# Patient Record
Sex: FEMALE | Race: WHITE | NOT HISPANIC OR LATINO | Employment: OTHER | URBAN - METROPOLITAN AREA
[De-identification: names, ages, dates, MRNs, and addresses within clinical notes are randomized per-mention and may not be internally consistent; named-entity substitution may affect disease eponyms.]

---

## 2018-04-05 LAB
ALBUMIN SERPL BCP-MCNC: 4.7 G/DL (ref 3.5–5.7)
ALP SERPL-CCNC: 92 IU/L (ref 40–150)
ALT SERPL W P-5'-P-CCNC: 15 IU/L (ref 0–50)
ANION GAP SERPL CALCULATED.3IONS-SCNC: 11.6 MM/L
AST SERPL W P-5'-P-CCNC: 16 U/L (ref 7–26)
BASOPHILS # BLD AUTO: 0.1 X3/UL (ref 0–0.3)
BASOPHILS # BLD AUTO: 0.9 % (ref 0–2)
BILIRUB SERPL-MCNC: 0.5 MG/DL (ref 0.3–1)
BUN SERPL-MCNC: 14 MG/DL (ref 7–25)
CALCIUM SERPL-MCNC: 10.4 MG/DL (ref 8.6–10.5)
CHLORIDE SERPL-SCNC: 100 MM/L (ref 98–107)
CHOLEST SERPL-MCNC: 216 MG/DL (ref 0–200)
CO2 SERPL-SCNC: 32 MM/L (ref 21–31)
CREAT SERPL-MCNC: 0.72 MG/DL (ref 0.6–1.2)
DEPRECATED RDW RBC AUTO: 13 % (ref 11.5–14.5)
EGFR (HISTORICAL): > 60 GFR
EGFR AFRICAN AMERICAN (HISTORICAL): > 60 GFR
EOSINOPHIL # BLD AUTO: 0.2 X3/UL (ref 0–0.5)
EOSINOPHIL NFR BLD AUTO: 2.5 % (ref 0–5)
GLUCOSE (HISTORICAL): 102 MG/DL (ref 65–99)
HCT VFR BLD AUTO: 45.4 % (ref 37–47)
HDLC SERPL-MCNC: 43 MG/DL (ref 40–60)
HGB BLD-MCNC: 15.2 G/DL (ref 12–16)
LDLC SERPL CALC-MCNC: 139.5 MG/DL (ref 75–193)
LYMPHOCYTES # BLD AUTO: 2.7 X3/UL (ref 1.2–4.2)
LYMPHOCYTES NFR BLD AUTO: 30.8 % (ref 20.5–51.1)
MCH RBC QN AUTO: 29.5 PG (ref 26–34)
MCHC RBC AUTO-ENTMCNC: 33.5 G/DL (ref 31–36)
MCV RBC AUTO: 88.1 FL (ref 81–99)
MONOCYTES # BLD AUTO: 0.5 X3/UL (ref 0–1)
MONOCYTES NFR BLD AUTO: 5.6 % (ref 1.7–12)
NEUTROPHILS # BLD AUTO: 5.2 X3/UL (ref 1.4–6.5)
NEUTS SEG NFR BLD AUTO: 60.2 % (ref 42.2–75.2)
OSMOLALITY, SERUM (HISTORICAL): 278 MOSM (ref 262–291)
PLATELET # BLD AUTO: 376 X3/UL (ref 130–400)
PMV BLD AUTO: 7.5 FL (ref 8.6–11.7)
POTASSIUM SERPL-SCNC: 4.6 MM/L (ref 3.5–5.5)
RBC # BLD AUTO: 5.16 X6/UL (ref 3.9–5.2)
SODIUM SERPL-SCNC: 139 MM/L (ref 134–143)
T4 TOTAL (HISTORICAL): 10.01 UG/DL (ref 6.1–12.2)
TOTAL PROTEIN (HISTORICAL): 8.1 G/DL (ref 6.4–8.9)
TRIGL SERPL-MCNC: 168 MG/DL (ref 44–166)
TSH SERPL DL<=0.05 MIU/L-ACNC: 2.06 UIU/M (ref 0.45–5.33)
VLDL CHOLESTEROL (HISTORICAL): 34 MG/DL (ref 5–51)
WBC # BLD AUTO: 8.7 X3/UL (ref 4.8–10.8)

## 2018-04-07 LAB
PTH-INTACT SERPL-MCNC: NORMAL PG/ML
QUESTION/PROBLEM (HISTORICAL): NORMAL

## 2018-04-08 LAB
AMPHETAMINES (HISTORICAL): NEGATIVE NG/ML
BARBITURATES (HISTORICAL): NEGATIVE NG/ML
BENZODIAZEPINES (HISTORICAL): NEGATIVE NG/ML
CANNABINOIDS (HISTORICAL): NEGATIVE NG/ML
COCAINE (METAB.), URINE (HISTORICAL): NEGATIVE NG/ML
CREATININE, URINE (HISTORICAL): 83 MG/DL (ref 20–300)
Lab: NEGATIVE NG/ML
Lab: NEGATIVE PG/ML
Lab: POSITIVE NG/ML
MEPERIDINE (HISTORICAL): NEGATIVE NG/ML
METHADONE (HISTORICAL): NEGATIVE NG/ML
OPIATES (HISTORICAL): POSITIVE NG/ML
PH UR STRIP.AUTO: 7.3 [PH] (ref 4.5–8.9)
PLEASE NOTE (HISTORICAL): NORMAL
PROPOXYPHENE (HISTORICAL): NEGATIVE NG/ML
TRAMADOL (HISTORICAL): NEGATIVE NG/ML

## 2018-07-19 ENCOUNTER — TRANSCRIBE ORDERS (OUTPATIENT)
Dept: LAB | Facility: MEDICAL CENTER | Age: 58
End: 2018-07-19

## 2018-07-19 ENCOUNTER — APPOINTMENT (OUTPATIENT)
Dept: LAB | Facility: MEDICAL CENTER | Age: 58
End: 2018-07-19
Payer: MEDICARE

## 2018-07-19 DIAGNOSIS — M19.90 ARTHRITIS: ICD-10-CM

## 2018-07-19 DIAGNOSIS — G62.9 NEUROPATHY: Primary | ICD-10-CM

## 2018-07-19 DIAGNOSIS — F31.9 BIPOLAR AFFECTIVE DISORDER, REMISSION STATUS UNSPECIFIED (HCC): ICD-10-CM

## 2018-07-19 DIAGNOSIS — E53.8 LOW VITAMIN B12 LEVEL: ICD-10-CM

## 2018-07-19 DIAGNOSIS — G62.9 NEUROPATHY: ICD-10-CM

## 2018-07-19 LAB
ANION GAP SERPL CALCULATED.3IONS-SCNC: 5 MMOL/L (ref 4–13)
BUN SERPL-MCNC: 14 MG/DL (ref 5–25)
CALCIUM SERPL-MCNC: 9.9 MG/DL (ref 8.3–10.1)
CHLORIDE SERPL-SCNC: 106 MMOL/L (ref 100–108)
CO2 SERPL-SCNC: 27 MMOL/L (ref 21–32)
CREAT SERPL-MCNC: 0.81 MG/DL (ref 0.6–1.3)
CRP SERPL QL: 14.7 MG/L
ERYTHROCYTE [DISTWIDTH] IN BLOOD BY AUTOMATED COUNT: 13 % (ref 11.6–15.1)
GFR SERPL CREATININE-BSD FRML MDRD: 81 ML/MIN/1.73SQ M
GLUCOSE SERPL-MCNC: 83 MG/DL (ref 65–140)
HCT VFR BLD AUTO: 48.3 % (ref 34.8–46.1)
HGB BLD-MCNC: 15.5 G/DL (ref 11.5–15.4)
MCH RBC QN AUTO: 29 PG (ref 26.8–34.3)
MCHC RBC AUTO-ENTMCNC: 32.1 G/DL (ref 31.4–37.4)
MCV RBC AUTO: 90 FL (ref 82–98)
PLATELET # BLD AUTO: 365 THOUSANDS/UL (ref 149–390)
PMV BLD AUTO: 9.6 FL (ref 8.9–12.7)
POTASSIUM SERPL-SCNC: 3.8 MMOL/L (ref 3.5–5.3)
RBC # BLD AUTO: 5.35 MILLION/UL (ref 3.81–5.12)
SODIUM SERPL-SCNC: 138 MMOL/L (ref 136–145)
WBC # BLD AUTO: 9.7 THOUSAND/UL (ref 4.31–10.16)

## 2018-07-19 PROCEDURE — 36415 COLL VENOUS BLD VENIPUNCTURE: CPT

## 2018-07-19 PROCEDURE — 85027 COMPLETE CBC AUTOMATED: CPT

## 2018-07-19 PROCEDURE — 86618 LYME DISEASE ANTIBODY: CPT

## 2018-07-19 PROCEDURE — 86140 C-REACTIVE PROTEIN: CPT

## 2018-07-19 PROCEDURE — 86430 RHEUMATOID FACTOR TEST QUAL: CPT

## 2018-07-19 PROCEDURE — 80048 BASIC METABOLIC PNL TOTAL CA: CPT

## 2018-07-20 LAB
B BURGDOR IGG SER IA-ACNC: 0.2
B BURGDOR IGM SER IA-ACNC: 0.58
RHEUMATOID FACT SER QL LA: NEGATIVE

## 2018-11-27 ENCOUNTER — TRANSCRIBE ORDERS (OUTPATIENT)
Dept: ADMINISTRATIVE | Facility: HOSPITAL | Age: 58
End: 2018-11-27

## 2018-11-27 DIAGNOSIS — R09.89 CAROTID BRUIT, UNSPECIFIED LATERALITY: Primary | ICD-10-CM

## 2018-11-27 DIAGNOSIS — Z87.891 HX OF SMOKING: ICD-10-CM

## 2018-12-21 ENCOUNTER — HOSPITAL ENCOUNTER (EMERGENCY)
Facility: HOSPITAL | Age: 58
Discharge: HOME | End: 2018-12-21
Payer: MEDICARE

## 2018-12-21 VITALS
RESPIRATION RATE: 18 BRPM | HEART RATE: 106 BPM | TEMPERATURE: 96.9 F | HEIGHT: 70 IN | DIASTOLIC BLOOD PRESSURE: 76 MMHG | SYSTOLIC BLOOD PRESSURE: 126 MMHG | WEIGHT: 180 LBS | BODY MASS INDEX: 25.77 KG/M2 | OXYGEN SATURATION: 98 %

## 2018-12-21 DIAGNOSIS — K04.7 DENTAL ABSCESS: Primary | ICD-10-CM

## 2018-12-21 PROCEDURE — 99282 EMERGENCY DEPT VISIT SF MDM: CPT

## 2018-12-21 RX ORDER — TIZANIDINE 4 MG/1
4 TABLET ORAL EVERY 8 HOURS PRN
COMMUNITY
End: 2021-04-01 | Stop reason: HOSPADM

## 2018-12-21 RX ORDER — IBUPROFEN 600 MG/1
600 TABLET ORAL 2 TIMES DAILY
COMMUNITY
End: 2021-04-01 | Stop reason: HOSPADM

## 2018-12-21 RX ORDER — MORPHINE SULFATE 30 MG/1
30 TABLET, FILM COATED, EXTENDED RELEASE ORAL 2 TIMES DAILY
COMMUNITY
End: 2022-08-03

## 2018-12-21 RX ORDER — GABAPENTIN 300 MG/1
300 CAPSULE ORAL AS NEEDED
COMMUNITY
End: 2022-08-03

## 2018-12-21 RX ORDER — AMOXICILLIN AND CLAVULANATE POTASSIUM 875; 125 MG/1; MG/1
1 TABLET, FILM COATED ORAL EVERY 12 HOURS
Qty: 14 TABLET | Refills: 0 | Status: SHIPPED | OUTPATIENT
Start: 2018-12-21 | End: 2018-12-28

## 2018-12-21 RX ORDER — OXYCODONE HYDROCHLORIDE AND ACETAMINOPHEN 5; 325 MG/1; MG/1
1 TABLET ORAL EVERY 4 HOURS PRN
COMMUNITY
End: 2022-08-03

## 2018-12-21 RX ORDER — CLONAZEPAM 0.5 MG/1
0.5 TABLET ORAL
COMMUNITY
End: 2021-07-13 | Stop reason: CLARIF

## 2018-12-21 RX ORDER — AMOXICILLIN AND CLAVULANATE POTASSIUM 875; 125 MG/1; MG/1
1 TABLET, FILM COATED ORAL ONCE
Status: COMPLETED | OUTPATIENT
Start: 2018-12-21 | End: 2018-12-21

## 2018-12-21 RX ADMIN — AMOXICILLIN AND CLAVULANATE POTASSIUM 1 TABLET: 875; 125 TABLET, FILM COATED ORAL at 15:14

## 2018-12-21 NOTE — ED PROVIDER NOTES
History  Chief Complaint   Patient presents with   [address] [facility], [place], operated by [facility]     Apolinar Arango is a 79-year-old female who came to the emergency department due to tooth pain with started several days prior to arrival associated with left lower facial pain  Patient states that she has done to a dentist several days ago but no definitive treatment has been instituted at this time  Patient has taken Bactrim DS for the last 1 week or so with no significant improvement noted  She denies fever or chills  History provided by:  Patient   used: No    Dental Pain   Location:  Lower  Lower teeth location:  23/LL lateral incisor  Quality:  Aching  Severity:  Moderate  Onset quality:  Gradual  Duration: Several   Timing:  Constant  Progression:  Worsening  Chronicity:  Recurrent  Context: abscess, dental caries and poor dentition    Context: cap still on, not crown fracture, not dental fracture, not enamel fracture, filling intact, not intrusion, not malocclusion, not recent dental surgery and not trauma    Relieved by:  Nothing  Worsened by:  Nothing  Ineffective treatments:  None tried  Associated symptoms: facial pain and gum swelling    Associated symptoms: no congestion, no difficulty swallowing, no drooling, no facial swelling, no fever, no headaches, no neck pain, no neck swelling, no oral bleeding, no oral lesions and no trismus    Risk factors: lack of dental care and smoking        Prior to Admission Medications   Prescriptions Last Dose Informant Patient Reported? Taking?    clonazePAM (KlonoPIN) 0 5 mg tablet   Yes Yes   Sig: Take 0 5 mg by mouth daily at bedtime   gabapentin (NEURONTIN) 300 mg capsule   Yes Yes   Sig: Take 100 mg by mouth as needed   ibuprofen (MOTRIN) 600 mg tablet   Yes Yes   Sig: Take 600 mg by mouth 2 (two) times a day   morphine (MS CONTIN) 30 mg 12 hr tablet   Yes Yes   Sig: Take 30 mg by mouth 2 (two) times a day   oxyCODONE-acetaminophen (PERCOCET) 5-325 mg per tablet   Yes Yes   Sig: Take 1 tablet by mouth every 4 (four) hours as needed for moderate pain   tiZANidine (ZANAFLEX) 4 mg tablet   Yes Yes   Sig: Take 4 mg by mouth every 8 (eight) hours as needed for muscle spasms      Facility-Administered Medications: None       Past Medical History:   Diagnosis Date    Fatty liver        Past Surgical History:   Procedure Laterality Date     SECTION      CHOLECYSTECTOMY      ROTATOR CUFF REPAIR W/ DISTAL CLAVICLE EXCISION Right     TONSILLECTOMY         History reviewed  No pertinent family history  I have reviewed and agree with the history as documented  Social History   Substance Use Topics    Smoking status: Current Every Day Smoker     Packs/day: 1 00     Types: Cigarettes    Smokeless tobacco: Never Used    Alcohol use No        Review of Systems   Constitutional: Negative for fever  HENT: Negative for congestion, drooling, facial swelling and mouth sores  Eyes: Negative  Respiratory: Negative  Cardiovascular: Negative  Gastrointestinal: Negative  Endocrine: Negative  Genitourinary: Negative  Musculoskeletal: Negative for neck pain  Skin: Negative  Allergic/Immunologic: Negative  Neurological: Negative for headaches  Hematological: Negative  Psychiatric/Behavioral: Negative  Physical Exam  Physical Exam   Constitutional: She is oriented to person, place, and time  She appears well-developed and well-nourished  No distress  HENT:   Head: Normocephalic and atraumatic  Right Ear: External ear normal    Left Ear: External ear normal    Nose: Nose normal    Mouth/Throat: Oropharynx is clear and moist  No oropharyngeal exudate  Eyes: Pupils are equal, round, and reactive to light  Conjunctivae and EOM are normal  Right eye exhibits no discharge  Left eye exhibits no discharge  No scleral icterus  Neck: Normal range of motion  Neck supple  No tracheal deviation present  No thyromegaly present     Cardiovascular: Normal rate, regular rhythm, normal heart sounds and intact distal pulses  Pulmonary/Chest: Effort normal and breath sounds normal  No respiratory distress  She has no wheezes  Abdominal: Soft  Bowel sounds are normal  She exhibits no distension  There is no tenderness  Musculoskeletal: Normal range of motion  She exhibits no edema, tenderness or deformity  Lymphadenopathy:     She has no cervical adenopathy  Neurological: She is alert and oriented to person, place, and time  No cranial nerve deficit or sensory deficit  She exhibits normal muscle tone  Coordination normal    Skin: Skin is warm and dry  No rash noted  She is not diaphoretic  No erythema  No pallor  Psychiatric: She has a normal mood and affect  Her behavior is normal  Judgment and thought content normal    Nursing note and vitals reviewed        Vital Signs  ED Triage Vitals [12/21/18 1503]   Temperature Pulse Respirations Blood Pressure SpO2   (!) 96 9 °F (36 1 °C) (!) 106 18 126/76 98 %      Temp Source Heart Rate Source Patient Position - Orthostatic VS BP Location FiO2 (%)   Tympanic Monitor Sitting Right arm --      Pain Score       3           Vitals:    12/21/18 1503   BP: 126/76   Pulse: (!) 106   Patient Position - Orthostatic VS: Sitting       Visual Acuity      ED Medications  Medications   amoxicillin-clavulanate (AUGMENTIN) 875-125 mg per tablet 1 tablet (1 tablet Oral Given 12/21/18 1514)       Diagnostic Studies  Results Reviewed     None                 No orders to display              Procedures  Procedures       Phone Contacts  ED Phone Contact    ED Course                               MDM  Number of Diagnoses or Management Options  Dental abscess: minor  Risk of Complications, Morbidity, and/or Mortality  Presenting problems: minimal  Management options: minimal    Patient Progress  Patient progress: stable    CritCare Time    Disposition  Final diagnoses:   Dental abscess     Time reflects when diagnosis was documented in both MDM as applicable and the Disposition within this note     Time User Action Codes Description Comment    12/21/2018  3:10 PM Eduardo Cade Add [K04 7] Dental abscess       ED Disposition     ED Disposition Condition Comment    Discharge  [address] discharge to home/self care  Condition at discharge: Good        Follow-up Information     Follow up With Specialties Details Why Contact Info    Oral surgeon  In 3 days            Patient's Medications   Discharge Prescriptions    AMOXICILLIN-CLAVULANATE (AUGMENTIN) 875-125 MG PER TABLET    Take 1 tablet by mouth every 12 (twelve) hours for 7 days       Start Date: 12/21/2018End Date: 12/28/2018       Order Dose: 1 tablet       Quantity: 14 tablet    Refills: 0     No discharge procedures on file      ED Provider  Electronically Signed by           Camilo Roman MD  12/21/18 0616

## 2018-12-21 NOTE — DISCHARGE INSTRUCTIONS
Dental Abscess   WHAT YOU NEED TO KNOW:   A dental abscess is a collection of pus in or around a tooth  A dental abscess is caused by bacteria  The bacteria usually enter the tooth when the enamel (outer part of the tooth) is damaged by tooth decay  Bacteria may also enter the tooth through a break or chip in the tooth, or a cut in the gum  Food particles that are stuck between the teeth for a long time may also lead to an abscess  DISCHARGE INSTRUCTIONS:   Return to the emergency department if:   · You have severe pain  · You have trouble breathing because of pain or swelling  Contact your healthcare provider if:   · Your symptoms get worse, even after treatment  · Your mouth is bleeding  · You cannot eat or drink because of pain or swelling  · Your abscess returns  · You have an injury that causes a crack in your tooth  · You have questions or concerns about your condition or care  Medicines: You may  need any of the following:  · Antibiotics  help treat a bacterial infection  · NSAIDs , such as ibuprofen, help decrease swelling, pain, and fever  This medicine is available with or without a doctor's order  NSAIDs can cause stomach bleeding or kidney problems in certain people  If you take blood thinner medicine, always ask your healthcare provider if NSAIDs are safe for you  Always read the medicine label and follow directions  · Acetaminophen  decreases pain and fever  It is available without a doctor's order  Ask how much to take and how often to take it  Follow directions  Read the labels of all other medicines you are using to see if they also contain acetaminophen, or ask your doctor or pharmacist  Acetaminophen can cause liver damage if not taken correctly  Do not use more than 4 grams (4,000 milligrams) total of acetaminophen in one day  · Prescription pain medicine  may be given  Ask your healthcare provider how to take this medicine safely   Some prescription pain medicines contain acetaminophen  Do not take other medicines that contain acetaminophen without talking to your healthcare provider  Too much acetaminophen may cause liver damage  Prescription pain medicine may cause constipation  Ask your healthcare provider how to prevent or treat constipation  · Take your medicine as directed  Contact your healthcare provider if you think your medicine is not helping or if you have side effects  Tell him of her if you are allergic to any medicine  Keep a list of the medicines, vitamins, and herbs you take  Include the amounts, and when and why you take them  Bring the list or the pill bottles to follow-up visits  Carry your medicine list with you in case of an emergency  Self-care:   · Rinse your mouth every 2 hours with salt water  This will help keep the area clean  · Gently brush your teeth twice a day with a soft tooth brush  This will help keep the area clean  · Eat soft foods as directed  Soft foods may cause less pain  Examples include applesauce, yogurt, and cooked pasta  Ask your healthcare provider how long to follow this instruction  · Apply a warm compress to your tooth or gum  Use a cotton ball or gauze soaked in warm water  Remove the compress in 10 minutes or when it becomes cool  Repeat 3 times a day  Prevent another abscess:   · Brush your teeth at least 2 times a day with fluoride toothpaste  · Use dental floss to clean between your teeth at least once a day  · Rinse your mouth with water or mouthwash after meals and snacks  · Chew sugarless gum after meals and snacks  · Limit foods that are sticky and high in sugar such as raisons  Also limit drinks high in sugar, such as soda  · See your dentist every 6 months for dental cleanings and oral exams  Follow up with your healthcare provider in 24 hours: Your healthcare provider will need to check your teeth and gums   Write down your questions so you remember to ask them during your visits  © 2017 [employer] Information is for End User's use only and may not be sold, redistributed or otherwise used for commercial purposes  All illustrations and images included in CareNotes® are the copyrighted property of [employer]  or [employer]  The above information is an  only  It is not intended as medical advice for individual conditions or treatments  Talk to your doctor, nurse or pharmacist before following any medical regimen to see if it is safe and effective for you  Dental Abscess   WHAT YOU NEED TO KNOW:   What is a dental abscess? A dental abscess is a collection of pus in or around a tooth  A dental abscess is caused by bacteria  The bacteria usually enter the tooth when the enamel (outer part of the tooth) is damaged by tooth decay  Bacteria may also enter the tooth through a break or chip in the tooth, or a cut in the gum  Food particles that are stuck between the teeth for a long time may also lead to an abscess  What increases my risk for a dental abscess? · Poor tooth care    · Medical conditions, such as diabetes, gastric reflux, or diseases that weaken the immune system     · Procedures on the tooth or the gums    · Dry mouth or very little saliva     · Smoking or drinking alcohol    · Radiation therapy of the head and neck    · Certain medicines, such as steroids, allergy, or blood pressure medicines  What are the signs and symptoms of a dental abscess? · Toothache, a loose tooth, or a tooth that is very sensitive to pressure or temperature    · Bad breath, unpleasant taste, and drooling    · Fever    · Pain, redness, and swelling of the gums, or swelling of your face and neck    · Pain when you open or close your mouth    · Trouble opening your mouth  How is a dental abscess diagnosed? Your healthcare provider will examine your teeth and gums  He or she will check for pus, redness, swelling, or a mass   You may need an x-ray to check for infection in deeper tissues or broken teeth  How is a dental abscess treated? Treatment helps treat your abscess and prevent more serious problems  · Medicines  may be given to treat a bacterial infection and decrease pain  · Incision and drainage  is a cut in the abscess to allow the pus to drain  A sample of fluid may be collected from your abscess  The fluid is sent to a lab and tested for bacteria  Ask your healthcare provider for more information  · A root canal  is a procedure to remove the bacteria and prevent more infection  It is usually done after an incision and drainage  A filling or crown will be placed over the tooth after you have healed from your root canal      · Tooth removal  may be needed if the infection affects deeper tissues  This is usually done after an incision and drainage  What can I do to care for myself? · Rinse your mouth every 2 hours with salt water  This will help keep the area clean  · Gently brush your teeth twice a day with a soft tooth brush  This will help keep the area clean  · Eat soft foods as directed  Soft foods may cause less pain  Examples include applesauce, yogurt, and cooked pasta  Ask your healthcare provider how long to follow this instruction  · Apply a warm compress to your tooth or gum  Use a cotton ball or gauze soaked in warm water  Remove the compress in 10 minutes or when it becomes cool  Repeat 3 times a day  What can I do to prevent another dental abscess? · Brush your teeth at least 2 times a day with fluoride toothpaste  · Use dental floss to clean between your teeth at least once a day  · Rinse your mouth with water or mouthwash after meals and snacks  · Chew sugarless gum after meals and snacks  · Limit foods that are sticky and high in sugar such as raisons  Also limit drinks high in sugar, such as soda       · See your dentist every 6 months for dental cleanings and oral exams   When should I seek immediate care? · You have severe pain  · You have trouble breathing because of pain or swelling  When should I contact my healthcare provider? · Your symptoms get worse, even after treatment  · Your mouth is bleeding  · You cannot eat or drink because of pain or swelling  · Your abscess returns  · You have an injury that causes a crack in your tooth  · You have questions or concerns about your condition or care  CARE AGREEMENT:   You have the right to help plan your care  Learn about your health condition and how it may be treated  Discuss treatment options with your caregivers to decide what care you want to receive  You always have the right to refuse treatment  The above information is an  only  It is not intended as medical advice for individual conditions or treatments  Talk to your doctor, nurse or pharmacist before following any medical regimen to see if it is safe and effective for you  © 2017 [employer]  Information is for End User's use only and may not be sold, redistributed or otherwise used for commercial purposes  All illustrations and images included in CareNotes® are the copyrighted property of [employer]  or [employer]

## 2019-01-07 ENCOUNTER — HOSPITAL ENCOUNTER (OUTPATIENT)
Dept: CT IMAGING | Facility: HOSPITAL | Age: 59
Discharge: HOME | End: 2019-01-07
Attending: INTERNAL MEDICINE
Payer: MEDICARE

## 2019-01-07 ENCOUNTER — HOSPITAL ENCOUNTER (OUTPATIENT)
Dept: NON INVASIVE DIAGNOSTICS | Facility: HOSPITAL | Age: 59
Discharge: HOME | End: 2019-01-07
Attending: INTERNAL MEDICINE
Payer: MEDICARE

## 2019-01-07 DIAGNOSIS — R09.89 CAROTID BRUIT, UNSPECIFIED LATERALITY: ICD-10-CM

## 2019-01-07 DIAGNOSIS — Z87.891 HX OF SMOKING: ICD-10-CM

## 2019-01-07 PROCEDURE — 93880 EXTRACRANIAL BILAT STUDY: CPT | Performed by: SURGERY

## 2019-01-07 PROCEDURE — 93880 EXTRACRANIAL BILAT STUDY: CPT

## 2020-07-16 ENCOUNTER — TRANSCRIBE ORDERS (OUTPATIENT)
Dept: ADMINISTRATIVE | Facility: HOSPITAL | Age: 60
End: 2020-07-16

## 2020-07-16 ENCOUNTER — TRANSCRIBE ORDERS (OUTPATIENT)
Dept: LAB | Facility: MEDICAL CENTER | Age: 60
End: 2020-07-16

## 2020-07-16 ENCOUNTER — APPOINTMENT (OUTPATIENT)
Dept: LAB | Facility: MEDICAL CENTER | Age: 60
End: 2020-07-16
Payer: MEDICARE

## 2020-07-16 DIAGNOSIS — M25.50 ARTHRALGIA, UNSPECIFIED JOINT: ICD-10-CM

## 2020-07-16 DIAGNOSIS — J44.9 CHRONIC OBSTRUCTIVE PULMONARY DISEASE, UNSPECIFIED COPD TYPE (HCC): ICD-10-CM

## 2020-07-16 DIAGNOSIS — R91.1 PULMONARY NODULE: Primary | ICD-10-CM

## 2020-07-16 DIAGNOSIS — E55.9 VITAMIN D DEFICIENCY: ICD-10-CM

## 2020-07-16 DIAGNOSIS — M25.511 RIGHT SHOULDER PAIN, UNSPECIFIED CHRONICITY: ICD-10-CM

## 2020-07-16 DIAGNOSIS — M54.2 NECK PAIN: ICD-10-CM

## 2020-07-16 DIAGNOSIS — R25.1 TREMOR: Primary | ICD-10-CM

## 2020-07-16 DIAGNOSIS — R91.1 PULMONARY NODULE: ICD-10-CM

## 2020-07-16 DIAGNOSIS — M54.10 RADICULOPATHY, UNSPECIFIED SPINAL REGION: ICD-10-CM

## 2020-07-16 DIAGNOSIS — M54.2 NECK PAIN: Primary | ICD-10-CM

## 2020-07-16 LAB
25(OH)D3 SERPL-MCNC: 34.9 NG/ML (ref 30–100)
ALBUMIN SERPL BCP-MCNC: 3.9 G/DL (ref 3.5–5)
ALP SERPL-CCNC: 99 U/L (ref 46–116)
ALT SERPL W P-5'-P-CCNC: 16 U/L (ref 12–78)
ANION GAP SERPL CALCULATED.3IONS-SCNC: 2 MMOL/L (ref 4–13)
AST SERPL W P-5'-P-CCNC: 11 U/L (ref 5–45)
BILIRUB SERPL-MCNC: 0.36 MG/DL (ref 0.2–1)
BUN SERPL-MCNC: 14 MG/DL (ref 5–25)
CALCIUM ALBUM COR SERPL-MCNC: 10.7 MG/DL (ref 8.3–10.1)
CALCIUM SERPL-MCNC: 10.6 MG/DL (ref 8.3–10.1)
CHLORIDE SERPL-SCNC: 109 MMOL/L (ref 100–108)
CHOLEST SERPL-MCNC: 213 MG/DL (ref 50–200)
CO2 SERPL-SCNC: 30 MMOL/L (ref 21–32)
CREAT SERPL-MCNC: 0.67 MG/DL (ref 0.6–1.3)
CRP SERPL QL: 8.3 MG/L
ERYTHROCYTE [DISTWIDTH] IN BLOOD BY AUTOMATED COUNT: 12.9 % (ref 11.6–15.1)
GFR SERPL CREATININE-BSD FRML MDRD: 97 ML/MIN/1.73SQ M
GLUCOSE P FAST SERPL-MCNC: 88 MG/DL (ref 65–99)
HCT VFR BLD AUTO: 45.8 % (ref 34.8–46.1)
HDLC SERPL-MCNC: 43 MG/DL
HGB BLD-MCNC: 15 G/DL (ref 11.5–15.4)
LDLC SERPL CALC-MCNC: 133 MG/DL (ref 0–100)
MCH RBC QN AUTO: 29.7 PG (ref 26.8–34.3)
MCHC RBC AUTO-ENTMCNC: 32.8 G/DL (ref 31.4–37.4)
MCV RBC AUTO: 91 FL (ref 82–98)
NONHDLC SERPL-MCNC: 170 MG/DL
PLATELET # BLD AUTO: 346 THOUSANDS/UL (ref 149–390)
PMV BLD AUTO: 9.4 FL (ref 8.9–12.7)
POTASSIUM SERPL-SCNC: 4.5 MMOL/L (ref 3.5–5.3)
PROT SERPL-MCNC: 8.3 G/DL (ref 6.4–8.2)
RBC # BLD AUTO: 5.05 MILLION/UL (ref 3.81–5.12)
SODIUM SERPL-SCNC: 141 MMOL/L (ref 136–145)
T4 SERPL-MCNC: 13.1 UG/DL (ref 4.7–13.3)
TRIGL SERPL-MCNC: 186 MG/DL
TSH SERPL DL<=0.05 MIU/L-ACNC: 1.36 UIU/ML (ref 0.36–3.74)
WBC # BLD AUTO: 10.34 THOUSAND/UL (ref 4.31–10.16)

## 2020-07-16 PROCEDURE — 86140 C-REACTIVE PROTEIN: CPT

## 2020-07-16 PROCEDURE — 84443 ASSAY THYROID STIM HORMONE: CPT

## 2020-07-16 PROCEDURE — 80061 LIPID PANEL: CPT

## 2020-07-16 PROCEDURE — 82306 VITAMIN D 25 HYDROXY: CPT

## 2020-07-16 PROCEDURE — 36415 COLL VENOUS BLD VENIPUNCTURE: CPT

## 2020-07-16 PROCEDURE — 80053 COMPREHEN METABOLIC PANEL: CPT

## 2020-07-16 PROCEDURE — 84436 ASSAY OF TOTAL THYROXINE: CPT

## 2020-07-16 PROCEDURE — 85027 COMPLETE CBC AUTOMATED: CPT

## 2021-02-24 ENCOUNTER — HOSPITAL ENCOUNTER (EMERGENCY)
Facility: HOSPITAL | Age: 61
Discharge: HOME | End: 2021-02-25
Attending: EMERGENCY MEDICINE | Admitting: EMERGENCY MEDICINE
Payer: MEDICARE

## 2021-02-24 DIAGNOSIS — R04.0 RIGHT-SIDED EPISTAXIS: Primary | ICD-10-CM

## 2021-02-24 PROCEDURE — 99283 EMERGENCY DEPT VISIT LOW MDM: CPT

## 2021-02-24 PROCEDURE — 99284 EMERGENCY DEPT VISIT MOD MDM: CPT | Performed by: EMERGENCY MEDICINE

## 2021-02-24 PROCEDURE — 30903 CONTROL OF NOSEBLEED: CPT | Performed by: EMERGENCY MEDICINE

## 2021-02-24 RX ORDER — OXYCODONE HYDROCHLORIDE AND ACETAMINOPHEN 5; 325 MG/1; MG/1
1 TABLET ORAL ONCE
Status: COMPLETED | OUTPATIENT
Start: 2021-02-24 | End: 2021-02-24

## 2021-02-24 RX ADMIN — OXYCODONE HYDROCHLORIDE AND ACETAMINOPHEN 1 TABLET: 5; 325 TABLET ORAL at 23:43

## 2021-02-25 VITALS
OXYGEN SATURATION: 94 % | DIASTOLIC BLOOD PRESSURE: 101 MMHG | RESPIRATION RATE: 18 BRPM | TEMPERATURE: 97.9 F | HEART RATE: 110 BPM | BODY MASS INDEX: 25.83 KG/M2 | WEIGHT: 180 LBS | SYSTOLIC BLOOD PRESSURE: 181 MMHG

## 2021-02-25 RX ADMIN — SILVER NITRATE APPLICATORS 1 APPLICATOR: 25; 75 STICK TOPICAL at 00:34

## 2021-02-25 NOTE — ED PROVIDER NOTES
History  Chief Complaint   Patient presents with    Nose Bleed     pt presents with a nose bleed that emilydnelly started at 2010hr today  pt stated she took 3-4 325mg ASA today for pain  HPI        This is a very pleasant, nontoxic, 61-year-old female presents the emergency department with a chief complaint of a right-sided nose bleed which started approximately 8:10 p m  tonight  Patient reports that she ran out of her pain medication specifically her MS Contin therefore she took 4 tablets of aspirin earlier today for pain control  Patient denies any trauma to the area  Patient also denies taking any other blood thinners  Prior to Admission Medications   Prescriptions Last Dose Informant Patient Reported? Taking? clonazePAM (KlonoPIN) 0 5 mg tablet   Yes Yes   Sig: Take 0 5 mg by mouth daily at bedtime   gabapentin (NEURONTIN) 300 mg capsule   Yes Yes   Sig: Take 100 mg by mouth as needed   ibuprofen (MOTRIN) 600 mg tablet Not Taking at Unknown time  Yes No   Sig: Take 600 mg by mouth 2 (two) times a day   morphine (MS CONTIN) 30 mg 12 hr tablet   Yes Yes   Sig: Take 30 mg by mouth 2 (two) times a day   oxyCODONE-acetaminophen (PERCOCET) 5-325 mg per tablet   Yes Yes   Sig: Take 1 tablet by mouth every 4 (four) hours as needed for moderate pain   tiZANidine (ZANAFLEX) 4 mg tablet   Yes Yes   Sig: Take 4 mg by mouth every 8 (eight) hours as needed for muscle spasms      Facility-Administered Medications: None       Past Medical History:   Diagnosis Date    Fatty liver        Past Surgical History:   Procedure Laterality Date     SECTION      CHOLECYSTECTOMY      ROTATOR CUFF REPAIR W/ DISTAL CLAVICLE EXCISION Right     TONSILLECTOMY         History reviewed  No pertinent family history  I have reviewed and agree with the history as documented      E-Cigarette/Vaping    E-Cigarette Use Never User      E-Cigarette/Vaping Substances     Social History     Tobacco Use    Smoking status: Current Every Day Smoker     Packs/day: 1 00     Types: Cigarettes    Smokeless tobacco: Never Used   Substance Use Topics    Alcohol use: No    Drug use: No       Review of Systems   Constitutional: Negative  HENT: Positive for nosebleeds  Eyes: Negative  Respiratory: Negative  Cardiovascular: Negative  Gastrointestinal: Negative  Endocrine: Negative  Genitourinary: Negative  Musculoskeletal: Negative  Skin: Negative  Allergic/Immunologic: Negative  Neurological: Negative  Hematological: Negative  Psychiatric/Behavioral: Negative  Physical Exam  Physical Exam  Vitals signs and nursing note reviewed  Constitutional:       Appearance: Normal appearance  She is normal weight  HENT:      Head: Normocephalic and atraumatic  Comments: Patient maintaining airway maintaining secretions  No stridor  No brawniness under the tongue  Uvula midline without edema  Right Ear: External ear normal       Left Ear: External ear normal       Nose: No nasal deformity, septal deviation, signs of injury, laceration, nasal tenderness, mucosal edema, congestion or rhinorrhea  Right Nostril: Epistaxis present  No septal hematoma or occlusion  Left Nostril: No foreign body, epistaxis, septal hematoma or occlusion  Right Turbinates: Not enlarged  Left Turbinates: Not enlarged  Mouth/Throat:      Mouth: Mucous membranes are moist       Pharynx: Oropharynx is clear  Eyes:      Extraocular Movements: Extraocular movements intact  Conjunctiva/sclera: Conjunctivae normal       Pupils: Pupils are equal, round, and reactive to light  Neck:      Musculoskeletal: Normal range of motion  Cardiovascular:      Rate and Rhythm: Normal rate and regular rhythm  Pulses: Normal pulses  Heart sounds: Normal heart sounds  Pulmonary:      Effort: Pulmonary effort is normal       Breath sounds: Normal breath sounds  Abdominal:      General: Abdomen is flat  Bowel sounds are normal    Musculoskeletal: Normal range of motion  Skin:     General: Skin is warm  Capillary Refill: Capillary refill takes less than 2 seconds  Neurological:      General: No focal deficit present  Mental Status: She is alert and oriented to person, place, and time  Mental status is at baseline  Psychiatric:         Mood and Affect: Mood normal          Behavior: Behavior normal          Thought Content: Thought content normal          Judgment: Judgment normal          Vital Signs  ED Triage Vitals   Temperature Pulse Respirations Blood Pressure SpO2   02/24/21 2206 02/24/21 2206 02/24/21 2206 02/24/21 2206 02/24/21 2206   97 9 °F (36 6 °C) (!) 117 18 (!) 190/108 94 %      Temp Source Heart Rate Source Patient Position - Orthostatic VS BP Location FiO2 (%)   02/24/21 2206 02/24/21 2206 -- 02/24/21 2206 --   Temporal Monitor  Right arm       Pain Score       02/24/21 2343       Worst Possible Pain           Vitals:    02/24/21 2206 02/25/21 0015 02/25/21 0030   BP: (!) 190/108 (!) 186/132 (!) 181/101   Pulse: (!) 117 (!) 111 (!) 110         Visual Acuity      ED Medications  Medications   oxyCODONE-acetaminophen (PERCOCET) 5-325 mg per tablet 1 tablet (1 tablet Oral Given 2/24/21 2343)   silver nitrate-potassium nitrate (ARZOL SILVER NITRATE) 77-66 % applicator 1 applicator (1 applicator Topical Given 2/25/21 0034)       Diagnostic Studies  Results Reviewed     None                 No orders to display              Procedures  Epistaxis management    Date/Time: 2/24/2021 10:14 PM  Performed by: Mikhail Ko DO  Authorized by: Yuko Fair III, DO   Universal Protocol:  Procedure performed by:  Consent: Verbal consent obtained    Risks and benefits: risks, benefits and alternatives were discussed  Consent given by: patient  Timeout called at: 2/24/2021 10:14 PM   Patient identity confirmed: verbally with patient and hospital-assigned identification number      Patient location:  ED  Procedure details:     Treatment site:  R anterior    Hemostasis method:  Rhino rocket and cautery    Approach:  External    Spec Headlamp used: Yes      Treatment complexity:  Extensive    Treatment episode: recurring    Post-procedure details:     Assessment:  No improvement    Patient tolerance of procedure: Tolerated well, no immediate complications  Comments:      See ED course for specifics  ED Course  ED Course as of Feb 25 0222   Wed Feb 24, 2021   2214  History physical completed  Direct pressure clamp applied, no active bleeding noted in posterior pharynx  2238 Clamp taken off, no active bleeding noted posterior pharynx, no identifiable bleeding noted currently, will re-assess  2325 Patient started to re-bleed will need a rhino rocket,   Long history of chronic pain syndrome, patient ran out of her medications and will be following up with her primary care doctor regarding this  Will proceed with 1 dose of p r n  pain medicine  2331 5 5 mm rhino-rocket, placed, started to re bleed, took out rhino-rocket, will apply direct pressure  Thu Feb 25, 2021   0019 Patient reassessed, noted to have small oozing area on inferior turbinate, will  cauterize  0026  Right near cauterized  0037 No active bleeding noted  2269 Patient has no ride home, an coached Randa Garrison can come and pick the patient up at 6:30 am                                               MDM  Number of Diagnoses or Management Options  Right-sided epistaxis:   Diagnosis management comments:  Please see ED course for specifics  Patient has chronic radicular pain in the right shoulder along with a right we scapular where she was given 1 tablet of Percocet secondary to running out of her pain medicine  Reviewed department policy about prescribing narcotics to the patient and will not be able to  Prescribe the medications for the patient    Patient will need to follow-up with a pain management doctor  After the right nare was cauterized, patient declined a re- insertion of a larger rhino rocket  Portions of the record may have been created with voice recognition software  Occasional wrong word or "sound a like" substitutions may have occurred due to the inherent limitations of voice recognition software  Read the chart carefully and recognize, using context, where substitutions have occurred  Disposition  Final diagnoses:   Right-sided epistaxis     Time reflects when diagnosis was documented in both MDM as applicable and the Disposition within this note     Time User Action Codes Description Comment    2/25/2021 12:29 AM Mindi Dance Add [R04 0] Right-sided epistaxis       ED Disposition     ED Disposition Condition Date/Time Comment    Discharge Stable u Feb 25, 2021 12:29 AM Marcia Arreguin discharge to home/self care              Follow-up Information     Follow up With Specialties Details Why Contact Info    Lisset Nayak, DO Otolaryngology   [address] [place] [address], DO Emergency Medicine, Internal Medicine   [address]  [phone]            Discharge Medication List as of 2/25/2021 12:40 AM      CONTINUE these medications which have NOT CHANGED    Details   clonazePAM (KlonoPIN) 0 5 mg tablet Take 0 5 mg by mouth daily at bedtime, Historical Med      gabapentin (NEURONTIN) 300 mg capsule Take 100 mg by mouth as needed, Historical Med      morphine (MS CONTIN) 30 mg 12 hr tablet Take 30 mg by mouth 2 (two) times a day, Historical Med      oxyCODONE-acetaminophen (PERCOCET) 5-325 mg per tablet Take 1 tablet by mouth every 4 (four) hours as needed for moderate pain, Historical Med      tiZANidine (ZANAFLEX) 4 mg tablet Take 4 mg by mouth every 8 (eight) hours as needed for muscle spasms, Historical Med      ibuprofen (MOTRIN) 600 mg tablet Take 600 mg by mouth 2 (two) times a day, Historical Med No discharge procedures on file      PDMP Review     None          ED Provider  Electronically Signed by           Andrew Hernandes III,   02/25/21 0225

## 2021-03-26 ENCOUNTER — HOSPITAL ENCOUNTER (EMERGENCY)
Facility: HOSPITAL | Age: 61
End: 2021-03-27
Attending: EMERGENCY MEDICINE | Admitting: EMERGENCY MEDICINE
Payer: MEDICARE

## 2021-03-26 ENCOUNTER — APPOINTMENT (EMERGENCY)
Dept: RADIOLOGY | Facility: HOSPITAL | Age: 61
End: 2021-03-26
Payer: MEDICARE

## 2021-03-26 DIAGNOSIS — R09.02 HYPOXIA: ICD-10-CM

## 2021-03-26 DIAGNOSIS — J18.9 PNEUMONIA: ICD-10-CM

## 2021-03-26 DIAGNOSIS — D72.829 LEUKOCYTOSIS: ICD-10-CM

## 2021-03-26 DIAGNOSIS — I31.3 PERICARDIAL EFFUSION: ICD-10-CM

## 2021-03-26 DIAGNOSIS — R73.9 HYPERGLYCEMIA: ICD-10-CM

## 2021-03-26 DIAGNOSIS — J96.90 RESPIRATORY FAILURE (HCC): Primary | ICD-10-CM

## 2021-03-26 DIAGNOSIS — R00.0 SINUS TACHYCARDIA: ICD-10-CM

## 2021-03-26 DIAGNOSIS — J90 BILATERAL PLEURAL EFFUSION: ICD-10-CM

## 2021-03-26 PROCEDURE — 94002 VENT MGMT INPAT INIT DAY: CPT

## 2021-03-26 PROCEDURE — 71045 X-RAY EXAM CHEST 1 VIEW: CPT

## 2021-03-26 PROCEDURE — 99291 CRITICAL CARE FIRST HOUR: CPT | Performed by: EMERGENCY MEDICINE

## 2021-03-26 PROCEDURE — 94760 N-INVAS EAR/PLS OXIMETRY 1: CPT

## 2021-03-26 PROCEDURE — 99285 EMERGENCY DEPT VISIT HI MDM: CPT

## 2021-03-26 PROCEDURE — 99292 CRITICAL CARE ADDL 30 MIN: CPT | Performed by: EMERGENCY MEDICINE

## 2021-03-27 ENCOUNTER — APPOINTMENT (EMERGENCY)
Dept: RADIOLOGY | Facility: HOSPITAL | Age: 61
End: 2021-03-27
Payer: MEDICARE

## 2021-03-27 ENCOUNTER — HOSPITAL ENCOUNTER (INPATIENT)
Facility: HOSPITAL | Age: 61
LOS: 5 days | Discharge: HOME HEALTH | DRG: 871 | End: 2021-04-01
Attending: INTERNAL MEDICINE | Admitting: INTERNAL MEDICINE
Payer: MEDICARE

## 2021-03-27 ENCOUNTER — APPOINTMENT (INPATIENT)
Dept: NON INVASIVE DIAGNOSTICS | Facility: HOSPITAL | Age: 61
DRG: 871 | End: 2021-03-27
Payer: MEDICARE

## 2021-03-27 ENCOUNTER — APPOINTMENT (EMERGENCY)
Dept: CT IMAGING | Facility: HOSPITAL | Age: 61
End: 2021-03-27
Payer: MEDICARE

## 2021-03-27 VITALS
RESPIRATION RATE: 28 BRPM | DIASTOLIC BLOOD PRESSURE: 103 MMHG | WEIGHT: 180 LBS | BODY MASS INDEX: 28.25 KG/M2 | HEIGHT: 67 IN | SYSTOLIC BLOOD PRESSURE: 165 MMHG | TEMPERATURE: 97.5 F | HEART RATE: 132 BPM | OXYGEN SATURATION: 95 %

## 2021-03-27 DIAGNOSIS — E87.6 HYPOKALEMIA: ICD-10-CM

## 2021-03-27 DIAGNOSIS — I42.9 CARDIOMYOPATHY, UNSPECIFIED TYPE (HCC): Primary | ICD-10-CM

## 2021-03-27 DIAGNOSIS — J18.9 PNEUMONIA: ICD-10-CM

## 2021-03-27 DIAGNOSIS — I31.3 PERICARDIAL EFFUSION: ICD-10-CM

## 2021-03-27 DIAGNOSIS — Z72.0 TOBACCO ABUSE: ICD-10-CM

## 2021-03-27 DIAGNOSIS — A41.9 SEPSIS (HCC): ICD-10-CM

## 2021-03-27 DIAGNOSIS — R77.8 ELEVATED TROPONIN: ICD-10-CM

## 2021-03-27 PROBLEM — R79.89 ELEVATED TROPONIN: Status: ACTIVE | Noted: 2021-03-27

## 2021-03-27 PROBLEM — I10 HYPERTENSION: Status: ACTIVE | Noted: 2021-03-27

## 2021-03-27 PROBLEM — R73.9 HYPERGLYCEMIA: Status: ACTIVE | Noted: 2021-03-27

## 2021-03-27 PROBLEM — I31.39 PERICARDIAL EFFUSION: Status: ACTIVE | Noted: 2021-03-27

## 2021-03-27 PROBLEM — J96.01 ACUTE RESPIRATORY FAILURE WITH HYPOXIA (HCC): Status: ACTIVE | Noted: 2021-03-27

## 2021-03-27 PROBLEM — G89.29 CHRONIC PAIN: Status: ACTIVE | Noted: 2021-03-27

## 2021-03-27 LAB
ALBUMIN SERPL BCP-MCNC: 4.1 G/DL (ref 3.5–5.7)
ALP SERPL-CCNC: 92 U/L (ref 55–165)
ALT SERPL W P-5'-P-CCNC: 9 U/L (ref 7–52)
ANION GAP SERPL CALCULATED.3IONS-SCNC: 12 MMOL/L (ref 4–13)
ANION GAP SERPL CALCULATED.3IONS-SCNC: 9 MMOL/L (ref 4–13)
APTT PPP: 31 SECONDS (ref 23–37)
ARTERIAL PATENCY WRIST A: YES
ARTERIAL PATENCY WRIST A: YES
AST SERPL W P-5'-P-CCNC: 13 U/L (ref 13–39)
BACTERIA UR QL AUTO: NORMAL /HPF
BASE EXCESS BLDA CALC-SCNC: -1.3 MMOL/L
BASE EXCESS BLDA CALC-SCNC: -3.2 MMOL/L (ref -2–3)
BASOPHILS # BLD AUTO: 0.09 THOUSANDS/ΜL (ref 0–0.1)
BASOPHILS NFR BLD AUTO: 0 % (ref 0–1)
BILIRUB SERPL-MCNC: 0.5 MG/DL (ref 0.2–1)
BILIRUB UR QL STRIP: NEGATIVE
BNP SERPL-MCNC: 1016 PG/ML (ref 1–100)
BUN SERPL-MCNC: 12 MG/DL (ref 5–25)
BUN SERPL-MCNC: 14 MG/DL (ref 7–25)
CALCIUM SERPL-MCNC: 8.2 MG/DL (ref 8.3–10.1)
CALCIUM SERPL-MCNC: 9.6 MG/DL (ref 8.6–10.5)
CHLORIDE SERPL-SCNC: 93 MMOL/L (ref 98–107)
CHLORIDE SERPL-SCNC: 99 MMOL/L (ref 100–108)
CLARITY UR: CLEAR
CO2 SERPL-SCNC: 24 MMOL/L (ref 21–31)
CO2 SERPL-SCNC: 28 MMOL/L (ref 21–32)
COLOR UR: YELLOW
CREAT SERPL-MCNC: 1.01 MG/DL (ref 0.6–1.3)
CREAT SERPL-MCNC: 1.04 MG/DL (ref 0.6–1.2)
EOSINOPHIL # BLD AUTO: 0 THOUSAND/ΜL (ref 0–0.61)
EOSINOPHIL # BLD AUTO: 0.22 THOUSAND/UL (ref 0–0.61)
EOSINOPHIL NFR BLD AUTO: 0 % (ref 0–6)
EOSINOPHIL NFR BLD MANUAL: 1 % (ref 0–6)
ERYTHROCYTE [DISTWIDTH] IN BLOOD BY AUTOMATED COUNT: 13 % (ref 11.6–15.1)
ERYTHROCYTE [DISTWIDTH] IN BLOOD BY AUTOMATED COUNT: 13.6 % (ref 11.5–14.5)
EST. AVERAGE GLUCOSE BLD GHB EST-MCNC: 111 MG/DL
FLUAV RNA RESP QL NAA+PROBE: NEGATIVE
FLUBV RNA RESP QL NAA+PROBE: NEGATIVE
GFR SERPL CREATININE-BSD FRML MDRD: 59 ML/MIN/1.73SQ M
GFR SERPL CREATININE-BSD FRML MDRD: 61 ML/MIN/1.73SQ M
GLUCOSE SERPL-MCNC: 121 MG/DL (ref 65–140)
GLUCOSE SERPL-MCNC: 138 MG/DL (ref 65–140)
GLUCOSE SERPL-MCNC: 150 MG/DL (ref 65–140)
GLUCOSE SERPL-MCNC: 192 MG/DL (ref 65–140)
GLUCOSE SERPL-MCNC: 390 MG/DL (ref 65–99)
GLUCOSE UR STRIP-MCNC: ABNORMAL MG/DL
HBA1C MFR BLD: 5.5 %
HCO3 BLDA-SCNC: 24.4 MMOL/L (ref 22–28)
HCO3 BLDA-SCNC: 25.6 MMOL/L (ref 22–28)
HCT VFR BLD AUTO: 41.1 % (ref 34.8–46.1)
HCT VFR BLD AUTO: 42.4 % (ref 42–47)
HGB BLD-MCNC: 13.4 G/DL (ref 11.5–15.4)
HGB BLD-MCNC: 13.5 G/DL (ref 12–16)
HGB UR QL STRIP.AUTO: ABNORMAL
HOROWITZ INDEX BLDA+IHG-RTO: 100 MM[HG]
HOROWITZ INDEX BLDA+IHG-RTO: 70 MM[HG]
IMM GRANULOCYTES # BLD AUTO: 0.32 THOUSAND/UL (ref 0–0.2)
IMM GRANULOCYTES NFR BLD AUTO: 1 % (ref 0–2)
INR PPP: 1.32 (ref 0.84–1.19)
KETONES UR STRIP-MCNC: NEGATIVE MG/DL
L PNEUMO1 AG UR QL IA.RAPID: NEGATIVE
LACTATE SERPL-SCNC: 1.5 MMOL/L (ref 0.5–2)
LACTATE SERPL-SCNC: 4.9 MMOL/L (ref 0.5–2)
LEUKOCYTE ESTERASE UR QL STRIP: NEGATIVE
LYMPHOCYTES # BLD AUTO: 1.51 THOUSANDS/ΜL (ref 0.6–4.47)
LYMPHOCYTES # BLD AUTO: 29 % (ref 20–51)
LYMPHOCYTES # BLD AUTO: 6.35 THOUSAND/UL (ref 0.6–4.47)
LYMPHOCYTES NFR BLD AUTO: 6 % (ref 14–44)
MCH RBC QN AUTO: 28.7 PG (ref 26–34)
MCH RBC QN AUTO: 29.5 PG (ref 26.8–34.3)
MCHC RBC AUTO-ENTMCNC: 31.9 G/DL (ref 31–37)
MCHC RBC AUTO-ENTMCNC: 32.6 G/DL (ref 31.4–37.4)
MCV RBC AUTO: 90 FL (ref 81–99)
MCV RBC AUTO: 90 FL (ref 82–98)
MONOCYTES # BLD AUTO: 1.1 THOUSAND/UL (ref 0–1.22)
MONOCYTES # BLD AUTO: 1.62 THOUSAND/ΜL (ref 0.17–1.22)
MONOCYTES NFR BLD AUTO: 5 % (ref 4–12)
MONOCYTES NFR BLD AUTO: 6 % (ref 4–12)
NEUTROPHILS # BLD AUTO: 24.13 THOUSANDS/ΜL (ref 1.85–7.62)
NEUTS SEG # BLD: 14.24 THOUSAND/UL (ref 1.81–6.82)
NEUTS SEG NFR BLD AUTO: 65 % (ref 42–75)
NEUTS SEG NFR BLD AUTO: 87 % (ref 43–75)
NITRITE UR QL STRIP: NEGATIVE
NON-SQ EPI CELLS URNS QL MICRO: NORMAL /HPF
NRBC BLD AUTO-RTO: 0 /100 WBCS
O2 CT BLDA-SCNC: 17.9 ML/DL
O2 CT BLDA-SCNC: 18.6 ML/DL (ref 16–23)
OXYHGB MFR BLDA: 94.1 % (ref 94–97)
OXYHGB MFR BLDA: 95.3 % (ref 94–100)
PCO2 BLDA: 51.6 MM HG (ref 36–44)
PCO2 BLDA: 57 MM HG (ref 35–45)
PEEP RESPIRATORY: 6 CM[H2O]
PEEP RESPIRATORY: 6 CM[H2O]
PH BLDA: 7.25 [PH] (ref 7.35–7.45)
PH BLDA: 7.31 [PH] (ref 7.35–7.45)
PH UR STRIP.AUTO: 6.5 [PH]
PLATELET # BLD AUTO: 435 THOUSANDS/UL (ref 149–390)
PLATELET # BLD AUTO: 543 THOUSANDS/UL (ref 149–390)
PLATELET BLD QL SMEAR: ABNORMAL
PMV BLD AUTO: 8.2 FL (ref 8.6–11.7)
PMV BLD AUTO: 9.6 FL (ref 8.9–12.7)
PO2 BLDA: 216 MM HG (ref 80–100)
PO2 BLDA: 82.8 MM HG (ref 75–129)
POTASSIUM SERPL-SCNC: 3.2 MMOL/L (ref 3.5–5.5)
POTASSIUM SERPL-SCNC: 3.5 MMOL/L (ref 3.5–5.3)
PROCALCITONIN SERPL-MCNC: 2.38 NG/ML
PROT SERPL-MCNC: 7.6 G/DL (ref 6.4–8.9)
PROT UR STRIP-MCNC: ABNORMAL MG/DL
PROTHROMBIN TIME: 16.3 SECONDS (ref 11.6–14.5)
RBC # BLD AUTO: 4.55 MILLION/UL (ref 3.81–5.12)
RBC # BLD AUTO: 4.72 MILLION/UL (ref 3.9–5.2)
RBC #/AREA URNS AUTO: NORMAL /HPF
RBC MORPH BLD: NORMAL
RSV RNA RESP QL NAA+PROBE: NEGATIVE
S PNEUM AG UR QL: NEGATIVE
SARS-COV-2 RNA RESP QL NAA+PROBE: NEGATIVE
SODIUM SERPL-SCNC: 129 MMOL/L (ref 134–143)
SODIUM SERPL-SCNC: 136 MMOL/L (ref 136–145)
SP GR UR STRIP.AUTO: 1.02 (ref 1–1.03)
SPECIMEN SOURCE: ABNORMAL
SPECIMEN SOURCE: ABNORMAL
TOTAL CELLS COUNTED SPEC: 100
TROPONIN I SERPL-MCNC: 0.08 NG/ML
TROPONIN I SERPL-MCNC: 0.42 NG/ML
TROPONIN I SERPL-MCNC: 0.49 NG/ML
TROPONIN I SERPL-MCNC: 0.53 NG/ML
TSH SERPL DL<=0.05 MIU/L-ACNC: 3.05 UIU/ML (ref 0.45–5.33)
UROBILINOGEN UR QL STRIP.AUTO: 0.2 E.U./DL
VENT AC: 16
VENT AC: 18
VENT- AC: AC
VT SETTING VENT: 400 ML
VT SETTING VENT: 400 ML
WBC # BLD AUTO: 21.9 THOUSAND/UL (ref 4.8–10.8)
WBC # BLD AUTO: 27.67 THOUSAND/UL (ref 4.31–10.16)
WBC #/AREA URNS AUTO: NORMAL /HPF

## 2021-03-27 PROCEDURE — 99291 CRITICAL CARE FIRST HOUR: CPT | Performed by: INTERNAL MEDICINE

## 2021-03-27 PROCEDURE — 87070 CULTURE OTHR SPECIMN AEROBIC: CPT | Performed by: NURSE PRACTITIONER

## 2021-03-27 PROCEDURE — 99223 1ST HOSP IP/OBS HIGH 75: CPT | Performed by: INTERNAL MEDICINE

## 2021-03-27 PROCEDURE — 36620 INSERTION CATHETER ARTERY: CPT | Performed by: INTERNAL MEDICINE

## 2021-03-27 PROCEDURE — NC001 PR NO CHARGE: Performed by: INTERNAL MEDICINE

## 2021-03-27 PROCEDURE — 81001 URINALYSIS AUTO W/SCOPE: CPT | Performed by: EMERGENCY MEDICINE

## 2021-03-27 PROCEDURE — 80053 COMPREHEN METABOLIC PANEL: CPT | Performed by: EMERGENCY MEDICINE

## 2021-03-27 PROCEDURE — 94640 AIRWAY INHALATION TREATMENT: CPT

## 2021-03-27 PROCEDURE — 03HY32Z INSERTION OF MONITORING DEVICE INTO UPPER ARTERY, PERCUTANEOUS APPROACH: ICD-10-PCS | Performed by: INTERNAL MEDICINE

## 2021-03-27 PROCEDURE — 83605 ASSAY OF LACTIC ACID: CPT | Performed by: EMERGENCY MEDICINE

## 2021-03-27 PROCEDURE — 71275 CT ANGIOGRAPHY CHEST: CPT

## 2021-03-27 PROCEDURE — 84484 ASSAY OF TROPONIN QUANT: CPT | Performed by: NURSE PRACTITIONER

## 2021-03-27 PROCEDURE — 87040 BLOOD CULTURE FOR BACTERIA: CPT | Performed by: NURSE PRACTITIONER

## 2021-03-27 PROCEDURE — 82805 BLOOD GASES W/O2 SATURATION: CPT | Performed by: EMERGENCY MEDICINE

## 2021-03-27 PROCEDURE — 93005 ELECTROCARDIOGRAM TRACING: CPT

## 2021-03-27 PROCEDURE — 84484 ASSAY OF TROPONIN QUANT: CPT | Performed by: EMERGENCY MEDICINE

## 2021-03-27 PROCEDURE — 4A133B1 MONITORING OF ARTERIAL PRESSURE, PERIPHERAL, PERCUTANEOUS APPROACH: ICD-10-PCS | Performed by: INTERNAL MEDICINE

## 2021-03-27 PROCEDURE — 36600 WITHDRAWAL OF ARTERIAL BLOOD: CPT

## 2021-03-27 PROCEDURE — 96365 THER/PROPH/DIAG IV INF INIT: CPT

## 2021-03-27 PROCEDURE — 87449 NOS EACH ORGANISM AG IA: CPT | Performed by: NURSE PRACTITIONER

## 2021-03-27 PROCEDURE — 82948 REAGENT STRIP/BLOOD GLUCOSE: CPT

## 2021-03-27 PROCEDURE — 0241U HB NFCT DS VIR RESP RNA 4 TRGT: CPT | Performed by: EMERGENCY MEDICINE

## 2021-03-27 PROCEDURE — 85007 BL SMEAR W/DIFF WBC COUNT: CPT | Performed by: EMERGENCY MEDICINE

## 2021-03-27 PROCEDURE — 84443 ASSAY THYROID STIM HORMONE: CPT | Performed by: EMERGENCY MEDICINE

## 2021-03-27 PROCEDURE — 84145 PROCALCITONIN (PCT): CPT | Performed by: NURSE PRACTITIONER

## 2021-03-27 PROCEDURE — 82805 BLOOD GASES W/O2 SATURATION: CPT | Performed by: NURSE PRACTITIONER

## 2021-03-27 PROCEDURE — 94002 VENT MGMT INPAT INIT DAY: CPT

## 2021-03-27 PROCEDURE — 93306 TTE W/DOPPLER COMPLETE: CPT

## 2021-03-27 PROCEDURE — 85025 COMPLETE CBC W/AUTO DIFF WBC: CPT | Performed by: NURSE PRACTITIONER

## 2021-03-27 PROCEDURE — 5A1945Z RESPIRATORY VENTILATION, 24-96 CONSECUTIVE HOURS: ICD-10-PCS | Performed by: INTERNAL MEDICINE

## 2021-03-27 PROCEDURE — 96361 HYDRATE IV INFUSION ADD-ON: CPT

## 2021-03-27 PROCEDURE — 36415 COLL VENOUS BLD VENIPUNCTURE: CPT | Performed by: EMERGENCY MEDICINE

## 2021-03-27 PROCEDURE — 85610 PROTHROMBIN TIME: CPT | Performed by: EMERGENCY MEDICINE

## 2021-03-27 PROCEDURE — 96366 THER/PROPH/DIAG IV INF ADDON: CPT

## 2021-03-27 PROCEDURE — 83036 HEMOGLOBIN GLYCOSYLATED A1C: CPT | Performed by: NURSE PRACTITIONER

## 2021-03-27 PROCEDURE — 96367 TX/PROPH/DG ADDL SEQ IV INF: CPT

## 2021-03-27 PROCEDURE — 80048 BASIC METABOLIC PNL TOTAL CA: CPT | Performed by: NURSE PRACTITIONER

## 2021-03-27 PROCEDURE — 94760 N-INVAS EAR/PLS OXIMETRY 1: CPT

## 2021-03-27 PROCEDURE — 96375 TX/PRO/DX INJ NEW DRUG ADDON: CPT

## 2021-03-27 PROCEDURE — 4A133J1 MONITORING OF ARTERIAL PULSE, PERIPHERAL, PERCUTANEOUS APPROACH: ICD-10-PCS | Performed by: INTERNAL MEDICINE

## 2021-03-27 PROCEDURE — 85730 THROMBOPLASTIN TIME PARTIAL: CPT | Performed by: EMERGENCY MEDICINE

## 2021-03-27 PROCEDURE — 93306 TTE W/DOPPLER COMPLETE: CPT | Performed by: INTERNAL MEDICINE

## 2021-03-27 PROCEDURE — G1004 CDSM NDSC: HCPCS

## 2021-03-27 PROCEDURE — 85027 COMPLETE CBC AUTOMATED: CPT | Performed by: EMERGENCY MEDICINE

## 2021-03-27 PROCEDURE — 87040 BLOOD CULTURE FOR BACTERIA: CPT | Performed by: EMERGENCY MEDICINE

## 2021-03-27 PROCEDURE — 87205 SMEAR GRAM STAIN: CPT | Performed by: NURSE PRACTITIONER

## 2021-03-27 PROCEDURE — 83880 ASSAY OF NATRIURETIC PEPTIDE: CPT | Performed by: EMERGENCY MEDICINE

## 2021-03-27 RX ORDER — PROPOFOL 10 MG/ML
5-50 INJECTION, EMULSION INTRAVENOUS
Status: DISCONTINUED | OUTPATIENT
Start: 2021-03-27 | End: 2021-03-28

## 2021-03-27 RX ORDER — METHYLPREDNISOLONE SODIUM SUCCINATE 40 MG/ML
40 INJECTION, POWDER, LYOPHILIZED, FOR SOLUTION INTRAMUSCULAR; INTRAVENOUS EVERY 12 HOURS SCHEDULED
Status: DISCONTINUED | OUTPATIENT
Start: 2021-03-27 | End: 2021-03-27

## 2021-03-27 RX ORDER — ETOMIDATE 2 MG/ML
20 INJECTION INTRAVENOUS ONCE
Status: COMPLETED | OUTPATIENT
Start: 2021-03-27 | End: 2021-03-27

## 2021-03-27 RX ORDER — FENTANYL CITRATE 50 UG/ML
INJECTION, SOLUTION INTRAMUSCULAR; INTRAVENOUS
Status: COMPLETED
Start: 2021-03-27 | End: 2021-03-27

## 2021-03-27 RX ORDER — CHLORHEXIDINE GLUCONATE 0.12 MG/ML
15 RINSE ORAL EVERY 12 HOURS SCHEDULED
Status: DISCONTINUED | OUTPATIENT
Start: 2021-03-27 | End: 2021-03-28

## 2021-03-27 RX ORDER — METHYLPREDNISOLONE SODIUM SUCCINATE 40 MG/ML
40 INJECTION, POWDER, LYOPHILIZED, FOR SOLUTION INTRAMUSCULAR; INTRAVENOUS DAILY
Status: DISCONTINUED | OUTPATIENT
Start: 2021-03-27 | End: 2021-03-29

## 2021-03-27 RX ORDER — VECURONIUM BROMIDE 1 MG/ML
10 INJECTION, POWDER, LYOPHILIZED, FOR SOLUTION INTRAVENOUS ONCE
Status: COMPLETED | OUTPATIENT
Start: 2021-03-27 | End: 2021-03-27

## 2021-03-27 RX ORDER — POTASSIUM CHLORIDE 14.9 MG/ML
20 INJECTION INTRAVENOUS ONCE
Status: COMPLETED | OUTPATIENT
Start: 2021-03-27 | End: 2021-03-27

## 2021-03-27 RX ORDER — FENTANYL CITRATE-0.9 % NACL/PF 10 MCG/ML
50 PLASTIC BAG, INJECTION (ML) INTRAVENOUS CONTINUOUS
Status: DISCONTINUED | OUTPATIENT
Start: 2021-03-27 | End: 2021-03-29

## 2021-03-27 RX ORDER — FENTANYL CITRATE 50 UG/ML
50 INJECTION, SOLUTION INTRAMUSCULAR; INTRAVENOUS ONCE
Status: COMPLETED | OUTPATIENT
Start: 2021-03-27 | End: 2021-03-27

## 2021-03-27 RX ORDER — MIDAZOLAM HYDROCHLORIDE 2 MG/2ML
2 INJECTION, SOLUTION INTRAMUSCULAR; INTRAVENOUS ONCE
Status: COMPLETED | OUTPATIENT
Start: 2021-03-27 | End: 2021-03-27

## 2021-03-27 RX ORDER — FUROSEMIDE 10 MG/ML
40 INJECTION INTRAMUSCULAR; INTRAVENOUS ONCE
Status: COMPLETED | OUTPATIENT
Start: 2021-03-27 | End: 2021-03-27

## 2021-03-27 RX ORDER — ASPIRIN 300 MG/1
300 SUPPOSITORY RECTAL ONCE
Status: COMPLETED | OUTPATIENT
Start: 2021-03-27 | End: 2021-03-27

## 2021-03-27 RX ORDER — SUCCINYLCHOLINE/SOD CL,ISO/PF 100 MG/5ML
110 SYRINGE (ML) INTRAVENOUS ONCE
Status: COMPLETED | OUTPATIENT
Start: 2021-03-27 | End: 2021-03-27

## 2021-03-27 RX ORDER — CEFEPIME HYDROCHLORIDE 2 G/50ML
2000 INJECTION, SOLUTION INTRAVENOUS ONCE
Status: COMPLETED | OUTPATIENT
Start: 2021-03-27 | End: 2021-03-27

## 2021-03-27 RX ORDER — PROPOFOL 10 MG/ML
5-50 INJECTION, EMULSION INTRAVENOUS
Status: DISCONTINUED | OUTPATIENT
Start: 2021-03-27 | End: 2021-03-27 | Stop reason: HOSPADM

## 2021-03-27 RX ORDER — LEVALBUTEROL 1.25 MG/.5ML
1.25 SOLUTION, CONCENTRATE RESPIRATORY (INHALATION)
Status: DISCONTINUED | OUTPATIENT
Start: 2021-03-27 | End: 2021-03-30

## 2021-03-27 RX ORDER — POTASSIUM CHLORIDE 20MEQ/15ML
40 LIQUID (ML) ORAL ONCE
Status: COMPLETED | OUTPATIENT
Start: 2021-03-27 | End: 2021-03-27

## 2021-03-27 RX ORDER — HEPARIN SODIUM 5000 [USP'U]/ML
5000 INJECTION, SOLUTION INTRAVENOUS; SUBCUTANEOUS EVERY 8 HOURS SCHEDULED
Status: DISCONTINUED | OUTPATIENT
Start: 2021-03-27 | End: 2021-04-01 | Stop reason: HOSPADM

## 2021-03-27 RX ADMIN — Medication 110 MG: at 00:11

## 2021-03-27 RX ADMIN — CEFTRIAXONE SODIUM 1000 MG: 10 INJECTION, POWDER, FOR SOLUTION INTRAVENOUS at 06:29

## 2021-03-27 RX ADMIN — FENTANYL CITRATE 50 MCG: 50 INJECTION INTRAMUSCULAR; INTRAVENOUS at 10:50

## 2021-03-27 RX ADMIN — HEPARIN SODIUM 5000 UNITS: 5000 INJECTION INTRAVENOUS; SUBCUTANEOUS at 05:57

## 2021-03-27 RX ADMIN — PROPOFOL 50 MCG/KG/MIN: 10 INJECTION, EMULSION INTRAVENOUS at 05:36

## 2021-03-27 RX ADMIN — FENTANYL CITRATE 50 MCG: 50 INJECTION, SOLUTION INTRAMUSCULAR; INTRAVENOUS at 10:50

## 2021-03-27 RX ADMIN — EPINEPHRINE 5 MCG/MIN: 1 INJECTION, SOLUTION, CONCENTRATE INTRAVENOUS at 09:28

## 2021-03-27 RX ADMIN — MIDAZOLAM HYDROCHLORIDE 2 MG: 1 INJECTION, SOLUTION INTRAMUSCULAR; INTRAVENOUS at 00:38

## 2021-03-27 RX ADMIN — INSULIN LISPRO 1 UNITS: 100 INJECTION, SOLUTION INTRAVENOUS; SUBCUTANEOUS at 18:02

## 2021-03-27 RX ADMIN — IPRATROPIUM BROMIDE 0.5 MG: 0.5 SOLUTION RESPIRATORY (INHALATION) at 07:57

## 2021-03-27 RX ADMIN — PROPOFOL 50 MCG/KG/MIN: 10 INJECTION, EMULSION INTRAVENOUS at 20:28

## 2021-03-27 RX ADMIN — VANCOMYCIN HYDROCHLORIDE 1750 MG: 1 INJECTION, POWDER, LYOPHILIZED, FOR SOLUTION INTRAVENOUS at 02:20

## 2021-03-27 RX ADMIN — LEVALBUTEROL HYDROCHLORIDE 1.25 MG: 1.25 SOLUTION, CONCENTRATE RESPIRATORY (INHALATION) at 07:57

## 2021-03-27 RX ADMIN — SODIUM CHLORIDE 1000 ML: 0.9 INJECTION, SOLUTION INTRAVENOUS at 02:40

## 2021-03-27 RX ADMIN — IPRATROPIUM BROMIDE 0.5 MG: 0.5 SOLUTION RESPIRATORY (INHALATION) at 14:15

## 2021-03-27 RX ADMIN — ETOMIDATE 20 MG: 20 INJECTION, SOLUTION INTRAVENOUS at 00:30

## 2021-03-27 RX ADMIN — Medication 50 MCG/HR: at 10:45

## 2021-03-27 RX ADMIN — SODIUM CHLORIDE 1000 ML: 0.9 INJECTION, SOLUTION INTRAVENOUS at 01:23

## 2021-03-27 RX ADMIN — POTASSIUM CHLORIDE 20 MEQ: 14.9 INJECTION, SOLUTION INTRAVENOUS at 05:57

## 2021-03-27 RX ADMIN — LEVALBUTEROL HYDROCHLORIDE 1.25 MG: 1.25 SOLUTION, CONCENTRATE RESPIRATORY (INHALATION) at 14:16

## 2021-03-27 RX ADMIN — LEVALBUTEROL HYDROCHLORIDE 1.25 MG: 1.25 SOLUTION, CONCENTRATE RESPIRATORY (INHALATION) at 19:43

## 2021-03-27 RX ADMIN — CHLORHEXIDINE GLUCONATE 0.12% ORAL RINSE 15 ML: 1.2 LIQUID ORAL at 23:00

## 2021-03-27 RX ADMIN — PROPOFOL 45 MCG/KG/MIN: 10 INJECTION, EMULSION INTRAVENOUS at 17:39

## 2021-03-27 RX ADMIN — HEPARIN SODIUM 5000 UNITS: 5000 INJECTION INTRAVENOUS; SUBCUTANEOUS at 23:00

## 2021-03-27 RX ADMIN — POTASSIUM CHLORIDE 40 MEQ: 20 SOLUTION ORAL at 05:57

## 2021-03-27 RX ADMIN — IPRATROPIUM BROMIDE 0.5 MG: 0.5 SOLUTION RESPIRATORY (INHALATION) at 19:43

## 2021-03-27 RX ADMIN — FUROSEMIDE 40 MG: 10 INJECTION, SOLUTION INTRAMUSCULAR; INTRAVENOUS at 11:01

## 2021-03-27 RX ADMIN — METHYLPREDNISOLONE SODIUM SUCCINATE 40 MG: 40 INJECTION, POWDER, FOR SOLUTION INTRAMUSCULAR; INTRAVENOUS at 10:45

## 2021-03-27 RX ADMIN — SODIUM CHLORIDE 448 ML: 0.9 INJECTION, SOLUTION INTRAVENOUS at 05:37

## 2021-03-27 RX ADMIN — ASPIRIN 300 MG: 300 SUPPOSITORY RECTAL at 01:18

## 2021-03-27 RX ADMIN — HEPARIN SODIUM 5000 UNITS: 5000 INJECTION INTRAVENOUS; SUBCUTANEOUS at 13:08

## 2021-03-27 RX ADMIN — CEFEPIME HYDROCHLORIDE 2000 MG: 2 INJECTION, SOLUTION INTRAVENOUS at 00:45

## 2021-03-27 RX ADMIN — AZITHROMYCIN MONOHYDRATE 500 MG: 500 INJECTION, POWDER, LYOPHILIZED, FOR SOLUTION INTRAVENOUS at 08:26

## 2021-03-27 RX ADMIN — PROPOFOL 40 MCG/KG/MIN: 10 INJECTION, EMULSION INTRAVENOUS at 13:08

## 2021-03-27 RX ADMIN — VECURONIUM BROMIDE 10 MG: 1 INJECTION, POWDER, LYOPHILIZED, FOR SOLUTION INTRAVENOUS at 01:15

## 2021-03-27 RX ADMIN — IOHEXOL 100 ML: 350 INJECTION, SOLUTION INTRAVENOUS at 01:53

## 2021-03-27 RX ADMIN — CHLORHEXIDINE GLUCONATE 0.12% ORAL RINSE 15 ML: 1.2 LIQUID ORAL at 08:26

## 2021-03-27 RX ADMIN — PROPOFOL 10 MCG/KG/MIN: 10 INJECTION, EMULSION INTRAVENOUS at 01:07

## 2021-03-27 NOTE — RESPIRATORY THERAPY NOTE
Pt was transported to ct scan via transport vent w/o incident, pt was then placed back on vent w/ prior settings

## 2021-03-27 NOTE — ASSESSMENT & PLAN NOTE
· Found to have calcification on renal arteries on CTA  · Hypertension noted on previous ER visits but not on outpatient medication  · May need to consult nephrology for FABIOLA workup

## 2021-03-27 NOTE — PLAN OF CARE
Problem: Prexisting or High Potential for Compromised Skin Integrity  Goal: Skin integrity is maintained or improved  Description: INTERVENTIONS:  - Identify patients at risk for skin breakdown  - Assess and monitor skin integrity  - Assess and monitor nutrition and hydration status  - Monitor labs   - Assess for incontinence   - Turn and reposition patient  - Assist with mobility/ambulation  - Relieve pressure over bony prominences  - Avoid friction and shearing  - Provide appropriate hygiene as needed including keeping skin clean and dry  - Evaluate need for skin moisturizer/barrier cream  - Collaborate with interdisciplinary team   - Patient/family teaching  - Consider wound care consult   Outcome: Progressing     Problem: PAIN - ADULT  Goal: Verbalizes/displays adequate comfort level or baseline comfort level  Description: Interventions:  - Encourage patient to monitor pain and request assistance  - Assess pain using appropriate pain scale  - Administer analgesics based on type and severity of pain and evaluate response  - Implement non-pharmacological measures as appropriate and evaluate response  - Consider cultural and social influences on pain and pain management  - Notify physician/advanced practitioner if interventions unsuccessful or patient reports new pain  Outcome: Progressing     Problem: INFECTION - ADULT  Goal: Absence or prevention of progression during hospitalization  Description: INTERVENTIONS:  - Assess and monitor for signs and symptoms of infection  - Monitor lab/diagnostic results  - Monitor all insertion sites, i e  indwelling lines, tubes, and drains  - Monitor endotracheal if appropriate and nasal secretions for changes in amount and color  - [place] appropriate cooling/warming therapies per order  - Administer medications as ordered  - Instruct and encourage patient and family to use good hand hygiene technique  - Identify and instruct in appropriate isolation precautions for identified infection/condition  Outcome: Progressing  Goal: Absence of fever/infection during neutropenic period  Description: INTERVENTIONS:  - Monitor WBC    Outcome: Progressing     Problem: SAFETY ADULT  Goal: Patient will remain free of falls  Description: INTERVENTIONS:  - Assess patient frequently for physical needs  -  Identify cognitive and physical deficits and behaviors that affect risk of falls    -  [place] fall precautions as indicated by assessment   - Educate patient/family on patient safety including physical limitations  - Instruct patient to call for assistance with activity based on assessment  - Modify environment to reduce risk of injury  - Consider OT/PT consult to assist with strengthening/mobility  Outcome: Progressing  Goal: Maintain or return to baseline ADL function  Description: INTERVENTIONS:  -  Assess patient's ability to carry out ADLs; assess patient's baseline for ADL function and identify physical deficits which impact ability to perform ADLs (bathing, care of mouth/teeth, toileting, grooming, dressing, etc )  - Assess/evaluate cause of self-care deficits   - Assess range of motion  - Assess patient's mobility; develop plan if impaired  - Assess patient's need for assistive devices and provide as appropriate  - Encourage maximum independence but intervene and supervise when necessary  - Involve family in performance of ADLs  - Assess for home care needs following discharge   - Consider OT consult to assist with ADL evaluation and planning for discharge  - Provide patient education as appropriate  Outcome: Progressing  Goal: Maintain or return mobility status to optimal level  Description: INTERVENTIONS:  - Assess patient's baseline mobility status (ambulation, transfers, stairs, etc )    - Identify cognitive and physical deficits and behaviors that affect mobility  - Identify mobility aids required to assist with transfers and/or ambulation (gait belt, sit-to-stand, lift, walker, cane, etc )  - [place] fall precautions as indicated by assessment  - Record patient progress and toleration of activity level on Mobility SBAR; progress patient to next Phase/Stage  - Instruct patient to call for assistance with activity based on assessment  - Consider rehabilitation consult to assist with strengthening/weightbearing, etc   Outcome: Progressing     Problem: DISCHARGE PLANNING  Goal: Discharge to home or other facility with appropriate resources  Description: INTERVENTIONS:  - Identify barriers to discharge w/patient and caregiver  - Arrange for needed discharge resources and transportation as appropriate  - Identify discharge learning needs (meds, wound care, etc )  - Arrange for interpretive services to assist at discharge as needed  - Refer to Case Management Department for coordinating discharge planning if the patient needs post-hospital services based on physician/advanced practitioner order or complex needs related to functional status, cognitive ability, or social support system  Outcome: Progressing     Problem: Knowledge Deficit  Goal: Patient/family/caregiver demonstrates understanding of disease process, treatment plan, medications, and discharge instructions  Description: Complete learning assessment and assess knowledge base    Interventions:  - Provide teaching at level of understanding  - Provide teaching via preferred learning methods  Outcome: Progressing     Problem: RESPIRATORY - ADULT  Goal: Achieves optimal ventilation and oxygenation  Description: INTERVENTIONS:  - Assess for changes in respiratory status  - Assess for changes in mentation and behavior  - Position to facilitate oxygenation and minimize respiratory effort  - Oxygen administered by appropriate delivery if ordered  - Initiate smoking cessation education as indicated  - Encourage broncho-pulmonary hygiene including cough, deep breathe, Incentive Spirometry  - Assess the need for suctioning and aspirate as needed  - Assess and instruct to report SOB or any respiratory difficulty  - Respiratory Therapy support as indicated  Outcome: Progressing     Problem: Potential for Falls  Goal: Patient will remain free of falls  Description: INTERVENTIONS:  - Assess patient frequently for physical needs  -  Identify cognitive and physical deficits and behaviors that affect risk of falls    -  [place] fall precautions as indicated by assessment   - Educate patient/family on patient safety including physical limitations  - Instruct patient to call for assistance with activity based on assessment  - Modify environment to reduce risk of injury  - Consider OT/PT consult to assist with strengthening/mobility  Outcome: Progressing     Problem: SAFETY,RESTRAINT: NV/NON-SELF DESTRUCTIVE BEHAVIOR  Goal: Remains free of harm/injury (restraint for non violent/non self-detsructive behavior)  Description: INTERVENTIONS:  - Instruct patient/family regarding restraint use   - Assess and monitor physiologic and psychological status   - Provide interventions and comfort measures to meet assessed patient needs   - Identify and implement measures to help patient regain control  - Assess readiness for release of restraint   Outcome: Progressing  Goal: Returns to optimal restraint-free functioning  Description: INTERVENTIONS:  - Assess the patient's behavior and symptoms that indicate continued need for restraint  - Identify and implement measures to help patient regain control  - Assess readiness for release of restraint   Outcome: Progressing     Problem: SELF HARM  Goal: Effect of psychiatric condition will be minimized and patient will be protected from self harm  Description: INTERVENTIONS:  - Assess impact of patient's symptoms on level of functioning, self-care needs and offer support as indicated  - Assess patient/family knowledge of depression, impact on illness and need for teaching  - Provide emotional support, presence and reassurance  - Assess for possible suicidal thoughts, ideation or self-harm  If patient expresses suicidal thoughts or statements do not leave alone, notify physician/AP immediately, initiate suicide precautions, and determine need for continual observation  - initiate consults and referrals as appropriate (a mental health professional, Spiritual Care  Outcome: Progressing     Problem: Nutrition/Hydration-ADULT  Goal: Nutrient/Hydration intake appropriate for improving, restoring or maintaining nutritional needs  Description: Monitor and assess patient's nutrition/hydration status for malnutrition  Collaborate with interdisciplinary team and initiate plan and interventions as ordered  Monitor patient's weight and dietary intake as ordered or per policy  Utilize nutrition screening tool and intervene as necessary  Determine patient's food preferences and provide high-protein, high-caloric foods as appropriate       INTERVENTIONS:  - Monitor oral intake, urinary output, labs, and treatment plans  - Assess nutrition and hydration status and recommend course of action  - Evaluate amount of meals eaten  - Assist patient with eating if necessary   - Allow adequate time for meals  - Recommend/ encourage appropriate diets, oral nutritional supplements, and vitamin/mineral supplements  - Order, calculate, and assess calorie counts as needed  - Recommend, monitor, and adjust tube feedings and TPN/PPN based on assessed needs  - Assess need for intravenous fluids  - Provide specific nutrition/hydration education as appropriate  - Include patient/family/caregiver in decisions related to nutrition  Outcome: Progressing

## 2021-03-27 NOTE — EMTALA/ACUTE CARE TRANSFER
[facility]  [address] [address]  [phone]  Dept: [phone]      EMTALA TRANSFER CONSENT    NAME Fam Courtney                                         1960                              MRN [id number]    I have been informed of my rights regarding examination, treatment, and transfer   by Dr Arturo Ovalles III, DO    Benefits:  Higher level of care, pericardial effusion    Risks:  [employer] crash      Consent for Transfer:  I acknowledge that my medical condition has been evaluated and explained to me by the emergency department physician or other qualified medical person and/or my attending physician, who has recommended that I be transferred to the service of    Dr Dann Sanderson at  [place] [address]  The above potential benefits of such transfer, the potential risks associated with such transfer, and the probable risks of not being transferred have been explained to me, and I fully understand them  The doctor has explained that, in my case, the benefits of transfer outweigh the risks  I agree to be transferred  I authorize the performance of emergency medical procedures and treatments upon me in both transit and upon arrival at the receiving facility  Additionally, I authorize the release of any and all medical records to the receiving facility and request they be transported with me, if possible  I understand that the safest mode of transportation during a medical emergency is an ambulance and that the Hospital advocates the use of this mode of transport  Risks of traveling to the receiving facility by car, including absence of medical control, life sustaining equipment, such as oxygen, and medical personnel has been explained to me and I fully understand them  (DENISE CORRECT BOX BELOW)  [  ]  I consent to the stated transfer and to be transported by ambulance/helicopter    [  ]  I consent to the stated transfer, but refuse transportation by ambulance and accept full responsibility for my transportation by car  I understand the risks of non-ambulance transfers and I exonerate the Hospital and its staff from any deterioration in my condition that results from this refusal     X___________________________________________    DATE  21  TIME________  Signature of patient or legally responsible individual signing on patient behalf           RELATIONSHIP TO PATIENT_________________________          Provider Certification    NAME Geoff Morales                                         1960                              MRN [id number]    A medical screening exam was performed on the above named patient  Based on the examination:    Condition Necessitating Transfer The primary encounter diagnosis was Leukocytosis  Diagnoses of Pneumonia, Hypoxia, Sinus tachycardia, Pericardial effusion, and Bilateral pleural effusion were also pertinent to this visit  Patient Condition:      Reason for Transfer:      Transfer Requirements: Facility     · Space available and qualified personnel available for treatment as acknowledged by    · Agreed to accept transfer and to provide appropriate medical treatment as acknowledged by          · Appropriate medical records of the examination and treatment of the patient are provided at the time of transfer   [address] [facility], [address] _______  · Transfer will be performed by qualified personnel from    and appropriate transfer equipment as required, including the use of necessary and appropriate life support measures      Provider Certification: I have examined the patient and explained the following risks and benefits of being transferred/refusing transfer to the patient/family:         Based on these reasonable risks and benefits to the patient and/or the unborn child(ebony), and based upon the information available at the time of the patients examination, I certify that the medical benefits reasonably to be expected from the provision of appropriate medical treatments at another medical facility outweigh the increasing risks, if any, to the individuals medical condition, and in the case of labor to the unborn child, from effecting the transfer      X____________________________________________ DATE 03/27/21        TIME_______      ORIGINAL - SEND TO MEDICAL RECORDS   COPY - SEND WITH PATIENT DURING TRANSFER

## 2021-03-27 NOTE — ED NOTES
Pt went to ct scan on vent and monitor with RN and resp therapy     Nereyda Elias, HORACE  03/27/21 1249
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
## 2021-03-27 NOTE — ASSESSMENT & PLAN NOTE
· Likely secondary to pneumonia now with thick ET secretions  · Obtain sputum culture and blood cultures  · Check procalcitonin  · Given Cefepime and Vancomycin in the ER --- Transitioned to Ceftriaxone and Azithromycin  · Urine negative

## 2021-03-27 NOTE — PLAN OF CARE
Problem: Prexisting or High Potential for Compromised Skin Integrity  Goal: Skin integrity is maintained or improved  Description: INTERVENTIONS:  - Identify patients at risk for skin breakdown  - Assess and monitor skin integrity  - Assess and monitor nutrition and hydration status  - Monitor labs   - Assess for incontinence   - Turn and reposition patient  - Assist with mobility/ambulation  - Relieve pressure over bony prominences  - Avoid friction and shearing  - Provide appropriate hygiene as needed including keeping skin clean and dry  - Evaluate need for skin moisturizer/barrier cream  - Collaborate with interdisciplinary team   - Patient/family teaching  - Consider wound care consult   Outcome: Progressing     Problem: PAIN - ADULT  Goal: Verbalizes/displays adequate comfort level or baseline comfort level  Description: Interventions:  - Encourage patient to monitor pain and request assistance  - Assess pain using appropriate pain scale  - Administer analgesics based on type and severity of pain and evaluate response  - Implement non-pharmacological measures as appropriate and evaluate response  - Consider cultural and social influences on pain and pain management  - Notify physician/advanced practitioner if interventions unsuccessful or patient reports new pain  Outcome: Progressing     Problem: INFECTION - ADULT  Goal: Absence or prevention of progression during hospitalization  Description: INTERVENTIONS:  - Assess and monitor for signs and symptoms of infection  - Monitor lab/diagnostic results  - Monitor all insertion sites, i e  indwelling lines, tubes, and drains  - Monitor endotracheal if appropriate and nasal secretions for changes in amount and color  - [place] appropriate cooling/warming therapies per order  - Administer medications as ordered  - Instruct and encourage patient and family to use good hand hygiene technique  - Identify and instruct in appropriate isolation precautions for identified infection/condition  Outcome: Progressing  Goal: Absence of fever/infection during neutropenic period  Description: INTERVENTIONS:  - Monitor WBC    Outcome: Progressing     Problem: SAFETY ADULT  Goal: Patient will remain free of falls  Description: INTERVENTIONS:  - Assess patient frequently for physical needs  -  Identify cognitive and physical deficits and behaviors that affect risk of falls    -  [place] fall precautions as indicated by assessment   - Educate patient/family on patient safety including physical limitations  - Instruct patient to call for assistance with activity based on assessment  - Modify environment to reduce risk of injury  - Consider OT/PT consult to assist with strengthening/mobility  Outcome: Progressing  Goal: Maintain or return to baseline ADL function  Description: INTERVENTIONS:  -  Assess patient's ability to carry out ADLs; assess patient's baseline for ADL function and identify physical deficits which impact ability to perform ADLs (bathing, care of mouth/teeth, toileting, grooming, dressing, etc )  - Assess/evaluate cause of self-care deficits   - Assess range of motion  - Assess patient's mobility; develop plan if impaired  - Assess patient's need for assistive devices and provide as appropriate  - Encourage maximum independence but intervene and supervise when necessary  - Involve family in performance of ADLs  - Assess for home care needs following discharge   - Consider OT consult to assist with ADL evaluation and planning for discharge  - Provide patient education as appropriate  Outcome: Progressing  Goal: Maintain or return mobility status to optimal level  Description: INTERVENTIONS:  - Assess patient's baseline mobility status (ambulation, transfers, stairs, etc )    - Identify cognitive and physical deficits and behaviors that affect mobility  - Identify mobility aids required to assist with transfers and/or ambulation (gait belt, sit-to-stand, lift, walker, cane, etc )  - [place] fall precautions as indicated by assessment  - Record patient progress and toleration of activity level on Mobility SBAR; progress patient to next Phase/Stage  - Instruct patient to call for assistance with activity based on assessment  - Consider rehabilitation consult to assist with strengthening/weightbearing, etc   Outcome: Progressing     Problem: DISCHARGE PLANNING  Goal: Discharge to home or other facility with appropriate resources  Description: INTERVENTIONS:  - Identify barriers to discharge w/patient and caregiver  - Arrange for needed discharge resources and transportation as appropriate  - Identify discharge learning needs (meds, wound care, etc )  - Arrange for interpretive services to assist at discharge as needed  - Refer to Case Management Department for coordinating discharge planning if the patient needs post-hospital services based on physician/advanced practitioner order or complex needs related to functional status, cognitive ability, or social support system  Outcome: Progressing     Problem: Knowledge Deficit  Goal: Patient/family/caregiver demonstrates understanding of disease process, treatment plan, medications, and discharge instructions  Description: Complete learning assessment and assess knowledge base    Interventions:  - Provide teaching at level of understanding  - Provide teaching via preferred learning methods  Outcome: Progressing     Problem: RESPIRATORY - ADULT  Goal: Achieves optimal ventilation and oxygenation  Description: INTERVENTIONS:  - Assess for changes in respiratory status  - Assess for changes in mentation and behavior  - Position to facilitate oxygenation and minimize respiratory effort  - Oxygen administered by appropriate delivery if ordered  - Initiate smoking cessation education as indicated  - Encourage broncho-pulmonary hygiene including cough, deep breathe, Incentive Spirometry  - Assess the need for suctioning and aspirate as needed  - Assess and instruct to report SOB or any respiratory difficulty  - Respiratory Therapy support as indicated  Outcome: Progressing

## 2021-03-27 NOTE — CONSULTS
Cardiology Consult  03/27/21    Referring Physian: MD YASMINE San    Chief Complain/Reason for Referal:     IMPRESSION/RECOMMENDATIONS/DISCUSSION:  1  Acute systolic and diastolic congestive heart failure  2  Severe cardiomyopathy, ejection fraction 18%, unspecified  3  Acute hypoxic respiratory failure, suspect predominately due to CHF  4  Moderate mitral regurgitation  5  COPD  6  Non MI related troponin elevation secondary to CHF      · Suspect acute hypoxic ventilator-dependent respiratory failure predominantly due to cardiogenic pulmonary edema  As such, recommend IV diuresis  40 mg IV Lasix has already been ordered--follow urine output/clinical response, renal function, electrolytes, blood pressure  · Watch for signs of cardiogenic shock  Currently, seems wet and fairly warm  Hopefully will respond well to IV Lasix  Would start inotropic therapy with norepinephrine if any signs of cardiogenic shock  If suboptimal diuresis, or worsening creatinine can consider Milrinone  · A-line can be considered  · Watch telemetry for ventricular dysrhythmia--would consider amiodarone if frequent bouts of nonsustained VT  · Will defer steroids, bronchodilators, pulmonary toileting, antibiotics, ventilator management to Critical Care Service  · Eventual ischemic evaluation coronary angiography after extubation  · Hold on BB and afterload reducers in light of marginally low BP  · D/W Dr Taylor Shoulders      ==================================================    HPI:  I am seeing this patient in cardiology consultation for:  Allyson Jones is a 61 y o  female with no prior cardiac history  She has history of fibromyalgia chronic pain as well as anxiety  She presented to the ER with respiratory distress, not feeling well generally for 2 weeks  EMS had found her hypoxic with saturations in the 70s  Was initiated on BiPAP then intubated in the ER  She received multiple nebulizers and steroids    CT chest without pulmonary embolism, but found a moderate pericardial effusion  Subsequent echocardiogram revealed ejection fraction 18% with moderate mitral regurgitation and small pericardial effusion without tamponade at this time cardiology consultation  At this time she is intubated and sedated  Past Medical History:   Diagnosis Date    Fatty liver          Scheduled Meds:  Current Facility-Administered Medications   Medication Dose Route Frequency Provider Last Rate    azithromycin  500 mg Intravenous Q24H Sherren Kinds, CRNP Stopped (03/27/21 2964)    cefTRIAXone  1,000 mg Intravenous Q24H [facility] Bilofskkaterina, CRNP 1,000 mg (03/27/21 0629)    chlorhexidine  15 mL [facility] haven, CRNP      epinephrine  1-10 mcg/min Intravenous Titrated Ruy Indu, CRNP 5 mcg/min (03/27/21 2371)    fentaNYL  50 mcg/hr Intravenous Continuous Ruy Indu, CRNP 50 mcg/hr (03/27/21 1045)    heparin (porcine)  5,000 Units Subcutaneous Q8H [address] Mariela K Bilofsky, CRNP      insulin lispro  1-5 Units Subcutaneous Q6H [address] Mariela K Bilofsky, CRNP      ipratropium  0 5 mg Nebulization Q6H Mariela K Bilofsky, CRNP      levalbuterol  1 25 mg Nebulization Q6H Mariela K Bilofsky, CRNP      methylPREDNISolone sodium succinate  40 mg Intravenous Daily Franca Louis DO      propofol  5-50 mcg/kg/min Intravenous Titrated [facility] Bilofsky, CRNP 50 mcg/kg/min (03/27/21 0536)     Continuous Infusions:epinephrine, 1-10 mcg/min, Last Rate: 5 mcg/min (03/27/21 1128)  fentaNYL, 50 mcg/hr, Last Rate: 50 mcg/hr (03/27/21 1045)  propofol, 5-50 mcg/kg/min, Last Rate: 50 mcg/kg/min (03/27/21 0536)      PRN Meds:  Allergies   Allergen Reactions    Wellbutrin [Bupropion] Arthralgia     I reviewed the Home Medication list in the chart  No family history on file      Social History     Socioeconomic History    Marital status: Single     Spouse name: Not on file    Number of children: Not on file    Years of education: Not on file    Highest education level: Not on file   Occupational History    Not on file   Social Needs    Financial resource strain: Not on file    Food insecurity     Worry: Not on file     Inability: Not on file    Transportation needs     Medical: Not on file     Non-medical: Not on file   Tobacco Use    Smoking status: Current Every Day Smoker     Packs/day: 1 00     Types: Cigarettes    Smokeless tobacco: Never Used   Substance and Sexual Activity    Alcohol use: No    Drug use: No    Sexual activity: Not on file   Lifestyle    Physical activity     Days per week: Not on file     Minutes per session: Not on file    Stress: Not on file   Relationships    Social connections     Talks on phone: Not on file     Gets together: Not on file     Attends Nondenominational service: Not on file     Active member of club or organization: Not on file     Attends meetings of clubs or organizations: Not on file     Relationship status: Not on file    Intimate partner violence     Fear of current or ex partner: Not on file     Emotionally abused: Not on file     Physically abused: Not on file     Forced sexual activity: Not on file   Other Topics Concern    Not on file   Social History Narrative    Not on file       Review of Systems - as per HPI, all others reviewed and negative  Vitals:    03/27/21 1000   BP: 101/77   Pulse: (!) 108   Resp: (!) 31   Temp: 100 °F (37 8 °C)   SpO2: 100%     I/O       03/25 0701 - 03/26 0700 03/26 0701 - 03/27 0700 03/27 0701 - 03/28 0700    I V  (mL/kg)  9 8 (0 1)     IV Piggyback  448 300    Total Intake(mL/kg)  457 8 (6) 300 (3 9)    Urine (mL/kg/hr)   150 (0 5)    Total Output   150    Net  +457 8 +150               Weight (last 2 days)     Date/Time   Weight    03/27/21 0500   76 1 (167 77)              GEN: NAD, Alert  HEENT: Mucus membranes moist, pink conjunctivae  EYES: Pupils equal, sclera anicteric  NECK: No JVD/HJR, no carotid bruit  CARDIOVASCULAR: RRR, No murmur, rub, gallops S1,S2, no parasternal heave/thrill  LUNGS: Clear To auscultation bilaterally  ABDOMEN: Soft, nondistended, no hepatic systolic pulsation  EXTREMITIES/VASCULAR: No edema  PSYCH: Normal Affect by limited examination  NEURO: Grossly intact by limited examination    HEME: No significant bleeding, bruising, petechia by limited examination  SKIN: No significant rashes by limited examination  MSK:  Normal upper extremity and trunk strengths by limited examination      EKG:  Sinus rhythm, LVH  TELE:  Sinus rhythm  CXR:  Pulmonary vascular congestion, pleural effusions    ECHO:  Ejection fraction 18%, moderate mitral regurgitation, small pericardial effusion    Results from last 7 days   Lab Units 03/27/21  0529 03/27/21  0012   WBC Thousand/uL 27 67* 21 90*   HEMOGLOBIN g/dL 13 4 13 5   HEMATOCRIT % 41 1 42 4   PLATELETS Thousands/uL 435* 543*   NEUTROS PCT % 87*  --    MONOS PCT % 6  --    MONO PCT %  --  5     Results from last 7 days   Lab Units 03/27/21  0522 03/27/21  0011   POTASSIUM mmol/L 3 5 3 2*   CHLORIDE mmol/L 99* 93*   CO2 mmol/L 28 24   BUN mg/dL 12 14   CREATININE mg/dL 1 01 1 04   CALCIUM mg/dL 8 2* 9 6     Results from last 7 days   Lab Units 03/27/21  0522 03/27/21  0011   POTASSIUM mmol/L 3 5 3 2*   CHLORIDE mmol/L 99* 93*   CO2 mmol/L 28 24   BUN mg/dL 12 14   CREATININE mg/dL 1 01 1 04   CALCIUM mg/dL 8 2* 9 6   ALK PHOS U/L  --  92   ALT U/L  --  9   AST U/L  --  13     No results found for: TROPONINT      Results from last 7 days   Lab Units 03/27/21  0927   TROPONIN I ng/mL 0 49*         Results from last 7 days   Lab Units 03/27/21  0011   INR  1 32*               I have personally reviewed the EKG, CXR and Telemetry images directly        Patient Active Problem List    Diagnosis Date Noted    Acute respiratory failure with hypoxia ([facility] [address] ) 03/27/2021     Priority: Low    [facility] 03/27/2021     Priority: Low    Pericardial effusion 03/27/2021     Priority: Low    Hypertension 03/27/2021     Priority: Low    Hyperglycemia 03/27/2021     Priority: Low    Chronic pain 03/27/2021     Priority: Low    Elevated troponin 03/27/2021     Priority: Low    Vitamin D deficiency 11/25/2013     Priority: Low       Portions of the record may have been created with voice recognition software  Occasional wrong word or "sound a like" substitutions may have occurred due to the inherent limitations of voice recognition software  Read the chart carefully and recognize, using context, where substitutions have occurred

## 2021-03-27 NOTE — ASSESSMENT & PLAN NOTE
· Per her PCP has not been feeling well for 2 weeks  Activated EMS due to shortness of breath  Found tripoding with O2 saturation in the 70's   Placed on BiPAP and received nebulizers and steroids in the ER for wheezing without improvement  · Intubated at [facility] and transferred to [facility]  · Multifactorial related to sepsis concerning for pneumonia vs atelectasis vs pericardial effusion  · Received Cefepime and Vancomycin in the ER transitioned to Ceftriaxone and Azithromycin  · Continue on mechanical ventilation  · Check ABG  · Maintain O2 saturation >90%  · Bronchodilators

## 2021-03-27 NOTE — ASSESSMENT & PLAN NOTE
· Likely secondary to sepsis vs pericardial effusion  · Trend troponins  · Bilateral pleural effusions noted on CTA with no previous echocardiogram  · Obtain echo

## 2021-03-27 NOTE — RESPIRATORY THERAPY NOTE
Pt arrived to ed in resp distress via ambulance, pt was on a neb rx w/ sats in the low 80's, bipap was started at 2350 w/ settings of 12/6 and 100%, pt tripoding and yelling "I can't breathe!" repeatedly, did not tolerate bipap well, pulling at mask, although sats increased to 100%, pt was on bipap for about 15 minutes and dr decided to intubate, pt was then intubated at [facility] w/ 7 0 ett after 2 attempts, ett secured at 21@ lips after comfirmed w/ +co2 detection, ausciltation, cxr was then ordered, pt was then placed on vent w/ settings given by dr Kyra Ramsey

## 2021-03-27 NOTE — ED NOTES
Patient arrived tachypnic- diaphoretic removing oxygen mask saying help me - respiratory present and BIPAP applied - oxygen sat 100%     Mary Pettit RN  03/26/21 5511

## 2021-03-27 NOTE — PROCEDURES
Arterial Line Insertion    Date/Time: 3/27/2021 1:00 PM  Performed by: Soha Madrigal MD  Authorized by: Soha Madrigal MD     Patient location:  Bedside and ICU  Consent:     Consent obtained:  Emergent situation    Consent given by:  Healthcare agent  [place] protocol:     Radiology Images displayed and confirmed  If images not available, report reviewed: yes      Required blood products, implants, devices, and special equipment available: yes      Site/side marked: yes      Immediately prior to procedure a time out was called: yes      Patient identity confirmed:  Arm band  Indications:     Indications: hemodynamic monitoring and continuous blood pressure monitoring    Pre-procedure details:     Skin preparation:  Chlorhexidine    Preparation: Patient was prepped and draped in sterile fashion    Sedation:     Sedation type: Moderate (conscious) sedation  Anesthesia (see MAR for exact dosages): Anesthesia method:  None  Procedure details:     Location / Tip of Catheter:  Radial    Laterality:  Right    German's test performed: yes      German's test abnormal: no      Needle gauge:  20 G    Placement technique:  Ultrasound guided    Number of attempts:  3    Successful placement: yes      Transducer: waveform confirmed    Post-procedure details:     Post-procedure:  Biopatch applied, secured with tape, sterile dressing applied and sutured    Patient tolerance of procedure:   Tolerated well, no immediate complications

## 2021-03-27 NOTE — ED NOTES
#7 ETT by dr Veronica Meng, 21 cm at lip, posirtive color change of co2 detector, jorge breath sounds and jorge rise and fall ogf chest       Evonne Moscoso, RN  03/27/21 7620

## 2021-03-27 NOTE — ED PROCEDURE NOTE
Procedure  Intubation    Date/Time: 3/27/2021 12:19 AM  Performed by: David Modi MD  Authorized by: David Modi MD     Patient location:  ED  Other Assisting Provider: Yes (comment) (Dr Vik Pérez)    Consent:     Consent obtained:  Emergent situation    Consent given by: emergent  Risks discussed:  Aspiration, brain injury, bleeding, death, hypoxia, dental trauma, laryngeal injury and pneumothorax    Alternatives discussed:  No treatment  Universal protocol:     Procedure explained and questions answered to patient or proxy's satisfaction: yes      Relevant documents present and verified: yes      Test results available and properly labeled: yes      Radiology Images displayed and confirmed  If images not available, report reviewed: yes      Required blood products, implants, devices, and special equipment available: yes      Site/side marked: yes      Immediately prior to procedure, a time out was called: yes      Patient identity confirmed: Anonymous protocol, patient vented/unresponsive  Pre-procedure details:     Patient status:  Altered mental status    Mallampati score:  3    Pretreatment medications:  Etomidate    Paralytics:  Succinylcholine  Indications:     Indications for intubation: respiratory distress and airway protection    Procedure details:     Preoxygenation:  Bag valve mask    CPR in progress: no      Nasal intubation technique: glidescope  Laryngoscope blade: Mac 4    Tube size (mm):  7 0    Tube type:  Cuffed    Number of attempts:  2    Ventilation between attempts: yes      Cricoid pressure: no      Tube visualized through cords: yes    Placement assessment:     ETT to lip:  21    Tube secured with:  ETT giron    Breath sounds:  Equal    Placement verification: chest rise, condensation, equal breath sounds and ETCO2 detector      CXR findings:  ETT in proper place  Post-procedure details:     Patient tolerance of procedure:   Tolerated well, no immediate complications                     Sylvain Keith MD  03/27/21 9694

## 2021-03-28 PROBLEM — I42.9 CARDIOMYOPATHY (HCC): Status: ACTIVE | Noted: 2021-03-27

## 2021-03-28 LAB
ANION GAP SERPL CALCULATED.3IONS-SCNC: 9 MMOL/L (ref 4–13)
ATRIAL RATE: 131 BPM
ATRIAL RATE: 134 BPM
BASE EXCESS BLDA CALC-SCNC: 3 MMOL/L
BASOPHILS # BLD AUTO: 0.05 THOUSANDS/ΜL (ref 0–0.1)
BASOPHILS NFR BLD AUTO: 0 % (ref 0–1)
BUN SERPL-MCNC: 18 MG/DL (ref 5–25)
CALCIUM SERPL-MCNC: 9.2 MG/DL (ref 8.3–10.1)
CHLORIDE SERPL-SCNC: 101 MMOL/L (ref 100–108)
CO2 SERPL-SCNC: 28 MMOL/L (ref 21–32)
CREAT SERPL-MCNC: 0.75 MG/DL (ref 0.6–1.3)
EOSINOPHIL # BLD AUTO: 0 THOUSAND/ΜL (ref 0–0.61)
EOSINOPHIL NFR BLD AUTO: 0 % (ref 0–6)
ERYTHROCYTE [DISTWIDTH] IN BLOOD BY AUTOMATED COUNT: 12.9 % (ref 11.6–15.1)
GFR SERPL CREATININE-BSD FRML MDRD: 87 ML/MIN/1.73SQ M
GLUCOSE SERPL-MCNC: 122 MG/DL (ref 65–140)
GLUCOSE SERPL-MCNC: 127 MG/DL (ref 65–140)
GLUCOSE SERPL-MCNC: 128 MG/DL (ref 65–140)
GLUCOSE SERPL-MCNC: 146 MG/DL (ref 65–140)
GLUCOSE SERPL-MCNC: 179 MG/DL (ref 65–140)
HCO3 BLDA-SCNC: 27.5 MMOL/L (ref 22–28)
HCT VFR BLD AUTO: 33.5 % (ref 34.8–46.1)
HGB BLD-MCNC: 11 G/DL (ref 11.5–15.4)
IMM GRANULOCYTES # BLD AUTO: 0.08 THOUSAND/UL (ref 0–0.2)
IMM GRANULOCYTES NFR BLD AUTO: 1 % (ref 0–2)
LYMPHOCYTES # BLD AUTO: 2.84 THOUSANDS/ΜL (ref 0.6–4.47)
LYMPHOCYTES NFR BLD AUTO: 19 % (ref 14–44)
MCH RBC QN AUTO: 29.3 PG (ref 26.8–34.3)
MCHC RBC AUTO-ENTMCNC: 32.8 G/DL (ref 31.4–37.4)
MCV RBC AUTO: 89 FL (ref 82–98)
MONOCYTES # BLD AUTO: 1.32 THOUSAND/ΜL (ref 0.17–1.22)
MONOCYTES NFR BLD AUTO: 9 % (ref 4–12)
NEUTROPHILS # BLD AUTO: 11.04 THOUSANDS/ΜL (ref 1.85–7.62)
NEUTS SEG NFR BLD AUTO: 71 % (ref 43–75)
NRBC BLD AUTO-RTO: 0 /100 WBCS
O2 CT BLDA-SCNC: 16.5 ML/DL (ref 16–23)
OXYHGB MFR BLDA: 95.9 % (ref 94–97)
P AXIS: 60 DEGREES
P AXIS: 61 DEGREES
PCO2 BLDA: 41.6 MM HG (ref 36–44)
PH BLDA: 7.44 [PH] (ref 7.35–7.45)
PLATELET # BLD AUTO: 350 THOUSANDS/UL (ref 149–390)
PMV BLD AUTO: 9.7 FL (ref 8.9–12.7)
PO2 BLDA: 85.6 MM HG (ref 75–129)
POTASSIUM SERPL-SCNC: 4 MMOL/L (ref 3.5–5.3)
PR INTERVAL: 142 MS
PR INTERVAL: 156 MS
PROCALCITONIN SERPL-MCNC: 3.96 NG/ML
PS CM H2O: 5
PS VENT FIO2: 50
PS VENT PEEP: 6
QRS AXIS: 82 DEGREES
QRS AXIS: 82 DEGREES
QRSD INTERVAL: 83 MS
QRSD INTERVAL: 84 MS
QT INTERVAL: 268 MS
QT INTERVAL: 308 MS
QTC INTERVAL: 400 MS
QTC INTERVAL: 455 MS
RBC # BLD AUTO: 3.76 MILLION/UL (ref 3.81–5.12)
SODIUM SERPL-SCNC: 138 MMOL/L (ref 136–145)
SPECIMEN SOURCE: NORMAL
T WAVE AXIS: -25 DEGREES
T WAVE AXIS: 69 DEGREES
VENT - PS: NORMAL
VENTRICULAR RATE: 131 BPM
VENTRICULAR RATE: 134 BPM
WBC # BLD AUTO: 15.33 THOUSAND/UL (ref 4.31–10.16)

## 2021-03-28 PROCEDURE — 94640 AIRWAY INHALATION TREATMENT: CPT

## 2021-03-28 PROCEDURE — 93005 ELECTROCARDIOGRAM TRACING: CPT

## 2021-03-28 PROCEDURE — 82805 BLOOD GASES W/O2 SATURATION: CPT | Performed by: INTERNAL MEDICINE

## 2021-03-28 PROCEDURE — 85025 COMPLETE CBC W/AUTO DIFF WBC: CPT | Performed by: NURSE PRACTITIONER

## 2021-03-28 PROCEDURE — 94003 VENT MGMT INPAT SUBQ DAY: CPT

## 2021-03-28 PROCEDURE — 84145 PROCALCITONIN (PCT): CPT | Performed by: NURSE PRACTITIONER

## 2021-03-28 PROCEDURE — 93010 ELECTROCARDIOGRAM REPORT: CPT | Performed by: INTERNAL MEDICINE

## 2021-03-28 PROCEDURE — C9113 INJ PANTOPRAZOLE SODIUM, VIA: HCPCS | Performed by: NURSE PRACTITIONER

## 2021-03-28 PROCEDURE — 94660 CPAP INITIATION&MGMT: CPT

## 2021-03-28 PROCEDURE — 93010 ELECTROCARDIOGRAM REPORT: CPT

## 2021-03-28 PROCEDURE — 82948 REAGENT STRIP/BLOOD GLUCOSE: CPT

## 2021-03-28 PROCEDURE — NC001 PR NO CHARGE: Performed by: INTERNAL MEDICINE

## 2021-03-28 PROCEDURE — 99291 CRITICAL CARE FIRST HOUR: CPT | Performed by: INTERNAL MEDICINE

## 2021-03-28 PROCEDURE — 80048 BASIC METABOLIC PNL TOTAL CA: CPT | Performed by: NURSE PRACTITIONER

## 2021-03-28 PROCEDURE — 99232 SBSQ HOSP IP/OBS MODERATE 35: CPT | Performed by: INTERNAL MEDICINE

## 2021-03-28 RX ORDER — LISINOPRIL 5 MG/1
5 TABLET ORAL DAILY
Status: DISCONTINUED | OUTPATIENT
Start: 2021-03-28 | End: 2021-03-31

## 2021-03-28 RX ORDER — FUROSEMIDE 10 MG/ML
40 INJECTION INTRAMUSCULAR; INTRAVENOUS
Status: COMPLETED | OUTPATIENT
Start: 2021-03-28 | End: 2021-03-29

## 2021-03-28 RX ORDER — CLONAZEPAM 0.5 MG/1
0.5 TABLET ORAL
Status: DISCONTINUED | OUTPATIENT
Start: 2021-03-28 | End: 2021-03-28

## 2021-03-28 RX ORDER — LORAZEPAM 2 MG/ML
1 INJECTION INTRAMUSCULAR ONCE
Status: COMPLETED | OUTPATIENT
Start: 2021-03-28 | End: 2021-03-28

## 2021-03-28 RX ORDER — PANTOPRAZOLE SODIUM 40 MG/1
40 INJECTION, POWDER, FOR SOLUTION INTRAVENOUS EVERY 12 HOURS SCHEDULED
Status: DISCONTINUED | OUTPATIENT
Start: 2021-03-28 | End: 2021-03-29

## 2021-03-28 RX ORDER — CLONAZEPAM 0.5 MG/1
0.5 TABLET ORAL
Status: DISCONTINUED | OUTPATIENT
Start: 2021-03-28 | End: 2021-04-01 | Stop reason: HOSPADM

## 2021-03-28 RX ORDER — LORAZEPAM 2 MG/ML
INJECTION INTRAMUSCULAR
Status: COMPLETED
Start: 2021-03-28 | End: 2021-03-28

## 2021-03-28 RX ORDER — NICOTINE 21 MG/24HR
14 PATCH, TRANSDERMAL 24 HOURS TRANSDERMAL DAILY
Status: DISCONTINUED | OUTPATIENT
Start: 2021-03-28 | End: 2021-04-01 | Stop reason: HOSPADM

## 2021-03-28 RX ADMIN — IPRATROPIUM BROMIDE 0.5 MG: 0.5 SOLUTION RESPIRATORY (INHALATION) at 07:49

## 2021-03-28 RX ADMIN — INSULIN LISPRO 1 UNITS: 100 INJECTION, SOLUTION INTRAVENOUS; SUBCUTANEOUS at 12:20

## 2021-03-28 RX ADMIN — IPRATROPIUM BROMIDE 0.5 MG: 0.5 SOLUTION RESPIRATORY (INHALATION) at 19:27

## 2021-03-28 RX ADMIN — SODIUM CHLORIDE 0.1 MCG/KG/HR: 9 INJECTION, SOLUTION INTRAVENOUS at 12:15

## 2021-03-28 RX ADMIN — PANTOPRAZOLE SODIUM 40 MG: 40 INJECTION, POWDER, FOR SOLUTION INTRAVENOUS at 08:01

## 2021-03-28 RX ADMIN — LORAZEPAM 1 MG: 2 INJECTION INTRAMUSCULAR; INTRAVENOUS at 12:14

## 2021-03-28 RX ADMIN — PROPOFOL 50 MCG/KG/MIN: 10 INJECTION, EMULSION INTRAVENOUS at 01:04

## 2021-03-28 RX ADMIN — HEPARIN SODIUM 5000 UNITS: 5000 INJECTION INTRAVENOUS; SUBCUTANEOUS at 14:28

## 2021-03-28 RX ADMIN — LEVALBUTEROL HYDROCHLORIDE 1.25 MG: 1.25 SOLUTION, CONCENTRATE RESPIRATORY (INHALATION) at 01:42

## 2021-03-28 RX ADMIN — PANTOPRAZOLE SODIUM 40 MG: 40 INJECTION, POWDER, FOR SOLUTION INTRAVENOUS at 03:00

## 2021-03-28 RX ADMIN — LORAZEPAM 1 MG: 2 INJECTION INTRAMUSCULAR at 12:14

## 2021-03-28 RX ADMIN — CHLORHEXIDINE GLUCONATE 0.12% ORAL RINSE 15 ML: 1.2 LIQUID ORAL at 08:01

## 2021-03-28 RX ADMIN — LEVALBUTEROL HYDROCHLORIDE 1.25 MG: 1.25 SOLUTION, CONCENTRATE RESPIRATORY (INHALATION) at 13:49

## 2021-03-28 RX ADMIN — FUROSEMIDE 40 MG: 10 INJECTION, SOLUTION INTRAMUSCULAR; INTRAVENOUS at 17:40

## 2021-03-28 RX ADMIN — Medication 14 MG: at 08:05

## 2021-03-28 RX ADMIN — HEPARIN SODIUM 5000 UNITS: 5000 INJECTION INTRAVENOUS; SUBCUTANEOUS at 22:12

## 2021-03-28 RX ADMIN — IPRATROPIUM BROMIDE 0.5 MG: 0.5 SOLUTION RESPIRATORY (INHALATION) at 01:42

## 2021-03-28 RX ADMIN — PANTOPRAZOLE SODIUM 40 MG: 40 INJECTION, POWDER, FOR SOLUTION INTRAVENOUS at 22:12

## 2021-03-28 RX ADMIN — PROPOFOL 50 MCG/KG/MIN: 10 INJECTION, EMULSION INTRAVENOUS at 06:34

## 2021-03-28 RX ADMIN — LEVALBUTEROL HYDROCHLORIDE 1.25 MG: 1.25 SOLUTION, CONCENTRATE RESPIRATORY (INHALATION) at 19:27

## 2021-03-28 RX ADMIN — CEFTRIAXONE SODIUM 1000 MG: 10 INJECTION, POWDER, FOR SOLUTION INTRAVENOUS at 05:02

## 2021-03-28 RX ADMIN — Medication 50 MCG/HR: at 01:04

## 2021-03-28 RX ADMIN — Medication 50 MCG/HR: at 22:21

## 2021-03-28 RX ADMIN — HEPARIN SODIUM 5000 UNITS: 5000 INJECTION INTRAVENOUS; SUBCUTANEOUS at 06:41

## 2021-03-28 RX ADMIN — IPRATROPIUM BROMIDE 0.5 MG: 0.5 SOLUTION RESPIRATORY (INHALATION) at 13:49

## 2021-03-28 RX ADMIN — AZITHROMYCIN MONOHYDRATE 500 MG: 500 INJECTION, POWDER, LYOPHILIZED, FOR SOLUTION INTRAVENOUS at 05:47

## 2021-03-28 RX ADMIN — LISINOPRIL 5 MG: 5 TABLET ORAL at 14:27

## 2021-03-28 RX ADMIN — CLONAZEPAM 0.5 MG: 0.5 TABLET ORAL at 22:12

## 2021-03-28 RX ADMIN — LEVALBUTEROL HYDROCHLORIDE 1.25 MG: 1.25 SOLUTION, CONCENTRATE RESPIRATORY (INHALATION) at 07:49

## 2021-03-28 RX ADMIN — METHYLPREDNISOLONE SODIUM SUCCINATE 40 MG: 40 INJECTION, POWDER, FOR SOLUTION INTRAMUSCULAR; INTRAVENOUS at 08:01

## 2021-03-28 NOTE — ASSESSMENT & PLAN NOTE
· Troponin 0 08  · BNP 1016  · Echo showed EF 18%, moderate MR, small pericardial effusion with no tamponade  · Cardiology input appreciated  · Given IV lasix yesterday with good UO  · Ischemic workup once extubated  · Monitor for cardiogenic shock

## 2021-03-28 NOTE — ASSESSMENT & PLAN NOTE
· Currently still on fentanyl and Precedex  · Wean off fentanyl and Precedex and resume below home meds  · PDMP reviewed, on Percocet prn and Morphine 30 BID -- resume

## 2021-03-28 NOTE — PROGRESS NOTES
Cardiology   MD Melissa Calvert MD Ather Mansoor, MD Pamalee Cool DO, Kathy Jones DO, Harriet Ferguson DO, [facility] [place]  -------------------------------------------------------------------  [facility]  [address], [place] [address]  [place]  [phone]        Progress Note - Cardiology   Federico Day 61 y o  female MRN: [id number]  Unit/Bed#: ICU 07 Encounter: [id number]        Assessment/Recommendations/Discussion:   1  Acute systolic and diastolic congestive heart failure  2  Severe cardiomyopathy, ejection fraction 18%, unspecified  3  Acute hypoxic respiratory failure, suspect predominately due to CHF  4  Moderate mitral regurgitation  5  COPD  6  Non MI related troponin elevation secondary to CHF      · Patient improving with diuresis, now extubated  Would re-dose 40 mg IV Lasix b i d  · Will start lisinopril 5 mg p o  Daily  · Can start beta-blocker tomorrow if blood pressure tolerating diuresis and ACE-inhibitor  · Suspect coronary angiography will be possible on Tuesday    She was recently extubated, still sitting upright on a Ventimask        Subjective:  Patient seen and examined, now extubated          Physical Exam:  GEN:  NAD  HEENT:  MMM, NCAT, pink conjunctiva, EOMI, nonicteric sclera  CV:  Mild JVD/HJR, RR, NO M/R/G, +S1/S2, NO PARASTERNAL HEAVE/THRILL, NO LE EDEMA, NO HEPATIC SYSTOLIC PULSATION, WARM EXTREMITIES  RESP:  Mildly coarse anteriorly  ABD:  SOFT, NT, NO GROSS ORGANOMEGALY        Vitals:   /87   Pulse 100   Temp 99 °F (37 2 °C)   Resp (!) 26   Ht 5' 7" (1 702 m)   Wt 76 3 kg (168 lb 3 4 oz)   SpO2 94%   BMI 26 35 kg/m²   Vitals:    03/27/21 0500 03/28/21 0600   Weight: 76 1 kg (167 lb 12 3 oz) 76 3 kg (168 lb 3 4 oz)       Intake/Output Summary (Last 24 hours) at 3/28/2021 1025  Last data filed at 3/28/2021 0600  Gross per 24 hour   Intake 886 45 ml   Output 2135 ml   Net -1248 55 ml TELEMETRY: ST  Lab Results:  Results from last 7 days   Lab Units 03/28/21 0457   WBC Thousand/uL 15 33*   HEMOGLOBIN g/dL 11 0*   HEMATOCRIT % 33 5*   PLATELETS Thousands/uL 350     Results from last 7 days   Lab Units 03/28/21 0457  03/27/21  0011   POTASSIUM mmol/L 4 0   < > 3 2*   CHLORIDE mmol/L 101   < > 93*   CO2 mmol/L 28   < > 24   BUN mg/dL 18   < > 14   CREATININE mg/dL 0 75   < > 1 04   CALCIUM mg/dL 9 2   < > 9 6   ALK PHOS U/L  --   --  92   ALT U/L  --   --  9   AST U/L  --   --  13    < > = values in this interval not displayed       Results from last 7 days   Lab Units 03/28/21 0457   POTASSIUM mmol/L 4 0   CHLORIDE mmol/L 101   CO2 mmol/L 28   BUN mg/dL 18   CREATININE mg/dL 0 75   CALCIUM mg/dL 9 2           Medications:    Current Facility-Administered Medications:     azithromycin (ZITHROMAX) 500 mg in sodium chloride 0 9% 250mL IVPB 500 mg, 500 mg, Intravenous, Q24H, ESTHER Park, Last Rate: 0 mL/hr at 03/27/21 0928, 500 mg at 03/28/21 0547    cefTRIAXone (ROCEPHIN) 1,000 mg in dextrose 5 % 50 mL IVPB, 1,000 mg, Intravenous, Q24H, ESTHER Parsons, Stopped at 03/28/21 0547    chlorhexidine (PERIDEX) 0 12 % oral rinse 15 mL, 15 mL, Swish & Spit, Q12H [address], ESTHER Parsons, 15 mL at 03/28/21 0801    fentaNYL 1000 mcg in sodium chloride 0 9% 100mL infusion, 50 mcg/hr, Intravenous, Continuous, ESTHER Lee, Last Rate: 5 mL/hr at 03/28/21 0104, 50 mcg/hr at 03/28/21 0104    heparin (porcine) subcutaneous injection 5,000 Units, 5,000 Units, Subcutaneous, Q8H [address], 5,000 Units at 03/28/21 0641 **AND** [CANCELED] Platelet count, , , Once, Mariela K Bilofsky, CRNP    insulin lispro (HumaLOG) 100 units/mL subcutaneous injection 1-5 Units, 1-5 Units, Subcutaneous, Q6H [address], 1 Units at 03/27/21 1802 **AND** Fingerstick Glucose (POCT), , , Q6H, ESTHER Park    ipratropium (ATROVENT) 0 02 % inhalation solution 0 5 mg, 0 5 mg, Nebulization, Q6H, Mariela Terry, ESTHER, 0 5 mg at 03/28/21 0749    levalbuterol [facility]) inhalation solution 1 25 mg, 1 25 mg, Nebulization, Q6H, ESTHER Park, 1 25 mg at 03/28/21 0749    methylPREDNISolone sodium succinate (Solu-MEDROL) injection 40 mg, 40 mg, Intravenous, Daily, Franca PICKETT Dostal, DO, 40 mg at 03/28/21 0801    nicotine (NICODERM CQ) 14 mg/24hr TD 24 hr patch 14 mg, 14 mg, Transdermal, Daily, JAY PalaciosNP, 14 mg at 03/28/21 0805    NOREPINEPHRINE 4 MG  ML NSS (CMPD ORDER) infusion, 1-30 mcg/min, Intravenous, Titrated, Esme Jiménez MD    pantoprazole (PROTONIX) injection 40 mg, 40 mg, Intravenous, Q12H [address], Elvia Edwards, CRNP, 40 mg at 03/28/21 0801    propofol (DIPRIVAN) 1000 mg in 100 mL infusion (premix), 5-50 mcg/kg/min, Intravenous, Titrated, ESTHER Menon, Last Rate: 24 5 mL/hr at 03/28/21 0634, 50 mcg/kg/min at 03/28/21 0555    This note was completed in part utilizing M-Modal Fluency Direct Software  Grammatical errors, random word insertions, spelling mistakes, and incomplete sentences may be an occasional consequence of this system secondary to software limitations, ambient noise, and hardware issues  If you have any questions or concerns about the content, text, or information contained within the body of this dictation, please contact the provider for clarification

## 2021-03-28 NOTE — QUICK NOTE
Spoke with patient's daughter Tyler Quiroz over the phone  Updated her of current status and plan/management  All questions/concerns were addressed

## 2021-03-28 NOTE — PLAN OF CARE
Problem: SAFETY,RESTRAINT: NV/NON-SELF DESTRUCTIVE BEHAVIOR  Goal: Returns to optimal restraint-free functioning  Description: INTERVENTIONS:  - Assess the patient's behavior and symptoms that indicate continued need for restraint  - Identify and implement measures to help patient regain control  - Assess readiness for release of restraint   3/28/2021 1252 by Mellissa Carr, RN  Outcome: Completed  3/28/2021 0727 by Mellissa Carr RN  Outcome: Progressing

## 2021-03-28 NOTE — ASSESSMENT & PLAN NOTE
· Per her PCP has not been feeling well for 2 weeks  Activated EMS due to shortness of breath  Found tripoding with O2 saturation in the 70's   Placed on BiPAP and received nebulizers and steroids in the ER for wheezing without improvement  · Intubated at [facility] and transferred to [facility]  · Secondary to volume overload (cardiogenic pulmonary edema); possible pne  · Check ABG this AM  · Maintain O2 saturation >90%  · Continue on mechanical ventilation, SBT today  · Bronchodilators

## 2021-03-28 NOTE — ASSESSMENT & PLAN NOTE
· Likely secondary to pneumonia   · Procalcitonin yesterday elevated at 2 3 (follow up procalcitonin this AM)  · Given Cefepime and Vancomycin in the ER --- Transitioned to Ceftriaxone and Azithromycin (day 2 on abx)  · Follow up blood and sputum cultures  · Urine negative

## 2021-03-28 NOTE — PROGRESS NOTES
[facility]  Progress Note - Nehemias Weeks 1960, 61 y o  female MRN: [id number]  Unit/Bed#: ICU 07 Encounter: [id number]  Primary Care Provider: Olivia Zuñiga DO   Date and time admitted to hospital: 3/27/2021  4:49 AM    * Acute respiratory failure with hypoxia [facility])  Assessment & Plan  · Per her PCP has not been feeling well for 2 weeks  Activated EMS due to shortness of breath  Found tripoding with O2 saturation in the 70's   Placed on BiPAP and received nebulizers and steroids in the ER for wheezing without improvement  · Intubated at [facility] and transferred to [facility]  · Secondary to volume overload (cardiogenic pulmonary edema); possible pne  · Check ABG this AM  · Maintain O2 saturation >90%  · Continue on mechanical ventilation, SBT today  · Bronchodilators      Sepsis ([address] )  Assessment & Plan  · Likely secondary to pneumonia   · Procalcitonin yesterday elevated at 2 3 (follow up procalcitonin this AM)  · Given Cefepime and Vancomycin in the ER --- Transitioned to Ceftriaxone and Azithromycin (day 2 on abx)  · Follow up blood and sputum cultures  · Urine negative     Severe cardiomyopathy  Assessment & Plan  · Troponin 0 08  · BNP 1016  · Echo showed EF 18%, moderate MR, small pericardial effusion with no tamponade  · Cardiology input appreciated  · Given IV lasix yesterday with good UO  · Ischemic workup once extubated  · Monitor for cardiogenic shock    Hypertension  Assessment & Plan  · No maintenance medications for HTN in outpatient setting    Hyperglycemia  Assessment & Plan  · BG on admission yesterday at 300s  · A1c 5 5% obtained on 3/27  · Blood glucose range in last 24h 122-150  · Continue SSI    Elevated troponin  Assessment & Plan  · Likely secondary to sepsis vs pericardial effusion  · Trend troponins  · Bilateral pleural effusions noted on CTA with no previous echocardiogram  · Obtain echo        ----------------------------------------------------------------------------------------  HPI/24hr events:     No overnight events    This is a 26-year-old female with past medical history of fibromyalgia, chronic pain, anxiety, no prior cardiac history who presented with respiratory distress  Reports feeling overall very weak and unwell over the last 2 weeks prior to admission  Found hypoxic by EMS (SpO2 70%), intubated in ED at [facility]  CT chest showed pericardial effusion, no PE  Transferred to [address] [place] [address]  Echo showed EF 18% with moderate MR, small pericardial effusion without tamponade  Currently on fentanyl 50 mcg/hr and propofol 50 mcg/kg/min for sedation  Disposition: Continue Critical Care   Code Status: Level 1 - Full Code  ---------------------------------------------------------------------------------------  SUBJECTIVE  Intubated and sedated    Review of Systems  Review of systems was reviewed and negative unless stated above in HPI/24-hour events   ---------------------------------------------------------------------------------------  OBJECTIVE    Vitals   Vitals:    21 0400 21 0500 21 0600 21 0715   BP:       BP Location:       Pulse: 96 94 92 96   Resp: 18 18 18 18   Temp: 99 °F (37 2 °C) 99 °F (37 2 °C) 98 6 °F (37 °C) 98 6 °F (37 °C)   TempSrc:       SpO2: 96% 97% 97% 98%   Weight:   76 3 kg (168 lb 3 4 oz)    Height:         Temp (24hrs), Av 8 °F (37 1 °C), Min:98 1 °F (36 7 °C), Max:100 °F (37 8 °C)  Current: Temperature: 98 6 °F (37 °C)  Arterial Line BP: 130/84  Arterial Line MAP (mmHg): 100 mmHg    Respiratory:  SpO2 Device: O2 Device: Ventilator      ACVC/18/400/60/6    Invasive/non-invasive ventilation settings   Respiratory    Lab Data (Last 4 hours)    None         O2/Vent Data (Last 4 hours)    None                Physical Exam  Vitals signs reviewed  Constitutional:       Comments: RASS -4   Eyes:      General: No scleral icterus    Neck: Comments: No jugular vein distension  No carotid bruit  Cardiovascular:      Rate and Rhythm: Normal rate and regular rhythm  Heart sounds: No murmur  Pulmonary:      Effort: Pulmonary effort is normal  No respiratory distress  Breath sounds: No wheezing or rales  Abdominal:      General: There is no distension  Palpations: Abdomen is soft  Musculoskeletal:         General: No swelling  Right lower leg: No edema  Left lower leg: No edema  Skin:     General: Skin is warm     Neurological:      Comments: Intubated and sedated          Laboratory and Diagnostics:  Results from last 7 days   Lab Units 03/28/21  0457 03/27/21  0529 03/27/21  0012   WBC Thousand/uL 15 33* 27 67* 21 90*   HEMOGLOBIN g/dL 11 0* 13 4 13 5   HEMATOCRIT % 33 5* 41 1 42 4   PLATELETS Thousands/uL 350 435* 543*   NEUTROS PCT % 71 87*  --    MONOS PCT % 9 6  --    MONO PCT %  --   --  5     Results from last 7 days   Lab Units 03/28/21  0457 03/27/21  0522 03/27/21  0011   SODIUM mmol/L 138 136 129*   POTASSIUM mmol/L 4 0 3 5 3 2*   CHLORIDE mmol/L 101 99* 93*   CO2 mmol/L 28 28 24   ANION GAP mmol/L 9 9 12   BUN mg/dL 18 12 14   CREATININE mg/dL 0 75 1 01 1 04   CALCIUM mg/dL 9 2 8 2* 9 6   GLUCOSE RANDOM mg/dL 127 192* 390*   ALT U/L  --   --  9   AST U/L  --   --  13   ALK PHOS U/L  --   --  92   ALBUMIN g/dL  --   --  4 1   TOTAL BILIRUBIN mg/dL  --   --  0 50          Results from last 7 days   Lab Units 03/27/21  0011   INR  1 32*   PTT seconds 31      Results from last 7 days   Lab Units 03/27/21  1512 03/27/21  0927 03/27/21  0522 03/27/21  0011   TROPONIN I ng/mL 0 42* 0 49* 0 53* 0 08*     Results from last 7 days   Lab Units 03/27/21  0326 03/27/21  0011   LACTIC ACID mmol/L 1 5 4 9*     ABG:  Results from last 7 days   Lab Units 03/27/21  0526   PH ART  7 313*   PCO2 ART mm Hg 51 6*   PO2 ART mm Hg 82 8   HCO3 ART mmol/L 25 6   BASE EXC ART mmol/L -1 3   ABG SOURCE  Radial, Left     VBG:  Results from last 7 days   Lab Units 03/27/21  0526   ABG SOURCE  Radial, Left     Results from last 7 days   Lab Units 03/27/21  0522   PROCALCITONIN ng/ml 2 38*       Micro  Results from last 7 days   Lab Units 03/27/21  0527 03/27/21  0522 03/27/21  0027 03/27/21  0011   BLOOD CULTURE   --  Received in Microbiology Lab  Culture in Progress  Received in Microbiology Lab  Culture in Progress  Received in Microbiology Lab  Culture in Progress  Received in Microbiology Lab  Culture in Progress  LEGIONELLA URINARY ANTIGEN  Negative  --   --   --    STREP PNEUMONIAE ANTIGEN, URINE  Negative  --   --   --        EKG:  Sinus  Imaging: I have personally reviewed pertinent reports  Intake and Output  I/O       03/26 0701 - 03/27 0700 03/27 0701 - 03/28 0700    I V  (mL/kg) 9 8 (0 1) 408 4 (5 4)    IV Piggyback 448 300    Total Intake(mL/kg) 457 8 (6) 708 4 (9 3)    Urine (mL/kg/hr)  2135 (1 2)    Total Output  2135    Net +457 8 -1426 6                Height and Weights   Height: 5' 7" (170 2 cm)  IBW: 61 6 kg  Body mass index is 26 35 kg/m²  Weight (last 2 days)     Date/Time   Weight    03/28/21 0600   76 3 (168 21)    03/27/21 0500   76 1 (167 77)                Nutrition       Diet Orders   (From admission, onward)             Start     Ordered    03/27/21 0510  Diet NPO  Diet effective now     Question Answer Comment   Diet Type NPO    RD to adjust diet per protocol?  Yes        03/27/21 0515                  Active Medications  Scheduled Meds:  Current Facility-Administered Medications   Medication Dose Route Frequency Provider Last Rate    azithromycin  500 mg Intravenous Q24H [facility] ESTHER Terry 0 mg (03/27/21 0928)    cefTRIAXone  1,000 mg Intravenous Q24H Sherren Kinds, CRNP Stopped (03/28/21 0547)    chlorhexidine  15 mL Swish & Spit [facility], CRNP      fentaNYL  50 mcg/hr Intravenous Continuous JAY ChenNP 50 mcg/hr (03/28/21 0104)    heparin (porcine)  5,000 Units Subcutaneous Q8H [address] Jerone Foil Supaofskkaterina, ESTHER      insulin lispro  1-5 Units Subcutaneous Q6H [address] Mariela K Bilofsky, ESTHER      ipratropium  0 5 mg Nebulization Q6H Mariela K Bilofsky, CRNICOLAS      levalbuterol  1 25 mg Nebulization Q6H Mariela K Bilofsky, CRNICOLAS      methylPREDNISolone sodium succinate  40 mg Intravenous Daily Franca Louis DO      nicotine  14 mg Transdermal Daily ESTHER Ching      norepinephrine  1-30 mcg/min Intravenous Titrated Kory Etienne MD      pantoprazole  40 mg Intravenous Q12H [address] Guillermo Altamirano, ESTHER      propofol  5-50 mcg/kg/min Intravenous Titrated Sharyle Smiling, CRNP 50 mcg/kg/min (03/28/21 0708)     Continuous Infusions:  fentaNYL, 50 mcg/hr, Last Rate: 50 mcg/hr (03/28/21 0104)  norepinephrine, 1-30 mcg/min  propofol, 5-50 mcg/kg/min, Last Rate: 50 mcg/kg/min (03/28/21 0634)      PRN Meds:        Invasive Devices Review  Invasive Devices     Peripheral Intravenous Line            Peripheral IV 03/27/21 Left Antecubital 1 day    Peripheral IV 03/27/21 Left Wrist 1 day    Peripheral IV 03/27/21 Right Antecubital 1 day          Arterial Line            Arterial Line 03/27/21 Radial less than 1 day          Drain            NG/OG Tube Orogastric Center mouth 1 day    NG/OG/Enteral Tube Orogastric Center mouth 1 day    Urethral Catheter 1 day          Airway            ETT  Cuffed 7 mm 1 day                Rationale for remaining devices:  Continue mechanical ventilation  ---------------------------------------------------------------------------------------  Advance Directive and Living Will:      Power of :    POLST:    ---------------------------------------------------------------------------------------        Kely Khan MD      Portions of the record may have been created with voice recognition software  Occasional wrong word or "sound a like" substitutions may have occurred due to the inherent limitations of voice recognition software    Read the chart carefully and recognize, using context, where substitutions have occurred

## 2021-03-28 NOTE — PLAN OF CARE
Problem: Prexisting or High Potential for Compromised Skin Integrity  Goal: Skin integrity is maintained or improved  Description: INTERVENTIONS:  - Identify patients at risk for skin breakdown  - Assess and monitor skin integrity  - Assess and monitor nutrition and hydration status  - Monitor labs   - Assess for incontinence   - Turn and reposition patient  - Assist with mobility/ambulation  - Relieve pressure over bony prominences  - Avoid friction and shearing  - Provide appropriate hygiene as needed including keeping skin clean and dry  - Evaluate need for skin moisturizer/barrier cream  - Collaborate with interdisciplinary team   - Patient/family teaching  - Consider wound care consult   Outcome: Progressing     Problem: PAIN - ADULT  Goal: Verbalizes/displays adequate comfort level or baseline comfort level  Description: Interventions:  - Encourage patient to monitor pain and request assistance  - Assess pain using appropriate pain scale  - Administer analgesics based on type and severity of pain and evaluate response  - Implement non-pharmacological measures as appropriate and evaluate response  - Consider cultural and social influences on pain and pain management  - Notify physician/advanced practitioner if interventions unsuccessful or patient reports new pain  Outcome: Progressing     Problem: INFECTION - ADULT  Goal: Absence or prevention of progression during hospitalization  Description: INTERVENTIONS:  - Assess and monitor for signs and symptoms of infection  - Monitor lab/diagnostic results  - Monitor all insertion sites, i e  indwelling lines, tubes, and drains  - Monitor endotracheal if appropriate and nasal secretions for changes in amount and color  - [place] appropriate cooling/warming therapies per order  - Administer medications as ordered  - Instruct and encourage patient and family to use good hand hygiene technique  - Identify and instruct in appropriate isolation precautions for identified infection/condition  Outcome: Progressing  Goal: Absence of fever/infection during neutropenic period  Description: INTERVENTIONS:  - Monitor WBC    Outcome: Progressing     Problem: SAFETY ADULT  Goal: Patient will remain free of falls  Description: INTERVENTIONS:  - Assess patient frequently for physical needs  -  Identify cognitive and physical deficits and behaviors that affect risk of falls    -  [place] fall precautions as indicated by assessment   - Educate patient/family on patient safety including physical limitations  - Instruct patient to call for assistance with activity based on assessment  - Modify environment to reduce risk of injury  - Consider OT/PT consult to assist with strengthening/mobility  Outcome: Progressing  Goal: Maintain or return to baseline ADL function  Description: INTERVENTIONS:  -  Assess patient's ability to carry out ADLs; assess patient's baseline for ADL function and identify physical deficits which impact ability to perform ADLs (bathing, care of mouth/teeth, toileting, grooming, dressing, etc )  - Assess/evaluate cause of self-care deficits   - Assess range of motion  - Assess patient's mobility; develop plan if impaired  - Assess patient's need for assistive devices and provide as appropriate  - Encourage maximum independence but intervene and supervise when necessary  - Involve family in performance of ADLs  - Assess for home care needs following discharge   - Consider OT consult to assist with ADL evaluation and planning for discharge  - Provide patient education as appropriate  Outcome: Progressing  Goal: Maintain or return mobility status to optimal level  Description: INTERVENTIONS:  - Assess patient's baseline mobility status (ambulation, transfers, stairs, etc )    - Identify cognitive and physical deficits and behaviors that affect mobility  - Identify mobility aids required to assist with transfers and/or ambulation (gait belt, sit-to-stand, lift, walker, cane, etc )  - [place] fall precautions as indicated by assessment  - Record patient progress and toleration of activity level on Mobility SBAR; progress patient to next Phase/Stage  - Instruct patient to call for assistance with activity based on assessment  - Consider rehabilitation consult to assist with strengthening/weightbearing, etc   Outcome: Progressing     Problem: DISCHARGE PLANNING  Goal: Discharge to home or other facility with appropriate resources  Description: INTERVENTIONS:  - Identify barriers to discharge w/patient and caregiver  - Arrange for needed discharge resources and transportation as appropriate  - Identify discharge learning needs (meds, wound care, etc )  - Arrange for interpretive services to assist at discharge as needed  - Refer to Case Management Department for coordinating discharge planning if the patient needs post-hospital services based on physician/advanced practitioner order or complex needs related to functional status, cognitive ability, or social support system  Outcome: Progressing     Problem: Knowledge Deficit  Goal: Patient/family/caregiver demonstrates understanding of disease process, treatment plan, medications, and discharge instructions  Description: Complete learning assessment and assess knowledge base    Interventions:  - Provide teaching at level of understanding  - Provide teaching via preferred learning methods  Outcome: Progressing     Problem: RESPIRATORY - ADULT  Goal: Achieves optimal ventilation and oxygenation  Description: INTERVENTIONS:  - Assess for changes in respiratory status  - Assess for changes in mentation and behavior  - Position to facilitate oxygenation and minimize respiratory effort  - Oxygen administered by appropriate delivery if ordered  - Initiate smoking cessation education as indicated  - Encourage broncho-pulmonary hygiene including cough, deep breathe, Incentive Spirometry  - Assess the need for suctioning and aspirate as needed  - Assess and instruct to report SOB or any respiratory difficulty  - Respiratory Therapy support as indicated  Outcome: Progressing     Problem: Potential for Falls  Goal: Patient will remain free of falls  Description: INTERVENTIONS:  - Assess patient frequently for physical needs  -  Identify cognitive and physical deficits and behaviors that affect risk of falls    -  [place] fall precautions as indicated by assessment   - Educate patient/family on patient safety including physical limitations  - Instruct patient to call for assistance with activity based on assessment  - Modify environment to reduce risk of injury  - Consider OT/PT consult to assist with strengthening/mobility  Outcome: Progressing     Problem: SAFETY,RESTRAINT: NV/NON-SELF DESTRUCTIVE BEHAVIOR  Goal: Remains free of harm/injury (restraint for non violent/non self-detsructive behavior)  Description: INTERVENTIONS:  - Instruct patient/family regarding restraint use   - Assess and monitor physiologic and psychological status   - Provide interventions and comfort measures to meet assessed patient needs   - Identify and implement measures to help patient regain control  - Assess readiness for release of restraint   Outcome: Progressing  Goal: Returns to optimal restraint-free functioning  Description: INTERVENTIONS:  - Assess the patient's behavior and symptoms that indicate continued need for restraint  - Identify and implement measures to help patient regain control  - Assess readiness for release of restraint   Outcome: Progressing     Problem: SELF HARM  Goal: Effect of psychiatric condition will be minimized and patient will be protected from self harm  Description: INTERVENTIONS:  - Assess impact of patient's symptoms on level of functioning, self-care needs and offer support as indicated  - Assess patient/family knowledge of depression, impact on illness and need for teaching  - Provide emotional support, presence and reassurance  - Assess for possible suicidal thoughts, ideation or self-harm  If patient expresses suicidal thoughts or statements do not leave alone, notify physician/AP immediately, initiate suicide precautions, and determine need for continual observation  - initiate consults and referrals as appropriate (a mental health professional, Spiritual Care  Outcome: Progressing     Problem: Nutrition/Hydration-ADULT  Goal: Nutrient/Hydration intake appropriate for improving, restoring or maintaining nutritional needs  Description: Monitor and assess patient's nutrition/hydration status for malnutrition  Collaborate with interdisciplinary team and initiate plan and interventions as ordered  Monitor patient's weight and dietary intake as ordered or per policy  Utilize nutrition screening tool and intervene as necessary  Determine patient's food preferences and provide high-protein, high-caloric foods as appropriate       INTERVENTIONS:  - Monitor oral intake, urinary output, labs, and treatment plans  - Assess nutrition and hydration status and recommend course of action  - Evaluate amount of meals eaten  - Assist patient with eating if necessary   - Allow adequate time for meals  - Recommend/ encourage appropriate diets, oral nutritional supplements, and vitamin/mineral supplements  - Order, calculate, and assess calorie counts as needed  - Recommend, monitor, and adjust tube feedings and TPN/PPN based on assessed needs  - Assess need for intravenous fluids  - Provide specific nutrition/hydration education as appropriate  - Include patient/family/caregiver in decisions related to nutrition  Outcome: Progressing

## 2021-03-28 NOTE — ASSESSMENT & PLAN NOTE
· BG on admission yesterday at 300s  · A1c 5 5% obtained on 3/27  · Blood glucose range in last 24h 122-150  · Continue SSI

## 2021-03-29 PROBLEM — F41.9 ANXIETY: Status: ACTIVE | Noted: 2021-03-29

## 2021-03-29 LAB
ANION GAP SERPL CALCULATED.3IONS-SCNC: 9 MMOL/L (ref 4–13)
BACTERIA SPT RESP CULT: ABNORMAL
BASOPHILS # BLD AUTO: 0.02 THOUSANDS/ΜL (ref 0–0.1)
BASOPHILS NFR BLD AUTO: 0 % (ref 0–1)
BUN SERPL-MCNC: 20 MG/DL (ref 5–25)
CALCIUM SERPL-MCNC: 9.3 MG/DL (ref 8.3–10.1)
CHLORIDE SERPL-SCNC: 100 MMOL/L (ref 100–108)
CO2 SERPL-SCNC: 29 MMOL/L (ref 21–32)
CREAT SERPL-MCNC: 0.78 MG/DL (ref 0.6–1.3)
EOSINOPHIL # BLD AUTO: 0.01 THOUSAND/ΜL (ref 0–0.61)
EOSINOPHIL NFR BLD AUTO: 0 % (ref 0–6)
ERYTHROCYTE [DISTWIDTH] IN BLOOD BY AUTOMATED COUNT: 12.9 % (ref 11.6–15.1)
GFR SERPL CREATININE-BSD FRML MDRD: 83 ML/MIN/1.73SQ M
GLUCOSE SERPL-MCNC: 111 MG/DL (ref 65–140)
GLUCOSE SERPL-MCNC: 112 MG/DL (ref 65–140)
GLUCOSE SERPL-MCNC: 183 MG/DL (ref 65–140)
GLUCOSE SERPL-MCNC: 187 MG/DL (ref 65–140)
GLUCOSE SERPL-MCNC: 93 MG/DL (ref 65–140)
GLUCOSE SERPL-MCNC: 95 MG/DL (ref 65–140)
GRAM STN SPEC: ABNORMAL
HCT VFR BLD AUTO: 36.5 % (ref 34.8–46.1)
HGB BLD-MCNC: 11.8 G/DL (ref 11.5–15.4)
IMM GRANULOCYTES # BLD AUTO: 0.05 THOUSAND/UL (ref 0–0.2)
IMM GRANULOCYTES NFR BLD AUTO: 0 % (ref 0–2)
LYMPHOCYTES # BLD AUTO: 3.66 THOUSANDS/ΜL (ref 0.6–4.47)
LYMPHOCYTES NFR BLD AUTO: 24 % (ref 14–44)
MCH RBC QN AUTO: 28.6 PG (ref 26.8–34.3)
MCHC RBC AUTO-ENTMCNC: 32.3 G/DL (ref 31.4–37.4)
MCV RBC AUTO: 88 FL (ref 82–98)
MONOCYTES # BLD AUTO: 1.09 THOUSAND/ΜL (ref 0.17–1.22)
MONOCYTES NFR BLD AUTO: 7 % (ref 4–12)
NEUTROPHILS # BLD AUTO: 10.58 THOUSANDS/ΜL (ref 1.85–7.62)
NEUTS SEG NFR BLD AUTO: 69 % (ref 43–75)
NRBC BLD AUTO-RTO: 0 /100 WBCS
PLATELET # BLD AUTO: 392 THOUSANDS/UL (ref 149–390)
PMV BLD AUTO: 9.5 FL (ref 8.9–12.7)
POTASSIUM SERPL-SCNC: 3.5 MMOL/L (ref 3.5–5.3)
RBC # BLD AUTO: 4.13 MILLION/UL (ref 3.81–5.12)
SODIUM SERPL-SCNC: 138 MMOL/L (ref 136–145)
WBC # BLD AUTO: 15.41 THOUSAND/UL (ref 4.31–10.16)

## 2021-03-29 PROCEDURE — 94660 CPAP INITIATION&MGMT: CPT

## 2021-03-29 PROCEDURE — 99291 CRITICAL CARE FIRST HOUR: CPT | Performed by: INTERNAL MEDICINE

## 2021-03-29 PROCEDURE — 99232 SBSQ HOSP IP/OBS MODERATE 35: CPT | Performed by: INTERNAL MEDICINE

## 2021-03-29 PROCEDURE — C9113 INJ PANTOPRAZOLE SODIUM, VIA: HCPCS | Performed by: NURSE PRACTITIONER

## 2021-03-29 PROCEDURE — 82948 REAGENT STRIP/BLOOD GLUCOSE: CPT

## 2021-03-29 PROCEDURE — 94640 AIRWAY INHALATION TREATMENT: CPT

## 2021-03-29 PROCEDURE — 85025 COMPLETE CBC W/AUTO DIFF WBC: CPT | Performed by: NURSE PRACTITIONER

## 2021-03-29 PROCEDURE — 80048 BASIC METABOLIC PNL TOTAL CA: CPT | Performed by: NURSE PRACTITIONER

## 2021-03-29 RX ORDER — CEFUROXIME AXETIL 250 MG/1
500 TABLET ORAL EVERY 12 HOURS SCHEDULED
Status: DISCONTINUED | OUTPATIENT
Start: 2021-03-30 | End: 2021-04-01 | Stop reason: HOSPADM

## 2021-03-29 RX ORDER — METOPROLOL SUCCINATE 25 MG/1
25 TABLET, EXTENDED RELEASE ORAL
Status: DISCONTINUED | OUTPATIENT
Start: 2021-03-29 | End: 2021-03-31

## 2021-03-29 RX ORDER — SODIUM CHLORIDE 9 MG/ML
100 INJECTION, SOLUTION INTRAVENOUS ONCE
Status: COMPLETED | OUTPATIENT
Start: 2021-03-30 | End: 2021-03-31

## 2021-03-29 RX ORDER — OXYCODONE HYDROCHLORIDE AND ACETAMINOPHEN 5; 325 MG/1; MG/1
1 TABLET ORAL EVERY 4 HOURS PRN
Status: DISCONTINUED | OUTPATIENT
Start: 2021-03-29 | End: 2021-04-01 | Stop reason: HOSPADM

## 2021-03-29 RX ORDER — POTASSIUM CHLORIDE 20 MEQ/1
40 TABLET, EXTENDED RELEASE ORAL ONCE
Status: COMPLETED | OUTPATIENT
Start: 2021-03-29 | End: 2021-03-29

## 2021-03-29 RX ORDER — ATORVASTATIN CALCIUM 40 MG/1
40 TABLET, FILM COATED ORAL
Status: DISCONTINUED | OUTPATIENT
Start: 2021-03-29 | End: 2021-04-01 | Stop reason: HOSPADM

## 2021-03-29 RX ORDER — MORPHINE SULFATE 30 MG/1
30 TABLET, FILM COATED, EXTENDED RELEASE ORAL EVERY 12 HOURS SCHEDULED
Status: DISCONTINUED | OUTPATIENT
Start: 2021-03-29 | End: 2021-04-01 | Stop reason: HOSPADM

## 2021-03-29 RX ORDER — TORSEMIDE 20 MG/1
20 TABLET ORAL DAILY
Status: DISCONTINUED | OUTPATIENT
Start: 2021-03-30 | End: 2021-04-01 | Stop reason: HOSPADM

## 2021-03-29 RX ADMIN — MORPHINE SULFATE 30 MG: 30 TABLET, EXTENDED RELEASE ORAL at 21:36

## 2021-03-29 RX ADMIN — METOPROLOL SUCCINATE 25 MG: 25 TABLET, EXTENDED RELEASE ORAL at 21:36

## 2021-03-29 RX ADMIN — METHYLPREDNISOLONE SODIUM SUCCINATE 40 MG: 40 INJECTION, POWDER, FOR SOLUTION INTRAMUSCULAR; INTRAVENOUS at 08:17

## 2021-03-29 RX ADMIN — IPRATROPIUM BROMIDE 0.5 MG: 0.5 SOLUTION RESPIRATORY (INHALATION) at 07:23

## 2021-03-29 RX ADMIN — IPRATROPIUM BROMIDE 0.5 MG: 0.5 SOLUTION RESPIRATORY (INHALATION) at 01:02

## 2021-03-29 RX ADMIN — LISINOPRIL 5 MG: 5 TABLET ORAL at 08:17

## 2021-03-29 RX ADMIN — HEPARIN SODIUM 5000 UNITS: 5000 INJECTION INTRAVENOUS; SUBCUTANEOUS at 13:36

## 2021-03-29 RX ADMIN — SODIUM CHLORIDE 0.3 MCG/KG/HR: 9 INJECTION, SOLUTION INTRAVENOUS at 05:20

## 2021-03-29 RX ADMIN — LEVALBUTEROL HYDROCHLORIDE 1.25 MG: 1.25 SOLUTION, CONCENTRATE RESPIRATORY (INHALATION) at 01:02

## 2021-03-29 RX ADMIN — IPRATROPIUM BROMIDE 0.5 MG: 0.5 SOLUTION RESPIRATORY (INHALATION) at 18:46

## 2021-03-29 RX ADMIN — LEVALBUTEROL HYDROCHLORIDE 1.25 MG: 1.25 SOLUTION, CONCENTRATE RESPIRATORY (INHALATION) at 07:23

## 2021-03-29 RX ADMIN — MORPHINE SULFATE 30 MG: 30 TABLET, EXTENDED RELEASE ORAL at 11:17

## 2021-03-29 RX ADMIN — IPRATROPIUM BROMIDE 0.5 MG: 0.5 SOLUTION RESPIRATORY (INHALATION) at 13:00

## 2021-03-29 RX ADMIN — CLONAZEPAM 0.5 MG: 0.5 TABLET ORAL at 21:37

## 2021-03-29 RX ADMIN — HEPARIN SODIUM 5000 UNITS: 5000 INJECTION INTRAVENOUS; SUBCUTANEOUS at 06:09

## 2021-03-29 RX ADMIN — PANTOPRAZOLE SODIUM 40 MG: 40 INJECTION, POWDER, FOR SOLUTION INTRAVENOUS at 08:17

## 2021-03-29 RX ADMIN — LEVALBUTEROL HYDROCHLORIDE 1.25 MG: 1.25 SOLUTION, CONCENTRATE RESPIRATORY (INHALATION) at 18:46

## 2021-03-29 RX ADMIN — Medication 14 MG: at 08:18

## 2021-03-29 RX ADMIN — FUROSEMIDE 40 MG: 10 INJECTION, SOLUTION INTRAMUSCULAR; INTRAVENOUS at 17:32

## 2021-03-29 RX ADMIN — ATORVASTATIN CALCIUM 40 MG: 40 TABLET, FILM COATED ORAL at 17:32

## 2021-03-29 RX ADMIN — AZITHROMYCIN MONOHYDRATE 500 MG: 500 INJECTION, POWDER, LYOPHILIZED, FOR SOLUTION INTRAVENOUS at 06:27

## 2021-03-29 RX ADMIN — FUROSEMIDE 40 MG: 10 INJECTION, SOLUTION INTRAMUSCULAR; INTRAVENOUS at 08:17

## 2021-03-29 RX ADMIN — INSULIN LISPRO 1 UNITS: 100 INJECTION, SOLUTION INTRAVENOUS; SUBCUTANEOUS at 17:32

## 2021-03-29 RX ADMIN — HEPARIN SODIUM 5000 UNITS: 5000 INJECTION INTRAVENOUS; SUBCUTANEOUS at 21:37

## 2021-03-29 RX ADMIN — POTASSIUM CHLORIDE 40 MEQ: 1500 TABLET, EXTENDED RELEASE ORAL at 17:32

## 2021-03-29 RX ADMIN — CEFTRIAXONE SODIUM 1000 MG: 10 INJECTION, POWDER, FOR SOLUTION INTRAVENOUS at 05:31

## 2021-03-29 RX ADMIN — LEVALBUTEROL HYDROCHLORIDE 1.25 MG: 1.25 SOLUTION, CONCENTRATE RESPIRATORY (INHALATION) at 13:00

## 2021-03-29 NOTE — ASSESSMENT & PLAN NOTE
· Per her PCP has not been feeling well for 2 weeks  Activated EMS due to shortness of breath  Found tripoding with O2 saturation in the 70's   Placed on BiPAP and received nebulizers and steroids in the ER for wheezing without improvement  · Intubated at [facility] and transferred to [facility]  · Secondary to volume overload (cardiogenic pulmonary edema); possible pneumonia  · Continue oxygen supplementation, wean as tolerated maintain SpO2 greater than 90%  · Bronchodilators

## 2021-03-29 NOTE — PROGRESS NOTES
Progress Note - Cardiology   Zeb Best 61 y o  female MRN: [id number]  Unit/Bed#: ICU 07 Encounter: [id number]          Asessment/Plan   acute systolic and diastolic CHF   non MI troponin elevation (due to  Acute CHF)   cardiomyopathy-unspecified type:   TTE 3/27/2021: EF approximately 18%, severe diffuse hypokinesia    moderate MR, mild AI, mild TR ( echo 3/27/2021)   COPD     potassium 3 5, BUN 20, creatinine 0 78    · Check daily standing weights  · Will initiate beta-blocker ~~> follow BP and heart rate trend with plans to continue escalation of both beta-blocker and Ace as tolerated  · Will confer with catheterization lab hopeful for angiography tomorrow  · Will transition to oral Lasix tomorrow  · Check lipid profile ~~> will empirically initiate atorvastatin while awaiting results      Subjective:  Patient reports that she is currently comfortable was seated out of bed    She has had no recent chest pain and states she is breathing comfortably    Vitals:  Vitals:    03/28/21 0600 03/29/21 0600   Weight: 76 3 kg (168 lb 3 4 oz) 76 5 kg (168 lb 10 4 oz)   ,  Vitals:    03/29/21 1110 03/29/21 1210 03/29/21 1310 03/29/21 1447   BP: 121/67 106/61 127/78    Pulse: 90 104 102    Resp: 20 20 (!) 26    Temp:    97 5 °F (36 4 °C)   TempSrc:    Temporal   SpO2: 91% 94% 95%    Weight:       Height:           Exam:  General:  Patient appears comfortable seated out of bed on nasal cannula oxygen  Heart:  Regular with controlled rate with DHAVAL at base/LSB  Lungs:  Some bibasilar dulling with only few rales  Lower Limbs:  No edema           Telemetry:       Sinus tachycardia with with ventricular rate 80-90s    Medications:    Current Facility-Administered Medications:     [START ON 3/30/2021] cefuroxime (CEFTIN) tablet 500 mg, 500 mg, Oral, Q12H [address], Ayala Dale MD    clonazePAM (KlonoPIN) tablet 0 5 mg, 0 5 mg, Oral, HS, Ayala Dale MD, 0 5 mg at 03/28/21 2212    furosemide (LASIX) injection 40 mg, 40 mg, Intravenous, BID (diuretic), Mignon Huerta MD, 40 mg at 03/29/21 0817    heparin (porcine) subcutaneous injection 5,000 Units, 5,000 Units, Subcutaneous, Q8H [address], 5,000 Units at 03/29/21 1336 **AND** [CANCELED] Platelet count, , , Once, ESTHER [place]    insulin lispro (HumaLOG) 100 units/mL subcutaneous injection 1-5 Units, 1-5 Units, Subcutaneous, TID AC **AND** Fingerstick Glucose (POCT), , , TID AC, ESTHER Venegas    ipratropium (ATROVENT) 0 02 % inhalation solution 0 5 mg, 0 5 mg, Nebulization, Q6H, ESTHER [place], 0 5 mg at 03/29/21 1300    levalbuterol (XOPENEX) inhalation solution 1 25 mg, 1 25 mg, Nebulization, Q6H, ESTHER Park, 1 25 mg at 03/29/21 1300    lisinopril (ZESTRIL) tablet 5 mg, 5 mg, Oral, Daily, Mignon Huerta MD, 5 mg at 03/29/21 0817    morphine (MS CONTIN) ER tablet 30 mg, 30 mg, Oral, Q12H [address], ESTHER Venegas, 30 mg at 03/29/21 1117    nicotine (NICODERM CQ) 14 mg/24hr TD 24 hr patch 14 mg, 14 mg, Transdermal, Daily, Darrelyn Minus, JAYNP, 14 mg at 03/29/21 0818    oxyCODONE-acetaminophen (PERCOCET) 5-325 mg per tablet 1 tablet, 1 tablet, Oral, Q4H PRN, ESTHER Venegas      Labs/Data:        Results from last 7 days   Lab Units 03/29/21  0510 03/28/21  0457 03/27/21  0529   WBC Thousand/uL 15 41* 15 33* 27 67*   HEMOGLOBIN g/dL 11 8 11 0* 13 4   HEMATOCRIT % 36 5 33 5* 41 1   PLATELETS Thousands/uL 392* 350 435*     Results from last 7 days   Lab Units 03/29/21  0510 03/28/21  0457 03/27/21  1512 03/27/21  0927 03/27/21  0522   POTASSIUM mmol/L 3 5 4 0  --   --  3 5   CHLORIDE mmol/L 100 101  --   --  99*   CO2 mmol/L 29 28  --   --  28   BUN mg/dL 20 18  --   --  12   TROPONIN I ng/mL  --   --  0 42* 0 49* 0 53*

## 2021-03-29 NOTE — PROGRESS NOTES
03/29/21 1200   Clinical Encounter Type   Visited With Patient   Routine Visit Introduction   Continue Visiting Yes

## 2021-03-29 NOTE — ASSESSMENT & PLAN NOTE
· BNP 1016  · Echo showed EF 18%, moderate MR, small pericardial effusion with no tamponade  · Cardiology input appreciated  · Started on IV Lasix 40 b i d   · Ischemic workup plan for Tuesday?   · Monitor for cardiogenic shock

## 2021-03-29 NOTE — ASSESSMENT & PLAN NOTE
· No maintenance medications for HTN in outpatient setting  · Started on lisinopril 5 mg once a day  · Add beta-blocker?

## 2021-03-29 NOTE — PROGRESS NOTES
[address] [place] [address]  Progress Note - Federico Day 1960, 61 y o  female MRN: [id number]  Unit/Bed#: ICU 07 Encounter: [id number]  Primary Care Provider: Jerome Wick DO   Date and time admitted to hospital: 3/27/2021  4:49 AM    * Acute respiratory failure with hypoxia [facility])  Assessment & Plan  · Per her PCP has not been feeling well for 2 weeks  Activated EMS due to shortness of breath  Found tripoding with O2 saturation in the 70's  Placed on BiPAP and received nebulizers and steroids in the ER for wheezing without improvement  · Intubated at [facility] and transferred to [facility]  · Secondary to volume overload (cardiogenic pulmonary edema); possible pneumonia  · Continue oxygen supplementation, wean as tolerated maintain SpO2 greater than 90%  · Bronchodilators      Sepsis ([address] )  Assessment & Plan  · Likely secondary to pneumonia   · Procalcitonin elevated at 2 3 on admission, uptrend yesterday to 3 9  · Given Cefepime and Vancomycin in the ER --- Transitioned to Ceftriaxone and Azithromycin (day 3 on abx)  · Follow up blood and sputum cultures -- sputum cultures showed mixed respiratory mathew, blood cultures negative for 24 hours  · Urine negative     Severe cardiomyopathy  Assessment & Plan  · BNP 1016  · Echo showed EF 18%, moderate MR, small pericardial effusion with no tamponade  · Cardiology input appreciated  · Started on IV Lasix 40 b i d   · Ischemic workup plan for Tuesday? · Monitor for cardiogenic shock    Hypertension  Assessment & Plan  · No maintenance medications for HTN in outpatient setting  · Started on lisinopril 5 mg once a day  · Add beta-blocker?     Hyperglycemia  Assessment & Plan  · BG on admission at 300s  · A1c 5 5% obtained on 3/27  · Blood glucose range in last 24h   · Continue SSI    Chronic pain  Assessment & Plan  · Currently still on fentanyl and Precedex  · Wean off fentanyl and Precedex and resume below home meds  · PDMP reviewed, on Percocet prn and Morphine 30 BID -- resume    Elevated troponin  Assessment & Plan  · Likely secondary to sepsis vs pericardial effusion  · Bilateral pleural effusions noted on CTA with no previous echocardiogram  · Echo results as outlined above      Anxiety  Assessment & Plan  · PDMP reviewed, on Clonazepam at bedtime      ----------------------------------------------------------------------------------------  HPI/24hr events: This is a 49-year-old female with past medical history of fibromyalgia, chronic pain, anxiety, no prior cardiac history who presented with respiratory distress  Reports feeling overall very weak and unwell over the last 2 weeks prior to admission  Found hypoxic by EMS with O2 sats 70%, intubated in the ED at [facility]  CT chest showed pericardial effusion, no PE  Patient was then transferred to [facility]  Echo showed EF of 18% with moderate MR, small pericardial effusion without tamponade  Yesterday was extubated, had episodes of hypoxia and anxiety, was placed on BiPAP, given 1 time dose of Ativan 1 mg  Fentanyl drip was continued and Precedex was added  No overnight events  Disposition: Continue Stepdown Level 1 level of care   Code Status: Level 1 - Full Code  ---------------------------------------------------------------------------------------  SUBJECTIVE  Patient states that she feels okay, denies any chest pain, abdominal pain, nausea or vomiting, increased shortness of breath      Review of Systems  Review of systems was reviewed and negative unless stated above in HPI/24-hour events   ---------------------------------------------------------------------------------------  OBJECTIVE    Vitals   Vitals:    21 0600 21 0710 21 0742 21 0817   BP:  110/75  110/70   Pulse:  90     Resp:  (!) 24     Temp:   97 9 °F (36 6 °C)    TempSrc:   Temporal    SpO2:  96%     Weight: 76 5 kg (168 lb 10 4 oz)      Height:         Temp (24hrs), Av 9 °F (36 6 °C), Min:97 4 °F (36 3 °C), Max:99 °F (37 2 °C)  Current: Temperature: 97 9 °F (36 6 °C)  Arterial Line BP: 160/96  Arterial Line MAP (mmHg): 120 mmHg    Respiratory:  SpO2 Device: O2 Device: Mid flow nasal cannula       Invasive/non-invasive ventilation settings   Respiratory    Lab Data (Last 4 hours)    None         O2/Vent Data (Last 4 hours)    None                Physical Exam  Vitals signs reviewed  Constitutional:       Appearance: She is not ill-appearing  HENT:      Nose: No congestion  Eyes:      General: No scleral icterus  Neck:      Comments: No jugular vein distension  Cardiovascular:      Rate and Rhythm: Normal rate and regular rhythm  Heart sounds: No murmur  Pulmonary:      Effort: Pulmonary effort is normal  No respiratory distress  Breath sounds: No wheezing or rales  Abdominal:      General: There is no distension  Palpations: Abdomen is soft  Tenderness: There is no abdominal tenderness  Musculoskeletal:         General: No swelling  Right lower leg: No edema  Left lower leg: No edema  Skin:     Capillary Refill: Capillary refill takes less than 2 seconds  Neurological:      General: No focal deficit present  Mental Status: She is alert and oriented to person, place, and time             Laboratory and Diagnostics:  Results from last 7 days   Lab Units 03/29/21  0510 03/28/21  0457 03/27/21  0529 03/27/21  0012   WBC Thousand/uL 15 41* 15 33* 27 67* 21 90*   HEMOGLOBIN g/dL 11 8 11 0* 13 4 13 5   HEMATOCRIT % 36 5 33 5* 41 1 42 4   PLATELETS Thousands/uL 392* 350 435* 543*   NEUTROS PCT % 69 71 87*  --    MONOS PCT % 7 9 6  --    MONO PCT %  --   --   --  5     Results from last 7 days   Lab Units 03/29/21  0510 03/28/21  0457 03/27/21  0522 03/27/21  0011   SODIUM mmol/L 138 138 136 129*   POTASSIUM mmol/L 3 5 4 0 3 5 3 2*   CHLORIDE mmol/L 100 101 99* 93*   CO2 mmol/L 29 28 28 24   ANION GAP mmol/L 9 9 9 12   BUN mg/dL 20 18 12 14   CREATININE mg/dL 0 78 0 75 1 01 1 04   CALCIUM mg/dL 9 3 9 2 8 2* 9 6   GLUCOSE RANDOM mg/dL 93 127 192* 390*   ALT U/L  --   --   --  9   AST U/L  --   --   --  13   ALK PHOS U/L  --   --   --  92   ALBUMIN g/dL  --   --   --  4 1   TOTAL BILIRUBIN mg/dL  --   --   --  0 50          Results from last 7 days   Lab Units 03/27/21  0011   INR  1 32*   PTT seconds 31      Results from last 7 days   Lab Units 03/27/21  1512 03/27/21  0927 03/27/21  0522 03/27/21  0011   TROPONIN I ng/mL 0 42* 0 49* 0 53* 0 08*     Results from last 7 days   Lab Units 03/27/21  0326 03/27/21  0011   LACTIC ACID mmol/L 1 5 4 9*     ABG:  Results from last 7 days   Lab Units 03/28/21  0806   PH ART  7 438   PCO2 ART mm Hg 41 6   PO2 ART mm Hg 85 6   HCO3 ART mmol/L 27 5   BASE EXC ART mmol/L 3 0   ABG SOURCE  Line, Arterial     VBG:  Results from last 7 days   Lab Units 03/28/21  0806   ABG SOURCE  Line, Arterial     Results from last 7 days   Lab Units 03/28/21  0457 03/27/21  0522   PROCALCITONIN ng/ml 3 96* 2 38*       Micro  Results from last 7 days   Lab Units 03/27/21  0530 03/27/21  0527 03/27/21  0522 03/27/21  0027 03/27/21  0011   BLOOD CULTURE   --   --  No Growth at 24 hrs  No Growth at 24 hrs  No Growth at 24 hrs  No Growth at 24 hrs  SPUTUM CULTURE  1+ Growth of   --   --   --   --    GRAM STAIN RESULT  No polys seen*  1+ Gram negative rods*  Rare Gram positive cocci in pairs*  --   --   --   --    LEGIONELLA URINARY ANTIGEN   --  Negative  --   --   --    STREP PNEUMONIAE ANTIGEN, URINE   --  Negative  --   --   --        EKG:  Sinus rhythm  Imaging: I have personally reviewed pertinent reports  Intake and Output  I/O       03/27 0701 - 03/28 0700 03/28 0701 - 03/29 0700 03/29 0701 - 03/30 0700    P  O   240     I V  (mL/kg) 586 5 (7 7) 131 1 (1 7)     IV Piggyback 600  250    Total Intake(mL/kg) 1186 5 (15 5) 371 1 (4 9) 250 (3 3)    Urine (mL/kg/hr) 2135 (1 2) 2410 (1 3)     Emesis/NG output 150      Total Output 2285 2410     Net -1098 6 -2038 9 +250                 Height and Weights   Height: 5' 7" (170 2 cm)  IBW: 61 6 kg  Body mass index is 26 41 kg/m²  Weight (last 2 days)     Date/Time   Weight    03/29/21 0600   76 5 (168 65)    03/28/21 0600   76 3 (168 21)    03/27/21 0500   76 1 (167 77)                Nutrition       Diet Orders   (From admission, onward)             Start     Ordered    03/29/21 0814  Diet Cardiovascular; Cardiac; Fluid Restriction 1800 ML, Sodium 2 GM  Diet effective now     Question Answer Comment   Diet Type Cardiovascular    Cardiac Cardiac    Other Restriction(s): Fluid Restriction 1800 ML    Other Restriction(s): Sodium 2 GM    RD to adjust diet per protocol?  Yes        03/29/21 0814                  Active Medications  Scheduled Meds:  Current Facility-Administered Medications   Medication Dose Route Frequency Provider Last Rate    azithromycin  500 mg Intravenous Q24H ESTHER Coles Stopped (03/29/21 0745)    cefTRIAXone  1,000 mg Intravenous Q24H [place] 1,000 mg (03/29/21 0531)    clonazePAM  0 5 mg Oral HS Dorothy Boyle MD      dexmedetomidine  0 1-0 7 mcg/kg/hr Intravenous Titrated Dorothy Boyle MD 0 3 mcg/kg/hr (03/29/21 0739)    fentaNYL  50 mcg/hr Intravenous Continuous Peggy Fleischer, CRNP 50 mcg/hr (03/29/21 0739)    furosemide  40 mg Intravenous BID (diuretic) Dorothy Boyle MD      heparin (porcine)  5,000 Units Subcutaneous [facility] ESTHER Wing      insulin lispro  1-5 Units Subcutaneous Q6H [address] [place]      ipratropium  0 5 mg Nebulization Q6H [place]      levalbuterol  1 25 mg Nebulization Q6H [place]      lisinopril  5 mg Oral Daily Dorothy Boyle MD      methylPREDNISolone sodium succinate  40 mg Intravenous Daily Mirta Don DO      nicotine  14 mg Transdermal Daily Peggy Fleischer, CRNP      pantoprazole  40 mg Intravenous Q12H [address] [facility] [address], [place]       Continuous Infusions:  dexmedetomidine, 0 1-0 7 mcg/kg/hr, Last Rate: 0 3 mcg/kg/hr (03/29/21 0739)  fentaNYL, 50 mcg/hr, Last Rate: 50 mcg/hr (03/29/21 0739)      PRN Meds:        Invasive Devices Review  Invasive Devices     Peripheral Intravenous Line            Peripheral IV 03/27/21 Left Wrist 2 days    Peripheral IV 03/29/21 Dorsal (posterior); Left Forearm less than 1 day          Drain            NG/OG Tube Orogastric Center mouth 2 days    Urethral Catheter 2 days                  ---------------------------------------------------------------------------------------  Advance Directive and Living Will:      Power of :    POLST:    ---------------------------------------------------------------------------------------      Leodan Goodwin MD      Portions of the record may have been created with voice recognition software  Occasional wrong word or "sound a like" substitutions may have occurred due to the inherent limitations of voice recognition software    Read the chart carefully and recognize, using context, where substitutions have occurred

## 2021-03-29 NOTE — SOCIAL WORK
CM met with the patient to do a general SW assessment:     The patient is LOS day 2  She is not a bundle, she is not a re-admission, her HRR score is GREEN at 9  The patient was found to have an EF of 18% on admission, cardiology following  The patient lives alone in a two story home, 2nd story BR and BA, the patient sleeps on her couch on the 1st floor  The patient is independent with adls and ambulation  She drives  No hx of VNA  No hx of STR  Her PCP is Dr Sarah Kaufman  She has no formal POA, her healthcare representative would be her daughter, Tyler Quiroz  No D&A use  No MH history  The patient reports that she has not had hot water for the past month  She has heat in her home, however, the coil in her hot water machine needs to be replaced and would cost about $900  She reports [facility] assisted her with something similar 3yrs ago however, they are telling her they will not help now  She takes pots of boiling water up to her bathtub and bathes about every 3 days using those pots of hot water in the tub  She is asking for any assistance that can be provided - will look into resources  She reports she recieves $1,066 in SSD per month, she is behind on her mortgage from paying her first 2 month oil bills and recently bought a new vehicle for $2,000    CM following

## 2021-03-29 NOTE — ASSESSMENT & PLAN NOTE
· Likely secondary to sepsis vs pericardial effusion  · Bilateral pleural effusions noted on CTA with no previous echocardiogram  · Echo results as outlined above

## 2021-03-29 NOTE — PLAN OF CARE
Problem: Prexisting or High Potential for Compromised Skin Integrity  Goal: Skin integrity is maintained or improved  Description: INTERVENTIONS:  - Identify patients at risk for skin breakdown  - Assess and monitor skin integrity  - Assess and monitor nutrition and hydration status  - Monitor labs   - Assess for incontinence   - Turn and reposition patient  - Assist with mobility/ambulation  - Relieve pressure over bony prominences  - Avoid friction and shearing  - Provide appropriate hygiene as needed including keeping skin clean and dry  - Evaluate need for skin moisturizer/barrier cream  - Collaborate with interdisciplinary team   - Patient/family teaching  - Consider wound care consult   Outcome: Progressing     Problem: PAIN - ADULT  Goal: Verbalizes/displays adequate comfort level or baseline comfort level  Description: Interventions:  - Encourage patient to monitor pain and request assistance  - Assess pain using appropriate pain scale  - Administer analgesics based on type and severity of pain and evaluate response  - Implement non-pharmacological measures as appropriate and evaluate response  - Consider cultural and social influences on pain and pain management  - Notify physician/advanced practitioner if interventions unsuccessful or patient reports new pain  Outcome: Progressing     Problem: INFECTION - ADULT  Goal: Absence or prevention of progression during hospitalization  Description: INTERVENTIONS:  - Assess and monitor for signs and symptoms of infection  - Monitor lab/diagnostic results  - Monitor all insertion sites, i e  indwelling lines, tubes, and drains  - Monitor endotracheal if appropriate and nasal secretions for changes in amount and color  - [place] appropriate cooling/warming therapies per order  - Administer medications as ordered  - Instruct and encourage patient and family to use good hand hygiene technique  - Identify and instruct in appropriate isolation precautions for identified infection/condition  Outcome: Progressing  Goal: Absence of fever/infection during neutropenic period  Description: INTERVENTIONS:  - Monitor WBC    Outcome: Progressing     Problem: SAFETY ADULT  Goal: Patient will remain free of falls  Description: INTERVENTIONS:  - Assess patient frequently for physical needs  -  Identify cognitive and physical deficits and behaviors that affect risk of falls    -  [place] fall precautions as indicated by assessment   - Educate patient/family on patient safety including physical limitations  - Instruct patient to call for assistance with activity based on assessment  - Modify environment to reduce risk of injury  - Consider OT/PT consult to assist with strengthening/mobility  Outcome: Progressing  Goal: Maintain or return to baseline ADL function  Description: INTERVENTIONS:  -  Assess patient's ability to carry out ADLs; assess patient's baseline for ADL function and identify physical deficits which impact ability to perform ADLs (bathing, care of mouth/teeth, toileting, grooming, dressing, etc )  - Assess/evaluate cause of self-care deficits   - Assess range of motion  - Assess patient's mobility; develop plan if impaired  - Assess patient's need for assistive devices and provide as appropriate  - Encourage maximum independence but intervene and supervise when necessary  - Involve family in performance of ADLs  - Assess for home care needs following discharge   - Consider OT consult to assist with ADL evaluation and planning for discharge  - Provide patient education as appropriate  Outcome: Progressing  Goal: Maintain or return mobility status to optimal level  Description: INTERVENTIONS:  - Assess patient's baseline mobility status (ambulation, transfers, stairs, etc )    - Identify cognitive and physical deficits and behaviors that affect mobility  - Identify mobility aids required to assist with transfers and/or ambulation (gait belt, sit-to-stand, lift, walker, cane, etc )  - [place] fall precautions as indicated by assessment  - Record patient progress and toleration of activity level on Mobility SBAR; progress patient to next Phase/Stage  - Instruct patient to call for assistance with activity based on assessment  - Consider rehabilitation consult to assist with strengthening/weightbearing, etc   Outcome: Progressing     Problem: DISCHARGE PLANNING  Goal: Discharge to home or other facility with appropriate resources  Description: INTERVENTIONS:  - Identify barriers to discharge w/patient and caregiver  - Arrange for needed discharge resources and transportation as appropriate  - Identify discharge learning needs (meds, wound care, etc )  - Arrange for interpretive services to assist at discharge as needed  - Refer to Case Management Department for coordinating discharge planning if the patient needs post-hospital services based on physician/advanced practitioner order or complex needs related to functional status, cognitive ability, or social support system  Outcome: Progressing     Problem: Knowledge Deficit  Goal: Patient/family/caregiver demonstrates understanding of disease process, treatment plan, medications, and discharge instructions  Description: Complete learning assessment and assess knowledge base    Interventions:  - Provide teaching at level of understanding  - Provide teaching via preferred learning methods  Outcome: Progressing     Problem: RESPIRATORY - ADULT  Goal: Achieves optimal ventilation and oxygenation  Description: INTERVENTIONS:  - Assess for changes in respiratory status  - Assess for changes in mentation and behavior  - Position to facilitate oxygenation and minimize respiratory effort  - Oxygen administered by appropriate delivery if ordered  - Initiate smoking cessation education as indicated  - Encourage broncho-pulmonary hygiene including cough, deep breathe, Incentive Spirometry  - Assess the need for suctioning and aspirate as needed  - Assess and instruct to report SOB or any respiratory difficulty  - Respiratory Therapy support as indicated  Outcome: Progressing     Problem: Potential for Falls  Goal: Patient will remain free of falls  Description: INTERVENTIONS:  - Assess patient frequently for physical needs  -  Identify cognitive and physical deficits and behaviors that affect risk of falls  -  [place] fall precautions as indicated by assessment   - Educate patient/family on patient safety including physical limitations  - Instruct patient to call for assistance with activity based on assessment  - Modify environment to reduce risk of injury  - Consider OT/PT consult to assist with strengthening/mobility  Outcome: Progressing     Problem: SAFETY,RESTRAINT: NV/NON-SELF DESTRUCTIVE BEHAVIOR  Goal: Remains free of harm/injury (restraint for non violent/non self-detsructive behavior)  Description: INTERVENTIONS:  - Instruct patient/family regarding restraint use   - Assess and monitor physiologic and psychological status   - Provide interventions and comfort measures to meet assessed patient needs   - Identify and implement measures to help patient regain control  - Assess readiness for release of restraint   Outcome: Progressing     Problem: SELF HARM  Goal: Effect of psychiatric condition will be minimized and patient will be protected from self harm  Description: INTERVENTIONS:  - Assess impact of patient's symptoms on level of functioning, self-care needs and offer support as indicated  - Assess patient/family knowledge of depression, impact on illness and need for teaching  - Provide emotional support, presence and reassurance  - Assess for possible suicidal thoughts, ideation or self-harm  If patient expresses suicidal thoughts or statements do not leave alone, notify physician/AP immediately, initiate suicide precautions, and determine need for continual observation    - initiate consults and referrals as appropriate (a mental health professional, Spiritual Care  Outcome: Progressing     Problem: Nutrition/Hydration-ADULT  Goal: Nutrient/Hydration intake appropriate for improving, restoring or maintaining nutritional needs  Description: Monitor and assess patient's nutrition/hydration status for malnutrition  Collaborate with interdisciplinary team and initiate plan and interventions as ordered  Monitor patient's weight and dietary intake as ordered or per policy  Utilize nutrition screening tool and intervene as necessary  Determine patient's food preferences and provide high-protein, high-caloric foods as appropriate       INTERVENTIONS:  - Monitor oral intake, urinary output, labs, and treatment plans  - Assess nutrition and hydration status and recommend course of action  - Evaluate amount of meals eaten  - Assist patient with eating if necessary   - Allow adequate time for meals  - Recommend/ encourage appropriate diets, oral nutritional supplements, and vitamin/mineral supplements  - Order, calculate, and assess calorie counts as needed  - Recommend, monitor, and adjust tube feedings and TPN/PPN based on assessed needs  - Assess need for intravenous fluids  - Provide specific nutrition/hydration education as appropriate  - Include patient/family/caregiver in decisions related to nutrition  Outcome: Progressing

## 2021-03-29 NOTE — ASSESSMENT & PLAN NOTE
· BG on admission at 300s  · A1c 5 5% obtained on 3/27  · Blood glucose range in last 24h   · Continue SSI

## 2021-03-29 NOTE — PROGRESS NOTES
Pastoral Care Progress Note    3/29/2021  Patient: Elvira Hernandez : 1960  Admission Date & Time: 3/27/2021 0449  MRN: [id number] [facility]: [id number]                     Chaplaincy Interventions Utilized:   Empowerment: Encouraged focus on present    Exploration: Explored emotional needs & resources and Explored relational needs & resources    Collaboration: Encouraged adherence to treatment plan    Relationship Building: Cultivated a relationship of care and support and Listened empathically    Chaplaincy Outcomes Achieved:  Expressed gratitude    Spiritual Coping Strategies Utilized:   Spiritual comfort

## 2021-03-29 NOTE — ASSESSMENT & PLAN NOTE
· Likely secondary to pneumonia   · Procalcitonin elevated at 2 3 on admission, uptrend yesterday to 3 9  · Given Cefepime and Vancomycin in the ER --- Transitioned to Ceftriaxone and Azithromycin (day 3 on abx)  · Follow up blood and sputum cultures -- sputum cultures showed mixed respiratory mathew, blood cultures negative for 24 hours  · Urine negative

## 2021-03-29 NOTE — QUICK NOTE
Spoke with patient's daughter Dary Mercado over the phone  Updated her of current status and plan/management  All questions/concerns were addressed

## 2021-03-30 ENCOUNTER — APPOINTMENT (OUTPATIENT)
Dept: NON INVASIVE DIAGNOSTICS | Facility: HOSPITAL | Age: 61
DRG: 871 | End: 2021-03-30
Payer: MEDICARE

## 2021-03-30 LAB
ANION GAP SERPL CALCULATED.3IONS-SCNC: 8 MMOL/L (ref 4–13)
BUN SERPL-MCNC: 19 MG/DL (ref 5–25)
CALCIUM SERPL-MCNC: 9.3 MG/DL (ref 8.3–10.1)
CHLORIDE SERPL-SCNC: 99 MMOL/L (ref 100–108)
CHOLEST SERPL-MCNC: 155 MG/DL (ref 50–200)
CO2 SERPL-SCNC: 31 MMOL/L (ref 21–32)
CREAT SERPL-MCNC: 0.82 MG/DL (ref 0.6–1.3)
GFR SERPL CREATININE-BSD FRML MDRD: 78 ML/MIN/1.73SQ M
GLUCOSE SERPL-MCNC: 101 MG/DL (ref 65–140)
GLUCOSE SERPL-MCNC: 104 MG/DL (ref 65–140)
GLUCOSE SERPL-MCNC: 108 MG/DL (ref 65–140)
GLUCOSE SERPL-MCNC: 130 MG/DL (ref 65–140)
GLUCOSE SERPL-MCNC: 97 MG/DL (ref 65–140)
HDLC SERPL-MCNC: 32 MG/DL
LDLC SERPL CALC-MCNC: 93 MG/DL (ref 0–100)
MAGNESIUM SERPL-MCNC: 2 MG/DL (ref 1.6–2.6)
NONHDLC SERPL-MCNC: 123 MG/DL
POTASSIUM SERPL-SCNC: 3.3 MMOL/L (ref 3.5–5.3)
SODIUM SERPL-SCNC: 138 MMOL/L (ref 136–145)
TRIGL SERPL-MCNC: 150 MG/DL

## 2021-03-30 PROCEDURE — 94640 AIRWAY INHALATION TREATMENT: CPT

## 2021-03-30 PROCEDURE — 80061 LIPID PANEL: CPT | Performed by: PHYSICIAN ASSISTANT

## 2021-03-30 PROCEDURE — 93458 L HRT ARTERY/VENTRICLE ANGIO: CPT | Performed by: PHYSICIAN ASSISTANT

## 2021-03-30 PROCEDURE — C1760 CLOSURE DEV, VASC: HCPCS | Performed by: PHYSICIAN ASSISTANT

## 2021-03-30 PROCEDURE — C1894 INTRO/SHEATH, NON-LASER: HCPCS | Performed by: PHYSICIAN ASSISTANT

## 2021-03-30 PROCEDURE — 94760 N-INVAS EAR/PLS OXIMETRY 1: CPT

## 2021-03-30 PROCEDURE — 93458 L HRT ARTERY/VENTRICLE ANGIO: CPT | Performed by: INTERNAL MEDICINE

## 2021-03-30 PROCEDURE — 82948 REAGENT STRIP/BLOOD GLUCOSE: CPT

## 2021-03-30 PROCEDURE — B2111ZZ FLUOROSCOPY OF MULTIPLE CORONARY ARTERIES USING LOW OSMOLAR CONTRAST: ICD-10-PCS | Performed by: INTERNAL MEDICINE

## 2021-03-30 PROCEDURE — 4A023N7 MEASUREMENT OF CARDIAC SAMPLING AND PRESSURE, LEFT HEART, PERCUTANEOUS APPROACH: ICD-10-PCS | Performed by: INTERNAL MEDICINE

## 2021-03-30 PROCEDURE — 83735 ASSAY OF MAGNESIUM: CPT | Performed by: NURSE PRACTITIONER

## 2021-03-30 PROCEDURE — 99152 MOD SED SAME PHYS/QHP 5/>YRS: CPT | Performed by: INTERNAL MEDICINE

## 2021-03-30 PROCEDURE — 80048 BASIC METABOLIC PNL TOTAL CA: CPT | Performed by: INTERNAL MEDICINE

## 2021-03-30 PROCEDURE — 99232 SBSQ HOSP IP/OBS MODERATE 35: CPT | Performed by: INTERNAL MEDICINE

## 2021-03-30 PROCEDURE — 97163 PT EVAL HIGH COMPLEX 45 MIN: CPT

## 2021-03-30 PROCEDURE — 99152 MOD SED SAME PHYS/QHP 5/>YRS: CPT | Performed by: PHYSICIAN ASSISTANT

## 2021-03-30 PROCEDURE — 90682 RIV4 VACC RECOMBINANT DNA IM: CPT | Performed by: NURSE PRACTITIONER

## 2021-03-30 PROCEDURE — 93308 TTE F-UP OR LMTD: CPT | Performed by: INTERNAL MEDICINE

## 2021-03-30 PROCEDURE — G0008 ADMIN INFLUENZA VIRUS VAC: HCPCS | Performed by: NURSE PRACTITIONER

## 2021-03-30 PROCEDURE — C1769 GUIDE WIRE: HCPCS | Performed by: PHYSICIAN ASSISTANT

## 2021-03-30 RX ORDER — LIDOCAINE HYDROCHLORIDE 10 MG/ML
INJECTION, SOLUTION EPIDURAL; INFILTRATION; INTRACAUDAL; PERINEURAL CODE/TRAUMA/SEDATION MEDICATION
Status: COMPLETED | OUTPATIENT
Start: 2021-03-30 | End: 2021-03-30

## 2021-03-30 RX ORDER — LEVALBUTEROL 1.25 MG/.5ML
1.25 SOLUTION, CONCENTRATE RESPIRATORY (INHALATION)
Status: DISCONTINUED | OUTPATIENT
Start: 2021-03-31 | End: 2021-04-01 | Stop reason: HOSPADM

## 2021-03-30 RX ORDER — ASPIRIN 81 MG/1
TABLET, CHEWABLE ORAL CODE/TRAUMA/SEDATION MEDICATION
Status: COMPLETED | OUTPATIENT
Start: 2021-03-30 | End: 2021-03-30

## 2021-03-30 RX ORDER — SODIUM CHLORIDE 9 MG/ML
50 INJECTION, SOLUTION INTRAVENOUS CONTINUOUS
Status: DISPENSED | OUTPATIENT
Start: 2021-03-30 | End: 2021-03-30

## 2021-03-30 RX ORDER — MIDAZOLAM HYDROCHLORIDE 2 MG/2ML
INJECTION, SOLUTION INTRAMUSCULAR; INTRAVENOUS CODE/TRAUMA/SEDATION MEDICATION
Status: COMPLETED | OUTPATIENT
Start: 2021-03-30 | End: 2021-03-30

## 2021-03-30 RX ORDER — FENTANYL CITRATE 50 UG/ML
INJECTION, SOLUTION INTRAMUSCULAR; INTRAVENOUS CODE/TRAUMA/SEDATION MEDICATION
Status: COMPLETED | OUTPATIENT
Start: 2021-03-30 | End: 2021-03-30

## 2021-03-30 RX ORDER — ASPIRIN 81 MG/1
81 TABLET, CHEWABLE ORAL DAILY
Status: DISCONTINUED | OUTPATIENT
Start: 2021-03-31 | End: 2021-04-01 | Stop reason: HOSPADM

## 2021-03-30 RX ORDER — POTASSIUM CHLORIDE 20 MEQ/1
40 TABLET, EXTENDED RELEASE ORAL ONCE
Status: COMPLETED | OUTPATIENT
Start: 2021-03-30 | End: 2021-03-30

## 2021-03-30 RX ORDER — ACETAMINOPHEN 325 MG/1
650 TABLET ORAL EVERY 4 HOURS PRN
Status: DISCONTINUED | OUTPATIENT
Start: 2021-03-30 | End: 2021-04-01 | Stop reason: HOSPADM

## 2021-03-30 RX ADMIN — IOHEXOL 55 ML: 350 INJECTION, SOLUTION INTRAVENOUS at 10:53

## 2021-03-30 RX ADMIN — ASPIRIN 81 MG CHEWABLE TABLET 81 MG: 81 TABLET CHEWABLE at 10:31

## 2021-03-30 RX ADMIN — FENTANYL CITRATE 50 MCG: 50 INJECTION, SOLUTION INTRAMUSCULAR; INTRAVENOUS at 10:33

## 2021-03-30 RX ADMIN — ATORVASTATIN CALCIUM 40 MG: 40 TABLET, FILM COATED ORAL at 16:13

## 2021-03-30 RX ADMIN — FENTANYL CITRATE 50 MCG: 50 INJECTION, SOLUTION INTRAMUSCULAR; INTRAVENOUS at 10:45

## 2021-03-30 RX ADMIN — HEPARIN SODIUM 5000 UNITS: 5000 INJECTION INTRAVENOUS; SUBCUTANEOUS at 06:06

## 2021-03-30 RX ADMIN — LEVALBUTEROL HYDROCHLORIDE 1.25 MG: 1.25 SOLUTION, CONCENTRATE RESPIRATORY (INHALATION) at 19:52

## 2021-03-30 RX ADMIN — IPRATROPIUM BROMIDE 0.5 MG: 0.5 SOLUTION RESPIRATORY (INHALATION) at 07:09

## 2021-03-30 RX ADMIN — LEVALBUTEROL HYDROCHLORIDE 1.25 MG: 1.25 SOLUTION, CONCENTRATE RESPIRATORY (INHALATION) at 13:43

## 2021-03-30 RX ADMIN — IPRATROPIUM BROMIDE 0.5 MG: 0.5 SOLUTION RESPIRATORY (INHALATION) at 01:38

## 2021-03-30 RX ADMIN — CEFUROXIME AXETIL 500 MG: 250 TABLET ORAL at 21:23

## 2021-03-30 RX ADMIN — TICAGRELOR 180 MG: 90 TABLET ORAL at 08:46

## 2021-03-30 RX ADMIN — OXYCODONE HYDROCHLORIDE AND ACETAMINOPHEN 1 TABLET: 5; 325 TABLET ORAL at 02:48

## 2021-03-30 RX ADMIN — CEFUROXIME AXETIL 500 MG: 250 TABLET ORAL at 08:34

## 2021-03-30 RX ADMIN — POTASSIUM CHLORIDE 40 MEQ: 1500 TABLET, EXTENDED RELEASE ORAL at 07:32

## 2021-03-30 RX ADMIN — MIDAZOLAM 2 MG: 1 INJECTION INTRAMUSCULAR; INTRAVENOUS at 10:34

## 2021-03-30 RX ADMIN — LIDOCAINE HYDROCHLORIDE 5 ML: 10 INJECTION, SOLUTION EPIDURAL; INFILTRATION; INTRACAUDAL; PERINEURAL at 10:36

## 2021-03-30 RX ADMIN — SODIUM CHLORIDE 100 ML/HR: 0.9 INJECTION, SOLUTION INTRAVENOUS at 06:05

## 2021-03-30 RX ADMIN — INFLUENZA A VIRUS A/HAWAII/70/2019 (H1N1) RECOMBINANT HEMAGGLUTININ ANTIGEN, INFLUENZA A VIRUS A/MINNESOTA/41/2019 (H3N2) RECOMBINANT HEMAGGLUTININ ANTIGEN, INFLUENZA B VIRUS B/WASHINGTON/02/2019 RECOMBINANT HEMAGGLUTININ ANTIGEN, AND INFLUENZA B VIRUS B/PHUKET/3073/2013 RECOMBINANT HEMAGGLUTININ ANTIGEN 0.5 ML: 45; 45; 45; 45 INJECTION INTRAMUSCULAR at 14:38

## 2021-03-30 RX ADMIN — Medication 14 MG: at 09:31

## 2021-03-30 RX ADMIN — CLONAZEPAM 0.5 MG: 0.5 TABLET ORAL at 21:23

## 2021-03-30 RX ADMIN — LISINOPRIL 5 MG: 5 TABLET ORAL at 08:34

## 2021-03-30 RX ADMIN — METOPROLOL SUCCINATE 25 MG: 25 TABLET, EXTENDED RELEASE ORAL at 21:23

## 2021-03-30 RX ADMIN — HEPARIN SODIUM 5000 UNITS: 5000 INJECTION INTRAVENOUS; SUBCUTANEOUS at 16:13

## 2021-03-30 RX ADMIN — IPRATROPIUM BROMIDE 0.5 MG: 0.5 SOLUTION RESPIRATORY (INHALATION) at 19:52

## 2021-03-30 RX ADMIN — OXYCODONE HYDROCHLORIDE AND ACETAMINOPHEN 1 TABLET: 5; 325 TABLET ORAL at 21:28

## 2021-03-30 RX ADMIN — LEVALBUTEROL HYDROCHLORIDE 1.25 MG: 1.25 SOLUTION, CONCENTRATE RESPIRATORY (INHALATION) at 07:09

## 2021-03-30 RX ADMIN — IPRATROPIUM BROMIDE 0.5 MG: 0.5 SOLUTION RESPIRATORY (INHALATION) at 13:43

## 2021-03-30 RX ADMIN — TORSEMIDE 20 MG: 20 TABLET ORAL at 16:13

## 2021-03-30 RX ADMIN — LEVALBUTEROL HYDROCHLORIDE 1.25 MG: 1.25 SOLUTION, CONCENTRATE RESPIRATORY (INHALATION) at 01:38

## 2021-03-30 RX ADMIN — MORPHINE SULFATE 30 MG: 30 TABLET, EXTENDED RELEASE ORAL at 09:31

## 2021-03-30 RX ADMIN — HEPARIN SODIUM 5000 UNITS: 5000 INJECTION INTRAVENOUS; SUBCUTANEOUS at 21:23

## 2021-03-30 NOTE — PHYSICAL THERAPY NOTE
PT EVALUATION    Pt  Name: Asa Santos  Pt  Age: 61 y o  MRN: [id number]  LENGTH OF STAY: 3    Patient Active Problem List   Diagnosis    Vitamin D deficiency    Acute [facility] failure with hypoxia ([facility] [address] [facility]    Sepsis ([facility] [address] )    Severe cardiomyopathy    Hypertension    Hyperglycemia    Chronic pain    Elevated troponin    Anxiety       Admitting Diagnoses:   Hypoxia [R09 02]    Past Medical History:   Diagnosis Date    Fatty liver        Past Surgical History:   Procedure Laterality Date     SECTION      CHOLECYSTECTOMY      ROTATOR CUFF REPAIR W/ DISTAL CLAVICLE EXCISION Right     TONSILLECTOMY         Imaging Studies:  No orders to display        21 1543   PT Last Visit   PT Visit Date 21   Note Type   Note type Evaluation   Pain Assessment   Pain Assessment Tool Pain Assessment not indicated - pt denies pain   Pain Score No Pain   Home Living   Type of Home House   Home Layout Two level;Bed/bath upstairs;Stairs to enter without rails  (2+3+4 w/o rails to enter; FOS to 2nd fl)   Bathroom Shower/Tub Tub/shower unit   Bathroom Toilet Standard   Bathroom Equipment Other (Comment)  (None)    [facility] Other (Comment)  (None)   Additional Comments Pt states she frequently sleeps on couch on 1st floor;  Pt states her hot water is currently broke and she carries buckets of hot water upstairs to bathe   Prior Function   Level of [place] Independent with ADLs and functional mobility  (w/o AD)   Lives With Alone   Receives Help From Family  (Daughter)   ADL Assistance Independent   Falls in the last 6 months 0   Comments (+)    Restrictions/Precautions   Weight Bearing Precautions Per Order No   Other Precautions Telemetry;O2;Fall Risk  (2L O2 NC)   General   Family/Caregiver Present No   Cognition   Overall Cognitive Status WFL   Arousal/Participation Alert   Orientation Level Oriented X4   Following Commands Follows multistep commands without difficulty   Comments Cooperative and pleasant   RUE Assessment   RUE Assessment WFL  (4-/5 grossly)   LUE Assessment   LUE Assessment WFL  (4-/5 grossly)   RLE Assessment   RLE Assessment X   Strength RLE   R Hip Flexion 3/5  (Deferred MMT testing 2* post cardiac cath w/ R femoral)   R Knee Flexion 4-/5   R Knee Extension 4-/5   R Ankle Dorsiflexion 4/5   R Ankle Plantar Flexion 4/5   LLE Assessment   LLE Assessment WFL  (4/5 grossly)   Coordination   Sensation WFL   Bed Mobility   Supine to Sit 5  Supervision   Additional items Bedrails; Increased time required;Verbal cues;HOB elevated   Sit to Supine Unable to assess   Additional Comments Cues for technique and safety; Pt OOB in chair post session   Transfers   Sit to Stand 5  Supervision   Additional items Increased time required;Verbal cues   Stand to Sit 5  Supervision   Additional items Armrests; Increased time required;Verbal cues   Additional Comments Cues for technique and safety   Ambulation/Elevation   Gait pattern Decreased foot clearance; Short stride; Step to;Excessively slow   Gait Assistance 4  Minimal assist   Additional items Assist x 1;Verbal cues   Assistive Device None   Distance 4'x1   Balance   Static Sitting Good   Dynamic Sitting Fair +   Static Standing Fair   Dynamic Standing Poor +   Ambulatory Poor +   Endurance Deficit   Endurance Deficit Yes   Endurance Deficit Description fatigue   Activity Tolerance   Activity Tolerance Patient limited by fatigue;Treatment limited secondary to medical complications (Comment)   Nurse Made Aware HORACE Briceno   Assessment   Prognosis Good   Problem List Decreased strength;Decreased endurance; Impaired balance;Decreased mobility   Assessment Pt  60 y  o female presents with SOB for two weeks  Past medical hx includes chronic pain, fibromyalgia, and anxiety  Pt admitted for Acute respiratory failure with hypoxia (HCC) and sepsis  Pt was intubated at [facility] 2* dec tolerance to bipap on 3/27/21  Pt extubated on 3/28/21  Per chart, CT noted moderate pericardial effusion; ECHO noted EF of 18%  S/p Cardiac catheterization on 3/30/21  Pt referred to PT for functional mobility evaluation & D/C planning w/ orders of up w/ assistance  Pt denies home O2 use  Pt currently on 2L O2 NC  SpO2 maintained at 94-95% t/o session  PTA, pt reports being I w/o AD  During evaluation, deficits included dec mobility, balance, ambulation  Pt demonstrated dec endurance and tolerance to activity  Evaluation of functional mobility required S for bed mobility and transfers; minAx1 for amb  Pt was able to ambulate 4' w/o AD  Gait deviations as above, slow but no gross LOB noted  Denies reports of dizziness or SOB t/o session  Pt stated she felt disoriented following supine to sit transfer  BP seated /83  Nsg staff most recent vital signs as follows: /93   Pulse 90   Temp (!) 97 °F (36 1 °C) (Temporal)   Resp 22   Ht 5' 7" (1 702 m)   Wt 74 4 kg (164 lb 0 4 oz)   SpO2 97%   BMI 25 69 kg/m²   At end of session, pt OOB in chair in stable condition, call bell & phone in reach, all lines intact  Pt was educated on fall precautions and reinforced w/ good understanding  Pt would benefit from continued PT to address deficits as defined above and maximize level of independence with functional mobility and safety  The patient's AM-PAC Basic Mobility Inpatient Short Form Raw Score is 19, Standardized Score is 42 48  A standardized score less than 42 9 suggests the patient may benefit from discharge to post-acute rehabilitation services  Please also refer to the recommendation of the Physical Therapist for safe discharge planning  From PT/mobility standpoint recommendation for D/C to STR vs home w/ HHPT pending progress, when medically cleared based on objective measures above, current function, risk for falls and dec family/caregiver support  CM to follow   [facility] staff to continue to mobilized pt (OOB in chair for all meals & ambulate in room/unit) as tolerated to prevent further decline in function  Nsg notified  Barriers to Discharge Inaccessible home environment;Decreased caregiver support   Barriers to Discharge Comments stairs; lives alone   Goals   Patient Goals to go home tomorrow   STG Expiration Date 04/09/21   Short Term Goal #1 1) Inc overall LE strength by 1/2 MMT grade to improve functional mobility; 2) Pt will demonstrate improved bed mobility with mod I to dec caregiver burden; 3) Pt will demonstrate improved transfers w/ mod I for inc safety; 4) Pt will be able to amb w/ S >150' w/ appropriate AD for household distances to inc safety and dec caregiver burden; 5) Pt will be able to navigate stairs with S to dec caregiver burden and inc safety with functional mobility; 6) Improve general balance by 1 grade to inc safety; 7) PT for ongoing patient and caregiver education    PT Treatment Day 0   Plan   Treatment/Interventions Functional transfer training;LE strengthening/ROM; Elevations; Therapeutic exercise; Endurance training;Patient/family training;Bed mobility;Gait training;Spoke to nursing   PT Frequency Other (Comment)  (3-5x/wk)   Recommendation   PT Discharge Recommendation Post-Acute Rehabilitation Services; Home with skilled therapy; Return to previous environment with social support  (STR vs HHPT pending progress)   AM-PAC Basic Mobility Inpatient   Turning in Bed Without Bedrails 4   Lying on Back to Sitting on Edge of Flat Bed 3   Moving Bed to Chair 3   Standing Up From Chair 3   Walk in Room 3   Climb 3-5 Stairs 3   Basic Mobility Inpatient Raw Score 19   Basic Mobility Standardized Score 42 48   Hx/personal factors: co-morbidities, inaccessible home, dec caregiver support, home alone, telemetry, fall risk and O2  Examination: dec mobility, dec balance, dec endurance, dec amb, risk for falls  Clinical: unpredictable (ongoing medical status, abnormal lab values and risk for falls)  Complexity: high     Susan Anu Ray

## 2021-03-30 NOTE — PHYSICAL THERAPY NOTE
PHYSICAL THERAPY NOTE          Patient Name: Elvira Hernandez  [id number] Date: 3/30/2021    PT consult received  Pt off floor to cath lab hence will defer PT eval at this time  Will continue to follow as appropriate   Leon Robledo, PT

## 2021-03-30 NOTE — QUICK NOTE
Spoke with patient's daughter Elena Staton over the phone  Updated her of current status and plan/management  All questions/concerns were addressed

## 2021-03-30 NOTE — PROCEDURES
POC Cardiac US    Date/Time: 3/30/2021 11:43 AM  Performed by: Iván Castillo MD  Authorized by:  Iván Castillo MD     Patient location:  ICU  Other Assisting Provider: No    Procedure details:     Exam Type:  Diagnostic    Indications: dyspnea      Assessment / Evaluation for: cardiac function and pericardial effusion      Exam Type: follow-up exam      Image quality: limited diagnostic      Image availability:  Video obtained  Patient Details:     Cardiac Rhythm:  Regular    Mechanical ventilation: No    Cardiac findings:     Echo technique: limited 2D      Views obtained: parasternal short axis and apical      Pericardial effusion: absent      Wall motion: hypodynamic      LV systolic function: depressed      LV systolic function comment:  But improved from prior echocardiogram    RV dilation: none    Interpretation:     Fluid Status:  Euvolemic

## 2021-03-30 NOTE — PLAN OF CARE
Problem: PHYSICAL THERAPY ADULT  Goal: Performs mobility at highest level of function for planned discharge setting  See evaluation for individualized goals  Description: Treatment/Interventions: Functional transfer training, LE strengthening/ROM, Elevations, Therapeutic exercise, Endurance training, Patient/family training, Bed mobility, Gait training, Spoke to nursing          See flowsheet documentation for full assessment, interventions and recommendations  Note: Prognosis: Good  Problem List: Decreased strength, Decreased endurance, Impaired balance, Decreased mobility  Assessment: Pt  61 y  o female presents with SOB for two weeks  Past medical hx includes chronic pain, fibromyalgia, and anxiety  Pt admitted for Acute respiratory failure with hypoxia (HCC) and sepsis  Pt was intubated at [facility] 2* dec tolerance to bipap on 3/27/21  Pt extubated on 3/28/21  Per chart, CT noted moderate pericardial effusion; ECHO noted EF of 18%  S/p Cardiac catheterization on 3/30/21  Pt referred to PT for functional mobility evaluation & D/C planning w/ orders of up w/ assistance  Pt denies home O2 use  Pt currently on 2L O2 NC  SpO2 maintained at 94-95% t/o session  PTA, pt reports being I w/o AD  During evaluation, deficits included dec mobility, balance, ambulation  Pt demonstrated dec endurance and tolerance to activity  Evaluation of functional mobility required S for bed mobility and transfers; minAx1 for amb  Pt was able to ambulate 4' w/o AD  Gait deviations as above, slow but no gross LOB noted  Denies reports of dizziness or SOB t/o session  Pt stated she felt disoriented following supine to sit transfer  BP seated /83  Nsg staff most recent vital signs as follows: /93   Pulse 90   Temp (!) 97 °F (36 1 °C) (Temporal)   Resp 22   Ht 5' 7" (1 702 m)   Wt 74 4 kg (164 lb 0 4 oz)   SpO2 97%   BMI 25 69 kg/m²    At end of session, pt OOB in chair in stable condition, call bell & phone in reach, all lines intact  Pt was educated on fall precautions and reinforced w/ good understanding  Pt would benefit from continued PT to address deficits as defined above and maximize level of independence with functional mobility and safety  The patient's AM-PAC Basic Mobility Inpatient Short Form Raw Score is 19, Standardized Score is 42 48  A standardized score less than 42 9 suggests the patient may benefit from discharge to post-acute rehabilitation services  Please also refer to the recommendation of the Physical Therapist for safe discharge planning  From PT/mobility standpoint recommendation for D/C to STR vs home w/ HHPT pending progress, when medically cleared based on objective measures above, current function, risk for falls and dec family/caregiver support  CM to follow  Nsg staff to continue to mobilized pt (OOB in chair for all meals & ambulate in room/unit) as tolerated to prevent further decline in function  Nsg notified  Barriers to Discharge: Inaccessible home environment, Decreased caregiver support  Barriers to Discharge Comments: stairs; lives alone  PT Discharge Recommendation: [address] [place], Home with skilled therapy, Return to previous environment with social support(STR vs HHPT pending progress)          See flowsheet documentation for full assessment

## 2021-03-30 NOTE — PLAN OF CARE
NPO for cath lab today  VS stable     Problem: Prexisting or High Potential for Compromised Skin Integrity  Goal: Skin integrity is maintained or improved  Description: INTERVENTIONS:  - Identify patients at risk for skin breakdown  - Assess and monitor skin integrity  - Assess and monitor nutrition and hydration status  - Monitor labs   - Assess for incontinence   - Turn and reposition patient  - Assist with mobility/ambulation  - Relieve pressure over bony prominences  - Avoid friction and shearing  - Provide appropriate hygiene as needed including keeping skin clean and dry  - Evaluate need for skin moisturizer/barrier cream  - Collaborate with interdisciplinary team   - Patient/family teaching  - Consider wound care consult   Outcome: Progressing     Problem: PAIN - ADULT  Goal: Verbalizes/displays adequate comfort level or baseline comfort level  Description: Interventions:  - Encourage patient to monitor pain and request assistance  - Assess pain using appropriate pain scale  - Administer analgesics based on type and severity of pain and evaluate response  - Implement non-pharmacological measures as appropriate and evaluate response  - Consider cultural and social influences on pain and pain management  - Notify physician/advanced practitioner if interventions unsuccessful or patient reports new pain  Outcome: Progressing     Problem: INFECTION - ADULT  Goal: Absence or prevention of progression during hospitalization  Description: INTERVENTIONS:  - Assess and monitor for signs and symptoms of infection  - Monitor lab/diagnostic results  - Monitor all insertion sites, i e  indwelling lines, tubes, and drains  - Monitor endotracheal if appropriate and nasal secretions for changes in amount and color  - [place] appropriate cooling/warming therapies per order  - Administer medications as ordered  - Instruct and encourage patient and family to use good hand hygiene technique  - Identify and instruct in appropriate isolation precautions for identified infection/condition  Outcome: Progressing     Problem: SAFETY ADULT  Goal: Patient will remain free of falls  Description: INTERVENTIONS:  - Assess patient frequently for physical needs  -  Identify cognitive and physical deficits and behaviors that affect risk of falls    -  [place] fall precautions as indicated by assessment   - Educate patient/family on patient safety including physical limitations  - Instruct patient to call for assistance with activity based on assessment  - Modify environment to reduce risk of injury  - Consider OT/PT consult to assist with strengthening/mobility  Outcome: Progressing  Goal: Maintain or return to baseline ADL function  Description: INTERVENTIONS:  -  Assess patient's ability to carry out ADLs; assess patient's baseline for ADL function and identify physical deficits which impact ability to perform ADLs (bathing, care of mouth/teeth, toileting, grooming, dressing, etc )  - Assess/evaluate cause of self-care deficits   - Assess range of motion  - Assess patient's mobility; develop plan if impaired  - Assess patient's need for assistive devices and provide as appropriate  - Encourage maximum independence but intervene and supervise when necessary  - Involve family in performance of ADLs  - Assess for home care needs following discharge   - Consider OT consult to assist with ADL evaluation and planning for discharge  - Provide patient education as appropriate  Outcome: Progressing  Goal: Maintain or return mobility status to optimal level  Description: INTERVENTIONS:  - Assess patient's baseline mobility status (ambulation, transfers, stairs, etc )    - Identify cognitive and physical deficits and behaviors that affect mobility  - Identify mobility aids required to assist with transfers and/or ambulation (gait belt, sit-to-stand, lift, walker, cane, etc )  - [place] fall precautions as indicated by assessment  - Record patient progress and toleration of activity level on Mobility SBAR; progress patient to next Phase/Stage  - Instruct patient to call for assistance with activity based on assessment  - Consider rehabilitation consult to assist with strengthening/weightbearing, etc   Outcome: Progressing     Problem: DISCHARGE PLANNING  Goal: Discharge to home or other facility with appropriate resources  Description: INTERVENTIONS:  - Identify barriers to discharge w/patient and caregiver  - Arrange for needed discharge resources and transportation as appropriate  - Identify discharge learning needs (meds, wound care, etc )  - Arrange for interpretive services to assist at discharge as needed  - Refer to Case Management Department for coordinating discharge planning if the patient needs post-hospital services based on physician/advanced practitioner order or complex needs related to functional status, cognitive ability, or social support system  Outcome: Progressing     Problem: Knowledge Deficit  Goal: Patient/family/caregiver demonstrates understanding of disease process, treatment plan, medications, and discharge instructions  Description: Complete learning assessment and assess knowledge base    Interventions:  - Provide teaching at level of understanding  - Provide teaching via preferred learning methods  Outcome: Progressing     Problem: RESPIRATORY - ADULT  Goal: Achieves optimal ventilation and oxygenation  Description: INTERVENTIONS:  - Assess for changes in respiratory status  - Assess for changes in mentation and behavior  - Position to facilitate oxygenation and minimize respiratory effort  - Oxygen administered by appropriate delivery if ordered  - Initiate smoking cessation education as indicated  - Encourage broncho-pulmonary hygiene including cough, deep breathe, Incentive Spirometry  - Assess the need for suctioning and aspirate as needed  - Assess and instruct to report SOB or any respiratory difficulty  - Respiratory Therapy support as indicated  Outcome: Progressing     Problem: CARDIOVASCULAR - ADULT  Goal: Maintains optimal cardiac output and hemodynamic stability  Description: INTERVENTIONS:  - Monitor I/O, vital signs and rhythm  - Monitor for S/S and trends of decreased cardiac output  - Administer and titrate ordered vasoactive medications to optimize hemodynamic stability  - Assess quality of pulses, skin color and temperature  - Assess for signs of decreased coronary artery perfusion  - Instruct patient to report change in severity of symptoms  Outcome: Progressing  Goal: Absence of cardiac dysrhythmias or at baseline rhythm  Description: INTERVENTIONS:  - Continuous cardiac monitoring, vital signs, obtain 12 lead EKG if ordered  - Administer antiarrhythmic and heart rate control medications as ordered  - Monitor electrolytes and administer replacement therapy as ordered  Outcome: Progressing     Problem: METABOLIC, FLUID AND ELECTROLYTES - ADULT  Goal: Electrolytes maintained within normal limits  Description: INTERVENTIONS:  - Monitor labs and assess patient for signs and symptoms of electrolyte imbalances  - Administer electrolyte replacement as ordered  - Monitor response to electrolyte replacements, including repeat lab results as appropriate  - Instruct patient on fluid and nutrition as appropriate  Outcome: Progressing  Goal: Fluid balance maintained  Description: INTERVENTIONS:  - Monitor labs   - Monitor I/O and WT  - Instruct patient on fluid and nutrition as appropriate  - Assess for signs & symptoms of volume excess or deficit  Outcome: Progressing     Problem: SKIN/TISSUE INTEGRITY - ADULT  Goal: Skin integrity remains intact  Description: INTERVENTIONS  - Identify patients at risk for skin breakdown  - Assess and monitor skin integrity  - Assess and monitor nutrition and hydration status  - Monitor labs (i e  albumin)  - Assess for incontinence   - Turn and reposition patient  - Assist with mobility/ambulation  - Relieve pressure over bony prominences  - Avoid friction and shearing  - Provide appropriate hygiene as needed including keeping skin clean and dry  - Evaluate need for skin moisturizer/barrier cream  - Collaborate with interdisciplinary team (i e  Nutrition, Rehabilitation, etc )   - Patient/family teaching  Outcome: Progressing     Problem: HEMATOLOGIC - ADULT  Goal: Maintains hematologic stability  Description: INTERVENTIONS  - Assess for signs and symptoms of bleeding or hemorrhage  - Monitor labs  - Administer supportive blood products/factors as ordered and appropriate  Outcome: Progressing     Problem: Potential for Falls  Goal: Patient will remain free of falls  Description: INTERVENTIONS:  - Assess patient frequently for physical needs  -  Identify cognitive and physical deficits and behaviors that affect risk of falls  -  [place] fall precautions as indicated by assessment   - Educate patient/family on patient safety including physical limitations  - Instruct patient to call for assistance with activity based on assessment  - Modify environment to reduce risk of injury  - Consider OT/PT consult to assist with strengthening/mobility  Outcome: Progressing     Problem: Nutrition/Hydration-ADULT  Goal: Nutrient/Hydration intake appropriate for improving, restoring or maintaining nutritional needs  Description: Monitor and assess patient's nutrition/hydration status for malnutrition  Collaborate with interdisciplinary team and initiate plan and interventions as ordered  Monitor patient's weight and dietary intake as ordered or per policy  Utilize nutrition screening tool and intervene as necessary  Determine patient's food preferences and provide high-protein, high-caloric foods as appropriate       INTERVENTIONS:  - Monitor oral intake, urinary output, labs, and treatment plans  - Assess nutrition and hydration status and recommend course of action  - Evaluate amount of meals eaten  - Assist patient with eating if necessary   - Allow adequate time for meals  - Recommend/ encourage appropriate diets, oral nutritional supplements, and vitamin/mineral supplements  - Order, calculate, and assess calorie counts as needed  - Recommend, monitor, and adjust tube feedings and TPN/PPN based on assessed needs  - Assess need for intravenous fluids  - Provide specific nutrition/hydration education as appropriate  - Include patient/family/caregiver in decisions related to nutrition  Outcome: Progressing

## 2021-03-30 NOTE — ASSESSMENT & PLAN NOTE
· Unclear etiology, differentials would be ischemic cardiomyopathy, viral cardiomyopathy  · BNP 1016  · Echo showed EF 18%, moderate MR, small pericardial effusion with no tamponade  · Cardiology input appreciated  · IV Lasix 40 b i d   Discontinued by Cardiology yesterday afternoon was her last dose, they will start on oral diuretics after catheterization today  · Cardiac catheterization today

## 2021-03-30 NOTE — ASSESSMENT & PLAN NOTE
· Per her PCP has not been feeling well for 2 weeks  Activated EMS due to shortness of breath  Found tripoding with O2 saturation in the 70's  Placed on BiPAP and received nebulizers and steroids in the ER for wheezing without improvement   Intubated at [facility] and transferred to [facility]  · Secondary to volume overload, cardiogenic shock and possible pneumonia  · Patient is currently doing well on room air  · Bronchodilators

## 2021-03-30 NOTE — PROGRESS NOTES
[address]  Progress Note - Anisa Elders 1960, 61 y o  female MRN: [id number]  Unit/Bed#: ICU 07 Encounter: [id number]  Primary Care Provider: Farzad Coffey DO   Date and time admitted to hospital: 3/27/2021  4:49 AM    * Acute respiratory failure with hypoxia [facility])  Assessment & Plan  · Per her PCP has not been feeling well for 2 weeks  Activated EMS due to shortness of breath  Found tripoding with O2 saturation in the 70's  Placed on BiPAP and received nebulizers and steroids in the ER for wheezing without improvement  · Intubated at [facility] and transferred to [facility]  · Secondary to volume overload (cardiogenic pulmonary edema); possible pneumonia  · Continue oxygen supplementation, wean as tolerated maintain SpO2 greater than 90%  · Bronchodilators      Sepsis ([address] )  Assessment & Plan  · Likely secondary to pneumonia   · Procalcitonin elevated at 2 3 on admission, uptrend yesterday to 3 9  · Given Cefepime and Vancomycin in the ER --- is a term eyes in discontinued, currently on Ceftin to complete a total of 7 days on antibiotics  · Follow up blood and sputum cultures -- sputum cultures showed mixed respiratory mathew, blood cultures negative for 24 hours  · Urine negative     Severe cardiomyopathy  Assessment & Plan  · Unclear etiology, differentials would be ischemic cardiomyopathy, viral cardiomyopathy  · BNP 1016  · Echo showed EF 18%, moderate MR, small pericardial effusion with no tamponade  · Cardiology input appreciated  · IV Lasix 40 b i d   Discontinued by Cardiology yesterday afternoon was her last dose, they will start on oral diuretics after catheterization today  · Cardiac catheterization today    Hypertension  Assessment & Plan  · No maintenance medications for HTN in outpatient setting  · Started on lisinopril 5 mg once a day  · Metoprolol started by Cardiology yesterday    Hyperglycemia  Assessment & Plan  · BG on admission at 300s  · A1c 5 5% obtained on 3/27  · Blood glucose range in last 24h   · Continue SSI    Chronic pain  Assessment & Plan  · Currently still on fentanyl and Precedex  · Discontinued fentanyl and Precedex infusion yesterday  · PDMP reviewed, on Percocet prn and Morphine 30 BID -- resume    Elevated troponin  Assessment & Plan  · Likely secondary to sepsis vs pericardial effusion  · Bilateral pleural effusions noted on CTA with no previous echocardiogram  · Echo results as outlined above      Anxiety  Assessment & Plan  · PDMP reviewed, on Clonazepam at bedtime        ----------------------------------------------------------------------------------------  HPI/24hr events:     No overnight events    Disposition: Transfer to [facility] with Telemetry   Code Status: Level 1 - Full Code  ---------------------------------------------------------------------------------------  SUBJECTIVE  Patient reports he feels well, no complaint  Review of Systems  Review of systems was reviewed and negative unless stated above in HPI/24-hour events   ---------------------------------------------------------------------------------------  OBJECTIVE    Vitals   Vitals:    21 0700 21 0709 21 0800 21 0834   BP: 163/87  170/89 (!) 173/97   Pulse: 96  90    Resp: (!) 33  19    Temp:  97 6 °F (36 4 °C)     TempSrc:  Temporal     SpO2: 91% 96% 96%    Weight:       Height:         Temp (24hrs), Av 1 °F (36 7 °C), Min:97 5 °F (36 4 °C), Max:99 1 °F (37 3 °C)  Current: Temperature: 97 6 °F (36 4 °C)  Arterial Line BP: 160/96  Arterial Line MAP (mmHg): 120 mmHg    Respiratory:  SpO2 Device: O2 Device: Nasal cannula 6 L       Invasive/non-invasive ventilation settings   Respiratory    Lab Data (Last 4 hours)    None         O2/Vent Data (Last 4 hours)    None                Physical Exam  Vitals signs reviewed  Constitutional:       General: She is not in acute distress  Appearance: She is not ill-appearing     Eyes:      General: No scleral icterus  Neck:      Comments: No JVD  Cardiovascular:      Rate and Rhythm: Normal rate and regular rhythm  Heart sounds: No murmur  Pulmonary:      Effort: Pulmonary effort is normal  No respiratory distress  Breath sounds: No wheezing or rales  Abdominal:      General: There is no distension  Palpations: Abdomen is soft  Tenderness: There is no abdominal tenderness  Musculoskeletal:      Right lower leg: No edema  Left lower leg: No edema  Skin:     Capillary Refill: Capillary refill takes less than 2 seconds  Coloration: Skin is not jaundiced  Neurological:      General: No focal deficit present  Mental Status: She is alert and oriented to person, place, and time              Laboratory and Diagnostics:  Results from last 7 days   Lab Units 03/29/21  0510 03/28/21  0457 03/27/21  0529 03/27/21  0012   WBC Thousand/uL 15 41* 15 33* 27 67* 21 90*   HEMOGLOBIN g/dL 11 8 11 0* 13 4 13 5   HEMATOCRIT % 36 5 33 5* 41 1 42 4   PLATELETS Thousands/uL 392* 350 435* 543*   NEUTROS PCT % 69 71 87*  --    MONOS PCT % 7 9 6  --    MONO PCT %  --   --   --  5     Results from last 7 days   Lab Units 03/30/21  0451 03/29/21  0510 03/28/21  0457 03/27/21  0522 03/27/21  0011   SODIUM mmol/L 138 138 138 136 129*   POTASSIUM mmol/L 3 3* 3 5 4 0 3 5 3 2*   CHLORIDE mmol/L 99* 100 101 99* 93*   CO2 mmol/L 31 29 28 28 24   ANION GAP mmol/L 8 9 9 9 12   BUN mg/dL 19 20 18 12 14   CREATININE mg/dL 0 82 0 78 0 75 1 01 1 04   CALCIUM mg/dL 9 3 9 3 9 2 8 2* 9 6   GLUCOSE RANDOM mg/dL 104 93 127 192* 390*   ALT U/L  --   --   --   --  9   AST U/L  --   --   --   --  13   ALK PHOS U/L  --   --   --   --  92   ALBUMIN g/dL  --   --   --   --  4 1   TOTAL BILIRUBIN mg/dL  --   --   --   --  0 50          Results from last 7 days   Lab Units 03/27/21  0011   INR  1 32*   PTT seconds 31      Results from last 7 days   Lab Units 03/27/21  1512 03/27/21  0927 03/27/21  0522 03/27/21  0011 TROPONIN I ng/mL 0 42* 0 49* 0 53* 0 08*     Results from last 7 days   Lab Units 03/27/21  0326 03/27/21  0011   LACTIC ACID mmol/L 1 5 4 9*     ABG:  Results from last 7 days   Lab Units 03/28/21  0806   PH ART  7 438   PCO2 ART mm Hg 41 6   PO2 ART mm Hg 85 6   HCO3 ART mmol/L 27 5   BASE EXC ART mmol/L 3 0   ABG SOURCE  Line, Arterial     VBG:  Results from last 7 days   Lab Units 03/28/21  0806   ABG SOURCE  Line, Arterial     Results from last 7 days   Lab Units 03/28/21  0457 03/27/21  0522   PROCALCITONIN ng/ml 3 96* 2 38*       Micro  Results from last 7 days   Lab Units 03/27/21  0530 03/27/21  0527 03/27/21  0522 03/27/21  0027 03/27/21  0011   BLOOD CULTURE   --   --  No Growth at 48 hrs  No Growth at 48 hrs  No Growth at 48 hrs  No Growth at 48 hrs  SPUTUM CULTURE  1+ Growth of   --   --   --   --    GRAM STAIN RESULT  No polys seen*  1+ Gram negative rods*  Rare Gram positive cocci in pairs*  --   --   --   --    LEGIONELLA URINARY ANTIGEN   --  Negative  --   --   --    STREP PNEUMONIAE ANTIGEN, URINE   --  Negative  --   --   --          Imaging: I have personally reviewed pertinent reports  Intake and Output  I/O       03/28 0701 - 03/29 0700 03/29 0701 - 03/30 0700 03/30 0701 - 03/31 0700    P  O  240 240     I V  (mL/kg) 131 1 (1 7) 142 (1 9)     IV Piggyback  250     Total Intake(mL/kg) 371 1 (4 9) 632 (8 5)     Urine (mL/kg/hr) 2410 (1 3) 3060 (1 7)     Emesis/NG output       Total Output 2410 3060     Net -2038 9 -7329                  Height and Weights   Height: 5' 7" (170 2 cm)  IBW: 61 6 kg  Body mass index is 25 69 kg/m²    Weight (last 2 days)     Date/Time   Weight    03/30/21 0558   74 4 (164 02)    03/30/21 0248   74 4 (164 02)    03/29/21 0600   76 5 (168 65)    03/28/21 0600   76 3 (168 21)                Nutrition       Diet Orders   (From admission, onward)             Start     Ordered    03/30/21 0001  Diet NPO; Sips with meds  Diet effective midnight     Question Answer Comment   Diet Type NPO    NPO Except: Sips with meds    RD to adjust diet per protocol? Yes        03/29/21 1527                  Active Medications  Scheduled Meds:  Current Facility-Administered Medications   Medication Dose Route Frequency Provider Last Rate    atorvastatin  40 mg Oral Daily With Dinner Mary Ann Anderson PA-C      cefuroxime  500 mg Oral Q12H [address] Leodan Goodwin MD      clonazePAM  0 5 mg Oral HS Leodan Goodwin MD      heparin (porcine)  5,000 Units Subcutaneous Replaced by [facility] Meryle Jacquet Bilofsky, CRNP      insulin lispro  1-5 Units Subcutaneous TID AC [place] Setting, ESTHER      ipratropium  0 5 mg Nebulization Q6H Meryle Jacquet Bilofsky, CRNP      levalbuterol  1 25 mg Nebulization Q6H Mariela ESTHER [place]      lisinopril  5 mg Oral Daily Leodan Goodwin MD      metoprolol succinate  25 mg Oral HS Mary Ann Anderson PA-C      morphine  30 mg Oral Q12H [address] [place] Setting, [address]      nicotine  14 mg Transdermal Daily ESTHER Saleh      oxyCODONE-acetaminophen  1 tablet Oral Q4H PRN Wilhemena SettingESTHER      ticagrelor  180 mg Oral Once Mary Ann Anderson PA-C      torsemide  20 mg Oral Daily Mary Ann Anderson PA-C       Continuous Infusions:     PRN Meds:   oxyCODONE-acetaminophen, 1 tablet, Q4H PRN        Invasive Devices Review  Invasive Devices     Peripheral Intravenous Line            Peripheral IV 03/27/21 Left Wrist 3 days    Peripheral IV 03/29/21 Dorsal (posterior); Left Forearm 1 day                ---------------------------------------------------------------------------------------  Advance Directive and Living Will:      Power of :    POLST:    ---------------------------------------------------------------------------------------        Leodan Goodwin MD      Portions of the record may have been created with voice recognition software    Occasional wrong word or "sound a like" substitutions may have occurred due to the inherent limitations of voice recognition software    Read the chart carefully and recognize, using context, where substitutions have occurred

## 2021-03-30 NOTE — ASSESSMENT & PLAN NOTE
· No maintenance medications for HTN in outpatient setting  · Started on lisinopril 20 mg daily, Coreg 3 125 mg b i d

## 2021-03-30 NOTE — PLAN OF CARE
Problem: Prexisting or High Potential for Compromised Skin Integrity  Goal: Skin integrity is maintained or improved  Description: INTERVENTIONS:  - Identify patients at risk for skin breakdown  - Assess and monitor skin integrity  - Assess and monitor nutrition and hydration status  - Monitor labs   - Assess for incontinence   - Turn and reposition patient  - Assist with mobility/ambulation  - Relieve pressure over bony prominences  - Avoid friction and shearing  - Provide appropriate hygiene as needed including keeping skin clean and dry  - Evaluate need for skin moisturizer/barrier cream  - Collaborate with interdisciplinary team   - Patient/family teaching  - Consider wound care consult   Outcome: Progressing     Problem: PAIN - ADULT  Goal: Verbalizes/displays adequate comfort level or baseline comfort level  Description: Interventions:  - Encourage patient to monitor pain and request assistance  - Assess pain using appropriate pain scale  - Administer analgesics based on type and severity of pain and evaluate response  - Implement non-pharmacological measures as appropriate and evaluate response  - Consider cultural and social influences on pain and pain management  - Notify physician/advanced practitioner if interventions unsuccessful or patient reports new pain  Outcome: Progressing     Problem: INFECTION - ADULT  Goal: Absence or prevention of progression during hospitalization  Description: INTERVENTIONS:  - Assess and monitor for signs and symptoms of infection  - Monitor lab/diagnostic results  - Monitor all insertion sites, i e  indwelling lines, tubes, and drains  - Monitor endotracheal if appropriate and nasal secretions for changes in amount and color  - [place] appropriate cooling/warming therapies per order  - Administer medications as ordered  - Instruct and encourage patient and family to use good hand hygiene technique  - Identify and instruct in appropriate isolation precautions for identified infection/condition  Outcome: Progressing     Problem: SAFETY ADULT  Goal: Patient will remain free of falls  Description: INTERVENTIONS:  - Assess patient frequently for physical needs  -  Identify cognitive and physical deficits and behaviors that affect risk of falls    -  [place] fall precautions as indicated by assessment   - Educate patient/family on patient safety including physical limitations  - Instruct patient to call for assistance with activity based on assessment  - Modify environment to reduce risk of injury  - Consider OT/PT consult to assist with strengthening/mobility  Outcome: Progressing  Goal: Maintain or return to baseline ADL function  Description: INTERVENTIONS:  -  Assess patient's ability to carry out ADLs; assess patient's baseline for ADL function and identify physical deficits which impact ability to perform ADLs (bathing, care of mouth/teeth, toileting, grooming, dressing, etc )  - Assess/evaluate cause of self-care deficits   - Assess range of motion  - Assess patient's mobility; develop plan if impaired  - Assess patient's need for assistive devices and provide as appropriate  - Encourage maximum independence but intervene and supervise when necessary  - Involve family in performance of ADLs  - Assess for home care needs following discharge   - Consider OT consult to assist with ADL evaluation and planning for discharge  - Provide patient education as appropriate  Outcome: Progressing  Goal: Maintain or return mobility status to optimal level  Description: INTERVENTIONS:  - Assess patient's baseline mobility status (ambulation, transfers, stairs, etc )    - Identify cognitive and physical deficits and behaviors that affect mobility  - Identify mobility aids required to assist with transfers and/or ambulation (gait belt, sit-to-stand, lift, walker, cane, etc )  - [place] fall precautions as indicated by assessment  - Record patient progress and toleration of activity level on Mobility SBAR; progress patient to next Phase/Stage  - Instruct patient to call for assistance with activity based on assessment  - Consider rehabilitation consult to assist with strengthening/weightbearing, etc   Outcome: Progressing     Problem: DISCHARGE PLANNING  Goal: Discharge to home or other facility with appropriate resources  Description: INTERVENTIONS:  - Identify barriers to discharge w/patient and caregiver  - Arrange for needed discharge resources and transportation as appropriate  - Identify discharge learning needs (meds, wound care, etc )  - Arrange for interpretive services to assist at discharge as needed  - Refer to Case Management Department for coordinating discharge planning if the patient needs post-hospital services based on physician/advanced practitioner order or complex needs related to functional status, cognitive ability, or social support system  Outcome: Progressing     Problem: Knowledge Deficit  Goal: Patient/family/caregiver demonstrates understanding of disease process, treatment plan, medications, and discharge instructions  Description: Complete learning assessment and assess knowledge base    Interventions:  - Provide teaching at level of understanding  - Provide teaching via preferred learning methods  Outcome: Progressing     Problem: RESPIRATORY - ADULT  Goal: Achieves optimal ventilation and oxygenation  Description: INTERVENTIONS:  - Assess for changes in respiratory status  - Assess for changes in mentation and behavior  - Position to facilitate oxygenation and minimize respiratory effort  - Oxygen administered by appropriate delivery if ordered  - Initiate smoking cessation education as indicated  - Encourage broncho-pulmonary hygiene including cough, deep breathe, Incentive Spirometry  - Assess the need for suctioning and aspirate as needed  - Assess and instruct to report SOB or any respiratory difficulty  - Respiratory Therapy support as indicated  Outcome: Progressing     Problem: Potential for Falls  Goal: Patient will remain free of falls  Description: INTERVENTIONS:  - Assess patient frequently for physical needs  -  Identify cognitive and physical deficits and behaviors that affect risk of falls  -  [place] fall precautions as indicated by assessment   - Educate patient/family on patient safety including physical limitations  - Instruct patient to call for assistance with activity based on assessment  - Modify environment to reduce risk of injury  - Consider OT/PT consult to assist with strengthening/mobility  Outcome: Progressing     Problem: Nutrition/Hydration-ADULT  Goal: Nutrient/Hydration intake appropriate for improving, restoring or maintaining nutritional needs  Description: Monitor and assess patient's nutrition/hydration status for malnutrition  Collaborate with interdisciplinary team and initiate plan and interventions as ordered  Monitor patient's weight and dietary intake as ordered or per policy  Utilize nutrition screening tool and intervene as necessary  Determine patient's food preferences and provide high-protein, high-caloric foods as appropriate       INTERVENTIONS:  - Monitor oral intake, urinary output, labs, and treatment plans  - Assess nutrition and hydration status and recommend course of action  - Evaluate amount of meals eaten  - Assist patient with eating if necessary   - Allow adequate time for meals  - Recommend/ encourage appropriate diets, oral nutritional supplements, and vitamin/mineral supplements  - Order, calculate, and assess calorie counts as needed  - Recommend, monitor, and adjust tube feedings and TPN/PPN based on assessed needs  - Assess need for intravenous fluids  - Provide specific nutrition/hydration education as appropriate  - Include patient/family/caregiver in decisions related to nutrition  Outcome: Progressing     Problem: CARDIOVASCULAR - ADULT  Goal: Maintains optimal cardiac output and hemodynamic stability  Description: INTERVENTIONS:  - Monitor I/O, vital signs and rhythm  - Monitor for S/S and trends of decreased cardiac output  - Administer and titrate ordered vasoactive medications to optimize hemodynamic stability  - Assess quality of pulses, skin color and temperature  - Assess for signs of decreased coronary artery perfusion  - Instruct patient to report change in severity of symptoms  Outcome: Progressing  Goal: Absence of cardiac dysrhythmias or at baseline rhythm  Description: INTERVENTIONS:  - Continuous cardiac monitoring, vital signs, obtain 12 lead EKG if ordered  - Administer antiarrhythmic and heart rate control medications as ordered  - Monitor electrolytes and administer replacement therapy as ordered  Outcome: Progressing     Problem: METABOLIC, FLUID AND ELECTROLYTES - ADULT  Goal: Electrolytes maintained within normal limits  Description: INTERVENTIONS:  - Monitor labs and assess patient for signs and symptoms of electrolyte imbalances  - Administer electrolyte replacement as ordered  - Monitor response to electrolyte replacements, including repeat lab results as appropriate  - Instruct patient on fluid and nutrition as appropriate  Outcome: Progressing  Goal: Fluid balance maintained  Description: INTERVENTIONS:  - Monitor labs   - Monitor I/O and WT  - Instruct patient on fluid and nutrition as appropriate  - Assess for signs & symptoms of volume excess or deficit  Outcome: Progressing     Problem: SKIN/TISSUE INTEGRITY - ADULT  Goal: Skin integrity remains intact  Description: INTERVENTIONS  - Identify patients at risk for skin breakdown  - Assess and monitor skin integrity  - Assess and monitor nutrition and hydration status  - Monitor labs (i e  albumin)  - Assess for incontinence   - Turn and reposition patient  - Assist with mobility/ambulation  - Relieve pressure over bony prominences  - Avoid friction and shearing  - Provide appropriate hygiene as needed including keeping skin clean and dry  - Evaluate need for skin moisturizer/barrier cream  - Collaborate with interdisciplinary team (i e  Nutrition, Rehabilitation, etc )   - Patient/family teaching  Outcome: Progressing     Problem: HEMATOLOGIC - ADULT  Goal: Maintains hematologic stability  Description: INTERVENTIONS  - Assess for signs and symptoms of bleeding or hemorrhage  - Monitor labs  - Administer supportive blood products/factors as ordered and appropriate  Outcome: Progressing

## 2021-03-30 NOTE — ASSESSMENT & PLAN NOTE
· Likely secondary to pneumonia   · Procalcitonin elevated at 2 3-->3 9  · Given Cefepime and Vancomycin in the ER ---currently on Ceftin to complete a total of 7 days on antibiotics  · Follow up blood and sputum cultures -- sputum cultures showed mixed respiratory mathew, blood cultures negative for 4 days  · Urine negative

## 2021-03-30 NOTE — ASSESSMENT & PLAN NOTE
· No maintenance medications for HTN in outpatient setting  · Started on lisinopril 5 mg once a day  · Metoprolol started by Cardiology yesterday

## 2021-03-30 NOTE — OCCUPATIONAL THERAPY NOTE
Occupational Therapy         Patient Name: Manuel Mohan  SEJXA'Y Date: 3/30/2021        MD's orders received  Currently pt off the flr for a cardiac cath  Will continue when appropriate   Jasmin Lung, OT

## 2021-03-30 NOTE — ASSESSMENT & PLAN NOTE
· Unclear etiology, differentials would be viral cardiomyopathy, dilated cardiomyopathy  · BNP 1016  · Echo showed EF 18%, moderate MR, small pericardial effusion with no tamponade  · Cardiology input appreciated  · IV Lasix 40 b i d   Discontinued by Cardiology yesterday afternoon was her last dose, they will start on oral diuretics after catheterization today  · Cardiac catheterization today

## 2021-03-30 NOTE — ASSESSMENT & PLAN NOTE
· Per her PCP has not been feeling well for 2 weeks  Activated EMS due to shortness of breath  Found tripoding with O2 saturation in the 70's   Placed on BiPAP and received nebulizers and steroids in the ER for wheezing without improvement  · Intubated at [facility] and transferred to [facility]  · Secondary to volume overload (cardiogenic pulmonary edema); possible pneumonia  · Continue oxygen supplementation, wean as tolerated maintain SpO2 greater than 90%  · Bronchodilators

## 2021-03-30 NOTE — ASSESSMENT & PLAN NOTE
· Per her PCP has not been feeling well for 2 weeks  Activated EMS due to shortness of breath  Found tripoding with O2 saturation in the 70's   Placed on BiPAP and received nebulizers and steroids in the ER for wheezing without improvement  · Intubated at [facility] and transferred to [facility]  · Secondary to volume overload (cardiogenic pulmonary edema); possible pneumonia  · Continue oxygen supplementation, wean as tolerated maintain SpO2 greater than 90%  · Bronchodilators

## 2021-03-30 NOTE — ASSESSMENT & PLAN NOTE
· Unclear etiology  · Echo showed EF 18%, moderate MR, small pericardial effusion with no tamponade  · Cardiac catheterization 3/30 showed mild to moderate on obstructing coronary artery disease  · Cardiology input appreciated, initially diuresed with IV Lasix, now on torsemide 20 mg daily  · Continue carvedilol and lisinopril  · Patient had no episodes of VT    No need for LifeVest, repeat echo in 3 months

## 2021-03-30 NOTE — ASSESSMENT & PLAN NOTE
· Likely secondary to pneumonia   · Procalcitonin elevated at 2 3 on admission, uptrend yesterday to 3 9  · Given Cefepime and Vancomycin in the ER --- is a term eyes in discontinued, currently on Ceftin to complete a total of 7 days on antibiotics  · Follow up blood and sputum cultures -- sputum cultures showed mixed respiratory mathew, blood cultures negative for 24 hours  · Urine negative

## 2021-03-30 NOTE — ASSESSMENT & PLAN NOTE
· Currently still on fentanyl and Precedex  · Discontinued fentanyl and Precedex infusion yesterday  · PDMP reviewed, on Percocet prn and Morphine 30 BID -- resume

## 2021-03-31 LAB
ANION GAP SERPL CALCULATED.3IONS-SCNC: 8 MMOL/L (ref 4–13)
BUN SERPL-MCNC: 16 MG/DL (ref 5–25)
CALCIUM SERPL-MCNC: 9.9 MG/DL (ref 8.3–10.1)
CHLORIDE SERPL-SCNC: 99 MMOL/L (ref 100–108)
CO2 SERPL-SCNC: 31 MMOL/L (ref 21–32)
CREAT SERPL-MCNC: 0.95 MG/DL (ref 0.6–1.3)
ERYTHROCYTE [DISTWIDTH] IN BLOOD BY AUTOMATED COUNT: 13 % (ref 11.6–15.1)
GFR SERPL CREATININE-BSD FRML MDRD: 65 ML/MIN/1.73SQ M
GLUCOSE SERPL-MCNC: 111 MG/DL (ref 65–140)
GLUCOSE SERPL-MCNC: 112 MG/DL (ref 65–140)
GLUCOSE SERPL-MCNC: 81 MG/DL (ref 65–140)
GLUCOSE SERPL-MCNC: 91 MG/DL (ref 65–140)
GLUCOSE SERPL-MCNC: 99 MG/DL (ref 65–140)
HCT VFR BLD AUTO: 41.4 % (ref 34.8–46.1)
HGB BLD-MCNC: 13.6 G/DL (ref 11.5–15.4)
MAGNESIUM SERPL-MCNC: 2.1 MG/DL (ref 1.6–2.6)
MCH RBC QN AUTO: 29.2 PG (ref 26.8–34.3)
MCHC RBC AUTO-ENTMCNC: 32.9 G/DL (ref 31.4–37.4)
MCV RBC AUTO: 89 FL (ref 82–98)
PHOSPHATE SERPL-MCNC: 4.1 MG/DL (ref 2.3–4.1)
PLATELET # BLD AUTO: 466 THOUSANDS/UL (ref 149–390)
PMV BLD AUTO: 9.6 FL (ref 8.9–12.7)
POTASSIUM SERPL-SCNC: 3.6 MMOL/L (ref 3.5–5.3)
RBC # BLD AUTO: 4.65 MILLION/UL (ref 3.81–5.12)
SODIUM SERPL-SCNC: 138 MMOL/L (ref 136–145)
WBC # BLD AUTO: 11.78 THOUSAND/UL (ref 4.31–10.16)

## 2021-03-31 PROCEDURE — 80048 BASIC METABOLIC PNL TOTAL CA: CPT | Performed by: INTERNAL MEDICINE

## 2021-03-31 PROCEDURE — 97116 GAIT TRAINING THERAPY: CPT

## 2021-03-31 PROCEDURE — 83735 ASSAY OF MAGNESIUM: CPT | Performed by: INTERNAL MEDICINE

## 2021-03-31 PROCEDURE — 99232 SBSQ HOSP IP/OBS MODERATE 35: CPT | Performed by: INTERNAL MEDICINE

## 2021-03-31 PROCEDURE — 97530 THERAPEUTIC ACTIVITIES: CPT

## 2021-03-31 PROCEDURE — 97110 THERAPEUTIC EXERCISES: CPT

## 2021-03-31 PROCEDURE — 94760 N-INVAS EAR/PLS OXIMETRY 1: CPT

## 2021-03-31 PROCEDURE — 84100 ASSAY OF PHOSPHORUS: CPT | Performed by: INTERNAL MEDICINE

## 2021-03-31 PROCEDURE — 94640 AIRWAY INHALATION TREATMENT: CPT

## 2021-03-31 PROCEDURE — 82948 REAGENT STRIP/BLOOD GLUCOSE: CPT

## 2021-03-31 PROCEDURE — 85027 COMPLETE CBC AUTOMATED: CPT | Performed by: INTERNAL MEDICINE

## 2021-03-31 PROCEDURE — 97167 OT EVAL HIGH COMPLEX 60 MIN: CPT

## 2021-03-31 RX ORDER — CARVEDILOL 6.25 MG/1
3.12 TABLET ORAL 2 TIMES DAILY WITH MEALS
Status: DISCONTINUED | OUTPATIENT
Start: 2021-03-31 | End: 2021-04-01 | Stop reason: HOSPADM

## 2021-03-31 RX ORDER — LISINOPRIL 20 MG/1
20 TABLET ORAL DAILY
Status: DISCONTINUED | OUTPATIENT
Start: 2021-04-01 | End: 2021-04-01 | Stop reason: HOSPADM

## 2021-03-31 RX ADMIN — MORPHINE SULFATE 30 MG: 30 TABLET, EXTENDED RELEASE ORAL at 08:45

## 2021-03-31 RX ADMIN — OXYCODONE HYDROCHLORIDE AND ACETAMINOPHEN 1 TABLET: 5; 325 TABLET ORAL at 21:33

## 2021-03-31 RX ADMIN — IPRATROPIUM BROMIDE 0.5 MG: 0.5 SOLUTION RESPIRATORY (INHALATION) at 08:21

## 2021-03-31 RX ADMIN — ASPIRIN 81 MG CHEWABLE TABLET 81 MG: 81 TABLET CHEWABLE at 08:45

## 2021-03-31 RX ADMIN — ATORVASTATIN CALCIUM 40 MG: 40 TABLET, FILM COATED ORAL at 16:21

## 2021-03-31 RX ADMIN — LISINOPRIL 15 MG: 5 TABLET ORAL at 09:46

## 2021-03-31 RX ADMIN — HEPARIN SODIUM 5000 UNITS: 5000 INJECTION INTRAVENOUS; SUBCUTANEOUS at 05:34

## 2021-03-31 RX ADMIN — LEVALBUTEROL HYDROCHLORIDE 1.25 MG: 1.25 SOLUTION, CONCENTRATE RESPIRATORY (INHALATION) at 13:45

## 2021-03-31 RX ADMIN — CEFUROXIME AXETIL 500 MG: 250 TABLET ORAL at 08:45

## 2021-03-31 RX ADMIN — CARVEDILOL 3.12 MG: 6.25 TABLET, FILM COATED ORAL at 16:21

## 2021-03-31 RX ADMIN — LEVALBUTEROL HYDROCHLORIDE 1.25 MG: 1.25 SOLUTION, CONCENTRATE RESPIRATORY (INHALATION) at 08:21

## 2021-03-31 RX ADMIN — LEVALBUTEROL HYDROCHLORIDE 1.25 MG: 1.25 SOLUTION, CONCENTRATE RESPIRATORY (INHALATION) at 19:00

## 2021-03-31 RX ADMIN — TORSEMIDE 20 MG: 20 TABLET ORAL at 08:45

## 2021-03-31 RX ADMIN — Medication 14 MG: at 08:45

## 2021-03-31 RX ADMIN — LISINOPRIL 5 MG: 5 TABLET ORAL at 08:45

## 2021-03-31 RX ADMIN — CLONAZEPAM 0.5 MG: 0.5 TABLET ORAL at 21:33

## 2021-03-31 RX ADMIN — CARVEDILOL 3.12 MG: 6.25 TABLET, FILM COATED ORAL at 09:47

## 2021-03-31 RX ADMIN — HEPARIN SODIUM 5000 UNITS: 5000 INJECTION INTRAVENOUS; SUBCUTANEOUS at 13:42

## 2021-03-31 RX ADMIN — IPRATROPIUM BROMIDE 0.5 MG: 0.5 SOLUTION RESPIRATORY (INHALATION) at 19:00

## 2021-03-31 RX ADMIN — HEPARIN SODIUM 5000 UNITS: 5000 INJECTION INTRAVENOUS; SUBCUTANEOUS at 21:32

## 2021-03-31 RX ADMIN — IPRATROPIUM BROMIDE 0.5 MG: 0.5 SOLUTION RESPIRATORY (INHALATION) at 13:45

## 2021-03-31 RX ADMIN — CEFUROXIME AXETIL 500 MG: 250 TABLET ORAL at 21:33

## 2021-03-31 NOTE — PHYSICAL THERAPY NOTE
PT PROGRESS NOTE    Name: Ariel Morales  AGE: 61 y o  MRN: [id number]   LENGTH OF STAY: 4        03/31/21 1109   PT Last Visit   PT Visit Date 03/31/21   Note Type   Note Type Treatment   Pain Assessment   Pain Assessment Tool Pain Assessment not indicated - pt denies pain   Pain Score No Pain   Restrictions/Precautions   Weight Bearing Precautions Per Order No   Other Precautions Chair Alarm; Bed Alarm;Telemetry; Fall Risk   General   Chart Reviewed Yes   Family/Caregiver Present No   Cognition   Overall Cognitive Status WFL   Arousal/Participation Alert   Attention Within functional limits   Orientation Level Oriented X4   Following Commands Follows multistep commands without difficulty   Comments Cooperative and pleasant   Subjective   Subjective Pt agreeable to PT   Bed Mobility   Sit to Supine 5  Supervision   Additional items Increased time required   Additional Comments Demonstrated proper techniques and safety; Pt OOB in chair pre session   Transfers   Sit to Stand 5  Supervision   Additional items Armrests; Increased time required;Verbal cues   Stand to Sit 5  Supervision   Additional items Armrests; Increased time required;Verbal cues   Additional Comments Cues for technique and safety   Ambulation/Elevation   Gait pattern Decreased foot clearance; Short stride; Excessively slow  (unsteady)   Gait Assistance 4  Minimal assist   Additional items Assist x 1;Verbal cues   Assistive Device None;Straight cane  (1st trial no AD; Trialed SPC)   Distance 20'x1 w/o AD; 40' x1 w/o AD; 10'x1 w/ SPC   Stair Management Assistance 4  Minimal assist   Additional items Assist x 1;Verbal cues   Stair Management Technique One rail R;Alternating pattern;Reciprocal   Number of Stairs 4  (x2; 1st trial no AD; 2nd trial w/ SPC step to pattern)   Balance   Static Sitting Good   Dynamic Sitting Fair +   Static Standing Fair   Dynamic Standing Poor +   Ambulatory Poor +   Endurance Deficit   Endurance Deficit Yes   Endurance Deficit Description fatigue   Activity Tolerance   Activity Tolerance Patient limited by fatigue;Treatment limited secondary to medical complications (Comment)   Nurse Made Aware RN Stephany   Exercises   Hip Abduction Sitting;10 reps;AROM; Bilateral   Hip Adduction Sitting;10 reps;AROM; Bilateral   Knee AROM Long Arc Quad Sitting;10 reps;AROM; Bilateral   Assessment   Prognosis Good   Problem List Decreased strength;Decreased endurance; Impaired balance;Decreased mobility   Assessment Patient was seen today per POC  Overall, pt demonstrated improved mobility & dec tolerance to activity  Upon arrival pt OOB in chair on RA  Required S for bed mobility and transfers; minAx1 for amb and stair navigation  Patient was able to amb 20' w/o AD + face mask, however pt complained of SOB and fatigue w/ SpO2 at 89% RA  Face mask was removed and pt amb in room 40' w/o AD and minAx1  SpO2 maintained 91-94% RA  Pt required seated rest break and SpO2 maintained at 95% RA  Pt was educated on use of RW for help w/ balance and energy conservation  Pt refused amb w/ RW  Pt was educated on use of SPC to inc balance and help w/ energy conservation  Pt agreeable to use of SPC  Pt able to amb 10' w/ [facility] however pt continues to demonstrate unsteadiness and inc fatigue  Pt may benefit from use of RW for safety  Gait deviations as mentioned above, slow but no gross LOB noted  Pt was [facility]axi to [facility]  Pt performed stair navigation of 4 steps w/ minAx1 w/ use of R hand rail and reciprocal gait pattern  SpO2 90% on RA following stair training  Required standing rest break to improve SpO2 to 94% RA  Pt was educated on proper use of SPC for stair navigation and step to pattern 2* pt does not have railings at home  Pt required consistent VC t/o stair training for use of SPC and inc safety  Pt demonstrated inc difficulty w/ stair descending w/ use of SPC and was unsteady requiring minAx1   Good tolerance to seated therapeutic exercises with mild fatigue  At end of session, pt back in bed in stable condition w/ call bell and phone in reach w/ bed alarm  Will continue to see pt per POC as tolerated  Pt concerned about inc fatigue and difficulty breathing w/ mobility  Pt was educated on benefits of STR due to her dec endurance and activity tolerance  Pt agreeable to rehab  The patient's AM-PAC Basic Mobility Inpatient Short Form Raw Score is 19, Standardized Score is 42 48  A standardized score less than 42 9 suggests the patient may benefit from discharge to post-acute rehabilitation services  Please also refer to the recommendation of the Physical Therapist for safe discharge planning  From PT standpoint recommendation for STR at D/C when medically cleared based objective findings, current function, dec endurance, dec caregiver/family support and risk for falls  [facility] staff to continue to mobilized pt (OOB in chair for all meals & ambulate in room/unit) as tolerated to prevent further decline in function  Ns staff notified  Barriers to Discharge Inaccessible home environment;Decreased caregiver support   Barriers to Discharge Comments stairs; lives alone   Goals   Patient Goals to get stronger   STG Expiration Date 04/09/21   PT Treatment Day 1   Plan   Treatment/Interventions Functional transfer training;LE strengthening/ROM; Elevations; Therapeutic exercise; Endurance training;Patient/family training;Bed mobility;Gait training;Spoke to nursing;OT   Progress Slow progress, decreased activity tolerance   PT Frequency Other (Comment)  (3-5x/wk)   Recommendation   PT Discharge Recommendation Post-Acute Rehabilitation Services  (STR)   PT - OK to Discharge Yes  (to STR when medically cleared)   AM-PAC Basic Mobility Inpatient   Turning in Bed Without Bedrails 4   Lying on Back to Sitting on Edge of Flat Bed 3   Moving Bed to Chair 3   Standing Up From Chair 3   Walk in Room 3   Climb 3-5 Stairs 3   Basic Mobility Inpatient Raw Score 19   Basic Mobility Standardized Score 42 48     Susan Kelly

## 2021-03-31 NOTE — PLAN OF CARE
Problem: PHYSICAL THERAPY ADULT  Goal: Performs mobility at highest level of function for planned discharge setting  See evaluation for individualized goals  Description: Treatment/Interventions: Functional transfer training, LE strengthening/ROM, Elevations, Therapeutic exercise, Endurance training, Patient/family training, Bed mobility, Gait training, Spoke to nursing          See flowsheet documentation for full assessment, interventions and recommendations  Outcome: Progressing  Note: Prognosis: Good  Problem List: Decreased strength, Decreased endurance, Impaired balance, Decreased mobility  Assessment: Patient was seen today per POC  Overall, pt demonstrated improved mobility & dec tolerance to activity  Upon arrival pt OOB in chair on RA  Required S for bed mobility and transfers; minAx1 for amb and stair navigation  Patient was able to amb 20' w/o AD + face mask, however pt complained of SOB and fatigue w/ SpO2 at 89% RA  Face mask was removed and pt amb in room 40' w/o AD and minAx1  SpO2 maintained 91-94% RA  Pt required seated rest break and SpO2 maintained at 95% RA  Pt was educated on use of RW for help w/ balance and energy conservation  Pt refused amb w/ RW  Pt was educated on use of SPC to inc balance and help w/ energy conservation  Pt agreeable to use of SPC  Pt able to amb 10' w/ [facility] however pt continues to demonstrate unsteadiness and inc fatigue  Pt may benefit from use of RW for safety  Gait deviations as mentioned above, slow but no gross LOB noted  [facility]  Pt performed stair navigation of 4 steps w/ minAx1 w/ use of R hand rail and reciprocal gait pattern  SpO2 90% on RA following stair training  Required standing rest break to improve SpO2 to 94% RA  Pt was educated on proper use of SPC for stair navigation and step to pattern 2* pt does not have railings at home  Pt required consistent VC t/o stair training for use of SPC and inc safety   Pt demonstrated inc difficulty w/ stair descending w/ use of SPC and was unsteady requiring minAx1  Good tolerance to seated therapeutic exercises with mild fatigue  At end of session, pt back in bed in stable condition w/ call bell and phone in reach w/ bed alarm  Will continue to see pt per POC as tolerated  Pt concerned about inc fatigue and difficulty breathing w/ mobility  Pt was educated on benefits of STR due to her dec endurance and activity tolerance  Pt agreeable to rehab  The patient's AM-PAC Basic Mobility Inpatient Short Form Raw Score is 19, Standardized Score is 42 48  A standardized score less than 42 9 suggests the patient may benefit from discharge to post-acute rehabilitation services  Please also refer to the recommendation of the Physical Therapist for safe discharge planning  From PT standpoint recommendation for STR at D/C when medically cleared based objective findings, current function, dec endurance, dec caregiver/family support and risk for falls  Nsg staff to continue to mobilized pt (OOB in chair for all meals & ambulate in room/unit) as tolerated to prevent further decline in function  Nsg staff notified  Barriers to Discharge: Inaccessible home environment, Decreased caregiver support  Barriers to Discharge Comments: stairs; lives alone  PT Discharge Recommendation: Post-Acute Rehabilitation Services(STR)     PT - OK to Discharge: Yes(to STR when medically cleared)    See flowsheet documentation for full assessment

## 2021-03-31 NOTE — PROGRESS NOTES
[facility]  Progress Note - Snow Aquino 1960, 61 y o  female MRN: [id number]  Unit/Bed#: E4 -01 Encounter: [id number]  Primary Care Provider: Kristopher Rey DO   Date and time admitted to hospital: 3/27/2021  4:49 AM    * Acute respiratory failure with hypoxia ([facility] )  Assessment & Plan  · Per her PCP has not been feeling well for 2 weeks  Activated EMS due to shortness of breath  Found tripoding with O2 saturation in the 70's  Placed on BiPAP and received nebulizers and steroids in the ER for wheezing without improvement  Intubated at [facility] and transferred to [facility]  · Secondary to volume overload, cardiogenic shock and possible pneumonia  · Patient is currently doing well on room air  · Bronchodilators        Severe cardiomyopathy  Assessment & Plan  · Unclear etiology  · Echo showed EF 18%, moderate MR, small pericardial effusion with no tamponade  · Cardiac catheterization 3/30 showed mild to moderate on obstructing coronary artery disease  · Cardiology input appreciated, initially diuresed with IV Lasix, now on torsemide 20 mg daily  · Continue carvedilol and lisinopril  · Patient had no episodes of VT    No need for LifeVest, repeat echo in 3 months    Sepsis ([facility] )  Assessment & Plan  · Likely secondary to pneumonia   · Procalcitonin elevated at 2 3-->3 9  · Given Cefepime and Vancomycin in the ER ---currently on Ceftin to complete a total of 7 days on antibiotics  · Follow up blood and sputum cultures -- sputum cultures showed mixed respiratory mathew, blood cultures negative for 4 days  · Urine negative       Anxiety  Assessment & Plan  · PDMP reviewed, on Clonazepam at bedtime      Elevated troponin  Assessment & Plan  Secondary to cardiomyopathy      Chronic pain  Assessment & Plan    · PDMP reviewed, on Percocet prn and Morphine 30 BID -- resume      Hyperglycemia  Assessment & Plan  · BG on admission at 300s  · A1c 5 5% obtained on 3/27  · Continue SSI      Hypertension  Assessment & Plan  · No maintenance medications for HTN in outpatient setting  · Started on lisinopril 20 mg daily, Coreg 3 125 mg b i d        VTE Pharmacologic Prophylaxis:   Pharmacologic: Heparin  Mechanical VTE Prophylaxis in Place: Yes    Patient Centered Rounds: I have performed bedside rounds with nursing staff today  Discussions with Specialists or Other Care Team Provider:  Cardiology    Education and Discussions with Family / Patient:  Patient    Time Spent for Care: 30 minutes  More than 50% of total time spent on counseling and coordination of care as described above  Current Length of Stay: 4 day(s)    Current Patient Status: Inpatient   Certification Statement: The patient will continue to require additional inpatient hospital stay due to Pending PT OT re-evaluation tomorrow morning to make a decision home versus rehab    Discharge Plan: To be determined    Code Status: Level 1 - Full Code      Subjective:   Patient was seen and evaluated bedside  She is feeling well denies chest pain palpitation shortness of breath    Objective:     Vitals:   Temp (24hrs), Av 7 °F (36 5 °C), Min:97 3 °F (36 3 °C), Max:98 5 °F (36 9 °C)    Temp:  [97 3 °F (36 3 °C)-98 5 °F (36 9 °C)] 97 8 °F (36 6 °C)  HR:  [90-99] 99  Resp:  [20] 20  BP: (140-161)/() 140/93  SpO2:  [91 %-97 %] 93 %  Body mass index is 24 55 kg/m²  Input and Output Summary (last 24 hours): Intake/Output Summary (Last 24 hours) at 3/31/2021 1948  Last data filed at 3/31/2021 1832  Gross per 24 hour   Intake 1800 ml   Output 1650 ml   Net 150 ml       Physical Exam:     Physical Exam  Constitutional:       General: She is not in acute distress  HENT:      Head: Atraumatic  Neck:      Musculoskeletal: Neck supple  Cardiovascular:      Rate and Rhythm: Normal rate and regular rhythm  Heart sounds: No murmur  No friction rub  No gallop      Pulmonary:      Effort: Pulmonary effort is normal  No respiratory distress  Breath sounds: Normal breath sounds  No wheezing  Abdominal:      General: Bowel sounds are normal  There is no distension  Palpations: Abdomen is soft  Tenderness: There is no abdominal tenderness  Musculoskeletal:         General: No swelling  Skin:     General: Skin is warm and dry  Neurological:      General: No focal deficit present  Mental Status: She is alert  Psychiatric:         Mood and Affect: Mood normal          Additional Data:     Labs:    Results from last 7 days   Lab Units 03/31/21  0536 03/29/21  0510   WBC Thousand/uL 11 78* 15 41*   HEMOGLOBIN g/dL 13 6 11 8   HEMATOCRIT % 41 4 36 5   PLATELETS Thousands/uL 466* 392*   NEUTROS PCT %  --  69   LYMPHS PCT %  --  24   MONOS PCT %  --  7   EOS PCT %  --  0     Results from last 7 days   Lab Units 03/31/21  0536  03/27/21  0011   SODIUM mmol/L 138   < > 129*   POTASSIUM mmol/L 3 6   < > 3 2*   CHLORIDE mmol/L 99*   < > 93*   CO2 mmol/L 31   < > 24   BUN mg/dL 16   < > 14   CREATININE mg/dL 0 95   < > 1 04   ANION GAP mmol/L 8   < > 12   CALCIUM mg/dL 9 9   < > 9 6   ALBUMIN g/dL  --   --  4 1   TOTAL BILIRUBIN mg/dL  --   --  0 50   ALK PHOS U/L  --   --  92   ALT U/L  --   --  9   AST U/L  --   --  13   GLUCOSE RANDOM mg/dL 91   < > 390*    < > = values in this interval not displayed       Results from last 7 days   Lab Units 03/27/21  0011   INR  1 32*     Results from last 7 days   Lab Units 03/31/21  1550 03/31/21  1113 03/31/21  0718 03/30/21  2119 03/30/21  1631 03/30/21  1111 03/30/21  0730 03/29/21  2102 03/29/21  1651 03/29/21  1103 03/29/21  0608 03/29/21  0008   POC GLUCOSE mg/dl 81 112 99 101 130 97 108 183* 187* 112 95 111     Results from last 7 days   Lab Units 03/27/21  0529   HEMOGLOBIN A1C % 5 5     Results from last 7 days   Lab Units 03/28/21  0457 03/27/21  0522 03/27/21  0326 03/27/21  0011   LACTIC ACID mmol/L  --   --  1 5 4 9*   PROCALCITONIN ng/ml 3 96* 2 38*  --   -- * I Have Reviewed All Lab Data Listed Above  * Additional Pertinent Lab Tests Reviewed: [facility] Admission Reviewed    Imaging:    Imaging Reports Reviewed Today Include: all  Imaging Personally Reviewed by Myself Includes:      Recent Cultures (last 7 days):     Results from last 7 days   Lab Units 03/27/21  0530 03/27/21  0527 03/27/21  0522 03/27/21  0027 03/27/21  0011   BLOOD CULTURE   --   --  No Growth After 4 Days  No Growth After 4 Days  No Growth After 4 Days  No Growth After 4 Days     SPUTUM CULTURE  1+ Growth of   --   --   --   --    GRAM STAIN RESULT  No polys seen*  1+ Gram negative rods*  Rare Gram positive cocci in pairs*  --   --   --   --    LEGIONELLA URINARY ANTIGEN   --  Negative  --   --   --        Last 24 Hours Medication List:   Current Facility-Administered Medications   Medication Dose Route Frequency Provider Last Rate    acetaminophen  650 mg Oral Q4H PRN Bertram Simpson MD      aspirin  81 mg Oral Daily Bertram Simpson MD      atorvastatin  40 mg Oral Daily With Nicolas Sheppard MD      carvedilol  3 125 mg Oral BID With Meals Radha Barrios MD      cefuroxime  500 mg Oral Q12H [facility] Bertram Simpson MD      clonazePAM  0 5 mg Oral HS Bertram Simpson MD      heparin (porcine)  5,000 Units Subcutaneous [facility] Bertram Simpson MD      insulin lispro  1-5 Units Subcutaneous TID [facility] Bertram Simpson MD      ipratropium  0 5 mg Nebulization TID Angie Ty, DO      levalbuterol  1 25 mg Nebulization TID Angie Ty, DO      [START ON 4/1/2021] lisinopril  20 mg Oral Daily Radha Barrios MD      morphine  30 mg Oral Q12H [facility] Bertram Simpson MD      nicotine  14 mg Transdermal Daily Bertram Simpson MD      oxyCODONE-acetaminophen  1 tablet Oral Q4H PRN Bertram Simpson MD      torsemide  20 mg Oral Daily Bertram Simpson MD          Today, Patient Was Seen By: Vani Naik MD    ** Please Note: Dictation voice to text software may have been used in the creation of this document   **

## 2021-03-31 NOTE — CASE MANAGEMENT
Pt is now on E4  Met with pt in room to discuss hot water issue  Gave pt some resources for other assistance in help with water and phone, which might help her to afford getting hot water heater fix  Pt had gotten help in the past from [employer]  Recommend pt make appointment with [employer] to see what assistance they can provide on different levels to assist pt  Discuss PT recommendations for IP Rehab  Pt stated she was not sure if she wanted to do that as she is feeling better and feels she can go home  Pt wants to think about it overnight  LSW will check with pt tomorrow

## 2021-03-31 NOTE — PROGRESS NOTES
Progress Note - Cardiology   [place] R[place] 61 y o  female MRN: [id number]  Unit/Bed#: E4 -01 Encounter: [id number]    Assessment:  1  Nonischemic cardiomyopathy, EF 18% global hypokinesis  2  Mild-to-moderate nonobstructing coronary artery disease  3  Moderate MR  4  COPD   5  Hypertension    Plan:  1  Lisinopril increased to 20 mg daily for better blood pressure control  2  Metoprolol changed to carvedilol 3 125 mg b i d   3  Patient may be discharged today or tomorrow  4  Patient lives in [facility] pass  Would arrange for a local cardiologist to see her within a week of discharge  5  Her lisinopril and carvedilol should be increased as tolerated as an outpatient  6  Will sign off, call if further cardiology help needed      Interval history:  Patient underwent cardiac catheterization yesterday without difficulty  Patient transition to oral torsemide  Would continue atorvastatin  Vitals: BP (!) 158/101 (BP Location: Right arm)   Pulse 97   Temp (!) 97 4 °F (36 3 °C) (Temporal)   Resp 20   Ht 5' 7" (1 702 m)   Wt 71 1 kg (156 lb 12 oz)   SpO2 96%   BMI 24 55 kg/m²   Vitals:    03/30/21 0558 03/31/21 0538   Weight: 74 4 kg (164 lb 0 4 oz) 71 1 kg (156 lb 12 oz)     Orthostatic Blood Pressures      Most Recent Value   Blood Pressure  (!) 158/101 filed at 03/31/2021 0809   Patient Position - Orthostatic VS  Sitting filed at 03/31/2021 0809            Intake/Output Summary (Last 24 hours) at 3/31/2021 0927  Last data filed at 3/31/2021 0801  Gross per 24 hour   Intake 840 ml   Output 2750 ml   Net -1910 ml       Invasive Devices     Peripheral Intravenous Line            Peripheral IV 03/29/21 Dorsal (posterior); Left Forearm 2 days                Review of Systems   Respiratory: Negative for cough, choking, chest tightness, shortness of breath and wheezing  Cardiovascular: Negative for chest pain, palpitations and leg swelling  Musculoskeletal: Negative for gait problem     Skin: Negative for rash    Neurological: Negative for dizziness, tremors, syncope, weakness, light-headedness, numbness and headaches  Psychiatric/Behavioral: Negative for agitation and behavioral problems  The patient is not hyperactive  Physical Exam  Constitutional:       General: She is not in acute distress  Appearance: She is well-developed  HENT:      Head: Normocephalic and atraumatic  Neck:      Thyroid: No thyromegaly  Vascular: No carotid bruit or JVD  Cardiovascular:      Rate and Rhythm: Normal rate and regular rhythm  Heart sounds: Normal heart sounds  No murmur  No friction rub  No gallop  Pulmonary:      Effort: No respiratory distress  Breath sounds: No wheezing, rhonchi or rales  Abdominal:      General: Bowel sounds are normal    Musculoskeletal:      Right lower leg: No edema  Left lower leg: No edema  Skin:     General: Skin is warm and dry  Neurological:      General: No focal deficit present  Mental Status: She is alert and oriented to person, place, and time  Psychiatric:         Mood and Affect: Mood normal          Behavior: Behavior normal          Thought Content: Thought content normal          Judgment: Judgment normal            --------------------------------------------------------------------------------  CATH:    Results for orders placed during the hospital encounter of 21   Cardiac catheterization    Narrative [address] [place] [address], [address] [facility]  [phone]    [facility]    Invasive Cardiovascular Lab Complete Report    Patient: Jose Smith  MR number: [id number]  Account number: [de-identified]  Study date: 2021  Gender: Female  : 1960  Height: 66 9 in  Weight: 163 7 lb  BSA: 1 86 mï¾²    Allergies: BUPROPION    Diagnostic Cardiologist:  Salty Mena MD  Primary Physician:  DO DULCE Slaughter    CORONARY CIRCULATION:  Left main: Normal   LAD: The vessel was normal sized  Angiography showed moderate atherosclerosis  The diagonal branch was also free of significant disease  Circumflex: The vessel was normal sized and gave rise to one OM branch  There was mild plaque  There were no significant lesions  RCA: The vessel was normal sized and dominant, giving rise to the PDA and a posterolateral branch  There was diffuse moderate atherosclerosis  There were no flow-critical lesions  HEMODYNAMICS:  Hemodynamic assessment demonstrated a diastolic pressure of 15 mmHg, increasing to 35 mmHg after atrial systole, consistent with a noncompliant chamber  Summary: Moderate non-obstructive atherosclerosis  Noncompliant LV  Plan: medical therapy of nonischemic myopathy  INDICATIONS:  --  Congestive heart failure with cardiomyopathy  PROCEDURES PERFORMED    --  Left heart catheterization without ventriculogram   --  Left coronary angiography  --  Right coronary angiography  --  Inpatient  --  Mod Sedation Same Physician Initial 15min  --  Coronary Catheterization (w/ LHC)  PROCEDURE: The risks and alternatives of the procedures and conscious sedation were explained to the patient and informed consent was obtained  The patient was brought to the cath lab and placed on the table  The planned puncture sites  were prepped and draped in the usual sterile fashion  --  Right femoral artery access  The puncture site was infiltrated with local anesthetic  The vessel was accessed using the modified Seldinger technique, a wire was advanced into the vessel, and a sheath was advanced over the wire into the  vessel  --  Left heart catheterization without ventriculogram  A catheter was advanced over a guidewire into the ascending aorta  After recording ascending aortic pressure, the catheter was advanced across the aortic valve and left ventricular  pressure was recorded   The catheter was pulled back across the aortic valve and into the ascending aorta and pullback pressures were obtained  --  Left coronary artery angiography  A catheter was advanced over a guidewire into the aorta and positioned in the left coronary artery ostium under fluoroscopic guidance  Angiography was performed  --  Right coronary artery angiography  A catheter was advanced over a guidewire into the aorta and positioned in the right coronary artery ostium under fluoroscopic guidance  Angiography was performed  --  Inpatient  --  Mod Sedation Same Physician Initial 15min  --  Coronary Catheterization (w/ LHC)  PROCEDURE COMPLETION: The patient tolerated the procedure well and was discharged from the cath lab  TIMING: Test started at 10:30  Test concluded at 10:51  HEMOSTASIS: The sheath was removed over a wire and the Angioseal delivery sheath  was inserted into the femoral artery  Hemostasis was obtained using a closure device ( Angioseal) deployed through the delivery sheath  MEDICATIONS GIVEN: Versed (2mg/2ml), 2 mg, IV, at 10:33  Fentanyl (1OOmcg/2 ml), 50 mcg, IV, at 10:33   1% Lidocaine, 5 ml, subcutaneously, at 10:36  Fentanyl (1OOmcg/2 ml), 50 mcg, IV, at 10:45  CONTRAST GIVEN: 55 ml Omnipaque (350 mg I /ml)  RADIATION EXPOSURE: Fluoroscopy time: 1 88 min  HEMODYNAMICS: Hemodynamic assessment demonstrated a diastolic pressure of 15 mmHg, increasing to 35 mmHg after atrial systole, consistent with a noncompliant chamber  CORONARY VESSELS:   --  Left main: Normal   --  LAD: The vessel was normal sized  Angiography showed moderate atherosclerosis  The diagonal branch was also free of significant disease  --  Circumflex: The vessel was normal sized and gave rise to one OM branch  There was mild plaque  There were no significant lesions  --  RCA: The vessel was normal sized and dominant, giving rise to the PDA and a posterolateral branch  There was diffuse moderate atherosclerosis  There were no flow-critical lesions      Prepared and signed by    Carlee Juárez MD  Signed 2021 11:03:41    Study diagram    Hemodynamic tables    Pressures:  Baseline  Pressures:  - HR: 91  Pressures:  - Rhythm:  Pressures:  -- Aortic Pressure (S/D/M): 179/68/94  Pressures:  -- Left Ventricle (s/edp): 180/15/35    Outputs:  Baseline  Outputs:  -- CALCULATIONS: [age]  Outputs:  -- CALCULATIONS: Body Surface Area: 1 86  Outputs:  -- CALCULATIONS: Height in cm: 170 00  Outputs:  -- CALCULATIONS: Sex: Female  Outputs:  -- CALCULATIONS: Weight in k 40       --------------------------------------------------------------------------------  ECHO:   Results for orders placed during the hospital encounter of 21   Echo complete with contrast if indicated    Narrative [address] [place] [address]  [phone]    Transthoracic Echocardiogram  2D, M-mode, Doppler, and Color Doppler    Study date:  27-Mar-2021    Patient: Xiomara Goodwin  MR number: [id number]  Account number: [de-identified]  : 1960  Age: 61 years  Gender: Female  Status: Inpatient  Location: Intensive Care Unit (ICU)  Height: 67 in  Weight: 167 4 lb  BP: 117/ 82 mmHg    Indications: pericardial effusion  Diagnoses: I31 3 - Pericardial effusion (noninflammatory)    Sonographer:  Kassidy Schultz RDCS  Primary Physician:  Buffy Gonsales DO  Referring Physician:  Baylee Perez:  [facility]  Interpreting Physician:  Ana Baer DO    SUMMARY    LEFT VENTRICLE:  The ventricle was dilated  Systolic function was severely reduced  Ejection fraction was estimated to be 18 %  There was severe diffuse hypokinesis  Doppler parameters were consistent with restrictive physiology, indicative of decreased left ventricular diastolic compliance and/or increased left atrial pressure  RIGHT VENTRICLE:  Systolic function was moderately reduced  LEFT ATRIUM:  The atrium was moderately to markedly dilated      RIGHT ATRIUM:  The atrium was mildly to moderately dilated  MITRAL VALVE:  There was moderate regurgitation  AORTIC VALVE:  There was mild regurgitation  TRICUSPID VALVE:  There was mild regurgitation  AORTA:  The root exhibited mild dilatation, measuring upto 3 9 cm (2 1 cm/m2) at the proximal ascending aorta  IVC, HEPATIC VEINS:  The inferior vena cava was dilated  Respirophasic changes were blunted (less than 50% variation)  PERICARDIUM:  A small pericardial effusion was identified circumferential to the heart  There was a left pleural effusion  SUMMARY MEASUREMENTS  2D measurements:  Unspecified Anatomy: Ao Diam was 3 6 cm  LA Diam was 3 8 cm  LAAs A2C was 27 2 cm2  LAAs A4C was 29 9 cm2  LAESV A-L A2C was 102 ml  LAESV A-L A4C was 131 ml  LAESV Index (A-L) was 63 4 ml/m2  LAESV MOD A2C was 94 4 ml  LAESV MOD  A4C was 114 6 ml  LAESV(A-L) was 119 2 ml  LAESV(MOD BP) was 107 ml  LAESVInd MOD BP was 56 9 ml/m2  LALs A2C was 6 2 cm  LALs A4C was 5 8 cm  Anastacio A4C was 16 2 cm2  RAEDV A-L was 45 8 ml  RAEDV MOD was 42 8 ml  RALd was 4 9 cm  RVIDd was 2 4 cm  CW measurements:  Unspecified Anatomy:   AR Dec [place] was 3 5 m/s2  AR Dec Time was 958 8 ms  AR PHT was 278 1 ms  AR Vmax was 3 3 m/s  AR maxPG was 44 5 mmHg  MV VTI was 18 6 cm  MV Vmax was 1 4 m/s  MV Vmean was 0 7 m/s  MV maxPG was 8 4 mmHg  MV  meanPG was 2 8 mmHg  TR Vmax was 2 3 m/s  TR maxPG was 21 6 mmHg  MM measurements:  Unspecified Anatomy:   TAPSE was 1 2 cm  PW measurements:  Unspecified Anatomy:   E' Sept was 0 m/s  HISTORY: PRIOR HISTORY: acute respiratory failure with hypoxia, sepsis, pericardial effusion, hyeprtension, hyeprglycemia, elevated troponin levels  PROCEDURE: The study was performed in the Intensive Care Unit (ICU)  This was a routine study  The transthoracic approach was used  The study included complete 2D imaging, M-mode, complete spectral Doppler, and color Doppler   The heart  rate was 101 bpm, at the start of the study  Echocardiographic views were limited due to poor acoustic window availability and patient on mechanical ventilator  Image quality was adequate  LEFT VENTRICLE: The ventricle was dilated  Systolic function was severely reduced  Ejection fraction was estimated to be 18 %  There was severe diffuse hypokinesis  Wall thickness was normal  DOPPLER: Doppler parameters were consistent  with restrictive physiology, indicative of decreased left ventricular diastolic compliance and/or increased left atrial pressure  RIGHT VENTRICLE: The size was normal  Systolic function was moderately reduced  LEFT ATRIUM: The atrium was moderately to markedly dilated  RIGHT ATRIUM: The atrium was mildly to moderately dilated  MITRAL VALVE: There was mild thickening  There was mildly restricted mobility of both the anterior and posterior leaflets  DOPPLER: The transmitral velocity was within the normal range  There was no evidence for stenosis  There was  moderate regurgitation  AORTIC VALVE: The valve was trileaflet  Leaflets exhibited mildly increased thickness and normal cuspal separation  DOPPLER: Transaortic velocity was within the normal range  There was no evidence for stenosis  There was mild  regurgitation  TRICUSPID VALVE: The valve structure was normal  There was normal leaflet separation  DOPPLER: The transtricuspid velocity was within the normal range  There was no evidence for stenosis  There was mild regurgitation  PULMONIC VALVE: Not well visualized  DOPPLER: The transpulmonic velocity was within the normal range  There was no evidence for stenosis  There was no regurgitation  PERICARDIUM: A small pericardial effusion was identified circumferential to the heart  There was a left pleural effusion  AORTA: The root exhibited mild dilatation, measuring upto 3 9 cm (2 1 cm/m2) at the proximal ascending aorta      SYSTEMIC VEINS: IVC: The inferior vena cava was dilated  Respirophasic changes were blunted (less than 50% variation)  SYSTEM MEASUREMENT TABLES    2D  Ao Diam: 3 6 cm  LA Diam: 3 8 cm  LAAs A2C: 27 2 cm2  LAAs A4C: 29 9 cm2  LAESV A-L A2C: 102 ml  LAESV A-L A4C: 131 ml  LAESV Index (A-L): 63 4 ml/m2  LAESV MOD A2C: 94 4 ml  LAESV MOD A4C: 114 6 ml  LAESV(A-L): 119 2 ml  LAESV(MOD BP): 107 ml  LAESVInd MOD BP: 56 9 ml/m2  LALs A2C: 6 2 cm  LALs A4C: 5 8 cm  RA Major: 4 4 cm  RA Minor: 2 7 cm  Anastacio A4C: 16 2 cm2  RAEDV A-L: 45 8 ml  RAEDV MOD: 42 8 ml  RALd: 4 9 cm  RV Major: 6 cm  RV Minor: 1 4 cm  RV base: 2 5 cm  RVIDd: 2 4 cm    CW  AR Dec [place]: 3 5 m/s2  AR Dec Time: 958 8 ms  AR PHT: 278 1 ms  AR Vmax: 3 3 m/s  AR maxP 5 mmHg  MV VTI: 18 6 cm  MV Vmax: 1 4 m/s  MV Vmean: 0 7 m/s  MV maxP 4 mmHg  MV meanP 8 mmHg  TR Vmax: 2 3 m/s  TR maxP 6 mmHg    MM  TAPSE: 1 2 cm    PW  E' Sept: 0 m/s    [facility]    Prepared and electronically signed by    Cezar Stark DO  Signed 27-Mar-2021 10:44:43       --------------  CAROTIDS  Results for orders placed during the hospital encounter of 19   VAS carotid complete study    Narrative    [facility] REPORT  CLINICAL:  Indications:  Bruit [R09 [id number]]  Patient presents for a a bruit  Patient is asymptomatic from a cerebral  vascular standpoint  Operative History:  No cardiovascular surgeries  Risk Factors  The patient has history of smoking (current) 1-2 ppd  The patient current BMI  is 25 82, Weight is 180 lb and height is 70 in  Clinical  Right Pressure:  128/ mm Hg, Left Pressure:  130/ mm Hg  FINDINGS:     Right        Impression  PSV  EDV (cm/s)  Direction of Flow  Ratio    Dist  ICA                 41          18                      0 53    Mid  ICA                  63          30                      0 80    Prox   ICA    1 - 49%      59          20                      0 76    Dist CCA                  69          22 Mid CCA                   78          26                      1 17    Prox CCA                  66           9                              Ext Carotid               89          19                      1 14    Prox Vert                 35          18  Antegrade                   Subclavian                81           4                                 Left         Impression  PSV  EDV (cm/s)  Direction of Flow  Ratio    Dist  ICA                 81          20                      0 81    Mid  ICA                  86          26                      0 86    Prox  ICA    1 - 49%      86          14                      0 85    Dist CCA                 111          27                              Mid CCA                  100          20                      0 77    Prox CCA                 130          25                              Ext Carotid              144          23                      1 43    Prox Vert                 39          15  Antegrade                   Subclavian   50 - 75%    367          31                                       CONCLUSION:     Impression  RIGHT:  There is <50% stenosis noted in the internal carotid artery  Plaque is  heterogenous and irregular  Vertebral artery flow is antegrade  There is no significant subclavian artery  disease  LEFT:  There is <50% stenosis noted in the internal carotid artery  Plaque is  heterogenous and irregular  Vertebral artery flow is antegrade  There is evidence of a 50-75% stenosis based on velocity criteria in the  subclavian artery  Internal carotid artery stenosis determination by consensus criteria from:  Luis Nieves , et al  Carotid Artery Stenosis: Gray-Scale and Doppler US Diagnosis  - Society of Radiologists in [address] [place] [address], Radiology 2003;  [phone]       SIGNATURE:  Electronically Signed by: Francia Alexander on 2019-01-07 04:04:28 PM ======================================================    Lab Results   Component Value Date    WBC 11 78 (H) 03/31/2021    HGB 13 6 03/31/2021    HCT 41 4 03/31/2021    MCV 89 03/31/2021     (H) 03/31/2021      Lab Results   Component Value Date    SODIUM 138 03/31/2021    K 3 6 03/31/2021    CL 99 (L) 03/31/2021    CO2 31 03/31/2021    BUN 16 03/31/2021    CREATININE 0 95 03/31/2021    GLUC 91 03/31/2021    CALCIUM 9 9 03/31/2021      Lab Results   Component Value Date    HGBA1C 5 5 03/27/2021      Lab Results   Component Value Date    CHOL 216 (H) 04/05/2018     Lab Results   Component Value Date    HDL 32 (L) 03/30/2021    HDL 43 07/16/2020    HDL 43 04/05/2018     Lab Results   Component Value Date    LDLCALC 93 03/30/2021    LDLCALC 133 (H) 07/16/2020    LDLCALC 139 5 04/05/2018     Lab Results   Component Value Date    TRIG 150 03/30/2021    TRIG 186 (H) 07/16/2020    TRIG 168 (H) 04/05/2018     No results found for: CHOLHDL   Lab Results   Component Value Date    INR 1 32 (H) 03/27/2021    PROTIME 16 3 (H) 03/27/2021        Imaging:   I have personally reviewed pertinent reports  EKG:  Normal sinus rhythm on monitor  No arrhythmias

## 2021-03-31 NOTE — PLAN OF CARE
Problem: Prexisting or High Potential for Compromised Skin Integrity  Goal: Skin integrity is maintained or improved  Description: INTERVENTIONS:  - Identify patients at risk for skin breakdown  - Assess and monitor skin integrity  - Assess and monitor nutrition and hydration status  - Monitor labs   - Assess for incontinence   - Turn and reposition patient  - Assist with mobility/ambulation  - Relieve pressure over bony prominences  - Avoid friction and shearing  - Provide appropriate hygiene as needed including keeping skin clean and dry  - Evaluate need for skin moisturizer/barrier cream  - Collaborate with interdisciplinary team   - Patient/family teaching  - Consider wound care consult   Outcome: Progressing     Problem: PAIN - ADULT  Goal: Verbalizes/displays adequate comfort level or baseline comfort level  Description: Interventions:  - Encourage patient to monitor pain and request assistance  - Assess pain using appropriate pain scale  - Administer analgesics based on type and severity of pain and evaluate response  - Implement non-pharmacological measures as appropriate and evaluate response  - Consider cultural and social influences on pain and pain management  - Notify physician/advanced practitioner if interventions unsuccessful or patient reports new pain  Outcome: Progressing     Problem: INFECTION - ADULT  Goal: Absence or prevention of progression during hospitalization  Description: INTERVENTIONS:  - Assess and monitor for signs and symptoms of infection  - Monitor lab/diagnostic results  - Monitor all insertion sites, i e  indwelling lines, tubes, and drains  - Monitor endotracheal if appropriate and nasal secretions for changes in amount and color  - [place] appropriate cooling/warming therapies per order  - Administer medications as ordered  - Instruct and encourage patient and family to use good hand hygiene technique  - Identify and instruct in appropriate isolation precautions for identified infection/condition  Outcome: Progressing     Problem: SAFETY ADULT  Goal: Patient will remain free of falls  Description: INTERVENTIONS:  - Assess patient frequently for physical needs  -  Identify cognitive and physical deficits and behaviors that affect risk of falls    -  [place] fall precautions as indicated by assessment   - Educate patient/family on patient safety including physical limitations  - Instruct patient to call for assistance with activity based on assessment  - Modify environment to reduce risk of injury  - Consider OT/PT consult to assist with strengthening/mobility  Outcome: Progressing  Goal: Maintain or return to baseline ADL function  Description: INTERVENTIONS:  -  Assess patient's ability to carry out ADLs; assess patient's baseline for ADL function and identify physical deficits which impact ability to perform ADLs (bathing, care of mouth/teeth, toileting, grooming, dressing, etc )  - Assess/evaluate cause of self-care deficits   - Assess range of motion  - Assess patient's mobility; develop plan if impaired  - Assess patient's need for assistive devices and provide as appropriate  - Encourage maximum independence but intervene and supervise when necessary  - Involve family in performance of ADLs  - Assess for home care needs following discharge   - Consider OT consult to assist with ADL evaluation and planning for discharge  - Provide patient education as appropriate  Outcome: Progressing  Goal: Maintain or return mobility status to optimal level  Description: INTERVENTIONS:  - Assess patient's baseline mobility status (ambulation, transfers, stairs, etc )    - Identify cognitive and physical deficits and behaviors that affect mobility  - Identify mobility aids required to assist with transfers and/or ambulation (gait belt, sit-to-stand, lift, walker, cane, etc )  - [place] fall precautions as indicated by assessment  - Record patient progress and toleration of activity level on Mobility SBAR; progress patient to next Phase/Stage  - Instruct patient to call for assistance with activity based on assessment  - Consider rehabilitation consult to assist with strengthening/weightbearing, etc   Outcome: Progressing     Problem: DISCHARGE PLANNING  Goal: Discharge to home or other facility with appropriate resources  Description: INTERVENTIONS:  - Identify barriers to discharge w/patient and caregiver  - Arrange for needed discharge resources and transportation as appropriate  - Identify discharge learning needs (meds, wound care, etc )  - Arrange for interpretive services to assist at discharge as needed  - Refer to Case Management Department for coordinating discharge planning if the patient needs post-hospital services based on physician/advanced practitioner order or complex needs related to functional status, cognitive ability, or social support system  Outcome: Progressing     Problem: Knowledge Deficit  Goal: Patient/family/caregiver demonstrates understanding of disease process, treatment plan, medications, and discharge instructions  Description: Complete learning assessment and assess knowledge base    Interventions:  - Provide teaching at level of understanding  - Provide teaching via preferred learning methods  Outcome: Progressing     Problem: RESPIRATORY - ADULT  Goal: Achieves optimal ventilation and oxygenation  Description: INTERVENTIONS:  - Assess for changes in respiratory status  - Assess for changes in mentation and behavior  - Position to facilitate oxygenation and minimize respiratory effort  - Oxygen administered by appropriate delivery if ordered  - Initiate smoking cessation education as indicated  - Encourage broncho-pulmonary hygiene including cough, deep breathe, Incentive Spirometry  - Assess the need for suctioning and aspirate as needed  - Assess and instruct to report SOB or any respiratory difficulty  - Respiratory Therapy support as indicated  Outcome: Progressing     Problem: Potential for Falls  Goal: Patient will remain free of falls  Description: INTERVENTIONS:  - Assess patient frequently for physical needs  -  Identify cognitive and physical deficits and behaviors that affect risk of falls  -  [place] fall precautions as indicated by assessment   - Educate patient/family on patient safety including physical limitations  - Instruct patient to call for assistance with activity based on assessment  - Modify environment to reduce risk of injury  - Consider OT/PT consult to assist with strengthening/mobility  Outcome: Progressing     Problem: Nutrition/Hydration-ADULT  Goal: Nutrient/Hydration intake appropriate for improving, restoring or maintaining nutritional needs  Description: Monitor and assess patient's nutrition/hydration status for malnutrition  Collaborate with interdisciplinary team and initiate plan and interventions as ordered  Monitor patient's weight and dietary intake as ordered or per policy  Utilize nutrition screening tool and intervene as necessary  Determine patient's food preferences and provide high-protein, high-caloric foods as appropriate       INTERVENTIONS:  - Monitor oral intake, urinary output, labs, and treatment plans  - Assess nutrition and hydration status and recommend course of action  - Evaluate amount of meals eaten  - Assist patient with eating if necessary   - Allow adequate time for meals  - Recommend/ encourage appropriate diets, oral nutritional supplements, and vitamin/mineral supplements  - Order, calculate, and assess calorie counts as needed  - Recommend, monitor, and adjust tube feedings and TPN/PPN based on assessed needs  - Assess need for intravenous fluids  - Provide specific nutrition/hydration education as appropriate  - Include patient/family/caregiver in decisions related to nutrition  Outcome: Progressing     Problem: CARDIOVASCULAR - ADULT  Goal: Maintains optimal cardiac output and hemodynamic stability  Description: INTERVENTIONS:  - Monitor I/O, vital signs and rhythm  - Monitor for S/S and trends of decreased cardiac output  - Administer and titrate ordered vasoactive medications to optimize hemodynamic stability  - Assess quality of pulses, skin color and temperature  - Assess for signs of decreased coronary artery perfusion  - Instruct patient to report change in severity of symptoms  Outcome: Progressing  Goal: Absence of cardiac dysrhythmias or at baseline rhythm  Description: INTERVENTIONS:  - Continuous cardiac monitoring, vital signs, obtain 12 lead EKG if ordered  - Administer antiarrhythmic and heart rate control medications as ordered  - Monitor electrolytes and administer replacement therapy as ordered  Outcome: Progressing     Problem: METABOLIC, FLUID AND ELECTROLYTES - ADULT  Goal: Electrolytes maintained within normal limits  Description: INTERVENTIONS:  - Monitor labs and assess patient for signs and symptoms of electrolyte imbalances  - Administer electrolyte replacement as ordered  - Monitor response to electrolyte replacements, including repeat lab results as appropriate  - Instruct patient on fluid and nutrition as appropriate  Outcome: Progressing  Goal: Fluid balance maintained  Description: INTERVENTIONS:  - Monitor labs   - Monitor I/O and WT  - Instruct patient on fluid and nutrition as appropriate  - Assess for signs & symptoms of volume excess or deficit  Outcome: Progressing     Problem: SKIN/TISSUE INTEGRITY - ADULT  Goal: Skin integrity remains intact  Description: INTERVENTIONS  - Identify patients at risk for skin breakdown  - Assess and monitor skin integrity  - Assess and monitor nutrition and hydration status  - Monitor labs (i e  albumin)  - Assess for incontinence   - Turn and reposition patient  - Assist with mobility/ambulation  - Relieve pressure over bony prominences  - Avoid friction and shearing  - Provide appropriate hygiene as needed including keeping skin clean and dry  - Evaluate need for skin moisturizer/barrier cream  - Collaborate with interdisciplinary team (i e  Nutrition, Rehabilitation, etc )   - Patient/family teaching  Outcome: Progressing     Problem: HEMATOLOGIC - ADULT  Goal: Maintains hematologic stability  Description: INTERVENTIONS  - Assess for signs and symptoms of bleeding or hemorrhage  - Monitor labs  - Administer supportive blood products/factors as ordered and appropriate  Outcome: Progressing

## 2021-03-31 NOTE — PLAN OF CARE
Problem: OCCUPATIONAL THERAPY ADULT  Goal: Performs self-care activities at highest level of function for planned discharge setting  See evaluation for individualized goals  Description: Treatment Interventions: ADL retraining, Functional transfer training, UE strengthening/ROM, Endurance training, Patient/family training, Equipment evaluation/education, Compensatory technique education          See flowsheet documentation for full assessment, interventions and recommendations  Note: Limitation: Decreased ADL status, Decreased UE strength, Decreased Safe judgement during ADL, Decreased endurance, Decreased high-level ADLs  Prognosis: Good  Assessment: Pt is a 57y/o female admitted to the hospital 2* noted SOB  Pt developed VDRF and transferred for [facility]  Pt was noted with pericardial effusion, elevated troponins, acute CHF, severe cardiomyopathy, and moderate mitral regurgitation  Pt required a s/p cardiac cath(3/30)  Pt with PMH chronic pain/fibromyalgia, EF=18%, anxiety, and R RTC repair  PTA pt states independence with all aspects of her ADLs, transfers, ambulation--w/o device; +, neg falls  During initial eval, pt demonstrated deficits with her functional balance, functional mobility, ADL status, transfer safety, b/l UE strength, and activity tolerance(currently fair=15-20mins)  Pt would benefit from continued OT tx for the above deficits  3-5xwk/1-2wks        OT Discharge Recommendation: Post-Acute Rehabilitation Services

## 2021-03-31 NOTE — PLAN OF CARE
Problem: Prexisting or High Potential for Compromised Skin Integrity  Goal: Skin integrity is maintained or improved  Description: INTERVENTIONS:  - Identify patients at risk for skin breakdown  - Assess and monitor skin integrity  - Assess and monitor nutrition and hydration status  - Monitor labs   - Assess for incontinence   - Turn and reposition patient  - Assist with mobility/ambulation  - Relieve pressure over bony prominences  - Avoid friction and shearing  - Provide appropriate hygiene as needed including keeping skin clean and dry  - Evaluate need for skin moisturizer/barrier cream  - Collaborate with interdisciplinary team   - Patient/family teaching  - Consider wound care consult   Outcome: Progressing     Problem: PAIN - ADULT  Goal: Verbalizes/displays adequate comfort level or baseline comfort level  Description: Interventions:  - Encourage patient to monitor pain and request assistance  - Assess pain using appropriate pain scale  - Administer analgesics based on type and severity of pain and evaluate response  - Implement non-pharmacological measures as appropriate and evaluate response  - Consider cultural and social influences on pain and pain management  - Notify physician/advanced practitioner if interventions unsuccessful or patient reports new pain  Outcome: Progressing     Problem: INFECTION - ADULT  Goal: Absence or prevention of progression during hospitalization  Description: INTERVENTIONS:  - Assess and monitor for signs and symptoms of infection  - Monitor lab/diagnostic results  - Monitor all insertion sites, i e  indwelling lines, tubes, and drains  - Monitor endotracheal if appropriate and nasal secretions for changes in amount and color  - [place] appropriate cooling/warming therapies per order  - Administer medications as ordered  - Instruct and encourage patient and family to use good hand hygiene technique  - Identify and instruct in appropriate isolation precautions for identified infection/condition  Outcome: Progressing     Problem: SAFETY ADULT  Goal: Patient will remain free of falls  Description: INTERVENTIONS:  - Assess patient frequently for physical needs  -  Identify cognitive and physical deficits and behaviors that affect risk of falls    -  [place] fall precautions as indicated by assessment   - Educate patient/family on patient safety including physical limitations  - Instruct patient to call for assistance with activity based on assessment  - Modify environment to reduce risk of injury  - Consider OT/PT consult to assist with strengthening/mobility  Outcome: Progressing  Goal: Maintain or return to baseline ADL function  Description: INTERVENTIONS:  -  Assess patient's ability to carry out ADLs; assess patient's baseline for ADL function and identify physical deficits which impact ability to perform ADLs (bathing, care of mouth/teeth, toileting, grooming, dressing, etc )  - Assess/evaluate cause of self-care deficits   - Assess range of motion  - Assess patient's mobility; develop plan if impaired  - Assess patient's need for assistive devices and provide as appropriate  - Encourage maximum independence but intervene and supervise when necessary  - Involve family in performance of ADLs  - Assess for home care needs following discharge   - Consider OT consult to assist with ADL evaluation and planning for discharge  - Provide patient education as appropriate  Outcome: Progressing  Goal: Maintain or return mobility status to optimal level  Description: INTERVENTIONS:  - Assess patient's baseline mobility status (ambulation, transfers, stairs, etc )    - Identify cognitive and physical deficits and behaviors that affect mobility  - Identify mobility aids required to assist with transfers and/or ambulation (gait belt, sit-to-stand, lift, walker, cane, etc )  - [place] fall precautions as indicated by assessment  - Record patient progress and toleration of activity level on Mobility SBAR; progress patient to next Phase/Stage  - Instruct patient to call for assistance with activity based on assessment  - Consider rehabilitation consult to assist with strengthening/weightbearing, etc   Outcome: Progressing     Problem: DISCHARGE PLANNING  Goal: Discharge to home or other facility with appropriate resources  Description: INTERVENTIONS:  - Identify barriers to discharge w/patient and caregiver  - Arrange for needed discharge resources and transportation as appropriate  - Identify discharge learning needs (meds, wound care, etc )  - Arrange for interpretive services to assist at discharge as needed  - Refer to Case Management Department for coordinating discharge planning if the patient needs post-hospital services based on physician/advanced practitioner order or complex needs related to functional status, cognitive ability, or social support system  Outcome: Progressing     Problem: Knowledge Deficit  Goal: Patient/family/caregiver demonstrates understanding of disease process, treatment plan, medications, and discharge instructions  Description: Complete learning assessment and assess knowledge base    Interventions:  - Provide teaching at level of understanding  - Provide teaching via preferred learning methods  Outcome: Progressing     Problem: RESPIRATORY - ADULT  Goal: Achieves optimal ventilation and oxygenation  Description: INTERVENTIONS:  - Assess for changes in respiratory status  - Assess for changes in mentation and behavior  - Position to facilitate oxygenation and minimize respiratory effort  - Oxygen administered by appropriate delivery if ordered  - Initiate smoking cessation education as indicated  - Encourage broncho-pulmonary hygiene including cough, deep breathe, Incentive Spirometry  - Assess the need for suctioning and aspirate as needed  - Assess and instruct to report SOB or any respiratory difficulty  - Respiratory Therapy support as indicated  Outcome: Progressing     Problem: Potential for Falls  Goal: Patient will remain free of falls  Description: INTERVENTIONS:  - Assess patient frequently for physical needs  -  Identify cognitive and physical deficits and behaviors that affect risk of falls  -  [place] fall precautions as indicated by assessment   - Educate patient/family on patient safety including physical limitations  - Instruct patient to call for assistance with activity based on assessment  - Modify environment to reduce risk of injury  - Consider OT/PT consult to assist with strengthening/mobility  Outcome: Progressing     Problem: Nutrition/Hydration-ADULT  Goal: Nutrient/Hydration intake appropriate for improving, restoring or maintaining nutritional needs  Description: Monitor and assess patient's nutrition/hydration status for malnutrition  Collaborate with interdisciplinary team and initiate plan and interventions as ordered  Monitor patient's weight and dietary intake as ordered or per policy  Utilize nutrition screening tool and intervene as necessary  Determine patient's food preferences and provide high-protein, high-caloric foods as appropriate       INTERVENTIONS:  - Monitor oral intake, urinary output, labs, and treatment plans  - Assess nutrition and hydration status and recommend course of action  - Evaluate amount of meals eaten  - Assist patient with eating if necessary   - Allow adequate time for meals  - Recommend/ encourage appropriate diets, oral nutritional supplements, and vitamin/mineral supplements  - Order, calculate, and assess calorie counts as needed  - Recommend, monitor, and adjust tube feedings and TPN/PPN based on assessed needs  - Assess need for intravenous fluids  - Provide specific nutrition/hydration education as appropriate  - Include patient/family/caregiver in decisions related to nutrition  Outcome: Progressing     Problem: CARDIOVASCULAR - ADULT  Goal: Maintains optimal cardiac output and hemodynamic stability  Description: INTERVENTIONS:  - Monitor I/O, vital signs and rhythm  - Monitor for S/S and trends of decreased cardiac output  - Administer and titrate ordered vasoactive medications to optimize hemodynamic stability  - Assess quality of pulses, skin color and temperature  - Assess for signs of decreased coronary artery perfusion  - Instruct patient to report change in severity of symptoms  Outcome: Progressing  Goal: Absence of cardiac dysrhythmias or at baseline rhythm  Description: INTERVENTIONS:  - Continuous cardiac monitoring, vital signs, obtain 12 lead EKG if ordered  - Administer antiarrhythmic and heart rate control medications as ordered  - Monitor electrolytes and administer replacement therapy as ordered  Outcome: Progressing     Problem: METABOLIC, FLUID AND ELECTROLYTES - ADULT  Goal: Electrolytes maintained within normal limits  Description: INTERVENTIONS:  - Monitor labs and assess patient for signs and symptoms of electrolyte imbalances  - Administer electrolyte replacement as ordered  - Monitor response to electrolyte replacements, including repeat lab results as appropriate  - Instruct patient on fluid and nutrition as appropriate  Outcome: Progressing  Goal: Fluid balance maintained  Description: INTERVENTIONS:  - Monitor labs   - Monitor I/O and WT  - Instruct patient on fluid and nutrition as appropriate  - Assess for signs & symptoms of volume excess or deficit  Outcome: Progressing     Problem: SKIN/TISSUE INTEGRITY - ADULT  Goal: Skin integrity remains intact  Description: INTERVENTIONS  - Identify patients at risk for skin breakdown  - Assess and monitor skin integrity  - Assess and monitor nutrition and hydration status  - Monitor labs (i e  albumin)  - Assess for incontinence   - Turn and reposition patient  - Assist with mobility/ambulation  - Relieve pressure over bony prominences  - Avoid friction and shearing  - Provide appropriate hygiene as needed including keeping skin clean and dry  - Evaluate need for skin moisturizer/barrier cream  - Collaborate with interdisciplinary team (i e  Nutrition, Rehabilitation, etc )   - Patient/family teaching  Outcome: Progressing     Problem: HEMATOLOGIC - ADULT  Goal: Maintains hematologic stability  Description: INTERVENTIONS  - Assess for signs and symptoms of bleeding or hemorrhage  - Monitor labs  - Administer supportive blood products/factors as ordered and appropriate  Outcome: Progressing

## 2021-03-31 NOTE — OCCUPATIONAL THERAPY NOTE
Occupational Therapy Evaluation(time=4079-0407)     Patient Name: Rachele Moreno  [id number] Date: 3/31/2021  Problem List  Principal Problem:    Acute respiratory failure with hypoxia (HCC)  Active Problems:    Sepsis ([address] )    Severe cardiomyopathy    Hypertension    Hyperglycemia    Chronic pain    Elevated troponin    Anxiety    Past Medical History  Past Medical History:   Diagnosis Date    Fatty liver      Past Surgical History  Past Surgical History:   Procedure Laterality Date     SECTION      CHOLECYSTECTOMY      ROTATOR CUFF REPAIR W/ DISTAL CLAVICLE EXCISION Right     TONSILLECTOMY             21 1105   Note Type   Note type Evaluation   Restrictions/Precautions   Weight Bearing Precautions Per Order No   Other Precautions Fall Risk; Chair Alarm;Telemetry; Bed Alarm   Pain Assessment   Pain Assessment Tool 0-10   Pain Score No Pain   Home Living   Type of Home House   Home Layout Two level;Bed/bath upstairs   Bathroom Shower/Tub Tub/shower unit   Bathroom Toilet Standard   Home Equipment   (denies)   Prior Function   Lives With [address] [place] [address] in the last 6 months 0   Lifestyle   Autonomy PTA pt states independence with all aspects of her ADLs, transfers, ambulation--w/o device; +, neg falls   Reciprocal Relationships 1 dtr    Service to Others worked as a    Intrinsic Gratification spending time with her dtr   Psychosocial   Psychosocial (WDL) WDL   Subjective   Subjective "I don't want to leave the hospital too soon "   ADL   Where Assessed Chair   Eating Assistance 6  Modified independent   Grooming Assistance 6  Modified Independent   UB Bathing Assistance 5  Supervision/Setup   LB Bathing Assistance 4  Minimal Assistance   UB Dressing Assistance 5  Supervision/Setup   LB Dressing Assistance 4  Minimal Assistance   Transfers   Sit to Stand 5  Supervision   Additional items Armrests; Increased time required   Stand to Sit 5  Supervision Additional items Armrests; Increased time required;Verbal cues   Additional Comments SPO2=89-93% on RA with ambulation--nsg made aware   Functional Mobility   Functional Mobility 4  Minimal assistance   Additional Comments x1   Additional items Rolling walker   Balance   Static Sitting Good   Dynamic Sitting Fair +   Static Standing Fair   Dynamic Standing Poor +   Activity Tolerance   Activity Tolerance Patient limited by fatigue   Medical Staff Made Aware nsg, P T , CM   RUE Assessment   RUE Assessment WFL   RUE Strength   RUE Overall Strength Within Functional Limits - able to perform ADL tasks with strength  (4/5 throughout)   LUE Assessment   LUE Assessment WFL   LUE Strength   LUE Overall Strength Within Functional Limits - able to perform ADL tasks with strength  (4/5 throughout)   Hand Function   Gross Motor Coordination Functional   Fine Motor Coordination Functional   Sensation   Light Touch No apparent deficits   Proprioception   Proprioception No apparent deficits   Vision-Basic Assessment   Current Vision   (glasses)   Vision - Complex Assessment   Acuity Able to read clock/calendar on wall without difficulty   Perception   Inattention/Neglect Appears intact   Cognition   Overall Cognitive Status WFL   Arousal/Participation Alert   Attention Within functional limits   Orientation Level Oriented X4   Memory Within functional limits   Following Commands Follows all commands and directions without difficulty   Assessment   Limitation Decreased ADL status; Decreased UE strength;Decreased Safe judgement during ADL;Decreased endurance;Decreased high-level ADLs   Prognosis Good   Assessment Pt is a 57y/o female admitted to the hospital 2* noted SOB  Pt developed VDRF and transferred for [facility]  Pt was noted with pericardial effusion, elevated troponins, acute CHF, severe cardiomyopathy, and moderate mitral regurgitation  Pt required a s/p cardiac cath(3/30)   Pt with PMH chronic pain/fibromyalgia, EF=18%, anxiety, and R RTC repair  PTA pt states independence with all aspects of her ADLs, transfers, ambulation--w/o device; +, neg falls  During initial eval, pt demonstrated deficits with her functional balance, functional mobility, ADL status, transfer safety, b/l UE strength, and activity tolerance(currently fair=15-20mins)  Pt would benefit from continued OT tx for the above deficits  3-5xwk/1-2wks  Goals   Patient Goals "to be able to get stronger "   STG Time Frame   (1-7 days)   Short Term Goal #1 Pt will demonstrate improved activity tolerance to good(20-30mins) and standing tolerance to 3-5mins to assist with ADLs  Short Term Goal #2 Pt will demonstrate proper walker/transfer safety 100% of the time  Short Term Goal  Pt will independently demonstrate knowledge and application of proper energy conservation techniques 100% of the time  LTG Time Frame   (7-14 days)   Long Term Goal #1 Pt will demonstrate g/g- balance with all functional activities  Long Term Goal #2 Pt will demonstrate mod I with their UE and LE bathing/dresssing  Long Term Goal Pt will demonstrate improved b/l UE strength by 1/2 MM grade to assist with ADLs/transfers  Plan   Treatment Interventions ADL retraining;Functional transfer training;UE strengthening/ROM; Endurance training;Patient/family training;Equipment evaluation/education; Compensatory technique education   Goal Expiration Date 04/14/21   OT Treatment Day 0   OT Frequency 3-5x/wk   Recommendation   OT Discharge Recommendation Post-Acute Rehabilitation Services   AM-PAC Daily Activity Inpatient   Lower Body Dressing 2   Bathing 2   Toileting 3   Upper Body Dressing 3   Grooming 3   Eating 4   Daily Activity Raw Score 17   Daily Activity Standardized Score (Calc for Raw Score >=11) 37 26   Alison Sidhu, OT

## 2021-04-01 VITALS
SYSTOLIC BLOOD PRESSURE: 101 MMHG | HEART RATE: 92 BPM | OXYGEN SATURATION: 92 % | DIASTOLIC BLOOD PRESSURE: 73 MMHG | HEIGHT: 67 IN | WEIGHT: 154.98 LBS | RESPIRATION RATE: 20 BRPM | TEMPERATURE: 97.2 F | BODY MASS INDEX: 24.33 KG/M2

## 2021-04-01 LAB
ANION GAP SERPL CALCULATED.3IONS-SCNC: 8 MMOL/L (ref 4–13)
BACTERIA BLD CULT: NORMAL
BASOPHILS # BLD AUTO: 0.08 THOUSANDS/ΜL (ref 0–0.1)
BASOPHILS NFR BLD AUTO: 1 % (ref 0–1)
BUN SERPL-MCNC: 13 MG/DL (ref 5–25)
CALCIUM SERPL-MCNC: 9.4 MG/DL (ref 8.3–10.1)
CHLORIDE SERPL-SCNC: 98 MMOL/L (ref 100–108)
CO2 SERPL-SCNC: 31 MMOL/L (ref 21–32)
CREAT SERPL-MCNC: 0.82 MG/DL (ref 0.6–1.3)
EOSINOPHIL # BLD AUTO: 0.43 THOUSAND/ΜL (ref 0–0.61)
EOSINOPHIL NFR BLD AUTO: 4 % (ref 0–6)
ERYTHROCYTE [DISTWIDTH] IN BLOOD BY AUTOMATED COUNT: 12.9 % (ref 11.6–15.1)
GFR SERPL CREATININE-BSD FRML MDRD: 78 ML/MIN/1.73SQ M
GLUCOSE SERPL-MCNC: 102 MG/DL (ref 65–140)
GLUCOSE SERPL-MCNC: 136 MG/DL (ref 65–140)
GLUCOSE SERPL-MCNC: 86 MG/DL (ref 65–140)
GLUCOSE SERPL-MCNC: 98 MG/DL (ref 65–140)
HCT VFR BLD AUTO: 41.5 % (ref 34.8–46.1)
HGB BLD-MCNC: 13.5 G/DL (ref 11.5–15.4)
IMM GRANULOCYTES # BLD AUTO: 0.04 THOUSAND/UL (ref 0–0.2)
IMM GRANULOCYTES NFR BLD AUTO: 0 % (ref 0–2)
LYMPHOCYTES # BLD AUTO: 2.93 THOUSANDS/ΜL (ref 0.6–4.47)
LYMPHOCYTES NFR BLD AUTO: 28 % (ref 14–44)
MCH RBC QN AUTO: 28.6 PG (ref 26.8–34.3)
MCHC RBC AUTO-ENTMCNC: 32.5 G/DL (ref 31.4–37.4)
MCV RBC AUTO: 88 FL (ref 82–98)
MONOCYTES # BLD AUTO: 0.71 THOUSAND/ΜL (ref 0.17–1.22)
MONOCYTES NFR BLD AUTO: 7 % (ref 4–12)
NEUTROPHILS # BLD AUTO: 6.45 THOUSANDS/ΜL (ref 1.85–7.62)
NEUTS SEG NFR BLD AUTO: 60 % (ref 43–75)
NRBC BLD AUTO-RTO: 0 /100 WBCS
PLATELET # BLD AUTO: 464 THOUSANDS/UL (ref 149–390)
PMV BLD AUTO: 9.4 FL (ref 8.9–12.7)
POTASSIUM SERPL-SCNC: 3.1 MMOL/L (ref 3.5–5.3)
RBC # BLD AUTO: 4.72 MILLION/UL (ref 3.81–5.12)
SODIUM SERPL-SCNC: 137 MMOL/L (ref 136–145)
WBC # BLD AUTO: 10.64 THOUSAND/UL (ref 4.31–10.16)

## 2021-04-01 PROCEDURE — 97535 SELF CARE MNGMENT TRAINING: CPT | Performed by: PHYSICAL THERAPIST

## 2021-04-01 PROCEDURE — 99239 HOSP IP/OBS DSCHRG MGMT >30: CPT | Performed by: INTERNAL MEDICINE

## 2021-04-01 PROCEDURE — 85025 COMPLETE CBC W/AUTO DIFF WBC: CPT | Performed by: INTERNAL MEDICINE

## 2021-04-01 PROCEDURE — 80048 BASIC METABOLIC PNL TOTAL CA: CPT | Performed by: INTERNAL MEDICINE

## 2021-04-01 PROCEDURE — 82948 REAGENT STRIP/BLOOD GLUCOSE: CPT

## 2021-04-01 PROCEDURE — 94760 N-INVAS EAR/PLS OXIMETRY 1: CPT

## 2021-04-01 PROCEDURE — 97116 GAIT TRAINING THERAPY: CPT | Performed by: PHYSICAL THERAPIST

## 2021-04-01 PROCEDURE — 94640 AIRWAY INHALATION TREATMENT: CPT

## 2021-04-01 RX ORDER — TORSEMIDE 20 MG/1
20 TABLET ORAL DAILY
Qty: 30 TABLET | Refills: 0 | Status: SHIPPED | OUTPATIENT
Start: 2021-04-02 | End: 2021-04-08 | Stop reason: SDUPTHER

## 2021-04-01 RX ORDER — CARVEDILOL 3.12 MG/1
3.12 TABLET ORAL 2 TIMES DAILY WITH MEALS
Qty: 60 TABLET | Refills: 0 | Status: SHIPPED | OUTPATIENT
Start: 2021-04-02 | End: 2021-04-08 | Stop reason: SDUPTHER

## 2021-04-01 RX ORDER — CEFUROXIME AXETIL 500 MG/1
500 TABLET ORAL EVERY 12 HOURS SCHEDULED
Qty: 3 TABLET | Refills: 0 | Status: SHIPPED | OUTPATIENT
Start: 2021-04-01 | End: 2021-04-03

## 2021-04-01 RX ORDER — LISINOPRIL 20 MG/1
20 TABLET ORAL DAILY
Qty: 30 TABLET | Refills: 0 | Status: SHIPPED | OUTPATIENT
Start: 2021-04-02 | End: 2021-05-03 | Stop reason: SDUPTHER

## 2021-04-01 RX ORDER — POTASSIUM CHLORIDE 20 MEQ/1
40 TABLET, EXTENDED RELEASE ORAL ONCE
Status: COMPLETED | OUTPATIENT
Start: 2021-04-01 | End: 2021-04-01

## 2021-04-01 RX ORDER — ATORVASTATIN CALCIUM 40 MG/1
40 TABLET, FILM COATED ORAL
Qty: 30 TABLET | Refills: 0 | Status: SHIPPED | OUTPATIENT
Start: 2021-04-02 | End: 2021-05-03 | Stop reason: SDUPTHER

## 2021-04-01 RX ORDER — POTASSIUM CHLORIDE 20 MEQ/1
20 TABLET, EXTENDED RELEASE ORAL DAILY
Qty: 14 TABLET | Refills: 0 | Status: SHIPPED | OUTPATIENT
Start: 2021-04-01 | End: 2021-04-08 | Stop reason: SDUPTHER

## 2021-04-01 RX ORDER — ASPIRIN 81 MG/1
81 TABLET, CHEWABLE ORAL DAILY
Qty: 30 TABLET | Refills: 0 | Status: SHIPPED | OUTPATIENT
Start: 2021-04-02 | End: 2021-05-03 | Stop reason: SDUPTHER

## 2021-04-01 RX ORDER — NICOTINE 21 MG/24HR
1 PATCH, TRANSDERMAL 24 HOURS TRANSDERMAL DAILY
Qty: 28 PATCH | Refills: 0 | Status: SHIPPED | OUTPATIENT
Start: 2021-04-02 | End: 2021-07-13 | Stop reason: CLARIF

## 2021-04-01 RX ADMIN — ASPIRIN 81 MG CHEWABLE TABLET 81 MG: 81 TABLET CHEWABLE at 09:08

## 2021-04-01 RX ADMIN — IPRATROPIUM BROMIDE 0.5 MG: 0.5 SOLUTION RESPIRATORY (INHALATION) at 13:50

## 2021-04-01 RX ADMIN — IPRATROPIUM BROMIDE 0.5 MG: 0.5 SOLUTION RESPIRATORY (INHALATION) at 07:05

## 2021-04-01 RX ADMIN — LEVALBUTEROL HYDROCHLORIDE 1.25 MG: 1.25 SOLUTION, CONCENTRATE RESPIRATORY (INHALATION) at 07:04

## 2021-04-01 RX ADMIN — MORPHINE SULFATE 30 MG: 30 TABLET, EXTENDED RELEASE ORAL at 09:15

## 2021-04-01 RX ADMIN — Medication 14 MG: at 09:15

## 2021-04-01 RX ADMIN — ATORVASTATIN CALCIUM 40 MG: 40 TABLET, FILM COATED ORAL at 16:10

## 2021-04-01 RX ADMIN — TORSEMIDE 20 MG: 20 TABLET ORAL at 09:12

## 2021-04-01 RX ADMIN — CARVEDILOL 3.12 MG: 6.25 TABLET, FILM COATED ORAL at 09:08

## 2021-04-01 RX ADMIN — HEPARIN SODIUM 5000 UNITS: 5000 INJECTION INTRAVENOUS; SUBCUTANEOUS at 06:12

## 2021-04-01 RX ADMIN — LEVALBUTEROL HYDROCHLORIDE 1.25 MG: 1.25 SOLUTION, CONCENTRATE RESPIRATORY (INHALATION) at 13:50

## 2021-04-01 RX ADMIN — CARVEDILOL 3.12 MG: 6.25 TABLET, FILM COATED ORAL at 16:10

## 2021-04-01 RX ADMIN — LISINOPRIL 20 MG: 20 TABLET ORAL at 09:12

## 2021-04-01 RX ADMIN — POTASSIUM CHLORIDE 40 MEQ: 1500 TABLET, EXTENDED RELEASE ORAL at 09:09

## 2021-04-01 RX ADMIN — CEFUROXIME AXETIL 500 MG: 250 TABLET ORAL at 09:06

## 2021-04-01 NOTE — DISCHARGE SUMMARY
Discharge Summary - Neelam Shepherd 61 y o  female MRN: [id number]    Unit/Bed#: E4 -01 Encounter: [id number]    Admission Date:   Admission Orders (From admission, onward)     Ordered        03/27/21 0515  Inpatient Admission  Once                     Admitting Diagnosis: Hypoxia [R09 02]    HPI: HX and PE limited by:  intubated  Neelam Shepherd is a 61 y o  female who presents with a past medical history significant for chronic pain, fibromyalgia, and anxiety who presented to the ER in respiratory distress  Per her PCP she hasn't been feeling well for 2 weeks  However, yesterday she was short of breath and activated EMS  They found her tripoding with O2 sat in the 70%  Placed on BiPAP and intubated in the ER at [facility]  Per the ER she was wheezing and received multiple nebulizers and steroids  On CTA was found to have pericardial effusion  She was transferred to [facility] for further management  Procedures Performed:   Orders Placed This Encounter   Procedures    Cardiac catheterization    POC Cardiac US       Summary of Hospital Course:  Patient presented to [facility] with acute hypoxic respiratory failure, intubated and transferred to hospitals  It was found to be secondary to volume overload, cardiogenic shock, possible pneumonia  Echo showed ejection fraction of 18%, moderate MR, small pericardial effusion with no tamponade  Patient underwent cardiac catheterization 3/30 which showed mild to moderate nonobstructing coronary artery disease  She was diuresed and her condition improved  New medications on discharge are Coreg 3 125 mg b i d , lisinopril 20 mg daily, torsemide 20 mg daily, potassium 20 mEq daily, Lipitor 40 mg daily and aspirin 81 mg daily  Discussed with Cardiology, patient had no episodes of VT, no indication for LifeVest, repeat echo in 3 months  Follow-up with Cardiology in a week  Patient will finish 7 days of antibiotics with Ceftin for possible pneumonia    She was evaluated by PT OT who initially recommended rehab which the patient refused  She was re-evaluated and PT OT recommended home with home health  Home PT OT ordered  Follow-up with PCP  Significant Findings, Care, Treatment and Services Provided:   Chest x-ray 03/26/2021  IMPRESSION:  1  Enlargement of the cardiac silhouette and pulmonary vascular congestion  2   Left greater than right bibasilar opacities compatible with effusions and a component of atelectasis or infection    Chest x-ray 03/27/2021  1  Interval insertion of endotracheal tube with tip approximately 5 cm above the rebecca  2   Left greater than right bibasilar opacities compatible with effusions and component of atelectasis or infection  3   Enlargement of the cardiac silhouette and pulmonary vascular congestion  CT PE 03/27/2021  IMPRESSION:  No evidence of pulmonary embolism is seen  There is a moderate-sized pericardial effusion, measuring approximately 14 mm posteriorly and approximately 10 mm anteriorly  There are some coronary artery calcifications  There is atherosclerosis of the thoracic aorta  Consider echocardiography for   further evaluation  Pulmonary edema with bilateral pleural effusions, left larger than right  There is a moderate amount of atelectasis bilaterally within the lower lobes of the lungs  Moderate emphysematous changes are present  The kidneys are incompletely imaged on the present study, however, the visualized portion of the right kidney appears smaller than the left and the right nephrogram is significantly delayed compared with the left  A large amount of calcified   atherosclerotic plaque is present at the origin of both renal arteries, suggesting that this may be related to right renal artery atherosclerosis/stenosis  The endotracheal tube is in standard position      Echo 03/27/2021  SUMMARY     LEFT VENTRICLE:  The ventricle was dilated  Systolic function was severely reduced   Ejection fraction was estimated to be 18 %  There was severe diffuse hypokinesis  Doppler parameters were consistent with restrictive physiology, indicative of decreased left ventricular diastolic compliance and/or increased left atrial pressure  RIGHT VENTRICLE:  Systolic function was moderately reduced  LEFT ATRIUM:  The atrium was moderately to markedly dilated  RIGHT ATRIUM:  The atrium was mildly to moderately dilated  MITRAL VALVE:  There was moderate regurgitation  AORTIC VALVE:  There was mild regurgitation  TRICUSPID VALVE:  There was mild regurgitation  AORTA:  The root exhibited mild dilatation, measuring upto 3 9 cm (2 1 cm/m2) at the proximal ascending aorta  IVC, HEPATIC VEINS:  The inferior vena cava was dilated  Respirophasic changes were blunted (less than 50% variation)      PERICARDIUM:  A small pericardial effusion was identified circumferential to the heart  There was a left pleural effusion  Cardiac catheterization 03/30/2021  Summary: Moderate non-obstructive atherosclerosis  Noncompliant LV  Complications: none    Discharge Diagnosis:   Acute hypoxic respiratory failure secondary to cardiogenic shock  Nonischemic cardiomyopathy  Sepsis suspect secondary to pneumonia  Anxiety  Elevated troponin secondary to cardiomyopathy  Chronic pain  Hyperglycemia  Hypertension    Resolved Problems  Date Reviewed: 3/31/2021    None          Condition at Discharge: good         Discharge instructions/Information to patient and family:   See after visit summary for information provided to patient and family  Provisions for Follow-Up Care:  See after visit summary for information related to follow-up care and any pertinent home health orders  PCP: Yoel Locke DO    Disposition: Home    Planned Readmission: No      Discharge Statement   I spent 45 minutes discharging the patient  This time was spent on the day of discharge  I had direct contact with the patient on the day of discharge   Additional documentation is required if more than 30 minutes were spent on discharge  Discharge Medications:  See after visit summary for reconciled discharge medications provided to patient and family

## 2021-04-01 NOTE — PHYSICAL THERAPY NOTE
Physical Therapy Progress note    Patient Active Problem List   Diagnosis    Vitamin D deficiency    Acute respiratory failure with hypoxia ([facility] [address] )    Sepsis ([facility] [address] )    Severe cardiomyopathy    Hypertension    Hyperglycemia    Chronic pain    Elevated troponin    Anxiety       Past Medical History:   Diagnosis Date    Fatty liver        Past Surgical History:   Procedure Laterality Date     SECTION      CHOLECYSTECTOMY      ROTATOR CUFF REPAIR W/ DISTAL CLAVICLE EXCISION Right     TONSILLECTOMY          21 1340   PT Last Visit   PT Visit Date 21   Note Type   Note Type Treatment   Pain Assessment   Pain Assessment Tool Pain Assessment not indicated - pt denies pain   Restrictions/Precautions   Weight Bearing Precautions Per Order No   Other Precautions Chair Alarm; Bed Alarm; Impulsive   General   Chart Reviewed No   Response to Previous Treatment Patient with no complaints from previous session  Cognition   Overall Cognitive Status WFL   Orientation Level Oriented X4   Subjective   Subjective agrees not to carry heavy items up stairs  reoprts her plumbing will be fixed in a day or 2  wants to shower before going home  Bed Mobility   Supine to Sit 7  Independent   Sit to Supine 7  Independent   Transfers   Sit to Stand 5  Supervision   Stand to Sit 5  Supervision   Ambulation/Elevation   Gait pattern Ataxia; Excessively slow  (mild ataxia, weakness vs dec sensation in feet)   Gait Assistance 5  Supervision   Assistive Device None   Distance 150' x2   Stair Management Assistance 5  Supervision   Additional items Verbal cues; Increased time required   Stair Management Technique One rail R;Alternating pattern; Foreward   Number of Stairs   (4+10, rest between)   Balance   Static Sitting Normal   Dynamic Sitting Good   Static Standing Fair   Dynamic Standing Fair   Ambulatory Fair   Endurance Deficit   Endurance Deficit Yes   Endurance Deficit Description fatigue, spO2 88% after flight of stairs     Activity Tolerance   Activity Tolerance Patient limited by fatigue  (nieves)   Nurse Made Aware yes   Exercises   Neuro re-ed instructed in HEP  standing slr 3 ways, heel raises, mini squats and marching  pt instructed to start with 10 reps  needs to be able to talk while exercising  if unable to talk, then stop and rest  progress to 3 sets of 10 reps over course of day   Assessment   Prognosis Good   Problem List Decreased endurance; Impaired balance;Decreased strength;Decreased safety awareness   Assessment pt seen for PT treatment session  pt is desiring to go home rather than rehab  pt was able to amb without assistive device no loss of balance but slight sway noted periodically  pt was nieves after 150', needed sitting rest was noted to spO2 88% on RA and   pt able to perform 4+10 stairs using handrail  pt is slow, nieves noted  pt educated in pacing her actvities until recovered more  pt educated in HEP of standing exercises to be done at counter top, 3 sets 10 reps goal over course of the day, to start with 1 set 10 reps  given guidlines for exercise tolerance and need to pace activities  pt voiced understanding  pt will need either cardiac rehab or OPPT to regain strength and cardiac endurance  Barriers to Discharge Inaccessible home environment;Decreased caregiver support   Goals   Patient Goals get strength back   STG Expiration Date 04/09/21   PT Treatment Day 2   Plan   Treatment/Interventions Functional transfer training;LE strengthening/ROM; Elevations; Therapeutic exercise; Endurance training;Patient/family training;Equipment eval/education;Gait training; Compensatory technique education;Spoke to nursing;Spoke to MD   Progress Slow progress, medical status limitations   Recommendation   PT Discharge Recommendation Return to previous environment with no needs; Home with skilled therapy  (pt wants OPPT vs cardiac rehab)   Equipment Recommended Cane  (has)   PT - OK to Discharge Yes AM-PAC Basic Mobility Inpatient   Turning in Bed Without Bedrails 4   Lying on Back to Sitting on Edge of Flat Bed 4   Moving Bed to Chair 4   Standing Up From Chair 4   Walk in Room 4   Climb 3-5 Stairs 4   Basic Mobility Inpatient Raw Score 24   Basic Mobility Standardized Score 57 68       Den Heck, PT

## 2021-04-01 NOTE — DISCHARGE INSTRUCTIONS
Your new medications are aspirin 81 mg daily, Lipitor 40 mg daily, Coreg 3 125 mg twice a day, lisinopril 20 mg daily, torsemide 20 mg daily and potassium 20 mEq daily  Antibiotics for 3 more doses to finish a course of 7 days  basic metabolic panel, lab work in 1 week

## 2021-04-01 NOTE — PLAN OF CARE
Problem: PHYSICAL THERAPY ADULT  Goal: Performs mobility at highest level of function for planned discharge setting  See evaluation for individualized goals  Description: Treatment/Interventions: Functional transfer training, LE strengthening/ROM, Elevations, Therapeutic exercise, Endurance training, Patient/family training, Bed mobility, Gait training, Spoke to nursing          See flowsheet documentation for full assessment, interventions and recommendations  Outcome: Adequate for Discharge  Note: Prognosis: Good  Problem List: Decreased endurance, Impaired balance, Decreased strength, Decreased safety awareness  Assessment: pt seen for PT treatment session  pt is desiring to go home rather than rehab  pt was able to amb without assistive device no loss of balance but slight sway noted periodically  pt was nieves after 150', needed sitting rest was noted to spO2 88% on RA and   pt able to perform 4+10 stairs using handrail  pt is slow, nieves noted  pt educated in pacing her actvities until recovered more  pt educated in HEP of standing exercises to be done at counter top, 3 sets 10 reps goal over course of the day, to start with 1 set 10 reps  given guidlines for exercise tolerance and need to pace activities  pt voiced understanding  pt will need either cardiac rehab or OPPT to regain strength and cardiac endurance  Barriers to Discharge: Inaccessible home environment, Decreased caregiver support  Barriers to Discharge Comments: stairs; lives alone  PT Discharge Recommendation: Return to previous environment with no needs, Home with skilled therapy(pt wants OPPT vs cardiac rehab)     PT - OK to Discharge: Yes    See flowsheet documentation for full assessment

## 2021-04-01 NOTE — SOCIAL WORK
CM met with pt at bedside  Pt has made improvements with PT/OT - she still is refusing rehab but PT/OT are now recommending home PT/OT  A post acute care recommendation was made by your care team for [facility]  Discussed [place] of Choice with patient  List of agencies given to patient via in person  patient aware the list is custom filtered for them by preference  and that [facility] post acute providers are designated  Pt wants [facility]  Ref sent there  Pt reports the heat/hot water issue in her home has been fixed  Pt will d/c home today; her daughter will transport her home  IMM reviewed with patient   patient agree with discharge determination

## 2021-04-01 NOTE — PLAN OF CARE
Problem: Prexisting or High Potential for Compromised Skin Integrity  Goal: Skin integrity is maintained or improved  Description: INTERVENTIONS:  - Identify patients at risk for skin breakdown  - Assess and monitor skin integrity  - Assess and monitor nutrition and hydration status  - Monitor labs   - Assess for incontinence   - Turn and reposition patient  - Assist with mobility/ambulation  - Relieve pressure over bony prominences  - Avoid friction and shearing  - Provide appropriate hygiene as needed including keeping skin clean and dry  - Evaluate need for skin moisturizer/barrier cream  - Collaborate with interdisciplinary team   - Patient/family teaching  - Consider wound care consult   Outcome: Progressing     Problem: PAIN - ADULT  Goal: Verbalizes/displays adequate comfort level or baseline comfort level  Description: Interventions:  - Encourage patient to monitor pain and request assistance  - Assess pain using appropriate pain scale  - Administer analgesics based on type and severity of pain and evaluate response  - Implement non-pharmacological measures as appropriate and evaluate response  - Consider cultural and social influences on pain and pain management  - Notify physician/advanced practitioner if interventions unsuccessful or patient reports new pain  Outcome: Progressing     Problem: INFECTION - ADULT  Goal: Absence or prevention of progression during hospitalization  Description: INTERVENTIONS:  - Assess and monitor for signs and symptoms of infection  - Monitor lab/diagnostic results  - Monitor all insertion sites, i e  indwelling lines, tubes, and drains  - Monitor endotracheal if appropriate and nasal secretions for changes in amount and color  - [place] appropriate cooling/warming therapies per order  - Administer medications as ordered  - Instruct and encourage patient and family to use good hand hygiene technique  - Identify and instruct in appropriate isolation precautions for identified infection/condition  Outcome: Progressing     Problem: SAFETY ADULT  Goal: Patient will remain free of falls  Description: INTERVENTIONS:  - Assess patient frequently for physical needs  -  Identify cognitive and physical deficits and behaviors that affect risk of falls    -  [place] fall precautions as indicated by assessment   - Educate patient/family on patient safety including physical limitations  - Instruct patient to call for assistance with activity based on assessment  - Modify environment to reduce risk of injury  - Consider OT/PT consult to assist with strengthening/mobility  Outcome: Progressing  Goal: Maintain or return to baseline ADL function  Description: INTERVENTIONS:  -  Assess patient's ability to carry out ADLs; assess patient's baseline for ADL function and identify physical deficits which impact ability to perform ADLs (bathing, care of mouth/teeth, toileting, grooming, dressing, etc )  - Assess/evaluate cause of self-care deficits   - Assess range of motion  - Assess patient's mobility; develop plan if impaired  - Assess patient's need for assistive devices and provide as appropriate  - Encourage maximum independence but intervene and supervise when necessary  - Involve family in performance of ADLs  - Assess for home care needs following discharge   - Consider OT consult to assist with ADL evaluation and planning for discharge  - Provide patient education as appropriate  Outcome: Progressing  Goal: Maintain or return mobility status to optimal level  Description: INTERVENTIONS:  - Assess patient's baseline mobility status (ambulation, transfers, stairs, etc )    - Identify cognitive and physical deficits and behaviors that affect mobility  - Identify mobility aids required to assist with transfers and/or ambulation (gait belt, sit-to-stand, lift, walker, cane, etc )  - [place] fall precautions as indicated by assessment  - Record patient progress and toleration of activity level on Mobility SBAR; progress patient to next Phase/Stage  - Instruct patient to call for assistance with activity based on assessment  - Consider rehabilitation consult to assist with strengthening/weightbearing, etc   Outcome: Progressing     Problem: DISCHARGE PLANNING  Goal: Discharge to home or other facility with appropriate resources  Description: INTERVENTIONS:  - Identify barriers to discharge w/patient and caregiver  - Arrange for needed discharge resources and transportation as appropriate  - Identify discharge learning needs (meds, wound care, etc )  - Arrange for interpretive services to assist at discharge as needed  - Refer to Case Management Department for coordinating discharge planning if the patient needs post-hospital services based on physician/advanced practitioner order or complex needs related to functional status, cognitive ability, or social support system  Outcome: Progressing     Problem: Knowledge Deficit  Goal: Patient/family/caregiver demonstrates understanding of disease process, treatment plan, medications, and discharge instructions  Description: Complete learning assessment and assess knowledge base    Interventions:  - Provide teaching at level of understanding  - Provide teaching via preferred learning methods  Outcome: Progressing     Problem: RESPIRATORY - ADULT  Goal: Achieves optimal ventilation and oxygenation  Description: INTERVENTIONS:  - Assess for changes in respiratory status  - Assess for changes in mentation and behavior  - Position to facilitate oxygenation and minimize respiratory effort  - Oxygen administered by appropriate delivery if ordered  - Initiate smoking cessation education as indicated  - Encourage broncho-pulmonary hygiene including cough, deep breathe, Incentive Spirometry  - Assess the need for suctioning and aspirate as needed  - Assess and instruct to report SOB or any respiratory difficulty  - Respiratory Therapy support as indicated  Outcome: Progressing     Problem: Potential for Falls  Goal: Patient will remain free of falls  Description: INTERVENTIONS:  - Assess patient frequently for physical needs  -  Identify cognitive and physical deficits and behaviors that affect risk of falls  -  [place] fall precautions as indicated by assessment   - Educate patient/family on patient safety including physical limitations  - Instruct patient to call for assistance with activity based on assessment  - Modify environment to reduce risk of injury  - Consider OT/PT consult to assist with strengthening/mobility  Outcome: Progressing     Problem: Nutrition/Hydration-ADULT  Goal: Nutrient/Hydration intake appropriate for improving, restoring or maintaining nutritional needs  Description: Monitor and assess patient's nutrition/hydration status for malnutrition  Collaborate with interdisciplinary team and initiate plan and interventions as ordered  Monitor patient's weight and dietary intake as ordered or per policy  Utilize nutrition screening tool and intervene as necessary  Determine patient's food preferences and provide high-protein, high-caloric foods as appropriate       INTERVENTIONS:  - Monitor oral intake, urinary output, labs, and treatment plans  - Assess nutrition and hydration status and recommend course of action  - Evaluate amount of meals eaten  - Assist patient with eating if necessary   - Allow adequate time for meals  - Recommend/ encourage appropriate diets, oral nutritional supplements, and vitamin/mineral supplements  - Order, calculate, and assess calorie counts as needed  - Recommend, monitor, and adjust tube feedings and TPN/PPN based on assessed needs  - Assess need for intravenous fluids  - Provide specific nutrition/hydration education as appropriate  - Include patient/family/caregiver in decisions related to nutrition  Outcome: Progressing     Problem: CARDIOVASCULAR - ADULT  Goal: Maintains optimal cardiac output and hemodynamic stability  Description: INTERVENTIONS:  - Monitor I/O, vital signs and rhythm  - Monitor for S/S and trends of decreased cardiac output  - Administer and titrate ordered vasoactive medications to optimize hemodynamic stability  - Assess quality of pulses, skin color and temperature  - Assess for signs of decreased coronary artery perfusion  - Instruct patient to report change in severity of symptoms  Outcome: Progressing  Goal: Absence of cardiac dysrhythmias or at baseline rhythm  Description: INTERVENTIONS:  - Continuous cardiac monitoring, vital signs, obtain 12 lead EKG if ordered  - Administer antiarrhythmic and heart rate control medications as ordered  - Monitor electrolytes and administer replacement therapy as ordered  Outcome: Progressing     Problem: METABOLIC, FLUID AND ELECTROLYTES - ADULT  Goal: Electrolytes maintained within normal limits  Description: INTERVENTIONS:  - Monitor labs and assess patient for signs and symptoms of electrolyte imbalances  - Administer electrolyte replacement as ordered  - Monitor response to electrolyte replacements, including repeat lab results as appropriate  - Instruct patient on fluid and nutrition as appropriate  Outcome: Progressing  Goal: Fluid balance maintained  Description: INTERVENTIONS:  - Monitor labs   - Monitor I/O and WT  - Instruct patient on fluid and nutrition as appropriate  - Assess for signs & symptoms of volume excess or deficit  Outcome: Progressing     Problem: SKIN/TISSUE INTEGRITY - ADULT  Goal: Skin integrity remains intact  Description: INTERVENTIONS  - Identify patients at risk for skin breakdown  - Assess and monitor skin integrity  - Assess and monitor nutrition and hydration status  - Monitor labs (i e  albumin)  - Assess for incontinence   - Turn and reposition patient  - Assist with mobility/ambulation  - Relieve pressure over bony prominences  - Avoid friction and shearing  - Provide appropriate hygiene as needed including keeping skin clean and dry  - Evaluate need for skin moisturizer/barrier cream  - Collaborate with interdisciplinary team (i e  Nutrition, Rehabilitation, etc )   - Patient/family teaching  Outcome: Progressing     Problem: HEMATOLOGIC - ADULT  Goal: Maintains hematologic stability  Description: INTERVENTIONS  - Assess for signs and symptoms of bleeding or hemorrhage  - Monitor labs  - Administer supportive blood products/factors as ordered and appropriate  Outcome: Progressing

## 2021-04-08 ENCOUNTER — OFFICE VISIT (OUTPATIENT)
Dept: CARDIOLOGY CLINIC | Facility: CLINIC | Age: 61
End: 2021-04-08
Payer: MEDICARE

## 2021-04-08 VITALS
DIASTOLIC BLOOD PRESSURE: 70 MMHG | BODY MASS INDEX: 25.43 KG/M2 | HEIGHT: 67 IN | WEIGHT: 162 LBS | HEART RATE: 80 BPM | SYSTOLIC BLOOD PRESSURE: 118 MMHG

## 2021-04-08 DIAGNOSIS — I42.0 DILATED CARDIOMYOPATHY (HCC): Primary | ICD-10-CM

## 2021-04-08 DIAGNOSIS — I10 ESSENTIAL HYPERTENSION: ICD-10-CM

## 2021-04-08 DIAGNOSIS — E87.6 HYPOKALEMIA: ICD-10-CM

## 2021-04-08 DIAGNOSIS — I42.9 CARDIOMYOPATHY, UNSPECIFIED TYPE (HCC): ICD-10-CM

## 2021-04-08 PROCEDURE — 99214 OFFICE O/P EST MOD 30 MIN: CPT | Performed by: INTERNAL MEDICINE

## 2021-04-08 RX ORDER — SPIRONOLACTONE 25 MG/1
25 TABLET ORAL DAILY
Qty: 90 TABLET | Refills: 5 | Status: SHIPPED | OUTPATIENT
Start: 2021-04-08 | End: 2021-05-27 | Stop reason: ALTCHOICE

## 2021-04-08 RX ORDER — POTASSIUM CHLORIDE 20 MEQ/1
20 TABLET, EXTENDED RELEASE ORAL DAILY PRN
Qty: 14 TABLET | Refills: 0
Start: 2021-04-08 | End: 2021-05-17 | Stop reason: ALTCHOICE

## 2021-04-08 RX ORDER — CARVEDILOL 6.25 MG/1
6.25 TABLET ORAL 2 TIMES DAILY WITH MEALS
Qty: 180 TABLET | Refills: 5 | Status: SHIPPED | OUTPATIENT
Start: 2021-04-08 | End: 2021-06-15 | Stop reason: HOSPADM

## 2021-04-08 RX ORDER — TORSEMIDE 20 MG/1
20 TABLET ORAL DAILY PRN
Qty: 30 TABLET | Refills: 0
Start: 2021-04-08 | End: 2021-05-27 | Stop reason: ALTCHOICE

## 2021-04-08 NOTE — PROGRESS NOTES
Patient ID: Jose King is a 61 y o  female  Plan:      Dilated cardiomyopathy ([facility] [address] )  Etiology unclear but not CAD related  I am going to [facility] up the carvedilol dose, add spironolactone, cut back on torsemide and potassium to p r n  Weight gain, and obtain a repeat echo in 3 months just prior to return  Hypertension  Very well controlled  Follow up Plan/Other summary comments:  Change in regimen as described above  Three month echo, EKG, and follow-up visit  HPI:   Patient is seen today to assume cardiac care  On 2021 she presented with acute heart failure and was at least briefly intubated  An echo the next day revealed ejection fraction less than 20% with moderate mitral regurgitation  Several days later she underwent coronary angiography which revealed moderate but nonobstructive disease  She has felt better since hospital discharge  No chest pain  No further dyspnea  No syncope or near syncope  Most recent or relevant cardiac/vascular testing:    TTE 2021:  EF less than 20%  Coronary angiography 2021:  No high-grade proximal CAD        Past Surgical History:   Procedure Laterality Date    CARDIAC CATHETERIZATION  2021    Moderate non-obstructive atherosclerosis     SECTION      CHOLECYSTECTOMY      ROTATOR CUFF REPAIR W/ DISTAL CLAVICLE EXCISION Right     TONSILLECTOMY       CMP:   Lab Results   Component Value Date     2018    K 3 1 (L) 2021    K 4 6 2018    CL 98 (L) 2021     2018    CO2 31 2021    CO2 32 (H) 2018    BUN 13 2021    BUN 14 2018    CREATININE 0 82 2021    CREATININE 0 72 2018    EGFR 78 2021       Lipid Profile:   Lab Results   Component Value Date    CHOL 216 (H) 2018    TRIG 150 2021    TRIG 168 (H) 2018    HDL 32 (L) 2021    HDL 43 2018         Review of Systems   10  point ROS  was otherwise non pertinent or negative except as per HPI or as below  Gait: Normal         Objective:     /70   Pulse 80   Ht 5' 7" (1 702 m)   Wt 73 5 kg (162 lb)   BMI 25 37 kg/m²     PHYSICAL EXAM:    General:  Normal appearance in no distress  Eyes:  Anicteric  Oral mucosa:  Moist   Neck:  No JVD  Carotid upstrokes are brisk without bruits  No masses  Chest:  Clear to auscultation and percussion  Cardiac:  No palpable PMI  Normal S1 and S2  No murmur gallop or rub  Abdomen:  Soft and nontender  No palpable organomegaly or aortic enlargement  Extremities:  No peripheral edema  Musculoskeletal:  Symmetric  Vascular:  Femoral pulses are brisk without bruits  Popliteal pulses are intact bilaterally  Pedal pulses are intact  Neuro:  Grossly symmetric  Psych:  Alert and oriented x3          Current Outpatient Medications:     aspirin 81 mg chewable tablet, Chew 1 tablet (81 mg total) daily, Disp: 30 tablet, Rfl: 0    atorvastatin (LIPITOR) 40 mg tablet, Take 1 tablet (40 mg total) by mouth daily with dinner, Disp: 30 tablet, Rfl: 0    carvedilol (COREG) 6 25 mg tablet, Take 1 tablet (6 25 mg total) by mouth 2 (two) times a day with meals, Disp: 180 tablet, Rfl: 5    clonazePAM (KlonoPIN) 0 5 mg tablet, Take 0 5 mg by mouth daily at bedtime, Disp: , Rfl:     gabapentin (NEURONTIN) 300 mg capsule, Take 100 mg by mouth as needed, Disp: , Rfl:     lisinopril (ZESTRIL) 20 mg tablet, Take 1 tablet (20 mg total) by mouth daily, Disp: 30 tablet, Rfl: 0    morphine (MS CONTIN) 30 mg 12 hr tablet, Take 30 mg by mouth 2 (two) times a day, Disp: , Rfl:     nicotine (NICODERM CQ) 14 mg/24hr TD 24 hr patch, Place 1 patch on the skin daily, Disp: 28 patch, Rfl: 0    oxyCODONE-acetaminophen (PERCOCET) 5-325 mg per tablet, Take 1 tablet by mouth every 4 (four) hours as needed for moderate pain, Disp: , Rfl:     potassium chloride (K-DUR,KLOR-CON) 20 mEq tablet, Take 1 tablet (20 mEq total) by mouth daily as needed (Only take on days that you need torsemide) for up to 14 days, Disp: 14 tablet, Rfl: 0    torsemide (DEMADEX) 20 mg tablet, Take 1 tablet (20 mg total) by mouth daily as needed (Only for weight in the am more than 160), Disp: 30 tablet, Rfl: 0    spironolactone (ALDACTONE) 25 mg tablet, Take 1 tablet (25 mg total) by mouth daily, Disp: 90 tablet, Rfl: 5  Allergies   Allergen Reactions    Wellbutrin [Bupropion] Arthralgia     Past Medical History:   Diagnosis Date    Cardiomyopathy ([facility] [address] )     COPD (chronic obstructive pulmonary disease) (HCC)     Fatty liver     Hyperlipidemia     Hypertension     Mitral regurgitation            Social History     Tobacco Use   Smoking Status Current Every Day Smoker    Packs/day: 1 00    Types: Cigarettes   Smokeless Tobacco Never Used

## 2021-04-08 NOTE — ASSESSMENT & PLAN NOTE
Etiology unclear but not CAD related  I am going to [facility] up the carvedilol dose, add spironolactone, cut back on torsemide and potassium to p r n  Weight gain, and obtain a repeat echo in 3 months just prior to return

## 2021-04-08 NOTE — PATIENT INSTRUCTIONS
Increase carvedilol to 6 25 mg twice daily  You can take to avoid yet have twice a day but I also sent you a new prescription for the higher dose  Add spironolactone 25 mg once daily  I sent in a new prescription for this  Only take the torsemide and potassium if your weight in the morning is greater than 160 lb  If fit is 160 or less than do not take either

## 2021-04-16 ENCOUNTER — APPOINTMENT (OUTPATIENT)
Dept: LAB | Facility: CLINIC | Age: 61
End: 2021-04-16
Payer: MEDICARE

## 2021-04-16 DIAGNOSIS — I42.0 DILATED CARDIOMYOPATHY (HCC): ICD-10-CM

## 2021-04-16 LAB
ANION GAP SERPL CALCULATED.3IONS-SCNC: 6 MMOL/L (ref 4–13)
BUN SERPL-MCNC: 36 MG/DL (ref 5–25)
CALCIUM SERPL-MCNC: 10.1 MG/DL (ref 8.3–10.1)
CHLORIDE SERPL-SCNC: 99 MMOL/L (ref 100–108)
CO2 SERPL-SCNC: 24 MMOL/L (ref 21–32)
CREAT SERPL-MCNC: 2.61 MG/DL (ref 0.6–1.3)
GFR SERPL CREATININE-BSD FRML MDRD: 19 ML/MIN/1.73SQ M
GLUCOSE P FAST SERPL-MCNC: 92 MG/DL (ref 65–99)
POTASSIUM SERPL-SCNC: 5.6 MMOL/L (ref 3.5–5.3)
SODIUM SERPL-SCNC: 129 MMOL/L (ref 136–145)

## 2021-04-16 PROCEDURE — 80048 BASIC METABOLIC PNL TOTAL CA: CPT

## 2021-04-16 PROCEDURE — 36415 COLL VENOUS BLD VENIPUNCTURE: CPT

## 2021-04-19 DIAGNOSIS — E87.5 HYPERKALEMIA: Primary | ICD-10-CM

## 2021-04-22 ENCOUNTER — TELEPHONE (OUTPATIENT)
Dept: CARDIOLOGY CLINIC | Facility: CLINIC | Age: 61
End: 2021-04-22

## 2021-04-22 ENCOUNTER — APPOINTMENT (OUTPATIENT)
Dept: LAB | Facility: CLINIC | Age: 61
End: 2021-04-22
Payer: MEDICARE

## 2021-04-22 DIAGNOSIS — E87.5 HYPERKALEMIA: ICD-10-CM

## 2021-04-22 LAB
ANION GAP SERPL CALCULATED.3IONS-SCNC: 8 MMOL/L (ref 4–13)
BUN SERPL-MCNC: 45 MG/DL (ref 5–25)
CALCIUM SERPL-MCNC: 9.8 MG/DL (ref 8.3–10.1)
CHLORIDE SERPL-SCNC: 103 MMOL/L (ref 100–108)
CO2 SERPL-SCNC: 24 MMOL/L (ref 21–32)
CREAT SERPL-MCNC: 2.44 MG/DL (ref 0.6–1.3)
GFR SERPL CREATININE-BSD FRML MDRD: 21 ML/MIN/1.73SQ M
GLUCOSE P FAST SERPL-MCNC: 95 MG/DL (ref 65–99)
POTASSIUM SERPL-SCNC: 5.1 MMOL/L (ref 3.5–5.3)
SODIUM SERPL-SCNC: 135 MMOL/L (ref 136–145)

## 2021-04-22 PROCEDURE — 36415 COLL VENOUS BLD VENIPUNCTURE: CPT

## 2021-04-22 PROCEDURE — 80048 BASIC METABOLIC PNL TOTAL CA: CPT

## 2021-04-22 NOTE — TELEPHONE ENCOUNTER
Home health nurse called, Yousif Mejia had her labs done today  Potassium is better but kidney function is still off  Nurse said she is holding potassium as you instructed and she did stop aldactone as you said also  Please review labs and let me know if there are any other changes  Nurse wanted us aware that ivan is feeling much better than she has been  She is eating, which she wasn't able to

## 2021-05-03 DIAGNOSIS — R77.8 ELEVATED TROPONIN: ICD-10-CM

## 2021-05-03 DIAGNOSIS — I42.9 CARDIOMYOPATHY, UNSPECIFIED TYPE (HCC): ICD-10-CM

## 2021-05-03 RX ORDER — LISINOPRIL 20 MG/1
20 TABLET ORAL DAILY
Qty: 90 TABLET | Refills: 3 | Status: SHIPPED | OUTPATIENT
Start: 2021-05-03 | End: 2021-05-25 | Stop reason: ALTCHOICE

## 2021-05-03 RX ORDER — ASPIRIN 81 MG/1
81 TABLET, CHEWABLE ORAL DAILY
Qty: 90 TABLET | Refills: 3 | Status: SHIPPED | OUTPATIENT
Start: 2021-05-03 | End: 2021-06-15 | Stop reason: HOSPADM

## 2021-05-03 RX ORDER — ATORVASTATIN CALCIUM 40 MG/1
40 TABLET, FILM COATED ORAL
Qty: 90 TABLET | Refills: 3 | Status: SHIPPED | OUTPATIENT
Start: 2021-05-03 | End: 2021-06-15 | Stop reason: HOSPADM

## 2021-05-03 NOTE — TELEPHONE ENCOUNTER
Claire HAWKINS  [facility]             Lisinopril  20 mg  1 tab Qd  90 with 3 refills     Atorvastatin 40 mg  1 tab QD   90 with 3 refills     ASA 81 mg   1 tab QD   90 with 3 refills     Dr Brigitte Chou in [place]

## 2021-05-13 ENCOUNTER — LAB (OUTPATIENT)
Dept: LAB | Facility: MEDICAL CENTER | Age: 61
End: 2021-05-13
Payer: MEDICARE

## 2021-05-13 ENCOUNTER — TRANSCRIBE ORDERS (OUTPATIENT)
Dept: LAB | Facility: MEDICAL CENTER | Age: 61
End: 2021-05-13

## 2021-05-13 DIAGNOSIS — I42.9 CARDIOMYOPATHY, UNSPECIFIED TYPE (HCC): ICD-10-CM

## 2021-05-13 DIAGNOSIS — I50.9 CONGESTIVE HEART FAILURE, UNSPECIFIED HF CHRONICITY, UNSPECIFIED HEART FAILURE TYPE (HCC): ICD-10-CM

## 2021-05-13 DIAGNOSIS — J44.9 CHRONIC OBSTRUCTIVE PULMONARY DISEASE, UNSPECIFIED COPD TYPE (HCC): ICD-10-CM

## 2021-05-13 DIAGNOSIS — D64.9 ANEMIA, UNSPECIFIED TYPE: ICD-10-CM

## 2021-05-13 DIAGNOSIS — I42.9 CARDIOMYOPATHY, UNSPECIFIED TYPE (HCC): Primary | ICD-10-CM

## 2021-05-13 LAB
ANION GAP SERPL CALCULATED.3IONS-SCNC: 4 MMOL/L (ref 4–13)
BASOPHILS # BLD AUTO: 0.09 THOUSANDS/ΜL (ref 0–0.1)
BASOPHILS NFR BLD AUTO: 1 % (ref 0–1)
BUN SERPL-MCNC: 25 MG/DL (ref 5–25)
CALCIUM SERPL-MCNC: 10 MG/DL (ref 8.3–10.1)
CHLORIDE SERPL-SCNC: 107 MMOL/L (ref 100–108)
CO2 SERPL-SCNC: 27 MMOL/L (ref 21–32)
CREAT SERPL-MCNC: 1.28 MG/DL (ref 0.6–1.3)
EOSINOPHIL # BLD AUTO: 0.49 THOUSAND/ΜL (ref 0–0.61)
EOSINOPHIL NFR BLD AUTO: 5 % (ref 0–6)
ERYTHROCYTE [DISTWIDTH] IN BLOOD BY AUTOMATED COUNT: 13.4 % (ref 11.6–15.1)
GFR SERPL CREATININE-BSD FRML MDRD: 46 ML/MIN/1.73SQ M
GLUCOSE SERPL-MCNC: 108 MG/DL (ref 65–140)
HCT VFR BLD AUTO: 40.7 % (ref 34.8–46.1)
HGB BLD-MCNC: 13.1 G/DL (ref 11.5–15.4)
IMM GRANULOCYTES # BLD AUTO: 0.04 THOUSAND/UL (ref 0–0.2)
IMM GRANULOCYTES NFR BLD AUTO: 0 % (ref 0–2)
LYMPHOCYTES # BLD AUTO: 2.99 THOUSANDS/ΜL (ref 0.6–4.47)
LYMPHOCYTES NFR BLD AUTO: 30 % (ref 14–44)
MAGNESIUM SERPL-MCNC: 2.1 MG/DL (ref 1.6–2.6)
MCH RBC QN AUTO: 28.9 PG (ref 26.8–34.3)
MCHC RBC AUTO-ENTMCNC: 32.2 G/DL (ref 31.4–37.4)
MCV RBC AUTO: 90 FL (ref 82–98)
MONOCYTES # BLD AUTO: 0.43 THOUSAND/ΜL (ref 0.17–1.22)
MONOCYTES NFR BLD AUTO: 4 % (ref 4–12)
NEUTROPHILS # BLD AUTO: 5.98 THOUSANDS/ΜL (ref 1.85–7.62)
NEUTS SEG NFR BLD AUTO: 60 % (ref 43–75)
NRBC BLD AUTO-RTO: 0 /100 WBCS
PLATELET # BLD AUTO: 370 THOUSANDS/UL (ref 149–390)
PMV BLD AUTO: 9.7 FL (ref 8.9–12.7)
POTASSIUM SERPL-SCNC: 5.2 MMOL/L (ref 3.5–5.3)
RBC # BLD AUTO: 4.53 MILLION/UL (ref 3.81–5.12)
SODIUM SERPL-SCNC: 138 MMOL/L (ref 136–145)
WBC # BLD AUTO: 10.02 THOUSAND/UL (ref 4.31–10.16)

## 2021-05-13 PROCEDURE — 36415 COLL VENOUS BLD VENIPUNCTURE: CPT

## 2021-05-13 PROCEDURE — 83735 ASSAY OF MAGNESIUM: CPT

## 2021-05-13 PROCEDURE — 80048 BASIC METABOLIC PNL TOTAL CA: CPT

## 2021-05-13 PROCEDURE — 85025 COMPLETE CBC W/AUTO DIFF WBC: CPT

## 2021-05-17 ENCOUNTER — TELEPHONE (OUTPATIENT)
Dept: CARDIOLOGY CLINIC | Facility: CLINIC | Age: 61
End: 2021-05-17

## 2021-05-17 NOTE — TELEPHONE ENCOUNTER
Drew Bautista MD  P  [facility]; Russ Melton, DO    Staff,   Please make sure potassium is stopped for now  5/17/21 called patient and left message with instructions    Zachary Mora

## 2021-05-24 ENCOUNTER — TELEPHONE (OUTPATIENT)
Dept: CARDIOLOGY CLINIC | Facility: CLINIC | Age: 61
End: 2021-05-24

## 2021-05-24 ENCOUNTER — APPOINTMENT (OUTPATIENT)
Dept: LAB | Facility: CLINIC | Age: 61
End: 2021-05-24
Payer: MEDICARE

## 2021-05-24 DIAGNOSIS — I42.0 DILATED CARDIOMYOPATHY (HCC): ICD-10-CM

## 2021-05-24 DIAGNOSIS — I42.0 DILATED CARDIOMYOPATHY (HCC): Primary | ICD-10-CM

## 2021-05-24 LAB
ANION GAP SERPL CALCULATED.3IONS-SCNC: 5 MMOL/L (ref 4–13)
BUN SERPL-MCNC: 19 MG/DL (ref 5–25)
CALCIUM SERPL-MCNC: 10.4 MG/DL (ref 8.3–10.1)
CHLORIDE SERPL-SCNC: 104 MMOL/L (ref 100–108)
CO2 SERPL-SCNC: 26 MMOL/L (ref 21–32)
CREAT SERPL-MCNC: 1.05 MG/DL (ref 0.6–1.3)
GFR SERPL CREATININE-BSD FRML MDRD: 58 ML/MIN/1.73SQ M
GLUCOSE SERPL-MCNC: 101 MG/DL (ref 65–140)
NT-PROBNP SERPL-MCNC: 468 PG/ML
POTASSIUM SERPL-SCNC: 4.6 MMOL/L (ref 3.5–5.3)
SODIUM SERPL-SCNC: 135 MMOL/L (ref 136–145)

## 2021-05-24 PROCEDURE — 83880 ASSAY OF NATRIURETIC PEPTIDE: CPT

## 2021-05-24 PROCEDURE — 36415 COLL VENOUS BLD VENIPUNCTURE: CPT | Performed by: PHYSICIAN ASSISTANT

## 2021-05-24 PROCEDURE — 80048 BASIC METABOLIC PNL TOTAL CA: CPT | Performed by: PHYSICIAN ASSISTANT

## 2021-05-24 NOTE — TELEPHONE ENCOUNTER
Gallo Estes stopped her lisinopril because she didn't feel well at all over the weekend  She couldn't eat, nauseous, metallic taste  She is saying she ate a lot of things with potassium  Strawberries, milk    she feels like she is loaded with potassium because she felt this way before  She said she read lisinopril can raise potassium levels  She is feeling a bit better today  She said she cannot go back on it

## 2021-05-24 NOTE — PROGRESS NOTES
The patient is concerned that she may be eating too many things rich in potassium and taking lisinopril as she is nauseous and having a metal taste in her mouth  She is concerned that the lisinopril without a diuretic may be harming her  We will check a BNP and BMP and see if there are any electrolyte abnormalities or need for diuresis

## 2021-05-25 ENCOUNTER — TELEPHONE (OUTPATIENT)
Dept: CARDIOLOGY CLINIC | Facility: CLINIC | Age: 61
End: 2021-05-25

## 2021-05-25 NOTE — TELEPHONE ENCOUNTER
----- Message from Acie Bamberger, PA-C sent at 5/25/2021  8:27 AM EDT -----  Potassium was not High but sodium was slightly low and therefore would not recommend starting a diuretic  Also Marker for heart failure was low and there for do not need diuretic at this point  Symptoms are NOT from a high potassium  Ok to continue off the Lisinopril   But will need to come in for a BP check in two weeks   ----- Message -----  From: Lab, Background User  Sent: 5/24/2021   7:51 PM EDT  To: Acie Bamberger, PA-C        5/25/21  Spoke with patient and gave her results/instructions as above  She will try to have her daughter check her BP and report back to us with number  She will call back with any questions or concerns      Flor Winsotn

## 2021-05-27 ENCOUNTER — TELEPHONE (OUTPATIENT)
Dept: CARDIOLOGY CLINIC | Facility: CLINIC | Age: 61
End: 2021-05-27

## 2021-05-27 NOTE — TELEPHONE ENCOUNTER
Patient called again  She has been still experiencing tiredness and metal/bitter taste in mouth, which started about a week after she was seen on 4/8/21  Her Carvedilol was increased to 6 25 mg bid  She was subsequently taken off Torsemide, potassium, and spironolactone  Can she try going back to 3 125 mg bid?

## 2021-06-13 ENCOUNTER — APPOINTMENT (EMERGENCY)
Dept: RADIOLOGY | Facility: HOSPITAL | Age: 61
DRG: 208 | End: 2021-06-13
Payer: MEDICARE

## 2021-06-13 ENCOUNTER — HOSPITAL ENCOUNTER (INPATIENT)
Facility: HOSPITAL | Age: 61
LOS: 2 days | Discharge: HOME | DRG: 208 | End: 2021-06-15
Attending: EMERGENCY MEDICINE | Admitting: HOSPITALIST
Payer: MEDICARE

## 2021-06-13 DIAGNOSIS — I50.43 ACUTE ON CHRONIC COMBINED SYSTOLIC AND DIASTOLIC CONGESTIVE HEART FAILURE (HCC): ICD-10-CM

## 2021-06-13 DIAGNOSIS — J96.02 ACUTE RESPIRATORY FAILURE WITH HYPOXIA AND HYPERCAPNIA (HCC): ICD-10-CM

## 2021-06-13 DIAGNOSIS — E87.6 HYPOKALEMIA: ICD-10-CM

## 2021-06-13 DIAGNOSIS — J96.01 ACUTE RESPIRATORY FAILURE WITH HYPOXIA (HCC): ICD-10-CM

## 2021-06-13 DIAGNOSIS — R77.8 ELEVATED TROPONIN: ICD-10-CM

## 2021-06-13 DIAGNOSIS — J96.01 ACUTE RESPIRATORY FAILURE WITH HYPOXIA AND HYPERCAPNIA (HCC): ICD-10-CM

## 2021-06-13 DIAGNOSIS — I42.0 DILATED CARDIOMYOPATHY (HCC): ICD-10-CM

## 2021-06-13 DIAGNOSIS — J81.0 FLASH PULMONARY EDEMA (HCC): ICD-10-CM

## 2021-06-13 DIAGNOSIS — I50.9 ACUTE DECOMPENSATED HEART FAILURE (HCC): Primary | ICD-10-CM

## 2021-06-13 PROBLEM — A41.9 SEPSIS (HCC): Status: RESOLVED | Noted: 2021-03-27 | Resolved: 2021-06-13

## 2021-06-13 PROBLEM — D72.829 LEUKOCYTOSIS: Status: ACTIVE | Noted: 2021-06-13

## 2021-06-13 PROBLEM — I10 ESSENTIAL HYPERTENSION: Chronic | Status: ACTIVE | Noted: 2021-03-27

## 2021-06-13 PROBLEM — E87.1 HYPONATREMIA: Status: ACTIVE | Noted: 2021-06-13

## 2021-06-13 PROBLEM — Z72.0 TOBACCO ABUSE: Status: ACTIVE | Noted: 2021-06-13

## 2021-06-13 LAB
ALBUMIN SERPL BCP-MCNC: 4.1 G/DL (ref 3.5–5.7)
ALP SERPL-CCNC: 87 U/L (ref 55–165)
ALT SERPL W P-5'-P-CCNC: 7 U/L (ref 7–52)
ANION GAP SERPL CALCULATED.3IONS-SCNC: 12 MMOL/L (ref 4–13)
APTT PPP: 29 SECONDS (ref 23–37)
ARTERIAL PATENCY WRIST A: NO
ARTERIAL PATENCY WRIST A: NO
AST SERPL W P-5'-P-CCNC: 16 U/L (ref 13–39)
BACTERIA UR QL AUTO: ABNORMAL /HPF
BASE EXCESS BLDA CALC-SCNC: -1 MMOL/L (ref -2–3)
BASE EXCESS BLDA CALC-SCNC: 4 MMOL/L (ref -2–3)
BASOPHILS # BLD AUTO: 0.1 THOUSANDS/ΜL (ref 0–0.1)
BASOPHILS NFR BLD AUTO: 1 % (ref 0–2)
BILIRUB SERPL-MCNC: 0.5 MG/DL (ref 0.2–1)
BILIRUB UR QL STRIP: NEGATIVE
BNP SERPL-MCNC: 2688 PG/ML (ref 1–100)
BUN SERPL-MCNC: 13 MG/DL (ref 7–25)
CALCIUM SERPL-MCNC: 9.3 MG/DL (ref 8.6–10.5)
CHLORIDE SERPL-SCNC: 95 MMOL/L (ref 98–107)
CLARITY UR: CLEAR
CO2 SERPL-SCNC: 25 MMOL/L (ref 21–31)
COLOR UR: YELLOW
CREAT SERPL-MCNC: 0.86 MG/DL (ref 0.6–1.2)
EOSINOPHIL # BLD AUTO: 0.4 THOUSAND/ΜL (ref 0–0.61)
EOSINOPHIL NFR BLD AUTO: 2 % (ref 0–5)
ERYTHROCYTE [DISTWIDTH] IN BLOOD BY AUTOMATED COUNT: 15.5 % (ref 11.5–14.5)
GFR SERPL CREATININE-BSD FRML MDRD: 74 ML/MIN/1.73SQ M
GLUCOSE SERPL-MCNC: 328 MG/DL (ref 65–99)
GLUCOSE UR STRIP-MCNC: NEGATIVE MG/DL
HCO3 BLDA-SCNC: 26.5 MMOL/L (ref 22–28)
HCO3 BLDA-SCNC: 29 MMOL/L (ref 22–28)
HCT VFR BLD AUTO: 37.5 % (ref 42–47)
HCT VFR BLD AUTO: 38.5 % (ref 42–47)
HCT VFR BLD AUTO: 39.7 % (ref 42–47)
HCT VFR BLD AUTO: 46.1 % (ref 42–47)
HGB BLD-MCNC: 12.7 G/DL (ref 12–16)
HGB BLD-MCNC: 13 G/DL (ref 12–16)
HGB BLD-MCNC: 13.5 G/DL (ref 12–16)
HGB BLD-MCNC: 15 G/DL (ref 12–16)
HGB UR QL STRIP.AUTO: NEGATIVE
HOROWITZ INDEX BLDA+IHG-RTO: 60 MM[HG]
HOROWITZ INDEX BLDA+IHG-RTO: ABNORMAL MM[HG]
INR PPP: 1.09 (ref 0.84–1.19)
KETONES UR STRIP-MCNC: NEGATIVE MG/DL
LEUKOCYTE ESTERASE UR QL STRIP: NEGATIVE
LYMPHOCYTES # BLD AUTO: 8.4 THOUSANDS/ΜL (ref 0.6–4.47)
LYMPHOCYTES NFR BLD AUTO: 51 % (ref 21–51)
MCH RBC QN AUTO: 28.9 PG (ref 26–34)
MCHC RBC AUTO-ENTMCNC: 32.5 G/DL (ref 31–37)
MCV RBC AUTO: 89 FL (ref 81–99)
MONOCYTES # BLD AUTO: 0.8 THOUSAND/ΜL (ref 0.17–1.22)
MONOCYTES NFR BLD AUTO: 5 % (ref 2–12)
NEUTROPHILS # BLD AUTO: 6.8 THOUSANDS/ΜL (ref 1.4–6.5)
NEUTS SEG NFR BLD AUTO: 41 % (ref 42–75)
NITRITE UR QL STRIP: NEGATIVE
NON-SQ EPI CELLS URNS QL MICRO: ABNORMAL /HPF
O2 CT BLDA-SCNC: 16.1 ML/DL
O2 CT BLDA-SCNC: 17.1 ML/DL
OB PNL GAST: NEGATIVE
OXYHGB MFR BLDA: 87 % (ref 94–100)
OXYHGB MFR BLDA: 94.9 % (ref 94–100)
PCO2 BLDA: 47.4 MM HG (ref 35–45)
PCO2 BLDA: 58.3 MM HG (ref 35–45)
PEEP RESPIRATORY: 6 CM[H2O]
PEEP RESPIRATORY: 6 CM[H2O]
PH BLDA: 7.28 [PH] (ref 7.35–7.45)
PH BLDA: 7.4 [PH] (ref 7.35–7.45)
PH UR STRIP.AUTO: 6 [PH]
PLATELET # BLD AUTO: 445 THOUSANDS/UL (ref 149–390)
PMV BLD AUTO: 8 FL (ref 8.6–11.7)
PO2 BLDA: 101 MM HG (ref 80–100)
PO2 BLDA: 71 MM HG (ref 80–100)
POTASSIUM SERPL-SCNC: 3.7 MMOL/L (ref 3.5–5.5)
PROCALCITONIN SERPL-MCNC: <0.05 NG/ML
PROT SERPL-MCNC: 8.1 G/DL (ref 6.4–8.9)
PROT UR STRIP-MCNC: NEGATIVE MG/DL
PROTHROMBIN TIME: 14 SECONDS (ref 11.6–14.5)
RBC # BLD AUTO: 5.19 MILLION/UL (ref 3.9–5.2)
RBC #/AREA URNS AUTO: ABNORMAL /HPF
SARS-COV-2 RNA RESP QL NAA+PROBE: NEGATIVE
SODIUM SERPL-SCNC: 132 MMOL/L (ref 134–143)
SP GR UR STRIP.AUTO: 1.01 (ref 1–1.03)
SPECIMEN SOURCE: ABNORMAL
SPECIMEN SOURCE: ABNORMAL
TROPONIN I SERPL-MCNC: 0.04 NG/ML
TROPONIN I SERPL-MCNC: 0.1 NG/ML
TROPONIN I SERPL-MCNC: 0.11 NG/ML
UROBILINOGEN UR QL STRIP.AUTO: 0.2 E.U./DL
VENT AC: 14
VENT AC: 18
VT SETTING VENT: 450 ML
VT SETTING VENT: 550 ML
WBC # BLD AUTO: 16.5 THOUSAND/UL (ref 4.8–10.8)
WBC #/AREA URNS AUTO: ABNORMAL /HPF

## 2021-06-13 PROCEDURE — U0003 INFECTIOUS AGENT DETECTION BY NUCLEIC ACID (DNA OR RNA); SEVERE ACUTE RESPIRATORY SYNDROME CORONAVIRUS 2 (SARS-COV-2) (CORONAVIRUS DISEASE [COVID-19]), AMPLIFIED PROBE TECHNIQUE, MAKING USE OF HIGH THROUGHPUT TECHNOLOGIES AS DESCRIBED BY CMS-2020-01-R: HCPCS

## 2021-06-13 PROCEDURE — 83880 ASSAY OF NATRIURETIC PEPTIDE: CPT | Performed by: EMERGENCY MEDICINE

## 2021-06-13 PROCEDURE — 99291 CRITICAL CARE FIRST HOUR: CPT

## 2021-06-13 PROCEDURE — 99291 CRITICAL CARE FIRST HOUR: CPT | Performed by: ANESTHESIOLOGY

## 2021-06-13 PROCEDURE — 82805 BLOOD GASES W/O2 SATURATION: CPT | Performed by: PHYSICIAN ASSISTANT

## 2021-06-13 PROCEDURE — 85610 PROTHROMBIN TIME: CPT | Performed by: EMERGENCY MEDICINE

## 2021-06-13 PROCEDURE — C9113 INJ PANTOPRAZOLE SODIUM, VIA: HCPCS | Performed by: PHYSICIAN ASSISTANT

## 2021-06-13 PROCEDURE — 0BH17EZ INSERTION OF ENDOTRACHEAL AIRWAY INTO TRACHEA, VIA NATURAL OR ARTIFICIAL OPENING: ICD-10-PCS | Performed by: INTERNAL MEDICINE

## 2021-06-13 PROCEDURE — 81001 URINALYSIS AUTO W/SCOPE: CPT | Performed by: ANESTHESIOLOGY

## 2021-06-13 PROCEDURE — 85018 HEMOGLOBIN: CPT | Performed by: PHYSICIAN ASSISTANT

## 2021-06-13 PROCEDURE — U0005 INFEC AGEN DETEC AMPLI PROBE: HCPCS

## 2021-06-13 PROCEDURE — 94002 VENT MGMT INPAT INIT DAY: CPT

## 2021-06-13 PROCEDURE — 87040 BLOOD CULTURE FOR BACTERIA: CPT | Performed by: ANESTHESIOLOGY

## 2021-06-13 PROCEDURE — 36600 WITHDRAWAL OF ARTERIAL BLOOD: CPT

## 2021-06-13 PROCEDURE — 85025 COMPLETE CBC W/AUTO DIFF WBC: CPT | Performed by: EMERGENCY MEDICINE

## 2021-06-13 PROCEDURE — 71045 X-RAY EXAM CHEST 1 VIEW: CPT

## 2021-06-13 PROCEDURE — 80053 COMPREHEN METABOLIC PANEL: CPT | Performed by: EMERGENCY MEDICINE

## 2021-06-13 PROCEDURE — 84145 PROCALCITONIN (PCT): CPT | Performed by: PHYSICIAN ASSISTANT

## 2021-06-13 PROCEDURE — 85730 THROMBOPLASTIN TIME PARTIAL: CPT | Performed by: EMERGENCY MEDICINE

## 2021-06-13 PROCEDURE — 5A1945Z RESPIRATORY VENTILATION, 24-96 CONSECUTIVE HOURS: ICD-10-PCS | Performed by: INTERNAL MEDICINE

## 2021-06-13 PROCEDURE — 36415 COLL VENOUS BLD VENIPUNCTURE: CPT | Performed by: EMERGENCY MEDICINE

## 2021-06-13 PROCEDURE — 94760 N-INVAS EAR/PLS OXIMETRY 1: CPT

## 2021-06-13 PROCEDURE — 99285 EMERGENCY DEPT VISIT HI MDM: CPT | Performed by: EMERGENCY MEDICINE

## 2021-06-13 PROCEDURE — 84484 ASSAY OF TROPONIN QUANT: CPT | Performed by: PHYSICIAN ASSISTANT

## 2021-06-13 PROCEDURE — 99223 1ST HOSP IP/OBS HIGH 75: CPT | Performed by: HOSPITALIST

## 2021-06-13 PROCEDURE — 99222 1ST HOSP IP/OBS MODERATE 55: CPT | Performed by: INTERNAL MEDICINE

## 2021-06-13 PROCEDURE — 84484 ASSAY OF TROPONIN QUANT: CPT | Performed by: EMERGENCY MEDICINE

## 2021-06-13 PROCEDURE — 96365 THER/PROPH/DIAG IV INF INIT: CPT

## 2021-06-13 PROCEDURE — 82805 BLOOD GASES W/O2 SATURATION: CPT | Performed by: EMERGENCY MEDICINE

## 2021-06-13 PROCEDURE — 82271 OCCULT BLOOD OTHER SOURCES: CPT | Performed by: PHYSICIAN ASSISTANT

## 2021-06-13 PROCEDURE — 85014 HEMATOCRIT: CPT | Performed by: PHYSICIAN ASSISTANT

## 2021-06-13 RX ORDER — FUROSEMIDE 10 MG/ML
40 INJECTION INTRAMUSCULAR; INTRAVENOUS
Status: DISCONTINUED | OUTPATIENT
Start: 2021-06-13 | End: 2021-06-13

## 2021-06-13 RX ORDER — FENTANYL CITRATE 50 UG/ML
100 INJECTION, SOLUTION INTRAMUSCULAR; INTRAVENOUS ONCE
Status: COMPLETED | OUTPATIENT
Start: 2021-06-13 | End: 2021-06-13

## 2021-06-13 RX ORDER — ETOMIDATE 2 MG/ML
0.3 INJECTION INTRAVENOUS ONCE
Status: COMPLETED | OUTPATIENT
Start: 2021-06-13 | End: 2021-06-13

## 2021-06-13 RX ORDER — NICOTINE 21 MG/24HR
1 PATCH, TRANSDERMAL 24 HOURS TRANSDERMAL DAILY
Status: DISCONTINUED | OUTPATIENT
Start: 2021-06-13 | End: 2021-06-15 | Stop reason: HOSPADM

## 2021-06-13 RX ORDER — ONDANSETRON 2 MG/ML
4 INJECTION INTRAMUSCULAR; INTRAVENOUS EVERY 6 HOURS PRN
Status: DISCONTINUED | OUTPATIENT
Start: 2021-06-13 | End: 2021-06-15 | Stop reason: HOSPADM

## 2021-06-13 RX ORDER — PANTOPRAZOLE SODIUM 40 MG/1
40 INJECTION, POWDER, FOR SOLUTION INTRAVENOUS EVERY 12 HOURS SCHEDULED
Status: DISCONTINUED | OUTPATIENT
Start: 2021-06-13 | End: 2021-06-14

## 2021-06-13 RX ORDER — CLONAZEPAM 0.5 MG/1
0.5 TABLET ORAL
Status: DISCONTINUED | OUTPATIENT
Start: 2021-06-13 | End: 2021-06-15 | Stop reason: HOSPADM

## 2021-06-13 RX ORDER — MORPHINE SULFATE 30 MG/1
30 TABLET, FILM COATED, EXTENDED RELEASE ORAL EVERY 12 HOURS SCHEDULED
Status: DISCONTINUED | OUTPATIENT
Start: 2021-06-13 | End: 2021-06-13

## 2021-06-13 RX ORDER — METOPROLOL TARTRATE 5 MG/5ML
5 INJECTION INTRAVENOUS EVERY 6 HOURS
Status: DISCONTINUED | OUTPATIENT
Start: 2021-06-13 | End: 2021-06-14

## 2021-06-13 RX ORDER — METHYLPREDNISOLONE SODIUM SUCCINATE 40 MG/ML
40 INJECTION, POWDER, LYOPHILIZED, FOR SOLUTION INTRAMUSCULAR; INTRAVENOUS EVERY 8 HOURS SCHEDULED
Status: DISCONTINUED | OUTPATIENT
Start: 2021-06-13 | End: 2021-06-14

## 2021-06-13 RX ORDER — NITROGLYCERIN 20 MG/100ML
5-200 INJECTION INTRAVENOUS
Status: DISCONTINUED | OUTPATIENT
Start: 2021-06-13 | End: 2021-06-13

## 2021-06-13 RX ORDER — FUROSEMIDE 10 MG/ML
80 INJECTION INTRAMUSCULAR; INTRAVENOUS ONCE
Status: COMPLETED | OUTPATIENT
Start: 2021-06-13 | End: 2021-06-13

## 2021-06-13 RX ORDER — FUROSEMIDE 10 MG/ML
10 SYRINGE (ML) INJECTION CONTINUOUS
Status: DISCONTINUED | OUTPATIENT
Start: 2021-06-13 | End: 2021-06-14

## 2021-06-13 RX ORDER — PROPOFOL 10 MG/ML
5-50 INJECTION, EMULSION INTRAVENOUS
Status: DISCONTINUED | OUTPATIENT
Start: 2021-06-13 | End: 2021-06-14

## 2021-06-13 RX ORDER — ALBUTEROL SULFATE 2.5 MG/3ML
2.5 SOLUTION RESPIRATORY (INHALATION) EVERY 6 HOURS PRN
Status: DISCONTINUED | OUTPATIENT
Start: 2021-06-13 | End: 2021-06-15 | Stop reason: HOSPADM

## 2021-06-13 RX ORDER — SUCCINYLCHOLINE/SOD CL,ISO/PF 100 MG/5ML
140 SYRINGE (ML) INTRAVENOUS ONCE
Status: COMPLETED | OUTPATIENT
Start: 2021-06-13 | End: 2021-06-13

## 2021-06-13 RX ADMIN — NICOTINE 1 PATCH: 21 PATCH, EXTENDED RELEASE TRANSDERMAL at 08:23

## 2021-06-13 RX ADMIN — Medication 10 MG/HR: at 09:43

## 2021-06-13 RX ADMIN — ENOXAPARIN SODIUM 40 MG: 40 INJECTION SUBCUTANEOUS at 09:00

## 2021-06-13 RX ADMIN — FENTANYL CITRATE 50 MCG/HR: 50 INJECTION, SOLUTION INTRAMUSCULAR; INTRAVENOUS at 12:45

## 2021-06-13 RX ADMIN — METOPROLOL TARTRATE 5 MG: 5 INJECTION INTRAVENOUS at 05:31

## 2021-06-13 RX ADMIN — PANTOPRAZOLE SODIUM 40 MG: 40 INJECTION, POWDER, FOR SOLUTION INTRAVENOUS at 20:41

## 2021-06-13 RX ADMIN — PROPOFOL 40 MCG/KG/MIN: 10 INJECTION, EMULSION INTRAVENOUS at 05:03

## 2021-06-13 RX ADMIN — METHYLPREDNISOLONE SODIUM SUCCINATE 40 MG: 40 INJECTION, POWDER, FOR SOLUTION INTRAMUSCULAR; INTRAVENOUS at 14:14

## 2021-06-13 RX ADMIN — PROPOFOL 50 MCG/KG/MIN: 10 INJECTION, EMULSION INTRAVENOUS at 20:41

## 2021-06-13 RX ADMIN — Medication 140 MG: at 02:23

## 2021-06-13 RX ADMIN — PROPOFOL 40 MCG/KG/MIN: 10 INJECTION, EMULSION INTRAVENOUS at 15:48

## 2021-06-13 RX ADMIN — MORPHINE SULFATE 30 MG: 30 TABLET, FILM COATED, EXTENDED RELEASE ORAL at 09:44

## 2021-06-13 RX ADMIN — ETOMIDATE 20 MG: 20 INJECTION, SOLUTION INTRAVENOUS at 02:41

## 2021-06-13 RX ADMIN — FENTANYL CITRATE 100 MCG: 50 INJECTION INTRAMUSCULAR; INTRAVENOUS at 18:39

## 2021-06-13 RX ADMIN — NITROGLYCERIN 40 MCG/MIN: 20 INJECTION INTRAVENOUS at 02:30

## 2021-06-13 RX ADMIN — METOPROLOL TARTRATE 5 MG: 5 INJECTION INTRAVENOUS at 12:40

## 2021-06-13 RX ADMIN — FUROSEMIDE 80 MG: 10 INJECTION, SOLUTION INTRAVENOUS at 04:03

## 2021-06-13 RX ADMIN — CLONAZEPAM 0.5 MG: 0.5 TABLET ORAL at 21:50

## 2021-06-13 RX ADMIN — PANTOPRAZOLE SODIUM 40 MG: 40 INJECTION, POWDER, FOR SOLUTION INTRAVENOUS at 08:24

## 2021-06-13 RX ADMIN — FUROSEMIDE 40 MG: 10 INJECTION, SOLUTION INTRAVENOUS at 08:25

## 2021-06-13 RX ADMIN — METHYLPREDNISOLONE SODIUM SUCCINATE 40 MG: 40 INJECTION, POWDER, FOR SOLUTION INTRAMUSCULAR; INTRAVENOUS at 21:50

## 2021-06-13 RX ADMIN — METOPROLOL TARTRATE 5 MG: 5 INJECTION INTRAVENOUS at 17:15

## 2021-06-13 RX ADMIN — PROPOFOL 10 MCG/KG/MIN: 10 INJECTION, EMULSION INTRAVENOUS at 02:35

## 2021-06-13 RX ADMIN — PROPOFOL 40 MCG/KG/MIN: 10 INJECTION, EMULSION INTRAVENOUS at 09:41

## 2021-06-13 NOTE — NURSING NOTE
Received pt from er via stretcher  Pt transferred to bed x 3 assist  All monitors attached  Pt intubated and sedated  Propofol infusing and nitro gtt infusing  Vss  Full assessment complete and documented  Spoke with daughter and let know that pt is on the unit  hospitalist in to see pt  Will cont to monitor

## 2021-06-13 NOTE — H&P
Eun Pedro 76 1960, 61 y o  female MRN: [id number]  Unit/Bed#: ICU 06 Encounter: [id number]  Primary Care Provider: Yoel Locke DO   Date and time admitted to hospital: 2021  2:18 AM    * Acute respiratory failure with hypoxia [facility])  Assessment & Plan  · Intubated with CHF exacerbation  · Had previous intubation for same  · Critical care consult    Acute on chronic combined systolic and diastolic congestive heart failure [facility])  Assessment & Plan  Wt Readings from Last 3 Encounters:   21 75 kg (165 lb 5 5 oz)   21 73 5 kg (162 lb)   21 70 3 kg (154 lb 15 7 oz)     · IV Lasix  · Monitor I&O  · Cardiology consult  · BNP 2688  · Chest x-ray pulmonary vascular congestion        Elevated troponin  Assessment & Plan  · Suspect Secondary CHF exacerbation  · Continue to trend troponins    Dilated cardiomyopathy [facility])  Assessment & Plan  · Echo 2021 with severely reduced systolic function with EF 18%, severe diffuse hypokinesis  Doppler parameters consistent with restrictive physiology indicated above decreased left ventricular diastolic compliance  Right ventricle systolic function moderately reduced  Mitral valve moderate regurg aortic valve and tricuspid valve mild regurg  · Resume Coreg as able    Appears and medication  2021      Leukocytosis  Assessment & Plan  · Check procalcitonin level  · Afebrile  · Hold on antibiotics for now    Hyponatremia  Assessment & Plan  · Likely secondary to hypervolemia  · Monitor labs with diuresis    Tobacco abuse  Assessment & Plan  · Nicotine patch    Chronic pain  Assessment & Plan  · With continuous opioid  · Patient on chronic morphine ER  · PDMP reviewed morphine ER 30 mg number 60 tabs filled on 2021  · Resume when extubated    Hyperglycemia  Assessment & Plan  · A1c 5 5 on 3/27    Essential hypertension  Assessment & Plan  · Metoprolol 5 mg IV q 6 hours  · Resume Coreg as able    VTE Prophylaxis: Enoxaparin (Lovenox)  Code Status:  Patient currently intubated    Anticipated Length of Stay:  Patient will be admitted on an Inpatient basis with an anticipated length of stay of  > 2 midnights  Justification for Hospital Stay:  Patient currently intubated, specialist input      Chief Complaint:   Shortness of breath    History of Present Illness:    Federico aDy is a 61 y o  female who has past medical history of hypertension, hyperlipidemia, COPD, systolic CHF, dilated cardiomyopathy presents with shortness of breath  Details obtained from chart  Patient was brought in by EMS secondary to shortness of breath  Patient had similar episodes acute decompensated heart failure requiring intubation  EMS provide patient 2 nitroglycerin and BiPAP in the ambulance foot when she arrived in the ER she reported that she was fatiguing and requested to be intubated as in the past     ER treatment including nitro drip and propofol    Review of Systems:    Review of Systems   Unable to perform ROS: Intubated       Past Medical and Surgical History:     Past Medical History:   Diagnosis Date    [facility] [address]     [facility] ([facility]     Fatty liver     Hyperlipidemia     Hypertension     Mitral regurgitation        Past Surgical History:   Procedure Laterality Date    CARDIAC CATHETERIZATION  2021    Moderate non-obstructive atherosclerosis     SECTION      CHOLECYSTECTOMY      ROTATOR CUFF REPAIR W/ DISTAL CLAVICLE EXCISION Right     TONSILLECTOMY         Meds/Allergies:    Prior to Admission medications    Medication Sig Start Date End Date Taking?  Authorizing Provider   aspirin 81 mg chewable tablet Chew 1 tablet (81 mg total) daily for 360 doses 5/3/21 4/28/22  Ara Pires MD   atorvastatin (LIPITOR) 40 mg tablet Take 1 tablet (40 mg total) by mouth daily with dinner for 360 doses 5/3/21 4/28/22  Ara Pires MD carvedilol (COREG) 6 25 mg tablet Take 1 tablet (6 25 mg total) by mouth 2 (two) times a day with meals 4/8/21 5/8/21  Abigail Colbert MD   clonazePAM (KlonoPIN) 0 5 mg tablet Take 0 5 mg by mouth daily at bedtime    Historical Provider, MD   gabapentin (NEURONTIN) 300 mg capsule Take 100 mg by mouth as needed    Historical Provider, MD   morphine (MS CONTIN) 30 mg 12 hr tablet Take 30 mg by mouth 2 (two) times a day    Historical Provider, MD   nicotine (NICODERM CQ) 14 mg/24hr TD 24 hr patch Place 1 patch on the skin daily 4/2/21   Mikki Shelton MD   oxyCODONE-acetaminophen (PERCOCET) 5-325 mg per tablet Take 1 tablet by mouth every 4 (four) hours as needed for moderate pain    Historical Provider, MD     all medications and allergies reviewed    Allergies: Allergies   Allergen Reactions    Wellbutrin [Bupropion] Arthralgia       Social History:     Marital Status: Single   Occupation:  Unknown  Patient Pre-hospital Living Situation:  Home  Patient Pre-hospital Level of Mobility:  Unknown  Patient Pre-hospital Diet Restrictions:  Unknown  Substance Use History:   Social History     Substance and Sexual Activity   Alcohol Use No     Social History     Tobacco Use   Smoking Status Current Every Day Smoker    Packs/day: 1 00    Types: Cigarettes   Smokeless Tobacco Never Used     Social History     Substance and Sexual Activity   Drug Use No       Family History:  I have reviewed the patient's family history    Physical Exam:     Vitals:   Blood Pressure: 122/74 (06/13/21 0515)  Pulse: (!) 111 (06/13/21 0515)  Temperature: (!) 97 4 °F (36 3 °C) (06/13/21 0428)  Temp Source: Tympanic (06/13/21 0428)  Respirations: 21 (06/13/21 0515)  Height: 5' 7" (170 2 cm) (06/13/21 0428)  Weight - Scale: 73 9 kg (162 lb 14 7 oz) (06/13/21 0428)  SpO2: 96 % (06/13/21 0515)    Physical Exam  Vitals reviewed  Constitutional:       Appearance: Normal appearance  Interventions: She is intubated     HENT:      Head: Normocephalic and atraumatic  Nose: Nose normal       Mouth/Throat:      Comments: OG tube in place possible blood  Eyes:      General:         Right eye: No discharge  Left eye: No discharge  Cardiovascular:      Rate and Rhythm: Regular rhythm  Tachycardia present  Pulmonary:      Effort: She is intubated  Breath sounds: Rales present  Abdominal:      General: Bowel sounds are normal  There is no distension  Palpations: Abdomen is soft  Tenderness: There is no abdominal tenderness  Musculoskeletal:         General: No swelling or tenderness  Right lower leg: No edema  Left lower leg: No edema  Comments: venadynes in place   Skin:     General: Skin is warm and dry  Capillary Refill: Capillary refill takes less than 2 seconds  Neurological:      Comments: Currently intubated   Psychiatric:      Comments: Currently intubated         Additional Data:     Lab Results: I have personally reviewed pertinent reports  Results from last 7 days   Lab Units 06/13/21  0233   WBC Thousand/uL 16 50*   HEMOGLOBIN g/dL 15 0   HEMATOCRIT % 46 1   PLATELETS Thousands/uL 445*   NEUTROS PCT % 41*   LYMPHS PCT % 51   MONOS PCT % 5   EOS PCT % 2     Results from last 7 days   Lab Units 06/13/21  0233   SODIUM mmol/L 132*   POTASSIUM mmol/L 3 7   CHLORIDE mmol/L 95*   CO2 mmol/L 25   BUN mg/dL 13   CREATININE mg/dL 0 86   ANION GAP mmol/L 12   CALCIUM mg/dL 9 3   ALBUMIN g/dL 4 1   TOTAL BILIRUBIN mg/dL 0 50   ALK PHOS U/L 87   ALT U/L 7   AST U/L 16   GLUCOSE RANDOM mg/dL 328*     Results from last 7 days   Lab Units 06/13/21  0233   INR  1 09       Imaging: Vascular congestion on my read  XR chest 1 view portable   ED Interpretation by Mayank Nelson MD (06/13 0304)   Severe pulmonary edema, ETT in satisfactory place          Epic / Care Everywhere Records Reviewed: Yes    ** Please Note: This note has been constructed using a voice recognition system   **

## 2021-06-13 NOTE — PLAN OF CARE
Problem: Potential for Falls  Goal: Patient will remain free of falls  Description: INTERVENTIONS:  - Assess patient frequently for physical needs  -  Identify cognitive and physical deficits and behaviors that affect risk of falls    -  [place] fall precautions as indicated by assessment   - Educate patient/family on patient safety including physical limitations  - Instruct patient to call for assistance with activity based on assessment  - Modify environment to reduce risk of injury  - Consider OT/PT consult to assist with strengthening/mobility  Outcome: Progressing     Problem: PAIN - ADULT  Goal: Verbalizes/displays adequate comfort level or baseline comfort level  Description: Interventions:  - Encourage patient to monitor pain and request assistance  - Assess pain using appropriate pain scale  - Administer analgesics based on type and severity of pain and evaluate response  - Implement non-pharmacological measures as appropriate and evaluate response  - Consider cultural and social influences on pain and pain management  - Notify physician/advanced practitioner if interventions unsuccessful or patient reports new pain  Outcome: Progressing     Problem: INFECTION - ADULT  Goal: Absence or prevention of progression during hospitalization  Description: INTERVENTIONS:  - Assess and monitor for signs and symptoms of infection  - Monitor lab/diagnostic results  - Monitor all insertion sites, i e  indwelling lines, tubes, and drains  - Monitor endotracheal if appropriate and nasal secretions for changes in amount and color  - [place] appropriate cooling/warming therapies per order  - Administer medications as ordered  - Instruct and encourage patient and family to use good hand hygiene technique  - Identify and instruct in appropriate isolation precautions for identified infection/condition  Outcome: Progressing  Goal: Absence of fever/infection during neutropenic period  Description: INTERVENTIONS:  - Monitor WBC    Outcome: Progressing     Problem: SAFETY ADULT  Goal: Patient will remain free of falls  Description: INTERVENTIONS:  - Assess patient frequently for physical needs  -  Identify cognitive and physical deficits and behaviors that affect risk of falls    -  [place] fall precautions as indicated by assessment   - Educate patient/family on patient safety including physical limitations  - Instruct patient to call for assistance with activity based on assessment  - Modify environment to reduce risk of injury  - Consider OT/PT consult to assist with strengthening/mobility  Outcome: Progressing  Goal: Maintain or return to baseline ADL function  Description: INTERVENTIONS:  -  Assess patient's ability to carry out ADLs; assess patient's baseline for ADL function and identify physical deficits which impact ability to perform ADLs (bathing, care of mouth/teeth, toileting, grooming, dressing, etc )  - Assess/evaluate cause of self-care deficits   - Assess range of motion  - Assess patient's mobility; develop plan if impaired  - Assess patient's need for assistive devices and provide as appropriate  - Encourage maximum independence but intervene and supervise when necessary  - Involve family in performance of ADLs  - Assess for home care needs following discharge   - Consider OT consult to assist with ADL evaluation and planning for discharge  - Provide patient education as appropriate  Outcome: Progressing  Goal: Maintain or return mobility status to optimal level  Description: INTERVENTIONS:  - Assess patient's baseline mobility status (ambulation, transfers, stairs, etc )    - Identify cognitive and physical deficits and behaviors that affect mobility  - Identify mobility aids required to assist with transfers and/or ambulation (gait belt, sit-to-stand, lift, walker, cane, etc )  - [place] fall precautions as indicated by assessment  - Record patient progress and toleration of activity level on Mobility SBAR; progress patient to next Phase/Stage  - Instruct patient to call for assistance with activity based on assessment  - Consider rehabilitation consult to assist with strengthening/weightbearing, etc   Outcome: Progressing     Problem: DISCHARGE PLANNING  Goal: Discharge to home or other facility with appropriate resources  Description: INTERVENTIONS:  - Identify barriers to discharge w/patient and caregiver  - Arrange for needed discharge resources and transportation as appropriate  - Identify discharge learning needs (meds, wound care, etc )  - Arrange for interpretive services to assist at discharge as needed  - Refer to Case Management Department for coordinating discharge planning if the patient needs post-hospital services based on physician/advanced practitioner order or complex needs related to functional status, cognitive ability, or social support system  Outcome: Progressing     Problem: Knowledge Deficit  Goal: Patient/family/caregiver demonstrates understanding of disease process, treatment plan, medications, and discharge instructions  Description: Complete learning assessment and assess knowledge base    Interventions:  - Provide teaching at level of understanding  - Provide teaching via preferred learning methods  Outcome: Progressing     Problem: SAFETY,RESTRAINT: NV/NON-SELF DESTRUCTIVE BEHAVIOR  Goal: Remains free of harm/injury (restraint for non violent/non self-detsructive behavior)  Description: INTERVENTIONS:  - Instruct patient/family regarding restraint use   - Assess and monitor physiologic and psychological status   - Provide interventions and comfort measures to meet assessed patient needs   - Identify and implement measures to help patient regain control  - Assess readiness for release of restraint   Outcome: Progressing  Goal: Returns to optimal restraint-free functioning  Description: INTERVENTIONS:  - Assess the patient's behavior and symptoms that indicate continued need for restraint  - Identify and implement measures to help patient regain control  - Assess readiness for release of restraint   Outcome: Progressing     Problem: CARDIOVASCULAR - ADULT  Goal: Maintains optimal cardiac output and hemodynamic stability  Description: INTERVENTIONS:  - Monitor I/O, vital signs and rhythm  - Monitor for S/S and trends of decreased cardiac output  - Administer and titrate ordered vasoactive medications to optimize hemodynamic stability  - Assess quality of pulses, skin color and temperature  - Assess for signs of decreased coronary artery perfusion  - Instruct patient to report change in severity of symptoms  Outcome: Progressing  Goal: Absence of cardiac dysrhythmias or at baseline rhythm  Description: INTERVENTIONS:  - Continuous cardiac monitoring, vital signs, obtain 12 lead EKG if ordered  - Administer antiarrhythmic and heart rate control medications as ordered  - Monitor electrolytes and administer replacement therapy as ordered  Outcome: Progressing     Problem: RESPIRATORY - ADULT  Goal: Achieves optimal ventilation and oxygenation  Description: INTERVENTIONS:  - Assess for changes in respiratory status  - Assess for changes in mentation and behavior  - Position to facilitate oxygenation and minimize respiratory effort  - Oxygen administered by appropriate delivery if ordered  - Initiate smoking cessation education as indicated  - Encourage broncho-pulmonary hygiene including cough, deep breathe, Incentive Spirometry  - Assess the need for suctioning and aspirate as needed  - Assess and instruct to report SOB or any respiratory difficulty  - Respiratory Therapy support as indicated  Outcome: Progressing

## 2021-06-13 NOTE — RESPIRATORY THERAPY NOTE
Per Dr John Wilson ,increase Vt to 550ml and increase rate to 18bpm  Repeated and verified verbal order

## 2021-06-13 NOTE — CONSULTS
Consultation - Cardiology   Moon Garcia 61 y o  female MRN: [id number]  Unit/Bed#: ICU 06 Encounter: [id number]    Assessment/Plan     Assessment:  Acute respiratory failure  Acute on chronic HFrEF  Nonischemic cardiomyopathy  COPD  Hypertension    Plan:  -wean off ventilator per critical care  -agree with Lasix infusion  She is currently diuresing well  Continue intake and output, daily weights  -replace electrolytes as needed  -continue beta-blockers  -add ARB, Aldactone as able  If price feasible, would subsequently start Entresto  -LV systolic function evaluation in 1-2 months with consideration for ICD placement  -we will follow    History of Present Illness   Physician Requesting Consult: Liz Fischer MD  Reason for Consult / Principal Problem: CHF  HPI: Moon Garcia is a 61y o  year old female with a history of COPD, nonischemic cardiomyopathy who presented with worsening shortness of breath  She was intubated for hypoxic respiratory failure and remains intubated, sedated  She had a spontaneous breathing trial this morning which she failed and hence she remains intubated  History is obtained from chart review, discussion with nursing  There is no history of chest pain  She has known non ischemic cardiomyopathy and underwent cardiac catheterization a few months ago showing nonobstructive CAD  Inpatient consult to Cardiology  Consult performed by: Yina Noe MD  Consult ordered by:  José Manuel Tabares PA-C          Review of Systems   Unable to perform ROS: Intubated       Historical Information   Past Medical History:   Diagnosis Date    Cardiomyopathy [facility])     COPD [facility] disease) [facility] [address] [facility]     Fatty liver     Hyperlipidemia     Hypertension     Mitral regurgitation      Past Surgical History:   Procedure Laterality Date    CARDIAC CATHETERIZATION  2021    Moderate non-obstructive atherosclerosis     SECTION      CHOLECYSTECTOMY      ROTATOR CUFF REPAIR W/ DISTAL CLAVICLE EXCISION Right     TONSILLECTOMY       Social History     Substance and Sexual Activity   Alcohol Use No     Social History     Substance and Sexual Activity   Drug Use No     E-Cigarette/Vaping    E-Cigarette Use Never User      E-Cigarette/Vaping Substances    Nicotine No     THC No     CBD No     Flavoring No     Other No     Unknown No      Social History     Tobacco Use   Smoking Status Current Every Day Smoker    Packs/day: 1 00    Types: Cigarettes   Smokeless Tobacco Never Used     Family History: non-contributory    Meds/Allergies   all current active meds have been reviewed, current meds:   Current Facility-Administered Medications   Medication Dose Route Frequency    albuterol inhalation solution 2 5 mg  2 5 mg Nebulization Q6H PRN    clonazePAM (KlonoPIN) tablet 0 5 mg  0 5 mg Oral HS    enoxaparin (LOVENOX) subcutaneous injection 40 mg  40 mg Subcutaneous Daily    fentaNYL (SUBLIMAZE) 2000 mcg (DOUBLE CONCENTRATION) drip in sodium chloride 0 9% 100 mL  50 mcg/hr Intravenous Continuous    furosemide (LASIX) 500 mg infusion 50 mL  10 mg/hr Intravenous Continuous    methylPREDNISolone sodium succinate (Solu-MEDROL) injection 40 mg  40 mg Intravenous Q8H [address]    metoprolol (LOPRESSOR) injection 5 mg  5 mg Intravenous Q6H    nicotine (NICODERM CQ) 21 mg/24 hr TD 24 hr patch 1 patch  1 patch Transdermal Daily    ondansetron (ZOFRAN) injection 4 mg  4 mg Intravenous Q6H PRN    pantoprazole (PROTONIX) injection 40 mg  40 mg Intravenous Q12H [address]    propofol (DIPRIVAN) 1000 mg in 100 mL infusion (premix)  5-50 mcg/kg/min Intravenous Titrated    and PTA meds:   Prior to Admission Medications   Prescriptions Last Dose Informant Patient Reported?  Taking?   aspirin 81 mg chewable tablet   No No   Sig: Chew 1 tablet (81 mg total) daily for 360 doses   atorvastatin (LIPITOR) 40 mg tablet   No No   Sig: Take 1 tablet (40 mg total) by mouth daily with dinner for 360 doses   carvedilol (COREG) 6 25 mg tablet   No No   Sig: Take 1 tablet (6 25 mg total) by mouth 2 (two) times a day with meals   clonazePAM (KlonoPIN) 0 5 mg tablet   Yes No   Sig: Take 0 5 mg by mouth daily at bedtime   gabapentin (NEURONTIN) 300 mg capsule   Yes No   Sig: Take 100 mg by mouth as needed   morphine (MS CONTIN) 30 mg 12 hr tablet   Yes No   Sig: Take 30 mg by mouth 2 (two) times a day   nicotine (NICODERM CQ) 14 mg/24hr TD 24 hr patch   No No   Sig: Place 1 patch on the skin daily   oxyCODONE-acetaminophen (PERCOCET) 5-325 mg per tablet   Yes No   Sig: Take 1 tablet by mouth every 4 (four) hours as needed for moderate pain      Facility-Administered Medications: None     Allergies   Allergen Reactions    Wellbutrin [Bupropion] Arthralgia       Objective   Vitals: Blood pressure 136/81, pulse 103, temperature 98 5 °F (36 9 °C), temperature source Temporal, resp  rate 18, height 5' 7" (1 702 m), weight 73 9 kg (162 lb 14 7 oz), SpO2 95 %  Orthostatic Blood Pressures      Most Recent Value   Blood Pressure  136/81 filed at 06/13/2021 1200   Patient Position - Orthostatic VS  Lying filed at 06/13/2021 0800            Intake/Output Summary (Last 24 hours) at 6/13/2021 1338  Last data filed at 6/13/2021 1200  Gross per 24 hour   Intake 200 71 ml   Output 1320 ml   Net -1119 29 ml       Invasive Devices     Peripheral Intravenous Line            Peripheral IV 06/13/21 Left;Ventral (anterior) Forearm <1 day    Peripheral IV 06/13/21 Right Antecubital <1 day    Peripheral IV 06/13/21 Right;Ventral (anterior) Wrist <1 day          Drain            NG/OG/Enteral Tube Orogastric 16 Fr Center mouth <1 day    Urethral Catheter Latex;Straight-tip 16 Fr  <1 day          Airway            ETT  Cuffed 7 mm <1 day                Physical Exam  Vitals and nursing note reviewed  HENT:      Head: Normocephalic and atraumatic        Nose: Nose normal    Cardiovascular:      Rate and Rhythm: Normal rate and regular rhythm  Pulses: Normal pulses  Pulmonary:      Breath sounds: Rhonchi present  Abdominal:      Palpations: Abdomen is soft  Musculoskeletal:      Cervical back: Neck supple  Skin:     General: Skin is warm and dry  Lab Results:   I have personally reviewed pertinent lab results  CBC with diff:   Results from last 7 days   Lab Units 06/13/21  1005 06/13/21  0233   WBC Thousand/uL  --  16 50*   RBC Million/uL  --  5 19   HEMOGLOBIN g/dL 13 0 15 0   HEMATOCRIT % 38 5* 46 1   MCV fL  --  89   MCH pg  --  28 9   MCHC g/dL  --  32 5   RDW %  --  15 5*   MPV fL  --  8 0*   PLATELETS Thousands/uL  --  445*     CMP:   Results from last 7 days   Lab Units 06/13/21  0233   SODIUM mmol/L 132*   POTASSIUM mmol/L 3 7   CHLORIDE mmol/L 95*   CO2 mmol/L 25   BUN mg/dL 13   CREATININE mg/dL 0 86   CALCIUM mg/dL 9 3   AST U/L 16   ALT U/L 7   ALK PHOS U/L 87   EGFR ml/min/1 73sq m 74     Troponin:   0   Lab Value Date/Time    TROPONINI 0 11 (H) 06/13/2021 0804    TROPONINI 0 10 (H) 06/13/2021 0521    TROPONINI 0 04 (H) 06/13/2021 0233    TROPONINI 0 42 (H) 03/27/2021 1512    TROPONINI 0 49 (H) 03/27/2021 0927    TROPONINI 0 53 (H) 03/27/2021 0522    TROPONINI 0 08 (H) 03/27/2021 0011     BNP:   Results from last 7 days   Lab Units 06/13/21  0233   POTASSIUM mmol/L 3 7   CHLORIDE mmol/L 95*   CO2 mmol/L 25   BUN mg/dL 13   CREATININE mg/dL 0 86   CALCIUM mg/dL 9 3   EGFR ml/min/1 73sq m 74     Coags:   Results from last 7 days   Lab Units 06/13/21  0233   PTT seconds 29   INR  1 09     TSH:     Magnesium:     Lipid Profile:     Imaging: I have personally reviewed pertinent reports  Code Status: Level 1 - Full Code  Advance Directive and Living Will:      Power of :    POLST:      Counseling / Coordination of Care  Total floor / unit time spent today 30 minutes  Greater than 50% of total time was spent with the patient and / or family counseling and / or coordination of care

## 2021-06-13 NOTE — ASSESSMENT & PLAN NOTE
· With continuous opioid  · Patient on chronic morphine ER  · PDMP reviewed morphine ER 30 mg number 60 tabs filled on 05/29/2021  · Resume when extubated

## 2021-06-13 NOTE — ASSESSMENT & PLAN NOTE
· Echo 2021 with severely reduced systolic function with EF 18%, severe diffuse hypokinesis  Doppler parameters consistent with restrictive physiology indicated above decreased left ventricular diastolic compliance  Right ventricle systolic function moderately reduced  Mitral valve moderate regurg aortic valve and tricuspid valve mild regurg  · Resume Coreg as able    Appears and medication  2021

## 2021-06-13 NOTE — CONSULTS
Consult - [address]en 61 y o  female MRN: [id number]  Unit/Bed#: ICU 06 Encounter: [id number]     Physician Requesting Consult: Maira Noe MD  Inpatient consult to Ronald Madrigal performed by: Barrington Peters MD  Consult ordered by: Monica Hicks PA-C           Reason for Consultation / Chief Complaint: Acute respiratory distress     History of Present Illness:  Nahomy Murillo is a 61 y o  female with a PMH of chronic systolic and diastolic heart failure secondary to a nonischemic dilated cardiomyopathy with an EF of 18%, COPD, HTN, chronic pain, and tobacco abuse who presented with shortness of breath and was found to have acute hypoxic and hypercarbic respiratory failure and is now s/p intubation at the patient's request following the initiation of NIPPV via Bipap  Of note, patient was recently admitted in 2021 for acute hypoxic respiratory failure of a similar etiology  History obtained from patient's chart  Past Medical History:  Past Medical History:   Diagnosis Date    Cardiomyopathy ([address] )     COPD (chronic obstructive pulmonary disease) (HCC)     Fatty liver     Hyperlipidemia     Hypertension     Mitral regurgitation         Past Surgical History:  Past Surgical History:   Procedure Laterality Date    CARDIAC CATHETERIZATION  2021    Moderate non-obstructive atherosclerosis     SECTION      CHOLECYSTECTOMY      ROTATOR CUFF REPAIR W/ DISTAL CLAVICLE EXCISION Right     TONSILLECTOMY          Past Family History:  History reviewed  No pertinent family history       Social History:  E-Cigarette/Vaping    E-Cigarette Use Never User      E-Cigarette/Vaping Substances    Nicotine No     THC No     CBD No     Flavoring No     Other No     Unknown No      Social History     Tobacco Use   Smoking Status Current Every Day Smoker    Packs/day: 1 00    Types: Cigarettes   Smokeless Tobacco Never Used     Social History Substance and Sexual Activity   Alcohol Use No     Social History     Substance and Sexual Activity   Drug Use No     Marital Status: Single    Home Medications:   Prior to Admission medications    Medication Sig Start Date End Date Taking?  Authorizing Provider   aspirin 81 mg chewable tablet Chew 1 tablet (81 mg total) daily for 360 doses 5/3/21 4/28/22  Maksim Laura MD   atorvastatin (LIPITOR) 40 mg tablet Take 1 tablet (40 mg total) by mouth daily with dinner for 360 doses 5/3/21 4/28/22  Maksim Laura MD   carvedilol (COREG) 6 25 mg tablet Take 1 tablet (6 25 mg total) by mouth 2 (two) times a day with meals 4/8/21 5/8/21  Maksim Laura MD   clonazePAM (KlonoPIN) 0 5 mg tablet Take 0 5 mg by mouth daily at bedtime    Historical Provider, MD   gabapentin (NEURONTIN) 300 mg capsule Take 100 mg by mouth as needed    Historical Provider, MD   morphine (MS CONTIN) 30 mg 12 hr tablet Take 30 mg by mouth 2 (two) times a day    Historical Provider, MD   nicotine (NICODERM CQ) 14 mg/24hr TD 24 hr patch Place 1 patch on the skin daily 4/2/21   Maeve Chávez MD   oxyCODONE-acetaminophen (PERCOCET) 5-325 mg per tablet Take 1 tablet by mouth every 4 (four) hours as needed for moderate pain    Historical Provider, MD        Inpatient Medications:  Scheduled Meds:  Current Facility-Administered Medications   Medication Dose Route Frequency Provider Last Rate    albuterol  2 5 mg Nebulization Q6H PRN Rosa Mathur MD      enoxaparin  40 mg Subcutaneous Daily Edward Shanks PA-C      furosemide  10 mg/hr Intravenous Continuous Ling Rachel MD      metoprolol  5 mg Intravenous Q6H Edward Shanks PA-C      nicotine  1 patch Transdermal Daily Edward Shanks PA-C      nitroGLYcerin  5-200 mcg/min Intravenous Titrated Jim Berg MD Stopped (06/13/21 0604)    ondansetron  4 mg Intravenous Q6H PRN Edward Shanks PA-C      pantoprazole  40 mg Intravenous Q12H [facility] Edward Shanks BRAVO      propofol  5-50 mcg/kg/min Intravenous Titrated Pura Haile MD 40 mcg/kg/min (21 0503)     Continuous Infusions:furosemide, 10 mg/hr  nitroGLYcerin, 5-200 mcg/min, Last Rate: Stopped (21 0604)  propofol, 5-50 mcg/kg/min, Last Rate: 40 mcg/kg/min (21 0503)      PRN Meds:  albuterol, 2 5 mg, Q6H PRN  ondansetron, 4 mg, Q6H PRN         Allergies: Allergies   Allergen Reactions    Wellbutrin [Bupropion] Arthralgia        ROS:   Review of Systems   Unable to perform ROS: Intubated        Vitals:  Vitals:    21 0645 21 0700 21 0749 21 0800   BP: 116/77 114/76  146/89   BP Location:       Pulse: 93 93  91   Resp: 18 18  18   Temp:  (!) 97 °F (36 1 °C)     TempSrc:  Temporal     SpO2: 97% 98% 98% 99%   Weight:       Height:         Temperature:   Temp (24hrs), Av 3 °F (36 3 °C), Min:97 °F (36 1 °C), Max:97 6 °F (36 4 °C)    Current Temperature: (!) 97 °F (36 1 °C)     Weights:   IBW (Ideal Body Weight): 61 6 kg  Body mass index is 25 52 kg/m²  Hemodynamic Monitoring:  N/A     Non-Invasive/Invasive Ventilation Settings:  Respiratory    Lab Data (Last 4 hours)       0600            pH, Arterial       7 400           pCO2, Arterial       47 4           pO2, Arterial       101 0           HCO3, Arterial       29 0           Base Excess, Arterial       4 0                O2/Vent Data       508   Most Recent         Vent Mode   AC/VC      Resp Rate (BPM) (BPM) 18  18      Vt (mL) (mL) 550  550      FIO2 (%) (%) 50  40      PEEP (cmH2O) (cmH2O) 6  6      MV   10 4                Lab Results   Component Value Date    PHART 7 400 2021    KJV4TPH 47 4 (H) 2021    PO2ART 101 0 (H) 2021    ZPE3AAO 29 0 (H) 2021    BEART 4 0 (H) 2021    SOURCE Brachial, Left 2021     SpO2: SpO2: 99 %     Physical Exam:  Physical Exam  Vitals and nursing note reviewed     Constitutional:       Comments: Intubated and sedated   HENT: Head: Normocephalic and atraumatic  Mouth/Throat:      Mouth: Mucous membranes are moist    Eyes:      Pupils: Pupils are equal, round, and reactive to light  Cardiovascular:      Rate and Rhythm: Normal rate and regular rhythm  Pulmonary:      Comments: Mechanical breath sounds  Abdominal:      General: Abdomen is flat  Palpations: Abdomen is soft  Musculoskeletal:      Cervical back: Neck supple  Right lower leg: No edema  Left lower leg: No edema  Skin:     General: Skin is warm and dry  Neurological:      Comments: Unable to assess    Psychiatric:      Comments: Unable to assess          Labs:  Results from last 7 days   Lab Units 06/13/21  0524 06/13/21  0233   WBC Thousand/uL  --  16 50*   HEMOGLOBIN g/dL 12 7 15 0   HEMATOCRIT % 37 5* 46 1   PLATELETS Thousands/uL  --  445*   NEUTROS PCT %  --  41*   MONOS PCT %  --  5      Results from last 7 days   Lab Units 06/13/21  0233   SODIUM mmol/L 132*   POTASSIUM mmol/L 3 7   CHLORIDE mmol/L 95*   CO2 mmol/L 25   BUN mg/dL 13   CREATININE mg/dL 0 86   CALCIUM mg/dL 9 3   ALK PHOS U/L 87   ALT U/L 7   AST U/L 16              Results from last 7 days   Lab Units 06/13/21  0233   INR  1 09   PTT seconds 29         0   Lab Value Date/Time    TROPONINI 0 10 (H) 06/13/2021 0521    TROPONINI 0 04 (H) 06/13/2021 0233    TROPONINI 0 42 (H) 03/27/2021 1512    TROPONINI 0 49 (H) 03/27/2021 0927    TROPONINI 0 53 (H) 03/27/2021 0522    TROPONINI 0 08 (H) 03/27/2021 0011        Imaging:  I have personally reviewed pertinent reports  and I have personally reviewed pertinent films in PACS     EKG: This was personally reviewed by myself  Micro:  Lab Results   Component Value Date    BLOODCX No Growth After 5 Days  03/27/2021    BLOODCX No Growth After 5 Days  03/27/2021    BLOODCX No Growth After 5 Days   03/27/2021    SPUTUMCULTUR 1+ Growth of  03/27/2021       Assessment: 61 F with acute hypoxic and hypercarbic respiratory failure secondary to acute on chronic systolic and diastolic heart failure secondary to nonischemic cardiomyopathy  Plan:                  Neuro: Intubated and sedated on propofol  Continue home klonopin  Will add a fentanyl infusion  Daily sedation holiday and SBT  Monitor serial neurological exams  CV: Monitor hemodynamics closely  Aggressive diuresis initiated in the ED on presentation  Will initiate a lasix infusion today  Monitor BNP  Elevated troponin on admission  Trend troponin  NTG infusion initiated in the ED  Will wean to off and discontinue  Metoprolol 5 mg IV Q6H  Cardiology consulted by primary team                 Lung: Acute hypoxic and hypercarbic respiratory failure  Presumed CHF exacerbation, but cannot rule out COPD exacerbation  Will initiate solumedrol 40 mg IV TID, with goal to initiate rapid wean once acute phase of illness is over  Intubated and sedated  Daily SBT  Serial ABGs as needed for changes in mechanical ventilation  FEN/GI: NPO  OGT  : Initiate lasix infusion  Trend serial BNPs and electrolytes  Insert leung catheter  Monitor strict I/Os  Monitor and replete electrolytes as needed  ID: Patient presented with leukocytosis  Afebrile since admission  Cultures pending  Monitor WBC count and fever curve  Procalcitonin pending  Will trend procalcitonin  Heme: Lovenox for DVT ppx                Endo: No acute concerns  Msk/Skin: Frequent turning and repositioning  Disposition: ICU     VTE Pharmacologic Prophylaxis: Enoxaparin (Lovenox)  VTE Mechanical Prophylaxis: sequential compression device     Invasive lines and devices:   Invasive Devices     Peripheral Intravenous Line            Peripheral IV 06/13/21 Left;Ventral (anterior) Forearm <1 day    Peripheral IV 06/13/21 Right Antecubital <1 day    Peripheral IV 06/13/21 Right;Ventral (anterior) Wrist <1 day          Drain NG/OG/Enteral Tube Orogastric 16 Fr Center mouth <1 day    Urethral Catheter Latex;Straight-tip 16 Fr  <1 day          Airway            ETT  Cuffed 7 mm <1 day                 Code Status: Level 1 - Full Code  POA:    POLST:       Given critical illness, patient length of stay will require greater than two midnights  Counseling / Coordination of Care  Upon my evaluation, this patient had a high probability of imminent or life-threatening deterioration due to acute resp failure, which required my direct attention, intervention, and personal management  I have personally provided 40 minutes (10:01 AM to 10:41 AM) of critical care time, exclusive of procedures, teaching, family meetings, and any prior time recorded by providers other than myself  Portions of the record may have been created with voice recognition software  Occasional wrong word or "sound a like" substitutions may have occurred due to the inherent limitations of voice recognition software  Read the chart carefully and recognize, using context, where substitutions have occurred          Kate Francisco MD

## 2021-06-13 NOTE — ASSESSMENT & PLAN NOTE
Wt Readings from Last 3 Encounters:   06/13/21 75 kg (165 lb 5 5 oz)   04/08/21 73 5 kg (162 lb)   04/01/21 70 3 kg (154 lb 15 7 oz)     · IV Lasix  · Monitor I&O  · Cardiology consult  · BNP 2688  · Chest x-ray pulmonary vascular congestion

## 2021-06-13 NOTE — RESPIRATORY THERAPY NOTE
1120 Patient placed on a weaning trial as per Dr Brian Santoyo  Settings of Spont  Mode, PS of 5 , Peep + 6 , and Fio2 40%  Patient HR increased to 106, BP increased to 172 / 101, Respiratory rate increased to 35  Patient became anxious  1127 Vent was placed back on her previous settings of A/C , Rate 18 , , Fio2 40 % and Peep + 6  Dr Brian Santoyo and HORACE Crooks  were  present  for weaning trial    As per Dr Brian Santoyo patient will remain on A/C settings till she is assessed by critical care in the morning

## 2021-06-13 NOTE — ED PROVIDER NOTES
History  Chief Complaint   Patient presents with    Respiratory Distress     Reports sudden onset of shortness of breath 1 hour prior to arrival, patient arrive grey, diaphoretic and dyspneic, gasping for air  Patient requesting to be intubated  Pt came in via EMS with acute onset of SOB similar to prior episodes of acute decompensated heart failure  2x ntg and bipap in ambulance improved but patient tiring and patient requested to be intubated as she has been in past  Pt denies cp/fever  No other history 2/2 acuity and respiratory distress  Prior to Admission Medications   Prescriptions Last Dose Informant Patient Reported?  Taking?   aspirin 81 mg chewable tablet   No No   Sig: Chew 1 tablet (81 mg total) daily for 360 doses   atorvastatin (LIPITOR) 40 mg tablet   No No   Sig: Take 1 tablet (40 mg total) by mouth daily with dinner for 360 doses   carvedilol (COREG) 6 25 mg tablet   No No   Sig: Take 1 tablet (6 25 mg total) by mouth 2 (two) times a day with meals   clonazePAM (KlonoPIN) 0 5 mg tablet   Yes No   Sig: Take 0 5 mg by mouth daily at bedtime   gabapentin (NEURONTIN) 300 mg capsule   Yes No   Sig: Take 100 mg by mouth as needed   morphine (MS CONTIN) 30 mg 12 hr tablet   Yes No   Sig: Take 30 mg by mouth 2 (two) times a day   nicotine (NICODERM CQ) 14 mg/24hr TD 24 hr patch   No No   Sig: Place 1 patch on the skin daily   oxyCODONE-acetaminophen (PERCOCET) 5-325 mg per tablet   Yes No   Sig: Take 1 tablet by mouth every 4 (four) hours as needed for moderate pain      Facility-Administered Medications: None       Past Medical History:   Diagnosis Date    Cardiomyopathy ([address] )     COPD (chronic obstructive pulmonary disease) (HCC)     Fatty liver     Hyperlipidemia     Hypertension     Mitral regurgitation        Past Surgical History:   Procedure Laterality Date    CARDIAC CATHETERIZATION  2021    Moderate non-obstructive atherosclerosis     SECTION      CHOLECYSTECTOMY      ROTATOR CUFF REPAIR W/ DISTAL CLAVICLE EXCISION Right     TONSILLECTOMY         History reviewed  No pertinent family history  I have reviewed and agree with the history as documented  E-Cigarette/Vaping    E-Cigarette Use Never User      E-Cigarette/Vaping Substances    Nicotine No     THC No     CBD No     Flavoring No     Other No     Unknown No      Social History     Tobacco Use    Smoking status: Current Every Day Smoker     Packs/day: 1 00     Types: Cigarettes    Smokeless tobacco: Never Used   Vaping Use    Vaping Use: Never used   Substance Use Topics    Alcohol use: No    Drug use: No       Review of Systems   Unable to perform ROS: Acuity of condition       Physical Exam  Physical Exam  Constitutional:       General: She is in acute distress  Appearance: She is ill-appearing and diaphoretic  HENT:      Head: Normocephalic and atraumatic  Mouth/Throat:      Mouth: Mucous membranes are moist    Eyes:      Conjunctiva/sclera: Conjunctivae normal    Cardiovascular:      Rate and Rhythm: Regular rhythm  Tachycardia present  Pulses: Normal pulses  Heart sounds: Normal heart sounds  Pulmonary:      Effort: Respiratory distress present  Breath sounds: Rales present  Abdominal:      General: Bowel sounds are normal       Palpations: Abdomen is soft  There is no mass  Tenderness: There is no abdominal tenderness  There is no guarding  Musculoskeletal:         General: Normal range of motion  Cervical back: Normal range of motion and neck supple  Skin:     General: Skin is warm  Capillary Refill: Capillary refill takes less than 2 seconds  Neurological:      General: No focal deficit present  Mental Status: She is oriented to person, place, and time           Vital Signs  ED Triage Vitals   Temperature Pulse Respirations Blood Pressure SpO2   06/13/21 0255 06/13/21 0225 06/13/21 0225 06/13/21 0225 06/13/21 0225   97 6 °F (36 4 °C) (!) 135 (!) 0 (!) 183/123 94 %      Temp Source Heart Rate Source Patient Position - Orthostatic VS BP Location FiO2 (%)   06/13/21 0255 06/13/21 0315 06/13/21 0315 06/13/21 0315 06/13/21 0315   Oral Monitor Lying Right arm 60      Pain Score       06/13/21 0428       No Pain           Vitals:    06/14/21 1800 06/14/21 1900 06/14/21 2000 06/14/21 2200   BP: 162/75 159/80 (!) 180/86 135/74   Pulse: 98 92 96 88   Patient Position - Orthostatic VS: Sitting Sitting Lying Lying         Visual Acuity  Visual Acuity      Most Recent Value   L Pupil Size (mm)  3   R Pupil Size (mm)  3   L Pupil Shape  Round   R Pupil Shape  Round          ED Medications  Medications   ondansetron (ZOFRAN) injection 4 mg (has no administration in time range)   nicotine (NICODERM CQ) 21 mg/24 hr TD 24 hr patch 1 patch (1 patch Transdermal Medication Applied 6/14/21 0809)   enoxaparin (LOVENOX) subcutaneous injection 40 mg (40 mg Subcutaneous Given 6/14/21 0810)   albuterol inhalation solution 2 5 mg (has no administration in time range)   clonazePAM (KlonoPIN) tablet 0 5 mg (0 5 mg Oral Given 6/14/21 2107)   aspirin chewable tablet 81 mg (81 mg Oral Given 6/14/21 1020)   atorvastatin (LIPITOR) tablet 40 mg (40 mg Oral Given 6/14/21 1609)   morphine (MS CONTIN) ER tablet 30 mg (30 mg Oral Given 6/14/21 1724)   oxyCODONE-acetaminophen (PERCOCET) 5-325 mg per tablet 1 tablet (has no administration in time range)   methylPREDNISolone sodium succinate (Solu-MEDROL) injection 40 mg (40 mg Intravenous Given 6/14/21 2106)   carvedilol (COREG) tablet 12 5 mg (12 5 mg Oral Given 6/14/21 1609)   furosemide (LASIX) injection 40 mg (40 mg Intravenous Given 6/14/21 1724)   Succinylcholine Chloride 100 mg/5 mL syringe 140 mg (140 mg Intravenous Given 6/13/21 0223)   etomidate (AMIDATE) 2 mg/mL injection 22 mg (20 mg Intravenous Given 6/13/21 0241)   furosemide (LASIX) injection 80 mg (80 mg Intravenous Given 6/13/21 2973)   fentanyl citrate (PF) 100 MCG/2ML 100 mcg (100 mcg Intravenous Given 6/13/21 1839)   potassium chloride 20 mEq IVPB (premix) (0 mEq Intravenous Stopped 6/14/21 1204)       Diagnostic Studies  Results Reviewed     Procedure Component Value Units Date/Time    CBC (With Platelets) [[id number]]  (Abnormal) Collected: 06/14/21 0500    Lab Status: Final result Specimen: Blood from Arm, Left Updated: 06/14/21 0655     WBC 6 00 Thousand/uL      RBC 4 45 Million/uL      Hemoglobin 13 1 g/dL      Hematocrit 38 4 %      MCV 86 fL      MCH 29 3 pg      MCHC 34 0 g/dL      RDW 15 6 %      Platelets 100 Thousands/uL      MPV 8 2 fL     Basic metabolic panel [[id number]]  (Abnormal) Collected: 06/14/21 0500    Lab Status: Final result Specimen: Blood from Arm, Left Updated: 06/14/21 0626     Sodium 133 mmol/L      Potassium 3 3 mmol/L      Chloride 92 mmol/L      CO2 29 mmol/L      ANION GAP 12 mmol/L      BUN 21 mg/dL      Creatinine 1 11 mg/dL      Glucose 142 mg/dL      Calcium 9 6 mg/dL      eGFR 54 ml/min/1 73sq m     Narrative:      [place] guidelines for Chronic Kidney Disease (CKD):     Stage 1 with normal or high GFR (GFR > 90 mL/min/1 73 square meters)    Stage 2 Mild CKD (GFR = 60-89 mL/min/1 73 square meters)    Stage 3A Moderate CKD (GFR = 45-59 mL/min/1 73 square meters)    Stage 3B Moderate CKD (GFR = 30-44 mL/min/1 73 square meters)    Stage 4 Severe CKD (GFR = 15-29 mL/min/1 73 square meters)    Stage 5 End Stage CKD (GFR <15 mL/min/1 73 square meters)  Note: GFR calculation is accurate only with a steady state creatinine    Magnesium [[id number]]  (Normal) Collected: 06/14/21 0500    Lab Status: Final result Specimen: Blood from Arm, Left Updated: 06/14/21 0625     Magnesium 2 0 mg/dL     Procalcitonin with AM Reflex [[id number]]  (Normal) Collected: 06/13/21 0521    Lab Status: Final result Specimen: Blood from Arm, Left Updated: 06/13/21 1448     Procalcitonin <0 05 ng/ml     Troponin I [[id number]] (Abnormal) Collected: 06/13/21 0804    Lab Status: Final result Specimen: Blood from Arm, Left Updated: 06/13/21 0840     Troponin I 0 11 ng/mL     Blood gas, arterial [[id number]]  (Abnormal) Collected: 06/13/21 0600    Lab Status: Final result Specimen: Blood, Arterial from Brachial, Left Updated: 06/13/21 0620     pH, Arterial 7 400     pCO2, Arterial 47 4 mm Hg      pO2, Arterial 101 0 mm Hg      HCO3, Arterial 29 0 mmol/L      Base Excess, Arterial 4 0 mmol/L      O2 Content, Arterial 17 1 mL/dL      O2 HGB,Arterial  94 9 %      SOURCE Brachial, Left     JULIAN TEST No     AC Rate 18     Tidal Volume 550 ml      Inspired Air (FIO2) 50%     PEEP 6    Troponin I [[id number]]  (Abnormal) Collected: 06/13/21 0521    Lab Status: Final result Specimen: Blood from Arm, Left Updated: 06/13/21 0614     Troponin I 0 10 ng/mL     Novel Coronavirus (Covid-19),PCR [facility] [[id number]]  (Normal) Collected: 06/13/21 0354    Lab Status: Final result Specimen: Nares from Nasopharyngeal Swab Updated: 06/13/21 0528     SARS-CoV-2 Negative    Narrative: The specimen collection materials, transport medium, and/or testing methodology utilized in the production of these test results have been proven to be reliable in a limited validation with an abbreviated program under the Emergency Utilization Authorization provided by the FDA  Testing reported as "Presumptive positive" will be confirmed with secondary testing to ensure result accuracy  Clinical caution and judgement should be used with the interpretation of these results with consideration of the clinical impression and other laboratory testing  Testing reported as "Positive" or "Negative" has been proven to be accurate according to standard laboratory validation requirements  All testing is performed with control materials showing appropriate reactivity at standard intervals        Blood gas, arterial [[id number]]  (Abnormal) Collected: 06/13/21 0301    Lab Status: Final result Specimen: Blood, Arterial from Brachial, Right Updated: 06/13/21 0326     pH, Arterial 7 280     pCO2, Arterial 58 3 mm Hg      pO2, Arterial 71 0 mm Hg      HCO3, Arterial 26 5 mmol/L      Base Excess, Arterial -1 0 mmol/L      O2 Content, Arterial 16 1 mL/dL      O2 HGB,Arterial  87 0 %      SOURCE Brachial, Right     JULIAN TEST No     AC Rate 14     Tidal Volume 450 ml      Inspired Air (FIO2) 60     PEEP 6    B-Type Natriuretic Peptide [facility] ONLY) [[id number]]  (Abnormal) Collected: 06/13/21 0233    Lab Status: Final result Specimen: Blood from Arm, Right Updated: 06/13/21 0311     BNP 2,688 pg/mL     Comprehensive metabolic panel [[id number]]  (Abnormal) Collected: 06/13/21 0233    Lab Status: Final result Specimen: Blood from Arm, Right Updated: 06/13/21 0309     Sodium 132 mmol/L      Potassium 3 7 mmol/L      Chloride 95 mmol/L      CO2 25 mmol/L      ANION GAP 12 mmol/L      BUN 13 mg/dL      Creatinine 0 86 mg/dL      Glucose 328 mg/dL      Calcium 9 3 mg/dL      AST 16 U/L      ALT 7 U/L      Alkaline Phosphatase 87 U/L      Total Protein 8 1 g/dL      Albumin 4 1 g/dL      Total Bilirubin 0 50 mg/dL      eGFR 74 ml/min/1 73sq m     Narrative:      [place] guidelines for Chronic Kidney Disease (CKD):     Stage 1 with normal or high GFR (GFR > 90 mL/min/1 73 square meters)    Stage 2 Mild CKD (GFR = 60-89 mL/min/1 73 square meters)    Stage 3A Moderate CKD (GFR = 45-59 mL/min/1 73 square meters)    Stage 3B Moderate CKD (GFR = 30-44 mL/min/1 73 square meters)    Stage 4 Severe CKD (GFR = 15-29 mL/min/1 73 square meters)    Stage 5 End Stage CKD (GFR <15 mL/min/1 73 square meters)  Note: GFR calculation is accurate only with a steady state creatinine    Troponin I [[id number]]  (Abnormal) Collected: 06/13/21 0233    Lab Status: Final result Specimen: Blood from Arm, Right Updated: 06/13/21 0309     Troponin I 0 04 ng/mL     Protime-INR [[id number]]  (Normal) Collected: 06/13/21 0233    Lab Status: Final result Specimen: Blood from Arm, Right Updated: 06/13/21 0300     Protime 14 0 seconds      INR 1 09    APTT [[id number]]  (Normal) Collected: 06/13/21 0233    Lab Status: Final result Specimen: Blood from Arm, Right Updated: 06/13/21 0300     PTT 29 seconds     CBC and differential [[id number]]  (Abnormal) Collected: 06/13/21 0233    Lab Status: Final result Specimen: Blood from Arm, Right Updated: 06/13/21 0254     WBC 16 50 Thousand/uL      RBC 5 19 Million/uL      Hemoglobin 15 0 g/dL      Hematocrit 46 1 %      MCV 89 fL      MCH 28 9 pg      MCHC 32 5 g/dL      RDW 15 5 %      MPV 8 0 fL      Platelets 950 Thousands/uL      Neutrophils Relative 41 %      Lymphocytes Relative 51 %      Monocytes Relative 5 %      Eosinophils Relative 2 %      Basophils Relative 1 %      Neutrophils Absolute 6 80 Thousands/µL      Lymphocytes Absolute 8 40 Thousands/µL      Monocytes Absolute 0 80 Thousand/µL      Eosinophils Absolute 0 40 Thousand/µL      Basophils Absolute 0 10 Thousands/µL                  XR chest 1 view portable   ED Interpretation by Pura Haile MD (06/13 0304)   Severe pulmonary edema, ETT in satisfactory place      Final Result by Jhony Reid MD (06/13 1901)      Moderate pulmonary edema  Workstation performed: [id number]                    Procedures  Procedures         ED Course                                           MDM  Number of Diagnoses or Management Options  Acute decompensated heart failure ([address] )  Acute respiratory failure with hypoxia and hypercapnia (HCC)  Flash pulmonary edema (HCC)  Diagnosis management comments: Acute Decomp Heart Failure, tiring out despite NIPV        Procedure:  RSI  Verbal consent  etom20 sux 140  1st pass with intubating bronchoscope - poor view  Patient bagged sats never dropped below 90   2nd pass excellent view with glidescope  Tube passed to 22 at gum under direct vision  Easy to ventilate   Confirmed ausc/color  CXR pending  Disposition  Final diagnoses:   Acute decompensated heart failure ([facility] [address] )   Flash pulmonary edema (HCC)   Acute respiratory failure with hypoxia and hypercapnia ([facility] )     Time reflects when diagnosis was documented in both MDM as applicable and the Disposition within this note     Time User Action Codes Description Comment    6/13/2021  3:38 AM Shantel Foil Add [I50 9] Acute decompensated heart failure ([facility] [address] )     6/13/2021  3:38 AM Jessie Caba Add [J81 0] Flash pulmonary edema ([facility] [address] )     6/13/2021  3:38 AM Shantel Foil Add [J96 01,  J96 02] Acute respiratory failure with hypoxia and hypercapnia ([facility] [address] )     6/13/2021  3:55 AM Vish Mantle D Add [J96 01] Acute respiratory failure with hypoxia ([facility] )     6/13/2021  3:57 AM Vish Mantle D Add [I50 43] Acute on chronic combined systolic and diastolic congestive heart failure ([facility] )     6/14/2021 10:07 AM Lischner, [place] Add [I42 0] Dilated cardiomyopathy [facility])       ED Disposition     ED Disposition Condition Date/Time Comment    Admit Stable Sun Jun 13, 2021  3:38 AM Case was discussed with la nena and the patient's admission status was agreed to be Admission Status: inpatient status to the service of Dr Karan Aquino           Follow-up Information    None         Current Discharge Medication List      CONTINUE these medications which have NOT CHANGED    Details   aspirin 81 mg chewable tablet Chew 1 tablet (81 mg total) daily for 360 doses  Qty: 90 tablet, Refills: 3    Associated Diagnoses: Elevated troponin      atorvastatin (LIPITOR) 40 mg tablet Take 1 tablet (40 mg total) by mouth daily with dinner for 360 doses  Qty: 90 tablet, Refills: 3    Associated Diagnoses: Elevated troponin      carvedilol (COREG) 6 25 mg tablet Take 1 tablet (6 25 mg total) by mouth 2 (two) times a day with meals  Qty: 180 tablet, Refills: 5    Associated Diagnoses: Cardiomyopathy, unspecified type (HCC)      clonazePAM (KlonoPIN) 0 5 mg tablet Take 0 5 mg by mouth daily at bedtime      gabapentin (NEURONTIN) 300 mg capsule Take 100 mg by mouth as needed      morphine (MS CONTIN) 30 mg 12 hr tablet Take 30 mg by mouth 2 (two) times a day      nicotine (NICODERM CQ) 14 mg/24hr TD 24 hr patch Place 1 patch on the skin daily  Qty: 28 patch, Refills: 0    Associated Diagnoses: Tobacco abuse      oxyCODONE-acetaminophen (PERCOCET) 5-325 mg per tablet Take 1 tablet by mouth every 4 (four) hours as needed for moderate pain           No discharge procedures on file      PDMP Review       Value Time User    PDMP Reviewed  Yes 6/13/2021  3:39 AM Ashwin Tomlin PA-C          ED Provider  Electronically Signed by           Anahi Bashir MD  06/14/21 8462

## 2021-06-13 NOTE — RESPIRATORY THERAPY NOTE
RT Protocol Note  Madelin Laura 61 y o  female MRN: [id number]  Unit/Bed#: ICU 06 Encounter: [id number]    Assessment    Principal Problem:    Acute respiratory failure with hypoxia ([place] [address] )  Active Problems:    Dilated cardiomyopathy (HCC)    Essential hypertension    Hyperglycemia    Chronic pain    Elevated troponin    Acute on chronic combined systolic and diastolic congestive heart failure (HCC)    Tobacco abuse    Hyponatremia    Leukocytosis      Home Pulmonary Medications:    Home Devices/Therapy: (P) Other (Comment) (No respiratory meds listed(pt  intubated))    Past Medical History:   Diagnosis Date    Cardiomyopathy ([place] [address] )     COPD (chronic obstructive pulmonary disease) ([place] [address] )     Fatty liver     Hyperlipidemia     Hypertension     Mitral regurgitation      Social History     Socioeconomic History    Marital status: Single     Spouse name: None    Number of children: None    Years of education: None    Highest education level: None   Occupational History    None   Tobacco Use    Smoking status: Current Every Day Smoker     Packs/day: 1 00     Types: Cigarettes    Smokeless tobacco: Never Used   Vaping Use    Vaping Use: Never used   Substance and Sexual Activity    Alcohol use: No    Drug use: No    Sexual activity: None   Other Topics Concern    None   Social History Narrative    None     Social Determinants of Health     Financial Resource Strain:     Difficulty of Paying Living Expenses:    Food Insecurity:     Worried About Running Out of Food in the Last Year:     Ran Out of Food in the Last Year:    Transportation Needs:     Lack of Transportation (Medical):      Lack of Transportation (Non-Medical):    Physical Activity:     Days of Exercise per Week:     Minutes of Exercise per Session:    Stress:     Feeling of Stress :    Social Connections:     Frequency of Communication with Friends and Family:     Frequency of Social Gatherings with Friends and Family:     Attends [employer] Services:     Active Member of Clubs or Organizations:     Attends Club or Organization Meetings:     Marital Status:    Intimate Partner Violence:     Fear of Current or Ex-Partner:     Emotionally Abused:     Physically Abused:     Sexually Abused:        Subjective DX: Acute respiratory failure with hypoxia and hypercapnia  No known home respiratory meds listed  Pt  Is intubated and on the ventilaor  Will add PRN Albuterol MN and I S  post intubation per protocol  Objective    Physical Exam:   Assessment Type: (P) Assess only  General Appearance: (P) Sedated  Respiratory Pattern: (P) Assisted  Chest Assessment: (P) Chest expansion symmetrical, Trachea midline  Bilateral Breath Sounds: (P) Clear, Diminished    Vitals:  Blood pressure (!) 174/112, pulse (!) 109, temperature (!) 97 4 °F (36 3 °C), temperature source Tympanic, resp  rate (!) 26, height 5' 7" (1 702 m), weight 73 9 kg (162 lb 14 7 oz), SpO2 100 %  Results from last 7 days   Lab Units 06/13/21  0301   PH ART  7 280*   PCO2 ART mm Hg 58 3*   PO2 ART mm Hg 71 0*   HCO3 ART mmol/L 26 5   BASE EXC ART mmol/L -1 0   O2 CONTENT ART mL/dL 16 1   O2 HGB, ARTERIAL % 87 0*   ABG SOURCE  Brachial, Right   JULIAN TEST  No       Imaging and other studies: I have personally reviewed pertinent reports              Plan    Respiratory Plan: (P) Mild Distress pathway  Airway Clearance Plan: (P) Incentive Spirometer     Resp Comments: (P) pt  intubated/vented

## 2021-06-13 NOTE — NURSING NOTE
Pt awake, with eyes open  Very restless, agitated, kicking, hitting rails with fists, attempting to sit up  When questioned, denies pain or sob  Afebrile  Propofol maxed out, and Fentanyl continues  VSS  Dr Vilma Albright made aware of same  New orders pending

## 2021-06-13 NOTE — RESPIRATORY THERAPY NOTE
Pt  Transported to ICU #6 on the portable ventilator  Pt  Placed back on the prior settings AC/, 18, 60% +6 peep

## 2021-06-14 PROBLEM — J96.02 ACUTE RESPIRATORY FAILURE WITH HYPOXIA AND HYPERCAPNIA (HCC): Status: ACTIVE | Noted: 2021-03-27

## 2021-06-14 LAB
ANION GAP SERPL CALCULATED.3IONS-SCNC: 12 MMOL/L (ref 4–13)
BUN SERPL-MCNC: 21 MG/DL (ref 7–25)
CALCIUM SERPL-MCNC: 9.6 MG/DL (ref 8.6–10.5)
CHLORIDE SERPL-SCNC: 92 MMOL/L (ref 98–107)
CO2 SERPL-SCNC: 29 MMOL/L (ref 21–31)
CREAT SERPL-MCNC: 1.11 MG/DL (ref 0.6–1.2)
ERYTHROCYTE [DISTWIDTH] IN BLOOD BY AUTOMATED COUNT: 15.6 % (ref 11.5–14.5)
GFR SERPL CREATININE-BSD FRML MDRD: 54 ML/MIN/1.73SQ M
GLUCOSE SERPL-MCNC: 142 MG/DL (ref 65–99)
HCT VFR BLD AUTO: 38.4 % (ref 42–47)
HGB BLD-MCNC: 13.1 G/DL (ref 12–16)
MAGNESIUM SERPL-MCNC: 2 MG/DL (ref 1.9–2.7)
MCH RBC QN AUTO: 29.3 PG (ref 26–34)
MCHC RBC AUTO-ENTMCNC: 34 G/DL (ref 31–37)
MCV RBC AUTO: 86 FL (ref 81–99)
PLATELET # BLD AUTO: 348 THOUSANDS/UL (ref 149–390)
PMV BLD AUTO: 8.2 FL (ref 8.6–11.7)
POTASSIUM SERPL-SCNC: 3.3 MMOL/L (ref 3.5–5.5)
PROCALCITONIN SERPL-MCNC: 0.8 NG/ML
RBC # BLD AUTO: 4.45 MILLION/UL (ref 3.9–5.2)
SODIUM SERPL-SCNC: 133 MMOL/L (ref 134–143)
WBC # BLD AUTO: 6 THOUSAND/UL (ref 4.8–10.8)

## 2021-06-14 PROCEDURE — 83735 ASSAY OF MAGNESIUM: CPT | Performed by: PHYSICIAN ASSISTANT

## 2021-06-14 PROCEDURE — 94664 DEMO&/EVAL PT USE INHALER: CPT

## 2021-06-14 PROCEDURE — 97166 OT EVAL MOD COMPLEX 45 MIN: CPT

## 2021-06-14 PROCEDURE — 84145 PROCALCITONIN (PCT): CPT | Performed by: PHYSICIAN ASSISTANT

## 2021-06-14 PROCEDURE — 97163 PT EVAL HIGH COMPLEX 45 MIN: CPT

## 2021-06-14 PROCEDURE — 99232 SBSQ HOSP IP/OBS MODERATE 35: CPT | Performed by: INTERNAL MEDICINE

## 2021-06-14 PROCEDURE — 99291 CRITICAL CARE FIRST HOUR: CPT | Performed by: ANESTHESIOLOGY

## 2021-06-14 PROCEDURE — 94760 N-INVAS EAR/PLS OXIMETRY 1: CPT

## 2021-06-14 PROCEDURE — 80048 BASIC METABOLIC PNL TOTAL CA: CPT | Performed by: PHYSICIAN ASSISTANT

## 2021-06-14 PROCEDURE — 85027 COMPLETE CBC AUTOMATED: CPT | Performed by: PHYSICIAN ASSISTANT

## 2021-06-14 PROCEDURE — C9113 INJ PANTOPRAZOLE SODIUM, VIA: HCPCS | Performed by: PHYSICIAN ASSISTANT

## 2021-06-14 PROCEDURE — 94003 VENT MGMT INPAT SUBQ DAY: CPT

## 2021-06-14 RX ORDER — MORPHINE SULFATE 30 MG/1
30 TABLET, FILM COATED, EXTENDED RELEASE ORAL 2 TIMES DAILY
Status: DISCONTINUED | OUTPATIENT
Start: 2021-06-14 | End: 2021-06-15 | Stop reason: HOSPADM

## 2021-06-14 RX ORDER — FUROSEMIDE 10 MG/ML
40 INJECTION INTRAMUSCULAR; INTRAVENOUS
Status: DISCONTINUED | OUTPATIENT
Start: 2021-06-14 | End: 2021-06-15

## 2021-06-14 RX ORDER — OXYCODONE HYDROCHLORIDE AND ACETAMINOPHEN 5; 325 MG/1; MG/1
1 TABLET ORAL EVERY 4 HOURS PRN
Status: DISCONTINUED | OUTPATIENT
Start: 2021-06-14 | End: 2021-06-15 | Stop reason: HOSPADM

## 2021-06-14 RX ORDER — ATORVASTATIN CALCIUM 40 MG/1
40 TABLET, FILM COATED ORAL
Status: DISCONTINUED | OUTPATIENT
Start: 2021-06-14 | End: 2021-06-15

## 2021-06-14 RX ORDER — CARVEDILOL 12.5 MG/1
12.5 TABLET ORAL 2 TIMES DAILY WITH MEALS
Status: DISCONTINUED | OUTPATIENT
Start: 2021-06-14 | End: 2021-06-15 | Stop reason: HOSPADM

## 2021-06-14 RX ORDER — ASPIRIN 81 MG/1
81 TABLET, CHEWABLE ORAL DAILY
Status: DISCONTINUED | OUTPATIENT
Start: 2021-06-14 | End: 2021-06-15

## 2021-06-14 RX ORDER — POTASSIUM CHLORIDE 14.9 MG/ML
20 INJECTION INTRAVENOUS
Status: COMPLETED | OUTPATIENT
Start: 2021-06-14 | End: 2021-06-14

## 2021-06-14 RX ORDER — CARVEDILOL 3.12 MG/1
6.25 TABLET ORAL 2 TIMES DAILY WITH MEALS
Status: DISCONTINUED | OUTPATIENT
Start: 2021-06-14 | End: 2021-06-14

## 2021-06-14 RX ORDER — METHYLPREDNISOLONE SODIUM SUCCINATE 40 MG/ML
40 INJECTION, POWDER, LYOPHILIZED, FOR SOLUTION INTRAMUSCULAR; INTRAVENOUS EVERY 12 HOURS SCHEDULED
Status: DISCONTINUED | OUTPATIENT
Start: 2021-06-14 | End: 2021-06-15

## 2021-06-14 RX ADMIN — NICOTINE 1 PATCH: 21 PATCH, EXTENDED RELEASE TRANSDERMAL at 08:09

## 2021-06-14 RX ADMIN — MORPHINE SULFATE 30 MG: 30 TABLET, FILM COATED, EXTENDED RELEASE ORAL at 10:19

## 2021-06-14 RX ADMIN — METHYLPREDNISOLONE SODIUM SUCCINATE 40 MG: 40 INJECTION, POWDER, FOR SOLUTION INTRAMUSCULAR; INTRAVENOUS at 21:06

## 2021-06-14 RX ADMIN — CARVEDILOL 12.5 MG: 12.5 TABLET, FILM COATED ORAL at 16:09

## 2021-06-14 RX ADMIN — PROPOFOL 50 MCG/KG/MIN: 10 INJECTION, EMULSION INTRAVENOUS at 04:00

## 2021-06-14 RX ADMIN — ASPIRIN 81 MG: 81 TABLET, CHEWABLE ORAL at 10:20

## 2021-06-14 RX ADMIN — ATORVASTATIN CALCIUM 40 MG: 40 TABLET, FILM COATED ORAL at 16:09

## 2021-06-14 RX ADMIN — PANTOPRAZOLE SODIUM 40 MG: 40 INJECTION, POWDER, FOR SOLUTION INTRAVENOUS at 08:10

## 2021-06-14 RX ADMIN — METOPROLOL TARTRATE 5 MG: 5 INJECTION INTRAVENOUS at 06:21

## 2021-06-14 RX ADMIN — POTASSIUM CHLORIDE 20 MEQ: 14.9 INJECTION, SOLUTION INTRAVENOUS at 10:04

## 2021-06-14 RX ADMIN — FUROSEMIDE 40 MG: 10 INJECTION, SOLUTION INTRAVENOUS at 11:29

## 2021-06-14 RX ADMIN — CLONAZEPAM 0.5 MG: 0.5 TABLET ORAL at 21:07

## 2021-06-14 RX ADMIN — FUROSEMIDE 40 MG: 10 INJECTION, SOLUTION INTRAVENOUS at 17:24

## 2021-06-14 RX ADMIN — POTASSIUM CHLORIDE 20 MEQ: 14.9 INJECTION, SOLUTION INTRAVENOUS at 08:10

## 2021-06-14 RX ADMIN — METHYLPREDNISOLONE SODIUM SUCCINATE 40 MG: 40 INJECTION, POWDER, FOR SOLUTION INTRAMUSCULAR; INTRAVENOUS at 06:21

## 2021-06-14 RX ADMIN — PROPOFOL 40 MCG/KG/MIN: 10 INJECTION, EMULSION INTRAVENOUS at 08:12

## 2021-06-14 RX ADMIN — MORPHINE SULFATE 30 MG: 30 TABLET, FILM COATED, EXTENDED RELEASE ORAL at 17:24

## 2021-06-14 RX ADMIN — ENOXAPARIN SODIUM 40 MG: 40 INJECTION SUBCUTANEOUS at 08:10

## 2021-06-14 RX ADMIN — PROPOFOL 50 MCG/KG/MIN: 10 INJECTION, EMULSION INTRAVENOUS at 00:12

## 2021-06-14 RX ADMIN — METOPROLOL TARTRATE 5 MG: 5 INJECTION INTRAVENOUS at 01:01

## 2021-06-14 NOTE — ASSESSMENT & PLAN NOTE
· Echo from March of this year showed EF of 18%  Patient also had cardiac catheterization done in March of this year which did not show significant CAD  · Will continue IV diuresis, patient currently on Lasix drip  · Will monitor output and daily weights  · Continue IV Lopressor while patient is on mechanical ventilation, will start patient's home Coreg once tolerating p o    · Troponins elevated with most recent of 0 11  · Cardiology following

## 2021-06-14 NOTE — NURSING NOTE
Patient extubated as of 0955  Pleasant and talkative at this time  Informed me that she recalls how she got short of breath which sent her to the emergency room on Saturday  Patient states "I live in an apartment by myself that costs 900$ and I can't afford it  I was heating water on the stove and carrying it upstairs to put it in the tub and got short of breath  I usually take breaks in between but I didn't that day and got short of breath "  Adds that " I need to get home soon because I have cats to feed and take care of " Reassured patient she is doing much better and will be home soon

## 2021-06-14 NOTE — NURSING NOTE
Patient found with oxygen out of nose SaO2 84% on same  Reapplied O2 at 4L and explained to patient she needs the oxygen right now as she was just extubated  Patient yelling at me "you don't know what I need  All you do is sit at that [employer] "  She claims that staff doesn't know what they are doing  Provided patient with the phone to call her daughter, she left her a message to come visit  Patient questioning our visiting policy, explained same  Also had charge nurse speak with patient and she settled down a bit

## 2021-06-14 NOTE — ASSESSMENT & PLAN NOTE
Non coronary related  The plan ultimately was repeat echo in July  <<----- Click to add NO pertinent Past Medical History No pertinent past medical history

## 2021-06-14 NOTE — NURSING NOTE
Patient remains on Ventilator settings tidal volume 550, rate 18 FIO2 30% PEEP 6  Complex physical assessment as noted, see chart for details  Wide awake at this time, follows commands  Asked if in any pain, patient shook head "no"  Explained to patient the doctor would be in soon and we would see if the tube could come out, she nodded that she understood

## 2021-06-14 NOTE — PLAN OF CARE
Problem: PHYSICAL THERAPY ADULT  Goal: Performs mobility at highest level of function for planned discharge setting  See evaluation for individualized goals  Description: Treatment/Interventions: Functional transfer training, LE strengthening/ROM, Elevations, Therapeutic exercise, Endurance training, Patient/family training, Equipment eval/education, Bed mobility, Gait training, Spoke to nursing, Spoke to case management          See flowsheet documentation for full assessment, interventions and recommendations  Note: Prognosis: Good  Problem List: Decreased strength, Decreased endurance, Impaired balance, Decreased mobility, Decreased safety awareness  Assessment: Pt is 61 y o  female seen for PT evaluation on 6/14/2021 s/p admit to [address] on 6/13/2021 w/ Acute respiratory failure with hypoxia and hypercapnia ([facility] [address] )  PT consulted to assess pt's functional mobility and d/c needs  Order placed for PT eval and tx, w/ up and OOB as tolerated order  PT performed at least 2 patient identifiers during session: Name and wristband  Comorbidities affecting pt's physical performance at time of assessment include: dilated cardiomyopathy, HTN, hyperglycemia, chronic pain, elevated troponin, acute on chronic systolic and diastolic CHF, tobacco abuse, hyponatremia, leukocytosis  PTA, pt was independent w/ all functional mobility w/ no AD, ambulates community distances and elevations, ambulates household distances, has 2+2+3 ANATOLY, lives alone in 2 level home and on disability  Personal factors affecting pt at time of IE include: lives in 2 story house, stairs to enter home, inability to navigate level surfaces w/o external assistance, unable to perform dynamic tasks in community, limited home support, decreased initiation and engagement, unable to perform physical activity, limited insight into impairments and inability to perform IADLs   Please find objective findings from PT assessment regarding body systems outlined above with impairments and limitations including weakness, impaired balance, decreased endurance, decreased activity tolerance, decreased functional mobility tolerance, decreased safety awareness and fall risk, as well as mobility assessment (need for cueing for mobility technique)  The following objective measures performed on IE also reveal limitations: AM-PAC 6-Clicks: 47/24  Pt's clinical presentation is currently unstable/unpredictable seen in pt's presentation of abnormal lab value(s), need for input for task focus and mobility technique and ongoing medical assessment  Pt to benefit from continued PT tx to address deficits as defined above and maximize level of functional independent mobility and consistency  From PT/mobility standpoint, recommendation at time of d/c would be home with home health rehabilitation pending progress in order to facilitate return to PLOF  Barriers to Discharge: Inaccessible home environment, Decreased caregiver support        PT Discharge Recommendation: Home with home health rehabilitation     PT - OK to Discharge: Yes (when medically cleared)    See flowsheet documentation for full assessment

## 2021-06-14 NOTE — OCCUPATIONAL THERAPY NOTE
Occupational Therapy Evaluation      Ezequielpatricia Neena    2021    Patient Active Problem List   Diagnosis    Vitamin D deficiency    Acute respiratory failure with hypoxia and hypercapnia (HCC)    Dilated cardiomyopathy (HCC)    Essential hypertension    Hyperglycemia    Chronic pain    Elevated troponin    Anxiety    Acute on chronic combined systolic and diastolic congestive heart failure (HCC)    Tobacco abuse    Hyponatremia    Leukocytosis       Past Medical History:   Diagnosis Date    [facility]omy[facility] [address] )     [facility]     Fatty liver     Hyperlipidemia     Hypertension     Mitral regurgitation        Past Surgical History:   Procedure Laterality Date    CARDIAC CATHETERIZATION  2021    Moderate non-obstructive atherosclerosis     SECTION      CHOLECYSTECTOMY      ROTATOR CUFF REPAIR W/ DISTAL CLAVICLE EXCISION Right     TONSILLECTOMY          21 1332   OT Last Visit   OT Visit Date 21   Note Type   Note type Evaluation   Restrictions/Precautions   Weight Bearing Precautions Per Order No   Other Precautions O2;Multiple lines;Telemetry; Fall Risk;Impulsive  (4 L O2 via NC- not used at baseline )   Pain Assessment   Pain Assessment Tool Pain Assessment not indicated - pt denies pain   Pain Score No Pain   Home Living   Type of Home House   Home Layout Two level;Performs ADLs on one level;Stairs to enter without rails  (2+3 ANATOLY, bathroom on 2nd floor, FOS to 2nd floor )   Bathroom Shower/Tub Tub/shower unit   Bathroom Toilet Standard   Bathroom Equipment   (no DME reported )   [facility]  (no AD used at baseline )   Prior Function   Level of [place] Independent with ADLs and functional mobility   Lives With Alone   Receives Help From Family  (family lives nearby)   [id number] [employer] in the last 6 months 0   Vocational On disability Comments (+) driving   Lifestyle   Autonomy Patient reporting being independent with ADLs/IADLs, ambulatory with no AD and lives alone in a two story house    Reciprocal Relationships daughter and nephew live nearby    Service to Others on disability for 30+ years    Intrinsic Gratification enjoys gardening    ADL   Eating Assistance 6  Modified independent   Grooming Assistance 6  Modified Independent   UB Bathing Assistance 5  Supervision/Setup   LB Bathing Assistance 4  [address] 5  Supervision/Setup    [facility] 4  [address]  4  Minimal Assistance   Bed Mobility   Supine to Sit 5  Supervision   Additional items Assist x 1; Increased time required;Verbal cues   Sit to Supine   (DNT: pt seated OOB in chair at end of eval )   Transfers   Sit to Stand 4  Minimal assistance   Additional items Assist x 1; Increased time required;Verbal cues   Stand to Sit 5  Supervision   Additional items Assist x 1; Armrests; Increased time required;Verbal cues   Additional Comments Pt denied lightheaded/dizziness    Functional Mobility   Functional Mobility   (CGA)   Additional Comments Pt ambulated in hallway with no LOB  Pt grossly unsteady with a slow gait pattern  SpO2 on 4 L at rest: 95-97%, during amb on 4 L O2, SpO2 ~94-95%   Additional items   (Pt ambulated with no AD )   Balance   Static Sitting Good   Dynamic Sitting Fair +   Static Standing Fair +   Dynamic Standing Fair   Activity Tolerance   Activity Tolerance Patient limited by fatigue   Medical Staff Made Aware Pt seen as a co-eval with PT due to the patient's co-morbidities, clinically unstable presentation, and present impairments which are a regression from the patient's baseline      Nurse Made Aware HORACE Sheth verbalized pt appropraite for therapy and made aware of outcomes    RUE Assessment   RUE Assessment WFL  (grossly 4/5 MMT based on functional assessment )   LUE Assessment   LUE Assessment WFL  (grossly 4/5 MMT based on functional assessment )   Hand Function   Gross Motor Coordination Functional   Fine Motor Coordination Functional   Sensation   Light Touch No apparent deficits   Vision-Basic Assessment   Current Vision Wears glasses all the time   Cognition   Overall Cognitive Status [facility]   Arousal/Participation Alert; Responsive; Cooperative   Attention Within functional limits   Orientation Level Oriented X4   Memory Decreased recall of precautions   Following Commands Follows one step commands without difficulty   Comments Pt agreeable to OT eval  Mini-Cog assessment performed today  Version 1 was given  Patient able to recall 3/3 words  Patient able to draw a normal clock with the correct time  Total score of test was 5/5 indicating no further screening of cognition is required  Cognition Assessment Tools   (Mini-cog )   Score 5   Assessment   Limitation Decreased ADL status; Decreased Safe judgement during ADL;Decreased endurance;Decreased self-care trans;Decreased high-level ADLs   Prognosis Good   Assessment Patient is a 61 y o  female seen for OT evaluation s/p admit to [address] [place] [address]  on 6/13/2021 w/Acute respiratory failure with hypoxia and hypercapnia ([facility] [address] )  Commorbidities affecting patient's functional performance at time of assessment include: CHF, dilated cardiomyopathy, HTN, hyperglycemia, and hyponatremia  Orders placed for OT evaluation and treatment and up and OOB as tolerated   Performed at least two patient identifiers during session including name and wristband  Prior to admission, Patient reporting being independent with ADLs/IADLs, ambulatory with no AD and lives alone in a two story house  Personal factors affecting patient at time of initial evaluation include: steps to enter, limited insight into deficits, difficulty performing ADLs and difficulty performing IADLs   Upon evaluation, patient requires supervision assist for UB ADLs, minimal  assist for LB ADLs, transfers and functional ambulation in room and bathroom with contact guard assist, with the use of no AD  Patient is oriented x 4 and presents with ability to acquire new concepts and behaviors (learning)  Occupational performance is affected by the following deficits: impulsive behavior, decreased muscle strength, dynamic sit/ stand balance deficit with poor standing tolerance time for self care and functional mobility, decreased activity tolerance and delayed righting and equilibrium reactions  Patient to benefit from continued Occupational Therapy treatment while in the hospital to address deficits as defined above and maximize level of functional independence with ADLs and functional mobility  Occupational Performance areas to address include: grooming , bathing/ shower, dressing, toilet hygiene, transfer to all surfaces, functional ambulation, medication routine/ management, IADLS: Household maintenance, IADLs: safety procedures and IADLs: meal prep/ clean up  From OT standpoint, recommendation at time of d/c would be Home with home health rehabilitation  Goals   Patient Goals to go home today    Plan   Treatment Interventions ADL retraining;Functional transfer training; Endurance training;Patient/family training; Compensatory technique education; Energy conservation   Goal Expiration Date 06/24/21   OT Treatment Day 0   OT Frequency 3-5x/wk   Recommendation   OT Discharge Recommendation Home with home health rehabilitation   Equipment Recommended Bedside commode   Commode Type Standard   OT - OK to Discharge Yes  (Once medically cleared )   Additional Comments  The patient's raw score on the AM-PAC Daily Activity inpatient short form is 21, standardized score is 44 27, greater than 39 4  Patients at this level are likely to benefit from discharge to home  Please refer to the recommendation of the Occupational Therapist for safe discharge planning     AM-PAC Daily Activity Inpatient   Lower Body Dressing 3   Bathing 3   Toileting 3   Upper Body Dressing 4   Grooming 4   Eating 4   Daily Activity Raw Score 21   Daily Activity Standardized Score (Calc for Raw Score >=11) 44 27   AM-PAC Applied Cognition Inpatient   Following a Speech/Presentation 4   Understanding Ordinary Conversation 4   Taking Medications 4   Remembering Where Things Are Placed or Put Away 4   Remembering List of 4-5 Errands 4   Taking Care of Complicated Tasks 4   Applied Cognition Raw Score 24   Applied Cognition Standardized Score 62 21     GOALS:    *ADL transfers with (I) for inc'd independence with ADLs/purposeful tasks    *UB ADL with (I) for inc'd independence with self cares    *LB ADL with (I) using AE prn for inc'd independence with self cares    *Toileting with (I) for clothing management and hygiene for return to PLOF with personal care    *Increase stand tolerance x5 m for inc'd tolerance with standing purposeful tasks    *Participate in 10m UE therex to increase overall stamina/activity tolerance for purposeful tasks    *Bed mobility- (I) for inc'd independence to manage own comfort and initiate EOB & OOB purposeful tasks    *Patient will verbalize 3 safety awareness/ principles to prevent falls in the home setting  *Patient will verbalize and demonstrate use of energy conservation/deep breathing techniques and work simplification skills during functional activities with no verbal cues  *Patient will increase OOB/sitting tolerance to 2-4 hours per day to increase participation in self-care and leisure tasks with no s/s of exertion       Earl Whiting MS, OTR/L

## 2021-06-14 NOTE — NURSING NOTE
Patient removed her oxygen and is refusing to wear it at this time  Denies shortness of breath  SaO2 on room air currently 93%  Also insists I remove her leung and states "I'm not taking lasix either, I'm going home " Dr Marylin Boyer aware of same

## 2021-06-14 NOTE — PHYSICAL THERAPY NOTE
Physical Therapy Evaluation     Patient's Name: Zachary Vanegas    Admitting Diagnosis  SOB (shortness of breath) [R06 02]  Flash pulmonary edema (HCC) [J81 0]  Acute on chronic combined systolic and diastolic congestive heart failure (HCC) [I50 43]  Acute respiratory failure with hypoxia (HCC) [J96 01]  Acute decompensated heart failure (HCC) [I50 9]  Acute respiratory failure with hypoxia and hypercapnia (HCC) [J96 01, J96 02]    Problem List  Patient Active Problem List   Diagnosis    Vitamin D deficiency    Acute respiratory failure with hypoxia and hypercapnia (HCC)    Dilated cardiomyopathy (HCC)    Essential hypertension    Hyperglycemia    Chronic pain    Elevated troponin    Anxiety    Acute on chronic combined systolic and diastolic congestive heart failure (HCC)    Tobacco abuse    Hyponatremia    Leukocytosis       Past Medical History  Past Medical History:   Diagnosis Date    [facility] [address] [facility]     [facility]     Fatty liver     Hyperlipidemia     Hypertension     Mitral regurgitation        Past Surgical History  Past Surgical History:   Procedure Laterality Date    CARDIAC CATHETERIZATION  2021    Moderate non-obstructive atherosclerosis     SECTION      CHOLECYSTECTOMY      ROTATOR CUFF REPAIR W/ DISTAL CLAVICLE EXCISION Right     TONSILLECTOMY            21 1352   PT Last Visit   PT Visit Date 21   Note Type   Note type Evaluation   Pain Assessment   Pain Assessment Tool Pain Assessment not indicated - pt denies pain   Pain Score No Pain   Home Living   Type of Home House   Home Layout Two level;Bed/bath upstairs;Stairs to enter with rails; Performs ADLs on one level  (2+2+3 ANATOLY, bathroom on 2nd floor, FOS to 2nd floor )   Bathroom Shower/Tub Tub/shower unit   Bathroom Toilet Standard   Bathroom Equipment   (no DME reported )    [address]  (no AD used at baseline ) Prior Function   Level of [place] Independent with ADLs and functional mobility   Lives With Alone   Receives Help From Family  (family lives nearby)   ADL Assistance Independent   IADLs Independent   Falls in the last 6 months 0   Vocational On disability   Comments + driving   Restrictions/Precautions   Weight Bearing Precautions Per Order No   Other Precautions Multiple lines;Telemetry;O2;Fall Risk;Impulsive  (4 L O2 via NC- not used at baseline )   General   Family/Caregiver Present No   Cognition   Arousal/Participation Alert   Orientation Level Oriented X4   Memory Decreased recall of precautions   Following Commands Follows one step commands without difficulty   Comments pt agreeable to PT session   RLE Assessment   RLE Assessment X   Strength RLE   RLE Overall Strength 3+/5   LLE Assessment   LLE Assessment X   Strength LLE   LLE Overall Strength 3+/5   Coordination   Movements are Fluid and Coordinated 1   Sensation WFL   Bed Mobility   Supine to Sit 5  Supervision   Additional items Assist x 1; Increased time required;Verbal cues   Transfers   Sit to Stand 4  Minimal assistance   Additional items Assist x 1; Increased time required;Verbal cues   Stand to Sit 5  Supervision   Additional items Assist x 1; Armrests; Verbal cues   Additional Comments SpO2 on 4 L at rest: 95-97%, during amb on 4 L O2, SpO2 ~94-95%   Ambulation/Elevation   Gait pattern Improper Weight shift;Decreased foot clearance; Short stride; Excessively slow   Gait Assistance 4  Minimal assist  (CGA for safety d/t unsteadiness)   Additional items Assist x 1;Verbal cues; Tactile cues   Assistive Device None   Distance 120 ft   Stair Management Assistance Not tested   Balance   Static Sitting Good   Dynamic Sitting Fair +   Static Standing Fair +   Dynamic Standing Fair   Ambulatory Fair   Endurance Deficit   Endurance Deficit Yes   Activity Tolerance   Activity Tolerance Patient limited by fatigue   Nurse Made Aware Yes, HORACE Sheth made aware of outcomes/recs   Assessment   Prognosis Good   Problem List Decreased strength;Decreased endurance; Impaired balance;Decreased mobility; Decreased safety awareness   Assessment Pt is 61 y o  female seen for PT evaluation on 6/14/2021 s/p [facility] on 6/13/2021 w/ Acute respiratory failure with hypoxia and hypercapnia ([facility] [address] )  PT consulted to assess pt's functional mobility and d/c needs  Order placed for PT eval and tx, w/ up and OOB as tolerated order  PT performed at least 2 patient identifiers during session: Name and wristband  Comorbidities affecting pt's physical performance at time of assessment include: dilated cardiomyopathy, HTN, hyperglycemia, chronic pain, elevated troponin, acute on chronic systolic and diastolic CHF, tobacco abuse, hyponatremia, leukocytosis  PTA, pt was independent w/ all functional mobility w/ no AD, ambulates community distances and elevations, ambulates household distances, has 2+2+3 ANATOLY, lives alone in 2 level home and on disability  Personal factors affecting pt at time of IE include: lives in 2 story house, stairs to enter home, inability to navigate level surfaces w/o external assistance, unable to perform dynamic tasks in community, limited home support, decreased initiation and engagement, unable to perform physical activity, limited insight into impairments and inability to perform IADLs  Please find objective findings from PT assessment regarding body systems outlined above with impairments and limitations including weakness, impaired balance, decreased endurance, decreased activity tolerance, decreased functional mobility tolerance, decreased safety awareness and fall risk, as well as mobility assessment (need for cueing for mobility technique)  The following objective measures performed on IE also reveal limitations: AM-PAC 6-Clicks: 13/19   Pt's clinical presentation is currently unstable/unpredictable seen in pt's presentation of abnormal lab value(s), need for input for task focus and mobility technique and ongoing medical assessment  Pt to benefit from continued PT tx to address deficits as defined above and maximize level of functional independent mobility and consistency  From PT/mobility standpoint, recommendation at time of d/c would be home with home health rehabilitation pending progress in order to facilitate return to PLOF  Barriers to Discharge Inaccessible home environment;Decreased caregiver support   Goals   Patient Goals "to go home today"   [facility] Expiration Date 06/24/21   Short Term Goal #1 In 7-10 days: Increase bilateral LE strength 1/2 grade to facilitate independent mobility, Perform all bed mobility tasks modified independent to decrease caregiver burden, Perform all transfers modified independent to improve independence, Ambulate > 150 ft  with least restrictive assistive device modified independent w/o LOB and w/ normalized gait pattern 100% of the time, Navigate 2+2+3 stairs modified independent with unilateral handrail to facilitate return to previous living environment, Increase all balance 1/2 grade to decrease risk for falls, Complete exercise program independently and Tolerate 4 hr OOB to faciliate upright tolerance   PT Treatment Day 0   Plan   Treatment/Interventions Functional transfer training;LE strengthening/ROM; Elevations; Therapeutic exercise; Endurance training;Patient/family training;Equipment eval/education; Bed mobility;Gait training;Spoke to nursing;Spoke to case management   PT Frequency   (3-5x/wk)   Recommendation   PT Discharge Recommendation Home with home health rehabilitation   PT - OK to Discharge Yes  (when medically cleared)   Additional Comments Pt's raw score on the [facility] Basic Mobility inpatient short form is 18, standardized score is 41 05  Patients at this level are likely to benefit from DC to home with home care, however, please refer to therapist recommendation for safe DC planning     -PAC Basic Mobility Inpatient   Turning in Bed Without Bedrails 4   Lying on Back to Sitting on Edge of Flat Bed 3   Moving Bed to Chair 3   Standing Up From Chair 3   Walk in Room 3   Climb 3-5 Stairs 2   Basic Mobility Inpatient Raw Score 18   Basic Mobility Standardized Score 41 05         Isela Garrison, PT

## 2021-06-14 NOTE — ASSESSMENT & PLAN NOTE
· Secondary to CHF exacerbation    Patient was intubated in the ER  · Will continue diuresis as tolerated  · Will continue to wean ventilator as possible  · Critical Care following

## 2021-06-14 NOTE — PROGRESS NOTES
Progress Note - Critical Care   Neelam Bun 61 y o  female MRN: [id number]  Unit/Bed#: ICU 06 Encounter: [id number]    Impression:  Principal Problem:    Acute respiratory failure with hypoxia and hypercapnia (HCC)  Active Problems:    Dilated cardiomyopathy (HCC)    Essential hypertension    Hyperglycemia    Chronic pain    Elevated troponin    Acute on chronic combined systolic and diastolic congestive heart failure (HCC)    Tobacco abuse    Hyponatremia    Leukocytosis      Assessment: 61 F with acute hypoxic and hypercarbic respiratory failure secondary to acute on chronic systolic and diastolic heart failure secondary to nonischemic cardiomyopathy         Plan:                  Neuro: Intact  Propofol held for extubation  Maintain fentanyl at 50mcg/min until she received home  dose of MS contin to prevent acute withdrawal   Continue home opioid regimen  Continue home klonopin                 CV: Diuresed well with lasix gtt  Transition to BID dosing today  Watch renal function  Lytes repleted  Pending repeat TTE  Transitioned back to Coreg  A/W add ARB and Entresto if feasible going forward                            Lung: Agree w/ management of both acute exacerbation of CHF and COPD  Wean solumedrol to BID today and continue nebs  Extubate to midflow                  FEN/GI: Lytes repleted  Resume cardiac diet post extubation if doing well                      : Monitor strict I/Os  Monitor and replete electrolytes as needed                   ID:  No evidence of infx    Cx results P                 Heme: Lovenox for DVT ppx                 Endo: No acute concerns                   Msk/Skin: Frequent turning and repositioning        VTE Prophylaxis:  · Pharmacologic Prophylaxis: Enoxaparin (Lovenox)  · Mechanical Prophylaxis: sequential compression device    Disposition: Continue ICU care    Treatment Team/Consultants: YASMINE cardiology    Date of admission: 2021    Reason for Admission: Acute resp failure    HPI/24hr events: Diuresed well on lasix gtt  Agitated this AM, indicates she wants tube out  ROS: 14 point ROS is unable to be obatined seconday to patient status  Physical Exam:    Skin - Pale, w/d  HEENT: Chronically ill appearing, anicteric  Neck: No JVD  Lungs: Coarse, w/o rales/rhonchii  Abd: S, N-T  Ext - WWP, no sig edema        Vitals:   Vitals:    21 0700 21 0800 21 0805 21 0946   BP: 108/65 136/78     BP Location: Left arm Left arm     Pulse: 66 65 69    Resp: 18 19 18    Temp: 97 5 °F (36 4 °C)      TempSrc: Tympanic      SpO2: 90% (!) 88% (!) 87% 91%   Weight:       Height:                 Temperature: Temp (24hrs), Av 4 °F (36 3 °C), Min:96 5 °F (35 8 °C), Max:98 5 °F (36 9 °C)  Current: Temperature: 97 5 °F (36 4 °C)    Weights: IBW (Ideal Body Weight): 61 6 kg  Body mass index is 23 48 kg/m²  Hemodynamic Monitoring:  N/A       Respiratory:  SpO2: SpO2: 91 %, SpO2 Activity: SpO2 Activity: At Rest, SpO2 Device: O2 Device: Ventilator       Intake and Outputs:    Intake/Output Summary (Last 24 hours) at 2021 0951  Last data filed at 2021 0800  Gross per 24 hour   Intake 670 38 ml   Output 2185 ml   Net -1514 62 ml     I/O last 24 hours: In: 788 1 [I V :638 1; NG/GT:150]  Out: 7516 [Urine:1375; Emesis/NG output:930]      Nutrition:        Diet Orders   (From admission, onward)             Start     Ordered    218  Diet NPO  Diet effective now     Question Answer Comment   Diet Type NPO    RD to adjust diet per protocol?  Yes        21 0407                  Labs:   Results from last 7 days   Lab Units 21  0500 21  1736 21  1005 21  0233   WBC Thousand/uL 6 00  --   --  16 50*   HEMOGLOBIN g/dL 13 1 13 5 13 0 15 0   HEMATOCRIT % 38 4* 39 7* 38 5* 46 1   PLATELETS Thousands/uL 348  --   --  445*   NEUTROS PCT %  --   --   --  41*   MONOS PCT %  --   --   --  5 Results from last 7 days   Lab Units 06/14/21  0500 06/13/21  0233   SODIUM mmol/L 133* 132*   POTASSIUM mmol/L 3 3* 3 7   CHLORIDE mmol/L 92* 95*   CO2 mmol/L 29 25   BUN mg/dL 21 13   CREATININE mg/dL 1 11 0 86   CALCIUM mg/dL 9 6 9 3   ALK PHOS U/L  --  87   ALT U/L  --  7   AST U/L  --  16     Results from last 7 days   Lab Units 06/14/21  0500   MAGNESIUM mg/dL 2 0          Results from last 7 days   Lab Units 06/13/21  0233   INR  1 09   PTT seconds 29         Results from last 7 days   Lab Units 06/13/21  0804 06/13/21  0521 06/13/21  0233   TROPONIN I ng/mL 0 11* 0 10* 0 04*     Results from last 7 days   Lab Units 06/13/21  0600 06/13/21  0301   PH ART  7 400 7 280*   PCO2 ART mm Hg 47 4* 58 3*   PO2 ART mm Hg 101 0* 71 0*   HCO3 ART mmol/L 29 0* 26 5   BASE EXC ART mmol/L 4 0* -1 0   ABG SOURCE  Brachial, Left Brachial, Right     Results from last 7 days   Lab Units 06/13/21  0521   PROCALCITONIN ng/ml <0 05     No results found for: [facility]             Imaging:   No new imaging  Admission CXR reviewed    Micro:   Blood Culture:   Lab Results   Component Value Date    BLOODCX Received in Microbiology Lab  Culture in Progress  06/13/2021    BLOODCX Received in Microbiology Lab  Culture in Progress  06/13/2021    BLOODCX No Growth After 5 Days  03/27/2021    BLOODCX No Growth After 5 Days  03/27/2021    BLOODCX No Growth After 5 Days  03/27/2021    BLOODCX No Growth After 5 Days  03/27/2021     Urine Culture: No results found for: URINECX  Sputum Culture: No components found for: SPUTUMCX  Wound Culure: No results found for: WOUNDCULT  Results from last 7 days   Lab Units 06/13/21  1008   BLOOD CULTURE  Received in Microbiology Lab  Culture in Progress  Received in Microbiology Lab  Culture in Progress  Allergies:    Allergies   Allergen Reactions    Wellbutrin [Bupropion] Arthralgia       Medications:   Scheduled Meds:  Current Facility-Administered Medications   Medication Dose Route Frequency Provider Last Rate    albuterol  2 5 mg Nebulization Q6H PRN Debra Keyes MD      aspirin  81 mg Oral Daily Shun Nailer, DO      atorvastatin  40 mg Oral Daily With [employer], DO      carvedilol  6 25 mg Oral BID With Meals Shun Sapna, DO      clonazePAM  0 5 mg Oral HS Dalton Rose MD      enoxaparin  40 mg Subcutaneous Daily Kaci Blount PA-C      fentaNYL  100 mcg/hr Intravenous Continuous Dalton Rose  mcg/hr (06/14/21 0501)    furosemide  10 mg/hr Intravenous Continuous Dalton Rose MD 10 mg/hr (06/14/21 0501)    methylPREDNISolone sodium succinate  40 mg Intravenous Q8H Ted Murry MD      morphine  30 mg Oral BID Shunjono Alejo, DO      nicotine  1 patch Transdermal Daily Kaci Blount PA-C      ondansetron  4 mg Intravenous Q6H PRN Kaci Blount PA-C      oxyCODONE-acetaminophen  1 tablet Oral Q4H PRN Mark Lischner, DO      pantoprazole  40 mg Intravenous Q12H [address] Mariam Pena PA-C      potassium chloride  20 mEq Intravenous Q2H Arnoldo Tolentino MD 20 mEq (06/14/21 0810)    propofol  5-50 mcg/kg/min Intravenous Titrated Ayla Henry MD 20 mcg/kg/min (06/14/21 0935)     Continuous Infusions:fentaNYL, 100 mcg/hr, Last Rate: 100 mcg/hr (06/14/21 0501)  furosemide, 10 mg/hr, Last Rate: 10 mg/hr (06/14/21 0501)  propofol, 5-50 mcg/kg/min, Last Rate: 20 mcg/kg/min (06/14/21 0935)      PRN Meds:  albuterol, 2 5 mg, Q6H PRN  ondansetron, 4 mg, Q6H PRN  oxyCODONE-acetaminophen, 1 tablet, Q4H PRN        Invasive lines and devices:   Invasive Devices     Peripheral Intravenous Line            Peripheral IV 06/13/21 Left;Ventral (anterior) Forearm 1 day    Peripheral IV 06/13/21 Right Antecubital 1 day    Peripheral IV 06/13/21 Right;Ventral (anterior) Wrist 1 day          Drain            NG/OG/Enteral Tube Orogastric 16 Fr Center mouth 1 day    Urethral Catheter Latex;Straight-tip 16 Fr  1 day          Airway            ETT  Cuffed 7 mm 1 day                Code Status: Level 1 - Full Code    Counseling / Coordination of Care  Total Critical Care time spent 31 minutes excluding procedures, teaching and family updates        SIGNATURE: Stevie Macias DO  DATE: June 14, 2021  TIME: 9:51 AM

## 2021-06-14 NOTE — NURSING NOTE
Patient is rambling about multiple topics, jumping from one to the next, SaO2 remains 93-95% on room air  Denies shortness of breath

## 2021-06-14 NOTE — ASSESSMENT & PLAN NOTE
May be multifactorial but certainly cardiomyopathy may be a contributing issue  Agree with diuresis as long as creatinine is not rising substantially and blood pressure is okay

## 2021-06-14 NOTE — PROGRESS NOTES
[address]  Progress Note [facility] [place] 1960, 61 y o  female MRN: [id number]  Unit/Bed#: ICU 06 Encounter: [id number]  Primary Care Provider: Marlea Canavan, DO   Date and time admitted to hospital: 6/13/2021  2:18 AM    Essential hypertension  Assessment & Plan  Well controlled  Dilated cardiomyopathy ([address] )  Assessment & Plan  Non coronary related  The plan ultimately was repeat echo in July  * Acute respiratory failure with hypoxia and hypercapnia [facility])  Assessment & Plan  May be multifactorial but certainly cardiomyopathy may be a contributing issue  Agree with diuresis as long as creatinine is not rising substantially and blood pressure is okay  Outpatient Cardiologist: Mahnaz Julien      Subjective:   Patient seen and examined  No significant events overnight  She remained intubated when I saw her  Summary comments:  Certainly the patient has underlying cardiomyopathy and BNP is high  Assume presentation as at least partly related to heart failure and diuresis remains appropriate as long as creatinine is not rising  On 03/26/2021 she presented with acute heart failure and was at least briefly intubated  An echo the next day revealed ejection fraction less than 20% with moderate mitral regurgitation  Several days later she underwent coronary angiography which revealed moderate but nonobstructive disease  Telemetry/ECG/Cardiac testing:   Sinus rhythm  TTE 03/27/2021:  EF less than 20%  Coronary angiography 03/30/2021:  No high-grade proximal CAD  Vitals: Blood pressure 134/84, pulse 81, temperature 97 5 °F (36 4 °C), temperature source Tympanic, resp   rate (!) 31, height 5' 7" (1 702 m), weight 68 kg (149 lb 14 6 oz), SpO2 93 % ,   Orthostatic Blood Pressures      Most Recent Value   Blood Pressure  134/84 filed at 06/14/2021 1100   Patient Position - Orthostatic VS  Lying filed at 06/14/2021 1100      ,   Weight (last 2 days) Date/Time   Weight    06/14/21 0600   68 (149 91)    06/13/21 0428   73 9 (162 92)    06/13/21 0306   75 (165 35)              Physical Exam:    General:  Intubated but awake and motioning  Eyes:  Anicteric  Oral mucosa:  Moist   Neck:  No JV D  Carotid upstrokes are brisk without bruits  No masses  Chest:  Clear to auscultation and percussion  Cardiac:  No palpable PMI  Normal S1 and S2  No murmur gallop or rub  Abdomen:  Soft and nontender  No palpable organomegaly or aortic enlargement  Extremities:  No peripheral edema  Neuro:  Grossly symmetric  Psych:  Alert and oriented x3        Medications:      Current Facility-Administered Medications:     albuterol inhalation solution 2 5 mg, 2 5 mg, Nebulization, Q6H PRN, Rosamaria Fernandez MD    aspirin chewable tablet 81 mg, 81 mg, Oral, Daily, Mark Lischner, DO, 81 mg at 06/14/21 1020    atorvastatin (LIPITOR) tablet 40 mg, 40 mg, Oral, Daily With [employer], [employer], DO    carvedilol (COREG) tablet 12 5 mg, 12 5 mg, Oral, BID With Meals, [employer], DO    clonazePAM (KlonoPIN) tablet 0 5 mg, 0 5 mg, Oral, HS, Chaitanya Pan MD, 0 5 mg at 06/13/21 2150    enoxaparin (LOVENOX) subcutaneous injection 40 mg, 40 mg, Subcutaneous, Daily, Alissa Abbasi PA-C, 40 mg at 06/14/21 0810    furosemide (LASIX) injection 40 mg, 40 mg, Intravenous, TID (diuretic), [employer], DO    methylPREDNISolone sodium succinate (Solu-MEDROL) injection 40 mg, 40 mg, Intravenous, Q12H JULIÁN, Mark Lischner, DO    morphine (MS CONTIN) ER tablet 30 mg, 30 mg, Oral, BID, Mark Lischner, DO, 30 mg at 06/14/21 1019    nicotine (NICODERM CQ) 21 mg/24 hr TD 24 hr patch 1 patch, 1 patch, Transdermal, Daily, Alissa Abbasi PA-C, 1 patch at 06/14/21 0809    ondansetron (ZOFRAN) injection 4 mg, 4 mg, Intravenous, Q6H PRN, Alissa Abbais PA-C    oxyCODONE-acetaminophen (PERCOCET) 5-325 mg per tablet 1 tablet, 1 tablet, Oral, Q4H PRN, Mark Lischner, DO  potassium chloride 20 mEq IVPB (premix), 20 mEq, Intravenous, Q2H, Anika Limon MD, Last Rate: 50 mL/hr at 06/14/21 1004, 20 mEq at 06/14/21 1004     Labs & Results:    Troponins:   Results from last 7 days   Lab Units 06/13/21  0804 06/13/21  0521 06/13/21  0233   TROPONIN I ng/mL 0 11* 0 10* 0 04*      BNP:   Results from last 6 Months   Lab Units 06/13/21  0233 03/27/21  0011   BNP pg/mL 2,688* 1,016*       CBC with diff:   Results from last 7 days   Lab Units 06/14/21  0500 06/13/21  1736 06/13/21  0233   WBC Thousand/uL 6 00  --  16 50*   HEMOGLOBIN g/dL 13 1 13 5 15 0   HEMATOCRIT % 38 4* 39 7* 46 1   MCV fL 86  --  89   PLATELETS Thousands/uL 348  --  445*     TSH:     CMP:   Results from last 7 days   Lab Units 06/14/21  0500 06/13/21  0233   POTASSIUM mmol/L 3 3* 3 7   CHLORIDE mmol/L 92* 95*   CO2 mmol/L 29 25   BUN mg/dL 21 13   CREATININE mg/dL 1 11 0 86   AST U/L  --  16   ALT U/L  --  7   EGFR ml/min/1 73sq m 54 74     Lipid Profile:     Coags:   Results from last 7 days   Lab Units 06/13/21  0233   INR  1 09

## 2021-06-14 NOTE — NURSING NOTE
Dr Khalida Reid at bedside with patient and spoke at length with her, as she is wanting to leave today   After lengthy discussion patient is agreeable to stay until tomorrow and be monitored overnight and have labs in am

## 2021-06-14 NOTE — PROGRESS NOTES
[address] [facility]  [facility] 1960, 61 y o  female MRN: [id number]  Unit/Bed#: ICU 06 Encounter: [id number]  Primary Care Provider: Marlea Canavan, DO   Date and time admitted to hospital: 6/13/2021  2:18 AM    Acute on chronic combined systolic and diastolic congestive heart failure [facility])  Assessment & Plan  · Echo from March of this year showed EF of 18%  Patient also had cardiac catheterization done in March of this year which did not show significant CAD  · Will continue IV diuresis, patient currently on Lasix drip  · Will monitor output and daily weights  · Continue IV Lopressor while patient is on mechanical ventilation, will start patient's home Coreg once tolerating p o  · Troponins elevated with most recent of 0 11  · Cardiology following    * Acute respiratory failure with hypoxia and hypercapnia (HCC)  Assessment & Plan  · Secondary to CHF exacerbation  Patient was intubated in the ER  · Will continue diuresis as tolerated  · Will continue to wean ventilator as possible  · Critical Care following    Dilated cardiomyopathy [address] [address] )  Assessment & Plan  · Continue treatment as above      Leukocytosis  Assessment & Plan  · Likely reactive, improved today  · Procalcitonin normal  · Afebrile, with no signs of infection    Tobacco abuse  Assessment & Plan  · Nicotine patch    Elevated troponin  Assessment & Plan  · Suspect Secondary to CHF exacerbation  · Will follow-up with cardiology    Chronic pain  Assessment & Plan  · With continuous opioid  · Patient on chronic morphine ER  · PDMP reviewed morphine ER 30 mg number 60 tabs filled on 05/29/2021  · Resume when extubated    Essential hypertension  Assessment & Plan  · Currently on Metoprolol 5 mg IV q 6 hours  · Resume Coreg once tolerating p o  VTE Prophylaxis:  Enoxaparin (Lovenox)    Patient Centered Rounds: I have performed bedside rounds with nursing staff today      Discussions with Specialists or Other Care Team Provider: yes  Education and Discussions with Family / Patient:  Spoke with patient's daughter Dary Mercado over the phone regarding plan of care and patient's current clinical condition    Current Length of Stay: 1 day(s)    Current Patient Status: Inpatient   Certification Statement: The patient will continue to require additional inpatient hospital stay due to Respiratory failure    Discharge Plan:  Pending hospital course    Code Status: Level 1 - Full Code    Subjective:   No overnight events noted  Patient continues to require mechanical ventilation this morning  Unable to get any review of systems as patient is currently intubated and sedated    Objective:     Vitals:   Temp (24hrs), Av 4 °F (36 3 °C), Min:96 5 °F (35 8 °C), Max:98 5 °F (36 9 °C)    Temp:  [96 5 °F (35 8 °C)-98 5 °F (36 9 °C)] 97 5 °F (36 4 °C)  HR:  [] 66  Resp:  [18-19] 18  BP: (108-137)/(62-86) 108/65  SpO2:  [90 %-98 %] 90 %  Body mass index is 23 48 kg/m²  Input and Output Summary (last 24 hours): Intake/Output Summary (Last 24 hours) at 2021 0805  Last data filed at 2021 0601  Gross per 24 hour   Intake 609 97 ml   Output 2125 ml   Net -1515 03 ml       Physical Exam:   Physical Exam  Constitutional:       Appearance: She is ill-appearing  Comments: Currently intubated and sedated   HENT:      Head: Normocephalic and atraumatic  Nose: Nose normal       Mouth/Throat:      Comments: ET tube and OG tube in place  Eyes:      Conjunctiva/sclera: Conjunctivae normal    Cardiovascular:      Rate and Rhythm: Normal rate and regular rhythm  Pulmonary:      Comments: Equal breath sounds bilaterally  Currently on mechanical ventilation  Abdominal:      General: There is no distension  Palpations: Abdomen is soft  Musculoskeletal:         General: No swelling or deformity  Cervical back: Neck supple  Skin:     General: Skin is warm and dry     Neurological:      Comments: Patient currently intubated and sedated         Additional Data:     Labs:    Results from last 7 days   Lab Units 06/14/21  0500 06/13/21  0233   WBC Thousand/uL 6 00 16 50*   HEMOGLOBIN g/dL 13 1 15 0   HEMATOCRIT % 38 4* 46 1   PLATELETS Thousands/uL 348 445*   NEUTROS PCT %  --  41*   LYMPHS PCT %  --  51   MONOS PCT %  --  5   EOS PCT %  --  2     Results from last 7 days   Lab Units 06/14/21  0500 06/13/21  0233   SODIUM mmol/L 133* 132*   POTASSIUM mmol/L 3 3* 3 7   CHLORIDE mmol/L 92* 95*   CO2 mmol/L 29 25   BUN mg/dL 21 13   CREATININE mg/dL 1 11 0 86   CALCIUM mg/dL 9 6 9 3   ALK PHOS U/L  --  87   ALT U/L  --  7   AST U/L  --  16     Results from last 7 days   Lab Units 06/13/21  0233   INR  1 09               * I Have Reviewed All Lab Data Listed Above  * Additional Pertinent Lab Tests Reviewed: [address] Admission  Reviewed    Imaging:  Imaging Reports Reviewed Today Include:  No new imaging    Recent Cultures (last 7 days):     Results from last 7 days   Lab Units 06/13/21  1008   BLOOD CULTURE  Received in Microbiology Lab  Culture in Progress  Received in Microbiology Lab  Culture in Progress         Last 24 Hours Medication List:   Current Facility-Administered Medications   Medication Dose Route Frequency Provider Last Rate    albuterol  2 5 mg Nebulization Q6H PRN Yana Flores MD      clonazePAM  0 5 mg Oral HS Ruddy Enriquez MD      enoxaparin  40 mg Subcutaneous Daily Tyesha Castellanos PA-C      fentaNYL  100 mcg/hr Intravenous Continuous Ruddy Enriquez  mcg/hr (06/14/21 0501)    furosemide  10 mg/hr Intravenous Continuous Ruddy Enriquez MD 10 mg/hr (06/14/21 0501)    methylPREDNISolone sodium succinate  40 mg Intravenous Q8H Marie Govea MD      metoprolol  5 mg Intravenous Q6H Tyesha Castellanos PA-C      nicotine  1 patch Transdermal Daily Tyesha Castellanos PA-C      ondansetron  4 mg Intravenous Q6H PRN Tyesha Castellanos PA-C      pantoprazole  40 mg Intravenous Q12H [address] Monica Hicks PA-C      potassium chloride  20 mEq Intravenous Q2H Alisia Rose MD      propofol  5-50 mcg/kg/min Intravenous Titrated Tomas Cabot, MD 40 mcg/kg/min (06/14/21 0745)        Today, Patient Was Seen By: Alisia Rose MD    ** Please Note: Dictation voice to text software may have been used in the creation of this document   **

## 2021-06-14 NOTE — NURSING NOTE
1900: Assumed care  Pt restless  Orders received to increase fentanyl gtt to 100 mcg/hr and bolus 100 mcg  Pt medicated per orders  Full assessment complete  She is on lasix gtt with adequate urine output  Propofol infusing at max dose of 50 mcg/kg min    2200: pt resting quietly at this time  Pt occasionally is restless and kicks legs and shakes head back and forth  Her RASS is -2 when resting  Will cont to monitor

## 2021-06-14 NOTE — CASE MANAGEMENT
LOS: 1 day  15 GREEN, pt is not a 30 day Readmission or Bundle patient, pt was admitted to ICU for Resp distress /chf, pt was able to be extubated today, pt is now on oxygen and she does not have oxygen at home, pt lives alone in a 2 story home with also a basement and she does  not need to use,  12-14 steps inside to each level , br on 2 nd floor only, 5 steps outside, pt had [facility] in the past, pt has declined the need for hhc, DME: cane ( she does not use), pt drives and is independent, pt does wear glasses and she states she is able to read the medication labels, pt smokes 1pkg cig a day, she is willing to quit she stated she wants the patches for free, cm placed a med to [facility], pt has medicare D plan and it does not cover nicotne patches, I mad her aware the can be purchased without a rx for cheaper and she stated to me she needs them FREE, pt denies alcohol use pt states her daughter can transport the pt home , cm will see if the pt qualifies for free patches, cm will continue to follow and work on a safe d/c plan, CM reviewed d/c planning process including the following: identifying help at home, patient preference for d/c planning needs, availability of treatment team to discuss questions or concerns patient and/or family may have regarding understanding medications and recognizing signs and symptoms once discharged  CM also encouraged patient to follow up with all recommended appointments after discharge  Patient advised of importance for patient and family to participate in managing patients medical well being

## 2021-06-14 NOTE — PLAN OF CARE
Problem: OCCUPATIONAL THERAPY ADULT  Goal: Performs self-care activities at highest level of function for planned discharge setting  See evaluation for individualized goals  Description: Treatment Interventions: ADL retraining, Functional transfer training, Endurance training, Patient/family training, Compensatory technique education, Energy conservation  Equipment Recommended: Bedside commode       See flowsheet documentation for full assessment, interventions and recommendations  6/14/2021 1440 by Kami Yates OT  Note: Limitation: Decreased ADL status, Decreased Safe judgement during ADL, Decreased endurance, Decreased self-care trans, Decreased high-level ADLs  Prognosis: Good  Assessment: Patient is a 61 y o  female seen for OT evaluation s/p admit to [facility]  on 6/13/2021 w/Acute respiratory failure with hypoxia and hypercapnia ([facility] [address] )  Commorbidities affecting patient's functional performance at time of assessment include: CHF, dilated cardiomyopathy, HTN, hyperglycemia, and hyponatremia  Orders placed for OT evaluation and treatment and up and OOB as tolerated   Performed at least two patient identifiers during session including name and wristband  Prior to admission, Patient reporting being independent with ADLs/IADLs, ambulatory with no AD and lives alone in a two story house  Personal factors affecting patient at time of initial evaluation include: steps to enter, limited insight into deficits, difficulty performing ADLs and difficulty performing IADLs  Upon evaluation, patient requires supervision assist for UB ADLs, minimal  assist for LB ADLs, transfers and functional ambulation in room and bathroom with contact guard assist, with the use of no AD  Patient is oriented x 4 and presents with ability to acquire new concepts and behaviors (learning)    Occupational performance is affected by the following deficits: impulsive behavior, decreased muscle strength, dynamic sit/ stand balance deficit with poor standing tolerance time for self care and functional mobility, decreased activity tolerance and delayed righting and equilibrium reactions  Patient to benefit from continued Occupational Therapy treatment while in the hospital to address deficits as defined above and maximize level of functional independence with ADLs and functional mobility  Occupational Performance areas to address include: grooming , bathing/ shower, dressing, toilet hygiene, transfer to all surfaces, functional ambulation, medication routine/ management, IADLS: Household maintenance, IADLs: safety procedures and IADLs: meal prep/ clean up  From OT standpoint, recommendation at time of d/c would be Home with home health rehabilitation  OT Discharge Recommendation: Home with home health rehabilitation  OT - OK to Discharge: Yes (Once medically cleared )    6/14/2021 1440 by Axel Hall OT  Note: Limitation: Decreased ADL status, Decreased Safe judgement during ADL, Decreased endurance, Decreased self-care trans, Decreased high-level ADLs  Prognosis: Good  Assessment: Patient is a 61 y o  female seen for OT evaluation s/p admit to [address] [place] [address]  on 6/13/2021 w/Acute respiratory failure with hypoxia and hypercapnia ([facility] [address] )  Commorbidities affecting patient's functional performance at time of assessment include: CHF, dilated cardiomyopathy, HTN, hyperglycemia, and hyponatremia  Orders placed for OT evaluation and treatment and up and OOB as tolerated   Performed at least two patient identifiers during session including name and wristband  Prior to admission, Patient reporting being independent with ADLs/IADLs, ambulatory with no AD and lives alone in a two story house  Personal factors affecting patient at time of initial evaluation include: steps to enter, limited insight into deficits, difficulty performing ADLs and difficulty performing IADLs   Upon evaluation, patient requires supervision assist for UB ADLs, minimal  assist for LB ADLs, transfers and functional ambulation in room and bathroom with contact guard assist, with the use of no AD  Patient is oriented x 4 and presents with ability to acquire new concepts and behaviors (learning)  Occupational performance is affected by the following deficits: impulsive behavior, decreased muscle strength, dynamic sit/ stand balance deficit with poor standing tolerance time for self care and functional mobility, decreased activity tolerance and delayed righting and equilibrium reactions  Patient to benefit from continued Occupational Therapy treatment while in the hospital to address deficits as defined above and maximize level of functional independence with ADLs and functional mobility  Occupational Performance areas to address include: grooming , bathing/ shower, dressing, toilet hygiene, transfer to all surfaces, functional ambulation, medication routine/ management, IADLS: Household maintenance, IADLs: safety procedures and IADLs: meal prep/ clean up  From OT standpoint, recommendation at time of d/c would be Home with home health rehabilitation         OT Discharge Recommendation: Home with home health rehabilitation  OT - OK to Discharge: Yes (Once medically cleared )     Lindsey Shahid, OT

## 2021-06-15 VITALS
HEART RATE: 72 BPM | RESPIRATION RATE: 26 BRPM | BODY MASS INDEX: 23.53 KG/M2 | WEIGHT: 149.91 LBS | OXYGEN SATURATION: 95 % | TEMPERATURE: 98 F | SYSTOLIC BLOOD PRESSURE: 138 MMHG | DIASTOLIC BLOOD PRESSURE: 82 MMHG | HEIGHT: 67 IN

## 2021-06-15 PROBLEM — E87.6 HYPOKALEMIA: Status: ACTIVE | Noted: 2021-03-27

## 2021-06-15 LAB
ANION GAP SERPL CALCULATED.3IONS-SCNC: 10 MMOL/L (ref 4–13)
BASOPHILS # BLD AUTO: 0 THOUSANDS/ΜL (ref 0–0.1)
BASOPHILS NFR BLD AUTO: 0 % (ref 0–2)
BUN SERPL-MCNC: 31 MG/DL (ref 7–25)
CALCIUM SERPL-MCNC: 9.7 MG/DL (ref 8.6–10.5)
CHLORIDE SERPL-SCNC: 92 MMOL/L (ref 98–107)
CO2 SERPL-SCNC: 32 MMOL/L (ref 21–31)
CREAT SERPL-MCNC: 1 MG/DL (ref 0.6–1.2)
EOSINOPHIL # BLD AUTO: 0 THOUSAND/ΜL (ref 0–0.61)
EOSINOPHIL NFR BLD AUTO: 0 % (ref 0–5)
ERYTHROCYTE [DISTWIDTH] IN BLOOD BY AUTOMATED COUNT: 15.2 % (ref 11.5–14.5)
GFR SERPL CREATININE-BSD FRML MDRD: 61 ML/MIN/1.73SQ M
GLUCOSE SERPL-MCNC: 142 MG/DL (ref 65–99)
HCT VFR BLD AUTO: 37.3 % (ref 42–47)
HGB BLD-MCNC: 12.4 G/DL (ref 12–16)
LYMPHOCYTES # BLD AUTO: 1.7 THOUSANDS/ΜL (ref 0.6–4.47)
LYMPHOCYTES NFR BLD AUTO: 18 % (ref 21–51)
MAGNESIUM SERPL-MCNC: 2.1 MG/DL (ref 1.9–2.7)
MCH RBC QN AUTO: 28.8 PG (ref 26–34)
MCHC RBC AUTO-ENTMCNC: 33.3 G/DL (ref 31–37)
MCV RBC AUTO: 86 FL (ref 81–99)
MONOCYTES # BLD AUTO: 0.3 THOUSAND/ΜL (ref 0.17–1.22)
MONOCYTES NFR BLD AUTO: 3 % (ref 2–12)
NEUTROPHILS # BLD AUTO: 7.4 THOUSANDS/ΜL (ref 1.4–6.5)
NEUTS SEG NFR BLD AUTO: 79 % (ref 42–75)
PLATELET # BLD AUTO: 362 THOUSANDS/UL (ref 149–390)
PMV BLD AUTO: 8.3 FL (ref 8.6–11.7)
POTASSIUM SERPL-SCNC: 3.2 MMOL/L (ref 3.5–5.5)
RBC # BLD AUTO: 4.32 MILLION/UL (ref 3.9–5.2)
SODIUM SERPL-SCNC: 134 MMOL/L (ref 134–143)
WBC # BLD AUTO: 9.4 THOUSAND/UL (ref 4.8–10.8)

## 2021-06-15 PROCEDURE — 99239 HOSP IP/OBS DSCHRG MGMT >30: CPT | Performed by: INTERNAL MEDICINE

## 2021-06-15 PROCEDURE — 85025 COMPLETE CBC W/AUTO DIFF WBC: CPT | Performed by: INTERNAL MEDICINE

## 2021-06-15 PROCEDURE — 94664 DEMO&/EVAL PT USE INHALER: CPT

## 2021-06-15 PROCEDURE — 80048 BASIC METABOLIC PNL TOTAL CA: CPT | Performed by: INTERNAL MEDICINE

## 2021-06-15 PROCEDURE — NC001 PR NO CHARGE: Performed by: ANESTHESIOLOGY

## 2021-06-15 PROCEDURE — 99231 SBSQ HOSP IP/OBS SF/LOW 25: CPT | Performed by: INTERNAL MEDICINE

## 2021-06-15 PROCEDURE — 94760 N-INVAS EAR/PLS OXIMETRY 1: CPT

## 2021-06-15 PROCEDURE — 83735 ASSAY OF MAGNESIUM: CPT | Performed by: INTERNAL MEDICINE

## 2021-06-15 RX ORDER — LISINOPRIL 10 MG/1
10 TABLET ORAL ONCE
Status: COMPLETED | OUTPATIENT
Start: 2021-06-15 | End: 2021-06-15

## 2021-06-15 RX ORDER — FUROSEMIDE 40 MG/1
40 TABLET ORAL DAILY
Qty: 30 TABLET | Refills: 0 | Status: SHIPPED | OUTPATIENT
Start: 2021-06-16 | End: 2021-06-15 | Stop reason: SDUPTHER

## 2021-06-15 RX ORDER — POTASSIUM CHLORIDE 14.9 MG/ML
20 INJECTION INTRAVENOUS ONCE
Status: DISCONTINUED | OUTPATIENT
Start: 2021-06-15 | End: 2021-06-15

## 2021-06-15 RX ORDER — POTASSIUM CHLORIDE 20 MEQ/1
40 TABLET, EXTENDED RELEASE ORAL ONCE
Status: DISCONTINUED | OUTPATIENT
Start: 2021-06-15 | End: 2021-06-15

## 2021-06-15 RX ORDER — POTASSIUM CHLORIDE 750 MG/1
20 CAPSULE, EXTENDED RELEASE ORAL DAILY
Qty: 40 CAPSULE | Refills: 0 | Status: SHIPPED | OUTPATIENT
Start: 2021-06-15 | End: 2021-06-15 | Stop reason: HOSPADM

## 2021-06-15 RX ORDER — CARVEDILOL 12.5 MG/1
12.5 TABLET ORAL 2 TIMES DAILY WITH MEALS
Qty: 60 TABLET | Refills: 0 | Status: SHIPPED | OUTPATIENT
Start: 2021-06-15 | End: 2021-07-13 | Stop reason: CLARIF

## 2021-06-15 RX ORDER — SPIRONOLACTONE 25 MG/1
25 TABLET ORAL DAILY
Qty: 30 TABLET | Refills: 0 | Status: SHIPPED | OUTPATIENT
Start: 2021-06-15 | End: 2021-07-13 | Stop reason: CLARIF

## 2021-06-15 RX ORDER — POTASSIUM CHLORIDE 20 MEQ/1
40 TABLET, EXTENDED RELEASE ORAL
Status: DISCONTINUED | OUTPATIENT
Start: 2021-06-15 | End: 2021-06-15 | Stop reason: HOSPADM

## 2021-06-15 RX ORDER — LISINOPRIL 10 MG/1
10 TABLET ORAL DAILY
Qty: 30 TABLET | Refills: 0 | Status: SHIPPED | OUTPATIENT
Start: 2021-06-15 | End: 2021-07-13 | Stop reason: CLARIF

## 2021-06-15 RX ORDER — FUROSEMIDE 20 MG/1
20 TABLET ORAL DAILY
Qty: 30 TABLET | Refills: 0 | Status: SHIPPED | OUTPATIENT
Start: 2021-06-16 | End: 2021-07-13 | Stop reason: CLARIF

## 2021-06-15 RX ADMIN — NICOTINE 1 PATCH: 21 PATCH, EXTENDED RELEASE TRANSDERMAL at 08:31

## 2021-06-15 RX ADMIN — ASPIRIN 81 MG: 81 TABLET, CHEWABLE ORAL at 08:30

## 2021-06-15 RX ADMIN — ENOXAPARIN SODIUM 40 MG: 40 INJECTION SUBCUTANEOUS at 08:30

## 2021-06-15 RX ADMIN — CARVEDILOL 12.5 MG: 12.5 TABLET, FILM COATED ORAL at 08:30

## 2021-06-15 RX ADMIN — LISINOPRIL 10 MG: 10 TABLET ORAL at 10:09

## 2021-06-15 RX ADMIN — MORPHINE SULFATE 30 MG: 30 TABLET, FILM COATED, EXTENDED RELEASE ORAL at 08:30

## 2021-06-15 RX ADMIN — POTASSIUM CHLORIDE 40 MEQ: 1500 TABLET, EXTENDED RELEASE ORAL at 08:30

## 2021-06-15 RX ADMIN — FUROSEMIDE 40 MG: 10 INJECTION, SOLUTION INTRAVENOUS at 05:18

## 2021-06-15 NOTE — NURSING NOTE
Discharge instructions given at length to patient, with highlights on instructions  Patient expressed understanding of same  Awaiting ride for discharge home

## 2021-06-15 NOTE — DISCHARGE INSTR - AVS FIRST PAGE
Dear Elvira Hernandez,     It was our pleasure to care for you here at [facility]  It is our hope that we were always able to exceed the expected standards for your care during your stay  You were hospitalized due to congestive heart failure and respiratory failure  You were cared for on the medical floor by Markus Garcia MD with the [facility] who covers for your primary care physician (PCP), Magdy Locke, DO, while you were hospitalized  If you have any questions or concerns related to this hospitalization, you may contact us at [phone]  For follow up as well as any medication refills, we recommend that you follow up with your primary care physician  A registered nurse will reach out to you by phone within a few days after your discharge to answer any additional questions that you may have after going home  However, at this time we provide for you here, the most important instructions / recommendations at discharge:     · Notable Medication Adjustments -   · Lisinopril 10 mg daily added  Aldactone 25 mg daily  Coreg 12 5 mg twice a day  Lasix 20 mg daily  · Discontinue aspirin and atorvastatin  · Testing Required after Discharge -   · Routine labs with PCP in 1 week specifically BMP to monitor kidney function and potassium level  · Important follow up information -   · Follow-up with cardiology as outpatient within 1-2 weeks of discharge  · Follow-up with PCP within 1 week of discharge  · Other Instructions -   · Please see discharge instructions  · Please review this entire after visit summary as additional general instructions including medication list, appointments, activity, diet, any pertinent wound care, and other additional recommendations from your care team that may be provided for you        Sincerely,     Markus Garcia MD

## 2021-06-15 NOTE — CASE MANAGEMENT
Cm met with the pt and I made her aware that c was recommended and the patient has declined, she does not want me to call her daughter because she has a new baby, she stated she will call her daughter, her family will transport the pt home, pt wanted free nicotine patches, I told her I tried to print a free coupon from the computer but I was not able , she has a computer at home, pt stated she has MA I stated she should use her MA card, she has medicare and it does not cover the patches, I let her know that [facility] has them you can buy without a rx, I did suggest she use her cigarette money to purchase the patches, she also has a cm from [place] and she will ask her about a coupon, pt denies any additional needs, she states she has an appointment with her pcp tomorrow, d/c order written, pt is in agreement with the d/c and d/c plan home

## 2021-06-15 NOTE — DISCHARGE SUMMARY
[address]  Discharge- [facility] 1960, 61 y o  female MRN: [id number]  Unit/Bed#: ICU 06 Encounter: [id number]  Primary Care Provider: Raúl Huerta DO   Date and time admitted to hospital: 6/13/2021  2:18 AM    Acute on chronic combined systolic and diastolic congestive heart failure [facility])  Assessment & Plan  · Echo from March of this year showed EF of 18%    Patient also had cardiac catheterization done in March of this year which did not show significant CAD  · Patient has improved with IV diuresis  · Patient was extubated on 06/14/2021  · Currently off oxygen  · Discussed case with Cardiology  · Will discharge patient on lisinopril, Lasix, Coreg, Aldactone  · Outpatient follow-up with cardiology for further management    * Acute respiratory failure with hypoxia and hypercapnia ([address] )  Assessment & Plan  · Patient extubated on 06/14/2021  · Further management as above    Dilated cardiomyopathy ([facility] [address] )  Assessment & Plan  · Continue treatment as above      Hyponatremia  Assessment & Plan  · Likely secondary to hypervolemia  · Improved with diuresis    Tobacco abuse  Assessment & Plan  · Nicotine patch  · Counseled on smoking cessation    Hypokalemia  Assessment & Plan  · Replaced, advised to follow-up with PCP to have BMP checked next week    Discharging Physician / Practitioner: Suleiman Contreras MD  PCP: Raúl Huerta DO  Admission Date:   Admission Orders (From admission, onward)     Ordered        06/13/21 0343  Inpatient Admission  Once                   Discharge Date: 06/15/21    Medical Problems     Resolved Problems  Date Reviewed: 6/13/2021    None                Consultations During Hospital Stay:  · Cardiology, Critical Care    Procedures Performed:   · Intubation/extubation    Significant Findings / Test Results:   · Congestive heart failure exacerbation/respiratory failure    Incidental Findings:   · None     Test Results Pending at Discharge (will require follow up): · None     Outpatient Tests Requested:  · BMP to be done with PCP within 1 week of discharge to monitor kidney function and potassium level    Complications:     None    Reason for Admission:  Respiratory failure    Hospital Course:     Nehemias Weeks is a 61 y o  female patient who originally presented to the hospital on 6/13/2021 due to shortness of breath  Patient found have CHF exacerbation  Patient also with respiratory failure requiring mechanical ventilation  Patient was intubated in the ER admitted to the ICU  Patient was started on IV diuresis  Patient was seen by critical care and medications were optimized  Patient was also seen by Cardiology  Patient was extubated on 06/14/2021  Patient doing well and was titrated off oxygen  Cardiology recommended discharge medications as above  Patient discharged home with outpatient follow-up  Please see above list of diagnoses and related plan for additional information  Condition at Discharge: stable     Discharge Day Visit / Exam:     Subjective:  Patient states she is feeling much better today  No complaints today  No shortness of breath or chest pain  Patient tolerating diet  No fever or chills  Vitals: Blood Pressure: 138/82 (06/15/21 1052)  Pulse: 72 (06/15/21 1052)  Temperature: 98 °F (36 7 °C) (06/15/21 0800)  Temp Source: Temporal (06/15/21 0800)  Respirations: (!) 26 (06/15/21 0800)  Height: 5' 7" (170 2 cm) (06/13/21 0428)  Weight - Scale: 68 kg (149 lb 14 6 oz) (06/15/21 0509)  SpO2: 95 % (06/15/21 0800)     Exam:   Physical Exam  Constitutional:       General: She is not in acute distress  HENT:      Head: Normocephalic and atraumatic  Nose: Nose normal       Mouth/Throat:      Mouth: Mucous membranes are moist    Eyes:      Extraocular Movements: Extraocular movements intact  Conjunctiva/sclera: Conjunctivae normal    Cardiovascular:      Rate and Rhythm: Normal rate and regular rhythm  Pulmonary:      Effort: Pulmonary effort is normal  No respiratory distress  Abdominal:      Palpations: Abdomen is soft  Tenderness: There is no abdominal tenderness  Musculoskeletal:         General: No swelling  Normal range of motion  Cervical back: Normal range of motion and neck supple  Skin:     General: Skin is warm and dry  Neurological:      General: No focal deficit present  Mental Status: She is alert  Cranial Nerves: No cranial nerve deficit  Psychiatric:         Mood and Affect: Mood normal          Behavior: Behavior normal          Discussion with Family:  Attempted to call patient's daughter Dary Poloa at number provided in chart with no answer    Discharge instructions/Information to patient and family:   See after visit summary for information provided to patient and family  Provisions for Follow-Up Care:  See after visit summary for information related to follow-up care and any pertinent home health orders  Disposition:     Home      Planned Readmission:    no     Discharge Statement:  I spent 35 minutes discharging the patient  This time was spent on the day of discharge  I had direct contact with the patient on the day of discharge  Greater than 50% of the total time was spent examining patient, answering all patient questions, arranging and discussing plan of care with patient as well as directly providing post-discharge instructions  Additional time then spent on discharge activities  Discharge Medications:  See after visit summary for reconciled discharge medications provided to patient and family        ** Please Note: This note has been constructed using a voice recognition system **

## 2021-06-15 NOTE — ASSESSMENT & PLAN NOTE
· Echo from March of this year showed EF of 18%    Patient also had cardiac catheterization done in March of this year which did not show significant CAD  · Patient has improved with IV diuresis  · Patient was extubated on 06/14/2021  · Currently off oxygen  · Discussed case with Cardiology  · Will discharge patient on lisinopril, Lasix, Coreg, Aldactone  · Outpatient follow-up with cardiology for further management

## 2021-06-15 NOTE — PROGRESS NOTES
[address]  Progress Note [facility] 1960, 61 y o  female MRN: [id number]  Unit/Bed#: ICU 06 Encounter: [id number]  Primary Care Provider: Ramon Ignacio DO   Date and time admitted to hospital: 6/13/2021  2:18 AM    Elevated troponin  Assessment & Plan  No CAD is present and there has been no true rise and fall  Dilated cardiomyopathy ([facility] [address] )  Assessment & Plan  Non coronary related  The plan ultimately was repeat echo in July  * Acute respiratory failure with hypoxia and hypercapnia [facility])  Assessment & Plan  May be multifactorial but certainly cardiomyopathy may be a contributing issue  Resolved  Outpatient Cardiologist: Eliezer Vinson   On 03/26/2021 she presented with acute heart failure and was at least briefly intubated  An echo the next day revealed ejection fraction less than 20% with moderate mitral regurgitation  Several days later she underwent coronary angiography which revealed moderate but nonobstructive disease  Subjective:   Patient seen and examined  Feels great today  Summary comments:  No respiratory distress  Successfully extubated yesterday  Appears ready for discharge  I am want to keep meds simple  As she has no CAD, I am going to stop the aspirin and atorvastatin  Chem 7 will be checked in 1 week  I am going to see her again in approximately 1 month with an echo prior and on that basis decide upon prophylactic defibrillator  Lisinopril being restarted for hypertension and cardiomyopathy  Telemetry/ECG/Cardiac testing:   Sinus rhythm  TTE 03/27/2021:  EF less than 20%  Coronary angiography 03/30/2021:  No high-grade proximal CAD  Vitals: Blood pressure (!) 171/90, pulse 88, temperature 98 °F (36 7 °C), temperature source Temporal, resp   rate (!) 26, height 5' 7" (1 702 m), weight 68 kg (149 lb 14 6 oz), SpO2 95 % ,   Orthostatic Blood Pressures      Most Recent Value   Blood Pressure  (!) 171/90 filed at 06/15/2021 0957   Patient Position - Orthostatic VS  Sitting filed at 06/15/2021 0800      ,   Weight (last 2 days)     Date/Time   Weight    06/15/21 0509   68 (149 91)    06/14/21 0600   68 (149 91)    06/13/21 0428   73 9 (162 92)    06/13/21 0306   75 (165 35)              Physical Exam:    General:  Normal appearance in no distress  Eyes:  Anicteric  Oral mucosa:  Moist   Neck:  No JVD  Carotid upstrokes are brisk without bruits  No masses  Chest:  Clear to auscultation and percussion  Cardiac:  Lateral  PMI  Normal S1 and S2  No murmur gallop or rub  Abdomen:  Soft and nontender  No palpable organomegaly or aortic enlargement  Extremities:  No peripheral edema  Musculoskeletal:  Symmetric  Vascular:  Femoral pulses are brisk without bruits  Popliteal pulses are intact bilaterally  Pedal pulses are intact  Neuro:  Grossly symmetric    Psych:  Alert and oriented x3           Medications:      Current Facility-Administered Medications:     albuterol inhalation solution 2 5 mg, 2 5 mg, Nebulization, Q6H PRN, Alissa Merlos MD    carvedilol (COREG) tablet 12 5 mg, 12 5 mg, Oral, BID With Meals, [employer], DO, 12 5 mg at 06/15/21 0830    clonazePAM (KlonoPIN) tablet 0 5 mg, 0 5 mg, Oral, HS, Saji Singh MD, 0 5 mg at 06/14/21 2107    enoxaparin (LOVENOX) subcutaneous injection 40 mg, 40 mg, Subcutaneous, Daily, Rima Wilson PA-C, 40 mg at 06/15/21 0830    lisinopril (ZESTRIL) tablet 10 mg, 10 mg, Oral, Once, Sen Castorena MD    morphine (MS CONTIN) ER tablet 30 mg, 30 mg, Oral, BID, Mark Lischner, DO, 30 mg at 06/15/21 0830    nicotine (NICODERM CQ) 21 mg/24 hr TD 24 hr patch 1 patch, 1 patch, Transdermal, Daily, Rima Wilson PA-C, 1 patch at 06/15/21 0831    ondansetron (ZOFRAN) injection 4 mg, 4 mg, Intravenous, Q6H PRN, Rima Wilson PA-C    oxyCODONE-acetaminophen (PERCOCET) 5-325 mg per tablet 1 tablet, 1 tablet, Oral, Q4H PRN, Mark Lischner, DO  potassium chloride (K-DUR,KLOR-CON) CR tablet 40 mEq, 40 mEq, Oral, TID With Meals, Anika Limon MD, 40 mEq at 06/15/21 0830     Labs & Results:    Troponins:   Results from last 7 days   Lab Units 06/13/21  0804 06/13/21  0521 06/13/21  0233   TROPONIN I ng/mL 0 11* 0 10* 0 04*      BNP:   Results from last 6 Months   Lab Units 06/13/21  0233 03/27/21  0011   BNP pg/mL 2,688* 1,016*       CBC with diff:   Results from last 7 days   Lab Units 06/15/21  0500 06/14/21  0500   WBC Thousand/uL 9 40 6 00   HEMOGLOBIN g/dL 12 4 13 1   HEMATOCRIT % 37 3* 38 4*   MCV fL 86 86   PLATELETS Thousands/uL 362 348     TSH:     CMP:   Results from last 7 days   Lab Units 06/15/21  0500 06/14/21  0500 06/13/21  0233   POTASSIUM mmol/L 3 2* 3 3* 3 7   CHLORIDE mmol/L 92* 92* 95*   CO2 mmol/L 32* 29 25   BUN mg/dL 31* 21 13   CREATININE mg/dL 1 00 1 11 0 86   AST U/L  --   --  16   ALT U/L  --   --  7   EGFR ml/min/1 73sq m 61 54 74     Lipid Profile:     Coags:   Results from last 7 days   Lab Units 06/13/21  0233   INR  1 09

## 2021-06-18 LAB
BACTERIA BLD CULT: NORMAL
BACTERIA BLD CULT: NORMAL

## 2021-06-22 ENCOUNTER — APPOINTMENT (OUTPATIENT)
Dept: LAB | Facility: CLINIC | Age: 61
End: 2021-06-22
Payer: MEDICARE

## 2021-06-22 DIAGNOSIS — E87.6 HYPOKALEMIA: ICD-10-CM

## 2021-06-22 DIAGNOSIS — E78.2 MIXED HYPERLIPIDEMIA: ICD-10-CM

## 2021-06-22 DIAGNOSIS — I10 ESSENTIAL HYPERTENSION: ICD-10-CM

## 2021-06-22 DIAGNOSIS — J44.9 CHRONIC OBSTRUCTIVE PULMONARY DISEASE, UNSPECIFIED COPD TYPE (HCC): ICD-10-CM

## 2021-06-22 LAB
ALBUMIN SERPL BCP-MCNC: 3.9 G/DL (ref 3.5–5)
ALP SERPL-CCNC: 85 U/L (ref 46–116)
ALT SERPL W P-5'-P-CCNC: 18 U/L (ref 12–78)
ANION GAP SERPL CALCULATED.3IONS-SCNC: 7 MMOL/L (ref 4–13)
AST SERPL W P-5'-P-CCNC: 13 U/L (ref 5–45)
BASOPHILS # BLD AUTO: 0.09 THOUSANDS/ΜL (ref 0–0.1)
BASOPHILS NFR BLD AUTO: 1 % (ref 0–1)
BILIRUB SERPL-MCNC: 0.52 MG/DL (ref 0.2–1)
BUN SERPL-MCNC: 15 MG/DL (ref 5–25)
CALCIUM SERPL-MCNC: 10.1 MG/DL (ref 8.3–10.1)
CHLORIDE SERPL-SCNC: 102 MMOL/L (ref 100–108)
CHOLEST SERPL-MCNC: 201 MG/DL (ref 50–200)
CO2 SERPL-SCNC: 28 MMOL/L (ref 21–32)
CREAT SERPL-MCNC: 0.94 MG/DL (ref 0.6–1.3)
EOSINOPHIL # BLD AUTO: 0.31 THOUSAND/ΜL (ref 0–0.61)
EOSINOPHIL NFR BLD AUTO: 3 % (ref 0–6)
ERYTHROCYTE [DISTWIDTH] IN BLOOD BY AUTOMATED COUNT: 14.4 % (ref 11.6–15.1)
GFR SERPL CREATININE-BSD FRML MDRD: 66 ML/MIN/1.73SQ M
GLUCOSE P FAST SERPL-MCNC: 103 MG/DL (ref 65–99)
HCT VFR BLD AUTO: 42.9 % (ref 34.8–46.1)
HDLC SERPL-MCNC: 47 MG/DL
HGB BLD-MCNC: 14.3 G/DL (ref 11.5–15.4)
IMM GRANULOCYTES # BLD AUTO: 0.02 THOUSAND/UL (ref 0–0.2)
IMM GRANULOCYTES NFR BLD AUTO: 0 % (ref 0–2)
LDLC SERPL CALC-MCNC: 125 MG/DL (ref 0–100)
LYMPHOCYTES # BLD AUTO: 3 THOUSANDS/ΜL (ref 0.6–4.47)
LYMPHOCYTES NFR BLD AUTO: 29 % (ref 14–44)
MAGNESIUM SERPL-MCNC: 2.3 MG/DL (ref 1.6–2.6)
MCH RBC QN AUTO: 29.6 PG (ref 26.8–34.3)
MCHC RBC AUTO-ENTMCNC: 33.3 G/DL (ref 31.4–37.4)
MCV RBC AUTO: 89 FL (ref 82–98)
MONOCYTES # BLD AUTO: 0.72 THOUSAND/ΜL (ref 0.17–1.22)
MONOCYTES NFR BLD AUTO: 7 % (ref 4–12)
NEUTROPHILS # BLD AUTO: 6.17 THOUSANDS/ΜL (ref 1.85–7.62)
NEUTS SEG NFR BLD AUTO: 60 % (ref 43–75)
NONHDLC SERPL-MCNC: 154 MG/DL
NRBC BLD AUTO-RTO: 0 /100 WBCS
NT-PROBNP SERPL-MCNC: 2533 PG/ML
PLATELET # BLD AUTO: 475 THOUSANDS/UL (ref 149–390)
PMV BLD AUTO: 9.2 FL (ref 8.9–12.7)
POTASSIUM SERPL-SCNC: 3.5 MMOL/L (ref 3.5–5.3)
PROT SERPL-MCNC: 8.3 G/DL (ref 6.4–8.2)
RBC # BLD AUTO: 4.83 MILLION/UL (ref 3.81–5.12)
SODIUM SERPL-SCNC: 137 MMOL/L (ref 136–145)
TRIGL SERPL-MCNC: 144 MG/DL
WBC # BLD AUTO: 10.31 THOUSAND/UL (ref 4.31–10.16)

## 2021-06-22 PROCEDURE — 85025 COMPLETE CBC W/AUTO DIFF WBC: CPT

## 2021-06-22 PROCEDURE — 36415 COLL VENOUS BLD VENIPUNCTURE: CPT

## 2021-06-22 PROCEDURE — 80053 COMPREHEN METABOLIC PANEL: CPT

## 2021-06-22 PROCEDURE — 83735 ASSAY OF MAGNESIUM: CPT

## 2021-06-22 PROCEDURE — 80061 LIPID PANEL: CPT

## 2021-06-22 PROCEDURE — 83880 ASSAY OF NATRIURETIC PEPTIDE: CPT

## 2021-07-09 ENCOUNTER — HOSPITAL ENCOUNTER (OUTPATIENT)
Dept: NON INVASIVE DIAGNOSTICS | Facility: CLINIC | Age: 61
Discharge: HOME | End: 2021-07-09
Payer: MEDICARE

## 2021-07-09 DIAGNOSIS — I42.0 DILATED CARDIOMYOPATHY (HCC): ICD-10-CM

## 2021-07-09 PROCEDURE — 93306 TTE W/DOPPLER COMPLETE: CPT

## 2021-07-09 PROCEDURE — 93306 TTE W/DOPPLER COMPLETE: CPT | Performed by: INTERNAL MEDICINE

## 2021-07-13 ENCOUNTER — OFFICE VISIT (OUTPATIENT)
Dept: CARDIOLOGY CLINIC | Facility: CLINIC | Age: 61
End: 2021-07-13
Payer: MEDICARE

## 2021-07-13 VITALS
DIASTOLIC BLOOD PRESSURE: 90 MMHG | BODY MASS INDEX: 24.8 KG/M2 | HEART RATE: 100 BPM | WEIGHT: 158 LBS | HEIGHT: 67 IN | SYSTOLIC BLOOD PRESSURE: 200 MMHG

## 2021-07-13 DIAGNOSIS — I42.0 DILATED CARDIOMYOPATHY (HCC): Primary | ICD-10-CM

## 2021-07-13 DIAGNOSIS — I10 ESSENTIAL HYPERTENSION: ICD-10-CM

## 2021-07-13 PROCEDURE — 99214 OFFICE O/P EST MOD 30 MIN: CPT | Performed by: INTERNAL MEDICINE

## 2021-07-13 RX ORDER — METOPROLOL SUCCINATE 50 MG/1
50 TABLET, EXTENDED RELEASE ORAL DAILY
Qty: 90 TABLET | Refills: 5 | Status: SHIPPED | OUTPATIENT
Start: 2021-07-13 | End: 2022-06-22

## 2021-07-13 RX ORDER — LOSARTAN POTASSIUM 50 MG/1
50 TABLET ORAL DAILY
COMMUNITY
Start: 2021-06-25 | End: 2022-06-22

## 2021-07-13 RX ORDER — CLONAZEPAM 1 MG/1
.5-1 TABLET ORAL
COMMUNITY
Start: 2021-05-13

## 2021-07-13 NOTE — PATIENT INSTRUCTIONS
Metoprolol 50 mg once daily was prescribed today  You can start with 1/2 dose daily for 4 days, then full dose once daily  Further adjustments by Christina Bond later this month      Limit the caffeine/soda

## 2021-10-27 NOTE — PROGRESS NOTES
Patient ID: Phil Nunez is a 61 y o  female  Plan:      Dilated cardiomyopathy ([address] )  Much improved by echo  Essential hypertension  She stopped taking beta blocker  Will restart as metoprolol succinate  Follow up Plan/Other summary comments:  She is wary of meds but agrees to re try beta blocker  Will ramp up the dose slowly  HPI: Patient seen in f/u regarding the above issues  Since the recent hospital stay she has done well  However she stopped taking a bunch of meds  She continues to drink a fair amount of caffeine in soda which may in account for some of her relative tachycardia today  Although she had severe cardiomyopathy as outlined below, an echo several days ago revealed dramatically improved LV systolic function  On 2021 she presented with acute heart failure and was at least briefly intubated  An echo the next day revealed ejection fraction less than 20% with moderate mitral regurgitation  Several days later she underwent coronary angiography which revealed moderate but nonobstructive disease  Echo on 2021 revealed near normal LV systolic function  Most recent or relevant cardiac/vascular testing:    TTE 2021:  EF less than 20%  Coronary angiography 2021:  No high-grade proximal CAD  TTE 2021:  EF 50%  Mild enlargement of the ascending aorta to 3 7 cm  Mild mitral regurgitation        Past Surgical History:   Procedure Laterality Date    CARDIAC CATHETERIZATION  2021    Moderate non-obstructive atherosclerosis     SECTION      CHOLECYSTECTOMY      ROTATOR CUFF REPAIR W/ DISTAL CLAVICLE EXCISION Right     TONSILLECTOMY       CMP:   Lab Results   Component Value Date     2018    K 3 5 2021    K 4 6 2018     2021     2018    CO2 28 2021    CO2 32 (H) 2018    BUN 15 2021    BUN 14 2018    CREATININE 0 94 2021    CREATININE 0 72 [FreeTextEntry1] : 30 year is here for a CPE. She was counselled on diet and exercise, drug and alcohol use, age appropriate health care maintenance including vaccines, seatbelts, sunscreen, stress reduction and safe sex. All questions asked/answered to the best of my ability.\par Labs sent.\par Pt will have to return for preop as labs will not be valid if sent now.\par FLu shot given.\par Patient counselled on COVID vaccination procedure.\par Weight loss counselling. Patient is taking correct steps with diet/exercise.\par Pt agrees to get MAMMO/SONO at this time. 04/05/2018    EGFR 66 06/22/2021       Lipid Profile:   Lab Results   Component Value Date    CHOL 216 (H) 04/05/2018    TRIG 144 06/22/2021    TRIG 168 (H) 04/05/2018    HDL 47 06/22/2021    HDL 43 04/05/2018         Review of Systems   10  point ROS  was otherwise non pertinent or negative except as per HPI or as below  Gait: Normal         Objective:     BP (!) 200/90   Pulse 100   Ht 5' 7" (1 702 m)   Wt 71 7 kg (158 lb)   BMI 24 75 kg/m²     PHYSICAL EXAM:    General:  Normal appearance in no distress  Eyes:  Anicteric  Oral mucosa:  Moist   Neck:  No JVD  Carotid upstrokes are brisk without bruits  No masses  Chest:  Clear to auscultation  Cardiac:  No palpable PMI  Normal S1 and S2  No murmur gallop or rub  Abdomen:  Soft and nontender  No palpable organomegaly or aortic enlargement  Extremities:  No peripheral edema  Musculoskeletal:  Symmetric  Vascular:  Femoral pulses are brisk without bruits  Popliteal pulses are intact bilaterally  Pedal pulses are intact  Neuro:  Grossly symmetric  Psych:  Alert and oriented x3          Current Outpatient Medications:     clonazePAM (KlonoPIN) 1 mg tablet, Take 0 5-1 mg by mouth daily at bedtime as needed, Disp: , Rfl:     gabapentin (NEURONTIN) 300 mg capsule, Take 300 mg by mouth as needed , Disp: , Rfl:     losartan (COZAAR) 50 mg tablet, Take 50 mg by mouth daily, Disp: , Rfl:     morphine (MS CONTIN) 30 mg 12 hr tablet, Take 30 mg by mouth 2 (two) times a day, Disp: , Rfl:     oxyCODONE-acetaminophen (PERCOCET) 5-325 mg per tablet, Take 1 tablet by mouth every 4 (four) hours as needed for moderate pain, Disp: , Rfl:     metoprolol succinate (TOPROL-XL) 50 mg 24 hr tablet, Take 1 tablet (50 mg total) by mouth daily, Disp: 90 tablet, Rfl: 5  Allergies   Allergen Reactions    Wellbutrin [Bupropion] Arthralgia     Past Medical History:   Diagnosis Date    [facility]     COPD (chronic obstructive pulmonary disease) ([facility]     Fatty liver     Hyperlipidemia     Hypertension     Mitral regurgitation            Social History     Tobacco Use   Smoking Status Current Every Day Smoker    Packs/day: 1 00    Types: Cigarettes   Smokeless Tobacco Never Used

## 2021-11-01 ENCOUNTER — APPOINTMENT (OUTPATIENT)
Dept: LAB | Facility: MEDICAL CENTER | Age: 61
End: 2021-11-01
Payer: MEDICARE

## 2021-11-01 DIAGNOSIS — I50.9 CONGESTIVE HEART FAILURE, UNSPECIFIED HF CHRONICITY, UNSPECIFIED HEART FAILURE TYPE (HCC): ICD-10-CM

## 2021-11-01 DIAGNOSIS — I10 HYPERTENSION, UNSPECIFIED TYPE: Primary | ICD-10-CM

## 2021-11-01 DIAGNOSIS — J44.9 CHRONIC OBSTRUCTIVE PULMONARY DISEASE, UNSPECIFIED COPD TYPE (HCC): ICD-10-CM

## 2021-11-01 LAB
ALBUMIN SERPL BCP-MCNC: 3.6 G/DL (ref 3.5–5)
ALP SERPL-CCNC: 118 U/L (ref 46–116)
ALT SERPL W P-5'-P-CCNC: 17 U/L (ref 12–78)
ANION GAP SERPL CALCULATED.3IONS-SCNC: 5 MMOL/L (ref 4–13)
AST SERPL W P-5'-P-CCNC: 13 U/L (ref 5–45)
BILIRUB SERPL-MCNC: 0.4 MG/DL (ref 0.2–1)
BUN SERPL-MCNC: 19 MG/DL (ref 5–25)
CALCIUM SERPL-MCNC: 9.4 MG/DL (ref 8.3–10.1)
CHLORIDE SERPL-SCNC: 101 MMOL/L (ref 100–108)
CO2 SERPL-SCNC: 28 MMOL/L (ref 21–32)
CREAT SERPL-MCNC: 0.92 MG/DL (ref 0.6–1.3)
ERYTHROCYTE [DISTWIDTH] IN BLOOD BY AUTOMATED COUNT: 14 % (ref 11.6–15.1)
GFR SERPL CREATININE-BSD FRML MDRD: 67 ML/MIN/1.73SQ M
GLUCOSE SERPL-MCNC: 112 MG/DL (ref 65–140)
HCT VFR BLD AUTO: 45.5 % (ref 34.8–46.1)
HGB BLD-MCNC: 15 G/DL (ref 11.5–15.4)
MAGNESIUM SERPL-MCNC: 2.5 MG/DL (ref 1.6–2.6)
MCH RBC QN AUTO: 28.8 PG (ref 26.8–34.3)
MCHC RBC AUTO-ENTMCNC: 33 G/DL (ref 31.4–37.4)
MCV RBC AUTO: 87 FL (ref 82–98)
PLATELET # BLD AUTO: 363 THOUSANDS/UL (ref 149–390)
PMV BLD AUTO: 9.7 FL (ref 8.9–12.7)
POTASSIUM SERPL-SCNC: 3.7 MMOL/L (ref 3.5–5.3)
PROT SERPL-MCNC: 8.5 G/DL (ref 6.4–8.2)
RBC # BLD AUTO: 5.21 MILLION/UL (ref 3.81–5.12)
SODIUM SERPL-SCNC: 134 MMOL/L (ref 136–145)
WBC # BLD AUTO: 10.15 THOUSAND/UL (ref 4.31–10.16)

## 2021-11-01 PROCEDURE — 36415 COLL VENOUS BLD VENIPUNCTURE: CPT

## 2021-11-01 PROCEDURE — 85027 COMPLETE CBC AUTOMATED: CPT

## 2021-11-01 PROCEDURE — 80053 COMPREHEN METABOLIC PANEL: CPT

## 2021-11-01 PROCEDURE — 83735 ASSAY OF MAGNESIUM: CPT

## 2022-02-09 ENCOUNTER — APPOINTMENT (OUTPATIENT)
Dept: LAB | Facility: MEDICAL CENTER | Age: 62
End: 2022-02-09
Payer: COMMERCIAL

## 2022-02-09 DIAGNOSIS — R00.0 TACHYCARDIA, UNSPECIFIED: ICD-10-CM

## 2022-02-09 DIAGNOSIS — E78.5 HYPERLIPIDEMIA, UNSPECIFIED HYPERLIPIDEMIA TYPE: ICD-10-CM

## 2022-02-09 DIAGNOSIS — E55.9 AVITAMINOSIS D: ICD-10-CM

## 2022-02-09 DIAGNOSIS — I50.20 SYSTOLIC HEART FAILURE, UNSPECIFIED HF CHRONICITY (HCC): ICD-10-CM

## 2022-02-09 LAB
25(OH)D3 SERPL-MCNC: 19.6 NG/ML (ref 30–100)
ALBUMIN SERPL BCP-MCNC: 4.1 G/DL (ref 3.5–5)
ALP SERPL-CCNC: 96 U/L (ref 46–116)
ALT SERPL W P-5'-P-CCNC: 15 U/L (ref 12–78)
ANION GAP SERPL CALCULATED.3IONS-SCNC: 6 MMOL/L (ref 4–13)
AST SERPL W P-5'-P-CCNC: 15 U/L (ref 5–45)
BASOPHILS # BLD AUTO: 0.09 THOUSANDS/ΜL (ref 0–0.1)
BASOPHILS NFR BLD AUTO: 1 % (ref 0–1)
BILIRUB SERPL-MCNC: 0.51 MG/DL (ref 0.2–1)
BUN SERPL-MCNC: 23 MG/DL (ref 5–25)
CALCIUM SERPL-MCNC: 10.2 MG/DL (ref 8.3–10.1)
CHLORIDE SERPL-SCNC: 104 MMOL/L (ref 100–108)
CHOLEST SERPL-MCNC: 190 MG/DL
CO2 SERPL-SCNC: 26 MMOL/L (ref 21–32)
CREAT SERPL-MCNC: 0.98 MG/DL (ref 0.6–1.3)
EOSINOPHIL # BLD AUTO: 0.3 THOUSAND/ΜL (ref 0–0.61)
EOSINOPHIL NFR BLD AUTO: 3 % (ref 0–6)
ERYTHROCYTE [DISTWIDTH] IN BLOOD BY AUTOMATED COUNT: 13.6 % (ref 11.6–15.1)
GFR SERPL CREATININE-BSD FRML MDRD: 62 ML/MIN/1.73SQ M
GLUCOSE P FAST SERPL-MCNC: 104 MG/DL (ref 65–99)
HCT VFR BLD AUTO: 43.6 % (ref 34.8–46.1)
HDLC SERPL-MCNC: 49 MG/DL
HGB BLD-MCNC: 14.3 G/DL (ref 11.5–15.4)
IMM GRANULOCYTES # BLD AUTO: 0.04 THOUSAND/UL (ref 0–0.2)
IMM GRANULOCYTES NFR BLD AUTO: 0 % (ref 0–2)
LDLC SERPL CALC-MCNC: 121 MG/DL (ref 0–100)
LYMPHOCYTES # BLD AUTO: 2.13 THOUSANDS/ΜL (ref 0.6–4.47)
LYMPHOCYTES NFR BLD AUTO: 19 % (ref 14–44)
MAGNESIUM SERPL-MCNC: 2.1 MG/DL (ref 1.6–2.6)
MCH RBC QN AUTO: 28.8 PG (ref 26.8–34.3)
MCHC RBC AUTO-ENTMCNC: 32.8 G/DL (ref 31.4–37.4)
MCV RBC AUTO: 88 FL (ref 82–98)
MONOCYTES # BLD AUTO: 0.62 THOUSAND/ΜL (ref 0.17–1.22)
MONOCYTES NFR BLD AUTO: 5 % (ref 4–12)
NEUTROPHILS # BLD AUTO: 8.22 THOUSANDS/ΜL (ref 1.85–7.62)
NEUTS SEG NFR BLD AUTO: 72 % (ref 43–75)
NONHDLC SERPL-MCNC: 141 MG/DL
NRBC BLD AUTO-RTO: 0 /100 WBCS
PLATELET # BLD AUTO: 353 THOUSANDS/UL (ref 149–390)
PMV BLD AUTO: 9.6 FL (ref 8.9–12.7)
POTASSIUM SERPL-SCNC: 3.8 MMOL/L (ref 3.5–5.3)
PROT SERPL-MCNC: 8.8 G/DL (ref 6.4–8.2)
RBC # BLD AUTO: 4.96 MILLION/UL (ref 3.81–5.12)
SODIUM SERPL-SCNC: 136 MMOL/L (ref 136–145)
T4 FREE SERPL-MCNC: 1.52 NG/DL (ref 0.76–1.46)
TRIGL SERPL-MCNC: 102 MG/DL
TSH SERPL DL<=0.05 MIU/L-ACNC: 1.96 UIU/ML (ref 0.36–3.74)
WBC # BLD AUTO: 11.4 THOUSAND/UL (ref 4.31–10.16)

## 2022-02-09 PROCEDURE — 84443 ASSAY THYROID STIM HORMONE: CPT

## 2022-02-09 PROCEDURE — 84439 ASSAY OF FREE THYROXINE: CPT

## 2022-02-09 PROCEDURE — 80053 COMPREHEN METABOLIC PANEL: CPT

## 2022-02-09 PROCEDURE — 36415 COLL VENOUS BLD VENIPUNCTURE: CPT

## 2022-02-09 PROCEDURE — 85025 COMPLETE CBC W/AUTO DIFF WBC: CPT

## 2022-02-09 PROCEDURE — 80061 LIPID PANEL: CPT

## 2022-02-09 PROCEDURE — 83735 ASSAY OF MAGNESIUM: CPT

## 2022-02-09 PROCEDURE — 82306 VITAMIN D 25 HYDROXY: CPT

## 2022-05-12 ENCOUNTER — APPOINTMENT (OUTPATIENT)
Dept: LAB | Facility: MEDICAL CENTER | Age: 62
End: 2022-05-12
Payer: MEDICARE

## 2022-05-12 DIAGNOSIS — I10 HYPERTENSION, UNSPECIFIED TYPE: ICD-10-CM

## 2022-05-12 DIAGNOSIS — E55.9 VITAMIN D DEFICIENCY: ICD-10-CM

## 2022-05-12 DIAGNOSIS — J44.9 CHRONIC OBSTRUCTIVE PULMONARY DISEASE, UNSPECIFIED COPD TYPE (HCC): ICD-10-CM

## 2022-05-12 DIAGNOSIS — I42.9 CARDIOMYOPATHY, UNSPECIFIED TYPE (HCC): ICD-10-CM

## 2022-05-12 DIAGNOSIS — G89.29 OTHER CHRONIC PAIN: ICD-10-CM

## 2022-05-12 LAB
25(OH)D3 SERPL-MCNC: 115.3 NG/ML (ref 30–100)
ALBUMIN SERPL BCP-MCNC: 3.8 G/DL (ref 3.5–5)
ALP SERPL-CCNC: 93 U/L (ref 46–116)
ALT SERPL W P-5'-P-CCNC: 14 U/L (ref 12–78)
ANION GAP SERPL CALCULATED.3IONS-SCNC: 6 MMOL/L (ref 4–13)
AST SERPL W P-5'-P-CCNC: 15 U/L (ref 5–45)
BILIRUB SERPL-MCNC: 0.5 MG/DL (ref 0.2–1)
BUN SERPL-MCNC: 40 MG/DL (ref 5–25)
CALCIUM SERPL-MCNC: 9.8 MG/DL (ref 8.3–10.1)
CHLORIDE SERPL-SCNC: 101 MMOL/L (ref 100–108)
CHOLEST SERPL-MCNC: 174 MG/DL
CO2 SERPL-SCNC: 25 MMOL/L (ref 21–32)
CREAT SERPL-MCNC: 1.97 MG/DL (ref 0.6–1.3)
ERYTHROCYTE [DISTWIDTH] IN BLOOD BY AUTOMATED COUNT: 13.1 % (ref 11.6–15.1)
GFR SERPL CREATININE-BSD FRML MDRD: 26 ML/MIN/1.73SQ M
GLUCOSE P FAST SERPL-MCNC: 105 MG/DL (ref 65–99)
HCT VFR BLD AUTO: 40.9 % (ref 34.8–46.1)
HDLC SERPL-MCNC: 43 MG/DL
HGB BLD-MCNC: 14.1 G/DL (ref 11.5–15.4)
LDLC SERPL CALC-MCNC: 113 MG/DL (ref 0–100)
MCH RBC QN AUTO: 29.7 PG (ref 26.8–34.3)
MCHC RBC AUTO-ENTMCNC: 34.5 G/DL (ref 31.4–37.4)
MCV RBC AUTO: 86 FL (ref 82–98)
NONHDLC SERPL-MCNC: 131 MG/DL
PLATELET # BLD AUTO: 340 THOUSANDS/UL (ref 149–390)
PMV BLD AUTO: 10.1 FL (ref 8.9–12.7)
POTASSIUM SERPL-SCNC: 4.1 MMOL/L (ref 3.5–5.3)
PROT SERPL-MCNC: 8.3 G/DL (ref 6.4–8.2)
RBC # BLD AUTO: 4.75 MILLION/UL (ref 3.81–5.12)
SODIUM SERPL-SCNC: 132 MMOL/L (ref 136–145)
TRIGL SERPL-MCNC: 91 MG/DL
WBC # BLD AUTO: 11.03 THOUSAND/UL (ref 4.31–10.16)

## 2022-05-12 PROCEDURE — 82306 VITAMIN D 25 HYDROXY: CPT

## 2022-05-12 PROCEDURE — 80061 LIPID PANEL: CPT

## 2022-05-12 PROCEDURE — 36415 COLL VENOUS BLD VENIPUNCTURE: CPT

## 2022-05-12 PROCEDURE — 80053 COMPREHEN METABOLIC PANEL: CPT

## 2022-05-12 PROCEDURE — 85027 COMPLETE CBC AUTOMATED: CPT

## 2022-06-14 ENCOUNTER — APPOINTMENT (EMERGENCY)
Dept: RADIOLOGY | Facility: HOSPITAL | Age: 62
End: 2022-06-14
Payer: MEDICARE

## 2022-06-14 ENCOUNTER — HOSPITAL ENCOUNTER (EMERGENCY)
Facility: HOSPITAL | Age: 62
End: 2022-06-15
Attending: EMERGENCY MEDICINE | Admitting: EMERGENCY MEDICINE
Payer: MEDICARE

## 2022-06-14 DIAGNOSIS — A41.9 SEPSIS (HCC): ICD-10-CM

## 2022-06-14 DIAGNOSIS — J96.00 ACUTE RESPIRATORY FAILURE (HCC): Primary | ICD-10-CM

## 2022-06-14 DIAGNOSIS — R09.02 HYPOXIA: ICD-10-CM

## 2022-06-14 DIAGNOSIS — J18.9 PNEUMONIA: ICD-10-CM

## 2022-06-14 DIAGNOSIS — E83.42 HYPOMAGNESEMIA: ICD-10-CM

## 2022-06-14 LAB
ALBUMIN SERPL BCP-MCNC: 4.6 G/DL (ref 3.5–5)
ALP SERPL-CCNC: 90 U/L (ref 34–104)
ALT SERPL W P-5'-P-CCNC: 7 U/L (ref 7–52)
ANION GAP SERPL CALCULATED.3IONS-SCNC: 13 MMOL/L (ref 4–13)
APTT PPP: 34 SECONDS (ref 23–37)
AST SERPL W P-5'-P-CCNC: 14 U/L (ref 13–39)
BASE EX.OXY STD BLDV CALC-SCNC: 89.8 % (ref 60–80)
BASE EXCESS BLDV CALC-SCNC: -0.4 MMOL/L
BASOPHILS # BLD AUTO: 0.08 THOUSANDS/ΜL (ref 0–0.1)
BASOPHILS NFR BLD AUTO: 1 % (ref 0–1)
BILIRUB SERPL-MCNC: 1.08 MG/DL (ref 0.2–1)
BUN SERPL-MCNC: 19 MG/DL (ref 5–25)
CALCIUM SERPL-MCNC: 10.1 MG/DL (ref 8.4–10.2)
CARDIAC TROPONIN I PNL SERPL HS: 43 NG/L
CHLORIDE SERPL-SCNC: 99 MMOL/L (ref 96–108)
CO2 SERPL-SCNC: 19 MMOL/L (ref 21–32)
CREAT SERPL-MCNC: 1.24 MG/DL (ref 0.6–1.3)
EOSINOPHIL # BLD AUTO: 0.01 THOUSAND/ΜL (ref 0–0.61)
EOSINOPHIL NFR BLD AUTO: 0 % (ref 0–6)
ERYTHROCYTE [DISTWIDTH] IN BLOOD BY AUTOMATED COUNT: 13.2 % (ref 11.6–15.1)
FLUAV RNA RESP QL NAA+PROBE: NEGATIVE
FLUBV RNA RESP QL NAA+PROBE: NEGATIVE
GFR SERPL CREATININE-BSD FRML MDRD: 46 ML/MIN/1.73SQ M
GLUCOSE SERPL-MCNC: 142 MG/DL (ref 65–140)
HCO3 BLDV-SCNC: 22 MMOL/L (ref 24–30)
HCT VFR BLD AUTO: 42.1 % (ref 34.8–46.1)
HGB BLD-MCNC: 14 G/DL (ref 11.5–15.4)
IMM GRANULOCYTES # BLD AUTO: 0.07 THOUSAND/UL (ref 0–0.2)
IMM GRANULOCYTES NFR BLD AUTO: 0 % (ref 0–2)
INR PPP: 1.22 (ref 0.84–1.19)
LACTATE SERPL-SCNC: 1 MMOL/L (ref 0.5–2)
LYMPHOCYTES # BLD AUTO: 1.4 THOUSANDS/ΜL (ref 0.6–4.47)
LYMPHOCYTES NFR BLD AUTO: 9 % (ref 14–44)
MAGNESIUM SERPL-MCNC: 1.7 MG/DL (ref 1.9–2.7)
MCH RBC QN AUTO: 30 PG (ref 26.8–34.3)
MCHC RBC AUTO-ENTMCNC: 33.3 G/DL (ref 31.4–37.4)
MCV RBC AUTO: 90 FL (ref 82–98)
MONOCYTES # BLD AUTO: 0.96 THOUSAND/ΜL (ref 0.17–1.22)
MONOCYTES NFR BLD AUTO: 6 % (ref 4–12)
NEUTROPHILS # BLD AUTO: 13.22 THOUSANDS/ΜL (ref 1.85–7.62)
NEUTS SEG NFR BLD AUTO: 84 % (ref 43–75)
NRBC BLD AUTO-RTO: 0 /100 WBCS
O2 CT BLDV-SCNC: 18.4 ML/DL
PCO2 BLDV: 30.2 MM HG (ref 42–50)
PH BLDV: 7.48 [PH] (ref 7.3–7.4)
PLATELET # BLD AUTO: 307 THOUSANDS/UL (ref 149–390)
PMV BLD AUTO: 9.2 FL (ref 8.9–12.7)
PO2 BLDV: 57.4 MM HG (ref 35–45)
POTASSIUM SERPL-SCNC: 3.9 MMOL/L (ref 3.5–5.3)
PROCALCITONIN SERPL-MCNC: 0.06 NG/ML
PROT SERPL-MCNC: 8.6 G/DL (ref 6.4–8.4)
PROTHROMBIN TIME: 15.3 SECONDS (ref 11.6–14.5)
RBC # BLD AUTO: 4.66 MILLION/UL (ref 3.81–5.12)
RSV RNA RESP QL NAA+PROBE: NEGATIVE
SARS-COV-2 RNA RESP QL NAA+PROBE: NEGATIVE
SODIUM SERPL-SCNC: 131 MMOL/L (ref 135–147)
WBC # BLD AUTO: 15.74 THOUSAND/UL (ref 4.31–10.16)

## 2022-06-14 PROCEDURE — 84484 ASSAY OF TROPONIN QUANT: CPT | Performed by: EMERGENCY MEDICINE

## 2022-06-14 PROCEDURE — 99285 EMERGENCY DEPT VISIT HI MDM: CPT

## 2022-06-14 PROCEDURE — 87040 BLOOD CULTURE FOR BACTERIA: CPT | Performed by: EMERGENCY MEDICINE

## 2022-06-14 PROCEDURE — 85025 COMPLETE CBC W/AUTO DIFF WBC: CPT | Performed by: EMERGENCY MEDICINE

## 2022-06-14 PROCEDURE — 84145 PROCALCITONIN (PCT): CPT | Performed by: EMERGENCY MEDICINE

## 2022-06-14 PROCEDURE — 85610 PROTHROMBIN TIME: CPT | Performed by: EMERGENCY MEDICINE

## 2022-06-14 PROCEDURE — 80053 COMPREHEN METABOLIC PANEL: CPT | Performed by: EMERGENCY MEDICINE

## 2022-06-14 PROCEDURE — 83735 ASSAY OF MAGNESIUM: CPT | Performed by: EMERGENCY MEDICINE

## 2022-06-14 PROCEDURE — 85730 THROMBOPLASTIN TIME PARTIAL: CPT | Performed by: EMERGENCY MEDICINE

## 2022-06-14 PROCEDURE — 0241U HB NFCT DS VIR RESP RNA 4 TRGT: CPT | Performed by: EMERGENCY MEDICINE

## 2022-06-14 PROCEDURE — 83605 ASSAY OF LACTIC ACID: CPT | Performed by: EMERGENCY MEDICINE

## 2022-06-14 PROCEDURE — 96361 HYDRATE IV INFUSION ADD-ON: CPT

## 2022-06-14 PROCEDURE — 96365 THER/PROPH/DIAG IV INF INIT: CPT

## 2022-06-14 PROCEDURE — 94760 N-INVAS EAR/PLS OXIMETRY 1: CPT

## 2022-06-14 PROCEDURE — 36415 COLL VENOUS BLD VENIPUNCTURE: CPT | Performed by: EMERGENCY MEDICINE

## 2022-06-14 PROCEDURE — 93005 ELECTROCARDIOGRAM TRACING: CPT

## 2022-06-14 PROCEDURE — 71045 X-RAY EXAM CHEST 1 VIEW: CPT

## 2022-06-14 PROCEDURE — 82805 BLOOD GASES W/O2 SATURATION: CPT | Performed by: EMERGENCY MEDICINE

## 2022-06-14 PROCEDURE — 94644 CONT INHLJ TX 1ST HOUR: CPT

## 2022-06-14 PROCEDURE — 99291 CRITICAL CARE FIRST HOUR: CPT | Performed by: EMERGENCY MEDICINE

## 2022-06-14 RX ORDER — MAGNESIUM SULFATE 1 G/100ML
1 INJECTION INTRAVENOUS ONCE
Status: COMPLETED | OUTPATIENT
Start: 2022-06-14 | End: 2022-06-15

## 2022-06-14 RX ORDER — CEFEPIME HYDROCHLORIDE 2 G/50ML
2000 INJECTION, SOLUTION INTRAVENOUS ONCE
Status: COMPLETED | OUTPATIENT
Start: 2022-06-14 | End: 2022-06-15

## 2022-06-14 RX ORDER — SODIUM CHLORIDE FOR INHALATION 0.9 %
3 VIAL, NEBULIZER (ML) INHALATION ONCE
Status: COMPLETED | OUTPATIENT
Start: 2022-06-14 | End: 2022-06-14

## 2022-06-14 RX ORDER — ACETAMINOPHEN 325 MG/1
650 TABLET ORAL ONCE
Status: COMPLETED | OUTPATIENT
Start: 2022-06-14 | End: 2022-06-14

## 2022-06-14 RX ADMIN — CEFEPIME HYDROCHLORIDE 2000 MG: 2 INJECTION, SOLUTION INTRAVENOUS at 23:23

## 2022-06-14 RX ADMIN — ALBUTEROL SULFATE 10 MG: 2.5 SOLUTION RESPIRATORY (INHALATION) at 22:13

## 2022-06-14 RX ADMIN — ACETAMINOPHEN 650 MG: 325 TABLET ORAL at 23:22

## 2022-06-14 RX ADMIN — SODIUM CHLORIDE 1000 ML: 0.9 INJECTION, SOLUTION INTRAVENOUS at 22:11

## 2022-06-14 RX ADMIN — ISODIUM CHLORIDE 3 ML: 0.03 SOLUTION RESPIRATORY (INHALATION) at 22:13

## 2022-06-14 RX ADMIN — MAGNESIUM SULFATE HEPTAHYDRATE 1 G: 1 INJECTION, SOLUTION INTRAVENOUS at 23:23

## 2022-06-14 RX ADMIN — IPRATROPIUM BROMIDE 1 MG: 0.5 SOLUTION RESPIRATORY (INHALATION) at 22:13

## 2022-06-15 ENCOUNTER — HOSPITAL ENCOUNTER (INPATIENT)
Facility: HOSPITAL | Age: 62
LOS: 7 days | Discharge: HOME HEALTH | DRG: 871 | End: 2022-06-22
Attending: INTERNAL MEDICINE | Admitting: INTERNAL MEDICINE
Payer: MEDICARE

## 2022-06-15 ENCOUNTER — APPOINTMENT (INPATIENT)
Dept: NON INVASIVE DIAGNOSTICS | Facility: HOSPITAL | Age: 62
DRG: 871 | End: 2022-06-15
Payer: MEDICARE

## 2022-06-15 ENCOUNTER — APPOINTMENT (INPATIENT)
Dept: CT IMAGING | Facility: HOSPITAL | Age: 62
DRG: 871 | End: 2022-06-15
Payer: MEDICARE

## 2022-06-15 VITALS
TEMPERATURE: 98.5 F | DIASTOLIC BLOOD PRESSURE: 57 MMHG | HEART RATE: 106 BPM | SYSTOLIC BLOOD PRESSURE: 106 MMHG | RESPIRATION RATE: 19 BRPM | OXYGEN SATURATION: 94 %

## 2022-06-15 DIAGNOSIS — J96.01 ACUTE RESPIRATORY FAILURE WITH HYPOXIA AND HYPERCAPNIA (HCC): ICD-10-CM

## 2022-06-15 DIAGNOSIS — J44.1 CHRONIC OBSTRUCTIVE PULMONARY DISEASE WITH ACUTE EXACERBATION (HCC): ICD-10-CM

## 2022-06-15 DIAGNOSIS — J96.02 ACUTE RESPIRATORY FAILURE WITH HYPOXIA AND HYPERCAPNIA (HCC): ICD-10-CM

## 2022-06-15 DIAGNOSIS — A41.9 SEPSIS, DUE TO UNSPECIFIED ORGANISM, UNSPECIFIED WHETHER ACUTE ORGAN DYSFUNCTION PRESENT (HCC): ICD-10-CM

## 2022-06-15 DIAGNOSIS — J96.01 ACUTE RESPIRATORY FAILURE WITH HYPOXIA (HCC): Primary | ICD-10-CM

## 2022-06-15 DIAGNOSIS — N17.9 AKI (ACUTE KIDNEY INJURY) (HCC): ICD-10-CM

## 2022-06-15 DIAGNOSIS — I50.43 ACUTE ON CHRONIC COMBINED SYSTOLIC AND DIASTOLIC CONGESTIVE HEART FAILURE (HCC): ICD-10-CM

## 2022-06-15 LAB
2HR DELTA HS TROPONIN: 5 NG/L
AMPHETAMINES SERPL QL SCN: NEGATIVE
AORTIC ROOT: 2.8 CM
APICAL FOUR CHAMBER EJECTION FRACTION: 32 %
ARTERIAL PATENCY WRIST A: YES
ASCENDING AORTA: 3.8 CM
ATRIAL RATE: 122 BPM
ATRIAL RATE: 132 BPM
BARBITURATES UR QL: NEGATIVE
BASE EXCESS BLDA CALC-SCNC: -8.8 MMOL/L
BASOPHILS # BLD AUTO: 0.1 THOUSANDS/ΜL (ref 0–0.1)
BASOPHILS NFR BLD AUTO: 0 % (ref 0–1)
BENZODIAZ UR QL: NEGATIVE
CARDIAC TROPONIN I PNL SERPL HS: 48 NG/L
COCAINE UR QL: NEGATIVE
E WAVE DECELERATION TIME: 133 MS
EOSINOPHIL # BLD AUTO: 0 THOUSAND/ΜL (ref 0–0.61)
EOSINOPHIL NFR BLD AUTO: 0 % (ref 0–6)
ERYTHROCYTE [DISTWIDTH] IN BLOOD BY AUTOMATED COUNT: 13.2 % (ref 11.6–15.1)
FRACTIONAL SHORTENING: 21 (ref 28–44)
GLUCOSE SERPL-MCNC: 183 MG/DL (ref 65–140)
HCO3 BLDA-SCNC: 20.3 MMOL/L (ref 22–28)
HCT VFR BLD AUTO: 43.3 % (ref 34.8–46.1)
HGB BLD-MCNC: 14.1 G/DL (ref 11.5–15.4)
IMM GRANULOCYTES # BLD AUTO: 0.17 THOUSAND/UL (ref 0–0.2)
IMM GRANULOCYTES NFR BLD AUTO: 1 % (ref 0–2)
INTERVENTRICULAR SEPTUM IN DIASTOLE (PARASTERNAL SHORT AXIS VIEW): 1.2 CM
INTERVENTRICULAR SEPTUM: 1.2 CM (ref 0.6–1.1)
IPAP: 18
IVC: 31.9 MM
L PNEUMO1 AG UR QL IA.RAPID: NEGATIVE
LAAS-AP2: 27.4 CM2
LAAS-AP4: 27.3 CM2
LACTATE SERPL-SCNC: 1.9 MMOL/L (ref 0.5–2)
LEFT ATRIUM SIZE: 4.4 CM
LEFT INTERNAL DIMENSION IN SYSTOLE: 3.7 CM (ref 2.1–4)
LEFT VENTRICLE DIASTOLIC VOLUME (MOD BIPLANE): 147 ML
LEFT VENTRICLE SYSTOLIC VOLUME (MOD BIPLANE): 87 ML
LEFT VENTRICULAR INTERNAL DIMENSION IN DIASTOLE: 4.7 CM (ref 3.5–6)
LEFT VENTRICULAR POSTERIOR WALL IN END DIASTOLE: 1.3 CM
LEFT VENTRICULAR STROKE VOLUME: 43 ML
LV EF: 41 %
LVSV (TEICH): 43 ML
LYMPHOCYTES # BLD AUTO: 2.09 THOUSANDS/ΜL (ref 0.6–4.47)
LYMPHOCYTES NFR BLD AUTO: 9 % (ref 14–44)
MCH RBC QN AUTO: 30.3 PG (ref 26.8–34.3)
MCHC RBC AUTO-ENTMCNC: 32.6 G/DL (ref 31.4–37.4)
MCV RBC AUTO: 93 FL (ref 82–98)
METHADONE UR QL: NEGATIVE
MONOCYTES # BLD AUTO: 1.71 THOUSAND/ΜL (ref 0.17–1.22)
MONOCYTES NFR BLD AUTO: 7 % (ref 4–12)
MV E'TISSUE VEL-SEP: 8 CM/S
MV PEAK A VEL: 1.17 M/S
MV PEAK E VEL: 93 CM/S
MV STENOSIS PRESSURE HALF TIME: 39 MS
MV VALVE AREA P 1/2 METHOD: 5.64
NEUTROPHILS # BLD AUTO: 19 THOUSANDS/ΜL (ref 1.85–7.62)
NEUTS SEG NFR BLD AUTO: 83 % (ref 43–75)
NON VENT- BIPAP: ABNORMAL
NRBC BLD AUTO-RTO: 0 /100 WBCS
NT-PROBNP SERPL-MCNC: ABNORMAL PG/ML
O2 CT BLDA-SCNC: 18.7 ML/DL (ref 16–23)
OPIATES UR QL SCN: POSITIVE
OXYCODONE+OXYMORPHONE UR QL SCN: POSITIVE
OXYHGB MFR BLDA: 96.5 % (ref 94–97)
P AXIS: 29 DEGREES
P AXIS: 45 DEGREES
PA SYSTOLIC PRESSURE: 46 MMHG
PCO2 BLDA: 57.6 MM HG (ref 36–44)
PCP UR QL: NEGATIVE
PEEP MAX SETTING VENT: 6 CM[H2O]
PH BLDA: 7.17 [PH] (ref 7.35–7.45)
PLATELET # BLD AUTO: 367 THOUSANDS/UL (ref 149–390)
PMV BLD AUTO: 9.6 FL (ref 8.9–12.7)
PO2 BLDA: 134 MM HG (ref 75–129)
PR INTERVAL: 150 MS
PR INTERVAL: 150 MS
PROCALCITONIN SERPL-MCNC: 0.21 NG/ML
QRS AXIS: 15 DEGREES
QRS AXIS: 17 DEGREES
QRSD INTERVAL: 90 MS
QRSD INTERVAL: 98 MS
QT INTERVAL: 310 MS
QT INTERVAL: 324 MS
QTC INTERVAL: 459 MS
QTC INTERVAL: 461 MS
RA PRESSURE ESTIMATED: 20 MMHG
RBC # BLD AUTO: 4.65 MILLION/UL (ref 3.81–5.12)
RIGHT ATRIAL 2D VOLUME: 46 ML
RIGHT ATRIUM AREA SYSTOLE A4C: 17.1 CM2
RIGHT VENTRICLE ID DIMENSION: 3.6 CM
RV PSP: 46 MMHG
S PNEUM AG UR QL: NEGATIVE
SL CV LEFT ATRIUM LENGTH A2C: 6.3 CM
SL CV LV EF: 41
SL CV PED ECHO LEFT VENTRICLE DIASTOLIC VOLUME (MOD BIPLANE) 2D: 100 ML
SL CV PED ECHO LEFT VENTRICLE SYSTOLIC VOLUME (MOD BIPLANE) 2D: 58 ML
SPECIMEN SOURCE: ABNORMAL
T WAVE AXIS: 61 DEGREES
T WAVE AXIS: 78 DEGREES
THC UR QL: NEGATIVE
TR MAX PG: 26 MMHG
TR PEAK VELOCITY: 2.6 M/S
TRICUSPID ANNULAR PLANE SYSTOLIC EXCURSION: 1.8 CM
TRICUSPID VALVE PEAK REGURGITATION VELOCITY: 2.57 M/S
VENT BIPAP FIO2: 100 %
VENTRICULAR RATE: 122 BPM
VENTRICULAR RATE: 132 BPM
WBC # BLD AUTO: 23.07 THOUSAND/UL (ref 4.31–10.16)

## 2022-06-15 PROCEDURE — 93306 TTE W/DOPPLER COMPLETE: CPT | Performed by: INTERNAL MEDICINE

## 2022-06-15 PROCEDURE — 36600 WITHDRAWAL OF ARTERIAL BLOOD: CPT

## 2022-06-15 PROCEDURE — 99223 1ST HOSP IP/OBS HIGH 75: CPT

## 2022-06-15 PROCEDURE — G1004 CDSM NDSC: HCPCS

## 2022-06-15 PROCEDURE — 99291 CRITICAL CARE FIRST HOUR: CPT | Performed by: INTERNAL MEDICINE

## 2022-06-15 PROCEDURE — 93010 ELECTROCARDIOGRAM REPORT: CPT | Performed by: INTERNAL MEDICINE

## 2022-06-15 PROCEDURE — 94002 VENT MGMT INPAT INIT DAY: CPT

## 2022-06-15 PROCEDURE — 71275 CT ANGIOGRAPHY CHEST: CPT

## 2022-06-15 PROCEDURE — 36415 COLL VENOUS BLD VENIPUNCTURE: CPT | Performed by: EMERGENCY MEDICINE

## 2022-06-15 PROCEDURE — 93005 ELECTROCARDIOGRAM TRACING: CPT

## 2022-06-15 PROCEDURE — NC001 PR NO CHARGE: Performed by: NURSE PRACTITIONER

## 2022-06-15 PROCEDURE — 99223 1ST HOSP IP/OBS HIGH 75: CPT | Performed by: INTERNAL MEDICINE

## 2022-06-15 PROCEDURE — 83605 ASSAY OF LACTIC ACID: CPT | Performed by: INTERNAL MEDICINE

## 2022-06-15 PROCEDURE — 93306 TTE W/DOPPLER COMPLETE: CPT

## 2022-06-15 PROCEDURE — 94640 AIRWAY INHALATION TREATMENT: CPT

## 2022-06-15 PROCEDURE — 82805 BLOOD GASES W/O2 SATURATION: CPT | Performed by: INTERNAL MEDICINE

## 2022-06-15 PROCEDURE — 83880 ASSAY OF NATRIURETIC PEPTIDE: CPT | Performed by: INTERNAL MEDICINE

## 2022-06-15 PROCEDURE — 82948 REAGENT STRIP/BLOOD GLUCOSE: CPT

## 2022-06-15 PROCEDURE — 84484 ASSAY OF TROPONIN QUANT: CPT | Performed by: EMERGENCY MEDICINE

## 2022-06-15 PROCEDURE — 87449 NOS EACH ORGANISM AG IA: CPT | Performed by: INTERNAL MEDICINE

## 2022-06-15 PROCEDURE — 80307 DRUG TEST PRSMV CHEM ANLYZR: CPT | Performed by: INTERNAL MEDICINE

## 2022-06-15 PROCEDURE — 85025 COMPLETE CBC W/AUTO DIFF WBC: CPT | Performed by: INTERNAL MEDICINE

## 2022-06-15 PROCEDURE — 84145 PROCALCITONIN (PCT): CPT | Performed by: INTERNAL MEDICINE

## 2022-06-15 RX ORDER — FUROSEMIDE 10 MG/ML
40 INJECTION INTRAMUSCULAR; INTRAVENOUS ONCE
Status: DISCONTINUED | OUTPATIENT
Start: 2022-06-15 | End: 2022-06-15

## 2022-06-15 RX ORDER — ALBUTEROL SULFATE 2.5 MG/3ML
SOLUTION RESPIRATORY (INHALATION)
Status: COMPLETED
Start: 2022-06-15 | End: 2022-06-15

## 2022-06-15 RX ORDER — METHYLPREDNISOLONE SODIUM SUCCINATE 40 MG/ML
40 INJECTION, POWDER, LYOPHILIZED, FOR SOLUTION INTRAMUSCULAR; INTRAVENOUS EVERY 12 HOURS SCHEDULED
Status: DISCONTINUED | OUTPATIENT
Start: 2022-06-15 | End: 2022-06-16

## 2022-06-15 RX ORDER — METOPROLOL SUCCINATE 25 MG/1
25 TABLET, EXTENDED RELEASE ORAL DAILY
Status: DISCONTINUED | OUTPATIENT
Start: 2022-06-15 | End: 2022-06-16

## 2022-06-15 RX ORDER — METHYLPREDNISOLONE SODIUM SUCCINATE 40 MG/ML
40 INJECTION, POWDER, LYOPHILIZED, FOR SOLUTION INTRAMUSCULAR; INTRAVENOUS EVERY 8 HOURS SCHEDULED
Status: DISCONTINUED | OUTPATIENT
Start: 2022-06-15 | End: 2022-06-15

## 2022-06-15 RX ORDER — OXYCODONE HYDROCHLORIDE 5 MG/1
5 TABLET ORAL EVERY 6 HOURS PRN
Status: DISCONTINUED | OUTPATIENT
Start: 2022-06-15 | End: 2022-06-22 | Stop reason: HOSPADM

## 2022-06-15 RX ORDER — OXYCODONE HYDROCHLORIDE 5 MG/1
2.5 TABLET ORAL EVERY 6 HOURS PRN
Status: DISCONTINUED | OUTPATIENT
Start: 2022-06-15 | End: 2022-06-22 | Stop reason: HOSPADM

## 2022-06-15 RX ORDER — LORAZEPAM 2 MG/ML
1 INJECTION INTRAMUSCULAR ONCE
Status: COMPLETED | OUTPATIENT
Start: 2022-06-15 | End: 2022-06-15

## 2022-06-15 RX ORDER — FUROSEMIDE 10 MG/ML
40 INJECTION INTRAMUSCULAR; INTRAVENOUS ONCE
Status: COMPLETED | OUTPATIENT
Start: 2022-06-15 | End: 2022-06-15

## 2022-06-15 RX ORDER — LORAZEPAM 2 MG/ML
INJECTION INTRAMUSCULAR
Status: DISPENSED
Start: 2022-06-15 | End: 2022-06-15

## 2022-06-15 RX ORDER — HEPARIN SODIUM 5000 [USP'U]/ML
5000 INJECTION, SOLUTION INTRAVENOUS; SUBCUTANEOUS EVERY 8 HOURS SCHEDULED
Status: DISCONTINUED | OUTPATIENT
Start: 2022-06-15 | End: 2022-06-22 | Stop reason: HOSPADM

## 2022-06-15 RX ORDER — ALBUMIN (HUMAN) 12.5 G/50ML
25 SOLUTION INTRAVENOUS ONCE
Status: COMPLETED | OUTPATIENT
Start: 2022-06-15 | End: 2022-06-15

## 2022-06-15 RX ORDER — LIDOCAINE 50 MG/G
1 PATCH TOPICAL DAILY
Status: DISCONTINUED | OUTPATIENT
Start: 2022-06-15 | End: 2022-06-22 | Stop reason: HOSPADM

## 2022-06-15 RX ORDER — IPRATROPIUM BROMIDE AND ALBUTEROL SULFATE 2.5; .5 MG/3ML; MG/3ML
3 SOLUTION RESPIRATORY (INHALATION)
Status: DISCONTINUED | OUTPATIENT
Start: 2022-06-15 | End: 2022-06-21

## 2022-06-15 RX ORDER — ACETAMINOPHEN 325 MG/1
650 TABLET ORAL EVERY 6 HOURS PRN
Status: DISCONTINUED | OUTPATIENT
Start: 2022-06-15 | End: 2022-06-22 | Stop reason: HOSPADM

## 2022-06-15 RX ADMIN — LIDOCAINE 1 PATCH: 50 PATCH CUTANEOUS at 21:04

## 2022-06-15 RX ADMIN — FUROSEMIDE 40 MG: 10 INJECTION, SOLUTION INTRAMUSCULAR; INTRAVENOUS at 04:19

## 2022-06-15 RX ADMIN — HEPARIN SODIUM 5000 UNITS: 5000 INJECTION INTRAVENOUS; SUBCUTANEOUS at 05:10

## 2022-06-15 RX ADMIN — NITROGLYCERIN: 20 OINTMENT TOPICAL at 04:30

## 2022-06-15 RX ADMIN — METHYLPREDNISOLONE SODIUM SUCCINATE 40 MG: 40 INJECTION, POWDER, FOR SOLUTION INTRAMUSCULAR; INTRAVENOUS at 04:01

## 2022-06-15 RX ADMIN — PIPERACILLIN SODIUM AND TAZOBACTAM SODIUM 3.38 G: 36; 4.5 INJECTION, POWDER, LYOPHILIZED, FOR SOLUTION INTRAVENOUS at 05:25

## 2022-06-15 RX ADMIN — ALBUTEROL SULFATE 10 MG: 2.5 SOLUTION RESPIRATORY (INHALATION) at 05:04

## 2022-06-15 RX ADMIN — IOHEXOL 75 ML: 350 INJECTION, SOLUTION INTRAVENOUS at 03:50

## 2022-06-15 RX ADMIN — CEFTRIAXONE SODIUM 1000 MG: 10 INJECTION, POWDER, FOR SOLUTION INTRAVENOUS at 09:27

## 2022-06-15 RX ADMIN — IPRATROPIUM BROMIDE AND ALBUTEROL SULFATE 3 ML: 2.5; .5 SOLUTION RESPIRATORY (INHALATION) at 13:47

## 2022-06-15 RX ADMIN — HEPARIN SODIUM 5000 UNITS: 5000 INJECTION INTRAVENOUS; SUBCUTANEOUS at 14:32

## 2022-06-15 RX ADMIN — ALBUMIN (HUMAN) 25 G: 0.25 INJECTION, SOLUTION INTRAVENOUS at 11:46

## 2022-06-15 RX ADMIN — METOPROLOL SUCCINATE 25 MG: 25 TABLET, EXTENDED RELEASE ORAL at 09:26

## 2022-06-15 RX ADMIN — HEPARIN SODIUM 5000 UNITS: 5000 INJECTION INTRAVENOUS; SUBCUTANEOUS at 21:03

## 2022-06-15 RX ADMIN — IPRATROPIUM BROMIDE AND ALBUTEROL SULFATE 3 ML: 2.5; .5 SOLUTION RESPIRATORY (INHALATION) at 04:30

## 2022-06-15 RX ADMIN — IPRATROPIUM BROMIDE AND ALBUTEROL SULFATE 3 ML: 2.5; .5 SOLUTION RESPIRATORY (INHALATION) at 20:23

## 2022-06-15 RX ADMIN — LORAZEPAM 1 MG: 2 INJECTION INTRAMUSCULAR; INTRAVENOUS at 04:15

## 2022-06-15 RX ADMIN — IPRATROPIUM BROMIDE AND ALBUTEROL SULFATE 3 ML: 2.5; .5 SOLUTION RESPIRATORY (INHALATION) at 07:41

## 2022-06-15 RX ADMIN — METHYLPREDNISOLONE SODIUM SUCCINATE 40 MG: 40 INJECTION, POWDER, FOR SOLUTION INTRAMUSCULAR; INTRAVENOUS at 21:04

## 2022-06-15 NOTE — ED PROCEDURE NOTE
PROCEDURE  ECG 12 Lead Documentation Only    Date/Time: 6/14/2022 10:00 PM  Performed by: Renae Le MD  Authorized by: Renae Le MD     Indications / Diagnosis:  Shortness of breath, tachycardia, sepsis  ECG reviewed by me, the ED Provider: yes    Patient location:  ED  Interpretation:     Interpretation: abnormal    Rate:     ECG rate:  132    ECG rate assessment: tachycardic    Rhythm:     Rhythm: sinus tachycardia    Ectopy:     Ectopy: none    ST segments:     ST segments:  Non-specific  T waves:     T waves: non-specific           Renae Le MD  06/14/22 8468

## 2022-06-15 NOTE — ASSESSMENT & PLAN NOTE
-shortness of breath and cough and fever  -requiring 6L O2 to maintain sats upon arrival at [facility]  -treatment for pneumonia started in [facility] with IV Cefepime  -Pulmonary Exam notable for expiratory wheezing, decreased breath sounds bilaterally  -history of CHF (EF once down to 20%, but then improved to 50% in July 2021)  -history of COPD   -Previously Intubated in 2021 for respiratory distress thought 2/2 HF exacerbation, COPD, Infection  Plan  > Order CTA Chest to further assess and rule out any possible PE  > treatment for pneumonia as in sepsis section   > treatment of acute on chronic combined systolic and diastolic heart failure exacerbation as in that section  > treated for COPD exacerbation as in that section  > I reached out to Critical Care AP overnight via tiger text to inform her of patient's condition and patient's history in case respiratory status/clinical status worsens

## 2022-06-15 NOTE — CONSULTS
Consultation - Cardiology   Ryan Mathews 64 y o  female MRN: [id number]  Unit/Bed#: ICU 05 Encounter: [id number]      Assessment and Plan:    Sepsis ([address] )  -septic shock, hypotensive earlier today with SBP in the 70s  -concern for acute bacterial pneumonia with heart failure exacerbation and possible COPD exacerbation as well    Acute on chronic combined systolic and diastolic congestive heart failure  -TTE 03/27/2021 showed EF of 18%, moderate RV dysfunction, moderate MR, mild AI and TR  -LHC 03/30/2021 showed nonobstructive CAD  -TTE 07/09/2021 showed EF improved to 50%, mild MR  -nonischemic cardiomyopathy with improvement from EF of 18% to 50% with medical management  - repeat TTE this admission showed slight worsening of EF function to around 40% with hypokinesis of the basal segments and hyperkinesis of the apex indicating possible stress-induced cardiomyopathy  -proBNP 30,046, troponins 43 with a delta of 48 and EKG with no acute ischemic changes  -no evidence of ACS currently  -home heart failure regiment losartan 50 mg daily, and Toprol XL 50 mg daily  -diuretic regiment, receiving intermittent Lasix   -heart failure regiment currently on Toprol XL 25 mg daily    Acute respiratory failure with hypoxia (HCC)  -requiring BiPAP  -ABG at 4:30 a m  today showed respiratory acidosis with a pH of 7 165 and a CO2 of 57 6    Chronic obstructive pulmonary disease with acute exacerbation (HCC)    Chronic pain    Tobacco abuse   -active smoker  -encourage smoking cessation    Plan:   -recommend to continue with diuresis as tolerated and treatment of underlying infection  -no evidence of ACS at this time, blood work and echo were more consistent with stress-induced cardiomyopathy  -plan to repeat TTE when she has recovered from her acute illness to re-evaluate EF  -if EF does not improve on repeat echo, will consider an ischemic evaluation at that time      History of Present Illness   Physician Requesting Consult: Gissel Robles MD  Reason for Consult / Principal Problem:  Heart failure exacerbation and new wall motion abnormality  HPI: Jared Mcgill is a 64y o  year old female who presents with cough, fever, and shortness of breath  She has past medical history of hypertension, hyperlipidemia, COPD, combined systolic-diastolic heart failure, dilated cardiomyopathy, and tobacco abuse  She reports her symptoms started about a week prior to presentation  She initially noted dyspnea while doing yard work requiring her to take breaks more frequently  On around the time she also began to notice orthopnea when lying down at night  Of note, she reports her Lasix was discontinued about a month prior due to worsening renal function  She was unable to see cardiology due to lack of available appointments  When her breathing worsen, she tried taking 20 mg of Lasix and then 40 mg Lasix the day of her admission without significant improvement in her symptoms  In addition to her breathing, she also reports developing fever, chills and a cough during this past week as well  She also developed a dull aching sensation across her chest about 2 days prior to presentation which she thought felt like a pulled muscle in the setting of her coughing  She was transferred from [facility] to [facility] for [facility] due to lack of beds there  In [facility], she had rapid response called on her for altered mental status and hypoxia for which she was started on BiPAP and given additional Lasix  Inpatient consult to Cardiology  Consult performed by: Glo Johns MD  Consult ordered by: Gissel Robles MD          Review of Systems:  Review of Systems   Constitutional: Positive for chills, fatigue and fever  HENT: Negative for congestion  Respiratory: Positive for cough and shortness of breath  Cardiovascular: Positive for chest pain  Negative for palpitations and leg swelling  Gastrointestinal: Negative for diarrhea, nausea and vomiting  Genitourinary: Negative for dysuria  Musculoskeletal: Negative for joint swelling  Skin: Negative for color change, pallor and rash  Neurological: Negative for syncope  Historical Information   Past Medical History:   Diagnosis Date    Cardiomyopathy ([address] )     COPD (chronic obstructive pulmonary disease) (HCC)     Fatty liver     Hyperlipidemia     Hypertension     Mitral regurgitation      Past Surgical History:   Procedure Laterality Date    CARDIAC CATHETERIZATION  2021    Moderate non-obstructive atherosclerosis     SECTION      CHOLECYSTECTOMY      ROTATOR CUFF REPAIR W/ DISTAL CLAVICLE EXCISION Right     TONSILLECTOMY       Social History     Substance and Sexual Activity   Alcohol Use None     Social History     Substance and Sexual Activity   Drug Use No     Social History     Tobacco Use   Smoking Status Current Every Day Smoker    Packs/day: 2 00    Types: Cigarettes   Smokeless Tobacco Never Used     Family History: non-contributory    Meds/Allergies   all current active meds have been reviewed and current meds:   Current Facility-Administered Medications   Medication Dose Route Frequency    cefTRIAXone (ROCEPHIN) 1,000 mg in dextrose 5 % 50 mL IVPB  1,000 mg Intravenous Q24H    heparin (porcine) subcutaneous injection 5,000 Units  5,000 Units Subcutaneous Q8H [address]    ipratropium-albuterol (DUO-NEB) 0 5-2 5 mg/3 mL inhalation solution 3 mL  3 mL Nebulization Q6H    methylPREDNISolone sodium succinate (Solu-MEDROL) injection 40 mg  40 mg Intravenous Q12H [address]    metoprolol succinate (TOPROL-XL) 24 hr tablet 25 mg  25 mg Oral Daily     Allergies   Allergen Reactions    Wellbutrin [Bupropion] Arthralgia       Objective   Vitals: Blood pressure 91/60, pulse 76, temperature (!) 97 °F (36 1 °C), resp  rate 21, height 5' 7" (1 702 m), weight 77 1 kg (170 lb), SpO2 100 %  , Body mass index is 26 63 kg/m² ,   Orthostatic Blood Pressures    Flowsheet Row Most Recent Value   Blood Pressure 91/60 filed at 06/15/2022 1430            Intake/Output Summary (Last 24 hours) at 6/15/2022 1524  Last data filed at 6/15/2022 1201  Gross per 24 hour   Intake --   Output 550 ml   Net -550 ml       Invasive Devices  Report    Peripheral Intravenous Line  Duration           Peripheral IV 06/14/22 Right Antecubital 1 day          Drain  Duration           Urethral Catheter Temperature probe <1 day                    Physical Exam:  Physical Exam  Constitutional:       General: She is not in acute distress  Appearance: She is well-developed  She is not diaphoretic  Eyes:      Conjunctiva/sclera: Conjunctivae normal       Pupils: Pupils are equal, round, and reactive to light  Neck:      Vascular: JVD present  Cardiovascular:      Rate and Rhythm: Normal rate and regular rhythm  Heart sounds: Normal heart sounds  No murmur heard  No friction rub  No gallop  Pulmonary:      Effort: Pulmonary effort is normal       Comments: Bilateral diffuse rhonchi with crackles at bases  Abdominal:      General: Bowel sounds are normal  There is no distension  Palpations: Abdomen is soft  Tenderness: There is no abdominal tenderness  There is no guarding  Musculoskeletal:         General: Normal range of motion  Skin:     General: Skin is warm and dry  Neurological:      Mental Status: She is alert and oriented to person, place, and time     Psychiatric:         Behavior: Behavior normal            Lab Results:     Lab Results   Component Value Date    TROPONINI 0 11 (H) 06/13/2021    TROPONINI 0 10 (H) 06/13/2021    TROPONINI 0 04 (H) 06/13/2021       Lab Results   Component Value Date    CALCIUM 10 1 06/14/2022     04/05/2018    K 3 9 06/14/2022    CO2 19 (L) 06/14/2022    CL 99 06/14/2022    BUN 19 06/14/2022    CREATININE 1 24 06/14/2022       Lab Results   Component Value Date    WBC 23 07 (H) 06/15/2022    HGB 14 1 06/15/2022    HCT 43 3 06/15/2022    MCV 93 06/15/2022     06/15/2022       Lab Results   Component Value Date    CHOL 216 (H) 04/05/2018     Lab Results   Component Value Date    HDL 43 (L) 05/12/2022    HDL 49 (L) 02/09/2022    HDL 47 06/22/2021     Lab Results   Component Value Date    LDLCALC 113 (H) 05/12/2022    LDLCALC 121 (H) 02/09/2022    LDLCALC 125 (H) 06/22/2021     Lab Results   Component Value Date    TRIG 91 05/12/2022    TRIG 102 02/09/2022    TRIG 144 06/22/2021       Lab Results   Component Value Date    ALT 7 06/14/2022    AST 14 06/14/2022       Results from last 7 days   Lab Units 06/14/22  2204   INR  1 22*         Imaging: I have personally reviewed pertinent reports        EKG: Sinus tachycardia with a heart rate of 132 bpm, LVH

## 2022-06-15 NOTE — ED NOTES
Report called to/care transferred to [address] [place] [address] at [facility] CINDY Boothe RN  06/15/22 0618

## 2022-06-15 NOTE — OCCUPATIONAL THERAPY NOTE
OCCUPATIONAL THERAPY CANCELLATION     OT orders received and chart reviewed  Pt s/p RRT this morning and transferred to ICU  Per RN, pt not medically appropriate at this time  Will f/u and see as able and appropriate       Mg Vernon MS, OTR/L

## 2022-06-15 NOTE — RAPID RESPONSE
Rapid Response Note  Usman Leslie 64 y o  female MRN: [id number]  Unit/Bed#: E5 -01 Encounter: [id number]    Rapid Response Notification(s):   Response called date/time:  6/15/2022 4:16 AM  Response team arrival date/time:  6/15/2022 4:20 AM  Level of care:  Stepdown 2  Rapid response location:  [facility]  Primary reason for rapid response call:  Acute change in O2 sat and acute change in heart rate    Rapid Response Intervention(s):   Breathing:  Oxygen and other (comment)  Comments:  Placed on BIPAP, nitro paste, 40 mg lasix       Assessment:   · Acute respiratory failure secondary to flash pulmonary edema    Plan:   · ABG  · BIPAP  · Nitro paste  · Had previously been administered ativan so will hold on morphine for now  · May require intubation- will transfer to the ICU for closer monitoring     Rapid Response Outcome:   Transfer:  Transfer to ICU  Primary service notified of transfer: Yes    Code Status: Level 1 (Full Code)      Family notified of transfer: called by primary team  Family member contacted:      Background/Situation:    Usman Leslie is a 64 y o  female who has past medical history significant for COPD, chronic combined systolic and diastolic heart failure, dilated cardiomyopathy, chronic pain, anxiety who presented to [facility] ER on the evening of 6/14 with worsening symptoms of cough, fever, shortness of breath over the last two weeks  Patient was transferred [facility] due to [facility]  Patient reports that she first noticed the sob two weeks ago when she was mowing the lawn and felt extremely fatigued and had to stop to sit down  Patient then reports several episodes of trying to walk up the stairs at home and having to stop for severe sob and fatigue  Patient also reports a non-productive persistent cough  Patient additionally reports a fever yesterday of 102 0   Patient additionally reports that she has a history of CHF in the past for which she had to be intubated in 2021  Patient initially had an EF of 20% on echocardiogram then, but then her EF improved on subsequent echo 6 months later to 50%  Patient does report that she occasionally takes lasix pills at home and took a couple doses over the previous couple days which "were making her urinate too much" so she stopped taking them  Patient also reports a history of daily cigarette use and that she thinks she has been wheezing  Patient denies any chest pain or abdominal pain on my exam       The patient deteriorated after arrival to the hospital with increased work of breathing and agitation  She underwent a CTA and returned to the floor in respiratory distress  The CTA appears to show pulmonary edema with a LLL infiltrate  She was transferred to the ICU       Review of Systems   Unable to perform ROS: Acuity of condition       Objective:   Vitals:    06/15/22 0225   BP: 128/94   Pulse: (!) 110   Resp: 19   Temp: 98 6 °F (37 °C)   SpO2: 95%     Physical Exam  Constitutional:       General: She is in acute distress  Appearance: She is ill-appearing  HENT:      Head: Normocephalic  Mouth/Throat:      Mouth: Mucous membranes are moist    Eyes:      Pupils: Pupils are equal, round, and reactive to light  Cardiovascular:      Rate and Rhythm: Tachycardia present  Pulmonary:      Effort: Respiratory distress present  Breath sounds: Rales present  Abdominal:      General: There is distension  Tenderness: There is no abdominal tenderness  Musculoskeletal:         General: Swelling present  Cervical back: Neck supple  Skin:     General: Skin is warm  Neurological:      Comments: Responds to painful stimuli, in acute respiratory distress         Portions of the record may have been created with voice recognition software  Occasional wrong word or "sound a like" substitutions may have occurred due to the inherent limitations of voice recognition software    Read the chart carefully and recognize, using context, where substitutions have occurred      Camella Krabbe, CRNP

## 2022-06-15 NOTE — QUICK NOTE
Evaluated patient for sepsis alert  tmep 101 7, , RR 30, /95, 89% RA  Treatment discussed with ED provider  Lactic negative, 2/2 BC pending, Procal pending, CXR completed  1L bolus given  Patient A&Ox's 4 on assessment  Patient reports taking lasix in the past for similar symptoms  +1/2 pitting edema in lower extremities noted   ED provider planned to treat for COPD/CHF

## 2022-06-15 NOTE — UTILIZATION REVIEW
Inpatient Admission Authorization Request   NOTIFICATION OF INPATIENT ADMISSION/INPATIENT AUTHORIZATION REQUEST   SERVICING FACILITY:   [address] [place] [address], [facility], [address] [facility]  Tax ID: [id number]  NPI: [id number]  Place of Service: Inpatient [facility]  [address]  Place of Service Code: 24     ATTENDING PROVIDER:  Attending Name and NPI#: [place], [place] [[id number]]  Address: [address] [place] [address], [facility], [facility] [address] [facility]  Phone: [phone]     UTILIZATION REVIEW CONTACT:  [facility] Utilization   Network Utilization Review Department  Phone: [phone]  Fax: [phone]  Email: [email or web address]     PHYSICIAN ADVISORY SERVICES:  FOR [id number] REVIEW - MEDICAL NECESSITY DENIAL  Phone: [phone]  Fax: [phone]  Email: [email or web address]     TYPE OF REQUEST:  Inpatient Status     ADMISSION INFORMATION:  ADMISSION DATE/TIME: 6/15/22  2:20 AM  PATIENT DIAGNOSIS CODE/DESCRIPTION:  Pneumonia [J18 9]  DISCHARGE DATE/TIME: No discharge date for patient encounter  IMPORTANT INFORMATION:  Please contact the [facility] directly with any questions or concerns regarding this request  Department voicemails are confidential     Send requests for admission clinical reviews, concurrent reviews, approvals, and administrative denials due to lack of clinical to fax [phone]

## 2022-06-15 NOTE — PROGRESS NOTES
Progress Note - Critical Care   Allegra Garcia 64 y o  female MRN: [id number]  Unit/Bed#: ICU 05 Encounter: [id number]    Assessment/Plan:  1  Acute hypoxemic and hypercapnic respiratory failure  · Patient required BiPAP last night  · Still with increased work of breathing and required BiPAP again during the day today  2  Acute bacterial pneumonia  · Continue ceftriaxone  · Received Zosyn yesterday  · Await culture results  3  Acute on chronic combined systolic and diastolic congestive heart failure  · Continue diuresis as able  · Would benefit from cardiology consultation given wall motion abnormality on echocardiogram and acute congestive heart failure component  4  Mild acute kidney injury, baseline 0 9  · Continue to monitor creatinine  · Continue cautious diuresis  5  COPD/emphysema with possible mild exacerbation  · Continue steroids but reduce dose today  · He does not clearly there is a true exacerbation at this time  · Continue bronchodilators  6  Tobacco abuse  · Encourage complete tobacco cessation  · Nicotine replacement therapy      Critical Care Time: 39 minutes  Documented critical care time excludes any procedures documented elsewhere  It also excludes any family updates    _____________________________________________________________________    HPI/24hr events:   Transfer events to the ICU reviewed  Patient initially admitted from [address] [place] [address] due to lack of beds  Was on medical-surgical floor but rapidly decompensated while at CT scan  Transfer to the ICU for hypoxemic and hypercapnic respiratory failure and need for BiPAP acutely  Was able to be transitioned to nasal cannula this morning but has had variable work of breathing and is now currently back on BiPAP  Naveen Hong     Medications:  Current Facility-Administered Medications   Medication Dose Route Frequency Provider Last Rate    cefTRIAXone  1,000 mg Intravenous Q24H Moni Quevedo PA-C 1,000 mg (06/15/22 7524)    heparin (porcine)  5,000 Units Subcutaneous [facility] [place] Sanchezserjaved, ESTHER      ipratropium-albuterol  3 mL Nebulization Q6H ESTHER Johnston      LORazepam           methylPREDNISolone sodium succinate  40 mg Intravenous Q12H [address] [place], [place]      metoprolol succinate  25 mg Oral Daily ESTHER Johnston                Physical exam:  Vitals: Body mass index is 26 63 kg/m²  Blood pressure 152/68, pulse 66, temperature (!) 97 4 °F (36 3 °C), resp  rate 21, height 5' 7" (1 702 m), weight 77 1 kg (170 lb), SpO2 96 %  ,  Temp  Min: 97 4 °F (36 3 °C)  Max: 101 7 °F (38 7 °C)       SpO2: 96 %            Intake/Output Summary (Last 24 hours) at 6/15/2022 1317  Last data filed at 6/15/2022 1201  Gross per 24 hour   Intake --   Output 550 ml   Net -550 ml       Invasive/non-invasive ventilation settings:   Respiratory  Report   Lab Data (Last 4 hours)    None         O2/Vent Data (Last 4 hours)    None              Invasive Devices  Report    Peripheral Intravenous Line  Duration           Peripheral IV 06/14/22 Right Antecubital 1 day          Drain  Duration           Urethral Catheter Temperature probe <1 day                  Physical Exam:  Gen:  Appears fatigued  No conversational dyspnea  Currently wearing BiPAP  HEENT:  Pupils reactive  Oropharynx moist  Neck:  No JVD or adenopathy  Chest:  Symmetric, few coarse breath sounds  Cor:  Regular  Abd:  Soft  Ext:  No edema  Neuro:  Nonfocal  Skin:  Warm and dry      Diagnostic Data:  Lab: I have personally reviewed pertinent lab results       ABG:   Lab Results   Component Value Date    PHART 7 165 (LL) 06/15/2022    EDT4GSO 57 6 (HH) 06/15/2022    PO2ART 134 0 (H) 06/15/2022    KXV8ZXN 20 3 (L) 06/15/2022    BEART -8 8 06/15/2022    SOURCE Radial, Left 06/15/2022   ,     CBC:   Results from last 7 days   Lab Units 06/15/22  0503 06/14/22  2204   WBC Thousand/uL 23 07* 15 74*   HEMOGLOBIN g/dL 14 1 14 0   HEMATOCRIT % 43 3 42 1   PLATELETS Thousands/uL 367 307       CMP:   Results from last 7 days   Lab Units 06/14/22  2204   SODIUM mmol/L 131*   POTASSIUM mmol/L 3 9   CHLORIDE mmol/L 99   CO2 mmol/L 19*   BUN mg/dL 19   CREATININE mg/dL 1 24   CALCIUM mg/dL 10 1   ALK PHOS U/L 90   ALT U/L 7   AST U/L 14     PT/INR:   Lab Results   Component Value Date    INR 1 22 (H) 06/14/2022   ,   Magnesium:   Results from last 7 days   Lab Units 06/14/22  2204   MAGNESIUM mg/dL 1 7*       Microbiology:  Results from last 7 days   Lab Units 06/14/22  2206 06/14/22 2204   BLOOD CULTURE  Received in Microbiology Lab  Culture in Progress  Received in Microbiology Lab  Culture in Progress  Imaging:  Personally reviewed the CT scan of the chest on the [facility] system  This study shows no evidence of pulmonary embolism but was limited study due to motion  Moderate emphysema is present bilaterally  This is most notable in the right upper lobe  Left lower lobe peripheral consolidation and small left pleural effusion  Likely superimposed edema    Cardiac lab/EKG/telemetry/ECHO:   Results from last 7 days   Lab Units 06/15/22  0016 06/14/22 2204   HS TNI 0HR ng/L  --  43   HS TNI 2HR ng/L 48  --      Lab Results   Component Value Date    NTBNP 30,046 (H) 06/15/2022    NTBNP 2,533 (H) 06/22/2021    NTBNP 468 (H) 05/24/2021     Cardiac echo from today:  Ejection fraction 41%  Grade 1 diastolic dysfunction  Right ventricular size and function is low normal with estimated pulmonary artery pressure of 46 mmHg    Code Status: Level 1 - Full Code    Doc MD Brandi    Portions of the record may have been created with voice recognition software  Occasional wrong word or "sound a like" substitutions may have occurred due to the inherent limitations of voice recognition software  Read the chart carefully and recognize, using context, where substitutions have occurred

## 2022-06-15 NOTE — QUICK NOTE
Upon coming back from CT, patient with increased work of breathing/agitation and placed on NRB  Rapid response called by myself and stat IV lasix 40mg given as well as patient placed on bipap  ICU team quickly at bedside to assist and they recommended moving patient to ICU for further treatment and possible intubation if necessary  Discussed case and events with ICU team as well as treatments that had been performed to that point  Abx switched to zosyn for anaerobic coverage  We held off vancomycin immediately given that patient had just had contrast load and was getting IV lasix  I additionally spoke with patient's daughter, vini avitia, to inform her of events and that her mother was now in the ICU on bipap for acute hypoxic and hypercapnic respiratory failure in the setting of pneumonia/emphysema/heart failure exacerbation  I did discuss that patient may require intubation in the ICU if her respiratory status did not improve and daughter reported that patient would want this and that she was ok with this if it had to be done  Daughter was also aware of possibility that mother may not be able to get off the ventilator, but reported that if that's the only option that it should be done  Daughter further reported that patient has been intubated at least twice in the past besides the episode a year ago here at [facility] for similar symptoms  Daughter also reported that she would try to come to the hospital to see her mother in a couple hours

## 2022-06-15 NOTE — ASSESSMENT & PLAN NOTE
History of COPD and current tobacco use  Audible wheezing on exam  Given DuoNebs in the ER  Plan  > treat for concurrent COPD exacerbation with scheduled DuoNebs as well as IV Solu-Medrol  > pulmonology consulted

## 2022-06-15 NOTE — RESPIRATORY THERAPY NOTE
RT Protocol Note  Ree Rollins 64 y o  female MRN: [id number]  Unit/Bed#: ED 25 Encounter: [id number]    Assessment    Active Problems:    * No active hospital problems  *      Home Pulmonary Medications:  None       Past Medical History:   Diagnosis Date    Cardiomyopathy ([address] )     COPD (chronic obstructive pulmonary disease) (HCC)     Fatty liver     Hyperlipidemia     Hypertension     Mitral regurgitation      Social History     Socioeconomic History    Marital status: Single     Spouse name: None    Number of children: None    Years of education: None    Highest education level: None   Occupational History    None   Tobacco Use    Smoking status: Current Every Day Smoker     Packs/day: 1 00     Types: Cigarettes    Smokeless tobacco: Never Used   Vaping Use    Vaping Use: Never used   Substance and Sexual Activity    Alcohol use: No    Drug use: No    Sexual activity: None   Other Topics Concern    None   Social History Narrative    None     Social Determinants of Health     Financial Resource Strain: Not on file   Food Insecurity: Not on file   Transportation Needs: Not on file   Physical Activity: Not on file   Stress: Not on file   Social Connections: Not on file   Intimate Partner Violence: Not on file   Housing Stability: Not on file       Subjective         Objective    Physical Exam:   Assessment Type: During-treatment  General Appearance: Alert, Awake  Respiratory Pattern: Labored  Chest Assessment: Chest expansion symmetrical  Bilateral Breath Sounds: Clear, Diminished  Cough: Non-productive  O2 Device: NC    Vitals:  Blood pressure (!) 173/95, pulse (!) 133, temperature (!) 101 7 °F (38 7 °C), temperature source Oral, resp  rate (!) 30, SpO2 94 %  Imaging and other studies: I have personally reviewed pertinent reports  O2 Device: NC     Plan    Respiratory Plan: Mild Distress pathway        Resp Comments: Pt admited to ED with SOB given hr long tx   Pt denies home O2, Cpap, or respiratory meds

## 2022-06-15 NOTE — ASSESSMENT & PLAN NOTE
In the setting of PNA       Plan:   · continue solumedrol taper   · continue bronchodilator therapy  · Abx - rocephin   · Incentive spirometry

## 2022-06-15 NOTE — ASSESSMENT & PLAN NOTE
Cough, fever and abdominal bloating since Monday  Temperature 101 7°, heart rate 133, WBC 15 K  Chest x-ray per ER with left lower lobe pneumonia  COVID, RSV and influenza negative  Lactic acid negative, procalcitonin 0 06  VB 48/30/57/22  Given IV cefepime and 1 L normal saline at [facility]  Plan  > admit to medicine for treatment of sepsis  Continue IV cefepime  Order urine Legionella and strep pneumo antigens  Follow up blood cultures  Consult pulmonary    > ordered UA to evaluate urine  > consider Infectious Disease consultation

## 2022-06-15 NOTE — ASSESSMENT & PLAN NOTE
On PTA MS Contin, Percocet, Neurontin and Klonopin  Plan  > Ideally we would like to hold all of these medications as they can worsen respiratory drive  Will discuss my concerns with patient and recommendations

## 2022-06-15 NOTE — ASSESSMENT & PLAN NOTE
· Tachycardic, tachypneic   LLL infiltrate, possible aspiration pneumonia    Plan:  · Will continue rocephin   · Given pulmonary edema, would not continue with fluid resuscitation  · Follow blood cx   · Monitor hemodynamics   · Am CBC

## 2022-06-15 NOTE — ASSESSMENT & PLAN NOTE
Wt Readings from Last 3 Encounters:   06/16/22 76 5 kg (168 lb 10 4 oz)   07/13/21 71 7 kg (158 lb)   06/15/21 68 kg (149 lb 14 6 oz)     · Home meds: Metoprolol 50 mg daily, Losartan 50 mg daily   · ECHO - LVEF 41%  Basal regional wall motion abnormality in inferiorl, inferolateral and anteroseptal region  Likely stress induced from underlying pneumonia/sepsis     · Output 24 hr -1 7 L     Plan:   · Hold lasix - given rise in Creatinine  · Daily weights  · I/Os - leung in place   · Aim for even to negative fluid balance  · Metoprolol 25 mg daily

## 2022-06-15 NOTE — H&P
[facility]  H&P- Rolley Falling 1960, 64 y o  female MRN: [id number]  Unit/Bed#: E5 -01 Encounter: [id number]  Primary Care Provider: Esther Fili, DO   Date and time admitted to hospital: 6/15/2022  2:20 AM    * Sepsis [facility])  Assessment & Plan  Cough, fever and abdominal bloating since Monday  Temperature 101 7°, heart rate 133, WBC 15 K  Chest x-ray per ER with left lower lobe pneumonia  COVID, RSV and influenza negative  Lactic acid negative, procalcitonin 0 06  VB 48//57/22  Given IV cefepime and 1 L normal saline at [facility]  Plan  > admit to medicine for treatment of sepsis  Continue IV cefepime  Order urine Legionella and strep pneumo antigens  Follow up blood cultures  Consult pulmonary    > ordered UA to evaluate urine  > consider Infectious Disease consultation    Acute respiratory failure with hypoxia [facility])  Assessment & Plan  -shortness of breath and cough and fever  -requiring 6L O2 to maintain sats upon arrival at [facility]  -treatment for pneumonia started in [facility] with IV Cefepime  -Pulmonary Exam notable for expiratory wheezing, decreased breath sounds bilaterally  -history of CHF (EF once down to 20%, but then improved to 50% in 2021)  -history of COPD   -Previously Intubated in  for respiratory distress thought 2/2 HF exacerbation, COPD, Infection  Plan  > Order CTA Chest to further assess and rule out any possible PE  > treatment for pneumonia as in sepsis section   > treatment of acute on chronic combined systolic and diastolic heart failure exacerbation as in that section  > treated for COPD exacerbation as in that section  > I reached out to Critical Care AP overnight via tiger text to inform her of patient's condition and patient's history in case respiratory status/clinical status worsens    Chronic obstructive pulmonary disease with acute exacerbation ([facility] [address] )  Assessment & Plan  History of COPD and current tobacco use  Audible wheezing on exam  Given DuoNebs in the ER  Plan  > treat for concurrent COPD exacerbation with scheduled DuoNebs as well as IV Solu-Medrol  > pulmonology consulted    Acute on chronic combined systolic and diastolic congestive heart failure ([address] )  Assessment & Plan  Wt Readings from Last 3 Encounters:   07/13/21 71 7 kg (158 lb)   06/15/21 68 kg (149 lb 14 6 oz)   04/08/21 73 5 kg (162 lb)     -bilateral lower extremity edema on exam   -echo on 07/09/2021:  EF 50%  Mild enlargement of the ascending aorta to 3 7 cm  Mild mitral regurgitation  -do note that patient previously had an echo in March 2021 with a EF less than 20%  Underwent coronary angiography on 03/30/2021 with moderate, but nonobstructive disease  No interventions performed  -on PTA Toprol 50 mg daily and losartan  Plan  > CTA Chest PE protocol ordered to rule out PE and further evaluate pulmonary abnormality  > May trial Lasix 40mg IV pending CT results/BNP/blood pressure  > repeat 2D echo  > trend troponins  > strict I&Os  Daily weights  > consult cardiology for their opinion on case  > cardiac diet with 1500 cc fluid restriction        Chronic pain  Assessment & Plan  On PTA MS Contin, Percocet, Neurontin and Klonopin  Plan  > Ideally we would like to hold all of these medications as they can worsen respiratory drive  Will discuss my concerns with patient and recommendations  VTE Prophylaxis: Heparin  / sequential compression device   Code Status: Level 1 - Full Code Full Code     Anticipated Length of Stay:  Patient will be admitted on an Inpatient basis with an anticipated length of stay of  > 2 midnights  Justification for Hospital Stay: Please see detailed plans noted above      Chief Complaint:     Cough, fever, shortness of breath  History of Present Illness:  Dafne Jerry is a 64 y o  female who has past medical history significant for COPD, chronic combined systolic and diastolic heart failure, dilated cardiomyopathy, chronic pain, anxiety who [facility] with worsening symptoms of cough, fever, shortness of breath over the last two weeks  Patient was transferred to [facility] due to [facility] at [facility]  Patient reports that she first noticed the sob two weeks ago when she was mowing the lawn and felt extremely fatigued and had to stop to sit down  Patient then reports several episodes of trying to walk up the stairs at home and having to stop for severe sob and fatigue  Patient also reports a non-productive persistent cough  Patient additionally reports a fever yesterday of 102 0  Patient additionally reports that she has a history of CHF in the past for which she had to be intubated in 2021  Patient initially had an EF of 20% on echocardiogram then, but then her EF improved on subsequent echo 6 months later to 50%  Patient does report that she occasionally takes lasix pills at home and took a couple doses over the previous couple days which "were making her urinate too much" so she stopped taking them  Patient also reports a history of daily cigarette use and that she thinks she has been wheezing   Patient denies any chest pain or abdominal pain on my exam         Review of Systems:    Constitutional:  Positive for fever and fatigue  Eyes:  Denies change in visual acuity   HENT:  Denies nasal congestion or sore throat   Respiratory:  Positive for cough and shortness of breath  Cardiovascular:  Denies chest pain or edema   GI:  Positive for abdominal discomfort and nausea  :  Denies dysuria   Musculoskeletal:  Denies back pain or joint pain   Integument:  Denies rash   Neurologic:  Denies  sensory changes   Endocrine:  Denies polyuria or polydipsia   Lymphatic:  Denies swollen glands   Psychiatric:  Denies depression or anxiety     Past Medical and Surgical History:   Past Medical History:   Diagnosis Date    Cardiomyopathy ([facility] [address] )     COPD [facility] obstructive [facility] disease) [address] [address] )     Fatty liver     Hyperlipidemia     Hypertension     Mitral regurgitation      Past Surgical History:   Procedure Laterality Date    CARDIAC CATHETERIZATION  2021    Moderate non-obstructive atherosclerosis     SECTION      CHOLECYSTECTOMY      ROTATOR CUFF REPAIR W/ DISTAL CLAVICLE EXCISION Right     TONSILLECTOMY         Meds/Allergies:  PTA medications: Toprol, Losartan, klonopin, MS contin, percocet, neurontin       Allergies: Allergies   Allergen Reactions    Wellbutrin [Bupropion] Arthralgia     History:    Substance Use History:   Social History     Substance and Sexual Activity   Alcohol Use No     Social History     Tobacco Use   Smoking Status Current Every Day Smoker    Packs/day: 1 00    Types: Cigarettes   Smokeless Tobacco Never Used     Social History     Substance and Sexual Activity   Drug Use No       Family History:  No family history on file  Physical Exam:     Vitals: 124/59, 122, 20, 98 5, 92%  Blood Pressure: 128/94 (06/15/22 0225)  Pulse: (!) 110 (06/15/22 0225)  Temperature: 98 6 °F (37 °C) (06/15/22 0225)  Respirations: 19 (06/15/22 0225)  SpO2: 95 % (06/15/22 0225)    Constitutional:  Ill appearing, respiratory distress  Eyes:  PERRL, EOMI  HENT: external ears and nose normal, dry mouth  Respiratory:  Late expiratory wheezes, decreased breath sounds bilaterally,   Cardiovascular:  tachycardia, no gallops   GI:  Soft, nondistended  :  No costovertebral angle tenderness   Musculoskeletal:  No significant lower extremity edema,no deformities  Integument:  no rash   Neurologic:  Alert &awake, communicative, CN 2-12 normal,  no focal deficits noted   Psychiatric:  Pressured speech      Lab Results: I have personally reviewed pertinent reports        Results from last 7 days   Lab Units 22  2204   WBC Thousand/uL 15 74*   HEMOGLOBIN g/dL 14 0   HEMATOCRIT % 42 1   PLATELETS Thousands/uL 307   NEUTROS PCT % 84*   LYMPHS PCT % 9* MONOS PCT % 6   EOS PCT % 0     Results from last 7 days   Lab Units 06/14/22  2204   POTASSIUM mmol/L 3 9   CHLORIDE mmol/L 99   CO2 mmol/L 19*   BUN mg/dL 19   CREATININE mg/dL 1 24   CALCIUM mg/dL 10 1   ALK PHOS U/L 90   ALT U/L 7   AST U/L 14     Results from last 7 days   Lab Units 06/14/22  2204   INR  1 22*         Imaging: I have personally reviewed pertinent reports  No results found  ** Please Note: Dragon 360 Dictation voice to text software was used in the creation of this document   **

## 2022-06-15 NOTE — ED PROVIDER NOTES
History  Chief Complaint   Patient presents with    Respiratory Distress     Pt presents via EMS from home c/o resp distress starting last week while mowing grass but has progressively gotten worse over the last week  Pt denies CP, N/V, dizziness, or known sick contacts  65 YO female  PMH:   Chronic smoker, pack per day  Hypertension  Dilated cardiomyopathy  CHF  COPD    Mode of transport:  EMS  EMS interventions:  [facility]    Pt here for evaluation of cough, fever, shortness of breath  Cough was NP      Home Interventions:  None      Patient denies Abdominal discomfort  Denies Nausea, No vomiting  No Diarrhea     No urinary complaints:  No dysuria/hematuria/frequency     No cp or pressure      Denies headache   No neck stiffness      No recent travel  No immobilization  No history of PE or DVT  No leg swelling or calf pain  No hemoptysis    No other complaints      History provided by:  Patient  Shortness of Breath  Severity:  Severe  Onset quality:  Gradual  Progression:  Worsening  Chronicity:  Recurrent  Relieved by:  Nothing  Worsened by:  Coughing  Associated symptoms: cough, fever and wheezing    Associated symptoms: no abdominal pain, no chest pain, no claudication, no diaphoresis, no headaches, no hemoptysis, no neck pain, no rash, no sore throat, no sputum production, no syncope and no vomiting        Prior to Admission Medications   Prescriptions Last Dose Informant Patient Reported? Taking?    clonazePAM (KlonoPIN) 1 mg tablet   Yes No   Sig: Take 0 5-1 mg by mouth daily at bedtime as needed   gabapentin (NEURONTIN) 300 mg capsule   Yes No   Sig: Take 300 mg by mouth as needed    losartan (COZAAR) 50 mg tablet   Yes No   Sig: Take 50 mg by mouth daily   metoprolol succinate (TOPROL-XL) 50 mg 24 hr tablet   No No   Sig: Take 1 tablet (50 mg total) by mouth daily   morphine (MS CONTIN) 30 mg 12 hr tablet   Yes No   Sig: Take 30 mg by mouth 2 (two) times a day   oxyCODONE-acetaminophen (PERCOCET) 5-325 mg per tablet   Yes No   Sig: Take 1 tablet by mouth every 4 (four) hours as needed for moderate pain      Facility-Administered Medications: None       Past Medical History:   Diagnosis Date    Cardiomyopathy ([facility] )     COPD (chronic obstructive pulmonary disease) ([facility] )     Fatty liver     Hyperlipidemia     Hypertension     Mitral regurgitation        Past Surgical History:   Procedure Laterality Date    CARDIAC CATHETERIZATION  2021    Moderate non-obstructive atherosclerosis     SECTION      CHOLECYSTECTOMY      ROTATOR CUFF REPAIR W/ DISTAL CLAVICLE EXCISION Right     TONSILLECTOMY         History reviewed  No pertinent family history  I have reviewed and agree with the history as documented  E-Cigarette/Vaping    E-Cigarette Use Never User      E-Cigarette/Vaping Substances    Nicotine No     THC No     CBD No     Flavoring No     Other No     Unknown No      Social History     Tobacco Use    Smoking status: Current Every Day Smoker     Packs/day: 1 00     Types: Cigarettes    Smokeless tobacco: Never Used   Vaping Use    Vaping Use: Never used   Substance Use Topics    Alcohol use: No    Drug use: No       Review of Systems   Constitutional: Positive for chills, fatigue and fever  Negative for diaphoresis  HENT: Negative for rhinorrhea, sinus pressure, sinus pain, sneezing, sore throat, trouble swallowing and voice change  Respiratory: Positive for cough, shortness of breath and wheezing  Negative for hemoptysis, sputum production and stridor  Cardiovascular: Negative for chest pain, palpitations, claudication, leg swelling and syncope  Gastrointestinal: Negative for abdominal pain, blood in stool, diarrhea, nausea and vomiting  Genitourinary: Negative for difficulty urinating, dysuria, flank pain and frequency  Musculoskeletal: Negative for arthralgias, back pain, gait problem, joint swelling, myalgias, neck pain and neck stiffness     Skin: Negative for rash and wound  Neurological: Negative for dizziness, light-headedness and headaches  All other systems reviewed and are negative  Physical Exam  Physical Exam  Constitutional:       General: She is in acute distress  Appearance: She is well-developed  She is ill-appearing, toxic-appearing and diaphoretic  HENT:      Head: Normocephalic and atraumatic  Right Ear: External ear normal       Left Ear: External ear normal       Nose: Nose normal       Mouth/Throat:      Pharynx: No oropharyngeal exudate  Eyes:      General: No scleral icterus  Right eye: No discharge  Left eye: No discharge  Conjunctiva/sclera: Conjunctivae normal       Pupils: Pupils are equal, round, and reactive to light  Neck:      Vascular: No JVD  Trachea: No tracheal deviation  Cardiovascular:      Rate and Rhythm: Normal rate and regular rhythm  Pulses: Normal pulses  Heart sounds: Normal heart sounds  No murmur heard  No friction rub  No gallop  Pulmonary:      Effort: Respiratory distress present  Breath sounds: No stridor  Rhonchi (Left lower lobe) present  No wheezing or rales  Chest:      Chest wall: No tenderness  Abdominal:      General: Bowel sounds are normal  There is no distension  Palpations: Abdomen is soft  There is no mass  Tenderness: There is no abdominal tenderness  There is no right CVA tenderness, left CVA tenderness, guarding or rebound  Hernia: No hernia is present  Musculoskeletal:         General: No swelling, tenderness, deformity or signs of injury  Normal range of motion  Cervical back: Normal range of motion and neck supple  No rigidity or tenderness  Right lower leg: No edema  Left lower leg: No edema  Lymphadenopathy:      Cervical: No cervical adenopathy  Skin:     General: Skin is warm  Capillary Refill: Capillary refill takes less than 2 seconds  Coloration: Skin is not jaundiced or pale  Findings: No bruising, erythema, lesion or rash  Neurological:      General: No focal deficit present  Mental Status: She is alert and oriented to person, place, and time  Mental status is at baseline  Cranial Nerves: No cranial nerve deficit  Sensory: No sensory deficit  Motor: No weakness or abnormal muscle tone  Coordination: Coordination normal    Psychiatric:         Thought Content:  Thought content normal          Judgment: Judgment normal       Comments: Ill affect         Vital Signs  ED Triage Vitals   Temperature Pulse Respirations Blood Pressure SpO2   06/14/22 2158 06/14/22 2157 06/14/22 2157 06/14/22 2157 06/14/22 2157   (!) 101 7 °F (38 7 °C) (!) 133 (!) 30 (!) 173/95 (!) 89 %      Temp Source Heart Rate Source Patient Position - Orthostatic VS BP Location FiO2 (%)   06/14/22 2158 06/14/22 2157 06/14/22 2157 06/14/22 2157 --   Oral Monitor Sitting Left arm       Pain Score       06/14/22 2322       Med Not Given for Pain - for MAR use only           Vitals:    06/15/22 0015 06/15/22 0030 06/15/22 0100 06/15/22 0115   BP: 124/59 120/59 109/57 106/57   Pulse: (!) 122 (!) 120 (!) 110 (!) 106   Patient Position - Orthostatic VS:             Visual Acuity      ED Medications  Medications   sodium chloride 0 9 % bolus 1,000 mL (0 mL Intravenous Stopped 6/14/22 2330)   albuterol inhalation solution 10 mg (10 mg Nebulization Given 6/14/22 2213)     And   ipratropium (ATROVENT) 0 02 % inhalation solution 1 mg (1 mg Nebulization Given 6/14/22 2213)     And   sodium chloride 0 9 % inhalation solution 3 mL (3 mL Nebulization Given 6/14/22 2213)   cefepime (MAXIPIME) IVPB (premix in dextrose) 2,000 mg 50 mL (0 mg Intravenous Stopped 6/15/22 0016)   acetaminophen (TYLENOL) tablet 650 mg (650 mg Oral Given 6/14/22 2322)   magnesium sulfate IVPB (premix) SOLN 1 g (0 g Intravenous Stopped 6/15/22 0016)       Diagnostic Studies  Results Reviewed     Procedure Component Value Units Date/Time HS Troponin I 2hr [[id number]]  (Normal) Collected: 06/15/22 0016    Lab Status: Final result Specimen: Blood from Arm, Right Updated: 06/15/22 0050     hs TnI 2hr 48 ng/L      Delta 2hr hsTnI 5 ng/L     HS Troponin I 4hr [[id number]] Updated: 06/15/22 0022    Lab Status: No result Specimen: Blood     COVID/FLU/RSV - 2 hour TAT [[id number]]  (Normal) Collected: 06/14/22 2204    Lab Status: Final result Specimen: Nares from Nose Updated: 06/14/22 2252     SARS-CoV-2 Negative     INFLUENZA A PCR Negative     INFLUENZA B PCR Negative     RSV PCR Negative    Narrative:      FOR PEDIATRIC PATIENTS - copy/paste COVID Guidelines URL to browser: [email or web address]  [employer]    SARS-CoV-2 assay is a Nucleic Acid Amplification assay intended for the  qualitative detection of nucleic acid from SARS-CoV-2 in nasopharyngeal  swabs  Results are for the presumptive identification of SARS-CoV-2 RNA  Positive results are indicative of infection with SARS-CoV-2, the virus  causing COVID-19, but do not rule out bacterial infection or co-infection  with other viruses  Laboratories within the [place] and its  territories are required to report all positive results to the appropriate  public health authorities  Negative results do not preclude SARS-CoV-2  infection and should not be used as the sole basis for treatment or other  patient management decisions  Negative results must be combined with  clinical observations, patient history, and epidemiological information  This test has not been FDA cleared or approved  This test has been authorized by FDA under an Emergency Use Authorization  (EUA)   This test is only authorized for the duration of time the  declaration that circumstances exist justifying the authorization of the  emergency use of an in vitro diagnostic tests for detection of SARS-CoV-2  virus and/or diagnosis of COVID-19 infection under section 564(b)(1) of  the Act, [place]  360bbb-3(b)(1), unless the authorization is terminated  or revoked sooner  The test has been validated but independent review by FDA  and CLIA is pending  Test performed using [employer]: This RT-PCR assay targets N2,  a region unique to SARS-CoV-2  A conserved region in the E-gene was chosen  for pan-Sarbecovirus detection which includes SARS-CoV-2      Procalcitonin [[id number]]  (Normal) Collected: 06/14/22 2204    Lab Status: Final result Specimen: Blood from Arm, Right Updated: 06/14/22 2243     Procalcitonin 0 06 ng/ml     HS Troponin 0hr (reflex protocol) [[id number]]  (Normal) Collected: 06/14/22 2204    Lab Status: Final result Specimen: Blood from Arm, Right Updated: 06/14/22 2241     hs TnI 0hr 43 ng/L     Comprehensive metabolic panel [[id number]]  (Abnormal) Collected: 06/14/22 2204    Lab Status: Final result Specimen: Blood from Arm, Right Updated: 06/14/22 2239     Sodium 131 mmol/L      Potassium 3 9 mmol/L      Chloride 99 mmol/L      CO2 19 mmol/L      ANION GAP 13 mmol/L      BUN 19 mg/dL      Creatinine 1 24 mg/dL      Glucose 142 mg/dL      Calcium 10 1 mg/dL      AST 14 U/L      ALT 7 U/L      Alkaline Phosphatase 90 U/L      Total Protein 8 6 g/dL      Albumin 4 6 g/dL      Total Bilirubin 1 08 mg/dL      eGFR 46 ml/min/1 73sq m     Narrative:      [place] guidelines for Chronic Kidney Disease (CKD):     Stage 1 with normal or high GFR (GFR > 90 mL/min/1 73 square meters)    Stage 2 Mild CKD (GFR = 60-89 mL/min/1 73 square meters)    Stage 3A Moderate CKD (GFR = 45-59 mL/min/1 73 square meters)    Stage 3B Moderate CKD (GFR = 30-44 mL/min/1 73 square meters)    Stage 4 Severe CKD (GFR = 15-29 mL/min/1 73 square meters)    Stage 5 End Stage CKD (GFR <15 mL/min/1 73 square meters)  Note: GFR calculation is accurate only with a steady state creatinine    Magnesium [[id number]]  (Abnormal) Collected: 06/14/22 2204    Lab Status: Final result Specimen: Blood from Arm, Right Updated: 06/14/22 2239     Magnesium 1 7 mg/dL     Lactic acid [[id number]]  (Normal) Collected: 06/14/22 2204    Lab Status: Final result Specimen: Blood from Arm, Right Updated: 06/14/22 2237     LACTIC ACID 1 0 mmol/L     Narrative:      Result may be elevated if tourniquet was used during collection  Protime-INR [[id number]]  (Abnormal) Collected: 06/14/22 2204    Lab Status: Final result Specimen: Blood from Arm, Right Updated: 06/14/22 2230     Protime 15 3 seconds      INR 1 22    APTT [[id number]]  (Normal) Collected: 06/14/22 2204    Lab Status: Final result Specimen: Blood from Arm, Right Updated: 06/14/22 2230     PTT 34 seconds     CBC and differential [[id number]]  (Abnormal) Collected: 06/14/22 2204    Lab Status: Final result Specimen: Blood from Arm, Right Updated: 06/14/22 2214     WBC 15 74 Thousand/uL      RBC 4 66 Million/uL      Hemoglobin 14 0 g/dL      Hematocrit 42 1 %      MCV 90 fL      MCH 30 0 pg      MCHC 33 3 g/dL      RDW 13 2 %      MPV 9 2 fL      Platelets 131 Thousands/uL      nRBC 0 /100 WBCs      Neutrophils Relative 84 %      Immat GRANS % 0 %      Lymphocytes Relative 9 %      Monocytes Relative 6 %      Eosinophils Relative 0 %      Basophils Relative 1 %      Neutrophils Absolute 13 22 Thousands/µL      Immature Grans Absolute 0 07 Thousand/uL      Lymphocytes Absolute 1 40 Thousands/µL      Monocytes Absolute 0 96 Thousand/µL      Eosinophils Absolute 0 01 Thousand/µL      Basophils Absolute 0 08 Thousands/µL     Blood gas, Venous [[id number]]  (Abnormal) Collected: 06/14/22 2204    Lab Status: Final result Specimen: Blood from Arm, Right Updated: 06/14/22 2214     pH, Tr 7 481     pCO2, Tr 30 2 mm Hg      pO2, Tr 57 4 mm Hg      HCO3, Tr 22 0 mmol/L      Base Excess, Tr -0 4 mmol/L      O2 Content, Tr 18 4 ml/dL      O2 HGB, VENOUS 89 8 %     Blood culture #1 [[id number]] Collected: 06/14/22 2206    Lab Status:  In process Specimen: Blood from Arm, Right Updated: 06/14/22 2211    Blood culture #2 [[id number]] Collected: 06/14/22 2204    Lab Status: In process Specimen: Blood from Arm, Right Updated: 06/14/22 2211                 XR chest portable - 1 view   ED Interpretation by Renae Le MD (06/14 2312)   Copd  Possible pna  Likely fluid overload                  Procedures  Procedures         ED Course  ED Course as of 06/15/22 0132   Tue Jun 14, 2022   2203 Pt seen and evaluated  Ill appearing  Respiratory failure  Flags for sepsis    2204 Sepsis alert called    2308 Blood gas, Venous(!)   2308 Protime-INR(!)   2308 COVID/FLU/RSV - 2 hour TAT   2308 Magnesium(!): 1 7   2308 PTT: 34   2308 LACTIC ACID: 1 0   2308 hs TnI 0hr: 43   2308 Procalcitonin: 0 06   2308 Comprehensive metabolic panel(!)   2845 The 30ml/kg fluid bolus was not given to the patient despite hypotension and/or significantly elevated lactate of = 4 and/or presence of septic shock due to: Heart Failure  The patient will be administered 1L of crystalloid fluid instead  Orders for this have been placed in Epic  The patient may receive additional colloid or crystalloid fluids thereafter based on clinical condition  Renae Le MD     2216 Patient is stable and improving steadily  I offered admission here, as a holding the ER or transfer for bed capacity  She is okay to transfer due to bed capacity   2347 Discussed with PACs    They are working on a bed at [facility]  [address] Ketty Jeong the case  He accepts to med surg tele   Wed Leland 15, 2022   0131 Transport is here for the patient             HEART Risk Score    Flowsheet Row Most Recent Value   Heart Score Risk Calculator    History 0 Filed at: 06/14/2022 2312   ECG 1 Filed at: 06/14/2022 2312   Age 1 Filed at: 06/14/2022 2312   Risk Factors 2 Filed at: 06/14/2022 2312   Troponin 2 Filed at: 06/14/2022 2312   HEART Score 6 Filed at: 06/14/2022 2312                     Initial Sepsis Screening     Row Name 06/14/22 2204                Is the patient's history suggestive of a new or worsening infection? Yes (Proceed)  -YB        Suspected source of infection pneumonia  -YB        Are two or more of the following signs & symptoms of infection both present and new to the patient? --        Indicate SIRS criteria Hyperthemia > 38 3C (100 9F); Tachycardia > 90 bpm;Tachypnea > 20 resp per min  -YB        If the answer is yes to both questions, suspicion of sepsis is present --        If severe sepsis is present AND tissue hypoperfusion perists in the hour after fluid resuscitation or lactate > 4, the patient meets criteria for SEPTIC SHOCK --        Are any of the following organ dysfunction criteria present within 6 hours of suspected infection and SIRS criteria that are NOT considered to be chronic conditions?  --        Organ dysfunction --        Date of presentation of severe sepsis 06/14/22  -YB        Time of presentation of severe sepsis 2200  -YB        Tissue hypoperfusion persists in the hour after crystalloid fluid administration, evidenced, by either: --        Was hypotension present within one hour of the conclusion of crystalloid fluid administration? --        Date of presentation of septic shock --        Time of presentation of septic shock --              User Key  (r) = Recorded By, (t) = Taken By, (c) = Cosigned By    [address] Name Provider Type    Hermelinda Franz MD Physician                              MDM    Disposition  Final diagnoses:   Acute respiratory failure ([facility] )   Hypoxia   Hypomagnesemia   Sepsis ([facility] )   Pneumonia     Time reflects when diagnosis was documented in both MDM as applicable and the Disposition within this note     Time User Action Codes Description Comment    6/14/2022 11:10 PM Lisbeth Abrams [J96 00] Acute respiratory failure ([address] )     6/14/2022 11:11 PM Lisbeth Abrams [R09 02] Hypoxia     6/14/2022 11:11 PM Catalina Pelayo [E83 42] Hypomagnesemia     6/14/2022 11:36 PM Reford Copier Add [A41 9] Sepsis ([address] )     6/14/2022 11:36 PM Reford Copier Add [J18 9] Pneumonia       ED Disposition     ED Disposition   Transfer to Another Facility-In Network    Condition   --    Date/Time   Tue Jun 14, 2022 11:37 PM    Comment   Alea Knight should be transferred [address]           MD Documentation    [address] [facility] Most Recent Value   Patient Condition The patient has been stabilized such that within reasonable medical probability, no material deterioration of the patient condition or the condition of the unborn child(ebony) is likely to result from the transfer   Reason for Transfer Other (Include comment)____________________   Benefits of Transfer Other benefits (Include comment)_______________________   Risks of Transfer Potential for delay in receiving treatment, Potential deterioration of medical condition, Loss of IV, Increased discomfort during transfer, Possible worsening of condition or death during transfer   Accepting Physician Ney Chamorro   Sending KEYSHAWN Pizano   Provider Certification General risk, such as traffic hazards, adverse weather conditions, rough terrain or turbulence, possible failure of equipment (including vehicle or aircraft), or consequences of actions of persons outside the control of the transport personnel, Unanticipated needs of medical equipment and personnel during transport, Risk of worsening condition, The possibility of a transport vehicle being unavailable      RN Documentation    [address]      Follow-up Information    None         Patient's Medications   Discharge Prescriptions    No medications on file       No discharge procedures on file      PDMP Review       Value Time User    PDMP Reviewed  Yes 6/13/2021  3:39 AM Brigitte Watson PA-C          ED Provider  Electronically Signed by           Allen Castro MD  06/15/22 3125

## 2022-06-15 NOTE — EMTALA/ACUTE CARE TRANSFER
[address] [facility] [address]  Dept: [phone]      EMTALA TRANSFER CONSENT    NAME Travis Lo                                         1960                              MRN [id number]    I have been informed of my rights regarding examination, treatment, and transfer   by Dr Burdette Hashimoto, MD    Benefits: Other benefits (Include comment)_______________________    Risks: Potential for delay in receiving treatment, Potential deterioration of medical condition, Loss of IV, Increased discomfort during transfer, Possible worsening of condition or death during transfer      Consent for Transfer:  I acknowledge that my medical condition has been evaluated and explained to me by the emergency department physician or other qualified medical person and/or my attending physician, who has recommended that I be transferred to the service of  Accepting Physician: Dr Carmen Goldstein at [address] [place] [address] [facility]  The above potential benefits of such transfer, the potential risks associated with such transfer, and the probable risks of not being transferred have been explained to me, and I fully understand them  The doctor has explained that, in my case, the benefits of transfer outweigh the risks  I agree to be transferred  I authorize the performance of emergency medical procedures and treatments upon me in both transit and upon arrival at the receiving facility  Additionally, I authorize the release of any and all medical records to the receiving facility and request they be transported with me, if possible  I understand that the safest mode of transportation during a medical emergency is an ambulance and that the Hospital advocates the use of this mode of transport   Risks of traveling to the receiving facility by car, including absence of medical control, life sustaining equipment, such as oxygen, and medical personnel has been explained to me and I fully understand them  (DENISE CORRECT BOX BELOW)  [  ]  I consent to the stated transfer and to be transported by ambulance/helicopter  [  ]  I consent to the stated transfer, but refuse transportation by ambulance and accept full responsibility for my transportation by car  I understand the risks of non-ambulance transfers and I exonerate the Hospital and its staff from any deterioration in my condition that results from this refusal     X___________________________________________    DATE  22  TIME________  Signature of patient or legally responsible individual signing on patient behalf           RELATIONSHIP TO PATIENT_________________________          Provider Certification    NAME Vennie Favre Hofacker                                         1960                              MRN [id number]    A medical screening exam was performed on the above named patient  Based on the examination:    Condition Necessitating Transfer The primary encounter diagnosis was Acute respiratory failure ([address] )  Diagnoses of Hypoxia, Hypomagnesemia, Sepsis ([address] ), and Pneumonia were also pertinent to this visit      Patient Condition: The patient has been stabilized such that within reasonable medical probability, no material deterioration of the patient condition or the condition of the unborn child(ebony) is likely to result from the transfer    Reason for Transfer: Other (Include comment)____________________    Transfer Requirements: [address]   · Space available and qualified personnel available for treatment as acknowledged by    · Agreed to accept transfer and to provide appropriate medical treatment as acknowledged by       Dr Porfirio Ellsworth  · Appropriate medical records of the examination and treatment of the patient are provided at the time of transfer   [address] _______  · Transfer will be performed by qualified personnel from    and appropriate transfer equipment as required, including the use of necessary and appropriate life support measures  Provider Certification: I have examined the patient and explained the following risks and benefits of being transferred/refusing transfer to the patient/family:  General risk, such as traffic hazards, adverse weather conditions, rough terrain or turbulence, possible failure of equipment (including vehicle or aircraft), or consequences of actions of persons outside the control of the transport personnel, Unanticipated needs of medical equipment and personnel during transport, Risk of worsening condition, The possibility of a transport vehicle being unavailable      Based on these reasonable risks and benefits to the patient and/or the unborn child(ebony), and based upon the information available at the time of the patients examination, I certify that the medical benefits reasonably to be expected from the provision of appropriate medical treatments at another medical facility outweigh the increasing risks, if any, to the individuals medical condition, and in the case of labor to the unborn child, from effecting the transfer      X____________________________________________ DATE 06/14/22        TIME_______      ORIGINAL - SEND TO MEDICAL RECORDS   COPY - SEND WITH PATIENT DURING TRANSFER

## 2022-06-15 NOTE — PHYSICAL THERAPY NOTE
PHYSICAL THERAPY NOTE          Patient Name: Alea CISNEROS'S Date: 6/15/2022       06/15/22 1131   PT Last Visit   PT Visit Date 06/15/22   Note Type   Note type Cancelled Session   Cancel Reasons Medical status   Additional Comments PT consult received  Chart reviewed  Patient was a rapid response and transferred to ICU this morning  Currently on BiPAP  Patient not appropriate for PT interventions at this time  Will continue to follow as medically appropriate  Nsg notified     Bryn Donahue, PT

## 2022-06-15 NOTE — ED PROCEDURE NOTE
PROCEDURE  CriticalCare Time  Performed by: Kimber Davis MD  Authorized by: Kimber Davis MD     Critical care provider statement:     Critical care time (minutes):  72    Critical care start time:  6/14/2022 10:00 PM    Critical care end time:  6/15/2022 1:31 AM    Critical care was necessary to treat or prevent imminent or life-threatening deterioration of the following conditions:  Dehydration, sepsis and respiratory failure    Critical care was time spent personally by me on the following activities:  Obtaining history from patient or surrogate, development of treatment plan with patient or surrogate, discussions with consultants, discussions with primary provider, evaluation of patient's response to treatment, examination of patient, review of old charts, re-evaluation of patient's condition, ordering and review of radiographic studies, ordering and review of laboratory studies and ordering and performing treatments and interventions         Kimber Davis MD  06/15/22 0132

## 2022-06-15 NOTE — ASSESSMENT & PLAN NOTE
· Transferred to the ICU when she developed acute SOB, increased WOB- likely multifactorial related to pulmonary edema, COPD and left sided pneumonia, possibly aspiration  · Used BiPAP for 2 hrs overnight and removed due to feeling warm   · On 6L O2 via NC     Plan:   · Will continue oxygen therapy for now with a goal to maintain her sats > 90%  · Will continue the IV rocephin   · Follow Blood cx  · Steroid taper - solumedrol 40 q12  · BiPAP HS

## 2022-06-15 NOTE — ED PROCEDURE NOTE
PROCEDURE  ECG 12 Lead Documentation Only    Date/Time: 6/15/2022 12:14 AM  Performed by: Ronnie Rodriguez MD  Authorized by: Ronnie Rodriguez MD     Indications / Diagnosis:  Tachycardia  ECG reviewed by me, the ED Provider: yes    Interpretation:     Interpretation: normal    Rate:     ECG rate:  122    ECG rate assessment: tachycardic    Rhythm:     Rhythm: sinus tachycardia    Ectopy:     Ectopy: none    QRS:     QRS axis:  Normal    QRS intervals:  Normal  Conduction:     Conduction: normal    ST segments:     ST segments:  Non-specific  T waves:     T waves: non-specific           Ronnie Rodriguez MD  06/15/22 0361

## 2022-06-15 NOTE — SIGNIFICANT EVENT
0405 Patient returned from CT c/o SOB  Anxious says she says " I can not breath" saturations 80 - 87% on 4L nc non re breather masked placed - Physician called to bedside - saturations up to 91% on non re breather pt restless  continues to pull off non re breather mask Saturations drops to mid 80's with pt restlessness- respiratory therapy at bedside attempting breathing treatment,  at bedside medication administered for anxiety  Pt tachypne remains restless- vitals obtained, rapid response called 0420-patient placed on BiPAP physician and rapid response team at bedside orders processed       440- patient transferred to ICU- bedside report provided

## 2022-06-15 NOTE — ASSESSMENT & PLAN NOTE
Wt Readings from Last 3 Encounters:   07/13/21 71 7 kg (158 lb)   06/15/21 68 kg (149 lb 14 6 oz)   04/08/21 73 5 kg (162 lb)     -bilateral lower extremity edema on exam   -echo on 07/09/2021:  EF 50%  Mild enlargement of the ascending aorta to 3 7 cm  Mild mitral regurgitation  -do note that patient previously had an echo in March 2021 with a EF less than 20%  Underwent coronary angiography on 03/30/2021 with moderate, but nonobstructive disease  No interventions performed  -on PTA Toprol 50 mg daily and losartan  Plan  > CTA Chest PE protocol ordered to rule out PE and further evaluate pulmonary abnormality  > May trial Lasix 40mg IV pending CT results/BNP/blood pressure  > repeat 2D echo  > trend troponins  > strict I&Os  Daily weights  > consult cardiology for their opinion on case    > cardiac diet with 1500 cc fluid restriction

## 2022-06-16 PROBLEM — N17.9 AKI (ACUTE KIDNEY INJURY) (HCC): Status: ACTIVE | Noted: 2022-06-16

## 2022-06-16 LAB
ALBUMIN SERPL BCP-MCNC: 3.2 G/DL (ref 3.5–5)
ALP SERPL-CCNC: 84 U/L (ref 46–116)
ALT SERPL W P-5'-P-CCNC: 22 U/L (ref 12–78)
ANION GAP SERPL CALCULATED.3IONS-SCNC: 11 MMOL/L (ref 4–13)
AST SERPL W P-5'-P-CCNC: 26 U/L (ref 5–45)
BASOPHILS # BLD AUTO: 0.03 THOUSANDS/ΜL (ref 0–0.1)
BASOPHILS NFR BLD AUTO: 0 % (ref 0–1)
BILIRUB SERPL-MCNC: 0.31 MG/DL (ref 0.2–1)
BUN SERPL-MCNC: 31 MG/DL (ref 5–25)
CALCIUM ALBUM COR SERPL-MCNC: 9.3 MG/DL (ref 8.3–10.1)
CALCIUM SERPL-MCNC: 8.7 MG/DL (ref 8.3–10.1)
CHLORIDE SERPL-SCNC: 99 MMOL/L (ref 100–108)
CO2 SERPL-SCNC: 21 MMOL/L (ref 21–32)
CREAT SERPL-MCNC: 1.61 MG/DL (ref 0.6–1.3)
EOSINOPHIL # BLD AUTO: 0 THOUSAND/ΜL (ref 0–0.61)
EOSINOPHIL NFR BLD AUTO: 0 % (ref 0–6)
ERYTHROCYTE [DISTWIDTH] IN BLOOD BY AUTOMATED COUNT: 13.3 % (ref 11.6–15.1)
GFR SERPL CREATININE-BSD FRML MDRD: 34 ML/MIN/1.73SQ M
GLUCOSE SERPL-MCNC: 150 MG/DL (ref 65–140)
HCT VFR BLD AUTO: 34 % (ref 34.8–46.1)
HGB BLD-MCNC: 11.5 G/DL (ref 11.5–15.4)
IMM GRANULOCYTES # BLD AUTO: 0.14 THOUSAND/UL (ref 0–0.2)
IMM GRANULOCYTES NFR BLD AUTO: 1 % (ref 0–2)
LYMPHOCYTES # BLD AUTO: 0.79 THOUSANDS/ΜL (ref 0.6–4.47)
LYMPHOCYTES NFR BLD AUTO: 5 % (ref 14–44)
MAGNESIUM SERPL-MCNC: 2 MG/DL (ref 1.6–2.6)
MCH RBC QN AUTO: 29.3 PG (ref 26.8–34.3)
MCHC RBC AUTO-ENTMCNC: 32.6 G/DL (ref 31.4–37.4)
MCV RBC AUTO: 90 FL (ref 82–98)
MONOCYTES # BLD AUTO: 0.42 THOUSAND/ΜL (ref 0.17–1.22)
MONOCYTES NFR BLD AUTO: 2 % (ref 4–12)
NEUTROPHILS # BLD AUTO: 16 THOUSANDS/ΜL (ref 1.85–7.62)
NEUTS SEG NFR BLD AUTO: 92 % (ref 43–75)
NRBC BLD AUTO-RTO: 0 /100 WBCS
PLATELET # BLD AUTO: 262 THOUSANDS/UL (ref 149–390)
PMV BLD AUTO: 10.1 FL (ref 8.9–12.7)
POTASSIUM SERPL-SCNC: 3.6 MMOL/L (ref 3.5–5.3)
PROCALCITONIN SERPL-MCNC: 17.8 NG/ML
PROT SERPL-MCNC: 7.6 G/DL (ref 6.4–8.2)
RBC # BLD AUTO: 3.79 MILLION/UL (ref 3.81–5.12)
SODIUM SERPL-SCNC: 131 MMOL/L (ref 136–145)
TSH SERPL DL<=0.05 MIU/L-ACNC: 0.35 UIU/ML (ref 0.45–4.5)
WBC # BLD AUTO: 17.38 THOUSAND/UL (ref 4.31–10.16)

## 2022-06-16 PROCEDURE — 97166 OT EVAL MOD COMPLEX 45 MIN: CPT

## 2022-06-16 PROCEDURE — 84443 ASSAY THYROID STIM HORMONE: CPT | Performed by: NURSE PRACTITIONER

## 2022-06-16 PROCEDURE — 94660 CPAP INITIATION&MGMT: CPT

## 2022-06-16 PROCEDURE — 80053 COMPREHEN METABOLIC PANEL: CPT | Performed by: NURSE PRACTITIONER

## 2022-06-16 PROCEDURE — 97163 PT EVAL HIGH COMPLEX 45 MIN: CPT

## 2022-06-16 PROCEDURE — 94640 AIRWAY INHALATION TREATMENT: CPT

## 2022-06-16 PROCEDURE — 99232 SBSQ HOSP IP/OBS MODERATE 35: CPT | Performed by: INTERNAL MEDICINE

## 2022-06-16 PROCEDURE — 84145 PROCALCITONIN (PCT): CPT | Performed by: NURSE PRACTITIONER

## 2022-06-16 PROCEDURE — 85025 COMPLETE CBC W/AUTO DIFF WBC: CPT | Performed by: PHYSICIAN ASSISTANT

## 2022-06-16 PROCEDURE — 99233 SBSQ HOSP IP/OBS HIGH 50: CPT | Performed by: INTERNAL MEDICINE

## 2022-06-16 PROCEDURE — 83735 ASSAY OF MAGNESIUM: CPT | Performed by: NURSE PRACTITIONER

## 2022-06-16 RX ORDER — GUAIFENESIN 600 MG
600 TABLET, EXTENDED RELEASE 12 HR ORAL EVERY 12 HOURS SCHEDULED
Status: DISCONTINUED | OUTPATIENT
Start: 2022-06-16 | End: 2022-06-22 | Stop reason: HOSPADM

## 2022-06-16 RX ORDER — METOPROLOL SUCCINATE 50 MG/1
50 TABLET, EXTENDED RELEASE ORAL DAILY
Status: DISCONTINUED | OUTPATIENT
Start: 2022-06-17 | End: 2022-06-18

## 2022-06-16 RX ORDER — METHYLPREDNISOLONE SODIUM SUCCINATE 40 MG/ML
40 INJECTION, POWDER, LYOPHILIZED, FOR SOLUTION INTRAMUSCULAR; INTRAVENOUS DAILY
Status: DISCONTINUED | OUTPATIENT
Start: 2022-06-16 | End: 2022-06-17

## 2022-06-16 RX ORDER — ONDANSETRON 2 MG/ML
4 INJECTION INTRAMUSCULAR; INTRAVENOUS EVERY 6 HOURS PRN
Status: DISCONTINUED | OUTPATIENT
Start: 2022-06-16 | End: 2022-06-22 | Stop reason: HOSPADM

## 2022-06-16 RX ADMIN — HEPARIN SODIUM 5000 UNITS: 5000 INJECTION INTRAVENOUS; SUBCUTANEOUS at 16:37

## 2022-06-16 RX ADMIN — METOPROLOL SUCCINATE 25 MG: 25 TABLET, EXTENDED RELEASE ORAL at 11:30

## 2022-06-16 RX ADMIN — OXYCODONE HYDROCHLORIDE 5 MG: 5 TABLET ORAL at 20:03

## 2022-06-16 RX ADMIN — IPRATROPIUM BROMIDE AND ALBUTEROL SULFATE 3 ML: 2.5; .5 SOLUTION RESPIRATORY (INHALATION) at 01:57

## 2022-06-16 RX ADMIN — CEFTRIAXONE SODIUM 1000 MG: 10 INJECTION, POWDER, FOR SOLUTION INTRAVENOUS at 14:13

## 2022-06-16 RX ADMIN — OXYCODONE HYDROCHLORIDE 5 MG: 5 TABLET ORAL at 11:30

## 2022-06-16 RX ADMIN — LIDOCAINE 1 PATCH: 50 PATCH CUTANEOUS at 21:24

## 2022-06-16 RX ADMIN — GUAIFENESIN 600 MG: 600 TABLET, EXTENDED RELEASE ORAL at 21:24

## 2022-06-16 RX ADMIN — ACETAMINOPHEN 325MG 650 MG: 325 TABLET ORAL at 20:03

## 2022-06-16 RX ADMIN — IPRATROPIUM BROMIDE AND ALBUTEROL SULFATE 3 ML: 2.5; .5 SOLUTION RESPIRATORY (INHALATION) at 14:38

## 2022-06-16 RX ADMIN — IPRATROPIUM BROMIDE AND ALBUTEROL SULFATE 3 ML: 2.5; .5 SOLUTION RESPIRATORY (INHALATION) at 19:53

## 2022-06-16 RX ADMIN — IPRATROPIUM BROMIDE AND ALBUTEROL SULFATE 3 ML: 2.5; .5 SOLUTION RESPIRATORY (INHALATION) at 07:56

## 2022-06-16 RX ADMIN — HEPARIN SODIUM 5000 UNITS: 5000 INJECTION INTRAVENOUS; SUBCUTANEOUS at 21:24

## 2022-06-16 RX ADMIN — ACETAMINOPHEN 325MG 650 MG: 325 TABLET ORAL at 04:17

## 2022-06-16 RX ADMIN — OXYCODONE HYDROCHLORIDE 5 MG: 5 TABLET ORAL at 04:18

## 2022-06-16 RX ADMIN — GUAIFENESIN 600 MG: 600 TABLET, EXTENDED RELEASE ORAL at 11:31

## 2022-06-16 RX ADMIN — METHYLPREDNISOLONE SODIUM SUCCINATE 40 MG: 40 INJECTION, POWDER, FOR SOLUTION INTRAMUSCULAR; INTRAVENOUS at 11:32

## 2022-06-16 RX ADMIN — HEPARIN SODIUM 5000 UNITS: 5000 INJECTION INTRAVENOUS; SUBCUTANEOUS at 05:18

## 2022-06-16 NOTE — UTILIZATION REVIEW
Inpatient Admission Authorization Request   NOTIFICATION OF INPATIENT ADMISSION/INPATIENT AUTHORIZATION REQUEST   SERVICING FACILITY:   [address] [place] [address], [facility], [facility]  Tax ID: [id number]  NPI: [id number]  Place of Service: [facility]patient [facility]  [address]  Place of Service Code: 24     ATTENDING PROVIDER:  Attending Name and NPI#: Guru Garibay [[id number]]  Address: [address] [place] [address], [facility], [facility]  Phone: [phone]     UTILIZATION REVIEW CONTACT:  [facility] Utilization   Network Utilization Review Department  Phone: [phone]  Fax: [phone]  Email: [email or web address]     PHYSICIAN ADVISORY SERVICES:  FOR [id number] REVIEW - MEDICAL NECESSITY DENIAL  Phone: [phone]  Fax: [phone]  Email: [email or web address]     TYPE OF REQUEST:  Inpatient Status     ADMISSION INFORMATION:  ADMISSION DATE/TIME: 6/15/22  2:20 AM  PATIENT DIAGNOSIS CODE/DESCRIPTION:  Pneumonia [J18 [id number]]  DISCHARGE DATE/TIME: No discharge date for patient encounter  IMPORTANT INFORMATION:  Please contact the [facility] directly with any questions or concerns regarding this request  Department voicemails are confidential     Send requests for admission clinical reviews, concurrent reviews, approvals, and administrative denials due to lack of clinical to fax [phone]

## 2022-06-16 NOTE — PLAN OF CARE
Problem: MOBILITY - ADULT  Goal: Maintain or return to baseline ADL function  Description: INTERVENTIONS:  -  Assess patient's ability to carry out ADLs; assess patient's baseline for ADL function and identify physical deficits which impact ability to perform ADLs (bathing, care of mouth/teeth, toileting, grooming, dressing, etc )  - Assess/evaluate cause of self-care deficits   - Assess range of motion  - Assess patient's mobility; develop plan if impaired  - Assess patient's need for assistive devices and provide as appropriate  - Encourage maximum independence but intervene and supervise when necessary  - Involve family in performance of ADLs  - Assess for home care needs following discharge   - Consider OT consult to assist with ADL evaluation and planning for discharge  - Provide patient education as appropriate  Outcome: Progressing  Goal: Maintains/Returns to pre admission functional level  Description: INTERVENTIONS:  - Perform BMAT or MOVE assessment daily    - Set and communicate daily mobility goal to care team and patient/family/caregiver  - Collaborate with rehabilitation services on mobility goals if consulted  - Perform Range of Motion 2 times a day  - Reposition patient every 2 hours    - Dangle patient 2 times a day  - Stand patient 2 times a day  - Ambulate patient 3 times a day  - Out of bed to chair 3 times a day   - Out of bed for meals 3 times a day  - Out of bed for toileting  - Record patient progress and toleration of activity level   Outcome: Progressing

## 2022-06-16 NOTE — UTILIZATION REVIEW
Initial Clinical Review    Admission: Date/Time/Statement:   Admission Orders (From admission, onward)     Ordered        06/15/22 0302  Inpatient Admission  Once                      Orders Placed This Encounter   Procedures    Inpatient Admission     Standing Status:   Standing     Number of Occurrences:   1     Order Specific Question:   Level of Care     Answer:   Med Surg [16]     Order Specific Question:   Estimated length of stay     Answer:   More than 2 Midnights     Order Specific Question:   Certification     Answer:   I certify that inpatient services are medically necessary for this patient for a duration of greater than two midnights  See H&P and MD Progress Notes for additional information about the patient's course of treatment  Arrival Information     Patient transfer from ED at Replaced by [facility] as higher level of care due to no available bed at [facility]                     chief complaint: Cough, fever, shortness of breath      Initial Presentation: 64 y o  female initially from home, transferred from ED at Replaced by [facility] admitted inpatient 6/15/22  due to Sepsis/Acute Respiratory Failure with hypoxia/COPD with acute exacerbation/acute on chronic heart failure  Presented to [facility] via ems on 6/14/22 due to worsening shortness of breath starting week prior to arrival    Patient was febrile, tachycardic, tachypneic and hypoxic  Wbc 15 74  CxR with LLL pneumonia per ED read  Treated with bro nebulizer, cefepime, IV Magnesium and bolus of IVF  Transferred to [facility]:  Patient with fever and fatigue, cough and shortness of breath  Has nausea and abdominal discomfort  On exam tachycardic, lungs decreased breath sounds, late expiratory wheezes  Speech pressured  On oxygen 6 liters  Bilateral LE edema  Plan is consult Pulmonary  Continue oxygen, wean as able  Check cta chest   Continue antibiotics  Scheduled Duo nebs and start IV Solu medrol  Repeat echo and consult cardiology  Fluid restriction  6/15/22 - rapid response due to flash pulmonary edema  Patient in respiratory distress post cta chest   Cta showed pulmonary edema with LLL infiltrate  Placed on Bipap, given ntg paste, ativan, albuterol neb, IV lasix  Dose of Zosyn  Per Cardiology 6/15/22:  Patient with sepsis/acute on chronic combined CHF/acute respiratory failure/COPD  Plan is diuresis  May need ischemic evaluation as echo as basal regional wall abnomality  Per Pulmonary 6/15/22: patient with acute  Hypoxemic and hypercapnic respiratory failure/acute bacterial pneumonia/acute on chronic CHF/SHANTA with baseline creatinine of 0 9/COPD exacerbation  Patient required Bipap overnight, today transition to nasal cannula  To continue ceftriaxone  Consult cardiology  Monitor BMP  Continue steroids at reduced dose  Continue bronchodilators  Date: 6/16/22    Day 2:  Unable to sleep  Has dry cough, feels phlegm at right side of chest   Telemetry sinus tachycardia  Output 1 7 L in last 24 hours  On exam: diaphoresis  Lungs rales  On oxygen 6 liters  Wbc 17 38  Bun 31, creatinine 1 61  Plan  Is increase Toprol Xl, hold diuretics  Continue oxygen for sat > 90%  Continue IV ceftriaxone, Solu medrol, Bipap at HS  Continue bronchodilator        ED Triage Vitals   Temperature Pulse Respirations Blood Pressure SpO2   06/15/22 0225 06/15/22 0225 06/15/22 0225 06/15/22 0225 06/15/22 0225   98 6 °F (37 °C) (!) 110 19 128/94 95 %      Temp Source Heart Rate Source Patient Position - Orthostatic VS BP Location FiO2 (%)   06/15/22 1937 06/15/22 2130 06/15/22 2130 06/15/22 2130 06/16/22 0000   Temporal Monitor Lying Left arm 50      Pain Score       06/15/22 0230       4          Wt Readings from Last 1 Encounters:   06/16/22 76 5 kg (168 lb 10 4 oz)     Additional Vital Signs:  06/16/22 1130 97 4 °F (36 3 °C) Abnormal  111 Abnormal  -- 159/95 -- -- -- -- -- -- -- -- --   06/16/22 0800 -- 98 22 145/86 108 97 % -- 40 -- 5 L/min Mid flow nasal cannula -- Lying   06/16/22 0756 98 3 °F (36 8 °C) -- -- -- -- 95 % -- 40 -- 5 L/min Mid flow nasal cannula -- --   06/16/22 0700 -- 100 26 Abnormal  140/90 106 94 % -- -- -- -- -- -- --   06/16/22 0600 -- 102 25 Abnormal  141/88 107 94 % -- -- -- -- -- -- --   06/16/22 0500 -- 108 Abnormal  27 Abnormal  127/83 99 93 % -- -- -- -- -- -- --   06/16/22 0400 -- 110 Abnormal  25 Abnormal  141/93 111 94 % -- -- 10 L/min -- Mid flow nasal cannula -- Lying   06/16/22 0324 99 8 °F (37 7 °C) -- -- -- -- -- -- -- -- -- -- -- --   06/16/22 0300 -- 108 Abnormal  24 Abnormal  132/86 104 96 % -- -- -- -- -- -- --   06/16/22 0200 -- 106 Abnormal  25 Abnormal  137/84 101 93 % -- -- 10 L/min -- High flow nasal cannula -- --   06/16/22 0100 -- 108 Abnormal  35 Abnormal  144/83 120 100 % -- -- -- -- -- -- --   06/15/22 2312 99 6 °F (37 6 °C) -- -- -- -- -- -- -- -- -- -- -- --   06/15/22 2200 -- 100 26 Abnormal  111/75 87 97 % -- -- -- -- -- -- Lying   06/15/22 2000 -- -- 20 -- -- -- -- -- -- -- -- -- --   06/15/22 1937 98 2 °F (36 8 °C) -- -- -- -- -- -- -- -- -- -- -- --   06/15/22 1800 -- 82 21 107/75 85 100 % -- -- -- -- -- -- --   06/15/22 1700 -- 82 22 84/58 Abnormal  67 100 % -- -- -- -- -- -- --   06/15/22 1600 -- 86 24 Abnormal  128/74 89 100 % -- -- -- -- -- -- --   06/15/22 1430 -- 76 21 91/60 68 100 % -- -- -- -- -- -- --   06/15/22 1230 -- 82 21 79/51 Abnormal  62 Abnormal  100 % -- -- -- -- -- -- --   06/15/22 1130 -- 84 21 76/53 Abnormal  61 Abnormal  100 % -- -- -- -- -- -- --   06/15/22 11:19:45 -- 66 -- 152/68 96 96 % -- -- -- -- -- -- --   06/15/22 1110 97 4 °F (36 3 °C) Abnormal  -- -- -- -- -- -- -- -- -- -- -- --   06/15/22 1100 -- 90 21 100/64 81 100 % -- -- -- -- -- -- --   06/15/22 1000 -- 96 23 Abnormal  96/58 76 96 % -- -- -- -- -- -- --   06/15/22 0900 -- 98 24 Abnormal  91/53 67 93 % -- -- -- -- -- -- --   06/15/22 0800 97 9 °F (36 6 °C) 102 30 Abnormal  100/61 77 100 % -- -- -- -- -- -- --   06/15/22 0700 -- 108 Abnormal  23 Abnormal  94/61 74 100 % -- -- -- -- -- -- --   06/15/22 0630 -- 112 Abnormal  24 Abnormal  109/78 90 100 % -- -- -- -- -- -- --   06/15/22 0600 -- 116 Abnormal  35 Abnormal  101/74 84 100 % -- -- -- -- -- -- --   06/15/22 0500 -- 150 Abnormal  66 Abnormal  -- -- 100 % --           On bipap 6/15/22  Pertinent Labs/Diagnostic Test Results:   CTA chest pe study   Final Result by April Xiao DO (06/15 1317)      Some images are suboptimal secondary to respiratory motion which decreases sensitivity for evaluation of peripheral pulmonary emboli, subject to this, no pulmonary embolism is seen  Superimposed on moderate pulmonary emphysematous changes, a large ill-defined consolidation in the periphery of the left lower lobe, suspicious for infection in the appropriate clinical setting  Correlation with the patient's symptoms and laboratory    values recommended  Small dependent left pleural effusion, possibly reactive  Cardiomegaly, coronary atherosclerosis, prominent mediastinal and hilar lymph nodes, right renal atrophy, and other findings as above  Workstation performed: [id number]           6/13/22 CxR - Moderate pulmonary edema  6/14/22 CxR Development of diffuse bilateral opacities likely representing CHF and possible multifocal pneumonia    6/14/22 ecg Sinus tachycardia  Left ventricular hypertrophy  Nonspecific ST and T wave abnormality  Abnormal ECG  When compared with ECG of 28-MAR-2021 11:53,  No significant change was found    6/15/22 ecg Sinus tachycardia  Biatrial enlargement  Left ventricular hypertrophy  Nonspecific ST abnormality  Abnormal ECG  When compared with ECG of 14-JUN-2022 22:00, (unconfirmed)  No significant change was found    6/15/22 echo Left Ventricle: Left ventricular cavity size is normal  Wall thickness is mildly increased  There is mild concentric hypertrophy   The left ventricular ejection fraction is 41% by biplane measurement  Systolic function is moderately reduced  The Basalar septum and basilar inferior wall are hypokenetic  Diastolic function is mildly abnormal, consistent with grade I (abnormal) relaxation  Left atrial filling pressure is normal     Right Ventricle: Systolic function is low normal  Normal tricuspid annular plane systolic excursion (TAPSE) > 1 7 cm    Left Atrium: The atrium is severely dilated (>48 mL/m2)    Aortic Valve: The aortic valve is trileaflet  The aortic valve is sclerotic  There is mild regurgitation  The aortic valve has no significant stenosis    Tricuspid Valve: There is mild regurgitation    Aorta: The aortic root is normal in size  The ascending aorta is mildly dilated    Pulmonary Artery: The estimated pulmonary artery systolic pressure is 94 3 mmHg   The pulmonary artery systolic pressure is moderately increased    Results from last 7 days   Lab Units 06/14/22 2204   SARS-COV-2  Negative     Results from last 7 days   Lab Units 06/16/22 0447 06/15/22  0503 06/14/22 2204   WBC Thousand/uL 17 38* 23 07* 15 74*   HEMOGLOBIN g/dL 11 5 14 1 14 0   HEMATOCRIT % 34 0* 43 3 42 1   PLATELETS Thousands/uL 262 367 307   NEUTROS ABS Thousands/µL 16 00* 19 00* 13 22*     Results from last 7 days   Lab Units 06/16/22 0447 06/14/22 2204   SODIUM mmol/L 131* 131*   POTASSIUM mmol/L 3 6 3 9   CHLORIDE mmol/L 99* 99   CO2 mmol/L 21 19*   ANION GAP mmol/L 11 13   BUN mg/dL 31* 19   CREATININE mg/dL 1 61* 1 24   EGFR ml/min/1 73sq m 34 46   CALCIUM mg/dL 8 7 10 1   MAGNESIUM mg/dL 2 0 1 7*     Results from last 7 days   Lab Units 06/16/22 0447 06/14/22 2204   AST U/L 26 14   ALT U/L 22 7   ALK PHOS U/L 84 90   TOTAL PROTEIN g/dL 7 6 8 6*   ALBUMIN g/dL 3 2* 4 6   TOTAL BILIRUBIN mg/dL 0 31 1 08*     Results from last 7 days   Lab Units 06/15/22  0417   POC GLUCOSE mg/dl 183*     Results from last 7 days   Lab Units 06/16/22 0447 06/14/22 2204   GLUCOSE RANDOM mg/dL 150* 142*     Results from last 7 days   Lab Units 06/15/22  0428   PH ART  7 165*   PCO2 ART mm Hg 57 6*   PO2 ART mm Hg 134 0*   HCO3 ART mmol/L 20 3*   BASE EXC ART mmol/L -8 8   O2 CONTENT ART mL/dL 18 7   O2 HGB, ARTERIAL % 96 5   ABG SOURCE  Radial, Left     Results from last 7 days   Lab Units 06/14/22  2204   PH PANDA  7 481*   PCO2 PANDA mm Hg 30 2*   PO2 PANDA mm Hg 57 4*   HCO3 PANDA mmol/L 22 0*   BASE EXC PANDA mmol/L -0 4   O2 CONTENT PANDA ml/dL 18 4   O2 HGB, VENOUS % 89 8*     Results from last 7 days   Lab Units 06/15/22  0016 06/14/22  2204   HS TNI 0HR ng/L  --  43   HS TNI 2HR ng/L 48  --    HSTNI D2 ng/L 5  --      Results from last 7 days   Lab Units 06/14/22  2204   PROTIME seconds 15 3*   INR  1 22*   PTT seconds 34     Results from last 7 days   Lab Units 06/16/22  0447   TSH 3RD GENERATON uIU/mL 0 348*     Results from last 7 days   Lab Units 06/16/22  0447 06/15/22  0503 06/14/22  2204   PROCALCITONIN ng/ml 17 80* 0 21 0 06     Results from last 7 days   Lab Units 06/15/22  0503 06/14/22  2204   LACTIC ACID mmol/L 1 9 1 0     Results from last 7 days   Lab Units 06/15/22  0503   NT-PRO BNP pg/mL 30,046*     Results from last 7 days   Lab Units 06/15/22  0503 06/14/22  2204   STREP PNEUMONIAE ANTIGEN, URINE  Negative  --    LEGIONELLA URINARY ANTIGEN  Negative  --    INFLUENZA A PCR   --  Negative   INFLUENZA B PCR   --  Negative   RSV PCR   --  Negative     Results from last 7 days   Lab Units 06/15/22  0503   AMPH/METH  Negative   BARBITURATE UR  Negative   BENZODIAZEPINE UR  Negative   COCAINE UR  Negative   METHADONE URINE  Negative   OPIATE UR  Positive*   PCP UR  Negative   THC UR  Negative     Results from last 7 days   Lab Units 06/14/22  2206 06/14/22 2204   BLOOD CULTURE  No Growth at 24 hrs  No Growth at 24 hrs         Past Medical History:   Diagnosis Date    Cardiomyopathy [facility])     COPD (chronic obstructive pulmonary disease) (HCC)     Fatty liver     Hyperlipidemia     Hypertension     Mitral regurgitation      Present on Admission:   Acute on chronic combined systolic and diastolic congestive heart failure (HCC)   Chronic pain      Admitting Diagnosis: Pneumonia [J18 9]  Age/Sex: 64 y o  female  Admission Orders: 6 `5 22 0302 inpatient   Scheduled Medications:  cefTRIAXone, 1,000 mg, Intravenous, Q24H  guaiFENesin, 600 mg, Oral, Q12H JULIÁN  heparin (porcine), 5,000 Units, Subcutaneous, Q8H [facility]  ipratropium-albuterol, 3 mL, Nebulization, Q6H  lidocaine, 1 patch, Topical, Daily  methylPREDNISolone sodium succinate, 40 mg, Intravenous, Daily  metoprolol succinate, 25 mg, Oral, Daily    albumin human (FLEXBUMIN) 25 % injection 25 g  Dose: 25 g  Freq: Once Route: IV  Last Dose: Stopped (06/15/22 1300)  Start: 06/15/22 1145 End: 06/15/22 1300    albuterol (2 5 mg/3 mL) 0 083 % inhalation solution **ADS Override Pull**  Start: 06/15/22 0458 End: 06/15/22 0504    furosemide (LASIX) injection 40 mg  Dose: 40 mg  Freq: Once Route: IV  Start: 06/15/22 0415 End: 06/15/22 0419    LORazepam (ATIVAN) injection 1 mg  Dose: 1 mg  Freq: Once Route: IV  Start: 06/15/22 0415 End: 06/15/22 0415    nitroglycerin (NITRO-BID) 2 % TD ointment **ADS Override Pull**  Start: 06/15/22 0427 End: 06/15/22 0430    piperacillin-tazobactam (ZOSYN) 3 375 g in sodium chloride 0 9 % 100 mL IVPB  Dose: 3 375 g  Freq: Once Route: IV  Last Dose: Stopped (06/15/22 0555)  Start: 06/15/22 0530 End: 06/15/22 0555    Continuous IV Infusions: none      PRN Meds:  acetaminophen, 650 mg, Oral, Q6H PRN- used x1 6/16/22   oxyCODONE, 2 5 mg, Oral, Q6H PRN   Or  oxyCODONE, 5 mg, Oral, Q6H PRN - used x 2 6/16/22     Telemetry   Fluid restriction   Bipap at bedtime       IP CONSULT TO CARDIOLOGY    Network Utilization Review Department  ATTENTION: Please call with any questions or concerns to [phone] and carefully listen to the prompts so that you are directed to the right person   All voicemails are confidential   Kaleigh pedroza requests for admission clinical reviews, approved or denied determinations and any other requests to dedicated fax number below belonging to the campus where the patient is receiving treatment   List of dedicated fax numbers for the Facilities:  [address] [place] [address] DENIALS (Administrative/Medical Necessity) [phone]   [address]  [facility] (Maternity/NICU/Pediatrics) [phone]   [address]  [address] [place] [address] [address] [address] Shobha Brannon [address] [facility] [facility] [phone]

## 2022-06-16 NOTE — PROGRESS NOTES
06/16/22 1300   Clinical Encounter Type   Visited With Patient   Routine Visit Introduction   Continue Visiting Yes

## 2022-06-16 NOTE — ASSESSMENT & PLAN NOTE
In the setting of PNA       Plan:   · continue solumedrol taper   · continue bronchodilator therapy  · Abx - rocephin   · Incentive spirometry bathroom not accessible/stairs to enter home

## 2022-06-16 NOTE — PROGRESS NOTES
Pastoral Care Progress Note    2022  Patient: Jared Mcgill : 1960  Admission Date & Time: 6/15/2022 0220  MRN: [id number] [facility]: [id number]                     Chaplaincy Interventions Utilized:   Empowerment: Encouraged focus on present    Exploration: Explored emotional needs & resources and Explored spiritual needs & resources    Collaboration: Encouraged adherence to treatment plan    Relationship Building: Cultivated a relationship of care and support and Listened empathically    Chaplaincy Outcomes Achieved:  Expressed gratitude    Spiritual Coping Strategies Utilized:   Spiritual gratitude

## 2022-06-16 NOTE — CONSULTS
Consult for CHF  Obtained diet hx: B: pancake L: 1-2 slices of pizza or macroni and cheese D: fruit or ice cream, PT reported drinks a lot of water, also consumes milk, oj and soda daily  PT doesn't weigh her self at home  Provided verbal and written Heart Failure Nutrition Therapy diet education  Reviewed foods high in sodium to limit and provided alternate suggestions  Encouraged reading food labels  Discussed fluid restriction and reviewed tips for conserving on fluids  Encouraged weighing herself daily at home at consistent time  PT was receptive to diet education  Continue 2gm Na diet, 1500mL fluid restriction

## 2022-06-16 NOTE — PROGRESS NOTES
Cardiology Progress Note - Lux Johnson 64 y o  female MRN: [id number]    Unit/Bed#: ICU 10 Encounter: [id number]      Assessment & Plan:    Sepsis ([facility] [address] )  -septic shock, hypotensive earlier today with SBP in the 70s  -concern for acute bacterial pneumonia with heart failure exacerbation and possible COPD exacerbation as well    Acute on chronic combined systolic and diastolic congestive heart failure  -TTE 03/27/2021 showed EF of 18%, moderate RV dysfunction, moderate MR, mild AI and TR  -LHC 03/30/2021 showed nonobstructive CAD  -TTE 07/09/2021 showed EF improved to 50%, mild MR  -nonischemic cardiomyopathy with improvement from EF of 18% to 50% with medical management  - repeat TTE this admission showed slight worsening of EF function to around 40% with hypokinesis of the basal segments and hyperkinesis of the apex indicating possible stress-induced cardiomyopathy  -proBNP 30,046, troponins 43 with a delta of 48 and EKG with no acute ischemic changes  -no evidence of ACS currently  -home heart failure regiment losartan 50 mg daily, and Toprol XL 50 mg daily  -diuretic regiment,  received intermittent Lasix, agree with holding for now  -heart failure regiment currently on Toprol XL 25 mg daily    Acute respiratory failure with hypoxia ([address] )  -required BiPAP yesterday, now down to 5 L nasal cannula, continuing to improve  -ABG at 4:30 a m  today showed respiratory acidosis with a pH of 7 165 and a CO2 of 57 6    Acute kidney injury  -creatinine 1 61 today up from 1 24 yesterday with baseline around 0 9  -possibly due to over diuresis versus hypoperfusion as patient was hypotensive with SBP in the 70s to 80s yesterday  -BP improved today, agree with holding diuretics for now    Chronic obstructive pulmonary disease with acute exacerbation (HCC)    Chronic pain    Tobacco abuse   -active smoker  -encourage smoking cessation     Recommendations:  -heart rates persistently in the low 100s, recommend increasing Toprol XL to 50 mg  -agree with holding diuretics for now, appears euvolemic on exam  -plan to repeat TTE when she has recovered from her acute illness to re-evaluate EF  -if EF does not improve on repeat echo, will consider an ischemic evaluation at that time    Subjective:   Patient seen and examined  No significant events overnight  Patient reports feeling well this morning and her breathing has significantly improved  Currently satting well on 5 L nasal cannula  Acknowledges some phlegm or irritation in her right upper chest that she is unable to cough up  Denies chest pain, abdominal pain, nausea, vomiting, fever, chills, headache, dizziness or palpitations  Objective:     Vitals: Blood pressure 159/95, pulse (!) 111, temperature 98 3 °F (36 8 °C), temperature source Temporal, resp  rate 22, height 5' 7" (1 702 m), weight 76 5 kg (168 lb 10 4 oz), SpO2 97 %  , Body mass index is 26 41 kg/m² ,   Orthostatic Blood Pressures    Flowsheet Row Most Recent Value   Blood Pressure 159/95 filed at 06/16/2022 1130   Patient Position - Orthostatic VS Lying filed at 06/16/2022 0800            Intake/Output Summary (Last 24 hours) at 6/16/2022 1132  Last data filed at 6/16/2022 0600  Gross per 24 hour   Intake --   Output 1725 ml   Net -1725 ml           Physical Exam:    GEN: Lux Johnson appears well, alert and oriented x 3, pleasant and cooperative   HEENT: anicteric, mucous membranes moist  NECK:  No significant JVD   HEART:  Tachycardic, otherwise no significant audible murmurs  LUNGS:  Bilateral rhonchi predominantly at bases  ABDOMEN: normal bowel sounds, soft, no tenderness, no distention  EXTREMITIES: peripheral pulses normal; no clubbing, cyanosis, or edema  NEURO: no focal findings   SKIN: normal without suspicious lesions on exposed skin      Current Facility-Administered Medications:     acetaminophen (TYLENOL) tablet 650 mg, 650 mg, Oral, Q6H PRN, ESTHER Marin, 650 mg at 06/16/22 0518   cefTRIAXone (ROCEPHIN) 1,000 mg in dextrose 5 % 50 mL IVPB, 1,000 mg, Intravenous, Q24H, Corie Hays PA-C, Last Rate: 100 mL/hr at 06/15/22 0927, 1,000 mg at 06/15/22 0927    guaiFENesin (MUCINEX) 12 hr tablet 600 mg, 600 mg, Oral, Q12H [facility], Tyler Hawkins MD, 600 mg at 06/16/22 1131    heparin (porcine) subcutaneous injection 5,000 Units, 5,000 Units, Subcutaneous, Q8H [facility], 5,000 Units at 06/16/22 0518 **AND** [CANCELED] Platelet count, , , Once, ESTHER Pugh    ipratropium-albuterol (DUO-NEB) 0 5-2 5 mg/3 mL inhalation solution 3 mL, 3 mL, Nebulization, Q6H, ESTHER Pugh, 3 mL at 06/16/22 0756    lidocaine (LIDODERM) 5 % patch 1 patch, 1 patch, Topical, Daily, ESTHER Hoffmann, 1 patch at 06/15/22 2104    methylPREDNISolone sodium succinate (Solu-MEDROL) injection 40 mg, 40 mg, Intravenous, Daily, Tyler Hawkins MD    metoprolol succinate (TOPROL-XL) 24 hr tablet 25 mg, 25 mg, Oral, Daily, ESTHER Pugh, 25 mg at 06/16/22 1130    oxyCODONE (ROXICODONE) IR tablet 2 5 mg, 2 5 mg, Oral, Q6H PRN **OR** oxyCODONE (ROXICODONE) IR tablet 5 mg, 5 mg, Oral, Q6H PRN, ESTHER Hoffmann, 5 mg at 06/16/22 1130    Labs & Results:    Lab Results   Component Value Date    TROPONINI 0 11 (H) 06/13/2021    TROPONINI 0 10 (H) 06/13/2021    TROPONINI 0 04 (H) 06/13/2021       Lab Results   Component Value Date    CALCIUM 8 7 06/16/2022     04/05/2018    K 3 6 06/16/2022    CO2 21 06/16/2022    CL 99 (L) 06/16/2022    BUN 31 (H) 06/16/2022    CREATININE 1 61 (H) 06/16/2022       Lab Results   Component Value Date    WBC 17 38 (H) 06/16/2022    HGB 11 5 06/16/2022    HCT 34 0 (L) 06/16/2022    MCV 90 06/16/2022     06/16/2022     Results from last 7 days   Lab Units 06/14/22  2204   INR  1 22*       Lab Results   Component Value Date    CHOL 216 (H) 04/05/2018     Lab Results   Component Value Date    HDL 43 (L) 05/12/2022    HDL 49 (L) 02/09/2022     Lab Results   Component Value Date    LDLCALC 113 (H) 05/12/2022    LDLCALC 121 (H) 02/09/2022     Lab Results   Component Value Date    TRIG 91 05/12/2022    TRIG 102 02/09/2022       Lab Results   Component Value Date    ALT 22 06/16/2022    AST 26 06/16/2022         EKG personally reviewed by )Meg Dykes MD  No acute changes   TELE: No significant arrhythmias seen on telemetry review

## 2022-06-16 NOTE — PLAN OF CARE
Problem: OCCUPATIONAL THERAPY ADULT  Goal: Performs self-care activities at highest level of function for planned discharge setting  See evaluation for individualized goals  Description: Treatment Interventions: ADL retraining, Functional transfer training, UE strengthening/ROM, Endurance training, Patient/family training, Equipment evaluation/education, Compensatory technique education, Continued evaluation, Energy conservation, Activityengagement          See flowsheet documentation for full assessment, interventions and recommendations  Note: Limitation: Decreased ADL status, Decreased UE strength, Decreased endurance, Decreased self-care trans, Decreased high-level ADLs  Prognosis: Good  Assessment: Pt is a 64 y o  female seen for OT evaluation s/p [facility] to [facility]  on 6/15/2022 w/ cough, fever, and SOB and admitted w/ Acute respiratory failure with hypoxia, sepsis, COPD with acute exacerbation, and acute on chronic combined systolic and diastolic CHF  Pt was a rapid response on 6/15/22 2* acute respiratory failure 2* flash pulmonary edema  Pt transferred to ICU and placed on bipap  Pt on 6L Midflow at time of eval  Comorbidities affecting pts functional performance include a significant PMH of Cardiomyopathy, COPD, HLD, HTN Pt with active OT orders  Pt lives alone in a two level house with 2+2+2 ANATOLY and FOS to 2nd floor bed/bath  Pt reports often sleeping on 1st floor couch  Pt has dtr 5 mins away and nephew 2 mins away who can assist as needed  At baseline, pt was I w/ ADLs, IADLs, and functional transfers/mobility w/o use of AD  (+)   Denies falls PTA  Upon evaluation, pt currently requires Supervision for UB ADLs, Min A for LB ADLs, Min A for toileting, Supervision for transfers, and Min A for functional mobility 2* the following deficits impacting occupational performance: decreased strength, decreased balance and decreased tolerance   These impairments, as well at pts fall risk, new O2 requirements, steps to enter environment, limited home support, difficulty performing ADLS and difficulty performing IADLS  limit pts ability to safely engage in all baseline areas of occupation  Based on the aforementioned OT evaluation, functional performance deficits, and assessments, pt has been identified as a Moderate complexity evaluation  Pt to continue to benefit from continued acute OT services during hospital stay to address defined deficits and to maximize level of functional independence in the following Occupational Performance areas: grooming, bathing/shower, toilet hygiene, dressing, medication management, health maintenance, functional mobility, community mobility, clothing management, cleaning, meal prep and household maintenance  From OT standpoint, recommend Home w/ outpatient pulmonary rehab upon D/C   OT will continue to follow pt 2-3x/wk to address the following goals to  w/in 10-14 days:     OT Discharge Recommendation: Home with outpatient rehabilitation (outpatient pulmonary rehab)  OT - OK to Discharge: Yes (when medically cleared)

## 2022-06-16 NOTE — PHYSICAL THERAPY NOTE
PT EVALUATION    Pt  Name: Ramez Phillip  Pt  Age: 64 y o  MRN: [id number]  LENGTH OF STAY: 1      Admitting Diagnoses:   Pneumonia [J18 9]    Past Medical History:   Diagnosis Date    Cardiomyopathy ([facility] )     COPD (chronic obstructive pulmonary disease) ([facility] )     Fatty liver     Hyperlipidemia     Hypertension     Mitral regurgitation        Past Surgical History:   Procedure Laterality Date    CARDIAC CATHETERIZATION  2021    Moderate non-obstructive atherosclerosis     SECTION      CHOLECYSTECTOMY      ROTATOR CUFF REPAIR W/ DISTAL CLAVICLE EXCISION Right     TONSILLECTOMY         Imaging Studies:  CTA chest pe study   Final Result by Lyudmila Reddy DO (06/15 3708)      Some images are suboptimal secondary to respiratory motion which decreases sensitivity for evaluation of peripheral pulmonary emboli, subject to this, no pulmonary embolism is seen  Superimposed on moderate pulmonary emphysematous changes, a large ill-defined consolidation in the periphery of the left lower lobe, suspicious for infection in the appropriate clinical setting  Correlation with the patient's symptoms and laboratory    values recommended  Small dependent left pleural effusion, possibly reactive  Cardiomegaly, coronary atherosclerosis, prominent mediastinal and hilar lymph nodes, right renal atrophy, and other findings as above  Workstation performed: [id number]               22 1142   PT Last Visit   PT Visit Date 22   Note Type   Note type Evaluation   Pain Assessment   Pain Assessment Tool 0-10   Pain Score 9   Pain Location/Orientation Location: Neck; Location: Shoulder   Hospital Pain Intervention(s) Repositioned; Ambulation/increased activity; Emotional support; Rest   Restrictions/Precautions   Weight Bearing Precautions Per Order No   Other Precautions Multiple lines;Telemetry;O2;Fall Risk;Pain  (6L [address] [facility])   Home Living   Type of [address] Two level;Bed/bath upstairs; Able to live on main level with bedroom/bathroom;Stairs to enter without rails  (2+2+2STE w/o HR; pt has 1st flr set up)   Bathroom Shower/Tub Tub/shower unit   [facility]  (does not use at baseline)   Prior Function   Level of [place] Independent with ADLs and functional mobility  (w/o AD)   Lives With Alone   Receives Help From Family  (daughter lives [place] away; nephew lives Pk Mendez away)   ADL Assistance Independent   Falls in the last 6 months 0   Comments (+)    General   Family/Caregiver Present No   Cognition   Overall Cognitive Status WFL   Arousal/Participation Alert   Orientation Level Oriented to person;Oriented to place;Oriented to time   Following Commands Follows one step commands without difficulty   Comments pt pleasant & cooperative   Subjective   Subjective Patient agreeable to PT/OT eval    RUE Assessment   RUE Assessment   (Refer to OT)   LUE Assessment   LUE Assessment   (Refer to OT)   RLE Assessment   RLE Assessment WFL  (4/5 grossly)   LLE Assessment   LLE Assessment WFL  (4/5 grossly)   Coordination   Movements are Fluid and Coordinated 1   Sensation WFL   Bed Mobility   Supine to Sit Unable to assess   Sit to Supine Unable to assess   Additional Comments Patient out of bed in chair pre and post session   Transfers   Sit to Stand 5  Supervision   Additional items Increased time required;Armrests   Stand to Sit 5  Supervision   Additional items Armrests; Increased time required   Ambulation/Elevation   Gait pattern Wide CARLY; Decreased foot clearance; Short stride; Excessively slow   Gait Assistance 4  Minimal assist   Additional items Assist x 1;Verbal cues; Tactile cues  (+ assist w/ lines)   Assistive Device None   Distance 110'x1   Ambulation/Elevation Additional Comments unsteady gait but no gross LOB noted   Balance   Static Sitting Fair +   Dynamic Sitting Fair   Static Standing Fair -   Dynamic Standing Poor + Ambulatory Poor +   Activity Tolerance   Activity Tolerance Patient limited by fatigue;Treatment limited secondary to medical complications (Comment)   Medical Staff Made Aware BROOKE Casiano   Nurse Made Aware RN Ava Gosselin  Assessment   Prognosis Good   Problem List Decreased strength;Decreased endurance; Impaired balance;Decreased mobility   Assessment Pt  61 y  o female admitted for Acute respiratory failure with hypoxia (HCC) w/ pulmonary edema, COPD, pneumonia, sepsis, SHANTA and acute on chronic combined congestive heart failure  Pt currently on 6L [address]  Pt referred to PT for mobility assessment & D/C planning  Please see above for information re: home set-up & PLOF as well as objective findings during PT assessment  PTA, pt reports being I w/o AD  On eval, pt functioning below baseline hence will continue skilled PT to improve function & safety  Pt require S for transfers however require minAx1 for amb w/o AD + cues for techniques & safety  Pt was placed on 8L O2 NC per RN for amb  (+) SOB after amb but relieved w/ rest  SpO2 97% w/ O2 t/o session  The patient's AM-PAC Basic Mobility Inpatient Short Form Raw Score is 18  A Raw score of greater than 16 suggests the patient may benefit from discharge to home  Please also refer to the recommendation of the Physical Therapist for safe discharge planning  From PT standpoint, will anticipate good progress in PT for safe D/C to home  Pt will benefit from HHPT or OPPT for pulmonary rehab, pending progress  Slight dizziness reported during amb but no LOC  BP stable  Nsg staff most recent vital signs as follows: /95   Pulse (!) 111   Temp (!) 97 4 °F (36 3 °C) (Temporal)   Resp 22   Ht 5' 7" (1 702 m)   Wt 76 5 kg (168 lb 10 4 oz)   SpO2 97%   BMI 26 41 kg/m²   At end of session, pt OOB in chair in stable condition, call bell & phone in reach, all lines intact  Fall precautions reinforced w/ good understanding  CM to follow   [facility] staff to continue to mobilized pt (OOB in chair for all meals & ambulate in room/unit) as tolerated to prevent further decline in function  Nsg notified  Co-eval was necessary to complete this PT eval for the pt's best interest given pt's medical acuity & complexity  Barriers to Discharge Inaccessible home environment;Decreased caregiver support   Barriers to Discharge Comments home alone; stairs   Goals   Patient Goals to get better & go home   STG Expiration Date 06/26/22   Short Term Goal #1 Goals to be met in 10 days; pt will be able to: 1) inc strength & balance by 1/2 grade to improve overall functional mobility & dec fall risk; 2) inc bed mobility to modified I for pt to be able to get in/OOB safely w/ proper techniques 100% of the time, to dec caregiver burden & safely function at home; 3) inc transfers to modified I for pt to transition safely from one surface to another w/o % of the time, to dec caregiver burden & safely function at home; 4) inc amb w/ appropriate AD approx  >350' w/ S for pt to ambulate community distances w/o any % of the time, to dec caregiver burden & safely function at home; 5) negotiate stairs w/ S for inc safety during stair mgt inside/outside of home & dec caregiver burden; 6) pt/caregiver ed   PT Treatment Day 0   Plan   Treatment/Interventions Functional transfer training;LE strengthening/ROM; Elevations; Therapeutic exercise; Endurance training;Patient/family training;Bed mobility;Gait training;Spoke to nursing;OT   PT Frequency 3-5x/wk   Recommendation   PT Discharge Recommendation Home with outpatient rehabilitation  (HHPT or OPPT for pulmonary rehab, pending progress)   AM-PAC Basic Mobility Inpatient   Turning in Bed Without Bedrails 3   Lying on Back to Sitting on Edge of Flat Bed 3   Moving Bed to Chair 3   Standing Up From Chair 3   Walk in Room 3   Climb 3-5 Stairs 3   Basic Mobility Inpatient Raw Score 18   Basic Mobility Standardized Score 41 05   Highest Level Of Mobility   -[facility] Goal 6: Walk 10 steps or more   -HLM Achieved 7: Walk 25 feet or more   End of Consult   Patient Position at End of Consult Bedside chair; All needs within reach   End of Consult Comments Patient in stable condition at the end of session  All needs in reach  All lines intact     Hx/personal factors: co-morbidities, inaccessible home, home alone, mutliple lines, telemetry, use of AD, pain, fall risk, assist w/ ADL's, and O2  Examination: dec mobility, dec balance, dec endurance, dec amb, risk for falls, pain  Clinical: unpredictable (ongoing medical status, abnormal lab values, and risk for falls)  Complexity: high    Monae Patel, PT

## 2022-06-16 NOTE — CASE MANAGEMENT
Case Management Assessment & Discharge Planning Note    Patient name Adelita Gonzalez  Location ICU 10/ICU 10 MRN [id number]  : 1960 Date 2022       Current Admission Date: 6/15/2022  Current Admission Diagnosis:Acute respiratory failure with hypoxia [facility])   Patient Active Problem List    Diagnosis Date Noted    SHANTA (acute kidney injury) ([facility] ) 2022    Acute respiratory failure with hypoxia ([facility] ) 06/15/2022    Chronic obstructive pulmonary disease with acute exacerbation ([facility] ) 06/15/2022    Acute on chronic combined systolic and diastolic congestive heart failure ([facility] ) 2021    Tobacco abuse 2021    Hyponatremia 2021    Leukocytosis 2021    Anxiety 2021    Acute respiratory failure with hypoxia and hypercapnia ([facility] ) 2021    Sepsis ([facility] ) 2021    Dilated cardiomyopathy ([facility] ) 2021    Essential hypertension 2021    Hypokalemia 2021    Chronic pain 2021    Elevated troponin 2021    Vitamin D deficiency 2013      LOS (days): 1  Geometric Mean LOS (GMLOS) (days): 4 80  Days to GMLOS:3 2     OBJECTIVE:    Risk of Unplanned Readmission Score: 15 17         Current admission status: Inpatient       Preferred Pharmacy:   [address]   [place], [address]  [facility] [address]  Phone: [phone] Fax: [phone]    Primary Care Provider: Rosa Vaughan DO    Primary Insurance: MEDICARE MISC REPLACEMENT  Secondary Insurance: [facility]    ASSESSMENT:  [address], [facility] Representative - Daughter   Primary Phone: [phone] (Mobile)               Advance Directives  Does patient have a [address]?: No  Was patient offered paperwork?: Yes (declined)  Does patient currently have a Health Care decision maker?: Yes, please see Health Care Proxy section  Does patient have Advance Directives?: No  Was patient offered paperwork?: Yes (declined)  Primary Contact: Patient stated she does not have a POA and that she would like her daughter to be contacted in case of emergency as her decision-maker  Readmission Root Cause  30 Day Readmission: No    Patient Information  Admitted from[de-identified] Home  Mental Status: Alert  During Assessment patient was accompanied by: Not accompanied during assessment  Assessment information provided by[de-identified] Patient  Primary Caregiver: Self  Support Systems: Daughter  [place] of Residence: [facility] [address] do you live in?: [facility] entry access options   Select all that apply : Stairs  Number of steps to enter home : 7  Do the steps have railings?: No  Type of Current Residence: 2 story home  Upon entering residence, is there a bedroom on the main floor (no further steps)?: No  A bedroom is located on the following floor levels of residence (select all that apply):: 2nd Floor  Upon entering residence, is there a bathroom on the main floor (no further steps)?: No  Indicate which floors of current residence have a bathroom (select all the apply):: 2nd Floor  Number of steps to 2nd floor from main floor: One Flight  In the last 12 months, was there a time when you were not able to pay the mortgage or rent on time?: No  In the last 12 months, how many places have you lived?: 1  In the last 12 months, was there a time when you did not have a steady place to sleep or slept in a shelter (including now)?: No  Homeless/housing insecurity resource given?: Refused  Living Arrangements: Lives Alone  Is patient a ?: No    Activities of Daily Living Prior to Admission  Functional Status: Independent  Completes ADLs independently?: Yes  Ambulates independently?: Yes  Does patient use assisted devices?: No  Does patient currently own DME?: Yes  What DME does the patient currently own?: [employer]  Does patient have a history of Outpatient Therapy (PT/OT)?: No  Does the patient have a history of Short-Term Rehab?: No  Does patient have a history of HHC?: Yes ([facility])  Does patient currently have David Altman?: No         Patient Information Continued  Income Source: SSI/SSD  Does patient have prescription coverage?: Yes  Within the past 12 months, you worried that your food would run out before you got the money to buy more : Never true  Within the past 12 months, the food you bought just didn't last and you didn't have money to get more : Never true  Food insecurity resource given?: N/A  Does patient receive dialysis treatments?: No  Does patient have a history of substance abuse?: No  Does patient have a history of Mental Health Diagnosis?: No         Means of Transportation  Means of Transport to Appts[de-identified] Drives Self  In the past 12 months, has lack of transportation kept you from medical appointments or from getting medications?: No  In the past 12 months, has lack of transportation kept you from meetings, work, or from getting things needed for daily living?: No  Was application for public transport provided?: N/A        DISCHARGE DETAILS:    Discharge planning discussed with[de-identified] Patient  Freedom of Choice: Yes  Comments - Freedom of Choice: Patient states she is unsure if she would like to go to OP PT (unsure if she will need it) but interested in OP PT provider list  CM gave to nurse to put with paperwork to go to med surg floor w/ patient    CM contacted family/caregiver?: No- see comments (declined)  Were Treatment Team discharge recommendations reviewed with patient/caregiver?: Yes  Did patient/caregiver verbalize understanding of patient care needs?: Yes  Were patient/caregiver advised of the risks associated with not following Treatment Team discharge recommendations?: Yes              DME Referral Provided  Referral made for DME?: No    Other Referral/Resources/Interventions Provided:  Interventions: Outpatient PT    Would you like to participate in our [address] service program?  : No - Declined (preferene for own pharmacy)    Treatment Team Recommendation: Home  Discharge Destination Plan[de-identified] Home  Transport at Discharge : Family (daughter)                       Accompanied by: Family member

## 2022-06-16 NOTE — ASSESSMENT & PLAN NOTE
· Transferred to the ICU when she developed acute SOB, increased WOB- likely multifactorial related to pulmonary edema, COPD and left sided pneumonia, possibly aspiration  · Used BiPAP for 2 hrs overnight and removed due to feeling warm   · On 6L O2 via NC     Plan:   · Will continue oxygen therapy for now with a goal to maintain her sats > 90%  · Will continue the IV rocephin   · Follow Blood cx  · Steroid taper - solumedrol 40 mg daily   · BiPAP HS

## 2022-06-16 NOTE — PLAN OF CARE
Problem: MOBILITY - ADULT  Goal: Maintain or return to baseline ADL function  Description: INTERVENTIONS:  -  Assess patient's ability to carry out ADLs; assess patient's baseline for ADL function and identify physical deficits which impact ability to perform ADLs (bathing, care of mouth/teeth, toileting, grooming, dressing, etc )  - Assess/evaluate cause of self-care deficits   - Assess range of motion  - Assess patient's mobility; develop plan if impaired  - Assess patient's need for assistive devices and provide as appropriate  - Encourage maximum independence but intervene and supervise when necessary  - Involve family in performance of ADLs  - Assess for home care needs following discharge   - Consider OT consult to assist with ADL evaluation and planning for discharge  - Provide patient education as appropriate  Outcome: Progressing  Goal: Maintains/Returns to pre admission functional level  Description: INTERVENTIONS:  - Perform BMAT or MOVE assessment daily    - Set and communicate daily mobility goal to care team and patient/family/caregiver     - Collaborate with rehabilitation services on mobility goals if consulted    - Out of bed for toileting  - Record patient progress and toleration of activity level   Outcome: Progressing     Problem: Prexisting or High Potential for Compromised Skin Integrity  Goal: Skin integrity is maintained or improved  Description: INTERVENTIONS:  - Identify patients at risk for skin breakdown  - Assess and monitor skin integrity  - Assess and monitor nutrition and hydration status  - Monitor labs   - Assess for incontinence   - Turn and reposition patient  - Assist with mobility/ambulation  - Relieve pressure over bony prominences  - Avoid friction and shearing  - Provide appropriate hygiene as needed including keeping skin clean and dry  - Evaluate need for skin moisturizer/barrier cream  - Collaborate with interdisciplinary team   - Patient/family teaching  - Consider wound care consult   Outcome: Progressing

## 2022-06-16 NOTE — ASSESSMENT & PLAN NOTE
Baseline 0 9-1 2  Rise in Cr to 1 61  Likely in the setting of sepsis, CHF exacerbation, maybe diuretic use     Output in the last 24 hrs 1 7L     Plan:   · Hold diuresis - does not examen to be overloaded  · Cardiology following    · Monitor I/O, daily wt  · Avoid hypotension, nephrotoxins

## 2022-06-16 NOTE — PLAN OF CARE
Problem: PHYSICAL THERAPY ADULT  Goal: Performs mobility at highest level of function for planned discharge setting  See evaluation for individualized goals  Description: Treatment/Interventions: Functional transfer training, LE strengthening/ROM, Elevations, Therapeutic exercise, Endurance training, Patient/family training, Bed mobility, Gait training, Spoke to nursing, OT          See flowsheet documentation for full assessment, interventions and recommendations  Note: Prognosis: Good  Problem List: Decreased strength, Decreased endurance, Impaired balance, Decreased mobility  Assessment: Pt  64 y  o female admitted for Acute respiratory failure with hypoxia (HCC) w/ pulmonary edema, COPD, pneumonia, sepsis, SHANTA and acute on chronic combined congestive heart failure  Pt currently on 6L [address]  Pt referred to PT for mobility assessment & D/C planning  Please see above for information re: home set-up & PLOF as well as objective findings during PT assessment  PTA, pt reports being I w/o AD  On eval, pt functioning below baseline hence will continue skilled PT to improve function & safety  Pt require S for transfers however require minAx1 for amb w/o AD + cues for techniques & safety  Pt was placed on 8L O2 NC per RN for amb  (+) SOB after amb but relieved w/ rest  SpO2 97% w/ O2 t/o session  The patient's AM-PAC Basic Mobility Inpatient Short Form Raw Score is 18  A Raw score of greater than 16 suggests the patient may benefit from discharge to home  Please also refer to the recommendation of the Physical Therapist for safe discharge planning  From PT standpoint, will anticipate good progress in PT for safe D/C to home  Pt will benefit from HHPT or OPPT for pulmonary rehab, pending progress  Slight dizziness reported during amb but no LOC  BP stable   Nsg staff most recent vital signs as follows: /95   Pulse (!) 111   Temp (!) 97 4 °F (36 3 °C) (Temporal)   Resp 22   Ht 5' 7" (1 702 m)   Wt 76 5 kg (168 lb 10 4 oz)   SpO2 97%   BMI 26 41 kg/m²   At end of session, pt OOB in chair in stable condition, call bell & phone in reach, all lines intact  Fall precautions reinforced w/ good understanding  CM to follow  Nsg staff to continue to mobilized pt (OOB in chair for all meals & ambulate in room/unit) as tolerated to prevent further decline in function  Nsg notified  Co-eval was necessary to complete this PT eval for the pt's best interest given pt's medical acuity & complexity  Barriers to Discharge: Inaccessible home environment, Decreased caregiver support  Barriers to Discharge Comments: home alone; stairs     PT Discharge Recommendation: Home with outpatient rehabilitation (HHPT or OPPT for pulmonary rehab, pending progress)          See flowsheet documentation for full assessment

## 2022-06-16 NOTE — OCCUPATIONAL THERAPY NOTE
Occupational Therapy Evaluation     Patient Name: Amber Bond  TWVESTAY Date: 2022  Problem List  Principal Problem:    Acute respiratory failure with hypoxia (HCC)  Active Problems:    Sepsis (HCC)    Chronic pain    Acute on chronic combined systolic and diastolic congestive heart failure (HCC)    Chronic obstructive pulmonary disease with acute exacerbation (HCC)    SHANTA (acute kidney injury) ([facility] [address] )    Past Medical History  Past Medical History:   Diagnosis Date    Cardiomyopathy ([facility] )     COPD (chronic obstructive pulmonary disease) ([facility] )     Fatty liver     Hyperlipidemia     Hypertension     Mitral regurgitation      Past Surgical History  Past Surgical History:   Procedure Laterality Date    CARDIAC CATHETERIZATION  2021    Moderate non-obstructive atherosclerosis     SECTION      CHOLECYSTECTOMY      ROTATOR CUFF REPAIR W/ DISTAL CLAVICLE EXCISION Right     TONSILLECTOMY           22 1143   OT Last Visit   OT Visit Date 22   Note Type   Note type Evaluation   Restrictions/Precautions   Weight Bearing Precautions Per Order No   Other Precautions Multiple lines;Telemetry;O2;Fall Risk;Pain  (6L Midflow O2)   Pain Assessment   Pain Assessment Tool 0-10   Pain Score 9   Pain Location/Orientation Location: Neck; Location: Shoulder   Hospital Pain Intervention(s) Repositioned; Ambulation/increased activity; Emotional support; Rest   Multiple Pain Sites No   Home Living   Type of Home House   Home Layout Two level;Bed/bath upstairs;Stairs to enter without rails  (2+2+2 ANATOLY; Can sleep on 1st floor on couch, however no bath on 1st  FOS to bathroom and bedroom)   Bathroom Shower/Tub Tub/shower unit   Bathroom Toilet Standard   Home Equipment Cane  (no use PTA)   Additional Comments Pt lives alone in a two level house with 2+2+2 ANATOLY and FOS to 2nd floor bed/bath  Pt reports often sleeping on 1st floor couch  Pt has dtr 5 mins away and nephew 2 mins away who can assist as needed     Prior Function   Level of [place] Independent with ADLs and functional mobility   Lives With Alone   Receives Help From Family  (dtr lives 5 mins away, nephew lives 2 mins away)   ADL Assistance Independent   IADLs Independent   Falls in the last 6 months 0   Vocational Retired   Comments At baseline, pt was I w/ ADLs, IADLs, and functional transfers/mobility w/o use of AD  (+)   Denies falls PTA  Lifestyle   Autonomy At baseline, pt was I w/ ADLs, IADLs, and functional transfers/mobility w/o use of AD  (+)   Denies falls PTA  Reciprocal Relationships Dtr, nephew   Service to Others Retired-    Psychosocial   Psychosocial (WDL) WDL   ADL   Where Assessed Chair   Eating Assistance 7  3 [facility] 7  [address] [facility] 5  Supervision/Setup   LB [address] 4  Minimal Assistance   [address] 5  Supervision/Setup    [facility] 4  [address]   4  [address] [facility] 4  Minimal Assistance   Bed Mobility   Supine to Sit Unable to assess   Sit to Supine Unable to assess   Additional Comments Pt seated OOB in chair at start/end of session  Call bell and phone within reach  All needs met and pt reports no further questions for OT at this time  Transfers   Sit to Stand 5  Supervision   Additional items Armrests; Increased time required   Stand to Sit 5  Supervision   Additional items Armrests; Increased time required   Additional Comments Cues for safe technique   Functional Mobility   Functional Mobility 4  Minimal assistance   Additional Comments Assist x1 w/o use of AD; SpO2: 96-97% on 6L Midflow both pre and post mobility   Balance   Static Sitting Fair +   Dynamic Sitting Fair   Static Standing Fair -   Dynamic Standing Poor +   Ambulatory Poor +   Activity Tolerance   Activity Tolerance Patient limited by fatigue;Treatment limited secondary to medical complications (Comment) Medical Staff Made Aware Brian PT   Nurse Made Aware yes; Pilar RN   RUE Assessment   RUE Assessment WFL   RUE Strength   RUE Overall Strength Within Functional Limits - able to perform ADL tasks with strength  (4-/5 throughout)   LUE Assessment   LUE Assessment WFL   LUE Strength   LUE Overall Strength Within Functional Limits - able to perform ADL tasks with strength  (4-/5 throughout)   Hand Function   Gross Motor Coordination Functional   Fine Motor Coordination Functional   Sensation   Light Touch No apparent deficits   Proprioception   Proprioception No apparent deficits   Vision-Basic Assessment   Current Vision Wears glasses all the time   Vision - Complex Assessment   Ocular Range of Motion [facility]   Acuity Able to read clock/calendar on wall without difficulty; Able to read employee name badge without difficulty   Perception   Inattention/Neglect Appears intact   Cognition   Overall Cognitive Status [facility]   Arousal/Participation Alert; Cooperative   Attention Within functional limits   Orientation Level Oriented to person;Oriented to place;Oriented to time   Memory Within functional limits   Following Commands Follows one step commands without difficulty   Comments Pleasant and cooperative   Assessment   Limitation Decreased ADL status; Decreased UE strength;Decreased endurance;Decreased self-care trans;Decreased high-level ADLs   Prognosis Good   Assessment Pt is a 64 y o  female seen for OT evaluation s/p adm to [facility] on 6/15/2022 w/ cough, fever, and SOB and admitted w/ Acute respiratory failure with hypoxia, sepsis, COPD with acute exacerbation, and acute on chronic combined systolic and diastolic CHF  Pt was a rapid response on 6/15/22 2* acute respiratory failure 2* flash pulmonary edema  Pt transferred to ICU and placed on bipap   Pt on 6L Midflow at time of eval  Comorbidities affecting pts functional performance include a significant PMH of Cardiomyopathy, COPD, HLD, HTN Pt with active OT orders  Pt lives alone in a two level house with 2+2+2 ANATOLY and FOS to 2nd floor bed/bath  Pt reports often sleeping on 1st floor couch  Pt has dtr 5 mins away and nephew 2 mins away who can assist as needed  At baseline, pt was I w/ ADLs, IADLs, and functional transfers/mobility w/o use of AD  (+)   Denies falls PTA  Upon evaluation, pt currently requires Supervision for UB ADLs, Min A for LB ADLs, Min A for toileting, Supervision for transfers, and Min A for functional mobility 2* the following deficits impacting occupational performance: decreased strength, decreased balance and decreased tolerance  These impairments, as well at pts fall risk, new O2 requirements, steps to enter environment, limited home support, difficulty performing ADLS and difficulty performing IADLS  limit pts ability to safely engage in all baseline areas of occupation  Based on the aforementioned OT evaluation, functional performance deficits, and assessments, pt has been identified as a Moderate complexity evaluation  Pt to continue to benefit from continued acute OT services during hospital stay to address defined deficits and to maximize level of functional independence in the following Occupational Performance areas: grooming, bathing/shower, toilet hygiene, dressing, medication management, health maintenance, functional mobility, community mobility, clothing management, cleaning, meal prep and household maintenance  From OT standpoint, recommend Home w/ outpatient pulmonary rehab upon D/C  OT will continue to follow pt 2-3x/wk to address the following goals to  w/in 10-14 days:   Goals   Patient Goals To get better and go home   LTG Time Frame 10-14   Long Term Goal Please refer to LTGs listed below   Plan   Treatment Interventions ADL retraining;Functional transfer training;UE strengthening/ROM; Endurance training;Patient/family training;Equipment evaluation/education; Compensatory technique education;Continued evaluation; Energy conservation; Activityengagement   Goal Expiration Date 06/30/22   OT Treatment Day 0   OT Frequency 2-3x/wk   Recommendation   OT Discharge Recommendation Home with outpatient rehabilitation  (outpatient pulmonary rehab)   OT - OK to Discharge Yes  (when medically cleared)   Additional Comments  The patient's raw score on the AM-PAC Daily Activity inpatient short form is 20, standardized score is 42 03, greater than 39 4  Patients at this level are likely to benefit from discharge to home  Please refer to the recommendation of the Occupational Therapist for safe discharge planning     AM-PAC Daily Activity Inpatient   Lower Body Dressing 3   Bathing 3   Toileting 3   Upper Body Dressing 3   Grooming 4   Eating 4   Daily Activity Raw Score 20   Daily Activity Standardized Score (Calc for Raw Score >=11) 42 03   AM-[facility] Applied Cognition Inpatient   Following a Speech/Presentation 4   Understanding Ordinary Conversation 4   Taking Medications 4   Remembering Where Things Are Placed or Put Away 4   Remembering List of 4-5 Errands 4   Taking Care of Complicated Tasks 4   Applied Cognition Raw Score 24   Applied Cognition Standardized Score 62 21       GOALS    Pt will improve activity tolerance to G for min 30 min txment sessions for increase engagement in functional tasks    Pt will complete bed mobility at a Mod I level w/ G balance/safety demonstrated to decrease caregiver assistance required     Pt will complete UB dressing/self care w/ mod I using adaptive device and DME as needed     Pt will complete LB dressing/self care w/ mod I using adaptive device and DME as needed    Pt will complete toileting w/ mod I w/ G hygiene/thoroughness using DME as needed    Pt will improve functional transfers to Mod I on/off all surfaces using DME as needed w/ G balance/safety     Pt will improve functional mobility during ADL/IADL/leisure tasks to Mod I using DME as needed w/ G balance/safety     Pt will be attentive 100% of the time during ongoing cognitive assessment w/ G participation to assist w/ safe d/c planning/recommendations    Pt will demonstrate G carryover of pt/caregiver education and training as appropriate w/o cues w/ good tolerance to increase safety during functional tasks    Pt will demonstrate 100% carryover of energy conservation techniques t/o functional I/ADL/leisure tasks w/o cues s/p skilled education to increase endurance during functional tasks    Pt will increase BUE strength by 1MM grade via AROM exercises to increase independence in ADLs and transfers    Pt will increase standing tolerance to 8-10 mins with Fair+ dynamic standing balance to increase safety during participation in ADLs       Kal Gil, OTR/L

## 2022-06-16 NOTE — QUICK NOTE
Provided medical update to patient's primary contact, Daughter Alica Schilder  Discussed recent assessment and upcoming plan, including transfer to [facility]  Questions were answered  Daughter confirmed understanding and agrees with plan

## 2022-06-17 LAB
ANION GAP SERPL CALCULATED.3IONS-SCNC: 11 MMOL/L (ref 4–13)
BUN SERPL-MCNC: 26 MG/DL (ref 5–25)
CALCIUM SERPL-MCNC: 9.4 MG/DL (ref 8.3–10.1)
CHLORIDE SERPL-SCNC: 100 MMOL/L (ref 100–108)
CO2 SERPL-SCNC: 22 MMOL/L (ref 21–32)
CREAT SERPL-MCNC: 1.1 MG/DL (ref 0.6–1.3)
ERYTHROCYTE [DISTWIDTH] IN BLOOD BY AUTOMATED COUNT: 13.2 % (ref 11.6–15.1)
GFR SERPL CREATININE-BSD FRML MDRD: 54 ML/MIN/1.73SQ M
GLUCOSE SERPL-MCNC: 131 MG/DL (ref 65–140)
HCT VFR BLD AUTO: 36.5 % (ref 34.8–46.1)
HGB BLD-MCNC: 12.2 G/DL (ref 11.5–15.4)
MAGNESIUM SERPL-MCNC: 2 MG/DL (ref 1.6–2.6)
MCH RBC QN AUTO: 30.1 PG (ref 26.8–34.3)
MCHC RBC AUTO-ENTMCNC: 33.4 G/DL (ref 31.4–37.4)
MCV RBC AUTO: 90 FL (ref 82–98)
PLATELET # BLD AUTO: 310 THOUSANDS/UL (ref 149–390)
PMV BLD AUTO: 10.1 FL (ref 8.9–12.7)
POTASSIUM SERPL-SCNC: 4.4 MMOL/L (ref 3.5–5.3)
PROCALCITONIN SERPL-MCNC: 7.62 NG/ML
RBC # BLD AUTO: 4.05 MILLION/UL (ref 3.81–5.12)
SODIUM SERPL-SCNC: 133 MMOL/L (ref 136–145)
WBC # BLD AUTO: 15.48 THOUSAND/UL (ref 4.31–10.16)

## 2022-06-17 PROCEDURE — 85027 COMPLETE CBC AUTOMATED: CPT

## 2022-06-17 PROCEDURE — 84145 PROCALCITONIN (PCT): CPT

## 2022-06-17 PROCEDURE — 80048 BASIC METABOLIC PNL TOTAL CA: CPT

## 2022-06-17 PROCEDURE — 94640 AIRWAY INHALATION TREATMENT: CPT

## 2022-06-17 PROCEDURE — 94660 CPAP INITIATION&MGMT: CPT

## 2022-06-17 PROCEDURE — 99232 SBSQ HOSP IP/OBS MODERATE 35: CPT | Performed by: INTERNAL MEDICINE

## 2022-06-17 PROCEDURE — 83735 ASSAY OF MAGNESIUM: CPT

## 2022-06-17 RX ORDER — CYCLOBENZAPRINE HCL 10 MG
10 TABLET ORAL ONCE
Status: DISCONTINUED | OUTPATIENT
Start: 2022-06-17 | End: 2022-06-21

## 2022-06-17 RX ORDER — CLONAZEPAM 0.5 MG/1
0.5 TABLET ORAL
Status: DISCONTINUED | OUTPATIENT
Start: 2022-06-17 | End: 2022-06-22 | Stop reason: HOSPADM

## 2022-06-17 RX ORDER — PREDNISONE 20 MG/1
40 TABLET ORAL DAILY
Status: DISCONTINUED | OUTPATIENT
Start: 2022-06-18 | End: 2022-06-18

## 2022-06-17 RX ORDER — LOSARTAN POTASSIUM 25 MG/1
25 TABLET ORAL DAILY
Status: DISCONTINUED | OUTPATIENT
Start: 2022-06-17 | End: 2022-06-18

## 2022-06-17 RX ORDER — MORPHINE SULFATE 30 MG/1
30 TABLET, FILM COATED, EXTENDED RELEASE ORAL 2 TIMES DAILY
Status: DISCONTINUED | OUTPATIENT
Start: 2022-06-17 | End: 2022-06-22 | Stop reason: HOSPADM

## 2022-06-17 RX ADMIN — MORPHINE SULFATE 30 MG: 30 TABLET, EXTENDED RELEASE ORAL at 18:01

## 2022-06-17 RX ADMIN — GUAIFENESIN 600 MG: 600 TABLET, EXTENDED RELEASE ORAL at 22:25

## 2022-06-17 RX ADMIN — OXYCODONE HYDROCHLORIDE 5 MG: 5 TABLET ORAL at 16:44

## 2022-06-17 RX ADMIN — METHYLPREDNISOLONE SODIUM SUCCINATE 40 MG: 40 INJECTION, POWDER, FOR SOLUTION INTRAMUSCULAR; INTRAVENOUS at 08:00

## 2022-06-17 RX ADMIN — OXYCODONE HYDROCHLORIDE 5 MG: 5 TABLET ORAL at 11:20

## 2022-06-17 RX ADMIN — HEPARIN SODIUM 5000 UNITS: 5000 INJECTION INTRAVENOUS; SUBCUTANEOUS at 05:19

## 2022-06-17 RX ADMIN — HEPARIN SODIUM 5000 UNITS: 5000 INJECTION INTRAVENOUS; SUBCUTANEOUS at 22:25

## 2022-06-17 RX ADMIN — CEFTRIAXONE SODIUM 1000 MG: 10 INJECTION, POWDER, FOR SOLUTION INTRAVENOUS at 14:19

## 2022-06-17 RX ADMIN — LOSARTAN POTASSIUM 25 MG: 25 TABLET, FILM COATED ORAL at 11:25

## 2022-06-17 RX ADMIN — IPRATROPIUM BROMIDE AND ALBUTEROL SULFATE 3 ML: 2.5; .5 SOLUTION RESPIRATORY (INHALATION) at 01:10

## 2022-06-17 RX ADMIN — IPRATROPIUM BROMIDE AND ALBUTEROL SULFATE 3 ML: 2.5; .5 SOLUTION RESPIRATORY (INHALATION) at 19:30

## 2022-06-17 RX ADMIN — ACETAMINOPHEN 325MG 650 MG: 325 TABLET ORAL at 05:18

## 2022-06-17 RX ADMIN — IPRATROPIUM BROMIDE AND ALBUTEROL SULFATE 3 ML: 2.5; .5 SOLUTION RESPIRATORY (INHALATION) at 07:01

## 2022-06-17 RX ADMIN — IPRATROPIUM BROMIDE AND ALBUTEROL SULFATE 3 ML: 2.5; .5 SOLUTION RESPIRATORY (INHALATION) at 13:07

## 2022-06-17 RX ADMIN — HEPARIN SODIUM 5000 UNITS: 5000 INJECTION INTRAVENOUS; SUBCUTANEOUS at 14:19

## 2022-06-17 RX ADMIN — CLONAZEPAM 0.5 MG: 0.5 TABLET ORAL at 22:25

## 2022-06-17 RX ADMIN — METOPROLOL SUCCINATE 50 MG: 50 TABLET, EXTENDED RELEASE ORAL at 08:00

## 2022-06-17 RX ADMIN — OXYCODONE HYDROCHLORIDE 5 MG: 5 TABLET ORAL at 05:19

## 2022-06-17 RX ADMIN — GUAIFENESIN 600 MG: 600 TABLET, EXTENDED RELEASE ORAL at 08:00

## 2022-06-17 NOTE — PROGRESS NOTES
[address]  Progress Note - Ramez Head 1960, 64 y o  female MRN: [id number]  Unit/Bed#: E5 -01 Encounter: [id number]  Primary Care Provider: Griselda Peacemaker, DO   Date and time admitted to hospital: 6/15/2022  2:20 AM    * Acute respiratory failure with hypoxia [facility])  Assessment & Plan  · Previously intubated for respiratory distress in 2021  · Currently on 3 L nasal cannula  · Pulmonary follow-up appreciated  · Continue treatment for pneumonia  · Wean oxygen as tolerated    SHANTA (acute kidney injury) ([facility] [address] )  Assessment & Plan  · Baseline creatinine 0 9-1 2  · Creatinine increased to 1 61  · Likely secondary to sepsis, CHF exacerbation  · Improved to 1 10  · Continue to hold diuretics  · Monitor renal function    Chronic obstructive pulmonary disease with acute exacerbation (HCC)  Assessment & Plan  · History of COPD and current tobacco use  · Pulmonary follow-up appreciated  · Prednisone taper starting tomorrow 40 mg daily with reduction by 10 mg every 3 days  · Continue DuoNebs q 6 hours    Acute on chronic combined systolic and diastolic congestive heart failure (HCC)  Assessment & Plan  Wt Readings from Last 3 Encounters:   06/17/22 76 5 kg (168 lb 10 4 oz)   07/13/21 71 7 kg (158 lb)   06/15/21 68 kg (149 lb 14 6 oz)     · bilateral lower extremity edema on exam   · echo on 07/09/2021:  EF 50%  Mild enlargement of the ascending aorta to 3 7 cm  Mild mitral regurgitation  ·  coronary angiography on 03/30/2021 with moderate, but nonobstructive disease  No interventions performed  · Cardiology follow-up appreciated  · Diuretics currently on hold  · Continue metoprolol, losartan added at 25 mg daily by Cardiology  · Repeat echocardiogram in 1 week to re-evaluate left ventricle systolic function  · strict I&Os  Daily weights, fluid restriction      Chronic pain  Assessment & Plan  · Patient maintained on MS Contin 30 mg b i d   · Oxycodone 2 5 mg q 6 hours p r n   For moderate pain, oxycodone 5 mg p o  Q 6 hours p r n  For severe pain  · Monitor respiratory status closely    Sepsis (HCC)  Assessment & Plan  · Cough, fever and abdominal bloating since Monday,Temperature 101 7°, heart rate 133, WBC 15 K  · Chest x-ray per ER with left lower lobe pneumonia  · COVID, RSV and influenza negative  · Continue antibiotics to complete 7 day course  · Repeat chest x-ray in 6 8 weeks        VTE Pharmacologic Prophylaxis:   Pharmacologic:  Heparin    Patient Centered Rounds: I have performed bedside rounds with nursing staff today  Education and Discussions with Family / Patient: Updated daughter Sukhdev Green    Time Spent for Care: 20 minutes  More than 50% of total time spent on counseling and coordination of care as described above  Current Length of Stay: 2 day(s)    Current Patient Status: Inpatient   Certification Statement: The patient will continue to require additional inpatient hospital stay due to Acute hypoxic and hypercapnic respiratory failure    Discharge Plan / Estimated Discharge Date: TBD    Code Status: Level 1 - Full Code      Subjective:   Patient seen and examined at bedside, complaining of generalized body aches and pains    Objective:     Vitals:   Temp (24hrs), Av 3 °F (36 8 °C), Min:97 5 °F (36 4 °C), Max:98 8 °F (37 1 °C)    Temp:  [97 5 °F (36 4 °C)-98 8 °F (37 1 °C)] 98 °F (36 7 °C)  HR:  [] 112  Resp:  [18-20] 18  BP: (145-167)/() 165/106  SpO2:  [90 %-98 %] 92 %  Body mass index is 26 41 kg/m²  Input and Output Summary (last 24 hours): Intake/Output Summary (Last 24 hours) at 2022 1811  Last data filed at 2022 0945  Gross per 24 hour   Intake 120 ml   Output 1650 ml   Net -1530 ml       Physical Exam:    Constitutional: Patient is oriented to person, place and time, no acute distress  HEENT:  Normocephalic, atraumatic  Cardiovascular: Normal S1S2, RRR, No murmurs/rubs/gallops appreciated    Pulmonary:  Bilateral air entry, No rhonchi/rales/wheezing appreciated  Abdominal: Soft, Bowel sounds present, Non-tender, Non-distended  Extremities:  No cyanosis, clubbing or edema  Neurological: Cranial nerves II-XII grossly intact, sensation intact, otherwise no focal neurological symptoms  Skin:  Warm, dry    Additional Data:     Labs:    Results from last 7 days   Lab Units 06/17/22  0555 06/16/22  0447   WBC Thousand/uL 15 48* 17 38*   HEMOGLOBIN g/dL 12 2 11 5   HEMATOCRIT % 36 5 34 0*   PLATELETS Thousands/uL 310 262   NEUTROS PCT %  --  92*   LYMPHS PCT %  --  5*   MONOS PCT %  --  2*   EOS PCT %  --  0     Results from last 7 days   Lab Units 06/17/22  0555 06/16/22  0447   POTASSIUM mmol/L 4 4 3 6   CHLORIDE mmol/L 100 99*   CO2 mmol/L 22 21   BUN mg/dL 26* 31*   CREATININE mg/dL 1 10 1 61*   CALCIUM mg/dL 9 4 8 7   ALK PHOS U/L  --  84   ALT U/L  --  22   AST U/L  --  26     Results from last 7 days   Lab Units 06/14/22  2204   INR  1 22*        I Have Reviewed All Lab Data Listed Above  Recent Cultures (last 7 days):     Results from last 7 days   Lab Units 06/15/22  0503 06/14/22  2206 06/14/22  2204   BLOOD CULTURE   --  No Growth at 48 hrs  No Growth at 48 hrs     LEGIONELLA URINARY ANTIGEN  Negative  --   --        Last 24 Hours Medication List:   Current Facility-Administered Medications   Medication Dose Route Frequency Provider Last Rate    acetaminophen  650 mg Oral Q6H PRN Salena Haines MD      cefTRIAXone  1,000 mg Intravenous Q24H Salena Haines MD Stopped (06/17/22 1449)    guaiFENesin  600 mg Oral Q12H [facility] Salena Haines MD      heparin (porcine)  5,000 Units Subcutaneous [facility] Salena Haines MD      ipratropium-albuterol  3 mL Nebulization Q6H Salena Haines MD      lidocaine  1 patch Topical Daily Salena Haines MD      losartan  25 mg Oral Daily Rujucheyanne Israel DO      metoprolol succinate  50 mg Oral Daily Salena Haines MD     Lottie Sang morphine  30 mg Oral BID Lloyd Hayes MD      ondansetron  4 mg Intravenous Q6H PRN Pauline Elizabeth MD      oxyCODONE  2 5 mg Oral Q6H PRN Pauline Elizabeth MD      Or   Jared Olson oxyCODONE  5 mg Oral Q6H PRN Pauline Elizabeth MD      [START ON 6/18/2022] predniSONE  40 mg Oral Daily ESTHER Palomino          Today, Patient Was Seen By: Lloyd Hayes MD

## 2022-06-17 NOTE — QUICK NOTE
QUICK NOTE - Deterioration Index  Jenny Roque 64 y o  female MRN: [id number]  Unit/Bed#: E5 -01 Encounter: [id number]      Time Paged: [id number]  Room #: 536  Deterioration index score at time of page: 66    PROBLEMS:    Acute hypoxic respiratory   SHANTA   COPD   Acute on chronic systolic and diastolic congestive heart failure   Chronic pain   Sepsis    PLAN:     Continue plan as outlined by primary team   Discussed with Kaylene VU who currently states that the patient has been the same all day and has no critical care issues at this time    HPI Statement (Background):   Jenny Roque is a 64y o  year old female who presents with a past medical history of COPD, chronic heart failure, dilated cardiomyopathy, chronic pain and anxiety  She presented from [place] on  with worsening cough, fever, and shortness of breath  She was transferred [facility] for further monitoring/workup  She complains of being short of breath for 2 days prior to arrival   She has a history of CHF and was intubated in   She had had echocardiogram that showed an EF of 20% back then but her EF improved to 50%  She is also a current smoker  She has been monitored on the [facility] floor and has been followed by Pulmonary        Historical Information   Past Medical History:   Diagnosis Date    Cardiomyopathy ([address] )     COPD (chronic obstructive pulmonary disease) (HCC)     Fatty liver     Hyperlipidemia     Hypertension     Mitral regurgitation      Past Surgical History:   Procedure Laterality Date    CARDIAC CATHETERIZATION  2021    Moderate non-obstructive atherosclerosis     SECTION      CHOLECYSTECTOMY      ROTATOR CUFF REPAIR W/ DISTAL CLAVICLE EXCISION Right     TONSILLECTOMY         Vitals:   Vitals:    22 0747 22 1107 22 1307 22 1423   BP: (!) 165/104 (!) 167/104  (!) 165/106   BP Location: Right arm Left arm  Left arm   Pulse: (!) 106 101  (!) 112   Resp: 20 19  18 Temp: 98 8 °F (37 1 °C) 98 6 °F (37 °C)  98 °F (36 7 °C)   TempSrc: Oral Oral  Oral   SpO2: 90% 96% 96% 92%   Weight:       Height:           Temperature: Temp (24hrs), Av 3 °F (36 8 °C), Min:97 5 °F (36 4 °C), Max:98 8 °F (37 1 °C)  Current: Temperature: 98 °F (36 7 °C)    DIAGNOSTIC DATA:    Labs:   Results from last 7 days   Lab Units 22  0555 22  0447 06/15/22  0503 22  220   WBC Thousand/uL 15 48* 17 38* 23 07* 15 74*   HEMOGLOBIN g/dL 12 2 11 5 14 1 14 0   HEMATOCRIT % 36 5 34 0* 43 3 42 1   PLATELETS Thousands/uL 310 262 367 307   NEUTROS PCT %  --  92* 83* 84*   MONOS PCT %  --  2* 7 6     Results from last 7 days   Lab Units 22  0522  220   SODIUM mmol/L 133* 131* 131*   POTASSIUM mmol/L 4 4 3 6 3 9   CHLORIDE mmol/L 100 99* 99   CO2 mmol/L 22 21 19*   BUN mg/dL 26* 31* 19   CREATININE mg/dL 1 10 1 61* 1 24   CALCIUM mg/dL 9 4 8 7 10 1   ALK PHOS U/L  --  84 90   ALT U/L  --  22 7   AST U/L  --  26 14     Results from last 7 days   Lab Units 22  0522   MAGNESIUM mg/dL 2 0 2 0 1 7*          Results from last 7 days   Lab Units 22  220   INR  1 22*   PTT seconds 34     Results from last 7 days   Lab Units 06/15/22  0503 22  220   LACTIC ACID mmol/L 1 9 1 0         Results from last 7 days   Lab Units 06/15/22  0428   PH ART  7 165*   PCO2 ART mm Hg 57 6*   PO2 ART mm Hg 134 0*   HCO3 ART mmol/L 20 3*   BASE EXC ART mmol/L -8 8   ABG SOURCE  Radial, Left     Results from last 7 days   Lab Units 22  0555 06/16/22  0447 06/15/22  0503 22  2204   PROCALCITONIN ng/ml 7 62* 17 80* 0 21 0 06     No results found for: Imelda Bernardo       Results from last 7 days   Lab Units 22  0447   TSH 3RD GENERATON uIU/mL 7 234*       Applicable Imaging Studies: CTA chest pe study    Result Date: 6/15/2022  Impression: Some images are suboptimal secondary to respiratory motion which decreases sensitivity for evaluation of peripheral pulmonary emboli, subject to this, no pulmonary embolism is seen  Superimposed on moderate pulmonary emphysematous changes, a large ill-defined consolidation in the periphery of the left lower lobe, suspicious for infection in the appropriate clinical setting  Correlation with the patient's symptoms and laboratory values recommended  Small dependent left pleural effusion, possibly reactive  Cardiomegaly, coronary atherosclerosis, prominent mediastinal and hilar lymph nodes, right renal atrophy, and other findings as above   Workstation performed: [id number]     Code Status: Level 1 - Full Code

## 2022-06-17 NOTE — ASSESSMENT & PLAN NOTE
Wt Readings from Last 3 Encounters:   06/17/22 76 5 kg (168 lb 10 4 oz)   07/13/21 71 7 kg (158 lb)   06/15/21 68 kg (149 lb 14 6 oz)     · bilateral lower extremity edema on exam   · echo on 07/09/2021:  EF 50%  Mild enlargement of the ascending aorta to 3 7 cm  Mild mitral regurgitation  ·  coronary angiography on 03/30/2021 with moderate, but nonobstructive disease  No interventions performed  · Cardiology follow-up appreciated  · Diuretics currently on hold  · Continue metoprolol, losartan added at 25 mg daily by Cardiology  · Repeat echocardiogram in 1 week to re-evaluate left ventricle systolic function  · strict I&Os    Daily weights, fluid restriction

## 2022-06-17 NOTE — ASSESSMENT & PLAN NOTE
· Baseline creatinine 0 9-1 2  · Creatinine increased to 1 61  · Likely secondary to sepsis, CHF exacerbation  · Improved to 1 10  · Continue to hold diuretics  · Monitor renal function

## 2022-06-17 NOTE — ASSESSMENT & PLAN NOTE
· Patient maintained on MS Contin 30 mg b i d   · Oxycodone 2 5 mg q 6 hours p r n  For moderate pain, oxycodone 5 mg p o  Q 6 hours p r n   For severe pain  · Monitor respiratory status closely

## 2022-06-17 NOTE — ASSESSMENT & PLAN NOTE
· Previously intubated for respiratory distress in 2021  · Currently on 3 L nasal cannula  · Pulmonary follow-up appreciated  · Continue treatment for pneumonia  · Wean oxygen as tolerated

## 2022-06-17 NOTE — RESTORATIVE TECHNICIAN NOTE
Restorative Technician Note      Patient Name: Kailyn Tanya     Restorative Tech Visit Date: 6/17/2022      Pt denied amb and stated that she is tired

## 2022-06-17 NOTE — PROGRESS NOTES
Progress Note - Pulmonary   Hyacinth Hendricks 64 y o  female MRN: [id number]  Unit/Bed#: E5 -01 Encounter: [id number]      Assessment/Plan:    1  Acute hypoxic and hypercapnic respiratory failure  1  Currently on 3 L NC  2  Titrate to maintain SpO2>/=88%  3  Pulmonary toilet: IS, cough deep breathe  4  Home O2 prior to discharge  2  Acute bacterial pneumonia  1  Day 3 rocephin-complete 7 day course  2  Repeat CXR in 6- 8 weeks for resolution  3  Acute on chronic combined systolic and diastolic CHF  1  Cardiology following for management  2  Lasix on hold due to SHANTA-does appear euvolemic  3  Daily weights  4  Strict I &O  4  Mild SHANTA-improving  5  COPD/ emphysema-severe likely at baseline with mild acute exacerbation  1  Will start prednisone taper tomorrow at 40 mg daily with reduction by 10 mg every 3 days  2  mucinex bid  3  duoneb Q 6 hrs  4  Plan to discharge on anoro or incruse based on insurance coverage  5  Pulmonary follow up and PFTs outpatient  6  Tobacco abuse  1  Current 2ppd smoker  2  Considering smoking cessation at this time and will follow up in the office        Subjective:     Miguel Curieljc was seen in bed upon entering the room  Reports episode of dizziness this morning after taking cardiac meds that has now resolved  Overall breathing/sob has improved  Denies: chest pain, fevers, chills or hemotpysis    Objective:         Vitals: Blood pressure (!) 165/106, pulse (!) 112, temperature 98 °F (36 7 °C), resp  rate 18, height 5' 7" (1 702 m), weight 76 5 kg (168 lb 10 4 oz), SpO2 92 %  , 3L NC, Body mass index is 26 41 kg/m²        Intake/Output Summary (Last 24 hours) at 6/17/2022 1434  Last data filed at 6/17/2022 0945  Gross per 24 hour   Intake 120 ml   Output 2350 ml   Net -2230 ml         Physical Exam  Gen: Awake, alert, oriented x 3, no acute distress  HEENT: Mucous membranes moist, no oral lesions, no thrush  NECK: no accessory muscle use, JVP not elevated  Cardiac: Regular, single S1, single S2, no murmurs, no rubs, no gallops  Lungs: decreased breath sounds  Abdomen: normoactive bowel sounds, soft nontender, nondistended, no rebound or rigidity, no guarding  Extremities: no cyanosis, no clubbing, no edema    Labs: I have personally reviewed pertinent lab results  , CBC:   Lab Results   Component Value Date    WBC 15 48 (H) 06/17/2022    HGB 12 2 06/17/2022    HCT 36 5 06/17/2022    MCV 90 06/17/2022     06/17/2022    MCH 30 1 06/17/2022    MCHC 33 4 06/17/2022    RDW 13 2 06/17/2022    MPV 10 1 06/17/2022   , CMP:   Lab Results   Component Value Date    SODIUM 133 (L) 06/17/2022    K 4 4 06/17/2022     06/17/2022    CO2 22 06/17/2022    BUN 26 (H) 06/17/2022    CREATININE 1 10 06/17/2022    CALCIUM 9 4 06/17/2022    EGFR 54 06/17/2022     Imaging and other studies: none      ESTHER Jeong

## 2022-06-17 NOTE — ASSESSMENT & PLAN NOTE
· Cough, fever and abdominal bloating since Monday,Temperature 101 7°, heart rate 133, WBC 15 K  · Chest x-ray per ER with left lower lobe pneumonia  · COVID, RSV and influenza negative  · Continue antibiotics to complete 7 day course  · Repeat chest x-ray in 6 8 weeks

## 2022-06-17 NOTE — ASSESSMENT & PLAN NOTE
· History of COPD and current tobacco use  · Pulmonary follow-up appreciated  · Prednisone taper starting tomorrow 40 mg daily with reduction by 10 mg every 3 days  · Continue DuoNebs q 6 hours

## 2022-06-17 NOTE — PLAN OF CARE
Problem: MOBILITY - ADULT  Goal: Maintain or return to baseline ADL function  Description: INTERVENTIONS:  -  Assess patient's ability to carry out ADLs; assess patient's baseline for ADL function and identify physical deficits which impact ability to perform ADLs (bathing, care of mouth/teeth, toileting, grooming, dressing, etc )  - Assess/evaluate cause of self-care deficits   - Assess range of motion  - Assess patient's mobility; develop plan if impaired  - Assess patient's need for assistive devices and provide as appropriate  - Encourage maximum independence but intervene and supervise when necessary  - Involve family in performance of ADLs  - Assess for home care needs following discharge   - Consider OT consult to assist with ADL evaluation and planning for discharge  - Provide patient education as appropriate  Outcome: Progressing  Goal: Maintains/Returns to pre admission functional level  Description: INTERVENTIONS:  - Perform BMAT or MOVE assessment daily    - Set and communicate daily mobility goal to care team and patient/family/caregiver  - Collaborate with rehabilitation services on mobility goals if consulted  - Perform Range of Motion  times a day  - Reposition patient every  hours    - Dangle patient  times a day  - Stand patient  times a day  - Ambulate patient  times a day  - Out of bed to chair  times a day   - Out of bed for meal times a day  - Out of bed for toileting  - Record patient progress and toleration of activity level   Outcome: Progressing     Problem: Prexisting or High Potential for Compromised Skin Integrity  Goal: Skin integrity is maintained or improved  Description: INTERVENTIONS:  - Identify patients at risk for skin breakdown  - Assess and monitor skin integrity  - Assess and monitor nutrition and hydration status  - Monitor labs   - Assess for incontinence   - Turn and reposition patient  - Assist with mobility/ambulation  - Relieve pressure over bony prominences  - Avoid friction and shearing  - Provide appropriate hygiene as needed including keeping skin clean and dry  - Evaluate need for skin moisturizer/barrier cream  - Collaborate with interdisciplinary team   - Patient/family teaching  - Consider wound care consult   Outcome: Progressing     Problem: Potential for Falls  Goal: Patient will remain free of falls  Description: INTERVENTIONS:  - Educate patient/family on patient safety including physical limitations  - Instruct patient to call for assistance with activity   - Consult OT/PT to assist with strengthening/mobility   - Keep Call bell within reach  - Keep bed low and locked with side rails adjusted as appropriate  - Keep care items and personal belongings within reach  - Initiate and maintain comfort rounds  - Make Fall Risk Sign visible to staff  - Offer Toileting every  Hours, in advance of need  - Initiate/Maintain alarm  - Obtain necessary fall risk management equipment:   - Apply yellow socks and bracelet for high fall risk patients  - Consider moving patient to room near nurses station  Outcome: Progressing     Problem: Nutrition/Hydration-ADULT  Goal: Nutrient/Hydration intake appropriate for improving, restoring or maintaining nutritional needs  Description: Monitor and assess patient's nutrition/hydration status for malnutrition  Collaborate with interdisciplinary team and initiate plan and interventions as ordered  Monitor patient's weight and dietary intake as ordered or per policy  Utilize nutrition screening tool and intervene as necessary  Determine patient's food preferences and provide high-protein, high-caloric foods as appropriate       INTERVENTIONS:  - Monitor oral intake, urinary output, labs, and treatment plans  - Assess nutrition and hydration status and recommend course of action  - Evaluate amount of meals eaten  - Assist patient with eating if necessary   - Allow adequate time for meals  - Recommend/ encourage appropriate diets, oral nutritional supplements, and vitamin/mineral supplements  - Order, calculate, and assess calorie counts as needed  - Recommend, monitor, and adjust tube feedings and TPN/PPN based on assessed needs  - Assess need for intravenous fluids  - Provide specific nutrition/hydration education as appropriate  - Include patient/family/caregiver in decisions related to nutrition  Outcome: Progressing

## 2022-06-17 NOTE — PROGRESS NOTES
Cardiology         Progress Note - Cardiology   Serjio Brothers 64 y o  female MRN: [id number]  Unit/Bed#: E5 -01 Encounter: [id number]          Assessment/Recommendations/Discussion:   1  Sepsis syndrome  2  COPD with acute exacerbation  3  Acute bacterial pneumonia  4  Tobacco use  5  Chronic pain  6  Chronic systolic and diastolic CHF  7  Mild cardiomyopathy, ejection fraction 40%, possibly secondary to sepsis      · Continue metoprolol  Will add back losartan another renal function has improved  Will start at 25 mg once daily  · Continue holding furosemide for now  · If renal function stable after adding losartan, add p o  Furosemide  · Continue antibiotics per primary service  · Repeat echocardiogram in 1 week to re-evaluate left ventricular systolic function  · Blood pressure elevated  As above, will add losartan          Subjective:  Patient seen and examined, complaints of falling feeling after she falls asleep  Denies orthopnea  Still short of breath                  Physical Exam:  GEN:  NAD  HEENT:  MMM, NCAT, pink conjunctiva, EOMI, nonicteric sclera  CV:  NO JVD/HJR, RR, NO M/R/G, +S1/S2, NO PARASTERNAL HEAVE/THRILL, NO LE EDEMA, NO HEPATIC SYSTOLIC PULSATION, WARM EXTREMITIES  RESP:  Distant breath sounds, otherwise clear  ABD:  SOFT, NT, NO GROSS ORGANOMEGALY        Vitals:   BP (!) 167/104 (BP Location: Left arm)   Pulse 101   Temp 98 6 °F (37 °C) (Oral)   Resp 19   Ht 5' 7" (1 702 m)   Wt 76 5 kg (168 lb 10 4 oz)   SpO2 96%   BMI 26 41 kg/m²   Vitals:    06/16/22 0538 06/17/22 0547   Weight: 76 5 kg (168 lb 10 4 oz) 76 5 kg (168 lb 10 4 oz)       Intake/Output Summary (Last 24 hours) at 6/17/2022 1118  Last data filed at 6/17/2022 0945  Gross per 24 hour   Intake 120 ml   Output 2350 ml   Net -2230 ml       TELEMETRY:  Sinus tachycardia, mild  Lab Results:  Results from last 7 days   Lab Units 06/17/22  0555   WBC Thousand/uL 15 48*   HEMOGLOBIN g/dL 12 2   HEMATOCRIT % 36 5 PLATELETS Thousands/uL 310     Results from last 7 days   Lab Units 06/17/22  0555 06/16/22  0447   POTASSIUM mmol/L 4 4 3 6   CHLORIDE mmol/L 100 99*   CO2 mmol/L 22 21   BUN mg/dL 26* 31*   CREATININE mg/dL 1 10 1 61*   CALCIUM mg/dL 9 4 8 7   ALK PHOS U/L  --  84   ALT U/L  --  22   AST U/L  --  26     Results from last 7 days   Lab Units 06/17/22  0555   POTASSIUM mmol/L 4 4   CHLORIDE mmol/L 100   CO2 mmol/L 22   BUN mg/dL 26*   CREATININE mg/dL 1 10   CALCIUM mg/dL 9 4           Medications:    Current Facility-Administered Medications:     acetaminophen (TYLENOL) tablet 650 mg, 650 mg, Oral, Q6H PRN, Carleen Bo MD, 650 mg at 06/17/22 0518    cefTRIAXone (ROCEPHIN) 1,000 mg in dextrose 5 % 50 mL IVPB, 1,000 mg, Intravenous, Q24H, Carleen Bo MD, Stopped at 06/16/22 1505    guaiFENesin (MUCINEX) 12 hr tablet 600 mg, 600 mg, Oral, Q12H [address], Carleen Bo MD, 600 mg at 06/17/22 0800    heparin (porcine) subcutaneous injection 5,000 Units, 5,000 Units, Subcutaneous, Q8H [address], 5,000 Units at 06/17/22 0519 **AND** [CANCELED] Platelet count, , , Once, ESTHER Ramirez    ipratropium-albuterol (DUO-NEB) 0 5-2 5 mg/3 mL inhalation solution 3 mL, 3 mL, Nebulization, Q6H, Carleen Bo MD, 3 mL at 06/17/22 0701    lidocaine (LIDODERM) 5 % patch 1 patch, 1 patch, Topical, Daily, Carleen Bo MD, 1 patch at 06/16/22 2124    methylPREDNISolone sodium succinate (Solu-MEDROL) injection 40 mg, 40 mg, Intravenous, Daily, Carleen Bo MD, 40 mg at 06/17/22 0800    metoprolol succinate (TOPROL-XL) 24 hr tablet 50 mg, 50 mg, Oral, Daily, Carleen Bo MD, 50 mg at 06/17/22 0800    ondansetron (ZOFRAN) injection 4 mg, 4 mg, Intravenous, Q6H PRN, Carleen Bo MD    oxyCODONE (ROXICODONE) IR tablet 2 5 mg, 2 5 mg, Oral, Q6H PRN **OR** oxyCODONE (ROXICODONE) IR tablet 5 mg, 5 mg, Oral, Q6H PRN, Carleen Bo MD, 5  at 06/17/22 9376    This note was completed in part utilizing [employer] Direct Software  Grammatical errors, random word insertions, spelling mistakes, and incomplete sentences may be an occasional consequence of this system secondary to software limitations, ambient noise, and hardware issues  If you have any questions or concerns about the content, text, or information contained within the body of this dictation, please contact the provider for clarification

## 2022-06-18 ENCOUNTER — APPOINTMENT (INPATIENT)
Dept: RADIOLOGY | Facility: HOSPITAL | Age: 62
DRG: 871 | End: 2022-06-18
Payer: MEDICARE

## 2022-06-18 LAB
ANION GAP SERPL CALCULATED.3IONS-SCNC: 10 MMOL/L (ref 4–13)
BASOPHILS # BLD AUTO: 0.01 THOUSANDS/ΜL (ref 0–0.1)
BASOPHILS NFR BLD AUTO: 0 % (ref 0–1)
BUN SERPL-MCNC: 26 MG/DL (ref 5–25)
CALCIUM SERPL-MCNC: 9.4 MG/DL (ref 8.3–10.1)
CHLORIDE SERPL-SCNC: 99 MMOL/L (ref 100–108)
CO2 SERPL-SCNC: 26 MMOL/L (ref 21–32)
CREAT SERPL-MCNC: 1.33 MG/DL (ref 0.6–1.3)
EOSINOPHIL # BLD AUTO: 0 THOUSAND/ΜL (ref 0–0.61)
EOSINOPHIL NFR BLD AUTO: 0 % (ref 0–6)
ERYTHROCYTE [DISTWIDTH] IN BLOOD BY AUTOMATED COUNT: 13.2 % (ref 11.6–15.1)
GFR SERPL CREATININE-BSD FRML MDRD: 43 ML/MIN/1.73SQ M
GLUCOSE SERPL-MCNC: 113 MG/DL (ref 65–140)
HCT VFR BLD AUTO: 40.7 % (ref 34.8–46.1)
HGB BLD-MCNC: 13.7 G/DL (ref 11.5–15.4)
IMM GRANULOCYTES # BLD AUTO: 0.08 THOUSAND/UL (ref 0–0.2)
IMM GRANULOCYTES NFR BLD AUTO: 1 % (ref 0–2)
LYMPHOCYTES # BLD AUTO: 1.77 THOUSANDS/ΜL (ref 0.6–4.47)
LYMPHOCYTES NFR BLD AUTO: 12 % (ref 14–44)
MCH RBC QN AUTO: 30.5 PG (ref 26.8–34.3)
MCHC RBC AUTO-ENTMCNC: 33.7 G/DL (ref 31.4–37.4)
MCV RBC AUTO: 91 FL (ref 82–98)
MONOCYTES # BLD AUTO: 1.09 THOUSAND/ΜL (ref 0.17–1.22)
MONOCYTES NFR BLD AUTO: 8 % (ref 4–12)
NEUTROPHILS # BLD AUTO: 11.53 THOUSANDS/ΜL (ref 1.85–7.62)
NEUTS SEG NFR BLD AUTO: 79 % (ref 43–75)
NRBC BLD AUTO-RTO: 0 /100 WBCS
PLATELET # BLD AUTO: 322 THOUSANDS/UL (ref 149–390)
PMV BLD AUTO: 10.1 FL (ref 8.9–12.7)
POTASSIUM SERPL-SCNC: 4 MMOL/L (ref 3.5–5.3)
RBC # BLD AUTO: 4.49 MILLION/UL (ref 3.81–5.12)
SODIUM SERPL-SCNC: 135 MMOL/L (ref 136–145)
WBC # BLD AUTO: 14.48 THOUSAND/UL (ref 4.31–10.16)

## 2022-06-18 PROCEDURE — 80048 BASIC METABOLIC PNL TOTAL CA: CPT | Performed by: INTERNAL MEDICINE

## 2022-06-18 PROCEDURE — 99233 SBSQ HOSP IP/OBS HIGH 50: CPT | Performed by: INTERNAL MEDICINE

## 2022-06-18 PROCEDURE — 85025 COMPLETE CBC W/AUTO DIFF WBC: CPT | Performed by: INTERNAL MEDICINE

## 2022-06-18 PROCEDURE — 94640 AIRWAY INHALATION TREATMENT: CPT

## 2022-06-18 PROCEDURE — 94660 CPAP INITIATION&MGMT: CPT

## 2022-06-18 PROCEDURE — 71045 X-RAY EXAM CHEST 1 VIEW: CPT

## 2022-06-18 PROCEDURE — 99232 SBSQ HOSP IP/OBS MODERATE 35: CPT | Performed by: INTERNAL MEDICINE

## 2022-06-18 RX ORDER — ALPRAZOLAM 0.5 MG/1
0.5 TABLET ORAL ONCE
Status: DISCONTINUED | OUTPATIENT
Start: 2022-06-18 | End: 2022-06-21

## 2022-06-18 RX ORDER — CARVEDILOL 3.12 MG/1
3.12 TABLET ORAL 2 TIMES DAILY WITH MEALS
Status: DISCONTINUED | OUTPATIENT
Start: 2022-06-18 | End: 2022-06-22 | Stop reason: HOSPADM

## 2022-06-18 RX ORDER — LABETALOL HYDROCHLORIDE 5 MG/ML
10 INJECTION, SOLUTION INTRAVENOUS ONCE
Status: DISCONTINUED | OUTPATIENT
Start: 2022-06-18 | End: 2022-06-20

## 2022-06-18 RX ORDER — METHYLPREDNISOLONE SODIUM SUCCINATE 40 MG/ML
40 INJECTION, POWDER, LYOPHILIZED, FOR SOLUTION INTRAMUSCULAR; INTRAVENOUS EVERY 12 HOURS SCHEDULED
Status: DISCONTINUED | OUTPATIENT
Start: 2022-06-19 | End: 2022-06-18

## 2022-06-18 RX ORDER — FUROSEMIDE 10 MG/ML
40 INJECTION INTRAMUSCULAR; INTRAVENOUS ONCE
Status: COMPLETED | OUTPATIENT
Start: 2022-06-18 | End: 2022-06-18

## 2022-06-18 RX ORDER — METHYLPREDNISOLONE SODIUM SUCCINATE 40 MG/ML
40 INJECTION, POWDER, LYOPHILIZED, FOR SOLUTION INTRAMUSCULAR; INTRAVENOUS EVERY 12 HOURS SCHEDULED
Status: DISCONTINUED | OUTPATIENT
Start: 2022-06-18 | End: 2022-06-18

## 2022-06-18 RX ORDER — FUROSEMIDE 10 MG/ML
20 INJECTION INTRAMUSCULAR; INTRAVENOUS ONCE
Status: COMPLETED | OUTPATIENT
Start: 2022-06-18 | End: 2022-06-18

## 2022-06-18 RX ORDER — HYDRALAZINE HYDROCHLORIDE 20 MG/ML
5 INJECTION INTRAMUSCULAR; INTRAVENOUS ONCE
Status: COMPLETED | OUTPATIENT
Start: 2022-06-18 | End: 2022-06-18

## 2022-06-18 RX ORDER — FUROSEMIDE 10 MG/ML
40 INJECTION INTRAMUSCULAR; INTRAVENOUS
Status: DISCONTINUED | OUTPATIENT
Start: 2022-06-18 | End: 2022-06-18

## 2022-06-18 RX ORDER — PREDNISONE 20 MG/1
40 TABLET ORAL DAILY
Status: DISCONTINUED | OUTPATIENT
Start: 2022-06-19 | End: 2022-06-21

## 2022-06-18 RX ADMIN — IPRATROPIUM BROMIDE AND ALBUTEROL SULFATE 3 ML: 2.5; .5 SOLUTION RESPIRATORY (INHALATION) at 07:10

## 2022-06-18 RX ADMIN — CARVEDILOL 3.12 MG: 3.12 TABLET, FILM COATED ORAL at 13:38

## 2022-06-18 RX ADMIN — OXYCODONE HYDROCHLORIDE 5 MG: 5 TABLET ORAL at 20:20

## 2022-06-18 RX ADMIN — MORPHINE SULFATE 30 MG: 30 TABLET, EXTENDED RELEASE ORAL at 17:35

## 2022-06-18 RX ADMIN — HYDRALAZINE HYDROCHLORIDE 5 MG: 20 INJECTION, SOLUTION INTRAMUSCULAR; INTRAVENOUS at 03:00

## 2022-06-18 RX ADMIN — IPRATROPIUM BROMIDE AND ALBUTEROL SULFATE 3 ML: 2.5; .5 SOLUTION RESPIRATORY (INHALATION) at 13:18

## 2022-06-18 RX ADMIN — LOSARTAN POTASSIUM 25 MG: 25 TABLET, FILM COATED ORAL at 08:27

## 2022-06-18 RX ADMIN — HEPARIN SODIUM 5000 UNITS: 5000 INJECTION INTRAVENOUS; SUBCUTANEOUS at 05:58

## 2022-06-18 RX ADMIN — IPRATROPIUM BROMIDE AND ALBUTEROL SULFATE 3 ML: 2.5; .5 SOLUTION RESPIRATORY (INHALATION) at 19:01

## 2022-06-18 RX ADMIN — PREDNISONE 40 MG: 20 TABLET ORAL at 08:26

## 2022-06-18 RX ADMIN — MORPHINE SULFATE 30 MG: 30 TABLET, EXTENDED RELEASE ORAL at 08:26

## 2022-06-18 RX ADMIN — HEPARIN SODIUM 5000 UNITS: 5000 INJECTION INTRAVENOUS; SUBCUTANEOUS at 13:38

## 2022-06-18 RX ADMIN — CEFTRIAXONE SODIUM 1000 MG: 10 INJECTION, POWDER, FOR SOLUTION INTRAVENOUS at 13:39

## 2022-06-18 RX ADMIN — FUROSEMIDE 20 MG: 10 INJECTION, SOLUTION INTRAVENOUS at 10:38

## 2022-06-18 RX ADMIN — ACETAMINOPHEN 325MG 650 MG: 325 TABLET ORAL at 03:06

## 2022-06-18 RX ADMIN — FUROSEMIDE 20 MG: 10 INJECTION, SOLUTION INTRAVENOUS at 03:00

## 2022-06-18 RX ADMIN — CLONAZEPAM 0.5 MG: 0.5 TABLET ORAL at 20:01

## 2022-06-18 RX ADMIN — CARVEDILOL 3.12 MG: 3.12 TABLET, FILM COATED ORAL at 17:35

## 2022-06-18 RX ADMIN — IPRATROPIUM BROMIDE AND ALBUTEROL SULFATE 3 ML: 2.5; .5 SOLUTION RESPIRATORY (INHALATION) at 01:34

## 2022-06-18 RX ADMIN — FUROSEMIDE 40 MG: 10 INJECTION, SOLUTION INTRAVENOUS at 13:38

## 2022-06-18 RX ADMIN — OXYCODONE HYDROCHLORIDE 5 MG: 5 TABLET ORAL at 10:44

## 2022-06-18 NOTE — PROGRESS NOTES
[facility]  Progress Note - Abhay Diana 1960, 64 y o  female MRN: [id number]  Unit/Bed#: E5 -01 Encounter: [id number]  Primary Care Provider: Guillermina Rehman DO   Date and time admitted to hospital: 6/15/2022  2:20 AM    * Acute respiratory failure with hypoxia [facility])  Assessment & Plan  · Previously intubated for respiratory distress in 2021  · Initially improved now with worsening respiratory distress requiring greater than 5 L oxygen via nasal cannula  · Repeat chest x-ray with worsening pulmonary edema discussed with cardiologist and will give diuretics  · Continue prednisone, antibiotics  · Wean oxygen as tolerated    Acute on chronic combined systolic and diastolic congestive heart failure (HCC)  Assessment & Plan  Wt Readings from Last 3 Encounters:   06/18/22 76 8 kg (169 lb 5 oz)   07/13/21 71 7 kg (158 lb)   06/15/21 68 kg (149 lb 14 6 oz)     · bilateral lower extremity edema on exam   · echo on 07/09/2021:  EF 50%  Mild enlargement of the ascending aorta to 3 7 cm  Mild mitral regurgitation  ·  coronary angiography on 03/30/2021 with moderate, but nonobstructive disease  No interventions performed  · Repeat imaging with worsening pulmonary edema  · Discussed with cardiologist, Dr Mari Smith given furosemide 20 mg IV this morning plan to give additional 40 mg IV   · Patient refuses to take metoprolol bleeding that it does not make her feel well therefore changed to carvedilol 3 125 mg b i d , continue losartan 25 mg daily  · Repeat echocardiogram in 1 week to re-evaluate left ventricle systolic function  · strict I&Os    Daily weights, fluid restriction    SHANTA (acute kidney injury) ([facility] [address] )  Assessment & Plan  · Baseline creatinine 0 9-1 2  · Creatinine increased to 1 61  · Likely secondary to sepsis, CHF exacerbation  · Monitor closely while on diuretics    Chronic obstructive pulmonary disease with acute exacerbation (HCC)  Assessment & Plan  · History of COPD and current tobacco use  · Pulmonary follow-up appreciated  · Prednisone taper starting tomorrow 40 mg daily with reduction by 10 mg every 3 days  · Continue DuoNebs q 6 hours    Chronic pain  Assessment & Plan  · Patient maintained on MS Contin 30 mg b i d   · Oxycodone 2 5 mg q 6 hours p r n  For moderate pain, oxycodone 5 mg p o  Q 6 hours p r n  For severe pain  · Monitor respiratory status closely    Sepsis (HCC)  Assessment & Plan  · Cough, fever and abdominal bloating since Monday,Temperature 101 7°, heart rate 133, WBC 15 K  · Chest x-ray per ER with left lower lobe pneumonia  · COVID, RSV and influenza negative  · Continue antibiotics to complete 7 day course  · Repeat chest x-ray in 6-8 weeks        VTE Pharmacologic Prophylaxis:   Pharmacologic: heparin    Patient Centered Rounds: I have performed bedside rounds with nursing staff today  Discussions with Specialists or Other Care Team Provider:  Cardiology    Education and Discussions with Family / Patient:  Patient    Time Spent for Care: 36 minutes  More than 50% of total time spent on counseling and coordination of care as described above  Current Length of Stay: 3 day(s)    Current Patient Status: Inpatient   Certification Statement: The patient will continue to require additional inpatient hospital stay due to Acute hypoxic respiratory failure    Discharge Plan / Estimated Discharge Date: TBD    Code Status: Level 1 - Full Code      Subjective:   Patient seen and examined at bedside, complaining of feeling short of breath    Objective:     Vitals:   Temp (24hrs), Av 7 °F (37 1 °C), Min:98 °F (36 7 °C), Max:100 3 °F (37 9 °C)    Temp:  [98 °F (36 7 °C)-100 3 °F (37 9 °C)] 98 5 °F (36 9 °C)  HR:  [] 104  Resp:  [18-24] 20  BP: (134-170)/() 134/95  SpO2:  [84 %-97 %] 95 %  Body mass index is 26 52 kg/m²  Input and Output Summary (last 24 hours):        Intake/Output Summary (Last 24 hours) at 2022 7206  Last data filed at 6/18/2022 1137  Gross per 24 hour   Intake 480 ml   Output 3025 ml   Net -2545 ml       Physical Exam:    Constitutional: Patient is oriented to person, place and time, no acute distress  HEENT:  Normocephalic, atraumatic  Cardiovascular: Normal S1S2, RRR, No murmurs/rubs/gallops appreciated  Pulmonary:  Bilateral air entry, scattered rhonchi, bibasilar rales  Abdominal: Soft, Bowel sounds present, Non-tender, Non-distended  Extremities:  No cyanosis, clubbing or edema  Neurological: Cranial nerves II-XII grossly intact, sensation intact, otherwise no focal neurological symptoms  Skin:  Warm, dry    Additional Data:     Labs:    Results from last 7 days   Lab Units 06/18/22  0556   WBC Thousand/uL 14 48*   HEMOGLOBIN g/dL 13 7   HEMATOCRIT % 40 7   PLATELETS Thousands/uL 322   NEUTROS PCT % 79*   LYMPHS PCT % 12*   MONOS PCT % 8   EOS PCT % 0     Results from last 7 days   Lab Units 06/18/22  0556 06/17/22  0555 06/16/22  0447   POTASSIUM mmol/L 4 0   < > 3 6   CHLORIDE mmol/L 99*   < > 99*   CO2 mmol/L 26   < > 21   BUN mg/dL 26*   < > 31*   CREATININE mg/dL 1 33*   < > 1 61*   CALCIUM mg/dL 9 4   < > 8 7   ALK PHOS U/L  --   --  84   ALT U/L  --   --  22   AST U/L  --   --  26    < > = values in this interval not displayed  Results from last 7 days   Lab Units 06/14/22  2204   INR  1 22*        I Have Reviewed All Lab Data Listed Above  Recent Cultures (last 7 days):     Results from last 7 days   Lab Units 06/15/22  0503 06/14/22  2206 06/14/22  2204   BLOOD CULTURE   --  No Growth at 72 hrs  No Growth at 72 hrs     LEGIONELLA URINARY ANTIGEN  Negative  --   --        Last 24 Hours Medication List:   Current Facility-Administered Medications   Medication Dose Route Frequency Provider Last Rate    acetaminophen  650 mg Oral Q6H PRN Robert Barnett MD      ALPRAZolam  0 5 mg Oral Once [employer] PNC FinancialESTHER      carvedilol  3 125 mg Oral BID With Meals ESTHER Figueroa      cefTRIAXone 1,000 mg Intravenous Q24H Florencia Livingston MD 1,000 mg (06/18/22 1339)    clonazePAM  0 5 mg Oral HS PRN Claudene Yissel, CRNP      cyclobenzaprine  10 mg Oral Once Claudene Yissel, CRNP      guaiFENesin  600 mg Oral Q12H [facility] Florencia Livingston MD      heparin (porcine)  5,000 Units Subcutaneous [facility] Florencia Livingston MD      ipratropium-albuterol  3 mL Nebulization Q6H Florencia Livingston MD      labetalol  10 mg Intravenous Once Claudene Sines, JAYNP      lidocaine  1 patch Topical Daily Florencia Livingston MD      morphine  30 mg Oral BID Greg Dhaliwal MD      ondansetron  4 mg Intravenous Q6H PRN Florencia Livingston MD      oxyCODONE  2 5 mg Oral Q6H PRN Florencia Livingston MD      Or   Germaine Montilla oxyCODONE  5 mg Oral Q6H PRN MD Germaine De LaR osa ON 6/19/2022] predniSONE  40 mg Oral Daily Greg Dhaliwal MD          Today, Patient Was Seen By: Greg Dhaliwal MD

## 2022-06-18 NOTE — ASSESSMENT & PLAN NOTE
Wt Readings from Last 3 Encounters:   06/18/22 76 8 kg (169 lb 5 oz)   07/13/21 71 7 kg (158 lb)   06/15/21 68 kg (149 lb 14 6 oz)     · bilateral lower extremity edema on exam   · echo on 07/09/2021:  EF 50%  Mild enlargement of the ascending aorta to 3 7 cm  Mild mitral regurgitation  ·  coronary angiography on 03/30/2021 with moderate, but nonobstructive disease  No interventions performed  · Repeat imaging with worsening pulmonary edema  · Discussed with cardiologist, Dr Dee Nova given furosemide 20 mg IV this morning plan to give additional 40 mg IV   · Patient refuses to take metoprolol bleeding that it does not make her feel well therefore changed to carvedilol 3 125 mg b i d , continue losartan 25 mg daily  · Repeat echocardiogram in 1 week to re-evaluate left ventricle systolic function  · strict I&Os    Daily weights, fluid restriction

## 2022-06-18 NOTE — PROGRESS NOTES
Progress Note - Cardiology   Svetlana Rodriguez 64 y o  female MRN: [id number]  Unit/Bed#: E5 -01 Encounter: [id number]    Asessment:  Acute hypoxic and hypercapnic respiratory failure  COPD with acute exacerbation  Acute bacterial pneumonia  Sepsis   Chronic systolic and diastolic congestive heart failure  Mild cardiomyopathy, possibly due to sepsis    - LVEF 41%, basilar septum and basilar inferior wall are hypokinetic, grade 1 diastolic dysfunction, normal TAPSE at > 1 7 cm, left atrium severely dilated, mild AR, mild TR, ascending aorta mildly dilated, PASP 46 mmHg, 6/15/22    - LVEF 50%, mild MR, mild dilatation of the ascending aorta to 3 7 cm, 7/9/21  Acute kidney injury  Current tobacco use  Chronic pain    Plan/ Discussion:  · Currently on Toprol XL 50 mg daily; patient is refusing this medication as she states she does not take it outpatient and it doesn't make her feel well  She is agree to try low dose carvedilol 3 125 mg twice daily to start  Losartan 25 mg daily added back to regimen yesterday, 6/17/22  Would recommend holding tomorrow's dose of losartan as renal function has bumped again  · S/P furosemide 20 mg IV overnight and today  · Repeat echocardiogram in 1 week to reassess cardiac structure and function  · Monitor volume status closely with strict intake/output, daily weight  · Monitor renal function closely; recommend to maintain potassium > 4, magnesium > 2    · Check NT proBNP with morning labs tomorrow  Subjective:  Patient seen and examined at the bedside  Patient with earlier and overnight episodes of shortness of breath however notes that she feels better now  She denies chest pain      Vitals:  Vitals:    06/17/22 0547 06/18/22 0600   Weight: 76 5 kg (168 lb 10 4 oz) 76 8 kg (169 lb 5 oz)   ,  Vitals:    06/18/22 1023 06/18/22 1027 06/18/22 1048 06/18/22 1105   BP:  (!) 158/111  (!) 150/102   Pulse: (!) 116 (!) 115 103 (!) 108   Resp:       Temp:    99 6 °F (37 6 °C) [facility]:       SpO2: (!) 86% (!) 89% 91% 91%   Weight:       Height:           Exam:  General:  Ill-appearing, awake and alert oriented  Heart:  Tachycardic on exam, irregular rate, regular rhythm   Respiratory effort/ Lungs: remains on supplemental oxygen, fine rales noted bilateral bases   Abdominal: non-tender to palpation, + bowel sounds, soft, no masses or distension  Lower Limbs: no overt bilateral lower extremity edema            Telemetry:        Sinus tachycardia     Medications:    Current Facility-Administered Medications:     acetaminophen (TYLENOL) tablet 650 mg, 650 mg, Oral, Q6H PRN, Joao Iyer MD, 650 mg at 06/18/22 0306    ALPRAZolam (XANAX) tablet 0 5 mg, 0 5 mg, Oral, Once, [facility] ESTHER Alonzo    cefTRIAXone (ROCEPHIN) 1,000 mg in dextrose 5 % 50 mL IVPB, 1,000 mg, Intravenous, Q24H, Joao Iyer MD, Stopped at 06/17/22 1449    clonazePAM (KlonoPIN) tablet 0 5 mg, 0 5 mg, Oral, HS PRN, [facility] ESTHER Alonzo, 0 5 mg at 06/17/22 2225    cyclobenzaprine (FLEXERIL) tablet 10 mg, 10 mg, Oral, Once, [facility] ESTHER Alonzo    guaiFENesin (MUCINEX) 12 hr tablet 600 mg, 600 mg, Oral, Q12H [address], Joao Iyer MD, 600 mg at 06/17/22 2225    heparin (porcine) subcutaneous injection 5,000 Units, 5,000 Units, Subcutaneous, Q8H [address], 5,000 Units at 06/18/22 0558 **AND** [CANCELED] Platelet count, , , Once, ESTHER Ware    ipratropium-albuterol (DUO-NEB) 0 5-2 5 mg/3 mL inhalation solution 3 mL, 3 mL, Nebulization, Q6H, Joao Iyer MD, 3 mL at 06/18/22 0710    labetalol (NORMODYNE) injection 10 mg, 10 mg, Intravenous, Once, [facility] ESTHER Alonzo    lidocaine (LIDODERM) 5 % patch 1 patch, 1 patch, Topical, Daily, Joao Iyer MD, 1 patch at 06/16/22 2124    losartan (COZAAR) tablet 25 mg, 25 mg, Oral, Daily, Rujul DO Jhonatan, 25 mg at 06/18/22 0827    methylPREDNISolone sodium succinate (Solu-MEDROL) injection 40 mg, 40 mg, Intravenous, Q12H [address], Cristofer Flow, MD    metoprolol succinate (TOPROL-XL) 24 hr tablet 50 mg, 50 mg, Oral, Daily, Robert Barnett MD, 50 mg at 06/17/22 0800    morphine (MS CONTIN) ER tablet 30 mg, 30 mg, Oral, BID, Cristofer MD Milo, 30 mg at 06/18/22 0826    ondansetron (ZOFRAN) injection 4 mg, 4 mg, Intravenous, Q6H PRN, Robert Barnett MD    oxyCODONE (ROXICODONE) IR tablet 2 5 mg, 2 5 mg, Oral, Q6H PRN **OR** oxyCODONE (ROXICODONE) IR tablet 5 mg, 5 mg, Oral, Q6H PRN, Robert Barnett MD, 5 mg at 06/18/22 1044      Labs/Data:        Results from last 7 days   Lab Units 06/18/22  0556 06/17/22  0555 06/16/22  0447   WBC Thousand/uL 14 48* 15 48* 17 38*   HEMOGLOBIN g/dL 13 7 12 2 11 5   HEMATOCRIT % 40 7 36 5 34 0*   PLATELETS Thousands/uL 322 310 262     Results from last 7 days   Lab Units 06/18/22  0556 06/17/22  0555 06/16/22  0447   POTASSIUM mmol/L 4 0 4 4 3 6   CHLORIDE mmol/L 99* 100 99*   CO2 mmol/L 26 22 21   BUN mg/dL 26* 26* 31*

## 2022-06-18 NOTE — PLAN OF CARE
Problem: MOBILITY - ADULT  Goal: Maintain or return to baseline ADL function  Description: INTERVENTIONS:  -  Assess patient's ability to carry out ADLs; assess patient's baseline for ADL function and identify physical deficits which impact ability to perform ADLs (bathing, care of mouth/teeth, toileting, grooming, dressing, etc )  - Assess/evaluate cause of self-care deficits   - Assess range of motion  - Assess patient's mobility; develop plan if impaired  - Assess patient's need for assistive devices and provide as appropriate  - Encourage maximum independence but intervene and supervise when necessary  - Involve family in performance of ADLs  - Assess for home care needs following discharge   - Consider OT consult to assist with ADL evaluation and planning for discharge  - Provide patient education as appropriate  Outcome: Progressing  Goal: Maintains/Returns to pre admission functional level  Description: INTERVENTIONS:  - Perform BMAT or MOVE assessment daily    - Set and communicate daily mobility goal to care team and patient/family/caregiver  - Collaborate with rehabilitation services on mobility goals if consulted  - Perform Range of Motion 3 times a day  - Reposition patient every 2hours    - Dangle patient 3 times a day  - Stand patient 3 times a day  - Ambulate patient 3 times a day  - Out of bed to chair 3 times a day   - Out of bed for meals 3 times a day  - Out of bed for toileting  - Record patient progress and toleration of activity level   Outcome: Progressing     Problem: Prexisting or High Potential for Compromised Skin Integrity  Goal: Skin integrity is maintained or improved  Description: INTERVENTIONS:  - Identify patients at risk for skin breakdown  - Assess and monitor skin integrity  - Assess and monitor nutrition and hydration status  - Monitor labs   - Assess for incontinence   - Turn and reposition patient  - Assist with mobility/ambulation  - Relieve pressure over bony prominences  - Avoid friction and shearing  - Provide appropriate hygiene as needed including keeping skin clean and dry  - Evaluate need for skin moisturizer/barrier cream  - Collaborate with interdisciplinary team   - Patient/family teaching  - Consider wound care consult   Outcome: Progressing     Problem: Potential for Falls  Goal: Patient will remain free of falls  Description: INTERVENTIONS:  - Educate patient/family on patient safety including physical limitations  - Instruct patient to call for assistance with activity   - Consult OT/PT to assist with strengthening/mobility   - Keep Call bell within reach  - Keep bed low and locked with side rails adjusted as appropriate  - Keep care items and personal belongings within reach  - Initiate and maintain comfort rounds  - Make Fall Risk Sign visible to staff  - Offer Toileting every 2 Hours, in advance of need  - Initiate/Maintain bed alarm  - Obtain necessary fall risk management equipment: call alarm in reach and yellow socks on  - Apply yellow socks and bracelet for high fall risk patients  - Consider moving patient to room near nurses station  Outcome: Progressing     Problem: Nutrition/Hydration-ADULT  Goal: Nutrient/Hydration intake appropriate for improving, restoring or maintaining nutritional needs  Description: Monitor and assess patient's nutrition/hydration status for malnutrition  Collaborate with interdisciplinary team and initiate plan and interventions as ordered  Monitor patient's weight and dietary intake as ordered or per policy  Utilize nutrition screening tool and intervene as necessary  Determine patient's food preferences and provide high-protein, high-caloric foods as appropriate       INTERVENTIONS:  - Monitor oral intake, urinary output, labs, and treatment plans  - Assess nutrition and hydration status and recommend course of action  - Evaluate amount of meals eaten  - Assist patient with eating if necessary   - Allow adequate time for meals  - Recommend/ encourage appropriate diets, oral nutritional supplements, and vitamin/mineral supplements  - Order, calculate, and assess calorie counts as needed  - Recommend, monitor, and adjust tube feedings and TPN/PPN based on assessed needs  - Assess need for intravenous fluids  - Provide specific nutrition/hydration education as appropriate  - Include patient/family/caregiver in decisions related to nutrition  Outcome: Progressing

## 2022-06-18 NOTE — PLAN OF CARE
Problem: MOBILITY - ADULT  Goal: Maintain or return to baseline ADL function  Description: INTERVENTIONS:  -  Assess patient's ability to carry out ADLs; assess patient's baseline for ADL function and identify physical deficits which impact ability to perform ADLs (bathing, care of mouth/teeth, toileting, grooming, dressing, etc )  - Assess/evaluate cause of self-care deficits   - Assess range of motion  - Assess patient's mobility; develop plan if impaired  - Assess patient's need for assistive devices and provide as appropriate  - Encourage maximum independence but intervene and supervise when necessary  - Involve family in performance of ADLs  - Assess for home care needs following discharge   - Consider OT consult to assist with ADL evaluation and planning for discharge  - Provide patient education as appropriate  Outcome: Progressing  Goal: Maintains/Returns to pre admission functional level  Description: INTERVENTIONS:  - Perform BMAT or MOVE assessment daily    - Set and communicate daily mobility goal to care team and patient/family/caregiver  - Collaborate with rehabilitation services on mobility goals if consulted  - Perform Range of Motion  times a day  - Reposition patient every  hours    - Dangle patient  times a day  - Stand patient  times a day  - Ambulate patient  times a day  - Out of bed to chair  times a day   - Out of bed for meals  times a day  - Out of bed for toileting  - Record patient progress and toleration of activity level   Outcome: Progressing     Problem: Prexisting or High Potential for Compromised Skin Integrity  Goal: Skin integrity is maintained or improved  Description: INTERVENTIONS:  - Identify patients at risk for skin breakdown  - Assess and monitor skin integrity  - Assess and monitor nutrition and hydration status  - Monitor labs   - Assess for incontinence   - Turn and reposition patient  - Assist with mobility/ambulation  - Relieve pressure over bony prominences  - Avoid friction and shearing  - Provide appropriate hygiene as needed including keeping skin clean and dry  - Evaluate need for skin moisturizer/barrier cream  - Collaborate with interdisciplinary team   - Patient/family teaching  - Consider wound care consult   Outcome: Progressing     Problem: Potential for Falls  Goal: Patient will remain free of falls  Description: INTERVENTIONS:  - Educate patient/family on patient safety including physical limitations  - Instruct patient to call for assistance with activity   - Consult OT/PT to assist with strengthening/mobility   - Keep Call bell within reach  - Keep bed low and locked with side rails adjusted as appropriate  - Keep care items and personal belongings within reach  - Initiate and maintain comfort rounds  - Make Fall Risk Sign visible to staff  - Offer Toileting every  Hours, in advance of need  - Initiate/Maintain alarm  - Obtain necessary fall risk management equipment  - Apply yellow socks and bracelet for high fall risk patients  - Consider moving patient to room near nurses station  Outcome: Progressing     Problem: Nutrition/Hydration-ADULT  Goal: Nutrient/Hydration intake appropriate for improving, restoring or maintaining nutritional needs  Description: Monitor and assess patient's nutrition/hydration status for malnutrition  Collaborate with interdisciplinary team and initiate plan and interventions as ordered  Monitor patient's weight and dietary intake as ordered or per policy  Utilize nutrition screening tool and intervene as necessary  Determine patient's food preferences and provide high-protein, high-caloric foods as appropriate       INTERVENTIONS:  - Monitor oral intake, urinary output, labs, and treatment plans  - Assess nutrition and hydration status and recommend course of action  - Evaluate amount of meals eaten  - Assist patient with eating if necessary   - Allow adequate time for meals  - Recommend/ encourage appropriate diets, oral nutritional supplements, and vitamin/mineral supplements  - Order, calculate, and assess calorie counts as needed  - Recommend, monitor, and adjust tube feedings and TPN/PPN based on assessed needs  - Assess need for intravenous fluids  - Provide specific nutrition/hydration education as appropriate  - Include patient/family/caregiver in decisions related to nutrition  Outcome: Progressing

## 2022-06-18 NOTE — ASSESSMENT & PLAN NOTE
· Baseline creatinine 0 9-1 2  · Creatinine increased to 1 61  · Likely secondary to sepsis, CHF exacerbation  · Monitor closely while on diuretics

## 2022-06-18 NOTE — ASSESSMENT & PLAN NOTE
· Cough, fever and abdominal bloating since Monday,Temperature 101 7°, heart rate 133, WBC 15 K  · Chest x-ray per ER with left lower lobe pneumonia  · COVID, RSV and influenza negative  · Continue antibiotics to complete 7 day course  · Repeat chest x-ray in 6-8 weeks

## 2022-06-18 NOTE — ASSESSMENT & PLAN NOTE
· Previously intubated for respiratory distress in 2021  · Initially improved now with worsening respiratory distress requiring greater than 5 L oxygen via nasal cannula  · Repeat chest x-ray with worsening pulmonary edema discussed with cardiologist and will give diuretics  · Continue prednisone, antibiotics  · Wean oxygen as tolerated

## 2022-06-19 LAB
ANION GAP SERPL CALCULATED.3IONS-SCNC: 8 MMOL/L (ref 4–13)
BASOPHILS # BLD AUTO: 0.02 THOUSANDS/ΜL (ref 0–0.1)
BASOPHILS NFR BLD AUTO: 0 % (ref 0–1)
BUN SERPL-MCNC: 31 MG/DL (ref 5–25)
CALCIUM SERPL-MCNC: 9.4 MG/DL (ref 8.3–10.1)
CHLORIDE SERPL-SCNC: 94 MMOL/L (ref 100–108)
CO2 SERPL-SCNC: 28 MMOL/L (ref 21–32)
CREAT SERPL-MCNC: 1.54 MG/DL (ref 0.6–1.3)
EOSINOPHIL # BLD AUTO: 0.19 THOUSAND/ΜL (ref 0–0.61)
EOSINOPHIL NFR BLD AUTO: 1 % (ref 0–6)
ERYTHROCYTE [DISTWIDTH] IN BLOOD BY AUTOMATED COUNT: 13.2 % (ref 11.6–15.1)
GFR SERPL CREATININE-BSD FRML MDRD: 36 ML/MIN/1.73SQ M
GLUCOSE SERPL-MCNC: 100 MG/DL (ref 65–140)
HCT VFR BLD AUTO: 39.5 % (ref 34.8–46.1)
HGB BLD-MCNC: 13.3 G/DL (ref 11.5–15.4)
IMM GRANULOCYTES # BLD AUTO: 0.13 THOUSAND/UL (ref 0–0.2)
IMM GRANULOCYTES NFR BLD AUTO: 1 % (ref 0–2)
LYMPHOCYTES # BLD AUTO: 2.55 THOUSANDS/ΜL (ref 0.6–4.47)
LYMPHOCYTES NFR BLD AUTO: 15 % (ref 14–44)
MCH RBC QN AUTO: 30.2 PG (ref 26.8–34.3)
MCHC RBC AUTO-ENTMCNC: 33.7 G/DL (ref 31.4–37.4)
MCV RBC AUTO: 90 FL (ref 82–98)
MONOCYTES # BLD AUTO: 1.14 THOUSAND/ΜL (ref 0.17–1.22)
MONOCYTES NFR BLD AUTO: 7 % (ref 4–12)
NEUTROPHILS # BLD AUTO: 12.7 THOUSANDS/ΜL (ref 1.85–7.62)
NEUTS SEG NFR BLD AUTO: 76 % (ref 43–75)
NRBC BLD AUTO-RTO: 0 /100 WBCS
NT-PROBNP SERPL-MCNC: ABNORMAL PG/ML
PLATELET # BLD AUTO: 307 THOUSANDS/UL (ref 149–390)
PMV BLD AUTO: 10 FL (ref 8.9–12.7)
POTASSIUM SERPL-SCNC: 4.1 MMOL/L (ref 3.5–5.3)
RBC # BLD AUTO: 4.41 MILLION/UL (ref 3.81–5.12)
SODIUM SERPL-SCNC: 130 MMOL/L (ref 136–145)
WBC # BLD AUTO: 16.73 THOUSAND/UL (ref 4.31–10.16)

## 2022-06-19 PROCEDURE — 80048 BASIC METABOLIC PNL TOTAL CA: CPT | Performed by: INTERNAL MEDICINE

## 2022-06-19 PROCEDURE — 94640 AIRWAY INHALATION TREATMENT: CPT

## 2022-06-19 PROCEDURE — 99232 SBSQ HOSP IP/OBS MODERATE 35: CPT | Performed by: INTERNAL MEDICINE

## 2022-06-19 PROCEDURE — 85025 COMPLETE CBC W/AUTO DIFF WBC: CPT | Performed by: INTERNAL MEDICINE

## 2022-06-19 PROCEDURE — 83880 ASSAY OF NATRIURETIC PEPTIDE: CPT | Performed by: NURSE PRACTITIONER

## 2022-06-19 RX ADMIN — GUAIFENESIN 600 MG: 600 TABLET, EXTENDED RELEASE ORAL at 21:27

## 2022-06-19 RX ADMIN — HEPARIN SODIUM 5000 UNITS: 5000 INJECTION INTRAVENOUS; SUBCUTANEOUS at 06:33

## 2022-06-19 RX ADMIN — HEPARIN SODIUM 5000 UNITS: 5000 INJECTION INTRAVENOUS; SUBCUTANEOUS at 21:27

## 2022-06-19 RX ADMIN — IPRATROPIUM BROMIDE AND ALBUTEROL SULFATE 3 ML: 2.5; .5 SOLUTION RESPIRATORY (INHALATION) at 02:20

## 2022-06-19 RX ADMIN — CARVEDILOL 3.12 MG: 3.12 TABLET, FILM COATED ORAL at 08:12

## 2022-06-19 RX ADMIN — IPRATROPIUM BROMIDE AND ALBUTEROL SULFATE 3 ML: 2.5; .5 SOLUTION RESPIRATORY (INHALATION) at 19:09

## 2022-06-19 RX ADMIN — MORPHINE SULFATE 30 MG: 30 TABLET, EXTENDED RELEASE ORAL at 17:00

## 2022-06-19 RX ADMIN — PREDNISONE 40 MG: 20 TABLET ORAL at 08:12

## 2022-06-19 RX ADMIN — CEFTRIAXONE SODIUM 1000 MG: 10 INJECTION, POWDER, FOR SOLUTION INTRAVENOUS at 13:05

## 2022-06-19 RX ADMIN — OXYCODONE HYDROCHLORIDE 5 MG: 5 TABLET ORAL at 13:04

## 2022-06-19 RX ADMIN — GUAIFENESIN 600 MG: 600 TABLET, EXTENDED RELEASE ORAL at 08:12

## 2022-06-19 RX ADMIN — IPRATROPIUM BROMIDE AND ALBUTEROL SULFATE 3 ML: 2.5; .5 SOLUTION RESPIRATORY (INHALATION) at 14:50

## 2022-06-19 RX ADMIN — HEPARIN SODIUM 5000 UNITS: 5000 INJECTION INTRAVENOUS; SUBCUTANEOUS at 13:05

## 2022-06-19 RX ADMIN — IPRATROPIUM BROMIDE AND ALBUTEROL SULFATE 3 ML: 2.5; .5 SOLUTION RESPIRATORY (INHALATION) at 07:14

## 2022-06-19 RX ADMIN — MORPHINE SULFATE 30 MG: 30 TABLET, EXTENDED RELEASE ORAL at 08:12

## 2022-06-19 RX ADMIN — OXYCODONE HYDROCHLORIDE 5 MG: 5 TABLET ORAL at 06:33

## 2022-06-19 RX ADMIN — CARVEDILOL 3.12 MG: 3.12 TABLET, FILM COATED ORAL at 16:59

## 2022-06-19 RX ADMIN — CLONAZEPAM 0.5 MG: 0.5 TABLET ORAL at 23:32

## 2022-06-19 NOTE — ASSESSMENT & PLAN NOTE
· History of COPD and current tobacco use  · Pulmonary follow-up appreciated  · Continue prednisone taper with reduction by 10 mg every 3 days  · Continue DuoNebs q 6 hours

## 2022-06-19 NOTE — PROGRESS NOTES
Cardiology         Progress Note - Cardiology   Gisela Christensen 64 y o  female MRN: [id number]  Unit/Bed#: E5 -01 Encounter: [id number]          Assessment/Recommendations/Discussion:     1  Acute hypoxic and hypercapnic respiratory failure   2  Acute on chronic systolic and diastolic CHF   3  COPD with acute exacerbation   4  Acute bacterial pneumonia   5  Sepsis         · Agreeable to continue carvedilol  She is reluctant to add any more medications, although will eventually benefit with ACE-inhibitor/ARB  · Hold off on any further diuresis, she is breathing well today, and his IV Lasix yesterday  NT pro BNP remains elevated although improved  Will likely need intermittent re-dosing    On exam mostly has left lower lobe crackles consistent with left lung pneumonia  · Blood pressure controlled        Subjective:  Patient seen and examined, breathing better today, denies chest discomfort                Physical Exam:  GEN:  NAD  HEENT:  MMM, NCAT, pink conjunctiva, EOMI, nonicteric sclera  CV:  NO JVD/HJR, RR, NO M/R/G, +S1/S2, NO PARASTERNAL HEAVE/THRILL, NO LE EDEMA, NO HEPATIC SYSTOLIC PULSATION, WARM EXTREMITIES  RESP:  Left lower lobe crackles  ABD:  SOFT, NT, NO GROSS ORGANOMEGALY        Vitals:   /84   Pulse 102   Temp 98 9 °F (37 2 °C)   Resp 18   Ht 5' 7" (1 702 m)   Wt 76 8 kg (169 lb 5 oz)   SpO2 90%   BMI 26 52 kg/m²   Vitals:    06/18/22 0600 06/19/22 0559   Weight: 76 8 kg (169 lb 5 oz) 76 8 kg (169 lb 5 oz)       Intake/Output Summary (Last 24 hours) at 6/19/2022 1245  Last data filed at 6/19/2022 1040  Gross per 24 hour   Intake 500 ml   Output 700 ml   Net -200 ml       TELEMETRY:  Sinus rhythm  Lab Results:  Results from last 7 days   Lab Units 06/19/22  0458   WBC Thousand/uL 16 73*   HEMOGLOBIN g/dL 13 3   HEMATOCRIT % 39 5   PLATELETS Thousands/uL 307     Results from last 7 days   Lab Units 06/19/22  0458 06/17/22  0555 06/16/22  0447   POTASSIUM mmol/L 4 1   < > 3 6 CHLORIDE mmol/L 94*   < > 99*   CO2 mmol/L 28   < > 21   BUN mg/dL 31*   < > 31*   CREATININE mg/dL 1 54*   < > 1 61*   CALCIUM mg/dL 9 4   < > 8 7   ALK PHOS U/L  --   --  84   ALT U/L  --   --  22   AST U/L  --   --  26    < > = values in this interval not displayed       Results from last 7 days   Lab Units 06/19/22  0458   POTASSIUM mmol/L 4 1   CHLORIDE mmol/L 94*   CO2 mmol/L 28   BUN mg/dL 31*   CREATININE mg/dL 1 54*   CALCIUM mg/dL 9 4           Medications:    Current Facility-Administered Medications:     acetaminophen (TYLENOL) tablet 650 mg, 650 mg, Oral, Q6H PRN, Kaz Su MD, 650 mg at 06/18/22 0306    ALPRAZolam (XANAX) tablet 0 5 mg, 0 5 mg, Oral, Once, [facility] ESTHER Alonzo    carvedilol (COREG) tablet 3 125 mg, 3 125 mg, Oral, BID With Meals, ESTHER Figueroa, 3 125 mg at 06/19/22 0812    cefTRIAXone (ROCEPHIN) 1,000 mg in dextrose 5 % 50 mL IVPB, 1,000 mg, Intravenous, Q24H, Kaz Su MD, Last Rate: 100 mL/hr at 06/18/22 1339, 1,000 mg at 06/18/22 1339    clonazePAM (KlonoPIN) tablet 0 5 mg, 0 5 mg, Oral, HS PRN, [facility] ESTHER Alonzo, 0 5 mg at 06/18/22 2001    cyclobenzaprine (FLEXERIL) tablet 10 mg, 10 mg, Oral, Once, [facility] ESTHER Alonzo    guaiFENesin (MUCINEX) 12 hr tablet 600 mg, 600 mg, Oral, Q12H [address], Kaz Su MD, 600 mg at 06/19/22 7138    heparin (porcine) subcutaneous injection 5,000 Units, 5,000 Units, Subcutaneous, Q8H [address], 5,000 Units at 06/19/22 9428 **AND** [CANCELED] Platelet count, , , Once, Earnie Hilding, CRNP    ipratropium-albuterol (DUO-NEB) 0 5-2 5 mg/3 mL inhalation solution 3 mL, 3 mL, Nebulization, Q6H, Kaz Su MD, 3 mL at 06/19/22 0714    labetalol (NORMODYNE) injection 10 mg, 10 mg, Intravenous, Once, [facility], CRNP    lidocaine (LIDODERM) 5 % patch 1 patch, 1 patch, Topical, Daily, Kaz Su MD, 1 patch at 06/16/22 2126    morphine (MS CONTIN) ER tablet 30 mg, 30 mg, Oral, BID, Balbina Tony MD, 30 mg at 06/19/22 0812    ondansetron (ZOFRAN) injection 4 mg, 4 mg, Intravenous, Q6H PRN, Malvin Rodriguez MD    oxyCODONE (ROXICODONE) IR tablet 2 5 mg, 2 5 mg, Oral, Q6H PRN **OR** oxyCODONE (ROXICODONE) IR tablet 5 mg, 5 mg, Oral, Q6H PRN, Malivn Rodriguez MD, 5 mg at 06/19/22 0746    predniSONE tablet 40 mg, 40 mg, Oral, Daily, Balbina Tony MD, 40 mg at 06/19/22 9017    This note was completed in part utilizing [employer] Fluency Direct Software  Grammatical errors, random word insertions, spelling mistakes, and incomplete sentences may be an occasional consequence of this system secondary to software limitations, ambient noise, and hardware issues  If you have any questions or concerns about the content, text, or information contained within the body of this dictation, please contact the provider for clarification

## 2022-06-19 NOTE — PLAN OF CARE
Problem: MOBILITY - ADULT  Goal: Maintain or return to baseline ADL function  Description: INTERVENTIONS:  -  Assess patient's ability to carry out ADLs; assess patient's baseline for ADL function and identify physical deficits which impact ability to perform ADLs (bathing, care of mouth/teeth, toileting, grooming, dressing, etc )  - Assess/evaluate cause of self-care deficits   - Assess range of motion  - Assess patient's mobility; develop plan if impaired  - Assess patient's need for assistive devices and provide as appropriate  - Encourage maximum independence but intervene and supervise when necessary  - Involve family in performance of ADLs  - Assess for home care needs following discharge   - Consider OT consult to assist with ADL evaluation and planning for discharge  - Provide patient education as appropriate  Outcome: Progressing  Goal: Maintains/Returns to pre admission functional level  Description: INTERVENTIONS:  - Perform BMAT or MOVE assessment daily    - Set and communicate daily mobility goal to care team and patient/family/caregiver     - Collaborate with rehabilitation services on mobility goals if consulted  - Out of bed for toileting-PT INDEPENDENT  - Record patient progress and toleration of activity level   Outcome: Progressing     Problem: Prexisting or High Potential for Compromised Skin Integrity  Goal: Skin integrity is maintained or improved  Description: INTERVENTIONS:  - Identify patients at risk for skin breakdown  - Assess and monitor skin integrity  - Assess and monitor nutrition and hydration status  - Monitor labs   - Assess for incontinence   - Turn and reposition patient  - Assist with mobility/ambulation  - Relieve pressure over bony prominences  - Avoid friction and shearing  - Provide appropriate hygiene as needed including keeping skin clean and dry  - Evaluate need for skin moisturizer/barrier cream  - Collaborate with interdisciplinary team   - Patient/family teaching  - Consider wound care consult   Outcome: Progressing     Problem: Potential for Falls  Goal: Patient will remain free of falls  Description: INTERVENTIONS:  - Educate patient/family on patient safety including physical limitations  - Instruct patient to call for assistance with activity   - Consult OT/PT to assist with strengthening/mobility   - Keep Call bell within reach  - Keep bed low and locked with side rails adjusted as appropriate  - Keep care items and personal belongings within reach  - Initiate and maintain comfort rounds  - Make Fall Risk Sign visible to staff  - Offer Toileting every 2 Hours, in advance of need  - Initiate/Maintain BED alarm  - Obtain necessary fall risk management equipment: [address]  - Apply yellow socks and bracelet for high fall risk patients  - Consider moving patient to room near nurses station  Outcome: Progressing     Problem: Nutrition/Hydration-ADULT  Goal: Nutrient/Hydration intake appropriate for improving, restoring or maintaining nutritional needs  Description: Monitor and assess patient's nutrition/hydration status for malnutrition  Collaborate with interdisciplinary team and initiate plan and interventions as ordered  Monitor patient's weight and dietary intake as ordered or per policy  Utilize nutrition screening tool and intervene as necessary  Determine patient's food preferences and provide high-protein, high-caloric foods as appropriate       INTERVENTIONS:  - Monitor oral intake, urinary output, labs, and treatment plans  - Assess nutrition and hydration status and recommend course of action  - Evaluate amount of meals eaten  - Assist patient with eating if necessary   - Allow adequate time for meals  - Recommend/ encourage appropriate diets, oral nutritional supplements, and vitamin/mineral supplements  - Order, calculate, and assess calorie counts as needed  - Recommend, monitor, and adjust tube feedings and TPN/PPN based on assessed needs  - Assess need for intravenous fluids  - Provide specific nutrition/hydration education as appropriate  - Include patient/family/caregiver in decisions related to nutrition  Outcome: Progressing

## 2022-06-19 NOTE — ASSESSMENT & PLAN NOTE
· Baseline creatinine 0 9-1 2  · Likely secondary to sepsis, CHF exacerbation  · Monitor closely while on diuretics

## 2022-06-19 NOTE — PROGRESS NOTES
[address] [facility]  Progress Note - Amber Bond 1960, 64 y o  female MRN: [id number]  Unit/Bed#: E5 -01 Encounter: [id number]  Primary Care Provider: Niya Bojorquez DO   Date and time admitted to hospital: 6/15/2022  2:20 AM    * Acute respiratory failure with hypoxia [facility])  Assessment & Plan  · Previously intubated for respiratory distress in 2021  · Initially improved now with worsening respiratory distress requiring greater than 5 L oxygen via nasal cannula  · Repeat chest x-ray with worsening pulmonary edema discussed with cardiologist and will give diuretics  · Continue prednisone, antibiotics  · Wean oxygen as tolerated    Acute on chronic combined systolic and diastolic congestive heart failure (HCC)  Assessment & Plan  Wt Readings from Last 3 Encounters:   06/19/22 76 8 kg (169 lb 5 oz)   07/13/21 71 7 kg (158 lb)   06/15/21 68 kg (149 lb 14 6 oz)     · bilateral lower extremity edema on exam   · echo on 07/09/2021:  EF 50%  Mild enlargement of the ascending aorta to 3 7 cm  Mild mitral regurgitation  ·  coronary angiography on 03/30/2021 with moderate, but nonobstructive disease  No interventions performed  · Repeat CXR with interval worsening with small left effusion  · Given extra dose of furosemide 20 mg IV and 40 mg IV on 06/18/2022  · Patient refuses to take metoprolol bleeding that it does not make her feel well therefore changed to carvedilol 3 125 mg b i d , continue losartan 25 mg daily  · Cardiology stating to hold off on diuretics today  · Repeat echocardiogram in 1 week to re-evaluate left ventricle systolic function  · strict I&Os    Daily weights, fluid restriction    SHANTA (acute kidney injury) ([facility] [address] )  Assessment & Plan  · Baseline creatinine 0 9-1 2  · Likely secondary to sepsis, CHF exacerbation  · Monitor closely while on diuretics    Chronic obstructive pulmonary disease with acute exacerbation (HCC)  Assessment & Plan  · History of COPD and current tobacco use  · Pulmonary follow-up appreciated  · Continue prednisone taper with reduction by 10 mg every 3 days  · Continue DuoNebs q 6 hours    Chronic pain  Assessment & Plan  · Patient maintained on MS Contin 30 mg b i d   · Oxycodone 2 5 mg q 6 hours p r n  For moderate pain, oxycodone 5 mg p o  Q 6 hours p r n  For severe pain  · Monitor respiratory status closely    Sepsis (HCC)  Assessment & Plan  · Cough, fever and abdominal bloating since Monday,Temperature 101 7°, heart rate 133, WBC 15 K  · Chest x-ray per ER with left lower lobe pneumonia  · COVID, RSV and influenza negative  · Continue antibiotics to complete 7 day course  · Repeat chest x-ray in 6-8 weeks          VTE Pharmacologic Prophylaxis:   Pharmacologic:  Heparin    Patient Centered Rounds: I have performed bedside rounds with nursing staff today  Education and Discussions with Family / Patient:  Updated patient's daughter    Time Spent for Care: 20 minutes  More than 50% of total time spent on counseling and coordination of care as described above  Current Length of Stay: 4 day(s)    Current Patient Status: Inpatient   Certification Statement: The patient will continue to require additional inpatient hospital stay due to Acute hypoxic respiratory failure, antibiotics    Discharge Plan / Estimated Discharge Date: TBD    Code Status: Level 1 - Full Code      Subjective:   Patient seen and examined at bedside, states she feels much better today, breathing is better    Objective:     Vitals:   Temp (24hrs), Av 5 °F (36 9 °C), Min:98 1 °F (36 7 °C), Max:98 9 °F (37 2 °C)    Temp:  [98 1 °F (36 7 °C)-98 9 °F (37 2 °C)] 98 9 °F (37 2 °C)  HR:  [] 90  Resp:  [18-19] 18  BP: (100-143)/() 100/60  SpO2:  [82 %-91 %] 89 %  Body mass index is 26 52 kg/m²  Input and Output Summary (last 24 hours):        Intake/Output Summary (Last 24 hours) at 2022 1612  Last data filed at 2022 1040  Gross per 24 hour   Intake 260 ml Output 700 ml   Net -440 ml       Physical Exam:    Constitutional: Patient is oriented to person, place and time, no acute distress  HEENT:  Normocephalic, atraumatic  Cardiovascular: Normal S1S2, RRR, No murmurs/rubs/gallops appreciated  Pulmonary:  Bilateral air entry, mild left basilar rales, left-sided rhonchi  Abdominal: Soft, Bowel sounds present, Non-tender, Non-distended  Extremities:  No cyanosis, clubbing or edema  Neurological: Cranial nerves II-XII grossly intact, sensation intact, otherwise no focal neurological symptoms  Skin:  Warm, dry    Additional Data:     Labs:    Results from last 7 days   Lab Units 06/19/22  0458   WBC Thousand/uL 16 73*   HEMOGLOBIN g/dL 13 3   HEMATOCRIT % 39 5   PLATELETS Thousands/uL 307   NEUTROS PCT % 76*   LYMPHS PCT % 15   MONOS PCT % 7   EOS PCT % 1     Results from last 7 days   Lab Units 06/19/22  0458 06/17/22  0555 06/16/22  0447   POTASSIUM mmol/L 4 1   < > 3 6   CHLORIDE mmol/L 94*   < > 99*   CO2 mmol/L 28   < > 21   BUN mg/dL 31*   < > 31*   CREATININE mg/dL 1 54*   < > 1 61*   CALCIUM mg/dL 9 4   < > 8 7   ALK PHOS U/L  --   --  84   ALT U/L  --   --  22   AST U/L  --   --  26    < > = values in this interval not displayed  Results from last 7 days   Lab Units 06/14/22  2204   INR  1 22*        I Have Reviewed All Lab Data Listed Above  Recent Cultures (last 7 days):     Results from last 7 days   Lab Units 06/15/22  0503 06/14/22  2206 06/14/22  2204   BLOOD CULTURE   --  No Growth After 4 Days  No Growth After 4 Days     LEGIONELLA URINARY ANTIGEN  Negative  --   --        Last 24 Hours Medication List:   Current Facility-Administered Medications   Medication Dose Route Frequency Provider Last Rate    acetaminophen  650 mg Oral Q6H PRN Malvin Rodriguez MD      ALPRAZolam  0 5 mg Oral Once [employer], ESTHER      carvedilol  3 125 mg Oral BID With Meals ESTHER Figueroa      cefTRIAXone  1,000 mg Intravenous Q24H Yoni Simon Carmel Franks MD 1,000 mg (06/19/22 9653)    clonazePAM  0 5 mg Oral HS PRN Ruiz [place], CRNP      cyclobenzaprine  10 mg Oral Once [place], ESTHER      guaiFENesin  600 mg Oral Q12H [address] Madonna Staley MD      heparin (porcine)  5,000 Units Subcutaneous [facility] Madonna Staley MD      ipratropium-albuterol  3 mL Nebulization Q6H Madonna Staley MD      labetalol  10 mg Intravenous Once Joseph Rodriguez, ESTHER      lidocaine  1 patch Topical Daily Madonna Staley MD      morphine  30 mg Oral BID Aly Peters MD      ondansetron  4 mg Intravenous Q6H PRN Madonna Staley MD      oxyCODONE  2 5 mg Oral Q6H PRN Madonna Staley MD      Or   Nigrid Drop oxyCODONE  5 mg Oral Q6H PRN Madonna Staley MD      predniSONE  40 mg Oral Daily Aly Peters MD          Today, Patient Was Seen By: Aly Peters MD

## 2022-06-19 NOTE — ASSESSMENT & PLAN NOTE
Wt Readings from Last 3 Encounters:   06/19/22 76 8 kg (169 lb 5 oz)   07/13/21 71 7 kg (158 lb)   06/15/21 68 kg (149 lb 14 6 oz)     · bilateral lower extremity edema on exam   · echo on 07/09/2021:  EF 50%  Mild enlargement of the ascending aorta to 3 7 cm  Mild mitral regurgitation  ·  coronary angiography on 03/30/2021 with moderate, but nonobstructive disease  No interventions performed  · Repeat CXR with interval worsening with small left effusion  · Given extra dose of furosemide 20 mg IV and 40 mg IV on 06/18/2022  · Patient refuses to take metoprolol bleeding that it does not make her feel well therefore changed to carvedilol 3 125 mg b i d , continue losartan 25 mg daily  · Cardiology stating to hold off on diuretics today  · Repeat echocardiogram in 1 week to re-evaluate left ventricle systolic function  · strict I&Os    Daily weights, fluid restriction

## 2022-06-19 NOTE — PLAN OF CARE
Problem: MOBILITY - ADULT  Goal: Maintain or return to baseline ADL function  Description: INTERVENTIONS:  -  Assess patient's ability to carry out ADLs; assess patient's baseline for ADL function and identify physical deficits which impact ability to perform ADLs (bathing, care of mouth/teeth, toileting, grooming, dressing, etc )  - Assess/evaluate cause of self-care deficits   - Assess range of motion  - Assess patient's mobility; develop plan if impaired  - Assess patient's need for assistive devices and provide as appropriate  - Encourage maximum independence but intervene and supervise when necessary  - Involve family in performance of ADLs  - Assess for home care needs following discharge   - Consider OT consult to assist with ADL evaluation and planning for discharge  - Provide patient education as appropriate  Outcome: Progressing  Goal: Maintains/Returns to pre admission functional level  Description: INTERVENTIONS:  - Perform BMAT or MOVE assessment daily    - Set and communicate daily mobility goal to care team and patient/family/caregiver  - Collaborate with rehabilitation services on mobility goals if consulted  - Perform Range of Motion 3 times a day  - Reposition patient every 2 hours    - Dangle patient 3 times a day  - Stand patient 3 times a day  - Ambulate patient 3 times a day  - Out of bed to chair 3 times a day   - Out of bed for meals 3 times a day  - Out of bed for toileting  - Record patient progress and toleration of activity level   Outcome: Progressing     Problem: Prexisting or High Potential for Compromised Skin Integrity  Goal: Skin integrity is maintained or improved  Description: INTERVENTIONS:  - Identify patients at risk for skin breakdown  - Assess and monitor skin integrity  - Assess and monitor nutrition and hydration status  - Monitor labs   - Assess for incontinence   - Turn and reposition patient  - Assist with mobility/ambulation  - Relieve pressure over bony prominences  - Avoid friction and shearing  - Provide appropriate hygiene as needed including keeping skin clean and dry  - Evaluate need for skin moisturizer/barrier cream  - Collaborate with interdisciplinary team   - Patient/family teaching  - Consider wound care consult   Outcome: Progressing     Problem: Potential for Falls  Goal: Patient will remain free of falls  Description: INTERVENTIONS:  - Educate patient/family on patient safety including physical limitations  - Instruct patient to call for assistance with activity   - Consult OT/PT to assist with strengthening/mobility   - Keep Call bell within reach  - Keep bed low and locked with side rails adjusted as appropriate  - Keep care items and personal belongings within reach  - Initiate and maintain comfort rounds  - Make Fall Risk Sign visible to staff  - Offer Toileting every 2 Hours, in advance of need  - Initiate/Maintain bed alarm  - Obtain necessary fall risk management equipment: Alarm  - Apply yellow socks and bracelet for high fall risk patients  - Consider moving patient to room near nurses station  Outcome: Progressing     Problem: Nutrition/Hydration-ADULT  Goal: Nutrient/Hydration intake appropriate for improving, restoring or maintaining nutritional needs  Description: Monitor and assess patient's nutrition/hydration status for malnutrition  Collaborate with interdisciplinary team and initiate plan and interventions as ordered  Monitor patient's weight and dietary intake as ordered or per policy  Utilize nutrition screening tool and intervene as necessary  Determine patient's food preferences and provide high-protein, high-caloric foods as appropriate       INTERVENTIONS:  - Monitor oral intake, urinary output, labs, and treatment plans  - Assess nutrition and hydration status and recommend course of action  - Evaluate amount of meals eaten  - Assist patient with eating if necessary   - Allow adequate time for meals  - Recommend/ encourage appropriate diets, oral nutritional supplements, and vitamin/mineral supplements  - Order, calculate, and assess calorie counts as needed  - Recommend, monitor, and adjust tube feedings and TPN/PPN based on assessed needs  - Assess need for intravenous fluids  - Provide specific nutrition/hydration education as appropriate  - Include patient/family/caregiver in decisions related to nutrition  Outcome: Progressing     Problem: RESPIRATORY - ADULT  Goal: Achieves optimal ventilation and oxygenation  Description: INTERVENTIONS:  - Assess for changes in respiratory status  - Assess for changes in mentation and behavior  - Position to facilitate oxygenation and minimize respiratory effort  - Oxygen administered by appropriate delivery if ordered  - Initiate smoking cessation education as indicated  - Encourage broncho-pulmonary hygiene including cough, deep breathe, Incentive Spirometry  - Assess the need for suctioning and aspirate as needed  - Assess and instruct to report SOB or any respiratory difficulty  - Respiratory Therapy support as indicated  Outcome: Progressing     Problem: GENITOURINARY - ADULT  Goal: Maintains or returns to baseline urinary function  Description: INTERVENTIONS:  - Assess urinary function  - Encourage oral fluids to ensure adequate hydration if ordered  - Administer IV fluids as ordered to ensure adequate hydration  - Administer ordered medications as needed  - Offer frequent toileting  - Follow urinary retention protocol if ordered  Outcome: Progressing  Goal: Absence of urinary retention  Description: INTERVENTIONS:  - Assess patient's ability to void and empty bladder  - Monitor I/O  - Bladder scan as needed  - Discuss with physician/AP medications to alleviate retention as needed  - Discuss catheterization for long term situations as appropriate  Outcome: Progressing  Goal: Urinary catheter remains patent  Description: INTERVENTIONS:  - Assess patency of urinary catheter  - If patient has a chronic leung, consider changing catheter if non-functioning  - Follow guidelines for intermittent irrigation of non-functioning urinary catheter  Outcome: Progressing

## 2022-06-20 LAB
ANION GAP SERPL CALCULATED.3IONS-SCNC: 8 MMOL/L (ref 4–13)
BACTERIA BLD CULT: NORMAL
BACTERIA BLD CULT: NORMAL
BASOPHILS # BLD AUTO: 0.01 THOUSANDS/ΜL (ref 0–0.1)
BASOPHILS NFR BLD AUTO: 0 % (ref 0–1)
BUN SERPL-MCNC: 44 MG/DL (ref 5–25)
CALCIUM SERPL-MCNC: 9 MG/DL (ref 8.3–10.1)
CHLORIDE SERPL-SCNC: 96 MMOL/L (ref 100–108)
CO2 SERPL-SCNC: 27 MMOL/L (ref 21–32)
CREAT SERPL-MCNC: 1.75 MG/DL (ref 0.6–1.3)
EOSINOPHIL # BLD AUTO: 0.23 THOUSAND/ΜL (ref 0–0.61)
EOSINOPHIL NFR BLD AUTO: 2 % (ref 0–6)
ERYTHROCYTE [DISTWIDTH] IN BLOOD BY AUTOMATED COUNT: 12.9 % (ref 11.6–15.1)
GFR SERPL CREATININE-BSD FRML MDRD: 30 ML/MIN/1.73SQ M
GLUCOSE SERPL-MCNC: 102 MG/DL (ref 65–140)
HCT VFR BLD AUTO: 35.8 % (ref 34.8–46.1)
HGB BLD-MCNC: 12.1 G/DL (ref 11.5–15.4)
IMM GRANULOCYTES # BLD AUTO: 0.07 THOUSAND/UL (ref 0–0.2)
IMM GRANULOCYTES NFR BLD AUTO: 1 % (ref 0–2)
LYMPHOCYTES # BLD AUTO: 2.84 THOUSANDS/ΜL (ref 0.6–4.47)
LYMPHOCYTES NFR BLD AUTO: 27 % (ref 14–44)
MCH RBC QN AUTO: 30.1 PG (ref 26.8–34.3)
MCHC RBC AUTO-ENTMCNC: 33.8 G/DL (ref 31.4–37.4)
MCV RBC AUTO: 89 FL (ref 82–98)
MONOCYTES # BLD AUTO: 0.86 THOUSAND/ΜL (ref 0.17–1.22)
MONOCYTES NFR BLD AUTO: 8 % (ref 4–12)
NEUTROPHILS # BLD AUTO: 6.43 THOUSANDS/ΜL (ref 1.85–7.62)
NEUTS SEG NFR BLD AUTO: 62 % (ref 43–75)
NRBC BLD AUTO-RTO: 0 /100 WBCS
PLATELET # BLD AUTO: 319 THOUSANDS/UL (ref 149–390)
PMV BLD AUTO: 10.1 FL (ref 8.9–12.7)
POTASSIUM SERPL-SCNC: 3.7 MMOL/L (ref 3.5–5.3)
RBC # BLD AUTO: 4.02 MILLION/UL (ref 3.81–5.12)
SODIUM SERPL-SCNC: 131 MMOL/L (ref 136–145)
WBC # BLD AUTO: 10.44 THOUSAND/UL (ref 4.31–10.16)

## 2022-06-20 PROCEDURE — 99232 SBSQ HOSP IP/OBS MODERATE 35: CPT | Performed by: INTERNAL MEDICINE

## 2022-06-20 PROCEDURE — 94640 AIRWAY INHALATION TREATMENT: CPT

## 2022-06-20 PROCEDURE — 80048 BASIC METABOLIC PNL TOTAL CA: CPT | Performed by: INTERNAL MEDICINE

## 2022-06-20 PROCEDURE — 97110 THERAPEUTIC EXERCISES: CPT

## 2022-06-20 PROCEDURE — 85025 COMPLETE CBC W/AUTO DIFF WBC: CPT | Performed by: INTERNAL MEDICINE

## 2022-06-20 PROCEDURE — 99232 SBSQ HOSP IP/OBS MODERATE 35: CPT | Performed by: NURSE PRACTITIONER

## 2022-06-20 PROCEDURE — 97530 THERAPEUTIC ACTIVITIES: CPT

## 2022-06-20 PROCEDURE — 97116 GAIT TRAINING THERAPY: CPT

## 2022-06-20 RX ORDER — FUROSEMIDE 10 MG/ML
40 INJECTION INTRAMUSCULAR; INTRAVENOUS ONCE
Status: COMPLETED | OUTPATIENT
Start: 2022-06-20 | End: 2022-06-20

## 2022-06-20 RX ADMIN — IPRATROPIUM BROMIDE AND ALBUTEROL SULFATE 3 ML: 2.5; .5 SOLUTION RESPIRATORY (INHALATION) at 01:38

## 2022-06-20 RX ADMIN — GUAIFENESIN 600 MG: 600 TABLET, EXTENDED RELEASE ORAL at 21:25

## 2022-06-20 RX ADMIN — IPRATROPIUM BROMIDE AND ALBUTEROL SULFATE 3 ML: 2.5; .5 SOLUTION RESPIRATORY (INHALATION) at 19:31

## 2022-06-20 RX ADMIN — IPRATROPIUM BROMIDE AND ALBUTEROL SULFATE 3 ML: 2.5; .5 SOLUTION RESPIRATORY (INHALATION) at 07:09

## 2022-06-20 RX ADMIN — PREDNISONE 40 MG: 20 TABLET ORAL at 08:15

## 2022-06-20 RX ADMIN — HEPARIN SODIUM 5000 UNITS: 5000 INJECTION INTRAVENOUS; SUBCUTANEOUS at 13:05

## 2022-06-20 RX ADMIN — HEPARIN SODIUM 5000 UNITS: 5000 INJECTION INTRAVENOUS; SUBCUTANEOUS at 21:25

## 2022-06-20 RX ADMIN — MORPHINE SULFATE 30 MG: 30 TABLET, EXTENDED RELEASE ORAL at 17:30

## 2022-06-20 RX ADMIN — MORPHINE SULFATE 30 MG: 30 TABLET, EXTENDED RELEASE ORAL at 08:15

## 2022-06-20 RX ADMIN — GUAIFENESIN 600 MG: 600 TABLET, EXTENDED RELEASE ORAL at 08:15

## 2022-06-20 RX ADMIN — FUROSEMIDE 40 MG: 10 INJECTION, SOLUTION INTRAVENOUS at 13:05

## 2022-06-20 RX ADMIN — IPRATROPIUM BROMIDE AND ALBUTEROL SULFATE 3 ML: 2.5; .5 SOLUTION RESPIRATORY (INHALATION) at 13:13

## 2022-06-20 RX ADMIN — HEPARIN SODIUM 5000 UNITS: 5000 INJECTION INTRAVENOUS; SUBCUTANEOUS at 05:49

## 2022-06-20 RX ADMIN — CARVEDILOL 3.12 MG: 3.12 TABLET, FILM COATED ORAL at 08:16

## 2022-06-20 RX ADMIN — LIDOCAINE 1 PATCH: 50 PATCH CUTANEOUS at 21:25

## 2022-06-20 RX ADMIN — CEFTRIAXONE SODIUM 1000 MG: 10 INJECTION, POWDER, FOR SOLUTION INTRAVENOUS at 13:05

## 2022-06-20 RX ADMIN — CARVEDILOL 3.12 MG: 3.12 TABLET, FILM COATED ORAL at 17:30

## 2022-06-20 NOTE — CASE MANAGEMENT
Case Management Progress Note    Patient name Fam Courtney  Location [facility] 5 /E5 MS 22 [id number]-* MRN [id number]  : 1960 Date 2022       LOS (days): 5  Geometric Mean LOS (GMLOS) (days): 4 80  Days to GMLOS:-0 8        OBJECTIVE:        Current admission status: Inpatient  Preferred Pharmacy:   [address] [facility] [address]  [facility], [address]  [address] [facility] [address]  Phone: [phone] Fax: [phone]    Primary Care Provider: Alana Buenrostro DO    Primary Insurance: MEDICARE MISC REPLACEMENT  Secondary Insurance: [address]    PROGRESS NOTE:      CM met w/ pt at bedside to discuss dcp and PT/OT rec for OP therapy  Pt states she is very independent and will not likely require therapy  Pt does not appear to have a realistic view of her condition, as Pt is stating her ability to partake in physically taxing activities  Pt is requesting clothing to wear at time of discharge and a ride if possible  Pt states her Dtr works long hours and will be driving from [employer] to drive Pt home, adding that her Dtr will not likely agree to drive the distance  CM noted Pts requests  CM will continue to follow

## 2022-06-20 NOTE — ASSESSMENT & PLAN NOTE
· Patient maintained on MS Contin 30 mg b i d   · Oxycodone 2 5 mg q 6 hours p r n  For moderate pain, oxycodone 5 mg p o  Q 6 hours p r n   For severe pain

## 2022-06-20 NOTE — PROGRESS NOTES
Cardiology         Progress Note - Cardiology   Gisela Servant 64 y o  female MRN: [id number]  Unit/Bed#: E5 -01 Encounter: [id number]          Assessment/Recommendations/Discussion:   1  Acute hypoxic and hypercapnic respiratory failure   2  Acute on chronic systolic and diastolic CHF   3  Dilated cardiomyopathy, ejection fraction 40%--coronary angiography 03/30/2021 without obstructive CAD  4  COPD with acute exacerbation   5  Acute bacterial pneumonia   6  Sepsis     · Continue holding furosemide in light of increasing BUN and creatinine  Left lower lobe crackles heard with worsening left lower lobe infiltrate noted on chest x-ray 06/18/2022  Continue antibiotics per primary service  · Blood pressure controlled, continue carvedilol  · Start ACE-inhibitor or ARB for cardiomyopathy once renal function stabilizes, although patient quite reluctant to add more medications to her regimen        Subjective:  Patient seen and examined, no symptoms  She states she is breathing better                Physical Exam:  GEN:  NAD  HEENT:  MMM, NCAT, pink conjunctiva, EOMI, nonicteric sclera  CV:  NO JVD/HJR, RR, NO M/R/G, +S1/S2, NO PARASTERNAL HEAVE/THRILL, NO LE EDEMA, NO HEPATIC SYSTOLIC PULSATION, WARM EXTREMITIES  RESP:  Left lower lobe crackles  ABD:  SOFT, NT, NO GROSS ORGANOMEGALY        Vitals:   /83   Pulse 89   Temp (!) 97 4 °F (36 3 °C)   Resp 18   Ht 5' 7" (1 702 m)   Wt 76 8 kg (169 lb 5 oz)   SpO2 91%   BMI 26 52 kg/m²   Vitals:    06/19/22 0559 06/20/22 0600   Weight: 76 8 kg (169 lb 5 oz) 76 8 kg (169 lb 5 oz)       Intake/Output Summary (Last 24 hours) at 6/20/2022 1056  Last data filed at 6/19/2022 2249  Gross per 24 hour   Intake --   Output 900 ml   Net -900 ml         Lab Results:  Results from last 7 days   Lab Units 06/20/22  0555   WBC Thousand/uL 10 44*   HEMOGLOBIN g/dL 12 1   HEMATOCRIT % 35 8   PLATELETS Thousands/uL 319     Results from last 7 days   Lab Units 06/20/22  0555 06/17/22  0555 06/16/22  0447   POTASSIUM mmol/L 3 7   < > 3 6   CHLORIDE mmol/L 96*   < > 99*   CO2 mmol/L 27   < > 21   BUN mg/dL 44*   < > 31*   CREATININE mg/dL 1 75*   < > 1 61*   CALCIUM mg/dL 9 0   < > 8 7   ALK PHOS U/L  --   --  84   ALT U/L  --   --  22   AST U/L  --   --  26    < > = values in this interval not displayed       Results from last 7 days   Lab Units 06/20/22  0555   POTASSIUM mmol/L 3 7   CHLORIDE mmol/L 96*   CO2 mmol/L 27   BUN mg/dL 44*   CREATININE mg/dL 1 75*   CALCIUM mg/dL 9 0           Medications:    Current Facility-Administered Medications:     acetaminophen (TYLENOL) tablet 650 mg, 650 mg, Oral, Q6H PRN, [place] Anderson MD, 650 mg at 06/18/22 0306    ALPRAZolam (XANAX) tablet 0 5 mg, 0 5 mg, Oral, Once, [facility] ESTHER Alonzo    carvedilol (COREG) tablet 3 125 mg, 3 125 mg, Oral, BID With Meals, ESTHER Figueroa, 3 125 mg at 06/20/22 0816    cefTRIAXone (ROCEPHIN) 1,000 mg in dextrose 5 % 50 mL IVPB, 1,000 mg, Intravenous, Q24H, Gonzalez Anderson MD, Last Rate: 100 mL/hr at 06/19/22 1305, 1,000 mg at 06/19/22 1305    clonazePAM (KlonoPIN) tablet 0 5 mg, 0 5 mg, Oral, HS PRN, [facility] ESTHER Alonzo, 0 5 mg at 06/19/22 2332    cyclobenzaprine (FLEXERIL) tablet 10 mg, 10 mg, Oral, Once, [facility] ESTHER Alonzo    guaiFENesin (MUCINEX) 12 hr tablet 600 mg, 600 mg, Oral, Q12H [address], Gonzalez Anderson MD, 600 mg at 06/20/22 0815    heparin (porcine) subcutaneous injection 5,000 Units, 5,000 Units, Subcutaneous, Q8H [address], 5,000 Units at 06/20/22 0549 **AND** [CANCELED] Platelet count, , , Once, ESTHER Stover    ipratropium-albuterol (DUO-NEB) 0 5-2 5 mg/3 mL inhalation solution 3 mL, 3 mL, Nebulization, Q6H, Gonzalez uGangm, MD, 3 mL at 06/20/22 0709    labetalol (NORMODYNE) injection 10 mg, 10 mg, Intravenous, Once, [employer] ESTHER Alonzo    lidocaine (LIDODERM) 5 % patch 1 patch, 1 patch, Topical, Daily, Frannie Ken Maxi Seals MD, 1 patch at 06/16/22 2124    morphine (MS CONTIN) ER tablet 30 mg, 30 mg, Oral, BID, Glen Stevenson MD, 30 mg at 06/20/22 0815    ondansetron (ZOFRAN) injection 4 mg, 4 mg, Intravenous, Q6H PRN, Qamar Logan MD    oxyCODONE (ROXICODONE) IR tablet 2 5 mg, 2 5 mg, Oral, Q6H PRN **OR** oxyCODONE (ROXICODONE) IR tablet 5 mg, 5 mg, Oral, Q6H PRN, Qamar Logan MD, 5 mg at 06/19/22 1304    predniSONE tablet 40 mg, 40 mg, Oral, Daily, Glen Stevenson MD, 40 mg at 06/20/22 0815    This note was completed in part utilizing [employer] Fluency Direct Software  Grammatical errors, random word insertions, spelling mistakes, and incomplete sentences may be an occasional consequence of this system secondary to software limitations, ambient noise, and hardware issues  If you have any questions or concerns about the content, text, or information contained within the body of this dictation, please contact the provider for clarification

## 2022-06-20 NOTE — PLAN OF CARE
Problem: PHYSICAL THERAPY ADULT  Goal: Performs mobility at highest level of function for planned discharge setting  See evaluation for individualized goals  Description: Treatment/Interventions: Functional transfer training, LE strengthening/ROM, Elevations, Therapeutic exercise, Endurance training, Patient/family training, Bed mobility, Gait training, Spoke to nursing, OT          See flowsheet documentation for full assessment, interventions and recommendations  Outcome: Progressing  Note: Prognosis: Good  Problem List: Decreased strength, Decreased endurance  Assessment: Pt seen for PT treatment session this date with interventions consisting of gait training w/ emphasis on improving pt's ability to ambulate level surfaces x 125 ft with min A provided by therapist with RW, Therapeutic exercise consisting of: AROM 20 reps B LE in sitting position and therapeutic activity consisting of training: bed mobility, supine<>sit transfers and sit<>stand transfers  Pt agreeable to PT treatment session upon arrival, pt found supine in bed w/ HOB elevated, in no apparent distress and responsive  In comparison to previous session, pt with improvements in distance ambulated  Post session: pt returned back to recliner, all needs in reach and RN notified of session findings/recommendations  Continue to recommend home with home health rehabilitation or OPPT for pulmonary rehab at time of d/c in order to maximize pt's functional independence and safety w/ mobility  Pt continues to be functioning below baseline level  PT will continue to see pt during current hospitalization in order to address the deficits listed above and provide interventions consistent w/ POC in effort to achieve STGs    Barriers to Discharge: Decreased caregiver support, Inaccessible home environment  Barriers to Discharge Comments: home alone; stairs     PT Discharge Recommendation: Home with outpatient rehabilitation (HHPT or OPPT for pulmonary rehab) See flowsheet documentation for full assessment

## 2022-06-20 NOTE — PROGRESS NOTES
[facility]  Progress Note - Amber Bond 1960, 64 y o  female MRN: [id number]  Unit/Bed#: E5 -01 Encounter: [id number]  Primary Care Provider: Niya Bojorquez DO   Date and time admitted to hospital: 6/15/2022  2:20 AM    SHANTA (acute kidney injury) [facility])  Assessment & Plan  · Baseline creatinine 0 9-1 2  · Likely secondary to sepsis, CHF exacerbation, diuretics  · Uptrending over the last 72 hours  · Follow-up closely with diuretics    Chronic obstructive pulmonary disease with acute exacerbation ([facility] )  Assessment & Plan  · History of COPD and current tobacco use  · Pulmonary follow-up appreciated  · Continue prednisone taper with reduction by 10 mg every 3 days  · Continue DuoNebs q 6 hours    Acute on chronic combined systolic and diastolic congestive heart failure (HCC)  Assessment & Plan  Wt Readings from Last 3 Encounters:   06/20/22 76 8 kg (169 lb 5 oz)   07/13/21 71 7 kg (158 lb)   06/15/21 68 kg (149 lb 14 6 oz)     · Echo EF 38%, systolic function moderately reduced, grade 1 diastolic dysfunction  · Appears to be quite sensitive to diuretics with elevated creatinine over the last 2 days  · Patient clinically improving with decreased oxygen requirement  · Appreciate cardiology/pulmonology recommendations on diuretics  · Will discuss with Cardiology need for repeat echocardiogram given cardiomyopathy    Chronic pain  Assessment & Plan  · Patient maintained on MS Contin 30 mg b i d   · Oxycodone 2 5 mg q 6 hours p r n  For moderate pain, oxycodone 5 mg p o  Q 6 hours p r n   For severe pain    Sepsis ([facility] )  Assessment & Plan  · Cough, fever and abdominal bloating since Monday,Temperature 101 7°, heart rate 133, WBC 15 K  · Chest x-ray left lower lobe pneumonia  · Chest x-ray 6/18 with interval worsening, likely heart failure contributing to imaging findings  · COVID, RSV and influenza negative  · Repeat chest x-ray and procalcitonin  · Continue antibiotics to complete 7 day course    * Acute respiratory failure with hypoxia (HCC)  Assessment & Plan  · Likely multifactorial in setting of acute combined systolic/diastolic heart failure, pneumonia, COPD exacerbation  · Appreciate cardiology/pulmonology recommendations  · Continue steroid taper, nebs  · Titrate oxygen to maintain saturations greater than 88%      VTE Pharmacologic Prophylaxis:  Heparin    Patient Centered Rounds:  Patient care rounds were performed with nursing    Discussions with Specialists or Other Care Team Provider:  Cardiology    Education and Discussions with Family / Patient:  Updated daughter via telephone    Time Spent for Care: 30  More than 50% of total time spent on counseling and coordination of care as described above  Current Length of Stay: 5 day(s)    Current Patient Status: Inpatient   Certification Statement: The patient will continue to require additional inpatient hospital stay due to ongoing management of acute heart failure, SHANTA    Discharge Plan:  Discharge when respiratory status stable, renal function stable    Code Status: Level 1 - Full Code      Subjective:   Patient seen and evaluated at bedside  She reports that she feels improved  Breathing is much better than admission  Objective:     Vitals:   Temp (24hrs), Av 5 °F (36 4 °C), Min:97 4 °F (36 3 °C), Max:97 5 °F (36 4 °C)    Temp:  [97 4 °F (36 3 °C)-97 5 °F (36 4 °C)] 97 4 °F (36 3 °C)  HR:  [73-90] 73  Resp:  [18] 18  BP: ()/(60-83) 115/75  SpO2:  [86 %-96 %] 92 %  Body mass index is 26 52 kg/m²  Input and Output Summary (last 24 hours): Intake/Output Summary (Last 24 hours) at 2022 1350  Last data filed at 2022 2249  Gross per 24 hour   Intake --   Output 900 ml   Net -900 ml       Physical Exam:     Physical Exam  Vitals reviewed  Constitutional:       General: She is not in acute distress  Appearance: She is well-developed   She is not ill-appearing, toxic-appearing or diaphoretic  HENT:      Head: Normocephalic and atraumatic  Mouth/Throat:      Pharynx: No oropharyngeal exudate  Eyes:      General: No scleral icterus  Conjunctiva/sclera: Conjunctivae normal    Cardiovascular:      Rate and Rhythm: Normal rate and regular rhythm  Heart sounds: Normal heart sounds  Pulmonary:      Effort: Pulmonary effort is normal  No respiratory distress  Breath sounds: Rales (Left lower lobe) present  No wheezing  Abdominal:      General: There is no distension  Palpations: Abdomen is soft  Tenderness: There is no abdominal tenderness  There is no guarding or rebound  Musculoskeletal:         General: No swelling, tenderness or deformity  Skin:     General: Skin is warm and dry  Neurological:      General: No focal deficit present  Mental Status: She is alert  Mental status is at baseline  Psychiatric:         Mood and Affect: Mood normal          Behavior: Behavior normal          Thought Content: Thought content normal          Judgment: Judgment normal          Additional Data:     Labs: I have reviewed pertinent results     Results from last 7 days   Lab Units 06/20/22  0555   WBC Thousand/uL 10 44*   HEMOGLOBIN g/dL 12 1   HEMATOCRIT % 35 8   PLATELETS Thousands/uL 319   NEUTROS PCT % 62   LYMPHS PCT % 27   MONOS PCT % 8   EOS PCT % 2     Results from last 7 days   Lab Units 06/20/22  0555 06/17/22  0555 06/16/22  0447   SODIUM mmol/L 131*   < > 131*   POTASSIUM mmol/L 3 7   < > 3 6   CHLORIDE mmol/L 96*   < > 99*   CO2 mmol/L 27   < > 21   BUN mg/dL 44*   < > 31*   CREATININE mg/dL 1 75*   < > 1 61*   ANION GAP mmol/L 8   < > 11   CALCIUM mg/dL 9 0   < > 8 7   ALBUMIN g/dL  --   --  3 2*   TOTAL BILIRUBIN mg/dL  --   --  0 31   ALK PHOS U/L  --   --  84   ALT U/L  --   --  22   AST U/L  --   --  26   GLUCOSE RANDOM mg/dL 102   < > 150*    < > = values in this interval not displayed       Results from last 7 days   Lab Units 06/14/22  7765 INR  1 22*     Results from last 7 days   Lab Units 06/15/22  0417   POC GLUCOSE mg/dl 183*         Results from last 7 days   Lab Units 06/17/22  0555 06/16/22  0447 06/15/22  0503 06/14/22  2204   LACTIC ACID mmol/L  --   --  1 9 1 0   PROCALCITONIN ng/ml 7 62* 17 80* 0 21 0 06         Imaging: I have reviewed pertinent imaging       Recent Cultures (last 7 days):     Results from last 7 days   Lab Units 06/15/22  0503 06/14/22  2206 06/14/22  2204   BLOOD CULTURE   --  No Growth After 5 Days  No Growth After 5 Days  LEGIONELLA URINARY ANTIGEN  Negative  --   --        Last 24 Hours Medication List:   Current Facility-Administered Medications   Medication Dose Route Frequency Provider Last Rate    acetaminophen  650 mg Oral Q6H PRN Todd Avila MD      ALPRAZolam  0 5 mg Oral Once Leeta Odor, CRNP      carvedilol  3 125 mg Oral BID With Meals ESTHER Figueroa      cefTRIAXone  1,000 mg Intravenous Q24H Todd Avila MD 1,000 mg (06/20/22 1305)    clonazePAM  0 5 mg Oral HS PRN Leeta Odor, CRNP      cyclobenzaprine  10 mg Oral Once Leeta Odor, CRNP      guaiFENesin  600 mg Oral Q12H [address] Todd Avila MD      heparin (porcine)  5,000 Units Subcutaneous [facility] Todd Avila MD      ipratropium-albuterol  3 mL Nebulization Q6H Todd Avila MD      labetalol  10 mg Intravenous Once Leeta Odor, CRNP      lidocaine  1 patch Topical Daily Todd Avila MD      morphine  30 mg Oral BID Yan Lora MD      ondansetron  4 mg Intravenous Q6H PRN Todd Avila MD      oxyCODONE  2 5 mg Oral Q6H PRN Todd Avila MD      Or   Mary Splinter oxyCODONE  5 mg Oral Q6H PRJV Avila MD      predniSONE  40 mg Oral Daily Yan Lora MD          Today, Patient Was Seen By: Chasity Blas DO    ** Please Note: Dictation voice to text software may have been used in the creation of this document  **

## 2022-06-20 NOTE — ASSESSMENT & PLAN NOTE
Wt Readings from Last 3 Encounters:   06/20/22 76 8 kg (169 lb 5 oz)   07/13/21 71 7 kg (158 lb)   06/15/21 68 kg (149 lb 14 6 oz)     · Echo EF 50%, systolic function moderately reduced, grade 1 diastolic dysfunction  · Appears to be quite sensitive to diuretics with elevated creatinine over the last 2 days  · Patient clinically improving with decreased oxygen requirement  · Appreciate cardiology/pulmonology recommendations on diuretics  · Will discuss with Cardiology need for repeat echocardiogram given cardiomyopathy

## 2022-06-20 NOTE — PHYSICAL THERAPY NOTE
PHYSICAL THERAPY TREATMENT NOTE  NAME:  Gisela Christensen  DATE: 06/20/22    Length Of Stay: 5  Performed at least 2 patient identifiers during session: Name and ID bracelet    TREATMENT NOTE:     06/20/22 1116   PT Last Visit   PT Visit Date 06/20/22   Note Type   Note Type Treatment   Pain Assessment   Pain Assessment Tool 0-10   Pain Score 6   Pain Location/Orientation Location: Generalized   Pain Onset/Description Onset: Ongoing   Patient's Stated Pain Goal No pain   Hospital Pain Intervention(s) Ambulation/increased activity;Repositioned; Emotional support   Restrictions/Precautions   Weight Bearing Precautions Per Order No   Other Precautions Multiple lines;Telemetry;O2;Fall Risk;Pain  (3 L O2)   General   Chart Reviewed Yes   Response to Previous Treatment Patient with no complaints from previous session  Family/Caregiver Present No   Cognition   Overall Cognitive Status WFL   Arousal/Participation Alert; Cooperative   Attention Within functional limits   Orientation Level Oriented X4   Memory Within functional limits   Following Commands Follows all commands and directions without difficulty   Comments pt agreeable to PT session   Bed Mobility   Supine to Sit 5  Supervision   Additional Comments pt OOB in chair to end session   Transfers   Sit to Stand 5  Supervision   Additional items Armrests; Increased time required   Stand to Sit 5  Supervision   Additional items Armrests; Increased time required   Additional Comments cues for safety   Ambulation/Elevation   Gait pattern Narrow CARLY; Decreased foot clearance; Short stride; Excessively slow   Gait Assistance 4  Minimal assist   Additional items Assist x 1;Verbal cues; Tactile cues  (slight LOB , pt able to right self , reaches for wall)   Assistive Device None   Distance 125 ft   Ambulation/Elevation Additional Comments unsteady gait , pt able to right self   Balance   Static Sitting Good   Dynamic Sitting Fair +   Static Standing Fair -   Dynamic Standing Poor +   Ambulatory Poor +   Endurance Deficit   Endurance Deficit Yes   Activity Tolerance   Activity Tolerance Patient limited by fatigue;Treatment limited secondary to medical complications (Comment)   Nurse Made Aware yes   Exercises   Quad Sets Sitting;20 reps;AROM; Bilateral   Heelslides Sitting;20 reps;AROM; Bilateral   Glute Sets Sitting;20 reps;AROM; Bilateral   Hip Abduction Sitting;20 reps;AROM; Bilateral   Hip Adduction Sitting;20 reps;AROM; Bilateral   Knee AROM Long Arc Quad Sitting;20 reps;AROM; Bilateral   Ankle Pumps Sitting;20 reps;AROM; Bilateral   Marching Sitting;20 reps;AROM; Bilateral   Assessment   Prognosis Good   Problem List Decreased strength;Decreased endurance   Assessment Pt seen for PT treatment session this date with interventions consisting of gait training w/ emphasis on improving pt's ability to ambulate level surfaces x 125 ft with min A provided by therapist with RW, Therapeutic exercise consisting of: AROM 20 reps B LE in sitting position and therapeutic activity consisting of training: bed mobility, supine<>sit transfers and sit<>stand transfers  Pt agreeable to PT treatment session upon arrival, pt found supine in bed w/ HOB elevated, in no apparent distress and responsive  In comparison to previous session, pt with improvements in distance ambulated  Post session: pt returned back to recliner, all needs in reach and RN notified of session findings/recommendations  Continue to recommend home with home health rehabilitation or OPPT for pulmonary rehab at time of d/c in order to maximize pt's functional independence and safety w/ mobility  Pt continues to be functioning below baseline level  PT will continue to see pt during current hospitalization in order to address the deficits listed above and provide interventions consistent w/ POC in effort to achieve STGs  Barriers to Discharge Decreased caregiver support; Inaccessible home environment   Plan   Treatment/Interventions Functional transfer training;LE strengthening/ROM; Elevations; Therapeutic exercise; Endurance training;Gait training;Patient/family training   Progress Progressing toward goals   PT Frequency 3-5x/wk   Recommendation   PT Discharge Recommendation Home with outpatient rehabilitation  (HHPT or OPPT for pulmonary rehab)   AM-PAC Basic Mobility Inpatient   Turning in Bed Without Bedrails 3   Lying on Back to Sitting on Edge of Flat Bed 3   Moving Bed to Chair 3   Standing Up From Chair 3   Walk in Room 3   Climb 3-5 Stairs 3   Basic Mobility Inpatient Raw Score 18   Basic Mobility Standardized Score 41 05   Highest Level Of Mobility   JH-HLM Goal 6: Walk 10 steps or more   JH-HLM Achieved 7: Walk 25 feet or more   Education   Education Provided Mobility training   Patient Demonstrates acceptance/verbal understanding   End of Consult   Patient Position at End of Consult Bedside chair; All needs within reach       The patient's AM-PAC Basic Mobility Inpatient Short Form Raw Score is 18  A Raw score of greater than 16 suggests the patient may benefit from discharge to home  Please also refer to the recommendation of the Physical Therapist for safe discharge planning        Lizette Sanchez PTA,PTA

## 2022-06-20 NOTE — PLAN OF CARE
Problem: MOBILITY - ADULT  Goal: Maintain or return to baseline ADL function  Description: INTERVENTIONS:  -  Assess patient's ability to carry out ADLs; assess patient's baseline for ADL function and identify physical deficits which impact ability to perform ADLs (bathing, care of mouth/teeth, toileting, grooming, dressing, etc )  - Assess/evaluate cause of self-care deficits   - Assess range of motion  - Assess patient's mobility; develop plan if impaired  - Assess patient's need for assistive devices and provide as appropriate  - Encourage maximum independence but intervene and supervise when necessary  - Involve family in performance of ADLs  - Assess for home care needs following discharge   - Consider OT consult to assist with ADL evaluation and planning for discharge  - Provide patient education as appropriate  Outcome: Progressing  Goal: Maintains/Returns to pre admission functional level  Description: INTERVENTIONS:  - Perform BMAT or MOVE assessment daily    - Set and communicate daily mobility goal to care team and patient/family/caregiver  - Collaborate with rehabilitation services on mobility goals if consulted  - Perform Range of Motion 3 times a day  - Reposition patient every 2 hours    - Dangle patient 3 times a day  - Stand patient 3 times a day  - Ambulate patient 3 times a day  - Out of bed to chair 3 times a day   - Out of bed for meals 3 times a day  - Out of bed for toileting  - Record patient progress and toleration of activity level   Outcome: Progressing     Problem: Prexisting or High Potential for Compromised Skin Integrity  Goal: Skin integrity is maintained or improved  Description: INTERVENTIONS:  - Identify patients at risk for skin breakdown  - Assess and monitor skin integrity  - Assess and monitor nutrition and hydration status  - Monitor labs   - Assess for incontinence   - Turn and reposition patient  - Assist with mobility/ambulation  - Relieve pressure over bony prominences  - Avoid friction and shearing  - Provide appropriate hygiene as needed including keeping skin clean and dry  - Evaluate need for skin moisturizer/barrier cream  - Collaborate with interdisciplinary team   - Patient/family teaching  - Consider wound care consult   Outcome: Progressing     Problem: Potential for Falls  Goal: Patient will remain free of falls  Description: INTERVENTIONS:  - Educate patient/family on patient safety including physical limitations  - Instruct patient to call for assistance with activity   - Consult OT/PT to assist with strengthening/mobility   - Keep Call bell within reach  - Keep bed low and locked with side rails adjusted as appropriate  - Keep care items and personal belongings within reach  - Initiate and maintain comfort rounds  - Make Fall Risk Sign visible to staff  - Offer Toileting every 2 Hours, in advance of need  - Initiate/Maintain bed alarm  - Obtain necessary fall risk management equipment: Alarm  - Apply yellow socks and bracelet for high fall risk patients  - Consider moving patient to room near nurses station  Outcome: Progressing     Problem: Nutrition/Hydration-ADULT  Goal: Nutrient/Hydration intake appropriate for improving, restoring or maintaining nutritional needs  Description: Monitor and assess patient's nutrition/hydration status for malnutrition  Collaborate with interdisciplinary team and initiate plan and interventions as ordered  Monitor patient's weight and dietary intake as ordered or per policy  Utilize nutrition screening tool and intervene as necessary  Determine patient's food preferences and provide high-protein, high-caloric foods as appropriate       INTERVENTIONS:  - Monitor oral intake, urinary output, labs, and treatment plans  - Assess nutrition and hydration status and recommend course of action  - Evaluate amount of meals eaten  - Assist patient with eating if necessary   - Allow adequate time for meals  - Recommend/ encourage appropriate diets, oral nutritional supplements, and vitamin/mineral supplements  - Order, calculate, and assess calorie counts as needed  - Recommend, monitor, and adjust tube feedings and TPN/PPN based on assessed needs  - Assess need for intravenous fluids  - Provide specific nutrition/hydration education as appropriate  - Include patient/family/caregiver in decisions related to nutrition  Outcome: Progressing     Problem: RESPIRATORY - ADULT  Goal: Achieves optimal ventilation and oxygenation  Description: INTERVENTIONS:  - Assess for changes in respiratory status  - Assess for changes in mentation and behavior  - Position to facilitate oxygenation and minimize respiratory effort  - Oxygen administered by appropriate delivery if ordered  - Initiate smoking cessation education as indicated  - Encourage broncho-pulmonary hygiene including cough, deep breathe, Incentive Spirometry  - Assess the need for suctioning and aspirate as needed  - Assess and instruct to report SOB or any respiratory difficulty  - Respiratory Therapy support as indicated  Outcome: Progressing

## 2022-06-20 NOTE — ASSESSMENT & PLAN NOTE
· Likely multifactorial in setting of acute combined systolic/diastolic heart failure, pneumonia, COPD exacerbation  · Appreciate cardiology/pulmonology recommendations  · Continue steroid taper, nebs  · Titrate oxygen to maintain saturations greater than 88%

## 2022-06-20 NOTE — ASSESSMENT & PLAN NOTE
· Baseline creatinine 0 9-1 2  · Likely secondary to sepsis, CHF exacerbation, diuretics  · Uptrending over the last 72 hours  · Follow-up closely with diuretics

## 2022-06-20 NOTE — ASSESSMENT & PLAN NOTE
· Cough, fever and abdominal bloating since Monday,Temperature 101 7°, heart rate 133, WBC 15 K  · Chest x-ray left lower lobe pneumonia  · Chest x-ray 6/18 with interval worsening, likely heart failure contributing to imaging findings  · COVID, RSV and influenza negative  · Repeat chest x-ray and procalcitonin  · Continue antibiotics to complete 7 day course

## 2022-06-21 ENCOUNTER — APPOINTMENT (INPATIENT)
Dept: RADIOLOGY | Facility: HOSPITAL | Age: 62
DRG: 871 | End: 2022-06-21
Payer: MEDICARE

## 2022-06-21 LAB
ANION GAP SERPL CALCULATED.3IONS-SCNC: 8 MMOL/L (ref 4–13)
ARTERIAL PATENCY WRIST A: YES
BACTERIA UR QL AUTO: ABNORMAL /HPF
BASE EXCESS BLDA CALC-SCNC: 0.3 MMOL/L
BILIRUB UR QL STRIP: NEGATIVE
BUN SERPL-MCNC: 61 MG/DL (ref 5–25)
CALCIUM SERPL-MCNC: 9 MG/DL (ref 8.3–10.1)
CHLORIDE SERPL-SCNC: 97 MMOL/L (ref 100–108)
CLARITY UR: CLEAR
CO2 SERPL-SCNC: 28 MMOL/L (ref 21–32)
COLOR UR: YELLOW
CREAT SERPL-MCNC: 1.85 MG/DL (ref 0.6–1.3)
ERYTHROCYTE [DISTWIDTH] IN BLOOD BY AUTOMATED COUNT: 13.1 % (ref 11.6–15.1)
GFR SERPL CREATININE-BSD FRML MDRD: 28 ML/MIN/1.73SQ M
GLUCOSE SERPL-MCNC: 96 MG/DL (ref 65–140)
GLUCOSE UR STRIP-MCNC: ABNORMAL MG/DL
HCO3 BLDA-SCNC: 24.8 MMOL/L (ref 22–28)
HCT VFR BLD AUTO: 36.2 % (ref 34.8–46.1)
HGB BLD-MCNC: 11.8 G/DL (ref 11.5–15.4)
HGB UR QL STRIP.AUTO: NEGATIVE
KETONES UR STRIP-MCNC: NEGATIVE MG/DL
LEUKOCYTE ESTERASE UR QL STRIP: NEGATIVE
MCH RBC QN AUTO: 29.2 PG (ref 26.8–34.3)
MCHC RBC AUTO-ENTMCNC: 32.6 G/DL (ref 31.4–37.4)
MCV RBC AUTO: 90 FL (ref 82–98)
NITRITE UR QL STRIP: NEGATIVE
NON-SQ EPI CELLS URNS QL MICRO: ABNORMAL /HPF
O2 CT BLDA-SCNC: 15.9 ML/DL (ref 16–23)
OXYHGB MFR BLDA: 92.2 % (ref 94–97)
PCO2 BLDA: 39.4 MM HG (ref 36–44)
PH BLDA: 7.42 [PH] (ref 7.35–7.45)
PH UR STRIP.AUTO: 6 [PH]
PLATELET # BLD AUTO: 406 THOUSANDS/UL (ref 149–390)
PMV BLD AUTO: 10.4 FL (ref 8.9–12.7)
PO2 BLDA: 69.2 MM HG (ref 75–129)
POTASSIUM SERPL-SCNC: 4 MMOL/L (ref 3.5–5.3)
PROT UR STRIP-MCNC: ABNORMAL MG/DL
RBC # BLD AUTO: 4.04 MILLION/UL (ref 3.81–5.12)
RBC #/AREA URNS AUTO: ABNORMAL /HPF
SODIUM SERPL-SCNC: 133 MMOL/L (ref 136–145)
SP GR UR STRIP.AUTO: 1.01 (ref 1–1.03)
SPECIMEN SOURCE: ABNORMAL
UROBILINOGEN UR QL STRIP.AUTO: 0.2 E.U./DL
WBC # BLD AUTO: 12.55 THOUSAND/UL (ref 4.31–10.16)
WBC #/AREA URNS AUTO: ABNORMAL /HPF

## 2022-06-21 PROCEDURE — 81001 URINALYSIS AUTO W/SCOPE: CPT | Performed by: INTERNAL MEDICINE

## 2022-06-21 PROCEDURE — 99232 SBSQ HOSP IP/OBS MODERATE 35: CPT

## 2022-06-21 PROCEDURE — 94761 N-INVAS EAR/PLS OXIMETRY MLT: CPT

## 2022-06-21 PROCEDURE — 71045 X-RAY EXAM CHEST 1 VIEW: CPT

## 2022-06-21 PROCEDURE — 99232 SBSQ HOSP IP/OBS MODERATE 35: CPT | Performed by: INTERNAL MEDICINE

## 2022-06-21 PROCEDURE — 94762 N-INVAS EAR/PLS OXIMTRY CONT: CPT

## 2022-06-21 PROCEDURE — 80048 BASIC METABOLIC PNL TOTAL CA: CPT | Performed by: INTERNAL MEDICINE

## 2022-06-21 PROCEDURE — 85027 COMPLETE CBC AUTOMATED: CPT | Performed by: INTERNAL MEDICINE

## 2022-06-21 PROCEDURE — 82805 BLOOD GASES W/O2 SATURATION: CPT | Performed by: NURSE PRACTITIONER

## 2022-06-21 PROCEDURE — 36600 WITHDRAWAL OF ARTERIAL BLOOD: CPT

## 2022-06-21 RX ORDER — PREDNISONE 20 MG/1
20 TABLET ORAL DAILY
Status: DISCONTINUED | OUTPATIENT
Start: 2022-06-25 | End: 2022-06-22 | Stop reason: HOSPADM

## 2022-06-21 RX ORDER — ALBUTEROL SULFATE 2.5 MG/3ML
2.5 SOLUTION RESPIRATORY (INHALATION) EVERY 6 HOURS PRN
Qty: 180 ML | Refills: 3 | Status: SHIPPED | OUTPATIENT
Start: 2022-06-21 | End: 2022-08-03

## 2022-06-21 RX ORDER — UMECLIDINIUM BROMIDE AND VILANTEROL TRIFENATATE 62.5; 25 UG/1; UG/1
1 POWDER RESPIRATORY (INHALATION) DAILY
Qty: 60 BLISTER | Refills: 3 | Status: SHIPPED | OUTPATIENT
Start: 2022-06-21 | End: 2022-07-21

## 2022-06-21 RX ORDER — IPRATROPIUM BROMIDE AND ALBUTEROL SULFATE 2.5; .5 MG/3ML; MG/3ML
3 SOLUTION RESPIRATORY (INHALATION) EVERY 4 HOURS PRN
Status: DISCONTINUED | OUTPATIENT
Start: 2022-06-21 | End: 2022-06-22 | Stop reason: HOSPADM

## 2022-06-21 RX ORDER — PREDNISONE 10 MG/1
10 TABLET ORAL DAILY
Status: DISCONTINUED | OUTPATIENT
Start: 2022-06-28 | End: 2022-06-22 | Stop reason: HOSPADM

## 2022-06-21 RX ORDER — ALBUTEROL SULFATE 90 UG/1
2 AEROSOL, METERED RESPIRATORY (INHALATION) EVERY 6 HOURS PRN
Qty: 25.5 G | Refills: 3 | Status: SHIPPED | OUTPATIENT
Start: 2022-06-21

## 2022-06-21 RX ADMIN — HEPARIN SODIUM 5000 UNITS: 5000 INJECTION INTRAVENOUS; SUBCUTANEOUS at 05:02

## 2022-06-21 RX ADMIN — MORPHINE SULFATE 30 MG: 30 TABLET, EXTENDED RELEASE ORAL at 17:07

## 2022-06-21 RX ADMIN — LIDOCAINE 1 PATCH: 50 PATCH CUTANEOUS at 21:23

## 2022-06-21 RX ADMIN — CARVEDILOL 3.12 MG: 3.12 TABLET, FILM COATED ORAL at 17:07

## 2022-06-21 RX ADMIN — GUAIFENESIN 600 MG: 600 TABLET, EXTENDED RELEASE ORAL at 21:22

## 2022-06-21 RX ADMIN — MORPHINE SULFATE 30 MG: 30 TABLET, EXTENDED RELEASE ORAL at 09:52

## 2022-06-21 RX ADMIN — HEPARIN SODIUM 5000 UNITS: 5000 INJECTION INTRAVENOUS; SUBCUTANEOUS at 14:27

## 2022-06-21 RX ADMIN — CEFTRIAXONE SODIUM 1000 MG: 10 INJECTION, POWDER, FOR SOLUTION INTRAVENOUS at 14:28

## 2022-06-21 RX ADMIN — GUAIFENESIN 600 MG: 600 TABLET, EXTENDED RELEASE ORAL at 09:54

## 2022-06-21 RX ADMIN — CLONAZEPAM 0.5 MG: 0.5 TABLET ORAL at 22:39

## 2022-06-21 RX ADMIN — HEPARIN SODIUM 5000 UNITS: 5000 INJECTION INTRAVENOUS; SUBCUTANEOUS at 21:22

## 2022-06-21 RX ADMIN — CARVEDILOL 3.12 MG: 3.12 TABLET, FILM COATED ORAL at 09:52

## 2022-06-21 RX ADMIN — PREDNISONE 40 MG: 20 TABLET ORAL at 09:53

## 2022-06-21 RX ADMIN — IPRATROPIUM BROMIDE AND ALBUTEROL SULFATE 3 ML: 2.5; .5 SOLUTION RESPIRATORY (INHALATION) at 22:30

## 2022-06-21 NOTE — ASSESSMENT & PLAN NOTE
· Cough, fever and abdominal bloating since Monday,Temperature 101 7°, heart rate 133, WBC 15 K  · Chest x-ray left lower lobe pneumonia  · Chest x-ray 6/18 with interval worsening, likely heart failure contributing to imaging findings  · Chest x-ray 6/21 with improvement  · COVID, RSV and influenza negative  · Completed 7 day course of antibiotics

## 2022-06-21 NOTE — ASSESSMENT & PLAN NOTE
· Likely multifactorial in setting of acute combined systolic/diastolic heart failure, pneumonia, COPD exacerbation  · Appreciate cardiology/pulmonology recommendations  · Continue steroid taper, home nebs  · Home oxygen O2 evaluation patient requires 2 L at rest 4 L with exertion  · Titrate oxygen to maintain saturations greater than 88%

## 2022-06-21 NOTE — ASSESSMENT & PLAN NOTE
· History of COPD and current tobacco use  · Pulmonary follow-up appreciated  · Continue prednisone taper with reduction by 10 mg every 3 days

## 2022-06-21 NOTE — PROGRESS NOTES
Progress Note - Pulmonary   Cynda Grand 64 y o  female MRN: [id number]  Unit/Bed#: E5 -01 Encounter: [id number]    Assessment/Plan:    1  Acute hypoxic and hypercapnic respiratory failure likely multifaceted as listed below       -  patient currently on room air-93%, patient does not wear home O2       -  continue saturations greater than 88%       -  pulmonary toileting:  Deep breathing cough, OOB as tolerated, IS Q 1 hr       -  home O2 evaluation placed    Pending home O2 evaluation-  overnight pulse ox showed patient will require 2 L q h s , she does not qualify for BiPAP    2  Acute on chronic grade 1 systolic/diastolic CHF w/ moderate PHTN likely WHO group II & III       -  6/15/2022- EF 41%    proBNP-- 70779-- 39770   PA pressure 46 mmHg       - significantly improved rales after repeat diuresing       -  cardiology following    3  LLL PNA      -  6/21/2022- completed 7 days ceftriaxone      -  procalcitonin- 17 80-- 7 62      -  will need repeat imaging 4-6 weeks    4  COPD of unknown severity with resolving exacerbation       -  Inpatient: Day # 2/3-prednisone 40 mg, decreased by 10 mg q 3 days, DuoNeb q 6       -  will start home regimen:  Anoro 62 5/25 mcg 1 puff daily, ProAir 2 puffs q 6h p r n , albuterol nebulizer q 6h p r n        -  order has been placed for nebulizer-please make case management aware    5  Tobacco abuse       -  patient reports 2PPD       -  encourage in educated on tobacco cessation       -  patient appears to be in pre contemplation stage    6  Nocturnal hypoxia       -  overnight pulse ox: 6 min 4 sec < 89%       -  patient will require 2 L q h s        -  will repeat overnight pulse ox outpatient in 1 month        - outpatient follow-up per discharge instructions  - pulmonary will sign off      Chief Complaint:    "I feel a little tight today"    Subjective:    Alfred Araiza was comfortably sitting in her bed  She reports she is having little chest tightness this morning    No significant overnight events reported  Patient currently denies any fever, chills hemoptysis, headaches, night sweats, pleuritic chest pain, or palpitations  Objective:    Vitals: Blood pressure 139/80, pulse 66, temperature 97 5 °F (36 4 °C), resp  rate 16, height 5' 7" (1 702 m), weight 76 5 kg (168 lb 10 4 oz), SpO2 93 %  RA,Body mass index is 26 41 kg/m²  Intake/Output Summary (Last 24 hours) at 6/21/2022 1009  Last data filed at 6/20/2022 1916  Gross per 24 hour   Intake --   Output 3200 ml   Net -3200 ml       Invasive Devices  Report    Peripheral Intravenous Line  Duration           Peripheral IV 06/17/22 Left Antecubital 4 days                Physical Exam:   Physical Exam  Constitutional:       General: She is not in acute distress  Appearance: Normal appearance  She is normal weight  She is not ill-appearing  HENT:      Head: Normocephalic and atraumatic  Nose: Nose normal  No congestion or rhinorrhea  Mouth/Throat:      Mouth: Mucous membranes are dry  Pharynx: No oropharyngeal exudate or posterior oropharyngeal erythema  Cardiovascular:      Rate and Rhythm: Normal rate and regular rhythm  Pulses: Normal pulses  Heart sounds: Normal heart sounds  Pulmonary:      Effort: Pulmonary effort is normal  No respiratory distress  Breath sounds: No stridor  No wheezing, rhonchi or rales  Comments: Diminished breath sounds at bases  Chest:      Chest wall: No tenderness  Abdominal:      General: Abdomen is flat  Bowel sounds are normal  There is no distension  Palpations: Abdomen is soft  There is no mass  Musculoskeletal:         General: No swelling or tenderness  Normal range of motion  Cervical back: Normal range of motion  No rigidity or tenderness  Skin:     General: Skin is warm and dry  Coloration: Skin is not jaundiced or pale  Neurological:      General: No focal deficit present        Mental Status: She is alert and oriented to person, place, and time  Mental status is at baseline  Psychiatric:         Mood and Affect: Mood normal          Behavior: Behavior normal          Labs: I have personally reviewed pertinent lab results  , ABG:   Lab Results   Component Value Date    PHART 7 416 06/21/2022    UBP1GTW 39 4 06/21/2022    PO2ART 69 2 (L) 06/21/2022    MOO1BVF 24 8 06/21/2022    BEART 0 3 06/21/2022    SOURCE Brachial, Right 06/21/2022   , BNP: No results found for: BNP, CBC:   Lab Results   Component Value Date    WBC 12 55 (H) 06/21/2022    HGB 11 8 06/21/2022    HCT 36 2 06/21/2022    MCV 90 06/21/2022     (H) 06/21/2022    MCH 29 2 06/21/2022    MCHC 32 6 06/21/2022    RDW 13 1 06/21/2022    MPV 10 4 06/21/2022   , CMP:   Lab Results   Component Value Date    SODIUM 133 (L) 06/21/2022    K 4 0 06/21/2022    CL 97 (L) 06/21/2022    CO2 28 06/21/2022    BUN 61 (H) 06/21/2022    CREATININE 1 85 (H) 06/21/2022    CALCIUM 9 0 06/21/2022    EGFR 28 06/21/2022       Imaging and other studies: I have personally reviewed pertinent films in PACS     CXR 6/18/2022- worsening left lower lung effusion/opacity

## 2022-06-21 NOTE — RESPIRATORY THERAPY NOTE
Home Oxygen Qualifying Test     Patient name: Yesenia Hays        : 1960   Date of Test:  2022  Diagnosis:    Home Oxygen Test:    **Medicare Guidelines require item(s) 1-5 on all ambulatory patients or 1 and 2 on non-ambulatory patients  1  Baseline SPO2 on Room Air at rest 87%   a  If <= 88% on Room Air add O2 via NC to obtain SpO2 >=88%  If LPM needed, document LPM  needed to reach =>88%      2  SPO2 on Oxygen at Rest 91% at 2 LPM    3  SPO2 during exertion on Oxygen 91% at 4 LPM    4  Test performed during exertion activity  [x]  Supplemental Home Oxygen is indicated  []  Client does not qualify for home oxygen  Respiratory Additional Notes- pt was walked in hallway  Pt qualifies for home oxygen      Alonso Mendoza, RT

## 2022-06-21 NOTE — ASSESSMENT & PLAN NOTE
Wt Readings from Last 3 Encounters:   06/21/22 76 5 kg (168 lb 10 4 oz)   07/13/21 71 7 kg (158 lb)   06/15/21 68 kg (149 lb 14 6 oz)     · Echo EF 33%, systolic function moderately reduced, grade 1 diastolic dysfunction  · Patient appears quite fluid sensitive    Increased creatinine following diuretics  · Patient clinically improving with decreased oxygen requirement  · Hold further diuretics today  · Will discuss with Cardiology recommendation on diuretics moving forward

## 2022-06-21 NOTE — PROGRESS NOTES
[facility] [place]  Progress Note - Jocelyn Quijano 1960, 64 y o  female MRN: [id number]  Unit/Bed#: E5 -01 Encounter: [id number]  Primary Care Provider: Neha Maxwell DO   Date and time admitted to hospital: 6/15/2022  2:20 AM    * Acute respiratory failure with hypoxia ([facility] [address] )  Assessment & Plan  · Likely multifactorial in setting of acute combined systolic/diastolic heart failure, pneumonia, COPD exacerbation  · Appreciate cardiology/pulmonology recommendations  · Continue steroid taper, home nebs  · Home oxygen O2 evaluation patient requires 2 L at rest 4 L with exertion  · Titrate oxygen to maintain saturations greater than 88%    SHANTA (acute kidney injury) ([facility] [address] )  Assessment & Plan  · Baseline creatinine 0 9-1 2  · Likely secondary to sepsis, CHF exacerbation, diuretics-patient is quite sensitive to fluid shifts  · Uptrending over the last 72 hours  · Monitor off diuretics  · If no improvement will consult Nephrology    Chronic obstructive pulmonary disease with acute exacerbation (HCC)  Assessment & Plan  · History of COPD and current tobacco use  · Pulmonary follow-up appreciated  · Continue prednisone taper with reduction by 10 mg every 3 days      Acute on chronic combined systolic and diastolic congestive heart failure (HCC)  Assessment & Plan  Wt Readings from Last 3 Encounters:   06/21/22 76 5 kg (168 lb 10 4 oz)   07/13/21 71 7 kg (158 lb)   06/15/21 68 kg (149 lb 14 6 oz)     · Echo EF 94%, systolic function moderately reduced, grade 1 diastolic dysfunction  · Patient appears quite fluid sensitive  Increased creatinine following diuretics  · Patient clinically improving with decreased oxygen requirement  · Hold further diuretics today  · Will discuss with Cardiology recommendation on diuretics moving forward    Chronic pain  Assessment & Plan  · Patient maintained on MS Contin 30 mg b i d   · Oxycodone 2 5 mg q 6 hours p r n  For moderate pain, oxycodone 5 mg p o   Q 6 hours p r n  For severe pain    Sepsis ([facility] [address] )  Assessment & Plan  · Cough, fever and abdominal bloating since Monday,Temperature 101 7°, heart rate 133, WBC 15 K  · Chest x-ray left lower lobe pneumonia  · Chest x-ray  with interval worsening, likely heart failure contributing to imaging findings  · Chest x-ray  with improvement  · COVID, RSV and influenza negative  · Completed 7 day course of antibiotics      VTE Pharmacologic Prophylaxis:  Heparin    Patient Centered Rounds:  Patient care rounds were performed with nursing    Discussions with Specialists or Other Care Team Provider:  Cardiology    Time Spent for Care: 30  More than 50% of total time spent on counseling and coordination of care as described above  Current Length of Stay: 6 day(s)    Current Patient Status: Inpatient   Certification Statement: The patient will continue to require additional inpatient hospital stay due to ongoing management of SHANTA, heart failure    Discharge Plan:  Discharge when renal function stable    Code Status: Level 1 - Full Code      Subjective:   Patient seen and evaluated at bedside  She does report feeling improved today  Objective:     Vitals:   Temp (24hrs), Av 3 °F (36 3 °C), Min:96 8 °F (36 °C), Max:97 5 °F (36 4 °C)    Temp:  [96 8 °F (36 °C)-97 5 °F (36 4 °C)] 97 2 °F (36 2 °C)  HR:  [66-84] 74  Resp:  [16] 16  BP: (114-139)/(74-84) 130/81  SpO2:  [87 %-94 %] 91 %  Body mass index is 26 41 kg/m²  Input and Output Summary (last 24 hours): Intake/Output Summary (Last 24 hours) at 2022 1434  Last data filed at 2022 1300  Gross per 24 hour   Intake 180 ml   Output 3200 ml   Net -3020 ml       Physical Exam:     Physical Exam  Vitals reviewed  Constitutional:       General: She is not in acute distress  Appearance: She is well-developed  She is not ill-appearing, toxic-appearing or diaphoretic  HENT:      Head: Normocephalic and atraumatic        Mouth/Throat:      Mouth: Mucous membranes are moist       Pharynx: No oropharyngeal exudate  Eyes:      General: No scleral icterus  Extraocular Movements: Extraocular movements intact  Conjunctiva/sclera: Conjunctivae normal    Cardiovascular:      Rate and Rhythm: Normal rate and regular rhythm  Heart sounds: Normal heart sounds  Pulmonary:      Effort: Pulmonary effort is normal  No respiratory distress  Breath sounds: Rales present  No wheezing  Abdominal:      General: There is no distension  Palpations: Abdomen is soft  Tenderness: There is no abdominal tenderness  There is no guarding or rebound  Musculoskeletal:         General: No swelling, tenderness or deformity  Skin:     General: Skin is warm and dry  Neurological:      General: No focal deficit present  Mental Status: She is alert  Mental status is at baseline  Psychiatric:         Mood and Affect: Mood normal          Behavior: Behavior normal          Thought Content: Thought content normal          Judgment: Judgment normal          Additional Data:     Labs: I have reviewed pertinent results     Results from last 7 days   Lab Units 06/21/22  0454 06/20/22  0555   WBC Thousand/uL 12 55* 10 44*   HEMOGLOBIN g/dL 11 8 12 1   HEMATOCRIT % 36 2 35 8   PLATELETS Thousands/uL 406* 319   NEUTROS PCT %  --  62   LYMPHS PCT %  --  27   MONOS PCT %  --  8   EOS PCT %  --  2     Results from last 7 days   Lab Units 06/21/22  0454 06/17/22  0555 06/16/22  0447   SODIUM mmol/L 133*   < > 131*   POTASSIUM mmol/L 4 0   < > 3 6   CHLORIDE mmol/L 97*   < > 99*   CO2 mmol/L 28   < > 21   BUN mg/dL 61*   < > 31*   CREATININE mg/dL 1 85*   < > 1 61*   ANION GAP mmol/L 8   < > 11   CALCIUM mg/dL 9 0   < > 8 7   ALBUMIN g/dL  --   --  3 2*   TOTAL BILIRUBIN mg/dL  --   --  0 31   ALK PHOS U/L  --   --  84   ALT U/L  --   --  22   AST U/L  --   --  26   GLUCOSE RANDOM mg/dL 96   < > 150*    < > = values in this interval not displayed       Results from last 7 days   Lab Units 06/14/22  2204   INR  1 22*     Results from last 7 days   Lab Units 06/15/22  0417   POC GLUCOSE mg/dl 183*         Results from last 7 days   Lab Units 06/17/22  0555 06/16/22  0447 06/15/22  0503 06/14/22  2204   LACTIC ACID mmol/L  --   --  1 9 1 0   PROCALCITONIN ng/ml 7 62* 17 80* 0 21 0 06         Imaging: I have reviewed pertinent imaging       Recent Cultures (last 7 days):     Results from last 7 days   Lab Units 06/15/22  0503 06/14/22  2206 06/14/22 2204   BLOOD CULTURE   --  No Growth After 5 Days  No Growth After 5 Days     LEGIONELLA URINARY ANTIGEN  Negative  --   --        Last 24 Hours Medication List:   Current Facility-Administered Medications   Medication Dose Route Frequency Provider Last Rate    acetaminophen  650 mg Oral Q6H PRN Teresa Pierce MD      ALPRAZolam  0 5 mg Oral Once [facility], CRNP      carvedilol  3 125 mg Oral BID With Meals [employer], CRNP      clonazePAM  0 5 mg Oral HS PRN [facility], CRNP      cyclobenzaprine  10 mg Oral Once [facility], CRNP      guaiFENesin  600 mg Oral Q12H [facility] Teresa Pierce MD      heparin (porcine)  5,000 Units Subcutaneous [facility] Teresa Pierce MD      ipratropium-albuterol  3 mL Nebulization Q4H PRN Teresa Cabrera DO      lidocaine  1 patch Topical Daily Teresa Pierce MD      morphine  30 mg Oral BID Raven Moses MD      ondansetron  4 mg Intravenous Q6H PRN Teresa Pierce MD      oxyCODONE  2 5 mg Oral Q6H PRN MD Jamie Solares oxyCODONE  5 mg Oral Q6H PRN Tereas Pierce MD      [START ON 6/22/2022] predniSONE  30 mg Oral Daily Teresa Cabrera DO      Followed by   Marie Chairez ON 6/25/2022] predniSONE  20 mg Oral Daily Teresa Cabrera DO      Followed by   Marie Chairez ON 6/28/2022] predniSONE  10 mg Oral Daily Teresa Cabrera DO          Today, Patient Was Seen By: Teresa Cabrera DO    ** Please Note: Dictation voice to text software may have been used in the creation of this document   **

## 2022-06-21 NOTE — ASSESSMENT & PLAN NOTE
· Baseline creatinine 0 9-1 2  · Likely secondary to sepsis, CHF exacerbation, diuretics-patient is quite sensitive to fluid shifts  · Uptrending over the last 72 hours  · Monitor off diuretics  · If no improvement will consult Nephrology

## 2022-06-21 NOTE — PROGRESS NOTES
Progress Note - Cardiology   Hyacinth Hendricks 64 y o  female MRN: [id number]  Unit/Bed#: E5 -01 Encounter: [id number]    Asessment:  Acute hypoxic and hypercapnic respiratory failure  Acute on chronic systolic and diastolic congestive heart failure  Dilated cardiomyopathy              - LVEF 41%, basilar septum and basilar inferior wall are hypokinetic, grade 1 diastolic dysfunction, normal TAPSE at > 1 7 cm, left atrium severely dilated, mild AR, mild TR, ascending aorta mildly dilated, PASP 46 mmHg, 6/15/22               - LVEF 50%, mild MR, mild dilatation of the ascending aorta to 3 7 cm, 7/9/21    - [facility] 3/30/21: moderate nonobstructive atherosclerosis  COPD with acute exacerbation  Acute bacterial pneumonia  Sepsis   Acute kidney injury  Current tobacco use  Chronic pain    Plan/ Discussion:  · Patient is receiving p r n  IV diuretics due to shortness of breath  Patient did receive furosemide 40 mg IV x1 yesterday  · Continue carvedilol 3 125 mg twice daily  · Monitor volume status closely with strict intake/output, daily weight  · Monitor renal function and electrolytes closely; recommend to maintain potassium > 4, magnesium > 2  Renal function continues to worsen  · Consideration of ACE/ARB upon improvement of renal function, however patient is reluctant to take more medications  Subjective:  Patient resting in bed comfortably  Her breathing has much improved  She denies chest pain       Vitals:  Vitals:    06/20/22 0600 06/21/22 0600   Weight: 76 8 kg (169 lb 5 oz) 76 5 kg (168 lb 10 4 oz)   ,  Vitals:    06/21/22 1106 06/21/22 1107 06/21/22 1108 06/21/22 1113   BP:       Pulse:       Resp:       Temp:       TempSrc:       SpO2: 91% (!) 88% (!) 87% 91%   Weight:       Height:           Exam:  General: alert awake and oriented, no acute distress  Heart: regular rate and rhythm, S1, S2   Respiratory effort/ Lungs: fine rales noted bilateral bases, on supplemental oxygen   Abdominal: non-tender to palpation, + bowel sounds, soft, no masses or distension  Lower Limbs: no overt bilateral lower extremity edema            Medications:    Current Facility-Administered Medications:     acetaminophen (TYLENOL) tablet 650 mg, 650 mg, Oral, Q6H PRN, Todd Avila MD, 650 mg at 06/18/22 0306    ALPRAZolam (XANAX) tablet 0 5 mg, 0 5 mg, Oral, Once, Leeta Odor, CRNP    carvedilol (COREG) tablet 3 125 mg, 3 125 mg, Oral, BID With Meals, ESTHER Figueroa, 3 125 mg at 06/21/22 0952    cefTRIAXone (ROCEPHIN) 1,000 mg in dextrose 5 % 50 mL IVPB, 1,000 mg, Intravenous, Q24H, Todd Avila MD, Last Rate: 100 mL/hr at 06/20/22 1305, 1,000 mg at 06/20/22 1305    clonazePAM (KlonoPIN) tablet 0 5 mg, 0 5 mg, Oral, HS PRN, Leeta Odor, CRNP, 0 5 mg at 06/19/22 2332    cyclobenzaprine (FLEXERIL) tablet 10 mg, 10 mg, Oral, Once, Leeta Odor, CRNP    guaiFENesin (MUCINEX) 12 hr tablet 600 mg, 600 mg, Oral, Q12H [address], Todd Avila MD, 600 mg at 06/21/22 0954    heparin (porcine) subcutaneous injection 5,000 Units, 5,000 Units, Subcutaneous, Q8H [address], 5,000 Units at 06/21/22 0502 **AND** [CANCELED] Platelet count, , , Once, ESTHER Kendall    ipratropium-albuterol (DUO-NEB) 0 5-2 5 mg/3 mL inhalation solution 3 mL, 3 mL, Nebulization, Q4H PRN, Chasity Blas DO    lidocaine (LIDODERM) 5 % patch 1 patch, 1 patch, Topical, Daily, Todd Avila MD, 1 patch at 06/20/22 2125    morphine (MS CONTIN) ER tablet 30 mg, 30 mg, Oral, BID, Yan Lora MD, 30 mg at 06/21/22 0952    ondansetron (ZOFRAN) injection 4 mg, 4 mg, Intravenous, Q6H PRN, Todd Avila MD    oxyCODONE (ROXICODONE) IR tablet 2 5 mg, 2 5 mg, Oral, Q6H PRN **OR** oxyCODONE (ROXICODONE) IR tablet 5 mg, 5 mg, Oral, Q6H PRN, Todd Avila MD, 5 mg at 06/19/22 1304    predniSONE tablet 40 mg, 40 mg, Oral, Daily, Yan Lora MD, 40 mg at 06/21/22 4708      Labs/Data: Results from last 7 days   Lab Units 06/21/22  0454 06/20/22  0555 06/19/22  0458   WBC Thousand/uL 12 55* 10 44* 16 73*   HEMOGLOBIN g/dL 11 8 12 1 13 3   HEMATOCRIT % 36 2 35 8 39 5   PLATELETS Thousands/uL 406* 319 307     Results from last 7 days   Lab Units 06/21/22  0454 06/20/22  0555 06/19/22  0458   POTASSIUM mmol/L 4 0 3 7 4 1   CHLORIDE mmol/L 97* 96* 94*   CO2 mmol/L 28 27 28   BUN mg/dL 61* 44* 31*

## 2022-06-22 ENCOUNTER — HOME HEALTH ADMISSION (OUTPATIENT)
Dept: HOME HEALTH SERVICES | Facility: HOME HEALTHCARE | Age: 62
End: 2022-06-22

## 2022-06-22 VITALS
OXYGEN SATURATION: 88 % | TEMPERATURE: 97.9 F | DIASTOLIC BLOOD PRESSURE: 75 MMHG | HEIGHT: 67 IN | HEART RATE: 70 BPM | SYSTOLIC BLOOD PRESSURE: 122 MMHG | RESPIRATION RATE: 16 BRPM | BODY MASS INDEX: 26.51 KG/M2 | WEIGHT: 168.87 LBS

## 2022-06-22 LAB
ANION GAP SERPL CALCULATED.3IONS-SCNC: 9 MMOL/L (ref 4–13)
BUN SERPL-MCNC: 52 MG/DL (ref 5–25)
CALCIUM SERPL-MCNC: 8.6 MG/DL (ref 8.3–10.1)
CHLORIDE SERPL-SCNC: 101 MMOL/L (ref 100–108)
CO2 SERPL-SCNC: 26 MMOL/L (ref 21–32)
CREAT SERPL-MCNC: 1.45 MG/DL (ref 0.6–1.3)
DME PARACHUTE DELIVERY DATE ACTUAL: NORMAL
DME PARACHUTE DELIVERY DATE EXPECTED: NORMAL
DME PARACHUTE DELIVERY DATE REQUESTED: NORMAL
DME PARACHUTE ITEM DESCRIPTION: NORMAL
DME PARACHUTE ORDER STATUS: NORMAL
DME PARACHUTE SUPPLIER NAME: NORMAL
DME PARACHUTE SUPPLIER PHONE: NORMAL
GFR SERPL CREATININE-BSD FRML MDRD: 38 ML/MIN/1.73SQ M
GLUCOSE SERPL-MCNC: 90 MG/DL (ref 65–140)
POTASSIUM SERPL-SCNC: 4.2 MMOL/L (ref 3.5–5.3)
SODIUM SERPL-SCNC: 136 MMOL/L (ref 136–145)

## 2022-06-22 PROCEDURE — 99232 SBSQ HOSP IP/OBS MODERATE 35: CPT

## 2022-06-22 PROCEDURE — 99239 HOSP IP/OBS DSCHRG MGMT >30: CPT | Performed by: INTERNAL MEDICINE

## 2022-06-22 PROCEDURE — 80048 BASIC METABOLIC PNL TOTAL CA: CPT | Performed by: INTERNAL MEDICINE

## 2022-06-22 RX ORDER — ALBUTEROL SULFATE 90 UG/1
2 AEROSOL, METERED RESPIRATORY (INHALATION) EVERY 6 HOURS PRN
COMMUNITY
End: 2022-06-22

## 2022-06-22 RX ORDER — ALBUTEROL SULFATE 2.5 MG/3ML
2.5 SOLUTION RESPIRATORY (INHALATION) EVERY 6 HOURS PRN
COMMUNITY
End: 2022-06-22

## 2022-06-22 RX ORDER — FUROSEMIDE 20 MG/1
20 TABLET ORAL DAILY
Qty: 30 TABLET | Refills: 0 | Status: SHIPPED | OUTPATIENT
Start: 2022-06-22 | End: 2022-06-28

## 2022-06-22 RX ORDER — PREDNISONE 20 MG/1
TABLET ORAL
Qty: 11 TABLET | Refills: 0 | Status: SHIPPED | OUTPATIENT
Start: 2022-06-23 | End: 2022-07-04

## 2022-06-22 RX ORDER — CARVEDILOL 3.12 MG/1
3.12 TABLET ORAL 2 TIMES DAILY WITH MEALS
Qty: 60 TABLET | Refills: 0 | Status: SHIPPED | OUTPATIENT
Start: 2022-06-22 | End: 2022-08-03

## 2022-06-22 RX ORDER — UMECLIDINIUM BROMIDE AND VILANTEROL TRIFENATATE 62.5; 25 UG/1; UG/1
1 POWDER RESPIRATORY (INHALATION) DAILY
COMMUNITY
End: 2022-06-22

## 2022-06-22 RX ADMIN — HEPARIN SODIUM 5000 UNITS: 5000 INJECTION INTRAVENOUS; SUBCUTANEOUS at 14:24

## 2022-06-22 RX ADMIN — CARVEDILOL 3.12 MG: 3.12 TABLET, FILM COATED ORAL at 07:49

## 2022-06-22 RX ADMIN — PREDNISONE 30 MG: 20 TABLET ORAL at 07:49

## 2022-06-22 RX ADMIN — HEPARIN SODIUM 5000 UNITS: 5000 INJECTION INTRAVENOUS; SUBCUTANEOUS at 05:43

## 2022-06-22 RX ADMIN — GUAIFENESIN 600 MG: 600 TABLET, EXTENDED RELEASE ORAL at 07:49

## 2022-06-22 RX ADMIN — MORPHINE SULFATE 30 MG: 30 TABLET, EXTENDED RELEASE ORAL at 08:56

## 2022-06-22 RX ADMIN — CARVEDILOL 3.12 MG: 3.12 TABLET, FILM COATED ORAL at 15:23

## 2022-06-22 NOTE — DISCHARGE SUMMARY
[facility]  Discharge- Lux Johnson 1960, 64 y o  female MRN: [id number]  Unit/Bed#: E5 -01 Encounter: [id number]  Primary Care Provider: Med Goins DO   Date and time admitted to hospital: 6/15/2022  2:20 AM    * Acute respiratory failure with hypoxia ([facility] [address] )  Assessment & Plan  · Likely multifactorial in setting of acute combined systolic/diastolic heart failure, pneumonia, COPD exacerbation  · Patient significantly improved  · Appreciate cardiology/pulmonology recommendations  · Home oxygen O2 evaluation patient requires 2 L at rest 4 L with exertion  · Pulmonology prescribed home inhalers- Anoro Ellipta, albuterol inhaler, albuterol nebulizer  Complete prednisone taper  · Diuretics per Cardiology 20 mg of Lasix daily  · Titrate oxygen to maintain saturations greater than 88%    SHANTA (acute kidney injury) ([facility] )  Assessment & Plan  · Baseline creatinine 0 9-1 2  · Likely secondary to sepsis, CHF exacerbation, diuretics-patient is quite sensitive to fluid shifts  · Improved off of diuretics  · Start low-dose Lasix 20 mg daily  · Follow-up BMP in 1 week    Chronic obstructive pulmonary disease with acute exacerbation (HCC)  Assessment & Plan  · History of COPD and current tobacco use  · Pulmonary follow-up appreciated  · Continue prednisone taper with reduction by 10 mg every 3 days      Acute on chronic combined systolic and diastolic congestive heart failure (HCC)  Assessment & Plan  Wt Readings from Last 3 Encounters:   06/22/22 76 6 kg (168 lb 14 oz)   07/13/21 71 7 kg (158 lb)   06/15/21 68 kg (149 lb 14 6 oz)     · Echo EF 49%, systolic function moderately reduced, grade 1 diastolic dysfunction  · Patient appears quite fluid sensitive    Fluctuating creatinine with IV diuretics during hospital stay  · Cardiology recommends 20 mg Lasix daily with plan for extra dose if patient gains more than 3 lb in 1 day or 5 lb over any period of time  · Outpatient follow-up with Cardiology  · Outpatient BMP in 1 week    Chronic pain  Assessment & Plan  · Patient maintained on MS Contin 30 mg b i d   · Oxycodone 2 5 mg q 6 hours p r n  For moderate pain, oxycodone 5 mg p o  Q 6 hours p r n  For severe pain    Sepsis ([facility] [address] )  Assessment & Plan  · Cough, fever and abdominal bloating since Monday,Temperature 101 7°, heart rate 133, WBC 15 K  · Chest x-ray left lower lobe pneumonia  · Chest x-ray 6/18 with interval worsening, likely heart failure contributing to imaging findings  · Chest x-ray 6/21 with improvement  · COVID, RSV and influenza negative  · Completed 7 day course of antibiotics      Discharging Physician / Practitioner: Lyn Cruz DO  PCP: Susan Castellano DO  Admission Date:   Admission Orders (From admission, onward)     Ordered        06/15/22 0302  Inpatient Admission  Once                      Discharge Date: 06/22/22    Medical Problems             Resolved Problems  Date Reviewed: 6/19/2022   None                 Consultations During Hospital Stay:  Pulmonology, Cardiology    Procedures Performed: None    Significant Findings / Test Results:     XR chest portable    Result Date: 6/18/2022  Impression: Interval worsening of left lung pneumonia with small left effusion Workstation performed: [id number]     CTA chest pe study    Result Date: 6/15/2022  Impression: Some images are suboptimal secondary to respiratory motion which decreases sensitivity for evaluation of peripheral pulmonary emboli, subject to this, no pulmonary embolism is seen  Superimposed on moderate pulmonary emphysematous changes, a large ill-defined consolidation in the periphery of the left lower lobe, suspicious for infection in the appropriate clinical setting  Correlation with the patient's symptoms and laboratory values recommended  Small dependent left pleural effusion, possibly reactive    Cardiomegaly, coronary atherosclerosis, prominent mediastinal and hilar lymph nodes, right renal atrophy, and other findings as above  Workstation performed: [id number]       Incidental Findings: None    Test Results Pending at Discharge (will require follow up): None     Outpatient Tests Requested: None    Reason for Admission:  Cough, fever, shortness of breath    Hospital Course:     Kwan Tsang is a 64 y o  female With past medical history of COPD, chronic combined systolic and diastolic heart failure, dilated cardiomyopathy, chronic pain presents the hospital with cough, fever, shortness of breath progressing over 2 weeks  On admission patient met sepsis criteria related to left lower lobe pneumonia seen on CTA  Patient had acute hypoxic respiratory failure requiring intermittent BiPAP  Patient was monitored in ICU for 48 hours  Acute hypoxic respiratory failure was felt to be multifactorial in setting of pneumonia, COPD exacerbation, acute on chronic systolic/diastolic heart failure  Patient completed empiric course of antibiotics  Patient was very sensitive to fluid shifts developed SHANTA in setting of IV diuretics  Renal function returned to near baseline on day of discharge off of diuretics  Patient ambulatory pulse ox which determine her need for 2 L at rest and 4 L with ambulation  Oxygen therapy was established for home  She will complete oral steroid taper as an outpatient  Scripts for new medications provided on discharge  Please see above list of diagnoses and related plan for additional information  Condition at Discharge: stable     Discharge Day Visit / Exam:     Subjective:    Patient seen and evaluated at bedside  No overnight events  She feels much improved today  She feels near baseline  Exam:   Physical Exam  Vitals reviewed  Constitutional:       General: She is not in acute distress  Appearance: She is well-developed  She is not ill-appearing, toxic-appearing or diaphoretic  HENT:      Head: Normocephalic and atraumatic     Eyes:      General: No scleral icterus  Conjunctiva/sclera: Conjunctivae normal    Cardiovascular:      Rate and Rhythm: Normal rate and regular rhythm  Heart sounds: Normal heart sounds  Pulmonary:      Effort: Pulmonary effort is normal  No respiratory distress  Breath sounds: Normal breath sounds  No wheezing or rales  Comments: Faint crackles left base  Abdominal:      General: There is no distension  Palpations: Abdomen is soft  Tenderness: There is no abdominal tenderness  There is no guarding or rebound  Musculoskeletal:         General: No swelling, tenderness or deformity  Skin:     General: Skin is warm and dry  Neurological:      General: No focal deficit present  Mental Status: She is alert  Mental status is at baseline  Psychiatric:         Mood and Affect: Mood normal          Behavior: Behavior normal          Thought Content: Thought content normal          Judgment: Judgment normal            Discharge instructions/Information to patient and family:   See after visit summary for information provided to patient and family  Provisions for Follow-Up Care:  See after visit summary for information related to follow-up care and any pertinent home health orders  Disposition:     Home with home health      Discharge Statement:  I spent 60 minutes discharging the patient  This time was spent on the day of discharge  I had direct contact with the patient on the day of discharge  Greater than 50% of the total time was spent examining patient, answering all patient questions, arranging and discussing plan of care with patient as well as directly providing post-discharge instructions  Additional time then spent on discharge activities  Discharge Medications:  See after visit summary for reconciled discharge medications provided to patient and family        ** Please Note: This note has been constructed using a voice recognition system **

## 2022-06-22 NOTE — ASSESSMENT & PLAN NOTE
· Likely multifactorial in setting of acute combined systolic/diastolic heart failure, pneumonia, COPD exacerbation  · Patient significantly improved  · Appreciate cardiology/pulmonology recommendations  · Home oxygen O2 evaluation patient requires 2 L at rest 4 L with exertion  · Pulmonology prescribed home inhalers- Anoro Ellipta, albuterol inhaler, albuterol nebulizer    Complete prednisone taper  · Diuretics per Cardiology 20 mg of Lasix daily  · Titrate oxygen to maintain saturations greater than 88%

## 2022-06-22 NOTE — PLAN OF CARE
Problem: MOBILITY - ADULT  Goal: Maintain or return to baseline ADL function  Description: INTERVENTIONS:  -  Assess patient's ability to carry out ADLs; assess patient's baseline for ADL function and identify physical deficits which impact ability to perform ADLs (bathing, care of mouth/teeth, toileting, grooming, dressing, etc )  - Assess/evaluate cause of self-care deficits   - Assess range of motion  - Assess patient's mobility; develop plan if impaired  - Assess patient's need for assistive devices and provide as appropriate  - Encourage maximum independence but intervene and supervise when necessary  - Involve family in performance of ADLs  - Assess for home care needs following discharge   - Consider OT consult to assist with ADL evaluation and planning for discharge  - Provide patient education as appropriate  Outcome: Adequate for Discharge  Goal: Maintains/Returns to pre admission functional level  Description: INTERVENTIONS:  - Perform BMAT or MOVE assessment daily    - Set and communicate daily mobility goal to care team and patient/family/caregiver  - Collaborate with rehabilitation services on mobility goals if consulted  - Perform Range of Motion 3 times a day  - Reposition patient every 2 hours    - Dangle patient 3 times a day  - Stand patient 3 times a day  - Ambulate patient 3 times a day  - Out of bed to chair 3 times a day   - Out of bed for meals 3 times a day  - Out of bed for toileting  - Record patient progress and toleration of activity level   Outcome: Adequate for Discharge     Problem: Prexisting or High Potential for Compromised Skin Integrity  Goal: Skin integrity is maintained or improved  Description: INTERVENTIONS:  - Identify patients at risk for skin breakdown  - Assess and monitor skin integrity  - Assess and monitor nutrition and hydration status  - Monitor labs   - Assess for incontinence   - Turn and reposition patient  - Assist with mobility/ambulation  - Relieve pressure over bony prominences  - Avoid friction and shearing  - Provide appropriate hygiene as needed including keeping skin clean and dry  - Evaluate need for skin moisturizer/barrier cream  - Collaborate with interdisciplinary team   - Patient/family teaching  - Consider wound care consult   Outcome: Adequate for Discharge     Problem: Potential for Falls  Goal: Patient will remain free of falls  Description: INTERVENTIONS:  - Educate patient/family on patient safety including physical limitations  - Instruct patient to call for assistance with activity   - Consult OT/PT to assist with strengthening/mobility   - Keep Call bell within reach  - Keep bed low and locked with side rails adjusted as appropriate  - Keep care items and personal belongings within reach  - Initiate and maintain comfort rounds  - Make Fall Risk Sign visible to staff  - Offer Toileting every 2 Hours, in advance of need  - Initiate/Maintain bed alarm  - Obtain necessary fall risk management equipment: Alarm  - Apply yellow socks and bracelet for high fall risk patients  - Consider moving patient to room near nurses station  Outcome: Adequate for Discharge     Problem: Nutrition/Hydration-ADULT  Goal: Nutrient/Hydration intake appropriate for improving, restoring or maintaining nutritional needs  Description: Monitor and assess patient's nutrition/hydration status for malnutrition  Collaborate with interdisciplinary team and initiate plan and interventions as ordered  Monitor patient's weight and dietary intake as ordered or per policy  Utilize nutrition screening tool and intervene as necessary  Determine patient's food preferences and provide high-protein, high-caloric foods as appropriate       INTERVENTIONS:  - Monitor oral intake, urinary output, labs, and treatment plans  - Assess nutrition and hydration status and recommend course of action  - Evaluate amount of meals eaten  - Assist patient with eating if necessary   - Allow adequate time for meals  - Recommend/ encourage appropriate diets, oral nutritional supplements, and vitamin/mineral supplements  - Order, calculate, and assess calorie counts as needed  - Recommend, monitor, and adjust tube feedings and TPN/PPN based on assessed needs  - Assess need for intravenous fluids  - Provide specific nutrition/hydration education as appropriate  - Include patient/family/caregiver in decisions related to nutrition  Outcome: Adequate for Discharge     Problem: PHYSICAL THERAPY ADULT  Goal: Performs mobility at highest level of function for planned discharge setting  See evaluation for individualized goals  Description: Treatment/Interventions: Functional transfer training, LE strengthening/ROM, Elevations, Therapeutic exercise, Endurance training, Patient/family training, Bed mobility, Gait training, Spoke to nursing, OT          See flowsheet documentation for full assessment, interventions and recommendations  Outcome: Adequate for Discharge     Problem: OCCUPATIONAL THERAPY ADULT  Goal: Performs self-care activities at highest level of function for planned discharge setting  See evaluation for individualized goals  Description: Treatment Interventions: ADL retraining, Functional transfer training, UE strengthening/ROM, Endurance training, Patient/family training, Equipment evaluation/education, Compensatory technique education, Continued evaluation, Energy conservation, Activityengagement          See flowsheet documentation for full assessment, interventions and recommendations     Outcome: Adequate for Discharge     Problem: RESPIRATORY - ADULT  Goal: Achieves optimal ventilation and oxygenation  Description: INTERVENTIONS:  - Assess for changes in respiratory status  - Assess for changes in mentation and behavior  - Position to facilitate oxygenation and minimize respiratory effort  - Oxygen administered by appropriate delivery if ordered  - Initiate smoking cessation education as indicated  - Encourage broncho-pulmonary hygiene including cough, deep breathe, Incentive Spirometry  - Assess the need for suctioning and aspirate as needed  - Assess and instruct to report SOB or any respiratory difficulty  - Respiratory Therapy support as indicated  Outcome: Adequate for Discharge

## 2022-06-22 NOTE — PROGRESS NOTES
Progress Note - Cardiology   Ramez Head 64 y o  female MRN: [id number]  Unit/Bed#: E5 -01 Encounter: [id number]      Assessment/Recommendations/Discussion:   1  Acute hypoxic and hypercapnic respiratory failure    2  Acute on chronic systolic and diastolic heart failure   3  Dilated cardiomyopathy   4  COPD   5  Acute bacterial pneumonia   6  Sepsis   7  Acute kidney injury   8  Tobacco abuse   9  Chronic pain     PLAN   She appears euvolemic   EF is 41% by echo   Creatinine has improved from 1 85->1 45   Continue with carvedilol 3 125 b i d   Would plan to reinstitute ARB as outpatient as long as creatinine remains stable (was on losartan 50mg, may need to restart at lower dose 25mg)   Likely would do best with low-dose daily Lasix 20 mg daily with plan to take extra dose if weight gain greater than 3 lb overnight or 5 lb in 3 days   Would consider adding Jardiance 10 mg as outpatient as well as long as renal function remains stable, given HFrEF   Will need close outpatient follow-up, she follows with Dr Aubrey Garcia from our group   Will follow peripherally while she is still here, please call if questions  Subjective:   HPI  Doing well, no lower extremity edema, shortness of breath has significantly improved, now off oxygen      Review of Systems: As noted in HPI  Rest of ROS is negative      Vitals:   /75   Pulse 70   Temp 97 9 °F (36 6 °C)   Resp 16   Ht 5' 7" (1 702 m)   Wt 76 6 kg (168 lb 14 oz)   SpO2 (!) 88%   BMI 26 45 kg/m²   Vitals:    06/21/22 0600 06/22/22 0600   Weight: 76 5 kg (168 lb 10 4 oz) 76 6 kg (168 lb 14 oz)       Intake/Output Summary (Last 24 hours) at 6/22/2022 1133  Last data filed at 6/22/2022 1058  Gross per 24 hour   Intake 402 ml   Output 400 ml   Net 2 ml       Physical Exam   Constitutional: awake, alert and oriented, in no acute distress, no obvious deformities  Head: Normocephalic, without obvious abnormality, atraumatic  Eyes: conjunctivae clear and moist  Sclera anicteric  No xanthelasmas  Pupils equal bilaterally  Extraocular motions are full  Ear nose mouth and throat: ears are symmetrical bilaterally, hearing appears to be equal bilaterally, no nasal discharge or epistaxis, oropharynx is clear with moist mucous membranes  Neck:  Trachea is midline, neck is supple, no thyromegaly or significant lymphadenopathy, there is full range of motion  Lungs: clear to auscultation bilaterally, no wheezes, no rales, no rhonchi, no accessory muscle use, breathing is nonlabored  Heart: regular rate and rhythm, S1, S2 normal, no murmur, no click, no rub and no gallop, no lower extremity edema  Abdomen: soft, non-tender; bowel sounds normal; no masses,  no organomegaly  Psychiatric:  Patient is oriented to time, place, person, mood/affect is negative for depression, anxiety, agitation, appears to have appropriate insight  Skin: Skin is warm, dry, intact  No obvious rashes or lesions on exposed extremities  Nail beds are pink with no cyanosis or clubbing  TELEMETRY:   Lab Results:  Results from last 7 days   Lab Units 06/21/22  0454   WBC Thousand/uL 12 55*   HEMOGLOBIN g/dL 11 8   HEMATOCRIT % 36 2   PLATELETS Thousands/uL 406*     Results from last 7 days   Lab Units 06/22/22  0535 06/17/22  0555 06/16/22  0447   POTASSIUM mmol/L 4 2   < > 3 6   CHLORIDE mmol/L 101   < > 99*   CO2 mmol/L 26   < > 21   BUN mg/dL 52*   < > 31*   CREATININE mg/dL 1 45*   < > 1 61*   CALCIUM mg/dL 8 6   < > 8 7   ALK PHOS U/L  --   --  84   ALT U/L  --   --  22   AST U/L  --   --  26    < > = values in this interval not displayed       Results from last 7 days   Lab Units 06/22/22  0535   POTASSIUM mmol/L 4 2   CHLORIDE mmol/L 101   CO2 mmol/L 26   BUN mg/dL 52*   CREATININE mg/dL 1 45*   CALCIUM mg/dL 8 6           Medications:    Current Facility-Administered Medications:     acetaminophen (TYLENOL) tablet 650 mg, 650 mg, Oral, Q6H PRN, Susannah Henriquez MD, 650 mg at 06/18/22 0306    carvedilol (COREG) tablet 3 125 mg, 3 125 mg, Oral, BID With Meals, ESTHER Figueroa, 3 125 mg at 06/22/22 0749    clonazePAM (KlonoPIN) tablet 0 5 mg, 0 5 mg, Oral, HS PRN, ESTHER Limon, 0 5 mg at 06/21/22 2239    guaiFENesin (MUCINEX) 12 hr tablet 600 mg, 600 mg, Oral, Q12H [address], Malvin Rodriguez MD, 600 mg at 06/22/22 0749    heparin (porcine) subcutaneous injection 5,000 Units, 5,000 Units, Subcutaneous, Q8H [address], 5,000 Units at 06/22/22 0543 **AND** [CANCELED] Platelet count, , , Once, ESTHER Felton    ipratropium-albuterol (DUO-NEB) 0 5-2 5 mg/3 mL inhalation solution 3 mL, 3 mL, Nebulization, Q4H PRN, Daniel Johnston DO, 3 mL at 06/21/22 2230    lidocaine (LIDODERM) 5 % patch 1 patch, 1 patch, Topical, Daily, Malvin Rodriguez MD, 1 patch at 06/21/22 2123    morphine (MS CONTIN) ER tablet 30 mg, 30 mg, Oral, BID, Balbina Tony MD, 30 mg at 06/22/22 0856    ondansetron (ZOFRAN) injection 4 mg, 4 mg, Intravenous, Q6H PRN, Malvin Rodriguez MD    oxyCODONE (ROXICODONE) IR tablet 2 5 mg, 2 5 mg, Oral, Q6H PRN **OR** oxyCODONE (ROXICODONE) IR tablet 5 mg, 5 mg, Oral, Q6H PRN, Malvin Rodriguez MD, 5 mg at 06/19/22 1304    predniSONE tablet 30 mg, 30 mg, Oral, Daily, 30 mg at 06/22/22 0749 **FOLLOWED BY** [START ON 6/25/2022] predniSONE tablet 20 mg, 20 mg, Oral, Daily **FOLLOWED BY** [START ON 6/28/2022] predniSONE tablet 10 mg, 10 mg, Oral, Daily, Daniel Johnston, DO    This note was completed in part utilizing M-Modal Fluency Direct Software  Grammatical errors, random word insertions, spelling mistakes, and incomplete sentences may be an occasional consequence of this system secondary to software limitations, ambient noise, and hardware issues  If you have any questions or concerns about the content, text, or information contained within the body of this dictation, please contact the provider for clarification      Drew Muñoz, , [facility]  6/22/2022 11:33 AM

## 2022-06-22 NOTE — ASSESSMENT & PLAN NOTE
Wt Readings from Last 3 Encounters:   06/22/22 76 6 kg (168 lb 14 oz)   07/13/21 71 7 kg (158 lb)   06/15/21 68 kg (149 lb 14 6 oz)     · Echo EF 05%, systolic function moderately reduced, grade 1 diastolic dysfunction  · Patient appears quite fluid sensitive    Fluctuating creatinine with IV diuretics during hospital stay  · Cardiology recommends 20 mg Lasix daily with plan for extra dose if patient gains more than 3 lb in 1 day or 5 lb over any period of time  · Outpatient follow-up with Cardiology  · Outpatient BMP in 1 week

## 2022-06-22 NOTE — DISCHARGE INSTRUCTIONS
Dear Lux Johnson,     [facility]  It is our hope that we were always able to exceed the expected standards for your care during your stay  You were hospitalized due to ***   You were cared for on the *** floor under the service of Saray Aviles DO with the [facility] who covers for your primary care physician (PCP), Med Goins DO, while you were hospitalized  If you have any questions or concerns related to this hospitalization, you may contact us at [phone]  For follow up as well as medication refills, we recommend that you follow up with your primary care physician  A registered nurse will reach out to you by phone within a few days after your discharge to answer any additional questions that you may have after going home  However, at this time we provide for you here, the most important instructions / recommendations at discharge:     Notable Medication Adjustments -   Carvedilol 3 125 mg b i d  In place of metoprolol  Discontinue losartan  Discuss resuming this medication with your cardiologist at a follow-up appointment    Low-dose daily Lasix 20 mg daily with plan to take extra dose if weight gain greater than 3 lb overnight or 5 lb in 3 days  Prednisone taper  Pulmonology sent prescriptions for Anoro and albuterol inhalers    Testing Required after Discharge -   BMP in 1 week to follow-up on kidney function  Incidental findings that Require Follow Up   None  Important Follow Up Information -   Please follow up with Pulmonology and cardiology   Other Instructions -   Weigh yourself every day and call your cardiologist if you gain more than 3 lb in 1 day or greater than 5 lb over any period of time  Please review this entire after visit summary as additional general instructions including medication list, appointments, activity, diet, any pertinent wound care, and other additional recommendations from your care team that may be provided for you        Sincerely,     Teresa Cabrera, DO

## 2022-06-22 NOTE — CASE MANAGEMENT
Case Management Discharge Planning Note    Patient name Weston Knox  Location [address]-* MRN [id number]  : 1960 Date 2022       Current Admission Date: 6/15/2022  Current Admission Diagnosis:Acute respiratory failure with hypoxia [facility])   Patient Active Problem List    Diagnosis Date Noted    SHANTA (acute kidney injury) ([facility] [address] [facility] ) 2022    Acute respiratory failure with hypoxia ([facility] ) 06/15/2022    Chronic obstructive pulmonary disease with acute exacerbation ([facility] [facility] ) 06/15/2022    Acute on chronic combined systolic and diastolic congestive heart failure ([facility] [facility] ) 2021    Tobacco abuse 2021    Hyponatremia 2021    Leukocytosis 2021    Anxiety 2021    Acute respiratory failure with hypoxia and hypercapnia ([facility] [facility] ) 2021    Sepsis ([facility] [facility] ) 2021    Dilated cardiomyopathy ([facility] [facility] ) 2021    Essential hypertension 2021    Hypokalemia 2021    Chronic pain 2021    Elevated troponin 2021    Vitamin D deficiency 2013      LOS (days): 7  Geometric Mean LOS (GMLOS) (days): 4 80  Days to GMLOS:-2 6     OBJECTIVE:  Risk of Unplanned Readmission Score: 15 93         Current admission status: Inpatient   Preferred Pharmacy:   [facility] [address]  Jordyn Magana, [place] [address] [place] [address]  Phone: [phone] Fax: [phone]    Primary Care Provider: Jalyn Holland DO    Primary Insurance: MEDICARE MISC REPLACEMENT  Secondary Insurance: [id number]    DISCHARGE DETAILS:    Discharge planning discussed with[de-identified] Patient and Verlizzie Shaper (Daughter) [phone] (M)  Freedom of Choice: Yes  Comments - Freedom of Choice: Pt will discharge to home today  Order placed for HHPT/OT-and SN for new O2    CM contacted family/caregiver?: Yes  Were Treatment Team discharge recommendations reviewed with patient/caregiver?: Yes  Did patient/caregiver verbalize understanding of patient care needs?: Yes  Were patient/caregiver advised of the risks associated with not following Treatment Team discharge recommendations?: Yes    Contacts  Patient Contacts: Gokul Ferguson (Daughter) [phone] (M)  Relationship to Patient[de-identified] Family  Contact Method: Phone  Reason/Outcome: Continuity of Care, Emergency Contact, Discharge [address]         Is the patient interested in [facility] at discharge?: Yes  [address] requested[de-identified] Nursing, Occupational Therapy, Physical [address] Name[de-identified] [address] Provider[de-identified] PCP  Home Health Services Needed[de-identified] Strengthening/Theraputic Exercises to Improve Function, Oxygen Via Nasal Cannula, Gait/ADL Training, Evaluate Functional Status and Safety  Homebound Criteria Met[de-identified] Immunosuppressed, Requires the Assistance of Another Person for Safe Ambulation or to Leave the Home  Supporting Clincal Findings[de-identified] Limited Endurance, Requires Oxygen, Fatigues Easliy in [employer]    DME Referral Provided  Referral made for DME?: Yes  DME referral completed for the following items[de-identified] Portable Oxygen concentrator, Portable Oxygen tanks  DME Supplier Name[de-identified] [employer]    Treatment Team Recommendation: Home  Discharge Destination Plan[de-identified] Home with Mike Rubio at Discharge : Automobile  Transfer Mode: Ambulate  Accompanied by: Family member

## 2022-06-22 NOTE — ASSESSMENT & PLAN NOTE
· Baseline creatinine 0 9-1 2  · Likely secondary to sepsis, CHF exacerbation, diuretics-patient is quite sensitive to fluid shifts  · Improved off of diuretics  · Start low-dose Lasix 20 mg daily  · Follow-up BMP in 1 week

## 2022-06-23 NOTE — UTILIZATION REVIEW
Notification of Discharge   This is a Notification of Discharge from our facility [address]  Please be advised that this patient has been discharge from our facility  Below you will find the admission and discharge date and time including the patients disposition  UTILIZATION REVIEW CONTACT:  Marcelo Cortes MA  Utilization   Network Utilization Review Department  Phone: [phone] x carefully listen to the prompts  All voicemails are confidential   Email: [email or web address]     PHYSICIAN ADVISORY SERVICES:  FOR [id number] REVIEW - MEDICAL NECESSITY DENIAL  Phone: [phone]  Fax: [phone]  Email: [email or web address]     PRESENTATION DATE: 6/15/2022  2:20 AM  OBERVATION ADMISSION DATE:   INPATIENT ADMISSION DATE: 6/15/22  2:20 AM   DISCHARGE DATE: 6/22/2022  6:31 PM  DISPOSITION: Home/Self Care Home/Self Care      IMPORTANT INFORMATION:  Send all requests for admission clinical reviews, approved or denied determinations and any other requests to dedicated fax number below belonging to the campus where the patient is receiving treatment   List of dedicated fax numbers:  [address] DENIALS (Administrative/Medical Necessity) [phone]   [address] (Maternity/NICU/Pediatrics) [phone]   Chloe Borges [phone]   Karan Hunter [phone]   Devan Nones [phone]   [address] [phone]   [facility]  [phone]   [address] [place] [address], [facility]  [facility] 1000 [facility] [phone]

## 2022-06-28 ENCOUNTER — OFFICE VISIT (OUTPATIENT)
Dept: CARDIOLOGY CLINIC | Facility: CLINIC | Age: 62
End: 2022-06-28
Payer: MEDICARE

## 2022-06-28 VITALS
WEIGHT: 170.6 LBS | SYSTOLIC BLOOD PRESSURE: 174 MMHG | BODY MASS INDEX: 26.78 KG/M2 | HEART RATE: 94 BPM | HEIGHT: 67 IN | DIASTOLIC BLOOD PRESSURE: 110 MMHG

## 2022-06-28 DIAGNOSIS — I10 ESSENTIAL HYPERTENSION: Primary | Chronic | ICD-10-CM

## 2022-06-28 DIAGNOSIS — J96.01 ACUTE RESPIRATORY FAILURE WITH HYPOXIA (HCC): ICD-10-CM

## 2022-06-28 DIAGNOSIS — I42.0 DILATED CARDIOMYOPATHY (HCC): Chronic | ICD-10-CM

## 2022-06-28 DIAGNOSIS — N17.9 AKI (ACUTE KIDNEY INJURY) (HCC): ICD-10-CM

## 2022-06-28 PROCEDURE — 99214 OFFICE O/P EST MOD 30 MIN: CPT | Performed by: INTERNAL MEDICINE

## 2022-06-28 RX ORDER — FUROSEMIDE 40 MG/1
40 TABLET ORAL DAILY
Qty: 90 TABLET | Refills: 5 | Status: SHIPPED | OUTPATIENT
Start: 2022-06-28 | End: 2022-08-03

## 2022-06-28 NOTE — PATIENT INSTRUCTIONS
Furosemid 40 mg instead of 20 mg  Do not take it however if in the morning the weight is below 160 lb

## 2022-06-28 NOTE — PROGRESS NOTES
Patient ID: Svetlana Rodriguez is a 64 y o  female  Plan:      Dilated cardiomyopathy ([facility] [address] )  Very mild  Essential hypertension  Will reassess with proper diuretic use  SHANTA (acute kidney injury) ([facility] [address] )  Improved but nonetheless will hold diuretic for weight below 160  Follow up Plan/Other summary comments:  Return in about 3 months (around 2022)  If ok then f/u less often  HPI: Patient seen in f/u regarding the above issues  Patient is seen today as she was recently hospitalized for sepsis and fluid overload  She was diuresed and treated with antibiotics and has felt better since  No recent chest pain or chest pressure  On 2021 she presented with acute heart failure and was at least briefly intubated  An echo the next day revealed ejection fraction less than 20% with moderate mitral regurgitation  Several days later she underwent coronary angiography which revealed moderate but nonobstructive disease  Echo on 2021 revealed near normal LV systolic function          Most recent or relevant cardiac/vascular testing:       TTE 2021:  EF less than 20%  Coronary angiography 2021:  No high-grade proximal CAD  TTE 2021:  EF 50%  Mild enlargement of the ascending aorta to 3 7 cm  Mild mitral regurgitation    TTE 06/15/2022:  EF 41%      Past Surgical History:   Procedure Laterality Date    CARDIAC CATHETERIZATION  2021    Moderate non-obstructive atherosclerosis     SECTION      CHOLECYSTECTOMY      ROTATOR CUFF REPAIR W/ DISTAL CLAVICLE EXCISION Right     TONSILLECTOMY       CMP:   Lab Results   Component Value Date     2018    K 4 2 2022    K 4 6 2018     2022     2018    CO2 26 2022    CO2 32 (H) 2018    BUN 52 (H) 2022    BUN 14 2018    CREATININE 1 45 (H) 2022    CREATININE 0 72 2018    EGFR 38 2022       Lipid Profile:   Lab Results   Component Value Date    CHOL 216 (H) 04/05/2018    TRIG 91 05/12/2022    TRIG 168 (H) 04/05/2018    HDL 43 (L) 05/12/2022    HDL 43 04/05/2018         Review of Systems   10  point ROS  was otherwise non pertinent or negative except as per HPI or as below  Gait: Normal          Objective:     BP (!) 174/110   Pulse 94   Ht 5' 7" (1 702 m)   Wt 77 4 kg (170 lb 9 6 oz)   BMI 26 72 kg/m²     PHYSICAL EXAM:    General:  Normal appearance in no distress  Eyes:  Anicteric  Oral mucosa:  Moist   Neck:  No JVD  Carotid upstrokes are brisk without bruits  No masses  Chest:  Clear to auscultation  Cardiac:  No palpable PMI  Normal S1 and S2   Grade 2 systolic murmur at the base radiating to the neck  No gallop or rub  Abdomen:  Soft and nontender  No palpable organomegaly or aortic enlargement  Extremities:  No peripheral edema  Musculoskeletal:  Symmetric  Vascular:  Femoral pulses are brisk without bruits  Popliteal pulses are intact bilaterally  Pedal pulses are intact  Neuro:  Grossly symmetric  Psych:  Alert and oriented x3          Current Outpatient Medications:     carvedilol (COREG) 3 125 mg tablet, Take 1 tablet (3 125 mg total) by mouth 2 (two) times a day with meals, Disp: 60 tablet, Rfl: 0    clonazePAM (KlonoPIN) 1 mg tablet, Take 0 5-1 mg by mouth daily at bedtime as needed, Disp: , Rfl:     furosemide (LASIX) 40 mg tablet, Take 1 tablet (40 mg total) by mouth daily Hold for morning weight less than 160, Disp: 90 tablet, Rfl: 5    gabapentin (NEURONTIN) 300 mg capsule, Take 300 mg by mouth as needed , Disp: , Rfl:     morphine (MS CONTIN) 30 mg 12 hr tablet, Take 30 mg by mouth 2 (two) times a day, Disp: , Rfl:     oxyCODONE-acetaminophen (PERCOCET) 5-325 mg per tablet, Take 1 tablet by mouth every 4 (four) hours as needed for moderate pain, Disp: , Rfl:     predniSONE 20 mg tablet, Take 1 5 tablets (30 mg total) by mouth daily for 2 days, THEN 1 tablet (20 mg total) daily for 3 days, THEN 1 tablet (20 mg total) daily for 3 days, THEN 0 5 tablets (10 mg total) daily for 3 days  , Disp: 11 tablet, Rfl: 0    albuterol (2 5 mg/3 mL) 0 083 % nebulizer solution, Take 3 mL (2 5 mg total) by nebulization every 6 (six) hours as needed for wheezing or shortness of breath (Patient not taking: Reported on 6/28/2022), Disp: 180 mL, Rfl: 3    albuterol (ProAir HFA) 90 mcg/act inhaler, Inhale 2 puffs every 6 (six) hours as needed for wheezing (Patient not taking: Reported on 6/28/2022), Disp: 25 5 g, Rfl: 3    umeclidinium-vilanterol (Anoro Ellipta) 62 5-25 MCG/INH inhaler, Inhale 1 puff daily (Patient not taking: Reported on 6/28/2022), Disp: 60 blister, Rfl: 3  Allergies   Allergen Reactions    Wellbutrin [Bupropion] Arthralgia     Past Medical History:   Diagnosis Date    Cardiomyopathy ([facility] [address] )     Chronic combined systolic and diastolic congestive heart failure     COPD (chronic obstructive pulmonary disease) (HCC)     Fatty liver     Hyperlipidemia     Hypertension     Mitral regurgitation     Sepsis            Social History     Tobacco Use   Smoking Status Current Every Day Smoker    Packs/day: 2 00    Types: Cigarettes   Smokeless Tobacco Never Used

## 2022-07-12 ENCOUNTER — APPOINTMENT (EMERGENCY)
Dept: RADIOLOGY | Facility: HOSPITAL | Age: 62
DRG: 682 | End: 2022-07-12
Payer: MEDICARE

## 2022-07-12 ENCOUNTER — HOSPITAL ENCOUNTER (INPATIENT)
Facility: HOSPITAL | Age: 62
LOS: 10 days | DRG: 682 | End: 2022-07-22
Attending: EMERGENCY MEDICINE | Admitting: INTERNAL MEDICINE
Payer: MEDICARE

## 2022-07-12 DIAGNOSIS — I50.9 CHF (CONGESTIVE HEART FAILURE) (HCC): ICD-10-CM

## 2022-07-12 DIAGNOSIS — N17.9 AKI (ACUTE KIDNEY INJURY) (HCC): ICD-10-CM

## 2022-07-12 DIAGNOSIS — I42.0 DILATED CARDIOMYOPATHY (HCC): ICD-10-CM

## 2022-07-12 DIAGNOSIS — I50.43 ACUTE ON CHRONIC COMBINED SYSTOLIC AND DIASTOLIC CONGESTIVE HEART FAILURE (HCC): ICD-10-CM

## 2022-07-12 DIAGNOSIS — J96.91 RESPIRATORY FAILURE WITH HYPOXIA (HCC): Primary | ICD-10-CM

## 2022-07-12 DIAGNOSIS — J44.1 CHRONIC OBSTRUCTIVE PULMONARY DISEASE WITH ACUTE EXACERBATION (HCC): ICD-10-CM

## 2022-07-12 PROBLEM — A41.9 SEPSIS (HCC): Status: RESOLVED | Noted: 2021-03-27 | Resolved: 2022-07-12

## 2022-07-12 PROBLEM — J96.02 ACUTE RESPIRATORY FAILURE WITH HYPOXIA AND HYPERCAPNIA (HCC): Status: RESOLVED | Noted: 2021-03-27 | Resolved: 2022-07-12

## 2022-07-12 PROBLEM — R77.8 ELEVATED TROPONIN: Status: RESOLVED | Noted: 2021-03-27 | Resolved: 2022-07-12

## 2022-07-12 PROBLEM — J96.01 ACUTE RESPIRATORY FAILURE WITH HYPOXIA (HCC): Status: RESOLVED | Noted: 2022-06-15 | Resolved: 2022-07-12

## 2022-07-12 PROBLEM — E87.1 HYPONATREMIA: Status: RESOLVED | Noted: 2021-06-13 | Resolved: 2022-07-12

## 2022-07-12 PROBLEM — J96.01 ACUTE RESPIRATORY FAILURE WITH HYPOXIA AND HYPERCAPNIA (HCC): Status: RESOLVED | Noted: 2021-03-27 | Resolved: 2022-07-12

## 2022-07-12 PROBLEM — R79.89 ELEVATED TROPONIN: Status: RESOLVED | Noted: 2021-03-27 | Resolved: 2022-07-12

## 2022-07-12 PROBLEM — R06.89 ACUTE RESPIRATORY INSUFFICIENCY: Status: ACTIVE | Noted: 2022-07-12

## 2022-07-12 LAB
ALBUMIN SERPL BCP-MCNC: 4.3 G/DL (ref 3.5–5)
ALP SERPL-CCNC: 81 U/L (ref 34–104)
ALT SERPL W P-5'-P-CCNC: 3 U/L (ref 7–52)
ANION GAP SERPL CALCULATED.3IONS-SCNC: 9 MMOL/L (ref 4–13)
AST SERPL W P-5'-P-CCNC: 6 U/L (ref 13–39)
BASOPHILS # BLD AUTO: 0.05 THOUSANDS/ΜL (ref 0–0.1)
BASOPHILS NFR BLD AUTO: 1 % (ref 0–1)
BILIRUB SERPL-MCNC: 0.79 MG/DL (ref 0.2–1)
BILIRUB UR QL STRIP: NEGATIVE
BNP SERPL-MCNC: 1822 PG/ML (ref 0–100)
BUN SERPL-MCNC: 12 MG/DL (ref 5–25)
CALCIUM SERPL-MCNC: 10 MG/DL (ref 8.4–10.2)
CARDIAC TROPONIN I PNL SERPL HS: 30 NG/L
CHLORIDE SERPL-SCNC: 96 MMOL/L (ref 96–108)
CLARITY UR: CLEAR
CO2 SERPL-SCNC: 30 MMOL/L (ref 21–32)
COLOR UR: ABNORMAL
CREAT SERPL-MCNC: 1.08 MG/DL (ref 0.6–1.3)
EOSINOPHIL # BLD AUTO: 0.19 THOUSAND/ΜL (ref 0–0.61)
EOSINOPHIL NFR BLD AUTO: 2 % (ref 0–6)
ERYTHROCYTE [DISTWIDTH] IN BLOOD BY AUTOMATED COUNT: 14.1 % (ref 11.6–15.1)
GFR SERPL CREATININE-BSD FRML MDRD: 55 ML/MIN/1.73SQ M
GLUCOSE SERPL-MCNC: 100 MG/DL (ref 65–140)
GLUCOSE UR STRIP-MCNC: NEGATIVE MG/DL
HCT VFR BLD AUTO: 40.1 % (ref 34.8–46.1)
HGB BLD-MCNC: 13.3 G/DL (ref 11.5–15.4)
HGB UR QL STRIP.AUTO: NEGATIVE
IMM GRANULOCYTES # BLD AUTO: 0.03 THOUSAND/UL (ref 0–0.2)
IMM GRANULOCYTES NFR BLD AUTO: 0 % (ref 0–2)
KETONES UR STRIP-MCNC: NEGATIVE MG/DL
LEUKOCYTE ESTERASE UR QL STRIP: NEGATIVE
LIPASE SERPL-CCNC: 12 U/L (ref 11–82)
LYMPHOCYTES # BLD AUTO: 2.9 THOUSANDS/ΜL (ref 0.6–4.47)
LYMPHOCYTES NFR BLD AUTO: 34 % (ref 14–44)
MAGNESIUM SERPL-MCNC: 2 MG/DL (ref 1.9–2.7)
MCH RBC QN AUTO: 30 PG (ref 26.8–34.3)
MCHC RBC AUTO-ENTMCNC: 33.2 G/DL (ref 31.4–37.4)
MCV RBC AUTO: 90 FL (ref 82–98)
MONOCYTES # BLD AUTO: 0.55 THOUSAND/ΜL (ref 0.17–1.22)
MONOCYTES NFR BLD AUTO: 6 % (ref 4–12)
NEUTROPHILS # BLD AUTO: 4.94 THOUSANDS/ΜL (ref 1.85–7.62)
NEUTS SEG NFR BLD AUTO: 57 % (ref 43–75)
NITRITE UR QL STRIP: NEGATIVE
NRBC BLD AUTO-RTO: 0 /100 WBCS
PH UR STRIP.AUTO: 7 [PH]
PLATELET # BLD AUTO: 291 THOUSANDS/UL (ref 149–390)
PMV BLD AUTO: 8.9 FL (ref 8.9–12.7)
POTASSIUM SERPL-SCNC: 3.1 MMOL/L (ref 3.5–5.3)
PROCALCITONIN SERPL-MCNC: <0.05 NG/ML
PROT SERPL-MCNC: 7.6 G/DL (ref 6.4–8.4)
PROT UR STRIP-MCNC: NEGATIVE MG/DL
RBC # BLD AUTO: 4.44 MILLION/UL (ref 3.81–5.12)
SODIUM SERPL-SCNC: 135 MMOL/L (ref 135–147)
SP GR UR STRIP.AUTO: <=1.005 (ref 1–1.03)
UROBILINOGEN UR QL STRIP.AUTO: 0.2 E.U./DL
WBC # BLD AUTO: 8.66 THOUSAND/UL (ref 4.31–10.16)

## 2022-07-12 PROCEDURE — 94664 DEMO&/EVAL PT USE INHALER: CPT

## 2022-07-12 PROCEDURE — 83735 ASSAY OF MAGNESIUM: CPT | Performed by: PHYSICIAN ASSISTANT

## 2022-07-12 PROCEDURE — 99285 EMERGENCY DEPT VISIT HI MDM: CPT

## 2022-07-12 PROCEDURE — 83690 ASSAY OF LIPASE: CPT | Performed by: PHYSICIAN ASSISTANT

## 2022-07-12 PROCEDURE — 83880 ASSAY OF NATRIURETIC PEPTIDE: CPT | Performed by: PHYSICIAN ASSISTANT

## 2022-07-12 PROCEDURE — 36415 COLL VENOUS BLD VENIPUNCTURE: CPT | Performed by: PHYSICIAN ASSISTANT

## 2022-07-12 PROCEDURE — 99223 1ST HOSP IP/OBS HIGH 75: CPT | Performed by: PHYSICIAN ASSISTANT

## 2022-07-12 PROCEDURE — 84145 PROCALCITONIN (PCT): CPT | Performed by: INTERNAL MEDICINE

## 2022-07-12 PROCEDURE — 84484 ASSAY OF TROPONIN QUANT: CPT | Performed by: PHYSICIAN ASSISTANT

## 2022-07-12 PROCEDURE — 81003 URINALYSIS AUTO W/O SCOPE: CPT | Performed by: PHYSICIAN ASSISTANT

## 2022-07-12 PROCEDURE — 94640 AIRWAY INHALATION TREATMENT: CPT

## 2022-07-12 PROCEDURE — 80053 COMPREHEN METABOLIC PANEL: CPT | Performed by: PHYSICIAN ASSISTANT

## 2022-07-12 PROCEDURE — 71045 X-RAY EXAM CHEST 1 VIEW: CPT

## 2022-07-12 PROCEDURE — 85025 COMPLETE CBC W/AUTO DIFF WBC: CPT | Performed by: PHYSICIAN ASSISTANT

## 2022-07-12 PROCEDURE — 94760 N-INVAS EAR/PLS OXIMETRY 1: CPT

## 2022-07-12 PROCEDURE — 99291 CRITICAL CARE FIRST HOUR: CPT | Performed by: PHYSICIAN ASSISTANT

## 2022-07-12 PROCEDURE — 96374 THER/PROPH/DIAG INJ IV PUSH: CPT

## 2022-07-12 RX ORDER — ONDANSETRON 2 MG/ML
4 INJECTION INTRAMUSCULAR; INTRAVENOUS EVERY 6 HOURS PRN
Status: DISCONTINUED | OUTPATIENT
Start: 2022-07-12 | End: 2022-07-22 | Stop reason: HOSPADM

## 2022-07-12 RX ORDER — MORPHINE SULFATE 4 MG/ML
2 INJECTION, SOLUTION INTRAMUSCULAR; INTRAVENOUS ONCE
Status: COMPLETED | OUTPATIENT
Start: 2022-07-13 | End: 2022-07-12

## 2022-07-12 RX ORDER — FUROSEMIDE 10 MG/ML
40 INJECTION INTRAMUSCULAR; INTRAVENOUS 2 TIMES DAILY
Status: DISCONTINUED | OUTPATIENT
Start: 2022-07-13 | End: 2022-07-13

## 2022-07-12 RX ORDER — LEVALBUTEROL 1.25 MG/.5ML
1.25 SOLUTION, CONCENTRATE RESPIRATORY (INHALATION)
Status: DISCONTINUED | OUTPATIENT
Start: 2022-07-12 | End: 2022-07-22 | Stop reason: HOSPADM

## 2022-07-12 RX ORDER — METHYLPREDNISOLONE SODIUM SUCCINATE 40 MG/ML
40 INJECTION, POWDER, LYOPHILIZED, FOR SOLUTION INTRAMUSCULAR; INTRAVENOUS EVERY 8 HOURS
Status: DISCONTINUED | OUTPATIENT
Start: 2022-07-12 | End: 2022-07-13

## 2022-07-12 RX ORDER — CARVEDILOL 3.12 MG/1
3.12 TABLET ORAL 2 TIMES DAILY WITH MEALS
Status: DISCONTINUED | OUTPATIENT
Start: 2022-07-13 | End: 2022-07-13

## 2022-07-12 RX ORDER — ACETAMINOPHEN 325 MG/1
650 TABLET ORAL EVERY 4 HOURS PRN
Status: DISCONTINUED | OUTPATIENT
Start: 2022-07-12 | End: 2022-07-22 | Stop reason: HOSPADM

## 2022-07-12 RX ORDER — MORPHINE SULFATE 30 MG/1
30 TABLET, FILM COATED, EXTENDED RELEASE ORAL 2 TIMES DAILY
Status: DISCONTINUED | OUTPATIENT
Start: 2022-07-13 | End: 2022-07-13

## 2022-07-12 RX ORDER — NICOTINE 21 MG/24HR
1 PATCH, TRANSDERMAL 24 HOURS TRANSDERMAL DAILY
Status: DISCONTINUED | OUTPATIENT
Start: 2022-07-13 | End: 2022-07-22 | Stop reason: HOSPADM

## 2022-07-12 RX ORDER — FUROSEMIDE 10 MG/ML
40 INJECTION INTRAMUSCULAR; INTRAVENOUS 2 TIMES DAILY
Status: DISCONTINUED | OUTPATIENT
Start: 2022-07-12 | End: 2022-07-12

## 2022-07-12 RX ORDER — AZITHROMYCIN 250 MG/1
500 TABLET, FILM COATED ORAL EVERY 24 HOURS
Status: DISCONTINUED | OUTPATIENT
Start: 2022-07-12 | End: 2022-07-13

## 2022-07-12 RX ORDER — SODIUM CHLORIDE FOR INHALATION 0.9 %
3 VIAL, NEBULIZER (ML) INHALATION
Status: DISCONTINUED | OUTPATIENT
Start: 2022-07-12 | End: 2022-07-21

## 2022-07-12 RX ORDER — FUROSEMIDE 10 MG/ML
80 INJECTION INTRAMUSCULAR; INTRAVENOUS ONCE
Status: COMPLETED | OUTPATIENT
Start: 2022-07-12 | End: 2022-07-12

## 2022-07-12 RX ORDER — POTASSIUM CHLORIDE 20 MEQ/1
20 TABLET, EXTENDED RELEASE ORAL DAILY
Status: DISCONTINUED | OUTPATIENT
Start: 2022-07-13 | End: 2022-07-13

## 2022-07-12 RX ORDER — POTASSIUM CHLORIDE 20 MEQ/1
40 TABLET, EXTENDED RELEASE ORAL ONCE
Status: COMPLETED | OUTPATIENT
Start: 2022-07-12 | End: 2022-07-12

## 2022-07-12 RX ORDER — OXYCODONE HYDROCHLORIDE AND ACETAMINOPHEN 5; 325 MG/1; MG/1
1 TABLET ORAL EVERY 4 HOURS PRN
Status: DISCONTINUED | OUTPATIENT
Start: 2022-07-12 | End: 2022-07-13

## 2022-07-12 RX ORDER — ALBUTEROL SULFATE 2.5 MG/3ML
5 SOLUTION RESPIRATORY (INHALATION) ONCE
Status: COMPLETED | OUTPATIENT
Start: 2022-07-12 | End: 2022-07-12

## 2022-07-12 RX ORDER — HEPARIN SODIUM 5000 [USP'U]/ML
5000 INJECTION, SOLUTION INTRAVENOUS; SUBCUTANEOUS EVERY 8 HOURS SCHEDULED
Status: DISCONTINUED | OUTPATIENT
Start: 2022-07-12 | End: 2022-07-22 | Stop reason: HOSPADM

## 2022-07-12 RX ADMIN — POTASSIUM CHLORIDE 40 MEQ: 1500 TABLET, EXTENDED RELEASE ORAL at 19:47

## 2022-07-12 RX ADMIN — ALBUTEROL SULFATE 5 MG: 2.5 SOLUTION RESPIRATORY (INHALATION) at 18:22

## 2022-07-12 RX ADMIN — NITROGLYCERIN 1 INCH: 20 OINTMENT TOPICAL at 19:45

## 2022-07-12 RX ADMIN — METHYLPREDNISOLONE SODIUM SUCCINATE 40 MG: 40 INJECTION, POWDER, FOR SOLUTION INTRAMUSCULAR; INTRAVENOUS at 22:05

## 2022-07-12 RX ADMIN — MORPHINE SULFATE 2 MG: 4 INJECTION, SOLUTION INTRAMUSCULAR; INTRAVENOUS at 23:58

## 2022-07-12 RX ADMIN — IPRATROPIUM BROMIDE 0.5 MG: 0.5 SOLUTION RESPIRATORY (INHALATION) at 18:23

## 2022-07-12 RX ADMIN — HEPARIN SODIUM 5000 UNITS: 5000 INJECTION INTRAVENOUS; SUBCUTANEOUS at 22:02

## 2022-07-12 RX ADMIN — SODIUM CHLORIDE 1000 ML: 0.9 INJECTION, SOLUTION INTRAVENOUS at 18:25

## 2022-07-12 RX ADMIN — AZITHROMYCIN MONOHYDRATE 500 MG: 250 TABLET ORAL at 22:01

## 2022-07-12 RX ADMIN — FUROSEMIDE 80 MG: 10 INJECTION, SOLUTION INTRAMUSCULAR; INTRAVENOUS at 18:49

## 2022-07-12 NOTE — ED PROVIDER NOTES
History  Chief Complaint   Patient presents with    High Blood Pressure    Shortness of Breath     60-year-old female presents to the emergency department seeking evaluation for elevated blood pressure and exertional dyspnea  Patient states that she has a history of CHF and COPD  She states that she has had to be intubated twice within the last year  Patient states that she normally is supposed to wear oxygen at night and that she has difficulty laying flat when sleeping  She does take Klonopin for insomnia as well  She also takes narcotic pain medications  Patient states that last night she was so exertionally dyspneic bed she was unable to make it up stairs to the bathroom and urinated on herself  Patient presents with family today seeking evaluation of her worsening symptoms  Patient states that she has been taking more than her normal dose of Lasix at home  She states that she was supposed to be taking 40 mg however has been taking 60mg           Prior to Admission Medications   Prescriptions Last Dose Informant Patient Reported? Taking?    albuterol (2 5 mg/3 mL) 0 083 % nebulizer solution   No No   Sig: Take 3 mL (2 5 mg total) by nebulization every 6 (six) hours as needed for wheezing or shortness of breath   Patient not taking: Reported on 6/28/2022   albuterol (ProAir HFA) 90 mcg/act inhaler   No Yes   Sig: Inhale 2 puffs every 6 (six) hours as needed for wheezing   carvedilol (COREG) 3 125 mg tablet   No Yes   Sig: Take 1 tablet (3 125 mg total) by mouth 2 (two) times a day with meals   clonazePAM (KlonoPIN) 1 mg tablet   Yes Yes   Sig: Take 0 5-1 mg by mouth daily at bedtime as needed   furosemide (LASIX) 40 mg tablet   No Yes   Sig: Take 1 tablet (40 mg total) by mouth daily Hold for morning weight less than 160   gabapentin (NEURONTIN) 300 mg capsule   Yes Yes   Sig: Take 300 mg by mouth as needed    morphine (MS CONTIN) 30 mg 12 hr tablet 7/12/2022 at Unknown time  Yes Yes   Sig: Take 30 mg by mouth 2 (two) times a day   oxyCODONE-acetaminophen (PERCOCET) 5-325 mg per tablet   Yes Yes   Sig: Take 1 tablet by mouth every 4 (four) hours as needed for moderate pain   umeclidinium-vilanterol (Anoro Ellipta) 62 5-25 MCG/INH inhaler   No Yes   Sig: Inhale 1 puff daily      Facility-Administered Medications: None       Past Medical History:   Diagnosis Date    Cardiomyopathy ([facility] )     Chronic combined systolic and diastolic congestive heart failure     COPD (chronic obstructive pulmonary disease) ([facility] )     Fatty liver     Hyperlipidemia     Hypertension     Mitral regurgitation     Sepsis        Past Surgical History:   Procedure Laterality Date    CARDIAC CATHETERIZATION  2021    Moderate non-obstructive atherosclerosis     SECTION      CHOLECYSTECTOMY      ROTATOR CUFF REPAIR W/ DISTAL CLAVICLE EXCISION Right     TONSILLECTOMY         History reviewed  No pertinent family history  I have reviewed and agree with the history as documented  E-Cigarette/Vaping    E-Cigarette Use Never User     Comments Pt states she want to quit smoking      E-Cigarette/Vaping Substances    Nicotine Yes     THC No     CBD No     Flavoring No     Other No     Unknown No      Social History     Tobacco Use    Smoking status: Current Some Day Smoker     Packs/day: 2 00     Types: Cigarettes    Smokeless tobacco: Never Used   Vaping Use    Vaping Use: Never used   Substance Use Topics    Alcohol use: Never    Drug use: No       Review of Systems   Constitutional: Negative for chills, fatigue and fever  HENT: Negative for congestion, ear pain, rhinorrhea, sinus pressure, sneezing, sore throat and trouble swallowing  Eyes: Negative for discharge and itching  Respiratory: Positive for shortness of breath  Negative for cough, chest tightness, wheezing and stridor  Cardiovascular: Negative for chest pain and palpitations     Gastrointestinal: Negative for abdominal pain, diarrhea, nausea and vomiting  Neurological: Negative for dizziness, syncope, numbness and headaches  All other systems reviewed and are negative  Physical Exam  Physical Exam  Vitals and nursing note reviewed  Constitutional:       General: She is not in acute distress  Appearance: She is well-developed  She is not diaphoretic  HENT:      Head: Normocephalic and atraumatic  Right Ear: External ear normal       Left Ear: External ear normal       Nose: Nose normal    Eyes:      Conjunctiva/sclera: Conjunctivae normal       Pupils: Pupils are equal, round, and reactive to light  Cardiovascular:      Rate and Rhythm: Normal rate and regular rhythm  Heart sounds: Normal heart sounds  No murmur heard  No friction rub  No gallop  Pulmonary:      Effort: Pulmonary effort is normal  No respiratory distress  Breath sounds: No stridor  Examination of the right-middle field reveals rales  Examination of the left-middle field reveals rales  Examination of the right-lower field reveals rales  Examination of the left-lower field reveals rales  Rales present  No wheezing  Abdominal:      General: Bowel sounds are normal  There is no distension  Palpations: Abdomen is soft  Tenderness: There is no abdominal tenderness  There is no guarding  Musculoskeletal:         General: No tenderness  Normal range of motion  Cervical back: Normal range of motion  Skin:     General: Skin is warm  Capillary Refill: Capillary refill takes less than 2 seconds  Neurological:      Mental Status: She is alert and oriented to person, place, and time           Vital Signs  ED Triage Vitals   Temperature Pulse Respirations Blood Pressure SpO2   07/12/22 1756 07/12/22 1756 07/12/22 1756 07/12/22 1756 07/12/22 1756   98 8 °F (37 1 °C) 104 18 (!) 175/102 94 %      Temp src Heart Rate Source Patient Position - Orthostatic VS BP Location FiO2 (%)   -- 07/12/22 1900 07/12/22 1900 07/12/22 1900 -- Monitor Sitting Right arm       Pain Score       07/12/22 1756       No Pain           Vitals:    07/12/22 1756 07/12/22 1900 07/12/22 2000   BP: (!) 175/102 (!) 180/96 (!) 172/89   Pulse: 104 101 98   Patient Position - Orthostatic VS:  Sitting Sitting         Visual Acuity      ED Medications  Medications   carvedilol (COREG) tablet 3 125 mg (has no administration in time range)   umeclidinium-vilanterol (ANORO ELLIPTA) 62 5-25 MCG/INH inhaler 1 puff (has no administration in time range)   ondansetron (ZOFRAN) injection 4 mg (has no administration in time range)   nicotine (NICODERM CQ) 21 mg/24 hr TD 24 hr patch 1 patch (has no administration in time range)   levalbuterol (XOPENEX) inhalation solution 1 25 mg (has no administration in time range)     And   sodium chloride 0 9 % inhalation solution 3 mL (has no administration in time range)   methylPREDNISolone sodium succinate (Solu-MEDROL) injection 40 mg (has no administration in time range)   azithromycin (ZITHROMAX) tablet 500 mg (has no administration in time range)   heparin (porcine) subcutaneous injection 5,000 Units (has no administration in time range)   acetaminophen (TYLENOL) tablet 650 mg (has no administration in time range)   furosemide (LASIX) injection 40 mg (has no administration in time range)   sodium chloride 0 9 % bolus 1,000 mL (0 mL Intravenous Stopped 7/12/22 1850)   ipratropium (ATROVENT) 0 02 % inhalation solution 0 5 mg (0 5 mg Nebulization Given 7/12/22 1823)   albuterol inhalation solution 5 mg (5 mg Nebulization Given 7/12/22 1822)   furosemide (LASIX) injection 80 mg (80 mg Intravenous Given 7/12/22 1849)   potassium chloride (K-DUR,KLOR-CON) CR tablet 40 mEq (40 mEq Oral Given 7/12/22 1947)   nitroglycerin (NITRO-BID) 2 % TD ointment 1 inch (1 inch Topical Given 7/12/22 1945)       Diagnostic Studies  Results Reviewed     Procedure Component Value Units Date/Time    Procalcitonin [[id number]]     Lab Status: No result Specimen: Blood     UA (URINE) with reflex to Scope [[id number]]  (Abnormal) Collected: 07/12/22 1948    Lab Status: Final result Specimen: Urine, Clean Catch Updated: 07/12/22 1954     Color, UA Straw     Clarity, UA Clear     Specific Gravity, UA <=1 005     pH, UA 7 0     Leukocytes, UA Negative     Nitrite, UA Negative     Protein, UA Negative mg/dl      Glucose, UA Negative mg/dl      Ketones, UA Negative mg/dl      Urobilinogen, UA 0 2 E U /dl      Bilirubin, UA Negative     Occult Blood, UA Negative    Sputum culture and Gram stain [[id number]]     Lab Status: No result Specimen: Sputum     B-Type Natriuretic Peptide(BNP), AN, CA, EA Campuses Only [[id number]]  (Abnormal) Collected: 07/12/22 1816    Lab Status: Final result Specimen: Blood from Arm, Left Updated: 07/12/22 1848     BNP 1,822 pg/mL     Comprehensive metabolic panel [[id number]]  (Abnormal) Collected: 07/12/22 1816    Lab Status: Final result Specimen: Blood from Arm, Left Updated: 07/12/22 1843     Sodium 135 mmol/L      Potassium 3 1 mmol/L      Chloride 96 mmol/L      CO2 30 mmol/L      ANION GAP 9 mmol/L      BUN 12 mg/dL      Creatinine 1 08 mg/dL      Glucose 100 mg/dL      Calcium 10 0 mg/dL      AST 6 U/L      ALT 3 U/L      Alkaline Phosphatase 81 U/L      Total Protein 7 6 g/dL      Albumin 4 3 g/dL      Total Bilirubin 0 79 mg/dL      eGFR 55 ml/min/1 73sq m     Narrative:      Megacarly guidelines for Chronic Kidney Disease (CKD):     Stage 1 with normal or high GFR (GFR > 90 mL/min/1 73 square meters)    Stage 2 Mild CKD (GFR = 60-89 mL/min/1 73 square meters)    Stage 3A Moderate CKD (GFR = 45-59 mL/min/1 73 square meters)    Stage 3B Moderate CKD (GFR = 30-44 mL/min/1 73 square meters)    Stage 4 Severe CKD (GFR = 15-29 mL/min/1 73 square meters)    Stage 5 End Stage CKD (GFR <15 mL/min/1 73 square meters)  Note: GFR calculation is accurate only with a steady state creatinine    Lipase [[id number]]  (Normal) Collected: 07/12/22 1816    Lab Status: Final result Specimen: Blood from Arm, Left Updated: 07/12/22 1843     Lipase 12 u/L     Magnesium [[id number]]  (Normal) Collected: 07/12/22 1816    Lab Status: Final result Specimen: Blood from Arm, Left Updated: 07/12/22 1843     Magnesium 2 0 mg/dL     CBC and differential [[id number]] Collected: 07/12/22 1816    Lab Status: Final result Specimen: Blood from Arm, Left Updated: 07/12/22 1825     WBC 8 66 Thousand/uL      RBC 4 44 Million/uL      Hemoglobin 13 3 g/dL      Hematocrit 40 1 %      MCV 90 fL      MCH 30 0 pg      MCHC 33 2 g/dL      RDW 14 1 %      MPV 8 9 fL      Platelets 363 Thousands/uL      nRBC 0 /100 WBCs      Neutrophils Relative 57 %      Immat GRANS % 0 %      Lymphocytes Relative 34 %      Monocytes Relative 6 %      Eosinophils Relative 2 %      Basophils Relative 1 %      Neutrophils Absolute 4 94 Thousands/µL      Immature Grans Absolute 0 03 Thousand/uL      Lymphocytes Absolute 2 90 Thousands/µL      Monocytes Absolute 0 55 Thousand/µL      Eosinophils Absolute 0 19 Thousand/µL      Basophils Absolute 0 05 Thousands/µL                  XR chest 1 view portable    (Results Pending)              Procedures  CriticalCare Time  Performed by: Laurence Nevarez PA-C  Authorized by: Laurence Nevarez PA-C     Critical care provider statement:     Critical care time (minutes):  35    Critical care was necessary to treat or prevent imminent or life-threatening deterioration of the following conditions:  Respiratory failure    Critical care was time spent personally by me on the following activities:  Examination of patient, evaluation of patient's response to treatment, discussions with consultants, ordering and performing treatments and interventions, ordering and review of laboratory studies, ordering and review of radiographic studies, re-evaluation of patient's condition and review of old charts             ED Course  ED Course as of 07/12/22 2007 Tue Jul 12, 2022   1924 O2 sat 86% on RA  94% on 4 L                                               MDM  Number of Diagnoses or Management Options  CHF (congestive heart failure) (HCC)  Respiratory failure with hypoxia (HCC)  Diagnosis management comments: Hypoxic respiratory failure due to CHF and volume overload  Elevated BNP  Congestion on chest x-ray  Oxygen requirement  Patient be admitted for further monitoring and diuresis  Patient agreeable to plan       Amount and/or Complexity of Data Reviewed  Clinical lab tests: ordered and reviewed  Tests in the radiology section of CPT®: ordered and reviewed    Risk of Complications, Morbidity, and/or Mortality  Presenting problems: moderate  Diagnostic procedures: low  Management options: low    Patient Progress  Patient progress: stable      Disposition  Final diagnoses:   Respiratory failure with hypoxia (HCC)   CHF (congestive heart failure) ([facility] )     Time reflects when diagnosis was documented in both MDM as applicable and the Disposition within this note     Time User Action Codes Description Comment    7/12/2022  7:34 PM Natan Failing Add [J96 91] Respiratory failure with hypoxia ([facility] )     7/12/2022  7:35 PM L[place]ee Failing Add [I50 9] CHF (congestive heart failure) ([place] [id number] )     7/12/2022  7:38 PM Arely Montes Add [J44 1] Chronic obstructive pulmonary disease with acute exacerbation [facility])       ED Disposition     ED Disposition   Admit    Condition   Stable    Date/Time   Tue Jul 12, 2022  7:35 PM    Comment   Case was discussed with Greg Cosme and the patient's admission status was agreed to be Admission Status: inpatient status to the service of Dr Bobby Berman   Follow-up Information    None         Patient's Medications   Discharge Prescriptions    No medications on file       No discharge procedures on file      PDMP Review       Value Time User    PDMP Reviewed  Yes 6/13/2021  3:39 AM Lashell Torrez PA-C          ED Provider  Electronically Signed by           Sarah Beth Morse PA-C  07/12/22 2007

## 2022-07-13 PROBLEM — J96.21 ACUTE ON CHRONIC RESPIRATORY FAILURE WITH HYPOXEMIA (HCC): Status: ACTIVE | Noted: 2022-07-13

## 2022-07-13 LAB
2HR DELTA HS TROPONIN: -2 NG/L
4HR DELTA HS TROPONIN: 22 NG/L
ALBUMIN SERPL BCP-MCNC: 4 G/DL (ref 3.5–5)
ALP SERPL-CCNC: 82 U/L (ref 34–104)
ALT SERPL W P-5'-P-CCNC: 10 U/L (ref 7–52)
ANION GAP SERPL CALCULATED.3IONS-SCNC: 11 MMOL/L (ref 4–13)
ARTERIAL PATENCY WRIST A: YES
AST SERPL W P-5'-P-CCNC: 13 U/L (ref 13–39)
ATRIAL RATE: 108 BPM
ATRIAL RATE: 113 BPM
BASE EX.OXY STD BLDV CALC-SCNC: 88.7 % (ref 60–80)
BASE EXCESS BLDA CALC-SCNC: 3.7 MMOL/L
BASE EXCESS BLDV CALC-SCNC: -1.2 MMOL/L
BASOPHILS # BLD MANUAL: 0 THOUSAND/UL (ref 0–0.1)
BASOPHILS NFR MAR MANUAL: 0 % (ref 0–1)
BILIRUB SERPL-MCNC: 0.62 MG/DL (ref 0.2–1)
BUN SERPL-MCNC: 19 MG/DL (ref 5–25)
CA-I BLD-SCNC: 1.11 MMOL/L (ref 1.12–1.32)
CALCIUM SERPL-MCNC: 9.4 MG/DL (ref 8.4–10.2)
CARDIAC TROPONIN I PNL SERPL HS: 28 NG/L
CARDIAC TROPONIN I PNL SERPL HS: 52 NG/L
CARDIAC TROPONIN I PNL SERPL HS: 55 NG/L (ref 8–18)
CARDIAC TROPONIN I PNL SERPL HS: 56 NG/L (ref 8–18)
CARDIAC TROPONIN I PNL SERPL HS: 59 NG/L (ref 8–18)
CHLORIDE SERPL-SCNC: 101 MMOL/L (ref 96–108)
CO2 SERPL-SCNC: 27 MMOL/L (ref 21–32)
CREAT SERPL-MCNC: 1.66 MG/DL (ref 0.6–1.3)
EOSINOPHIL # BLD MANUAL: 0 THOUSAND/UL (ref 0–0.4)
EOSINOPHIL NFR BLD MANUAL: 0 % (ref 0–6)
ERYTHROCYTE [DISTWIDTH] IN BLOOD BY AUTOMATED COUNT: 14.1 % (ref 11.6–15.1)
GFR SERPL CREATININE-BSD FRML MDRD: 33 ML/MIN/1.73SQ M
GLUCOSE SERPL-MCNC: 142 MG/DL (ref 65–140)
HCO3 BLDA-SCNC: 28.4 MMOL/L (ref 22–28)
HCO3 BLDV-SCNC: 25 MMOL/L (ref 24–30)
HCT VFR BLD AUTO: 40.4 % (ref 34.8–46.1)
HGB BLD-MCNC: 12.7 G/DL (ref 11.5–15.4)
IPAP: 16
LYMPHOCYTES # BLD AUTO: 0.6 THOUSAND/UL (ref 0.6–4.47)
LYMPHOCYTES # BLD AUTO: 6 % (ref 14–44)
MAGNESIUM SERPL-MCNC: 2.2 MG/DL (ref 1.9–2.7)
MCH RBC QN AUTO: 29.1 PG (ref 26.8–34.3)
MCHC RBC AUTO-ENTMCNC: 31.4 G/DL (ref 31.4–37.4)
MCV RBC AUTO: 92 FL (ref 82–98)
MONOCYTES # BLD AUTO: 0.2 THOUSAND/UL (ref 0–1.22)
MONOCYTES NFR BLD: 2 % (ref 4–12)
NEUTROPHILS # BLD MANUAL: 9.27 THOUSAND/UL (ref 1.85–7.62)
NEUTS SEG NFR BLD AUTO: 92 % (ref 43–75)
NON VENT- BIPAP: ABNORMAL
O2 CT BLDA-SCNC: 18.4 ML/DL (ref 16–23)
O2 CT BLDV-SCNC: 19.6 ML/DL
OXYHGB MFR BLDA: 94.5 % (ref 94–97)
P AXIS: 47 DEGREES
P AXIS: 54 DEGREES
PCO2 BLDA: 42.8 MM HG (ref 36–44)
PCO2 BLDV: 46.8 MM HG (ref 42–50)
PEEP MAX SETTING VENT: 6 CM[H2O]
PH BLDA: 7.44 [PH] (ref 7.35–7.45)
PH BLDV: 7.34 [PH] (ref 7.3–7.4)
PHOSPHATE SERPL-MCNC: 4.5 MG/DL (ref 2.3–4.1)
PLATELET # BLD AUTO: 270 THOUSANDS/UL (ref 149–390)
PLATELET BLD QL SMEAR: ADEQUATE
PMV BLD AUTO: 9.2 FL (ref 8.9–12.7)
PO2 BLDA: 84.9 MM HG (ref 75–129)
PO2 BLDV: 66.5 MM HG (ref 35–45)
POTASSIUM SERPL-SCNC: 3.8 MMOL/L (ref 3.5–5.3)
PR INTERVAL: 162 MS
PR INTERVAL: 166 MS
PROCALCITONIN SERPL-MCNC: 0.25 NG/ML
PROT SERPL-MCNC: 7.2 G/DL (ref 6.4–8.4)
QRS AXIS: 20 DEGREES
QRS AXIS: 39 DEGREES
QRSD INTERVAL: 90 MS
QRSD INTERVAL: 90 MS
QT INTERVAL: 348 MS
QT INTERVAL: 368 MS
QTC INTERVAL: 477 MS
QTC INTERVAL: 493 MS
RBC # BLD AUTO: 4.37 MILLION/UL (ref 3.81–5.12)
RBC MORPH BLD: NORMAL
SODIUM SERPL-SCNC: 139 MMOL/L (ref 135–147)
SPECIMEN SOURCE: ABNORMAL
T WAVE AXIS: 84 DEGREES
T WAVE AXIS: 88 DEGREES
VENT BIPAP FIO2: 32 %
VENTRICULAR RATE: 108 BPM
VENTRICULAR RATE: 113 BPM
WBC # BLD AUTO: 10.08 THOUSAND/UL (ref 4.31–10.16)

## 2022-07-13 PROCEDURE — 84484 ASSAY OF TROPONIN QUANT: CPT | Performed by: PHYSICIAN ASSISTANT

## 2022-07-13 PROCEDURE — 83735 ASSAY OF MAGNESIUM: CPT

## 2022-07-13 PROCEDURE — 84484 ASSAY OF TROPONIN QUANT: CPT

## 2022-07-13 PROCEDURE — 82805 BLOOD GASES W/O2 SATURATION: CPT | Performed by: PHYSICIAN ASSISTANT

## 2022-07-13 PROCEDURE — 84100 ASSAY OF PHOSPHORUS: CPT

## 2022-07-13 PROCEDURE — 94760 N-INVAS EAR/PLS OXIMETRY 1: CPT

## 2022-07-13 PROCEDURE — 94640 AIRWAY INHALATION TREATMENT: CPT

## 2022-07-13 PROCEDURE — 36415 COLL VENOUS BLD VENIPUNCTURE: CPT | Performed by: PHYSICIAN ASSISTANT

## 2022-07-13 PROCEDURE — 99291 CRITICAL CARE FIRST HOUR: CPT

## 2022-07-13 PROCEDURE — 94002 VENT MGMT INPAT INIT DAY: CPT

## 2022-07-13 PROCEDURE — 82330 ASSAY OF CALCIUM: CPT

## 2022-07-13 PROCEDURE — 82805 BLOOD GASES W/O2 SATURATION: CPT

## 2022-07-13 PROCEDURE — 80053 COMPREHEN METABOLIC PANEL: CPT

## 2022-07-13 PROCEDURE — 84484 ASSAY OF TROPONIN QUANT: CPT | Performed by: NURSE PRACTITIONER

## 2022-07-13 PROCEDURE — 84145 PROCALCITONIN (PCT): CPT

## 2022-07-13 PROCEDURE — 93005 ELECTROCARDIOGRAM TRACING: CPT

## 2022-07-13 PROCEDURE — 93010 ELECTROCARDIOGRAM REPORT: CPT | Performed by: INTERNAL MEDICINE

## 2022-07-13 PROCEDURE — 85027 COMPLETE CBC AUTOMATED: CPT

## 2022-07-13 PROCEDURE — 85007 BL SMEAR W/DIFF WBC COUNT: CPT

## 2022-07-13 RX ORDER — CARVEDILOL 3.12 MG/1
3.12 TABLET ORAL 2 TIMES DAILY WITH MEALS
Status: DISCONTINUED | OUTPATIENT
Start: 2022-07-13 | End: 2022-07-16

## 2022-07-13 RX ORDER — POTASSIUM CHLORIDE 14.9 MG/ML
20 INJECTION INTRAVENOUS ONCE
Status: COMPLETED | OUTPATIENT
Start: 2022-07-13 | End: 2022-07-13

## 2022-07-13 RX ORDER — CLONAZEPAM 0.5 MG/1
0.5 TABLET ORAL
Status: DISCONTINUED | OUTPATIENT
Start: 2022-07-13 | End: 2022-07-16

## 2022-07-13 RX ORDER — CALCIUM GLUCONATE 20 MG/ML
1 INJECTION, SOLUTION INTRAVENOUS ONCE
Status: COMPLETED | OUTPATIENT
Start: 2022-07-13 | End: 2022-07-13

## 2022-07-13 RX ORDER — DEXMEDETOMIDINE HYDROCHLORIDE 4 UG/ML
.1-1.4 INJECTION, SOLUTION INTRAVENOUS
Status: DISCONTINUED | OUTPATIENT
Start: 2022-07-13 | End: 2022-07-17

## 2022-07-13 RX ORDER — METOPROLOL TARTRATE 5 MG/5ML
5 INJECTION INTRAVENOUS ONCE
Status: COMPLETED | OUTPATIENT
Start: 2022-07-13 | End: 2022-07-13

## 2022-07-13 RX ORDER — ALBUTEROL SULFATE 2.5 MG/3ML
2.5 SOLUTION RESPIRATORY (INHALATION) EVERY 6 HOURS PRN
Status: DISCONTINUED | OUTPATIENT
Start: 2022-07-13 | End: 2022-07-22 | Stop reason: HOSPADM

## 2022-07-13 RX ORDER — OXYCODONE HYDROCHLORIDE AND ACETAMINOPHEN 5; 325 MG/1; MG/1
1 TABLET ORAL EVERY 4 HOURS PRN
Status: DISCONTINUED | OUTPATIENT
Start: 2022-07-13 | End: 2022-07-14

## 2022-07-13 RX ORDER — NITROGLYCERIN 20 MG/100ML
5-200 INJECTION INTRAVENOUS
Status: DISCONTINUED | OUTPATIENT
Start: 2022-07-13 | End: 2022-07-13

## 2022-07-13 RX ORDER — LIDOCAINE 50 MG/G
2 PATCH TOPICAL DAILY
Status: DISCONTINUED | OUTPATIENT
Start: 2022-07-14 | End: 2022-07-22 | Stop reason: HOSPADM

## 2022-07-13 RX ORDER — AZITHROMYCIN 250 MG/1
500 TABLET, FILM COATED ORAL EVERY 24 HOURS
Status: DISCONTINUED | OUTPATIENT
Start: 2022-07-13 | End: 2022-07-13

## 2022-07-13 RX ORDER — ALBUMIN, HUMAN INJ 5% 5 %
12.5 SOLUTION INTRAVENOUS ONCE
Status: COMPLETED | OUTPATIENT
Start: 2022-07-13 | End: 2022-07-14

## 2022-07-13 RX ORDER — HEPARIN SODIUM 5000 [USP'U]/ML
5000 INJECTION, SOLUTION INTRAVENOUS; SUBCUTANEOUS EVERY 8 HOURS SCHEDULED
Status: DISCONTINUED | OUTPATIENT
Start: 2022-07-13 | End: 2022-07-13

## 2022-07-13 RX ADMIN — METOROPROLOL TARTRATE 5 MG: 5 INJECTION, SOLUTION INTRAVENOUS at 00:15

## 2022-07-13 RX ADMIN — CARVEDILOL 3.12 MG: 3.12 TABLET, FILM COATED ORAL at 08:30

## 2022-07-13 RX ADMIN — ISODIUM CHLORIDE 3 ML: 0.03 SOLUTION RESPIRATORY (INHALATION) at 19:27

## 2022-07-13 RX ADMIN — CALCIUM GLUCONATE 1 G: 20 INJECTION, SOLUTION INTRAVENOUS at 04:57

## 2022-07-13 RX ADMIN — NICOTINE 1 PATCH: 21 PATCH, EXTENDED RELEASE TRANSDERMAL at 08:30

## 2022-07-13 RX ADMIN — FUROSEMIDE 40 MG: 10 INJECTION, SOLUTION INTRAMUSCULAR; INTRAVENOUS at 08:30

## 2022-07-13 RX ADMIN — DEXMEDETOMIDINE HYDROCHLORIDE 0.2 MCG/KG/HR: 400 INJECTION INTRAVENOUS at 00:34

## 2022-07-13 RX ADMIN — DEXMEDETOMIDINE HYDROCHLORIDE 0.2 MCG/KG/HR: 400 INJECTION INTRAVENOUS at 19:33

## 2022-07-13 RX ADMIN — METHYLPREDNISOLONE SODIUM SUCCINATE 40 MG: 40 INJECTION, POWDER, FOR SOLUTION INTRAMUSCULAR; INTRAVENOUS at 12:15

## 2022-07-13 RX ADMIN — LEVALBUTEROL HYDROCHLORIDE 1.25 MG: 1.25 SOLUTION, CONCENTRATE RESPIRATORY (INHALATION) at 07:20

## 2022-07-13 RX ADMIN — HEPARIN SODIUM 5000 UNITS: 5000 INJECTION INTRAVENOUS; SUBCUTANEOUS at 05:03

## 2022-07-13 RX ADMIN — ISODIUM CHLORIDE 3 ML: 0.03 SOLUTION RESPIRATORY (INHALATION) at 07:20

## 2022-07-13 RX ADMIN — LEVALBUTEROL HYDROCHLORIDE 1.25 MG: 1.25 SOLUTION, CONCENTRATE RESPIRATORY (INHALATION) at 13:18

## 2022-07-13 RX ADMIN — OXYCODONE HYDROCHLORIDE AND ACETAMINOPHEN 1 TABLET: 5; 325 TABLET ORAL at 21:34

## 2022-07-13 RX ADMIN — ALBUMIN (HUMAN) 12.5 G: 12.5 INJECTION, SOLUTION INTRAVENOUS at 17:29

## 2022-07-13 RX ADMIN — HEPARIN SODIUM 5000 UNITS: 5000 INJECTION INTRAVENOUS; SUBCUTANEOUS at 12:15

## 2022-07-13 RX ADMIN — HEPARIN SODIUM 5000 UNITS: 5000 INJECTION INTRAVENOUS; SUBCUTANEOUS at 21:23

## 2022-07-13 RX ADMIN — METHYLPREDNISOLONE SODIUM SUCCINATE 40 MG: 40 INJECTION, POWDER, FOR SOLUTION INTRAMUSCULAR; INTRAVENOUS at 04:01

## 2022-07-13 RX ADMIN — ISODIUM CHLORIDE 3 ML: 0.03 SOLUTION RESPIRATORY (INHALATION) at 13:18

## 2022-07-13 RX ADMIN — LEVALBUTEROL HYDROCHLORIDE 1.25 MG: 1.25 SOLUTION, CONCENTRATE RESPIRATORY (INHALATION) at 19:27

## 2022-07-13 RX ADMIN — POTASSIUM CHLORIDE 20 MEQ: 14.9 INJECTION, SOLUTION INTRAVENOUS at 05:04

## 2022-07-13 RX ADMIN — CARVEDILOL 3.12 MG: 3.12 TABLET, FILM COATED ORAL at 16:39

## 2022-07-13 NOTE — ASSESSMENT & PLAN NOTE
Wt Readings from Last 3 Encounters:   06/28/22 77 4 kg (170 lb 9 6 oz)   06/22/22 76 6 kg (168 lb 14 oz)   07/13/21 71 7 kg (158 lb)     · Place on telemetry  · BNP elevated at 1822  · Placed on cardiac no added salt 1800 cc fluid restriction diet  · Give Lasix 40 mg IV b i d  · Trend cardiac enzymes  · Consult Cardiology in a m    · Obtain daily weights

## 2022-07-13 NOTE — RESPIRATORY THERAPY NOTE
RT Protocol Note  Salvatore Webber 64 y o  female MRN: [id number]  Unit/Bed#: TR 04 Encounter: [id number]    Assessment    Active Problems:    * No active hospital problems  *      Home Pulmonary Medications:    Home Devices/Therapy: (P) Home O2 (2-4 hs not using, MDi alb prn Anoro Daily, alb neb (no machine))    Past Medical History:   Diagnosis Date    Cardiomyopathy ([facility] )     Chronic combined systolic and diastolic congestive heart failure     COPD (chronic obstructive pulmonary disease) (HCC)     Fatty liver     Hyperlipidemia     Hypertension     Mitral regurgitation     Sepsis      Social History     Socioeconomic History    Marital status: Single     Spouse name: None    Number of children: None    Years of education: None    Highest education level: None   Occupational History    None   Tobacco Use    Smoking status: Current Some Day Smoker     Packs/day: 2 00     Types: Cigarettes    Smokeless tobacco: Never Used   Vaping Use    Vaping Use: Never used   Substance and Sexual Activity    Alcohol use: Never    Drug use: No    Sexual activity: None   Other Topics Concern    None   Social History Narrative    None     Social Determinants of Health     Financial Resource Strain: Not on file   Food Insecurity: No Food Insecurity    Worried About Running Out of Food in the Last Year: Never true    Ran Out of Food in the Last Year: Never true   Transportation Needs: No Transportation Needs    Lack of Transportation (Medical): No    Lack of Transportation (Non-Medical):  No   Physical Activity: Not on file   Stress: Not on file   Social Connections: Not on file   Intimate Partner Violence: Not on file   Housing Stability: Low Risk     Unable to Pay for Housing in the Last Year: No    Number of Places Lived in the Last Year: 1    Unstable Housing in the Last Year: No       Subjective    Subjective Data: (P) pt offered smoking cessation and will ask RN if and when she would like nicotine patcch    Objective    Physical Exam:   Assessment Type: (P) Assess only  General Appearance: (P) Alert, Awake  Respiratory Pattern: (P) Normal  Chest Assessment: (P) Chest expansion symmetrical  Bilateral Breath Sounds: (P) Diminished, Rales (faint bases)  Cough: (P) Strong, Dry, Non-productive (vey rare)  O2 Device: (P) NC    Vitals:  Blood pressure (!) 172/89, pulse (P) 104, temperature 98 8 °F (37 1 °C), resp  rate (P) 16, SpO2 (P) 92 %  Imaging and other studies: I have personally reviewed pertinent reports        O2 Device: (P) NC     Plan    Respiratory Plan: (P) Home Bronchodilator Patient pathway, No distress/Pulmonary history        Resp Comments: (P) pt assessed for resp protocol, here for SOB increased BP, pt to wear o2 hs but does not, has MDi alb prn and anoro Daily, also has alb neb but no machine, has unoffical DX of COPD will see [facility] in August for first time, still currently smoking, > 45 yrs 2ppd, no resp distress BS dim w/ rales at bases, no ACP needed no retained secretions no I/S needed, pt currently on NC 1 5 92% will cont lupis XOP/NSS TID pt made aware of order and will start in am as pt had alb neb at 630 pm

## 2022-07-13 NOTE — UTILIZATION REVIEW
Initial Clinical Review    Admission: Date/Time/Statement:   Admission Orders (From admission, onward)     Ordered        07/12/22 1935  INPATIENT ADMISSION  Once                      Orders Placed This Encounter   Procedures    INPATIENT ADMISSION     Standing Status:   Standing     Number of Occurrences:   1     Order Specific Question:   Level of Care     Answer:   Med Surg [16]     Order Specific Question:   Estimated length of stay     Answer:   More than 2 Midnights     Order Specific Question:   Certification     Answer:   I certify that inpatient services are medically necessary for this patient for a duration of greater than two midnights  See H&P and MD Progress Notes for additional information about the patient's course of treatment  ED Arrival Information     Expected   -    Arrival   7/12/2022 17:36    Acuity   Emergent            Means of arrival   Walk-In    Escorted by   Family Member    Service   Critical Care/ICU    Admission type   Emergency            Arrival complaint   SOB/ /122            Chief Complaint   Patient presents with    High Blood Pressure    Shortness of Breath       Initial Presentation: 64 y o  female presented to the ED with dyspnea x3 days  Patient was recently discharged on 06/22/2022 from [facility] with similar presentation with a discharge diagnosis of COPD as well as CHF exacerbation complicated by pneumonia  Patient complains of dyspnea with minimal activity accompanied with a nonproductive cough states that this is slightly improved with her albuterol MDI  Patient states that her symptoms are worse at night but not when lying flat during the day  Pt continues to smoke  PMH: cardiomyopathy, CHF, COPD, HLD, HTN  PE: decreased air movement and rales present   Plan: Inpatient admission for evaluation and treatment of COPD with acute exacerbation, acut laxmi chronic CHF, acute resp insufficiency: Placed on O2 2L NC, IV solu medrol 40 mg q 8 hrs, Xopenex nebulizer tid, Consult Pulmonology, telemetry, IV lasix 40 mg bid, trend cardiac enzymes      Date: 7/13   Day 2:     Critical Care: continue solu medrol 40 mg q 8 hrs, IV azithromycin, sputum culture, IV lasix 40 mg bid, continue Precedex drip for agitation, continue Bipap    ED Triage Vitals   Temperature Pulse Respirations Blood Pressure SpO2   07/12/22 1756 07/12/22 1756 07/12/22 1756 07/12/22 1756 07/12/22 1756   98 8 °F (37 1 °C) 104 18 (!) 175/102 94 %      Temp Source Heart Rate Source Patient Position - Orthostatic VS BP Location FiO2 (%)   07/13/22 0115 07/12/22 1900 07/12/22 1900 07/12/22 1900 07/13/22 0003   Tympanic Monitor Sitting Right arm 60      Pain Score       07/12/22 1756       No Pain          Wt Readings from Last 1 Encounters:   07/13/22 76 7 kg (169 lb 1 5 oz)     Additional Vital Signs:     Date/Time Temp Pulse Resp BP MAP (mmHg) SpO2 FiO2 (%) Calculated FIO2 (%) - Nasal Cannula Nasal Cannula O2 Flow Rate (L/min) O2 Device   07/13/22 1100 97 1 °F (36 2 °C) Abnormal  -- -- -- -- -- -- -- -- --   07/13/22 0930 -- 83 19 133/73 96 89 % Abnormal  -- -- -- --   07/13/22 0720 -- -- -- -- -- 88 % Abnormal  -- 36 4 L/min Nasal cannula   07/13/22 0700 97 °F (36 1 °C) Abnormal  86 21 128/74 97 91 % -- -- -- --   07/13/22 0600 -- -- -- -- -- -- -- 36 4 L/min Nasal cannula   07/13/22 0500 -- 72 18 128/79 99 95 % -- -- -- --   07/13/22 0400 -- 74 22 116/73 90 97 % -- -- -- --   07/13/22 0300 -- 80 21 122/77 95 97 % -- -- -- --   07/13/22 0200 -- 90 22 117/72 89 96 % -- -- -- --   07/13/22 0115 98 °F (36 7 °C) 108 Abnormal  57 Abnormal  188/106 Abnormal  140 97 % 50 -- -- BiPAP   07/13/22 0045 -- 114 Abnormal  36 Abnormal  187/125 Abnormal  150 98 % -- -- -- BiPAP   07/13/22 0027 -- 128 Abnormal  46 Abnormal  185/119 Abnormal  -- 97 % -- -- -- BiPAP   07/13/22 0015 -- 141 Abnormal  45 Abnormal  238/129 Abnormal  175 97 % -- -- -- BiPAP   07/13/22 0003 -- 154 Abnormal  48 Abnormal  -- -- 96 % 60 -- -- BiPAP 07/13/22 0000 -- 131 Abnormal  45 Abnormal  235/131 Abnormal  174 85 % Abnormal  -- 36 4 L/min Nasal cannula   07/12/22 2345 -- 127 Abnormal  36 Abnormal  223/133 Abnormal  170 94 % -- 28 2 L/min Nasal cannula   07/12/22 2245 -- 104 19 166/98 126 93 % -- 28 2 L/min Nasal cannula   07/12/22 2145 -- 101 16 158/79 112 94 % -- 26 1 5 L/min Nasal cannula   07/12/22 2059 -- 104 16 -- -- 92 % -- 26 1 5 L/min Nasal cannula   07/12/22 2000 -- 98 16 172/89 Abnormal  121 90 % -- 28 2 L/min Nasal cannula   07/12/22 1900 -- 101 16 180/96 Abnormal  132 93 % -- -- -- None (Room air)       Pertinent Labs/Diagnostic Test Results:   XR chest 1 view portable   Final Result by Adeel Nolasco MD (07/13 [id number])      Recurrent CHF   Persistent left chest infiltrate                  Workstation performed: [id number]           7/13 Repeat EKG:  Sinus tachycardia  Left atrial enlargement  Left ventricular hypertrophy with repolarization abnormality  When compared with ECG of 13-JUL-2022 00:23, (unconfirmed)  No significant change was found    7/13 EKG:  Sinus tachycardia  Left atrial enlargement  Nonspecific ST-t wave changes  Abnormal ECG  When compared with ECG of 15-FREDY-2022 00:14,  T wave inversion now evident in Lateral leads      Results from last 7 days   Lab Units 07/13/22  0422 07/12/22  1816   WBC Thousand/uL 10 08 8 66   HEMOGLOBIN g/dL 12 7 13 3   HEMATOCRIT % 40 4 40 1   PLATELETS Thousands/uL 270 291   NEUTROS ABS Thousands/µL  --  4 94         Results from last 7 days   Lab Units 07/13/22  0422 07/12/22  1816   SODIUM mmol/L 139 135   POTASSIUM mmol/L 3 8 3 1*   CHLORIDE mmol/L 101 96   CO2 mmol/L 27 30   ANION GAP mmol/L 11 9   BUN mg/dL 19 12   CREATININE mg/dL 1 66* 1 08   EGFR ml/min/1 73sq m 33 55   CALCIUM mg/dL 9 4 10 0   CALCIUM, IONIZED mmol/L 1 11*  --    MAGNESIUM mg/dL 2 2 2 0   PHOSPHORUS mg/dL 4 5*  --      Results from last 7 days   Lab Units 07/13/22  0422 07/12/22  1816   AST U/L 13 6*   ALT U/L 10 3* ALK PHOS U/L 82 81   TOTAL PROTEIN g/dL 7 2 7 6   ALBUMIN g/dL 4 0 4 3   TOTAL BILIRUBIN mg/dL 0 62 0 79         Results from last 7 days   Lab Units 07/13/22  0422 07/12/22  1816   GLUCOSE RANDOM mg/dL 142* 100                Results from last 7 days   Lab Units 07/13/22  0559   PH ART  7 439   PCO2 ART mm Hg 42 8   PO2 ART mm Hg 84 9   HCO3 ART mmol/L 28 4*   BASE EXC ART mmol/L 3 7   O2 CONTENT ART mL/dL 18 4   O2 HGB, ARTERIAL % 94 5   ABG SOURCE  Radial, Left     Results from last 7 days   Lab Units 07/13/22  0001   PH PANDA  7 345   PCO2 PANDA mm Hg 46 8   PO2 PANDA mm Hg 66 5*   HCO3 PANDA mmol/L 25 0   BASE EXC PANDA mmol/L -1 2   O2 CONTENT PANDA ml/dL 19 6   O2 HGB, VENOUS % 88 7*             Results from last 7 days   Lab Units 07/13/22  0246 07/13/22  0052 07/12/22  2301   HS TNI 0HR ng/L  --   --  30   HS TNI 2HR ng/L  --  28  --    HSTNI D2 ng/L  --  -2  --    HS TNI 4HR ng/L 52*  --   --    HSTNI D4 ng/L 22*  --   --                  Results from last 7 days   Lab Units 07/13/22  0422 07/12/22  1816   PROCALCITONIN ng/ml 0 25 <0 05                 Results from last 7 days   Lab Units 07/12/22  1816   BNP pg/mL 1,822*             Results from last 7 days   Lab Units 07/12/22  1816   LIPASE u/L 12           Results from last 7 days   Lab Units 07/12/22  1948   CLARITY UA  Clear   COLOR UA  Straw   SPEC GRAV UA  <=1 005*   PH UA  7 0   GLUCOSE UA mg/dl Negative   KETONES UA mg/dl Negative   BLOOD UA  Negative   PROTEIN UA mg/dl Negative   NITRITE UA  Negative   BILIRUBIN UA  Negative   UROBILINOGEN UA E U /dl 0 2   LEUKOCYTES UA  Negative         ED Treatment:   Medication Administration from 07/12/2022 1735 to 07/13/2022 0114       Date/Time Order Dose Route Action     07/12/2022 1825 sodium chloride 0 9 % bolus 1,000 mL 1,000 mL Intravenous New Bag     07/12/2022 1823 ipratropium (ATROVENT) 0 02 % inhalation solution 0 5 mg 0 5 mg Nebulization Given     07/12/2022 1822 albuterol inhalation solution 5 mg 5 mg Nebulization Given     07/12/2022 1849 furosemide (LASIX) injection 80 mg 80 mg Intravenous Given     07/12/2022 1947 potassium chloride (K-DUR,KLOR-CON) CR tablet 40 mEq 40 mEq Oral Given     07/12/2022 1945 nitroglycerin (NITRO-BID) 2 % TD ointment 1 inch 1 inch Topical Given     07/12/2022 2205 methylPREDNISolone sodium succinate (Solu-MEDROL) injection 40 mg 40 mg Intravenous Given     07/12/2022 2201 azithromycin (ZITHROMAX) tablet 500 mg 500 mg Oral Given     07/12/2022 2202 heparin (porcine) subcutaneous injection 5,000 Units 5,000 Units Subcutaneous Given     07/12/2022 2358 morphine injection 2 mg 2 mg Intravenous Given     07/13/2022 0015 metoprolol (LOPRESSOR) injection 5 mg 5 mg Intravenous Given     07/13/2022 0034 dexmedeTOMIDine (Precedex) 400 mcg in sodium chloride 0 9% 100 mL 0 2 mcg/kg/hr Intravenous New Bag        Past Medical History:   Diagnosis Date    Cardiomyopathy ([facility] )     Chronic combined systolic and diastolic congestive heart failure     COPD (chronic obstructive pulmonary disease) ([facility] )     Fatty liver     Hyperlipidemia     Hypertension     Mitral regurgitation     Sepsis      Present on Admission:   Tobacco abuse   Hypokalemia   Essential hypertension   Chronic pain   Chronic obstructive pulmonary disease with acute exacerbation (HCC)   Acute on chronic combined systolic and diastolic congestive heart failure ([facility])   Anxiety      Admitting Diagnosis: CHF (congestive heart failure) ([facility]) [I50 9]  High blood pressure [I10]  SOB (shortness of breath) [R06 02]  Respiratory failure with hypoxia ([facility]) [J96 91]  Chronic obstructive pulmonary disease with acute exacerbation ([facility]) [J44 1]  Age/Sex: 64 y o  female  Admission Orders:  Scheduled Medications:  carvedilol, 3 125 mg, Oral, BID With Meals  heparin (porcine), 5,000 Units, Subcutaneous, Q8H [address]  levalbuterol, 1 25 mg, Nebulization, TID   And  sodium chloride, 3 mL, Nebulization, TID  nicotine, 1 patch, Transdermal, Daily  umeclidinium-vilanterol, 1 puff, Inhalation, Daily      Continuous IV Infusions:  dexmedetomidine, 0 1-0 7 mcg/kg/hr, Intravenous, Titrated      PRN Meds:  acetaminophen, 650 mg, Oral, Q4H PRN  albuterol, 2 5 mg, Nebulization, Q6H PRN  ondansetron, 4 mg, Intravenous, Q6H PRN        IP CONSULT TO NUTRITION SERVICES  IP CONSULT TO CASE MANAGEMENT  IP CONSULT TO CARDIOLOGY    Network Utilization Review Department  ATTENTION: Please call with any questions or concerns to [phone] and carefully listen to the prompts so that you are directed to the right person  All voicemails are confidential   Manuel Arzate all requests for admission clinical reviews, approved or denied determinations and any other requests to dedicated fax number below belonging to the campus where the patient is receiving treatment   List of dedicated fax numbers for the Facilities:  [address] DENIALS (Administrative/Medical Necessity) [phone]   [address] (Maternity/NICU/Pediatrics) [phone]   [address]  [address] [id number] [place] [address] [address] Shobha Mckeon [address] [facility] [facility] [phone]

## 2022-07-13 NOTE — ASSESSMENT & PLAN NOTE
· Currently smokes 2 PPD  · Recently discharged from [facility] for similar presentation  · Per chart review, has been intubated 2 X in the past year  · Continue Solu-Medrol 40 mg IV q 8 hours  · Atrovent and  Xopenex nebulizer treatments t i d   · PRN Albuterol  · Zithromax oral switched to IV for NPO status Total D2 ABX  · Currently maintained on Bipap for respiratory distress and Hypoxemia  · Sputum culture ordered  · CXR concerning for vascular congestion, will wait for official read  · Pulmonary consulted by [facility]  · ABG ordered for the morning  · Consider Palliative consult

## 2022-07-13 NOTE — ED NOTES
DOTTIE Mckeon made aware that pt feels SOB and feels like all the fluid is in her chest  RT paged  O2 turned up from 2L to 4L/min via TERE Villareal RN  07/13/22 0010

## 2022-07-13 NOTE — ASSESSMENT & PLAN NOTE
· Patient does not normally wear oxygen at home  · Is now requiring 2 L nasal cannula maintain O2 saturation 93%  · Likely multifactorial  · Treating COPD exacerbation as well as CHF as per above  · Will place on O2 and respiratory protocol

## 2022-07-13 NOTE — PLAN OF CARE
Problem: MOBILITY - ADULT  Goal: Maintain or return to baseline ADL function  Description: INTERVENTIONS:  -  Assess patient's ability to carry out ADLs; assess patient's baseline for ADL function and identify physical deficits which impact ability to perform ADLs (bathing, care of mouth/teeth, toileting, grooming, dressing, etc )  - Assess/evaluate cause of self-care deficits   - Assess range of motion  - Assess patient's mobility; develop plan if impaired  - Assess patient's need for assistive devices and provide as appropriate  - Encourage maximum independence but intervene and supervise when necessary  - Involve family in performance of ADLs  - Assess for home care needs following discharge   - Consider OT consult to assist with ADL evaluation and planning for discharge  - Provide patient education as appropriate  Outcome: Progressing  Goal: Maintains/Returns to pre admission functional level  Description: INTERVENTIONS:  - Perform BMAT or MOVE assessment daily    - Set and communicate daily mobility goal to care team and patient/family/caregiver  - Collaborate with rehabilitation services on mobility goals if consulted  - Perform Range of Motion 3 times a day  - Reposition patient every 2 hours    - Dangle patient 3 times a day  - Stand patient 3 times a day  - Ambulate patient 3 times a day  - Out of bed to chair 3 times a day   - Out of bed for meals 3 times a day  - Out of bed for toileting  - Record patient progress and toleration of activity level   Outcome: Progressing     Problem: Potential for Falls  Goal: Patient will remain free of falls  Description: INTERVENTIONS:  - Educate patient/family on patient safety including physical limitations  - Instruct patient to call for assistance with activity   - Consult OT/PT to assist with strengthening/mobility   - Keep Call bell within reach  - Keep bed low and locked with side rails adjusted as appropriate  - Keep care items and personal belongings within reach  - Initiate and maintain comfort rounds  - Make Fall Risk Sign visible to staff  - Offer Toileting every 2 Hours, in advance of need  - Initiate/Maintain bed 2alarm  - Obtain necessary fall risk management equipment: yellow socks  - Apply yellow socks and bracelet for high fall risk patients  - Consider moving patient to room near nurses station  Outcome: Progressing     Problem: Prexisting or High Potential for Compromised Skin Integrity  Goal: Skin integrity is maintained or improved  Description: INTERVENTIONS:  - Identify patients at risk for skin breakdown  - Assess and monitor skin integrity  - Assess and monitor nutrition and hydration status  - Monitor labs   - Assess for incontinence   - Turn and reposition patient  - Assist with mobility/ambulation  - Relieve pressure over bony prominences  - Avoid friction and shearing  - Provide appropriate hygiene as needed including keeping skin clean and dry  - Evaluate need for skin moisturizer/barrier cream  - Collaborate with interdisciplinary team   - Patient/family teaching  - Consider wound care consult   Outcome: Progressing     Problem: PAIN - ADULT  Goal: Verbalizes/displays adequate comfort level or baseline comfort level  Description: Interventions:  - Encourage patient to monitor pain and request assistance  - Assess pain using appropriate pain scale  - Administer analgesics based on type and severity of pain and evaluate response  - Implement non-pharmacological measures as appropriate and evaluate response  - Consider cultural and social influences on pain and pain management  - Notify physician/advanced practitioner if interventions unsuccessful or patient reports new pain  Outcome: Progressing     Problem: INFECTION - ADULT  Goal: Absence or prevention of progression during hospitalization  Description: INTERVENTIONS:  - Assess and monitor for signs and symptoms of infection  - Monitor lab/diagnostic results  - Monitor all insertion sites, i e  indwelling lines, tubes, and drains  - Monitor endotracheal if appropriate and nasal secretions for changes in amount and color  - [place] appropriate cooling/warming therapies per order  - Administer medications as ordered  - Instruct and encourage patient and family to use good hand hygiene technique  - Identify and instruct in appropriate isolation precautions for identified infection/condition  Outcome: Progressing  Goal: Absence of fever/infection during neutropenic period  Description: INTERVENTIONS:  - Monitor WBC    Outcome: Progressing     Problem: SAFETY ADULT  Goal: Maintain or return to baseline ADL function  Description: INTERVENTIONS:  -  Assess patient's ability to carry out ADLs; assess patient's baseline for ADL function and identify physical deficits which impact ability to perform ADLs (bathing, care of mouth/teeth, toileting, grooming, dressing, etc )  - Assess/evaluate cause of self-care deficits   - Assess range of motion  - Assess patient's mobility; develop plan if impaired  - Assess patient's need for assistive devices and provide as appropriate  - Encourage maximum independence but intervene and supervise when necessary  - Involve family in performance of ADLs  - Assess for home care needs following discharge   - Consider OT consult to assist with ADL evaluation and planning for discharge  - Provide patient education as appropriate  Outcome: Progressing  Goal: Maintains/Returns to pre admission functional level  Description: INTERVENTIONS:  - Perform BMAT or MOVE assessment daily    - Set and communicate daily mobility goal to care team and patient/family/caregiver  - Collaborate with rehabilitation services on mobility goals if consulted  - Perform Range of Motion 3 times a day  - Reposition patient every 2 hours    - Dangle patient 3 times a day  - Stand patient 3 times a day  - Ambulate patient 3 times a day  - Out of bed to chair 3 times a day   - Out of bed for meals 3 times a day  - Out of bed for toileting  - Record patient progress and toleration of activity level   Outcome: Progressing  Goal: Patient will remain free of falls  Description: INTERVENTIONS:  - Educate patient/family on patient safety including physical limitations  - Instruct patient to call for assistance with activity   - Consult OT/PT to assist with strengthening/mobility   - Keep Call bell within reach  - Keep bed low and locked with side rails adjusted as appropriate  - Keep care items and personal belongings within reach  - Initiate and maintain comfort rounds  - Make Fall Risk Sign visible to staff  - Offer Toileting every 2 Hours, in advance of need  - Initiate/Maintain bed 2alarm  - Obtain necessary fall risk management equipment: yellow socks  - Apply yellow socks and bracelet for high fall risk patients  - Consider moving patient to room near nurses station  Outcome: Progressing     Problem: DISCHARGE PLANNING  Goal: Discharge to home or other facility with appropriate resources  Description: INTERVENTIONS:  - Identify barriers to discharge w/patient and caregiver  - Arrange for needed discharge resources and transportation as appropriate  - Identify discharge learning needs (meds, wound care, etc )  - Arrange for interpretive services to assist at discharge as needed  - Refer to Case Management Department for coordinating discharge planning if the patient needs post-hospital services based on physician/advanced practitioner order or complex needs related to functional status, cognitive ability, or social support system  Outcome: Progressing     Problem: Knowledge Deficit  Goal: Patient/family/caregiver demonstrates understanding of disease process, treatment plan, medications, and discharge instructions  Description: Complete learning assessment and assess knowledge base    Interventions:  - Provide teaching at level of understanding  - Provide teaching via preferred learning methods  Outcome: Progressing

## 2022-07-13 NOTE — CASE MANAGEMENT
Case Management Assessment    Patient name Federico Day  Location ICU 02/ICU  MRN [id number]  : 1960 Date 2022       Current Admission Date: 2022  Current Admission Diagnosis:Chronic obstructive pulmonary disease with acute exacerbation [facility])   Patient Active Problem List    Diagnosis Date Noted    Acute on chronic respiratory failure with hypoxemia ([facility] ) 2022    Acute respiratory insufficiency 2022    SHANTA (acute kidney injury) ([facility] ) 2022    [facility] disease with [facility] exacerbation [facility] ) 06/15/2022    Acute on chronic combined systolic and diastolic congestive heart failure ([facility] ) 2021    Tobacco abuse 2021    Leukocytosis 2021    Anxiety 2021    Dilated cardiomyopathy ([facility] ) 2021    Essential hypertension 2021    Hypokalemia 2021    Chronic pain 2021    Vitamin D deficiency 2013      LOS (days): 1  Geometric Mean LOS (GMLOS) (days): 3 80  Days to GMLOS:3 1     OBJECTIVE:  PATIENT READMITTED TO HOSPITAL  Risk of Unplanned Readmission Score: 14 76     Current admission status: Inpatient    Preferred Pharmacy:   [address] [facility] [address]   Dorothy Guajardo, [place] [address] [place] [id number]  Phone: [phone] Fax: [phone]    Primary Care Provider: Jerome Wick DO    Primary Insurance: MEDICARE MISC REPLACEMENT  Secondary Insurance: [place] [address] [place] [address]    ASSESSMENT:  [facility] Representative - Daughter   Primary Phone: [phone] (Mobile)               Advance Directives  Does patient have a [address]?: No  Was patient offered paperwork?: Yes (Declined)  Does patient currently have a Health Care decision maker?: Yes, please see Health Care Proxy section  Does patient have Advance Directives?: No  Was patient offered paperwork?: Yes (Declined)  Primary Contact: Juan Catherine daughter    Readmission Root Cause  30 Day Readmission: Yes  Who directed you to return to the hospital?: Self  Did you understand whom to contact if you had questions or problems?: Yes  Did you get your prescriptions before you left the hospital?: No  Reason[de-identified] Declined service  Were you able to get your prescriptions filled when you left the hospital?: Yes  Did you take your medications as prescribed?: Yes  Were you able to get to your follow-up appointments?: No  Reason[de-identified] Readmitted prior to appointment  During previous admission, was a post-acute recommendation made?: No  Patient was readmitted due to: CHF    Patient Information  Admitted from[de-identified] Home  Mental Status: Alert  During Assessment patient was accompanied by: Not accompanied during assessment  Assessment information provided by[de-identified] Patient  Primary Caregiver: Self  Support Systems: Daughter  [place] of Residence: [address] do you live in?: [address] [place] [address] entry access options   Select all that apply : Stairs  Number of steps to enter home : 7  Do the steps have railings?: Yes  Type of Current Residence: 2 story home  Upon entering residence, is there a bedroom on the main floor (no further steps)?: No  A bedroom is located on the following floor levels of residence (select all that apply):: 2nd Floor  Upon entering residence, is there a bathroom on the main floor (no further steps)?: No  Indicate which floors of current residence have a bathroom (select all the apply):: 2nd Floor  Number of steps to 2nd floor from main floor: One Flight  In the last 12 months, was there a time when you were not able to pay the mortgage or rent on time?: No  In the last 12 months, how many places have you lived?: 1  In the last 12 months, was there a time when you did not have a steady place to sleep or slept in a shelter (including now)?: No  Homeless/housing insecurity resource given?: N/A  Living Arrangements: Lives Alone  Is patient a ?: No    Activities of Daily Living Prior to Admission  Functional Status: Independent  Completes ADLs independently?: Yes  Ambulates independently?: Yes  Does patient use assisted devices?: No  Does patient currently own DME?: Yes  What DME does the patient currently own?: Home Oxygen concentrator, Portable Oxygen tanks ([employer])  Does patient have a history of Outpatient Therapy (PT/OT)?: No  Does the patient have a history of Short-Term Rehab?: No  Does patient have a history of HHC?: Yes ([facility])  Does patient currently have [facility]?: No    Patient Information Continued  Income Source: Pension/longterm  Does patient have prescription coverage?: Yes  Within the past 12 months, you worried that your food would run out before you got the money to buy more : Never true  Within the past 12 months, the food you bought just didn't last and you didn't have money to get more : Never true  Food insecurity resource given?: N/A  Does patient receive dialysis treatments?: No  Does patient have a history of substance abuse?: No  Does patient have a history of Mental Health Diagnosis?: No    PHQ 2/9 Screening   Reviewed PHQ 2/9 Depression Screening Score?: No    Means of Transportation  Means of Transport to Appts[de-identified] Drives Self  In the past 12 months, has lack of transportation kept you from medical appointments or from getting medications?: No  In the past 12 months, has lack of transportation kept you from meetings, work, or from getting things needed for daily living?: No  Was application for public transport provided?: N/A

## 2022-07-13 NOTE — H&P
Jessicajessenia 45  H&P- Shama Kappa 1960, 64 y o  female MRN: [id number]  Unit/Bed#: TR 04 Encounter: [id number]  Primary Care Provider: Golden Howell DO   Date and time admitted to hospital: 7/12/2022  5:54 PM    * Chronic obstructive pulmonary disease with acute exacerbation ([facility] )  Assessment & Plan  · Admit to telemetry  · Likely secondary to continued smoking  · Placed on O2 and respiratory protocol  · Give Solu-Medrol 40 mg IV q 8 hours  · Give Xopenex nebulizer treatments t i d   · Give Zithromax 500 mg p o  daily  · Continue pre-hospital Anoro 1 puff daily  · Consult pulmonary in a m  Acute on chronic combined systolic and diastolic congestive heart failure (HCC)  Assessment & Plan  Wt Readings from Last 3 Encounters:   06/28/22 77 4 kg (170 lb 9 6 oz)   06/22/22 76 6 kg (168 lb 14 oz)   07/13/21 71 7 kg (158 lb)     · Place on telemetry  · BNP elevated at 1822  · Placed on cardiac no added salt 1800 cc fluid restriction diet  · Give Lasix 40 mg IV b i d  · Trend cardiac enzymes  · Consult Cardiology in a m  · Obtain daily weights    Acute respiratory insufficiency  Assessment & Plan  · Patient does not normally wear oxygen at home  · Is now requiring 2 L nasal cannula maintain O2 saturation 93%  · Likely multifactorial  · Treating COPD exacerbation as well as CHF as per above  · Will place on O2 and respiratory protocol    Tobacco abuse  Assessment & Plan  Will give nicotine 21 mg transdermal daily consult for smoking cessation education    Chronic pain  Assessment & Plan  Continue pre-hospital MS Contin and Percocet    Hypokalemia  Assessment & Plan  Patient status post 40 mEq p o  repletion in the ER, will continue 20 mEq p o  daily continue monitor with repeat labs in a m      Essential hypertension  Assessment & Plan  Continue pre-hospital Coreg 3 125 mg p o  b i d  and Lasix 40 mg IV b i d       VTE Prophylaxis: Heparin  Code Status:  Level 1  Discussion with family: None present at bedside at time of exam    Anticipated Length of Stay:  Patient will be admitted on an Inpatient basis with an anticipated length of stay of  > 2 midnights  Justification for Hospital Stay:  Acute respiratory insufficiency likely secondary to COPD exacerbation as well as acute on chronic congestive heart failure requiring O2 support, IV steroids and frequent breathing treatments    Total Time for Visit, including Counseling / Coordination of Care: 1 hour  Greater than 50% of this total time spent on direct patient counseling and coordination of care  Chief Complaint:   Dyspnea x3 days    History of Present Illness:    Moon Garcia is a 64 y o  female who presents with dyspnea x3 days  Patient was recently discharged on 06/22/2022 from [facility] with similar presentation with a discharge diagnosis of COPD as well as CHF exacerbation complicated by pneumonia  Patient has longstanding history of COPD who continues to smoke and has a pending appointment with Pulmonary on August 4th  Patient additionally has a longstanding history of congestive heart failure and follows with cardiology Dr Terri Moore in who she last saw for routine follow-up was 06/28/2022  Patient complains of dyspnea with minimal activity accompanied with a nonproductive cough states that this is slightly improved with her albuterol MDI  Patient states that her symptoms are worse at night but not when lying flat during the day  Patient denies any chest pain, palpitations, lightheadedness or dizziness  Patient denies any fever or chills, no pedal edema or recent weight gain  Review of Systems:    Review of Systems   Constitutional: Negative for chills and fever  HENT: Negative for congestion and sore throat  Respiratory: Positive for cough and shortness of breath  Negative for wheezing  Cardiovascular: Negative for chest pain and palpitations     Gastrointestinal: Negative for diarrhea, nausea and vomiting  Genitourinary: Negative for dysuria, frequency, hematuria and urgency  Musculoskeletal: Negative for arthralgias and myalgias  Skin: Negative for wound  Neurological: Negative for dizziness, syncope, light-headedness and headaches  All other systems reviewed and are negative  Past Medical and Surgical History:     Past Medical History:   Diagnosis Date    Cardiomy[facility] )     Chronic combined systolic and diastolic congestive heart failure     COPD (chronic obstructive pulmonary disease) (HCC)     Fatty liver     Hyperlipidemia     Hypertension     Mitral regurgitation     Sepsis        Past Surgical History:   Procedure Laterality Date    CARDIAC CATHETERIZATION  2021    Moderate non-obstructive atherosclerosis     SECTION      CHOLECYSTECTOMY      ROTATOR CUFF REPAIR W/ DISTAL CLAVICLE EXCISION Right     TONSILLECTOMY         Meds/Allergies:    Prior to Admission medications    Medication Sig Start Date End Date Taking?  Authorizing Provider   albuterol (ProAir HFA) 90 mcg/act inhaler Inhale 2 puffs every 6 (six) hours as needed for wheezing 22  Yes ESTHER Ferguson   carvedilol (COREG) 3 125 mg tablet Take 1 tablet (3 125 mg total) by mouth 2 (two) times a day with meals 22  Yes Marshonna Pancoast,    clonazePAM (KlonoPIN) 1 mg tablet Take 0 5-1 mg by mouth daily at bedtime as needed 21  Yes Historical Provider, MD   furosemide (LASIX) 40 mg tablet Take 1 tablet (40 mg total) by mouth daily Hold for morning weight less than 160 22  Yes Mary Jo Carpenter MD   gabapentin (NEURONTIN) 300 mg capsule Take 300 mg by mouth as needed    Yes Historical Provider, MD   morphine (MS CONTIN) 30 mg 12 hr tablet Take 30 mg by mouth 2 (two) times a day   Yes Historical Provider, MD   oxyCODONE-acetaminophen (PERCOCET) 5-325 mg per tablet Take 1 tablet by mouth every 4 (four) hours as needed for moderate pain   Yes Historical Provider, MD umeclidinium-vilanterol (Anoro Ellipta) 62 5-25 MCG/INH inhaler Inhale 1 puff daily 6/21/22 7/21/22 Yes ESTHER Donald   albuterol (2 5 mg/3 mL) 0 083 % nebulizer solution Take 3 mL (2 5 mg total) by nebulization every 6 (six) hours as needed for wheezing or shortness of breath  Patient not taking: Reported on 6/28/2022 6/21/22   ESTHER Donald     all medications and allergies reviewed    Allergies: Allergies   Allergen Reactions    Wellbutrin [Bupropion] Arthralgia       Social History:     Marital Status: Single   Occupation:  Retired  Patient Pre-hospital Living Situation:  Resides at home alone  Patient Pre-hospital Level of Mobility:  Full without assist  Patient Pre-hospital Diet Restrictions:  Low-salt  Substance Use History:   Social History     Substance and Sexual Activity   Alcohol Use Never     Social History     Tobacco Use   Smoking Status Current Some Day Smoker    Packs/day: 2 00    Types: Cigarettes   Smokeless Tobacco Never Used     Social History     Substance and Sexual Activity   Drug Use No       Family History:  I have reviewed the patient's family history    Physical Exam:     Vitals:   Blood Pressure: 166/98 (07/12/22 2245)  Pulse: 104 (07/12/22 2245)  Temperature: 98 8 °F (37 1 °C) (07/12/22 1756)  Respirations: 19 (07/12/22 2245)  SpO2: 93 % (07/12/22 2245)    Physical Exam  Vitals and nursing note reviewed  Constitutional:       General: She is not in acute distress  Appearance: Normal appearance  HENT:      Head: Normocephalic and atraumatic  Right Ear: Tympanic membrane normal       Left Ear: Tympanic membrane normal       Nose: Nose normal       Mouth/Throat:      Mouth: Mucous membranes are moist       Pharynx: Oropharynx is clear  No posterior oropharyngeal erythema  Eyes:      Extraocular Movements: Extraocular movements intact  Conjunctiva/sclera: Conjunctivae normal       Pupils: Pupils are equal, round, and reactive to light  Cardiovascular:      Rate and Rhythm: Normal rate and regular rhythm  Pulses: Normal pulses  Heart sounds: Normal heart sounds  No murmur heard  Pulmonary:      Effort: Pulmonary effort is normal  No respiratory distress  Breath sounds: Decreased air movement present  Rales present  No wheezing or rhonchi  Abdominal:      General: Bowel sounds are normal       Palpations: Abdomen is soft  Tenderness: There is no abdominal tenderness  Musculoskeletal:      Cervical back: Normal range of motion and neck supple  No muscular tenderness  Right lower leg: No edema  Left lower leg: No edema  Skin:     General: Skin is warm and dry  Capillary Refill: Capillary refill takes less than 2 seconds  Neurological:      General: No focal deficit present  Mental Status: She is alert and oriented to person, place, and time  Additional Data:     Lab Results: I have personally reviewed pertinent reports  Results from last 7 days   Lab Units 07/12/22  1816   WBC Thousand/uL 8 66   HEMOGLOBIN g/dL 13 3   HEMATOCRIT % 40 1   PLATELETS Thousands/uL 291   NEUTROS PCT % 57   LYMPHS PCT % 34   MONOS PCT % 6   EOS PCT % 2     Results from last 7 days   Lab Units 07/12/22  1816   SODIUM mmol/L 135   POTASSIUM mmol/L 3 1*   CHLORIDE mmol/L 96   CO2 mmol/L 30   BUN mg/dL 12   CREATININE mg/dL 1 08   ANION GAP mmol/L 9   CALCIUM mg/dL 10 0   ALBUMIN g/dL 4 3   TOTAL BILIRUBIN mg/dL 0 79   ALK PHOS U/L 81   ALT U/L 3*   AST U/L 6*   GLUCOSE RANDOM mg/dL 100                 Results from last 7 days   Lab Units 07/12/22  1816   PROCALCITONIN ng/ml <0 05       Imaging: I have personally reviewed pertinent reports  XR chest 1 view portable    (Results Pending)         EKG, Pathology, and Other Studies Reviewed on Admission:   · EKG: N/A    [facility] / Care Everywhere Records Reviewed: Yes    ** Please Note: This note has been constructed using a voice recognition system   **

## 2022-07-13 NOTE — ED NOTES
RT arriving at bedside, placing pt on bipap  VBG processing in lab       Marcey Babinski, RN  07/13/22 1798

## 2022-07-13 NOTE — UTILIZATION REVIEW
Inpatient Admission Authorization Request   NOTIFICATION OF INPATIENT ADMISSION/INPATIENT AUTHORIZATION REQUEST   SERVICING FACILITY:   [facility] [address]  [address] [place] [address], [employer], [address]  Tax ID: [id number]  NPI: [id number]  Place of Service: Inpatient [facility] [facility]  [address]  Place of Service Code: 24     ATTENDING PROVIDER:  Attending Name and NPI#: Óscar Dana Kyle  Address: [address] [facility], [employer], [address]  Phone: [phone]     UTILIZATION REVIEW CONTACT:  [facility] Utilization   Network Utilization Review Department  Phone: [phone]  Fax [phone]  Email: [email or web address]     PHYSICIAN ADVISORY SERVICES:  FOR [id number] REVIEW - MEDICAL NECESSITY DENIAL  Phone: [phone]  Fax: [phone]  Email: [email or web address]     TYPE OF REQUEST:  Inpatient Status     ADMISSION INFORMATION:  ADMISSION DATE/TIME: 7/12/22  7:36 PM  PATIENT DIAGNOSIS CODE/DESCRIPTION:  CHF (congestive heart failure) (HCC) [I50 9]  High blood pressure [I10]  SOB (shortness of breath) [R06 02]  Respiratory failure with hypoxia (HCC) [J96 91]  Chronic obstructive pulmonary disease with acute exacerbation (HCC) [J44 1]  DISCHARGE DATE/TIME: No discharge date for patient encounter  IMPORTANT INFORMATION:  Please contact the [facility] directly with any questions or concerns regarding this request  Department voicemails are confidential     Send requests for admission clinical reviews, concurrent reviews, approvals, and administrative denials due to lack of clinical to fax [phone]

## 2022-07-13 NOTE — ASSESSMENT & PLAN NOTE
Wt Readings from Last 3 Encounters:   07/13/22 76 7 kg (169 lb 1 5 oz)   06/28/22 77 4 kg (170 lb 9 6 oz)   06/22/22 76 6 kg (168 lb 14 oz)       · BNP 1822 an admission  · Recent ECHO June 2022: Left ventricular cavity size is normal  Wall thickness is mildly increased  There is mild concentric hypertrophy  The left ventricular ejection fraction is 41% by biplane measurement  Systolic function is moderately reduced  The Basalar septum and basilar inferior wall are hypokenetic  Diastolic function is mildly abnormal, consistent with grade I (abnormal) relaxation  The pulmonary artery systolic pressure is moderately increased  · Follows with Dr Mahnaz Julien outpatient  · 80 Lasix given in ED X1  · Given Nitropaste in the ED  · Lasix 40 mg IV b i d  · Trend cardiac enzymes  · Consult Cardiology in a m    · Obtain daily weights  · Continue Henley catheter for strict I/O

## 2022-07-13 NOTE — ASSESSMENT & PLAN NOTE
· Admit to telemetry  · Likely secondary to continued smoking  · Placed on O2 and respiratory protocol  · Give Solu-Medrol 40 mg IV q 8 hours  · Give Xopenex nebulizer treatments t i d   · Give Zithromax 500 mg p o  daily  · Continue pre-hospital Anoro 1 puff daily  · Consult pulmonary in a m

## 2022-07-13 NOTE — UTILIZATION REVIEW
Inpatient Admission Authorization Request   NOTIFICATION OF INPATIENT ADMISSION/INPATIENT AUTHORIZATION REQUEST   SERVICING FACILITY:   [place] [address]  [address] [facility] [address], [address]  Tax ID: [id number]  NPI: [id number]  Place of Service: Inpatient [facility]  [address]  Place of Service Code: 24     ATTENDING PROVIDER:  Attending Name and NPI#: Dana Tucker  Address: [address] [facility] [address] [place], [address]  Phone: [phone]     UTILIZATION REVIEW CONTACT:  Mague Stauffer, Utilization   Network Utilization Review Department  Phone: [phone]  Fax [phone]  Email: [email or web address]     PHYSICIAN ADVISORY SERVICES:  FOR HNFH-IE-ELTR REVIEW - MEDICAL NECESSITY DENIAL  Phone: [phone]  Fax: [phone]  Email: [email or web address]     TYPE OF REQUEST:  Inpatient Status     ADMISSION INFORMATION:  ADMISSION DATE/TIME: 7/12/22  7:36 PM  PATIENT DIAGNOSIS CODE/DESCRIPTION:  CHF (congestive heart failure) (HCC) [I50 9]  High blood pressure [I10]  SOB (shortness of breath) [R06 02]  Respiratory failure with hypoxia (HCC) [J96 91]  Chronic obstructive pulmonary disease with acute exacerbation (HCC) [J44 1]  DISCHARGE DATE/TIME: No discharge date for patient encounter  IMPORTANT INFORMATION:  Please contact the [facility] directly with any questions or concerns regarding this request  Department voicemails are confidential     Send requests for admission clinical reviews, concurrent reviews, approvals, and administrative denials due to lack of clinical to fax [phone]

## 2022-07-13 NOTE — ASSESSMENT & PLAN NOTE
· Home MS Contin, Percocet, and Gabapentin on hold while NPO  · Patient resting comfortably currently

## 2022-07-13 NOTE — WOUND OSTOMY CARE
Consult Note - Wound   Windy Estrada 64 y o  female MRN: [id number]  Unit/Bed#: ICU 02-01 Encounter: [id number]      History and Present Illness:  64year old female admitted with COPD  Wound care consulted for sacral wound  Patient history significant for HTN, chronic pain, anxiety, CHF, tobacco abuse, and acute on chronic respiratory failure  Assessment Findings:   Patient in bed for assessment  She is awake, alert and oriented  She has a leung catheter for urinary management, she is not incontinent at baseline  She is able to turn in the bed independently  1  Bilateral heels, abdominal and groin folds intact  2  POA-stage 2 pressure injury to the sacrum on the bony prominence, non-blanchable wound bed, no drainage at this time  Area of hyperpigmented scar on the left buttock, unknown as to etiology of the wound that has resolved, suspicious for resolved pressure injury, patient states she has been in the hospital a few times over the past few months    Skin and Wound Care Plan:   1  Ehob offloading cushion to chair when OOB  2  Elevate heels off the bed with pillows   3  Turn and reposition every two hours  4  Hydraguard to bilateral heels and lower buttocks BID and PRN, do not apply beneath Allevyn foam dressing  5  Skin nourishing cream daily  6  Allevyn life silicone bordered foam dressing to sacrum, elieser with T, date, peel back dressing daily for skin assessment, reapply and change every 3 days and PRN    Wounds:  Wound 07/13/22 Coccyx (Active)   Wound Image   07/13/22 1204   Wound Description Beefy red 07/13/22 1204   Pressure Injury Stage 2 07/13/22 1204   Reba-wound Assessment Dry; Intact 07/13/22 1204   Wound Length (cm) 0 3 cm 07/13/22 1204   Wound Width (cm) 0 2 cm 07/13/22 1204   Wound Depth (cm) 0 1 cm 07/13/22 1204   Wound Surface Area (cm^2) 0 06 cm^2 07/13/22 1204   Wound Volume (cm^3) 0 006 cm^3 07/13/22 1204   Calculated Wound Volume (cm^3) 0 01 cm^3 07/13/22 1204   Drainage Amount None 07/13/22 1204   Treatments Cleansed 07/13/22 1204   Dressing Foam, Silicon (eg  Allevyn, etc) 07/13/22 1204   Dressing Changed New 07/13/22 1204   Patient Tolerance Tolerated well 07/13/22 1204       Wound 07/13/22 Pressure Injury Buttocks (Active)   Wound Image   07/13/22 1202   Wound Description Clean;Dry; Intact 07/13/22 1203   Reba-wound Assessment Hyperpigmented 07/13/22 1203   Drainage Amount None 07/13/22 1203   Treatments Cleansed 07/13/22 1203   Dressing Foam, Silicon (eg   Allevyn, etc) 07/13/22 1203   Dressing Changed New 07/13/22 1203   Patient Tolerance Tolerated well 07/13/22 1203     Reviewed plan of care with primary RN Bobby  Recommendations written as orders  Wound care team to follow weekly while admitted  Questions or concerns [facility] Nurse    Margie WELLSN, RN, [facility]

## 2022-07-13 NOTE — ED NOTES
Pt hitting call bell telling RN that she is nauseous, pt offered PRN zofran, declining, provided vomit bag per request  Pt beginning to feel anxious and screaming that she can't breath and is short of breath       Amira Bourne RN  07/13/22 3983

## 2022-07-13 NOTE — PROGRESS NOTES
[address]  Progress Note Rae Roque 1960, 64 y o  female MRN: [id number]  Unit/Bed#: ICU 02-01 Encounter: [id number]  Primary Care Provider: Yoel Locke DO   Date and time admitted to hospital: 7/12/2022  5:54 PM    * Chronic obstructive pulmonary disease with acute exacerbation ([facility] [address] )  Assessment & Plan    · Currently smokes 2 PPD  · Recently discharged from [facility] for similar presentation  · Per chart review, has been intubated 2 X in the past year  · Continue Solu-Medrol 40 mg IV q 8 hours  · Atrovent and  Xopenex nebulizer treatments t i d   · PRN Albuterol  · Zithromax oral switched to IV for NPO status Total D2 ABX  · Currently maintained on Bipap for respiratory distress and Hypoxemia  · Sputum culture ordered  · CXR concerning for vascular congestion, will wait for official read  · Pulmonary consulted by [facility] AP  · ABG ordered for the morning  · Consider Palliative consult      Acute on chronic respiratory failure with hypoxemia [facility]  Assessment & Plan  · Patient initially admitted to [place] [address] and CC contacted for acute change in respiratory status, Tachycardia, Tachypnea, Diaphoresis, and agitation     · Per chart review, patient is SOB with exertion and is unable to lay flat at bedtime  · Intubated 2 X in the past year for similar symptomatology  · Given 2 mg Morphine IV in ED  · Received 80 Lasix in ED  · Continued on Azithromycin, 40 mg Solumedrol Q8, Nebulizer treatments, 40 IV lasix BID  · Continue Precedex gtt for agitation  · Sputum Culture ordered  · Continue Bipap 16/6 50%  · CXR concerning for vascular congestion, official read pending    Acute on chronic combined systolic and diastolic [facility]  Assessment & Plan  Wt Readings from Last 3 Encounters:   07/13/22 76 7 kg (169 lb 1 5 oz)   06/28/22 77 4 kg (170 lb 9 6 oz)   06/22/22 76 6 kg (168 lb 14 oz)       · BNP 1822 an admission  · Recent ECHO June 2022: Left ventricular cavity size is normal  Wall thickness is mildly increased  There is mild concentric hypertrophy  The left ventricular ejection fraction is 41% by biplane measurement  Systolic function is moderately reduced  The Basalar septum and basilar inferior wall are hypokenetic  Diastolic function is mildly abnormal, consistent with grade I (abnormal) relaxation  The pulmonary artery systolic pressure is moderately increased  · Follows with Dr Rabia Butterfield outpatient  · 80 Lasix given in ED X1  · Given Nitropaste in the ED  · Lasix 40 mg IV b i d  · Trend cardiac enzymes  · Consult Cardiology in a m  · Obtain daily weights  · Continue Henley catheter for strict I/O        Essential hypertension  Assessment & Plan  · Holding home Coreg 3 125 mg while NPO   · Currently normotensive  · Lasix 40 mg IV b i d  ordered    Hypokalemia  Assessment & Plan  · Given 40 PO in ED  · Check BMP in AM  · Replete as necessary with diuresis    Chronic pain  Assessment & Plan  · Home MS Contin, Percocet, and Gabapentin on hold while NPO  · Patient resting comfortably currently       Tobacco abuse  Assessment & Plan  · Current everyday smoker 2 PPD  · Per chart review, she is ordered O2 @ home but does not wear it  · SLIM placed pulmonary Consult  · Nicotine patch ordered  · Encourage Cessation      Anxiety  Assessment & Plan  · Home Klonopin on hold while NPO  · Continue Precedex gtt        ----------------------------------------------------------------------------------------  HPI/24hr events: Per [facility] AP/Admission: Jessy Draper is a 64 y o  female who presents with dyspnea x3 days  Patient was recently discharged on 06/22/2022 from [facility] with similar presentation with a discharge diagnosis of COPD as well as CHF exacerbation complicated by pneumonia  Patient has longstanding history of COPD who continues to smoke and has a pending appointment with Pulmonary on August 4th    Patient additionally has a longstanding history of congestive heart failure and follows with cardiology Dr Basilio Clements who she last saw for routine follow-up was 06/28/2022  Patient complains of dyspnea with minimal activity accompanied with a nonproductive cough states that this is slightly improved with her albuterol MDI  Patient states that her symptoms are worse at night but not when lying flat during the day  Patient denies any chest pain, palpitations, lightheadedness or dizziness  Patient denies any fever or chills, no pedal edema or recent weight gain  Events overnight: Critical care contacted by the [facility] at approximately 12:30 AM to evaluate patient in the ED who was recently admitted for COPD exacerbation  Per the [facility] the patient had an acute change in her respiratory status  Upon evaluation the patient was diaphoretic, tachycardic, hypertensive, SOB, and agitated  Earlier in the Evening she had received 80 IV lasix, a CXR was obtained, other orders included Azithromycin, Solumedrol, Nebulizer treatment, 1L bolus, Potassium replacement, and Nitropaste  Upon evaluation by CC, The patient was given 2 MG Morphine IV, 5mg Metoprolol IV, Placed on Bipap, Henley catheter was placed, Precedex gtt started, and she was transferred to ICU for management  The patient is currently resting comfortably, vitals stable    Patient appropriate for transfer out of the ICU today?: No  Disposition: Continue Critical Care   Code Status: Level 1 - Full Code  ---------------------------------------------------------------------------------------  SUBJECTIVE  Patient resting comfortably on bipap   Patient states " I feel much better now "      Review of Systems   Unable to perform ROS: Other (currently on bipap)     Review of systems was unable to be performed secondary to Bipap  ---------------------------------------------------------------------------------------  OBJECTIVE    Vitals   Vitals:    07/13/22 0115 07/13/22 0200 07/13/22 0300 07/13/22 0400   BP: (!) 188/106 117/72 122/77 116/73   BP Location: Right arm      Pulse: (!) 108 90 80 74   Resp: (!) 57 22 21 22   Temp: 98 °F (36 7 °C)      TempSrc: Tympanic      SpO2: 97% 96% 97% 97%   Weight: 76 7 kg (169 lb 1 5 oz)      Height: 5' 10 5" (1 791 m)        Temp (24hrs), Av 4 °F (36 9 °C), Min:98 °F (36 7 °C), Max:98 8 °F (37 1 °C)  Current: Temperature: 98 °F (36 7 °C)          Respiratory:  SpO2: SpO2: 97 %  Nasal Cannula O2 Flow Rate (L/min): 4 L/min    Invasive/non-invasive ventilation settings   Respiratory  Report   Lab Data (Last 4 hours)    None         O2/Vent Data (Last 4 hours)    None                Physical Exam  Vitals and nursing note reviewed  Constitutional:       General: She is not in acute distress  HENT:      Head: Normocephalic  Mouth/Throat:      Mouth: Mucous membranes are dry  Eyes:      Pupils: Pupils are equal, round, and reactive to light  Cardiovascular:      Rate and Rhythm: Normal rate and regular rhythm  Pulses: Normal pulses  Heart sounds: Normal heart sounds  Pulmonary:      Effort: No respiratory distress  Breath sounds: No wheezing  Abdominal:      Palpations: Abdomen is soft  Tenderness: There is no abdominal tenderness  Musculoskeletal:         General: No swelling  Right lower leg: No edema  Left lower leg: No edema  Skin:     General: Skin is warm and dry  Capillary Refill: Capillary refill takes less than 2 seconds  Coloration: Skin is pale  Neurological:      Mental Status: She is oriented to person, place, and time               Laboratory and Diagnostics:  Results from last 7 days   Lab Units 22  0422 22  1816   WBC Thousand/uL 10 08 8 66   HEMOGLOBIN g/dL 12 7 13 3   HEMATOCRIT % 40 4 40 1   PLATELETS Thousands/uL 270 291   NEUTROS PCT %  --  57   MONOS PCT %  --  6     Results from last 7 days   Lab Units 22  0422 22  1816   SODIUM mmol/L 139 135   POTASSIUM mmol/L 3 8 3 1*   CHLORIDE mmol/L 101 96   CO2 mmol/L 27 30   ANION GAP mmol/L 11 9   BUN mg/dL 19 12   CREATININE mg/dL 1 66* 1 08   CALCIUM mg/dL 9 4 10 0   GLUCOSE RANDOM mg/dL 142* 100   ALT U/L 10 3*   AST U/L 13 6*   ALK PHOS U/L 82 81   ALBUMIN g/dL 4 0 4 3   TOTAL BILIRUBIN mg/dL 0 62 0 79     Results from last 7 days   Lab Units 07/13/22  0422 07/12/22  1816   MAGNESIUM mg/dL 2 2 2 0   PHOSPHORUS mg/dL 4 5*  --                    ABG:    VBG:  Results from last 7 days   Lab Units 07/13/22  0001   PH PANDA  7 345   PCO2 PANDA mm Hg 46 8   PO2 PANDA mm Hg 66 5*   HCO3 PANDA mmol/L 25 0   BASE EXC PANDA mmol/L -1 2     Results from last 7 days   Lab Units 07/13/22  0422 07/12/22  1816   PROCALCITONIN ng/ml 0 25 <0 05       Micro        EKG: reviewed  Imaging: I have personally reviewed pertinent reports  Intake and Output  I/O       07/11 0701 07/12 0700 07/12 0701 07/13 0700    I V  (mL/kg)  1 7 (0)    IV Piggyback  1000    Total Intake(mL/kg)  1001 7 (13 1)    Urine (mL/kg/hr)  800    Total Output  800    Net  +201 7                Height and Weights   Height: 5' 10 5" (179 1 cm)     Body mass index is 23 92 kg/m²  Weight (last 2 days)     Date/Time Weight    07/13/22 0115 76 7 (169 09)            Nutrition       Diet Orders   (From admission, onward)             Start     Ordered    07/13/22 0043  Diet NPO  Diet effective now        References:    Nutrtion Support Algorithm Enteral vs  Parenteral   Question Answer Comment   Diet Type NPO    RD to adjust diet per protocol?  Yes        07/13/22 0045                  Active Medications  Scheduled Meds:  Current Facility-Administered Medications   Medication Dose Route Frequency Provider Last Rate    acetaminophen  650 mg Oral Q4H PRN Claven [place], CRNP      albuterol  2 5 mg Nebulization Q6H PRN Claven [place], CRNP      [START ON 7/14/2022] azithromycin  500 mg Intravenous Q24H Claven [place], CRNP      calcium gluconate  1 g Intravenous Once Claven [place], CRNP 1 g (07/13/22 1095)    dexmedetomidine  0 1-0 7 mcg/kg/hr Intravenous Titrated Delrae Gazella, CRNP 0 2 mcg/kg/hr (07/13/22 0034)    furosemide  40 mg Intravenous BID Delrae Gazella, CRNP      heparin (porcine)  5,000 Units Subcutaneous Q8H [address] Delrae Gazella, CRNP      ipratropium  0 5 mg Nebulization TID Delrae Gazella, CRNP      levalbuterol  1 25 mg Nebulization TID Delrae Gazella, CRNP      And    sodium chloride  3 mL Nebulization TID Delrae Gazella, CRNP      methylPREDNISolone sodium succinate  40 mg Intravenous Q8H Delrae Gazella, CRNP      nicotine  1 patch Transdermal Daily Delrae Gazella, CRNP      ondansetron  4 mg Intravenous Q6H PRN Delrae Gazella, CRNP      potassium chloride  20 mEq Intravenous Once Delrae Gazella, CRNP 20 mEq (07/13/22 0504)     Continuous Infusions:  dexmedetomidine, 0 1-0 7 mcg/kg/hr, Last Rate: 0 2 mcg/kg/hr (07/13/22 0034)      PRN Meds:   acetaminophen, 650 mg, Q4H PRN  albuterol, 2 5 mg, Q6H PRN  ondansetron, 4 mg, Q6H PRN        Invasive Devices Review  Invasive Devices  Report    Peripheral Intravenous Line  Duration           Peripheral IV 07/12/22 Left Antecubital <1 day    Peripheral IV 07/13/22 Right Hand <1 day          Drain  Duration           Urethral Catheter 16 Fr  <1 day                Rationale for remaining devices: I/O  ---------------------------------------------------------------------------------------  Advance Directive and Living Will:      Power of :    POLST:    ---------------------------------------------------------------------------------------  Care Time Delivered:   Upon my evaluation, this patient had a high probability of imminent or life-threatening deterioration due to Acute respiratory failure, which required my direct attention, intervention, and personal management    I have personally provided 30 minutes (0100 to 0130) of critical care time, exclusive of procedures, teaching, family meetings, and any prior time recorded by providers other than myself  ESTHER Cardoso      Portions of the record may have been created with voice recognition software  Occasional wrong word or "sound a like" substitutions may have occurred due to the inherent limitations of voice recognition software    Read the chart carefully and recognize, using context, where substitutions have occurred

## 2022-07-13 NOTE — ASSESSMENT & PLAN NOTE
Patient status post 40 mEq p o  repletion in the ER, will continue 20 mEq p o  daily continue monitor with repeat labs in a m

## 2022-07-13 NOTE — RESPIRATORY THERAPY NOTE
07/13/22 0003   Respiratory Assessment   Assessment Type Assess only   General Appearance Alert; Awake   Respiratory Pattern Tripod position; Accessory muscle use;Labored   Chest Assessment Chest expansion symmetrical   Bilateral Breath Sounds Diminished;Rales  (bases)   Cough None   Resp Comments called to pt bedside, arrived pt in resp distress placed on Bipap 16/6 60% pt diaphoretic keith well, pt was combative to er Rn prior, was complaing on SOB and nausea no vomitting, then yelling for bipap per RN, pt redirected and attempting to calm and comfort pt, precedx requested   O2 Device bipap   Non-Invasive Information   O2 Interface Device Full face mask  (med)   Non-Invasive Ventilation Mode BiPAP  (v60)   $ Continous NIV Initial   SpO2 96 %   $ Pulse Oximetry Spot Check Charge Completed   Non-Invasive Settings   FiO2 (%) 60   Rise Time 6   IPAP (cm) 16 cm   EPAP (cm) 6 cm   Rate (Set) 14   Flow (lpm) 17   Pressure Support (cm H2O) 10   Inspiratory Time (Set) 1 5   Non-Invasive Readings   Skin Intervention Skin intact   Total Rate 48   Spontaneous Vt (mL) 640   Spontaneous MV (mL) 24 3   I/E Ratio (Obs) 1:3   Leak (lpm) 1   Vt (mL) (Mech) 640   Peak Pressure (Obs) 19   Non-Invasive Alarms   Insp Pressure High (cm H20) 40   Insp Pressure Low (cm H20) 4   Low Insp Pressure Time (sec) 20 sec   MV Low (L/min) 6   Vt High (mL) 1000   Vt Low (mL) 200   High Resp Rate (BPM) 50 BPM   Low Resp Rate (BPM) 4 BPM   Apnea Interval (sec) 20   Apnea Pressure 16

## 2022-07-13 NOTE — RESPIRATORY THERAPY NOTE
07/13/22 0015   Respiratory Assessment   Resp Comments dererased fio2 to 50% to pt keith   Non-Invasive Information   SpO2 97 %   Non-Invasive Settings   FiO2 (%) (S)  50

## 2022-07-13 NOTE — ASSESSMENT & PLAN NOTE
· Patient initially admitted to [place] [address] and CC contacted for acute change in respiratory status, Tachycardia, Tachypnea, Diaphoresis, and agitation     · Per chart review, patient is SOB with exertion and is unable to lay flat at bedtime  · Intubated 2 X in the past year for similar symptomatology  · Given 2 mg Morphine IV in ED  · Received 80 Lasix in ED  · Continued on Azithromycin, 40 mg Solumedrol Q8, Nebulizer treatments, 40 IV lasix BID  · Continue Precedex gtt for agitation  · Sputum Culture ordered  · Continue Bipap 16/6 50%  · CXR concerning for vascular congestion, official read pending

## 2022-07-13 NOTE — ASSESSMENT & PLAN NOTE
· Current everyday smoker 2 PPD  · Per chart review, she is ordered O2 @ home but does not wear it  · SLIM placed pulmonary Consult  · Nicotine patch ordered  · Encourage Cessation

## 2022-07-14 ENCOUNTER — APPOINTMENT (INPATIENT)
Dept: ULTRASOUND IMAGING | Facility: HOSPITAL | Age: 62
DRG: 682 | End: 2022-07-14
Payer: MEDICARE

## 2022-07-14 ENCOUNTER — APPOINTMENT (INPATIENT)
Dept: RADIOLOGY | Facility: HOSPITAL | Age: 62
DRG: 682 | End: 2022-07-14
Payer: MEDICARE

## 2022-07-14 ENCOUNTER — APPOINTMENT (INPATIENT)
Dept: NON INVASIVE DIAGNOSTICS | Facility: HOSPITAL | Age: 62
DRG: 682 | End: 2022-07-14
Payer: MEDICARE

## 2022-07-14 LAB
ALBUMIN SERPL BCP-MCNC: 3.8 G/DL (ref 3.5–5)
ALP SERPL-CCNC: 66 U/L (ref 34–104)
ALT SERPL W P-5'-P-CCNC: 9 U/L (ref 7–52)
ANION GAP SERPL CALCULATED.3IONS-SCNC: 13 MMOL/L (ref 4–13)
ANION GAP SERPL CALCULATED.3IONS-SCNC: 15 MMOL/L (ref 4–13)
ANION GAP SERPL CALCULATED.3IONS-SCNC: 15 MMOL/L (ref 4–13)
AORTIC ROOT: 3.4 CM
AORTIC VALVE MEAN VELOCITY: 9.9 M/S
APICAL FOUR CHAMBER EJECTION FRACTION: 34 %
AST SERPL W P-5'-P-CCNC: 14 U/L (ref 13–39)
AV AREA BY CONTINUOUS VTI: 1.8 CM2
AV AREA PEAK VELOCITY: 1.7 CM2
AV LVOT MEAN GRADIENT: 1 MMHG
AV LVOT PEAK GRADIENT: 2 MMHG
AV MEAN GRADIENT: 4 MMHG
AV PEAK GRADIENT: 7 MMHG
AV REGURGITATION PRESSURE HALF TIME: 363 MS
AV VALVE AREA: 1.75 CM2
AV VELOCITY RATIO: 0.53
BASE EX.OXY STD BLDV CALC-SCNC: 77.2 % (ref 60–80)
BASE EX.OXY STD BLDV CALC-SCNC: 91.3 % (ref 60–80)
BASE EXCESS BLDV CALC-SCNC: -2.8 MMOL/L
BASE EXCESS BLDV CALC-SCNC: -3.8 MMOL/L
BILIRUB DIRECT SERPL-MCNC: 0.04 MG/DL (ref 0–0.2)
BILIRUB SERPL-MCNC: 0.43 MG/DL (ref 0.2–1)
BUN SERPL-MCNC: 58 MG/DL (ref 5–25)
BUN SERPL-MCNC: 64 MG/DL (ref 5–25)
BUN SERPL-MCNC: 73 MG/DL (ref 5–25)
CALCIUM SERPL-MCNC: 9 MG/DL (ref 8.4–10.2)
CALCIUM SERPL-MCNC: 9.3 MG/DL (ref 8.4–10.2)
CALCIUM SERPL-MCNC: 9.7 MG/DL (ref 8.4–10.2)
CARDIAC TROPONIN I PNL SERPL HS: 57 NG/L (ref 8–18)
CHLORIDE SERPL-SCNC: 94 MMOL/L (ref 96–108)
CHLORIDE SERPL-SCNC: 94 MMOL/L (ref 96–108)
CHLORIDE SERPL-SCNC: 95 MMOL/L (ref 96–108)
CO2 SERPL-SCNC: 22 MMOL/L (ref 21–32)
CO2 SERPL-SCNC: 24 MMOL/L (ref 21–32)
CO2 SERPL-SCNC: 24 MMOL/L (ref 21–32)
CREAT SERPL-MCNC: 4.5 MG/DL (ref 0.6–1.3)
CREAT SERPL-MCNC: 4.95 MG/DL (ref 0.6–1.3)
CREAT SERPL-MCNC: 5.63 MG/DL (ref 0.6–1.3)
DOP CALC AO PEAK VEL: 1.33 M/S
DOP CALC AO VTI: 24.1 CM
DOP CALC LVOT AREA: 3.14 CM2
DOP CALC LVOT DIAMETER: 2 CM
DOP CALC LVOT PEAK VEL VTI: 13.45 CM
DOP CALC LVOT PEAK VEL: 0.71 M/S
DOP CALC LVOT STROKE INDEX: 21.7 ML/M2
DOP CALC LVOT STROKE VOLUME: 42.23
ERYTHROCYTE [DISTWIDTH] IN BLOOD BY AUTOMATED COUNT: 14.2 % (ref 11.6–15.1)
FRACTIONAL SHORTENING: 15 (ref 28–44)
GFR SERPL CREATININE-BSD FRML MDRD: 7 ML/MIN/1.73SQ M
GFR SERPL CREATININE-BSD FRML MDRD: 8 ML/MIN/1.73SQ M
GFR SERPL CREATININE-BSD FRML MDRD: 9 ML/MIN/1.73SQ M
GLUCOSE SERPL-MCNC: 111 MG/DL (ref 65–140)
GLUCOSE SERPL-MCNC: 115 MG/DL (ref 65–140)
GLUCOSE SERPL-MCNC: 161 MG/DL (ref 65–140)
HCO3 BLDV-SCNC: 21.5 MMOL/L (ref 24–30)
HCO3 BLDV-SCNC: 23.6 MMOL/L (ref 24–30)
HCT VFR BLD AUTO: 38.9 % (ref 34.8–46.1)
HGB BLD-MCNC: 12.9 G/DL (ref 11.5–15.4)
INTERVENTRICULAR SEPTUM IN DIASTOLE (PARASTERNAL SHORT AXIS VIEW): 1.1 CM
INTERVENTRICULAR SEPTUM: 1.3 CM (ref 0.6–1.1)
LAAS-AP4: 22.5 CM2
LEFT ATRIUM AREA SYSTOLE SINGLE PLANE A4C: 22 CM2
LEFT ATRIUM SIZE: 4.4 CM
LEFT INTERNAL DIMENSION IN SYSTOLE: 4.7 CM (ref 2.1–4)
LEFT VENTRICULAR INTERNAL DIMENSION IN DIASTOLE: 5.5 CM (ref 3.5–6)
LEFT VENTRICULAR POSTERIOR WALL IN END DIASTOLE: 1.1 CM
LEFT VENTRICULAR STROKE VOLUME: 46 ML
LVSV (TEICH): 46 ML
MCH RBC QN AUTO: 30.6 PG (ref 26.8–34.3)
MCHC RBC AUTO-ENTMCNC: 33.2 G/DL (ref 31.4–37.4)
MCV RBC AUTO: 92 FL (ref 82–98)
O2 CT BLDV-SCNC: 15.5 ML/DL
O2 CT BLDV-SCNC: 17.2 ML/DL
OSMOLALITY UR/SERPL-RTO: 306 MMOL/KG (ref 282–298)
OSMOLALITY UR: 167 MMOL/KG
PCO2 BLDV: 40.3 MM HG (ref 42–50)
PCO2 BLDV: 47.2 MM HG (ref 42–50)
PH BLDV: 7.32 [PH] (ref 7.3–7.4)
PH BLDV: 7.35 [PH] (ref 7.3–7.4)
PLATELET # BLD AUTO: 279 THOUSANDS/UL (ref 149–390)
PMV BLD AUTO: 9.9 FL (ref 8.9–12.7)
PO2 BLDV: 48.8 MM HG (ref 35–45)
PO2 BLDV: 71.3 MM HG (ref 35–45)
POTASSIUM SERPL-SCNC: 4.6 MMOL/L (ref 3.5–5.3)
POTASSIUM SERPL-SCNC: 4.6 MMOL/L (ref 3.5–5.3)
POTASSIUM SERPL-SCNC: 4.9 MMOL/L (ref 3.5–5.3)
PROCALCITONIN SERPL-MCNC: 1.88 NG/ML
PROT SERPL-MCNC: 7.2 G/DL (ref 6.4–8.4)
RBC # BLD AUTO: 4.22 MILLION/UL (ref 3.81–5.12)
RIGHT ATRIUM AREA SYSTOLE A4C: 13.9 CM2
RIGHT VENTRICLE ID DIMENSION: 3.7 CM
SL CV AV DECELERATION TIME RETROGRADE: 1250 MS
SL CV AV PEAK GRADIENT RETROGRADE: 68 MMHG
SL CV LV EF: 25
SL CV PED ECHO LEFT VENTRICLE DIASTOLIC VOLUME (MOD BIPLANE) 2D: 147 ML
SL CV PED ECHO LEFT VENTRICLE SYSTOLIC VOLUME (MOD BIPLANE) 2D: 102 ML
SODIUM 24H UR-SCNC: 49 MOL/L
SODIUM SERPL-SCNC: 131 MMOL/L (ref 135–147)
SODIUM SERPL-SCNC: 132 MMOL/L (ref 135–147)
SODIUM SERPL-SCNC: 133 MMOL/L (ref 135–147)
TR MAX PG: 37 MMHG
TR PEAK VELOCITY: 3.1 M/S
TRICUSPID VALVE PEAK REGURGITATION VELOCITY: 3.05 M/S
WBC # BLD AUTO: 13.45 THOUSAND/UL (ref 4.31–10.16)

## 2022-07-14 PROCEDURE — 94003 VENT MGMT INPAT SUBQ DAY: CPT

## 2022-07-14 PROCEDURE — 93005 ELECTROCARDIOGRAM TRACING: CPT

## 2022-07-14 PROCEDURE — 93321 DOPPLER ECHO F-UP/LMTD STD: CPT | Performed by: INTERNAL MEDICINE

## 2022-07-14 PROCEDURE — 84145 PROCALCITONIN (PCT): CPT

## 2022-07-14 PROCEDURE — 36556 INSERT NON-TUNNEL CV CATH: CPT | Performed by: NURSE PRACTITIONER

## 2022-07-14 PROCEDURE — 99291 CRITICAL CARE FIRST HOUR: CPT | Performed by: INTERNAL MEDICINE

## 2022-07-14 PROCEDURE — 93308 TTE F-UP OR LMTD: CPT | Performed by: INTERNAL MEDICINE

## 2022-07-14 PROCEDURE — 94640 AIRWAY INHALATION TREATMENT: CPT

## 2022-07-14 PROCEDURE — 93325 DOPPLER ECHO COLOR FLOW MAPG: CPT | Performed by: INTERNAL MEDICINE

## 2022-07-14 PROCEDURE — 83935 ASSAY OF URINE OSMOLALITY: CPT | Performed by: NURSE PRACTITIONER

## 2022-07-14 PROCEDURE — 71045 X-RAY EXAM CHEST 1 VIEW: CPT

## 2022-07-14 PROCEDURE — 99223 1ST HOSP IP/OBS HIGH 75: CPT | Performed by: INTERNAL MEDICINE

## 2022-07-14 PROCEDURE — 76770 US EXAM ABDO BACK WALL COMP: CPT

## 2022-07-14 PROCEDURE — 02HV33Z INSERTION OF INFUSION DEVICE INTO SUPERIOR VENA CAVA, PERCUTANEOUS APPROACH: ICD-10-PCS | Performed by: INTERNAL MEDICINE

## 2022-07-14 PROCEDURE — 80048 BASIC METABOLIC PNL TOTAL CA: CPT | Performed by: NURSE PRACTITIONER

## 2022-07-14 PROCEDURE — 85027 COMPLETE CBC AUTOMATED: CPT | Performed by: NURSE PRACTITIONER

## 2022-07-14 PROCEDURE — 99223 1ST HOSP IP/OBS HIGH 75: CPT | Performed by: NURSE PRACTITIONER

## 2022-07-14 PROCEDURE — 84300 ASSAY OF URINE SODIUM: CPT | Performed by: NURSE PRACTITIONER

## 2022-07-14 PROCEDURE — 80076 HEPATIC FUNCTION PANEL: CPT | Performed by: NURSE PRACTITIONER

## 2022-07-14 PROCEDURE — 93308 TTE F-UP OR LMTD: CPT

## 2022-07-14 PROCEDURE — 82805 BLOOD GASES W/O2 SATURATION: CPT | Performed by: NURSE PRACTITIONER

## 2022-07-14 PROCEDURE — NC001 PR NO CHARGE: Performed by: NURSE PRACTITIONER

## 2022-07-14 PROCEDURE — 83930 ASSAY OF BLOOD OSMOLALITY: CPT | Performed by: NURSE PRACTITIONER

## 2022-07-14 PROCEDURE — 94760 N-INVAS EAR/PLS OXIMETRY 1: CPT

## 2022-07-14 RX ORDER — FUROSEMIDE 10 MG/ML
80 INJECTION INTRAMUSCULAR; INTRAVENOUS ONCE
Status: COMPLETED | OUTPATIENT
Start: 2022-07-14 | End: 2022-07-14

## 2022-07-14 RX ORDER — LABETALOL HYDROCHLORIDE 5 MG/ML
10 INJECTION, SOLUTION INTRAVENOUS ONCE
Status: COMPLETED | OUTPATIENT
Start: 2022-07-14 | End: 2022-07-14

## 2022-07-14 RX ORDER — SODIUM CHLORIDE, SODIUM GLUCONATE, SODIUM ACETATE, POTASSIUM CHLORIDE, MAGNESIUM CHLORIDE, SODIUM PHOSPHATE, DIBASIC, AND POTASSIUM PHOSPHATE .53; .5; .37; .037; .03; .012; .00082 G/100ML; G/100ML; G/100ML; G/100ML; G/100ML; G/100ML; G/100ML
250 INJECTION, SOLUTION INTRAVENOUS ONCE
Status: COMPLETED | OUTPATIENT
Start: 2022-07-14 | End: 2022-07-14

## 2022-07-14 RX ORDER — SODIUM CHLORIDE, SODIUM GLUCONATE, SODIUM ACETATE, POTASSIUM CHLORIDE, MAGNESIUM CHLORIDE, SODIUM PHOSPHATE, DIBASIC, AND POTASSIUM PHOSPHATE .53; .5; .37; .037; .03; .012; .00082 G/100ML; G/100ML; G/100ML; G/100ML; G/100ML; G/100ML; G/100ML
250 INJECTION, SOLUTION INTRAVENOUS ONCE
Status: DISCONTINUED | OUTPATIENT
Start: 2022-07-14 | End: 2022-07-14

## 2022-07-14 RX ORDER — LORAZEPAM 2 MG/ML
0.5 INJECTION INTRAMUSCULAR ONCE
Status: COMPLETED | OUTPATIENT
Start: 2022-07-14 | End: 2022-07-14

## 2022-07-14 RX ORDER — MILRINONE LACTATE 0.2 MG/ML
0.25 INJECTION, SOLUTION INTRAVENOUS CONTINUOUS
Status: DISCONTINUED | OUTPATIENT
Start: 2022-07-14 | End: 2022-07-14

## 2022-07-14 RX ORDER — HYDRALAZINE HYDROCHLORIDE 20 MG/ML
10 INJECTION INTRAMUSCULAR; INTRAVENOUS ONCE
Status: COMPLETED | OUTPATIENT
Start: 2022-07-14 | End: 2022-07-14

## 2022-07-14 RX ORDER — FUROSEMIDE 10 MG/ML
20 SYRINGE (ML) INJECTION CONTINUOUS
Status: DISCONTINUED | OUTPATIENT
Start: 2022-07-14 | End: 2022-07-14

## 2022-07-14 RX ADMIN — DEXMEDETOMIDINE HYDROCHLORIDE 0.6 MCG/KG/HR: 400 INJECTION INTRAVENOUS at 15:23

## 2022-07-14 RX ADMIN — ACETAMINOPHEN 650 MG: 325 TABLET ORAL at 19:24

## 2022-07-14 RX ADMIN — HEPARIN SODIUM 5000 UNITS: 5000 INJECTION INTRAVENOUS; SUBCUTANEOUS at 21:16

## 2022-07-14 RX ADMIN — UMECLIDINIUM BROMIDE AND VILANTEROL TRIFENATATE 1 PUFF: 62.5; 25 POWDER RESPIRATORY (INHALATION) at 08:00

## 2022-07-14 RX ADMIN — LEVALBUTEROL HYDROCHLORIDE 1.25 MG: 1.25 SOLUTION, CONCENTRATE RESPIRATORY (INHALATION) at 19:31

## 2022-07-14 RX ADMIN — MORPHINE SULFATE 2 MG: 2 INJECTION, SOLUTION INTRAMUSCULAR; INTRAVENOUS at 00:55

## 2022-07-14 RX ADMIN — CARVEDILOL 3.12 MG: 3.12 TABLET, FILM COATED ORAL at 07:54

## 2022-07-14 RX ADMIN — SODIUM CHLORIDE 500 ML: 0.9 INJECTION, SOLUTION INTRAVENOUS at 15:52

## 2022-07-14 RX ADMIN — Medication 20 MG/HR: at 11:25

## 2022-07-14 RX ADMIN — SODIUM CHLORIDE, SODIUM GLUCONATE, SODIUM ACETATE, POTASSIUM CHLORIDE, MAGNESIUM CHLORIDE, SODIUM PHOSPHATE, DIBASIC, AND POTASSIUM PHOSPHATE 250 ML: .53; .5; .37; .037; .03; .012; .00082 INJECTION, SOLUTION INTRAVENOUS at 05:25

## 2022-07-14 RX ADMIN — ISODIUM CHLORIDE 3 ML: 0.03 SOLUTION RESPIRATORY (INHALATION) at 13:09

## 2022-07-14 RX ADMIN — MORPHINE SULFATE 2 MG: 2 INJECTION, SOLUTION INTRAMUSCULAR; INTRAVENOUS at 13:17

## 2022-07-14 RX ADMIN — HYDRALAZINE HYDROCHLORIDE 10 MG: 20 INJECTION INTRAMUSCULAR; INTRAVENOUS at 13:17

## 2022-07-14 RX ADMIN — FUROSEMIDE 80 MG: 10 INJECTION, SOLUTION INTRAMUSCULAR; INTRAVENOUS at 10:18

## 2022-07-14 RX ADMIN — LEVALBUTEROL HYDROCHLORIDE 1.25 MG: 1.25 SOLUTION, CONCENTRATE RESPIRATORY (INHALATION) at 13:09

## 2022-07-14 RX ADMIN — LORAZEPAM 0.5 MG: 2 INJECTION INTRAMUSCULAR; INTRAVENOUS at 13:07

## 2022-07-14 RX ADMIN — ISODIUM CHLORIDE 3 ML: 0.03 SOLUTION RESPIRATORY (INHALATION) at 07:42

## 2022-07-14 RX ADMIN — NICOTINE 1 PATCH: 21 PATCH, EXTENDED RELEASE TRANSDERMAL at 08:00

## 2022-07-14 RX ADMIN — LORAZEPAM 0.5 MG: 2 INJECTION INTRAMUSCULAR; INTRAVENOUS at 13:29

## 2022-07-14 RX ADMIN — PERFLUTREN 0.4 ML/MIN: 6.52 INJECTION, SUSPENSION INTRAVENOUS at 07:30

## 2022-07-14 RX ADMIN — SODIUM CHLORIDE, SODIUM GLUCONATE, SODIUM ACETATE, POTASSIUM CHLORIDE, MAGNESIUM CHLORIDE, SODIUM PHOSPHATE, DIBASIC, AND POTASSIUM PHOSPHATE 250 ML: .53; .5; .37; .037; .03; .012; .00082 INJECTION, SOLUTION INTRAVENOUS at 00:32

## 2022-07-14 RX ADMIN — SODIUM CHLORIDE 500 ML: 0.9 INJECTION, SOLUTION INTRAVENOUS at 16:38

## 2022-07-14 RX ADMIN — CLONAZEPAM 0.5 MG: 0.5 TABLET ORAL at 00:32

## 2022-07-14 RX ADMIN — SODIUM CHLORIDE, SODIUM GLUCONATE, SODIUM ACETATE, POTASSIUM CHLORIDE, MAGNESIUM CHLORIDE, SODIUM PHOSPHATE, DIBASIC, AND POTASSIUM PHOSPHATE 250 ML: .53; .5; .37; .037; .03; .012; .00082 INJECTION, SOLUTION INTRAVENOUS at 04:09

## 2022-07-14 RX ADMIN — LEVALBUTEROL HYDROCHLORIDE 1.25 MG: 1.25 SOLUTION, CONCENTRATE RESPIRATORY (INHALATION) at 07:42

## 2022-07-14 RX ADMIN — Medication 10 MG: at 13:20

## 2022-07-14 RX ADMIN — DEXMEDETOMIDINE HYDROCHLORIDE 0.8 MCG/KG/HR: 400 INJECTION INTRAVENOUS at 01:58

## 2022-07-14 RX ADMIN — HEPARIN SODIUM 5000 UNITS: 5000 INJECTION INTRAVENOUS; SUBCUTANEOUS at 04:09

## 2022-07-14 RX ADMIN — ISODIUM CHLORIDE 3 ML: 0.03 SOLUTION RESPIRATORY (INHALATION) at 19:30

## 2022-07-14 NOTE — PROCEDURES
Central Line Insertion    Date/Time: 7/14/2022 3:39 PM  Performed by: ESTHER Ambrosio  Authorized by: ESTHER Ambrosio     Patient location:  Bedside  Other Assisting Provider: Yes (comment) (Dr Rosalva Aguilar)    Consent:     Consent obtained:  Written    Consent given by:  Healthcare agent    Risks discussed:  Arterial puncture, incorrect placement, nerve damage, bleeding, infection and pneumothorax    Alternatives discussed:  No treatment and delayed treatment  Universal protocol:     Relevant documents present and verified: yes      Site/side marked: yes      Immediately prior to procedure, a time out was called: yes      Patient identity confirmed:  Arm band and provided demographic data  Pre-procedure details:     Hand hygiene: Hand hygiene performed prior to insertion      Sterile barrier technique: All elements of maximal sterile technique followed      Skin preparation:  ChloraPrep    Skin preparation agent: Skin preparation agent completely dried prior to procedure    Indications:     Central line indications: medications requiring central line and hemodynamic monitoring    Anesthesia (see MAR for exact dosages): Anesthesia method:  Local infiltration    Local anesthetic:  Lidocaine 1% w/o epi  Procedure details:     Location:  Left subclavian    Vessel type: vein      Laterality:  Left    Patient position:  Flat    Catheter type:  Triple lumen 16cm    Landmarks identified: yes      Ultrasound guidance: no      Manometry confirmation: yes      Number of attempts:  1    Successful placement: yes    Post-procedure details:     Post-procedure:  Line sutured and dressing applied    Assessment:  Blood return through all ports, free fluid flow, placement verified by x-ray and no pneumothorax on x-ray    Patient tolerance of procedure:   Tolerated well, no immediate complications

## 2022-07-14 NOTE — NURSING NOTE
1300 Dr Jenn Benedict at bedside to discuss care with pt  Pt speaking over bipap, difficult to understand  Dr Jenn Benedict requested bipap removed to more directly speak with pt  RN removed bipap at provider's order  As discussion continued pt became increasingly anxious, tachypneic, and short of breath  Pt placed back on bipap  At which point pt became extremely hypertensive in 377D systolic  Ativatan and hydralazine administered to poor effect  Precedex titrated and remainder of ativan administered at Dr Tisha Tucker verbal order to poor effect  PT remained hypertensive and tachypneic  Morphine and labetalol then ordered and administered to good effect  Pt left on bipap to recover and currently resting

## 2022-07-14 NOTE — PROGRESS NOTES
Bernardino U  66   Progress Note Magalene Class 1960, 64 y o  female MRN: [id number]  Unit/Bed#: ICU 02-01 Encounter: [id number]  Primary Care Provider: Mariel Stark DO   Date and time admitted to hospital: 7/12/2022  5:54 PM    * Chronic obstructive pulmonary disease with acute exacerbation ([facility] [address] )  Assessment & Plan    · Currently smokes 2 PPD  · Recently discharged from [facility] for similar presentation  · Per chart review, has been intubated 2 X in the past year  · Continue Xopenex nebulizer treatments t i d   · PRN Albuterol  · Bipap as needed  · Sputum culture ordered  · CXR 7/12 Recurrent CHF  Persistent left chest infiltrate  · Continue home Anoro-Ellipta  · Consider Palliative consult      Acute on chronic respiratory failure with hypoxemia [facility])  Assessment & Plan  · Patient initially admitted to [place] [id number] and CC contacted for acute change in respiratory status, Tachycardia, Tachypnea, Diaphoresis, and agitation  · Intubated 2 X in the past year for similar symptomatology  · Possibly 2/2 ischemia, Cardiology consulted  · Given 2 mg Morphine IV in ED  · Received 80 Lasix in ED  · Hold diuretics for now  · Continue Precedex gtt for agitation  · Sputum Culture ordered  · Continue Bipap as needed        Acute on chronic combined systolic and diastolic congestive heart failure (HCC)  Assessment & Plan  Wt Readings from Last 3 Encounters:   07/13/22 76 7 kg (169 lb 1 5 oz)   06/28/22 77 4 kg (170 lb 9 6 oz)   06/22/22 76 6 kg (168 lb 14 oz)       · BNP 1822 an admission  · Recent ECHO June 2022: Left ventricular cavity size is normal  Wall thickness is mildly increased  There is mild concentric hypertrophy  The left ventricular ejection fraction is 41% by biplane measurement  Systolic function is moderately reduced  The Basalar septum and basilar inferior wall are hypokenetic  Diastolic function is mildly abnormal, consistent with grade I (abnormal) relaxation   The pulmonary artery systolic pressure is moderately increased  · Cardiology consulted  · Follows with Dr Janae Chavira outpatient  · 80 Lasix given in ED X1  · Given Nitropaste in the ED  · Trend cardiac enzymes  · Obtain daily weights  · Continue Henley catheter for strict I/O        Essential hypertension  Assessment & Plan  · Continue Home Coreg 3 125 mg BID      Hypokalemia  Assessment & Plan  · Given 40 PO in ED  · Check BMP in AM  · Replete as necessary     Chronic pain  Assessment & Plan  · Home Percocet ordered  · Lidocaine Patch ordered  · Patient resting comfortably currently       Tobacco abuse  Assessment & Plan  · Current everyday smoker 2 PPD  · Per chart review, she is ordered O2 @ home but does not wear it  · Nicotine patch ordered  · Encourage Cessation      Anxiety  Assessment & Plan  · Home Klonopin on hold while NPO  · Continue Precedex gtt    · Decrease stimulation, cluster care      ----------------------------------------------------------------------------------------  HPI/24hr events: Maintained on bipap overnight  Minimal UO overnight, 250ml bolus isolyte given  Patient intermittently anxious, maintained on precedex gtt  2mg Morphine given X's1 for acute distress, tachypnea, and diaphoresis  Patient appropriate for transfer out of the ICU today?: No  Disposition: Continue Critical Care   Code Status: Level 1 - Full Code  ---------------------------------------------------------------------------------------  SUBJECTIVE  Patient states that she would like to wear the bipap overnight so she can "rest and sweat it out " Patient admits to back and shoulder pain  Patient denies chest pain  Review of Systems   Constitutional: Positive for activity change and fatigue  HENT: Negative for congestion  Eyes: Negative  Respiratory: Positive for shortness of breath  Negative for cough, chest tightness and wheezing  Cardiovascular: Negative  Negative for chest pain and leg swelling     Gastrointestinal: Negative  Negative for abdominal pain and nausea  Endocrine: Negative  Genitourinary: Positive for decreased urine volume  Musculoskeletal: Positive for back pain  Skin: Negative  Neurological: Negative for weakness and headaches  Psychiatric/Behavioral: The patient is nervous/anxious  All other systems reviewed and are negative  Review of systems was reviewed and negative unless stated above in HPI/24-hour events   ---------------------------------------------------------------------------------------  OBJECTIVE    Vitals   Vitals:    22 0100 22 0200 22 0300 22 0400   BP: 149/93 134/83 131/82 131/85   Pulse: 80 75 72 72   Resp: (!) 25 (!) 25 (!) 23 (!) 23   Temp:    (!) 97 °F (36 1 °C)   TempSrc:    Tympanic   SpO2: 99% 99% 99% 100%   Weight:    79 1 kg (174 lb 6 1 oz)   Height:         Temp (24hrs), Av 4 °F (36 3 °C), Min:97 °F (36 1 °C), Max:98 °F (36 7 °C)  Current: Temperature: (!) 97 °F (36 1 °C)          Respiratory:  SpO2: SpO2: 100 %  Nasal Cannula O2 Flow Rate (L/min): 3 L/min    Invasive/non-invasive ventilation settings   Respiratory  Report   Lab Data (Last 4 hours)    None         O2/Vent Data (Last 4 hours)       0351          Non-Invasive Ventilation Mode BiPAP                   Physical Exam  Vitals and nursing note reviewed  Constitutional:       General: She is not in acute distress  Appearance: She is ill-appearing  HENT:      Head: Normocephalic  Mouth/Throat:      Mouth: Mucous membranes are dry  Eyes:      Pupils: Pupils are equal, round, and reactive to light  Cardiovascular:      Rate and Rhythm: Normal rate and regular rhythm  Pulses: Normal pulses  Heart sounds: Normal heart sounds  Pulmonary:      Effort: No respiratory distress  Breath sounds: Normal breath sounds  No wheezing or rhonchi  Abdominal:      General: There is no distension  Palpations: Abdomen is soft  Tenderness:  There is no abdominal tenderness  Musculoskeletal:         General: Normal range of motion  Right lower leg: No edema  Left lower leg: No edema  Skin:     General: Skin is warm and dry  Capillary Refill: Capillary refill takes less than 2 seconds  Coloration: Skin is pale  Neurological:      Mental Status: She is oriented to person, place, and time  Motor: Weakness present               Laboratory and Diagnostics:  Results from last 7 days   Lab Units 07/14/22 0416 07/13/22 0422 07/12/22  1816   WBC Thousand/uL 13 45* 10 08 8 66   HEMOGLOBIN g/dL 12 9 12 7 13 3   HEMATOCRIT % 38 9 40 4 40 1   PLATELETS Thousands/uL 279 270 291   NEUTROS PCT %  --   --  57   MONOS PCT %  --   --  6   MONO PCT %  --  2*  --      Results from last 7 days   Lab Units 07/14/22 0416 07/13/22 0422 07/12/22  1816   SODIUM mmol/L 131* 139 135   POTASSIUM mmol/L 4 6 3 8 3 1*   CHLORIDE mmol/L 94* 101 96   CO2 mmol/L 24 27 30   ANION GAP mmol/L 13 11 9   BUN mg/dL 58* 19 12   CREATININE mg/dL 4 50* 1 66* 1 08   CALCIUM mg/dL 9 7 9 4 10 0   GLUCOSE RANDOM mg/dL 161* 142* 100   ALT U/L  --  10 3*   AST U/L  --  13 6*   ALK PHOS U/L  --  82 81   ALBUMIN g/dL  --  4 0 4 3   TOTAL BILIRUBIN mg/dL  --  0 62 0 79     Results from last 7 days   Lab Units 07/13/22 0422 07/12/22  1816   MAGNESIUM mg/dL 2 2 2 0   PHOSPHORUS mg/dL 4 5*  --                    ABG:  Results from last 7 days   Lab Units 07/13/22  0559   PH ART  7 439   PCO2 ART mm Hg 42 8   PO2 ART mm Hg 84 9   HCO3 ART mmol/L 28 4*   BASE EXC ART mmol/L 3 7   ABG SOURCE  Radial, Left     VBG:  Results from last 7 days   Lab Units 07/13/22  0559 07/13/22  0001   PH PANDA   --  7 345   PCO2 PANDA mm Hg  --  46 8   PO2 PANDA mm Hg  --  66 5*   HCO3 PANDA mmol/L  --  25 0   BASE EXC PANDA mmol/L  --  -1 2   ABG SOURCE  Radial, Left  --      Results from last 7 days   Lab Units 07/13/22 0422 07/12/22  1816   PROCALCITONIN ng/ml 0 25 <0 05       Micro        EKG: reviewed  Imaging: I have personally reviewed pertinent reports  Intake and Output  I/O       07/12 0701 07/13 0700 07/13 0701 07/14 0700    P  O   840    I V  (mL/kg) 14 8 (0 2) 23 4 (0 3)    IV Piggyback 1000 650    Total Intake(mL/kg) 1014 8 (13 2) 1513 4 (19 7)    Urine (mL/kg/hr) 870 50 (0)    Total Output 870 50    Net +144 8 +1463 4                Height and Weights   Height: 5' 10 5" (179 1 cm)     Body mass index is 24 67 kg/m²  Weight (last 2 days)     Date/Time Weight    07/14/22 0400 79 1 (174 38)     Weight: weighed twice at 07/14/22 0400    07/13/22 0600 76 7 (169 09)    07/13/22 0115 76 7 (169 09)            Nutrition       Diet Orders   (From admission, onward)             Start     Ordered    07/13/22 0811  Diet Cardiovascular; Cardiac  Diet effective now        References:    Nutrtion Support Algorithm Enteral vs  Parenteral   Question Answer Comment   Diet Type Cardiovascular    Cardiac Cardiac    RD to adjust diet per protocol?  Yes        07/13/22 0810                  Active Medications  Scheduled Meds:  Current Facility-Administered Medications   Medication Dose Route Frequency Provider Last Rate    acetaminophen  650 mg Oral Q4H PRN Claven Rolette, CRNP      albuterol  2 5 mg Nebulization Q6H PRN Claven Tatyana, CRNP      carvedilol  3 125 mg Oral BID With Meals ESTHER Hilliard      clonazePAM  0 5 mg Oral HS PRN Claven Tatyana, CRNP      dexmedetomidine  0 1-1 2 mcg/kg/hr Intravenous Titrated Claven Tatyana, CRNP 0 8 mcg/kg/hr (07/14/22 0158)    heparin (porcine)  5,000 Units Subcutaneous Q8H [address] Claven Tatyana, CRNP      levalbuterol  1 25 mg Nebulization TID Claven Tatyana, CRNP      And    sodium chloride  3 mL Nebulization TID Claven Rolette, CRNP      lidocaine  2 patch Topical Daily Claven Rolette, CRNP      multi-electrolyte  250 mL Intravenous Once Claven Rolette, CRNP      nicotine  1 patch Transdermal Daily Claven Tatyana, CRNP      ondansetron  4 mg Intravenous Q6H PRN ESTHER Tineo      umeclidinium-vilanterol  1 puff Inhalation Daily ESTHER Hilliard       Continuous Infusions:  dexmedetomidine, 0 1-1 2 mcg/kg/hr, Last Rate: 0 8 mcg/kg/hr (07/14/22 0158)      PRN Meds:   acetaminophen, 650 mg, Q4H PRN  albuterol, 2 5 mg, Q6H PRN  clonazePAM, 0 5 mg, HS PRN  ondansetron, 4 mg, Q6H PRN        Invasive Devices Review  Invasive Devices  Report    Peripheral Intravenous Line  Duration           Peripheral IV 07/13/22 Right Hand 1 day    Peripheral IV 07/14/22 Right;Upper Arm <1 day          Drain  Duration           Urethral Catheter 16 Fr  1 day                Rationale for remaining devices: Henley Catheter, strict I/O  ---------------------------------------------------------------------------------------  Advance Directive and Living Will:      Power of :    POLST:    ---------------------------------------------------------------------------------------  Care Time Delivered:   No Critical Care time spent       ESTHER Tineo      Portions of the record may have been created with voice recognition software  Occasional wrong word or "sound a like" substitutions may have occurred due to the inherent limitations of voice recognition software    Read the chart carefully and recognize, using context, where substitutions have occurred

## 2022-07-14 NOTE — CASE MANAGEMENT
Case Management Discharge Planning Note    Patient name Wesly Blanco  Location ICU 02/ICU  MRN [id number]  : 1960 Date 2022       Current Admission Date: 2022  Current Admission Diagnosis:Chronic obstructive pulmonary disease with acute exacerbation [facility])   Patient Active Problem List    Diagnosis Date Noted    [facility]    Acute respiratory insufficiency 2022    SHANTA (acute kidney injury) ([facility] ) 2022    Chronic obstructive pulmonary disease with acute exacerbation ([facility] ) 06/15/2022    Acute on chronic combined systolic and diastolic congestive heart failure ([place] [address] ) 2021    Tobacco abuse 2021    Leukocytosis 2021    Anxiety 2021    Dilated cardiomyopathy ([place] [address] ) 2021    Essential hypertension 2021    Hypokalemia 2021    Chronic pain 2021    Vitamin D deficiency 2013      LOS (days): 2  Geometric Mean LOS (GMLOS) (days): 3 80  Days to GMLOS:2     OBJECTIVE:  Risk of Unplanned Readmission Score: 16 37         Current admission status: Inpatient   Preferred Pharmacy:   [address] [facility] [address]   [place] [address] [address] [facility] [address]  [place] [address]  Phone: [phone] Fax: [phone]    Primary Care Provider: Osei Arita DO    Primary Insurance: MEDICARE MISC REPLACEMENT  Secondary Insurance: [address]    DISCHARGE DETAILS:     cm was requested to meet with the pt's daughter she has concerns about the pt's medical bill, she wants to know if she will be responsible for her mother's bills, I advised her to call the billing dept and check with them concerning this matter,

## 2022-07-14 NOTE — ASSESSMENT & PLAN NOTE
· Current everyday smoker 2 PPD  · Per chart review, she is ordered O2 @ home but does not wear it  · Nicotine patch ordered  · Encourage Cessation

## 2022-07-14 NOTE — ASSESSMENT & PLAN NOTE
Wt Readings from Last 3 Encounters:   07/14/22 78 9 kg (174 lb)   06/28/22 77 4 kg (170 lb 9 6 oz)   06/22/22 76 6 kg (168 lb 14 oz)     Multiple indicators of volume overload, however, pt now in ARF  Lasix drip has been ordered and renal consult pending  May benefit from milrinone to improve cardiac output  Low Na diet, fluid restriction, daily weights, I/O monitoring

## 2022-07-14 NOTE — ASSESSMENT & PLAN NOTE
Wt Readings from Last 3 Encounters:   07/13/22 76 7 kg (169 lb 1 5 oz)   06/28/22 77 4 kg (170 lb 9 6 oz)   06/22/22 76 6 kg (168 lb 14 oz)       · BNP 1822 an admission  · Recent ECHO June 2022: Left ventricular cavity size is normal  Wall thickness is mildly increased  There is mild concentric hypertrophy  The left ventricular ejection fraction is 41% by biplane measurement  Systolic function is moderately reduced  The Basalar septum and basilar inferior wall are hypokenetic  Diastolic function is mildly abnormal, consistent with grade I (abnormal) relaxation  The pulmonary artery systolic pressure is moderately increased    · Cardiology consulted  · Follows with Dr Madiha Martinez outpatient  · 80 Lasix given in ED X1  · Given Nitropaste in the ED  · Trend cardiac enzymes  · Obtain daily weights  · Continue Henley catheter for strict I/O

## 2022-07-14 NOTE — QUICK NOTE
I spoke to the patients daughter regarding her transfer to the ICU for medical management  She was informed that the patient was placed on bipap and was currently stable  All questions answered

## 2022-07-14 NOTE — CONSULTS
Consultation - Nephrology   Asa Santos 64 y o  female MRN: [id number]  Unit/Bed#: ICU 02-01 Encounter: [id number]      A/P:  1  Oliguric acute kidney injury superimpose don chronic kidney disease stage 3   - She likely has acute tubular necrosis due to decreased perfusion due to pulmonary edema   - Medications reviewed for nephrotoxins   - no contrast given   - had flash pulmonary edema due to cardiomyopathy with worsened EF   - As discussed with Dr Mary Robertson CVP placed for guidance   - Would trial Plasmalyte or normal saline in view of hypochloremia to try to convert oliguric to nonoliguric renal failure   - CVP guidance will help with fluid management   - most probably will need dialysis    - I spoke at length with her daughter Anderson Rawls and son in [facility] and reviewed the labs and explained the clinical scenario   - they have agreed to dialysis  - would try to give fluids first     - She can tolerate intermittent dialysis due to a blood pressure of 130/70 and lack of pressor support, however, can start CVVHD to remove fluid gently due to low EF   - urine studies ordered but essentially anuric   - kidney and bladder ultrasound study is unremarkable   - case discussed with Critical Care AP   - will not be able to obtain intermittent hemodialysis tonight   - if she has respiratory distress and does not respond to fluid challenge would do CVVHD  A catheter can be placed     2  Acute on chronic respiratory failure with hypoxia   - Tole ratting BIPAP    3  Cardiomyopathy   - being followed by Cardiology    4  Essential hypertension   - blood pressure is controlled    5  Acute on chronic systolic HF   - on a furosemide infusion and receiving IVF   - as above             Thank you for allowing us to participate in the care of your patient  Please feel free to contact us regarding the care of this patient, or any other questions/concerns that may be applicable      Patient Active Problem List   Diagnosis  Vitamin D deficiency    Dilated cardiomyopathy ([facility] [address] )    Essential hypertension    Hypokalemia    Chronic pain    Anxiety    Acute on chronic combined systolic and diastolic congestive heart failure (HCC)    Tobacco abuse    Leukocytosis    Chronic obstructive pulmonary disease with acute exacerbation (HCC)    SHANTA (acute kidney injury) ([facility] [address] )    Acute respiratory insufficiency    Acute on chronic respiratory failure with hypoxemia ([facility])       History of Present Illness   Physician Requesting Consult: Reggie Santos*  Reason for Consult / Principal Problem: oliguric acute kidney injury superimposed on chronic kidney disease  Hx and PE limited by:   HPI: Adelfo Flores is a 64y o  year old female who presents with three days of dyspnea and was admitted with an acute exacerbation of COPD  She is an active smoker  She had dyspnea with minimal activity accompanied by a cough  She had volume overload and furosemide 40mg IV bid was given  She was seen by cardiology and started on a furosemide drip  Her baseline creatinine was 1 6 mg/dl and today her creatinine damari  To 4 5 mg/d --> 4 95 mg/dl She is essentially anuric with 10 ml of urine output  She was on furosemide 40mg daily as an outpatient but no ACEI/ARB  An echo demonstrated normal LV cavity size with an eF of 25% with severe global hypokinesis with regional variation, basal and mid inferior wall were more severely hypo to akinetic  An echo done in June demonstrated an EF of 41%  [facility] last year, [facility] with cardiogenic shock and volume overload  At that time her echo demonstrated an EF of 18%  A cardiac cath demonstrated mild-moderate nonobstructively CAD  She was diuresed and improved   Her kidney function was unchanged with a creatinine of 1 mg/dl        History obtained from child and chart review and Dr Chris Burkett of [id number]    Constitutional ROS- patient is sedated for a central line placement and is on BIPAP  Historical Information   Past Medical History:   Diagnosis Date    Cardiomyopathy ([address] )     Chronic combined systolic and diastolic congestive heart failure     COPD (chronic obstructive pulmonary disease) (HCC)     Fatty liver     Hyperlipidemia     Hypertension     Mitral regurgitation     Sepsis      Past Surgical History:   Procedure Laterality Date    CARDIAC CATHETERIZATION  2021    Moderate non-obstructive atherosclerosis     SECTION      CHOLECYSTECTOMY      ROTATOR CUFF REPAIR W/ DISTAL CLAVICLE EXCISION Right     TONSILLECTOMY       Social History   Social History     Substance and Sexual Activity   Alcohol Use Never     Social History     Substance and Sexual Activity   Drug Use No     Social History     Tobacco Use   Smoking Status Current Some Day Smoker    Packs/day: 2 00    Types: Cigarettes   Smokeless Tobacco Never Used     History reviewed  No pertinent family history      Meds/Allergies   all current active meds have been reviewed, current meds:   Current Facility-Administered Medications   Medication Dose Route Frequency    acetaminophen (TYLENOL) tablet 650 mg  650 mg Oral Q4H PRN    albuterol inhalation solution 2 5 mg  2 5 mg Nebulization Q6H PRN    carvedilol (COREG) tablet 3 125 mg  3 125 mg Oral BID With Meals    clonazePAM (KlonoPIN) tablet 0 5 mg  0 5 mg Oral HS PRN    dexmedeTOMIDine (Precedex) 400 mcg in sodium chloride 0 9% 100 mL  0 1-1 2 mcg/kg/hr Intravenous Titrated    furosemide (LASIX) 500 mg infusion 50 mL  20 mg/hr Intravenous Continuous    heparin (porcine) subcutaneous injection 5,000 Units  5,000 Units Subcutaneous Q8H [facility]    levalbuterol (XOPENEX) inhalation solution 1 25 mg  1 25 mg Nebulization TID    And    sodium chloride 0 9 % inhalation solution 3 mL  3 mL Nebulization TID    lidocaine (LIDODERM) 5 % patch 2 patch  2 patch Topical Daily    nicotine (NICODERM CQ) 21 mg/24 hr TD 24 hr patch 1 patch  1 patch Transdermal Daily  ondansetron (ZOFRAN) injection 4 mg  4 mg Intravenous Q6H PRN    umeclidinium-vilanterol (ANORO ELLIPTA) 62 5-25 MCG/INH inhaler 1 puff  1 puff Inhalation Daily    and PTA meds:    Medications Prior to Admission   Medication    albuterol (ProAir HFA) 90 mcg/act inhaler    carvedilol (COREG) 3 125 mg tablet    clonazePAM (KlonoPIN) 1 mg tablet    furosemide (LASIX) 40 mg tablet    gabapentin (NEURONTIN) 300 mg capsule    morphine (MS CONTIN) 30 mg 12 hr tablet    oxyCODONE-acetaminophen (PERCOCET) 5-325 mg per tablet    umeclidinium-vilanterol (Anoro Ellipta) 62 5-25 MCG/INH inhaler    albuterol (2 5 mg/3 mL) 0 083 % nebulizer solution         Allergies   Allergen Reactions    Wellbutrin [Bupropion] Arthralgia       Objective     Intake/Output Summary (Last 24 hours) at 7/14/2022 1528  Last data filed at 7/14/2022 1022  Gross per 24 hour   Intake 2089 75 ml   Output 10 ml   Net 2079 75 ml       Invasive Devices:   Urethral Catheter 16 Fr  (Active)   Amt returned on insertion(mL) 350 mL 07/13/22 0026   Reasons to continue Urinary Catheter  Accurate I&O assessment in critically ill patients (48 hr  max) 07/14/22 1022   Goal for Removal Voiding trial when ambulation improves 07/14/22 1022   Site Assessment Clean;Skin intact 07/13/22 2000   Henley Care Done 07/14/22 0829   Collection Container Standard drainage bag 07/13/22 2000   Securement Method Securing device (Describe) 07/13/22 2000   Output (mL) 0 mL 07/14/22 1022       Physical Exam      I/O last 3 completed shifts: In: 1888 3 [P O :840; I V :398 3; IV Piggyback:650]  Out: 920 [Urine:920]    Vitals:    07/14/22 1400   BP: 148/88   Pulse: 94   Resp: (!) 68   Temp: 99 7 °F (37 6 °C)   SpO2: 97%       General Appearance:     Sedated but arouseable  Head:    Normocephalic  Atraumatic  Normal jaw occlusion  Eyes:    Lids, conjunctiva normal  No scleral icterus  Ears:    Normal external ears  Nose:   Nares normal  No drainage     Mouth:   Lips, normal   Neck:      Back:     Symmetric  No CVA tenderness  Lungs:     Normal respiratory effort  Decreased anterior to auscultation bilaterally  Chest wall:    No tenderness or deformity  Heart:    Regular rate and rhythm  Normal S1 and S2  No murmur  No JVD  No edema  Abdomen:     Soft  Non-tender  Bowel sounds active  Genitourinary: Henley catheter present  minimal urine   Extremities:   Extremities normal  Atraumatic  No cyanosis  Skin:   Warm and dry  Current Weight: Weight - Scale: 78 9 kg (174 lb)  First Weight: Weight - Scale: 76 7 kg (169 lb 1 5 oz)    Lab Results:  I have personally reviewed pertinent labs      CBC:   Lab Results   Component Value Date    WBC 13 45 (H) 07/14/2022    HGB 12 9 07/14/2022    HCT 38 9 07/14/2022    MCV 92 07/14/2022     07/14/2022    MCH 30 6 07/14/2022    MCHC 33 2 07/14/2022    RDW 14 2 07/14/2022    MPV 9 9 07/14/2022     CMP:   Lab Results   Component Value Date    K 4 6 07/14/2022    CL 94 (L) 07/14/2022    CO2 24 07/14/2022    BUN 64 (H) 07/14/2022    CREATININE 4 95 (H) 07/14/2022    CALCIUM 9 3 07/14/2022    AST 14 07/14/2022    ALT 9 07/14/2022    ALKPHOS 66 07/14/2022    EGFR 8 07/14/2022     Phosphorus: No results found for: PHOS  Magnesium: No results found for: MG  Urinalysis: No results found for: COLORU, CLARITYU, SPECGRAV, PHUR, LEUKOCYTESUR, NITRITE, PROTEINUA, GLUCOSEU, KETONESU, BILIRUBINUR, BLOODU  Ionized Calcium: No results found for: CAION  Coagulation: No results found for: PT, INR, APTT  Troponin: No results found for: TROPONINI  ABG: No results found for: PHART, EGT6JML, PO2ART, VNC2VCV, C8FYNYRN, BEART, SOURCE    Results from last 7 days   Lab Units 07/14/22  0943 07/14/22  0416 07/13/22  0422 07/12/22  1816   POTASSIUM mmol/L 4 6 4 6 3 8 3 1*   CHLORIDE mmol/L 94* 94* 101 96   CO2 mmol/L 24 24 27 30   BUN mg/dL 64* 58* 19 12   CREATININE mg/dL 4 95* 4 50* 1 66* 1 08   CALCIUM mg/dL 9 3 9 7 9 4 10 0   ALK PHOS U/L  --  66 82 81   ALT U/L  --  9 10 3*   AST U/L  --  14 13 6*       Radiology review:  Procedure: XR chest 1 view portable    Result Date: 7/13/2022  Narrative: CHEST INDICATION:   sob  COMPARISON:  6/21/2022 EXAM PERFORMED/VIEWS:  XR CHEST PORTABLE Single view FINDINGS: Recurrent CHF  Persistent lateral left mid chest infiltrate Cardiomediastinal silhouette appears unremarkable  No pneumothorax or pleural effusion  Osseous structures appear within normal limits for patient age  Impression: Recurrent CHF  Persistent left chest infiltrate Workstation performed: [id number]     Procedure: US kidney and bladder    Result Date: 7/14/2022  Narrative: RENAL ULTRASOUND INDICATION:   SHANTA  COMPARISON: None TECHNIQUE:   Ultrasound of the retroperitoneum was performed with a curvilinear transducer utilizing volumetric sweeps and still imaging techniques  FINDINGS: KIDNEYS: Symmetric and normal size  Right kidney:  9 1 x 3 3 x 3 4 cm  Volume 53 4 mL Left kidney:  10 5 x 6 0 x 5 1 cm  Volume 167 2 mL Right kidney Normal echogenicity and contour  No mass is identified  No hydronephrosis  No shadowing calculi  No perinephric fluid collections  Left kidney Normal echogenicity and contour  No mass is identified  No hydronephrosis  No shadowing calculi  No perinephric fluid collections  URETERS: Nonvisualized  BLADDER: A leung catheter is in place, decompressing the bladder and limiting its evaluation  Impression: Kidneys are within normal limits  Bladder is decompressed around a Leung catheter, limiting its evaluation  Workstation performed: [id number]     Procedure: Echo follow up/limited w/ contrast if indicated    Result Date: 7/14/2022  Narrative: Clifm Maximino  Left Ventricle: Left ventricular cavity size is normal  Wall thickness is normal  The left ventricular ejection fraction is 25%  Systolic function is severely reduced   There is severe global hypokinesis with regional variation, basal and mid inferior wall appeared more severely hypo to akinetic    Right Ventricle: Systolic function is normal    Left Atrium: The atrium is mildly dilated    Aortic Valve: There is mild regurgitation    Mitral Valve: There is moderate regurgitation    Tricuspid Valve: There is mild regurgitation    Pericardium: There is no pericardial effusion  EKG, Pathology, and Other Studies: I personally reviewed the CXR - fluid overloadI reviewed current and prior [facility] in [facility]      Counseling / Coordination of Care  Total ADDITIONAL floor / unit time spent today 60 minutes  Greater than 50% of total time was spent with the patient and / or family counseling and / or coordination of care  A description of the counseling / coordination of care: Case discussed with Critical Care and family    Kim Martin MD      This consultation note was produced in part using a dictation device which may document imprecise wording from author's original intent

## 2022-07-14 NOTE — ASSESSMENT & PLAN NOTE
· Currently smokes 2 PPD  · Recently discharged from [facility] for similar presentation  · Per chart review, has been intubated 2 X in the past year  · Continue Xopenex nebulizer treatments t i d   · PRN Albuterol  · Bipap as needed  · Sputum culture ordered  · CXR 7/12 Recurrent CHF   Persistent left chest infiltrate  · Continue home Anoro-Ellipta  · Consider Palliative consult

## 2022-07-14 NOTE — QUICK NOTE
Spoke with the patient's daughter, Charlie Cherises  She was updated on her mother's current condition and plan of care  All her questions were answered

## 2022-07-14 NOTE — RESPIRATORY THERAPY NOTE
07/14/22 1931   Respiratory Assessment   Assessment Type Pre-treatment   General Appearance Alert; Awake   Respiratory Pattern Assisted;Spontaneous   Chest Assessment Chest expansion symmetrical   Bilateral Breath Sounds Diminished;Clear   Resp Comments no distress pt remains on bipap keith well 16/6 40%, tx given in line   O2 Device bipap   Non-Invasive Information   O2 Interface Device Full face mask  (mask)   Non-Invasive Ventilation Mode BiPAP  (v60)   $ Continous NIV Subsequent   SpO2 98 %   $ Pulse Oximetry Spot Check Charge Completed   Non-Invasive Settings   FiO2 (%) 40   Rise Time 3   IPAP (cm) 16 cm   EPAP (cm) 6 cm   Rate (Set) 12   Flow (lpm) 30   Pressure Support (cm H2O) 10   Inspiratory Time (Set) 1 25   Non-Invasive Readings   Skin Intervention Skin intact;Mask rotated;Skin barrier applied  (appied earlier)   Total Rate 24   Spontaneous Vt (mL) 691   Spontaneous MV (mL) 14 9   I/E Ratio (Obs) 1:3   Leak (lpm) 1   Peak Pressure (Obs) 17   Non-Invasive Alarms   Insp Pressure High (cm H20) 25   Insp Pressure Low (cm H20) 12   Low Insp Pressure Time (sec) 20 sec   MV Low (L/min) 3   Vt High (mL) 1200   Vt Low (mL) 200   High Resp Rate (BPM) 40 BPM   Low Resp Rate (BPM) 8 BPM   Apnea Interval (sec) 20   Apnea Pressure 16

## 2022-07-14 NOTE — PLAN OF CARE
Pt remains in ICU  UO minimal last 24 hours  BUN and Creat rising       Problem: METABOLIC, FLUID AND ELECTROLYTES - ADULT  Goal: Fluid balance maintained  Description: INTERVENTIONS:  - Monitor labs   - Monitor I/O and WT  - Instruct patient on fluid and nutrition as appropriate  - Assess for signs & symptoms of volume excess or deficit  Outcome: Not Progressing

## 2022-07-14 NOTE — SEPSIS NOTE
Sepsis Note   [facility] 64 y o  female MRN: [id number]  Unit/Bed#: ICU 02-01 Encounter: [id number]       qSOFA     [address] Name 07/14/22 0500 07/14/22 0400 07/14/22 0300 07/14/22 0200 07/14/22 0100    Altered mental status GCS < 15 -- 1 -- -- --    Respiratory Rate > / =22 1 1 1 1 1    Systolic BP < / =959 0 0 0 0 0    Q Sofa Score 1 2 1 1 1    Row Name 07/14/22 0000 07/13/22 2300 07/13/22 2200 07/13/22 2100 07/13/22 2000    Altered mental status GCS < 15 0 -- -- -- --    Respiratory Rate > / =22 1 0 0 1 1    Systolic BP < / =955 0 0 0 0 0    Q Sofa Score 1 0 0 1 1    Row Name 07/13/22 1930 07/13/22 1927 07/13/22 1800 07/13/22 1700 07/13/22 1639    Altered mental status GCS < 15 0 -- -- -- --    Respiratory Rate > / =22 -- 1 1 1 --    Systolic BP < / =800 -- -- 0 0 0    Q Sofa Score 1 1 1 1 --    Row Name 07/13/22 1600 07/13/22 1400 07/13/22 1300 07/13/22 1200 07/13/22 1100    Altered mental status GCS < 15 0 -- -- 0 --    Respiratory Rate > / =22 1 1 1 1 1    Systolic BP < / =999 0 0 0 0 0    Q Sofa Score 1 1 1 1 1    Row Name 07/13/22 1000 07/13/22 0930 07/13/22 0800 07/13/22 0700 07/13/22 0500    Altered mental status GCS < 15 -- -- 0 -- --    Respiratory Rate > / =22 0 0 -- 0 0    Systolic BP < / =856 0 0 -- 0 0    Q Sofa Score 0 0 0 0 0    Row Name 07/13/22 0400 07/13/22 0300 07/13/22 0200 07/13/22 0115 07/13/22 0045    Altered mental status GCS < 15 0 -- -- 0 --    Respiratory Rate > / =22 1 0 1 1 1    Systolic BP < / =020 0 0 0 0 0    Q Sofa Score 1 0 1 1 1    Row Name 07/13/22 0027 07/13/22 0015 07/13/22 0003 07/13/22 0000 07/12/22 2345    Altered mental status GCS < 15 -- -- -- -- --    Respiratory Rate > / =22 1 1 1 1 1    Systolic BP < / =521 0 0 -- 0 0    Q Sofa Score 1 1 1 1 1    Row Name 07/12/22 2245 07/12/22 2145 07/12/22 2059 07/12/22 2000 07/12/22 1900    Altered mental status GCS < 15 -- -- -- -- --    Respiratory Rate > / =22 0 0 0 0 0    Systolic BP < / =453 0 0 -- 0 0    Q Sofa Score 0 0 0 0 0    Row Name 07/12/22 1756                Altered mental status GCS < 15 --        Respiratory Rate > / =15 0        Systolic BP < / =419 0        Q Sofa Score 0                   Initial Sepsis Screening     Row Name 07/14/22 0535                Is the patient's history suggestive of a new or worsening infection? No  -JS        Suspected source of infection --        Are two or more of the following signs & symptoms of infection both present and new to the patient? --        Indicate SIRS criteria Leukocytosis (WBC > 18316 IJL)  -JS        If the answer is yes to both questions, suspicion of sepsis is present --        If severe sepsis is present AND tissue hypoperfusion perists in the hour after fluid resuscitation or lactate > 4, the patient meets criteria for SEPTIC SHOCK --        Are any of the following organ dysfunction criteria present within 6 hours of suspected infection and SIRS criteria that are NOT considered to be chronic conditions?  --        Organ dysfunction --        Date of presentation of severe sepsis --        Time of presentation of severe sepsis --        Tissue hypoperfusion persists in the hour after crystalloid fluid administration, evidenced, by either: --        Was hypotension present within one hour of the conclusion of crystalloid fluid administration? --        Date of presentation of septic shock --        Time of presentation of septic shock --              User Key  (r) = Recorded By, (t) = Taken By, (c) = Cosigned By    Initials Name Provider Type    Kailyn Ortega Nurse Practitioner

## 2022-07-14 NOTE — ASSESSMENT & PLAN NOTE
· Patient initially admitted to [place] [address] and CC contacted for acute change in respiratory status, Tachycardia, Tachypnea, Diaphoresis, and agitation     · Intubated 2 X in the past year for similar symptomatology  · Possibly 2/2 ischemia, Cardiology consulted  · Given 2 mg Morphine IV in ED  · Received 80 Lasix in ED  · Hold diuretics for now  · Continue Precedex gtt for agitation  · Sputum Culture ordered  · Continue Bipap as needed

## 2022-07-14 NOTE — CONSULTS
[address] WINSTON Morales 1960, 64 y o  female MRN: [id number]  Unit/Bed#: ICU 02-01 Encounter: [id number]  Primary Care Provider: Osei Arita DO   Date and time admitted to hospital: 7/12/2022  5:54 PM    Inpatient consult to Cardiology  Consult performed by: ESTHER Hernandez  Consult ordered by: ESTHER Chamorro          Acute on chronic respiratory failure with hypoxemia [facility])  Assessment & Plan  Multifactorial  Diuresis  Management of COPD and possible pneumonia per CC    Tobacco abuse  Assessment & Plan  Cessation encouraged    Acute on chronic combined systolic and diastolic congestive heart failure (HCC)  Assessment & Plan  Wt Readings from Last 3 Encounters:   07/14/22 78 9 kg (174 lb)   06/28/22 77 4 kg (170 lb 9 6 oz)   06/22/22 76 6 kg (168 lb 14 oz)     Multiple indicators of volume overload, however, pt now in ARF  Lasix drip has been ordered and renal consult pending  May benefit from milrinone to improve cardiac output  Low Na diet, fluid restriction, daily weights, I/O monitoring        * Chronic obstructive pulmonary disease with acute exacerbation [facility])  Assessment & Plan  Management per critical care    Other summary comments:   Patient may benefit from milrinone to improve cardiac output  Pt needs diuresis, however, renal function worsening which is likely due to poor perfusion  Awaiting renal consultation  Outpatient Cardiologist: Dr Spencer Fay    HPI: Wesly Blanco is a 64y o  year old female who presented with worsening SOB  Jose Mitchell has had several hospital admissions for acute respiratory failure in the last month  Most recently, she was at [place] [address] and discharged on 6/22/2022  It was felt that her resp failure was multifactorial and related to COPD, combined heart failure, and pneumonia  Jose Mitchell presented with worsening SOB  Imaging revealed a persistent L chest infiltrate and recurrent CHF    Her BNP was elevated >1800  HS trop levels are slightly abnormal but are flat  She received 80 mg IV lasix in the ER with improvement in her symptoms  She later became acutely SOB in the ER and required BiPap for flash pulmonary edema  Improvement was noted yesterday morning (7/13)  She was admitted to the ICU  The critical care team felt her acute pulmonary edema was precipitated by an ischemic episode, thus cardiology was consulted  She received additional lasix and has had worsening renal function and minimal urine output  She has a positive fluid balance  A lasix infusion has been ordered  Renal consult is pending  During my evaluation, Rad is somewhat lethargic on a Precedex infusion  She feels "fluid" on her chest but denies chest pain, pressure, tightness  She is SOB and requesting Bipap again  She is fearful of needing dialysis  She told me that after her discharge in June, she was doing well at home  She began to have worsening SOB 2 days prior to presentation  EKG: ST, LAE, LVH with repolarization abnormalities       MOST  RECENT CARDIAC IMAGING:   Echo 7/14/2022 EF 25%, severe global hypokinesis with regional variation, basal and mid inferior wall more severely hypo- to akinetic  Mild AI, mod MR, mild TR    Echo 6/15/2022 EF 41%  Basalar septum and basilar inferior wall are hypokinetic  Mild AI, mild TR  Mildly dilated asc aorta    Echo 7/9/2021 EF 50%, no WMA  Mild MR  Mild dilatation of the ascending arota to 3 7    Echo 3/27/2021 EF 18% severe diffuse hypokinesis  Mod MR  Mild AI< TR  Mild aortic root dilatation, 3 9 cm    Cardiac Cath 3/30/2021   Left main: Normal   LAD: The vessel was normal sized  Angiography showed moderate atherosclerosis  The diagonal branch was also free of significant disease  Circumflex: The vessel was normal sized and gave rise to one OM branch  There was mild plaque  There were no significant lesions    RCA: The vessel was normal sized and dominant, giving rise to the PDA and a posterolateral branch  There was diffuse moderate atherosclerosis  There were no flow-critical lesions  Review of Systems: a 10 point review of systems was conducted and is negative except for as mentioned in the HPI or as below  Historical Information   Past Medical History:   Diagnosis Date    Cardiomyopathy ([address] )     Chronic combined systolic and diastolic congestive heart failure     COPD (chronic obstructive pulmonary disease) (HCC)     Fatty liver     Hyperlipidemia     Hypertension     Mitral regurgitation     Sepsis      Past Surgical History:   Procedure Laterality Date    CARDIAC CATHETERIZATION  2021    Moderate non-obstructive atherosclerosis     SECTION      CHOLECYSTECTOMY      ROTATOR CUFF REPAIR W/ DISTAL CLAVICLE EXCISION Right     TONSILLECTOMY       Social History     Substance and Sexual Activity   Alcohol Use Never     Social History     Substance and Sexual Activity   Drug Use No     Social History     Tobacco Use   Smoking Status Current Some Day Smoker    Packs/day: 2 00    Types: Cigarettes   Smokeless Tobacco Never Used       Family History:       Meds/Allergies   all current active meds have been reviewed  Medications Prior to Admission   Medication    albuterol (ProAir HFA) 90 mcg/act inhaler    carvedilol (COREG) 3 125 mg tablet    clonazePAM (KlonoPIN) 1 mg tablet    furosemide (LASIX) 40 mg tablet    gabapentin (NEURONTIN) 300 mg capsule    morphine (MS CONTIN) 30 mg 12 hr tablet    oxyCODONE-acetaminophen (PERCOCET) 5-325 mg per tablet    umeclidinium-vilanterol (Anoro Ellipta) 62 5-25 MCG/INH inhaler    albuterol (2 5 mg/3 mL) 0 083 % nebulizer solution       Allergies   Allergen Reactions    Wellbutrin [Bupropion] Arthralgia       Objective   Vitals: Blood pressure 144/97, pulse 79, temperature (!) 97 2 °F (36 2 °C), temperature source Tympanic, resp   rate (!) 23, height 5' 10" (1 778 m), weight 78 9 kg (174 lb), SpO2 98 %  , Body mass index is 24 97 kg/m² ,   Orthostatic Blood Pressures    Flowsheet Row Most Recent Value   Blood Pressure 144/97 filed at 07/14/2022 1000   Patient Position - Orthostatic VS Lying filed at 07/13/2022 2462          Systolic (79IZA), GWQ:324 , Min:131 , BKN:725     Diastolic (89DUL), DUL:00, Min:78, Max:112      Physical Exam:    General:  Normal appearance in no distress, wearing NC  Eyes:  Anicteric  Oral mucosa:  Moist   Neck:  No JVD  Carotid upstrokes are brisk without bruits  No masses  Chest:  ocasional wheeze, decreased, occasional coarse breath sounds  Cardiac:  No palpable PMI  Normal S1 and S2  No murmur gallop or rub  Abdomen:  Soft and nontender  No palpable organomegaly or aortic enlargement  Extremities:  No peripheral edema  Musculoskeletal:  Symmetric  Vascular:  Pedal pulses are intact  Neuro:  Grossly symmetric    Psych:  Alert and oriented x3, lethargic        Lab Results:     Troponins:    Results from last 7 days   Lab Units 07/13/22  0246 07/13/22  0052 07/12/22  2301   HS TNI 0HR ng/L  --   --  30   HS TNI 2HR ng/L  --  28  --    HSTNI D2 ng/L  --  -2  --    HS TNI 4HR ng/L 52*  --   --    HSTNI D4 ng/L 22*  --   --      BNP:   Results from last 6 Months   Lab Units 07/12/22  1816   BNP pg/mL 1,822*       CBC :   Results from last 7 days   Lab Units 07/14/22  0416 07/13/22  0422   WBC Thousand/uL 13 45* 10 08   HEMOGLOBIN g/dL 12 9 12 7   HEMATOCRIT % 38 9 40 4   MCV fL 92 92   PLATELETS Thousands/uL 279 270     TSH:     CMP:   Results from last 7 days   Lab Units 07/14/22  0943 07/14/22  0416 07/13/22  0422   POTASSIUM mmol/L 4 6 4 6 3 8   CHLORIDE mmol/L 94* 94* 101   CO2 mmol/L 24 24 27   BUN mg/dL 64* 58* 19   CREATININE mg/dL 4 95* 4 50* 1 66*   AST U/L  --  14 13   ALT U/L  --  9 10   EGFR ml/min/1 73sq m 8 9 33     Lipid Profile:     Coags:

## 2022-07-15 ENCOUNTER — APPOINTMENT (INPATIENT)
Dept: DIALYSIS | Facility: HOSPITAL | Age: 62
DRG: 682 | End: 2022-07-15
Payer: MEDICARE

## 2022-07-15 ENCOUNTER — APPOINTMENT (INPATIENT)
Dept: RADIOLOGY | Facility: HOSPITAL | Age: 62
DRG: 682 | End: 2022-07-15
Payer: MEDICARE

## 2022-07-15 PROBLEM — E87.6 HYPOKALEMIA: Status: RESOLVED | Noted: 2021-03-27 | Resolved: 2022-07-15

## 2022-07-15 LAB
ALBUMIN SERPL BCP-MCNC: 3.6 G/DL (ref 3.5–5)
ALP SERPL-CCNC: 71 U/L (ref 34–104)
ALT SERPL W P-5'-P-CCNC: 7 U/L (ref 7–52)
ANION GAP SERPL CALCULATED.3IONS-SCNC: 15 MMOL/L (ref 4–13)
ANION GAP SERPL CALCULATED.3IONS-SCNC: 15 MMOL/L (ref 4–13)
ANION GAP SERPL CALCULATED.3IONS-SCNC: 16 MMOL/L (ref 4–13)
ANION GAP SERPL CALCULATED.3IONS-SCNC: 17 MMOL/L (ref 4–13)
AST SERPL W P-5'-P-CCNC: 7 U/L (ref 13–39)
ATRIAL RATE: 72 BPM
BASOPHILS # BLD AUTO: 0.05 THOUSANDS/ΜL (ref 0–0.1)
BASOPHILS NFR BLD AUTO: 0 % (ref 0–1)
BILIRUB DIRECT SERPL-MCNC: 0.1 MG/DL (ref 0–0.2)
BILIRUB SERPL-MCNC: 0.55 MG/DL (ref 0.2–1)
BUN SERPL-MCNC: 71 MG/DL (ref 5–25)
BUN SERPL-MCNC: 82 MG/DL (ref 5–25)
BUN SERPL-MCNC: 90 MG/DL (ref 5–25)
BUN SERPL-MCNC: 99 MG/DL (ref 5–25)
CALCIUM SERPL-MCNC: 9.2 MG/DL (ref 8.4–10.2)
CALCIUM SERPL-MCNC: 9.7 MG/DL (ref 8.4–10.2)
CHLORIDE SERPL-SCNC: 94 MMOL/L (ref 96–108)
CHLORIDE SERPL-SCNC: 95 MMOL/L (ref 96–108)
CHLORIDE SERPL-SCNC: 96 MMOL/L (ref 96–108)
CHLORIDE SERPL-SCNC: 97 MMOL/L (ref 96–108)
CO2 SERPL-SCNC: 21 MMOL/L (ref 21–32)
CO2 SERPL-SCNC: 21 MMOL/L (ref 21–32)
CO2 SERPL-SCNC: 22 MMOL/L (ref 21–32)
CO2 SERPL-SCNC: 24 MMOL/L (ref 21–32)
CREAT SERPL-MCNC: 5.6 MG/DL (ref 0.6–1.3)
CREAT SERPL-MCNC: 6.49 MG/DL (ref 0.6–1.3)
CREAT SERPL-MCNC: 6.94 MG/DL (ref 0.6–1.3)
CREAT SERPL-MCNC: 7.42 MG/DL (ref 0.6–1.3)
EOSINOPHIL # BLD AUTO: 0.07 THOUSAND/ΜL (ref 0–0.61)
EOSINOPHIL NFR BLD AUTO: 1 % (ref 0–6)
ERYTHROCYTE [DISTWIDTH] IN BLOOD BY AUTOMATED COUNT: 14.2 % (ref 11.6–15.1)
GFR SERPL CREATININE-BSD FRML MDRD: 5 ML/MIN/1.73SQ M
GFR SERPL CREATININE-BSD FRML MDRD: 5 ML/MIN/1.73SQ M
GFR SERPL CREATININE-BSD FRML MDRD: 6 ML/MIN/1.73SQ M
GFR SERPL CREATININE-BSD FRML MDRD: 7 ML/MIN/1.73SQ M
GLUCOSE SERPL-MCNC: 122 MG/DL (ref 65–140)
GLUCOSE SERPL-MCNC: 125 MG/DL (ref 65–140)
GLUCOSE SERPL-MCNC: 67 MG/DL (ref 65–140)
GLUCOSE SERPL-MCNC: 82 MG/DL (ref 65–140)
GLUCOSE SERPL-MCNC: 87 MG/DL (ref 65–140)
HCT VFR BLD AUTO: 37.2 % (ref 34.8–46.1)
HGB BLD-MCNC: 11.8 G/DL (ref 11.5–15.4)
IMM GRANULOCYTES # BLD AUTO: 0.06 THOUSAND/UL (ref 0–0.2)
IMM GRANULOCYTES NFR BLD AUTO: 1 % (ref 0–2)
LYMPHOCYTES # BLD AUTO: 1.77 THOUSANDS/ΜL (ref 0.6–4.47)
LYMPHOCYTES NFR BLD AUTO: 15 % (ref 14–44)
MAGNESIUM SERPL-MCNC: 2.5 MG/DL (ref 1.9–2.7)
MCH RBC QN AUTO: 29.4 PG (ref 26.8–34.3)
MCHC RBC AUTO-ENTMCNC: 31.7 G/DL (ref 31.4–37.4)
MCV RBC AUTO: 93 FL (ref 82–98)
MONOCYTES # BLD AUTO: 0.66 THOUSAND/ΜL (ref 0.17–1.22)
MONOCYTES NFR BLD AUTO: 6 % (ref 4–12)
NEUTROPHILS # BLD AUTO: 9.14 THOUSANDS/ΜL (ref 1.85–7.62)
NEUTS SEG NFR BLD AUTO: 77 % (ref 43–75)
NRBC BLD AUTO-RTO: 0 /100 WBCS
P AXIS: 46 DEGREES
PHOSPHATE SERPL-MCNC: 10.3 MG/DL (ref 2.3–4.1)
PLATELET # BLD AUTO: 292 THOUSANDS/UL (ref 149–390)
PMV BLD AUTO: 10 FL (ref 8.9–12.7)
POTASSIUM SERPL-SCNC: 4.7 MMOL/L (ref 3.5–5.3)
POTASSIUM SERPL-SCNC: 5.1 MMOL/L (ref 3.5–5.3)
POTASSIUM SERPL-SCNC: 5.3 MMOL/L (ref 3.5–5.3)
POTASSIUM SERPL-SCNC: 6 MMOL/L (ref 3.5–5.3)
PR INTERVAL: 186 MS
PROCALCITONIN SERPL-MCNC: 3.04 NG/ML
PROT SERPL-MCNC: 6.8 G/DL (ref 6.4–8.4)
QRS AXIS: 25 DEGREES
QRSD INTERVAL: 94 MS
QT INTERVAL: 430 MS
QTC INTERVAL: 470 MS
RBC # BLD AUTO: 4.01 MILLION/UL (ref 3.81–5.12)
SODIUM SERPL-SCNC: 133 MMOL/L (ref 135–147)
SODIUM SERPL-SCNC: 134 MMOL/L (ref 135–147)
T WAVE AXIS: 82 DEGREES
VENTRICULAR RATE: 72 BPM
WBC # BLD AUTO: 11.75 THOUSAND/UL (ref 4.31–10.16)

## 2022-07-15 PROCEDURE — 71045 X-RAY EXAM CHEST 1 VIEW: CPT

## 2022-07-15 PROCEDURE — 86704 HEP B CORE ANTIBODY TOTAL: CPT | Performed by: INTERNAL MEDICINE

## 2022-07-15 PROCEDURE — 90935 HEMODIALYSIS ONE EVALUATION: CPT | Performed by: INTERNAL MEDICINE

## 2022-07-15 PROCEDURE — 36569 INSJ PICC 5 YR+ W/O IMAGING: CPT | Performed by: NURSE PRACTITIONER

## 2022-07-15 PROCEDURE — 94003 VENT MGMT INPAT SUBQ DAY: CPT

## 2022-07-15 PROCEDURE — 99291 CRITICAL CARE FIRST HOUR: CPT | Performed by: INTERNAL MEDICINE

## 2022-07-15 PROCEDURE — 93010 ELECTROCARDIOGRAM REPORT: CPT | Performed by: INTERNAL MEDICINE

## 2022-07-15 PROCEDURE — 84145 PROCALCITONIN (PCT): CPT | Performed by: NURSE PRACTITIONER

## 2022-07-15 PROCEDURE — 87081 CULTURE SCREEN ONLY: CPT | Performed by: NURSE PRACTITIONER

## 2022-07-15 PROCEDURE — 93503 INSERT/PLACE HEART CATHETER: CPT | Performed by: NURSE PRACTITIONER

## 2022-07-15 PROCEDURE — 82948 REAGENT STRIP/BLOOD GLUCOSE: CPT

## 2022-07-15 PROCEDURE — 94640 AIRWAY INHALATION TREATMENT: CPT

## 2022-07-15 PROCEDURE — 87340 HEPATITIS B SURFACE AG IA: CPT | Performed by: INTERNAL MEDICINE

## 2022-07-15 PROCEDURE — 80076 HEPATIC FUNCTION PANEL: CPT | Performed by: NURSE PRACTITIONER

## 2022-07-15 PROCEDURE — NC001 PR NO CHARGE: Performed by: INTERNAL MEDICINE

## 2022-07-15 PROCEDURE — 94760 N-INVAS EAR/PLS OXIMETRY 1: CPT

## 2022-07-15 PROCEDURE — 76937 US GUIDE VASCULAR ACCESS: CPT | Performed by: NURSE PRACTITIONER

## 2022-07-15 PROCEDURE — 80048 BASIC METABOLIC PNL TOTAL CA: CPT | Performed by: NURSE PRACTITIONER

## 2022-07-15 PROCEDURE — 84100 ASSAY OF PHOSPHORUS: CPT | Performed by: NURSE PRACTITIONER

## 2022-07-15 PROCEDURE — 02HQ32Z INSERTION OF MONITORING DEVICE INTO RIGHT PULMONARY ARTERY, PERCUTANEOUS APPROACH: ICD-10-PCS | Performed by: INTERNAL MEDICINE

## 2022-07-15 PROCEDURE — 83735 ASSAY OF MAGNESIUM: CPT | Performed by: NURSE PRACTITIONER

## 2022-07-15 PROCEDURE — 86705 HEP B CORE ANTIBODY IGM: CPT | Performed by: INTERNAL MEDICINE

## 2022-07-15 PROCEDURE — 36556 INSERT NON-TUNNEL CV CATH: CPT | Performed by: NURSE PRACTITIONER

## 2022-07-15 PROCEDURE — 99232 SBSQ HOSP IP/OBS MODERATE 35: CPT | Performed by: PHYSICIAN ASSISTANT

## 2022-07-15 PROCEDURE — 85025 COMPLETE CBC W/AUTO DIFF WBC: CPT | Performed by: NURSE PRACTITIONER

## 2022-07-15 PROCEDURE — 5A1D70Z PERFORMANCE OF URINARY FILTRATION, INTERMITTENT, LESS THAN 6 HOURS PER DAY: ICD-10-PCS | Performed by: INTERNAL MEDICINE

## 2022-07-15 PROCEDURE — 86706 HEP B SURFACE ANTIBODY: CPT | Performed by: INTERNAL MEDICINE

## 2022-07-15 PROCEDURE — 86803 HEPATITIS C AB TEST: CPT | Performed by: INTERNAL MEDICINE

## 2022-07-15 RX ORDER — DEXTROSE MONOHYDRATE 25 G/50ML
INJECTION, SOLUTION INTRAVENOUS
Status: DISPENSED
Start: 2022-07-15 | End: 2022-07-16

## 2022-07-15 RX ORDER — LORAZEPAM 2 MG/ML
0.5 INJECTION INTRAMUSCULAR ONCE
Status: COMPLETED | OUTPATIENT
Start: 2022-07-15 | End: 2022-07-15

## 2022-07-15 RX ORDER — HYDRALAZINE HYDROCHLORIDE 20 MG/ML
10 INJECTION INTRAMUSCULAR; INTRAVENOUS ONCE
Status: COMPLETED | OUTPATIENT
Start: 2022-07-15 | End: 2022-07-15

## 2022-07-15 RX ORDER — CALCIUM GLUCONATE 20 MG/ML
2 INJECTION, SOLUTION INTRAVENOUS ONCE
Status: COMPLETED | OUTPATIENT
Start: 2022-07-15 | End: 2022-07-15

## 2022-07-15 RX ORDER — DEXTROSE MONOHYDRATE 25 G/50ML
25 INJECTION, SOLUTION INTRAVENOUS ONCE
Status: COMPLETED | OUTPATIENT
Start: 2022-07-15 | End: 2022-07-15

## 2022-07-15 RX ADMIN — HYDRALAZINE HYDROCHLORIDE 10 MG: 20 INJECTION INTRAMUSCULAR; INTRAVENOUS at 08:43

## 2022-07-15 RX ADMIN — CALCIUM GLUCONATE 2 G: 20 INJECTION, SOLUTION INTRAVENOUS at 07:46

## 2022-07-15 RX ADMIN — INSULIN HUMAN 8 UNITS: 100 INJECTION, SOLUTION PARENTERAL at 07:46

## 2022-07-15 RX ADMIN — DEXTROSE MONOHYDRATE 25 ML: 25 INJECTION, SOLUTION INTRAVENOUS at 07:46

## 2022-07-15 RX ADMIN — DEXMEDETOMIDINE HYDROCHLORIDE 0.7 MCG/KG/HR: 400 INJECTION INTRAVENOUS at 03:43

## 2022-07-15 RX ADMIN — LEVALBUTEROL HYDROCHLORIDE 1.25 MG: 1.25 SOLUTION, CONCENTRATE RESPIRATORY (INHALATION) at 07:09

## 2022-07-15 RX ADMIN — NICOTINE 1 PATCH: 21 PATCH, EXTENDED RELEASE TRANSDERMAL at 08:33

## 2022-07-15 RX ADMIN — LEVALBUTEROL HYDROCHLORIDE 1.25 MG: 1.25 SOLUTION, CONCENTRATE RESPIRATORY (INHALATION) at 13:04

## 2022-07-15 RX ADMIN — ISODIUM CHLORIDE 3 ML: 0.03 SOLUTION RESPIRATORY (INHALATION) at 19:24

## 2022-07-15 RX ADMIN — HEPARIN SODIUM 5000 UNITS: 5000 INJECTION INTRAVENOUS; SUBCUTANEOUS at 15:29

## 2022-07-15 RX ADMIN — ISODIUM CHLORIDE 3 ML: 0.03 SOLUTION RESPIRATORY (INHALATION) at 13:04

## 2022-07-15 RX ADMIN — DEXMEDETOMIDINE HYDROCHLORIDE 1.4 MCG/KG/HR: 400 INJECTION INTRAVENOUS at 07:45

## 2022-07-15 RX ADMIN — ACETAMINOPHEN 650 MG: 325 TABLET ORAL at 21:22

## 2022-07-15 RX ADMIN — HEPARIN SODIUM 5000 UNITS: 5000 INJECTION INTRAVENOUS; SUBCUTANEOUS at 21:23

## 2022-07-15 RX ADMIN — ISODIUM CHLORIDE 3 ML: 0.03 SOLUTION RESPIRATORY (INHALATION) at 07:09

## 2022-07-15 RX ADMIN — DEXMEDETOMIDINE HYDROCHLORIDE 0.4 MCG/KG/HR: 400 INJECTION INTRAVENOUS at 23:58

## 2022-07-15 RX ADMIN — HEPARIN SODIUM 5000 UNITS: 5000 INJECTION INTRAVENOUS; SUBCUTANEOUS at 05:07

## 2022-07-15 RX ADMIN — LORAZEPAM 0.5 MG: 2 INJECTION INTRAMUSCULAR; INTRAVENOUS at 05:07

## 2022-07-15 RX ADMIN — CLONAZEPAM 0.5 MG: 0.5 TABLET ORAL at 21:22

## 2022-07-15 RX ADMIN — LEVALBUTEROL HYDROCHLORIDE 1.25 MG: 1.25 SOLUTION, CONCENTRATE RESPIRATORY (INHALATION) at 19:24

## 2022-07-15 RX ADMIN — DEXMEDETOMIDINE HYDROCHLORIDE 1.4 MCG/KG/HR: 400 INJECTION INTRAVENOUS at 11:30

## 2022-07-15 RX ADMIN — DEXMEDETOMIDINE HYDROCHLORIDE 1.4 MCG/KG/HR: 400 INJECTION INTRAVENOUS at 15:28

## 2022-07-15 NOTE — PROGRESS NOTES
Bernard[address]  66   Progress Note Pat Pulido 1960, 64 y o  female MRN: [id number]  Unit/Bed#: ICU 02-01 Encounter: [id number]  Primary Care Provider: Raúl Huerta DO   Date and time admitted to hospital: 7/12/2022  5:54 PM    Acute on chronic respiratory failure with hypoxemia [facility])  Assessment & Plan  Multifactorial  Diuresis/HD per nephrology  Management of COPD and possible pneumonia per CC    Tobacco abuse  Assessment & Plan  Cessation encouraged    Acute on chronic combined systolic and diastolic congestive heart failure (HCC)  Assessment & Plan  Wt Readings from Last 3 Encounters:   07/15/22 75 8 kg (167 lb 1 7 oz)   06/28/22 77 4 kg (170 lb 9 6 oz)   06/22/22 76 6 kg (168 lb 14 oz)     Multiple indicators of volume overload, however, pt now in ARF   Likely cardiorenal syndrome  Diuretics/HD per nephrology  Agree with milrinone to improve cardiac output  Low Na diet, fluid restriction, daily weights, I/O monitoring        * Chronic obstructive pulmonary disease without acute exacerbation ([facility] [address] )  Assessment & Plan  Management per critical care      Subjective:  Patient is sedated and now BiPAP dependant  Renal function worsening  Will need HD under nephrology's direction  Has had elevated BP overnight (now better controlled)  On Milrinone to aid with cardiac output  Objective:   Vitals: Blood pressure (!) 187/96, pulse 75, temperature 97 5 °F (36 4 °C), temperature source Tympanic, resp  rate 21, height 5' 10" (1 778 m), weight 75 8 kg (167 lb 1 7 oz), SpO2 98 % ,   Orthostatic Blood Pressures    Flowsheet Row Most Recent Value   Blood Pressure 187/96 filed at 07/15/2022 0830   Patient Position - Orthostatic VS Lying filed at 07/14/2022 2100          Telemetry: controlled rate     Physical Exam:  Physical Exam  Constitutional:       Appearance: She is normal weight  Interventions: She is sedated  HENT:      Head: Normocephalic and atraumatic     Cardiovascular: Rate and Rhythm: Normal rate and regular rhythm     Pulmonary:      Comments: BiPAP dependant  Abdominal:      General: Bowel sounds are normal          Medications:    Current Facility-Administered Medications:     acetaminophen (TYLENOL) tablet 650 mg, 650 mg, Oral, Q4H PRN, JAY HouNP, 650 mg at 07/14/22 1924    albuterol inhalation solution 2 5 mg, 2 5 mg, Nebulization, Q6H PRN, Jonathan Iglesias CRNP    carvedilol (COREG) tablet 3 125 mg, 3 125 mg, Oral, BID With Meals, ESTHER Hilliard, 3 125 mg at 07/14/22 0754    clonazePAM (KlonoPIN) tablet 0 5 mg, 0 5 mg, Oral, HS PRN, ESTHER Hou, 0 5 mg at 07/14/22 0032    dexmedeTOMIDine (Precedex) 400 mcg in sodium chloride 0 9% 100 mL, 0 1-1 4 mcg/kg/hr, Intravenous, Titrated, ESTHER Hilliard, Last Rate: 27 1 mL/hr at 07/15/22 0745, 1 4 mcg/kg/hr at 07/15/22 0745    heparin (porcine) subcutaneous injection 5,000 Units, 5,000 Units, Subcutaneous, Q8H [facility], ESTHER Hou, 5,000 Units at 07/15/22 0507    levalbuterol (XOPENEX) inhalation solution 1 25 mg, 1 25 mg, Nebulization, TID, 1 25 mg at 07/15/22 0709 **AND** sodium chloride 0 9 % inhalation solution 3 mL, 3 mL, Nebulization, TID, Jonathan Iglesias CRNP, 3 mL at 07/15/22 0709    lidocaine (LIDODERM) 5 % patch 2 patch, 2 patch, Topical, Daily, ESTHER Hou    nicotine (NICODERM CQ) 21 mg/24 hr TD 24 hr patch 1 patch, 1 patch, Transdermal, Daily, Tara Escalante, JAYNP, 1 patch at 07/15/22 0833    ondansetron (ZOFRAN) injection 4 mg, 4 mg, Intravenous, Q6H PRN, Jonathan Iglesias CRNP    umeclidinium-vilanterol (ANORO ELLIPTA) 62 5-25 MCG/INH inhaler 1 puff, 1 puff, Inhalation, Daily, ESTHER Hilliard, 1 puff at 07/14/22 0800     Labs & Results:  Results from last 7 days   Lab Units 07/12/22  1816   BNP pg/mL 1,822*     Results from last 7 days   Lab Units 07/15/22  0627   WBC Thousand/uL 11 75*   HEMOGLOBIN g/dL 11 8   HEMATOCRIT % 37 2   PLATELETS Thousands/uL 292         Results from last 7 days   Lab Units 07/15/22  0627   POTASSIUM mmol/L 6 0*   CHLORIDE mmol/L 95*   CO2 mmol/L 21   BUN mg/dL 90*   CREATININE mg/dL 6 94*

## 2022-07-15 NOTE — ASSESSMENT & PLAN NOTE
· Baseline creatinine appears to be 1 5-1 8 range  · Creatinine started rising, with poor urine output  · Nephrology consulted, appreciate their recommendations  · Likely 2/2 ATN  · Has not responded to diuretics or fluids  · Will likely need dialysis-iHD vs CRRT  · Most recent creatinine 6 49, potassium 5 3  · Continue to follow labs

## 2022-07-15 NOTE — PROGRESS NOTES
HEMODIALYSIS PROCEDURE NOTE  The patient was seen and examined on hemodialysis    Time: 2 hours  Sodium: 138 Blood flow: 200   Dialyzer: F160 Potassium: 2 Dialysate flow: 350   Access: right c=dialysis catheter Bicarbonate: 37 Ultrafiltration goal: 2   Medications on HD: none   Blood pressure 140/82  Patient is tolerating treatment well  I updated her daughter Breanne Julio at the bedside

## 2022-07-15 NOTE — ASSESSMENT & PLAN NOTE
· Not currently in an exacerbation  · Currently smokes 2 PPD  · Recently discharged from [facility] for similar presentation  · Per chart review, has been intubated 2 X in the past year  · Continue Xopenex nebulizer treatments t i d   · PRN Albuterol  · Bipap as needed  · Sputum culture ordered  · CXR 7/12 Recurrent CHF   Persistent left chest infiltrate  · Continue home Anoro-Ellipta  · Consider Palliative consult No significant past surgical history

## 2022-07-15 NOTE — ASSESSMENT & PLAN NOTE
Wt Readings from Last 3 Encounters:   07/14/22 78 9 kg (174 lb)   06/28/22 77 4 kg (170 lb 9 6 oz)   06/22/22 76 6 kg (168 lb 14 oz)       · BNP 1822 an admission  · Recent ECHO June 2022: Left ventricular cavity size is normal  Wall thickness is mildly increased  There is mild concentric hypertrophy  The left ventricular ejection fraction is 41% by biplane measurement  Systolic function is moderately reduced  The Basalar septum and basilar inferior wall are hypokenetic  Diastolic function is mildly abnormal, consistent with grade I (abnormal) relaxation  The pulmonary artery systolic pressure is moderately increased    · Repeat echo 7/14 showed EF 25%, severe global hypokinesis  · Cardiology consulted, appreciate their recommendations  · Follows with Dr Yarely Regalado outpatient  · Has now been unresponsive to diuretics  · Requiring continuous bipap  · cardiac enzymes slightly elevated with peak of 59  · Obtain daily weights  · Continue Henley catheter for strict I/O

## 2022-07-15 NOTE — PROCEDURES
Central Line Insertion    Date/Time: 7/15/2022 11:00 AM  Performed by: ESTHER Rush  Authorized by: ESTHER Rush     Patient location:  Bedside  Other Assisting Provider: Yes (comment) (Dr Gurmeet Mccarthy)    Consent:     Consent obtained:  Verbal    Consent given by:  Healthcare agent    Risks discussed:  Arterial puncture, bleeding, incorrect placement, infection, nerve damage and pneumothorax    Alternatives discussed:  No treatment, delayed treatment and observation  Universal protocol:     Relevant documents present and verified: yes      Site/side marked: yes      Immediately prior to procedure, a time out was called: yes      Patient identity confirmed:  Arm band and provided demographic data  Pre-procedure details:     Hand hygiene: Hand hygiene performed prior to insertion      Sterile barrier technique: All elements of maximal sterile technique followed      Skin preparation:  ChloraPrep    Skin preparation agent: Skin preparation agent completely dried prior to procedure    Indications:     Central line indications: medications requiring central line and hemodynamic monitoring    Anesthesia (see MAR for exact dosages): Anesthesia method:  None  Procedure details:     Location:  Left subclavian    Vessel type: vein      Laterality:  Left    Patient position:  Flat    Catheter type:  Cordis    Number of attempts:  1    Successful placement: yes    Post-procedure details:     Post-procedure:  Dressing applied and line sutured    Assessment:  No pneumothorax on x-ray, placement verified by x-ray, free fluid flow and blood return through all ports    Patient tolerance of procedure: Tolerated well, no immediate complications  Comments:      Cordis was placed through wire exchange in sterile fashion  Wire was threaded through previous triple lumen catheter and catheter was removed  Cordis was then placed over the wire and wire removed     Pulmonary Artery Cathether Insertion    Date/Time: 7/15/2022 11:30 AM  Performed by: ESTHER Gordillo  Authorized by: ESTHER Gordillo     Patient location:  Bedside  Other Assisting Provider: Yes (comment) (Dr Angela Springer)    Consent:     Consent obtained:  Verbal    Consent given by:  Healthcare agent    Risks discussed:  Arterial puncture, air embolism, damage to surrounding structures, vascular damage, induced arrhythmia and bleeding    Alternatives discussed:  No treatment and observation  Universal protocol:     Site/side marked: yes      Immediately prior to procedure, a time out was called: yes      Patient identity confirmed:  Arm band and provided demographic data  Pre-procedure details:     Hand hygiene: Hand hygiene performed prior to insertion      Sterile barrier technique: All elements of maximal sterile technique followed      Skin preparation:  ChloraPrep    Skin preparation agent: Skin preparation agent completely dried prior to procedure    Sedation:     Sedation type:  None  Anesthesia (see MAR for exact dosages): Anesthesia method:  None  Indications:     Sivakumar Norman catheter indications: CVP monitoring, assessment of intravascular volume status and CCO    Procedure details:     Patient position:  Flat    Venous introducer: new sheath/introducer placed      Location:  Left subclavian    Landmarks identified: yes      Ultrasound guidance: no      PA catheter successfully placed?: yes      Number of attempts:  1    PA catheter size:  7 5 Fr    PA catheter depth at introducer hub (cm):  62    Placement confirmation: pressure tracing changes and pulmonary artery catheter wave confirmed      PA waveform: good waveform    Post-procedure details:     PA catheter events: flushed and inserted without difficulty      PA catheter securement: PA catheter cover in place, PA catheter hub locked and catheter hub secured      Post-procedure:  Securement device    Image confirmation:  Placement verified by x-ray    Patient tolerance of procedure:   Tolerated well, no immediate complications

## 2022-07-15 NOTE — PLAN OF CARE
Problem: MOBILITY - ADULT  Goal: Maintain or return to baseline ADL function  Description: INTERVENTIONS:  -  Assess patient's ability to carry out ADLs; assess patient's baseline for ADL function and identify physical deficits which impact ability to perform ADLs (bathing, care of mouth/teeth, toileting, grooming, dressing, etc )  - Assess/evaluate cause of self-care deficits   - Assess range of motion  - Assess patient's mobility; develop plan if impaired  - Assess patient's need for assistive devices and provide as appropriate  - Encourage maximum independence but intervene and supervise when necessary  - Involve family in performance of ADLs  - Assess for home care needs following discharge   - Consider OT consult to assist with ADL evaluation and planning for discharge  - Provide patient education as appropriate  Outcome: Progressing  Goal: Maintains/Returns to pre admission functional level  Description: INTERVENTIONS:  - Perform BMAT or MOVE assessment daily    - Set and communicate daily mobility goal to care team and patient/family/caregiver  - Collaborate with rehabilitation services on mobility goals if consulted  - Perform Range of Motion 3 times a day  - Reposition patient every 2 hours    - Dangle patient 3 times a day  - Stand patient 3 times a day  - Ambulate patient 3 times a day  - Out of bed to chair 3 times a day   - Out of bed for meals 3 times a day  - Out of bed for toileting  - Record patient progress and toleration of activity level   Outcome: Progressing     Problem: Potential for Falls  Goal: Patient will remain free of falls  Description: INTERVENTIONS:  - Educate patient/family on patient safety including physical limitations  - Instruct patient to call for assistance with activity   - Consult OT/PT to assist with strengthening/mobility   - Keep Call bell within reach  - Keep bed low and locked with side rails adjusted as appropriate  - Keep care items and personal belongings within reach  - Initiate and maintain comfort rounds  - Make Fall Risk Sign visible to staff  - Offer Toileting every 2 Hours, in advance of need  - Initiate/Maintain bed 2alarm  - Obtain necessary fall risk management equipment: yellow socks  - Apply yellow socks and bracelet for high fall risk patients  - Consider moving patient to room near nurses station  Outcome: Progressing     Problem: Prexisting or High Potential for Compromised Skin Integrity  Goal: Skin integrity is maintained or improved  Description: INTERVENTIONS:  - Identify patients at risk for skin breakdown  - Assess and monitor skin integrity  - Assess and monitor nutrition and hydration status  - Monitor labs   - Assess for incontinence   - Turn and reposition patient  - Assist with mobility/ambulation  - Relieve pressure over bony prominences  - Avoid friction and shearing  - Provide appropriate hygiene as needed including keeping skin clean and dry  - Evaluate need for skin moisturizer/barrier cream  - Collaborate with interdisciplinary team   - Patient/family teaching  - Consider wound care consult   Outcome: Progressing     Problem: PAIN - ADULT  Goal: Verbalizes/displays adequate comfort level or baseline comfort level  Description: Interventions:  - Encourage patient to monitor pain and request assistance  - Assess pain using appropriate pain scale  - Administer analgesics based on type and severity of pain and evaluate response  - Implement non-pharmacological measures as appropriate and evaluate response  - Consider cultural and social influences on pain and pain management  - Notify physician/advanced practitioner if interventions unsuccessful or patient reports new pain  Outcome: Progressing     Problem: INFECTION - ADULT  Goal: Absence or prevention of progression during hospitalization  Description: INTERVENTIONS:  - Assess and monitor for signs and symptoms of infection  - Monitor lab/diagnostic results  - Monitor all insertion sites, i e  indwelling lines, tubes, and drains  - Monitor endotracheal if appropriate and nasal secretions for changes in amount and color  - [place] appropriate cooling/warming therapies per order  - Administer medications as ordered  - Instruct and encourage patient and family to use good hand hygiene technique  - Identify and instruct in appropriate isolation precautions for identified infection/condition  Outcome: Progressing  Goal: Absence of fever/infection during neutropenic period  Description: INTERVENTIONS:  - Monitor WBC    Outcome: Progressing     Problem: SAFETY ADULT  Goal: Maintain or return to baseline ADL function  Description: INTERVENTIONS:  -  Assess patient's ability to carry out ADLs; assess patient's baseline for ADL function and identify physical deficits which impact ability to perform ADLs (bathing, care of mouth/teeth, toileting, grooming, dressing, etc )  - Assess/evaluate cause of self-care deficits   - Assess range of motion  - Assess patient's mobility; develop plan if impaired  - Assess patient's need for assistive devices and provide as appropriate  - Encourage maximum independence but intervene and supervise when necessary  - Involve family in performance of ADLs  - Assess for home care needs following discharge   - Consider OT consult to assist with ADL evaluation and planning for discharge  - Provide patient education as appropriate  Outcome: Progressing  Goal: Maintains/Returns to pre admission functional level  Description: INTERVENTIONS:  - Perform BMAT or MOVE assessment daily    - Set and communicate daily mobility goal to care team and patient/family/caregiver  - Collaborate with rehabilitation services on mobility goals if consulted  - Perform Range of Motion 3 times a day  - Reposition patient every 2 hours    - Dangle patient 3 times a day  - Stand patient 3 times a day  - Ambulate patient 3 times a day  - Out of bed to chair 3 times a day   - Out of bed for meals 3 times a day  - Out of bed for toileting  - Record patient progress and toleration of activity level   Outcome: Progressing  Goal: Patient will remain free of falls  Description: INTERVENTIONS:  - Educate patient/family on patient safety including physical limitations  - Instruct patient to call for assistance with activity   - Consult OT/PT to assist with strengthening/mobility   - Keep Call bell within reach  - Keep bed low and locked with side rails adjusted as appropriate  - Keep care items and personal belongings within reach  - Initiate and maintain comfort rounds  - Make Fall Risk Sign visible to staff  - Offer Toileting every 2 Hours, in advance of need  - Initiate/Maintain bed 2alarm  - Obtain necessary fall risk management equipment: yellow socks  - Apply yellow socks and bracelet for high fall risk patients  - Consider moving patient to room near nurses station  Outcome: Progressing     Problem: DISCHARGE PLANNING  Goal: Discharge to home or other facility with appropriate resources  Description: INTERVENTIONS:  - Identify barriers to discharge w/patient and caregiver  - Arrange for needed discharge resources and transportation as appropriate  - Identify discharge learning needs (meds, wound care, etc )  - Arrange for interpretive services to assist at discharge as needed  - Refer to Case Management Department for coordinating discharge planning if the patient needs post-hospital services based on physician/advanced practitioner order or complex needs related to functional status, cognitive ability, or social support system  Outcome: Progressing     Problem: Knowledge Deficit  Goal: Patient/family/caregiver demonstrates understanding of disease process, treatment plan, medications, and discharge instructions  Description: Complete learning assessment and assess knowledge base    Interventions:  - Provide teaching at level of understanding  - Provide teaching via preferred learning methods  Outcome: Progressing Problem: RESPIRATORY - ADULT  Goal: Achieves optimal ventilation and oxygenation  Description: INTERVENTIONS:  - Assess for changes in respiratory status  - Assess for changes in mentation and behavior  - Position to facilitate oxygenation and minimize respiratory effort  - Oxygen administered by appropriate delivery if ordered  - Initiate smoking cessation education as indicated  - Encourage broncho-pulmonary hygiene including cough, deep breathe, Incentive Spirometry  - Assess the need for suctioning and aspirate as needed  - Assess and instruct to report SOB or any respiratory difficulty  - Respiratory Therapy support as indicated  Outcome: Progressing     Problem: GENITOURINARY - ADULT  Goal: Maintains or returns to baseline urinary function  Description: INTERVENTIONS:  - Assess urinary function  - Encourage oral fluids to ensure adequate hydration if ordered  - Administer IV fluids as ordered to ensure adequate hydration  - Administer ordered medications as needed  - Offer frequent toileting  - Follow urinary retention protocol if ordered  Outcome: Progressing  Goal: Absence of urinary retention  Description: INTERVENTIONS:  - Assess patients ability to void and empty bladder  - Monitor I/O  - Bladder scan as needed  - Discuss with physician/AP medications to alleviate retention as needed  - Discuss catheterization for long term situations as appropriate  Outcome: Progressing  Goal: Urinary catheter remains patent  Description: INTERVENTIONS:  - Assess patency of urinary catheter  - If patient has a chronic leung, consider changing catheter if non-functioning  - Follow guidelines for intermittent irrigation of non-functioning urinary catheter  Outcome: Progressing     Problem: METABOLIC, FLUID AND ELECTROLYTES - ADULT  Goal: Electrolytes maintained within normal limits  Description: INTERVENTIONS:  - Monitor labs and assess patient for signs and symptoms of electrolyte imbalances  - Administer electrolyte replacement as ordered  - Monitor response to electrolyte replacements, including repeat lab results as appropriate  - Instruct patient on fluid and nutrition as appropriate  Outcome: Progressing  Goal: Fluid balance maintained  Description: INTERVENTIONS:  - Monitor labs   - Monitor I/O and WT  - Instruct patient on fluid and nutrition as appropriate  - Assess for signs & symptoms of volume excess or deficit  Outcome: Progressing  Goal: Glucose maintained within target range  Description: INTERVENTIONS:  - Monitor Blood Glucose as ordered  - Assess for signs and symptoms of hyperglycemia and hypoglycemia  - Administer ordered medications to maintain glucose within target range  - Assess nutritional intake and initiate nutrition service referral as needed  Outcome: Progressing

## 2022-07-15 NOTE — ASSESSMENT & PLAN NOTE
Wt Readings from Last 3 Encounters:   07/15/22 75 8 kg (167 lb 1 7 oz)   06/28/22 77 4 kg (170 lb 9 6 oz)   06/22/22 76 6 kg (168 lb 14 oz)     Multiple indicators of volume overload, however, pt now in ARF   Likely cardiorenal syndrome  Diuretics/HD per nephrology  Agree with milrinone to improve cardiac output  Low Na diet, fluid restriction, daily weights, I/O monitoring

## 2022-07-15 NOTE — ASSESSMENT & PLAN NOTE
· Patient initially admitted to [place] [address] and CC contacted for acute change in respiratory status, Tachycardia, Tachypnea, Diaphoresis, and agitation     · Intubated 2 X in the past year for similar symptomatology  · Possibly 2/2 ischemia, Cardiology consulted  · Given 2 mg Morphine IV in ED  · Received 80 Lasix in ED  · Hold diuretics for now  · Continue Precedex gtt for agitation  · Sputum Culture ordered  · Continue Bipap as needed

## 2022-07-15 NOTE — PLAN OF CARE
Pre-tx:  UF 2L as tolerated per order  Today is pt's first HD tx, Nephrologist Merlyn at bedside during tx initiation  BP stable to start tx   2K bath per last serum K of 6 0 as discussed with Dr Shelby Escobedo  Temp cath venous lumen without blood return, flushes well  Will cont to monitor  Post-Dialysis RN Treatment Note    Blood Pressure:  Pre 147/63 mm/Hg  Post 128/78 mmHg   EDW  TBD kg    Weight:  Pre 77 kg   Post 75 4 kg   Mode of weight measurement: Bed Scale   Volume Removed  1600 ml    Treatment duration 2  hours   NS given  No     Treatment shortened?  No   Medications given during Rx NA   Estimated Kt/V  None Reported   Access type: Temporary HD catheter   Access Issues: No   Report called to primary nurse   Yes           Problem: METABOLIC, FLUID AND ELECTROLYTES - ADULT  Goal: Electrolytes maintained within normal limits  Description: INTERVENTIONS:  - Monitor labs and assess patient for signs and symptoms of electrolyte imbalances  - Administer electrolyte replacement as ordered  - Monitor response to electrolyte replacements, including repeat lab results as appropriate  - Instruct patient on fluid and nutrition as appropriate  Outcome: Progressing  Goal: Fluid balance maintained  Description: INTERVENTIONS:  - Monitor labs   - Monitor I/O and WT  - Instruct patient on fluid and nutrition as appropriate  - Assess for signs & symptoms of volume excess or deficit  Outcome: Progressing

## 2022-07-15 NOTE — PROGRESS NOTES
Progress Note - Nephrology   Nuñez Comfort 64 y o  female MRN: [id number]  Unit/Bed#: ICU 02-01 Encounter: [id number]    A/P:  1  Anuric acute kidney injury   - with volume overload and hyperkalemia today   - IVF attempted last night to convert anuria to nonoliguric renal failure without success   - blood pressure is stable   - will arrange for a 2 hour treatment today and 3 hr treatment tomorrow   - informed consent obtained from daughter Rosa Bhatti with [address] witnessing  I outlined risk of infection, bleeding and air embolism  Daughter consented   - she will have a dialysis catheter placed and orders will be written    2  Acute on chronic respiratory failure   - requiring Bipap   - will ultrafilter 2 kg of fluid today    3  Cardiomyopathy   - had a marked drop in EF on echo   -being followed by cardiology    4  Essential hypertension   - blood pressure is adequate for intermittent dialysis today   - no indication for CVVHD    5  Chronic kidney disease stage 3   - She had acute kidney injury in the past during a prior admission at [address]  - she has had recurrent tubulointerstitial insults so baseline creatinine will be higher    6  Hyperphosphatemia   - due to decreased renal excretion due to likely dense ATN    7  Hyperkalemia   - dialyze against a low potassium bath     8   Accelerated hypertension   - should improve with dialysis   -  Can give labetalol injection 10mg as needed - will discuss with cardiology        Follow up reason for today's visit: Anuric acute kidney injury    Chronic obstructive pulmonary disease with acute exacerbation ([facility] )    Patient Active Problem List   Diagnosis    Vitamin D deficiency    Dilated cardiomyopathy ([facility] )    Essential hypertension    Chronic pain    Anxiety    Acute on chronic combined systolic and diastolic congestive heart failure ([facility] )    Tobacco abuse    Leukocytosis    Chronic obstructive pulmonary disease without acute exacerbation ([facility] )    SHANTA (acute kidney injury) ([facility] )    Acute respiratory insufficiency    Acute on chronic respiratory failure with hypoxemia (HCC)         Subjective:   Cannot offer as sedated on Precedex    Objective:     Vitals: Blood pressure (!) 187/96, pulse 75, temperature 97 5 °F (36 4 °C), temperature source Tympanic, resp  rate 21, height 5' 10" (1 778 m), weight 75 8 kg (167 lb 1 7 oz), SpO2 98 %  ,Body mass index is 23 98 kg/m²  Weight (last 2 days)     Date/Time Weight    07/15/22 0500 75 8 (167 11)    07/14/22 0743 78 9 (174)    07/14/22 0400 79 1 (174 38)     Weight: weighed twice at 07/14/22 0400    07/13/22 0600 76 7 (169 09)    07/13/22 0115 76 7 (169 09)            Intake/Output Summary (Last 24 hours) at 7/15/2022 0933  Last data filed at 7/15/2022 0601  Gross per 24 hour   Intake 2262 2 ml   Output 38 ml   Net 2224 2 ml     I/O last 3 completed shifts: In: 7486 [P O :160; I V :1072; IV Piggyback:2500]  Out: 48 [Urine:48]    Urethral Catheter 16 Fr   (Active)   Amt returned on insertion(mL) 350 mL 07/13/22 0026   Reasons to continue Urinary Catheter  Accurate I&O assessment in critically ill patients (48 hr  max) 07/14/22 1022   Goal for Removal Voiding trial when ambulation improves 07/14/22 1022   Site Assessment Clean;Skin intact 07/13/22 2000   Henley Care Done 07/15/22 0900   Collection Container Standard drainage bag 07/13/22 2000   Securement Method Securing device (Describe) 07/13/22 2000   Output (mL) 0 mL 07/15/22 0601       Physical Exam: BP (!) 187/96   Pulse 75   Temp 97 5 °F (36 4 °C) (Tympanic)   Resp 21   Ht 5' 10" (1 778 m)   Wt 75 8 kg (167 lb 1 7 oz)   SpO2 98%   BMI 23 98 kg/m²     General Appearance:    Sedated appears stated age   Head:    Normocephalic, without obvious abnormality, atraumatic   Eyes:    Conjunctiva/corneas  closed   Ears:    Normal external ears   Nose:  Normal    Throat:    Neck:   Supple, symmetrical, trachea midline, no adenopathy;        thyroid:  No enlargement/tenderness/nodules; no carotid    bruit or JVD   Back:     Symmetric, no curvature, ROM normal, no CVA tenderness   Lungs:     Decreased anteriorly  to auscultation bilaterally, respirations unlabored   Chest wall:    No tenderness    Heart:    Regular rate and rhythm, S1 and S2 normal, no murmur, rub   or gallop   Abdomen:   Soft BS + Henley in place - no urine output   Extremities:   Extremities normal, atraumatic, no cyanosis or edema   Skin:   Skin color, texture, turgor normal, no rashes or lesions   Lymph nodes:   Cervical normal   Neurologic:   CNII-XII cannot assess due to sedation            Lab, Imaging and other studies: I have personally reviewed pertinent labs  CBC:   Lab Results   Component Value Date    WBC 11 75 (H) 07/15/2022    HGB 11 8 07/15/2022    HCT 37 2 07/15/2022    MCV 93 07/15/2022     07/15/2022    MCH 29 4 07/15/2022    MCHC 31 7 07/15/2022    RDW 14 2 07/15/2022    MPV 10 0 07/15/2022    NRBC 0 07/15/2022     CMP:   Lab Results   Component Value Date    K 6 0 (H) 07/15/2022    CL 95 (L) 07/15/2022    CO2 21 07/15/2022    BUN 90 (H) 07/15/2022    CREATININE 6 94 (H) 07/15/2022    CALCIUM 9 2 07/15/2022    AST 7 (L) 07/15/2022    ALT 7 07/15/2022    ALKPHOS 71 07/15/2022    EGFR 5 07/15/2022         Results from last 7 days   Lab Units 07/15/22  0627 07/15/22  0020 07/14/22  1731 07/14/22  0943 07/14/22  0416 07/13/22  0422   POTASSIUM mmol/L 6 0* 5 3 4 9   < > 4 6 3 8   CHLORIDE mmol/L 95* 96 95*   < > 94* 101   CO2 mmol/L 21 22 22   < > 24 27   BUN mg/dL 90* 82* 73*   < > 58* 19   CREATININE mg/dL 6 94* 6 49* 5 63*   < > 4 50* 1 66*   CALCIUM mg/dL 9 2 9 2 9 0   < > 9 7 9 4   ALK PHOS U/L 71  --   --   --  66 82   ALT U/L 7  --   --   --  9 10   AST U/L 7*  --   --   --  14 13    < > = values in this interval not displayed           Phosphorus:   Lab Results   Component Value Date    PHOS 10 3 (H) 07/15/2022     Magnesium:   Lab Results   Component Value Date    MG 2 5 07/15/2022     Urinalysis: No results found for: Juline Manisha, SPECGRAV, PHUR, LEUKOCYTESUR, NITRITE, PROTEINUA, GLUCOSEU, KETONESU, BILIRUBINUR, BLOODU  Ionized Calcium: No results found for: CAION  Coagulation: No results found for: PT, INR, APTT  Troponin: No results found for: TROPONINI  ABG: No results found for: PHART, SRR7JFW, PO2ART, UWT9TRC, A7OXLJPC, BEART, SOURCE  Radiology review:     IMAGING  Procedure: XR chest 1 view portable    Result Date: 7/13/2022  Narrative: CHEST INDICATION:   sob  COMPARISON:  6/21/2022 EXAM PERFORMED/VIEWS:  XR CHEST PORTABLE Single view FINDINGS: Recurrent CHF  Persistent lateral left mid chest infiltrate Cardiomediastinal silhouette appears unremarkable  No pneumothorax or pleural effusion  Osseous structures appear within normal limits for patient age  Impression: Recurrent CHF  Persistent left chest infiltrate Workstation performed: [id number]     Procedure: XR chest portable ICU    Result Date: 7/14/2022  Narrative: CHEST INDICATION:   line placement  COMPARISON:  Chest radiograph from 7/12/2022 and chest CT from 6/15/2022  EXAM PERFORMED/VIEWS:  XR CHEST PORTABLE ICU FINDINGS:  Left subclavian catheter in upper SVC  Cardiomediastinal silhouette appears unremarkable  Interstitial edema with trace effusions  No pneumothorax  Osseous structures appear within normal limits for patient age  Impression: Left subclavian catheter in upper SVC with no pneumothorax  Persistent interstitial edema with trace effusions  Workstation performed: [id number]     Procedure: US kidney and bladder    Result Date: 7/14/2022  Narrative: RENAL ULTRASOUND INDICATION:   SHANTA  COMPARISON: None TECHNIQUE:   Ultrasound of the retroperitoneum was performed with a curvilinear transducer utilizing volumetric sweeps and still imaging techniques  FINDINGS: KIDNEYS: Symmetric and normal size  Right kidney:  9 1 x 3 3 x 3 4 cm  Volume 53 4 mL Left kidney:  10 5 x 6 0 x 5 1 cm    Volume 167 2 mL Right kidney Normal echogenicity and contour  No mass is identified  No hydronephrosis  No shadowing calculi  No perinephric fluid collections  Left kidney Normal echogenicity and contour  No mass is identified  No hydronephrosis  No shadowing calculi  No perinephric fluid collections  URETERS: Nonvisualized  BLADDER: A leung catheter is in place, decompressing the bladder and limiting its evaluation  Impression: Kidneys are within normal limits  Bladder is decompressed around a Leung catheter, limiting its evaluation  Workstation performed: [id number]     Procedure: Echo follow up/limited w/ contrast if indicated    Result Date: 7/14/2022  Narrative: Marina Yanez  Left Ventricle: Left ventricular cavity size is normal  Wall thickness is normal  The left ventricular ejection fraction is 25%  Systolic function is severely reduced  There is severe global hypokinesis with regional variation, basal and mid inferior wall appeared more severely hypo to akinetic    Right Ventricle: Systolic function is normal    Left Atrium: The atrium is mildly dilated    Aortic Valve: There is mild regurgitation    Mitral Valve: There is moderate regurgitation    Tricuspid Valve: There is mild regurgitation    Pericardium: There is no pericardial effusion         Current Facility-Administered Medications   Medication Dose Route Frequency    acetaminophen (TYLENOL) tablet 650 mg  650 mg Oral Q4H PRN    albuterol inhalation solution 2 5 mg  2 5 mg Nebulization Q6H PRN    carvedilol (COREG) tablet 3 125 mg  3 125 mg Oral BID With Meals    clonazePAM (KlonoPIN) tablet 0 5 mg  0 5 mg Oral HS PRN    dexmedeTOMIDine (Precedex) 400 mcg in sodium chloride 0 9% 100 mL  0 1-1 4 mcg/kg/hr Intravenous Titrated    heparin (porcine) subcutaneous injection 5,000 Units  5,000 Units Subcutaneous Q8H [address]    levalbuterol (XOPENEX) inhalation solution 1 25 mg  1 25 mg Nebulization TID    And    sodium chloride 0 9 % inhalation solution 3 mL  3 mL Nebulization TID    lidocaine (LIDODERM) 5 % patch 2 patch  2 patch Topical Daily    nicotine (NICODERM CQ) 21 mg/24 hr TD 24 hr patch 1 patch  1 patch Transdermal Daily    ondansetron (ZOFRAN) injection 4 mg  4 mg Intravenous Q6H PRN    umeclidinium-vilanterol (ANORO ELLIPTA) 62 5-25 MCG/INH inhaler 1 puff  1 puff Inhalation Daily     Medications Discontinued During This Encounter   Medication Reason    furosemide (LASIX) injection 40 mg     heparin (porcine) subcutaneous injection 5,000 Units     umeclidinium-vilanterol (ANORO ELLIPTA) 62 5-25 MCG/INH inhaler 1 puff     morphine (MS CONTIN) ER tablet 30 mg     oxyCODONE-acetaminophen (PERCOCET) 5-325 mg per tablet 1 tablet     potassium chloride (K-DUR,KLOR-CON) CR tablet 20 mEq     nitroGLYcerin (TRIDIL) 50 mg in 250 mL infusion (premix)     azithromycin (ZITHROMAX) tablet 500 mg     carvedilol (COREG) tablet 3 125 mg     ipratropium (ATROVENT) 0 02 % inhalation solution 0 5 mg     azithromycin (ZITHROMAX) 500 mg in sodium chloride 0 9 % 250 mL IVPB     methylPREDNISolone sodium succinate (Solu-MEDROL) injection 40 mg     furosemide (LASIX) injection 40 mg     azithromycin (ZITHROMAX) tablet 500 mg     multi-electrolyte (ISOLYTE-S PH 7 4) bolus 250 mL     oxyCODONE-acetaminophen (PERCOCET) 5-325 mg per tablet 1 tablet     morphine injection 2 mg     milrinone (PRIMACOR) 20 mg in 100 mL infusion (premix)     furosemide (LASIX) 500 mg infusion 50 mL        Keisha Arzola MD      This progress note was produced in part using a dictation device which may document imprecise wording from author's original intent

## 2022-07-15 NOTE — NURSING NOTE
[id number] Received report from nightshift RN  RN assessment performed and documented in flowsheets  See MAR for medication administration  Pt remains on bipap with precedex running for pt comfort  No urine output ESTHER Abdullahi AM, at bedside to insert HD line  Line placement uneventful  After reviewing pt condition and discussion, decision made to replace left subclavian triple lumen with cordis for PA catheter placement over guidewire  Cordis placement uneventful  PA catheter placed  Unable to connect PA catheter to ICU monitor due to missing cable adapter  [employer] supplied alternate monitor and wedge pressure of 28 obtained  Per provider plan to dialyze pt and trial pt off bipap  If respiratory distress occurs, obtain wedge pressure and evaluate appropriate actions  1430 Pt transitioned to 4L NC  Wedge pressure of 24 obtained with Cheli Wilkinson at bedside  Pt denies nor appears in any respiratory distress  Awaiting module from [place] in order to measure CI/CO

## 2022-07-15 NOTE — PROCEDURES
Temporary HD Catheter    Date/Time: 7/15/2022 11:00 AM  Performed by: ESTHER Hui  Authorized by: ESTHER Hui     Patient location:  Bedside  Consent:     Consent obtained:  Verbal    Consent given by:  Healthcare agent    Risks discussed:  Arterial puncture, bleeding, incorrect placement, infection, nerve damage and pneumothorax    Alternatives discussed:  No treatment and delayed treatment  Universal protocol:     Relevant documents present and verified: yes      Site/side marked: yes      Immediately prior to procedure, a time out was called: yes      Patient identity confirmed:  Arm band and provided demographic data  Pre-procedure details:     Hand hygiene: Hand hygiene performed prior to insertion      Sterile barrier technique: All elements of maximal sterile technique followed      Skin preparation:  ChloraPrep    Skin preparation agent: Skin preparation agent completely dried prior to procedure    Indications:     Central line indications: dialysis    Anesthesia (see MAR for exact dosages): Anesthesia method:  Local infiltration    Local anesthetic:  Lidocaine 1% w/o epi  Procedure details:     Location:  Right internal jugular    Vessel type: vein      Laterality:  Right    Patient position:  Flat    Catheter type:  Double lumen    Catheter length:  16 cm    Landmarks identified: yes      Ultrasound guidance: yes      Ultrasound image availability:  Images available in PACS and still images obtained    Sterile ultrasound techniques: Sterile gel and sterile probe covers were used      Number of attempts:  1    Successful placement: yes    Post-procedure details:     Post-procedure:  Dressing applied and line sutured    Assessment:  Blood return through all ports, free fluid flow, no pneumothorax on x-ray and placement verified by x-ray    Patient tolerance of procedure:   Tolerated well, no immediate complications

## 2022-07-15 NOTE — PROGRESS NOTES
[address]  Progress Note Russ Davis 1960, 64 y o  female MRN: [id number]  Unit/Bed#: ICU 02-01 Encounter: [id number]  Primary Care Provider: Olivia Zuñiga DO   Date and time admitted to hospital: 7/12/2022  5:54 PM    * Chronic obstructive pulmonary disease without acute exacerbation ([facility] )  Assessment & Plan  · Not currently in an exacerbation  · Currently smokes 2 PPD  · Recently discharged from [facility] for similar presentation  · Per chart review, has been intubated 2 X in the past year  · Continue Xopenex nebulizer treatments t i d   · PRN Albuterol  · Bipap as needed  · Sputum culture ordered  · CXR 7/12 Recurrent CHF  Persistent left chest infiltrate  · Continue home Anoro-Ellipta  · Consider Palliative consult      Acute on chronic respiratory failure with hypoxemia [facility])  Assessment & Plan  · Patient initially admitted to [place] [address] and CC contacted for acute change in respiratory status, Tachycardia, Tachypnea, Diaphoresis, and agitation  · Intubated 2 X in the past year for similar symptomatology  · Possibly 2/2 ischemia, Cardiology consulted  · Given 2 mg Morphine IV in ED  · Received 80 Lasix in ED  · Hold diuretics for now  · Continue Precedex gtt for agitation  · Sputum Culture ordered  · Continue Bipap as needed        Acute on chronic combined systolic and diastolic congestive heart failure ([facility] )  Assessment & Plan  Wt Readings from Last 3 Encounters:   07/14/22 78 9 kg (174 lb)   06/28/22 77 4 kg (170 lb 9 6 oz)   06/22/22 76 6 kg (168 lb 14 oz)       · BNP 1822 an admission  · Recent ECHO June 2022: Left ventricular cavity size is normal  Wall thickness is mildly increased  There is mild concentric hypertrophy  The left ventricular ejection fraction is 41% by biplane measurement  Systolic function is moderately reduced  The Basalar septum and basilar inferior wall are hypokenetic   Diastolic function is mildly abnormal, consistent with grade I (abnormal) relaxation  The pulmonary artery systolic pressure is moderately increased  · Repeat echo 7/14 showed EF 25%, severe global hypokinesis  · Cardiology consulted, appreciate their recommendations  · Follows with Dr Spencer Fay outpatient  · Has now been unresponsive to diuretics  · Requiring continuous bipap  · cardiac enzymes slightly elevated with peak of 59  · Obtain daily weights  · Continue Henley catheter for strict I/O        Essential hypertension  Assessment & Plan  · Continue Home Coreg 3 125 mg BID      Hypokalemia-resolved as of 7/15/2022  Assessment & Plan  · Given 40 PO in ED  · Check BMP in AM  · Replete as necessary     Chronic pain  Assessment & Plan  · Home Percocet ordered  · Lidocaine Patch ordered  · Patient resting comfortably currently       SHANTA (acute kidney injury) ([facility] )  Assessment & Plan  · Baseline creatinine appears to be 1 5-1 8 range  · Creatinine started rising, with poor urine output  · Nephrology consulted, appreciate their recommendations  · Likely 2/2 ATN  · Has not responded to diuretics or fluids  · Will likely need dialysis-iHD vs CRRT  · Most recent creatinine 6 49, potassium 5 3  · Continue to follow labs     Tobacco abuse  Assessment & Plan  · Current everyday smoker 2 PPD  · Per chart review, she is ordered O2 @ home but does not wear it  · Nicotine patch ordered  · Encourage Cessation      Anxiety  Assessment & Plan  · Home Klonopin on hold while NPO  · Continue Precedex gtt    · Decrease stimulation, cluster care    ----------------------------------------------------------------------------------------  HPI/24hr events: Is now bipap dependent  Unable to tolerate bipap off  Is now oliguric with rising creatinine  Seen by cardiology and nephrology  Will likely need dialysis today      Patient appropriate for transfer out of the ICU today?: No  Disposition: Continue Critical Care   Code Status: Level 1 - Full Code  ---------------------------------------------------------------------------------------  SUBJECTIVE  Resting in bed   "I'm thirsty "    Review of Systems   Constitutional: Negative for chills and fever  HENT: Negative  Eyes: Negative  Respiratory: Positive for shortness of breath  Negative for cough, wheezing and stridor  Cardiovascular: Negative for chest pain, palpitations and leg swelling  Gastrointestinal: Negative for abdominal distention, abdominal pain, blood in stool, diarrhea, nausea and vomiting  Endocrine: Negative  Genitourinary: Negative for dysuria, flank pain, frequency and urgency  Musculoskeletal: Negative  Skin: Negative for rash and wound  Allergic/Immunologic: Negative  Neurological: Negative for dizziness, syncope, facial asymmetry, weakness, light-headedness, numbness and headaches  Hematological: Negative for adenopathy  Does not bruise/bleed easily  Psychiatric/Behavioral: The patient is nervous/anxious  Review of systems was reviewed and negative unless stated above in HPI/24-hour events   ---------------------------------------------------------------------------------------  OBJECTIVE    Vitals   Vitals:    22 2200 22 2300 07/15/22 0000 07/15/22 0100   BP: 160/85 140/77 140/79 154/93   BP Location:       Pulse: 82 78 75 74   Resp: (!) 31 (!) 40 21 (!) 42   Temp:       TempSrc:       SpO2: 97% 98% 97% 97%   Weight:       Height:         Temp (24hrs), Av 9 °F (36 6 °C), Min:97 °F (36 1 °C), Max:99 7 °F (37 6 °C)  Current: Temperature: 97 5 °F (36 4 °C)          Respiratory:  SpO2: SpO2: 97 %  Nasal Cannula O2 Flow Rate (L/min): 4 L/min    Invasive/non-invasive ventilation settings   Respiratory  Report   Lab Data (Last 4 hours)    None         O2/Vent Data (Last 4 hours)    None                Physical Exam  Constitutional:       General: She is awake  Appearance: She is ill-appearing     HENT:      Head: Normocephalic and atraumatic  Eyes:      Extraocular Movements: Extraocular movements intact  Pupils: Pupils are equal, round, and reactive to light  Neck:      Vascular: No JVD  Cardiovascular:      Rate and Rhythm: Normal rate and regular rhythm  Heart sounds: Normal heart sounds  Pulmonary:      Breath sounds: Decreased breath sounds present  Abdominal:      General: Bowel sounds are normal       Palpations: Abdomen is soft  Tenderness: There is no abdominal tenderness  Musculoskeletal:      Right lower leg: No edema  Left lower leg: No edema  Skin:     General: Skin is warm and dry  Neurological:      Mental Status: She is alert  GCS: GCS eye subscore is 4  GCS verbal subscore is 5  GCS motor subscore is 6  Psychiatric:         Mood and Affect: Mood is anxious               Laboratory and Diagnostics:  Results from last 7 days   Lab Units 07/14/22  0416 07/13/22  0422 07/12/22  1816   WBC Thousand/uL 13 45* 10 08 8 66   HEMOGLOBIN g/dL 12 9 12 7 13 3   HEMATOCRIT % 38 9 40 4 40 1   PLATELETS Thousands/uL 279 270 291   NEUTROS PCT %  --   --  57   MONOS PCT %  --   --  6   MONO PCT %  --  2*  --      Results from last 7 days   Lab Units 07/15/22  0020 07/14/22  1731 07/14/22  0943 07/14/22  0416 07/13/22  0422 07/12/22  1816   SODIUM mmol/L 133* 132* 133* 131* 139 135   POTASSIUM mmol/L 5 3 4 9 4 6 4 6 3 8 3 1*   CHLORIDE mmol/L 96 95* 94* 94* 101 96   CO2 mmol/L 22 22 24 24 27 30   ANION GAP mmol/L 15* 15* 15* 13 11 9   BUN mg/dL 82* 73* 64* 58* 19 12   CREATININE mg/dL 6 49* 5 63* 4 95* 4 50* 1 66* 1 08   CALCIUM mg/dL 9 2 9 0 9 3 9 7 9 4 10 0   GLUCOSE RANDOM mg/dL 122 111 115 161* 142* 100   ALT U/L  --   --   --  9 10 3*   AST U/L  --   --   --  14 13 6*   ALK PHOS U/L  --   --   --  66 82 81   ALBUMIN g/dL  --   --   --  3 8 4 0 4 3   TOTAL BILIRUBIN mg/dL  --   --   --  0 43 0 62 0 79     Results from last 7 days   Lab Units 07/13/22  0422 07/12/22  1816   MAGNESIUM mg/dL 2 2 2 0 PHOSPHORUS mg/dL 4 5*  --                    ABG:  Results from last 7 days   Lab Units 07/13/22  0559   PH ART  7 439   PCO2 ART mm Hg 42 8   PO2 ART mm Hg 84 9   HCO3 ART mmol/L 28 4*   BASE EXC ART mmol/L 3 7   ABG SOURCE  Radial, Left     VBG:  Results from last 7 days   Lab Units 07/14/22  1731 07/14/22  1246 07/13/22  0559   PH PANDA  7 346   < >  --    PCO2 PANDA mm Hg 40 3*   < >  --    PO2 PANDA mm Hg 71 3*   < >  --    HCO3 PANDA mmol/L 21 5*   < >  --    BASE EXC PANDA mmol/L -3 8   < >  --    ABG SOURCE   --   --  Radial, Left    < > = values in this interval not displayed  Results from last 7 days   Lab Units 07/14/22  0416 07/13/22  0422 07/12/22  1816   PROCALCITONIN ng/ml 1 88* 0 25 <0 05       Micro        EKG: NSR  Imaging: I have personally reviewed pertinent reports  Intake and Output  I/O       07/13 0701 07/14 0700 07/14 0701  07/15 0700    P  O  840 160    I V  (mL/kg) 383 4 (4 8) 608 2 (7 7)    IV Piggyback 650 2000    Total Intake(mL/kg) 1873 4 (23 7) 2768 2 (35 1)    Urine (mL/kg/hr) 50 (0) 48 (0)    Stool  0    Total Output 50 48    Net +1823 4 +2720 2          Unmeasured Stool Occurrence  0 x          Height and Weights   Height: 5' 10" (177 8 cm)     Body mass index is 24 97 kg/m²  Weight (last 2 days)     Date/Time Weight    07/14/22 0743 78 9 (174)    07/14/22 0400 79 1 (174 38)     Weight: weighed twice at 07/14/22 0400    07/13/22 0600 76 7 (169 09)    07/13/22 0115 76 7 (169 09)            Nutrition       Diet Orders   (From admission, onward)             Start     Ordered    07/14/22 1336  Diet NPO  Diet effective now        References:    Nutrtion Support Algorithm Enteral vs  Parenteral   Question Answer Comment   Diet Type NPO    RD to adjust diet per protocol?  Yes        07/14/22 1335                  Active Medications  Scheduled Meds:  Current Facility-Administered Medications   Medication Dose Route Frequency Provider Last Rate    acetaminophen  650 mg Oral Q4H PRN ESTHER [place]      albuterol  2 5 mg Nebulization Q6H PRN ESTHER [place]      carvedilol  3 125 mg Oral BID With Meals ESTHER Hilliard      clonazePAM  0 5 mg Oral HS PRN ESTHER [place]      dexmedetomidine  0 1-1 2 mcg/kg/hr Intravenous Titrated JAY WashingtonNP 0 4 mcg/kg/hr (07/14/22 2200)    heparin (porcine)  5,000 Units Subcutaneous Q8H [facility] ESTHER [place]      levalbuterol  1 25 mg Nebulization TID ESTHER [place]      And    sodium chloride  3 mL Nebulization TID ESTHER [place]      lidocaine  2 patch Topical Daily Bufyf Harrington, ESTHER      nicotine  1 patch Transdermal Daily ESTHER [place]      ondansetron  4 mg Intravenous Q6H PRN ESTHER [place]      umeclidinium-vilanterol  1 puff Inhalation Daily ESTHER Hilliard       Continuous Infusions:  dexmedetomidine, 0 1-1 2 mcg/kg/hr, Last Rate: 0 4 mcg/kg/hr (07/14/22 2200)      PRN Meds:   acetaminophen, 650 mg, Q4H PRN  albuterol, 2 5 mg, Q6H PRN  clonazePAM, 0 5 mg, HS PRN  ondansetron, 4 mg, Q6H PRN        Invasive Devices Review  Invasive Devices  Report    Central Venous Catheter Line  Duration           CVC Central Lines 07/14/22 Triple 16cm <1 day          Peripheral Intravenous Line  Duration           Peripheral IV 07/13/22 Right Hand 2 days    Peripheral IV 07/14/22 Right;Upper Arm <1 day          Drain  Duration           Urethral Catheter 16 Fr  2 days                Rationale for remaining devices: critical illness  ---------------------------------------------------------------------------------------  Advance Directive and Living Will:      Power of :    POLST:    ---------------------------------------------------------------------------------------  Care Time Delivered:   Upon my evaluation, this patient had a high probability of imminent or life-threatening deterioration due to acute hypoxic resp failure, which required my direct attention, intervention, and personal management  I have personally provided 30 minutes ([phone] of critical care time, exclusive of procedures, teaching, family meetings, and any prior time recorded by providers other than myself  ESTHER Faustin      Portions of the record may have been created with voice recognition software  Occasional wrong word or "sound a like" substitutions may have occurred due to the inherent limitations of voice recognition software    Read the chart carefully and recognize, using context, where substitutions have occurred

## 2022-07-16 ENCOUNTER — APPOINTMENT (INPATIENT)
Dept: DIALYSIS | Facility: HOSPITAL | Age: 62
DRG: 682 | End: 2022-07-16
Payer: MEDICARE

## 2022-07-16 LAB
ALBUMIN SERPL BCP-MCNC: 3.5 G/DL (ref 3.5–5)
ALP SERPL-CCNC: 71 U/L (ref 34–104)
ALT SERPL W P-5'-P-CCNC: 3 U/L (ref 7–52)
ANION GAP SERPL CALCULATED.3IONS-SCNC: 20 MMOL/L (ref 4–13)
ANION GAP SERPL CALCULATED.3IONS-SCNC: 21 MMOL/L (ref 4–13)
AST SERPL W P-5'-P-CCNC: 6 U/L (ref 13–39)
BILIRUB DIRECT SERPL-MCNC: 0.07 MG/DL (ref 0–0.2)
BILIRUB SERPL-MCNC: 0.48 MG/DL (ref 0.2–1)
BUN SERPL-MCNC: 83 MG/DL (ref 5–25)
BUN SERPL-MCNC: 89 MG/DL (ref 5–25)
CALCIUM SERPL-MCNC: 9.2 MG/DL (ref 8.4–10.2)
CALCIUM SERPL-MCNC: 9.3 MG/DL (ref 8.4–10.2)
CHLORIDE SERPL-SCNC: 94 MMOL/L (ref 96–108)
CHLORIDE SERPL-SCNC: 94 MMOL/L (ref 96–108)
CO2 SERPL-SCNC: 20 MMOL/L (ref 21–32)
CO2 SERPL-SCNC: 21 MMOL/L (ref 21–32)
CREAT SERPL-MCNC: 6.41 MG/DL (ref 0.6–1.3)
CREAT SERPL-MCNC: 6.96 MG/DL (ref 0.6–1.3)
ERYTHROCYTE [DISTWIDTH] IN BLOOD BY AUTOMATED COUNT: 14.3 % (ref 11.6–15.1)
GFR SERPL CREATININE-BSD FRML MDRD: 5 ML/MIN/1.73SQ M
GFR SERPL CREATININE-BSD FRML MDRD: 6 ML/MIN/1.73SQ M
GLUCOSE SERPL-MCNC: 71 MG/DL (ref 65–140)
GLUCOSE SERPL-MCNC: 77 MG/DL (ref 65–140)
HBV CORE AB SER QL: REACTIVE
HBV CORE IGM SER QL: ABNORMAL
HBV SURFACE AB SER-ACNC: >1000 MIU/ML
HBV SURFACE AG SER QL: ABNORMAL
HCT VFR BLD AUTO: 36.1 % (ref 34.8–46.1)
HCV AB SER QL: ABNORMAL
HGB BLD-MCNC: 11.6 G/DL (ref 11.5–15.4)
MCH RBC QN AUTO: 29.3 PG (ref 26.8–34.3)
MCHC RBC AUTO-ENTMCNC: 32.1 G/DL (ref 31.4–37.4)
MCV RBC AUTO: 91 FL (ref 82–98)
PLATELET # BLD AUTO: 255 THOUSANDS/UL (ref 149–390)
PMV BLD AUTO: 10 FL (ref 8.9–12.7)
POTASSIUM SERPL-SCNC: 4.6 MMOL/L (ref 3.5–5.3)
POTASSIUM SERPL-SCNC: 4.9 MMOL/L (ref 3.5–5.3)
PROT SERPL-MCNC: 6.7 G/DL (ref 6.4–8.4)
RBC # BLD AUTO: 3.96 MILLION/UL (ref 3.81–5.12)
SODIUM SERPL-SCNC: 134 MMOL/L (ref 135–147)
SODIUM SERPL-SCNC: 136 MMOL/L (ref 135–147)
WBC # BLD AUTO: 10.39 THOUSAND/UL (ref 4.31–10.16)

## 2022-07-16 PROCEDURE — 94760 N-INVAS EAR/PLS OXIMETRY 1: CPT

## 2022-07-16 PROCEDURE — 99291 CRITICAL CARE FIRST HOUR: CPT | Performed by: NURSE PRACTITIONER

## 2022-07-16 PROCEDURE — 99232 SBSQ HOSP IP/OBS MODERATE 35: CPT | Performed by: INTERNAL MEDICINE

## 2022-07-16 PROCEDURE — 80048 BASIC METABOLIC PNL TOTAL CA: CPT | Performed by: NURSE PRACTITIONER

## 2022-07-16 PROCEDURE — 94660 CPAP INITIATION&MGMT: CPT

## 2022-07-16 PROCEDURE — 85027 COMPLETE CBC AUTOMATED: CPT | Performed by: NURSE PRACTITIONER

## 2022-07-16 PROCEDURE — 94640 AIRWAY INHALATION TREATMENT: CPT

## 2022-07-16 PROCEDURE — 5A1D70Z PERFORMANCE OF URINARY FILTRATION, INTERMITTENT, LESS THAN 6 HOURS PER DAY: ICD-10-PCS | Performed by: INTERNAL MEDICINE

## 2022-07-16 PROCEDURE — 80076 HEPATIC FUNCTION PANEL: CPT | Performed by: NURSE PRACTITIONER

## 2022-07-16 PROCEDURE — 83835 ASSAY OF METANEPHRINES: CPT | Performed by: NURSE PRACTITIONER

## 2022-07-16 PROCEDURE — 93005 ELECTROCARDIOGRAM TRACING: CPT

## 2022-07-16 RX ORDER — CLONAZEPAM 0.5 MG/1
0.5 TABLET ORAL 2 TIMES DAILY PRN
Status: DISCONTINUED | OUTPATIENT
Start: 2022-07-16 | End: 2022-07-22 | Stop reason: HOSPADM

## 2022-07-16 RX ORDER — CARVEDILOL 3.12 MG/1
3.12 TABLET ORAL ONCE
Status: COMPLETED | OUTPATIENT
Start: 2022-07-16 | End: 2022-07-16

## 2022-07-16 RX ORDER — CARVEDILOL 3.12 MG/1
6.25 TABLET ORAL 2 TIMES DAILY WITH MEALS
Status: DISCONTINUED | OUTPATIENT
Start: 2022-07-16 | End: 2022-07-17

## 2022-07-16 RX ORDER — OLANZAPINE 10 MG/1
10 TABLET, ORALLY DISINTEGRATING ORAL
Status: DISCONTINUED | OUTPATIENT
Start: 2022-07-16 | End: 2022-07-17

## 2022-07-16 RX ADMIN — ISODIUM CHLORIDE 3 ML: 0.03 SOLUTION RESPIRATORY (INHALATION) at 20:16

## 2022-07-16 RX ADMIN — ISODIUM CHLORIDE 3 ML: 0.03 SOLUTION RESPIRATORY (INHALATION) at 07:15

## 2022-07-16 RX ADMIN — DEXMEDETOMIDINE HYDROCHLORIDE 1.4 MCG/KG/HR: 400 INJECTION INTRAVENOUS at 21:30

## 2022-07-16 RX ADMIN — UMECLIDINIUM BROMIDE AND VILANTEROL TRIFENATATE 1 PUFF: 62.5; 25 POWDER RESPIRATORY (INHALATION) at 08:10

## 2022-07-16 RX ADMIN — ISODIUM CHLORIDE 3 ML: 0.03 SOLUTION RESPIRATORY (INHALATION) at 14:48

## 2022-07-16 RX ADMIN — HEPARIN SODIUM 5000 UNITS: 5000 INJECTION INTRAVENOUS; SUBCUTANEOUS at 14:50

## 2022-07-16 RX ADMIN — CARVEDILOL 3.12 MG: 3.12 TABLET, FILM COATED ORAL at 12:18

## 2022-07-16 RX ADMIN — LEVALBUTEROL HYDROCHLORIDE 1.25 MG: 1.25 SOLUTION, CONCENTRATE RESPIRATORY (INHALATION) at 20:16

## 2022-07-16 RX ADMIN — DEXMEDETOMIDINE HYDROCHLORIDE 0.7 MCG/KG/HR: 400 INJECTION INTRAVENOUS at 14:50

## 2022-07-16 RX ADMIN — CLONAZEPAM 0.5 MG: 0.5 TABLET ORAL at 20:42

## 2022-07-16 RX ADMIN — LEVALBUTEROL HYDROCHLORIDE 1.25 MG: 1.25 SOLUTION, CONCENTRATE RESPIRATORY (INHALATION) at 07:15

## 2022-07-16 RX ADMIN — HEPARIN SODIUM 5000 UNITS: 5000 INJECTION INTRAVENOUS; SUBCUTANEOUS at 05:24

## 2022-07-16 RX ADMIN — LEVALBUTEROL HYDROCHLORIDE 1.25 MG: 1.25 SOLUTION, CONCENTRATE RESPIRATORY (INHALATION) at 14:48

## 2022-07-16 RX ADMIN — OLANZAPINE 10 MG: 10 TABLET, ORALLY DISINTEGRATING ORAL at 22:06

## 2022-07-16 RX ADMIN — CARVEDILOL 3.12 MG: 3.12 TABLET, FILM COATED ORAL at 08:09

## 2022-07-16 RX ADMIN — CARVEDILOL 6.25 MG: 3.12 TABLET, FILM COATED ORAL at 16:58

## 2022-07-16 RX ADMIN — HEPARIN SODIUM 5000 UNITS: 5000 INJECTION INTRAVENOUS; SUBCUTANEOUS at 21:09

## 2022-07-16 RX ADMIN — CLONAZEPAM 0.5 MG: 0.5 TABLET ORAL at 11:13

## 2022-07-16 RX ADMIN — DEXMEDETOMIDINE HYDROCHLORIDE 0.7 MCG/KG/HR: 400 INJECTION INTRAVENOUS at 08:16

## 2022-07-16 NOTE — ASSESSMENT & PLAN NOTE
· Patient initially admitted to [place] [address] and CC contacted for acute change in respiratory status, Tachycardia, Tachypnea, Diaphoresis, and agitation  · Intubated 2 X in the past year for similar symptomatology  · Possibly 2/2 ischemia, Cardiology consulted  · Given 2 mg Morphine IV in ED  · Received 80 Lasix in ED  · Hold diuretics for now  · Continue Precedex gtt for agitation  · Sputum Culture ordered  · Tolerated transition to 4 L NC after iHD session yesterday    BIPAP PRN

## 2022-07-16 NOTE — ASSESSMENT & PLAN NOTE
Wt Readings from Last 3 Encounters:   07/15/22 75 8 kg (167 lb 1 7 oz)   06/28/22 77 4 kg (170 lb 9 6 oz)   06/22/22 76 6 kg (168 lb 14 oz)       · BNP 1822 an admission  · Recent ECHO June 2022: Left ventricular cavity size is normal  Wall thickness is mildly increased  There is mild concentric hypertrophy  The left ventricular ejection fraction is 41% by biplane measurement  Systolic function is moderately reduced  The Basalar septum and basilar inferior wall are hypokenetic  Diastolic function is mildly abnormal, consistent with grade I (abnormal) relaxation  The pulmonary artery systolic pressure is moderately increased    · Repeat echo 7/14 showed EF 25%, severe global hypokinesis  · Cardiology consulted, appreciate their recommendations  · Follows with Dr Lianet Vallejo outpatient  · Has no been unresponsive to diuretics  · Required continuous bipap up until 7/15 on which she tolerated transition to 4 L n/c after iHD session   · Repeat iHD session planned for tomorrow   · cardiac enzymes slightly elevated with peak of 59  · Obtain daily weights  · Continue Henley catheter for strict I/O

## 2022-07-16 NOTE — ASSESSMENT & PLAN NOTE
· Baseline creatinine appears to be 1 5-1 8 range  · Nephrology consulted, appreciate their recommendations  · Likely 2/2 ATN, creatinine remains elevated  · Became oliguric and did not respond to diuretics or fluids  · Tolerated iHD session 7/15 and 7/16   · Continue to monitor renal indices and urine output   · If patient remains oliguric, consider D/C leung and bladder scan q8 hour  · Work up for pheochromocytoma pending

## 2022-07-16 NOTE — ASSESSMENT & PLAN NOTE
· Patient initially admitted to [place] [address] and CC contacted for acute change in respiratory status, Tachycardia, Tachypnea, Diaphoresis, and agitation  · Intubated 2 X in the past year for similar symptomatology  · Possibly 2/2 ischemia, Cardiology consulted  · Given 2 mg Morphine IV in ED  · Received 80 Lasix in ED  · Sputum Culture ordered  · Tolerated transition to 4 L NC after iHD session yesterday      · Has remained on nasal cannula, had additional iHD session today  · BIPAP PRN

## 2022-07-16 NOTE — PROGRESS NOTES
Progress Note - Nephrology   Amari Longoria 64 y o  female MRN: [id number]  Unit/Bed#: ICU 02-01 Encounter: [id number]    A/P:  1  Acute anuric kidney injury   - has undergone 2 hemodialysis sessions and tolerated them well   - maintained adequate blood pressures   - -2 5 liter total removal with improvement in lung exam and oxygen requirement   -  Potassium is controlled at 4 9 mmol/l   - next treatment Monday July 18th    2  Accelerated hypertension   - ameliorated by volume removal   - however, plasma metanephrines ordered as has episodic hypertensions, new cardiomyopathy and headaches   - continue current management   - Blood pressure currently 157/89 on carvedilol     3  Acute on chronic combined HF   - PCWP was 29 prior to dialysis   - check PCWP now   - continue dialysis for ultrafiltration    4 acute on chronic hypoxemic respiratory failure   - liberated from BiPAP and tolerating nasal cannula oxygen    5  Chronic obstructive pulmonary disease without acute exacerbation   - in an active smoker   - continue inhaler therapy      Acute kidney injury/respiratory failureFollow up reason for today's visit:     Chronic obstructive pulmonary disease with acute exacerbation ([facility] )    Patient Active Problem List   Diagnosis    Vitamin D deficiency    Dilated cardiomyopathy ([facility] )    Essential hypertension    Chronic pain    Anxiety    Acute on chronic combined systolic and diastolic congestive heart failure (HCC)    Tobacco abuse    Leukocytosis    Chronic obstructive pulmonary disease without acute exacerbation ([facility] )    SHANTA (acute kidney injury) ([facility] )    Acute respiratory insufficiency    Acute on chronic respiratory failure with hypoxemia (HCC)         Subjective:   Says she feels much better  Still has some dyspnea but she says it is improved  Denies chest pain abdominal pain nausea vomiting diarrhea  She is thirsty    She knows that she is not making any urine and feels that it is due to decreased p o  intake    Objective:     Vitals: Blood pressure 157/89, pulse 66, temperature 98 2 °F (36 8 °C), temperature source Tympanic, resp  rate (!) 23, height 5' 10" (1 778 m), weight 77 4 kg (170 lb 10 2 oz), SpO2 96 %  ,Body mass index is 24 48 kg/m²  Weight (last 2 days)     Date/Time Weight    07/16/22 0537 77 4 (170 64)    07/16/22 0500 77 4 (170 64)    07/15/22 0500 75 8 (167 11)    07/14/22 0743 78 9 (174)    07/14/22 0400 79 1 (174 38)     Weight: weighed twice at 07/14/22 0400            Intake/Output Summary (Last 24 hours) at 7/16/2022 1512  Last data filed at 7/16/2022 1230  Gross per 24 hour   Intake 1604 04 ml   Output 1995 ml   Net -390 96 ml     I/O last 3 completed shifts: In: 2092 3 [P O :60; I V :1032 3; IV Piggyback:1000]  Out: 1995 [IAYCJ:4833]    HD Temporary Double Catheter (Active)   Reasons to continue HD Cath Treatment Therapy 07/16/22 1214   Goal for Removal N/A- chronic HD catheter 07/16/22 1214   Line Necessity Reviewed Yes, reviewed with provider 07/16/22 1214   Site Assessment WDL; Clean;Dry 07/16/22 1214   Proximal Lumen Status Flushed;Blood return noted 07/16/22 1214   Distal Lumen Status Flushed;Blood return noted 07/16/22 1214   Line Care Connections checked and tightened 07/15/22 1540   Dressing Type Chlorhexidine dressing 07/16/22 1214   Dressing Status Clean;Dry; Intact 07/16/22 1214   Dressing Change Due 07/22/22 07/15/22 1540       Urethral Catheter 16 Fr   (Active)   Amt returned on insertion(mL) 350 mL 07/13/22 0026   Reasons to continue Urinary Catheter  Accurate I&O assessment in critically ill patients (48 hr  max) 07/16/22 1214   Goal for Removal Voiding trial when ambulation improves 07/15/22 2000   Site Assessment Clean;Skin intact 07/15/22 2000   [place] Care Done 07/16/22 0837   Collection Container Standard drainage bag 07/15/22 2000   Securement Method Securing device (Describe) 07/15/22 2000   Output (mL) 0 mL 07/16/22 1200       Physical Exam: /89 (BP Location: Left arm)   Pulse 66   Temp 98 2 °F (36 8 °C) (Tympanic)   Resp (!) 23   Ht 5' 10" (1 778 m)   Wt 77 4 kg (170 lb 10 2 oz)   SpO2 96%   BMI 24 48 kg/m²     General Appearance:    Alert, cooperative, no distress, appears stated age   Head:    Normocephalic, without obvious abnormality, atraumatic   Eyes:    Conjunctiva/corneas clear   Ears:    Normal external ears   Nose:   Nares normal, septum midline, mucosa normal, no drainage    or sinus tenderness   Throat:   Lips, mucosa, and tongue normal; teeth and gums normal   Neck:   Supple, symmetrical, trachea midline, no adenopathy;        thyroid:  No enlargement/tenderness/nodules; no carotid    bruit or JVD   Back:     Symmetric, no curvature, ROM normal, no CVA tenderness   Lungs:     Clear to auscultation bilaterally, respirations unlabored   Chest wall:    No tenderness or deformity dialysis catheter right IJ  Grandy-Armida catheter left chest   Heart:    Regular rate and rhythm, S1 and S2 normal, no murmur, rub   or gallop   Abdomen:     Soft, non-tender, bowel sounds active   Extremities:   Extremities normal, atraumatic, no cyanosis or edema   Skin:   Skin color, texture, turgor normal, no rashes or lesions   Lymph nodes:   Cervical normal   Neurologic:   CNII-XII intact she is conversant and appropriate today            Lab, Imaging and other studies: I have personally reviewed pertinent labs  CBC:   Lab Results   Component Value Date    WBC 10 39 (H) 07/16/2022    HGB 11 6 07/16/2022    HCT 36 1 07/16/2022    MCV 91 07/16/2022     07/16/2022    MCH 29 3 07/16/2022    MCHC 32 1 07/16/2022    RDW 14 3 07/16/2022    MPV 10 0 07/16/2022     CMP:   Lab Results   Component Value Date    K 4 9 07/16/2022    CL 94 (L) 07/16/2022    CO2 21 07/16/2022    BUN 89 (H) 07/16/2022    CREATININE 6 96 (H) 07/16/2022    CALCIUM 9 2 07/16/2022    AST 6 (L) 07/16/2022    ALT 3 (L) 07/16/2022    ALKPHOS 71 07/16/2022    EGFR 5 07/16/2022           Results from last 7 days   Lab Units 07/16/22  0524 07/16/22  0034 07/15/22  1817 07/15/22  1343 07/15/22  0627 07/14/22  0943 07/14/22  0416   POTASSIUM mmol/L 4 9 4 6 4 7   < > 6 0*   < > 4 6   CHLORIDE mmol/L 94* 94* 94*   < > 95*   < > 94*   CO2 mmol/L 21 20* 24   < > 21   < > 24   BUN mg/dL 89* 83* 71*   < > 90*   < > 58*   CREATININE mg/dL 6 96* 6 41* 5 60*   < > 6 94*   < > 4 50*   CALCIUM mg/dL 9 2 9 3 9 2   < > 9 2   < > 9 7   ALK PHOS U/L 71  --   --   --  71  --  66   ALT U/L 3*  --   --   --  7  --  9   AST U/L 6*  --   --   --  7*  --  14    < > = values in this interval not displayed  Phosphorus: No results found for: PHOS  Magnesium: No results found for: MG  Urinalysis: No results found for: Steve Amsler, SPECGRAV, PHUR, LEUKOCYTESUR, NITRITE, PROTEINUA, GLUCOSEU, KETONESU, BILIRUBINUR, BLOODU  Ionized Calcium: No results found for: CAION  Coagulation: No results found for: PT, INR, APTT  Troponin: No results found for: TROPONINI  ABG: No results found for: PHART, EOS4JMM, PO2ART, WVU6UYX, M1PBGEPY, BEART, SOURCE  Radiology review:     IMAGING  Procedure: XR chest portable    Result Date: 7/15/2022  Narrative: CHEST INDICATION:   line placement  COMPARISON:  7/14/2022 EXAM PERFORMED/VIEWS:  XR CHEST PORTABLE FINDINGS: New left subclavian [place]-Armida catheter, tip in the distal right pulmonary artery  Previous left subclavian catheter removed  New right IJ central venous catheter, tip in the distal SVC  Lung volumes are within normal limits  Similar interstitial edema  No effusion or pneumothorax  Stable appearance of the cardiomediastinal silhouette  No acute osseous or soft tissue pathology  Impression: New support lines as above, no pneumothorax  Similar edema to yesterday  Workstation performed: [id number]     Procedure: XR chest portable ICU    Result Date: 7/14/2022  Narrative: CHEST INDICATION:   line placement  COMPARISON:  Chest radiograph from 7/12/2022 and chest CT from 6/15/2022   EXAM PERFORMED/VIEWS:  XR CHEST PORTABLE ICU FINDINGS:  Left subclavian catheter in upper SVC  Cardiomediastinal silhouette appears unremarkable  Interstitial edema with trace effusions  No pneumothorax  Osseous structures appear within normal limits for patient age  Impression: Left subclavian catheter in upper SVC with no pneumothorax  Persistent interstitial edema with trace effusions  Workstation performed: [id number]     Procedure: US kidney and bladder    Result Date: 7/14/2022  Narrative: RENAL ULTRASOUND INDICATION:   SHANTA  COMPARISON: None TECHNIQUE:   Ultrasound of the retroperitoneum was performed with a curvilinear transducer utilizing volumetric sweeps and still imaging techniques  FINDINGS: KIDNEYS: Symmetric and normal size  Right kidney:  9 1 x 3 3 x 3 4 cm  Volume 53 4 mL Left kidney:  10 5 x 6 0 x 5 1 cm  Volume 167 2 mL Right kidney Normal echogenicity and contour  No mass is identified  No hydronephrosis  No shadowing calculi  No perinephric fluid collections  Left kidney Normal echogenicity and contour  No mass is identified  No hydronephrosis  No shadowing calculi  No perinephric fluid collections  URETERS: Nonvisualized  BLADDER: A leung catheter is in place, decompressing the bladder and limiting its evaluation  Impression: Kidneys are within normal limits  Bladder is decompressed around a Leung catheter, limiting its evaluation  Workstation performed: [id number]     Procedure: Echo follow up/limited w/ contrast if indicated    Result Date: 7/14/2022  Narrative: Yeimy Urbina  Left Ventricle: Left ventricular cavity size is normal  Wall thickness is normal  The left ventricular ejection fraction is 25%  Systolic function is severely reduced  There is severe global hypokinesis with regional variation, basal and mid inferior wall appeared more severely hypo to akinetic    Right Ventricle: Systolic function is normal    Left Atrium: The atrium is mildly dilated    Aortic Valve:  There is mild regurgitation    Mitral Valve: There is moderate regurgitation    Tricuspid Valve: There is mild regurgitation    Pericardium: There is no pericardial effusion         Current Facility-Administered Medications   Medication Dose Route Frequency    acetaminophen (TYLENOL) tablet 650 mg  650 mg Oral Q4H PRN    albuterol inhalation solution 2 5 mg  2 5 mg Nebulization Q6H PRN    carvedilol (COREG) tablet 6 25 mg  6 25 mg Oral BID With Meals    clonazePAM (KlonoPIN) tablet 0 5 mg  0 5 mg Oral BID PRN    dexmedeTOMIDine (Precedex) 400 mcg in sodium chloride 0 9% 100 mL  0 1-1 4 mcg/kg/hr Intravenous Titrated    heparin (porcine) subcutaneous injection 5,000 Units  5,000 Units Subcutaneous Q8H [address]    levalbuterol (XOPENEX) inhalation solution 1 25 mg  1 25 mg Nebulization TID    And    sodium chloride 0 9 % inhalation solution 3 mL  3 mL Nebulization TID    lidocaine (LIDODERM) 5 % patch 2 patch  2 patch Topical Daily    nicotine (NICODERM CQ) 21 mg/24 hr TD 24 hr patch 1 patch  1 patch Transdermal Daily    ondansetron (ZOFRAN) injection 4 mg  4 mg Intravenous Q6H PRN    umeclidinium-vilanterol (ANORO ELLIPTA) 62 5-25 MCG/INH inhaler 1 puff  1 puff Inhalation Daily     Medications Discontinued During This Encounter   Medication Reason    furosemide (LASIX) injection 40 mg     heparin (porcine) subcutaneous injection 5,000 Units     umeclidinium-vilanterol (ANORO ELLIPTA) 62 5-25 MCG/INH inhaler 1 puff     morphine (MS CONTIN) ER tablet 30 mg     oxyCODONE-acetaminophen (PERCOCET) 5-325 mg per tablet 1 tablet     potassium chloride (K-DUR,KLOR-CON) CR tablet 20 mEq     nitroGLYcerin (TRIDIL) 50 mg in 250 mL infusion (premix)     azithromycin (ZITHROMAX) tablet 500 mg     carvedilol (COREG) tablet 3 125 mg     ipratropium (ATROVENT) 0 02 % inhalation solution 0 5 mg     azithromycin (ZITHROMAX) 500 mg in sodium chloride 0 9 % 250 mL IVPB     methylPREDNISolone sodium succinate (Solu-MEDROL) injection 40 mg     furosemide (LASIX) injection 40 mg     azithromycin (ZITHROMAX) tablet 500 mg     multi-electrolyte (ISOLYTE-S PH 7 4) bolus 250 mL     oxyCODONE-acetaminophen (PERCOCET) 5-325 mg per tablet 1 tablet     morphine injection 2 mg     milrinone (PRIMACOR) 20 mg in 100 mL infusion (premix)     furosemide (LASIX) 500 mg infusion 50 mL     clonazePAM (KlonoPIN) tablet 0 5 mg     carvedilol (COREG) tablet 3 125 mg        France Downey MD      This progress note was produced in part using a dictation device which may document imprecise wording from author's original intent

## 2022-07-16 NOTE — ASSESSMENT & PLAN NOTE
Wt Readings from Last 3 Encounters:   07/16/22 77 4 kg (170 lb 10 2 oz)   06/28/22 77 4 kg (170 lb 9 6 oz)   06/22/22 76 6 kg (168 lb 14 oz)       · BNP 1822 an admission  · Recent ECHO June 2022: Left ventricular cavity size is normal  Wall thickness is mildly increased  There is mild concentric hypertrophy  The left ventricular ejection fraction is 41% by biplane measurement  Systolic function is moderately reduced  The Basalar septum and basilar inferior wall are hypokenetic  Diastolic function is mildly abnormal, consistent with grade I (abnormal) relaxation  The pulmonary artery systolic pressure is moderately increased    · Repeat echo 7/14 showed EF 25%, severe global hypokinesis  · Cardiology consulted, appreciate their recommendations  · Follows with Dr Spencer Fay outpatient  · Has no been unresponsive to diuretics  · Zaira Zafar catheter placed 7/15-CI >2 5  · Required continuous bipap up until 7/15 on which she tolerated transition to 4 L n/c after iHD session   · Repeat iHD session planned for tomorrow   · cardiac enzymes slightly elevated with peak of 59  · Obtain daily weights

## 2022-07-16 NOTE — ASSESSMENT & PLAN NOTE
· Not currently in an exacerbation  · Currently smokes 2 PPD  · Recently discharged from [facility] for similar presentation  · Per chart review, has been intubated 2 X in the past year  · Continue Xopenex nebulizer treatments t i d   · PRN Albuterol  · Bipap as needed  · Sputum culture ordered  · CXR 7/12 Recurrent CHF   Persistent left chest infiltrate  · Continue home Anoro-Ellipta  · Consider Palliative consult

## 2022-07-16 NOTE — PLAN OF CARE
Problem: Knowledge Deficit  Goal: Patient/family/caregiver demonstrates understanding of disease process, treatment plan, medications, and discharge instructions  Description: Complete learning assessment and assess knowledge base    Interventions:  - Provide teaching at level of understanding  - Provide teaching via preferred learning methods  Outcome: Not Progressing

## 2022-07-16 NOTE — PROGRESS NOTES
[address]  Progress Note Ivy Ybarra 1960, 64 y o  female MRN: [id number]  Unit/Bed#: ICU 02-01 Encounter: [id number]  Primary Care Provider: Kristopher Rey DO   Date and time admitted to hospital: 7/12/2022  5:54 PM    * Chronic obstructive pulmonary disease without acute exacerbation ([facility] [address] )  Assessment & Plan  · Not currently in an exacerbation  · Currently smokes 2 PPD  · Recently discharged from [facility] for similar presentation  · Per chart review, has been intubated 2 X in the past year  · Continue Xopenex nebulizer treatments t i d   · PRN Albuterol  · Bipap as needed  · Sputum culture ordered  · CXR 7/12 Recurrent CHF  Persistent left chest infiltrate  · Continue home Anoro-Ellipta  · Consider Palliative consult      Acute on chronic respiratory failure with hypoxemia [facility])  Assessment & Plan  · Patient initially admitted to [place] [address] and CC contacted for acute change in respiratory status, Tachycardia, Tachypnea, Diaphoresis, and agitation  · Intubated 2 X in the past year for similar symptomatology  · Possibly 2/2 ischemia, Cardiology consulted  · Given 2 mg Morphine IV in ED  · Received 80 Lasix in ED  · Hold diuretics for now  · Continue Precedex gtt for agitation  · Sputum Culture ordered  · Tolerated transition to 4 L NC after iHD session yesterday  BIPAP PRN         Acute on chronic combined systolic and diastolic congestive heart failure [facility])  Assessment & Plan  Wt Readings from Last 3 Encounters:   07/15/22 75 8 kg (167 lb 1 7 oz)   06/28/22 77 4 kg (170 lb 9 6 oz)   06/22/22 76 6 kg (168 lb 14 oz)       · BNP 1822 an admission  · Recent ECHO June 2022: Left ventricular cavity size is normal  Wall thickness is mildly increased  There is mild concentric hypertrophy  The left ventricular ejection fraction is 41% by biplane measurement  Systolic function is moderately reduced  The Basalar septum and basilar inferior wall are hypokenetic   Diastolic function is mildly abnormal, consistent with grade I (abnormal) relaxation  The pulmonary artery systolic pressure is moderately increased  · Repeat echo 7/14 showed EF 25%, severe global hypokinesis  · Cardiology consulted, appreciate their recommendations  · Follows with Dr Rabia Butterfield outpatient  · Has no been unresponsive to diuretics  · Required continuous bipap up until 7/15 on which she tolerated transition to 4 L n/c after iHD session   · Repeat iHD session planned for tomorrow   · cardiac enzymes slightly elevated with peak of 59  · Obtain daily weights  · Continue Henley catheter for strict I/O        Essential hypertension  Assessment & Plan  · Continue Home Coreg 3 125 mg BID      Chronic pain  Assessment & Plan  · Home Percocet ordered  · Lidocaine Patch ordered  · Patient resting comfortably currently       SHANTA (acute kidney injury) ([facility] [address] )  Assessment & Plan  · Baseline creatinine appears to be 1 5-1 8 range  · Nephrology consulted, appreciate their recommendations  · Likely 2/2 ATN  · Has not responded to diuretics or fluids  · Tolerated iHD session yesterday, will repeat today   · Continue to monitor renal indices and urine output     Tobacco abuse  Assessment & Plan  · Current everyday smoker 2 PPD  · Per chart review, she is ordered O2 @ home but does not wear it  · Nicotine patch ordered  · Encourage Cessation      Anxiety  Assessment & Plan  · Home Klonopin on hold while NPO  · Continue Precedex gtt    · Decrease stimulation, cluster care      ----------------------------------------------------------------------------------------  HPI/24hr events: tolerated iHD yesterday with 1 6 L removal   Transitioned from BIPAP to 4 L n/c and tolerating well  Giuseppe Ben catheter placed with PA pressure 40's/20's, CI 2 7/CO 5 2        Patient appropriate for transfer out of the ICU today?: No  Disposition: Transfer to Stepdown Level 1   Code Status: Level 1 - Full Code  ---------------------------------------------------------------------------------------  SUBJECTIVE  "my breathing feels better  Im just tired"    Review of Systems  Review of systems was reviewed and negative unless stated above in HPI/24-hour events   ---------------------------------------------------------------------------------------  OBJECTIVE    Vitals   Vitals:    07/15/22 2300 07/15/22 2342 22 0000 22 0100   BP: 146/77      Pulse: 72 71 72 69   Resp: 22 (!) 30 22 15   Temp:       TempSrc:       SpO2: 94% 95% 94% 95%   Weight:       Height:         Temp (24hrs), Av 8 °F (36 6 °C), Min:97 4 °F (36 3 °C), Max:97 9 °F (36 6 °C)  Current: Temperature: (!) 97 4 °F (36 3 °C)          Respiratory:  SpO2: SpO2: 95 %  Nasal Cannula O2 Flow Rate (L/min): 4 L/min    Invasive/non-invasive ventilation settings   Respiratory  Report   Lab Data (Last 4 hours)    None         O2/Vent Data (Last 4 hours)    None                Physical Exam  Constitutional:       General: She is sleeping  Appearance: She is overweight  She is ill-appearing  Interventions: Nasal cannula in place  HENT:      Head: Normocephalic and atraumatic  Eyes:      General: Lids are normal       Extraocular Movements: Extraocular movements intact  Conjunctiva/sclera: Conjunctivae normal    Neck:      Trachea: Trachea normal    Cardiovascular:      Rate and Rhythm: Normal rate  Pulses: Normal pulses  Pulmonary:      Breath sounds: Decreased breath sounds present  Abdominal:      Palpations: Abdomen is soft  Musculoskeletal:      Cervical back: Normal range of motion  Right lower leg: No edema  Left lower leg: No edema  Skin:     General: Skin is warm and dry  Neurological:      General: No focal deficit present  Mental Status: She is easily aroused  GCS: GCS eye subscore is 4  GCS verbal subscore is 5  GCS motor subscore is 6  Sensory: Sensation is intact        Motor: Motor function is intact         Laboratory and Diagnostics:  Results from last 7 days   Lab Units 07/15/22  0627 07/14/22  0416 07/13/22  0422 07/12/22  1816   WBC Thousand/uL 11 75* 13 45* 10 08 8 66   HEMOGLOBIN g/dL 11 8 12 9 12 7 13 3   HEMATOCRIT % 37 2 38 9 40 4 40 1   PLATELETS Thousands/uL 292 279 270 291   NEUTROS PCT % 77*  --   --  57   MONOS PCT % 6  --   --  6   MONO PCT %  --   --  2*  --      Results from last 7 days   Lab Units 07/16/22  0034 07/15/22  1817 07/15/22  1343 07/15/22  0627 07/15/22  0020 07/14/22  1731 07/14/22  0943 07/14/22 0416 07/13/22  0422 07/12/22  1816   SODIUM mmol/L 134* 133* 134* 133* 133* 132* 133* 131* 139 135   POTASSIUM mmol/L 4 6 4 7 5 1 6 0* 5 3 4 9 4 6 4 6 3 8 3 1*   CHLORIDE mmol/L 94* 94* 97 95* 96 95* 94* 94* 101 96   CO2 mmol/L 20* 24 21 21 22 22 24 24 27 30   ANION GAP mmol/L 20* 15* 16* 17* 15* 15* 15* 13 11 9   BUN mg/dL 83* 71* 99* 90* 82* 73* 64* 58* 19 12   CREATININE mg/dL 6 41* 5 60* 7 42* 6 94* 6 49* 5 63* 4 95* 4 50* 1 66* 1 08   CALCIUM mg/dL 9 3 9 2 9 7 9 2 9 2 9 0 9 3 9 7 9 4 10 0   GLUCOSE RANDOM mg/dL 71 87 67 125 122 111 115 161* 142* 100   ALT U/L  --   --   --  7  --   --   --  9 10 3*   AST U/L  --   --   --  7*  --   --   --  14 13 6*   ALK PHOS U/L  --   --   --  71  --   --   --  66 82 81   ALBUMIN g/dL  --   --   --  3 6  --   --   --  3 8 4 0 4 3   TOTAL BILIRUBIN mg/dL  --   --   --  0 55  --   --   --  0 43 0 62 0 79     Results from last 7 days   Lab Units 07/15/22  0627 07/13/22  0422 07/12/22  1816   MAGNESIUM mg/dL 2 5 2 2 2 0   PHOSPHORUS mg/dL 10 3* 4 5*  --                    ABG:  Results from last 7 days   Lab Units 07/13/22  0559   PH ART  7 439   PCO2 ART mm Hg 42 8   PO2 ART mm Hg 84 9   HCO3 ART mmol/L 28 4*   BASE EXC ART mmol/L 3 7   ABG SOURCE  Radial, Left     VBG:  Results from last 7 days   Lab Units 07/14/22  1731 07/14/22  1246 07/13/22  0559   PH PANDA  7 346   < >  --    PCO2 PANDA mm Hg 40 3*   < >  --    PO2 PANDA mm Hg 71 3* < >  --    HCO3 PANDA mmol/L 21 5*   < >  --    BASE EXC PANDA mmol/L -3 8   < >  --    ABG SOURCE   --   --  Radial, Left    < > = values in this interval not displayed  Results from last 7 days   Lab Units 07/15/22  0627 07/14/22  0416 07/13/22  0422 07/12/22  1816   PROCALCITONIN ng/ml 3 04* 1 88* 0 25 <0 05       Micro        EKG: NSR   Imaging:   XR chest portable   Final Result   New support lines as above, no pneumothorax  Similar edema to yesterday  Workstation performed: [id number]         XR chest portable ICU   Final Result      Left subclavian catheter in upper SVC with no pneumothorax  Persistent interstitial edema with trace effusions  Workstation performed: [id number]         US kidney and bladder   Final Result      Kidneys are within normal limits  Bladder is decompressed around a Henley catheter, limiting its evaluation  Workstation performed: [id number]         XR chest 1 view portable   Final Result      Recurrent CHF  Persistent left chest infiltrate                  Workstation performed: [id number]             Intake and Output  I/O       07/14 0701  07/15 0700 07/15 0701 07/16 0700    P  O  160     I V  (mL/kg) 711 9 (9 4) 500 (6 6)    IV Piggyback 2000     Total Intake(mL/kg) 2871 9 (37 9) 500 (6 6)    Urine (mL/kg/hr) 48 (0)     Other  1995    Stool 0     Total Output 48 1995    Net +2823 9 -1495          Unmeasured Stool Occurrence 0 x           Height and Weights   Height: 5' 10" (177 8 cm)     Body mass index is 23 98 kg/m²    Weight (last 2 days)     Date/Time Weight    07/15/22 0500 75 8 (167 11)    07/14/22 0743 78 9 (174)    07/14/22 0400 79 1 (174 38)     Weight: weighed twice at 07/14/22 0400            Nutrition       Diet Orders   (From admission, onward)             Start     Ordered    07/14/22 1336  Diet NPO  Diet effective now        References:    Nutrtion Support Algorithm Enteral vs  Parenteral   Question Answer Comment Diet Type NPO    RD to adjust diet per protocol?  Yes        07/14/22 1335                  Active Medications  Scheduled Meds:  Current Facility-Administered Medications   Medication Dose Route Frequency Provider Last Rate    acetaminophen  650 mg Oral Q4H PRN ESTHER Burgess      albuterol  2 5 mg Nebulization Q6H PRN ESTHER Burgess      carvedilol  3 125 mg Oral BID With Meals ESTHER Hilliard      clonazePAM  0 5 mg Oral HS PRN ESTHER Burgess      dexmedetomidine  0 1-1 4 mcg/kg/hr Intravenous Titrated ESTHER Joel 0 4 mcg/kg/hr (07/15/22 2358)    heparin (porcine)  5,000 Units Subcutaneous Q8H [facility] ESTHER Burgess      levalbuterol  1 25 mg Nebulization TID ESTHER Burgess      And    sodium chloride  3 mL Nebulization TID ESTHER Burgess      lidocaine  2 patch Topical Daily ESTHER Burgess      nicotine  1 patch Transdermal Daily ESTHER Burgess      ondansetron  4 mg Intravenous Q6H PRN ESTHER Burgess      umeclidinium-vilanterol  1 puff Inhalation Daily ESTHER Hilliard       Continuous Infusions:  dexmedetomidine, 0 1-1 4 mcg/kg/hr, Last Rate: 0 4 mcg/kg/hr (07/15/22 2358)      PRN Meds:   acetaminophen, 650 mg, Q4H PRN  albuterol, 2 5 mg, Q6H PRN  clonazePAM, 0 5 mg, HS PRN  ondansetron, 4 mg, Q6H PRN        Invasive Devices Review  Invasive Devices  Report    Central Venous Catheter Line  Duration           CVC Central Lines 07/15/22 <1 day          Peripheral Intravenous Line  Duration           Peripheral IV 07/13/22 Right Hand 3 days          Hemodialysis Catheter  Duration           HD Temporary Double Catheter <1 day          Drain  Duration           Urethral Catheter 16 Fr  3 days                Rationale for remaining devices: hemodynamic monitoring, HD, accurate I/o  ---------------------------------------------------------------------------------------  Advance Directive and Living Will:      Power of Attorney:    POLST:    ---------------------------------------------------------------------------------------  Care Time Delivered:   Upon my evaluation, this patient had a high probability of imminent or life-threatening deterioration due to acute hypoxic respiratory failure decompesated heart failure, which required my direct attention, intervention, and personal management  I have personally provided 30 minutes (0030 to 0100) of critical care time, exclusive of procedures, teaching, family meetings, and any prior time recorded by providers other than myself  ESTHER Rodas      Portions of the record may have been created with voice recognition software  Occasional wrong word or "sound a like" substitutions may have occurred due to the inherent limitations of voice recognition software    Read the chart carefully and recognize, using context, where substitutions have occurred

## 2022-07-16 NOTE — ASSESSMENT & PLAN NOTE
· Baseline creatinine appears to be 1 5-1 8 range  · Nephrology consulted, appreciate their recommendations  · Likely 2/2 ATN  · Has not responded to diuretics or fluids  · Tolerated iHD session yesterday, will repeat today   · Continue to monitor renal indices and urine output

## 2022-07-16 NOTE — PLAN OF CARE
Patient resting comfortably in bed at this time  Respirations easy, non labored on O2 5L via nasal cannula  NSR on monitor  Complex physical assessment completed at this time, see chart for details  Call bell in reach  Readily verbalizing needs

## 2022-07-16 NOTE — ASSESSMENT & PLAN NOTE
· Not currently in an exacerbation  · Currently smokes 2 PPD  · Recently discharged from [facility] for similar presentation  · Per chart review, has been intubated 2 X in the past year  · Continue Xopenex nebulizer treatments t i d   · PRN Albuterol  · Bipap as needed  · Sputum culture ordered  · CXR 7/12 Recurrent CHF   Persistent left chest infiltrate  · Continue home Anoro-Ellipta  · Consider Palliative consult

## 2022-07-16 NOTE — NURSING NOTE
Dialysis nurse at bedside with patient, called out to me because patient states she can't breathe  Responded to room patient's respiratory rate 26-28/min, SaO2 95-97% on 5L oxygen via nasal cannula and lungs remain clear with decreased bases  Asked patient to elaborate on her shortness of breath she states "I take a deep breath then sometimes I can't I breathe in low " A bit anxious, medicated with Klonopin for anxiety  Will continue to monitor

## 2022-07-17 ENCOUNTER — APPOINTMENT (INPATIENT)
Dept: RADIOLOGY | Facility: HOSPITAL | Age: 62
DRG: 682 | End: 2022-07-17
Payer: MEDICARE

## 2022-07-17 LAB
ALBUMIN SERPL BCP-MCNC: 3.4 G/DL (ref 3.5–5)
ALP SERPL-CCNC: 65 U/L (ref 34–104)
ALT SERPL W P-5'-P-CCNC: 4 U/L (ref 7–52)
ANION GAP SERPL CALCULATED.3IONS-SCNC: 14 MMOL/L (ref 4–13)
AST SERPL W P-5'-P-CCNC: 5 U/L (ref 13–39)
ATRIAL RATE: 67 BPM
ATRIAL RATE: 68 BPM
BILIRUB SERPL-MCNC: 0.49 MG/DL (ref 0.2–1)
BUN SERPL-MCNC: 85 MG/DL (ref 5–25)
CALCIUM ALBUM COR SERPL-MCNC: 9 MG/DL (ref 8.3–10.1)
CALCIUM SERPL-MCNC: 8.5 MG/DL (ref 8.4–10.2)
CHLORIDE SERPL-SCNC: 91 MMOL/L (ref 96–108)
CO2 SERPL-SCNC: 26 MMOL/L (ref 21–32)
CREAT SERPL-MCNC: 6.92 MG/DL (ref 0.6–1.3)
ERYTHROCYTE [DISTWIDTH] IN BLOOD BY AUTOMATED COUNT: 13.8 % (ref 11.6–15.1)
GFR SERPL CREATININE-BSD FRML MDRD: 5 ML/MIN/1.73SQ M
GLUCOSE SERPL-MCNC: 139 MG/DL (ref 65–140)
HCT VFR BLD AUTO: 32.2 % (ref 34.8–46.1)
HGB BLD-MCNC: 10.7 G/DL (ref 11.5–15.4)
MAGNESIUM SERPL-MCNC: 2.4 MG/DL (ref 1.9–2.7)
MCH RBC QN AUTO: 30.1 PG (ref 26.8–34.3)
MCHC RBC AUTO-ENTMCNC: 33.2 G/DL (ref 31.4–37.4)
MCV RBC AUTO: 91 FL (ref 82–98)
MRSA NOSE QL CULT: ABNORMAL
MRSA NOSE QL CULT: ABNORMAL
P AXIS: 20 DEGREES
P AXIS: 30 DEGREES
PHOSPHATE SERPL-MCNC: 9.3 MG/DL (ref 2.3–4.1)
PLATELET # BLD AUTO: 212 THOUSANDS/UL (ref 149–390)
PMV BLD AUTO: 10.2 FL (ref 8.9–12.7)
POTASSIUM SERPL-SCNC: 4.6 MMOL/L (ref 3.5–5.3)
PR INTERVAL: 178 MS
PR INTERVAL: 184 MS
PROCALCITONIN SERPL-MCNC: 4.2 NG/ML
PROT SERPL-MCNC: 6.4 G/DL (ref 6.4–8.4)
QRS AXIS: 0 DEGREES
QRS AXIS: 12 DEGREES
QRSD INTERVAL: 96 MS
QRSD INTERVAL: 98 MS
QT INTERVAL: 432 MS
QT INTERVAL: 438 MS
QTC INTERVAL: 456 MS
QTC INTERVAL: 465 MS
RBC # BLD AUTO: 3.55 MILLION/UL (ref 3.81–5.12)
SODIUM SERPL-SCNC: 131 MMOL/L (ref 135–147)
T WAVE AXIS: 58 DEGREES
T WAVE AXIS: 69 DEGREES
VENTRICULAR RATE: 67 BPM
VENTRICULAR RATE: 68 BPM
WBC # BLD AUTO: 9.74 THOUSAND/UL (ref 4.31–10.16)

## 2022-07-17 PROCEDURE — 94760 N-INVAS EAR/PLS OXIMETRY 1: CPT

## 2022-07-17 PROCEDURE — 99291 CRITICAL CARE FIRST HOUR: CPT | Performed by: NURSE PRACTITIONER

## 2022-07-17 PROCEDURE — 94668 MNPJ CHEST WALL SBSQ: CPT

## 2022-07-17 PROCEDURE — 85027 COMPLETE CBC AUTOMATED: CPT | Performed by: NURSE PRACTITIONER

## 2022-07-17 PROCEDURE — 84100 ASSAY OF PHOSPHORUS: CPT | Performed by: NURSE PRACTITIONER

## 2022-07-17 PROCEDURE — 94660 CPAP INITIATION&MGMT: CPT

## 2022-07-17 PROCEDURE — 80053 COMPREHEN METABOLIC PANEL: CPT | Performed by: NURSE PRACTITIONER

## 2022-07-17 PROCEDURE — 71045 X-RAY EXAM CHEST 1 VIEW: CPT

## 2022-07-17 PROCEDURE — 93010 ELECTROCARDIOGRAM REPORT: CPT | Performed by: INTERNAL MEDICINE

## 2022-07-17 PROCEDURE — 84145 PROCALCITONIN (PCT): CPT | Performed by: NURSE PRACTITIONER

## 2022-07-17 PROCEDURE — 83735 ASSAY OF MAGNESIUM: CPT | Performed by: NURSE PRACTITIONER

## 2022-07-17 PROCEDURE — 94664 DEMO&/EVAL PT USE INHALER: CPT

## 2022-07-17 PROCEDURE — 94640 AIRWAY INHALATION TREATMENT: CPT

## 2022-07-17 PROCEDURE — 99232 SBSQ HOSP IP/OBS MODERATE 35: CPT | Performed by: INTERNAL MEDICINE

## 2022-07-17 RX ORDER — CARVEDILOL 12.5 MG/1
12.5 TABLET ORAL 2 TIMES DAILY WITH MEALS
Status: DISCONTINUED | OUTPATIENT
Start: 2022-07-17 | End: 2022-07-17

## 2022-07-17 RX ORDER — HYDRALAZINE HYDROCHLORIDE 20 MG/ML
10 INJECTION INTRAMUSCULAR; INTRAVENOUS EVERY 6 HOURS PRN
Status: DISCONTINUED | OUTPATIENT
Start: 2022-07-17 | End: 2022-07-17

## 2022-07-17 RX ORDER — CARVEDILOL 25 MG/1
25 TABLET ORAL 2 TIMES DAILY WITH MEALS
Status: DISCONTINUED | OUTPATIENT
Start: 2022-07-18 | End: 2022-07-22 | Stop reason: HOSPADM

## 2022-07-17 RX ORDER — GUAIFENESIN 600 MG/1
600 TABLET, EXTENDED RELEASE ORAL EVERY 12 HOURS SCHEDULED
Status: DISCONTINUED | OUTPATIENT
Start: 2022-07-17 | End: 2022-07-22 | Stop reason: HOSPADM

## 2022-07-17 RX ORDER — AMLODIPINE BESYLATE 5 MG/1
5 TABLET ORAL DAILY
Status: DISCONTINUED | OUTPATIENT
Start: 2022-07-17 | End: 2022-07-22 | Stop reason: HOSPADM

## 2022-07-17 RX ORDER — LABETALOL HYDROCHLORIDE 5 MG/ML
10 INJECTION, SOLUTION INTRAVENOUS ONCE
Status: COMPLETED | OUTPATIENT
Start: 2022-07-17 | End: 2022-07-17

## 2022-07-17 RX ORDER — OLANZAPINE 10 MG/1
10 TABLET, ORALLY DISINTEGRATING ORAL
Status: DISCONTINUED | OUTPATIENT
Start: 2022-07-17 | End: 2022-07-22 | Stop reason: HOSPADM

## 2022-07-17 RX ORDER — HYDRALAZINE HYDROCHLORIDE 20 MG/ML
20 INJECTION INTRAMUSCULAR; INTRAVENOUS EVERY 6 HOURS PRN
Status: DISCONTINUED | OUTPATIENT
Start: 2022-07-17 | End: 2022-07-19

## 2022-07-17 RX ORDER — HYDRALAZINE HYDROCHLORIDE 20 MG/ML
10 INJECTION INTRAMUSCULAR; INTRAVENOUS ONCE
Status: COMPLETED | OUTPATIENT
Start: 2022-07-17 | End: 2022-07-17

## 2022-07-17 RX ADMIN — HYDRALAZINE HYDROCHLORIDE 10 MG: 20 INJECTION INTRAMUSCULAR; INTRAVENOUS at 13:13

## 2022-07-17 RX ADMIN — DEXMEDETOMIDINE HYDROCHLORIDE 1.4 MCG/KG/HR: 400 INJECTION INTRAVENOUS at 01:31

## 2022-07-17 RX ADMIN — SODIUM CHLORIDE 5 MG/HR: 0.9 INJECTION, SOLUTION INTRAVENOUS at 17:10

## 2022-07-17 RX ADMIN — HEPARIN SODIUM 5000 UNITS: 5000 INJECTION INTRAVENOUS; SUBCUTANEOUS at 15:20

## 2022-07-17 RX ADMIN — UMECLIDINIUM BROMIDE AND VILANTEROL TRIFENATATE 1 PUFF: 62.5; 25 POWDER RESPIRATORY (INHALATION) at 08:28

## 2022-07-17 RX ADMIN — ISODIUM CHLORIDE 3 ML: 0.03 SOLUTION RESPIRATORY (INHALATION) at 21:00

## 2022-07-17 RX ADMIN — OLANZAPINE 10 MG: 10 TABLET, ORALLY DISINTEGRATING ORAL at 21:57

## 2022-07-17 RX ADMIN — LEVALBUTEROL HYDROCHLORIDE 1.25 MG: 1.25 SOLUTION, CONCENTRATE RESPIRATORY (INHALATION) at 13:27

## 2022-07-17 RX ADMIN — CARVEDILOL 6.25 MG: 3.12 TABLET, FILM COATED ORAL at 08:20

## 2022-07-17 RX ADMIN — AMLODIPINE BESYLATE 5 MG: 5 TABLET ORAL at 16:43

## 2022-07-17 RX ADMIN — Medication 10 MG: at 16:18

## 2022-07-17 RX ADMIN — DEXMEDETOMIDINE HYDROCHLORIDE 1.4 MCG/KG/HR: 400 INJECTION INTRAVENOUS at 04:54

## 2022-07-17 RX ADMIN — HEPARIN SODIUM 5000 UNITS: 5000 INJECTION INTRAVENOUS; SUBCUTANEOUS at 21:59

## 2022-07-17 RX ADMIN — GUAIFENESIN 600 MG: 600 TABLET, EXTENDED RELEASE ORAL at 13:14

## 2022-07-17 RX ADMIN — HYDRALAZINE HYDROCHLORIDE 20 MG: 20 INJECTION INTRAMUSCULAR; INTRAVENOUS at 15:35

## 2022-07-17 RX ADMIN — LEVALBUTEROL HYDROCHLORIDE 1.25 MG: 1.25 SOLUTION, CONCENTRATE RESPIRATORY (INHALATION) at 07:32

## 2022-07-17 RX ADMIN — LEVALBUTEROL HYDROCHLORIDE 1.25 MG: 1.25 SOLUTION, CONCENTRATE RESPIRATORY (INHALATION) at 21:00

## 2022-07-17 RX ADMIN — CLONAZEPAM 0.5 MG: 0.5 TABLET ORAL at 21:57

## 2022-07-17 RX ADMIN — GUAIFENESIN 600 MG: 600 TABLET, EXTENDED RELEASE ORAL at 21:57

## 2022-07-17 RX ADMIN — ISODIUM CHLORIDE 3 ML: 0.03 SOLUTION RESPIRATORY (INHALATION) at 13:27

## 2022-07-17 RX ADMIN — CARVEDILOL 12.5 MG: 12.5 TABLET, FILM COATED ORAL at 15:32

## 2022-07-17 RX ADMIN — ISODIUM CHLORIDE 3 ML: 0.03 SOLUTION RESPIRATORY (INHALATION) at 07:32

## 2022-07-17 RX ADMIN — HEPARIN SODIUM 5000 UNITS: 5000 INJECTION INTRAVENOUS; SUBCUTANEOUS at 08:19

## 2022-07-17 RX ADMIN — CLONAZEPAM 0.5 MG: 0.5 TABLET ORAL at 15:17

## 2022-07-17 NOTE — RESPIRATORY THERAPY NOTE
RT Protocol Note  Zeb Best 64 y o  female MRN: [id number]  Unit/Bed#: ICU 02-01 Encounter: [id number]    Assessment    Principal Problem:    Chronic obstructive pulmonary disease without acute exacerbation ([place] [address] )  Active Problems:    Essential hypertension    Chronic pain    Anxiety    Acute on chronic combined systolic and diastolic congestive heart failure ([facility])    Tobacco abuse    SHANTA (acute kidney injury) ([place] [address] )    Acute on chronic respiratory failure with hypoxemia ([facility])      Home Pulmonary Medications:    Home Devices/Therapy: Home O2 (2-4 hs not using, MDi alb prn Anoro Daily, alb neb (no machine))    Past Medical History:   Diagnosis Date    Cardiomyopathy ([place] [address] )     Chronic combined systolic and diastolic congestive heart failure     COPD (chronic obstructive pulmonary disease) ([facility])     Fatty liver     Hyperlipidemia     Hypertension     Mitral regurgitation     Sepsis      Social History     Socioeconomic History    Marital status: Single     Spouse name: None    Number of children: None    Years of education: None    Highest education level: None   Occupational History    None   Tobacco Use    Smoking status: Current Some Day Smoker     Packs/day: 2 00     Types: Cigarettes    Smokeless tobacco: Never Used   Vaping Use    Vaping Use: Never used   Substance and Sexual Activity    Alcohol use: Never    Drug use: No    Sexual activity: None   Other Topics Concern    None   Social History Narrative    None     Social Determinants of Health     Financial Resource Strain: Not on file   Food Insecurity: No Food Insecurity    Worried About Running Out of Food in the Last Year: Never true    Ran Out of Food in the Last Year: Never true   Transportation Needs: No Transportation Needs    Lack of Transportation (Medical): No    Lack of Transportation (Non-Medical):  No   Physical Activity: Not on file   Stress: Not on file   Social Connections: Not on file   Intimate Partner Violence: Not on file   Housing Stability: Low Risk     Unable to Pay for Housing in the Last Year: No    Number of Places Lived in the Last Year: 1    Unstable Housing in the Last Year: No       Subjective    Subjective Data: pt offered smoking cessation and will ask RN if and when she would like nicotine patcch    Objective    Physical Exam:   Assessment Type: (P) Pre-treatment  General Appearance: (P) Awake, Alert  Respiratory Pattern: (P) Shallow  Chest Assessment: (P) Chest expansion symmetrical  Bilateral Breath Sounds: (P) Diminished (sl  coarse)  O2 Device: (P) nc    Vitals:  Blood pressure 153/87, pulse 66, temperature 98 4 °F (36 9 °C), resp  rate 21, height 5' 10" (1 778 m), weight 79 1 kg (174 lb 6 1 oz), SpO2 91 %  Results from last 7 days   Lab Units 07/13/22  0559   PH ART  7 439   PCO2 ART mm Hg 42 8   PO2 ART mm Hg 84 9   HCO3 ART mmol/L 28 4*   BASE EXC ART mmol/L 3 7   O2 CONTENT ART mL/dL 18 4   O2 HGB, ARTERIAL % 94 5   ABG SOURCE  Radial, Left   JULIAN TEST  Yes       Imaging and other studies: I have personally reviewed pertinent reports  O2 Device: (P) nc     Plan    Respiratory Plan: Home Bronchodilator Patient pathway, No distress/Pulmonary history  Airway Clearance Plan: (P) Flutter, Incentive Spirometer     Resp Comments: (P) Fluter and IS started at this time   Pt needs encouragement

## 2022-07-17 NOTE — PROGRESS NOTES
[address] 45  Progress Note Gabriella Hutchins 1960, 64 y o  female MRN: [id number]  Unit/Bed#: ICU 02-01 Encounter: [id number]  Primary Care Provider: Romeo Ni DO   Date and time admitted to hospital: 7/12/2022  5:54 PM    * Chronic obstructive pulmonary disease without acute exacerbation ([facility] [address] )  Assessment & Plan  · Not currently in an exacerbation  · Currently smokes 2 PPD  · Recently discharged from [facility] for similar presentation  · Per chart review, has been intubated 2 X in the past year  · Continue Xopenex nebulizer treatments t i d   · PRN Albuterol  · Bipap as needed  · Sputum culture ordered  · CXR 7/12 Recurrent CHF  Persistent left chest infiltrate  · Continue home Anoro-Ellipta  · Consider Palliative consult      Acute on chronic respiratory failure with hypoxemia [facility])  Assessment & Plan  · Patient initially admitted to [place] [id number] and CC contacted for acute change in respiratory status, Tachycardia, Tachypnea, Diaphoresis, and agitation  · Intubated 2 X in the past year for similar symptomatology  · Possibly 2/2 ischemia, Cardiology consulted  · Given 2 mg Morphine IV in ED  · Received 80 Lasix in ED  · Sputum Culture ordered  · Tolerated transition to 4 L NC after iHD session yesterday  · Has remained on nasal cannula, had additional iHD session today  · BIPAP PRN         Acute on chronic combined systolic and diastolic congestive heart failure (HCC)  Assessment & Plan  Wt Readings from Last 3 Encounters:   07/16/22 77 4 kg (170 lb 10 2 oz)   06/28/22 77 4 kg (170 lb 9 6 oz)   06/22/22 76 6 kg (168 lb 14 oz)       · BNP 1822 an admission  · Recent ECHO June 2022: Left ventricular cavity size is normal  Wall thickness is mildly increased  There is mild concentric hypertrophy  The left ventricular ejection fraction is 41% by biplane measurement  Systolic function is moderately reduced  The Basalar septum and basilar inferior wall are hypokenetic   Diastolic function is mildly abnormal, consistent with grade I (abnormal) relaxation  The pulmonary artery systolic pressure is moderately increased  · Repeat echo 7/14 showed EF 25%, severe global hypokinesis  · Cardiology consulted, appreciate their recommendations  · Follows with Dr [facility] outpatient  · Has no been unresponsive to diuretics  · Anu Martin catheter placed 7/15-CI >2 5  · Required continuous bipap up until 7/15 on which she tolerated transition to 4 L n/c after iHD session   · Repeat iHD session planned for tomorrow   · cardiac enzymes slightly elevated with peak of 59  · Obtain daily weights        Essential hypertension  Assessment & Plan  · Coreg dose increased today      Chronic pain  Assessment & Plan  · Home Percocet ordered  · Lidocaine Patch ordered  · Patient resting comfortably currently       SHANTA (acute kidney injury) ([facility] [address] )  Assessment & Plan  · Baseline creatinine appears to be 1 5-1 8 range  · Nephrology consulted, appreciate their recommendations  · Likely 2/2 ATN, creatinine remains elevated  · Became oliguric and did not respond to diuretics or fluids  · Tolerated iHD session 7/15 and 7/16   · Continue to monitor renal indices and urine output   · If patient remains oliguric, consider D/C leung and bladder scan q8 hour  · Work up for pheochromocytoma pending    Tobacco abuse  Assessment & Plan  · Current everyday smoker 2 PPD  · Per chart review, she is ordered O2 @ home but does not wear it  · Nicotine patch ordered  · Encourage Cessation      Anxiety  Assessment & Plan  · Home Klonopin increased  · Wean Precedex gtt to off as able  · Decrease stimulation, cluster care    ----------------------------------------------------------------------------------------  HPI/24hr events: Had iHD yesterday and tolerated well    Wearing bipap per patient's request     Patient appropriate for transfer out of the ICU today?: No  Disposition: Continue Critical Care   Code Status: Level 1 - Full Code  ---------------------------------------------------------------------------------------  SUBJECTIVE  "I need anxiety medicine "    Review of Systems   Constitutional: Negative for chills and fever  HENT: Negative  Eyes: Negative  Respiratory: Positive for shortness of breath  Negative for cough, wheezing and stridor  Cardiovascular: Negative for chest pain, palpitations and leg swelling  Gastrointestinal: Negative for abdominal distention, abdominal pain, blood in stool, diarrhea, nausea and vomiting  Endocrine: Negative  Genitourinary: Negative for dysuria, flank pain, frequency and urgency  Musculoskeletal: Negative  Skin: Negative for rash and wound  Allergic/Immunologic: Negative  Neurological: Negative for dizziness, syncope, facial asymmetry, weakness, light-headedness, numbness and headaches  Hematological: Negative for adenopathy  Does not bruise/bleed easily  Psychiatric/Behavioral: The patient is nervous/anxious  Review of systems was reviewed and negative unless stated above in HPI/24-hour events   ---------------------------------------------------------------------------------------  OBJECTIVE    Vitals   Vitals:    22 1900 225   BP: 148/79 156/88     BP Location:  Left arm     Pulse: 71 68 68 68   Resp: (!) 52 (!) 24 17 16   Temp:  99 1 °F (37 3 °C)     TempSrc:  Core     SpO2:  93% 95% 98%   Weight:       Height:         Temp (24hrs), Av 6 °F (37 °C), Min:98 2 °F (36 8 °C), Max:99 1 °F (37 3 °C)  Current: Temperature: 99 1 °F (37 3 °C)          Respiratory:  SpO2: SpO2: 98 %  Nasal Cannula O2 Flow Rate (L/min): 4 L/min    Invasive/non-invasive ventilation settings   Respiratory  Report   Lab Data (Last 4 hours)    None         O2/Vent Data (Last 4 hours)      2115          Non-Invasive Ventilation Mode BiPAP  Comment: [id number]                   Physical Exam  Constitutional:       General: She is awake  Appearance: She is ill-appearing  HENT:      Head: Normocephalic and atraumatic  Eyes:      Extraocular Movements: Extraocular movements intact  Pupils: Pupils are equal, round, and reactive to light  Neck:      Vascular: No JVD  Cardiovascular:      Rate and Rhythm: Normal rate and regular rhythm  Heart sounds: Normal heart sounds  Pulmonary:      Breath sounds: Decreased breath sounds present  Abdominal:      General: Bowel sounds are normal       Palpations: Abdomen is soft  Musculoskeletal:      Right lower leg: No edema  Left lower leg: No edema  Skin:     General: Skin is warm and dry  Neurological:      Mental Status: She is alert  GCS: GCS eye subscore is 4  GCS verbal subscore is 5  GCS motor subscore is 6               Laboratory and Diagnostics:  Results from last 7 days   Lab Units 07/16/22  0524 07/15/22  0627 07/14/22  0416 07/13/22  0422 07/12/22  1816   WBC Thousand/uL 10 39* 11 75* 13 45* 10 08 8 66   HEMOGLOBIN g/dL 11 6 11 8 12 9 12 7 13 3   HEMATOCRIT % 36 1 37 2 38 9 40 4 40 1   PLATELETS Thousands/uL 255 292 279 270 291   NEUTROS PCT %  --  77*  --   --  57   MONOS PCT %  --  6  --   --  6   MONO PCT %  --   --   --  2*  --      Results from last 7 days   Lab Units 07/16/22  0524 07/16/22  0034 07/15/22  1817 07/15/22  1343 07/15/22  0627 07/15/22  0020 07/14/22  1731 07/14/22  0943 07/14/22  0416 07/13/22  0422 07/12/22  1816   SODIUM mmol/L 136 134* 133* 134* 133* 133* 132*   < > 131* 139 135   POTASSIUM mmol/L 4 9 4 6 4 7 5 1 6 0* 5 3 4 9   < > 4 6 3 8 3 1*   CHLORIDE mmol/L 94* 94* 94* 97 95* 96 95*   < > 94* 101 96   CO2 mmol/L 21 20* 24 21 21 22 22   < > 24 27 30   ANION GAP mmol/L 21* 20* 15* 16* 17* 15* 15*   < > 13 11 9   BUN mg/dL 89* 83* 71* 99* 90* 82* 73*   < > 58* 19 12   CREATININE mg/dL 6 96* 6 41* 5 60* 7 42* 6 94* 6 49* 5 63*   < > 4 50* 1 66* 1 08   CALCIUM mg/dL 9 2 9 3 9 2 9 7 9 2 9 2 9 0   < > 9 7 9 4 10 0   GLUCOSE RANDOM mg/dL 77 71 87 67 125 122 111   < > 161* 142* 100   ALT U/L 3*  --   --   --  7  --   --   --  9 10 3*   AST U/L 6*  --   --   --  7*  --   --   --  14 13 6*   ALK PHOS U/L 71  --   --   --  71  --   --   --  66 82 81   ALBUMIN g/dL 3 5  --   --   --  3 6  --   --   --  3 8 4 0 4 3   TOTAL BILIRUBIN mg/dL 0 48  --   --   --  0 55  --   --   --  0 43 0 62 0 79    < > = values in this interval not displayed  Results from last 7 days   Lab Units 07/15/22  0627 07/13/22  0422 07/12/22  1816   MAGNESIUM mg/dL 2 5 2 2 2 0   PHOSPHORUS mg/dL 10 3* 4 5*  --                    ABG:  Results from last 7 days   Lab Units 07/13/22  0559   PH ART  7 439   PCO2 ART mm Hg 42 8   PO2 ART mm Hg 84 9   HCO3 ART mmol/L 28 4*   BASE EXC ART mmol/L 3 7   ABG SOURCE  Radial, Left     VBG:  Results from last 7 days   Lab Units 07/14/22  1731 07/14/22  1246 07/13/22  0559   PH PANDA  7 346   < >  --    PCO2 PANDA mm Hg 40 3*   < >  --    PO2 PANDA mm Hg 71 3*   < >  --    HCO3 PANDA mmol/L 21 5*   < >  --    BASE EXC PANDA mmol/L -3 8   < >  --    ABG SOURCE   --   --  Radial, Left    < > = values in this interval not displayed  Results from last 7 days   Lab Units 07/15/22  0627 07/14/22  0416 07/13/22  0422 07/12/22  1816   PROCALCITONIN ng/ml 3 04* 1 88* 0 25 <0 05       Micro        EKG: nsr  Imaging: I have personally reviewed pertinent reports  Intake and Output  I/O       07/15 0701  07/16 0700 07/16 0701 07/17 0700    P  O   1080    I V  (mL/kg) 903 3 (11 7) 411 6 (5 3)    Other  200    Total Intake(mL/kg) 903 3 (11 7) 1691 6 (21 9)    Urine (mL/kg/hr)  0 (0)    Other 1995     Total Output 1995 0    Net -1091 7 +1691 6                Height and Weights   Height: 5' 10" (177 8 cm)     Body mass index is 24 48 kg/m²    Weight (last 2 days)     Date/Time Weight    07/16/22 0537 77 4 (170 64)    07/16/22 0500 77 4 (170 64)    07/15/22 0500 75 8 (167 11)            Nutrition       Diet Orders   (From admission, onward)             Start Ordered    07/16/22 1137  Diet Cardiovascular; Cardiac  Diet effective now        References:    Nutrtion Support Algorithm Enteral vs  Parenteral   Question Answer Comment   Diet Type Cardiovascular    Cardiac Cardiac    RD to adjust diet per protocol?  Yes        07/16/22 1136                  Active Medications  Scheduled Meds:  Current Facility-Administered Medications   Medication Dose Route Frequency Provider Last Rate    acetaminophen  650 mg Oral Q4H PRN ESTHER Tilley      albuterol  2 5 mg Nebulization Q6H PRN ESTHER Tilley      carvedilol  6 25 mg Oral BID With Meals Carlean Million, CRNP      clonazePAM  0 5 mg Oral BID PRN Carlean Million CRNP      dexmedetomidine  0 1-1 4 mcg/kg/hr Intravenous Titrated Lysbeth AntuESTHER 1 4 mcg/kg/hr (07/16/22 2130)    heparin (porcine)  5,000 Units Subcutaneous Q8H [address] ESTHER Tilley      levalbuterol  1 25 mg Nebulization TID ESTHER Tilley      And    sodium chloride  3 mL Nebulization TID ESTHER Tilley      lidocaine  2 patch Topical Daily ESTHER Tilley      nicotine  1 patch Transdermal Daily ESTHER Tilley      OLANZapine  10 mg Oral HS ESTHER Hilliard      ondansetron  4 mg Intravenous Q6H PRN ESTHER Tilley      umeclidinium-vilanterol  1 puff Inhalation Daily ESTHER Hilliard       Continuous Infusions:  dexmedetomidine, 0 1-1 4 mcg/kg/hr, Last Rate: 1 4 mcg/kg/hr (07/16/22 2130)      PRN Meds:   acetaminophen, 650 mg, Q4H PRN  albuterol, 2 5 mg, Q6H PRN  clonazePAM, 0 5 mg, BID PRN  ondansetron, 4 mg, Q6H PRN        Invasive Devices Review  Invasive Devices  Report    Central Venous Catheter Line  Duration           Introducer 07/15/22 Subclavian Left 1 day          Peripheral Intravenous Line  Duration           Peripheral IV 07/13/22 Right Hand 4 days          Hemodialysis Catheter  Duration           HD Temporary Double Catheter 1 day          Drain  Duration Urethral Catheter 16 Fr  4 days                Rationale for remaining devices: critical illness  ---------------------------------------------------------------------------------------  Advance Directive and Living Will:      Power of :    POLST:    ---------------------------------------------------------------------------------------  Care Time Delivered:   Upon my evaluation, this patient had a high probability of imminent or life-threatening deterioration due to acute on chronic heart failure and SHANTA, which required my direct attention, intervention, and personal management  I have personally provided 30 minutes (0001 to 0031) of critical care time, exclusive of procedures, teaching, family meetings, and any prior time recorded by providers other than myself  ESTHER Faustin      Portions of the record may have been created with voice recognition software  Occasional wrong word or "sound a like" substitutions may have occurred due to the inherent limitations of voice recognition software    Read the chart carefully and recognize, using context, where substitutions have occurred

## 2022-07-17 NOTE — NURSING NOTE
1300 BP's trending upward  Patient denies any discomfort  NSR on monitor  Critical care attending aware, will address  1400 Patient given completed bedbath and hair washed  Assisted to bedside chair with legs elevated  Patient was on 4L nasal cannula oxygen prior to the transfer  To the chair, with transfer SaO2 dropped to 84%  Encouraged to take deep breaths SaO2 damari to 85% after about 2 minutes and patient still feeling short of breath  Increased to 5L nc and SaO2 damari to 91-92% on same  1530 Coreg dose increased by attending as BP remains elevated, patient is resting comfortably in chair  Also give additional dose of hydralazine IV    1618 given dose of Labetolol 10 mg IV SBP's still 180-190's patient comfortable in chair    1640 Norvasc added to regimen and patient will be started on Cardene drip    1710 Cardene drip initiated at 5mg/hr  1800 Patient resting comfortably in bedside recliner  BP now 140/65, HR 91 NSr on monitor

## 2022-07-17 NOTE — PROGRESS NOTES
Progress Note - Nephrology   Syed Menjivar 64 y o  female MRN: [id number]  Unit/Bed#: ICU 02-01 Encounter: [id number]    A/P:  1  Acute anuric kidney injury   - urine output < 10 cc   - tolerated dialysis with improvement in lung exam andrespiratory status and mental status   - plan for 3 hours of dialysis tomorrow   - follow daily labs    2  Acute on chronic combined HF   - due to markedly reduced cardiac output   - unresponsive to diuretics and may be hemodialysis dependent   - continue current management   - will have dialysis tomorrow morning - orders written and patient aware    3  Acute on chronic respiratory failure with hypoxemia   - required BiPAP over night  Currently on NC --> o2 sat of 90% but comfortable    4  Essential hypertension   - blood pressure fair at 164/87    5  COPD with acute exacerbation   - is an active smoker    Follow up reason for today's visit: Acute anuric kidney injury    Chronic obstructive pulmonary disease with acute exacerbation ([facility] )    Patient Active Problem List   Diagnosis    Vitamin D deficiency    Dilated cardiomyopathy ([facility] )    Essential hypertension    Chronic pain    Anxiety    Acute on chronic combined systolic and diastolic congestive heart failure (HCC)    Tobacco abuse    Leukocytosis    Chronic obstructive pulmonary disease without acute exacerbation ([facility] )    SHANTA (acute kidney injury) ([facility] )    Acute respiratory insufficiency    Acute on chronic respiratory failure with hypoxemia (HCC)         Subjective:   Much more alert today  Denies headache, dizziness, chest pain  Says dyspnea is improved but has secretions which she cannot mobilize  No abdominal pain, NVD, ate BF, is voiding scant yellow urine clear via [place] but < 10 cc    Objective:     Vitals: Blood pressure 157/86, pulse 64, temperature 98 4 °F (36 9 °C), resp  rate (!) 30, height 5' 10" (1 778 m), weight 79 1 kg (174 lb 6 1 oz), SpO2 93 %  ,Body mass index is 25 02 kg/m²      Weight (last 2 days)     Date/Time Weight    07/17/22 0600 79 1 (174 38)    07/16/22 0537 77 4 (170 64)    07/16/22 0500 77 4 (170 64)    07/15/22 0500 75 8 (167 11)            Intake/Output Summary (Last 24 hours) at 7/17/2022 0959  Last data filed at 7/17/2022 0800  Gross per 24 hour   Intake 2133 31 ml   Output 0 ml   Net 2133 31 ml     I/O last 3 completed shifts: In: 2299 8 [P O :1080; I V :1019 8; Other:200]  Out: 0     HD Temporary Double Catheter (Active)   Reasons to continue HD Cath Treatment Therapy 07/17/22 0919   Goal for Removal N/A- chronic HD catheter 07/16/22 2000   Line Necessity Reviewed Yes, reviewed with provider 07/17/22 0800   Site Assessment WDL 07/17/22 0800   Proximal Lumen Status Normal saline locked 07/17/22 0000   Distal Lumen Status Normal saline locked 07/17/22 0800   Line Care Connections checked and tightened 07/15/22 1540   Dressing Type Chlorhexidine dressing 07/17/22 0800   Dressing Status Clean;Dry; Intact 07/17/22 0800   Dressing Change Due 07/22/22 07/15/22 1540       Urethral Catheter 16 Fr   (Active)   Amt returned on insertion(mL) 350 mL 07/13/22 0026   Reasons to continue Urinary Catheter  Accurate I&O assessment in critically ill patients (48 hr  max) 07/17/22 0920   Goal for Removal No longer needed- Will place order to discontinue 07/17/22 0920   Site Assessment Clean;Skin intact 07/15/22 2000   Hen[place] Care Done 07/17/22 0840   Collection Container Standard drainage bag 07/15/22 2000   Securement Method Securing device (Describe) 07/15/22 2000   Output (mL) 0 mL 07/17/22 0800       Physical Exam: /86   Pulse 64   Temp 98 4 °F (36 9 °C)   Resp (!) 30   Ht 5' 10" (1 778 m)   Wt 79 1 kg (174 lb 6 1 oz)   SpO2 93%   BMI 25 02 kg/m²     General Appearance:    Alert, cooperative, no distress, appears stated age   Head:    Normocephalic, without obvious abnormality, atraumatic   Eyes:    Conjunctiva/corneas clear   Ears:    Normal external ears   Nose:   Nares normal, septum midline, mucosa normal, no drainage    or sinus tenderness   Throat:   Lips, mucosa, and tongue normal; teeth and gums normal   Neck:   Supple, symmetrical, trachea midline, no adenopathy;        thyroid:  No enlargement/tenderness/nodules; no carotid    bruit or JVD   Back:     Symmetric, no curvature, ROM normal, no CVA tenderness   Lungs:      Dec 1/3 way up to auscultation bilaterally, respirations unlabored   Chest wall:    No tenderness or deformity   Heart:    Regular rate and rhythm, S1 and S2 normal, no murmur, rub   or gallop   Abdomen:     Soft, non-tender, bowel sounds active   Extremities:   Extremities normal, atraumatic, no cyanosis or edema   Skin:   Skin color, texture, turgor normal, no rashes or lesions   Lymph nodes:   Cervical normal   Neurologic:   CNII-XII intact            Lab, Imaging and other studies: I have personally reviewed pertinent labs  CBC:   Lab Results   Component Value Date    WBC 9 74 07/17/2022    HGB 10 7 (L) 07/17/2022    HCT 32 2 (L) 07/17/2022    MCV 91 07/17/2022     07/17/2022    MCH 30 1 07/17/2022    MCHC 33 2 07/17/2022    RDW 13 8 07/17/2022    MPV 10 2 07/17/2022     CMP:   Lab Results   Component Value Date    K 4 6 07/17/2022    CL 91 (L) 07/17/2022    CO2 26 07/17/2022    BUN 85 (H) 07/17/2022    CREATININE 6 92 (H) 07/17/2022    CALCIUM 8 5 07/17/2022    AST 5 (L) 07/17/2022    ALT 4 (L) 07/17/2022    ALKPHOS 65 07/17/2022    EGFR 5 07/17/2022         Results from last 7 days   Lab Units 07/17/22  0544 07/16/22  0524 07/16/22  0034 07/15/22  1343 07/15/22  0627   POTASSIUM mmol/L 4 6 4 9 4 6   < > 6 0*   CHLORIDE mmol/L 91* 94* 94*   < > 95*   CO2 mmol/L 26 21 20*   < > 21   BUN mg/dL 85* 89* 83*   < > 90*   CREATININE mg/dL 6 92* 6 96* 6 41*   < > 6 94*   CALCIUM mg/dL 8 5 9 2 9 3   < > 9 2   ALK PHOS U/L 65 71  --   --  71   ALT U/L 4* 3*  --   --  7   AST U/L 5* 6*  --   --  7*    < > = values in this interval not displayed           Phosphorus:   Lab Results   Component Value Date    PHOS 9 3 (H) 07/17/2022     Magnesium:   Lab Results   Component Value Date    MG 2 4 07/17/2022     Urinalysis: No results found for: Macarena Porteous, SPECGRAV, PHUR, LEUKOCYTESUR, NITRITE, PROTEINUA, GLUCOSEU, KETONESU, BILIRUBINUR, BLOODU  Ionized Calcium: No results found for: CAION  Coagulation: No results found for: PT, INR, APTT  Troponin: No results found for: TROPONINI  ABG: No results found for: PHART, LUO2YRZ, PO2ART, JKW3INH, X7UBZFTG, BEART, SOURCE  Radiology review:     IMAGING  Procedure: XR chest portable    Result Date: 7/15/2022  Narrative: CHEST INDICATION:   line placement  COMPARISON:  7/14/2022 EXAM PERFORMED/VIEWS:  XR CHEST PORTABLE FINDINGS: New left subclavian Garrison-Armida catheter, tip in the distal right pulmonary artery  Previous left subclavian catheter removed  New right IJ central venous catheter, tip in the distal SVC  Lung volumes are within normal limits  Similar interstitial edema  No effusion or pneumothorax  Stable appearance of the cardiomediastinal silhouette  No acute osseous or soft tissue pathology  Impression: New support lines as above, no pneumothorax  Similar edema to yesterday  Workstation performed: [id number]     Procedure: XR chest portable ICU    Result Date: 7/14/2022  Narrative: CHEST INDICATION:   line placement  COMPARISON:  Chest radiograph from 7/12/2022 and chest CT from 6/15/2022  EXAM PERFORMED/VIEWS:  XR CHEST PORTABLE ICU FINDINGS:  Left subclavian catheter in upper SVC  Cardiomediastinal silhouette appears unremarkable  Interstitial edema with trace effusions  No pneumothorax  Osseous structures appear within normal limits for patient age  Impression: Left subclavian catheter in upper SVC with no pneumothorax  Persistent interstitial edema with trace effusions  Workstation performed: [id number]     Procedure: US kidney and bladder    Result Date: 7/14/2022  Narrative: RENAL ULTRASOUND INDICATION:   SHANTA  COMPARISON: None TECHNIQUE:   Ultrasound of the retroperitoneum was performed with a curvilinear transducer utilizing volumetric sweeps and still imaging techniques  FINDINGS: KIDNEYS: Symmetric and normal size  Right kidney:  9 1 x 3 3 x 3 4 cm  Volume 53 4 mL Left kidney:  10 5 x 6 0 x 5 1 cm  Volume 167 2 mL Right kidney Normal echogenicity and contour  No mass is identified  No hydronephrosis  No shadowing calculi  No perinephric fluid collections  Left kidney Normal echogenicity and contour  No mass is identified  No hydronephrosis  No shadowing calculi  No perinephric fluid collections  URETERS: Nonvisualized  BLADDER: A leung catheter is in place, decompressing the bladder and limiting its evaluation  Impression: Kidneys are within normal limits  Bladder is decompressed around a Leung catheter, limiting its evaluation   Workstation performed: [id number]       Current Facility-Administered Medications   Medication Dose Route Frequency    acetaminophen (TYLENOL) tablet 650 mg  650 mg Oral Q4H PRN    albuterol inhalation solution 2 5 mg  2 5 mg Nebulization Q6H PRN    carvedilol (COREG) tablet 6 25 mg  6 25 mg Oral BID With Meals    clonazePAM (KlonoPIN) tablet 0 5 mg  0 5 mg Oral BID PRN    dexmedeTOMIDine (Precedex) 400 mcg in sodium chloride 0 9% 100 mL  0 1-1 4 mcg/kg/hr Intravenous Titrated    heparin (porcine) subcutaneous injection 5,000 Units  5,000 Units Subcutaneous Q8H [address]    levalbuterol (XOPENEX) inhalation solution 1 25 mg  1 25 mg Nebulization TID    And    sodium chloride 0 9 % inhalation solution 3 mL  3 mL Nebulization TID    lidocaine (LIDODERM) 5 % patch 2 patch  2 patch Topical Daily    nicotine (NICODERM CQ) 21 mg/24 hr TD 24 hr patch 1 patch  1 patch Transdermal Daily    OLANZapine (ZyPREXA ZYDIS) dispersible tablet 10 mg  10 mg Oral HS    ondansetron (ZOFRAN) injection 4 mg  4 mg Intravenous Q6H PRN    umeclidinium-vilanterol (ANORO ELLIPTA) 62 5-25 MCG/INH inhaler 1 puff 1 puff Inhalation Daily     Medications Discontinued During This Encounter   Medication Reason    furosemide (LASIX) injection 40 mg     heparin (porcine) subcutaneous injection 5,000 Units     umeclidinium-vilanterol (ANORO ELLIPTA) 62 5-25 MCG/INH inhaler 1 puff     morphine (MS CONTIN) ER tablet 30 mg     oxyCODONE-acetaminophen (PERCOCET) 5-325 mg per tablet 1 tablet     potassium chloride (K-DUR,KLOR-CON) CR tablet 20 mEq     nitroGLYcerin (TRIDIL) 50 mg in 250 mL infusion (premix)     azithromycin (ZITHROMAX) tablet 500 mg     carvedilol (COREG) tablet 3 125 mg     ipratropium (ATROVENT) 0 02 % inhalation solution 0 5 mg     azithromycin (ZITHROMAX) 500 mg in sodium chloride 0 9 % 250 mL IVPB     methylPREDNISolone sodium succinate (Solu-MEDROL) injection 40 mg     furosemide (LASIX) injection 40 mg     azithromycin (ZITHROMAX) tablet 500 mg     multi-electrolyte (ISOLYTE-S PH 7 4) bolus 250 mL     oxyCODONE-acetaminophen (PERCOCET) 5-325 mg per tablet 1 tablet     morphine injection 2 mg     milrinone (PRIMACOR) 20 mg in 100 mL infusion (premix)     furosemide (LASIX) 500 mg infusion 50 mL     clonazePAM (KlonoPIN) tablet 0 5 mg     carvedilol (COREG) tablet 3 125 mg        Floyd Guerra MD      This progress note was produced in part using a dictation device which may document imprecise wording from author's original intent

## 2022-07-17 NOTE — RESPIRATORY THERAPY NOTE
07/16/22 2118   Respiratory Assessment   Assessment Type During-treatment   General Appearance Alert; Awake   Respiratory Pattern Normal   Chest Assessment Chest expansion symmetrical   Bilateral Breath Sounds Diminished;Clear   Resp Comments pt asking for bipap, no distress no indication for  bipap, pt placed on 12/6 32% adjusted per pt tidal volumes   O2 Device bipaP   Non-Invasive Information   O2 Interface Device Full face mask  (med)   Non-Invasive Ventilation Mode BiPAP  (v60)   $ Intermittent NIV Yes   SpO2 98 %   $ Pulse Oximetry Spot Check Charge Completed   Non-Invasive Settings   FiO2 (%) 32   Rise Time 3   IPAP (cm) 12 cm   EPAP (cm) 6 cm   Rate (Set) 12   Flow (lpm) 18   Pressure Support (cm H2O) 6   Inspiratory Time (Set) 1 25   Non-Invasive Readings   Skin Intervention Skin barrier applied;Skin intact   Total Rate 16   Spontaneous Vt (mL) 689   Spontaneous MV (mL) 12 6   I/E Ratio (Obs) 1:3   Leak (lpm) 6   Peak Pressure (Obs) 13   Non-Invasive Alarms   Insp Pressure High (cm H20) 25   Insp Pressure Low (cm H20) 12   Low Insp Pressure Time (sec) 20 sec   MV Low (L/min) 3   Vt High (mL) 1200   Vt Low (mL) 200   High Resp Rate (BPM) 40 BPM   Low Resp Rate (BPM) 8 BPM   Apnea Interval (sec) 20   Apnea Pressure 12

## 2022-07-17 NOTE — PLAN OF CARE
Problem: MOBILITY - ADULT  Goal: Maintain or return to baseline ADL function  Description: INTERVENTIONS:  -  Assess patient's ability to carry out ADLs; assess patient's baseline for ADL function and identify physical deficits which impact ability to perform ADLs (bathing, care of mouth/teeth, toileting, grooming, dressing, etc )  - Assess/evaluate cause of self-care deficits   - Assess range of motion  - Assess patient's mobility; develop plan if impaired  - Assess patient's need for assistive devices and provide as appropriate  - Encourage maximum independence but intervene and supervise when necessary  - Involve family in performance of ADLs  - Assess for home care needs following discharge   - Consider OT consult to assist with ADL evaluation and planning for discharge  - Provide patient education as appropriate  Outcome: Progressing     Problem: MOBILITY - ADULT  Goal: Maintains/Returns to pre admission functional level  Description: INTERVENTIONS:  - Perform BMAT or MOVE assessment daily    - Set and communicate daily mobility goal to care team and patient/family/caregiver  - Collaborate with rehabilitation services on mobility goals if consulted  - Perform Range of Motion 3 times a day  - Reposition patient every 2 hours    - Dangle patient 3 times a day  - Stand patient 3 times a day  - Ambulate patient 3 times a day  - Out of bed to chair 3 times a day   - Out of bed for meals 3 times a day  - Out of bed for toileting  - Record patient progress and toleration of activity level   Outcome: Progressing     Problem: Potential for Falls  Goal: Patient will remain free of falls  Description: INTERVENTIONS:  -  Assess patient's ability to carry out ADLs; assess patient's baseline for ADL function and identify physical deficits which impact ability to perform ADLs (bathing, care of mouth/teeth, toileting, grooming, dressing, etc )  - Assess/evaluate cause of self-care deficits   - Assess range of motion  - Assess patient's mobility; develop plan if impaired  - Assess patient's need for assistive devices and provide as appropriate  - Encourage maximum independence but intervene and supervise when necessary  - Involve family in performance of ADLs  - Assess for home care needs following discharge   - Consider OT consult to assist with ADL evaluation and planning for discharge  - Provide patient education as appropriate  Outcome: Progressing     Problem: Prexisting or High Potential for Compromised Skin Integrity  Goal: Skin integrity is maintained or improved  Description: INTERVENTIONS:  - Identify patients at risk for skin breakdown  - Assess and monitor skin integrity  - Assess and monitor nutrition and hydration status  - Monitor labs   - Assess for incontinence   - Turn and reposition patient  - Assist with mobility/ambulation  - Relieve pressure over bony prominences  - Avoid friction and shearing  - Provide appropriate hygiene as needed including keeping skin clean and dry  - Evaluate need for skin moisturizer/barrier cream  - Collaborate with interdisciplinary team   - Patient/family teaching  - Consider wound care consult   Outcome: Progressing     Problem: PAIN - ADULT  Goal: Verbalizes/displays adequate comfort level or baseline comfort level  Description: Interventions:  - Encourage patient to monitor pain and request assistance  - Assess pain using appropriate pain scale  - Administer analgesics based on type and severity of pain and evaluate response  - Implement non-pharmacological measures as appropriate and evaluate response  - Consider cultural and social influences on pain and pain management  - Notify physician/advanced practitioner if interventions unsuccessful or patient reports new pain  Outcome: Progressing     Problem: INFECTION - ADULT  Goal: Absence or prevention of progression during hospitalization  Description: INTERVENTIONS:  - Assess and monitor for signs and symptoms of infection  - Monitor lab/diagnostic results  - Monitor all insertion sites, i e  indwelling lines, tubes, and drains  - Monitor endotracheal if appropriate and nasal secretions for changes in amount and color  - [place] appropriate cooling/warming therapies per order  - Administer medications as ordered  - Instruct and encourage patient and family to use good hand hygiene technique  - Identify and instruct in appropriate isolation precautions for identified infection/condition  Outcome: Progressing     Problem: INFECTION - ADULT  Goal: Absence of fever/infection during neutropenic period  Description: INTERVENTIONS:  - Monitor WBC    Outcome: Progressing     Problem: SAFETY ADULT  Goal: Maintain or return to baseline ADL function  Description: INTERVENTIONS:  -  Assess patient's ability to carry out ADLs; assess patient's baseline for ADL function and identify physical deficits which impact ability to perform ADLs (bathing, care of mouth/teeth, toileting, grooming, dressing, etc )  - Assess/evaluate cause of self-care deficits   - Assess range of motion  - Assess patient's mobility; develop plan if impaired  - Assess patient's need for assistive devices and provide as appropriate  - Encourage maximum independence but intervene and supervise when necessary  - Involve family in performance of ADLs  - Assess for home care needs following discharge   - Consider OT consult to assist with ADL evaluation and planning for discharge  - Provide patient education as appropriate  Outcome: Progressing     Problem: SAFETY ADULT  Goal: Maintains/Returns to pre admission functional level  Description: INTERVENTIONS:  - Perform BMAT or MOVE assessment daily    - Set and communicate daily mobility goal to care team and patient/family/caregiver  - Collaborate with rehabilitation services on mobility goals if consulted  - Perform Range of Motion 3 times a day  - Reposition patient every 2 hours    - Dangle patient 3 times a day  - Stand patient 3 times a day  - Ambulate patient 3 times a day  - Out of bed to chair 3 times a day   - Out of bed for meals 3 times a day  - Out of bed for toileting  - Record patient progress and toleration of activity level   Outcome: Progressing     Problem: SAFETY ADULT  Goal: Patient will remain free of falls  Description: INTERVENTIONS:  -  Assess patient's ability to carry out ADLs; assess patient's baseline for ADL function and identify physical deficits which impact ability to perform ADLs (bathing, care of mouth/teeth, toileting, grooming, dressing, etc )  - Assess/evaluate cause of self-care deficits   - Assess range of motion  - Assess patient's mobility; develop plan if impaired  - Assess patient's need for assistive devices and provide as appropriate  - Encourage maximum independence but intervene and supervise when necessary  - Involve family in performance of ADLs  - Assess for home care needs following discharge   - Consider OT consult to assist with ADL evaluation and planning for discharge  - Provide patient education as appropriate  Outcome: Progressing     Problem: DISCHARGE PLANNING  Goal: Discharge to home or other facility with appropriate resources  Description: INTERVENTIONS:  - Identify barriers to discharge w/patient and caregiver  - Arrange for needed discharge resources and transportation as appropriate  - Identify discharge learning needs (meds, wound care, etc )  - Arrange for interpretive services to assist at discharge as needed  - Refer to Case Management Department for coordinating discharge planning if the patient needs post-hospital services based on physician/advanced practitioner order or complex needs related to functional status, cognitive ability, or social support system  Outcome: Progressing

## 2022-07-17 NOTE — NURSING NOTE
Patient resting comfortably in bed at this time, only complaint was her feet were cold  Gave warm blankets  Refused breakfast at this time, states she is tired and just wants to sleep  Refused to do IS at this time  Precedex drip discontinued at this time  Complex physical assessment as noted, see shift assessment for details  Call bell in reach  Readily verbalizes needs

## 2022-07-17 NOTE — RESPIRATORY THERAPY NOTE
07/17/22 0300   Respiratory Assessment   Assessment Type Assess only   General Appearance Sleeping   Respiratory Pattern Normal   Chest Assessment Chest expansion symmetrical   Bilateral Breath Sounds Diminished;Clear   Resp Comments pt asleep on bipap no changes made will cont to monitor   O2 Device bipap   Non-Invasive Information   O2 Interface Device Full face mask  (med)   Non-Invasive Ventilation Mode BiPAP  (v60)   $ Intermittent NIV Yes   SpO2 96 %   $ Pulse Oximetry Spot Check Charge Completed   Non-Invasive Settings   FiO2 (%) 32   Rise Time 3   IPAP (cm) 12 cm   EPAP (cm) 6 cm   Rate (Set) 12   Flow (lpm) 27   Pressure Support (cm H2O) 6   Inspiratory Time (Set) 1 25   Non-Invasive Readings   Skin Intervention Skin barrier applied;Skin intact   Total Rate 20   Spontaneous Vt (mL) 560   Spontaneous MV (mL) 12 5   I/E Ratio (Obs) 1:3   Leak (lpm) 2   Peak Pressure (Obs) 12   Non-Invasive Alarms   Insp Pressure High (cm H20) 25   Insp Pressure Low (cm H20) 12   Low Insp Pressure Time (sec) 20 sec   MV Low (L/min) 3   Vt High (mL) 1200   Vt Low (mL) 200   High Resp Rate (BPM) 40 BPM   Low Resp Rate (BPM) 8 BPM   Apnea Interval (sec) 20   Apnea Pressure 12

## 2022-07-18 ENCOUNTER — APPOINTMENT (INPATIENT)
Dept: DIALYSIS | Facility: HOSPITAL | Age: 62
DRG: 682 | End: 2022-07-18
Payer: MEDICARE

## 2022-07-18 ENCOUNTER — TELEPHONE (OUTPATIENT)
Dept: OTHER | Facility: OTHER | Age: 62
End: 2022-07-18

## 2022-07-18 LAB
ALBUMIN SERPL BCP-MCNC: 3.4 G/DL (ref 3.5–5)
ALP SERPL-CCNC: 68 U/L (ref 34–104)
ALT SERPL W P-5'-P-CCNC: 5 U/L (ref 7–52)
ANION GAP SERPL CALCULATED.3IONS-SCNC: 19 MMOL/L (ref 4–13)
AST SERPL W P-5'-P-CCNC: 9 U/L (ref 13–39)
ATRIAL RATE: 72 BPM
BASOPHILS # BLD AUTO: 0.05 THOUSANDS/ΜL (ref 0–0.1)
BASOPHILS NFR BLD AUTO: 1 % (ref 0–1)
BILIRUB SERPL-MCNC: 0.48 MG/DL (ref 0.2–1)
BUN SERPL-MCNC: 110 MG/DL (ref 5–25)
CALCIUM ALBUM COR SERPL-MCNC: 8.7 MG/DL (ref 8.3–10.1)
CALCIUM SERPL-MCNC: 8.2 MG/DL (ref 8.4–10.2)
CHLORIDE SERPL-SCNC: 90 MMOL/L (ref 96–108)
CO2 SERPL-SCNC: 20 MMOL/L (ref 21–32)
CREAT SERPL-MCNC: 8.87 MG/DL (ref 0.6–1.3)
EOSINOPHIL # BLD AUTO: 0.37 THOUSAND/ΜL (ref 0–0.61)
EOSINOPHIL NFR BLD AUTO: 3 % (ref 0–6)
ERYTHROCYTE [DISTWIDTH] IN BLOOD BY AUTOMATED COUNT: 14.1 % (ref 11.6–15.1)
GFR SERPL CREATININE-BSD FRML MDRD: 4 ML/MIN/1.73SQ M
GLUCOSE SERPL-MCNC: 115 MG/DL (ref 65–140)
HCT VFR BLD AUTO: 30.4 % (ref 34.8–46.1)
HGB BLD-MCNC: 10.1 G/DL (ref 11.5–15.4)
IMM GRANULOCYTES # BLD AUTO: 0.06 THOUSAND/UL (ref 0–0.2)
IMM GRANULOCYTES NFR BLD AUTO: 1 % (ref 0–2)
LYMPHOCYTES # BLD AUTO: 1.65 THOUSANDS/ΜL (ref 0.6–4.47)
LYMPHOCYTES NFR BLD AUTO: 15 % (ref 14–44)
MCH RBC QN AUTO: 29.4 PG (ref 26.8–34.3)
MCHC RBC AUTO-ENTMCNC: 33.2 G/DL (ref 31.4–37.4)
MCV RBC AUTO: 88 FL (ref 82–98)
MONOCYTES # BLD AUTO: 0.86 THOUSAND/ΜL (ref 0.17–1.22)
MONOCYTES NFR BLD AUTO: 8 % (ref 4–12)
NEUTROPHILS # BLD AUTO: 7.83 THOUSANDS/ΜL (ref 1.85–7.62)
NEUTS SEG NFR BLD AUTO: 72 % (ref 43–75)
NRBC BLD AUTO-RTO: 0 /100 WBCS
P AXIS: 47 DEGREES
PLATELET # BLD AUTO: 240 THOUSANDS/UL (ref 149–390)
PMV BLD AUTO: 9.7 FL (ref 8.9–12.7)
POTASSIUM SERPL-SCNC: 4.7 MMOL/L (ref 3.5–5.3)
PR INTERVAL: 186 MS
PROCALCITONIN SERPL-MCNC: 2.97 NG/ML
PROT SERPL-MCNC: 6.4 G/DL (ref 6.4–8.4)
QRS AXIS: 36 DEGREES
QRSD INTERVAL: 98 MS
QT INTERVAL: 442 MS
QTC INTERVAL: 483 MS
RBC # BLD AUTO: 3.44 MILLION/UL (ref 3.81–5.12)
SODIUM SERPL-SCNC: 129 MMOL/L (ref 135–147)
T WAVE AXIS: 84 DEGREES
VENTRICULAR RATE: 72 BPM
WBC # BLD AUTO: 10.82 THOUSAND/UL (ref 4.31–10.16)

## 2022-07-18 PROCEDURE — 93010 ELECTROCARDIOGRAM REPORT: CPT | Performed by: INTERNAL MEDICINE

## 2022-07-18 PROCEDURE — 94668 MNPJ CHEST WALL SBSQ: CPT

## 2022-07-18 PROCEDURE — 99232 SBSQ HOSP IP/OBS MODERATE 35: CPT | Performed by: INTERNAL MEDICINE

## 2022-07-18 PROCEDURE — 94760 N-INVAS EAR/PLS OXIMETRY 1: CPT

## 2022-07-18 PROCEDURE — 85025 COMPLETE CBC W/AUTO DIFF WBC: CPT | Performed by: NURSE PRACTITIONER

## 2022-07-18 PROCEDURE — 80053 COMPREHEN METABOLIC PANEL: CPT | Performed by: NURSE PRACTITIONER

## 2022-07-18 PROCEDURE — 97166 OT EVAL MOD COMPLEX 45 MIN: CPT

## 2022-07-18 PROCEDURE — 99291 CRITICAL CARE FIRST HOUR: CPT

## 2022-07-18 PROCEDURE — 94640 AIRWAY INHALATION TREATMENT: CPT

## 2022-07-18 PROCEDURE — 84145 PROCALCITONIN (PCT): CPT | Performed by: NURSE PRACTITIONER

## 2022-07-18 PROCEDURE — 93005 ELECTROCARDIOGRAM TRACING: CPT

## 2022-07-18 PROCEDURE — 97163 PT EVAL HIGH COMPLEX 45 MIN: CPT

## 2022-07-18 RX ORDER — CALCIUM ACETATE 667 MG/1
1334 CAPSULE ORAL
Status: DISCONTINUED | OUTPATIENT
Start: 2022-07-18 | End: 2022-07-22 | Stop reason: HOSPADM

## 2022-07-18 RX ORDER — DEXMEDETOMIDINE HYDROCHLORIDE 4 UG/ML
.1-.7 INJECTION, SOLUTION INTRAVENOUS
Status: DISCONTINUED | OUTPATIENT
Start: 2022-07-18 | End: 2022-07-18

## 2022-07-18 RX ORDER — OXYCODONE HYDROCHLORIDE AND ACETAMINOPHEN 5; 325 MG/1; MG/1
1 TABLET ORAL EVERY 4 HOURS PRN
Status: DISCONTINUED | OUTPATIENT
Start: 2022-07-18 | End: 2022-07-22 | Stop reason: HOSPADM

## 2022-07-18 RX ORDER — QUETIAPINE FUMARATE 25 MG/1
25 TABLET, FILM COATED ORAL
Status: DISCONTINUED | OUTPATIENT
Start: 2022-07-18 | End: 2022-07-22 | Stop reason: HOSPADM

## 2022-07-18 RX ADMIN — HEPARIN SODIUM 5000 UNITS: 5000 INJECTION INTRAVENOUS; SUBCUTANEOUS at 05:40

## 2022-07-18 RX ADMIN — GUAIFENESIN 600 MG: 600 TABLET, EXTENDED RELEASE ORAL at 21:59

## 2022-07-18 RX ADMIN — CALCIUM ACETATE 1334 MG: 667 CAPSULE ORAL at 13:56

## 2022-07-18 RX ADMIN — CALCIUM ACETATE 1334 MG: 667 CAPSULE ORAL at 17:23

## 2022-07-18 RX ADMIN — CLONAZEPAM 0.5 MG: 0.5 TABLET ORAL at 21:59

## 2022-07-18 RX ADMIN — HEPARIN SODIUM 5000 UNITS: 5000 INJECTION INTRAVENOUS; SUBCUTANEOUS at 21:59

## 2022-07-18 RX ADMIN — DEXMEDETOMIDINE HYDROCHLORIDE 0.7 MCG/KG/HR: 400 INJECTION INTRAVENOUS at 05:40

## 2022-07-18 RX ADMIN — AMLODIPINE BESYLATE 5 MG: 5 TABLET ORAL at 08:43

## 2022-07-18 RX ADMIN — OXYCODONE HYDROCHLORIDE AND ACETAMINOPHEN 1 TABLET: 5; 325 TABLET ORAL at 13:56

## 2022-07-18 RX ADMIN — DEXMEDETOMIDINE HYDROCHLORIDE 0.7 MCG/KG/HR: 400 INJECTION INTRAVENOUS at 01:04

## 2022-07-18 RX ADMIN — ISODIUM CHLORIDE 3 ML: 0.03 SOLUTION RESPIRATORY (INHALATION) at 07:24

## 2022-07-18 RX ADMIN — CARVEDILOL 25 MG: 25 TABLET, FILM COATED ORAL at 17:23

## 2022-07-18 RX ADMIN — LEVALBUTEROL HYDROCHLORIDE 1.25 MG: 1.25 SOLUTION, CONCENTRATE RESPIRATORY (INHALATION) at 19:27

## 2022-07-18 RX ADMIN — HEPARIN SODIUM 5000 UNITS: 5000 INJECTION INTRAVENOUS; SUBCUTANEOUS at 13:56

## 2022-07-18 RX ADMIN — CLONAZEPAM 0.5 MG: 0.5 TABLET ORAL at 13:51

## 2022-07-18 RX ADMIN — ONDANSETRON 4 MG: 2 INJECTION INTRAMUSCULAR; INTRAVENOUS at 21:59

## 2022-07-18 RX ADMIN — CARVEDILOL 25 MG: 25 TABLET, FILM COATED ORAL at 08:01

## 2022-07-18 RX ADMIN — ISODIUM CHLORIDE 3 ML: 0.03 SOLUTION RESPIRATORY (INHALATION) at 19:27

## 2022-07-18 RX ADMIN — GUAIFENESIN 600 MG: 600 TABLET, EXTENDED RELEASE ORAL at 08:43

## 2022-07-18 RX ADMIN — QUETIAPINE FUMARATE 25 MG: 25 TABLET ORAL at 21:59

## 2022-07-18 RX ADMIN — HYDRALAZINE HYDROCHLORIDE 20 MG: 20 INJECTION INTRAMUSCULAR; INTRAVENOUS at 23:39

## 2022-07-18 RX ADMIN — UMECLIDINIUM BROMIDE AND VILANTEROL TRIFENATATE 1 PUFF: 62.5; 25 POWDER RESPIRATORY (INHALATION) at 08:46

## 2022-07-18 RX ADMIN — LEVALBUTEROL HYDROCHLORIDE 1.25 MG: 1.25 SOLUTION, CONCENTRATE RESPIRATORY (INHALATION) at 07:24

## 2022-07-18 NOTE — ASSESSMENT & PLAN NOTE
· Baseline creatinine appears to be 1 5-1 8 range  · Nephrology following  · Likely 2/2 ATN, creatinine remains elevated  · Became oliguric and did not respond to diuretics or fluids  · Tolerated iHD session 7/15 and 7/16   iHD scheduled for 7/18  · Continue to monitor renal indices and urine output   · Kale Cortés  · Work up for pheochromocytoma pending  · May be HD dependent

## 2022-07-18 NOTE — ASSESSMENT & PLAN NOTE
· Coreg dose increased today  · Amlodipine added 7/17  · Labetalol & Hydralazine given   · Cardene gtt continued  · iHD planned for 7/18

## 2022-07-18 NOTE — QUICK NOTE
Admitted for COPD and CHF exacerbation, required BiPAP, developed renal failure, now on dialysis  Patient was downgraded to medical level of service  Please see today's note for ongoing plan of care

## 2022-07-18 NOTE — PROGRESS NOTES
[address]  Progress Note [facility] 1960, 64 y o  female MRN: [id number]  Unit/Bed#: ICU 02-01 Encounter: [id number]  Primary Care Provider: Ramon Ignacio DO   Date and time admitted to hospital: 7/12/2022  5:54 PM    * Chronic obstructive pulmonary disease without acute exacerbation ([facility] [address] )  Assessment & Plan  · Not currently in an exacerbation  · Currently smokes 2 PPD  · Recently discharged from [facility] for similar presentation  · Per chart review, has been intubated 2 X in the past year  · Continue Xopenex nebulizer treatments t i d   · PRN Albuterol  · Bipap as needed  · Sputum culture ordered  · CXR 7/12 Recurrent CHF  Persistent left chest infiltrate  · CXR 7/17 Moderate interstitial edema with trace effusions  · Continue home Anoro-Ellipta  · Consider Palliative consult  · Continue Flutter valve and IS  · Continue Mucinex      Acute on chronic respiratory failure with hypoxemia [facility])  Assessment & Plan  · Patient initially admitted to [place] [address] and CC contacted for acute change in respiratory status, Tachycardia, Tachypnea, Diaphoresis, and agitation  · Intubated 2 X in the past year for similar symptomatology  · Possibly 2/2 Cardiomyopathy vs acute renal failure   · Cardiology and Nephrology following  · Given 2 mg Morphine IV in ED  · Received 80 Lasix in ED  · Sputum Culture ordered  · Tolerated transition to 4 L NC, goal SpO2 >88%  · Tolerated OOB to chair    · iHD scheduled for Monday  · BIPAP PRN         Acute on chronic combined systolic and diastolic congestive heart failure [facility])  Assessment & Plan  Wt Readings from Last 3 Encounters:   07/17/22 79 1 kg (174 lb 6 1 oz)   06/28/22 77 4 kg (170 lb 9 6 oz)   06/22/22 76 6 kg (168 lb 14 oz)       · BNP 1822 an admission  · Recent ECHO June 2022: Left ventricular cavity size is normal  Wall thickness is mildly increased  There is mild concentric hypertrophy   The left ventricular ejection fraction is 41% by biplane measurement  Systolic function is moderately reduced  The Basalar septum and basilar inferior wall are hypokenetic  Diastolic function is mildly abnormal, consistent with grade I (abnormal) relaxation  The pulmonary artery systolic pressure is moderately increased  · Repeat echo 7/14 showed EF 25%, severe global hypokinesis  · Cardiology following  · Follows with Dr Meg Peralta outpatient  · Received intermittent diuretics and Lasix gtt w/o response   · Janeen Mulling catheter placed 7/15-CI >2 5, discontinued 7/17  · Required continuous bipap up until 7/15 on which she tolerated transition to 4 L n/c after iHD session   · Repeat iHD session planned for Monday 7/18   · cardiac enzymes slightly elevated with peak of 59  · Obtain daily weights        Essential hypertension  Assessment & Plan  · Coreg dose increased today  · Amlodipine added 7/17  · Labetalol & Hydralazine given   · Cardene gtt continued  · iHD planned for 7/18      Chronic pain  Assessment & Plan  · Lidocaine Patch ordered  · Patient resting comfortably currently       SHANTA (acute kidney injury) ([facility] [address] )  Assessment & Plan  · Baseline creatinine appears to be 1 5-1 8 range  · Nephrology following  · Likely 2/2 ATN, creatinine remains elevated  · Became oliguric and did not respond to diuretics or fluids  · Tolerated iHD session 7/15 and 7/16   iHD scheduled for 7/18  · Continue to monitor renal indices and urine output   · Kale Cortés  · Work up for pheochromocytoma pending  · May be HD dependent     Tobacco abuse  Assessment & Plan  · Current everyday smoker 2 PPD  · Per chart review, she is ordered O2 @ home but does not wear it  · Nicotine patch ordered  · Encourage Cessation      Anxiety  Assessment & Plan  · Home Klonopin increased  · 10 Zyprexa PRN  · Precedex gtt restarted overnight  · Decrease stimulation, cluster care      ----------------------------------------------------------------------------------------  HPI/24hr events:  Coreg dose increased and Amlodipine added  Labetalol and Hydralazine given w/o reduction of SBP  Cardene gtt @5mg/hr  Zyprexa and Klonopin given  Precedex restarted for anxiety  Patient appropriate for transfer out of the ICU today?: No  Disposition: Continue Critical Care   Code Status: Level 1 - Full Code  ---------------------------------------------------------------------------------------  SUBJECTIVE  " I can breath much better today " On assessment patient sitting comfortably in the recliner, denies chest pain  Patient confirms that she will utilize the IS and flutter valve at bedside  Review of Systems   Constitutional: Positive for activity change  Negative for chills and fatigue  Eyes: Negative  Respiratory: Positive for shortness of breath  Negative for chest tightness and wheezing  Cardiovascular: Positive for leg swelling  Negative for chest pain and palpitations  Gastrointestinal: Negative for abdominal pain and nausea  Genitourinary: Positive for decreased urine volume  Musculoskeletal: Negative  Skin: Negative  Neurological: Positive for weakness  Psychiatric/Behavioral: The patient is nervous/anxious        Review of systems was reviewed and negative unless stated above in HPI/24-hour events   ---------------------------------------------------------------------------------------  OBJECTIVE    Vitals   Vitals:    22 0300 22 0400 22 0500 22 0537   BP: 136/62 129/62 127/59    Pulse: 85 83 82    Resp: (!) 41 (!) 32 (!) 35    Temp:       TempSrc:       SpO2: (!) 89% (!) 89% (!) 89%    Weight:    80 1 kg (176 lb 9 4 oz)   Height:         Temp (24hrs), Av 4 °F (36 9 °C), Min:98 1 °F (36 7 °C), Max:98 8 °F (37 1 °C)  Current: Temperature: 98 1 °F (36 7 °C)          Respiratory:  SpO2: SpO2: (!) 89 %  Nasal Cannula O2 Flow Rate (L/min): 3 L/min    Invasive/non-invasive ventilation settings   Respiratory  Report   Lab Data (Last 4 hours)    None         O2/Vent Data (Last 4 hours)    None                Physical Exam  Vitals and nursing note reviewed  Constitutional:       General: She is not in acute distress  Appearance: She is ill-appearing  HENT:      Head: Normocephalic  Mouth/Throat:      Mouth: Mucous membranes are moist       Pharynx: Oropharynx is clear  Eyes:      Pupils: Pupils are equal, round, and reactive to light  Cardiovascular:      Rate and Rhythm: Normal rate and regular rhythm  Pulses: Normal pulses  Heart sounds: Normal heart sounds  No murmur heard  Pulmonary:      Effort: No respiratory distress  Breath sounds: No wheezing or rhonchi  Comments: Diminished BL  Abdominal:      General: Bowel sounds are normal  There is no distension  Palpations: Abdomen is soft  Musculoskeletal:      Right lower leg: Edema present  Left lower leg: Edema present  Skin:     General: Skin is warm and dry  Capillary Refill: Capillary refill takes less than 2 seconds  Neurological:      Mental Status: She is alert and oriented to person, place, and time  Motor: Weakness present  Psychiatric:         Thought Content:  Thought content normal              Laboratory and Diagnostics:  Results from last 7 days   Lab Units 07/17/22  0544 07/16/22  0524 07/15/22  0627 07/14/22  0416 07/13/22  0422 07/12/22  1816   WBC Thousand/uL 9 74 10 39* 11 75* 13 45* 10 08 8 66   HEMOGLOBIN g/dL 10 7* 11 6 11 8 12 9 12 7 13 3   HEMATOCRIT % 32 2* 36 1 37 2 38 9 40 4 40 1   PLATELETS Thousands/uL 212 255 292 279 270 291   NEUTROS PCT %  --   --  77*  --   --  57   MONOS PCT %  --   --  6  --   --  6   MONO PCT %  --   --   --   --  2*  --      Results from last 7 days   Lab Units 07/17/22  0544 07/16/22  0524 07/16/22  0034 07/15/22  1817 07/15/22  1343 07/15/22  0627 07/15/22  0020 07/14/22  0943 07/14/22  0416 07/13/22  0422 07/12/22  1816   SODIUM mmol/L 131* 136 134* 133* 134* 133* 133*   < > 131* 139 135   POTASSIUM mmol/L 4 6 4 9 4 6 4 7 5 1 6 0* 5 3   < > 4 6 3 8 3 1*   CHLORIDE mmol/L 91* 94* 94* 94* 97 95* 96   < > 94* 101 96   CO2 mmol/L 26 21 20* 24 21 21 22   < > 24 27 30   ANION GAP mmol/L 14* 21* 20* 15* 16* 17* 15*   < > 13 11 9   BUN mg/dL 85* 89* 83* 71* 99* 90* 82*   < > 58* 19 12   CREATININE mg/dL 6 92* 6 96* 6 41* 5 60* 7 42* 6 94* 6 49*   < > 4 50* 1 66* 1 08   CALCIUM mg/dL 8 5 9 2 9 3 9 2 9 7 9 2 9 2   < > 9 7 9 4 10 0   GLUCOSE RANDOM mg/dL 139 77 71 87 67 125 122   < > 161* 142* 100   ALT U/L 4* 3*  --   --   --  7  --   --  9 10 3*   AST U/L 5* 6*  --   --   --  7*  --   --  14 13 6*   ALK PHOS U/L 65 71  --   --   --  71  --   --  66 82 81   ALBUMIN g/dL 3 4* 3 5  --   --   --  3 6  --   --  3 8 4 0 4 3   TOTAL BILIRUBIN mg/dL 0 49 0 48  --   --   --  0 55  --   --  0 43 0 62 0 79    < > = values in this interval not displayed  Results from last 7 days   Lab Units 07/17/22  0544 07/15/22  0627 07/13/22  0422 07/12/22  1816   MAGNESIUM mg/dL 2 4 2 5 2 2 2 0   PHOSPHORUS mg/dL 9 3* 10 3* 4 5*  --                    ABG:  Results from last 7 days   Lab Units 07/13/22  0559   PH ART  7 439   PCO2 ART mm Hg 42 8   PO2 ART mm Hg 84 9   HCO3 ART mmol/L 28 4*   BASE EXC ART mmol/L 3 7   ABG SOURCE  Radial, Left     VBG:  Results from last 7 days   Lab Units 07/14/22  1731 07/14/22  1246 07/13/22  0559   PH PANDA  7 346   < >  --    PCO2 PANDA mm Hg 40 3*   < >  --    PO2 PANAD mm Hg 71 3*   < >  --    HCO3 PANDA mmol/L 21 5*   < >  --    BASE EXC PANDA mmol/L -3 8   < >  --    ABG SOURCE   --   --  Radial, Left    < > = values in this interval not displayed       Results from last 7 days   Lab Units 07/17/22  0544 07/15/22  0627 07/14/22  0416 07/13/22  0422 07/12/22  1816   PROCALCITONIN ng/ml 4 20* 3 04* 1 88* 0 25 <0 05       Micro  Results from last 7 days   Lab Units 07/15/22  2129   MRSA CULTURE ONLY  Methicillin Resistant Staphylococcus aureus isolated*  This patient requires contact isolation precautions per [place] [address]  Contact precautions are not required in [place] for nasal surveillance cultures  EKG: reviewed    Imaging: I have personally reviewed pertinent reports  Intake and Output  I/O       07/16 0701 07/17 0700 07/17 0701 07/18 0700    P  O  1080 328    I V  (mL/kg) 616 6 (7 8) 225 7 (2 9)    Other 200     Total Intake(mL/kg) 1896 6 (24) 553 7 (7)    Urine (mL/kg/hr) 0 (0) 5 (0)    Other      Total Output 0 5    Net +1896 6 +548 7                Height and Weights   Height: 5' 10" (177 8 cm)     Body mass index is 25 34 kg/m²  Weight (last 2 days)     Date/Time Weight    07/18/22 0537 80 1 (176 59)    07/17/22 0600 79 1 (174 38)    07/16/22 0537 77 4 (170 64)    07/16/22 0500 77 4 (170 64)            Nutrition       Diet Orders   (From admission, onward)             Start     Ordered    07/16/22 1137  Diet Cardiovascular; Cardiac  Diet effective now        References:    Nutrtion Support Algorithm Enteral vs  Parenteral   Question Answer Comment   Diet Type Cardiovascular    Cardiac Cardiac    RD to adjust diet per protocol?  Yes        07/16/22 1136                  Active Medications  Scheduled Meds:  Current Facility-Administered Medications   Medication Dose Route Frequency Provider Last Rate    acetaminophen  650 mg Oral Q4H PRN Chyna Sable, CRNP      albuterol  2 5 mg Nebulization Q6H PRN Chyna Sable, CRNP      amLODIPine  5 mg Oral Daily Josephine Daubs, CRNP      carvedilol  25 mg Oral BID With Meals Josephine Daubs, CRNP      clonazePAM  0 5 mg Oral BID PRN Josephine Daubs, CRNP      dexmedetomidine  0 1-0 7 mcg/kg/hr Intravenous Titrated Chyna Sable, CRNP 0 7 mcg/kg/hr (07/18/22 0104)    guaiFENesin  600 mg Oral Q12H [address] Josephine Daubs, CRNP      heparin (porcine)  5,000 Units Subcutaneous Q8H [address] ESTHER Lagunas      hydrALAZINE  20 mg Intravenous Q6H PRN Josephine Daubs, CRNP      levalbuterol  1 25 mg Nebulization TID ESTHER Rivero      And   Carine Falcon sodium chloride  3 mL Nebulization TID Claven ESTHER Joe      lidocaine  2 patch Topical Daily ClavESTHER [place]      niCARdipine  1-15 mg/hr Intravenous Titrated ESTHER Long 5 mg/hr (07/18/22 0100)    nicotine  1 patch Transdermal Daily Claven Tatyana, ESTHER      OLANZapine  10 mg Oral HS PRN Bozena NetoESTHER      ondansetron  4 mg Intravenous Q6H PRN Claven [place]      umeclidinium-vilanterol  1 puff Inhalation Daily ESTHER Hilliard       Continuous Infusions:  dexmedetomidine, 0 1-0 7 mcg/kg/hr, Last Rate: 0 7 mcg/kg/hr (07/18/22 0104)  niCARdipine, 1-15 mg/hr, Last Rate: 5 mg/hr (07/18/22 0100)      PRN Meds:   acetaminophen, 650 mg, Q4H PRN  albuterol, 2 5 mg, Q6H PRN  clonazePAM, 0 5 mg, BID PRN  hydrALAZINE, 20 mg, Q6H PRN  OLANZapine, 10 mg, HS PRN  ondansetron, 4 mg, Q6H PRN        Invasive Devices Review  Invasive Devices  Report    Peripheral Intravenous Line  Duration           Peripheral IV 07/17/22 Right Antecubital <1 day          Hemodialysis Catheter  Duration           HD Temporary Double Catheter 2 days                Rationale for remaining devices: HD temporary catheter for iHD  ---------------------------------------------------------------------------------------  Advance Directive and Living Will:      Power of :    POLST:    ---------------------------------------------------------------------------------------  Care Time Delivered:   Upon my evaluation, this patient had a high probability of imminent or life-threatening deterioration due to Acute respiratory and kidney failure, which required my direct attention, intervention, and personal management  I have personally provided 30 minutes (1930 to 2000) of critical care time, exclusive of procedures, teaching, family meetings, and any prior time recorded by providers other than myself         ESTHER Helm      Portions of the record may have been created with voice recognition software  Occasional wrong word or "sound a like" substitutions may have occurred due to the inherent limitations of voice recognition software    Read the chart carefully and recognize, using context, where substitutions have occurred

## 2022-07-18 NOTE — ASSESSMENT & PLAN NOTE
· Patient initially admitted to [place] [address] and CC contacted for acute change in respiratory status, Tachycardia, Tachypnea, Diaphoresis, and agitation     · Intubated 2 X in the past year for similar symptomatology  · Possibly 2/2 Cardiomyopathy vs acute renal failure   · Cardiology and Nephrology following  · Given 2 mg Morphine IV in ED  · Received 80 Lasix in ED  · Sputum Culture ordered  · Tolerated transition to 4 L NC, goal SpO2 >88%  · Tolerated OOB to chair    · iHD scheduled for Monday  · BIPAP PRN

## 2022-07-18 NOTE — OCCUPATIONAL THERAPY NOTE
Occupational Therapy Evaluation      Zachary Guilherme    2022    Principal Problem:    Chronic obstructive pulmonary disease without acute exacerbation (HCC)  Active Problems:    Essential hypertension    Chronic pain    Anxiety    Acute on chronic combined systolic and diastolic congestive heart failure (HCC)    Tobacco abuse    SHANTA (acute kidney injury) ([facility] [address] )    Acute on chronic respiratory failure with hypoxemia (HCC)      Past Medical History:   Diagnosis Date    [facility]     Chronic combined systolic and diastolic congestive heart failure     COPD (chronic obstructive pulmonary disease) (HCC)     Fatty liver     Hyperlipidemia     Hypertension     Mitral regurgitation     Sepsis        Past Surgical History:   Procedure Laterality Date    CARDIAC CATHETERIZATION  2021    Moderate non-obstructive atherosclerosis     SECTION      CHOLECYSTECTOMY      ROTATOR CUFF REPAIR W/ DISTAL CLAVICLE EXCISION Right     TONSILLECTOMY          22 0908   OT Last Visit   OT Visit Date 22   Note Type   Note type Evaluation   Restrictions/Precautions   Weight Bearing Precautions Per Order No   Other Precautions Fall Risk;Multiple lines;Telemetry;O2;Pain  (4 5L of O2, wears 2-4L at night at home)   Pain Assessment   Pain Assessment Tool 0-10   Pain Score 5   Pain Location/Orientation Location: Neck   Pain Onset/Description Onset: Ongoing; Descriptor: Aching   Patient's Stated Pain Goal No pain   Hospital Pain Intervention(s) Repositioned; Emotional support   Home Living   Type of [address] [place] [address] Two level;Bed/bath upstairs;Stairs to enter without rails  (7-8 ANATOLY)   Bathroom Shower/Tub Tub/shower unit   Bathroom Toilet Standard   Bathroom Equipment   (none PTA)   [address] [facility]  (did not use at [facility])   Prior Function   Level of [place] Independent with ADLs and functional mobility   Lives With Alone  (2 cats)   Receives Help From Family  (daughtewr lives 5 mins away, nephew lives 2 mins away)   ADL Assistance Independent   IADLs Independent   Falls in the last 6 months 0   Vocational On disability  ( and )   Comments +   ADL   UB Bathing Assistance 5  Supervision/Setup   LB Bathing Assistance 4  Minimal Assistance   [address] Deficit Setup;Verbal cueing;Supervison/safety; Increased time to complete; Bedside commode;Perineal hygiene   Bed Mobility   Supine to Sit 5  Supervision   Additional items Assist x 1;HOB elevated; Increased time required   Additional Comments Pt remained OOB in recliner upon conclusion   Transfers   Sit to Stand   (CGA)   Additional items Assist x 2; Increased time required   Stand to Sit   (CGA)   Additional items Assist x 2; Increased time required   Toilet transfer   (CGA)   Additional items Assist x 2;Commode; Increased time required   Balance   Static Sitting Good   Dynamic Sitting Fair +   Static Standing Fair   Dynamic Standing Fair -   Ambulatory Fair -   Activity Tolerance   Activity Tolerance Patient limited by fatigue;Patient limited by pain   Nurse Made Aware HORACE Harrison   RUE Assessment   RUE Assessment WFL   LUE Assessment   LUE Assessment WFL   Hand Function   Gross Motor Coordination Functional   Fine Motor Coordination Functional   Vision-Basic Assessment   Current Vision Wears glasses all the time   Cognition   Overall Cognitive Status [facility]   Arousal/Participation Alert; Cooperative   Attention Attends with cues to redirect   Orientation Level Oriented X4   Memory Within functional limits   Following Commands Follows one step commands without difficulty   Assessment   Limitation Decreased ADL status; Decreased Safe judgement during ADL;Decreased endurance;Decreased self-care trans;Decreased high-level ADLs   Prognosis Good   Assessment Pt is a 61 y o  female seen for OT evaluation s/p admit to [address] on 7/12/2022 w/ Chronic obstructive pulmonary disease with acute exacerbation ([facility] [address] )  Comorbidities affecting pt's functional performance at time of assessment include: COPD, Fatty liver, HLD, HTN, Sepsis  Personal factors affecting pt at time of IE include:steps to enter environment, limited home support and difficulty performing ADLS  Prior to admission, pt was I with ADLs  Upon evaluation: the following deficits impact occupational performance: decreased balance, decreased tolerance  Pt to benefit from continued skilled OT tx while in the hospital to address deficits as defined above and maximize level of functional independence w ADL's and functional mobility  Occupational Performance areas to address include: bathing/shower, toilet hygiene, dressing, functional mobility and clothing management  From OT standpoint, recommendation at time of d/c would be Out-patient OT  Goals   Patient Goals "to go home"   Plan   Treatment Interventions ADL retraining;Functional transfer training; Endurance training; Compensatory technique education; Energy conservation; Activityengagement   Goal Expiration Date 07/28/22   OT Treatment Day 0   OT Frequency 3-5x/wk   Recommendation   OT Discharge Recommendation Home with outpatient rehabilitation   Additional Comments  Pt seen as a co-eval with PT due to the patient's co-morbidities, clinically unstable presentation, and present impairments which are a regression from the patient's baseline  Additional Comments 2 The patient's raw score on the AM-PAC Daily Activity inpatient short form is 20, standardized score is 42 03, greater than 39 4  Patients at this level are likely to benefit from discharge to home  Please refer to the recommendation of the Occupational Therapist for safe discharge planning     AM-PAC Daily Activity Inpatient   Lower Body Dressing 3   Bathing 3   Toileting 3   Upper Body Dressing 3   Grooming 4 Eating 4   Daily Activity Raw Score 20   Daily Activity Standardized Score (Calc for Raw Score >=11) 42 03   AM-PAC Applied Cognition Inpatient   Following a Speech/Presentation 4   Understanding Ordinary Conversation 4   Taking Medications 4   Remembering Where Things Are Placed or Put Away 4   Remembering List of 4-5 Errands 4   Taking Care of Complicated Tasks 3   Applied Cognition Raw Score 23   Applied Cognition Standardized Score 53 08     GOALS:    Pt will achieve the following within specified time frame: STG  Pt will achieve the following goals within 5 days    *ADL transfers with (I) for inc'd independence with ADLs/purposeful tasks    *UB ADL with (I) for inc'd independence with self cares    *LB ADL with (S) using AE prn for inc'd independence with self cares    *Toileting with CGA for clothing management and hygiene for return to PLOF with personal care    *Increase static stand balance to F+ and dyn stand balance to F for inc'd safety with standing purposeful tasks    *Increase stand tolerance x3 m for inc'd tolerance with standing purposeful tasks    *Participate in 10m UE therex to increase overall stamina/activity tolerance for purposeful tasks    *Bed mobility- (I) for inc'd independence to manage own comfort and initiate EOB & OOB purposeful tasks    Pt will achieve the following within specified time frame: LTG  Pt will achieve the following goals within 10 days    *LB ADL with (I) using AE prn for inc'd independence with self cares    *Toileting with (S) for clothing management and hygiene for return to PLOF with personal care    *Increase static stand balance to G- and dyn stand balance to F+ for inc'd safety with standing purposeful tasks    *Increase stand tolerance x5 m for inc'd tolerance with standing purposeful tasks      Leena Botello MS, OTR/L

## 2022-07-18 NOTE — ASSESSMENT & PLAN NOTE
· Not currently in an exacerbation  · Currently smokes 2 PPD  · Recently discharged from [facility] for similar presentation  · Per chart review, has been intubated 2 X in the past year  · Continue Xopenex nebulizer treatments t i d   · PRN Albuterol  · Bipap as needed  · Sputum culture ordered  · CXR 7/12 Recurrent CHF  Persistent left chest infiltrate  · CXR 7/17 Moderate interstitial edema with trace effusions     · Continue home Anoro-Ellipta  · Consider Palliative consult  · Continue Flutter valve and IS  · Continue Mucinex

## 2022-07-18 NOTE — PLAN OF CARE
Problem: OCCUPATIONAL THERAPY ADULT  Goal: Performs self-care activities at highest level of function for planned discharge setting  See evaluation for individualized goals  Description: Treatment Interventions: ADL retraining, Functional transfer training, Endurance training, Compensatory technique education, Energy conservation, Activityengagement     See flowsheet documentation for full assessment, interventions and recommendations  Note: Limitation: Decreased ADL status, Decreased Safe judgement during ADL, Decreased endurance, Decreased self-care trans, Decreased high-level ADLs  Prognosis: Good  Assessment: Pt is a 64 y o  female seen for OT evaluation s/p admit to [facility] on 7/12/2022 w/ Chronic obstructive pulmonary disease with acute exacerbation ([facility] )  Comorbidities affecting pt's functional performance at time of assessment include: COPD, Fatty liver, HLD, HTN, Sepsis  Personal factors affecting pt at time of IE include:steps to enter environment, limited home support and difficulty performing ADLS  Prior to admission, pt was I with ADLs  Upon evaluation: the following deficits impact occupational performance: decreased balance, decreased tolerance  Pt to benefit from continued skilled OT tx while in the hospital to address deficits as defined above and maximize level of functional independence w ADL's and functional mobility  Occupational Performance areas to address include: bathing/shower, toilet hygiene, dressing, functional mobility and clothing management  From OT standpoint, recommendation at time of d/c would be Out-patient OT       OT Discharge Recommendation: Home with outpatient rehabilitation     Susan Altman MS, OTR/L

## 2022-07-18 NOTE — PLAN OF CARE
Problem: PHYSICAL THERAPY ADULT  Goal: Performs mobility at highest level of function for planned discharge setting  See evaluation for individualized goals  Description: Treatment/Interventions: Functional transfer training, LE strengthening/ROM, Therapeutic exercise, Endurance training, Patient/family training, Equipment eval/education, Bed mobility, Gait training, Elevations, Spoke to case management, Spoke to nursing  Equipment Recommended:  (TBD pending pt progression with OOB mobility)       See flowsheet documentation for full assessment, interventions and recommendations  Note: Prognosis: Good  Problem List: Decreased strength, Decreased endurance, Impaired balance, Decreased mobility  Assessment: Pt is 64 y o  female seen for high-complexity PT evaluation on 7/18/2022 s/p admit to [facility] on 7/12/2022 w/ Chronic obstructive pulmonary disease with acute exacerbation ([facility] [address] )  PT was consulted to assess pt's functional mobility and d/c needs  Order placed for PT eval and tx  PTA, pt lives alone in 2 SH c 7-8 ANATOLY, amb s AD, (I) ADLs  At time of eval, pt requires SUP for bed mobility, CGA for transfers and short amb of 3 ft x 2 with HHA  Upon evaluation, pt presenting with impaired functional mobility d/t decreased strength, decreased endurance, impaired balance and decreased mobility  Pertinent PMHx and current co-morbidities affecting pt's physical performance at time of assessment include: COPD, acute on chronic respiratory failure, CHF, HTN, chronic pain, SHANTA, tobacco abuse, anxiety  Personal factors affecting pt at time of eval include: unable to perform dynamic tasks in community and unable to perform physical activity  The following objective measures performed on IE also reveal limitations: AM-PAC 6-Clicks: 69/37   Pt's clinical presentation is currently unstable/unpredictable seen in pt's presentation of abnormal lab value(s), ongoing medical assessment and supplemental O2 requirement of 4 5 L at rest  Overall, pt's rehab potential and prognosis to return to PLOF is good as impacted by objective findings, warranting pt to receive further skilled PT interventions to address identified impairments, activity limitation(s), and participation restriction(s)  Goal for patient is to return home  Pt to benefit from continued PT tx to address deficits as defined above and maximize level of functional independent mobility and consistency in order for pt to improve OOB mobility tolerance  From PT/mobility standpoint, recommendation at time of d/c would be home with home health rehabilitation pending progress in order to facilitate return to PLOF  Barriers to Discharge: Decreased caregiver support (pt lives alone, but reports good support system)     PT Discharge Recommendation: Home with home health rehabilitation    See flowsheet documentation for full assessment

## 2022-07-18 NOTE — TELEPHONE ENCOUNTER
Ana Mao from [facility] called in to discuss pt's lab test for hep  She says Dr Amy Velasquez gave the order  She is requesting a call back

## 2022-07-18 NOTE — PLAN OF CARE
Target UF Goal 3L as tolerated  Patient dialyzing for 3 5 hours on 2K bath for serum K of 4 7 per protocol  Post-Dialysis RN Treatment Note    Blood Pressure:  Pre 167/81 mm/Hg  Post 188/89 mmHg   EDW  TBD kg    Weight:  Pre 79 9 kg   Post 76 9 kg   Mode of weight measurement: Bed Scale   Volume Removed  3000 ml    Treatment duration 3 5 hours    NS given  No    Treatment shortened?  No   Medications given during Rx See Mar   Estimated Kt/V  1 4   Access type: Temporary HD catheter   Access Issues: No    Report called to primary nurse   Yes, Tova Deans, RN         Problem: METABOLIC, FLUID AND ELECTROLYTES - ADULT  Goal: Electrolytes maintained within normal limits  Description: INTERVENTIONS:  - Monitor labs and assess patient for signs and symptoms of electrolyte imbalances  - Administer electrolyte replacement as ordered  - Monitor response to electrolyte replacements, including repeat lab results as appropriate  - Instruct patient on fluid and nutrition as appropriate  Outcome: Progressing  Goal: Fluid balance maintained  Description: INTERVENTIONS:  - Monitor labs   - Monitor I/O and WT  - Instruct patient on fluid and nutrition as appropriate  - Assess for signs & symptoms of volume excess or deficit  Outcome: Progressing

## 2022-07-18 NOTE — PHYSICAL THERAPY NOTE
Physical Therapy Evaluation   Time in: 907  Time out: 931  Total evaluation time: 24 minutes    Patient's Name: Asa Santos    Admitting Diagnosis  CHF (congestive heart failure) ([facility] ) [I50 9]  High blood pressure [I10]  SOB (shortness of breath) [R06 02]  Respiratory failure with hypoxia (HCC) [J96 91]  Chronic obstructive pulmonary disease with acute exacerbation (HCC) [J44 1]    Problem List  Patient Active Problem List   Diagnosis    Vitamin D deficiency    Dilated cardiomyopathy ([facility] )    Essential hypertension    Chronic pain    Anxiety    Acute on chronic combined systolic and diastolic congestive heart failure (HCC)    Tobacco abuse    Leukocytosis    Chronic obstructive pulmonary disease without acute exacerbation (HCC)    SHANTA (acute kidney injury) ([facility] )    Acute respiratory insufficiency    Acute on chronic respiratory failure with hypoxemia ([place] [id number] )       Past Medical History  Past Medical History:   Diagnosis Date    Cardiomyopathy ([facility] )     Chronic combined systolic and diastolic congestive heart failure     COPD (chronic obstructive pulmonary disease) ([facility] )     Fatty liver     Hyperlipidemia     Hypertension     Mitral regurgitation     Sepsis        Past Surgical History  Past Surgical History:   Procedure Laterality Date    CARDIAC CATHETERIZATION  2021    Moderate non-obstructive atherosclerosis     SECTION      CHOLECYSTECTOMY      ROTATOR CUFF REPAIR W/ DISTAL CLAVICLE EXCISION Right     TONSILLECTOMY         PT performed at least 2 patient identifiers during session: Name and wristband  22   PT Last Visit   PT Visit Date 22   Note Type   Note type Evaluation   Pain Assessment   Pain Assessment Tool 0-10   Pain Score 5   Pain Location/Orientation Location: Neck   Pain Onset/Description Onset: Ongoing   Patient's Stated Pain Goal No pain   Hospital Pain Intervention(s) Repositioned; Emotional support   Restrictions/Precautions   Weight Bearing Precautions Per Order No   Other Precautions Fall Risk;Multiple lines;Telemetry;O2;Pain  (4 5L of O2, wears 2-4L at night at home)   Home Living   Type of [address] [place] [address] Two level;Bed/bath upstairs;Stairs to enter without rails  (7-8 ANATOLY)   Bathroom Shower/Tub Tub/shower unit   Bathroom Toilet Standard   Bathroom Equipment   (no DME at baseline)   [address]  (did not use at [facility])   Prior Function   Level of [place] Independent with ADLs and functional mobility   Lives With Alone  (2 cats)   Receives Help From Family  (daughtewr lives 5 mins away, nephew lives 2 mins away)   ADL Assistance Independent   IADLs Independent   Falls in the last 6 months 0   Vocational On disability  ( and )   General   Family/Caregiver Present No   Cognition   Arousal/Participation Alert   Orientation Level Oriented X4   Memory Within functional limits   Following Commands Follows one step commands without difficulty   Comments pt agreeable to PT session   Subjective   Subjective "I have to use the commode"   RLE Assessment   RLE Assessment   (grossly 4-/5 throughout)   LLE Assessment   LLE Assessment   (grossly 4-/5 throughout)   Vision-Basic Assessment   Current Vision Wears glasses all the time   Coordination   Movements are Fluid and Coordinated 0   Sensation WFL   Bed Mobility   Supine to Sit 5  Supervision   Additional items Assist x 1;HOB elevated; Increased time required   Transfers   Sit to Stand   (CGA)   Additional items Assist x 2; Increased time required   Stand to Sit   (CGA)   Additional items Assist x 2; Increased time required   Toilet transfer   (CGA)   Additional items Assist x 2;Commode; Increased time required   Ambulation/Elevation   Gait pattern Improper Weight shift;Decreased foot clearance; Excessively slow   Gait Assistance   (CGA)   Additional items Assist x 2   Assistive Device   (HHA)   Distance 3 ft x 2   Stair Management Assistance Not tested   Balance   Static Sitting Good   Dynamic Sitting Fair +   Static Standing Fair   Dynamic Standing Fair -   Ambulatory Fair -   Endurance Deficit   Endurance Deficit Yes   Activity Tolerance   Activity Tolerance Patient limited by fatigue;Patient limited by pain   Medical Staff Made Aware coordination of care provided with OT Leena   Nurse Made Aware Yes, RN made aware of outcomes/recs   Assessment   Prognosis Good   Problem List Decreased strength;Decreased endurance; Impaired balance;Decreased mobility   Assessment Pt is 64 y o  female seen for high-complexity PT evaluation on 7/18/2022 s/p admit to [facility] on 7/12/2022 w/ Chronic obstructive pulmonary disease with acute exacerbation ([facility] [address] )  PT was consulted to assess pt's functional mobility and d/c needs  Order placed for PT eval and tx  PTA, pt lives alone in 2 SH c 7-8 ANATOLY, amb s AD, (I) ADLs  At time of eval, pt requires SUP for bed mobility, CGA for transfers and short amb of 3 ft x 2 with HHA  Upon evaluation, pt presenting with impaired functional mobility d/t decreased strength, decreased endurance, impaired balance and decreased mobility  Pertinent PMHx and current co-morbidities affecting pt's physical performance at time of assessment include: COPD, acute on chronic respiratory failure, CHF, HTN, chronic pain, SHANTA, tobacco abuse, anxiety  Personal factors affecting pt at time of eval include: unable to perform dynamic tasks in community and unable to perform physical activity  The following objective measures performed on IE also reveal limitations: AM-PAC 6-Clicks: 61/18   Pt's clinical presentation is currently unstable/unpredictable seen in pt's presentation of abnormal lab value(s), ongoing medical assessment and supplemental O2 requirement of 4 5 L at rest  Overall, pt's rehab potential and prognosis to return to PLOF is good as impacted by objective findings, warranting pt to receive further skilled PT interventions to address identified impairments, activity limitation(s), and participation restriction(s)  Goal for patient is to return home  Pt to benefit from continued PT tx to address deficits as defined above and maximize level of functional independent mobility and consistency in order for pt to improve OOB mobility tolerance  From PT/mobility standpoint, recommendation at time of d/c would be home with home health rehabilitation pending progress in order to facilitate return to PLOF  Barriers to Discharge Decreased caregiver support  (pt lives alone, but reports good support system)   Goals   Patient Goals "to go home"   [facility] Expiration Date 07/28/22   Short Term Goal #1 In 7-10 days: Increase bilateral LE strength 1/2 grade to facilitate independent mobility, Perform all bed mobility tasks modified independent to decrease caregiver burden, Perform all transfers modified independent to improve independence, Ambulate > 50 ft  with least restrictive assistive device with distant S w/o LOB and w/ normalized gait pattern 100% of the time, Navigate 8 stairs with distant S with unilateral handrail to facilitate return to previous living environment, Increase all balance 1/2 grade to decrease risk for falls, Complete exercise program independently and Tolerate 4 hr OOB to faciliate upright tolerance   PT Treatment Day 0   Plan   Treatment/Interventions Functional transfer training;LE strengthening/ROM; Therapeutic exercise; Endurance training;Patient/family training;Equipment eval/education; Bed mobility;Gait training;Elevations; Spoke to case management;Spoke to nursing   PT Frequency 3-5x/wk   Recommendation   PT Discharge Recommendation Home with home health rehabilitation   Equipment Recommended   (TBD pending pt progression with OOB mobility)   Additional Comments Pt's raw score on the [address] inpatient short form is 19, standardized score is 42 48   Patients at this level are likely to benefit from DC to home with home care, however, please refer to therapist recommendation for safe DC planning  AM-PAC Basic Mobility Inpatient   Turning in Bed Without Bedrails 4   Lying on Back to Sitting on Edge of Flat Bed 4   Moving Bed to Chair 3   Standing Up From Chair 3   Walk in Room 3   Climb 3-5 Stairs 2   Basic Mobility Inpatient Raw Score 19   Basic Mobility Standardized Score 42 48   Highest Level Of Mobility   JH-HLM Goal 6: Walk 10 steps or more   JH-HLM Achieved 5: Stand (1 or more minutes)   End of Consult   Patient Position at End of Consult Bedside chair; All needs within reach       Hari Cortez, PT, DPT

## 2022-07-18 NOTE — ASSESSMENT & PLAN NOTE
Wt Readings from Last 3 Encounters:   07/17/22 79 1 kg (174 lb 6 1 oz)   06/28/22 77 4 kg (170 lb 9 6 oz)   06/22/22 76 6 kg (168 lb 14 oz)       · BNP 1822 an admission  · Recent ECHO June 2022: Left ventricular cavity size is normal  Wall thickness is mildly increased  There is mild concentric hypertrophy  The left ventricular ejection fraction is 41% by biplane measurement  Systolic function is moderately reduced  The Basalar septum and basilar inferior wall are hypokenetic  Diastolic function is mildly abnormal, consistent with grade I (abnormal) relaxation  The pulmonary artery systolic pressure is moderately increased    · Repeat echo 7/14 showed EF 25%, severe global hypokinesis  · Cardiology following  · Follows with Dr Janae Chavira outpatient  · Received intermittent diuretics and Lasix gtt w/o response   · [place] catheter placed 7/15-CI >2 5, discontinued 7/17  · Required continuous bipap up until 7/15 on which she tolerated transition to 4 L n/c after iHD session   · Repeat iHD session planned for Monday 7/18   · cardiac enzymes slightly elevated with peak of 59  · Obtain daily weights

## 2022-07-18 NOTE — UTILIZATION REVIEW
Continued Stay Review    Date: 7/16/22                     Current Patient Class: Inpatient Current Level of Care: Critical Care    HPI:61 y o  female initially admitted on 7/12/22 for acute exacerbation of COPD, acute on chronic CHF and acute respiratory insufficiency  Patient initially required 2L O2 NC  Developed flash pulmonary edema on 7/13 and required BiPap  Nephrology was consulted on 7/14 for oliguric SHANTA on CKD Stage III  Patient remained anuric despite diuretics and fluid challenges  On 7/15, a temporary dialysis catheter was placed and patient was started on hemodialysis  7/16 Critical Care:  No events overnight  This morning, patient c/o chest heaviness and SOB  O2 sat 96%, hemodynamics stable  About to get started on dialysis  Acute hypoxemic respiratory failure  has improved  She is off  BiPAP,  titrate oxygen to maintain O2 saturation above 88%  Severe cardiomyopathy with possible underlying coronary artery disease  We will keep the PA catheter in for another 24 hours  Increase the dose of Coreg  Possible pheochromocytoma  The patient meets the triad for a diagnosis of having episodic hypertension, cardiomyopathy and headaches  Also she develops sweating  Will check her serum metanephrines since she is not making urine  Tolerated HD session yesterday  Continue bronchodilators  PE: GCS 15  Decreased breath sounds  Nephrology:  Acute anuric kidney injury: has undergone two hemodialysis sessions and tolerated them well,  maintained adequate blood pressures  2 5 liter total removal with improvement in lung exam and oxygen requirement  Patient still has some dyspnea, but says it is improved  Potassium is controlled at 4 9 mmol/l  Next treatment Monday July 18th           Vital Signs:       Date/Time Temp Pulse Resp BP MAP (mmHg) SpO2 Calculated FIO2 (%) - Nasal Cannula Nasal Cannula O2 Flow Rate (L/min) O2 Device O2 Interface Device CVP (mean)   O2 Flow Rate (L/min): increased to 5L at 07/17/22 1445   O2 Interface Device: med at 07/17/22 0300   07/16/22 2300 -- 66 32 Abnormal  150/93 115 98 % -- -- -- -- 5 mmHg   07/16/22 2200 -- 67 39 Abnormal  152/92 117 98 % -- -- -- -- 4 mmHg   07/16/22 2115 -- 68 16 -- -- 98 % -- -- BiPAP Full face mask  --   07/16/22 2100 -- 69 24 Abnormal  156/98 121 95 % -- -- -- -- 6 mmHg   07/16/22 2030 -- -- -- -- -- -- 36 4 L/min Nasal cannula -- --   07/16/22 2016 -- 68 17 -- -- 95 % 36 4 L/min  Nasal cannula -- --   07/16/22 2000 99 1 °F (37 3 °C) 68 24 Abnormal  156/88 116 93 % -- -- Nasal cannula -- 2 mmHg   07/16/22 1900 -- 71 52 Abnormal  148/79 107 -- -- -- -- -- 1 mmHg   07/16/22 1800 -- 73 28 Abnormal  146/84 109 96 % 40 5 L/min Nasal cannula -- 6 mmHg   07/16/22 1700 -- 73 32 Abnormal  136/76 102 95 % 40 5 L/min Nasal cannula -- 4 mmHg   07/16/22 1600 -- 71 30 Abnormal  143/79 105 94 % 40 5 L/min Nasal cannula -- 4 mmHg   07/16/22 1500 -- 69 27 Abnormal  176/99 Abnormal  131 100 % 40 5 L/min Nasal cannula -- 1 mmHg   07/16/22 1453 -- -- -- -- -- 96 % 40 5 L/min Nasal cannula -- --   07/16/22 1400 -- 67 26 Abnormal  165/94 123 96 % -- -- Nasal cannula -- -2 mmHg   07/16/22 1330 -- -- -- 157/89 -- -- -- -- -- -- --   07/16/22 1317 -- -- -- -- -- -- -- -- -- -- --   07/16/22 1300 -- 66 27 Abnormal  148/83 110 94 % -- -- Nasal cannula -- 3 mmHg   07/16/22 1245 -- -- -- -- -- -- -- -- -- -- --   07/16/22 1200 98 2 °F (36 8 °C) 66 23 Abnormal  140/81 132 96 % 40 5 L/min Nasal cannula -- 4 mmHg   07/16/22 1100 -- 66 21 159/94 120 95 % 40 5 L/min Nasal cannula -- 5 mmHg   07/16/22 1000 -- 69 27 Abnormal  156/89 116 96 % 40 5 L/min Nasal cannula -- 4 mmHg   07/16/22 0900 -- 75 20 153/82 111 95 % 40 5 L/min Nasal cannula -- 3 mmHg   07/16/22 0800 -- 72 21 160/86 116 95 % 40 5 L/min Nasal cannula -- 2 mmHg   07/16/22 0715 -- -- -- -- -- 93 % 40 5 L/min Nasal cannula -- --   07/16/22 0700 98 6 °F (37 °C) 73 22 169/90 123 93 % 40 5 L/min Nasal cannula -- 6 mmHg   07/16/22 0600 -- 72 23 Abnormal  156/87 114 93 % -- -- -- -- 2 mmHg   07/16/22 0500 -- 68 28 Abnormal  160/85 115 98 % -- -- -- -- -2 mmHg   07/16/22 0400 -- 68 23 Abnormal  164/87 119 97 % -- -- -- -- -3 mmHg   07/16/22 0300 -- 66 18 151/81 110 97 % -- -- -- -- 2 mmHg   07/16/22 0200 -- 67 20 155/83 112 97 % -- -- -- -- 1 mmHg   07/16/22 0128 -- -- -- -- -- -- -- -- -- Full face mask --   07/16/22 0100 -- 69 15 156/78 111 95 % -- -- -- -- 2 mmHg   07/16/22 0000 -- 72 22 -- -- 94 % -- -- -- -- 2 mmHg       Date and Time Temp Pulse SpO2        O2 Resp BP   07/16/22 0000 -- 72 94 %  22 --   07/15/22 2342 -- 71 95 %  30 (Abnormal)   --   07/15/22 2300 -- 72 94 %  22 146/77   07/15/22 2200 -- 72 95 %  16 150/82   07/15/22 2100 -- 72 96 %  14 153/81   07/15/22 2000 97 4 °F (36 3 °C) (Abnormal)   72 92 %  24 (Abnormal)   154/81   07/15/22 1900 -- 69 96 %  27 (Abnormal)   150/83   07/15/22 1730 -- 66 93 %  20 148/84   07/15/22 1700 -- 66 92 %  19 139/79   07/15/22 1630 -- 67 93 %  4L NC 24 (Abnormal)   140/81   07/15/22 1613 -- -- 94 %  6L NC -- --   07/15/22 1540 97 9 °F (36 6 °C) 62 --  18 128/78   07/15/22 1530 -- 64 98 %  15 143/84   07/15/22 1500 -- 64 --  18 136/80   07/15/22 1500 -- -- 99 %  -- --   07/15/22 1430 -- 63 98 %  16 126/74   07/15/22 1416 -- 64 --  18 123/73   07/15/22 1400 -- 64 --  18 131/78   07/15/22 1340 97 9 °F (36 6 °C) 64 --  22 147/65   07/15/22 1330 -- 65 98 %  17 147/87   07/15/22 1304 -- -- 99 %  -- --   07/15/22 1300 -- 67 98 %  31 (Abnormal)   147/86   07/15/22 1230 -- 67 97 %  19 151/85   07/15/22 1200 -- 71 96 %  37 (Abnormal)   153/86   07/15/22 1130 -- 72 96 %  16 150/78   07/15/22 1100 -- 74 96 %  18 151/79   07/15/22 1030 -- 75 97 %  18 145/83   07/15/22 1000 -- 76 97 %  20 142/78   07/15/22 0930 -- 81 98 %  21 133/67   07/15/22 0900 -- 75 98 %  20 146/75   07/15/22 0830 -- 75 98 %  21 187/96 (Abnormal)     07/15/22 0800 97 9 °F (36 6 °C) 75 98 %  23 (Abnormal)   193/103 (Abnormal)   07/15/22 0710 -- -- 97 %  -- --   07/15/22 0700 -- 72 98 %  BiPap 23 (Abnormal)   156/93   07/15/22 0600 -- 74 98 %  24 (Abnormal)   155/93   07/15/22 0500 -- 81 98 %  25 (Abnormal)   177/106 (Abnormal)     07/15/22 0400 -- 80 98 %  29 (Abnormal)   168/105 (Abnormal)     07/15/22 0315 -- 75 98 %  BiPap 26 (Abnormal)   --   07/15/22 0300 -- 79 99 %  26 (Abnormal)   171/105 (Abnormal)     07/15/22 0200 -- 78 97 %  40 (Abnormal)   165/106 (Abnormal)     07/15/22 0100 -- 74 97 %  42 (Abnormal)   154/93   07/15/22 0000 -- 75 97 %  21 140/79         Pertinent Labs/Diagnostic Results:         7/14 CXR: Left subclavian catheter in upper SVC with no pneumothorax  Persistent interstitial edema with trace effusions  7/14 US kidneys and bladder: Kaykay Malou are within normal limits  Bladder is decompressed around a Henley catheter, limiting its evaluation  7/14 ECHO:      Left Ventricle: Left ventricular cavity size is normal  Wall thickness is normal  The left ventricular ejection fraction is 25%  Systolic function is severely reduced  There is severe global hypokinesis with regional variation, basal and mid inferior wall appeared more severely hypo to akinetic    Right Ventricle: Systolic function is normal     Left Atrium: The atrium is mildly dilated    Aortic Valve: There is mild regurgitation    Mitral Valve: There is moderate regurgitation    Tricuspid Valve: There is mild regurgitation    Pericardium: There is no pericardial effusion      7/16 EKG:    Normal sinus rhythm  Possible Left atrial enlargement  Left ventricular hypertrophy  Abnormal ECG  When compared with ECG of 14-JUL-2022 04:00,  No significant change was found  7/14 EKG:      Normal sinus rhythm  Possible Left atrial enlargement  Left ventricular hypertrophy  Abnormal ECG      Results from last 7 days   Lab Units 07/16/22  0524 07/15/22  0627 07/14/22  0416 07/12/22  1816   WBC Thousand/uL 10 39* 11 75* 13 45* 8 66   HEMOGLOBIN g/dL 11 6 11 8 12 9 13 3   HEMATOCRIT % 36 1 37 2 38 9 40 1   PLATELETS Thousands/uL 255 292 279 291   NEUTROS ABS Thousands/µL  --  9 14*  --  4 94        Results from last 7 days   Lab Units 07/16/22  0034 07/15/22  1817 07/15/22  1343 07/15/22  0627 07/15/22  0020 07/14/22  1731 07/14/22  0943 07/14/22  0416 07/13/22  0422 07/12/22  1816   SODIUM mmol/L 134* 133* 134* 133* 133* 132* 133* 131* 139 135   POTASSIUM mmol/L 4 6 4 7 5 1 6 0* 5 3 4 9 4 6 4 6 3 8 3 1*   CHLORIDE mmol/L 94* 94* 97 95* 96 95* 94* 94* 101 96   CO2 mmol/L 20* 24 21 21 22 22 24 24 27 30   ANION GAP mmol/L 20* 15* 16* 17* 15* 15* 15* 13 11 9   BUN mg/dL 83* 71* 99* 90* 82* 73* 64* 58* 19 12   CREATININE mg/dL 6 41* 5 60* 7 42* 6 94* 6 49* 5 63* 4 95* 4 50* 1 66* 1 08   CALCIUM mg/dL 9 3 9 2 9 7 9 2 9 2 9 0 9 3 9 7 9 4 10 0   GLUCOSE RANDOM mg/dL 71 87 67 125 122 111 115 161* 142* 100   ALT U/L  --   --   --  7  --   --   --  9 10 3*   AST U/L  --   --   --  7*  --   --   --  14 13 6*   ALK PHOS U/L  --   --   --  71  --   --   --  66 82 81   ALBUMIN g/dL  --   --   --  3 6  --   --   --  3 8 4 0 4 3   TOTAL BILIRUBIN mg/dL  --   --   --  0 55  --   --   --  0 43 0 62 0 79        ab Units 07/16/22  0524 07/15/22  0627 07/14/22  0416   AST U/L 6* 7* 14   ALT U/L 3* 7 9   ALK PHOS U/L 71 71 66   TOTAL PROTEIN g/dL 6 7 6 8 7 2   ALBUMIN g/dL 3 5 3 6 3 8   TOTAL BILIRUBIN mg/dL 0 48 0 55 0 43   BILIRUBIN DIRECT mg/dL 0 07 0 10 0 04     Results from last 7 days   Lab Units 07/15/22  1423   POC GLUCOSE mg/dl 82     Results from last 7 days   Lab Units 07/16/22  0524 07/16/22  0034 07/15/22  1817 07/15/22  1343 07/15/22  0627 07/15/22  0020 07/14/22  1731 07/14/22  0943 07/14/22  0416 07/13/22  0422   GLUCOSE RANDOM mg/dL 77 71 87 67 125 122 111 115 161* 142*     Results from last 7 days   Lab Units 07/14/22  0943   OSMOLALITY, SERUM mmol/*       Results from last 7 days   Lab Units 07/13/22  0559   PH ART  7 439   PCO2 ART mm Hg 42 8   PO2 ART mm Hg 84 9 HCO3 ART mmol/L 28 4*   BASE EXC ART mmol/L 3 7   O2 CONTENT ART mL/dL 18 4   O2 HGB, ARTERIAL % 94 5   ABG SOURCE  Radial, Left     Results from last 7 days   Lab Units 07/14/22  1731 07/14/22  1246 07/13/22  0001   PH PANDA  7 346 7 317 7 345   PCO2 PANDA mm Hg 40 3* 47 2 46 8   PO2 PANDA mm Hg 71 3* 48 8* 66 5*   HCO3 PANDA mmol/L 21 5* 23 6* 25 0   BASE EXC PANDA mmol/L -3 8 -2 8 -1 2   O2 CONTENT PANDA ml/dL 17 2 15 5 19 6   O2 HGB, VENOUS % 91 3* 77 2 88 7*             Results from last 7 days   Lab Units 07/13/22  0246 07/13/22  0052 07/12/22  2301   HS TNI 0HR ng/L  --   --  30   HS TNI 2HR ng/L  --  28  --    HSTNI D2 ng/L  --  -2  --    HS TNI 4HR ng/L 52*  --   --    HSTNI D4 ng/L 22*  --   --                  Results from last 7 days   Lab Units 07/15/22  0627 07/14/22  0416 07/13/22  0422   PROCALCITONIN ng/ml 3 04* 1 88* 0 25                 Results from last 7 days   Lab Units 07/12/22  1816   BNP pg/mL 1,822*         Results from last 7 days   Lab Units 07/15/22  1343   HEP B S AG  Non-reactive   HEP C AB  Non-reactive   HEP B C IGM  Non-reactive   HEP B C TOTAL AB  Reactive*     Results from last 7 days   Lab Units 07/12/22  1816   LIPASE u/L 12             Results from last 7 days   Lab Units 07/14/22  0943   OSMOLALITY, SERUM mmol/*   OSMO UR mmol/*     Results from last 7 days   Lab Units 07/14/22  0943 07/12/22  1948   CLARITY UA   --  Clear   COLOR UA   --  Straw   SPEC GRAV UA   --  <=1 005*   PH UA   --  7 0   GLUCOSE UA mg/dl  --  Negative   KETONES UA mg/dl  --  Negative   BLOOD UA   --  Negative   PROTEIN UA mg/dl  --  Negative   NITRITE UA   --  Negative   BILIRUBIN UA   --  Negative   UROBILINOGEN UA E U /dl  --  0 2   LEUKOCYTES UA   --  Negative   SODIUM UR  49  --          Medications:   Scheduled Medications:      Current Facility-Administered Medications   Medication Dose Route Frequency    acetaminophen  650 mg Oral Q4H PRN    albuterol  2 5 mg Nebulization Q6H PRN    carvedilol  3 125 mg Oral BID With Meals    clonazePAM  0 5 mg Oral HS PRN    heparin (porcine)  5,000 Units Subcutaneous Q8H [facility]    levalbuterol  1 25 mg Nebulization TID     And    sodium chloride  3 mL Nebulization TID    lidocaine  2 patch Topical Daily    nicotine  1 patch Transdermal Daily    ondansetron  4 mg Intravenous Q6H PRN    umeclidinium-vilanterol  1 puff Inhalation Daily      Continuous Infusions:  dexmedetomidine, 0 1-1 4 mcg/kg/hr, Last Rate: 0 4 mcg/kg/hr (07/15/22 6591)         PRN Meds:   acetaminophen, 650 mg, Q4H PRN  albuterol, 2 5 mg, Q6H PRN  clonazePAM, 0 5 mg, HS PRN x 2 doses 7/16  ondansetron, 4 mg, Q6H PRN              Discharge Plan: TBD              Network Utilization Review Department  ATTENTION: Please call with any questions or concerns to [phone] and carefully listen to the prompts so that you are directed to the right person  All voicemails are confidential   Reji Kunz all requests for admission clinical reviews, approved or denied determinations and any other requests to dedicated fax number below belonging to the campus where the patient is receiving treatment   List of dedicated fax numbers for the Facilities:  [address] DENIALS (Administrative/Medical Necessity) [phone]   [address] (Maternity/NICU/Pediatrics) [phone]   [address] [place] [address] [address] [place] [address]  [phone]   [address] [facility] [place] [address] [id number] [place] [address] [phone] [facility] 77 [facility] [phone]

## 2022-07-18 NOTE — PROGRESS NOTES
Progress Note - Nephrology   Iwona Mata 64 y o  female MRN: [id number]  Unit/Bed#: ICU 02-01 Encounter: [id number]    A/P:  1  Hemodialysis dependent acute renal failure   Patient essentially remains aneuric, today's hemodialysis session will be for 3 5 hours and will continue her on Monday Wednesday Friday hemodialysis sessions  Patient will most likely be medically stable for discharge at some point in the near future, we should make plans for discharge at an outpatient facility for hemodialysis as an SHANTA patient  2  Chronic kidney disease stage IIIA with baseline creatinine between 1-1 1 mg/dL  3  Hypertension setting of chronic kidney disease stage 3   Blood pressure is appropriate, continue current medications  4  Hyperphosphatemia   Last phosphorus level only slightly improved at 9 3 mg/dL  Given patient remains essentially aneuric, will add PhosLo 2 tablets with meals for now  Continue monitor phosphorus level time time  PTH check in the outpatient setting  5  Acidosis   Continue with acid-base correction with hemodialysis sessions  6  Hyponatremia   Should improve post hemodialysis session  7  Acute on chronic combined systolic and diastolic congestive heart failure   Continue ultrafilter as tolerated hemodialysis sessions, continue low-sodium diet  8  COPD with acute exacerbation   Continues to improve, continue current care        Follow up reason for today's visit:  Acute kidney injury/chronic kidney disease/hypertension/hyperphosphatemia    Chronic obstructive pulmonary disease with acute exacerbation [facility])    Patient Active Problem List   Diagnosis    Vitamin D deficiency    Dilated cardiomyopathy ([facility] [facility] )    Essential hypertension    Chronic pain    Anxiety    Acute on chronic combined systolic and diastolic congestive heart failure (HCC)    Tobacco abuse    Leukocytosis    Chronic obstructive pulmonary disease without acute exacerbation ([facility] [facility] )    SHANTA (acute kidney injury) ([facility] [address] )  Acute respiratory insufficiency    Acute on chronic respiratory failure with hypoxemia (HCC)         Subjective:   Patient denies acute issues, breathing is comfortable  Claims that bilateral lower extremity edema has significantly improved  Objective:     Vitals: Blood pressure 145/72, pulse 74, temperature (!) 97 2 °F (36 2 °C), temperature source Tympanic, resp  rate (!) 31, height 5' 10" (1 778 m), weight 80 1 kg (176 lb 9 4 oz), SpO2 90 %  ,Body mass index is 25 34 kg/m²  Weight (last 2 days)     Date/Time Weight    07/18/22 0537 80 1 (176 59)    07/17/22 0600 79 1 (174 38)    07/16/22 0537 77 4 (170 64)    07/16/22 0500 77 4 (170 64)            Intake/Output Summary (Last 24 hours) at 7/18/2022 1324  Last data filed at 7/18/2022 1101  Gross per 24 hour   Intake 542 12 ml   Output 0 ml   Net 542 12 ml     I/O last 3 completed shifts: In: 893 7 [P O :328; I V :565 7]  Out: 5 [Urine:5]    HD Temporary Double Catheter (Active)   Reasons to continue HD Cath Treatment Therapy 07/18/22 0900   Goal for Removal N/A- chronic HD catheter 07/18/22 0900   Line Necessity Reviewed Yes, reviewed with provider 07/18/22 0900   Site Assessment WDL 07/18/22 0900   Proximal Lumen Status Capped 07/18/22 0900   Distal Lumen Status Capped 07/18/22 0900   Line Care Connections checked and tightened 07/15/22 1540   Dressing Type Chlorhexidine dressing 07/18/22 0900   Dressing Status Clean;Dry; Intact 07/18/22 0900   Dressing Change Due 07/22/22 07/15/22 1540       Physical Exam: /72 (BP Location: Left arm)   Pulse 74   Temp (!) 97 2 °F (36 2 °C) (Tympanic)   Resp (!) 31   Ht 5' 10" (1 778 m)   Wt 80 1 kg (176 lb 9 4 oz)   SpO2 90%   BMI 25 34 kg/m²     General Appearance:    Alert, cooperative, no distress, appears stated age   Head:    Normocephalic, without obvious abnormality, atraumatic   Eyes:    Conjunctiva/corneas clear   Ears:    Normal external ears   Nose:   Nares normal, septum midline, mucosa normal, no drainage    or sinus tenderness   Throat:   Lips, mucosa, and tongue normal; teeth and gums normal   Neck:   Supple   Back:     Symmetric, no curvature, ROM normal, no CVA tenderness   Lungs:     Clear to auscultation bilaterally, respirations unlabored   Chest wall:    No tenderness or deformity   Heart:    Regular rate and rhythm, S1 and S2 normal, no murmur, rub   or gallop   Abdomen:     Soft, non-tender, bowel sounds active   Extremities:   Extremities normal, atraumatic, no cyanosis or edema   Skin:   Skin color, texture, turgor normal, no rashes or lesions   Lymph nodes:   Cervical normal   Neurologic:   CNII-XII intact            Lab, Imaging and other studies: I have personally reviewed pertinent labs  CBC:   Lab Results   Component Value Date    WBC 10 82 (H) 07/18/2022    HGB 10 1 (L) 07/18/2022    HCT 30 4 (L) 07/18/2022    MCV 88 07/18/2022     07/18/2022    MCH 29 4 07/18/2022    MCHC 33 2 07/18/2022    RDW 14 1 07/18/2022    MPV 9 7 07/18/2022    NRBC 0 07/18/2022     CMP:   Lab Results   Component Value Date    K 4 7 07/18/2022    CL 90 (L) 07/18/2022    CO2 20 (L) 07/18/2022     (H) 07/18/2022    CREATININE 8 87 (H) 07/18/2022    CALCIUM 8 2 (L) 07/18/2022    AST 9 (L) 07/18/2022    ALT 5 (L) 07/18/2022    ALKPHOS 68 07/18/2022    EGFR 4 07/18/2022           Results from last 7 days   Lab Units 07/18/22  0601 07/17/22  0544 07/16/22  0524   POTASSIUM mmol/L 4 7 4 6 4 9   CHLORIDE mmol/L 90* 91* 94*   CO2 mmol/L 20* 26 21   BUN mg/dL 110* 85* 89*   CREATININE mg/dL 8 87* 6 92* 6 96*   CALCIUM mg/dL 8 2* 8 5 9 2   ALK PHOS U/L 68 65 71   ALT U/L 5* 4* 3*   AST U/L 9* 5* 6*         Phosphorus: No results found for: PHOS  Magnesium: No results found for: MG  Urinalysis: No results found for: COLORU, CLARITYU, SPECGRAV, PHUR, LEUKOCYTESUR, NITRITE, PROTEINUA, GLUCOSEU, KETONESU, BILIRUBINUR, BLOODU  Ionized Calcium: No results found for: CAION  Coagulation: No results found for: PT, INR, APTT  Troponin: No results found for: TROPONINI  ABG: No results found for: PHART, TAL6JKJ, PO2ART, DTF1DWK, M4FJIORU, BEART, SOURCE  Radiology review:     IMAGING  Procedure: XR chest portable ICU    Result Date: 7/17/2022  Narrative: CHEST INDICATION:   hypoxia  COMPARISON:  Chest radiograph from 7/15/2022 and chest CT from 6/15/2022  EXAM PERFORMED/VIEWS:  XR CHEST PORTABLE ICU FINDINGS:  Right jugular catheter at cavoatrial junction  Left subclavian pulmonary artery catheter in right interlobar pulmonary artery  Mild cardiomegaly  Moderate interstitial edema with trace effusions  No pneumothorax  Osseous structures appear within normal limits for patient age  Impression: Moderate interstitial edema with trace effusions   Workstation performed: [id number]       Current Facility-Administered Medications   Medication Dose Route Frequency    acetaminophen (TYLENOL) tablet 650 mg  650 mg Oral Q4H PRN    albuterol inhalation solution 2 5 mg  2 5 mg Nebulization Q6H PRN    amLODIPine (NORVASC) tablet 5 mg  5 mg Oral Daily    carvedilol (COREG) tablet 25 mg  25 mg Oral BID With Meals    clonazePAM (KlonoPIN) tablet 0 5 mg  0 5 mg Oral BID PRN    guaiFENesin (MUCINEX) 12 hr tablet 600 mg  600 mg Oral Q12H [place]    heparin (porcine) subcutaneous injection 5,000 Units  5,000 Units Subcutaneous Q8H [address]    hydrALAZINE (APRESOLINE) injection 20 mg  20 mg Intravenous Q6H PRN    levalbuterol (XOPENEX) inhalation solution 1 25 mg  1 25 mg Nebulization TID    And    sodium chloride 0 9 % inhalation solution 3 mL  3 mL Nebulization TID    lidocaine (LIDODERM) 5 % patch 2 patch  2 patch Topical Daily    nicotine (NICODERM CQ) 21 mg/24 hr TD 24 hr patch 1 patch  1 patch Transdermal Daily    OLANZapine (ZyPREXA ZYDIS) dispersible tablet 10 mg  10 mg Oral HS PRN    ondansetron (ZOFRAN) injection 4 mg  4 mg Intravenous Q6H PRN    oxyCODONE-acetaminophen (PERCOCET) 5-325 mg per tablet 1 tablet  1 tablet Oral Q4H PRN  QUEtiapine (SEROquel) tablet 25 mg  25 mg Oral HS    umeclidinium-vilanterol (ANORO ELLIPTA) 62 5-25 MCG/INH inhaler 1 puff  1 puff Inhalation Daily     Medications Discontinued During This Encounter   Medication Reason    furosemide (LASIX) injection 40 mg     heparin (porcine) subcutaneous injection 5,000 Units     umeclidinium-vilanterol (ANORO ELLIPTA) 62 5-25 MCG/INH inhaler 1 puff     morphine (MS CONTIN) ER tablet 30 mg     oxyCODONE-acetaminophen (PERCOCET) 5-325 mg per tablet 1 tablet     potassium chloride (K-DUR,KLOR-CON) CR tablet 20 mEq     nitroGLYcerin (TRIDIL) 50 mg in 250 mL infusion (premix)     azithromycin (ZITHROMAX) tablet 500 mg     carvedilol (COREG) tablet 3 125 mg     ipratropium (ATROVENT) 0 02 % inhalation solution 0 5 mg     azithromycin (ZITHROMAX) 500 mg in sodium chloride 0 9 % 250 mL IVPB     methylPREDNISolone sodium succinate (Solu-MEDROL) injection 40 mg     furosemide (LASIX) injection 40 mg     azithromycin (ZITHROMAX) tablet 500 mg     multi-electrolyte (ISOLYTE-S PH 7 4) bolus 250 mL     oxyCODONE-acetaminophen (PERCOCET) 5-325 mg per tablet 1 tablet     morphine injection 2 mg     milrinone (PRIMACOR) 20 mg in 100 mL infusion (premix)     furosemide (LASIX) 500 mg infusion 50 mL     clonazePAM (KlonoPIN) tablet 0 5 mg     carvedilol (COREG) tablet 3 125 mg     dexmedeTOMIDine (Precedex) 400 mcg in sodium chloride 0 9% 100 mL     OLANZapine (ZyPREXA ZYDIS) dispersible tablet 10 mg     carvedilol (COREG) tablet 6 25 mg     hydrALAZINE (APRESOLINE) injection 10 mg     carvedilol (COREG) tablet 12 5 mg     dexmedeTOMIDine (Precedex) 400 mcg in sodium chloride 0 9% 100 mL     niCARdipine (CARDENE) 50 mg (DOUBLE CONCENTRATION) IV in sodium chloride 0 9% 250 mL        John Sanz, DO      This progress note was produced in part using a dictation device which may document imprecise wording from author's original intent

## 2022-07-18 NOTE — CASE MANAGEMENT
Case Management Discharge Planning Note    Patient name Syed Menjivar  Location ICU 02/ICU  MRN [id number]  : 1960 Date 2022       Current Admission Date: 2022  Current Admission Diagnosis:Chronic obstructive pulmonary disease without acute exacerbation [facility])   Patient Active Problem List    Diagnosis Date Noted    [facility] on [facility] ) 2022    Acute respiratory insufficiency 2022    SHANTA (acute kidney injury) ([facility] ) 2022    Chronic obstructive pulmonary disease without acute exacerbation ([facility] ) 06/15/2022    Acute on chronic combined systolic and diastolic congestive heart failure ([facility] ) 2021    Tobacco abuse 2021    Leukocytosis 2021    Anxiety 2021    Dilated cardiomyopathy ([facility] ) 2021    Essential hypertension 2021    Chronic pain 2021    Vitamin D deficiency 2013      LOS (days): 6  Geometric Mean LOS (GMLOS) (days): 3 80  Days to GMLOS:-1 9     OBJECTIVE:  Risk of Unplanned Readmission Score: 22 88     Current admission status: Inpatient   Preferred Pharmacy:   [address] [facility] [address]  [place], [place] [facility]  [address] [place] [id number]  Phone: [phone] Fax: [phone]    Primary Care Provider: You Smith DO    Primary Insurance: MEDICARE MISC REPLACEMENT  Secondary Insurance: [address]    DISCHARGE DETAILS:  PT is recommending home PT  Spoke to the pt at the bedside about [facility] services  Pt is agreeable to same  Made a blanket referral and will have the pt chose from same  Pt is unsure if the dialysis treatments  will be permanent

## 2022-07-19 ENCOUNTER — APPOINTMENT (INPATIENT)
Dept: RADIOLOGY | Facility: HOSPITAL | Age: 62
DRG: 682 | End: 2022-07-19
Payer: MEDICARE

## 2022-07-19 LAB
ANION GAP SERPL CALCULATED.3IONS-SCNC: 13 MMOL/L (ref 4–13)
BUN SERPL-MCNC: 47 MG/DL (ref 5–25)
CALCIUM SERPL-MCNC: 9.2 MG/DL (ref 8.4–10.2)
CHLORIDE SERPL-SCNC: 92 MMOL/L (ref 96–108)
CO2 SERPL-SCNC: 22 MMOL/L (ref 21–32)
CREAT SERPL-MCNC: 5.04 MG/DL (ref 0.6–1.3)
ERYTHROCYTE [DISTWIDTH] IN BLOOD BY AUTOMATED COUNT: 14 % (ref 11.6–15.1)
GFR SERPL CREATININE-BSD FRML MDRD: 8 ML/MIN/1.73SQ M
GLUCOSE SERPL-MCNC: 135 MG/DL (ref 65–140)
HCT VFR BLD AUTO: 33.7 % (ref 34.8–46.1)
HGB BLD-MCNC: 11.4 G/DL (ref 11.5–15.4)
MAGNESIUM SERPL-MCNC: 2.2 MG/DL (ref 1.9–2.7)
MCH RBC QN AUTO: 30.3 PG (ref 26.8–34.3)
MCHC RBC AUTO-ENTMCNC: 33.8 G/DL (ref 31.4–37.4)
MCV RBC AUTO: 90 FL (ref 82–98)
PHOSPHATE SERPL-MCNC: 6.1 MG/DL (ref 2.3–4.1)
PLATELET # BLD AUTO: 260 THOUSANDS/UL (ref 149–390)
PMV BLD AUTO: 9.8 FL (ref 8.9–12.7)
POTASSIUM SERPL-SCNC: 4.4 MMOL/L (ref 3.5–5.3)
PROCALCITONIN SERPL-MCNC: 2.58 NG/ML
RBC # BLD AUTO: 3.76 MILLION/UL (ref 3.81–5.12)
SODIUM SERPL-SCNC: 127 MMOL/L (ref 135–147)
WBC # BLD AUTO: 12.78 THOUSAND/UL (ref 4.31–10.16)

## 2022-07-19 PROCEDURE — 83735 ASSAY OF MAGNESIUM: CPT | Performed by: INTERNAL MEDICINE

## 2022-07-19 PROCEDURE — 99233 SBSQ HOSP IP/OBS HIGH 50: CPT | Performed by: NURSE PRACTITIONER

## 2022-07-19 PROCEDURE — 80048 BASIC METABOLIC PNL TOTAL CA: CPT | Performed by: INTERNAL MEDICINE

## 2022-07-19 PROCEDURE — 94760 N-INVAS EAR/PLS OXIMETRY 1: CPT

## 2022-07-19 PROCEDURE — 99232 SBSQ HOSP IP/OBS MODERATE 35: CPT | Performed by: INTERNAL MEDICINE

## 2022-07-19 PROCEDURE — 94668 MNPJ CHEST WALL SBSQ: CPT

## 2022-07-19 PROCEDURE — 84145 PROCALCITONIN (PCT): CPT | Performed by: INTERNAL MEDICINE

## 2022-07-19 PROCEDURE — 84100 ASSAY OF PHOSPHORUS: CPT | Performed by: INTERNAL MEDICINE

## 2022-07-19 PROCEDURE — 94640 AIRWAY INHALATION TREATMENT: CPT

## 2022-07-19 PROCEDURE — 85027 COMPLETE CBC AUTOMATED: CPT | Performed by: INTERNAL MEDICINE

## 2022-07-19 PROCEDURE — 71045 X-RAY EXAM CHEST 1 VIEW: CPT

## 2022-07-19 RX ORDER — HYDRALAZINE HYDROCHLORIDE 20 MG/ML
10 INJECTION INTRAMUSCULAR; INTRAVENOUS EVERY 6 HOURS PRN
Status: DISCONTINUED | OUTPATIENT
Start: 2022-07-19 | End: 2022-07-22 | Stop reason: HOSPADM

## 2022-07-19 RX ORDER — LABETALOL HYDROCHLORIDE 5 MG/ML
10 INJECTION, SOLUTION INTRAVENOUS ONCE
Status: COMPLETED | OUTPATIENT
Start: 2022-07-19 | End: 2022-07-19

## 2022-07-19 RX ORDER — LORAZEPAM 2 MG/ML
0.5 INJECTION INTRAMUSCULAR ONCE
Status: COMPLETED | OUTPATIENT
Start: 2022-07-19 | End: 2022-07-19

## 2022-07-19 RX ADMIN — CALCIUM ACETATE 1334 MG: 667 CAPSULE ORAL at 16:29

## 2022-07-19 RX ADMIN — ISODIUM CHLORIDE 3 ML: 0.03 SOLUTION RESPIRATORY (INHALATION) at 07:20

## 2022-07-19 RX ADMIN — AMLODIPINE BESYLATE 5 MG: 5 TABLET ORAL at 09:27

## 2022-07-19 RX ADMIN — HEPARIN SODIUM 5000 UNITS: 5000 INJECTION INTRAVENOUS; SUBCUTANEOUS at 15:12

## 2022-07-19 RX ADMIN — CALCIUM ACETATE 1334 MG: 667 CAPSULE ORAL at 09:28

## 2022-07-19 RX ADMIN — UMECLIDINIUM BROMIDE AND VILANTEROL TRIFENATATE 1 PUFF: 62.5; 25 POWDER RESPIRATORY (INHALATION) at 09:28

## 2022-07-19 RX ADMIN — NICOTINE 1 PATCH: 21 PATCH, EXTENDED RELEASE TRANSDERMAL at 09:28

## 2022-07-19 RX ADMIN — OXYCODONE HYDROCHLORIDE AND ACETAMINOPHEN 1 TABLET: 5; 325 TABLET ORAL at 22:56

## 2022-07-19 RX ADMIN — LEVALBUTEROL HYDROCHLORIDE 1.25 MG: 1.25 SOLUTION, CONCENTRATE RESPIRATORY (INHALATION) at 07:20

## 2022-07-19 RX ADMIN — LEVALBUTEROL HYDROCHLORIDE 1.25 MG: 1.25 SOLUTION, CONCENTRATE RESPIRATORY (INHALATION) at 19:34

## 2022-07-19 RX ADMIN — LORAZEPAM 0.5 MG: 2 INJECTION INTRAMUSCULAR; INTRAVENOUS at 04:09

## 2022-07-19 RX ADMIN — OLANZAPINE 10 MG: 10 TABLET, ORALLY DISINTEGRATING ORAL at 03:57

## 2022-07-19 RX ADMIN — CARVEDILOL 25 MG: 25 TABLET, FILM COATED ORAL at 16:30

## 2022-07-19 RX ADMIN — Medication 10 MG: at 04:41

## 2022-07-19 RX ADMIN — HEPARIN SODIUM 5000 UNITS: 5000 INJECTION INTRAVENOUS; SUBCUTANEOUS at 21:21

## 2022-07-19 RX ADMIN — ISODIUM CHLORIDE 3 ML: 0.03 SOLUTION RESPIRATORY (INHALATION) at 13:09

## 2022-07-19 RX ADMIN — GUAIFENESIN 600 MG: 600 TABLET, EXTENDED RELEASE ORAL at 09:28

## 2022-07-19 RX ADMIN — LEVALBUTEROL HYDROCHLORIDE 1.25 MG: 1.25 SOLUTION, CONCENTRATE RESPIRATORY (INHALATION) at 13:09

## 2022-07-19 RX ADMIN — QUETIAPINE FUMARATE 25 MG: 25 TABLET ORAL at 21:21

## 2022-07-19 RX ADMIN — HYDRALAZINE HYDROCHLORIDE 10 MG: 20 INJECTION INTRAMUSCULAR; INTRAVENOUS at 06:30

## 2022-07-19 RX ADMIN — ISODIUM CHLORIDE 3 ML: 0.03 SOLUTION RESPIRATORY (INHALATION) at 19:34

## 2022-07-19 RX ADMIN — GUAIFENESIN 600 MG: 600 TABLET, EXTENDED RELEASE ORAL at 21:21

## 2022-07-19 RX ADMIN — CARVEDILOL 25 MG: 25 TABLET, FILM COATED ORAL at 09:28

## 2022-07-19 RX ADMIN — HEPARIN SODIUM 5000 UNITS: 5000 INJECTION INTRAVENOUS; SUBCUTANEOUS at 06:30

## 2022-07-19 RX ADMIN — ONDANSETRON 4 MG: 2 INJECTION INTRAMUSCULAR; INTRAVENOUS at 06:32

## 2022-07-19 RX ADMIN — CLONAZEPAM 0.5 MG: 0.5 TABLET ORAL at 21:21

## 2022-07-19 NOTE — ASSESSMENT & PLAN NOTE
· Home Klonopin increased to twice daily, Zyprexa added as needed  · Seroquel started 07/18/2022  · Supportive care

## 2022-07-19 NOTE — ASSESSMENT & PLAN NOTE
Wt Readings from Last 3 Encounters:   07/19/22 77 3 kg (170 lb 6 7 oz)   06/28/22 77 4 kg (170 lb 9 6 oz)   06/22/22 76 6 kg (168 lb 14 oz)     · Does not appear volume overloaded, patient anuric, volume management by dialysis  · Low-salt diet with 1500 mL fluid restriction  · Daily weights and I&Os  · Cardiology recommendations appreciated

## 2022-07-19 NOTE — RESPIRATORY THERAPY NOTE
07/19/22 0400   Respiratory Assessment   Assessment Type Pre-treatment   General Appearance Awake; Alert   Respiratory Pattern Tachypneic   Chest Assessment Chest expansion symmetrical   Bilateral Breath Sounds Diminished;Scattered;Rales   Cough Strong;Moist;Non-productive   Resp Comments with pt and Rn, pt c/o of hard to bring up secretions, pt did acapella , increased moist NPC also bed percussion, pt getting anxious she can  not get up secretions, placed pt on OPtiflo for moisture, placed on 55% 45 l/m keith well pox increased from 87% to 97%  pt doing well on OPtiflo   O2 Device optiflo   Non-Invasive Information   O2 Interface Device HFNC prongs   Non-Invasive Ventilation Mode HFNC (High flow)  (optiflo)   SpO2 97 %   $ Pulse Oximetry Spot Check Charge Completed   Non-Invasive Settings   FiO2 (%) 55   Temperature (Set) 37   Flow (lpm) 55   Non-Invasive Readings   Skin Intervention Skin intact   Heater Temperature (Obs) 37

## 2022-07-19 NOTE — ASSESSMENT & PLAN NOTE
· Diffuse wheezing on exam, on high-flow oxygen  · Respiratory protocol  · Received 3 doses of Solu-Medrol 40 mg IV, then discontinued  · Give Xopenex 3 times daily  · Continue pre-hospital Anoro 1 puff daily  · Consult pulmonary

## 2022-07-19 NOTE — PHYSICAL THERAPY NOTE
Physical Therapy Cancellation Note         07/19/22 2365   PT Last Visit   PT Visit Date 07/19/22   Note Type   Note Type Cancelled Session   Cancel Reasons   (pt refusal)     Chart review performed  At this time, PT treatment session cancelled as patient refusing skilled PT interventions d/t fatigue and reports not getting much sleep last night  PT explained benefits to mobility while hospitalized, however pt remains with refusal to session  PT will follow and provide PT interventions as appropriate      Gracia Anand, PT

## 2022-07-19 NOTE — CASE MANAGEMENT
Case Management Discharge Planning Note    Patient name Evelyn Valadez  Location ICU 02/ICU  MRN [id number]  : 1960 Date 2022       Current Admission Date: 2022  Current Admission Diagnosis:COPD with acute exacerbation [facility])   Patient Active Problem List    Diagnosis Date Noted    [facility] 20[facility]    Acute respiratory insufficiency 2022    SHANTA (acute kidney injury) ([facility] ) 2022    COPD with acute exacerbation ([facility] ) 06/15/2022    Acute on chronic combined systolic and diastolic congestive heart failure ([facility] ) 2021    Tobacco abuse 2021    Leukocytosis 2021    Anxiety 2021    Dilated cardiomyopathy ([facility] ) 2021    Essential hypertension 2021    Chronic pain 2021    Vitamin D deficiency 2013      LOS (days): 7  Geometric Mean LOS (GMLOS) (days): 3 80  Days to GMLOS:-3     OBJECTIVE:  Risk of Unplanned Readmission Score: 20 57     Current admission status: Inpatient   Preferred Pharmacy:   [address] [facility] [address]  [place], [address]  [address] [facility] [address]  Phone: [phone] Fax: [phone]    Primary Care Provider: Yoel Locke DO    Primary Insurance: MEDICARE MISC REPLACEMENT  Secondary Insurance: [address]    DISCHARGE DETAILS:  Spoke to the pt about obtaining an outpt dialysis chair  Pt preferred to go to [facility] in Replaced by [facility]  A referral was made for same via [facility]  Pt currently on 55LHF oxygen  Does not wear oxygen at home

## 2022-07-19 NOTE — PROGRESS NOTES
Shira 45  Progress Note Russ Davis 1960, 64 y o  female MRN: [id number]  Unit/Bed#: ICU 02-01 Encounter: [id number]  Primary Care Provider: Olivia Zuñiga DO   Date and time admitted to hospital: 7/12/2022  5:54 PM    * COPD with acute exacerbation ([facility] )  Assessment & Plan  · Diffuse wheezing on exam, on high-flow oxygen  · Respiratory protocol  · Received 3 doses of Solu-Medrol 40 mg IV, then discontinued  · Give Xopenex 3 times daily  · Continue pre-hospital Anoro 1 puff daily  · Consult pulmonary     SHANTA (acute kidney injury) ([facility] )  Assessment & Plan  · Baseline creatinine appears to be around 1 5-1 8  · Nephrology following  · Likely 2/2 ATN, creatinine remains elevated  · Became oliguric and did not respond to diuretics or fluids  · Tolerated iHD session 7/15, 7/16, 7/18   · Dialysis scheduled for Monday Wednesday Friday  · Continue to monitor renal indices and urine output   · Work up for pheochromocytoma pending  · Case management on board-patient will need placement at a facility with dialysis availability, at least short-term    Tobacco abuse  Assessment & Plan  · Nicotine patch  · Encourage cessation    Acute on chronic combined systolic and diastolic congestive heart failure (HCC)  Assessment & Plan  Wt Readings from Last 3 Encounters:   07/19/22 77 3 kg (170 lb 6 7 oz)   06/28/22 77 4 kg (170 lb 9 6 oz)   06/22/22 76 6 kg (168 lb 14 oz)     · Does not appear volume overloaded, patient anuric, volume management by dialysis  · Low-salt diet with 1500 mL fluid restriction  · Daily weights and I&Os  · Cardiology recommendations appreciated      Anxiety  Assessment & Plan  · Home Klonopin increased to twice daily, Zyprexa added as needed  · Seroquel started 07/18/2022  · Supportive care    Chronic pain  Assessment & Plan  · Continue pre-hospital MS Contin and Percocet    Essential hypertension  Assessment & Plan  · BP reviewed  · Continue amlodipine  · Continue Coreg which has been increased to 25 mg twice daily   · Monitor blood pressure    VTE Pharmacologic Prophylaxis:   Pharmacologic: Heparin  Mechanical VTE Prophylaxis in Place: Yes    Patient Centered Rounds: I have performed bedside rounds with nursing staff today  Discussions with Specialists or Other Care Team Provider:  Case management    Education and Discussions with Family / Patient:  Patient    Time Spent for Care: 30 minutes  More than 50% of total time spent on counseling and coordination of care as described above  Current Length of Stay: 7 day(s)    Current Patient Status: Inpatient   Certification Statement: The patient will continue to require additional inpatient hospital stay due to per plan above  Discharge Plan: To be determined    Code Status: Level 1 - Full Code      Subjective:   Patient seen and examined  She is on high-flow oxygen and is mildly dyspneic  She says she does not feel good but she is not able to describe why  Objective:     Vitals:   Temp (24hrs), Av 2 °F (37 3 °C), Min:98 4 °F (36 9 °C), Max:99 6 °F (37 6 °C)    Temp:  [98 4 °F (36 9 °C)-99 6 °F (37 6 °C)] 99 2 °F (37 3 °C)  HR:  [] 101  Resp:  [18-55] 28  BP: (160-205)/() 175/101  SpO2:  [90 %-97 %] 96 %  Body mass index is 24 45 kg/m²  Input and Output Summary (last 24 hours): Intake/Output Summary (Last 24 hours) at 2022 1344  Last data filed at 2022 1800  Gross per 24 hour   Intake 520 ml   Output 3502 ml   Net -2982 ml       Physical Exam:     Physical Exam  Vitals and nursing note reviewed  Constitutional:       Appearance: She is ill-appearing  HENT:      Head: Normocephalic and atraumatic  Mouth/Throat:      Mouth: Mucous membranes are dry  Pharynx: Oropharynx is clear  Eyes:      Pupils: Pupils are equal, round, and reactive to light  Cardiovascular:      Rate and Rhythm: Regular rhythm  Tachycardia present  Pulses: Normal pulses        Comments: Heart rate around 101  Pulmonary:      Effort: Pulmonary effort is normal  No respiratory distress  Breath sounds: Wheezing and rales present  Comments: Diffuse expiratory wheezing, faint rales  Abdominal:      General: Bowel sounds are normal       Palpations: Abdomen is soft  Tenderness: There is no abdominal tenderness  Musculoskeletal:      Cervical back: Neck supple  Right lower leg: No edema  Left lower leg: No edema  Skin:     General: Skin is warm and dry  Capillary Refill: Capillary refill takes less than 2 seconds  Findings: No rash  Neurological:      General: No focal deficit present  Mental Status: She is alert  Mental status is at baseline  Additional Data:     Labs:    Results from last 7 days   Lab Units 07/19/22  0353 07/18/22  0601   WBC Thousand/uL 12 78* 10 82*   HEMOGLOBIN g/dL 11 4* 10 1*   HEMATOCRIT % 33 7* 30 4*   PLATELETS Thousands/uL 260 240   NEUTROS PCT %  --  72   LYMPHS PCT %  --  15   MONOS PCT %  --  8   EOS PCT %  --  3     Results from last 7 days   Lab Units 07/19/22  0352 07/18/22  0601   SODIUM mmol/L 127* 129*   POTASSIUM mmol/L 4 4 4 7   CHLORIDE mmol/L 92* 90*   CO2 mmol/L 22 20*   BUN mg/dL 47* 110*   CREATININE mg/dL 5 04* 8 87*   ANION GAP mmol/L 13 19*   CALCIUM mg/dL 9 2 8 2*   ALBUMIN g/dL  --  3 4*   TOTAL BILIRUBIN mg/dL  --  0 48   ALK PHOS U/L  --  68   ALT U/L  --  5*   AST U/L  --  9*   GLUCOSE RANDOM mg/dL 135 115         Results from last 7 days   Lab Units 07/15/22  1423   POC GLUCOSE mg/dl 82         Results from last 7 days   Lab Units 07/19/22  0352 07/18/22  0601 07/17/22  0544 07/15/22  0627 07/14/22  0416   PROCALCITONIN ng/ml 2 58* 2 97* 4 20* 3 04* 1 88*           * I Have Reviewed All Lab Data Listed Above  * Additional Pertinent Lab Tests Reviewed:  All [place] Admission Reviewed            Last 24 Hours Medication List:   Current Facility-Administered Medications   Medication Dose Route Frequency Provider Last Rate    acetaminophen  650 mg Oral Q4H PRN Syliva [place], CRNP      albuterol  2 5 mg Nebulization Q6H PRN Syliva [place], CRNP      amLODIPine  5 mg Oral Daily Syliva [place], CRNP      calcium acetate  1,334 mg Oral TID With Meals Joycelyn Rose DO      carvedilol  25 mg Oral BID With Meals Syliva [place], CRNP      clonazePAM  0 5 mg Oral BID PRN Syliva [place], CRNP      guaiFENesin  600 mg Oral Q12H [address] Syliva [place], CRNP      heparin (porcine)  5,000 Units Subcutaneous [facility] Syliva [place], [address]      hydrALAZINE  10 mg Intravenous Q6H PRN Kanchan Leroy PA-C      levalbuterol  1 25 mg Nebulization TID Syliva [place], CRNP      And    sodium chloride  3 mL Nebulization TID Syliva [place], CRNP      lidocaine  2 patch Topical Daily Syliva [place], CRNP      nicotine  1 patch Transdermal Daily Syliva [place], CRNP      OLANZapine  10 mg Oral HS PRN Syliva [place], CRNP      ondansetron  4 mg Intravenous Q6H PRN Syliva [place], CRNP      oxyCODONE-acetaminophen  1 tablet Oral Q4H PRN Syliva [place], CRNP      QUEtiapine  25 mg Oral HS Syliva [place], CRNP      umeclidinium-vilanterol  1 puff Inhalation Daily Syliva [place], CRNP          Today, Patient Was Seen By: ESTHER Casas    ** Please Note: Dictation voice to text software may have been used in the creation of this document   **

## 2022-07-19 NOTE — PROGRESS NOTES
Progress Note - Nephrology   Windy Estrada 64 y o  female MRN: [id number]  Unit/Bed#: ICU 02-01 Encounter: [id number]    A/P:  1  Hemodialysis dependent acute renal failure               continue with hemodialysis sessions Monday Wednesday Friday for 3 5 hours  Patient will need to be admitted into an outpatient hemodialysis session as an SHANTA patient  Anticipate patient to have renal recovery, time will tell  2  Chronic kidney disease stage IIIA with baseline creatinine between 1-1 1 mg/dL  3  Hypertension setting of chronic kidney disease stage 3               blood pressure elevated at this time, continue to ultrafilter as tolerated  Modification of medications as indicated from the clinical standpoint  4  Hyperphosphatemia               continue with PhosLo 2 tablets, this was started on July 18th  Will re-evaluate phosphorus in the next 24-48 hours  Will check PTH in the outpatient setting  5  Acidosis               serum bicarb increased up to 22 millimole/L  Continue with dialysate currently at 30 millimole/L   6  Hyponatremia               continues to improve, continue fluid restriction  7  Acute on chronic combined systolic and diastolic congestive heart failure               patient is becoming compensated, continue to ultrafilter as tolerated hemodialysis session  Unfortunately, the patient has no urine significant output at this time    8  COPD with acute exacerbation   Continues to improve    Follow up reason for today's visit:  Hemodialysis dependent acute renal failure/chronic kidney disease/hypertension/hyperphosphatemia/acidosis/hyponatremia    COPD with acute exacerbation [facility])    Patient Active Problem List   Diagnosis    Vitamin D deficiency    Dilated cardiomyopathy ([facility] )    Essential hypertension    Chronic pain    Anxiety    Acute on chronic combined systolic and diastolic congestive heart failure ([facility] )    Tobacco abuse    Leukocytosis    [facility] with [facility] )  SHANTA (acute kidney injury) ([facility]    Acute respiratory insufficiency    Acute on chronic respiratory failure with hypoxemia (HCC)         Subjective:   No Acute events overnight  Objective:     Vitals: Blood pressure (!) 175/101, pulse 101, temperature 99 2 °F (37 3 °C), temperature source Tympanic, resp  rate (!) 28, height 5' 10" (1 778 m), weight 77 3 kg (170 lb 6 7 oz), SpO2 96 %  ,Body mass index is 24 45 kg/m²  Weight (last 2 days)     Date/Time Weight    07/19/22 0600 77 3 (170 42)    07/18/22 0537 80 1 (176 59)    07/17/22 0600 79 1 (174 38)            Intake/Output Summary (Last 24 hours) at 7/19/2022 1341  Last data filed at 7/18/2022 1800  Gross per 24 hour   Intake 520 ml   Output 3502 ml   Net -2982 ml     I/O last 3 completed shifts: In: 3565 [P O :370; I V :715]  Out: 3502 [Other:3502]    HD Temporary Double Catheter (Active)   Reasons to continue HD Cath Treatment Therapy 07/18/22 1700   Goal for Removal Other (comment) 07/18/22 1700   Line Necessity Reviewed Yes, reviewed with provider 07/18/22 1700   Site Assessment WDL 07/18/22 1700   Proximal Lumen Status Normal saline locked 07/18/22 1700   Distal Lumen Status Normal saline locked 07/18/22 1700   Line Care Connections checked and tightened 07/18/22 1700   Dressing Type Chlorhexidine dressing 07/18/22 1700   Dressing Status Clean;Dry; Intact 07/18/22 1700   Dressing Change Due 07/22/22 07/18/22 1700       Physical Exam: BP (!) 175/101   Pulse 101   Temp 99 2 °F (37 3 °C) (Tympanic)   Resp (!) 28   Ht 5' 10" (1 778 m)   Wt 77 3 kg (170 lb 6 7 oz)   SpO2 96%   BMI 24 45 kg/m²     General Appearance:    Alert, cooperative, no distress, appears stated age   Head:    Normocephalic, without obvious abnormality, atraumatic   Eyes:    Conjunctiva/corneas clear   Ears:    Normal external ears   Nose:   Nares normal, septum midline, mucosa normal, no drainage    or sinus tenderness   Throat:   Lips, mucosa, and tongue normal; teeth and gums normal   Neck:   Supple   Back:     Symmetric, no curvature, ROM normal, no CVA tenderness   Lungs:     Clear to auscultation bilaterally, respirations unlabored   Chest wall:    No tenderness or deformity   Heart:    Regular rate and rhythm, S1 and S2 normal, no murmur, rub   or gallop   Abdomen:     Soft, non-tender, bowel sounds active   Extremities:   Extremities normal, atraumatic, no cyanosis or edema   Skin:   Skin color, texture, turgor normal, no rashes or lesions   Lymph nodes:   Cervical normal   Neurologic:   CNII-XII intact            Lab, Imaging and other studies: I have personally reviewed pertinent labs  CBC:   Lab Results   Component Value Date    WBC 12 78 (H) 07/19/2022    HGB 11 4 (L) 07/19/2022    HCT 33 7 (L) 07/19/2022    MCV 90 07/19/2022     07/19/2022    MCH 30 3 07/19/2022    MCHC 33 8 07/19/2022    RDW 14 0 07/19/2022    MPV 9 8 07/19/2022     CMP:   Lab Results   Component Value Date    K 4 4 07/19/2022    CL 92 (L) 07/19/2022    CO2 22 07/19/2022    BUN 47 (H) 07/19/2022    CREATININE 5 04 (H) 07/19/2022    CALCIUM 9 2 07/19/2022    EGFR 8 07/19/2022           Results from last 7 days   Lab Units 07/19/22  0352 07/18/22  0601 07/17/22  0544 07/16/22  0524   POTASSIUM mmol/L 4 4 4 7 4 6 4 9   CHLORIDE mmol/L 92* 90* 91* 94*   CO2 mmol/L 22 20* 26 21   BUN mg/dL 47* 110* 85* 89*   CREATININE mg/dL 5 04* 8 87* 6 92* 6 96*   CALCIUM mg/dL 9 2 8 2* 8 5 9 2   ALK PHOS U/L  --  68 65 71   ALT U/L  --  5* 4* 3*   AST U/L  --  9* 5* 6*         Phosphorus:   Lab Results   Component Value Date    PHOS 6 1 (H) 07/19/2022     Magnesium:   Lab Results   Component Value Date    MG 2 2 07/19/2022     Urinalysis: No results found for: COLORU, CLARITYU, SPECGRAV, PHUR, LEUKOCYTESUR, NITRITE, PROTEINUA, GLUCOSEU, KETONESU, BILIRUBINUR, BLOODU  Ionized Calcium: No results found for: CAION  Coagulation: No results found for: PT, INR, APTT  Troponin: No results found for: TROPONINI  ABG: No results found for: PHART, YKY4GSP, PO2ART, HFE3URI, F9EBTFQC, BEART, SOURCE  Radiology review:     IMAGING  Procedure: XR chest portable ICU    Result Date: 7/19/2022  Narrative: CHEST INDICATION:   hypoxia  COMPARISON:  Chest radiograph July 12, 2022 EXAM PERFORMED/VIEWS:  XR CHEST PORTABLE ICU FINDINGS:  Tip of right internal jugular central venous catheter projects over the superior cavoatrial junction  Heart shadow is enlarged but unchanged from prior exam  Increased prominence of the interstitial markings  Slight increased size of the small right pleural effusion  No pneumothorax  Osseous structures appear within normal limits for patient age  Impression: Pulmonary edema has slightly increased since radiograph performed 2 days prior  Workstation performed: [id number]     Procedure: XR chest portable ICU    Result Date: 7/17/2022  Narrative: CHEST INDICATION:   hypoxia  COMPARISON:  Chest radiograph from 7/15/2022 and chest CT from 6/15/2022  EXAM PERFORMED/VIEWS:  XR CHEST PORTABLE ICU FINDINGS:  Right jugular catheter at cavoatrial junction  Left subclavian pulmonary artery catheter in right interlobar pulmonary artery  Mild cardiomegaly  Moderate interstitial edema with trace effusions  No pneumothorax  Osseous structures appear within normal limits for patient age  Impression: Moderate interstitial edema with trace effusions   Workstation performed: [id number]       Current Facility-Administered Medications   Medication Dose Route Frequency    acetaminophen (TYLENOL) tablet 650 mg  650 mg Oral Q4H PRN    albuterol inhalation solution 2 5 mg  2 5 mg Nebulization Q6H PRN    amLODIPine (NORVASC) tablet 5 mg  5 mg Oral Daily    calcium acetate (PHOSLO) capsule 1,334 mg  1,334 mg Oral TID With Meals    carvedilol (COREG) tablet 25 mg  25 mg Oral BID With Meals    clonazePAM (KlonoPIN) tablet 0 5 mg  0 5 mg Oral BID PRN    guaiFENesin (MUCINEX) 12 hr tablet 600 mg  600 mg Oral Q12H [facility]    heparin (porcine) subcutaneous injection 5,000 Units  5,000 Units Subcutaneous Q8H [address]    hydrALAZINE (APRESOLINE) injection 10 mg  10 mg Intravenous Q6H PRN    levalbuterol (XOPENEX) inhalation solution 1 25 mg  1 25 mg Nebulization TID    And    sodium chloride 0 9 % inhalation solution 3 mL  3 mL Nebulization TID    lidocaine (LIDODERM) 5 % patch 2 patch  2 patch Topical Daily    nicotine (NICODERM CQ) 21 mg/24 hr TD 24 hr patch 1 patch  1 patch Transdermal Daily    OLANZapine (ZyPREXA ZYDIS) dispersible tablet 10 mg  10 mg Oral HS PRN    ondansetron (ZOFRAN) injection 4 mg  4 mg Intravenous Q6H PRN    oxyCODONE-acetaminophen (PERCOCET) 5-325 mg per tablet 1 tablet  1 tablet Oral Q4H PRN    QUEtiapine (SEROquel) tablet 25 mg  25 mg Oral HS    umeclidinium-vilanterol (ANORO ELLIPTA) 62 5-25 MCG/INH inhaler 1 puff  1 puff Inhalation Daily     Medications Discontinued During This Encounter   Medication Reason    furosemide (LASIX) injection 40 mg     heparin (porcine) subcutaneous injection 5,000 Units     umeclidinium-vilanterol (ANORO ELLIPTA) 62 5-25 MCG/INH inhaler 1 puff     morphine (MS CONTIN) ER tablet 30 mg     oxyCODONE-acetaminophen (PERCOCET) 5-325 mg per tablet 1 tablet     potassium chloride (K-DUR,KLOR-CON) CR tablet 20 mEq     nitroGLYcerin (TRIDIL) 50 mg in 250 mL infusion (premix)     azithromycin (ZITHROMAX) tablet 500 mg     carvedilol (COREG) tablet 3 125 mg     ipratropium (ATROVENT) 0 02 % inhalation solution 0 5 mg     azithromycin (ZITHROMAX) 500 mg in sodium chloride 0 9 % 250 mL IVPB     methylPREDNISolone sodium succinate (Solu-MEDROL) injection 40 mg     furosemide (LASIX) injection 40 mg     azithromycin (ZITHROMAX) tablet 500 mg     multi-electrolyte (ISOLYTE-S PH 7 4) bolus 250 mL     oxyCODONE-acetaminophen (PERCOCET) 5-325 mg per tablet 1 tablet     morphine injection 2 mg     milrinone (PRIMACOR) 20 mg in 100 mL infusion (premix)     furosemide (LASIX) 500 mg infusion 50 mL     clonazePAM (KlonoPIN) tablet 0 5 mg     carvedilol (COREG) tablet 3 125 mg     dexmedeTOMIDine (Precedex) 400 mcg in sodium chloride 0 9% 100 mL     OLANZapine (ZyPREXA ZYDIS) dispersible tablet 10 mg     carvedilol (COREG) tablet 6 25 mg     hydrALAZINE (APRESOLINE) injection 10 mg     carvedilol (COREG) tablet 12 5 mg     dexmedeTOMIDine (Precedex) 400 mcg in sodium chloride 0 9% 100 mL     niCARdipine (CARDENE) 50 mg (DOUBLE CONCENTRATION) IV in sodium chloride 0 9% 250 mL     hydrALAZINE (APRESOLINE) injection 20 mg        [facility], DO      This progress note was produced in part using a dictation device which may document imprecise wording from author's original intent

## 2022-07-19 NOTE — ASSESSMENT & PLAN NOTE
· Baseline creatinine appears to be around 1 5-1 8  · Nephrology following  · Likely 2/2 ATN, creatinine remains elevated  · Became oliguric and did not respond to diuretics or fluids  · Tolerated iHD session 7/15, 7/16, 7/18   · Dialysis scheduled for Monday Wednesday Friday  · Continue to monitor renal indices and urine output   · Work up for pheochromocytoma pending  · Case management on board-patient will need placement at a facility with dialysis availability, at least short-term

## 2022-07-19 NOTE — ASSESSMENT & PLAN NOTE
· BP reviewed  · Continue amlodipine  · Continue Coreg which has been increased to 25 mg twice daily   · Monitor blood pressure

## 2022-07-20 ENCOUNTER — APPOINTMENT (INPATIENT)
Dept: DIALYSIS | Facility: HOSPITAL | Age: 62
DRG: 682 | End: 2022-07-20
Payer: MEDICARE

## 2022-07-20 PROBLEM — B19.10 HEPATITIS B: Status: ACTIVE | Noted: 2022-07-20

## 2022-07-20 LAB — PROCALCITONIN SERPL-MCNC: 1.75 NG/ML

## 2022-07-20 PROCEDURE — 94640 AIRWAY INHALATION TREATMENT: CPT

## 2022-07-20 PROCEDURE — 99233 SBSQ HOSP IP/OBS HIGH 50: CPT | Performed by: NURSE PRACTITIONER

## 2022-07-20 PROCEDURE — 94760 N-INVAS EAR/PLS OXIMETRY 1: CPT

## 2022-07-20 PROCEDURE — 97530 THERAPEUTIC ACTIVITIES: CPT

## 2022-07-20 PROCEDURE — 99232 SBSQ HOSP IP/OBS MODERATE 35: CPT | Performed by: INTERNAL MEDICINE

## 2022-07-20 PROCEDURE — 94668 MNPJ CHEST WALL SBSQ: CPT

## 2022-07-20 PROCEDURE — 84145 PROCALCITONIN (PCT): CPT | Performed by: NURSE PRACTITIONER

## 2022-07-20 RX ORDER — BUMETANIDE 1 MG/1
4 TABLET ORAL
Status: DISCONTINUED | OUTPATIENT
Start: 2022-07-21 | End: 2022-07-22

## 2022-07-20 RX ADMIN — LEVALBUTEROL HYDROCHLORIDE 1.25 MG: 1.25 SOLUTION, CONCENTRATE RESPIRATORY (INHALATION) at 07:18

## 2022-07-20 RX ADMIN — OLANZAPINE 10 MG: 10 TABLET, ORALLY DISINTEGRATING ORAL at 03:55

## 2022-07-20 RX ADMIN — OXYCODONE HYDROCHLORIDE AND ACETAMINOPHEN 1 TABLET: 5; 325 TABLET ORAL at 13:40

## 2022-07-20 RX ADMIN — GUAIFENESIN 600 MG: 600 TABLET, EXTENDED RELEASE ORAL at 22:46

## 2022-07-20 RX ADMIN — CARVEDILOL 25 MG: 25 TABLET, FILM COATED ORAL at 16:56

## 2022-07-20 RX ADMIN — AMLODIPINE BESYLATE 5 MG: 5 TABLET ORAL at 08:35

## 2022-07-20 RX ADMIN — GUAIFENESIN 600 MG: 600 TABLET, EXTENDED RELEASE ORAL at 08:35

## 2022-07-20 RX ADMIN — CALCIUM ACETATE 1334 MG: 667 CAPSULE ORAL at 08:35

## 2022-07-20 RX ADMIN — HEPARIN SODIUM 5000 UNITS: 5000 INJECTION INTRAVENOUS; SUBCUTANEOUS at 05:52

## 2022-07-20 RX ADMIN — HEPARIN SODIUM 5000 UNITS: 5000 INJECTION INTRAVENOUS; SUBCUTANEOUS at 16:56

## 2022-07-20 RX ADMIN — ISODIUM CHLORIDE 3 ML: 0.03 SOLUTION RESPIRATORY (INHALATION) at 07:18

## 2022-07-20 RX ADMIN — HYDRALAZINE HYDROCHLORIDE 10 MG: 20 INJECTION INTRAMUSCULAR; INTRAVENOUS at 13:36

## 2022-07-20 RX ADMIN — HEPARIN SODIUM 5000 UNITS: 5000 INJECTION INTRAVENOUS; SUBCUTANEOUS at 22:41

## 2022-07-20 RX ADMIN — CLONAZEPAM 0.5 MG: 0.5 TABLET ORAL at 22:41

## 2022-07-20 RX ADMIN — ISODIUM CHLORIDE 3 ML: 0.03 SOLUTION RESPIRATORY (INHALATION) at 20:03

## 2022-07-20 RX ADMIN — NICOTINE 1 PATCH: 21 PATCH, EXTENDED RELEASE TRANSDERMAL at 08:38

## 2022-07-20 RX ADMIN — OLANZAPINE 10 MG: 10 TABLET, ORALLY DISINTEGRATING ORAL at 22:41

## 2022-07-20 RX ADMIN — LEVALBUTEROL HYDROCHLORIDE 1.25 MG: 1.25 SOLUTION, CONCENTRATE RESPIRATORY (INHALATION) at 13:19

## 2022-07-20 RX ADMIN — CARVEDILOL 25 MG: 25 TABLET, FILM COATED ORAL at 08:35

## 2022-07-20 RX ADMIN — CALCIUM ACETATE 1334 MG: 667 CAPSULE ORAL at 16:55

## 2022-07-20 RX ADMIN — LEVALBUTEROL HYDROCHLORIDE 1.25 MG: 1.25 SOLUTION, CONCENTRATE RESPIRATORY (INHALATION) at 20:02

## 2022-07-20 RX ADMIN — ONDANSETRON 4 MG: 2 INJECTION INTRAMUSCULAR; INTRAVENOUS at 16:55

## 2022-07-20 RX ADMIN — QUETIAPINE FUMARATE 25 MG: 25 TABLET ORAL at 22:41

## 2022-07-20 RX ADMIN — UMECLIDINIUM BROMIDE AND VILANTEROL TRIFENATATE 1 PUFF: 62.5; 25 POWDER RESPIRATORY (INHALATION) at 08:40

## 2022-07-20 RX ADMIN — HYDRALAZINE HYDROCHLORIDE 10 MG: 20 INJECTION INTRAMUSCULAR; INTRAVENOUS at 04:10

## 2022-07-20 RX ADMIN — ISODIUM CHLORIDE 3 ML: 0.03 SOLUTION RESPIRATORY (INHALATION) at 13:19

## 2022-07-20 RX ADMIN — CLONAZEPAM 0.5 MG: 0.5 TABLET ORAL at 16:56

## 2022-07-20 NOTE — PROGRESS NOTES
Progress Note - Nephrology   Snow Aquino 64 y o  female MRN: [id number]  Unit/Bed#: ICU 02-01 Encounter: [id number]    A/P:  1  Hemodialysis dependent acute renal failure                patient will have a 3 hour treatment today  She will also need to have her temporary hemodialysis catheter converted to a PermCath  At this time the patient is having difficulty laying flat, by my hope is that by tomorrow, July 21st, she should be able to proceed with a PermCath placement which will then allow us to more easily place her in the outpatient setting  Of note, the patient has started to urinate a very small amount  2  Chronic kidney disease stage IIIA with baseline creatinine between 1-1 1 mg/dL  3  Hypertension setting of chronic kidney disease stage 3                blood pressure elevated while hemodialysis with systolic blood pressure over 170 mmHg  Continue to ultrafilter as tolerated  4  Hyperphosphatemia                continue PhosLo 2 tabs with meals, serum phosphorus level was down to 6 1 mg/dL  Will reassess in next 2 days  5  Acidosis                continue treatment with hemodialysis, patient's serum bicarb is appropriate at this time  6  Hyponatremia                serum sodium level stable, continue fluid restriction, continue hemodialysis with reduced sodium dialysate  7  Acute on chronic combined systolic and diastolic congestive heart failure                continue ultrafilter as tolerated hemodialysis sessions, we are trying for 3 5 L today with dialysis  I will also put in orders for IV Bumex to be given on nondialysis days  8  COPD with acute exacerbation   Continue to manage as indicated from a medical standpoint      Follow up reason for today's visit:  Acute kidney failure/chronic kidney disease/hypertension/hyperphosphatemia/acidosis/hyponatremia    Acute on chronic respiratory failure with hypoxemia [facility])    Patient Active Problem List   Diagnosis    Vitamin D deficiency    Dilated [facility] ([facility] )    Essential hypertension    Chronic pain    Anxiety    Acute on chronic combined systolic and diastolic congestive heart failure (HCC)    Tobacco abuse    Leukocytosis    COPD with acute exacerbation (HCC)    SHANTA (acute kidney injury) ([facility] )    Acute respiratory insufficiency    Acute on chronic respiratory failure with hypoxemia (HCC)    Hepatitis B         Subjective:   Patient fell significantly more short of breath today compared to previous  This is surprising given the patient's overall weight continues to improve  She is currently on a high-flow nasal cannula  Objective:     Vitals: Blood pressure 163/77, pulse 86, temperature 97 6 °F (36 4 °C), temperature source Temporal, resp  rate 18, height 5' 10" (1 778 m), weight 77 2 kg (170 lb 3 1 oz), SpO2 94 %  ,Body mass index is 24 42 kg/m²  Weight (last 2 days)     Date/Time Weight    07/20/22 0554 77 2 (170 2)    07/19/22 0600 77 3 (170 42)    07/18/22 0537 80 1 (176 59)            Intake/Output Summary (Last 24 hours) at 7/20/2022 1431  Last data filed at 7/20/2022 1215  Gross per 24 hour   Intake 200 ml   Output --   Net 200 ml     No intake/output data recorded  HD Temporary Double Catheter (Active)   Reasons to continue HD Cath Treatment Therapy 07/20/22 1215   Goal for Removal N/A- chronic HD catheter 07/20/22 1215   Line Necessity Reviewed Yes, reviewed with provider 07/20/22 1215   Site Assessment WDL 07/20/22 1215   Proximal Lumen Status Capped;Flushed;Blood return noted 07/20/22 1215   Distal Lumen Status Capped;Flushed; Other (Comment) 07/20/22 1215   Line Care Connections checked and tightened 07/20/22 1215   Dressing Type Chlorhexidine dressing 07/20/22 1215   Dressing Status Clean;Dry; Intact 07/20/22 1215   Dressing Change Due 07/22/22 07/20/22 1215       Physical Exam: /77   Pulse 86   Temp 97 6 °F (36 4 °C) (Temporal)   Resp 18   Ht 5' 10" (1 778 m)   Wt 77 2 kg (170 lb 3 1 oz)   SpO2 94%   BMI 24 42 kg/m²     General Appearance:    Alert, cooperative, no distress, appears stated age   Head:    Normocephalic, without obvious abnormality, atraumatic   Eyes:    Conjunctiva/corneas clear   Ears:    Normal external ears   Nose:   Nares normal, septum midline, mucosa normal, no drainage    or sinus tenderness   Throat:   Lips, mucosa, and tongue normal; teeth and gums normal   Neck:   Supple   Back:     Symmetric, no curvature, ROM normal, no CVA tenderness   Lungs:     Reduced bilaterally   Chest wall:    No tenderness or deformity   Heart:    Regular rate and rhythm, S1 and S2 normal, no murmur, rub   or gallop   Abdomen:     Soft, non-tender, bowel sounds active   Extremities:   Extremities normal, atraumatic, no cyanosis, trace thigh and presacral edema   Skin:   Skin color, texture, turgor normal, no rashes or lesions   Lymph nodes:   Cervical normal   Neurologic:   CNII-XII intact            Lab, Imaging and other studies: I have personally reviewed pertinent labs  CBC: No results found for: WBC, HGB, HCT, MCV, PLT, ADJUSTEDWBC, MCH, MCHC, RDW, MPV, NRBC  CMP: No results found for: NA, K, CL, CO2, ANIONGAP, BUN, CREATININE, GLUCOSE, CALCIUM, AST, ALT, ALKPHOS, PROT, BILITOT, EGFR        Results from last 7 days   Lab Units 07/19/22  0352 07/18/22  0601 07/17/22  0544 07/16/22  0524   POTASSIUM mmol/L 4 4 4 7 4 6 4 9   CHLORIDE mmol/L 92* 90* 91* 94*   CO2 mmol/L 22 20* 26 21   BUN mg/dL 47* 110* 85* 89*   CREATININE mg/dL 5 04* 8 87* 6 92* 6 96*   CALCIUM mg/dL 9 2 8 2* 8 5 9 2   ALK PHOS U/L  --  68 65 71   ALT U/L  --  5* 4* 3*   AST U/L  --  9* 5* 6*         Phosphorus: No results found for: PHOS  Magnesium: No results found for: MG  Urinalysis: No results found for: COLORU, CLARITYU, SPECGRAV, PHUR, LEUKOCYTESUR, NITRITE, PROTEINUA, GLUCOSEU, KETONESU, BILIRUBINUR, BLOODU  Ionized Calcium: No results found for: CAION  Coagulation: No results found for: PT, INR, APTT  Troponin: No results found for: TROPONINI  ABG: No results found for: PHART, AGZ8BWW, PO2ART, IBS5KTP, R5LWHJCV, BEART, SOURCE  Radiology review:     IMAGING  Procedure: XR chest portable ICU    Result Date: 7/19/2022  Narrative: CHEST INDICATION:   hypoxia  COMPARISON:  Chest radiograph July 12, 2022 EXAM PERFORMED/VIEWS:  XR CHEST PORTABLE ICU FINDINGS:  Tip of right internal jugular central venous catheter projects over the superior cavoatrial junction  Heart shadow is enlarged but unchanged from prior exam  Increased prominence of the interstitial markings  Slight increased size of the small right pleural effusion  No pneumothorax  Osseous structures appear within normal limits for patient age  Impression: Pulmonary edema has slightly increased since radiograph performed 2 days prior   Workstation performed: [id number]       Current Facility-Administered Medications   Medication Dose Route Frequency    acetaminophen (TYLENOL) tablet 650 mg  650 mg Oral Q4H PRN    albuterol inhalation solution 2 5 mg  2 5 mg Nebulization Q6H PRN    amLODIPine (NORVASC) tablet 5 mg  5 mg Oral Daily    calcium acetate (PHOSLO) capsule 1,334 mg  1,334 mg Oral TID With Meals    carvedilol (COREG) tablet 25 mg  25 mg Oral BID With Meals    clonazePAM (KlonoPIN) tablet 0 5 mg  0 5 mg Oral BID PRN    guaiFENesin (MUCINEX) 12 hr tablet 600 mg  600 mg Oral Q12H [facility]    heparin (porcine) subcutaneous injection 5,000 Units  5,000 Units Subcutaneous Q8H [address]    hydrALAZINE (APRESOLINE) injection 10 mg  10 mg Intravenous Q6H PRN    levalbuterol (XOPENEX) inhalation solution 1 25 mg  1 25 mg Nebulization TID    And    sodium chloride 0 9 % inhalation solution 3 mL  3 mL Nebulization TID    lidocaine (LIDODERM) 5 % patch 2 patch  2 patch Topical Daily    nicotine (NICODERM CQ) 21 mg/24 hr TD 24 hr patch 1 patch  1 patch Transdermal Daily    OLANZapine (ZyPREXA ZYDIS) dispersible tablet 10 mg  10 mg Oral HS PRN    ondansetron (ZOFRAN) injection 4 mg  4 mg Intravenous Q6H PRN    oxyCODONE-acetaminophen (PERCOCET) 5-325 mg per tablet 1 tablet  1 tablet Oral Q4H PRN    QUEtiapine (SEROquel) tablet 25 mg  25 mg Oral HS    umeclidinium-vilanterol (ANORO ELLIPTA) 62 5-25 MCG/INH inhaler 1 puff  1 puff Inhalation Daily     Medications Discontinued During This Encounter   Medication Reason    furosemide (LASIX) injection 40 mg     heparin (porcine) subcutaneous injection 5,000 Units     umeclidinium-vilanterol (ANORO ELLIPTA) 62 5-25 MCG/INH inhaler 1 puff     morphine (MS CONTIN) ER tablet 30 mg     oxyCODONE-acetaminophen (PERCOCET) 5-325 mg per tablet 1 tablet     potassium chloride (K-DUR,KLOR-CON) CR tablet 20 mEq     nitroGLYcerin (TRIDIL) 50 mg in 250 mL infusion (premix)     azithromycin (ZITHROMAX) tablet 500 mg     carvedilol (COREG) tablet 3 125 mg     ipratropium (ATROVENT) 0 02 % inhalation solution 0 5 mg     azithromycin (ZITHROMAX) 500 mg in sodium chloride 0 9 % 250 mL IVPB     methylPREDNISolone sodium succinate (Solu-MEDROL) injection 40 mg     furosemide (LASIX) injection 40 mg     azithromycin (ZITHROMAX) tablet 500 mg     multi-electrolyte (ISOLYTE-S PH 7 4) bolus 250 mL     oxyCODONE-acetaminophen (PERCOCET) 5-325 mg per tablet 1 tablet     morphine injection 2 mg     milrinone (PRIMACOR) 20 mg in 100 mL infusion (premix)     furosemide (LASIX) 500 mg infusion 50 mL     clonazePAM (KlonoPIN) tablet 0 5 mg     carvedilol (COREG) tablet 3 125 mg     dexmedeTOMIDine (Precedex) 400 mcg in sodium chloride 0 9% 100 mL     OLANZapine (ZyPREXA ZYDIS) dispersible tablet 10 mg     carvedilol (COREG) tablet 6 25 mg     hydrALAZINE (APRESOLINE) injection 10 mg     carvedilol (COREG) tablet 12 5 mg     dexmedeTOMIDine (Precedex) 400 mcg in sodium chloride 0 9% 100 mL     niCARdipine (CARDENE) 50 mg (DOUBLE CONCENTRATION) IV in sodium chloride 0 9% 250 mL     hydrALAZINE (APRESOLINE) injection 20 mg        Christophe Brandt, DO      This progress note was produced in part using a dictation device which may document imprecise wording from author's original intent

## 2022-07-20 NOTE — PLAN OF CARE
Problem: PHYSICAL THERAPY ADULT  Goal: Performs mobility at highest level of function for planned discharge setting  See evaluation for individualized goals  Description: Treatment/Interventions: Functional transfer training, LE strengthening/ROM, Therapeutic exercise, Endurance training, Patient/family training, Equipment eval/education, Bed mobility, Gait training, Elevations, Spoke to case management, Spoke to nursing  Equipment Recommended:  (TBD pending pt progression with OOB mobility)       See flowsheet documentation for full assessment, interventions and recommendations  Outcome: Not Progressing  Note: Prognosis: Fair  Problem List: Decreased strength, Decreased endurance, Impaired balance, Decreased mobility  Assessment: Pt seen for PT treatment session this date, consisting of ther act focused on bed mobility and functional transfer training and gt training on level surfaces to improve pt safety in household environment  Since previous session, pt has made no progress in terms of progression of OOB activity, pt requires increased physical assistance for mobility tasks and on HFNC  Pertinent barriers during this session include SOB and awareness  Current goals and POC remain appropriate, pt continues to have rehab potential and is making progress towards STGs  Pt prognosis for achieving goals is fair, pending pt progress with hospitalization/medical status improvements, and indicated by supportive family/caregivers  Pt limited d/t inability to concentrate under maximum structure, avoidance behaviors and lack of ability to demonstrate mobility and/or self-care activities  PT recommends post acute rehabilitation services upon discharge  Pt continues to be functioning below baseline level, and remains limited 2* factors listed above   PT will continue to see pt during current hospitalization in order to address the deficits listed above and provide interventions consistent w/ POC in effort to achieve STGs   Barriers to Discharge: Decreased caregiver support     PT Discharge Recommendation: Post acute rehabilitation services    See flowsheet documentation for full assessment

## 2022-07-20 NOTE — ASSESSMENT & PLAN NOTE
Wt Readings from Last 3 Encounters:   07/20/22 77 2 kg (170 lb 3 1 oz)   06/28/22 77 4 kg (170 lb 9 6 oz)   06/22/22 76 6 kg (168 lb 14 oz)     · Does not appear volume overloaded, patient anuric, volume management by dialysis  · Low-salt diet with 1500 mL fluid restriction  · Daily weights and I&Os  · Cardiology recommendations appreciated

## 2022-07-20 NOTE — OCCUPATIONAL THERAPY NOTE
Occupational Therapy Treatment Note      Salvatore Webber    2022    Principal Problem:    COPD with acute exacerbation ([place] [address] )  Active Problems:    Essential hypertension    Chronic pain    Anxiety    Acute on chronic combined systolic and diastolic congestive heart failure (HCC)    Tobacco abuse    SHANTA (acute kidney injury) ([place] [address] )    Acute on chronic respiratory failure with hypoxemia ([facility])      Past Medical History:   Diagnosis Date    Cardiomyopathy ([place] [address] )     Chronic combined systolic and diastolic congestive heart failure     COPD (chronic obstructive pulmonary disease) ([facility])     Fatty liver     Hyperlipidemia     Hypertension     Mitral regurgitation     Sepsis        Past Surgical History:   Procedure Laterality Date    CARDIAC CATHETERIZATION  2021    Moderate non-obstructive atherosclerosis     SECTION      CHOLECYSTECTOMY      ROTATOR CUFF REPAIR W/ DISTAL CLAVICLE EXCISION Right     TONSILLECTOMY          22 0957   OT Last Visit   OT Visit Date 22   Note Type   Note Type Treatment   Restrictions/Precautions   Weight Bearing Precautions Per Order No   Other Precautions Fall Risk;Multiple lines;Telemetry;O2  (HFNC)   Pain Assessment   Pain Assessment Tool 0-10   Pain Score No Pain   Bed Mobility   Supine to Sit 3  Moderate assistance   Additional items Assist x 1;HOB elevated; Increased time required;Verbal cues;LE management   Additional Comments Pt remained OOB in recliner upon conclusion   Transfers   Sit to Stand 4  Minimal assistance   Additional items Assist x 2; Increased time required   Stand to Sit 4  Minimal assistance   Additional items Assist x 2;Armrests   Stand pivot 4  Minimal assistance   Additional items Assist x 2; Increased time required;Verbal cues   Additional Comments Pt declined to complete any further OT at this time due to fatigue     Cognition   Arousal/Participation Responsive;Persistent stimuli required   Attention Attends with cues to redirect Orientation Level Oriented to person;Oriented to place   Memory Decreased recall of precautions   Following Commands Follows one step commands with increased time or repetition   Activity Tolerance   Activity Tolerance Patient limited by fatigue   Medical Staff Made Aware RN Anna   Assessment   Assessment Patient participated in Skilled OT session this date with interventions consisting of  therapeutic activities to: increase activity tolerance   Patient agreeable to OT treatment session, upon arrival patient was found supine in bed  Patient requiring frequent rest periods  Patient continues to be functioning below baseline level, occupational performance remains limited secondary to factors listed above and increased risk for falls and injury  From OT standpoint, recommendation at time of d/c would be Short Term Rehab  Patient to benefit from continued Occupational Therapy treatment while in the hospital to address deficits as defined above and maximize level of functional independence with ADLs and functional mobility  Plan   Treatment Interventions ADL retraining;Functional transfer training; Endurance training; Compensatory technique education; Energy conservation; Activityengagement   Goal Expiration Date 07/28/22   OT Treatment Day 1   OT Frequency 3-5x/wk   Recommendation   OT Discharge Recommendation (S)  Post acute rehabilitation services   Additional Comments  Co treatment with PT completed secondary to complex medical condition of pt, Min A of 2 required to achieve and maintain transitional movements, requiring the need of skilled therapeutic intervention of 2 therapists to achieve delivery of services  Additional Comments 2 The patient's raw score on the AM-PAC Daily Activity inpatient short form is 16, standardized score is 35 96, less than 39 4  Patients at this level are likely to benefit from discharge to post-acute rehabilitation services   Please refer to the recommendation of the Occupational Therapist for safe discharge planning     AM-PAC Daily Activity Inpatient   Lower Body Dressing 2   Bathing 2   Toileting 1   Upper Body Dressing 3   Grooming 4   Eating 4   Daily Activity Raw Score 16   Daily Activity Standardized Score (Calc for Raw Score >=11) 35 96   AM-PAC Applied Cognition Inpatient   Following a Speech/Presentation 3   Understanding Ordinary Conversation 3   Taking Medications 3   Remembering Where Things Are Placed or Put Away 3   Remembering List of 4-5 Errands 2   Taking Care of Complicated Tasks 2   Applied Cognition Raw Score 16   Applied Cognition Standardized Score 35 03     Leena Mortensen MS, OTR/L

## 2022-07-20 NOTE — ASSESSMENT & PLAN NOTE
· Initially admitted to Medicine Service and had acute change in respiratory status, transfer to Critical Care Service  · History of intubation twice in the past year for similar presentation   · Treated with BiPAP, titrated to 4 L nasal cannula  · Currently utilizing high-flow nasal cannula, saturating 93%  · Cardiology Nephrology recommendations appreciated  · Continue dialysis Monday Wednesday Friday  · Continue oxygen protocol

## 2022-07-20 NOTE — PROGRESS NOTES
Bernardino U  66   Progress Note Rae Roque 1960, 64 y o  female MRN: [id number]  Unit/Bed#: ICU 02-01 Encounter: [id number]  Primary Care Provider: Yoel Locke DO   Date and time admitted to hospital: 7/12/2022  5:54 PM    * Acute on chronic respiratory failure with hypoxemia [facility])  Assessment & Plan  · Initially admitted to Medicine Service and had acute change in respiratory status, transfer to Critical Care Service  · History of intubation twice in the past year for similar presentation   · Treated with BiPAP, titrated to 4 L nasal cannula  · Currently utilizing high-flow nasal cannula, saturating 93%  · Cardiology Nephrology recommendations appreciated  · Continue dialysis Monday Wednesday Friday  · Continue oxygen protocol    SHANTA (acute kidney injury) [facility])  Assessment & Plan  · Baseline creatinine appears to be around 1 5-1 8  · Nephrology following  · Likely 2/2 ATN, creatinine remains elevated  · Became oliguric and did not respond to diuretics or fluids  · Tolerated iHD session 7/15, 7/16, 7/18   · Dialysis scheduled for Monday Wednesday Friday  · Continue to monitor renal indices and urine output   · Work up for pheochromocytoma pending  · Case management on board-patient will need placement at a facility with dialysis availability, at least short-term    COPD with acute exacerbation ([facility] [address] )  Assessment & Plan  · Diffuse wheezing on exam, on high-flow oxygen  · Respiratory protocol  · Received 3 doses of Solu-Medrol 40 mg IV, then discontinued  · Give Xopenex 3 times daily  · Continue pre-hospital Anoro 1 puff daily  · Consult pulmonary     Tobacco abuse  Assessment & Plan  · Nicotine patch  · Encourage cessation    Acute on chronic combined systolic and diastolic congestive heart failure (HCC)  Assessment & Plan  Wt Readings from Last 3 Encounters:   07/20/22 77 2 kg (170 lb 3 1 oz)   06/28/22 77 4 kg (170 lb 9 6 oz)   06/22/22 76 6 kg (168 lb 14 oz)     · Does not appear volume overloaded, patient anuric, volume management by dialysis  · Low-salt diet with 1500 mL fluid restriction  · Daily weights and I&Os  · Cardiology recommendations appreciated      Anxiety  Assessment & Plan  · Home Klonopin increased to twice daily, Zyprexa added as needed  · Seroquel started 2022  · Supportive care    Chronic pain  Assessment & Plan  · Continue pre-hospital MS Contin and Percocet    Essential hypertension  Assessment & Plan  · BP reviewed  · Continue amlodipine  · Continue Coreg which has been increased to 25 mg twice daily   · Monitor blood pressure    Hypokalemia-resolved as of 7/15/2022  Assessment & Plan  · Continue daily BMP and monitor potassium    VTE Pharmacologic Prophylaxis:   Pharmacologic: Heparin  Mechanical VTE Prophylaxis in Place: Yes    Patient Centered Rounds: I have performed bedside rounds with nursing staff today  Discussions with Specialists or Other Care Team Provider:  Case management    Education and Discussions with Family / Patient:  Patient    Time Spent for Care: 30 minutes  More than 50% of total time spent on counseling and coordination of care as described above  Current Length of Stay: 8 day(s)    Current Patient Status: Inpatient   Certification Statement: The patient will continue to require additional inpatient hospital stay due to per plan above  Discharge Plan: To be determined    Code Status: Level 1 - Full Code      Subjective:   Patient seen and examined  She says she still does not feel good, but does feel a little better  Objective:     Vitals:   Temp (24hrs), Av 4 °F (36 9 °C), Min:97 6 °F (36 4 °C), Max:99 2 °F (37 3 °C)    Temp:  [97 6 °F (36 4 °C)-99 2 °F (37 3 °C)] 97 6 °F (36 4 °C)  HR:  [81-90] 89  Resp:  [18-52] 23  BP: (130-218)/(72-95) 183/88  SpO2:  [90 %-98 %] 90 %  Body mass index is 24 42 kg/m²       Input and Output Summary (last 24 hours):     No intake or output data in the 24 hours ending 22 1059    Physical Exam:     Physical Exam  Vitals and nursing note reviewed  Constitutional:       Appearance: She is ill-appearing  HENT:      Head: Normocephalic and atraumatic  Mouth/Throat:      Mouth: Mucous membranes are dry  Pharynx: Oropharynx is clear  Eyes:      Pupils: Pupils are equal, round, and reactive to light  Cardiovascular:      Rate and Rhythm: Normal rate and regular rhythm  Pulses: Normal pulses  Pulmonary:      Effort: Pulmonary effort is normal  No respiratory distress  Breath sounds: Decreased breath sounds present  No wheezing  Comments: No wheezing today, decreased breath sounds  Abdominal:      General: Bowel sounds are normal       Palpations: Abdomen is soft  Tenderness: There is no abdominal tenderness  Musculoskeletal:      Cervical back: Neck supple  Right lower leg: No edema  Left lower leg: No edema  Skin:     General: Skin is warm and dry  Capillary Refill: Capillary refill takes less than 2 seconds  Neurological:      General: No focal deficit present  Mental Status: She is alert           Additional Data:     Labs:    Results from last 7 days   Lab Units 07/19/22  0353 07/18/22  0601   WBC Thousand/uL 12 78* 10 82*   HEMOGLOBIN g/dL 11 4* 10 1*   HEMATOCRIT % 33 7* 30 4*   PLATELETS Thousands/uL 260 240   NEUTROS PCT %  --  72   LYMPHS PCT %  --  15   MONOS PCT %  --  8   EOS PCT %  --  3     Results from last 7 days   Lab Units 07/19/22  0352 07/18/22  0601   SODIUM mmol/L 127* 129*   POTASSIUM mmol/L 4 4 4 7   CHLORIDE mmol/L 92* 90*   CO2 mmol/L 22 20*   BUN mg/dL 47* 110*   CREATININE mg/dL 5 04* 8 87*   ANION GAP mmol/L 13 19*   CALCIUM mg/dL 9 2 8 2*   ALBUMIN g/dL  --  3 4*   TOTAL BILIRUBIN mg/dL  --  0 48   ALK PHOS U/L  --  68   ALT U/L  --  5*   AST U/L  --  9*   GLUCOSE RANDOM mg/dL 135 115         Results from last 7 days   Lab Units 07/15/22  1423   POC GLUCOSE mg/dl 82         Results from last 7 days Lab Units 07/20/22  0524 07/19/22  0352 07/18/22  0601 07/17/22  0544 07/15/22  0627   PROCALCITONIN ng/ml 1 75* 2 58* 2 97* 4 20* 3 04*           * I Have Reviewed All Lab Data Listed Above  * Additional Pertinent Lab Tests Reviewed: [place] [facility] Admission Reviewed    Imaging:    Imaging Reports Reviewed Today Include:  Chest x-ray          Last 24 Hours Medication List:   Current Facility-Administered Medications   Medication Dose Route Frequency Provider Last Rate    acetaminophen  650 mg Oral Q4H PRN Karle Fire, CRNP      albuterol  2 5 mg Nebulization Q6H PRN Karle Fire, CRNP      amLODIPine  5 mg Oral Daily Karle Fire, CRNP      calcium acetate  1,334 mg Oral TID With Meals [facility], DO      carvedilol  25 mg Oral BID With Meals Karle Fire, CRNP      clonazePAM  0 5 mg Oral BID PRN Karle Fire, CRNP      guaiFENesin  600 mg Oral Q12H [facility] Kar Fire, CRNP      heparin (porcine)  5,000 Units Subcutaneous [place], [place]      hydrALAZINE  10 mg Intravenous Q6H PRN Eleni Vera PA-C      levalbuterol  1 25 mg Nebulization TID Karle Fire, CRNP      And    sodium chloride  3 mL Nebulization TID Karle Fire, CRNP      lidocaine  2 patch Topical Daily Karle Fire, CRNP      nicotine  1 patch Transdermal Daily Karle Fire, CRNP      OLANZapine  10 mg Oral HS PRN Karle Fire, CRNP      ondansetron  4 mg Intravenous Q6H PRN Karle Fire, CRNP      oxyCODONE-acetaminophen  1 tablet Oral Q4H PRN Karle Fire, CRNP      QUEtiapine  25 mg Oral HS Karle Fire, CRNP      umeclidinium-vilanterol  1 puff Inhalation Daily Karle Fire, CRNP          Today, Patient Was Seen By: ESTHER Grier    ** Please Note: Dictation voice to text software may have been used in the creation of this document   **

## 2022-07-20 NOTE — PLAN OF CARE
Problem: OCCUPATIONAL THERAPY ADULT  Goal: Performs self-care activities at highest level of function for planned discharge setting  See evaluation for individualized goals  Description: Treatment Interventions: ADL retraining, Functional transfer training, Endurance training, Compensatory technique education, Energy conservation, Activityengagement     See flowsheet documentation for full assessment, interventions and recommendations  Outcome: Not Progressing  Note: Limitation: Decreased ADL status, Decreased Safe judgement during ADL, Decreased endurance, Decreased self-care trans, Decreased high-level ADLs  Prognosis: Good  Assessment: Patient participated in Skilled OT session this date with interventions consisting of  therapeutic activities to: increase activity tolerance   Patient agreeable to OT treatment session, upon arrival patient was found supine in bed  Patient requiring frequent rest periods  Patient continues to be functioning below baseline level, occupational performance remains limited secondary to factors listed above and increased risk for falls and injury  From OT standpoint, recommendation at time of d/c would be Short Term Rehab  Patient to benefit from continued Occupational Therapy treatment while in the hospital to address deficits as defined above and maximize level of functional independence with ADLs and functional mobility       OT Discharge Recommendation: (S) Post acute rehabilitation services     Myrisa Georgeana Holstein, MS, OTR/L

## 2022-07-20 NOTE — PLAN OF CARE
Pre-tx:  UF 3 L as pt tolerates per discussion with Nephrologist Brandt  3hr tx today d/t scheduling, nephrology aware  BP stable at 153/89 to start tx today  Will cont to monitor       Post-Dialysis RN Treatment Note    Blood Pressure:  Pre 153/89 mm/Hg  Post 174/84 mmHg   EDW  TBD kg    Weight:  Pre 76 9 kg   Post 73 7 kg   Mode of weight measurement: Bed Scale   Volume Removed  3200 ml    Treatment duration 3 hours    NS given  No    Treatment shortened?  3 hr tx today d/t scheduling per discussion with Nephrologist Brandt   Medications given during Rx Not Applicable   Estimated Kt/V  1 12   Access type: Temporary HD catheter   Access Issues: No    Report called to primary nurse   Yes       Problem: METABOLIC, FLUID AND ELECTROLYTES - ADULT  Goal: Electrolytes maintained within normal limits  Description: INTERVENTIONS:  - Monitor labs and assess patient for signs and symptoms of electrolyte imbalances  - Administer electrolyte replacement as ordered  - Monitor response to electrolyte replacements, including repeat lab results as appropriate  - Instruct patient on fluid and nutrition as appropriate  Outcome: Progressing  Goal: Fluid balance maintained  Description: INTERVENTIONS:  - Monitor labs   - Monitor I/O and WT  - Instruct patient on fluid and nutrition as appropriate  - Assess for signs & symptoms of volume excess or deficit  Outcome: Progressing

## 2022-07-20 NOTE — PHYSICAL THERAPY NOTE
Physical Therapy Treatment Note       07/20/22 0958   PT Last Visit   PT Visit Date 07/20/22   Note Type   Note Type Treatment   Pain Assessment   Pain Assessment Tool 0-10   Pain Score No Pain   Restrictions/Precautions   Weight Bearing Precautions Per Order No   Other Precautions Fall Risk;Multiple lines;Telemetry;O2  (HFNC)   General   Chart Reviewed Yes   Response to Previous Treatment Patient unable to report, no changes reported from family or staff   Family/Caregiver Present Yes  (pt's dtr at bedside)   Cognition   Arousal/Participation Responsive;Persistent stimuli required   Attention Attends with cues to redirect   Orientation Level Oriented to person;Oriented to place   Memory Decreased recall of precautions   Following Commands Follows one step commands with increased time or repetition   Comments pt ultimately agreeable to PT session with maximal encouragement by PT/OT and dtr at bedside   Subjective   Subjective "I want to lay down and nap in the chair"   Bed Mobility   Supine to Sit 3  Moderate assistance   Additional items Assist x 1;HOB elevated; Increased time required;Verbal cues;LE management   Transfers   Sit to Stand 4  Minimal assistance   Additional items Assist x 2; Increased time required   Stand to Sit 4  Minimal assistance   Additional items Assist x 2;Armrests   Ambulation/Elevation   Gait pattern Improper Weight shift; Wide CARLY; Decreased foot clearance; Short stride   Gait Assistance 4  Minimal assist   Additional items Assist x 2   Assistive Device   (HHA)   Distance 3 ft   Stair Management Assistance Not tested   Balance   Static Sitting Fair   Dynamic Sitting Fair -   Static Standing Fair -   Dynamic Standing Poor +   Ambulatory Poor +   Endurance Deficit   Endurance Deficit Yes   Activity Tolerance   Activity Tolerance Patient limited by fatigue   Medical Staff Made Aware coordination of care provided with OT Leena Hill Made Aware Yes, RN Kathryn Warren made aware of outcomes/recs Assessment   Prognosis Fair   Problem List Decreased strength;Decreased endurance; Impaired balance;Decreased mobility   Assessment Pt seen for PT treatment session this date, consisting of ther act focused on bed mobility and functional transfer training and gt training on level surfaces to improve pt safety in household environment  Since previous session, pt has made no progress in terms of progression of OOB activity, pt requires increased physical assistance for mobility tasks and on HFNC  Pertinent barriers during this session include SOB and awareness  Current goals and POC remain appropriate, pt continues to have rehab potential and is making progress towards STGs  Pt prognosis for achieving goals is fair, pending pt progress with hospitalization/medical status improvements, and indicated by supportive family/caregivers  Pt limited d/t inability to concentrate under maximum structure, avoidance behaviors and lack of ability to demonstrate mobility and/or self-care activities  PT recommends post acute rehabilitation services upon discharge  Pt continues to be functioning below baseline level, and remains limited 2* factors listed above  PT will continue to see pt during current hospitalization in order to address the deficits listed above and provide interventions consistent w/ POC in effort to achieve STGs  Barriers to Discharge Decreased caregiver support   Goals   Patient Goals "to nap"   STG Expiration Date 07/28/22   Short Term Goal #1 goals remain appropriate   PT Treatment Day 1   Plan   Treatment/Interventions Functional transfer training;LE strengthening/ROM; Therapeutic exercise; Endurance training;Patient/family training;Equipment eval/education; Bed mobility;Gait training;Spoke to nursing   Progress No functional improvements   PT Frequency 3-5x/wk   Recommendation   PT Discharge Recommendation Post acute rehabilitation services   Equipment Recommended   (pt would benefit from RW trial next session)   Additional Comments Pt's raw score on the AM-PAC Basic Mobility inpatient short form is 12, standardized score is 32 23  Patients at this level are likely to benefit from DC to [facility], however, please refer to therapist recommendation for safe DC planning  AM-[facility] Basic Mobility Inpatient   Turning in Bed Without Bedrails 3   Lying on Back to Sitting on Edge of Flat Bed 2   Moving Bed to Chair 2   Standing Up From Chair 2   Walk in Room 2   Climb 3-5 Stairs 1   Basic Mobility Inpatient Raw Score 12   Basic Mobility Standardized Score 32 23   Highest Level Of Mobility   JH-HLM Goal 4: Move to chair/commode   JH-HLM Achieved 4: Move to chair/commode   Education   Education Provided Mobility training   Patient Reinforcement needed   End of Consult   Patient Position at End of Consult Bedside chair; All needs within reach             Edyta Meneses, PT, DPT    Time of PT treatment session: 9073-3911 (total treatment time = 17 minutes)

## 2022-07-20 NOTE — RESPIRATORY THERAPY NOTE
07/20/22 0718   Respiratory Assessment   Assessment Type Pre-treatment   General Appearance Drowsy   Respiratory Pattern Normal   Chest Assessment Chest expansion symmetrical   Bilateral Breath Sounds Diminished; Other (Comment)  (Faint wheeze)   Resp Comments TX given with 1 25 mg Xopenex / Nss   Non-Invasive Information   O2 Interface Device HFNC prongs   Non-Invasive Ventilation Mode HFNC (High flow)   SpO2 93 %   $ Pulse Oximetry Spot Check Charge Completed   Non-Invasive Settings   FiO2 (%) 50   Temperature (Set) 31   Flow (lpm) (S)  50  (Found on 50 liters   Decreased to 45 liters, RN Anna aware)

## 2022-07-20 NOTE — TELEPHONE ENCOUNTER
Aníbal Garcia from the [facility] called back asking about why patient had a Hep B Panel done    I told her I was not sure has patient has currently been in the hospital   She stated that she will be reaching out to the hospital

## 2022-07-21 ENCOUNTER — APPOINTMENT (INPATIENT)
Dept: RADIOLOGY | Facility: HOSPITAL | Age: 62
DRG: 682 | End: 2022-07-21
Payer: MEDICARE

## 2022-07-21 LAB
ANION GAP SERPL CALCULATED.3IONS-SCNC: 12 MMOL/L (ref 4–13)
BASOPHILS # BLD AUTO: 0.07 THOUSANDS/ΜL (ref 0–0.1)
BASOPHILS NFR BLD AUTO: 1 % (ref 0–1)
BUN SERPL-MCNC: 56 MG/DL (ref 5–25)
CALCIUM SERPL-MCNC: 8.9 MG/DL (ref 8.4–10.2)
CHLORIDE SERPL-SCNC: 92 MMOL/L (ref 96–108)
CO2 SERPL-SCNC: 23 MMOL/L (ref 21–32)
CREAT SERPL-MCNC: 5.81 MG/DL (ref 0.6–1.3)
EOSINOPHIL # BLD AUTO: 0.17 THOUSAND/ΜL (ref 0–0.61)
EOSINOPHIL NFR BLD AUTO: 2 % (ref 0–6)
ERYTHROCYTE [DISTWIDTH] IN BLOOD BY AUTOMATED COUNT: 14.1 % (ref 11.6–15.1)
GFR SERPL CREATININE-BSD FRML MDRD: 7 ML/MIN/1.73SQ M
GLUCOSE SERPL-MCNC: 122 MG/DL (ref 65–140)
HCT VFR BLD AUTO: 33.3 % (ref 34.8–46.1)
HGB BLD-MCNC: 10.8 G/DL (ref 11.5–15.4)
IMM GRANULOCYTES # BLD AUTO: 0.14 THOUSAND/UL (ref 0–0.2)
IMM GRANULOCYTES NFR BLD AUTO: 1 % (ref 0–2)
LYMPHOCYTES # BLD AUTO: 2.14 THOUSANDS/ΜL (ref 0.6–4.47)
LYMPHOCYTES NFR BLD AUTO: 20 % (ref 14–44)
MCH RBC QN AUTO: 29.5 PG (ref 26.8–34.3)
MCHC RBC AUTO-ENTMCNC: 32.4 G/DL (ref 31.4–37.4)
MCV RBC AUTO: 91 FL (ref 82–98)
MONOCYTES # BLD AUTO: 0.89 THOUSAND/ΜL (ref 0.17–1.22)
MONOCYTES NFR BLD AUTO: 8 % (ref 4–12)
NEUTROPHILS # BLD AUTO: 7.33 THOUSANDS/ΜL (ref 1.85–7.62)
NEUTS SEG NFR BLD AUTO: 68 % (ref 43–75)
NRBC BLD AUTO-RTO: 0 /100 WBCS
NT-PROBNP SERPL-MCNC: ABNORMAL PG/ML
PLATELET # BLD AUTO: 262 THOUSANDS/UL (ref 149–390)
PMV BLD AUTO: 9.5 FL (ref 8.9–12.7)
POTASSIUM SERPL-SCNC: 4.8 MMOL/L (ref 3.5–5.3)
RBC # BLD AUTO: 3.66 MILLION/UL (ref 3.81–5.12)
SODIUM SERPL-SCNC: 127 MMOL/L (ref 135–147)
WBC # BLD AUTO: 10.74 THOUSAND/UL (ref 4.31–10.16)

## 2022-07-21 PROCEDURE — 99232 SBSQ HOSP IP/OBS MODERATE 35: CPT | Performed by: INTERNAL MEDICINE

## 2022-07-21 PROCEDURE — 99233 SBSQ HOSP IP/OBS HIGH 50: CPT | Performed by: NURSE PRACTITIONER

## 2022-07-21 PROCEDURE — 94660 CPAP INITIATION&MGMT: CPT

## 2022-07-21 PROCEDURE — 83880 ASSAY OF NATRIURETIC PEPTIDE: CPT | Performed by: INTERNAL MEDICINE

## 2022-07-21 PROCEDURE — 85025 COMPLETE CBC W/AUTO DIFF WBC: CPT | Performed by: NURSE PRACTITIONER

## 2022-07-21 PROCEDURE — 94760 N-INVAS EAR/PLS OXIMETRY 1: CPT

## 2022-07-21 PROCEDURE — 80048 BASIC METABOLIC PNL TOTAL CA: CPT | Performed by: NURSE PRACTITIONER

## 2022-07-21 PROCEDURE — 94668 MNPJ CHEST WALL SBSQ: CPT

## 2022-07-21 PROCEDURE — 71045 X-RAY EXAM CHEST 1 VIEW: CPT

## 2022-07-21 PROCEDURE — 97110 THERAPEUTIC EXERCISES: CPT

## 2022-07-21 PROCEDURE — 94640 AIRWAY INHALATION TREATMENT: CPT

## 2022-07-21 RX ADMIN — LEVALBUTEROL HYDROCHLORIDE 1.25 MG: 1.25 SOLUTION, CONCENTRATE RESPIRATORY (INHALATION) at 07:38

## 2022-07-21 RX ADMIN — OXYCODONE HYDROCHLORIDE AND ACETAMINOPHEN 1 TABLET: 5; 325 TABLET ORAL at 00:08

## 2022-07-21 RX ADMIN — IPRATROPIUM BROMIDE 0.5 MG: 0.5 SOLUTION RESPIRATORY (INHALATION) at 20:50

## 2022-07-21 RX ADMIN — CARVEDILOL 25 MG: 25 TABLET, FILM COATED ORAL at 07:17

## 2022-07-21 RX ADMIN — ISODIUM CHLORIDE 3 ML: 0.03 SOLUTION RESPIRATORY (INHALATION) at 07:38

## 2022-07-21 RX ADMIN — CLONAZEPAM 0.5 MG: 0.5 TABLET ORAL at 16:23

## 2022-07-21 RX ADMIN — LEVALBUTEROL HYDROCHLORIDE 1.25 MG: 1.25 SOLUTION, CONCENTRATE RESPIRATORY (INHALATION) at 20:50

## 2022-07-21 RX ADMIN — CARVEDILOL 25 MG: 25 TABLET, FILM COATED ORAL at 17:16

## 2022-07-21 RX ADMIN — LIDOCAINE 2 PATCH: 50 PATCH TOPICAL at 08:50

## 2022-07-21 RX ADMIN — GUAIFENESIN 600 MG: 600 TABLET, EXTENDED RELEASE ORAL at 08:49

## 2022-07-21 RX ADMIN — AMLODIPINE BESYLATE 5 MG: 5 TABLET ORAL at 08:49

## 2022-07-21 RX ADMIN — ISODIUM CHLORIDE 3 ML: 0.03 SOLUTION RESPIRATORY (INHALATION) at 13:25

## 2022-07-21 RX ADMIN — HEPARIN SODIUM 5000 UNITS: 5000 INJECTION INTRAVENOUS; SUBCUTANEOUS at 21:01

## 2022-07-21 RX ADMIN — CALCIUM ACETATE 1334 MG: 667 CAPSULE ORAL at 17:16

## 2022-07-21 RX ADMIN — HEPARIN SODIUM 5000 UNITS: 5000 INJECTION INTRAVENOUS; SUBCUTANEOUS at 06:08

## 2022-07-21 RX ADMIN — CLONAZEPAM 0.5 MG: 0.5 TABLET ORAL at 23:31

## 2022-07-21 RX ADMIN — UMECLIDINIUM BROMIDE AND VILANTEROL TRIFENATATE 1 PUFF: 62.5; 25 POWDER RESPIRATORY (INHALATION) at 09:08

## 2022-07-21 RX ADMIN — GUAIFENESIN 600 MG: 600 TABLET, EXTENDED RELEASE ORAL at 21:01

## 2022-07-21 RX ADMIN — CALCIUM ACETATE 1334 MG: 667 CAPSULE ORAL at 07:17

## 2022-07-21 RX ADMIN — OXYCODONE HYDROCHLORIDE AND ACETAMINOPHEN 1 TABLET: 5; 325 TABLET ORAL at 21:01

## 2022-07-21 RX ADMIN — QUETIAPINE FUMARATE 25 MG: 25 TABLET ORAL at 21:01

## 2022-07-21 RX ADMIN — NICOTINE 1 PATCH: 21 PATCH, EXTENDED RELEASE TRANSDERMAL at 09:08

## 2022-07-21 RX ADMIN — BUMETANIDE 4 MG: 1 TABLET ORAL at 08:49

## 2022-07-21 RX ADMIN — LEVALBUTEROL HYDROCHLORIDE 1.25 MG: 1.25 SOLUTION, CONCENTRATE RESPIRATORY (INHALATION) at 13:25

## 2022-07-21 RX ADMIN — HEPARIN SODIUM 5000 UNITS: 5000 INJECTION INTRAVENOUS; SUBCUTANEOUS at 13:39

## 2022-07-21 RX ADMIN — CALCIUM ACETATE 1334 MG: 667 CAPSULE ORAL at 12:53

## 2022-07-21 NOTE — NURSING NOTE
Pt is anxious  Ringing light " I can't breath , I want BIPAP, " Respiratory therapist here and adjusted oxygen   Medicated for anxiety

## 2022-07-21 NOTE — RESPIRATORY THERAPY NOTE
07/21/22 0738   Respiratory Assessment   Assessment Type Pre-treatment   General Appearance Alert; Awake   Respiratory Pattern Normal   Chest Assessment Chest expansion symmetrical   Bilateral Breath Sounds Diminished;Clear   Resp Comments no distress tx given pt remains on optiflo 50% 40 l/m will cont to monitor   O2 Device optiflo   Non-Invasive Information   O2 Interface Device HFNC prongs   Non-Invasive Ventilation Mode HFNC (High flow)   SpO2 93 %   $ Pulse Oximetry Spot Check Charge Completed   Non-Invasive Settings   FiO2 (%) 50   Temperature (Set) 31   Flow (lpm) (S)  40   Non-Invasive Readings   Skin Intervention Skin intact   Heater Temperature (Obs) 31   Maintenance   Water bag changed No

## 2022-07-21 NOTE — OCCUPATIONAL THERAPY NOTE
07/21/22 1054   OT Last Visit   OT Visit Date 07/21/22   Note Type   Note Type Treatment  (completed [id number])   Restrictions/Precautions   Weight Bearing Precautions Per Order No   Other Precautions Fall Risk;Multiple lines;Telemetry;O2  (HFNC)   General   Response to Previous Treatment Patient with no complaints from previous session   Lifestyle   Intrinsic Gratification gardening (and in the winter, yousif)   Pain Assessment   Pain Assessment Tool 0-10   Pain Score 7  (stated: "you know I  have chronic pain?" )   Pain Location/Orientation Location: Neck  (had been sleeping in the chair on my approach)   ADL   Grooming Comments patient agreeable to comb hair which she accomplished with good result   LB Dressing Comments patient demos  'd ability to bring feet to opposite knees, for sock doff/donning   Therapeutic Excerise-Strength   UE Strength Yes  (With much encouragement and adaptations to gain patient's participation)   Right Upper Extremity- Strength   R Shoulder   (patient deferred all R shoulder exer  secondary to discomfort)   R Elbow Elbow flexion;Elbow extension  (and supin/pron-ation)   R Weight/Reps/Sets 1 pound weight - 15 reps each exer  Left Upper Extremity-Strength   L Shoulder   (pro/re-traction X 2 sets)   L Elbow Elbow extension;Elbow flexion  (and supin/pron-ation)   L Weights/Reps/Sets 1 pound weight - 15 reps each exer  Cognition   Overall Cognitive Status WFL   Arousal/Participation Responsive; Cooperative  (But very self-limiting at this time)   Attention Attends with cues to redirect   Following Commands Follows one step commands with increased time or repetition   Comments patient is very talkative at times - voices concern about being a burden to others/family   Activity Tolerance   Activity Tolerance Patient limited by fatigue;Patient limited by pain   Medical Staff Made Aware Nurse Eric Harrison informed of session's outcome  (Patient verbalized being interested in PT treatment - PT staff notified of same)   Assessment   Assessment Patient participated in Skilled OT session this date with interventions consisting of Energy Conservation techniques, therapeutic exercise to: increase functional use of BUEs, increase BUE muscle strength , increase postural control and increase OOB/ sitting tolerance   Patient agreeable to OT treatment session, upon arrival patient was found seated OOB to Recliner - I woke her for treatment  In comparison to previous session, patient with improvements in level of alertness  Patient requiring verbal cues for correct technique, verbal cues for pacing thru activity steps, one step directives and frequent rest periods, occasional re-direction, and self-care assistance as noted in flow sheet/-PAC  Patient continues to be functioning below baseline level, occupational performance remains limited secondary to factors listed above and increased risk for falls and injury  From OT standpoint, recommendation at time of d/c would be post-acute rehabilitation services  Patient to benefit from continued Occupational Therapy treatment while in the hospital to address deficits as defined above and maximize level of functional independence with ADLs and functional mobility  Plan   Treatment Interventions UE strengthening/ROM; Patient/family training; Activityengagement; Energy conservation   Goal Expiration Date 07/28/22   OT Treatment Day 2   Recommendation   OT Discharge Recommendation Post acute rehabilitation services   Additional Comments 2 The patient's raw score on the AM-PAC Daily Activity inpatient short form is 17, standardized score is 37 26, less than 39 4  Patients at this level are likely to benefit from discharge to post-acute rehabilitation services  Please refer to the recommendation of the Occupational Therapist for safe discharge planning     AM-PAC Daily Activity Inpatient   Lower Body Dressing 2   Bathing 2   Toileting 2   Upper Body Dressing 3   Grooming 4   Eating 4   Daily Activity Raw Score 17   Daily Activity Standardized Score (Calc for Raw Score >=11) 37 26   AM-PAC Applied Cognition Inpatient   Following a Speech/Presentation 3   Understanding Ordinary Conversation 3   Taking Medications 3   Remembering Where Things Are Placed or Put Away 3   Remembering List of 4-5 Errands 2   Taking Care of Complicated Tasks 2   Applied Cognition Raw Score 16   Applied Cognition Standardized Score 35 03     GIFTY Ortiz/PIYUSH

## 2022-07-21 NOTE — WOUND OSTOMY CARE
Progress Note - Wound   Evelyn Valadez 64 y o  female MRN: [id number]  Unit/Bed#: ICU 02-01 Encounter: [id number]    Assessment:   Wound care weekly follow up  Patient in bed for assessment, she is alert and oriented, cooperative with assessment  She has high flow nasal cannula in place  Patient is anuric and not incontinent of stool  Findings  1  Bilateral heels intact  2  Stage 2 pressure injury to the sacrum resolved  Small area on the left buttock with intact scab    Skin and Wound CarePlan:   1  Ehob offloading cushion to chair when OOB  2  Elevate heels off the bed with pillows   3  Turn and reposition every two hours  4  Hydraguard to bilateral heels and lower buttocks BID and PRN, do not apply beneath Allevyn foam dressing  5  Skin nourishing cream daily  6  Allevyn life silicone bordered foam dressing to sacrum, elieser with P, date, peel back dressing daily for skin assessment, reapply and change every 3 days and PRN    Wound; Wound 07/13/22 Coccyx (Active)   Wound Image   07/20/22 1546   Wound Description Intact; Pink 07/20/22 1546   Pressure Injury Stage 2 07/20/22 1546   Reba-wound Assessment Intact 07/20/22 1546   Wound Length (cm) 0 cm 07/20/22 1546   Wound Width (cm) 0 cm 07/20/22 1546   Wound Depth (cm) 0 cm 07/20/22 1546   Wound Surface Area (cm^2) 0 cm^2 07/20/22 1546   Wound Volume (cm^3) 0 cm^3 07/20/22 1546   Calculated Wound Volume (cm^3) 0 cm^3 07/20/22 1546   Change in Wound Size % 100 07/20/22 1546   Drainage Amount None 07/20/22 1546   Treatments Cleansed 07/20/22 1546   Dressing Foam, Silicon (eg  Allevyn, etc) 07/21/22 0800   Dressing Changed Changed 07/20/22 1546   Patient Tolerance Tolerated well 07/20/22 1546   Dressing Status Clean;Dry; Intact 07/18/22 0740       Wound 07/13/22 Pressure Injury Buttocks (Active)   Wound Image   07/20/22 1545   Wound Description Dry;Brown; Intact 07/20/22 1546   Reba-wound Assessment Hyperpigmented 07/20/22 1546   Wound Length (cm) 0 cm 07/20/22 1546 Wound Width (cm) 0 cm 07/20/22 1546   Wound Depth (cm) 0 cm 07/20/22 1546   Wound Surface Area (cm^2) 0 cm^2 07/20/22 1546   Wound Volume (cm^3) 0 cm^3 07/20/22 1546   Calculated Wound Volume (cm^3) 0 cm^3 07/20/22 1546   Drainage Amount None 07/20/22 1546   Treatments Site care 07/14/22 0800   Dressing Open to air 07/21/22 0800   Dressing Changed Changed 07/20/22 1546   Patient Tolerance Tolerated well 07/13/22 1203   Dressing Status Clean;Dry; Intact 07/17/22 1200     Reviewed plan of care with primary RN [facility]  Recommendations written as orders  Wound care team to follow weekly while admitted  Questions or concerns [facility] Nurse    Frankey Height BSN, RN, [facility]

## 2022-07-21 NOTE — ASSESSMENT & PLAN NOTE
· Initially admitted to Medicine Service and had acute change in respiratory status, transfer to Critical Care Service  · History of intubation twice in the past year for similar presentation   · Treated with BiPAP, titrated to 4 L nasal cannula  · Currently utilizing high-flow nasal cannula, saturating 93%  · Cardiology Nephrology recommendations appreciated  · Continue dialysis Monday Wednesday Friday  · Continue oxygen protocol-currently on mid flow-wean oxygen as tolerated

## 2022-07-21 NOTE — ASSESSMENT & PLAN NOTE
· Patient tested positive for hepatitis B during routine screening for initiation of dialysis  · Outpatient follow-up with GI

## 2022-07-21 NOTE — PROGRESS NOTES
Progress Note - Pulmonary   [place] Backbone 64 y o  female MRN: [id number]  Unit/Bed#: ICU 02-01 Encounter: [id number]      Assessment:  1  Acute on chronic hypoxic respiratory failure - multifactorial/mainly CHF/pulmonary edema, on a background severe COPD/emphysema  2  Pulmonary edema - still with multiple B lines on bedside lung ultrasound  Along with small pleural effusion on both sides  3  Acute CHF exacerbation - repeat TTE this admission showing EF of 25%  PCWP was 27 mmHg when was in the ICU   4  COPD W/exacerbation on admission - completed steroids/does not appear to be in exacerbation currently  5  Active tobacco abuse   6  Resistant hypertension-concern for pheochromocytoma/pending metanephrine    Plan:  · Continue volume removal with HD given the above  · Appreciate cardiology evaluation for the drop in EF from 50% in 7/2021 till this admission which is 25%, ?cath  · Wheezing improved today, continue xopenx nebs tid, added atrovent tid, dc anoro  · Reordered BiPAP nocturnal/with naps  · Continue to wean FiO2 for SpO2 >88%    Background   64 y o  female with a h/o COPD, CKD 3, combined systolic/diastolic heart failure, cardiomyopathy, HTN, tobacco abuse 2 packs per day  Presented to the ED 7/12 with dyspnea, acute hypoxic respiratory failure, acute kidney injury  Treated for COPD exacerbation with steroids, and for CHF with Lasix  7/13 upgraded to critical care due to worsening respiratory distress required BiPAP/Precedex  HD catheter placed 7/15 and started on dialysis  PA catheter placed 7/16, discontinued 7/17  PCWP was 29 on 07/16 prior to dialysis  Downgraded out of the ICU 7/18 was on 5 LPM   On 07/19 required HFNC  Last HD session done yesterday 7/20 with removal of 3kg ultrafiltration  Metanephrine sent for concern of pheochromocytoma/pending      Subjective:   Sitting in chair, no dyspnea at rest   Reports some chest congestion, felt better when she was having the BiPAP 2 days ago, was not on it for the past 2 days  Vitals: Blood pressure 145/80, pulse 83, temperature 97 7 °F (36 5 °C), temperature source Tympanic, resp  rate 18, height 5' 10" (1 778 m), weight 74 8 kg (164 lb 14 5 oz), SpO2 93 %  , Body mass index is 23 66 kg/m²  Intake/Output Summary (Last 24 hours) at 7/21/2022 1343  Last data filed at 7/21/2022 1252  Gross per 24 hour   Intake 836 ml   Output 3687 ml   Net -2851 ml       Physical Exam  Gen: not in acute distress, Body mass index is 23 66 kg/m²  HEENT:supple, no accessory muscle use, no JVD appreciated  Cardiac: RRR, no murmurs appreciated  Lungs: normal respiratory effort, no wheezing, fine crackles up to the mid lung fields  Abdomen: soft, nontender  Extremities: no cyanosis, no clubbing, trace pitting edema  Neuro: AAO X3, no focal motor deficit    Labs: I have personally reviewed pertinent lab results          Maulik Lu MD

## 2022-07-21 NOTE — PHYSICAL THERAPY NOTE
PHYSICAL THERAPY TREATMENT NOTE  NAME:  Shama Burgos  DATE: 07/21/22    Length Of Stay: 9  Performed at least 2 patient identifiers during session: Name and ID bracelet    TREATMENT FLOWSHEET:    07/21/22 1507   PT Last Visit   PT Visit Date 07/21/22   Note Type   Note Type Treatment   Pain Assessment   Pain Assessment Tool 0-10   Pain Score No Pain   Restrictions/Precautions   Weight Bearing Precautions Per Order No   Other Precautions O2;Multiple lines; Fall Risk;Telemetry  (40L/min HFNC)   General   Chart Reviewed Yes   Response to Previous Treatment Patient with no complaints from previous session  Family/Caregiver Present No   Cognition   Overall Cognitive Status WFL   Arousal/Participation Alert; Cooperative   Attention Within functional limits   Orientation Level Oriented to person;Oriented to place;Oriented to situation   Memory Decreased recall of precautions   Following Commands Follows one step commands without difficulty   Subjective   Subjective "I don't have pain my lungs feel heavy "   Bed Mobility   Additional Comments OOB in recliner   Transfers   Sit to Stand Unable to assess   Ambulation/Elevation   Gait pattern   (unable to assess)   Balance   Static Sitting Fair +   Dynamic Sitting Fair   Endurance Deficit   Endurance Deficit Yes   Activity Tolerance   Activity Tolerance Treatment limited secondary to medical complications (Comment)  (physician came in to assess pt  and the wanted to perform an ultra sound, so PT treatment ended )   Exercises   Quad Sets Sitting;10 reps;AROM; Bilateral   Heelslides Sitting;10 reps;AROM; Bilateral   Glute Sets Sitting;10 reps;AROM; Bilateral   Hip Flexion Sitting;10 reps;AROM; Bilateral   Hip Extension Sitting;10 reps;AROM; Bilateral   Hip Adduction Sitting;10 reps;AROM; Bilateral   Knee AROM Long Arc Quad Sitting;10 reps;AROM; Bilateral   Ankle Pumps Sitting;10 reps;AROM; Bilateral   Marching Sitting;10 reps;AROM; Bilateral   Assessment   Prognosis Fair   Problem List Decreased strength;Decreased endurance;Decreased mobility   Goals   Patient Goals to get rid of extra fluid   PT Treatment Day 2   Plan   Treatment/Interventions Functional transfer training;LE strengthening/ROM; Elevations; Therapeutic exercise; Endurance training;Patient/family training;Equipment eval/education; Bed mobility;Gait training;Spoke to nursing;Spoke to advanced practitioner;Spoke to MD   Progress No functional improvements   PT Frequency 3-5x/wk   Recommendation   PT Discharge Recommendation Post acute rehabilitation services   AM-PAC Basic Mobility Inpatient   Turning in Bed Without Bedrails 3   Lying on Back to Sitting on Edge of Flat Bed 2   Moving Bed to Chair 2   Standing Up From Chair 2   Walk in Room 2   Climb 3-5 Stairs 1   Basic Mobility Inpatient Raw Score 12   Basic Mobility Standardized Score 32 23   Highest Level Of Mobility   -[facility] Goal 4: Move to chair/commode   -HL Achieved 3: Sit at edge of bed       The patient's AM-PAC Basic Mobility Inpatient Short Form Raw Score is 12, Standardized Score is 32 23  A standardized score less than 42 9 suggests the patient may benefit from discharge to post-acute rehabilitation services  Please also refer to the recommendation of the Physical Therapist for safe discharge planning  Pt seen for PT treatment session this date with interventions consisting of seated TE, and education provided of proper technique  Pt agreeable to PT treatment session upon arrival, pt found sitting OOB in recliner  At end of session, pt left sitting OOB in recliner  In comparison to previous session, pt with no improvements as evidenced by only able to perform TE due testing being performed   Continue to recommend STR at time of d/c in order to maximize pt's functional independence and safety w/ mobility  Pt continues to be functioning below baseline level   PT will continue to see pt while here in order to address the deficits listed above and provide interventions consistent w/ POC in effort to achieve STGs      Moraima Basket, PTA

## 2022-07-21 NOTE — PLAN OF CARE
Problem: MOBILITY - ADULT  Goal: Maintain or return to baseline ADL function  Description: INTERVENTIONS:  -  Assess patient's ability to carry out ADLs; assess patient's baseline for ADL function and identify physical deficits which impact ability to perform ADLs (bathing, care of mouth/teeth, toileting, grooming, dressing, etc )  - Assess/evaluate cause of self-care deficits   - Assess range of motion  - Assess patient's mobility; develop plan if impaired  - Assess patient's need for assistive devices and provide as appropriate  - Encourage maximum independence but intervene and supervise when necessary  - Involve family in performance of ADLs  - Assess for home care needs following discharge   - Consider OT consult to assist with ADL evaluation and planning for discharge  - Provide patient education as appropriate  Outcome: Progressing     Problem: MOBILITY - ADULT  Goal: Maintains/Returns to pre admission functional level  Description: INTERVENTIONS:  - Perform BMAT or MOVE assessment daily    - Set and communicate daily mobility goal to care team and patient/family/caregiver  - Collaborate with rehabilitation services on mobility goals if consulted  - Perform Range of Motion 3 times a day  - Reposition patient every 2 hours    - Dangle patient 3 times a day  - Stand patient 3 times a day  - Ambulate patient 3 times a day  - Out of bed to chair 3 times a day   - Out of bed for meals 3 times a day  - Out of bed for toileting  - Record patient progress and toleration of activity level   Outcome: Progressing     Problem: Potential for Falls  Goal: Patient will remain free of falls  Description: INTERVENTIONS:  -  Assess patient's ability to carry out ADLs; assess patient's baseline for ADL function and identify physical deficits which impact ability to perform ADLs (bathing, care of mouth/teeth, toileting, grooming, dressing, etc )  - Assess/evaluate cause of self-care deficits   - Assess range of motion  - Assess patient's mobility; develop plan if impaired  - Assess patient's need for assistive devices and provide as appropriate  - Encourage maximum independence but intervene and supervise when necessary  - Involve family in performance of ADLs  - Assess for home care needs following discharge   - Consider OT consult to assist with ADL evaluation and planning for discharge  - Provide patient education as appropriate  Outcome: Progressing     Problem: Prexisting or High Potential for Compromised Skin Integrity  Goal: Skin integrity is maintained or improved  Description: INTERVENTIONS:  - Identify patients at risk for skin breakdown  - Assess and monitor skin integrity  - Assess and monitor nutrition and hydration status  - Monitor labs   - Assess for incontinence   - Turn and reposition patient  - Assist with mobility/ambulation  - Relieve pressure over bony prominences  - Avoid friction and shearing  - Provide appropriate hygiene as needed including keeping skin clean and dry  - Evaluate need for skin moisturizer/barrier cream  - Collaborate with interdisciplinary team   - Patient/family teaching  - Consider wound care consult   Outcome: Progressing     Problem: PAIN - ADULT  Goal: Verbalizes/displays adequate comfort level or baseline comfort level  Description: Interventions:  - Encourage patient to monitor pain and request assistance  - Assess pain using appropriate pain scale  - Administer analgesics based on type and severity of pain and evaluate response  - Implement non-pharmacological measures as appropriate and evaluate response  - Consider cultural and social influences on pain and pain management  - Notify physician/advanced practitioner if interventions unsuccessful or patient reports new pain  Outcome: Progressing     Problem: INFECTION - ADULT  Goal: Absence or prevention of progression during hospitalization  Description: INTERVENTIONS:  - Assess and monitor for signs and symptoms of infection  - Monitor lab/diagnostic results  - Monitor all insertion sites, i e  indwelling lines, tubes, and drains  - Monitor endotracheal if appropriate and nasal secretions for changes in amount and color  - [place] appropriate cooling/warming therapies per order  - Administer medications as ordered  - Instruct and encourage patient and family to use good hand hygiene technique  - Identify and instruct in appropriate isolation precautions for identified infection/condition  Outcome: Progressing     Problem: SAFETY ADULT  Goal: Maintain or return to baseline ADL function  Description: INTERVENTIONS:  -  Assess patient's ability to carry out ADLs; assess patient's baseline for ADL function and identify physical deficits which impact ability to perform ADLs (bathing, care of mouth/teeth, toileting, grooming, dressing, etc )  - Assess/evaluate cause of self-care deficits   - Assess range of motion  - Assess patient's mobility; develop plan if impaired  - Assess patient's need for assistive devices and provide as appropriate  - Encourage maximum independence but intervene and supervise when necessary  - Involve family in performance of ADLs  - Assess for home care needs following discharge   - Consider OT consult to assist with ADL evaluation and planning for discharge  - Provide patient education as appropriate  Outcome: Progressing     Problem: SAFETY ADULT  Goal: Patient will remain free of falls  Description: INTERVENTIONS:  -  Assess patient's ability to carry out ADLs; assess patient's baseline for ADL function and identify physical deficits which impact ability to perform ADLs (bathing, care of mouth/teeth, toileting, grooming, dressing, etc )  - Assess/evaluate cause of self-care deficits   - Assess range of motion  - Assess patient's mobility; develop plan if impaired  - Assess patient's need for assistive devices and provide as appropriate  - Encourage maximum independence but intervene and supervise when necessary  - Involve family in performance of ADLs  - Assess for home care needs following discharge   - Consider OT consult to assist with ADL evaluation and planning for discharge  - Provide patient education as appropriate  Outcome: Progressing

## 2022-07-21 NOTE — PROGRESS NOTES
[address]  Progress Note Russ Davis 1960, 64 y o  female MRN: [id number]  Unit/Bed#: ICU 02-01 Encounter: [id number]  Primary Care Provider: Olivia Zuñiga DO   Date and time admitted to hospital: 7/12/2022  5:54 PM    * Acute on chronic respiratory failure with hypoxemia [facility])  Assessment & Plan  · Initially admitted to Medicine Service and had acute change in respiratory status, transfer to Critical Care Service  · History of intubation twice in the past year for similar presentation   · Treated with BiPAP, titrated to 4 L nasal cannula  · Currently utilizing high-flow nasal cannula, saturating 93%  · Cardiology Nephrology recommendations appreciated  · Continue dialysis Monday Wednesday Friday  · Continue oxygen protocol-currently on mid flow-wean oxygen as tolerated    SH[facility]acute kidney [facility]) ([facility] )  Assessment & Plan  · Baseline creatinine appears to be around 1 5-1 8  · Nephrology following  · Likely 2/2 ATN, creatinine remains elevated  · Became oliguric and did not respond to diuretics or fluids  · Tolerated iHD session 7/15, 7/16, 7/18   · Dialysis scheduled for Monday Wednesday Friday  · Continue to monitor renal indices and urine output   · Work up for pheochromocytoma pending  · Case management on board-patient will need placement at a facility with dialysis availability, at least short-term  · PermCath placement when patient is able to lay flat, possibly today or tomorrow  · Patient has begun to make small amounts of urine    COPD with acute exacerbation ([facility] )  Assessment & Plan  · Diffuse wheezing resolved, breathing improved, although continuing on high-flow oxygen  · Respiratory protocol  · Wean oxygen as tolerated  · Received 3 doses of Solu-Medrol 40 mg IV, then discontinued  · Give Xopenex 3 times daily  · Continue pre-hospital Anoro 1 puff daily  · Consult pulmonary for worsening    Hepatitis B  Assessment & Plan  · Patient tested positive for hepatitis B during routine screening for initiation of dialysis  · Outpatient follow-up with GI    Tobacco abuse  Assessment & Plan  · Nicotine patch  · Encourage cessation    Acute on chronic combined systolic and diastolic congestive heart failure (HCC)  Assessment & Plan  Wt Readings from Last 3 Encounters:   07/21/22 74 8 kg (164 lb 14 5 oz)   06/28/22 77 4 kg (170 lb 9 6 oz)   06/22/22 76 6 kg (168 lb 14 oz)     · Does not appear volume overloaded, patient anuric, volume management by dialysis  · Low-salt diet with 1500 mL fluid restriction  · Daily weights and I&Os  · Cardiology recommendations appreciated      Anxiety  Assessment & Plan  · Home Klonopin increased to twice daily, Zyprexa added as needed  · Seroquel started 07/18/2022  · Supportive care    Chronic pain  Assessment & Plan  · Continue pre-hospital MS Contin and Percocet    Essential hypertension  Assessment & Plan  · BP reviewed  · Continue amlodipine  · Continue Coreg which has been increased to 25 mg twice daily   · Monitor blood pressure    Hypokalemia-resolved as of 7/15/2022  Assessment & Plan  · Continue daily BMP and monitor potassium    VTE Pharmacologic Prophylaxis:   Pharmacologic: Heparin  Mechanical VTE Prophylaxis in Place: Yes    Patient Centered Rounds: I have performed bedside rounds with nursing staff today  Discussions with Specialists or Other Care Team Provider:  Case management    Education and Discussions with Family / Patient:  Patient    Time Spent for Care: 30 minutes  More than 50% of total time spent on counseling and coordination of care as described above  Current Length of Stay: 9 day(s)    Current Patient Status: Inpatient   Certification Statement: The patient will continue to require additional inpatient hospital stay due to per plan above  Discharge Plan: To be determined  Possibly 24-48 hours  Code Status: Level 1 - Full Code      Subjective:   Patient seen and examined  She says she feels so much better  She is sitting upright in a chair  She says she is mildly short of breath  No chest pain, nausea, dizziness, or syncope  Objective:     Vitals:   Temp (24hrs), Av °F (36 7 °C), Min:97 6 °F (36 4 °C), Max:99 1 °F (37 3 °C)    Temp:  [97 6 °F (36 4 °C)-99 1 °F (37 3 °C)] 98 1 °F (36 7 °C)  HR:  [75-89] 75  Resp:  [16-40] 31  BP: (125-182)/(66-94) 125/75  SpO2:  [92 %-96 %] 96 %  Body mass index is 23 66 kg/m²  Input and Output Summary (last 24 hours): Intake/Output Summary (Last 24 hours) at 2022 1059  Last data filed at 2022 0800  Gross per 24 hour   Intake 856 ml   Output 3687 ml   Net -2831 ml       Physical Exam:     Physical Exam  Vitals and nursing note reviewed  Constitutional:       Comments: Appears greatly improved from yesterday  Sitting up in chair, smiling, conversing with ease  HENT:      Head: Normocephalic and atraumatic  Mouth/Throat:      Pharynx: Oropharynx is clear  Eyes:      Pupils: Pupils are equal, round, and reactive to light  Cardiovascular:      Rate and Rhythm: Normal rate  Pulmonary:      Effort: Pulmonary effort is normal  No respiratory distress  Breath sounds: Normal breath sounds  Abdominal:      General: Bowel sounds are normal       Palpations: Abdomen is soft  Tenderness: There is no abdominal tenderness  Musculoskeletal:      Cervical back: Neck supple  Skin:     General: Skin is warm and dry  Capillary Refill: Capillary refill takes less than 2 seconds  Neurological:      General: No focal deficit present  Mental Status: She is alert           Additional Data:     Labs:    Results from last 7 days   Lab Units 22  0609   WBC Thousand/uL 10 74*   HEMOGLOBIN g/dL 10 8*   HEMATOCRIT % 33 3*   PLATELETS Thousands/uL 262   NEUTROS PCT % 68   LYMPHS PCT % 20   MONOS PCT % 8   EOS PCT % 2     Results from last 7 days   Lab Units 22  0609 22  0352 22  0601   SODIUM mmol/L 127*   < > 129* POTASSIUM mmol/L 4 8   < > 4 7   CHLORIDE mmol/L 92*   < > 90*   CO2 mmol/L 23   < > 20*   BUN mg/dL 56*   < > 110*   CREATININE mg/dL 5 81*   < > 8 87*   ANION GAP mmol/L 12   < > 19*   CALCIUM mg/dL 8 9   < > 8 2*   ALBUMIN g/dL  --   --  3 4*   TOTAL BILIRUBIN mg/dL  --   --  0 48   ALK PHOS U/L  --   --  68   ALT U/L  --   --  5*   AST U/L  --   --  9*   GLUCOSE RANDOM mg/dL 122   < > 115    < > = values in this interval not displayed  Results from last 7 days   Lab Units 07/15/22  1423   POC GLUCOSE mg/dl 82         Results from last 7 days   Lab Units 07/20/22  0524 07/19/22  0352 07/18/22  0601 07/17/22  0544 07/15/22  0627   PROCALCITONIN ng/ml 1 75* 2 58* 2 97* 4 20* 3 04*           * I Have Reviewed All Lab Data Listed Above  * Additional Pertinent Lab Tests Reviewed:  [address] Admission Reviewed          Last 24 Hours Medication List:   Current Facility-Administered Medications   Medication Dose Route Frequency Provider Last Rate    acetaminophen  650 mg Oral Q4H PRN ESTHER Cool      albuterol  2 5 mg Nebulization Q6H PRN ESTHER Cool      amLODIPine  5 mg Oral Daily ESTHER Cool      bumetanide  4 mg Oral Once per day on Sun Tue Thu Sat Adeellaura Vazquez, DO      calcium acetate  1,334 mg Oral TID With Meals Adeel Vazquez, DO      carvedilol  25 mg Oral BID With Meals ESTHER Cool      clonazePAM  0 5 mg Oral BID PRN ESTHER Cool      guaiFENesin  600 mg Oral Q12H [address] ESTHER Cool      heparin (porcine)  5,000 Units Subcutaneous [facility] En[address]      hydrALAZINE  10 mg Intravenous Q6H PRN Jennifer Peralta PA-C      levalbuterol  1 25 mg Nebulization TID ESTHER Cool      And    sodium chloride  3 mL Nebulization TID ESTHER Cool      lidocaine  2 patch Topical Daily ESTHER Cool      nicotine  1 patch Transdermal Daily ESTHER Cool      OLANZapine  10 mg Oral HS PRN Renée Elizabeth ESTHER Kurtz      ondansetron  4 mg Intravenous Q6H PRN ESTHER Singh      oxyCODONE-acetaminophen  1 tablet Oral Q4H PRN ESTHER Singh      QUEtiapine  25 mg Oral HS ESTHER Singh      umeclidinium-vilanterol  1 puff Inhalation Daily ESTHER Singh          Today, Patient Was Seen By: ESTHER Caceres    ** Please Note: Dictation voice to text software may have been used in the creation of this document   **

## 2022-07-21 NOTE — ASSESSMENT & PLAN NOTE
· Baseline creatinine appears to be around 1 5-1 8  · Nephrology following  · Likely 2/2 ATN, creatinine remains elevated  · Became oliguric and did not respond to diuretics or fluids  · Tolerated iHD session 7/15, 7/16, 7/18   · Dialysis scheduled for Monday Wednesday Friday  · Continue to monitor renal indices and urine output   · Work up for pheochromocytoma pending  · Case management on board-patient will need placement at a facility with dialysis availability, at least short-term  · PermCath placement when patient is able to lay flat, possibly today or tomorrow  · Patient has begun to make small amounts of urine

## 2022-07-21 NOTE — PLAN OF CARE
Problem: OCCUPATIONAL THERAPY ADULT  Goal: Performs self-care activities at highest level of function for planned discharge setting  See evaluation for individualized goals  Description: Treatment Interventions: ADL retraining, Functional transfer training, Endurance training, Compensatory technique education, Energy conservation, Activityengagement     See flowsheet documentation for full assessment, interventions and recommendations  Outcome: Progressing  Note: Limitation: Decreased ADL status, Decreased Safe judgement during ADL, Decreased endurance, Decreased self-care trans, Decreased high-level ADLs  Prognosis: Good  Assessment: Patient participated in Skilled OT session this date with interventions consisting of Energy Conservation techniques, therapeutic exercise to: increase functional use of BUEs, increase BUE muscle strength , increase postural control and increase OOB/ sitting tolerance   Patient agreeable to OT treatment session, upon arrival patient was found seated OOB to Recliner - I woke her for treatment  In comparison to previous session, patient with improvements in level of alertness  Patient requiring verbal cues for correct technique, verbal cues for pacing thru activity steps, one step directives and frequent rest periods, occasional re-direction, and self-care assistance as noted in flow sheet/AM-PAC  Patient continues to be functioning below baseline level, occupational performance remains limited secondary to factors listed above and increased risk for falls and injury  From OT standpoint, recommendation at time of d/c would be post-acute rehabilitation services  Patient to benefit from continued Occupational Therapy treatment while in the hospital to address deficits as defined above and maximize level of functional independence with ADLs and functional mobility       OT Discharge Recommendation: Post acute rehabilitation services     [place], [place]UR[place]/L

## 2022-07-21 NOTE — RESPIRATORY THERAPY NOTE
07/21/22 1640   Respiratory Assessment   Resp Comments Called to the room  Patient complaining she wants to be placed on BiPAP right now  Patient states Dr Mark Lemon said she could have it any time she wanted it  Explained the BiPAP was ordered for Bedtime  Patient keeps insisting she wants it  States she can't breathe  Patient is able to speak in full sentences  Increased oxygen flow at patient's request   SPO2 94%  Patient appears anxious  RN was able to help patient relax  Will continue to monitor     Oxygen Therapy/Pulse Ox   O2 Device High flow nasal cannula   O2 Therapy Oxygen humidified   FiO2 (%) 50   O2 Flow Rate (L/min) (S)  45 L/min   SpO2 94 %   SpO2 Activity At Rest   $ Pulse Oximetry Spot Check Charge Completed

## 2022-07-21 NOTE — ASSESSMENT & PLAN NOTE
· Diffuse wheezing resolved, breathing improved, although continuing on high-flow oxygen  · Respiratory protocol  · Wean oxygen as tolerated  · Received 3 doses of Solu-Medrol 40 mg IV, then discontinued  · Give Xopenex 3 times daily  · Continue pre-hospital Anoro 1 puff daily  · Consult pulmonary for worsening

## 2022-07-21 NOTE — PROGRESS NOTES
NEPHROLOGY PROGRESS NOTE   Windy Estrada 64 y o  female MRN: [id number]  Unit/Bed#: ICU 02-01 Encounter: [id number]    Assessment/Plan:    60-year-old female with CKD, COPD, history of VDRF x 2 previous this year, chronic combined systolic and diastolic congestive heart failure, cardiomyopathy LVEF 25% being treated in ICU for a/c hypoxic respiratory failure, a/c combined decompensated CHF, likely cardiorenal syndrome and hemodialysis dependent acute kidney injury for which nephrology following along  1  Hemodialysis dependent acute kidney injury (POA) atop chronic kidney disease  · Initial hemodialysis session 7/15 due to anuric severe SHANTA, decompensated heart failure and hypoxic respiratory failure refractory to diuretics  She has since been maintained on a Monday, Wednesday, Friday schedule, last session 7/20 43 2 L ultrafiltrate and post weight of 73 7 kg  Next hemodialysis session will be tomorrow, 7/22  · See below regarding diuretics  · Access:  Temporary right IJ hemodialysis catheter which needs to be switched to PermCath  Did consult IR today and discussed with Dr Iveth Ordonez is planned for next week  · Appreciate case management's coordination for outpatient dialysis unit  2  Stage 3 chronic kidney disease with baseline creatinine around 1 0-1 1 mg/dL  3  Acute on chronic combined systolic and diastolic congestive heart failure, cardiorenal syndrome, cardiomyopathy LVEF 25%  · Received 4 mg p o  Bumex today with minimal urine output  May need to be escalated to b i d  dosing +/- metolazone  Continue ultrafiltration with hemodialysis  4  Acute hypoxic respiratory failure  · Requiring high-flow nasal cannula  Will continue to ultrafiltrate as tolerated with hemodialysis to assist with liberation from oxygen  5  Hyperphosphatemia  · Continue binders with meals and will evaluate phosphorus level next day or so  6   Hypertension in the setting of CKD  · Blood pressure fairly stable today, will avoid further titration of antihypertensive medications at this time  · Plasma metanephrines pending  7  COPD with exacerbation POA  · Per pulmonology/primary team      ROS  Seen and examined sitting in chair  States she had trouble breathing this morning  Has not voided since diuretic administration  A complete 10 point review of systems have been performed and are otherwise negative  Historical Information   Past Medical History:   Diagnosis Date    Cardiomyopathy ([address] )     Chronic combined systolic and diastolic congestive heart failure     COPD (chronic obstructive pulmonary disease) (HCC)     Fatty liver     Hyperlipidemia     Hypertension     Mitral regurgitation     Sepsis      Past Surgical History:   Procedure Laterality Date    CARDIAC CATHETERIZATION  2021    Moderate non-obstructive atherosclerosis     SECTION      CHOLECYSTECTOMY      ROTATOR CUFF REPAIR W/ DISTAL CLAVICLE EXCISION Right     TONSILLECTOMY       Social History   Social History     Substance and Sexual Activity   Alcohol Use Never     Social History     Substance and Sexual Activity   Drug Use No     Social History     Tobacco Use   Smoking Status Current Some Day Smoker    Packs/day: 2 00    Types: Cigarettes   Smokeless Tobacco Never Used       Family History:   History reviewed  No pertinent family history      Medications:  Pertinent medications were reviewed  Current Facility-Administered Medications   Medication Dose Route Frequency Provider Last Rate    acetaminophen  650 mg Oral Q4H PRN ESTHER Venegas      albuterol  2 5 mg Nebulization Q6H PRN ESTHER Venegas      amLODIPine  5 mg Oral Daily ESTHER Venegas      bumetanide  4 mg Oral Once per day on Sun Tue Thu Sat Todd Bock,       calcium acetate  1,334 mg Oral TID With Meals Todd Cheema, DO      carvedilol  25 mg Oral BID With Meals ESTHER Venegas      clonazePAM  0 5 mg Oral BID PRN Hemalatha Beavers CRNP      guaiFENesin  600 mg Oral Q12H [facility] Wilhemena Setting, CRNP      heparin (porcine)  5,000 Units Subcutaneous [facility] Wilhemena Setting, Kailyn Parmar      hydrALAZINE  10 mg Intravenous Q6H PRN Minh Shepherd PA-C      ipratropium  0 5 mg Nebulization TID Maulik Lu MD      levalbuterol  1 25 mg Nebulization TID Maulik Lu MD      And    sodium chloride  3 mL Nebulization TID Maulik Lu MD      lidocaine  2 patch Topical Daily Wilhemena Setting, CRNP      nicotine  1 patch Transdermal Daily Wilhemena Setting, CRNP      OLANZapine  10 mg Oral HS PRN Wilhemena Setting, CRNP      ondansetron  4 mg Intravenous Q6H PRN Wilhemena Setting, CRNP      oxyCODONE-acetaminophen  1 tablet Oral Q4H PRN Wilhemena Setting, CRNP      QUEtiapine  25 mg Oral HS Wilhemena Setting, CRNP           Allergies   Allergen Reactions    Wellbutrin [Bupropion] Arthralgia         Vitals:   /83 (BP Location: Right arm)   Pulse 81   Temp 98 7 °F (37 1 °C) (Tympanic)   Resp 18   Ht 5' 10" (1 778 m)   Wt 74 8 kg (164 lb 14 5 oz)   SpO2 93%   BMI 23 66 kg/m²   Body mass index is 23 66 kg/m²    SpO2: 93 %,   SpO2 Activity: At Rest,   O2 Device: High flow nasal cannula      Intake/Output Summary (Last 24 hours) at 7/21/2022 1556  Last data filed at 7/21/2022 1325  Gross per 24 hour   Intake 536 ml   Output 25 ml   Net 511 ml     Invasive Devices  Report    Peripheral Intravenous Line  Duration           Peripheral IV 07/18/22 Left;Ventral (anterior) Forearm 3 days          Hemodialysis Catheter  Duration           HD Temporary Double Catheter 6 days                Physical Exam  General: conscious, cooperative, in no acute distress  Eyes: conjunctivae pink, anicteric sclerae  ENT: lips and mucous membranes moist  Neck: supple, no JVD, no masses  Chest:  Diminished breath sounds bilaterally, scattered rhonchi on mid flow nasal cannula  CVS: S1 & S2, normal rate, regular rhythm  Abdomen: soft, non-tender, non-distended, normoactive bowel sounds obese  Extremities:  Moderate edema of both legs  Skin: no rash  Neuro: awake, alert, oriented      Diagnostic Data:  Lab: I have personally reviewed pertinent lab results  ,   CBC:  Results from last 7 days   Lab Units 07/21/22  0609   WBC Thousand/uL 10 74*   HEMOGLOBIN g/dL 10 8*   HEMATOCRIT % 33 3*   PLATELETS Thousands/uL 262      CMP:   Lab Results   Component Value Date    SODIUM 127 (L) 07/21/2022    K 4 8 07/21/2022    CL 92 (L) 07/21/2022    CO2 23 07/21/2022    BUN 56 (H) 07/21/2022    CREATININE 5 81 (H) 07/21/2022    CALCIUM 8 9 07/21/2022    EGFR 7 07/21/2022   ,   PT/INR: No results found for: PT, INR,   Magnesium: No components found for: MAG,  Phosphorous: No results found for: PHOS    Microbiology:  @LABRCNTIP,(urinecx:7)@        ESTHER Lucia    Portions of the record may have been created with voice recognition software  Occasional wrong word or "sound a like" substitutions may have occurred due to the inherent limitations of voice recognition software  Read the chart carefully and recognize, using context, where substitutions have occurred

## 2022-07-21 NOTE — CASE MANAGEMENT
Case Management Discharge Planning Note    Patient name [facility]  Location ICU 02/ICU  MRN [id number]  : 1960 Date 2022       Current Admission Date: 2022  Current Admission Diagnosis:Acute on chronic respiratory failure with hypoxemia [facility])   Patient Active Problem List    Diagnosis Date Noted    Hepatitis B 2022    Acute on chronic respiratory failure with hypoxemia ([facility] ) 2022    Acute respiratory insufficiency 2022    SHANTA (acute kidney injury) ([facility] ) 2022    COPD with acute exacerbation ([facility] ) 06/15/2022    Acute on chronic combined systolic and diastolic [facility] 2021    Tobacco abuse 2021    Leukocytosis 2021    Anxiety 2021    Dilated cardiomyopathy ([facility] ) 2021    Essential hypertension 2021    Chronic pain 2021    Vitamin D deficiency 2013      LOS (days): 9  Geometric Mean LOS (GMLOS) (days): 3 80  Days to GMLOS:-4 9     OBJECTIVE:  Risk of Unplanned Readmission Score: 21 36     Current admission status: Inpatient   Preferred Pharmacy:   [address] [facility] [address]  [place], [address]  [address] [place] [address]  Phone: [phone] Fax: [phone]    Primary Care Provider: Lorena Crowley DO    Primary Insurance: MEDICARE MISC REPLACEMENT  Secondary Insurance: [address]    DISCHARGE DETAILS:  TC to [facility] at [phone] spoke to Lakshmi Mast to confirm chair time  Pt will not be able to get any outpt dialysis this week  Will start dialysis [facility] at [facility] in [place] at [address]  This needs to be confirmed tomorrow if pt is discharged  Pt has [facility] [address] set up  Pt states she only lives a few blocks from the dialysis center and will be able to drive to same  Pt also states [place] that would provide transport if needed    Pt states she has oxygen at home and portable tanks from her last admission

## 2022-07-21 NOTE — PLAN OF CARE
Problem: PHYSICAL THERAPY ADULT  Goal: Performs mobility at highest level of function for planned discharge setting  See evaluation for individualized goals  Description: Treatment/Interventions: Functional transfer training, LE strengthening/ROM, Therapeutic exercise, Endurance training, Patient/family training, Equipment eval/education, Bed mobility, Gait training, Elevations, Spoke to case management, Spoke to nursing  Equipment Recommended:  (TBD pending pt progression with OOB mobility)       See flowsheet documentation for full assessment, interventions and recommendations  Outcome: Not Progressing  Note: Prognosis: Fair  Problem List: Decreased strength, Decreased endurance, Decreased mobility  Assessment: Pt seen for PT treatment session this date with interventions consisting of seated TE, and education provided of proper technique  Pt agreeable to PT treatment session upon arrival, pt found sitting OOB in recliner  At end of session, pt left sitting OOB in recliner  In comparison to previous session, pt with no improvements as evidenced by only able to perform TE due testing being performed   Continue to recommend STR at time of d/c in order to maximize pt's functional independence and safety w/ mobility  Pt continues to be functioning below baseline level  PT will continue to see pt while here in order to address the deficits listed above and provide interventions consistent w/ POC in effort to achieve STGs  Barriers to Discharge: Decreased caregiver support     PT Discharge Recommendation: Post acute rehabilitation services    See flowsheet documentation for full assessment

## 2022-07-21 NOTE — PLAN OF CARE
Problem: MOBILITY - ADULT  Goal: Maintain or return to baseline ADL function  Description: INTERVENTIONS:  -  Assess patient's ability to carry out ADLs; assess patient's baseline for ADL function and identify physical deficits which impact ability to perform ADLs (bathing, care of mouth/teeth, toileting, grooming, dressing, etc )  - Assess/evaluate cause of self-care deficits   - Assess range of motion  - Assess patient's mobility; develop plan if impaired  - Assess patient's need for assistive devices and provide as appropriate  - Encourage maximum independence but intervene and supervise when necessary  - Involve family in performance of ADLs  - Assess for home care needs following discharge   - Consider OT consult to assist with ADL evaluation and planning for discharge  - Provide patient education as appropriate  Outcome: Progressing  Goal: Maintains/Returns to pre admission functional level  Description: INTERVENTIONS:  - Perform BMAT or MOVE assessment daily    - Set and communicate daily mobility goal to care team and patient/family/caregiver  - Collaborate with rehabilitation services on mobility goals if consulted  - Perform Range of Motion 3 times a day  - Reposition patient every 2 hours    - Dangle patient 3 times a day  - Stand patient 3 times a day  - Ambulate patient 3 times a day  - Out of bed to chair 3 times a day   - Out of bed for meals 3 times a day  - Out of bed for toileting  - Record patient progress and toleration of activity level   Outcome: Progressing     Problem: Potential for Falls  Goal: Patient will remain free of falls  Description: INTERVENTIONS:  - Educate patient/family on patient safety including physical limitations  - Instruct patient to call for assistance with activity   - Consult OT/PT to assist with strengthening/mobility   - Keep Call bell within reach  - Keep bed low and locked with side rails adjusted as appropriate  - Keep care items and personal belongings within reach  - Initiate and maintain comfort rounds  - Make Fall Risk Sign visible to staff  - Offer Toileting every 2 Hours, in advance of need  - Initiate/Maintain bed 2alarm  - Obtain necessary fall risk management equipment: yellow socks  - Apply yellow socks and bracelet for high fall risk patients  - Consider moving patient to room near nurses station  Outcome: Progressing     Problem: Prexisting or High Potential for Compromised Skin Integrity  Goal: Skin integrity is maintained or improved  Description: INTERVENTIONS:  - Identify patients at risk for skin breakdown  - Assess and monitor skin integrity  - Assess and monitor nutrition and hydration status  - Monitor labs   - Assess for incontinence   - Turn and reposition patient  - Assist with mobility/ambulation  - Relieve pressure over bony prominences  - Avoid friction and shearing  - Provide appropriate hygiene as needed including keeping skin clean and dry  - Evaluate need for skin moisturizer/barrier cream  - Collaborate with interdisciplinary team   - Patient/family teaching  - Consider wound care consult   Outcome: Progressing     Problem: PAIN - ADULT  Goal: Verbalizes/displays adequate comfort level or baseline comfort level  Description: Interventions:  - Encourage patient to monitor pain and request assistance  - Assess pain using appropriate pain scale  - Administer analgesics based on type and severity of pain and evaluate response  - Implement non-pharmacological measures as appropriate and evaluate response  - Consider cultural and social influences on pain and pain management  - Notify physician/advanced practitioner if interventions unsuccessful or patient reports new pain  Outcome: Progressing     Problem: INFECTION - ADULT  Goal: Absence or prevention of progression during hospitalization  Description: INTERVENTIONS:  - Assess and monitor for signs and symptoms of infection  - Monitor lab/diagnostic results  - Monitor all insertion sites, i e  indwelling lines, tubes, and drains  - Monitor endotracheal if appropriate and nasal secretions for changes in amount and color  - [place] appropriate cooling/warming therapies per order  - Administer medications as ordered  - Instruct and encourage patient and family to use good hand hygiene technique  - Identify and instruct in appropriate isolation precautions for identified infection/condition  Outcome: Progressing  Goal: Absence of fever/infection during neutropenic period  Description: INTERVENTIONS:  - Monitor WBC    Outcome: Progressing     Problem: SAFETY ADULT  Goal: Maintain or return to baseline ADL function  Description: INTERVENTIONS:  -  Assess patient's ability to carry out ADLs; assess patient's baseline for ADL function and identify physical deficits which impact ability to perform ADLs (bathing, care of mouth/teeth, toileting, grooming, dressing, etc )  - Assess/evaluate cause of self-care deficits   - Assess range of motion  - Assess patient's mobility; develop plan if impaired  - Assess patient's need for assistive devices and provide as appropriate  - Encourage maximum independence but intervene and supervise when necessary  - Involve family in performance of ADLs  - Assess for home care needs following discharge   - Consider OT consult to assist with ADL evaluation and planning for discharge  - Provide patient education as appropriate  Outcome: Progressing  Goal: Maintains/Returns to pre admission functional level  Description: INTERVENTIONS:  - Perform BMAT or MOVE assessment daily    - Set and communicate daily mobility goal to care team and patient/family/caregiver  - Collaborate with rehabilitation services on mobility goals if consulted  - Perform Range of Motion 3 times a day  - Reposition patient every 2 hours    - Dangle patient 3 times a day  - Stand patient 3 times a day  - Ambulate patient 3 times a day  - Out of bed to chair 3 times a day   - Out of bed for meals 3 times a day  - Out of bed for toileting  - Record patient progress and toleration of activity level   Outcome: Progressing  Goal: Patient will remain free of falls  Description: INTERVENTIONS:  - Educate patient/family on patient safety including physical limitations  - Instruct patient to call for assistance with activity   - Consult OT/PT to assist with strengthening/mobility   - Keep Call bell within reach  - Keep bed low and locked with side rails adjusted as appropriate  - Keep care items and personal belongings within reach  - Initiate and maintain comfort rounds  - Make Fall Risk Sign visible to staff  - Offer Toileting every 2 Hours, in advance of need  - Initiate/Maintain bed 2alarm  - Obtain necessary fall risk management equipment: yellow socks  - Apply yellow socks and bracelet for high fall risk patients  - Consider moving patient to room near nurses station  Outcome: Progressing     Problem: DISCHARGE PLANNING  Goal: Discharge to home or other facility with appropriate resources  Description: INTERVENTIONS:  - Identify barriers to discharge w/patient and caregiver  - Arrange for needed discharge resources and transportation as appropriate  - Identify discharge learning needs (meds, wound care, etc )  - Arrange for interpretive services to assist at discharge as needed  - Refer to Case Management Department for coordinating discharge planning if the patient needs post-hospital services based on physician/advanced practitioner order or complex needs related to functional status, cognitive ability, or social support system  Outcome: Progressing     Problem: Knowledge Deficit  Goal: Patient/family/caregiver demonstrates understanding of disease process, treatment plan, medications, and discharge instructions  Description: Complete learning assessment and assess knowledge base    Interventions:  - Provide teaching at level of understanding  - Provide teaching via preferred learning methods  Outcome: Progressing Problem: GENITOURINARY - ADULT  Goal: Absence of urinary retention  Description: INTERVENTIONS:  - Assess patients ability to void and empty bladder  - Monitor I/O  - Bladder scan as needed  - Discuss with physician/AP medications to alleviate retention as needed  - Discuss catheterization for long term situations as appropriate  Outcome: Progressing     Problem: METABOLIC, FLUID AND ELECTROLYTES - ADULT  Goal: Electrolytes maintained within normal limits  Description: INTERVENTIONS:  - Monitor labs and assess patient for signs and symptoms of electrolyte imbalances  - Administer electrolyte replacement as ordered  - Monitor response to electrolyte replacements, including repeat lab results as appropriate  - Instruct patient on fluid and nutrition as appropriate  Outcome: Progressing  Goal: Fluid balance maintained  Description: INTERVENTIONS:  - Monitor labs   - Monitor I/O and WT  - Instruct patient on fluid and nutrition as appropriate  - Assess for signs & symptoms of volume excess or deficit  Outcome: Progressing  Goal: Glucose maintained within target range  Description: INTERVENTIONS:  - Monitor Blood Glucose as ordered  - Assess for signs and symptoms of hyperglycemia and hypoglycemia  - Administer ordered medications to maintain glucose within target range  - Assess nutritional intake and initiate nutrition service referral as needed  Outcome: Progressing

## 2022-07-21 NOTE — ASSESSMENT & PLAN NOTE
Wt Readings from Last 3 Encounters:   07/21/22 74 8 kg (164 lb 14 5 oz)   06/28/22 77 4 kg (170 lb 9 6 oz)   06/22/22 76 6 kg (168 lb 14 oz)     · Does not appear volume overloaded, patient anuric, volume management by dialysis  · Low-salt diet with 1500 mL fluid restriction  · Daily weights and I&Os  · Cardiology recommendations appreciated

## 2022-07-22 ENCOUNTER — HOSPITAL ENCOUNTER (INPATIENT)
Facility: HOSPITAL | Age: 62
LOS: 12 days | Discharge: HOME HEALTH | DRG: 252 | End: 2022-08-03
Attending: INTERNAL MEDICINE | Admitting: INTERNAL MEDICINE
Payer: MEDICARE

## 2022-07-22 ENCOUNTER — APPOINTMENT (INPATIENT)
Dept: DIALYSIS | Facility: HOSPITAL | Age: 62
DRG: 682 | End: 2022-07-22
Attending: INTERNAL MEDICINE
Payer: MEDICARE

## 2022-07-22 VITALS
RESPIRATION RATE: 25 BRPM | WEIGHT: 171.74 LBS | SYSTOLIC BLOOD PRESSURE: 155 MMHG | TEMPERATURE: 98.5 F | HEART RATE: 82 BPM | BODY MASS INDEX: 24.59 KG/M2 | DIASTOLIC BLOOD PRESSURE: 81 MMHG | OXYGEN SATURATION: 91 % | HEIGHT: 70 IN

## 2022-07-22 DIAGNOSIS — I50.23 ACUTE ON CHRONIC SYSTOLIC CONGESTIVE HEART FAILURE (HCC): Primary | ICD-10-CM

## 2022-07-22 DIAGNOSIS — N17.9 ACUTE ON CHRONIC KIDNEY FAILURE (HCC): ICD-10-CM

## 2022-07-22 DIAGNOSIS — I70.1 RENAL ARTERY STENOSIS (HCC): ICD-10-CM

## 2022-07-22 DIAGNOSIS — J41.0 SIMPLE CHRONIC BRONCHITIS (HCC): ICD-10-CM

## 2022-07-22 DIAGNOSIS — N18.9 ACUTE KIDNEY INJURY SUPERIMPOSED ON CKD (HCC): ICD-10-CM

## 2022-07-22 DIAGNOSIS — N18.9 ACUTE ON CHRONIC KIDNEY FAILURE (HCC): ICD-10-CM

## 2022-07-22 DIAGNOSIS — N17.9 ACUTE KIDNEY INJURY SUPERIMPOSED ON CKD (HCC): ICD-10-CM

## 2022-07-22 DIAGNOSIS — I42.0 DILATED CARDIOMYOPATHY (HCC): Chronic | ICD-10-CM

## 2022-07-22 PROBLEM — J44.9 COPD (CHRONIC OBSTRUCTIVE PULMONARY DISEASE) (HCC): Status: ACTIVE | Noted: 2022-06-15

## 2022-07-22 LAB
ALBUMIN SERPL BCP-MCNC: 3.3 G/DL (ref 3.5–5)
ALP SERPL-CCNC: 95 U/L (ref 46–116)
ALT SERPL W P-5'-P-CCNC: 17 U/L (ref 12–78)
ANION GAP SERPL CALCULATED.3IONS-SCNC: 17 MMOL/L (ref 4–13)
ANION GAP SERPL CALCULATED.3IONS-SCNC: 8 MMOL/L (ref 4–13)
ARTERIAL PATENCY WRIST A: NO
AST SERPL W P-5'-P-CCNC: 11 U/L (ref 5–45)
BASE EX.OXY STD BLDV CALC-SCNC: 58.7 % (ref 60–80)
BASE EXCESS BLDA CALC-SCNC: 0.6 MMOL/L
BASE EXCESS BLDV CALC-SCNC: 1.8 MMOL/L
BASOPHILS # BLD AUTO: 0.13 THOUSANDS/ΜL (ref 0–0.1)
BASOPHILS NFR BLD AUTO: 1 % (ref 0–1)
BILIRUB SERPL-MCNC: 0.62 MG/DL (ref 0.2–1)
BUN SERPL-MCNC: 61 MG/DL (ref 5–25)
BUN SERPL-MCNC: 91 MG/DL (ref 5–25)
CALCIUM ALBUM COR SERPL-MCNC: 9.4 MG/DL (ref 8.3–10.1)
CALCIUM SERPL-MCNC: 8.8 MG/DL (ref 8.3–10.1)
CALCIUM SERPL-MCNC: 9.1 MG/DL (ref 8.4–10.2)
CHLORIDE SERPL-SCNC: 90 MMOL/L (ref 96–108)
CHLORIDE SERPL-SCNC: 90 MMOL/L (ref 96–108)
CO2 SERPL-SCNC: 19 MMOL/L (ref 21–32)
CO2 SERPL-SCNC: 30 MMOL/L (ref 21–32)
CREAT SERPL-MCNC: 5.42 MG/DL (ref 0.6–1.3)
CREAT SERPL-MCNC: 8.02 MG/DL (ref 0.6–1.3)
EOSINOPHIL # BLD AUTO: 0.25 THOUSAND/ΜL (ref 0–0.61)
EOSINOPHIL NFR BLD AUTO: 2 % (ref 0–6)
ERYTHROCYTE [DISTWIDTH] IN BLOOD BY AUTOMATED COUNT: 13.8 % (ref 11.6–15.1)
GFR SERPL CREATININE-BSD FRML MDRD: 4 ML/MIN/1.73SQ M
GFR SERPL CREATININE-BSD FRML MDRD: 7 ML/MIN/1.73SQ M
GLUCOSE SERPL-MCNC: 102 MG/DL (ref 65–140)
GLUCOSE SERPL-MCNC: 116 MG/DL (ref 65–140)
HCO3 BLDA-SCNC: 25.8 MMOL/L (ref 22–28)
HCO3 BLDV-SCNC: 26.9 MMOL/L (ref 24–30)
HCT VFR BLD AUTO: 33.4 % (ref 34.8–46.1)
HFNC FLOW LPM: 45
HGB BLD-MCNC: 10.8 G/DL (ref 11.5–15.4)
IMM GRANULOCYTES # BLD AUTO: 0.2 THOUSAND/UL (ref 0–0.2)
IMM GRANULOCYTES NFR BLD AUTO: 2 % (ref 0–2)
LACTATE SERPL-SCNC: 1 MMOL/L (ref 0.5–2)
LYMPHOCYTES # BLD AUTO: 2.58 THOUSANDS/ΜL (ref 0.6–4.47)
LYMPHOCYTES NFR BLD AUTO: 21 % (ref 14–44)
MAGNESIUM SERPL-MCNC: 2 MG/DL (ref 1.6–2.6)
MCH RBC QN AUTO: 29.5 PG (ref 26.8–34.3)
MCHC RBC AUTO-ENTMCNC: 32.3 G/DL (ref 31.4–37.4)
MCV RBC AUTO: 91 FL (ref 82–98)
METANEPH FREE SERPL-MCNC: 77.4 PG/ML (ref 0–88)
MONOCYTES # BLD AUTO: 1 THOUSAND/ΜL (ref 0.17–1.22)
MONOCYTES NFR BLD AUTO: 8 % (ref 4–12)
NASAL CANNULA: 5
NEUTROPHILS # BLD AUTO: 7.9 THOUSANDS/ΜL (ref 1.85–7.62)
NEUTS SEG NFR BLD AUTO: 66 % (ref 43–75)
NON VENT HFNC FIO2: ABNORMAL
NON VENT TYPE HFNC: ABNORMAL
NORMETANEPHRINE SERPL-MCNC: 51.8 PG/ML (ref 0–285.2)
NRBC BLD AUTO-RTO: 0 /100 WBCS
O2 CT BLDA-SCNC: 16.5 ML/DL (ref 16–23)
O2 CT BLDV-SCNC: 9.3 ML/DL
OXYHGB MFR BLDA: 97.9 % (ref 94–97)
PCO2 BLDA: 43.8 MM HG (ref 36–44)
PCO2 BLDV: 44.1 MM HG (ref 42–50)
PH BLDA: 7.39 [PH] (ref 7.35–7.45)
PH BLDV: 7.4 [PH] (ref 7.3–7.4)
PHOSPHATE SERPL-MCNC: 5 MG/DL (ref 2.3–4.1)
PLATELET # BLD AUTO: 294 THOUSANDS/UL (ref 149–390)
PMV BLD AUTO: 9.9 FL (ref 8.9–12.7)
PO2 BLDA: 136.5 MM HG (ref 75–129)
PO2 BLDV: 32.3 MM HG (ref 35–45)
POTASSIUM SERPL-SCNC: 4.1 MMOL/L (ref 3.5–5.3)
POTASSIUM SERPL-SCNC: 5.1 MMOL/L (ref 3.5–5.3)
PROT SERPL-MCNC: 7.3 G/DL (ref 6.4–8.4)
RBC # BLD AUTO: 3.66 MILLION/UL (ref 3.81–5.12)
SODIUM SERPL-SCNC: 126 MMOL/L (ref 135–147)
SODIUM SERPL-SCNC: 128 MMOL/L (ref 135–147)
SPECIMEN SOURCE: ABNORMAL
WBC # BLD AUTO: 12.06 THOUSAND/UL (ref 4.31–10.16)

## 2022-07-22 PROCEDURE — 82805 BLOOD GASES W/O2 SATURATION: CPT | Performed by: PHYSICIAN ASSISTANT

## 2022-07-22 PROCEDURE — 94668 MNPJ CHEST WALL SBSQ: CPT

## 2022-07-22 PROCEDURE — 94660 CPAP INITIATION&MGMT: CPT

## 2022-07-22 PROCEDURE — 84100 ASSAY OF PHOSPHORUS: CPT | Performed by: PHYSICIAN ASSISTANT

## 2022-07-22 PROCEDURE — 83605 ASSAY OF LACTIC ACID: CPT | Performed by: PHYSICIAN ASSISTANT

## 2022-07-22 PROCEDURE — 83735 ASSAY OF MAGNESIUM: CPT | Performed by: PHYSICIAN ASSISTANT

## 2022-07-22 PROCEDURE — NC001 PR NO CHARGE: Performed by: INTERNAL MEDICINE

## 2022-07-22 PROCEDURE — 94760 N-INVAS EAR/PLS OXIMETRY 1: CPT

## 2022-07-22 PROCEDURE — 80048 BASIC METABOLIC PNL TOTAL CA: CPT | Performed by: NURSE PRACTITIONER

## 2022-07-22 PROCEDURE — 99239 HOSP IP/OBS DSCHRG MGMT >30: CPT | Performed by: NURSE PRACTITIONER

## 2022-07-22 PROCEDURE — 85025 COMPLETE CBC W/AUTO DIFF WBC: CPT | Performed by: NURSE PRACTITIONER

## 2022-07-22 PROCEDURE — 82805 BLOOD GASES W/O2 SATURATION: CPT | Performed by: NURSE PRACTITIONER

## 2022-07-22 PROCEDURE — 99232 SBSQ HOSP IP/OBS MODERATE 35: CPT | Performed by: INTERNAL MEDICINE

## 2022-07-22 PROCEDURE — 94640 AIRWAY INHALATION TREATMENT: CPT

## 2022-07-22 PROCEDURE — 80053 COMPREHEN METABOLIC PANEL: CPT | Performed by: PHYSICIAN ASSISTANT

## 2022-07-22 PROCEDURE — NC001 PR NO CHARGE: Performed by: NURSE PRACTITIONER

## 2022-07-22 PROCEDURE — 99223 1ST HOSP IP/OBS HIGH 75: CPT | Performed by: INTERNAL MEDICINE

## 2022-07-22 PROCEDURE — 90935 HEMODIALYSIS ONE EVALUATION: CPT | Performed by: NURSE PRACTITIONER

## 2022-07-22 RX ORDER — ACETAMINOPHEN 325 MG/1
650 TABLET ORAL EVERY 6 HOURS PRN
Status: DISCONTINUED | OUTPATIENT
Start: 2022-07-22 | End: 2022-08-03 | Stop reason: HOSPADM

## 2022-07-22 RX ORDER — CARVEDILOL 25 MG/1
25 TABLET ORAL 2 TIMES DAILY WITH MEALS
Status: DISCONTINUED | OUTPATIENT
Start: 2022-07-23 | End: 2022-07-22

## 2022-07-22 RX ORDER — LORAZEPAM 2 MG/ML
0.5 INJECTION INTRAMUSCULAR ONCE
Status: COMPLETED | OUTPATIENT
Start: 2022-07-22 | End: 2022-07-22

## 2022-07-22 RX ORDER — AMLODIPINE BESYLATE 5 MG/1
5 TABLET ORAL DAILY
Status: DISCONTINUED | OUTPATIENT
Start: 2022-07-23 | End: 2022-07-22

## 2022-07-22 RX ORDER — CARVEDILOL 12.5 MG/1
12.5 TABLET ORAL 2 TIMES DAILY WITH MEALS
Status: DISCONTINUED | OUTPATIENT
Start: 2022-07-23 | End: 2022-07-24

## 2022-07-22 RX ORDER — CLONAZEPAM 0.5 MG/1
0.5 TABLET ORAL 2 TIMES DAILY PRN
Status: DISCONTINUED | OUTPATIENT
Start: 2022-07-22 | End: 2022-07-23

## 2022-07-22 RX ORDER — BUMETANIDE 0.25 MG/ML
4 INJECTION, SOLUTION INTRAMUSCULAR; INTRAVENOUS DAILY
Status: DISCONTINUED | OUTPATIENT
Start: 2022-07-23 | End: 2022-07-22 | Stop reason: HOSPADM

## 2022-07-22 RX ORDER — OXYCODONE HYDROCHLORIDE 5 MG/1
2.5 TABLET ORAL EVERY 4 HOURS PRN
Status: DISCONTINUED | OUTPATIENT
Start: 2022-07-22 | End: 2022-08-03 | Stop reason: HOSPADM

## 2022-07-22 RX ORDER — OXYCODONE HYDROCHLORIDE 5 MG/1
5 TABLET ORAL EVERY 4 HOURS PRN
Status: DISCONTINUED | OUTPATIENT
Start: 2022-07-22 | End: 2022-08-03 | Stop reason: HOSPADM

## 2022-07-22 RX ORDER — CALCIUM ACETATE 667 MG/1
1334 CAPSULE ORAL
Status: DISCONTINUED | OUTPATIENT
Start: 2022-07-23 | End: 2022-08-03 | Stop reason: HOSPADM

## 2022-07-22 RX ORDER — QUETIAPINE FUMARATE 25 MG/1
25 TABLET, FILM COATED ORAL
Status: DISCONTINUED | OUTPATIENT
Start: 2022-07-22 | End: 2022-07-24

## 2022-07-22 RX ORDER — ALBUTEROL SULFATE 90 UG/1
2 AEROSOL, METERED RESPIRATORY (INHALATION) EVERY 4 HOURS PRN
Status: DISCONTINUED | OUTPATIENT
Start: 2022-07-22 | End: 2022-08-03 | Stop reason: HOSPADM

## 2022-07-22 RX ORDER — LEVALBUTEROL 1.25 MG/.5ML
1.25 SOLUTION, CONCENTRATE RESPIRATORY (INHALATION)
Status: DISCONTINUED | OUTPATIENT
Start: 2022-07-22 | End: 2022-08-03 | Stop reason: HOSPADM

## 2022-07-22 RX ORDER — GUAIFENESIN 600 MG/1
600 TABLET, EXTENDED RELEASE ORAL EVERY 12 HOURS SCHEDULED
Status: DISCONTINUED | OUTPATIENT
Start: 2022-07-22 | End: 2022-08-03 | Stop reason: HOSPADM

## 2022-07-22 RX ORDER — HYDRALAZINE HYDROCHLORIDE 10 MG/1
10 TABLET, FILM COATED ORAL EVERY 8 HOURS SCHEDULED
Status: DISCONTINUED | OUTPATIENT
Start: 2022-07-22 | End: 2022-07-23

## 2022-07-22 RX ORDER — HYDRALAZINE HYDROCHLORIDE 20 MG/ML
10 INJECTION INTRAMUSCULAR; INTRAVENOUS EVERY 6 HOURS PRN
Status: DISCONTINUED | OUTPATIENT
Start: 2022-07-22 | End: 2022-08-03 | Stop reason: HOSPADM

## 2022-07-22 RX ORDER — HEPARIN SODIUM 5000 [USP'U]/ML
5000 INJECTION, SOLUTION INTRAVENOUS; SUBCUTANEOUS EVERY 8 HOURS SCHEDULED
Status: DISCONTINUED | OUTPATIENT
Start: 2022-07-23 | End: 2022-08-03 | Stop reason: HOSPADM

## 2022-07-22 RX ADMIN — AMLODIPINE BESYLATE 5 MG: 5 TABLET ORAL at 08:16

## 2022-07-22 RX ADMIN — QUETIAPINE FUMARATE 25 MG: 25 TABLET ORAL at 20:34

## 2022-07-22 RX ADMIN — OXYCODONE HYDROCHLORIDE AND ACETAMINOPHEN 1 TABLET: 5; 325 TABLET ORAL at 11:28

## 2022-07-22 RX ADMIN — LEVALBUTEROL HYDROCHLORIDE 1.25 MG: 1.25 SOLUTION, CONCENTRATE RESPIRATORY (INHALATION) at 20:01

## 2022-07-22 RX ADMIN — IPRATROPIUM BROMIDE 0.5 MG: 0.5 SOLUTION RESPIRATORY (INHALATION) at 13:35

## 2022-07-22 RX ADMIN — IPRATROPIUM BROMIDE 0.5 MG: 0.5 SOLUTION RESPIRATORY (INHALATION) at 07:25

## 2022-07-22 RX ADMIN — CALCIUM ACETATE 1334 MG: 667 CAPSULE ORAL at 17:14

## 2022-07-22 RX ADMIN — GUAIFENESIN 600 MG: 600 TABLET, EXTENDED RELEASE ORAL at 08:16

## 2022-07-22 RX ADMIN — HEPARIN SODIUM 5000 UNITS: 5000 INJECTION INTRAVENOUS; SUBCUTANEOUS at 20:35

## 2022-07-22 RX ADMIN — GUAIFENESIN 600 MG: 600 TABLET, EXTENDED RELEASE ORAL at 20:34

## 2022-07-22 RX ADMIN — IPRATROPIUM BROMIDE 0.5 MG: 0.5 SOLUTION RESPIRATORY (INHALATION) at 20:01

## 2022-07-22 RX ADMIN — HEPARIN SODIUM 5000 UNITS: 5000 INJECTION INTRAVENOUS; SUBCUTANEOUS at 05:15

## 2022-07-22 RX ADMIN — CARVEDILOL 25 MG: 25 TABLET, FILM COATED ORAL at 17:14

## 2022-07-22 RX ADMIN — HEPARIN SODIUM 5000 UNITS: 5000 INJECTION INTRAVENOUS; SUBCUTANEOUS at 13:11

## 2022-07-22 RX ADMIN — LEVALBUTEROL HYDROCHLORIDE 1.25 MG: 1.25 SOLUTION, CONCENTRATE RESPIRATORY (INHALATION) at 07:25

## 2022-07-22 RX ADMIN — LEVALBUTEROL HYDROCHLORIDE 1.25 MG: 1.25 SOLUTION, CONCENTRATE RESPIRATORY (INHALATION) at 13:35

## 2022-07-22 RX ADMIN — CALCIUM ACETATE 1334 MG: 667 CAPSULE ORAL at 12:02

## 2022-07-22 RX ADMIN — CLONAZEPAM 0.5 MG: 0.5 TABLET ORAL at 04:05

## 2022-07-22 RX ADMIN — CARVEDILOL 25 MG: 25 TABLET, FILM COATED ORAL at 08:16

## 2022-07-22 RX ADMIN — LORAZEPAM 0.5 MG: 2 INJECTION INTRAMUSCULAR; INTRAVENOUS at 10:40

## 2022-07-22 RX ADMIN — CALCIUM ACETATE 1334 MG: 667 CAPSULE ORAL at 08:24

## 2022-07-22 NOTE — ASSESSMENT & PLAN NOTE
Wt Readings from Last 3 Encounters:   07/22/22 77 9 kg (171 lb 11 8 oz)   06/28/22 77 4 kg (170 lb 9 6 oz)   06/22/22 76 6 kg (168 lb 14 oz)     · Does not appear volume overloaded, patient anuric, volume management by dialysis  · Low-salt diet with 1500 mL fluid restriction  · Daily weights and I&Os  · Cardiology recommendations appreciated-patient to be transferred to [facility] for heart failure team

## 2022-07-22 NOTE — ASSESSMENT & PLAN NOTE
High oxygen requirement  Getting hemodialysis  Not much clinical improvement  Will speak to my heart failure colleagues

## 2022-07-22 NOTE — PROGRESS NOTES
[address]  Progress Note Christoph Ching 1960, 64 y o  female MRN: [id number]  Unit/Bed#: ICU 02-01 Encounter: [id number]  Primary Care Provider: Radha Lindsey DO   Date and time admitted to hospital: 7/12/2022  5:54 PM    Acute on chronic systolic congestive heart failure ([facility] [address] )  Assessment & Plan  High oxygen requirement  Getting hemodialysis  Not much clinical improvement  Will speak to my heart failure colleagues  SHANTA (acute kidney injury) [facility])  Assessment & Plan  Likely secondary to low output state  [facility]  Assessment & Plan  Nonischemic  Relatively recent negative coronary angiogram         Outpatient Cardiologist:  Angela Villagomez  On 03/26/2021 she presented with acute heart failure and was at least briefly intubated  An echo the next day revealed ejection fraction less than 20% with moderate mitral regurgitation  Several days later she underwent coronary angiography which revealed moderate but nonobstructive disease  Echo on 07/09/2021 revealed near normal LV systolic function  However echo on 07/14/2022 again revealed severe LV dysfunction when she presented with heart failure  Subjective:   Still feels quite short of breath  Summary comments: For now continuing dialysis  Will discuss Milrinone andother options with my heart failure colleagues  Telemetry/ECG/Cardiac testing:   Sinus rhythm  TTE 03/27/2021:  EF less than 20%  Coronary angiography 03/30/2021:  No high-grade proximal CAD  TTE 07/09/2021:  EF 50%  Mild enlargement of the ascending aorta to 3 7 cm  Mild mitral regurgitation    TTE 06/15/2022:  EF 41%  TTE 07/14/2022:  EF 25%  Vitals: Blood pressure 144/77, pulse 80, temperature 97 7 °F (36 5 °C), temperature source Temporal, resp   rate (!) 25, height 5' 10" (1 778 m), weight 77 9 kg (171 lb 11 8 oz), SpO2 95 % ,   Orthostatic Blood Pressures    Flowsheet Row Most Recent Value   Blood Pressure 144/77 filed at 07/22/2022 1100   Patient Position - Orthostatic VS Lying filed at 07/22/2022 0400      ,   Weight (last 2 days)     Date/Time Weight    07/22/22 0600 77 9 (171 74)    07/21/22 0600 74 8 (164 9)    07/20/22 0554 77 2 (170 2)          Physical Exam:    General:  Wearing high-flow oxygen  Mildly breathless  Eyes:  Anicteric  Oral mucosa:  Moist   Neck:  No JV D  Carotid upstrokes are brisk without bruits  No masses  Chest:  Crackles throughout  Cardiac:  No palpable PMI  Normal S1 and S2  No murmur gallop or rub  Abdomen:  Soft and nontender  No palpable organomegaly or aortic enlargement  Extremities:  No peripheral edema  Neuro:  Grossly symmetric  Psych:  Alert and oriented x3        Medications:      Current Facility-Administered Medications:     acetaminophen (TYLENOL) tablet 650 mg, 650 mg, Oral, Q4H PRN, Bobbetta Games, CRNP, 650 mg at 07/15/22 2122    albuterol inhalation solution 2 5 mg, 2 5 mg, Nebulization, Q6H PRN, Bobbetta Games, CRNP    amLODIPine (NORVASC) tablet 5 mg, 5 mg, Oral, Daily, Bobbetta Games, CRNP, 5 mg at 07/22/22 0816    bumetanide (BUMEX) tablet 4 mg, 4 mg, Oral, Once per day on Sun Tue Thu Sat, Keith Carlton, DO, 4 mg at 07/21/22 5120    calcium acetate (PHOSLO) capsule 1,334 mg, 1,334 mg, Oral, TID With Meals, Elverna Hasten, DO, 1,334 mg at 07/22/22 5445    carvedilol (COREG) tablet 25 mg, 25 mg, Oral, BID With Meals, Bobbetta Games, CRNP, 25 mg at 07/22/22 0816    clonazePAM (KlonoPIN) tablet 0 5 mg, 0 5 mg, Oral, BID PRN, Bobbetta Games, CRNP, 0 5 mg at 07/22/22 0405    guaiFENesin (MUCINEX) 12 hr tablet 600 mg, 600 mg, Oral, Q12H [address], Bobbetta Games, CRNP, 600 mg at 07/22/22 0816    heparin (porcine) subcutaneous injection 5,000 Units, 5,000 Units, Subcutaneous, Q8H [address], ESTHER June, 5,000 Units at 07/22/22 0515    hydrALAZINE (APRESOLINE) injection 10 mg, 10 mg, Intravenous, Q6H PRN, Tucker Escobar PA-C, 10 mg at 07/20/22 1766   ipratropium (ATROVENT) 0 02 % inhalation solution 0 5 mg, 0 5 mg, Nebulization, TID, Bob Huerta MD, 0 5 mg at 07/22/22 0725    levalbuterol [facility]) inhalation solution 1 25 mg, 1 25 mg, Nebulization, TID, 1 25 mg at 07/22/22 0725 **AND** [DISCONTINUED] sodium chloride 0 9 % inhalation solution 3 mL, 3 mL, Nebulization, TID, Bob Huerta MD, 3 mL at 07/21/22 1325    lidocaine (LIDODERM) 5 % patch 2 patch, 2 patch, Topical, Daily, Bobbetta Games, CRNP, 2 patch at 07/21/22 0850    nicotine (NICODERM CQ) 21 mg/24 hr TD 24 hr patch 1 patch, 1 patch, Transdermal, Daily, Bobbetta Alejandra, CRNP, 1 patch at 07/21/22 0908    OLANZapine (ZyPREXA ZYDIS) dispersible tablet 10 mg, 10 mg, Oral, HS PRN, Bobbetta Alejandra, CRNP, 10 mg at 07/20/22 2241    ondansetron (ZOFRAN) injection 4 mg, 4 mg, Intravenous, Q6H PRN, Bobbetta Games, CRNP, 4 mg at 07/20/22 1655    oxyCODONE-acetaminophen (PERCOCET) 5-325 mg per tablet 1 tablet, 1 tablet, Oral, Q4H PRN, Bobbetta Games, CRNP, 1 tablet at 07/22/22 1128    QUEtiapine (SEROquel) tablet 25 mg, 25 mg, Oral, HS, Bobbetta Games, CRNP, 25 mg at 07/21/22 2101     Labs & Results:    Troponins:         BNP:   Results from last 6 Months   Lab Units 07/12/22  1816   BNP pg/mL 1,822*     CBC with diff:   Results from last 7 days   Lab Units 07/22/22  0420 07/21/22  0609   WBC Thousand/uL 12 06* 10 74*   HEMOGLOBIN g/dL 10 8* 10 8*   HEMATOCRIT % 33 4* 33 3*   MCV fL 91 91   PLATELETS Thousands/uL 294 262     TSH:     CMP:   Results from last 7 days   Lab Units 07/22/22  0420 07/21/22  0609 07/19/22  0352 07/18/22  0601 07/17/22  0544   POTASSIUM mmol/L 5 1 4 8   < > 4 7 4 6   CHLORIDE mmol/L 90* 92*   < > 90* 91*   CO2 mmol/L 19* 23   < > 20* 26   BUN mg/dL 91* 56*   < > 110* 85*   CREATININE mg/dL 8 02* 5 81*   < > 8 87* 6 92*   AST U/L  --   --   --  9* 5*   ALT U/L  --   --   --  5* 4*   EGFR ml/min/1 73sq m 4 7   < > 4 5    < > = values in this interval not displayed       Lipid Profile:     Coags:

## 2022-07-22 NOTE — TRANSPORTATION MEDICAL NECESSITY
Section I - General Information    Name of Patient: Jonah Stevenson                 : 1960    Medicare #: [id number]  Transport Date: 22 (PCS is valid for round trips on this date and for all repetitive trips in the 60-day range as noted below )  Origin: [address]: [address]  Is the pt's stay covered under Medicare Part A (PPS/DRG)   [x]     Closest appropriate facility? If no, why is transport to more distant facility required? Yes  If hospice pt, is this transport related to pt's terminal illness? NA       Section II - Medical Necessity Questionnaire  Ambulance transportation is medically necessary only if other means of transport are contraindicated or would be potentially harmful to the patient  To meet this requirement, the patient must either be "bed confined" or suffer from a condition such that transport by means other than ambulance is contraindicated by the patient's condition  The following questions must be answered by the medical professional signing below for this form to be valid:    1)  Describe the MEDICAL CONDITION (physical and/or mental) of this patient AT [address] [place] [address] that requires the patient to be transported in an ambulance and why transport by other means is contraindicated by the patient's condition:Heart Failure    2) Is the patient "bed confined" as defined below? Yes  To be "be confined" the patient must satisfy all three of the following conditions: (1) unable to get up from bed without Assistance; AND (2) unable to ambulate; AND (3) unable to sit in a chair or wheelchair  3) Can this patient safely be transported by car or wheelchair van (i e , seated during transport without a medical attendant or monitoring)?    No    4) In addition to completing questions 1-3 above, please check any of the following conditions that apply*: *Note: supporting documentation for any boxes checked must be maintained in the patient's medical records  If hosp-hosp transfer, describe services needed at 2nd facility not available at 1st facility? Requires oxygen-unable to self administer      Section III - Signature of Physician or Healthcare Professional  I certify that the above information is true and correct based on my evaluation of this patient, and represent that the patient requires transport by ambulance and that other forms of transport are contraindicated  I understand that this information will be used by the Centers for Medicare and Medicaid Services (CMS) to support the determination of medical necessity for ambulance services, and I represent that I have personal knowledge of the patient's condition at time of transport  [x]  If this box is checked, I also certify that the patient is physically or mentally incapable of signing the ambulance service's claim and that the institution with which I am affiliated has furnished care, services, or assistance to the patient  My signature below is made on behalf of the patient pursuant to 42 CFR §424 36(b)(4)  In accordance with 42 CFR §424 37, the specific reason(s) that the patient is physically or mentally incapable of signing the claim form is as follows:       Signature of Physician* or Healthcare Professional______________________________________________________________  Signature Date 07/22/22 (For scheduled repetitive transports, this form is not valid for transports performed more than 60 days after this date)    Printed Name & Credentials of Physician or Healthcare Professional (MD, DO, RN, etc )__Yanelis HERNANDEZ______________________________  *Form must be signed by patient's attending physician for scheduled, repetitive transports   For non-repetitive, unscheduled ambulance transports, if unable to obtain the signature of the attending physician, any of the following may sign (choose appropriate option below)  [] Physician Assistant []  Clinical Nurse Specialist []  Registered Nurse  []  Nurse Practitioner  [x] Discharge Planner

## 2022-07-22 NOTE — RESPIRATORY THERAPY NOTE
07/22/22 2312   Respiratory Assessment   Assessment Type Assess only   General Appearance Sleeping   Respiratory Pattern Shallow;Normal   Bilateral Breath Sounds Diminished;Clear   Resp Comments decreased fio2 and flow on optiflo pt asleep keith well   O2 Device optiflo   Non-Invasive Information   O2 Interface Device HFNC prongs   Non-Invasive Ventilation Mode HFNC (High flow)  (optiflo)   SpO2 95 %   $ Pulse Oximetry Spot Check Charge Completed   Non-Invasive Settings   FiO2 (%) (S)  35   Temperature (Set) 31   Flow (lpm) 40   Non-Invasive Readings   Skin Intervention Skin intact   Heater Temperature (Obs) 31   Maintenance   Water bag changed No

## 2022-07-22 NOTE — PLAN OF CARE
Problem: Potential for Falls  Goal: Patient will remain free of falls  Description: INTERVENTIONS:  - Educate patient/family on patient safety including physical limitations  - Instruct patient to call for assistance with activity   - Consult OT/PT to assist with strengthening/mobility   - Keep Call bell within reach  - Keep bed low and locked with side rails adjusted as appropriate  - Keep care items and personal belongings within reach  - Initiate and maintain comfort rounds  - Make Fall Risk Sign visible to staff  - Offer Toileting every 2 Hours, in advance of need  - Initiate/Maintain bed 2alarm  - Obtain necessary fall risk management equipment: yellow socks  - Apply yellow socks and bracelet for high fall risk patients  - Consider moving patient to room near nurses station  Outcome: Progressing     Problem: PAIN - ADULT  Goal: Verbalizes/displays adequate comfort level or baseline comfort level  Description: Interventions:  - Encourage patient to monitor pain and request assistance  - Assess pain using appropriate pain scale  - Administer analgesics based on type and severity of pain and evaluate response  - Implement non-pharmacological measures as appropriate and evaluate response  - Consider cultural and social influences on pain and pain management  - Notify physician/advanced practitioner if interventions unsuccessful or patient reports new pain  Outcome: Progressing

## 2022-07-22 NOTE — PLAN OF CARE
Problem: MOBILITY - ADULT  Goal: Maintain or return to baseline ADL function  Description: INTERVENTIONS:  -  Assess patient's ability to carry out ADLs; assess patient's baseline for ADL function and identify physical deficits which impact ability to perform ADLs (bathing, care of mouth/teeth, toileting, grooming, dressing, etc )  - Assess/evaluate cause of self-care deficits   - Assess range of motion  - Assess patient's mobility; develop plan if impaired  - Assess patient's need for assistive devices and provide as appropriate  - Encourage maximum independence but intervene and supervise when necessary  - Involve family in performance of ADLs  - Assess for home care needs following discharge   - Consider OT consult to assist with ADL evaluation and planning for discharge  - Provide patient education as appropriate  Outcome: Progressing  Goal: Maintains/Returns to pre admission functional level  Description: INTERVENTIONS:  - Perform BMAT or MOVE assessment daily    - Set and communicate daily mobility goal to care team and patient/family/caregiver  - Collaborate with rehabilitation services on mobility goals if consulted  - Perform Range of Motion 3 times a day  - Reposition patient every 2 hours    - Dangle patient 3 times a day  - Stand patient 3 times a day  - Ambulate patient 3 times a day  - Out of bed to chair 3 times a day   - Out of bed for meals 3 times a day  - Out of bed for toileting  - Record patient progress and toleration of activity level   Outcome: Progressing     Problem: Potential for Falls  Goal: Patient will remain free of falls  Description: INTERVENTIONS:  - Educate patient/family on patient safety including physical limitations  - Instruct patient to call for assistance with activity   - Consult OT/PT to assist with strengthening/mobility   - Keep Call bell within reach  - Keep bed low and locked with side rails adjusted as appropriate  - Keep care items and personal belongings within reach  - Initiate and maintain comfort rounds  - Make Fall Risk Sign visible to staff  - Offer Toileting every 2 Hours, in advance of need  - Initiate/Maintain bed 2alarm  - Obtain necessary fall risk management equipment: yellow socks  - Apply yellow socks and bracelet for high fall risk patients  - Consider moving patient to room near nurses station  Outcome: Progressing     Problem: Prexisting or High Potential for Compromised Skin Integrity  Goal: Skin integrity is maintained or improved  Description: INTERVENTIONS:  - Identify patients at risk for skin breakdown  - Assess and monitor skin integrity  - Assess and monitor nutrition and hydration status  - Monitor labs   - Assess for incontinence   - Turn and reposition patient  - Assist with mobility/ambulation  - Relieve pressure over bony prominences  - Avoid friction and shearing  - Provide appropriate hygiene as needed including keeping skin clean and dry  - Evaluate need for skin moisturizer/barrier cream  - Collaborate with interdisciplinary team   - Patient/family teaching  - Consider wound care consult   Outcome: Progressing     Problem: PAIN - ADULT  Goal: Verbalizes/displays adequate comfort level or baseline comfort level  Description: Interventions:  - Encourage patient to monitor pain and request assistance  - Assess pain using appropriate pain scale  - Administer analgesics based on type and severity of pain and evaluate response  - Implement non-pharmacological measures as appropriate and evaluate response  - Consider cultural and social influences on pain and pain management  - Notify physician/advanced practitioner if interventions unsuccessful or patient reports new pain  Outcome: Progressing     Problem: INFECTION - ADULT  Goal: Absence or prevention of progression during hospitalization  Description: INTERVENTIONS:  - Assess and monitor for signs and symptoms of infection  - Monitor lab/diagnostic results  - Monitor all insertion sites, i e  indwelling lines, tubes, and drains  - Monitor endotracheal if appropriate and nasal secretions for changes in amount and color  - [place] appropriate cooling/warming therapies per order  - Administer medications as ordered  - Instruct and encourage patient and family to use good hand hygiene technique  - Identify and instruct in appropriate isolation precautions for identified infection/condition  Outcome: Progressing  Goal: Absence of fever/infection during neutropenic period  Description: INTERVENTIONS:  - Monitor WBC    Outcome: Progressing     Problem: SAFETY ADULT  Goal: Maintain or return to baseline ADL function  Description: INTERVENTIONS:  -  Assess patient's ability to carry out ADLs; assess patient's baseline for ADL function and identify physical deficits which impact ability to perform ADLs (bathing, care of mouth/teeth, toileting, grooming, dressing, etc )  - Assess/evaluate cause of self-care deficits   - Assess range of motion  - Assess patient's mobility; develop plan if impaired  - Assess patient's need for assistive devices and provide as appropriate  - Encourage maximum independence but intervene and supervise when necessary  - Involve family in performance of ADLs  - Assess for home care needs following discharge   - Consider OT consult to assist with ADL evaluation and planning for discharge  - Provide patient education as appropriate  Outcome: Progressing  Goal: Maintains/Returns to pre admission functional level  Description: INTERVENTIONS:  - Perform BMAT or MOVE assessment daily    - Set and communicate daily mobility goal to care team and patient/family/caregiver  - Collaborate with rehabilitation services on mobility goals if consulted  - Perform Range of Motion 3 times a day  - Reposition patient every 2 hours    - Dangle patient 3 times a day  - Stand patient 3 times a day  - Ambulate patient 3 times a day  - Out of bed to chair 3 times a day   - Out of bed for meals 3 times a day  - Out of bed for toileting  - Record patient progress and toleration of activity level   Outcome: Progressing  Goal: Patient will remain free of falls  Description: INTERVENTIONS:  - Educate patient/family on patient safety including physical limitations  - Instruct patient to call for assistance with activity   - Consult OT/PT to assist with strengthening/mobility   - Keep Call bell within reach  - Keep bed low and locked with side rails adjusted as appropriate  - Keep care items and personal belongings within reach  - Initiate and maintain comfort rounds  - Make Fall Risk Sign visible to staff  - Offer Toileting every 2 Hours, in advance of need  - Initiate/Maintain bed 2alarm  - Obtain necessary fall risk management equipment: yellow socks  - Apply yellow socks and bracelet for high fall risk patients  - Consider moving patient to room near nurses station  Outcome: Progressing     Problem: DISCHARGE PLANNING  Goal: Discharge to home or other facility with appropriate resources  Description: INTERVENTIONS:  - Identify barriers to discharge w/patient and caregiver  - Arrange for needed discharge resources and transportation as appropriate  - Identify discharge learning needs (meds, wound care, etc )  - Arrange for interpretive services to assist at discharge as needed  - Refer to Case Management Department for coordinating discharge planning if the patient needs post-hospital services based on physician/advanced practitioner order or complex needs related to functional status, cognitive ability, or social support system  Outcome: Progressing     Problem: Knowledge Deficit  Goal: Patient/family/caregiver demonstrates understanding of disease process, treatment plan, medications, and discharge instructions  Description: Complete learning assessment and assess knowledge base    Interventions:  - Provide teaching at level of understanding  - Provide teaching via preferred learning methods  Outcome: Progressing Problem: GENITOURINARY - ADULT  Goal: Maintains or returns to baseline urinary function  Description: INTERVENTIONS:  - Assess urinary function  - Encourage oral fluids to ensure adequate hydration if ordered  - Administer IV fluids as ordered to ensure adequate hydration  - Administer ordered medications as needed  - Offer frequent toileting  - Follow urinary retention protocol if ordered  Outcome: Progressing  Goal: Absence of urinary retention  Description: INTERVENTIONS:  - Assess patients ability to void and empty bladder  - Monitor I/O  - Bladder scan as needed  - Discuss with physician/AP medications to alleviate retention as needed  - Discuss catheterization for long term situations as appropriate  Outcome: Progressing  Goal: Urinary catheter remains patent  Description: INTERVENTIONS:  - Assess patency of urinary catheter  - If patient has a chronic leung, consider changing catheter if non-functioning  - Follow guidelines for intermittent irrigation of non-functioning urinary catheter  Outcome: Progressing     Problem: METABOLIC, FLUID AND ELECTROLYTES - ADULT  Goal: Electrolytes maintained within normal limits  Description: INTERVENTIONS:  - Monitor labs and assess patient for signs and symptoms of electrolyte imbalances  - Administer electrolyte replacement as ordered  - Monitor response to electrolyte replacements, including repeat lab results as appropriate  - Instruct patient on fluid and nutrition as appropriate  Outcome: Progressing  Goal: Fluid balance maintained  Description: INTERVENTIONS:  - Monitor labs   - Monitor I/O and WT  - Instruct patient on fluid and nutrition as appropriate  - Assess for signs & symptoms of volume excess or deficit  Outcome: Progressing  Goal: Glucose maintained within target range  Description: INTERVENTIONS:  - Monitor Blood Glucose as ordered  - Assess for signs and symptoms of hyperglycemia and hypoglycemia  - Administer ordered medications to maintain glucose within target range  - Assess nutritional intake and initiate nutrition service referral as needed  Outcome: Progressing     Problem: Nutrition/Hydration-ADULT  Goal: Nutrient/Hydration intake appropriate for improving, restoring or maintaining nutritional needs  Description: Monitor and assess patient's nutrition/hydration status for malnutrition  Collaborate with interdisciplinary team and initiate plan and interventions as ordered  Monitor patient's weight and dietary intake as ordered or per policy  Utilize nutrition screening tool and intervene as necessary  Determine patient's food preferences and provide high-protein, high-caloric foods as appropriate       INTERVENTIONS:  - Monitor oral intake, urinary output, labs, and treatment plans  - Assess nutrition and hydration status and recommend course of action  - Evaluate amount of meals eaten  - Assist patient with eating if necessary   - Allow adequate time for meals  - Recommend/ encourage appropriate diets, oral nutritional supplements, and vitamin/mineral supplements  - Order, calculate, and assess calorie counts as needed  - Recommend, monitor, and adjust tube feedings and TPN/PPN based on assessed needs  - Assess need for intravenous fluids  - Provide specific nutrition/hydration education as appropriate  - Include patient/family/caregiver in decisions related to nutrition  Outcome: Progressing

## 2022-07-22 NOTE — ASSESSMENT & PLAN NOTE
· Baseline creatinine appears to be around 1 5-1 8  · Nephrology following  · Likely 2/2 ATN, creatinine remains elevated  · Became oliguric and did not respond to diuretics or fluids  · Tolerated iHD session 7/15, 7/16, 7/18   · Dialysis scheduled for Monday Wednesday Friday  · Continue to monitor renal indices and urine output   · Work up for pheochromocytoma pending  · Case management on board-patient will need placement at a facility with dialysis availability, at least short-term  · PermCath placement when patient is able to lay flat, tentatively next week  · Patient has begun to make small amounts of urine

## 2022-07-22 NOTE — ASSESSMENT & PLAN NOTE
· Diffuse wheezing resolved, breathing improved, although continuing on high-flow oxygen  · Respiratory protocol  · Wean oxygen as tolerated  · Received 3 doses of Solu-Medrol 40 mg IV, then discontinued  · Give Xopenex 3 times daily  · Continue pre-hospital Anoro 1 puff daily

## 2022-07-22 NOTE — RESPIRATORY THERAPY NOTE
07/22/22 0726   Respiratory Assessment   Assessment Type Pre-treatment   General Appearance Sleeping   Respiratory Pattern Assisted;Spontaneous   Chest Assessment Chest expansion symmetrical   Bilateral Breath Sounds Diminished;Clear   Resp Comments pt asleep on bipap tx given via aerogen, keith bipap well 12/6 30% will cont to monitor   O2 Device bipap   Non-Invasive Information   O2 Interface Device Full face mask  (med)   Non-Invasive Ventilation Mode BiPAP  (v60)   SpO2 98 %   $ Pulse Oximetry Spot Check Charge Completed   Non-Invasive Settings   FiO2 (%) 30   Rise Time 2   IPAP (cm) 12 cm   EPAP (cm) 6 cm   Rate (Set) 12   Flow (lpm) 47   Pressure Support (cm H2O) 6   Inspiratory Time (Set) 1 25   Non-Invasive Readings   Skin Intervention Skin intact   Total Rate 12   Spontaneous Vt (mL) 778   Spontaneous MV (mL) 9 7   I/E Ratio (Obs) 1:3   Leak (lpm) 16   Peak Pressure (Obs) 13   Non-Invasive Alarms   Insp Pressure High (cm H20) 25   Insp Pressure Low (cm H20) 6   Low Insp Pressure Time (sec) 20 sec   MV Low (L/min) 3   Vt High (mL) 1200   Vt Low (mL) 200   High Resp Rate (BPM) 40 BPM   Low Resp Rate (BPM) 8 BPM   Apnea Interval (sec) 20   Apnea Rate 12   Apnea Pressure 12

## 2022-07-22 NOTE — ASSESSMENT & PLAN NOTE
· Initially admitted to Medicine Service and had acute change in respiratory status, transfer to Critical Care Service  · History of intubation twice in the past year for similar presentation   · Treated with BiPAP, titrated to 4 L nasal cannula, then return to mid to high-flow  · Cardiology Nephrology recommendations appreciated  · Continue dialysis Monday Wednesday Friday  · Continue oxygen protocol-currently on high flow  · Patient to be transferred to [facility] for heart failure team

## 2022-07-22 NOTE — PLAN OF CARE
Problem: Potential for Falls  Goal: Patient will remain free of falls  Description: INTERVENTIONS:  - Educate patient/family on patient safety including physical limitations  - Instruct patient to call for assistance with activity   - Consult OT/PT to assist with strengthening/mobility   - Keep Call bell within reach  - Keep bed low and locked with side rails adjusted as appropriate  - Keep care items and personal belongings within reach  - Initiate and maintain comfort rounds  - Make Fall Risk Sign visible to staff  - Offer Toileting every 2 Hours, in advance of need  - Initiate/Maintain bed 2alarm  - Obtain necessary fall risk management equipment: yellow socks  - Apply yellow socks and bracelet for high fall risk patients  - Consider moving patient to room near nurses station  Outcome: Progressing     Problem: PAIN - ADULT  Goal: Verbalizes/displays adequate comfort level or baseline comfort level  Description: Interventions:  - Encourage patient to monitor pain and request assistance  - Assess pain using appropriate pain scale  - Administer analgesics based on type and severity of pain and evaluate response  - Implement non-pharmacological measures as appropriate and evaluate response  - Consider cultural and social influences on pain and pain management  - Notify physician/advanced practitioner if interventions unsuccessful or patient reports new pain  Outcome: Progressing     Problem: RESPIRATORY - ADULT  Goal: Achieves optimal ventilation and oxygenation  Description: INTERVENTIONS:  - Assess for changes in respiratory status  - Assess for changes in mentation and behavior  - Position to facilitate oxygenation and minimize respiratory effort  - Oxygen administered by appropriate delivery if ordered  - Initiate smoking cessation education as indicated  - Encourage broncho-pulmonary hygiene including cough, deep breathe, Incentive Spirometry  - Assess the need for suctioning and aspirate as needed  - Assess and instruct to report SOB or any respiratory difficulty  - Respiratory Therapy support as indicated  Outcome: Progressing

## 2022-07-22 NOTE — PHYSICAL THERAPY NOTE
07/22/22 1103   PT Last Visit   PT Visit Date 07/22/22   Note Type   Note Type Cancelled Session   Cancel Reasons Other  (dialysis)     Physical Therapy Cancellation Note    Chart review performed  At this time, PT treatment session cancelled , patient receiving dialysis  PT will follow and provide PT interventions as appropriate      Neville Lagos PTA

## 2022-07-22 NOTE — PROGRESS NOTES
Shira 45  Progress Note Moy Vega 1960, 64 y o  female MRN: [id number]  Unit/Bed#: ICU 02-01 Encounter: [id number]  Primary Care Provider: Jodee Nunez DO   Date and time admitted to hospital: 7/12/2022  5:54 PM    * Acute on chronic respiratory failure with hypoxemia [facility])  Assessment & Plan  · Initially admitted to Medicine Service and had acute change in respiratory status, transfer to Critical Care Service  · History of intubation twice in the past year for similar presentation   · Treated with BiPAP, titrated to 4 L nasal cannula, then return to mid to high-flow  · Cardiology Nephrology recommendations appreciated  · Continue dialysis Monday Wednesday Friday  · Continue oxygen protocol-currently on high flow  · Patient to be transferred to [facility] for heart failure team    SHANTA (acute kidney injury) [facility])  Assessment & Plan  · Baseline creatinine appears to be around 1 5-1 8  · Nephrology following  · Likely 2/2 ATN, creatinine remains elevated  · Became oliguric and did not respond to diuretics or fluids  · Tolerated iHD session 7/15, 7/16, 7/18   · Dialysis scheduled for Monday Wednesday Friday  · Continue to monitor renal indices and urine output   · Work up for pheochromocytoma pending  · Case management on board-patient will need placement at a facility with dialysis availability, at least short-term  · PermCath placement when patient is able to lay flat, tentatively next week  · Patient has begun to make small amounts of urine    COPD with acute exacerbation ([facility] [address] )  Assessment & Plan  · Diffuse wheezing resolved, breathing improved, although continuing on high-flow oxygen  · Respiratory protocol  · Wean oxygen as tolerated  · Received 3 doses of Solu-Medrol 40 mg IV, then discontinued  · Give Xopenex 3 times daily  · Continue pre-hospital Anoro 1 puff daily      Hepatitis B  Assessment & Plan  · Patient tested positive for hepatitis B during routine screening for initiation of dialysis  · Outpatient follow-up with GI    Tobacco abuse  Assessment & Plan  · Nicotine patch  · Encourage cessation    Acute on chronic systolic congestive heart failure (HCC)  Assessment & Plan  Wt Readings from Last 3 Encounters:   07/22/22 77 9 kg (171 lb 11 8 oz)   06/28/22 77 4 kg (170 lb 9 6 oz)   06/22/22 76 6 kg (168 lb 14 oz)     · Does not appear volume overloaded, patient anuric, volume management by dialysis  · Low-salt diet with 1500 mL fluid restriction  · Daily weights and I&Os  · Cardiology recommendations appreciated-patient to be transferred to [facility] for heart failure team      [address]  · Home Klonopin increased to twice daily, Zyprexa added as needed  · Seroquel started 07/18/2022  · Supportive care    Chronic pain  Assessment & Plan  · Continue pre-hospital MS Contin and Percocet    Essential hypertension  Assessment & Plan  · BP reviewed  · Continue amlodipine  · Continue Coreg which has been increased to 25 mg twice daily   · Monitor blood pressure    Hypokalemia-resolved as of 7/15/2022  Assessment & Plan  · Continue daily BMP and monitor potassium    VTE Pharmacologic Prophylaxis:   Pharmacologic: Heparin  Mechanical VTE Prophylaxis in Place: Yes    Patient Centered Rounds: I have performed bedside rounds with nursing staff today  Discussions with Specialists or Other Care Team Provider:  Cardiology, attending, PACS    Education and Discussions with Family / Patient:  Patient, and called daughter Anton Tucker and left message  Time Spent for Care: 1 hour  More than 50% of total time spent on counseling and coordination of care as described above  Current Length of Stay: 10 day(s)    Current Patient Status: Inpatient   Certification Statement: The patient will continue to require additional inpatient hospital stay due to per plan above  Discharge Plan: to be determined      Code Status: Level 1 - Full Code      Subjective:   Patient seen and examined  She says she feels better this afternoon, but has some difficulty breathing this morning  She is sitting upright in a chair with high-flow oxygen in place, in no acute distress  Objective:     Vitals:   Temp (24hrs), Av 7 °F (36 5 °C), Min:97 3 °F (36 3 °C), Max:98 °F (36 7 °C)    Temp:  [97 3 °F (36 3 °C)-98 °F (36 7 °C)] 97 3 °F (36 3 °C)  HR:  [74-93] 82  Resp:  [12-44] 20  BP: (117-187)/(74-98) 154/81  SpO2:  [93 %-98 %] 93 %  Body mass index is 24 64 kg/m²  Input and Output Summary (last 24 hours): Intake/Output Summary (Last 24 hours) at 2022 1658  Last data filed at 2022 1230  Gross per 24 hour   Intake 820 ml   Output 3510 ml   Net -2690 ml       Physical Exam:     Physical Exam  Vitals and nursing note reviewed  Constitutional:       Appearance: She is ill-appearing  HENT:      Head: Normocephalic and atraumatic  Mouth/Throat:      Pharynx: Oropharynx is clear  Eyes:      Extraocular Movements: Extraocular movements intact  Pupils: Pupils are equal, round, and reactive to light  Cardiovascular:      Rate and Rhythm: Normal rate and regular rhythm  Pulses: Normal pulses  Pulmonary:      Effort: Pulmonary effort is normal  No respiratory distress  Breath sounds: Rales present  Comments: Faint rales bilateral bases  Abdominal:      General: Bowel sounds are normal       Palpations: Abdomen is soft  Tenderness: There is no abdominal tenderness  Musculoskeletal:         General: Normal range of motion  Cervical back: Neck supple  Skin:     General: Skin is warm and dry  Capillary Refill: Capillary refill takes less than 2 seconds  Neurological:      General: No focal deficit present  Mental Status: She is alert and oriented to person, place, and time           Additional Data:     Labs:    Results from last 7 days   Lab Units 22  0420   WBC Thousand/uL 12 06*   HEMOGLOBIN g/dL 10 8*   HEMATOCRIT % 33 4* PLATELETS Thousands/uL 294   NEUTROS PCT % 66   LYMPHS PCT % 21   MONOS PCT % 8   EOS PCT % 2     Results from last 7 days   Lab Units 07/22/22  0420 07/19/22  0352 07/18/22  0601   SODIUM mmol/L 126*   < > 129*   POTASSIUM mmol/L 5 1   < > 4 7   CHLORIDE mmol/L 90*   < > 90*   CO2 mmol/L 19*   < > 20*   BUN mg/dL 91*   < > 110*   CREATININE mg/dL 8 02*   < > 8 87*   ANION GAP mmol/L 17*   < > 19*   CALCIUM mg/dL 9 1   < > 8 2*   ALBUMIN g/dL  --   --  3 4*   TOTAL BILIRUBIN mg/dL  --   --  0 48   ALK PHOS U/L  --   --  68   ALT U/L  --   --  5*   AST U/L  --   --  9*   GLUCOSE RANDOM mg/dL 116   < > 115    < > = values in this interval not displayed  Results from last 7 days   Lab Units 07/20/22  0524 07/19/22  0352 07/18/22  0601 07/17/22  0544   PROCALCITONIN ng/ml 1 75* 2 58* 2 97* 4 20*           * I Have Reviewed All Lab Data Listed Above  * Additional Pertinent Lab Tests Reviewed:  Odette Shepherd Admission Reviewed          Last 24 Hours Medication List:   Current Facility-Administered Medications   Medication Dose Route Frequency Provider Last Rate    acetaminophen  650 mg Oral Q4H PRN ESTHER Mi      albuterol  2 5 mg Nebulization Q6H PRN ESTHER Mi      amLODIPine  5 mg Oral Daily ESTHER Mi      bumetanide  4 mg Oral Once per day on Sun Tue Thu Sat Yomaira [place], DO      calcium acetate  1,334 mg Oral TID With Meals Yomairaanabelle Delacruz, DO      carvedilol  25 mg Oral BID With Meals ESTHER Mi      clonazePAM  0 5 mg Oral BID PRN ESTHER Mi      guaiFENesin  600 mg Oral Q12H [facility] ESTHER Mi      heparin (porcine)  5,000 Units Subcutaneous [facility] Kendrick Elizalde, Enzo Menjivar      hydrALAZINE  10 mg Intravenous Q6H PRN Elizabeth Vicente PA-C      ipratropium  0 5 mg Nebulization TID Trudi Lowers, MD      levalbuterol  1 25 mg Nebulization TID Trudi Davian, MD      lidocaine  2 patch Topical Daily [place], CRNP      nicotine  1 patch Transdermal Daily [place], CRNP      OLANZapine  10 mg Oral HS PRN [place], CRNP      ondansetron  4 mg Intravenous Q6H PRN [place], CRNP      oxyCODONE-acetaminophen  1 tablet Oral Q4H PRN [place], CRNP      QUEtiapine  25 mg Oral HS Armida [place], JAYNP          Today, Patient Was Seen By: ESTHER Manrique    ** Please Note: Dictation voice to text software may have been used in the creation of this document   **

## 2022-07-22 NOTE — TELEMEDICINE
e-Consult (IPC)  - Interventional Radiology  Samina Lu 64 y o  female MRN: [id number]  Unit/Bed#: ICU 02-01 Encounter: [id number]          Interventional Radiology has been consulted to evaluate Krystal to IR  Consult performed by: Deanna Reddy MD  Consult ordered by: ESTHER Rose        07/22/22    Assessment/Recommendation:   64year old woman with acute renal failure remains hemodialysis dependent  Multiple comorbid conditions including COPD, congestive heart failure and cardiomyopathy  Alternating between high-flow nasal cannula and BiPAP  Consult for tunneled hemodialysis catheter placement  Has temporary line in place placed by ICU staff  Apparently discussed with Dr Wilmer Scott yesterday and will plan for procedure next week  May be challenging to place her relatively flat for 30-45 minutes with sedation  Likely will require at least BiPAP for procedure  Total time spent in review of data, discussion with requesting provider and rendering advice was 20 minutes  Thank you for allowing Interventional Radiology to participate in the care of Samina Lu  Please don't hesitate to call or TigerText us with any questions       Patricia Roberts MD

## 2022-07-22 NOTE — OCCUPATIONAL THERAPY NOTE
07/22/22 1105   Note Type   Note Type Cancelled Session   Cancel Reasons Patient off floor/test  (dialysis)   Attempted OT treatment today at 11:05  Patient unavailable due to dialysis  Will continue with OT treatment as able    [place]

## 2022-07-22 NOTE — PLAN OF CARE
Hemodialysis treatment planned for 180 minutes using a 2 K+ bath for potassium 5 1 this morning  Fluid goal 3500 ml as tolerated  Treatment review with Edyta Tobar at bedside            Post-Dialysis RN Treatment Note    Blood Pressure:  Pre 182/98 mm/Hg  Post 117/74 mmHg   EDW:  TBD   Weight:  Pre 76 kg   Post 73 4 kg   Mode of weight measurement:   Bed scale   Volume Removed  3510 ml    Treatment duration 180 minutes    NS given:   none   Treatment shortened:  no   Medications given during Rx:  none   Estimated Kt/V:   1 22   Access type:    RIJ temporary catheter   Access Issues:   Maintains 300 bfr with lines reversed   Report called to primary nurse:   Verbal to Armani Torre RN            Problem: METABOLIC, FLUID AND ELECTROLYTES - ADULT  Goal: Electrolytes maintained within normal limits  Description: INTERVENTIONS:  - Monitor labs and assess patient for signs and symptoms of electrolyte imbalances  - Administer electrolyte replacement as ordered  - Monitor response to electrolyte replacements, including repeat lab results as appropriate  - Instruct patient on fluid and nutrition as appropriate  Outcome: Progressing  Goal: Fluid balance maintained  Description: INTERVENTIONS:  - Monitor labs   - Monitor I/O and WT  - Instruct patient on fluid and nutrition as appropriate  - Assess for signs & symptoms of volume excess or deficit  Outcome: Progressing

## 2022-07-22 NOTE — RESPIRATORY THERAPY NOTE
07/22/22 1335   Respiratory Assessment   Assessment Type Pre-treatment   General Appearance Sleeping   Respiratory Pattern Shallow;Normal   Chest Assessment Chest expansion symmetrical   Bilateral Breath Sounds Diminished;Clear   Resp Comments pt restinf eyes closed lying lide side, keith optiflo well tx given via aerogen will cont to monitor 40% 40 l/m ABG done on previous settings due to pt stating trouble breathing 7 388/43 8/136 5/25 8   O2 Device optiflo   Non-Invasive Information   O2 Interface Device HFNC prongs   Non-Invasive Ventilation Mode HFNC (High flow)  (optiflo)   SpO2 96 %   $ Pulse Oximetry Spot Check Charge Completed   Non-Invasive Settings   FiO2 (%) 45   Temperature (Set) 31   Flow (lpm) 40   Non-Invasive Readings   Skin Intervention Skin intact   Heater Temperature (Obs) 31

## 2022-07-22 NOTE — PROGRESS NOTES
NEPHROLOGY PROGRESS NOTE   Iwona Mata 64 y o  female MRN: [id number]  Unit/Bed#: ICU 02-01 Encounter: [id number]    Assessment/Plan:    2 previous this year, chronic combined systolic and diastolic congestive heart failure, cardiomyopathy LVEF 25% being treated in ICU for a/c hypoxic respiratory failure, a/c combined decompensated CHF, likely cardiorenal syndrome and hemodialysis dependent acute kidney injury for which nephrology following along  Tentative transfer to [facility] for heart failure evaluation    HEMODIALYSIS PROCEDURE NOTE  The patient was seen and examined on hemodialysis  She is complaining shortness of breath and anxiety  Her blood pressure is elevated with systolic around 200 mmHg  Time: 3 hours  Sodium: 135 Blood flow: 300   Dialyzer: F160 Potassium: 2k Dialysate flow: 500   Access: temporary HD cath Bicarbonate: 40 Ultrafiltration goal:  3 L   Medications on HD: none       1  Hemodialysis dependent acute kidney injury (POA) atop chronic kidney disease  ? First hemodialysis session was 7/15 4 and uric severe acute kidney injury, decompensated heart failure hypoxic respiratory failure refractory to diuretics  ? Most recent hemodialysis session as above  Next treatment will be Monday, 7/25 or sooner if indicated  ? Refractory to oral Bumex 4 mg  She will likely need escalated dose in IV form-will leave to receiving nephrology colleagues  ? hepatitis B positive  ? Access:  Temporary right IJ hemodialysis catheter-IR consult placed for PermCath  ? Appreciate case management coordination for outpatient dialysis unit  2  Stage 3 chronic kidney disease with baseline creatinine around 1 0-1 1 mg/dL  3  Acute on chronic combined systolic and diastolic congestive heart failure, cardiorenal syndrome, cardiomyopathy LVEF 25%  ? Refractory to 4 mg p o  Bumex    Recommend trialing higher dose of IV diuretic on non dialysis days, daily weight, strict I&O, sodium and fluid restriction  ? Tentative transfer to [facility] for heart failure evaluation  4  Acute hypoxic respiratory failure  ? As above, will be aggressive with ultrafiltration to facilitate liberation from high-flow nasal cannula  5  Hyperphosphatemia  ? Continue binders with meals and periodically monitor phosphorus level  6  Hypertension in the setting of CKD  ? Blood pressure stable and receiving aggressive ultrafiltration-no changes made antihypertensives  Plasma metanephrines pending  7  COPD with exacerbation POA  ? Per pulmonology and primary team          ROS  Seen and examined on hemodialysis as above  Complains of anxiety in need of clonazepam and wanting her BiPAP back on  A complete 10 point review of systems have been performed and are otherwise negative  Historical Information   Past Medical History:   Diagnosis Date    Cardiomyopathy ([address] )     Chronic combined systolic and diastolic congestive heart failure     COPD (chronic obstructive pulmonary disease) (HCC)     Fatty liver     Hyperlipidemia     Hypertension     Mitral regurgitation     Sepsis      Past Surgical History:   Procedure Laterality Date    CARDIAC CATHETERIZATION  2021    Moderate non-obstructive atherosclerosis     SECTION      CHOLECYSTECTOMY      ROTATOR CUFF REPAIR W/ DISTAL CLAVICLE EXCISION Right     TONSILLECTOMY       Social History   Social History     Substance and Sexual Activity   Alcohol Use Never     Social History     Substance and Sexual Activity   Drug Use No     Social History     Tobacco Use   Smoking Status Current Some Day Smoker    Packs/day: 2 00    Types: Cigarettes   Smokeless Tobacco Never Used       Family History:   History reviewed  No pertinent family history      Medications:  Pertinent medications were reviewed  Current Facility-Administered Medications   Medication Dose Route Frequency Provider Last Rate    acetaminophen  650 mg Oral Q4H PRN ESTHER June  albuterol  2 5 mg Nebulization Q6H PRN [place], CRNP      amLODIPine  5 mg Oral Daily [place], CRNP      bumetanide  4 mg Oral Once per day on Sun Tue Thu Sat [employer], DO      calcium acetate  1,334 mg Oral TID With Meals [employer], DO      carvedilol  25 mg Oral BID With Meals Syl[place], CRNP      clonazePAM  0 5 mg Oral BID PRN [place], CRNP      guaiFENesin  600 mg Oral Q12H [address] Syliva [place], CRNP      heparin (porcine)  5,000 Units Subcutaneous [facility] Syliva [place], [address]      hydrALAZINE  10 mg Intravenous Q6H PRN Kanchan Leroy PA-C      ipratropium  0 5 mg Nebulization TID Tad MD Osman      levalbuterol  1 25 mg Nebulization TID Tad MD Osman      lidocaine  2 patch Topical Daily Syl[place], CRNP      nicotine  1 patch Transdermal Daily Syl[place], CRNP      OLANZapine  10 mg Oral HS PRN Syl[place], CRNP      ondansetron  4 mg Intravenous Q6H PRN Syl[place], CRNP      oxyCODONE-acetaminophen  1 tablet Oral Q4H PRN Syliva [place], CRNP      QUEtiapine  25 mg Oral HS Syliva [place], CRNP           Allergies   Allergen Reactions    Wellbutrin [Bupropion] Arthralgia         Vitals:   /83   Pulse 78   Temp 97 7 °F (36 5 °C) (Temporal)   Resp (!) 44   Ht 5' 10" (1 778 m)   Wt 77 9 kg (171 lb 11 8 oz)   SpO2 95%   BMI 24 64 kg/m²   Body mass index is 24 64 kg/m²    SpO2: 95 %,   SpO2 Activity: At Rest,   O2 Device: High flow nasal cannula      Intake/Output Summary (Last 24 hours) at 7/22/2022 1251  Last data filed at 7/22/2022 1101  Gross per 24 hour   Intake 700 ml   Output 25 ml   Net 675 ml     Invasive Devices  Report    Peripheral Intravenous Line  Duration           Peripheral IV 07/22/22 Proximal;Right;Ventral (anterior) Forearm <1 day          Hemodialysis Catheter  Duration           HD Temporary Double Catheter 7 days                Physical Exam  General: conscious, cooperative, in no acute distress  Eyes: conjunctivae pink, anicteric sclerae  ENT: lips and mucous membranes moist  Neck: supple, no JVD, no masses  Chest:  Very diminished breath sounds bilaterally, scattered rhonchi on high-flow nasal cannula  CVS: S1 & S2, normal rate, regular rhythm  Abdomen: soft, non-tender, non-distended, normoactive bowel sounds obese  Extremities:  Mild edema of both legs  Skin: no rash  Neuro: awake, alert, oriented      Diagnostic Data:  Lab: I have personally reviewed pertinent lab results  ,   CBC:  Results from last 7 days   Lab Units 07/22/22  0420   WBC Thousand/uL 12 06*   HEMOGLOBIN g/dL 10 8*   HEMATOCRIT % 33 4*   PLATELETS Thousands/uL 294      CMP:   Lab Results   Component Value Date    SODIUM 126 (L) 07/22/2022    K 5 1 07/22/2022    CL 90 (L) 07/22/2022    CO2 19 (L) 07/22/2022    BUN 91 (H) 07/22/2022    CREATININE 8 02 (H) 07/22/2022    CALCIUM 9 1 07/22/2022    EGFR 4 07/22/2022   ,   PT/INR: No results found for: PT, INR,   Magnesium: No components found for: MAG,  Phosphorous: No results found for: PHOS    Microbiology:  @[facility],(urinecx:7)@        ESTHER Hood    Portions of the record may have been created with voice recognition software  Occasional wrong word or "sound a like" substitutions may have occurred due to the inherent limitations of voice recognition software  Read the chart carefully and recognize, using context, where substitutions have occurred

## 2022-07-22 NOTE — RESPIRATORY THERAPY NOTE
07/22/22 0803   Respiratory Assessment   Assessment Type Assess only   General Appearance Alert; Awake   Respiratory Pattern Spontaneous; Normal   Chest Assessment Chest expansion symmetrical   Resp Comments pt awake placed on optfilfo for day, 50% 45 l/m keith well will cont to monitor   O2 Device optfilo   Non-Invasive Information   O2 Interface Device MFNC prongs   Non-Invasive Ventilation Mode HFNC (High flow)  (optiflo)   SpO2 96 %   $ Pulse Oximetry Spot Check Charge Completed   Non-Invasive Settings   FiO2 (%) 50   Temperature (Set) 31   Flow (lpm) 45   Non-Invasive Readings   Skin Intervention Skin intact   Heater Temperature (Obs) 31   Maintenance   Water bag changed No

## 2022-07-22 NOTE — QUICK NOTE
Reviewed with Dr Dias from heart failure  Will arrange for transfer and further inpatient heart failure care  If there is a hold up on beds will have a central line placed for a venous blood gas which may also be helpful

## 2022-07-23 ENCOUNTER — APPOINTMENT (INPATIENT)
Dept: DIALYSIS | Facility: HOSPITAL | Age: 62
DRG: 252 | End: 2022-07-23
Payer: MEDICARE

## 2022-07-23 LAB
ALBUMIN SERPL BCP-MCNC: 3.4 G/DL (ref 3.5–5)
ALP SERPL-CCNC: 92 U/L (ref 46–116)
ALT SERPL W P-5'-P-CCNC: 18 U/L (ref 12–78)
ANION GAP SERPL CALCULATED.3IONS-SCNC: 13 MMOL/L (ref 4–13)
AST SERPL W P-5'-P-CCNC: 15 U/L (ref 5–45)
BILIRUB SERPL-MCNC: 0.83 MG/DL (ref 0.2–1)
BUN SERPL-MCNC: 65 MG/DL (ref 5–25)
CALCIUM ALBUM COR SERPL-MCNC: 9.5 MG/DL (ref 8.3–10.1)
CALCIUM SERPL-MCNC: 9 MG/DL (ref 8.3–10.1)
CHLORIDE SERPL-SCNC: 90 MMOL/L (ref 96–108)
CO2 SERPL-SCNC: 27 MMOL/L (ref 21–32)
CREAT SERPL-MCNC: 6.15 MG/DL (ref 0.6–1.3)
ERYTHROCYTE [DISTWIDTH] IN BLOOD BY AUTOMATED COUNT: 14 % (ref 11.6–15.1)
GFR SERPL CREATININE-BSD FRML MDRD: 6 ML/MIN/1.73SQ M
GLUCOSE SERPL-MCNC: 101 MG/DL (ref 65–140)
HCT VFR BLD AUTO: 33.1 % (ref 34.8–46.1)
HGB BLD-MCNC: 11.2 G/DL (ref 11.5–15.4)
MAGNESIUM SERPL-MCNC: 2.3 MG/DL (ref 1.6–2.6)
MCH RBC QN AUTO: 29.5 PG (ref 26.8–34.3)
MCHC RBC AUTO-ENTMCNC: 33.8 G/DL (ref 31.4–37.4)
MCV RBC AUTO: 87 FL (ref 82–98)
PHOSPHATE SERPL-MCNC: 5.7 MG/DL (ref 2.3–4.1)
PLATELET # BLD AUTO: 285 THOUSANDS/UL (ref 149–390)
PMV BLD AUTO: 9.8 FL (ref 8.9–12.7)
POTASSIUM SERPL-SCNC: 4.2 MMOL/L (ref 3.5–5.3)
PROT SERPL-MCNC: 7.7 G/DL (ref 6.4–8.4)
RBC # BLD AUTO: 3.8 MILLION/UL (ref 3.81–5.12)
SODIUM SERPL-SCNC: 130 MMOL/L (ref 135–147)
WBC # BLD AUTO: 12.34 THOUSAND/UL (ref 4.31–10.16)

## 2022-07-23 PROCEDURE — 97163 PT EVAL HIGH COMPLEX 45 MIN: CPT

## 2022-07-23 PROCEDURE — 84100 ASSAY OF PHOSPHORUS: CPT | Performed by: PHYSICIAN ASSISTANT

## 2022-07-23 PROCEDURE — 97167 OT EVAL HIGH COMPLEX 60 MIN: CPT

## 2022-07-23 PROCEDURE — 94640 AIRWAY INHALATION TREATMENT: CPT

## 2022-07-23 PROCEDURE — 94760 N-INVAS EAR/PLS OXIMETRY 1: CPT

## 2022-07-23 PROCEDURE — 80053 COMPREHEN METABOLIC PANEL: CPT | Performed by: PHYSICIAN ASSISTANT

## 2022-07-23 PROCEDURE — 85027 COMPLETE CBC AUTOMATED: CPT | Performed by: PHYSICIAN ASSISTANT

## 2022-07-23 PROCEDURE — 83735 ASSAY OF MAGNESIUM: CPT | Performed by: PHYSICIAN ASSISTANT

## 2022-07-23 PROCEDURE — 99233 SBSQ HOSP IP/OBS HIGH 50: CPT | Performed by: INTERNAL MEDICINE

## 2022-07-23 PROCEDURE — NC001 PR NO CHARGE: Performed by: INTERNAL MEDICINE

## 2022-07-23 PROCEDURE — 94660 CPAP INITIATION&MGMT: CPT

## 2022-07-23 PROCEDURE — 5A1D70Z PERFORMANCE OF URINARY FILTRATION, INTERMITTENT, LESS THAN 6 HOURS PER DAY: ICD-10-PCS | Performed by: HOSPITALIST

## 2022-07-23 RX ORDER — POLYETHYLENE GLYCOL 3350 17 G/17G
17 POWDER, FOR SOLUTION ORAL DAILY PRN
Status: DISCONTINUED | OUTPATIENT
Start: 2022-07-23 | End: 2022-08-03 | Stop reason: HOSPADM

## 2022-07-23 RX ORDER — CLONAZEPAM 1 MG/1
1 TABLET ORAL 2 TIMES DAILY PRN
Status: DISCONTINUED | OUTPATIENT
Start: 2022-07-23 | End: 2022-08-03 | Stop reason: HOSPADM

## 2022-07-23 RX ORDER — ISOSORBIDE DINITRATE 10 MG/1
10 TABLET ORAL EVERY 8 HOURS SCHEDULED
Status: DISCONTINUED | OUTPATIENT
Start: 2022-07-23 | End: 2022-07-23

## 2022-07-23 RX ORDER — ISOSORBIDE DINITRATE 20 MG/1
20 TABLET ORAL EVERY 8 HOURS SCHEDULED
Status: DISCONTINUED | OUTPATIENT
Start: 2022-07-23 | End: 2022-07-24

## 2022-07-23 RX ORDER — SENNOSIDES 8.6 MG
1 TABLET ORAL
Status: DISCONTINUED | OUTPATIENT
Start: 2022-07-23 | End: 2022-08-03 | Stop reason: HOSPADM

## 2022-07-23 RX ORDER — DOCUSATE SODIUM 100 MG/1
100 CAPSULE, LIQUID FILLED ORAL 2 TIMES DAILY
Status: DISCONTINUED | OUTPATIENT
Start: 2022-07-23 | End: 2022-08-03 | Stop reason: HOSPADM

## 2022-07-23 RX ORDER — HYDRALAZINE HYDROCHLORIDE 25 MG/1
25 TABLET, FILM COATED ORAL EVERY 8 HOURS SCHEDULED
Status: DISCONTINUED | OUTPATIENT
Start: 2022-07-23 | End: 2022-07-23

## 2022-07-23 RX ORDER — HYDRALAZINE HYDROCHLORIDE 50 MG/1
50 TABLET, FILM COATED ORAL EVERY 8 HOURS SCHEDULED
Status: DISCONTINUED | OUTPATIENT
Start: 2022-07-24 | End: 2022-07-26

## 2022-07-23 RX ORDER — ONDANSETRON 2 MG/ML
4 INJECTION INTRAMUSCULAR; INTRAVENOUS EVERY 4 HOURS PRN
Status: DISCONTINUED | OUTPATIENT
Start: 2022-07-23 | End: 2022-08-03 | Stop reason: HOSPADM

## 2022-07-23 RX ADMIN — HYDRALAZINE HYDROCHLORIDE 10 MG: 20 INJECTION, SOLUTION INTRAMUSCULAR; INTRAVENOUS at 01:59

## 2022-07-23 RX ADMIN — ISOSORBIDE DINITRATE 20 MG: 20 TABLET ORAL at 21:31

## 2022-07-23 RX ADMIN — GUAIFENESIN 600 MG: 600 TABLET, EXTENDED RELEASE ORAL at 20:21

## 2022-07-23 RX ADMIN — ISOSORBIDE DINITRATE 10 MG: 10 TABLET ORAL at 05:13

## 2022-07-23 RX ADMIN — IPRATROPIUM BROMIDE 0.5 MG: 0.5 SOLUTION RESPIRATORY (INHALATION) at 08:38

## 2022-07-23 RX ADMIN — OXYCODONE HYDROCHLORIDE 5 MG: 5 TABLET ORAL at 00:08

## 2022-07-23 RX ADMIN — QUETIAPINE FUMARATE 25 MG: 25 TABLET ORAL at 21:31

## 2022-07-23 RX ADMIN — CLONAZEPAM 0.5 MG: 0.5 TABLET ORAL at 04:06

## 2022-07-23 RX ADMIN — DOCUSATE SODIUM 100 MG: 100 CAPSULE, LIQUID FILLED ORAL at 17:44

## 2022-07-23 RX ADMIN — HEPARIN SODIUM 5000 UNITS: 5000 INJECTION INTRAVENOUS; SUBCUTANEOUS at 05:05

## 2022-07-23 RX ADMIN — GUAIFENESIN 600 MG: 600 TABLET, EXTENDED RELEASE ORAL at 00:08

## 2022-07-23 RX ADMIN — CARVEDILOL 12.5 MG: 12.5 TABLET, FILM COATED ORAL at 08:04

## 2022-07-23 RX ADMIN — HEPARIN SODIUM 5000 UNITS: 5000 INJECTION INTRAVENOUS; SUBCUTANEOUS at 13:05

## 2022-07-23 RX ADMIN — CALCIUM ACETATE 1334 MG: 667 CAPSULE ORAL at 11:51

## 2022-07-23 RX ADMIN — QUETIAPINE FUMARATE 25 MG: 25 TABLET ORAL at 00:08

## 2022-07-23 RX ADMIN — SENNOSIDES 8.6 MG: 8.6 TABLET, FILM COATED ORAL at 21:31

## 2022-07-23 RX ADMIN — HYDRALAZINE HYDROCHLORIDE 10 MG: 10 TABLET, FILM COATED ORAL at 00:08

## 2022-07-23 RX ADMIN — HYDRALAZINE HYDROCHLORIDE 10 MG: 20 INJECTION, SOLUTION INTRAMUSCULAR; INTRAVENOUS at 17:43

## 2022-07-23 RX ADMIN — OXYCODONE HYDROCHLORIDE 5 MG: 5 TABLET ORAL at 20:21

## 2022-07-23 RX ADMIN — ALBUTEROL SULFATE 2 PUFF: 90 AEROSOL, METERED RESPIRATORY (INHALATION) at 04:02

## 2022-07-23 RX ADMIN — ISOSORBIDE DINITRATE 10 MG: 10 TABLET ORAL at 13:05

## 2022-07-23 RX ADMIN — ONDANSETRON 4 MG: 2 INJECTION INTRAMUSCULAR; INTRAVENOUS at 20:21

## 2022-07-23 RX ADMIN — HYDRALAZINE HYDROCHLORIDE 10 MG: 10 TABLET, FILM COATED ORAL at 05:05

## 2022-07-23 RX ADMIN — DOCUSATE SODIUM 100 MG: 100 CAPSULE, LIQUID FILLED ORAL at 11:50

## 2022-07-23 RX ADMIN — CALCIUM ACETATE 1334 MG: 667 CAPSULE ORAL at 17:43

## 2022-07-23 RX ADMIN — HYDRALAZINE HYDROCHLORIDE 25 MG: 25 TABLET, FILM COATED ORAL at 21:31

## 2022-07-23 RX ADMIN — CALCIUM ACETATE 1334 MG: 667 CAPSULE ORAL at 08:04

## 2022-07-23 RX ADMIN — CLONAZEPAM 1 MG: 1 TABLET ORAL at 13:58

## 2022-07-23 RX ADMIN — GUAIFENESIN 600 MG: 600 TABLET, EXTENDED RELEASE ORAL at 08:04

## 2022-07-23 RX ADMIN — HEPARIN SODIUM 5000 UNITS: 5000 INJECTION INTRAVENOUS; SUBCUTANEOUS at 21:31

## 2022-07-23 RX ADMIN — CARVEDILOL 12.5 MG: 12.5 TABLET, FILM COATED ORAL at 17:42

## 2022-07-23 RX ADMIN — HYDRALAZINE HYDROCHLORIDE 25 MG: 25 TABLET, FILM COATED ORAL at 13:05

## 2022-07-23 RX ADMIN — LEVALBUTEROL HYDROCHLORIDE 1.25 MG: 1.25 SOLUTION, CONCENTRATE RESPIRATORY (INHALATION) at 08:39

## 2022-07-23 NOTE — ASSESSMENT & PLAN NOTE
· Patient discharged with home O2 but has not required  · Admitted 7/12 to [facility] with respiratory failure and volume overload requiring BiPAP  · Had been weaned to [facility] but then required HFNC today for increased WOB  · Upon transfer, transitioned to 15L [address] [facility] and now on 5L  · Continue volume removal with IHD - discuss additional session over the weekend

## 2022-07-23 NOTE — ASSESSMENT & PLAN NOTE
Lab Results   Component Value Date    EGFR 4 07/22/2022    EGFR 7 07/21/2022    EGFR 8 07/19/2022    CREATININE 8 02 (H) 07/22/2022    CREATININE 5 81 (H) 07/21/2022    CREATININE 5 04 (H) 07/19/2022     · Likely cardiorenal in the setting of HFrEF  · Baseline Cr 1-1 1  · Anuric renal failure requiring IHD now on MWF schedule   · RIJ temporary HD catheter in place since 7/15  · IR consult Monday for permcath  · Nephrology to continue to follow

## 2022-07-23 NOTE — PLAN OF CARE
Patient tolerated treatment well  Blood pressures remained elevated pre/during/post treatment  Post-Dialysis RN Treatment Note    Blood Pressure:  Pre 167/104 mm/Hg  Post 180/115 mmHg   EDW  TBD    Weight:  Pre 76 3 kg   Post 72 6 kg   Mode of weight measurement: Bed Scale   Volume Removed  3000 ml    Treatment duration 210 minutes    NS given  No    Treatment shortened? No   Medications given during Rx : klonopin 1mg   Estimated Kt/V  1 53   Access type: Temporary HD catheter   Access Issues: Yes, describe: Arterial port difficult push/pull  Lines reversed and only able to run at  due to high arterial pressure  Report called to primary nurse   Yes, Maren Salinas, RN      Care plan reviewed for a treatment plan of 3 5 hours with net UF 3L as tolerated  Serum potassium 4 2 from 7/23/22 on a 3k/2 5ca bath  Arterial port difficult pull/push  Lines reversed      Problem: METABOLIC, FLUID AND ELECTROLYTES - ADULT  Goal: Electrolytes maintained within normal limits  Description: INTERVENTIONS:  - Monitor labs and assess patient for signs and symptoms of electrolyte imbalances  - Administer electrolyte replacement as ordered  - Monitor response to electrolyte replacements, including repeat lab results as appropriate  - Instruct patient on fluid and nutrition as appropriate  Outcome: Progressing  Goal: Fluid balance maintained  Description: INTERVENTIONS:  - Monitor labs   - Monitor I/O and WT  - Instruct patient on fluid and nutrition as appropriate  - Assess for signs & symptoms of volume excess or deficit  Outcome: Progressing  Goal: Glucose maintained within target range  Description: INTERVENTIONS:  - Monitor Blood Glucose as ordered  - Assess for signs and symptoms of hyperglycemia and hypoglycemia  - Administer ordered medications to maintain glucose within target range  - Assess nutritional intake and initiate nutrition service referral as needed  Outcome: Progressing

## 2022-07-23 NOTE — DISCHARGE SUMMARY
[address]  Discharge- Jonah Reveal 1960, 64 y o  female MRN: [id number]  Unit/Bed#: ICU 02-01 Encounter: [id number]  Primary Care Provider: Radha Lindsey DO   Date and time admitted to hospital: 7/12/2022  5:54 PM    * Acute on chronic respiratory failure with hypoxemia [facility])  Assessment & Plan  · Initially admitted to Medicine Service and had acute change in respiratory status, transfer to Critical Care Service  · History of intubation twice in the past year for similar presentation   · Treated with BiPAP, titrated to 4 L nasal cannula, then return to mid to high-flow  · Cardiology Nephrology recommendations appreciated  · Continue dialysis Monday Wednesday Friday  · Continue oxygen protocol-currently on high flow  · Patient to be transferred to [facility] for heart failure team    Acute on chronic kidney failure [facility])  Assessment & Plan  · Baseline creatinine appears to be around 1 5-1 8  · Nephrology following  · Likely 2/2 ATN, creatinine remains elevated  · Became oliguric and did not respond to diuretics or fluids  · Tolerated iHD session 7/15, 7/16, 7/18   · Dialysis scheduled for Monday Wednesday Friday  · Continue to monitor renal indices and urine output   · Work up for pheochromocytoma pending  · Case management on board-patient will need placement at a facility with dialysis availability, at least short-term  · PermCath placement when patient is able to lay flat, tentatively next week  · Patient has begun to make small amounts of urine    COPD (chronic obstructive pulmonary disease) ([facility] [address] )  Assessment & Plan  · Diffuse wheezing resolved, breathing improved, although continuing on high-flow oxygen  · Respiratory protocol  · Wean oxygen as tolerated  · Received 3 doses of Solu-Medrol 40 mg IV, then discontinued  · Give Xopenex 3 times daily  · Continue pre-hospital Anoro 1 puff daily      Hepatitis B  Assessment & Plan  · Patient tested positive for hepatitis B during routine screening for initiation of dialysis  · Outpatient follow-up with GI    Tobacco abuse  Assessment & Plan  · Nicotine patch  · Encourage cessation    Acute on chronic systolic congestive heart failure (HCC)  Assessment & Plan  Wt Readings from Last 3 Encounters:   07/23/22 73 9 kg (162 lb 14 7 oz)   07/22/22 77 9 kg (171 lb 11 8 oz)   06/28/22 77 4 kg (170 lb 9 6 oz)     · Does not appear volume overloaded, patient anuric, volume management by dialysis  · Low-salt diet with 1500 mL fluid restriction  · Daily weights and I&Os  · Cardiology recommendations appreciated-patient to be transferred to [place] for heart failure team      Anxiety  Assessment & Plan  · Home Klonopin increased to twice daily, Zyprexa added as needed  · Seroquel started 07/18/2022  · Supportive care    Chronic pain  Assessment & Plan  · Continue pre-hospital MS Contin and Percocet    Essential hypertension  Assessment & Plan  · BP reviewed  · Continue amlodipine  · Continue Coreg which has been increased to 25 mg twice daily   · Monitor blood pressure    Hypokalemia-resolved as of 7/15/2022  Assessment & Plan  · Continue daily BMP and monitor potassium    Discharging Physician / Practitioner: ESTHER Andrews  PCP: Chastity Orozco DO  Admission Date:   Admission Orders (From admission, onward)     Ordered        07/12/22 1935  INPATIENT ADMISSION  Once                      Discharge Date: 07/22/22    Medical Problems             Resolved Problems  Date Reviewed: 7/23/2022          Resolved    Hypokalemia 7/15/2022     Resolved by  Joanne Samaniego, [address] [place] [address] Stay:  · PT, OT, cardiology, Nephrology, case management    Procedures Performed:   · Dialysis, [facility]    Significant Findings / Test Results:   · Worsening cardiomyopathy, oliguric SHANTA    Incidental Findings:   · None     Test Results Pending at Discharge (will require follow up):    · None     Outpatient Tests Requested:  · None    Complications:  None apparent    Reason for Admission:  Shortness of breath    Hospital Course:     Kaylen Zuniga is a 64 y o  female patient with history of CHF, COPD, current smoker, essential hypertension, who originally presented to the hospital on 7/12/2022 due to acute respiratory insufficiency, COPD exacerbation, and acute on chronic combined systolic and diastolic heart failure  She was requiring 2 L of oxygen and was admitted to the [address] [place] [address] service  She had an acute change in respiratory status including tachypnea, diaphoresis, tachycardia, and agitation  She was unable lay flat in bed  She has a history of intubation x2 in the past   She was transferred to the critical care unit and placed on BiPAP and Precedex drip  She developed renal failure and was started on dialysis  On the 18th she was downgraded to medical level of service  She was utilizing 5 L nasal cannula  Her oxygen requirements increased she developed diffuse wheezing  Pulmonary was consulted  She was placed back on BiPAP and seen by Cardiology, and it was decided that she should be transferred to the Heart Failure Team at [facility]  Please see chart for additional details  Please see above list of diagnoses and related plan for additional information  Condition at Discharge: serious     Discharge Day Visit / Exam:     * Please refer to separate progress note for these details *    Discussion with Family:  Daughter    Discharge instructions/Information to patient and family:   See after visit summary for information provided to patient and family  Provisions for Follow-Up Care:  See after visit summary for information related to follow-up care and any pertinent home health orders  Disposition:     [address] Transfer to [facility]      Planned Readmission:  No     Discharge Statement:  I spent 30 minutes discharging the patient   This time was spent on the day of discharge  I had direct contact with the patient on the day of discharge  Greater than 50% of the total time was spent examining patient, answering all patient questions, arranging and discussing plan of care with patient as well as directly providing post-discharge instructions  Additional time then spent on discharge activities  Discharge Medications:  See after visit summary for reconciled discharge medications provided to patient and family        ** Please Note: This note has been constructed using a voice recognition system **

## 2022-07-23 NOTE — ASSESSMENT & PLAN NOTE
Wt Readings from Last 3 Encounters:   07/22/22 77 9 kg (171 lb 11 8 oz)   06/28/22 77 4 kg (170 lb 9 6 oz)   06/22/22 76 6 kg (168 lb 14 oz)     · Patient with prior HFrEF with recovery but now back down to EF 25%  · 3/2021 EF 18%, diffuse hypokinesis  · 3/2021 Cardiac cath: moderate non-obstructive disease  · 7/2021: EF 50%  · 6/2022: EF 39%, grade 1 diastolic dysfunction   · [birth year]: EF 25%, moderate MR  · Transferred to [facility] for heart failure evaluation  · Previously had PA catheter at [facility] with normal CI and elevated PA pressures in the 40s/20s  · Discussed with heart failure - add afterload reduction with hydralazine 10 mg q8h, d/c amlodipine, decrease Coreg to 12 5 mg BID, add isordil if BP tolerates  · Check VBG and Lactic  · Consider limited echo after addition of afterload reducing agents

## 2022-07-23 NOTE — PROGRESS NOTES
[facility]  Progress Note Lali Rodarte 1960, 64 y o  female MRN: [id number]  Unit/Bed#: [facility] 501-01 Encounter: [id number]  Primary Care Provider: Sara Prince DO   Date and time admitted to hospital: 7/22/2022  9:51 PM    * Acute on chronic systolic congestive heart failure ([address] )  Assessment & Plan  Wt Readings from Last 3 Encounters:   07/22/22 79 1 kg (174 lb 6 1 oz)   07/22/22 77 9 kg (171 lb 11 8 oz)   06/28/22 77 4 kg (170 lb 9 6 oz)     · Patient with prior HFrEF with recovery but now back down to EF 25%  · 3/2021 EF 18%, diffuse hypokinesis  · 3/2021 Cardiac cath: moderate non-obstructive disease  · 7/2021: EF 50%  · 6/2022: EF 50%, grade 1 diastolic dysfunction   · 3/89/33: EF 25%, moderate MR  · Transferred to [facility] for heart failure evaluation  · Previously had PA catheter at [facility] with normal CI and elevated PA pressures in the 40s/20s  · Heart failure following, appreciate their recommendations  · Isordil 10 mg q8h  · Hydralazine 10 mg q8h  · Coreg 12 5 mg BID  · Uptitrate afterload reducing medications as BP tolerates   · Consider limited echo after addition of afterload reducing agents     Acute on chronic respiratory failure with hypoxemia (HCC)  Assessment & Plan  · Patient discharged with home O2 but has not required  · Admitted 7/12 to [facility] with respiratory failure and volume overload requiring BiPAP  · Had been weaned to [facility] but then required HFNC today for increased WOB  · Upon transfer, transitioned to 15L [address] [facility] and now on 5L  · Continue volume removal with IHD - discuss additional session over the weekend     Acute on chronic kidney failure [facility])  Assessment & Plan  Lab Results   Component Value Date    EGFR 7 07/22/2022    EGFR 4 07/22/2022    EGFR 7 07/21/2022    CREATININE 5 42 (H) 07/22/2022    CREATININE 8 02 (H) 07/22/2022    CREATININE 5 81 (H) 07/21/2022     · Likely cardiorenal in the setting of HFrEF  · Baseline Cr 1-1 1  · Anuric renal failure requiring IHD now on MWF schedule   · RIJ temporary HD catheter in place since 7/15  · IR consult Monday for permcath  · Nephrology to continue to follow  · Trial diuresis with bumex 4 mg IV    COPD (chronic obstructive pulmonary disease) ([facility] [address] )  Assessment & Plan  · Admitted for COPD vs CHF exacarbation  · Treated with IV steroids, nebulizer therapy  · Only uses PRN albuterol as an outpatient  · Not currently in COPD exacerbation  · Continue Xopenex/atrovent  · Respiratory protocol     Anxiety  Assessment & Plan  · Continue home Klonopin PRN  · Started on Seroquel during current hospitalization - consider discontinuing     Tobacco abuse  Assessment & Plan  · Previously refusing nicotine patch    ----------------------------------------------------------------------------------------  HPI/24hr events: Admitted from [facility]  HFNC weaned to 5L NC  Heart failure consulted - started on afterload reducing medications with hydralazine and isordil  Disposition: Transfer to Med Surg with Telemetry   Code Status: Level 1 - Full Code  ---------------------------------------------------------------------------------------  SUBJECTIVE    Review of Systems   Respiratory: Positive for shortness of breath  Negative for cough and wheezing  Cardiovascular: Positive for leg swelling  Negative for chest pain  Gastrointestinal: Negative for abdominal pain and nausea  Genitourinary: Positive for decreased urine volume  Neurological: Negative for headaches  All other systems reviewed and are negative      Review of systems was reviewed and negative unless stated above in HPI/24-hour events   ---------------------------------------------------------------------------------------  OBJECTIVE    Vitals   Vitals:    07/22/22 2300 07/23/22 0131 07/23/22 0318   BP: (!) 177/101 (!) 177/94 162/77   BP Location: Right arm Left arm Left arm   Pulse: 82 84 88   Resp: 18 20 20   Temp:  97 9 °F (36 6 °C) 98 7 °F (37 1 °C)   TempSrc:  Oral Oral   SpO2: 93% 94% 94%   Weight: 79 1 kg (174 lb 6 1 oz)     Height: 5' 10" (1 778 m)       Temp (24hrs), Av °F (36 7 °C), Min:97 3 °F (36 3 °C), Max:98 7 °F (37 1 °C)  Current: Temperature: 98 7 °F (37 1 °C)    Respiratory:  SpO2: SpO2: 94 %, SpO2 Device: O2 Device: Nasal cannula,   Nasal Cannula O2 Flow Rate (L/min): 5 L/min    Physical Exam  Constitutional:       General: She is not in acute distress  HENT:      Head: Atraumatic  Mouth/Throat:      Mouth: Mucous membranes are moist    Cardiovascular:      Rate and Rhythm: Normal rate and regular rhythm  Pulses:           Radial pulses are 2+ on the right side and 2+ on the left side  Dorsalis pedis pulses are 2+ on the right side and 2+ on the left side  Pulmonary:      Effort: No respiratory distress  Breath sounds: Examination of the right-lower field reveals rales  Examination of the left-lower field reveals rales  Rales present  No wheezing or rhonchi  Abdominal:      General: Bowel sounds are normal  There is distension  Palpations: Abdomen is soft  Tenderness: There is no abdominal tenderness  Musculoskeletal:      Cervical back: Neck supple  Right lower le+ Edema present  Left lower le+ Edema present  Skin:     General: Skin is warm  Capillary Refill: Capillary refill takes 2 to 3 seconds  Neurological:      General: No focal deficit present  Mental Status: She is alert and oriented to person, place, and time         Laboratory and Diagnostics:  Results from last 7 days   Lab Units 22  0420 22  0609 22  0353 22  0601 22  0544 22  0524   WBC Thousand/uL 12 06* 10 74* 12 78* 10 82* 9 74 10 39*   HEMOGLOBIN g/dL 10 8* 10 8* 11 4* 10 1* 10 7* 11 6   HEMATOCRIT % 33 4* 33 3* 33 7* 30 4* 32 2* 36 1   PLATELETS Thousands/uL 294 262 260 240 212 255   NEUTROS PCT % 66 68  --  72  --   --    MONOS PCT % 8 8  --  8  --   -- Results from last 7 days   Lab Units 07/22/22  2314 07/22/22  0420 07/21/22  0609 07/19/22  0352 07/18/22  0601 07/17/22  0544 07/16/22  0524   SODIUM mmol/L 128* 126* 127* 127* 129* 131* 136   POTASSIUM mmol/L 4 1 5 1 4 8 4 4 4 7 4 6 4 9   CHLORIDE mmol/L 90* 90* 92* 92* 90* 91* 94*   CO2 mmol/L 30 19* 23 22 20* 26 21   ANION GAP mmol/L 8 17* 12 13 19* 14* 21*   BUN mg/dL 61* 91* 56* 47* 110* 85* 89*   CREATININE mg/dL 5 42* 8 02* 5 81* 5 04* 8 87* 6 92* 6 96*   CALCIUM mg/dL 8 8 9 1 8 9 9 2 8 2* 8 5 9 2   GLUCOSE RANDOM mg/dL 102 116 122 135 115 139 77   ALT U/L 17  --   --   --  5* 4* 3*   AST U/L 11  --   --   --  9* 5* 6*   ALK PHOS U/L 95  --   --   --  68 65 71   ALBUMIN g/dL 3 3*  --   --   --  3 4* 3 4* 3 5   TOTAL BILIRUBIN mg/dL 0 62  --   --   --  0 48 0 49 0 48     Results from last 7 days   Lab Units 07/22/22 2314 07/19/22 0352 07/17/22  0544   MAGNESIUM mg/dL 2 0 2 2 2 4   PHOSPHORUS mg/dL 5 0* 6 1* 9 3*               Results from last 7 days   Lab Units 07/22/22  2314   LACTIC ACID mmol/L 1 0     ABG:  Results from last 7 days   Lab Units 07/22/22  1033   PH ART  7 388   PCO2 ART mm Hg 43 8   PO2 ART mm Hg 136 5*   HCO3 ART mmol/L 25 8   BASE EXC ART mmol/L 0 6   ABG SOURCE  Radial, Right     VBG:  Results from last 7 days   Lab Units 07/22/22 2314 07/22/22  1033   PH PANDA  7 403*  --    PCO2 PANDA mm Hg 44 1  --    PO2 PANDA mm Hg 32 3*  --    HCO3 PANDA mmol/L 26 9  --    BASE EXC PANDA mmol/L 1 8  --    ABG SOURCE   --  Radial, Right   SvO2 58 7%    Results from last 7 days   Lab Units 07/20/22  0524 07/19/22  0352 07/18/22  0601 07/17/22  0544   PROCALCITONIN ng/ml 1 75* 2 58* 2 97* 4 20*       EKG: NSR  Imaging: I have personally reviewed pertinent films in PACS    Intake and Output  I/O     None            Height and Weights   Height: 5' 10" (177 8 cm)     Body mass index is 25 02 kg/m²    Weight (last 2 days)     Date/Time Weight    07/22/22 2300 79 1 (174 38)            Nutrition       Diet Orders   (From admission, onward)             Start     Ordered    07/22/22 2239  Diet Cardiovascular; Cardiac; Renal Marion; Yes; Fluid Restriction 2000 ML; No  Diet effective now        References:    Nutrtion Support Algorithm Enteral vs  Parenteral   Question Answer Comment   Diet Type Cardiovascular    Cardiac Cardiac    Other Restriction(s): Renal Marion    Should patient have a fluid restriction? Yes    Fluid Restriction Fluid Restriction 2000 ML    Should patient have a protein modifier? No    RD to adjust diet per protocol?  Yes        07/22/22 2240                  Active Medications  Scheduled Meds:  Current Facility-Administered Medications   Medication Dose Route Frequency Provider Last Rate    acetaminophen  650 mg Oral Q6H PRN Jaspreet Rodriguez PA-C      albuterol  2 puff Inhalation Q4H PRN Alok Davenport MD      calcium acetate  1,334 mg Oral TID With Meals Jaspreet Rodriguez PA-C      carvedilol  12 5 mg Oral BID With Meals Jaspreet Rodriguez PA-C      clonazePAM  0 5 mg Oral BID PRN Jaspreet Rodriguez PA-C      guaiFENesin  600 mg Oral Q12H [address] Jaspreet Rodriguez PA-C      heparin (porcine)  5,000 Units Subcutaneous [facility] Jaspreet [place], [place]      hydrALAZINE  10 mg Intravenous Q6H PRN Jaspreet Rodriguez PA-C      hydrALAZINE  10 mg Oral [facility] Jaspreet Rodriguez PA-C      ipratropium  0 5 mg Nebulization TID Alok Davenport MD      isosorbide dinitrate  10 mg Oral [facility] [place]      levalbuterol  1 25 mg Nebulization TID Alok Davenport MD      oxyCODONE  2 5 mg Oral Q4H PRN Jaspreet Rodriguez PA-C      oxyCODONE  5 mg Oral Q4H PRN Jaspreet Rodriguez PA-C      QUEtiapine  25 mg Oral HS Jaspreet Rodriguez PA-C       Continuous Infusions:     PRN Meds:   acetaminophen, 650 mg, Q6H PRN  albuterol, 2 puff, Q4H PRN  clonazePAM, 0 5 mg, BID PRN  hydrALAZINE, 10 mg, Q6H PRN  oxyCODONE, 2 5 mg, Q4H PRN  oxyCODONE, 5 mg, Q4H PRN        Invasive Devices Review  Invasive Devices  Report Peripheral Intravenous Line  Duration           Peripheral IV 07/22/22 Proximal;Right;Ventral (anterior) Forearm 1 day          Hemodialysis Catheter  Duration           HD Temporary Double Catheter 8 days                Rationale for remaining devices: HD  ---------------------------------------------------------------------------------------  Advance Directive and Living Will:      Power of :    POLST:    ---------------------------------------------------------------------------------------  Care Time Delivered:   No Critical Care time spent       Amber Calderon PA-C      Portions of the record may have been created with voice recognition software  Occasional wrong word or "sound a like" substitutions may have occurred due to the inherent limitations of voice recognition software    Read the chart carefully and recognize, using context, where substitutions have occurred

## 2022-07-23 NOTE — CONSULTS
Consultation - Cardiology  Jonah Stevenson 64 y o  female MRN: [id number]  Unit/Bed#: [facility] 501-01 Encounter: [id number]  Inpatient consult to Heart Failure Service  Consult performed by: Jazmin Carty DO  Consult ordered by: Barbara Flores PA-C          History of Present Illness   Physician Requesting Consult: Davey Rubi MD  Reason for Consult / Principal Problem:  Acute heart failure    Assessment/Plan   Acute on chronic systolic heart failure  -patient with history of nonischemic cardiomyopathy with improved function in 2021, now comes back with severe worsening  Cardiomyopathy possibly worsened in setting of recent sepsis and COPD exacerbation requiring IV fluids, prednisone, poorly controlled hypertension and the interruption in diuretic use  Her catheterization from last year was with just moderate nonobstructive CAD thus less likely new ischemic event   -would-warm  -volume status:  Hypervolemic, unfortunately with minimal urine output thus main fluid removal via ultrafiltration  Continue with IV Bumex (per Nephrology recommendations)  -hemodynamic support:  Cardiac output is 4 9 l/min, CI 2 5 l/min/m2, normal lactic acid levels  Patient is hypertensive, will add hydralazine and Isordil for decreasing of afterload with hope to increase cardiac output  Stopped amlodipine, and decreased Coreg with the hope to be able to go up on hydralazine and Isordil  -will repeat limited TTE in few days once blood pressure is better controlled  SHANTA on CKD  -unfortunately now on ultrafiltration    Continue with intermittent IV diuresis although with minimal urine output but with hope that as decreased afterload there would be increase in cardiac output and might have some improvement in kidney functions    HPI: Jonah Stevenson is a 64 y o   female with past medical history of COPD, current smoker, nonischemic cardiomyopathy, presented with worsening of shortness of breath and was found to have acute on chronic heart failure  Patient was diagnosed with severe nonischemic cardiomyopathy in  (left heart catheterization with moderate nonobstructive atherosclerosis), this was followed by improvement in LV function with EF about 50% in   She was admitted to hospital for 1 week in June of 2022 with shortness of breath which was thought to be in setting of COPD exacerbation  secondary to pneumonia and acute on chronic heart failure exacerbation  During that time she was treated with steroids, antibiotics  In regards of heart failure exacerbation she was treated with diuretics but seem her kidney function was worsening which was attributed to diuretic use and they were hold  Per chart and patient reports she was discharged on diuretics  She was followed by outpatient Cardiology after the discharge and was recommended to continue use of diuretics and hold for certain weight  in view of recent SHANTA  She was discharged on supplemental oxygen to be used 2 L at rest and 4 L with exertion  She states that she felt better for some time after being discharged and did not require oxygen as to home, peripheral sat showed in mid 90s%  but 3 days before coming back to hospital on 07/12/2022 she started feeling more short of breath and was orthopneic, and started using oxygen at night  She denies leg swelling but she feels her abdomen was distended and due to persistent oxygen requirements and being hypertensive she came to hospital per her daughter recommendations  While in [facility] TTE was repeated with showing worsening of her left ventricular function (EF of 25%)  Chest x-ray was with persistent pulmonary congestion and pulmonary effusions and she required increased supplemental oxygen (at some point with high-flow)    In last 11 days while in [address] [place] [address] she was trialed on furosemide intermittent diuresis, trialed on Lasix drip transiently, given persistent hypertension trialed on nicardipine drip, started on amlodipine, received hydralazine intermittently as well as labetalol with eventual worsening in kidney function leading to hemodialysis with 1st session being on 07/15 and the most recent on 07/22  She was recommended to have Bumex in between dialysis sessions  It was felt  That patient is unable to be weaned off high oxygen support thus she was transferred to [facility] for further evaluation by heart failure team   She is a current smoker, smoked about 2 packs in 6 weeks  She admits to increased fluid intake at home but denies increased salt intake  She is not sure of her weight  Patient had [place] catheter at some point while in [facility] which was removed (details unknown)    Home cardiac medications:  Furosemide 40 mg daily, Coreg 3 125 mg b i d ,    Primary Cardiologist:  Dr Maureen Gutierrez    Cardiac testing:   ECHO:  06/15/2022   Left Ventricle: Left ventricular cavity size is normal  Wall thickness is mildly increased  There is mild concentric hypertrophy  The left ventricular ejection fraction is 41% by biplane measurement  Systolic function is moderately reduced  The Basalar septum and basilar inferior wall are hypokenetic  Diastolic function is mildly abnormal, consistent with grade I (abnormal) relaxation  Left atrial filling pressure is normal     Right Ventricle: Systolic function is low normal  Normal tricuspid annular plane systolic excursion (TAPSE) > 1 7 cm    Left Atrium: The atrium is severely dilated (>48 mL/m2)    Aortic Valve: The aortic valve is trileaflet  The aortic valve is sclerotic  There is mild regurgitation  The aortic valve has no significant stenosis    Tricuspid Valve: There is mild regurgitation    Aorta: The aortic root is normal in size  The ascending aorta is mildly dilated    Pulmonary Artery: The estimated pulmonary artery systolic pressure is 47 4 mmHg  The pulmonary artery systolic pressure is moderately increased      Echo 07/14/2022 Left Ventricle: Left ventricular cavity size is normal  Wall thickness is normal  The left ventricular ejection fraction is 25%  Systolic function is severely reduced  There is severe global hypokinesis with regional variation, basal and mid inferior wall appeared more severely hypo to akinetic    Right Ventricle: Systolic function is normal     Left Atrium: The atrium is mildly dilated    Aortic Valve: There is mild regurgitation    Mitral Valve: There is moderate regurgitation    Tricuspid Valve: There is mild regurgitation    Pericardium: There is no pericardial effusion  Left heart catheterization 03/30/2021  SUMMARY  CORONARY CIRCULATION:  Left main: Normal   LAD: The vessel was normal sized  Angiography showed moderate atherosclerosis  The diagonal branch was also free of significant disease  Circumflex: The vessel was normal sized and gave rise to one OM branch  There was mild plaque  There were no significant lesions  RCA: The vessel was normal sized and dominant, giving rise to the PDA and a posterolateral branch  There was diffuse moderate atherosclerosis  There were no flow-critical lesions      HEMODYNAMICS:  Hemodynamic assessment demonstrated a diastolic pressure of 15 mmHg, increasing to 35 mmHg after atrial systole, consistent with a noncompliant chamber      Summary: Moderate non-obstructive atherosclerosis  Noncompliant LV  Plan: medical therapy of nonischemic myopathy      Review of Systems  ROS as noted above, otherwise 12 point review of systems was performed and is negative       Historical Information   Past Medical History:   Diagnosis Date    Cardiomyopathy ([address] )     Chronic combined systolic and diastolic congestive heart failure     COPD (chronic obstructive pulmonary disease) (HCC)     Fatty liver     Hyperlipidemia     Hypertension     Mitral regurgitation     Sepsis      Past Surgical History:   Procedure Laterality Date    CARDIAC CATHETERIZATION  03/30/2021 Moderate non-obstructive atherosclerosis     SECTION      CHOLECYSTECTOMY      ROTATOR CUFF REPAIR W/ DISTAL CLAVICLE EXCISION Right     TONSILLECTOMY       Social History     Substance and Sexual Activity   Alcohol Use Never     Social History     Substance and Sexual Activity   Drug Use No     Social History     Tobacco Use   Smoking Status Current Some Day Smoker    Packs/day: 2 00    Types: Cigarettes   Smokeless Tobacco Never Used     Family History: non-contributory    Meds/Allergies   Hospital Medications:   Current Facility-Administered Medications   Medication Dose Route Frequency    acetaminophen (TYLENOL) tablet 650 mg  650 mg Oral Q6H PRN    albuterol (PROVENTIL HFA,VENTOLIN HFA) inhaler 2 puff  2 puff Inhalation Q4H PRN    [START ON 2022] calcium acetate (PHOSLO) capsule 1,334 mg  1,334 mg Oral TID With Meals    [START ON 2022] carvedilol (COREG) tablet 12 5 mg  12 5 mg Oral BID With Meals    clonazePAM (KlonoPIN) tablet 0 5 mg  0 5 mg Oral BID PRN    guaiFENesin (MUCINEX) 12 hr tablet 600 mg  600 mg Oral Q12H [facility]    [START ON 2022] heparin (porcine) subcutaneous injection 5,000 Units  5,000 Units Subcutaneous Q8H [facility]    hydrALAZINE (APRESOLINE) injection 10 mg  10 mg Intravenous Q6H PRN    hydrALAZINE (APRESOLINE) tablet 10 mg  10 mg Oral Q8H [facility]    ipratropium (ATROVENT) 0 02 % inhalation solution 0 5 mg  0 5 mg Nebulization TID    levalbuterol (XOPENEX) inhalation solution 1 25 mg  1 25 mg Nebulization TID    oxyCODONE (ROXICODONE) IR tablet 2 5 mg  2 5 mg Oral Q4H PRN    oxyCODONE (ROXICODONE) IR tablet 5 mg  5 mg Oral Q4H PRN    QUEtiapine (SEROquel) tablet 25 mg  25 mg Oral      Home Medications:   Medications Prior to Admission   Medication    albuterol (2 5 mg/3 mL) 0 083 % nebulizer solution    albuterol (ProAir HFA) 90 mcg/act inhaler    carvedilol (COREG) 3 125 mg tablet    clonazePAM (KlonoPIN) 1 mg tablet    furosemide (LASIX) 40 mg tablet    gabapentin (NEURONTIN) 300 mg capsule    morphine (MS CONTIN) 30 mg 12 hr tablet    oxyCODONE-acetaminophen (PERCOCET) 5-325 mg per tablet    umeclidinium-vilanterol (Anoro Ellipta) 62 5-25 MCG/INH inhaler       Allergies   Allergen Reactions    Wellbutrin [Bupropion] Arthralgia       Objective   Vitals: Blood pressure (!) 177/101, pulse 82, resp  rate 18, height 5' 10" (1 778 m), weight 79 1 kg (174 lb 6 1 oz), SpO2 93 %  Orthostatic Blood Pressures    Flowsheet Row Most Recent Value   Blood Pressure 177/101 filed at 07/22/2022 2300          No intake or output data in the 24 hours ending 07/22/22 2354    Invasive Devices  Report    Peripheral Intravenous Line  Duration           Peripheral IV 07/22/22 Proximal;Right;Ventral (anterior) Forearm <1 day              Physical Exam   GEN: NAD, alert and oriented, well appearing  SKIN: dry without significant lesions or rashes  HEENT: NCAT, PERRL, EOMs intact  NECK:  Positive JVD   CARDIOVASCULAR: RRR, normal S1, S2 without murmurs, rubs, or gallops appreciated  LUNGS:  Bilateral bibasilar crackles  ABDOMEN: Soft, nontender, nondistended  EXTREMITIES/VASCULAR: perfused without clubbing, cyanosis, or edema b/l  PSYCH: Normal mood and affect  NEURO: CN ll-Xll grossly intact    Lab Results: I have personally reviewed pertinent lab results      Results from last 7 days   Lab Units 07/22/22 0420 07/21/22  0609 07/19/22  0353   WBC Thousand/uL 12 06* 10 74* 12 78*   HEMOGLOBIN g/dL 10 8* 10 8* 11 4*   HEMATOCRIT % 33 4* 33 3* 33 7*   PLATELETS Thousands/uL 294 262 260     Results from last 7 days   Lab Units 07/22/22  2314 07/22/22  0420 07/21/22  0609   POTASSIUM mmol/L 4 1 5 1 4 8   CHLORIDE mmol/L 90* 90* 92*   CO2 mmol/L 30 19* 23   BUN mg/dL 61* 91* 56*   CREATININE mg/dL 5 42* 8 02* 5 81*   CALCIUM mg/dL 8 8 9 1 8 9         Results from last 7 days   Lab Units 07/22/22  2314 07/19/22  0352 07/17/22  0544   MAGNESIUM mg/dL 2 0 2 2 2 4     Imaging: I have personally reviewed pertinent reports

## 2022-07-23 NOTE — CONSULTS
Consult done on 07/14 at [facility] by Dr Karl Lerma  Patient with severe acute kidney injury on top of CKD, started on hemodialysis, was transferred to [facility] for advance heart failure evaluation    Please see progress note from today for further details

## 2022-07-23 NOTE — ASSESSMENT & PLAN NOTE
· Continue home Klonopin PRN  · Started on Seroquel during current hospitalization - consider discontinuing

## 2022-07-23 NOTE — H&P
[address] [facility]  H&P- Elvira Ship 1960, 64 y o  female MRN: [id number]  Unit/Bed#: [facility] 501-01 Encounter: [id number]  Primary Care Provider: Magdy Locke DO   Date and time admitted to hospital: 7/22/2022  9:51 PM    * Acute on chronic systolic congestive heart failure ([address] )  Assessment & Plan  Wt Readings from Last 3 Encounters:   07/22/22 77 9 kg (171 lb 11 8 oz)   06/28/22 77 4 kg (170 lb 9 6 oz)   06/22/22 76 6 kg (168 lb 14 oz)     · Patient with prior HFrEF with recovery but now back down to EF 25%  · 3/2021 EF 18%, diffuse hypokinesis  · 3/2021 Cardiac cath: moderate non-obstructive disease  · 7/2021: EF 50%  · 6/2022: EF 17%, grade 1 diastolic dysfunction   · 6/90/22: EF 25%, moderate MR  · Transferred to [facility] for heart failure evaluation  · Previously had PA catheter at [facility] with normal CI and elevated PA pressures in the 40s/20s  · Discussed with heart failure - add afterload reduction with hydralazine 10 mg q8h, d/c amlodipine, decrease Coreg to 12 5 mg BID, add isordil if BP tolerates  · Check VBG and Lactic  · Consider limited echo after addition of afterload reducing agents         Acute on chronic respiratory failure with hypoxemia (HCC)  Assessment & Plan  · Patient discharged with home O2 but has not required  · Admitted 7/12 to [facility] with respiratory failure and volume overload requiring BiPAP  · Had been weaned to [facility] but then required HFNC today for increased WOB  · Upon transfer, transitioned to 15L [address] [facility] and now on 8L  · Continue volume removal with IHD - discuss additional session over the weekend     Acute on chronic kidney failure [facility])  Assessment & Plan  Lab Results   Component Value Date    EGFR 4 07/22/2022    EGFR 7 07/21/2022    EGFR 8 07/19/2022    CREATININE 8 02 (H) 07/22/2022    CREATININE 5 81 (H) 07/21/2022    CREATININE 5 04 (H) 07/19/2022     · Likely cardiorenal in the setting of HFrEF  · Baseline Cr 1-1 1  · Anuric renal failure requiring IHD now on MWF schedule   · RIJ temporary HD catheter in place since 7/15  · IR consult Monday for permcath  · Nephrology to continue to follow    COPD (chronic obstructive pulmonary disease) ([address] )  Assessment & Plan  · Admitted for COPD vs CHF exacarbation  · Treated with IV steroids, nebulizer therapy  · Only uses PRN albuterol as an outpatient  · Not currently in COPD exacerbation  · Continue Xopenex/atrovent  · Respiratory protocol     Anxiety  Assessment & Plan  · Continue home Klonopin PRN  · Started on Seroquel during current hospitalization - continue tonight but consider discontinuing     Tobacco abuse  Assessment & Plan  · Previously refusing nicotine patch    -------------------------------------------------------------------------------------------------------------  Chief Complaint: Acute CHF exacerbation     History of Present Illness   Angela Mcfarland is a 64 y o  female with PMH significant for CHF, COPD, HTN, anxiety, CKD who presented to [facility] ED on 7/12 with acute hypoxic respiratory failure  She was initially admitted to the floor but had increased WOB requiring BiPAP while in the ED  She was admitted to the ICU for further management  She was treated for a COPD exacerbation with IV steroids, azithromycin and nebulizer therapy  Echocardiogram was performed which revealed an EF of 25%  A PA catheter was placed and she was not in low output heart failure at that time  Antihypertensives were initiated  She also had an SHANTA and developed anuric renal failure  Diuretics were trialed but she ultimately required IHD via a RIJ temporary HD catheter  Patient required BiPAP earlier in her hospitalization but was able to be transitioned to [address] [facility]  Today, patient developed increased WOB and was placed on HFNC  She was transferred to [facility] for heart failure evaluation  Upon arrival, patient was transitioned to [address] [facility] and has maintained appropriate oxygen saturations and work of breathing       History obtained from chart review and the patient   -------------------------------------------------------------------------------------------------------------  Dispo: Admit to Stepdown Level 1    Code Status: Level 1 - Full Code  --------------------------------------------------------------------------------------------------------------  Review of Systems   Constitutional: Negative for fever  Respiratory: Positive for cough and shortness of breath  Negative for wheezing  Cardiovascular: Positive for leg swelling  Negative for chest pain and palpitations  Gastrointestinal: Positive for abdominal distention  Negative for abdominal pain and nausea  Genitourinary: Positive for decreased urine volume  Neurological: Negative for light-headedness  A 12-point, complete review of systems was reviewed and negative except as stated above     Physical Exam  Constitutional:       General: She is not in acute distress  HENT:      Head: Atraumatic  Mouth/Throat:      Mouth: Mucous membranes are moist    Cardiovascular:      Rate and Rhythm: Normal rate and regular rhythm  Pulses:           Radial pulses are 2+ on the right side and 2+ on the left side  Dorsalis pedis pulses are 2+ on the right side and 2+ on the left side  Pulmonary:      Effort: No respiratory distress  Breath sounds: Examination of the right-lower field reveals rales  Examination of the left-lower field reveals rales  Rales present  No wheezing or rhonchi  Abdominal:      General: Bowel sounds are normal  There is distension  Palpations: Abdomen is soft  Tenderness: There is no abdominal tenderness  Musculoskeletal:      Cervical back: Neck supple  Right lower leg: No edema  Left lower leg: No edema  Skin:     General: Skin is warm and dry  Capillary Refill: Capillary refill takes 2 to 3 seconds  Neurological:      General: No focal deficit present        Mental Status: She is alert and oriented to person, place, and time     Psychiatric:         Mood and Affect: Mood normal        --------------------------------------------------------------------------------------------------------------  Vitals:   HR 82  /101  SpO2 94% on 8L  RR 19    Laboratory and Diagnostics:  Results from last 7 days   Lab Units 07/22/22  0420 07/21/22  0609 07/19/22  0353 07/18/22  0601 07/17/22  0544 07/16/22  0524   WBC Thousand/uL 12 06* 10 74* 12 78* 10 82* 9 74 10 39*   HEMOGLOBIN g/dL 10 8* 10 8* 11 4* 10 1* 10 7* 11 6   HEMATOCRIT % 33 4* 33 3* 33 7* 30 4* 32 2* 36 1   PLATELETS Thousands/uL 294 262 260 240 212 255   NEUTROS PCT % 66 68  --  72  --   --    MONOS PCT % 8 8  --  8  --   --      Results from last 7 days   Lab Units 07/22/22  0420 07/21/22  0609 07/19/22  0352 07/18/22  0601 07/17/22  0544 07/16/22  0524 07/16/22  0034   SODIUM mmol/L 126* 127* 127* 129* 131* 136 134*   POTASSIUM mmol/L 5 1 4 8 4 4 4 7 4 6 4 9 4 6   CHLORIDE mmol/L 90* 92* 92* 90* 91* 94* 94*   CO2 mmol/L 19* 23 22 20* 26 21 20*   ANION GAP mmol/L 17* 12 13 19* 14* 21* 20*   BUN mg/dL 91* 56* 47* 110* 85* 89* 83*   CREATININE mg/dL 8 02* 5 81* 5 04* 8 87* 6 92* 6 96* 6 41*   CALCIUM mg/dL 9 1 8 9 9 2 8 2* 8 5 9 2 9 3   GLUCOSE RANDOM mg/dL 116 122 135 115 139 77 71   ALT U/L  --   --   --  5* 4* 3*  --    AST U/L  --   --   --  9* 5* 6*  --    ALK PHOS U/L  --   --   --  68 65 71  --    ALBUMIN g/dL  --   --   --  3 4* 3 4* 3 5  --    TOTAL BILIRUBIN mg/dL  --   --   --  0 48 0 49 0 48  --      Results from last 7 days   Lab Units 07/19/22  0352 07/17/22  0544   MAGNESIUM mg/dL 2 2 2 4   PHOSPHORUS mg/dL 6 1* 9 3*                   ABG:  Results from last 7 days   Lab Units 07/22/22  1033   PH ART  7 388   PCO2 ART mm Hg 43 8   PO2 ART mm Hg 136 5*   HCO3 ART mmol/L 25 8   BASE EXC ART mmol/L 0 6   ABG SOURCE  Radial, Right     VBG:  Results from last 7 days   Lab Units 07/22/22  2314 07/22/22  1033   PH PANDA  7 403*  --    PCO2 PANDA mm Hg 44 1  --    PO2 PANDA mm Hg 32 3*  --    HCO3 PANDA mmol/L 26 9  --    BASE EXC PANDA mmol/L 1 8  --    ABG SOURCE   --  Radial, Right     Results from last 7 days   Lab Units 22  0524 22  0352 22  0601 22  0544   PROCALCITONIN ng/ml 1 75* 2 58* 2 97* 4 20*       Micro:        EKG: NSR  Imaging: I have personally reviewed pertinent films in PACS      Historical Information   Past Medical History:   Diagnosis Date    Cardiomyopathy ([address] )     Chronic combined systolic and diastolic congestive heart failure     COPD (chronic obstructive pulmonary disease) (HCC)     Fatty liver     Hyperlipidemia     Hypertension     Mitral regurgitation     Sepsis      Past Surgical History:   Procedure Laterality Date    CARDIAC CATHETERIZATION  2021    Moderate non-obstructive atherosclerosis     SECTION      CHOLECYSTECTOMY      ROTATOR CUFF REPAIR W/ DISTAL CLAVICLE EXCISION Right     TONSILLECTOMY       Social History   Social History     Substance and Sexual Activity   Alcohol Use Never     Social History     Substance and Sexual Activity   Drug Use No     Social History     Tobacco Use   Smoking Status Current Some Day Smoker    Packs/day: 2 00    Types: Cigarettes   Smokeless Tobacco Never Used     Exercise History: Active  Family History:   No family history on file    I have reviewed this patient's family history and commented on sigificant items within the HPI      Medications:  Current Facility-Administered Medications   Medication Dose Route Frequency    acetaminophen (TYLENOL) tablet 650 mg  650 mg Oral Q6H PRN    albuterol (PROVENTIL HFA,VENTOLIN HFA) inhaler 2 puff  2 puff Inhalation Q4H PRN    [START ON 2022] calcium acetate (PHOSLO) capsule 1,334 mg  1,334 mg Oral TID With Meals    [START ON 2022] carvedilol (COREG) tablet 12 5 mg  12 5 mg Oral BID With Meals    clonazePAM (KlonoPIN) tablet 0 5 mg  0 5 mg Oral BID PRN    guaiFENesin (MUCINEX) 12 hr tablet 600 mg  600 mg Oral Q12H [facility]    [START ON 7/23/2022] heparin (porcine) subcutaneous injection 5,000 Units  5,000 Units Subcutaneous Q8H [facility]    hydrALAZINE (APRESOLINE) injection 10 mg  10 mg Intravenous Q6H PRN    hydrALAZINE (APRESOLINE) tablet 10 mg  10 mg Oral Q8H [facility]    ipratropium (ATROVENT) 0 02 % inhalation solution 0 5 mg  0 5 mg Nebulization TID    levalbuterol (XOPENEX) inhalation solution 1 25 mg  1 25 mg Nebulization TID    oxyCODONE (ROXICODONE) IR tablet 2 5 mg  2 5 mg Oral Q4H PRN    oxyCODONE (ROXICODONE) IR tablet 5 mg  5 mg Oral Q4H PRN    QUEtiapine (SEROquel) tablet 25 mg  25 mg Oral      Home medications:  Prior to Admission Medications   Prescriptions Last Dose Informant Patient Reported? Taking? albuterol (2 5 mg/3 mL) 0 083 % nebulizer solution   No No   Sig: Take 3 mL (2 5 mg total) by nebulization every 6 (six) hours as needed for wheezing or shortness of breath   Patient not taking: Reported on 6/28/2022   albuterol (ProAir HFA) 90 mcg/act inhaler   No No   Sig: Inhale 2 puffs every 6 (six) hours as needed for wheezing   carvedilol (COREG) 3 125 mg tablet   No No   Sig: Take 1 tablet (3 125 mg total) by mouth 2 (two) times a day with meals   clonazePAM (KlonoPIN) 1 mg tablet   Yes No   Sig: Take 0 5-1 mg by mouth daily at bedtime as needed   furosemide (LASIX) 40 mg tablet   No No   Sig: Take 1 tablet (40 mg total) by mouth daily Hold for morning weight less than 160   gabapentin (NEURONTIN) 300 mg capsule   Yes No   Sig: Take 300 mg by mouth as needed    morphine (MS CONTIN) 30 mg 12 hr tablet   Yes No   Sig: Take 30 mg by mouth 2 (two) times a day   oxyCODONE-acetaminophen (PERCOCET) 5-325 mg per tablet   Yes No   Sig: Take 1 tablet by mouth every 4 (four) hours as needed for moderate pain   umeclidinium-vilanterol (Anoro Ellipta) 62 5-25 MCG/INH inhaler   No No   Sig: Inhale 1 puff daily      Facility-Administered Medications: None     Allergies:   Allergies   Allergen Reactions    Wellbutrin [Bupropion] Arthralgia       ------------------------------------------------------------------------------------------------------------  Advance Directive and Living Will:      Power of :    POLST:    ------------------------------------------------------------------------------------------------------------  Anticipated Length of Stay is > 2 midnights    Care Time Delivered:   No Critical Care time spent       Rebeka Davis PA-C        Portions of the record may have been created with voice recognition software  Occasional wrong word or "sound a like" substitutions may have occurred due to the inherent limitations of voice recognition software    Read the chart carefully and recognize, using context, where substitutions have occurred

## 2022-07-23 NOTE — ASSESSMENT & PLAN NOTE
· Continue home Klonopin PRN  · Started on Seroquel during current hospitalization - continue tonight but consider discontinuing

## 2022-07-23 NOTE — PROGRESS NOTES
Patient was noted to have labs ordered for this evening and she also has AM labs  Patient requested to have labs grouped to AM   Messaged request to ordering provider Khang Rodriguez and was given verbal order to adjust times

## 2022-07-23 NOTE — ASSESSMENT & PLAN NOTE
· Initially admitted to Medicine Service and had acute change in respiratory status, transfer to Critical Care Service  · History of intubation twice in the past year for similar presentation   · Treated with BiPAP, titrated to 4 L nasal cannula, then return to mid to high-flow  · Cardiology Nephrology recommendations appreciated  · Continue dialysis Monday Wednesday Friday  · Continue oxygen protocol-currently on high flow  · Patient to be transferred to [place] for heart failure team

## 2022-07-23 NOTE — PHYSICAL THERAPY NOTE
Physical Therapy Evaluation    Patient's Name: Amari Longoria    Admitting Diagnosis  Acute on chronic respiratory failure with hypoxemia [facility])    Problem List  Patient Active Problem List   Diagnosis    Vitamin D deficiency    Dilated cardiomyopathy ([facility] )    Essential hypertension    Chronic pain    Anxiety    Acute on chronic systolic congestive heart failure (HCC)    Tobacco abuse    Leukocytosis    COPD (chronic obstructive pulmonary disease) (HCC)    Acute on chronic kidney failure (HCC)    Acute respiratory insufficiency    Acute on chronic respiratory failure with hypoxemia ([facility] )    Hepatitis B       Past Medical History  Past Medical History:   Diagnosis Date    Cardiomyopathy ([facility] )     Chronic combined systolic and diastolic congestive heart failure     COPD (chronic obstructive [facility] disease) ([facility] )     Fatty liver     Hyperlipidemia     Hypertension     Mitral regurgitation     Sepsis        Past Surgical History  Past Surgical History:   Procedure Laterality Date    CARDIAC CATHETERIZATION  2021    Moderate non-obstructive atherosclerosis     SECTION      CHOLECYSTECTOMY      ROTATOR CUFF REPAIR W/ DISTAL CLAVICLE EXCISION Right     TONSILLECTOMY          22 1044   PT Last Visit   PT Visit Date 22   Note Type   Note type Evaluation   Pain Assessment   Pain Assessment Tool 0-10   Pain Score No Pain   Restrictions/Precautions   Weight Bearing Precautions Per Order No   Other Precautions Multiple lines;Telemetry; Fall Risk   Home Living   Type of [address] [facility] 1/2 bath on main level;Stairs to enter without rails  (7-8 ANATOLY, reports she often stays on 1st floor + sleeps on the couch)   Bathroom Shower/Tub Tub/shower unit   [address] [facility]  (home O2 (2-4L at night))   Prior Function   Level of [place] Independent with ADLs and functional mobility   Lives With Alone   Receives Help From Family  (dtr + nephew close by)   ADL Assistance Independent   IADLs Independent   Falls in the last 6 months 0   Vocational On disability   Comments At baseline pt is I + active w/o AD, (+) , mows the lawn  General   Family/Caregiver Present No   Cognition   Overall Cognitive Status WFL   Arousal/Participation Alert   Attention Within functional limits   Orientation Level Oriented X4   Memory Within functional limits;Decreased recall of recent events   Following Commands Follows one step commands without difficulty   Comments pleasant + cooperative  Subjective   Subjective "My heart is only working at 20% "   RLE Assessment   RLE Assessment   (grossly 4-/5)   LLE Assessment   LLE Assessment   (grossly 4-/5)   Coordination   Movements are Fluid and Coordinated 1   Bed Mobility   Supine to Sit 4  Minimal assistance   Additional items Assist x 1; Increased time required;Verbal cues   Sit to Supine Unable to assess   Additional Comments Pt greeted in supine  Transfers   Sit to Stand 4  Minimal assistance   Additional items Assist x 1; Increased time required;Verbal cues   Stand to Sit 4  Minimal assistance   Additional items Assist x 1; Increased time required;Verbal cues   Additional Comments no AD   Ambulation/Elevation   Gait pattern Excessively slow; Short stride   Gait Assistance 4  Minimal assist   Additional items Assist x 1;Verbal cues; Tactile cues   Assistive Device None   Distance 4'   Stair Management Assistance Not tested   Balance   Static Sitting Fair +   Dynamic Sitting Fair   Static Standing Fair -   Dynamic Standing Poor +   Ambulatory Poor +   Endurance Deficit   Endurance Deficit Yes   Endurance Deficit Description dyspnea (SpO2 WNL)   Activity Tolerance   Activity Tolerance Patient limited by fatigue   Medical Staff Made Aware BROOKE Stanton   Nurse Made Aware yes - cleared for therapy   Assessment   Prognosis Good   Problem List Decreased strength;Decreased endurance; Impaired balance;Decreased mobility   Assessment Pt is 64 y o  female seen for a high complexity PT evaluation s/p admit to [facility] on 7/22/2022  Pt presenting for heart failure evaluation - initially presented to [facility] 7/12/22 w/ acute hypoxic respiratory failure  Please see above for other active problem list / PMH  PT now consulted to assess functional mobility and needs for safe d/c planning  Prior to admission, pt was I + active, lives alone in a house w/ stairs  Currently pt requires MinAx1 for bed skills; MinAx1 for functional transfers; MinAx1 for ambulation w/ RW  Pt presents functioning below baseline and w/ overall mobility deficits 2* to: decreased LE strength; generalized weakness/deconditioning; decreased endurance; impaired balance; gait deviations; SOB/SCALES; fatigue; bed/chair alarms; multiple lines  Pt currently at risk for falls  (Please find additional objective findings from PT assessment regarding body systems outlined above )     Pt will continue to benefit from skilled PT interventions to address stated impairments; to maximize functional potential; for ongoing pt/ family training; and DME needs  Anticipate pt will progress to the point where she can d/c home w/ home PT  Will continue to follow while in-house  Barriers to Discharge Inaccessible home environment;Decreased caregiver support   Goals   Patient Goals get better   STG Expiration Date 08/06/22   Short Term Goal #1 In 14 days pt will complete: 1) Bed mobility skills with Yudith to facilitate safe return to previous living environment  2) Functional transfers with Yudith to facilitate safe return to previous living environment  3) Ambulation with least restrictive ' w/ Yudith without LOB for safe ambulation in home/community environment  4) Improve balance scores by 1 grade to decrease fall risk  5) Improve LE strength grades by 1 to increase independence w/ all functional mobility, transfers and gait   6) PT for ongoing pt and family education; DME needs and D/C planning to promote highest level of function in least restrictive environment  7) Stair training up/ down 8 steps without rails and I for safe access to previous living environment and to increase community access  PT Treatment Day 0   Plan   Treatment/Interventions Functional transfer training;LE strengthening/ROM; Elevations; Therapeutic exercise; Endurance training;Patient/family training;Equipment eval/education; Bed mobility;Gait training; Compensatory technique education;Spoke to nursing;OT  (balance training)   PT Frequency 3-5x/wk   Recommendation   PT Discharge Recommendation   (HHPT pending progress)   AM-PAC Basic Mobility Inpatient   Turning in Bed Without Bedrails 3   Lying on Back to Sitting on Edge of Flat Bed 3   Moving Bed to Chair 3   Standing Up From Chair 3   Walk in Room 3   Climb 3-5 Stairs 2   Basic Mobility Inpatient Raw Score 17   Basic Mobility Standardized Score 39 67   Highest Level Of Mobility   JH-HLM Goal 5: Stand one or more mins   JH-HLM Achieved 4: Move to chair/commode   End of Consult   Patient Position at End of Consult Bedside chair;Bed/Chair alarm activated; All needs within reach  (on waffle cushion, all lines in tact)     Ludy Coffman, PT, DPT

## 2022-07-23 NOTE — RESPIRATORY THERAPY NOTE
RT Protocol Note  Amari Longoria 64 y o  female MRN: [id number]  Unit/Bed#: [facility] 501-01 Encounter: [id number]    Assessment    Active Problems:    * No active hospital problems  *      Home Pulmonary Medications:  Albuteral HFA PRN   Home Devices/Therapy: (P) Home O2    Past Medical History:   Diagnosis Date    Cardiomyopathy ([address] )     Chronic combined systolic and diastolic congestive heart failure     COPD (chronic obstructive pulmonary disease) (HCC)     Fatty liver     Hyperlipidemia     Hypertension     Mitral regurgitation     Sepsis      Social History     Socioeconomic History    Marital status: Single     Spouse name: Not on file    Number of children: Not on file    Years of education: Not on file    Highest education level: Not on file   Occupational History    Not on file   Tobacco Use    Smoking status: Current Some Day Smoker     Packs/day: 2 00     Types: Cigarettes    Smokeless tobacco: Never Used   Vaping Use    Vaping Use: Never used   Substance and Sexual Activity    Alcohol use: Never    Drug use: No    Sexual activity: Not on file   Other Topics Concern    Not on file   Social History Narrative    Not on file     Social Determinants of Health     Financial Resource Strain: Not on file   Food Insecurity: No Food Insecurity    Worried About Running Out of Food in the Last Year: Never true    Ran Out of Food in the Last Year: Never true   Transportation Needs: No Transportation Needs    Lack of Transportation (Medical): No    Lack of Transportation (Non-Medical):  No   Physical Activity: Not on file   Stress: Not on file   Social Connections: Not on file   Intimate Partner Violence: Not on file   Housing Stability: Low Risk     Unable to Pay for Housing in the Last Year: No    Number of Places Lived in the Last Year: 1    Unstable Housing in the Last Year: No       Subjective         Objective    Physical Exam:   Assessment Type: (P) Assess only  General Appearance: (P) Awake, Alert  Respiratory Pattern: (P) Normal  Chest Assessment: (P) Chest expansion symmetrical  Bilateral Breath Sounds: (P) Clear, Diminished  Cough: (P) None    Vitals: There were no vitals taken for this visit  Results from last 7 days   Lab Units 07/22/22  1033   PH ART  7 388   PCO2 ART mm Hg 43 8   PO2 ART mm Hg 136 5*   HCO3 ART mmol/L 25 8   BASE EXC ART mmol/L 0 6   O2 CONTENT ART mL/dL 16 5   O2 HGB, ARTERIAL % 97 9*   ABG SOURCE  Radial, Right   JULIAN TEST  No       Imaging and other studies: I have personally reviewed pertinent reports              Plan    Respiratory Plan: (P) Home Bronchodilator Patient pathway, Discontinue Protocol        Resp Comments: (P) Pt has Hx of COPD Pt only uses inhaler PRN

## 2022-07-23 NOTE — PLAN OF CARE
Problem: PHYSICAL THERAPY ADULT  Goal: Performs mobility at highest level of function for planned discharge setting  See evaluation for individualized goals  Description: Treatment/Interventions: Functional transfer training, LE strengthening/ROM, Elevations, Therapeutic exercise, Endurance training, Patient/family training, Equipment eval/education, Bed mobility, Gait training, Compensatory technique education, Spoke to nursing, OT (balance training)          See flowsheet documentation for full assessment, interventions and recommendations  Note: Prognosis: Good  Problem List: Decreased strength, Decreased endurance, Impaired balance, Decreased mobility  Assessment: Pt is 64 y o  female seen for a high complexity PT evaluation s/p admit to [facility] on 7/22/2022  Pt presenting for heart failure evaluation - initially presented to [facility] 7/12/22 w/ acute hypoxic respiratory failure  Please see above for other active problem list / PMH  PT now consulted to assess functional mobility and needs for safe d/c planning  Prior to admission, pt was I + active, lives alone in a house w/ stairs  Currently pt requires MinAx1 for bed skills; MinAx1 for functional transfers; MinAx1 for ambulation w/ RW  Pt presents functioning below baseline and w/ overall mobility deficits 2* to: decreased LE strength; generalized weakness/deconditioning; decreased endurance; impaired balance; gait deviations; SOB/SCALES; fatigue; bed/chair alarms; multiple lines  Pt currently at risk for falls  (Please find additional objective findings from PT assessment regarding body systems outlined above ) Pt will continue to benefit from skilled PT interventions to address stated impairments; to maximize functional potential; for ongoing pt/ family training; and DME needs  Anticipate pt will progress to the point where she can d/c home w/ home PT  Will continue to follow while in-house    Barriers to Discharge: Inaccessible home environment, Decreased caregiver support     PT Discharge Recommendation:  (HHPT pending progress)    See flowsheet documentation for full assessment

## 2022-07-23 NOTE — PROGRESS NOTES
NEPHROLOGY PROGRESS NOTE   Nahomy Murillo 64 y o  female MRN: [id number]  Unit/Bed#: [facility] 501-01 Encounter: [id number]      ASSESSMENT & PLAN:  1  Severe acute kidney injury on top of CKD stage 3  Reported baseline creatinine 1-1 1  Patient was started on dialysis on 07/15 in the setting of decompensated heart failure and hypoxemic respiratory failure refractory to diuretics  Last dialysis treatment yesterday, previously was on a MWF schedule while patient was in [facility]  Patient seen and examined, remains volume overload with shortness of breath  Plan for extra HD treatment today for further UF  Discussed with patient and she is agreeable with  Discussed with critical care team   Follow daily labs  Monitor ins and outs  Follow daily weight  If not improvement she will need a PermCath next week    2  Acute on chronic HFrEF, patient was transferred to [facility] for advanced heart failure evaluation  Plan for extra HD today for further UF  3  Acute on chronic hypoxemic respiratory failure in the setting of volume overload  Management per critical care team   Plan for extra HD today for further UF    4  Hyponatremia in the setting of acute kidney injury as well as volume overload, continue with fluid restriction, continue with UF during dialysis  Follow daily labs    5  Hypophosphatemia the setting of acute kidney injury, continue with low phosphorus diet and binders  6  Mild anemia, monitor H&H    7  Access, temporary HD catheter in place, if not improvement will need a PermCath next week    My plan and recommendations were discussed with critical care team      SUBJECTIVE:  Patient seen and examined, continues complaining of shortness of breath, denies chest pain, no abdominal pain, no nausea, no vomiting  Status post dialysis treatment yesterday    Plan for extra HD treatment today and patient is agreeable with    OBJECTIVE:  Current Weight: Weight - Scale: 73 9 kg (162 lb 14 7 oz)  Vitals:    07/23/22 1045   BP:    Pulse:    Resp: 18   Temp: 97 9 °F (36 6 °C)   SpO2: 95%       Intake/Output Summary (Last 24 hours) at 7/23/2022 1120  Last data filed at 7/23/2022 0801  Gross per 24 hour   Intake 360 ml   Output 0 ml   Net 360 ml     General: conscious, cooperative, in not acute distress  Eyes: conjunctivae pink, anicteric sclerae  ENT: lips and mucous membranes moist, oxygen via nasal cannula  Neck: supple, no JVD  Chest:  Few rales, slightly decreased breath sounds at bases  CVS: distinct S1 & S2, normal rate, regular rhythm  Abdomen: soft, non-tender, non-distended, normoactive bowel sounds  Extremities:  Positive edema of both legs  Skin: no rash  Neuro: awake, alert, oriented  Temporary HD catheter in place        Medications:    Current Facility-Administered Medications:     acetaminophen (TYLENOL) tablet 650 mg, 650 mg, Oral, Q6H PRN, Germán Pan PA-C    albuterol (PROVENTIL HFA,VENTOLIN HFA) inhaler 2 puff, 2 puff, Inhalation, Q4H PRN, Alka Ma MD, 2 puff at 07/23/22 0402    calcium acetate (PHOSLO) capsule 1,334 mg, 1,334 mg, Oral, TID With Meals, Germán Pan PA-C, 1,334 mg at 07/23/22 0804    carvedilol (COREG) tablet 12 5 mg, 12 5 mg, Oral, BID With Meals, Germán Pan PA-C, 12 5 mg at 07/23/22 0804    clonazePAM (KlonoPIN) tablet 1 mg, 1 mg, Oral, BID PRN, Seema Huynh PA-C    docusate sodium (COLACE) capsule 100 mg, 100 mg, Oral, BID, Seema Huynh PA-C    guaiFENesin (MUCINEX) 12 hr tablet 600 mg, 600 mg, Oral, Q12H [facility], Germán Pan PA-C, 600 mg at 07/23/22 0804    heparin (porcine) subcutaneous injection 5,000 Units, 5,000 Units, Subcutaneous, Q8H [facility], Germán aPn PA-C, 5,000 Units at 07/23/22 0505    hydrALAZINE (APRESOLINE) injection 10 mg, 10 mg, Intravenous, Q6H PRN, Germán Pan PA-C, 10 mg at 07/23/22 0159    hydrALAZINE (APRESOLINE) tablet 10 mg, 10 mg, Oral, Q8H [facility], Karolina Kauffman PA-C, 10 mg at 07/23/22 0505    ipratropium (ATROVENT) 0 02 % inhalation solution 0 5 mg, 0 5 mg, Nebulization, TID, Alok Davenport MD, 0 5 mg at 07/23/22 0838    isosorbide dinitrate (ISORDIL) tablet 10 mg, 10 mg, Oral, Q8H [address], Jaspreet Rodriguez PA-C, 10 mg at 07/23/22 0036    levalbuterol (Kenzie Lush) inhalation solution 1 25 mg, 1 25 mg, Nebulization, TID, Alok Davenport MD, 1 25 mg at 07/23/22 0839    oxyCODONE (ROXICODONE) IR tablet 2 5 mg, 2 5 mg, Oral, Q4H PRN, Jaspreet Rodriguez PA-C    oxyCODONE (ROXICODONE) IR tablet 5 mg, 5 mg, Oral, Q4H PRN, Jaspreet Rodriguez PA-C, 5 mg at 07/23/22 0008    polyethylene glycol (MIRALAX) packet 17 g, 17 g, Oral, Daily PRN, Seema Huynh PA-C    QUEtiapine (SEROquel) tablet 25 mg, 25 mg, Oral, HS, Jaspreet Rodriguez PA-C, 25 mg at 07/23/22 0008    senna (SENOKOT) tablet 8 6 mg, 1 tablet, Oral, HS, Seema Huynh PA-C    Invasive Devices:        Lab Results:   Results from last 7 days   Lab Units 07/23/22  0512 07/22/22  2314 07/22/22  0420 07/21/22  0609 07/19/22  0353 07/19/22  0352 07/18/22  0601   WBC Thousand/uL 12 34*  --  12 06* 10 74*   < >  --  10 82*   HEMOGLOBIN g/dL 11 2*  --  10 8* 10 8*   < >  --  10 1*   HEMATOCRIT % 33 1*  --  33 4* 33 3*   < >  --  30 4*   PLATELETS Thousands/uL 285  --  294 262   < >  --  240   SODIUM mmol/L 130* 128* 126* 127*  --  127* 129*   POTASSIUM mmol/L 4 2 4 1 5 1 4 8  --  4 4 4 7   CHLORIDE mmol/L 90* 90* 90* 92*  --  92* 90*   CO2 mmol/L 27 30 19* 23  --  22 20*   BUN mg/dL 65* 61* 91* 56*  --  47* 110*   CREATININE mg/dL 6 15* 5 42* 8 02* 5 81*  --  5 04* 8 87*   CALCIUM mg/dL 9 0 8 8 9 1 8 9  --  9 2 8 2*   MAGNESIUM mg/dL 2 3 2 0  --   --   --  2 2  --    PHOSPHORUS mg/dL 5 7* 5 0*  --   --   --  6 1*  --    ALK PHOS U/L 92 95  --   --   --   --  68   ALT U/L 18 17  --   --   --   --  5*   AST U/L 15 11  --   --   --   --  9*    < > = values in this interval not displayed         Previous work up:  See previous notes      Portions of the record may have been created with voice recognition software  Occasional wrong word or "sound a like" substitutions may have occurred due to the inherent limitations of voice recognition software  Read the chart carefully and recognize, using context, where substitutions have occurred  If you have any questions, please contact the dictating provider

## 2022-07-23 NOTE — OCCUPATIONAL THERAPY NOTE
Occupational Therapy Evaluation     Patient Name: Salvatore Webber  [id number] Date: 2022  Problem List  Principal Problem:    Acute on chronic systolic congestive heart failure (HCC)  Active Problems:    Anxiety    Tobacco abuse    COPD (chronic obstructive pulmonary disease) (HCC)    Acute on chronic kidney failure (HCC)    Acute on chronic respiratory failure with hypoxemia [facility])    Past Medical History  Past Medical History:   Diagnosis Date    Cardiomyopathy ([facility] [address] )     Chronic combined systolic and diastolic congestive heart failure     COPD (chronic obstructive pulmonary disease) (HCC)     Fatty liver     Hyperlipidemia     Hypertension     Mitral regurgitation     Sepsis      Past Surgical History  Past Surgical History:   Procedure Laterality Date    CARDIAC CATHETERIZATION  2021    Moderate non-obstructive atherosclerosis     SECTION      CHOLECYSTECTOMY      ROTATOR CUFF REPAIR W/ DISTAL CLAVICLE EXCISION Right     TONSILLECTOMY           22 1045   OT Last Visit   OT Visit Date 22   Note Type   Note type Evaluation   Restrictions/Precautions   Weight Bearing Precautions Per Order No   Other Precautions Chair Alarm; Bed Alarm;Multiple lines;Telemetry;O2;Fall Risk   Pain Assessment   Pain Assessment Tool 0-10   Pain Score No Pain   Home Living   Type of Home House   Home Layout Two level;Bed/bath upstairs;Stairs to enter with rails  (2+2+2 ANATOLY, full flight to second floor)   Bathroom Shower/Tub Tub/shower unit   Bathroom Toilet Standard   Bathroom Equipment   (denies)    [address]  (not using)   Prior Function   Level of [place] Independent with ADLs and functional mobility   Lives With Alone   Receives Help From Family  (local dtr and nephew who can assist)   ADL Assistance Independent   IADLs Independent   Falls in the last 6 months 0   Vocational On disability  (/)   Lifestyle   Autonomy pta, pt was I w ADL/IADL, no AD mobility, +    Reciprocal Relationships supportive dtr and nephew who are local and avail to assist    Service to Others on disability, was    Intrinsic Gratification gardening   Psychosocial   Psychosocial (WDL) WDL   Subjective   Subjective "I have trouble breathing "   ADL   Where Assessed Edge of bed   Eating Assistance 5  Supervision/Setup   Grooming Assistance 5  Supervision/Setup   UB Bathing Assistance 5  Supervision/Setup   LB Bathing Assistance 4  Minimal Assistance   UB Dressing Assistance 5  Supervision/Setup   LB [place] 4  Minimal Assistance   Bed Mobility   Supine to Sit 4  Minimal assistance   Additional items Assist x 1; Increased time required;Verbal cues;LE management   Additional Comments found supine in bed, left in chair w all needs in reach and alarm on   Transfers   Sit to Stand 4  Minimal assistance   Additional items Assist x 1; Increased time required;Verbal cues   Stand to Sit 4  Minimal assistance   Additional items Assist x 1; Increased time required;Verbal cues   Additional Comments c HHA from EOB> chair  limited 2' SOB, all vital WNL     Functional Mobility   Functional Mobility 4  Minimal assistance   Additional Comments ax1, short steps from EOB>chair   Additional items Rolling walker   Balance   Static Sitting Fair +   Dynamic Sitting Fair   Static Standing Fair -   Dynamic Standing Poor +   Ambulatory Poor +   Activity Tolerance   Activity Tolerance Other (Comment)  (2' SOB)   Medical Staff Made Aware DPT Lindy 2' pts med complexity, comorbidities and regression from baseline   Nurse Made Aware ok per RN   RUE Assessment   RUE Assessment WFL   LUE Assessment   LUE Assessment WFL   Hand Function   Gross Motor Coordination Functional   Fine Motor Coordination Functional   Cognition   Overall Cognitive Status WFL   Arousal/Participation Alert; Responsive; Cooperative   Attention Within functional limits   Orientation Level Oriented X4   Memory Within functional limits   Following Commands Follows one step commands without difficulty   Comments pleasant and cooperative, overall G safety awareness and insight to condition  Is a bit fearful to complete session 2' inc WOB and SOb  Assessment   Limitation Decreased ADL status; Decreased endurance;Decreased self-care trans;Decreased high-level ADLs   Prognosis Good   Assessment Pt is a 64 y o  female admitted 7/22/22 for heart failure evaluation- initally presented to [facility] 7/12/22 w acute hypoxic respiratory failure  Pt w active OT eval and treat orders at this time  PMH includes  has a past medical history of Cardiomyopathy ([address] ), Chronic combined systolic and diastolic congestive heart failure, COPD (chronic obstructive pulmonary disease) ([facility] [address] ), Fatty liver, Hyperlipidemia, Hypertension, Mitral regurgitation, and Sepsis  Pt lives alone in a [facility] with 6 ANATOLY, full flight to bed/ bath wgucg includes tub shower and standard toilet  Pta, pt was independent w/ ADL/IADL and functional mobility, was driving and was not using any DME at baseline, though she does have a SPC if needed  Currently, pt is S for UB ADL, Min A for LB ADL and completed transfers/FM with CGA w/ HHA  Very limited 2' fatigue and SOB this date, anticipating as pts SOB resolves she will tolerate inc activity and her overall fxnl status will improve  Pt is limited at this time 2* decreased endurance/activtiy tolerance, decreased ADL/High-level ADL status, decreased self-care trans, and is a fall risk  This impacts pt's ability to complete UB and LB dressing and bathing, toileting, transfers, functional mobility, community mobility, home and health maintenance, and safe engagement in typical daily routine  The patient's raw score on the AM-PAC Daily Activity inpatient short form is 19, standardized score is 40 22, greater than 39 4   Patients at this level are likely to benefit from discharge to home  Please refer to the recommendation of the Occupational Therapist for safe discharge planning  From OT standpoint, pt should D/C to home w home OT when medically stable  Pt will benefit from continued acute OT services 3-5x/wk for 10-14 days to meet goals  Goals   Patient Goals to sit in the chair   LTG Time Frame 10-14   Long Term Goal #1 see below   Plan   Treatment Interventions ADL retraining;Functional transfer training; Endurance training;Patient/family training; Activityengagement; Energy conservation; Compensatory technique education;Equipment evaluation/education   Goal Expiration Date 08/06/22   OT Frequency 3-5x/wk   Recommendation   OT Discharge Recommendation Home OT   AM-PAC Daily Activity Inpatient   Lower Body Dressing 2   Bathing 3   Toileting 3   Upper Body Dressing 3   Grooming 4   Eating 4   Daily Activity Raw Score 19   Daily Activity Standardized Score (Calc for Raw Score >=11) 40 22   AM-PAC Applied Cognition Inpatient   Following a Speech/Presentation 4   Understanding Ordinary Conversation 4   Taking Medications 4   Remembering Where Things Are Placed or Put Away 4   Remembering List of 4-5 Errands 4   Taking Care of Complicated Tasks 4   Applied Cognition Raw Score 24   Applied Cognition Standardized Score 62 21     Pt will complete functional mobility with Mod I using appropriate DME as needed  Pt will complete UB dressing and bathing with Mod I using appropriate DME as needed  Pt will complete LB dressing and bathing with Mod I using appropriate DME as needed  Pt will complete transfers with Mod I using appropriate DME as needed  Pt will complete toileting with Mod I using appropriate DME as needed  Pt will complete home maintenance task with Mod I using appropriate DME as needed  Pt will utilize energy conservation techniques throughout functional activity/ADL s/p skilled education       Pt will demonstrate increased safety awareness during functional tasks/ADL's s/p skilled education  Pt will increase activity tolerance to 30 minutes in order to complete ADL's/ functional tasks, using appropriate DME as needed         Vito Schwarz, MARBELLA, OTR/L

## 2022-07-23 NOTE — ASSESSMENT & PLAN NOTE
· Admitted for COPD vs CHF exacarbation  · Treated with IV steroids, nebulizer therapy  · Only uses PRN albuterol as an outpatient  · Not currently in COPD exacerbation  · Continue Xopenex/atrovent  · Respiratory protocol

## 2022-07-23 NOTE — ASSESSMENT & PLAN NOTE
Wt Readings from Last 3 Encounters:   07/22/22 79 1 kg (174 lb 6 1 oz)   07/22/22 77 9 kg (171 lb 11 8 oz)   06/28/22 77 4 kg (170 lb 9 6 oz)     · Patient with prior HFrEF with recovery but now back down to EF 25%  · 3/2021 EF 18%, diffuse hypokinesis  · 3/2021 Cardiac cath: moderate non-obstructive disease  · 7/2021: EF 50%  · 6/2022: EF 33%, grade 1 diastolic dysfunction   · 0/22/74: EF 25%, moderate MR  · Transferred to [facility] for heart failure evaluation  · Previously had PA catheter at [facility] with normal CI and elevated PA pressures in the 40s/20s  · Heart failure following, appreciate their recommendations  · Isordil 10 mg q8h  · Hydralazine 10 mg q8h  · Coreg 12 5 mg BID  · Uptitrate afterload reducing medications as BP tolerates   · Consider limited echo after addition of afterload reducing agents

## 2022-07-23 NOTE — PLAN OF CARE
Problem: OCCUPATIONAL THERAPY ADULT  Goal: Performs self-care activities at highest level of function for planned discharge setting  See evaluation for individualized goals  Description: Treatment Interventions: ADL retraining, Functional transfer training, Endurance training, Patient/family training, Activityengagement, Energy conservation, Compensatory technique education, Equipment evaluation/education          See flowsheet documentation for full assessment, interventions and recommendations  Note: Limitation: Decreased ADL status, Decreased endurance, Decreased self-care trans, Decreased high-level ADLs  Prognosis: Good  Assessment: Pt is a 64 y o  female admitted 7/22/22 for heart failure evaluation- initally presented to [facility] 7/12/22 w acute hypoxic respiratory failure  Pt w active OT eval and treat orders at this time  PMH includes  has a past medical history of Cardiomyopathy ([facility] [address] ), Chronic combined systolic and diastolic congestive heart failure, COPD (chronic obstructive pulmonary disease) ([facility] [address] ), Fatty liver, Hyperlipidemia, Hypertension, Mitral regurgitation, and Sepsis  Pt lives alone in a [facility] with 6 ANATOLY, full flight to bed/ bath wgucg includes tub shower and standard toilet  Pta, pt was independent w/ ADL/IADL and functional mobility, was driving and was not using any DME at baseline, though she does have a SPC if needed  Currently, pt is S for UB ADL, Min A for LB ADL and completed transfers/FM with CGA w/ HHA  Very limited 2' fatigue and SOB this date, anticipating as pts SOB resolves she will tolerate inc activity and her overall fxnl status will improve  Pt is limited at this time 2* decreased endurance/activtiy tolerance, decreased ADL/High-level ADL status, decreased self-care trans, and is a fall risk   This impacts pt's ability to complete UB and LB dressing and bathing, toileting, transfers, functional mobility, community mobility, home and health maintenance, and safe engagement in typical daily routine  The patient's raw score on the AM-PAC Daily Activity inpatient short form is 19, standardized score is 40 22, greater than 39 4  Patients at this level are likely to benefit from discharge to home  Please refer to the recommendation of the Occupational Therapist for safe discharge planning  From OT standpoint, pt should D/C to home w home OT when medically stable  Pt will benefit from continued acute OT services 3-5x/wk for 10-14 days to meet goals       OT Discharge Recommendation: Post acute rehabilitation services

## 2022-07-23 NOTE — ASSESSMENT & PLAN NOTE
· Patient discharged with home O2 but has not required  · Admitted 7/12 to [facility] with respiratory failure and volume overload requiring BiPAP  · Had been weaned to [facility] but then required HFNC today for increased WOB  · Upon transfer, transitioned to 15L [address] [facility] and now on 8L  · Continue volume removal with IHD - discuss additional session over the weekend

## 2022-07-23 NOTE — UTILIZATION REVIEW
Notification of Inpatient Admission/Inpatient Authorization Request  This is a Notification of Inpatient Admission/Request for Inpatient Authorization for our facility [facility]  Be advised that this patient was admitted to our facility under Inpatient Status  Please contact the Utilization Review Department where the patient is receiving care services for additional admission information  [facility]ility: [facility] ([address] [place] [address])  Place of Service Code: 21   Place of Service Name: [address]  Presentation Date & Time: 7/22/2022  9:51 PM  Inpatient Admission Date & Time: 7/22/22  9:51 PM  Discharge Date & Time: No discharge date for patient encounter  Discharge Disposition (if discharged): [facility]  Attending Physician and NPI#: Bret De La Paz [[id number]]  Admission Orders (From admission, onward)     Ordered        07/22/22 2204  Inpatient Admission  Once                      Network Utilization Review Department  Phone: [phone]; Fax [phone]  [email or web address]  ATTENTION: Please call with any questions or concerns to [phone]  and carefully listen to the prompts so that you are directed to the right person  Send all requests for admission clinical reviews, approved or denied determinations and any other requests to fax [phone]   All voicemails are confidential

## 2022-07-23 NOTE — PLAN OF CARE
Problem: Prexisting or High Potential for Compromised Skin Integrity  Goal: Skin integrity is maintained or improved  Description: INTERVENTIONS:  - Identify patients at risk for skin breakdown  - Assess and monitor skin integrity  - Assess and monitor nutrition and hydration status  - Monitor labs   - Assess for incontinence   - Turn and reposition patient  - Assist with mobility/ambulation  - Relieve pressure over bony prominences  - Avoid friction and shearing  - Provide appropriate hygiene as needed including keeping skin clean and dry  - Evaluate need for skin moisturizer/barrier cream  - Collaborate with interdisciplinary team   - Patient/family teaching  - Consider wound care consult   Outcome: Progressing     Problem: MOBILITY - ADULT  Goal: Maintain or return to baseline ADL function  Description: INTERVENTIONS:  -  Assess patient's ability to carry out ADLs; assess patient's baseline for ADL function and identify physical deficits which impact ability to perform ADLs (bathing, care of mouth/teeth, toileting, grooming, dressing, etc )  - Assess/evaluate cause of self-care deficits   - Assess range of motion  - Assess patient's mobility; develop plan if impaired  - Assess patient's need for assistive devices and provide as appropriate  - Encourage maximum independence but intervene and supervise when necessary  - Involve family in performance of ADLs  - Assess for home care needs following discharge   - Consider OT consult to assist with ADL evaluation and planning for discharge  - Provide patient education as appropriate  Outcome: Progressing  Goal: Maintains/Returns to pre admission functional level  Description: INTERVENTIONS:  - Perform BMAT or MOVE assessment daily    - Set and communicate daily mobility goal to care team and patient/family/caregiver  - Collaborate with rehabilitation services on mobility goals if consulted  - Perform Range of Motion  times a day    - Reposition patient every hours   - Dangle patient  times a day  - Stand patient  times a day  - Ambulate patient  times a day  - Out of bed to chair  times a day   - Out of bed for meals  times a day  - Out of bed for toileting  - Record patient progress and toleration of activity level   Outcome: Progressing     Problem: Potential for Falls  Goal: Patient will remain free of falls  Description: INTERVENTIONS:  - Educate patient/family on patient safety including physical limitations  - Instruct patient to call for assistance with activity   - Consult OT/PT to assist with strengthening/mobility   - Keep Call bell within reach  - Keep bed low and locked with side rails adjusted as appropriate  - Keep care items and personal belongings within reach  - Initiate and maintain comfort rounds  - Make Fall Risk Sign visible to staff  - Offer Toileting every Hours, in advance of need  - Initiate/Maintain alarm  - Obtain necessary fall risk management equipment:   - Apply yellow socks and bracelet for high fall risk patients  - Consider moving patient to room near nurses station  Outcome: Progressing

## 2022-07-23 NOTE — UTILIZATION REVIEW
Initial Clinical Review    Admission: Date/Time/Statement:   Admission Orders (From admission, onward)     Ordered        07/22/22 2204  Inpatient Admission  Once                      Orders Placed This Encounter   Procedures    Inpatient Admission     Standing Status:   Standing     Number of Occurrences:   1     Order Specific Question:   Level of Care     Answer:   Level 1 Stepdown [13]     Order Specific Question:   Estimated length of stay     Answer:   More than 2 Midnights     Order Specific Question:   Certification     Answer:   I certify that inpatient services are medically necessary for this patient for a duration of greater than two midnights  See H&P and MD Progress Notes for additional information about the patient's course of treatment  Initial Presentation: 64 y o  female with PMH of CHF, COPD, HTN, anxiety, CKD who presented to [address] [place] [address] ED on 7/12 with acute hypoxic respiratory failure  She was initially admitted to the floor but had increased WOB requiring BiPAP while in the ED  She was admitted to the ICU for further management  She was treated for a COPD exacerbation with IV steroids, azithromycin and nebulizer therapy  Echocardiogram was performed which revealed an EF of 25%  Antihypertensives were initiated  She also had an SHANTA and developed anuric renal failure  Diuretics were trialed but she ultimately required IHD via a RIJ temporary HD catheter  Patient required BiPAP earlier in her hospitalization but was able to be transitioned to [address] [facility]  Today, patient developed increased WOB and was placed on HFNC  She was transferred to [facility] for heart failure evaluation  Upon arrival, patient was transitioned to [address] [facility] and has maintained appropriate oxygen saturations and work of breathing  Admit Inpatient Level 1 stepdown unit dt CHF, resp failure w/ hypoxemia:   Cardiology consult    Check VBG and lactic acid, consider echo, continue volume removal w/ IHD w/ possible additional session over weekend, IR consult on Monday for permacath, nephrology following  Continue breathing txs, continue home meds  7/22 Cardiology Consult:  Cardiomyopathy possibly worsened in setting of recent sepsis and COPD exacerbation requiring IV fluids, prednisone, poorly controlled hypertension and the interruption in diuretic use  Pt is hypervolemic, unfortunately with minimal urine output thus main fluid removal via ultrafiltration  Continue w/ IV Bumex  Add afterload reduction with hydralazine 10 mg q8h, d/c amlodipine, decrease Coreg to 12 5 mg BID, add isordil if BP tolerates  Repeat limited TTE in few days once blood pressure is better controlled  Date: 7/23   Day 2:   Patient endorses subjective SOB  Was placed on low FiO2 HFNC at [facility] for patient comfort  Weaned to 5L NC on arrival to [facility]  Weaned to 5L NC upon arrival  Hydralazine and isordil initiated for afterload reduction  Pt states of anxiety, increase clonopin to 1 mg and seroquel increase if needed  PT/OT eval   Continue Hydralazine and isordil added for afterload reduction, uptitrate as able  Continue diuresis and continue iHD to optimize volume status    QHS and PRN bipap for patient comfort       Triage Vitals   Temperature Pulse Respirations Blood Pressure SpO2   07/23/22 0131 07/22/22 2300 07/22/22 2300 07/22/22 2300 07/22/22 2300   97 9 °F (36 6 °C) 82 18 (!) 177/101 93 %      Temp Source Heart Rate Source Patient Position - Orthostatic VS BP Location FiO2 (%)   07/23/22 0131 07/22/22 2300 07/23/22 0131 07/22/22 2300 --   Oral Monitor Lying Right arm       Pain Score       07/22/22 2300       No Pain          Wt Readings from Last 1 Encounters:   07/23/22 73 9 kg (162 lb 14 7 oz)     Additional Vital Signs:   Date/Time Temp Pulse Resp BP SpO2 Calculated FIO2 (%) - Nasal Cannula Nasal Cannula O2 Flow Rate (L/min) O2 Device Patient Position - Orthostatic VS   07/23/22 1300 -- -- -- 179/87 Abnormal  -- -- -- -- --   07/23/22 10:45:31 97 9 °F (36 6 °C) -- 18 -- 95 % -- -- -- --   07/23/22 0842 -- -- -- -- 95 % -- -- -- --   07/23/22 06:51:35 97 5 °F (36 4 °C) 89 -- 189/90 Abnormal  -- -- -- -- --   07/23/22 0400 -- -- -- -- -- 40 5 L/min Nasal cannula --   07/23/22 0318 98 7 °F (37 1 °C) 88 20 162/77 94 % -- -- Nasal cannula Lying   07/23/22 0131 97 9 °F (36 6 °C) 84 20 177/94 Abnormal  94 % -- -- Nasal cannula Lying   07/22/22 2300 -- 82 18 177/101 Abnormal  93 % 40 5 L/min -- --       Pertinent Labs/Diagnostic Test Results:   No orders to display         Results from last 7 days   Lab Units 07/23/22  0512 07/22/22  0420 07/21/22  0609 07/19/22  0353 07/18/22  0601   WBC Thousand/uL 12 34* 12 06* 10 74* 12 78* 10 82*   HEMOGLOBIN g/dL 11 2* 10 8* 10 8* 11 4* 10 1*   HEMATOCRIT % 33 1* 33 4* 33 3* 33 7* 30 4*   PLATELETS Thousands/uL 285 294 262 260 240   NEUTROS ABS Thousands/µL  --  7 90* 7 33  --  7 83*     Results from last 7 days   Lab Units 07/23/22  0512 07/22/22  2314 07/22/22  0420 07/21/22  0609 07/19/22  0352 07/17/22  0544   SODIUM mmol/L 130* 128* 126* 127* 127* 131*   POTASSIUM mmol/L 4 2 4 1 5 1 4 8 4 4 4 6   CHLORIDE mmol/L 90* 90* 90* 92* 92* 91*   CO2 mmol/L 27 30 19* 23 22 26   ANION GAP mmol/L 13 8 17* 12 13 14*   BUN mg/dL 65* 61* 91* 56* 47* 85*   CREATININE mg/dL 6 15* 5 42* 8 02* 5 81* 5 04* 6 92*   EGFR ml/min/1 73sq m 6 7 4 7 8 5   CALCIUM mg/dL 9 0 8 8 9 1 8 9 9 2 8 5   MAGNESIUM mg/dL 2 3 2 0  --   --  2 2 2 4   PHOSPHORUS mg/dL 5 7* 5 0*  --   --  6 1* 9 3*     Results from last 7 days   Lab Units 07/23/22  0512 07/22/22  2314 07/18/22  0601 07/17/22  0544   AST U/L 15 11 9* 5*   ALT U/L 18 17 5* 4*   ALK PHOS U/L 92 95 68 65   TOTAL PROTEIN g/dL 7 7 7 3 6 4 6 4   ALBUMIN g/dL 3 4* 3 3* 3 4* 3 4*   TOTAL BILIRUBIN mg/dL 0 83 0 62 0 48 0 49     Results from last 7 days   Lab Units 07/23/22  0512 07/22/22 2314 07/22/22  0420 07/21/22  0609 07/19/22  0352 07/18/22  0601 07/17/22  0544   GLUCOSE RANDOM mg/dL 101 102 116 122 135 115 139      Results from last 7 days   Lab Units 07/22/22  1033   PH ART  7 388   PCO2 ART mm Hg 43 8   PO2 ART mm Hg 136 5*   HCO3 ART mmol/L 25 8   BASE EXC ART mmol/L 0 6   O2 CONTENT ART mL/dL 16 5   O2 HGB, ARTERIAL % 97 9*   ABG SOURCE  Radial, Right   NON VENT TYPE HFNC  HFNC Flow   HFNC FLOW LPM  45     Results from last 7 days   Lab Units 07/22/22  2314   PH PANDA  7 403*   PCO2 PANDA mm Hg 44 1   PO2 PANDA mm Hg 32 3*   HCO3 PANDA mmol/L 26 9   BASE EXC PANDA mmol/L 1 8   O2 CONTENT PANDA ml/dL 9 3   O2 HGB, VENOUS % 58 7*     Results from last 7 days   Lab Units 07/20/22  0524 07/19/22  0352 07/18/22  0601 07/17/22  0544   PROCALCITONIN ng/ml 1 75* 2 58* 2 97* 4 20*     Results from last 7 days   Lab Units 07/22/22  2314   LACTIC ACID mmol/L 1 0     Results from last 7 days   Lab Units 07/21/22  0609   NT-PRO BNP pg/mL >175,000*       Past Medical History:   Diagnosis Date    Cardiomyopathy ([facility] [address] )     Chronic combined systolic and diastolic congestive heart failure     COPD (chronic obstructive pulmonary disease) (HCC)     Fatty liver     Hyperlipidemia     Hypertension     Mitral regurgitation     Sepsis      Present on Admission:   Acute on chronic respiratory failure with hypoxemia (HCC)   Acute on chronic systolic congestive heart failure (HCC)   Anxiety   Tobacco abuse      Admitting Diagnosis: Acute on chronic respiratory failure with hypoxemia (HCC)  Age/Sex: 64 y o  female  Admission Orders:  Scheduled Medications:  calcium acetate, 1,334 mg, Oral, TID With Meals  carvedilol, 12 5 mg, Oral, BID With Meals  docusate sodium, 100 mg, Oral, BID  guaiFENesin, 600 mg, Oral, Q12H JULIÁN  heparin (porcine), 5,000 Units, Subcutaneous, Q8H JULIÁN  hydrALAZINE, 25 mg, Oral, Q8H JULIÁN  ipratropium, 0 5 mg, Nebulization, TID  isosorbide dinitrate, 10 mg, Oral, Q8H JULIÁN  levalbuterol, 1 25 mg, Nebulization, TID  QUEtiapine, 25 mg, Oral, HS  senna, 1 tablet, Oral, HS      Continuous IV Infusions: none     PRN Meds:  acetaminophen, 650 mg, Oral, Q6H PRN  albuterol, 2 puff, Inhalation, Q4H PRN  clonazePAM, 1 mg, Oral, BID PRN  hydrALAZINE, 10 mg, Intravenous, Q6H PRN  oxyCODONE, 2 5 mg, Oral, Q4H PRN  oxyCODONE, 5 mg, Oral, Q4H PRN x1 thus far  polyethylene glycol, 17 g, Oral, Daily PRN    scd  Cardiac diet w/ fluid restriction    IP CONSULT TO CASE MANAGEMENT  IP CONSULT TO HEART FAILURE SERVICE  IP CONSULT TO NEPHROLOGY    Network Utilization Review Department  ATTENTION: Please call with any questions or concerns to [phone] and carefully listen to the prompts so that you are directed to the right person  All voicemails are confidential   Ty Limb all requests for admission clinical reviews, approved or denied determinations and any other requests to dedicated fax number below belonging to the campus where the patient is receiving treatment   List of dedicated fax numbers for the Facilities:  [address] DENIALS (Administrative/Medical Necessity) [phone]   [address] (Maternity/NICU/Pediatrics) [phone]   [address] [place] [address]  [address] [facility] [place] [address] [address] [place] [address] [address] [facility] [place] [address] [phone]

## 2022-07-23 NOTE — ASSESSMENT & PLAN NOTE
Wt Readings from Last 3 Encounters:   07/23/22 73 9 kg (162 lb 14 7 oz)   07/22/22 77 9 kg (171 lb 11 8 oz)   06/28/22 77 4 kg (170 lb 9 6 oz)     · Does not appear volume overloaded, patient anuric, volume management by dialysis  · Low-salt diet with 1500 mL fluid restriction  · Daily weights and I&Os  · Cardiology recommendations appreciated-patient to be transferred to [facility] for heart failure team

## 2022-07-23 NOTE — ASSESSMENT & PLAN NOTE
Lab Results   Component Value Date    EGFR 7 07/22/2022    EGFR 4 07/22/2022    EGFR 7 07/21/2022    CREATININE 5 42 (H) 07/22/2022    CREATININE 8 02 (H) 07/22/2022    CREATININE 5 81 (H) 07/21/2022     · Likely cardiorenal in the setting of HFrEF  · Baseline Cr 1-1 1  · Anuric renal failure requiring IHD now on MWF schedule   · RIJ temporary HD catheter in place since 7/15  · IR consult Monday for permcath  · Nephrology to continue to follow  · Trial diuresis with bumex 4 mg IV

## 2022-07-24 ENCOUNTER — APPOINTMENT (INPATIENT)
Dept: NON INVASIVE DIAGNOSTICS | Facility: HOSPITAL | Age: 62
DRG: 252 | End: 2022-07-24
Payer: MEDICARE

## 2022-07-24 LAB
ANION GAP SERPL CALCULATED.3IONS-SCNC: 8 MMOL/L (ref 4–13)
BUN SERPL-MCNC: 42 MG/DL (ref 5–25)
CALCIUM SERPL-MCNC: 9.2 MG/DL (ref 8.3–10.1)
CHLORIDE SERPL-SCNC: 95 MMOL/L (ref 96–108)
CO2 SERPL-SCNC: 26 MMOL/L (ref 21–32)
CREAT SERPL-MCNC: 5.17 MG/DL (ref 0.6–1.3)
ERYTHROCYTE [DISTWIDTH] IN BLOOD BY AUTOMATED COUNT: 14.1 % (ref 11.6–15.1)
GFR SERPL CREATININE-BSD FRML MDRD: 8 ML/MIN/1.73SQ M
GLUCOSE SERPL-MCNC: 116 MG/DL (ref 65–140)
HCT VFR BLD AUTO: 33.3 % (ref 34.8–46.1)
HGB BLD-MCNC: 10.8 G/DL (ref 11.5–15.4)
MCH RBC QN AUTO: 29.1 PG (ref 26.8–34.3)
MCHC RBC AUTO-ENTMCNC: 32.4 G/DL (ref 31.4–37.4)
MCV RBC AUTO: 90 FL (ref 82–98)
PLATELET # BLD AUTO: 261 THOUSANDS/UL (ref 149–390)
PMV BLD AUTO: 9.6 FL (ref 8.9–12.7)
POTASSIUM SERPL-SCNC: 4.8 MMOL/L (ref 3.5–5.3)
RBC # BLD AUTO: 3.71 MILLION/UL (ref 3.81–5.12)
SODIUM SERPL-SCNC: 129 MMOL/L (ref 135–147)
WBC # BLD AUTO: 12.92 THOUSAND/UL (ref 4.31–10.16)

## 2022-07-24 PROCEDURE — 99222 1ST HOSP IP/OBS MODERATE 55: CPT | Performed by: SURGERY

## 2022-07-24 PROCEDURE — 82384 ASSAY THREE CATECHOLAMINES: CPT | Performed by: INTERNAL MEDICINE

## 2022-07-24 PROCEDURE — 99232 SBSQ HOSP IP/OBS MODERATE 35: CPT | Performed by: INTERNAL MEDICINE

## 2022-07-24 PROCEDURE — 94660 CPAP INITIATION&MGMT: CPT

## 2022-07-24 PROCEDURE — 82088 ASSAY OF ALDOSTERONE: CPT | Performed by: INTERNAL MEDICINE

## 2022-07-24 PROCEDURE — 94760 N-INVAS EAR/PLS OXIMETRY 1: CPT

## 2022-07-24 PROCEDURE — 93975 VASCULAR STUDY: CPT | Performed by: SURGERY

## 2022-07-24 PROCEDURE — 93975 VASCULAR STUDY: CPT

## 2022-07-24 PROCEDURE — 84244 ASSAY OF RENIN: CPT | Performed by: INTERNAL MEDICINE

## 2022-07-24 PROCEDURE — 80048 BASIC METABOLIC PNL TOTAL CA: CPT | Performed by: PHYSICIAN ASSISTANT

## 2022-07-24 PROCEDURE — 85027 COMPLETE CBC AUTOMATED: CPT | Performed by: PHYSICIAN ASSISTANT

## 2022-07-24 PROCEDURE — 94640 AIRWAY INHALATION TREATMENT: CPT

## 2022-07-24 RX ORDER — DIAPER,BRIEF,INFANT-TODD,DISP
EACH MISCELLANEOUS 4 TIMES DAILY PRN
Status: DISCONTINUED | OUTPATIENT
Start: 2022-07-24 | End: 2022-08-03 | Stop reason: HOSPADM

## 2022-07-24 RX ORDER — DIPHENHYDRAMINE HCL 25 MG
25 TABLET ORAL EVERY 6 HOURS PRN
Status: DISCONTINUED | OUTPATIENT
Start: 2022-07-24 | End: 2022-08-03 | Stop reason: HOSPADM

## 2022-07-24 RX ORDER — TRAZODONE HYDROCHLORIDE 50 MG/1
50 TABLET ORAL
Status: DISCONTINUED | OUTPATIENT
Start: 2022-07-24 | End: 2022-08-03 | Stop reason: HOSPADM

## 2022-07-24 RX ORDER — ISOSORBIDE DINITRATE 30 MG/1
30 TABLET ORAL EVERY 8 HOURS SCHEDULED
Status: DISCONTINUED | OUTPATIENT
Start: 2022-07-24 | End: 2022-07-30

## 2022-07-24 RX ORDER — CARVEDILOL 25 MG/1
25 TABLET ORAL 2 TIMES DAILY WITH MEALS
Status: DISCONTINUED | OUTPATIENT
Start: 2022-07-24 | End: 2022-07-27

## 2022-07-24 RX ADMIN — HYDRALAZINE HYDROCHLORIDE 50 MG: 50 TABLET, FILM COATED ORAL at 06:22

## 2022-07-24 RX ADMIN — HEPARIN SODIUM 5000 UNITS: 5000 INJECTION INTRAVENOUS; SUBCUTANEOUS at 21:05

## 2022-07-24 RX ADMIN — HEPARIN SODIUM 5000 UNITS: 5000 INJECTION INTRAVENOUS; SUBCUTANEOUS at 13:09

## 2022-07-24 RX ADMIN — IPRATROPIUM BROMIDE 0.5 MG: 0.5 SOLUTION RESPIRATORY (INHALATION) at 20:00

## 2022-07-24 RX ADMIN — GUAIFENESIN 600 MG: 600 TABLET, EXTENDED RELEASE ORAL at 21:04

## 2022-07-24 RX ADMIN — OXYCODONE HYDROCHLORIDE 5 MG: 5 TABLET ORAL at 02:09

## 2022-07-24 RX ADMIN — ISOSORBIDE DINITRATE 20 MG: 20 TABLET ORAL at 13:09

## 2022-07-24 RX ADMIN — LEVALBUTEROL HYDROCHLORIDE 1.25 MG: 1.25 SOLUTION, CONCENTRATE RESPIRATORY (INHALATION) at 13:43

## 2022-07-24 RX ADMIN — HYDRALAZINE HYDROCHLORIDE 50 MG: 50 TABLET, FILM COATED ORAL at 21:04

## 2022-07-24 RX ADMIN — ALBUTEROL SULFATE 2 PUFF: 90 AEROSOL, METERED RESPIRATORY (INHALATION) at 01:08

## 2022-07-24 RX ADMIN — CLONAZEPAM 1 MG: 1 TABLET ORAL at 13:09

## 2022-07-24 RX ADMIN — CARVEDILOL 12.5 MG: 12.5 TABLET, FILM COATED ORAL at 06:41

## 2022-07-24 RX ADMIN — DIPHENHYDRAMINE HCL 25 MG: 25 TABLET ORAL at 23:29

## 2022-07-24 RX ADMIN — HEPARIN SODIUM 5000 UNITS: 5000 INJECTION INTRAVENOUS; SUBCUTANEOUS at 06:22

## 2022-07-24 RX ADMIN — HYDRALAZINE HYDROCHLORIDE 10 MG: 20 INJECTION, SOLUTION INTRAMUSCULAR; INTRAVENOUS at 02:21

## 2022-07-24 RX ADMIN — IPRATROPIUM BROMIDE 0.5 MG: 0.5 SOLUTION RESPIRATORY (INHALATION) at 07:59

## 2022-07-24 RX ADMIN — CALCIUM ACETATE 1334 MG: 667 CAPSULE ORAL at 16:35

## 2022-07-24 RX ADMIN — ISOSORBIDE DINITRATE 30 MG: 30 TABLET ORAL at 21:05

## 2022-07-24 RX ADMIN — CLONAZEPAM 1 MG: 1 TABLET ORAL at 02:09

## 2022-07-24 RX ADMIN — OXYCODONE HYDROCHLORIDE 5 MG: 5 TABLET ORAL at 21:08

## 2022-07-24 RX ADMIN — ISOSORBIDE DINITRATE 20 MG: 20 TABLET ORAL at 06:23

## 2022-07-24 RX ADMIN — DOCUSATE SODIUM 100 MG: 100 CAPSULE, LIQUID FILLED ORAL at 09:07

## 2022-07-24 RX ADMIN — QUETIAPINE FUMARATE 25 MG: 25 TABLET ORAL at 21:04

## 2022-07-24 RX ADMIN — CALCIUM ACETATE 1334 MG: 667 CAPSULE ORAL at 06:41

## 2022-07-24 RX ADMIN — LEVALBUTEROL HYDROCHLORIDE 1.25 MG: 1.25 SOLUTION, CONCENTRATE RESPIRATORY (INHALATION) at 07:58

## 2022-07-24 RX ADMIN — CALCIUM ACETATE 1334 MG: 667 CAPSULE ORAL at 12:06

## 2022-07-24 RX ADMIN — LEVALBUTEROL HYDROCHLORIDE 1.25 MG: 1.25 SOLUTION, CONCENTRATE RESPIRATORY (INHALATION) at 20:00

## 2022-07-24 RX ADMIN — GUAIFENESIN 600 MG: 600 TABLET, EXTENDED RELEASE ORAL at 09:07

## 2022-07-24 RX ADMIN — CLONAZEPAM 1 MG: 1 TABLET ORAL at 21:09

## 2022-07-24 RX ADMIN — HYDRALAZINE HYDROCHLORIDE 50 MG: 50 TABLET, FILM COATED ORAL at 13:09

## 2022-07-24 RX ADMIN — IPRATROPIUM BROMIDE 0.5 MG: 0.5 SOLUTION RESPIRATORY (INHALATION) at 13:43

## 2022-07-24 RX ADMIN — CARVEDILOL 25 MG: 25 TABLET, FILM COATED ORAL at 16:35

## 2022-07-24 NOTE — CONSULTS
Consultation - Vascular Surgery   Salvatore Webber 64 y o  female MRN: [id number]  Unit/Bed#: [facility] 501-01 Encounter: [id number]      Assessment/Plan      Assessment:  65 y/o F w h/o COPD, CHF, refractory HTN, SHANTA on CKD now w volume overload requiring HD since 7/15  Vascular surgery consulted for renal artery stenosis and recs for possible L Renal artery stent  Vss  Afebrile  B/l palpable femoral pulses  B/l palpable dp pulses  No pedal edema  Plan:  Recommend adding asa, and statin  CT imaging and US reviewed  Will discuss utility of possible L renal artery stent for L kidney salvage  Continue strict I/Os  Continue HD for volume overload, appreciate nephrology recs  Continue BP control w coreg, hydralazine, isordil  Appreciate consult    History of Present Illness   Physician Requesting Consult: Vy Santamaria MD  Reason for Consult / Principal Problem: refractory htn, renal artery stenosis now requiring HD  History, ROS and PFSH unobtainable from any source due to none  HPI: Salvatore Webber is a 64y o  year old female w PMH of COPD, nicotine dependence (80 pack year), CHF (EF 25%), HTN, HLD, who presents with volume overload, worsening heart failure and new requirement of HD  Pt reports that she has been in hospital 2 weeks  She came in the week of July 4th with shortness of breath, heart failure, and COPD exacerbation  Also SHANTA on CKD 3 and new requirement of HD since 7/15  Per pt she has been in and out of the hospital since February for shortness of breath and HTN  BP currently controlled with coreg, hydralazine  She has a recent renal ultrasound showing R renal artery occlusion and R renal atrophy, and L renal artery 60% stenosis  High degree of b/l renal artery calcifications seen on CTA PE study on 6/15/22  She is currently on 5 L midflow  Denied any current chest pain or shortness of breath  Denied fever or chills       Inpatient consult to Vascular Surgery  Consult performed by: Brisa Guardado Andrez Fairchild MD  Consult ordered by: Syeda Abrams MD          Review of Systems   Constitutional: Negative for chills and fever  HENT: Negative  Eyes: Negative  Respiratory: Negative  Cardiovascular: Negative  Gastrointestinal: Negative  Endocrine: Negative  Genitourinary: Negative  Musculoskeletal: Negative  Skin: Negative  Allergic/Immunologic: Negative  Neurological: Negative  Hematological: Negative  Psychiatric/Behavioral: Negative  Historical Information   Past Medical History:   Diagnosis Date    Cardiomyopathy ([address] )     Chronic combined systolic and diastolic congestive heart failure     COPD (chronic obstructive pulmonary disease) (HCC)     Fatty liver     Hyperlipidemia     Hypertension     Mitral regurgitation     Sepsis      Past Surgical History:   Procedure Laterality Date    CARDIAC CATHETERIZATION  2021    Moderate non-obstructive atherosclerosis     SECTION      CHOLECYSTECTOMY      ROTATOR CUFF REPAIR W/ DISTAL CLAVICLE EXCISION Right     TONSILLECTOMY       Social History   Social History     Substance and Sexual Activity   Alcohol Use Never     Social History     Substance and Sexual Activity   Drug Use No     E-Cigarette/Vaping    E-Cigarette Use Never User     Comments Pt states she want to quit smoking      E-Cigarette/Vaping Substances    Nicotine Yes     THC No     CBD No     Flavoring No     Other No     Unknown No      Social History     Tobacco Use   Smoking Status Current Some Day Smoker    Packs/day: 2 00    Types: Cigarettes   Smokeless Tobacco Never Used     Family History: non-contributory}    Meds/Allergies   all current active meds have been reviewed  Allergies   Allergen Reactions    Wellbutrin [Bupropion] Arthralgia       Objective   Vitals: Blood pressure 162/78, pulse 91, temperature 97 9 °F (36 6 °C), resp   rate 13, height 5' 10" (1 778 m), weight 73 9 kg (162 lb 14 7 oz), SpO2 94 % ,Body mass index is 23 38 kg/m²  Intake/Output Summary (Last 24 hours) at 7/24/2022 1038  Last data filed at 7/24/2022 0901  Gross per 24 hour   Intake 1680 ml   Output 3501 ml   Net -1821 ml     Invasive Devices  Report    Peripheral Intravenous Line  Duration           Peripheral IV 07/22/22 Proximal;Right;Ventral (anterior) Forearm 2 days          Hemodialysis Catheter  Duration           HD Temporary Double Catheter 9 days                Physical Exam  Vitals reviewed  Constitutional:       General: She is not in acute distress  Appearance: Normal appearance  HENT:      Head: Normocephalic and atraumatic  Nose: Nose normal       Mouth/Throat:      Mouth: Mucous membranes are moist    Eyes:      Pupils: Pupils are equal, round, and reactive to light  Cardiovascular:      Rate and Rhythm: Normal rate  Pulses: Normal pulses  Pulmonary:      Effort: Pulmonary effort is normal    Abdominal:      General: There is no distension  Palpations: Abdomen is soft  Tenderness: There is no abdominal tenderness  Genitourinary:     Comments: deferred  Musculoskeletal:         General: No swelling  Normal range of motion  Cervical back: Normal range of motion and neck supple  Skin:     General: Skin is warm  Capillary Refill: Capillary refill takes 2 to 3 seconds  Neurological:      General: No focal deficit present  Mental Status: She is alert  Psychiatric:         Mood and Affect: Mood normal          Lab Results:   I have personally reviewed pertinent reports    , Coags: No results found for: PT, PTT, INR, Creatinine:   Lab Results   Component Value Date    CREATININE 5 17 (H) 07/24/2022   , Lipid Panel: No results found for: CHOL, CBC with diff:   Lab Results   Component Value Date    WBC 12 92 (H) 07/24/2022    HGB 10 8 (L) 07/24/2022    HCT 33 3 (L) 07/24/2022    MCV 90 07/24/2022     07/24/2022    MCH 29 1 07/24/2022    MCHC 32 4 07/24/2022    RDW 14 1 07/24/2022    MPV 9 6 07/24/2022   , BMP/CMP:   Lab Results   Component Value Date    SODIUM 129 (L) 07/24/2022    K 4 8 07/24/2022    CL 95 (L) 07/24/2022    CO2 26 07/24/2022    BUN 42 (H) 07/24/2022    CREATININE 5 17 (H) 07/24/2022    CALCIUM 9 2 07/24/2022    EGFR 8 07/24/2022   , Coags: No results found for: PT, PTT, INR  Imaging Studies: I have personally reviewed pertinent reports  EKG, Pathology, and Other Studies: I have personally reviewed pertinent reports  VTE Prophylaxis: Sequential compression device Camila Rojas)      Code Status: Level 1 - Full Code  Advance Directive and Living Will:      Power of :    POLST:      Counseling / Coordination of Care  Counseling/Coordination of Care: Total floor / unit time spent today 30 minutes  Greater than 50% of total time was spent with the patient and / or family counseling and / or coordination of care   A description of the counseling / coordination of care: 30

## 2022-07-24 NOTE — PLAN OF CARE
Problem: Prexisting or High Potential for Compromised Skin Integrity  Goal: Skin integrity is maintained or improved  Description: INTERVENTIONS:  - Identify patients at risk for skin breakdown  - Assess and monitor skin integrity  - Assess and monitor nutrition and hydration status  - Monitor labs   - Assess for incontinence   - Turn and reposition patient  - Assist with mobility/ambulation  - Relieve pressure over bony prominences  - Avoid friction and shearing  - Provide appropriate hygiene as needed including keeping skin clean and dry  - Evaluate need for skin moisturizer/barrier cream  - Collaborate with interdisciplinary team   - Patient/family teaching  - Consider wound care consult   Outcome: Progressing     Problem: MOBILITY - ADULT  Goal: Maintain or return to baseline ADL function  Description: INTERVENTIONS:  -  Assess patient's ability to carry out ADLs; assess patient's baseline for ADL function and identify physical deficits which impact ability to perform ADLs (bathing, care of mouth/teeth, toileting, grooming, dressing, etc )  - Assess/evaluate cause of self-care deficits   - Assess range of motion  - Assess patient's mobility; develop plan if impaired  - Assess patient's need for assistive devices and provide as appropriate  - Encourage maximum independence but intervene and supervise when necessary  - Involve family in performance of ADLs  - Assess for home care needs following discharge   - Consider OT consult to assist with ADL evaluation and planning for discharge  - Provide patient education as appropriate  Outcome: Progressing  Goal: Maintains/Returns to pre admission functional level  Description: INTERVENTIONS:  - Perform BMAT or MOVE assessment daily    - Set and communicate daily mobility goal to care team and patient/family/caregiver  - Collaborate with rehabilitation services on mobility goals if consulted  - Perform Range of Motion  times a day    - Reposition patient every hours   - Dangle patient  times a day  - Stand patient  times a day  - Ambulate patient  times a day  - Out of bed to chair  times a day   - Out of bed for meals  times a day  - Out of bed for toileting  - Record patient progress and toleration of activity level   Outcome: Progressing     Problem: Potential for Falls  Goal: Patient will remain free of falls  Description: INTERVENTIONS:  - Educate patient/family on patient safety including physical limitations  - Instruct patient to call for assistance with activity   - Consult OT/PT to assist with strengthening/mobility   - Keep Call bell within reach  - Keep bed low and locked with side rails adjusted as appropriate  - Keep care items and personal belongings within reach  - Initiate and maintain comfort rounds  - Make Fall Risk Sign visible to staff  - Offer Toileting every  Hours, in advance of need  - Initiate/Maintain alarm  - Obtain necessary fall risk management equipment:  - Apply yellow socks and bracelet for high fall risk patients  - Consider moving patient to room near nurses station  Outcome: Progressing     Problem: Nutrition/Hydration-ADULT  Goal: Nutrient/Hydration intake appropriate for improving, restoring or maintaining nutritional needs  Description: Monitor and assess patient's nutrition/hydration status for malnutrition  Collaborate with interdisciplinary team and initiate plan and interventions as ordered  Monitor patient's weight and dietary intake as ordered or per policy  Utilize nutrition screening tool and intervene as necessary  Determine patient's food preferences and provide high-protein, high-caloric foods as appropriate       INTERVENTIONS:  - Monitor oral intake, urinary output, labs, and treatment plans  - Assess nutrition and hydration status and recommend course of action  - Evaluate amount of meals eaten  - Assist patient with eating if necessary   - Allow adequate time for meals  - Recommend/ encourage appropriate diets, oral nutritional supplements, and vitamin/mineral supplements  - Order, calculate, and assess calorie counts as needed  - Recommend, monitor, and adjust tube feedings and TPN/PPN based on assessed needs  - Assess need for intravenous fluids  - Provide specific nutrition/hydration education as appropriate  - Include patient/family/caregiver in decisions related to nutrition  Outcome: Progressing     Problem: METABOLIC, FLUID AND ELECTROLYTES - ADULT  Goal: Electrolytes maintained within normal limits  Description: INTERVENTIONS:  - Monitor labs and assess patient for signs and symptoms of electrolyte imbalances  - Administer electrolyte replacement as ordered  - Monitor response to electrolyte replacements, including repeat lab results as appropriate  - Instruct patient on fluid and nutrition as appropriate  Outcome: Progressing  Goal: Fluid balance maintained  Description: INTERVENTIONS:  - Monitor labs   - Monitor I/O and WT  - Instruct patient on fluid and nutrition as appropriate  - Assess for signs & symptoms of volume excess or deficit  Outcome: Progressing  Goal: Glucose maintained within target range  Description: INTERVENTIONS:  - Monitor Blood Glucose as ordered  - Assess for signs and symptoms of hyperglycemia and hypoglycemia  - Administer ordered medications to maintain glucose within target range  - Assess nutritional intake and initiate nutrition service referral as needed  Outcome: Progressing

## 2022-07-24 NOTE — PROGRESS NOTES
Cardiology Progress note  Unit/Bed#: [facility] 501-01 Encounter: [id number]        Federico Day 64 y o  female [id number]  Hospital Stay Days: 2    Assessment and Plan      Current Problem List   Principal Problem:    Acute on chronic systolic congestive heart failure (HCC)  Active Problems:    Anxiety    Tobacco abuse    COPD (chronic obstructive pulmonary disease) (HCC)    Acute on chronic kidney failure (HCC)    Acute on chronic respiratory failure with hypoxemia (HCC)    Assessment/Plan:    1  Acute on chronic systolic congestive heart failure - 2/2 non ischemic cardiomyopathy - previously partially recovered EF now with EF of 25% -  Likely secondary to fluid overload state in setting of acute renal failure  · Now on dialysis  · Occlusion of kidney noted - vascular consult in  · [facility] 3/2021 showed moderate non obstructive CM  · Will need better BP control  · Cont  Isordil  · Cont  Coreg  · Unable to do ace/arb/neprolysin inhibitor due to kidney dysfunction at this time  2   ARF   ·  vascular consult as above + nephology following -received dialysis yesterday  3  Hypertension - on hydralazine 50 mg Q 8 now   · Cont  Coreg - up titrate   · Up titrate hydralazine  · Cont  Isordil  · If continues to be elevated consider addition of norvasc  Subjective     Patient seen and examined  Patient with no chest pain, shortness of breath or swelling  Does admit to orthopnea though  Went to HD yesterday  U/S this a m  shows occulusion of the right kidney and stenosis of left  Reached out to nephrology   Vascular consult in     Objective     Vitals: Temp (24hrs), Av 4 °F (36 9 °C), Min:97 9 °F (36 6 °C), Max:99 3 °F (37 4 °C)  Current: Temperature: 99 3 °F (37 4 °C)  Patient Vitals for the past 24 hrs:   BP Temp Temp src Pulse Resp SpO2   22 0800 -- -- -- -- -- 94 %   22 0700 165/82 99 3 °F (37 4 °C) Oral -- -- 99 %   22 0641 (!) 182/90 -- -- -- -- --   22 0234 168/80 -- -- -- -- --   07/24/22 0209 (!) 201/104 -- -- -- -- --   07/23/22 2203 169/81 98 3 °F (36 8 °C) Oral 91 18 99 %   07/23/22 1910 159/74 98 6 °F (37 °C) Oral 91 18 96 %   07/23/22 1750 -- -- -- -- -- 97 %   07/23/22 1742 (!) 179/105 -- -- -- -- --   07/23/22 1650 (!) 180/115 98 1 °F (36 7 °C) Oral 91 18 --   07/23/22 1630 (!) 185/121 -- -- 87 18 --   07/23/22 1600 (!) 183/122 -- -- 82 18 --   07/23/22 1530 (!) 179/121 -- -- 89 18 --   07/23/22 1500 (!) 175/99 -- -- 85 18 --   07/23/22 1445 (!) 186/95 -- -- 87 18 --   07/23/22 1430 (!) 179/121 -- -- 88 18 --   07/23/22 1400 (!) 173/113 -- -- 88 18 --   07/23/22 1330 (!) 177/112 -- -- 80 18 --   07/23/22 1320 (!) 167/104 97 9 °F (36 6 °C) Oral 82 18 --   07/23/22 1300 (!) 179/87 -- -- -- -- --   07/23/22 1045 -- 97 9 °F (36 6 °C) -- -- 18 95 %    Body mass index is 23 38 kg/m²  Physical Exam:  Physical Exam  Constitutional:       General: She is not in acute distress  Appearance: Normal appearance  Cardiovascular:      Rate and Rhythm: Normal rate and regular rhythm  Pulmonary:      Effort: Pulmonary effort is normal  No respiratory distress  Breath sounds: No wheezing or rhonchi  Musculoskeletal:      Right lower leg: No edema  Left lower leg: No edema  Neurological:      Mental Status: She is alert  Psychiatric:         Thought Content:  Thought content normal          Invasive Devices  Report    Peripheral Intravenous Line  Duration           Peripheral IV 07/22/22 Proximal;Right;Ventral (anterior) Forearm 2 days          Hemodialysis Catheter  Duration           HD Temporary Double Catheter 9 days                    Labs:   Results from last 7 days   Lab Units 07/24/22  0615 07/23/22  0512 07/22/22  0420 07/21/22  0609 07/19/22  0353 07/18/22  0601   WBC Thousand/uL 12 92* 12 34* 12 06* 10 74* 12 78* 10 82*   HEMOGLOBIN g/dL 10 8* 11 2* 10 8* 10 8* 11 4* 10 1*   HEMATOCRIT % 33 3* 33 1* 33 4* 33 3* 33 7* 30 4*   PLATELETS Thousands/uL 261 285 294 262 260 240   NEUTROS PCT %  --   --  66 68  --  72   MONOS PCT %  --   --  8 8  --  8      Results from last 7 days   Lab Units 07/24/22  0615 07/23/22  0512 07/22/22  2314 07/22/22  0420 07/21/22  0609 07/19/22  0352 07/18/22  0601   SODIUM mmol/L 129* 130* 128* 126* 127* 127* 129*   POTASSIUM mmol/L 4 8 4 2 4 1 5 1 4 8 4 4 4 7   CHLORIDE mmol/L 95* 90* 90* 90* 92* 92* 90*   CO2 mmol/L 26 27 30 19* 23 22 20*   BUN mg/dL 42* 65* 61* 91* 56* 47* 110*   CREATININE mg/dL 5 17* 6 15* 5 42* 8 02* 5 81* 5 04* 8 87*   CALCIUM mg/dL 9 2 9 0 8 8 9 1 8 9 9 2 8 2*   ALK PHOS U/L  --  92 95  --   --   --  68   ALT U/L  --  18 17  --   --   --  5*   AST U/L  --  15 11  --   --   --  9*   MAGNESIUM mg/dL  --  2 3 2 0  --   --  2 2  --    PHOSPHORUS mg/dL  --  5 7* 5 0*  --   --  6 1*  --    EGFR ml/min/1 73sq m 8 6 7 4 7 8 4         Results from last 7 days   Lab Units 07/22/22  2314   LACTIC ACID mmol/L 1 0         No results found for: PHART, LNW4GYS, PO2ART, NPI9SHI, Z4MWRHJV, BEART, SOURCE  No components found for: HIV1X2  Lab Results   Component Value Date    HEPBIGM Non-reactive 07/15/2022    HEPBCAB Reactive (A) 07/15/2022    HEPCAB Non-reactive 07/15/2022     No results found for: SPEP, UPEP   Lab Results   Component Value Date    HGBA1C 5 5 03/27/2021     Lab Results   Component Value Date    CHOL 216 (H) 04/05/2018      Lab Results   Component Value Date    HDL 43 (L) 05/12/2022    HDL 49 (L) 02/09/2022    HDL 47 06/22/2021      Lab Results   Component Value Date    LDLCALC 113 (H) 05/12/2022    LDLCALC 121 (H) 02/09/2022    LDLCALC 125 (H) 06/22/2021      Lab Results   Component Value Date    TRIG 91 05/12/2022    TRIG 102 02/09/2022    TRIG 144 06/22/2021     No components found for: PROCAL      Micro:      Urinalysis:  Lab Results   Component Value Date    BDZUR Negative 06/15/2022    COCAINEUR Negative 06/15/2022    OPIATEUR Positive (A) 06/15/2022    PCPUR Negative 06/15/2022    THCUR Negative 06/15/2022 Invalid input(s): URIBILINOGEN        Intake and Outputs:  I/O       07/22 0701 07/23 0700 07/23 0701 07/24 0700 07/24 0701 07/25 0700    P  O   1300 240    I V  (mL/kg)  500 (6 8)     Total Intake(mL/kg)  1800 (24 4) 240 (3 2)    Urine (mL/kg/hr)  0 (0) 0 (0)    Emesis/NG output  1     Other  3500     Total Output  3501 0    Net  -1701 +240           Unmeasured Urine Occurrence  1 x         Nutrition:  Diet Cardiovascular; Cardiac; Renal [place]; Yes; Fluid Restriction 2000 ML;  No  Radiology Results:   VAS renal artery complete    (Results Pending)     Scheduled Medications:  calcium acetate, 1,334 mg, TID With Meals  carvedilol, 12 5 mg, BID With Meals  docusate sodium, 100 mg, BID  guaiFENesin, 600 mg, Q12H JULIÁN  heparin (porcine), 5,000 Units, Q8H [address]  hydrALAZINE, 50 mg, Q8H JULIÁN  ipratropium, 0 5 mg, TID  isosorbide dinitrate, 20 mg, Q8H [address]  levalbuterol, 1 25 mg, TID  QUEtiapine, 25 mg, HS  senna, 1 tablet, HS      PRN MEDS:  acetaminophen, 650 mg, Q6H PRN  albuterol, 2 puff, Q4H PRN  clonazePAM, 1 mg, BID PRN  hydrALAZINE, 10 mg, Q6H PRN  ondansetron, 4 mg, Q4H PRN  oxyCODONE, 2 5 mg, Q4H PRN  oxyCODONE, 5 mg, Q4H PRN  polyethylene glycol, 17 g, Daily PRN      Last 24 Hour Meds: :   Medication Administration - last 24 hours from 07/23/2022 1010 to 07/24/2022 1010       Date/Time Order Dose Route Action Action by     07/24/2022 0641 calcium acetate (PHOSLO) capsule 1,334 mg 1,334 mg Oral Given Lizzeth Jimenez RN     07/23/2022 1743 calcium acetate (PHOSLO) capsule 1,334 mg 1,334 mg Oral Given Pratima Madison RN     07/23/2022 1151 calcium acetate (PHOSLO) capsule 1,334 mg 1,334 mg Oral Given David Moore RN     07/24/2022 0907 guaiFENesin (MUCINEX) 12 hr tablet 600 mg 600 mg Oral Given David Moore RN     07/23/2022 2021 guaiFENesin (MUCINEX) 12 hr tablet 600 mg 600 mg Oral Given Lizzeth Jimenez RN     07/24/2022 0622 heparin (porcine) subcutaneous injection 5,000 Units 5,000 Units Subcutaneous Given Daryn Capone RN     07/23/2022 2131 heparin (porcine) subcutaneous injection 5,000 Units 5,000 Units Subcutaneous Given Daryn Capone RN     07/23/2022 1305 heparin (porcine) subcutaneous injection 5,000 Units 5,000 Units Subcutaneous Given Maria G Jerome, HORACE     07/23/2022 2131 QUEtiapine (SEROquel) tablet 25 mg 25 mg Oral Given Daryn Capone RN     07/24/2022 0221 hydrALAZINE (APRESOLINE) injection 10 mg 10 mg Intravenous Given Daryn Capone RN     07/23/2022 1743 hydrALAZINE (APRESOLINE) injection 10 mg 10 mg Intravenous Given Pratima Madison, HORACE     07/24/2022 3425 oxyCODONE (ROXICODONE) IR tablet 5 mg 5 mg Oral Given Daryn Capone RN     07/23/2022 2021 oxyCODONE (ROXICODONE) IR tablet 5 mg 5 mg Oral Given Daryn Capone RN     07/24/2022 0108 albuterol (PROVENTIL HFA,VENTOLIN HFA) inhaler 2 puff 2 puff Inhalation Given Daryn Capone RN     07/24/2022 0641 carvedilol (COREG) tablet 12 5 mg 12 5 mg Oral Given Daryn Capone RN     07/23/2022 1742 carvedilol (COREG) tablet 12 5 mg 12 5 mg Oral Given Pratima Madison, HORACE     07/24/2022 0758 levalbuterol (XOPENEX) inhalation solution 1 25 mg 1 25 mg Nebulization Given Monet Balls, RT     07/23/2022 1929 levalbuterol (Lorayne Dunn) inhalation solution 1 25 mg 1 25 mg Nebulization Not Given Jorje Bravo, RT     07/23/2022 1322 levalbuterol (XOPENEX) inhalation solution 1 25 mg 1 25 mg Nebulization Not Given Monet Alisson, RT     07/23/2022 1305 isosorbide dinitrate (ISORDIL) tablet 10 mg 10 mg Oral Given Maria G Jerome RN     07/24/2022 0759 ipratropium (ATROVENT) 0 02 % inhalation solution 0 5 mg 0 5 mg Nebulization Given Monet Balls, RT     07/23/2022 1928 ipratropium (ATROVENT) 0 02 % inhalation solution 0 5 mg 0 5 mg Nebulization Not Given Jorje Bravo, RT     07/23/2022 1322 ipratropium (ATROVENT) 0 02 % inhalation solution 0 5 mg 0 5 mg Nebulization Not Given Monet Alisson, RT     07/24/2022 0209 clonazePAM (KlonoPIN) tablet 1 mg 1 mg Oral Given Mei Mabry RN     07/23/2022 1358 clonazePAM (KlonoPIN) tablet 1 mg 1 mg Oral Given Nakul Barros RN     07/24/2022 2738 docusate sodium (COLACE) capsule 100 mg 100 mg Oral Given Khalida Wilson RN     07/23/2022 1744 docusate sodium (COLACE) capsule 100 mg 100 mg Oral Given Pratima Madison, RN     07/23/2022 1150 docusate sodium (COLACE) capsule 100 mg 100 mg Oral Given Khalida Wilson RN     07/23/2022 2131 senna (SENOKOT) tablet 8 6 mg 8 6 mg Oral Given Mei Mabry RN     07/23/2022 2131 hydrALAZINE (APRESOLINE) tablet 25 mg 25 mg Oral Given Mei Mabry RN     07/23/2022 1305 hydrALAZINE (APRESOLINE) tablet 25 mg 25 mg Oral Given Khalida Wilson RN     07/24/2022 0743 isosorbide dinitrate (ISORDIL) tablet 20 mg 20 mg Oral Given Mei Mabry RN     07/23/2022 2131 isosorbide dinitrate (ISORDIL) tablet 20 mg 20 mg Oral Given Mei Mabry RN     07/23/2022 2021 ondansetron [facility]) injection 4 mg 4 mg Intravenous Given Mei Mabry RN     07/24/2022 0622 hydrALAZINE (APRESOLINE) tablet 50 mg 50 mg Oral Given Mei Mabry RN          PLEASE NOTE:  This encounter was completed utilizing the [employer] Direct Speech Voice Recognition Software  Grammatical errors, random word insertions, pronoun errors and incomplete sentences are occasional consequences of the system due to software limitations, ambient noise and hardware issues  These may be missed by proof reading prior to affixing electronic signature  Any questions or concerns about the content, text or information contained within the body of this dictation should be directly addressed to the physician for clarification  Please do not hesitate to call me directly if you have any any questions or concerns

## 2022-07-24 NOTE — PROGRESS NOTES
NEPHROLOGY PROGRESS NOTE   Angela Mcfarland 64 y o  female MRN: [id number]  Unit/Bed#: [facility] 501-01 Encounter: [id number]      ASSESSMENT & PLAN:  1  Severe acute kidney injury on top of CKD stage 3  Reported baseline creatinine 1-1 1  Patient was started on dialysis on 07/15 in the setting of decompensated heart failure and hypoxemic respiratory failure refractory to diuretics  Patient was transferred from [facility] for heart failure evaluation  Status post extra dialysis treatment yesterday with 3 kilos UF  Plan to continue with dialysis on a MWF schedule, next dialysis tomorrow, will attempt 2-3 kilos UF  Follow daily labs  Monitor ins and outs  Avoid relative hypotension   If not improvement she will need a perm catheter next week    2  Acute on chronic HFrEF, patient was transferred to [facility] for advanced heart failure evaluation  Plan for hemodialysis tomorrow, continue with UF as tolerated  3  Acute on chronic hypoxemic respiratory failure in the setting of volume overload  Management per critical care team   Status post extra HD treatment yesterday with 3 kilos UF, plan for HD tomorrow and continue with further UF as tolerated    4  Hyponatremia in the setting of acute kidney injury as well as volume overload, continue with fluid restriction, changed to 1 5 L daily (was on 2 L fluid restriction), continue with UF during dialysis  Follow daily labs    5  Uncontrolled hypertension with suspected component of renal artery stenosis, discussed with Cardiology, renal Doppler show atrophic right kidney, main right renal artery could not be identified, left renal artery appears to be more than 60% stenosis, also patient with 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  Recommend to consult vascular, she may need stenting on left renal artery, meanwhile continue with antihypertensive medication and UF on dialysis    6   Hypophosphatemia the setting of acute kidney injury, continue with low phosphorus diet and binders  7  Mild anemia, monitor H&H    8  Access, temporary HD catheter in place, if not improvement will need a PermCath next week    My plan and recommendations were discussed with cardiology team      SUBJECTIVE:  Patient seen and examined, upset because could not sleep well overnight without BiPAP    Denies any issues with dialysis yesterday    OBJECTIVE:  Current Weight: Weight - Scale: 73 9 kg (162 lb 14 7 oz)  Vitals:    07/24/22 0800   BP:    Pulse:    Resp:    Temp:    SpO2: 94%       Intake/Output Summary (Last 24 hours) at 7/24/2022 1012  Last data filed at 7/24/2022 0901  Gross per 24 hour   Intake 1680 ml   Output 3501 ml   Net -1821 ml     General: conscious, cooperative, in not acute distress  Eyes: conjunctivae pink, anicteric sclerae  ENT: lips and mucous membranes moist  Neck: supple, elevated JVD, temporary HD catheter in place  Chest:  Decreased breath sounds at bases, few rales, no crackles, ronchus or wheezings  CVS: distinct S1 & S2, normal rate, regular rhythm  Abdomen: soft, non-tender, non-distended, normoactive bowel sounds  Extremities: + edema of both legs  Skin: no rash  Neuro: awake, alert, oriented          Medications:    Current Facility-Administered Medications:     acetaminophen (TYLENOL) tablet 650 mg, 650 mg, Oral, Q6H PRN, Angi Rajan PA-C    albuterol (PROVENTIL HFA,VENTOLIN HFA) inhaler 2 puff, 2 puff, Inhalation, Q4H PRN, Jana Fairbanks MD, 2 puff at 07/24/22 0108    calcium acetate (PHOSLO) capsule 1,334 mg, 1,334 mg, Oral, TID With Meals, Angi Rajan PA-C, 1,334 mg at 07/24/22 0641    carvedilol (COREG) tablet 12 5 mg, 12 5 mg, Oral, BID With Meals, Angi Rajan PA-C, 12 5 mg at 07/24/22 0641    clonazePAM (KlonoPIN) tablet 1 mg, 1 mg, Oral, BID PRN, Andrew Jamse PA-C, 1 mg at 07/24/22 0209    docusate sodium (COLACE) capsule 100 mg, 100 mg, Oral, BID, Seema Huynh PA-C, 100 mg at 07/24/22 0907    guaiFENesin (MUCINEX) 12 hr tablet 600 mg, 600 mg, Oral, Q12H [address], Jaspreet Rodriguez PA-C, 600 mg at 07/24/22 8968    heparin (porcine) subcutaneous injection 5,000 Units, 5,000 Units, Subcutaneous, Q8H [address], Jaspreet Rodriguez PA-C, 5,000 Units at 07/24/22 0622    hydrALAZINE (APRESOLINE) injection 10 mg, 10 mg, Intravenous, Q6H PRN, Jaspreet Rodriguez PA-C, 10 mg at 07/24/22 0221    hydrALAZINE (APRESOLINE) tablet 50 mg, 50 mg, Oral, Q8H [address], Moy Nevarez MD, 50 mg at 07/24/22 0622    ipratropium (ATROVENT) 0 02 % inhalation solution 0 5 mg, 0 5 mg, Nebulization, TID, Alok Davenport MD, 0 5 mg at 07/24/22 0759    isosorbide dinitrate (ISORDIL) tablet 20 mg, 20 mg, Oral, Q8H [address], Moy Nevarez MD, 20 mg at 07/24/22 8329    levalbuterol (Kenzie Lush) inhalation solution 1 25 mg, 1 25 mg, Nebulization, TID, Alok Davenport MD, 1 25 mg at 07/24/22 0758    ondansetron (ZOFRAN) injection 4 mg, 4 mg, Intravenous, Q4H PRN, Cheyenne Sarmiento PA-C, 4 mg at 07/23/22 2021    oxyCODONE (ROXICODONE) IR tablet 2 5 mg, 2 5 mg, Oral, Q4H PRN, Jaspreet Rodriguez PA-C    oxyCODONE (ROXICODONE) IR tablet 5 mg, 5 mg, Oral, Q4H PRN, Jaspreet Rodriguez PA-C, 5 mg at 07/24/22 0209    polyethylene glycol (MIRALAX) packet 17 g, 17 g, Oral, Daily PRN, Seema Ragona, PA-C    QUEtiapine (SEROquel) tablet 25 mg, 25 mg, Oral, HS, Jaspreet Rodriguez PA-C, 25 mg at 07/23/22 2131    senna (SENOKOT) tablet 8 6 mg, 1 tablet, Oral, HS, Seema Huynh PA-C, 8 6 mg at 07/23/22 2131    Invasive Devices:        Lab Results:   Results from last 7 days   Lab Units 07/24/22  0615 07/23/22  0512 07/22/22  2314 07/22/22  0420 07/19/22  0353 07/19/22  0352 07/18/22  0601   WBC Thousand/uL 12 92* 12 34*  --  12 06*   < >  --  10 82*   HEMOGLOBIN g/dL 10 8* 11 2*  --  10 8*   < >  --  10 1*   HEMATOCRIT % 33 3* 33 1*  --  33 4*   < >  --  30 4*   PLATELETS Thousands/uL 261 285  --  294   < >  --  240   SODIUM mmol/L 129* 130* 128* 126*   < > 127* 129*   POTASSIUM mmol/L 4 8 4 2 4 1 5 1   < > 4 4 4 7   CHLORIDE mmol/L 95* 90* 90* 90*   < > 92* 90*   CO2 mmol/L 26 27 30 19*   < > 22 20*   BUN mg/dL 42* 65* 61* 91*   < > 47* 110*   CREATININE mg/dL 5 17* 6 15* 5 42* 8 02*   < > 5 04* 8 87*   CALCIUM mg/dL 9 2 9 0 8 8 9 1   < > 9 2 8 2*   MAGNESIUM mg/dL  --  2 3 2 0  --   --  2 2  --    PHOSPHORUS mg/dL  --  5 7* 5 0*  --   --  6 1*  --    ALK PHOS U/L  --  92 95  --   --   --  68   ALT U/L  --  18 17  --   --   --  5*   AST U/L  --  15 11  --   --   --  9*    < > = values in this interval not displayed  Previous work up:  See previous notes      Portions of the record may have been created with voice recognition software  Occasional wrong word or "sound a like" substitutions may have occurred due to the inherent limitations of voice recognition software  Read the chart carefully and recognize, using context, where substitutions have occurred  If you have any questions, please contact the dictating provider

## 2022-07-25 ENCOUNTER — APPOINTMENT (INPATIENT)
Dept: DIALYSIS | Facility: HOSPITAL | Age: 62
DRG: 252 | End: 2022-07-25
Attending: INTERNAL MEDICINE
Payer: MEDICARE

## 2022-07-25 LAB
ANION GAP SERPL CALCULATED.3IONS-SCNC: 12 MMOL/L (ref 4–13)
ARTERIAL PATENCY WRIST A: YES
BASE EXCESS BLDA CALC-SCNC: 6.9 MMOL/L
BUN SERPL-MCNC: 61 MG/DL (ref 5–25)
CALCIUM SERPL-MCNC: 9.6 MG/DL (ref 8.3–10.1)
CHLORIDE SERPL-SCNC: 92 MMOL/L (ref 96–108)
CO2 SERPL-SCNC: 23 MMOL/L (ref 21–32)
CREAT SERPL-MCNC: 6.77 MG/DL (ref 0.6–1.3)
GFR SERPL CREATININE-BSD FRML MDRD: 6 ML/MIN/1.73SQ M
GLUCOSE SERPL-MCNC: 111 MG/DL (ref 65–140)
HCO3 BLDA-SCNC: 29.6 MMOL/L (ref 22–28)
NASAL CANNULA: 6
NON VENT ROOM AIR: ABNORMAL %
O2 CT BLDA-SCNC: 13.9 ML/DL (ref 16–23)
OXYHGB MFR BLDA: 88 % (ref 94–97)
PCO2 BLDA: 35 MM HG (ref 36–44)
PH BLDA: 7.54 [PH] (ref 7.35–7.45)
PO2 BLDA: 49.6 MM HG (ref 75–129)
POTASSIUM SERPL-SCNC: 4.9 MMOL/L (ref 3.5–5.3)
SODIUM SERPL-SCNC: 127 MMOL/L (ref 135–147)
SPECIMEN SOURCE: ABNORMAL

## 2022-07-25 PROCEDURE — 99232 SBSQ HOSP IP/OBS MODERATE 35: CPT | Performed by: INTERNAL MEDICINE

## 2022-07-25 PROCEDURE — 5A1D70Z PERFORMANCE OF URINARY FILTRATION, INTERMITTENT, LESS THAN 6 HOURS PER DAY: ICD-10-PCS | Performed by: HOSPITALIST

## 2022-07-25 PROCEDURE — 94640 AIRWAY INHALATION TREATMENT: CPT

## 2022-07-25 PROCEDURE — 82805 BLOOD GASES W/O2 SATURATION: CPT | Performed by: INTERNAL MEDICINE

## 2022-07-25 PROCEDURE — 94002 VENT MGMT INPAT INIT DAY: CPT

## 2022-07-25 PROCEDURE — 94760 N-INVAS EAR/PLS OXIMETRY 1: CPT

## 2022-07-25 PROCEDURE — NC001 PR NO CHARGE: Performed by: NURSE PRACTITIONER

## 2022-07-25 PROCEDURE — 80048 BASIC METABOLIC PNL TOTAL CA: CPT | Performed by: INTERNAL MEDICINE

## 2022-07-25 PROCEDURE — 90935 HEMODIALYSIS ONE EVALUATION: CPT | Performed by: INTERNAL MEDICINE

## 2022-07-25 RX ORDER — AMLODIPINE BESYLATE 5 MG/1
5 TABLET ORAL DAILY
Status: DISCONTINUED | OUTPATIENT
Start: 2022-07-25 | End: 2022-07-26

## 2022-07-25 RX ADMIN — HYDRALAZINE HYDROCHLORIDE 50 MG: 50 TABLET, FILM COATED ORAL at 22:31

## 2022-07-25 RX ADMIN — TRAZODONE HYDROCHLORIDE 50 MG: 50 TABLET ORAL at 22:31

## 2022-07-25 RX ADMIN — IPRATROPIUM BROMIDE 0.5 MG: 0.5 SOLUTION RESPIRATORY (INHALATION) at 13:32

## 2022-07-25 RX ADMIN — IPRATROPIUM BROMIDE 0.5 MG: 0.5 SOLUTION RESPIRATORY (INHALATION) at 19:50

## 2022-07-25 RX ADMIN — ISOSORBIDE DINITRATE 30 MG: 30 TABLET ORAL at 05:30

## 2022-07-25 RX ADMIN — LEVALBUTEROL HYDROCHLORIDE 1.25 MG: 1.25 SOLUTION, CONCENTRATE RESPIRATORY (INHALATION) at 08:00

## 2022-07-25 RX ADMIN — CLONAZEPAM 1 MG: 1 TABLET ORAL at 10:48

## 2022-07-25 RX ADMIN — OXYCODONE HYDROCHLORIDE 5 MG: 5 TABLET ORAL at 23:24

## 2022-07-25 RX ADMIN — HYDRALAZINE HYDROCHLORIDE 50 MG: 50 TABLET, FILM COATED ORAL at 15:23

## 2022-07-25 RX ADMIN — ALBUTEROL SULFATE 2 PUFF: 90 AEROSOL, METERED RESPIRATORY (INHALATION) at 06:00

## 2022-07-25 RX ADMIN — HEPARIN SODIUM 5000 UNITS: 5000 INJECTION INTRAVENOUS; SUBCUTANEOUS at 05:30

## 2022-07-25 RX ADMIN — HEPARIN SODIUM 5000 UNITS: 5000 INJECTION INTRAVENOUS; SUBCUTANEOUS at 15:23

## 2022-07-25 RX ADMIN — ISOSORBIDE DINITRATE 30 MG: 30 TABLET ORAL at 22:31

## 2022-07-25 RX ADMIN — IPRATROPIUM BROMIDE 0.5 MG: 0.5 SOLUTION RESPIRATORY (INHALATION) at 08:00

## 2022-07-25 RX ADMIN — CARVEDILOL 25 MG: 25 TABLET, FILM COATED ORAL at 15:23

## 2022-07-25 RX ADMIN — CALCIUM ACETATE 1334 MG: 667 CAPSULE ORAL at 15:23

## 2022-07-25 RX ADMIN — CLONAZEPAM 1 MG: 1 TABLET ORAL at 17:00

## 2022-07-25 RX ADMIN — LEVALBUTEROL HYDROCHLORIDE 1.25 MG: 1.25 SOLUTION, CONCENTRATE RESPIRATORY (INHALATION) at 19:50

## 2022-07-25 RX ADMIN — DOCUSATE SODIUM 100 MG: 100 CAPSULE, LIQUID FILLED ORAL at 18:36

## 2022-07-25 RX ADMIN — ISOSORBIDE DINITRATE 30 MG: 30 TABLET ORAL at 15:23

## 2022-07-25 RX ADMIN — SENNOSIDES 8.6 MG: 8.6 TABLET, FILM COATED ORAL at 22:31

## 2022-07-25 RX ADMIN — HYDRALAZINE HYDROCHLORIDE 50 MG: 50 TABLET, FILM COATED ORAL at 05:30

## 2022-07-25 RX ADMIN — HEPARIN SODIUM 5000 UNITS: 5000 INJECTION INTRAVENOUS; SUBCUTANEOUS at 22:32

## 2022-07-25 RX ADMIN — LEVALBUTEROL HYDROCHLORIDE 1.25 MG: 1.25 SOLUTION, CONCENTRATE RESPIRATORY (INHALATION) at 13:32

## 2022-07-25 RX ADMIN — GUAIFENESIN 600 MG: 600 TABLET, EXTENDED RELEASE ORAL at 22:31

## 2022-07-25 RX ADMIN — AMLODIPINE BESYLATE 5 MG: 5 TABLET ORAL at 18:36

## 2022-07-25 NOTE — ASSESSMENT & PLAN NOTE
Lab Results   Component Value Date    EGFR 8 07/24/2022    EGFR 6 07/23/2022    EGFR 7 07/22/2022    CREATININE 5 17 (H) 07/24/2022    CREATININE 6 15 (H) 07/23/2022    CREATININE 5 42 (H) 07/22/2022   Baseline creatinine 1 to 1 1  Begun on HD on 07/15 due to acute on chronic systolic heart failure unresponsive to diuretics with worsening hypoxic respiratory failure  Currently on HD on MWF  If continues to require HD PermCath to be placed next week

## 2022-07-25 NOTE — ASSESSMENT & PLAN NOTE
Wt Readings from Last 3 Encounters:   07/23/22 73 9 kg (162 lb 14 7 oz)   07/22/22 77 9 kg (171 lb 11 8 oz)   06/28/22 77 4 kg (170 lb 9 6 oz)     Had been unresponsive to diuretics and became anuric  Currently dialysis dependent  Volume control through hemodialysis

## 2022-07-25 NOTE — ASSESSMENT & PLAN NOTE
· Home Klonopin was increased to twice daily  · Begun on Seroquel on 07/18/2022  · Zyprexa was added as needed

## 2022-07-25 NOTE — PLAN OF CARE
Problem: Prexisting or High Potential for Compromised Skin Integrity  Goal: Skin integrity is maintained or improved  Description: INTERVENTIONS:  - Identify patients at risk for skin breakdown  - Assess and monitor skin integrity  - Assess and monitor nutrition and hydration status  - Monitor labs   - Assess for incontinence   - Turn and reposition patient  - Assist with mobility/ambulation  - Relieve pressure over bony prominences  - Avoid friction and shearing  - Provide appropriate hygiene as needed including keeping skin clean and dry  - Evaluate need for skin moisturizer/barrier cream  - Collaborate with interdisciplinary team   - Patient/family teaching  - Consider wound care consult   7/25/2022 0345 by Luis Siddiqui RN  Outcome: Progressing  7/25/2022 0345 by Luis Siddiqui RN  Outcome: Progressing     Problem: MOBILITY - ADULT  Goal: Maintain or return to baseline ADL function  Description: INTERVENTIONS:  -  Assess patient's ability to carry out ADLs; assess patient's baseline for ADL function and identify physical deficits which impact ability to perform ADLs (bathing, care of mouth/teeth, toileting, grooming, dressing, etc )  - Assess/evaluate cause of self-care deficits   - Assess range of motion  - Assess patient's mobility; develop plan if impaired  - Assess patient's need for assistive devices and provide as appropriate  - Encourage maximum independence but intervene and supervise when necessary  - Involve family in performance of ADLs  - Assess for home care needs following discharge   - Consider OT consult to assist with ADL evaluation and planning for discharge  - Provide patient education as appropriate  7/25/2022 0345 by Luis Siddiqui RN  Outcome: Progressing  7/25/2022 0345 by Luis Siddiqui RN  Outcome: Progressing  Goal: Maintains/Returns to pre admission functional level  Description: INTERVENTIONS:  - Perform BMAT or MOVE assessment daily    - Set and communicate daily mobility goal to care team and patient/family/caregiver  - Collaborate with rehabilitation services on mobility goals if consulted  - Perform Range of Motion  times a day  - Reposition patient every  hours  - Dangle patient  times a day  - Stand patient  times a day  - Ambulate patient  times a day  - Out of bed to chair  times a day   - Out of bed for meals  times a day  - Out of bed for toileting  - Record patient progress and toleration of activity level   7/25/2022 0345 by Ricardo Nicole RN  Outcome: Progressing  7/25/2022 0345 by Ricardo Nicole RN  Outcome: Progressing     Problem: Potential for Falls  Goal: Patient will remain free of falls  Description: INTERVENTIONS:  - Educate patient/family on patient safety including physical limitations  - Instruct patient to call for assistance with activity   - Consult OT/PT to assist with strengthening/mobility   - Keep Call bell within reach  - Keep bed low and locked with side rails adjusted as appropriate  - Keep care items and personal belongings within reach  - Initiate and maintain comfort rounds  - Make Fall Risk Sign visible to staff  - Offer Toileting every  Hours, in advance of need  - Initiate/Maintain alarm  - Obtain necessary fall risk management equipment:   - Apply yellow socks and bracelet for high fall risk patients  - Consider moving patient to room near nurses station  7/25/2022 0345 by Ricardo Nicole RN  Outcome: Progressing  7/25/2022 0345 by Ricardo Nicole RN  Outcome: Progressing     Problem: Nutrition/Hydration-ADULT  Goal: Nutrient/Hydration intake appropriate for improving, restoring or maintaining nutritional needs  Description: Monitor and assess patient's nutrition/hydration status for malnutrition  Collaborate with interdisciplinary team and initiate plan and interventions as ordered  Monitor patient's weight and dietary intake as ordered or per policy  Utilize nutrition screening tool and intervene as necessary   Determine patient's food preferences and provide high-protein, high-caloric foods as appropriate       INTERVENTIONS:  - Monitor oral intake, urinary output, labs, and treatment plans  - Assess nutrition and hydration status and recommend course of action  - Evaluate amount of meals eaten  - Assist patient with eating if necessary   - Allow adequate time for meals  - Recommend/ encourage appropriate diets, oral nutritional supplements, and vitamin/mineral supplements  - Order, calculate, and assess calorie counts as needed  - Recommend, monitor, and adjust tube feedings and TPN/PPN based on assessed needs  - Assess need for intravenous fluids  - Provide specific nutrition/hydration education as appropriate  - Include patient/family/caregiver in decisions related to nutrition  7/25/2022 0345 by Paul Trejo RN  Outcome: Progressing  7/25/2022 0345 by Paul Trejo RN  Outcome: Progressing     Problem: METABOLIC, FLUID AND ELECTROLYTES - ADULT  Goal: Electrolytes maintained within normal limits  Description: INTERVENTIONS:  - Monitor labs and assess patient for signs and symptoms of electrolyte imbalances  - Administer electrolyte replacement as ordered  - Monitor response to electrolyte replacements, including repeat lab results as appropriate  - Instruct patient on fluid and nutrition as appropriate  7/25/2022 0345 by Paul Trejo RN  Outcome: Progressing  7/25/2022 0345 by Paul Trejo RN  Outcome: Progressing  Goal: Fluid balance maintained  Description: INTERVENTIONS:  - Monitor labs   - Monitor I/O and WT  - Instruct patient on fluid and nutrition as appropriate  - Assess for signs & symptoms of volume excess or deficit  7/25/2022 0345 by Paul Trejo RN  Outcome: Progressing  7/25/2022 0345 by Paul Trejo RN  Outcome: Progressing  Goal: Glucose maintained within target range  Description: INTERVENTIONS:  - Monitor Blood Glucose as ordered  - Assess for signs and symptoms of hyperglycemia and hypoglycemia  - Administer ordered medications to maintain glucose within target range  - Assess nutritional intake and initiate nutrition service referral as needed  7/25/2022 0345 by Tracy Marsh, RN  Outcome: Progressing  7/25/2022 0345 by Tracy Marsh, RN  Outcome: Progressing

## 2022-07-25 NOTE — RESPIRATORY THERAPY NOTE
07/25/22 1719   Respiratory Assessment   Resp Comments Called to bedside by RN  Patient complaining she cant breath and that she will wear BIPAP  Upon my arrival, patient on [facility]  RR 26,appears anxous  Bilateral BS clear with great air movement  patient complianing she cannot feel pressure from BIPAP, increased to IPAP 26  What Type Of Note Output Would You Prefer (Optional)?: Standard Output How Severe Are Your Spot(S)?: mild Hpi Title: Evaluation of Skin Lesions

## 2022-07-25 NOTE — ASSESSMENT & PLAN NOTE
· Exacerbation during admission for which she was given 3 doses of Solu-Medrol 40 mg IV which was then discontinued  · Continue Xopenex nebs, Anoro

## 2022-07-25 NOTE — RESPIRATORY THERAPY NOTE
07/25/22 7943   Respiratory Assessment   Resp Comments patient complaining of SOB  Attmepted to place patient on BIPAP and increased IPAP to 18  Patiwent said it was helping but shortly there after, stated she didnt feel anything and that it was harder to breath  Commuiicated with RN  patient has some belly breathing but also appears to be very anxous  Will continue to monitor

## 2022-07-25 NOTE — ASSESSMENT & PLAN NOTE
· Uncontrolled hypertension  · Vascular consulted for left renal artery stenosis  · Follow-up labs for secondary hypertension workup  · Continue carvedilol 25 mg twice daily, hydralazine 50 mg q 8 hours, Isordil 30 mg q 8 hours

## 2022-07-25 NOTE — UTILIZATION REVIEW
Notification of Discharge   This is a Notification of Discharge from our facility [address]  Please be advised that this patient has been discharge from our facility  Below you will find the admission and discharge date and time including the patients disposition  UTILIZATION REVIEW CONTACT:  Carlos Florez  Utilization   Network Utilization Review Department  Phone: [phone] carefully listen to the prompts  All voicemails are confidential   Email: [email or web address]     PHYSICIAN ADVISORY SERVICES:  FOR [id number] REVIEW - MEDICAL NECESSITY DENIAL  Phone: [phone]  Fax: [phone]  Email: [email or web address]     PRESENTATION DATE: 7/12/2022  5:54 PM  OBERVATION ADMISSION DATE:   INPATIENT ADMISSION DATE: 7/12/22  7:36 PM   DISCHARGE DATE: 7/22/2022  9:35 PM  DISPOSITION: [address] [facility]      IMPORTANT INFORMATION:  Send all requests for admission clinical reviews, approved or denied determinations and any other requests to dedicated fax number below belonging to the campus where the patient is receiving treatment   List of dedicated fax numbers:  [address] DENIALS (Administrative/Medical Necessity) [phone]   [address] (Maternity/NICU/Pediatrics) [phone]   Sony Crespo [phone]   130 [facility] [phone]   [address] [place] [address] [phone]   2000 [facility] [address] [place] [address],[address] [place] [address] [phone]   [facility]  [phone]   [address]5 [facility] [address]  2401 [facility] 1000 [facility] [phone]

## 2022-07-25 NOTE — RESPIRATORY THERAPY NOTE
07/25/22 0800   Respiratory Assessment   Assessment Type Pre-treatment   General Appearance Alert; Awake   Respiratory Pattern Normal   Chest Assessment Chest expansion symmetrical   Bilateral Breath Sounds Clear;Diminished   Resp Comments Bilaterla BS clear/diminshed  Treatments gioven as sceduled   No distress noted   O2 Device nc   Additional Assessments   Pulse 92   Respirations 16   SpO2 99 %   Position Erika's

## 2022-07-25 NOTE — OCCUPATIONAL THERAPY NOTE
Occupational Therapy Screen        Patient Name: Amari Longoria  AGJRP'S Date: 7/25/2022 07/25/22 1143   OT Last Visit   OT Visit Date 07/25/22   Note Type   Note Type Cancelled Session   Cancel Reasons Patient off floor/test  (Pt at HD- will cont to follow to see as able )     Loki Borrego MS, OTR/L

## 2022-07-25 NOTE — PLAN OF CARE
3 hour tx initiated uf 3kg as tolerated  2k bath for a serum K+ of 4 9      Problem: METABOLIC, FLUID AND ELECTROLYTES - ADULT  Goal: Electrolytes maintained within normal limits  Description: INTERVENTIONS:  - Monitor labs and assess patient for signs and symptoms of electrolyte imbalances  - Administer electrolyte replacement as ordered  - Monitor response to electrolyte replacements, including repeat lab results as appropriate  - Instruct patient on fluid and nutrition as appropriate  Outcome: Progressing  Goal: Fluid balance maintained  Description: INTERVENTIONS:  - Monitor labs   - Monitor I/O and WT  - Instruct patient on fluid and nutrition as appropriate  - Assess for signs & symptoms of volume excess or deficit  Outcome: Progressing   Post-Dialysis RN Treatment Note    Blood Pressure:  Pre 179/119 mm/Hg  Post 170/108  mmHg   EDW  TBD kg    Weight:  Pre 75 kg   Post 71 7 kg   Mode of weight measurement: Bed Scale   Volume Removed  2500 ml    Treatment duration 180 minutes    NS given  No    Treatment shortened? yes    Medications given during Rx Not Applicable   Estimated Kt/V  N/A   Access type: Temporary HD catheter   Access Issues: No    Report called to primary nurse   Yes

## 2022-07-25 NOTE — TELEMEDICINE
e-Consult (IPC)  - Interventional Radiology  Mini Chaudhary 64 y o  female MRN: [id number]  Unit/Bed#: [facility] 501-01 Encounter: [id number]          Interventional Radiology has been consulted to evaluate Ivpatricia Ebbing    We were consulted by nephrology concerning this patient with long term dialysis needs  IP Consult to IR  Consult performed by: ESTHER Cutler  Consult ordered by: Verner Hollering, MD        07/25/22    Assessment/Recommendation:     64year old with CKD stage 3 with SHANTA, began HD on 7/15, patient will require long term HD and needs permacath placement  Tentatively planned for renal artery stent with vascular surgery on Wednesday pending medical clearance  Consider FABIOLA to be done in IR as joint procedure Wednesday so may be done together and patient does not have to be NPO successive days  Plan for permacath placement 7/26 as is  MWF dialysis patient      - NPO after MN 7/26  - plan for permacath 7/26 unless consideration for placement during stenting     Total time spent in review of data, discussion with requesting provider and rendering advice was 35 minutes  Thank you for allowing Interventional Radiology to participate in the care of Ivpatricia Ebbing  Please don't hesitate to call or TigerText us with any questions       [address] [place] [address] Prabha Byrd

## 2022-07-25 NOTE — PROGRESS NOTES
Cardiology Progress Note - Wesly Blanco 64 y o  female MRN: [id number]    Unit/Bed#: [facility] 501-01 Encounter: [id number]      Assessment & Plan:  Principal Problem:    Acute on chronic systolic congestive heart failure (HCC)  Active Problems:    Essential hypertension    Chronic pain    Anxiety    Tobacco abuse    COPD (chronic obstructive pulmonary disease) (HCC)    Acute kidney injury superimposed on CKD-3    Acute on chronic respiratory failure with hypoxemia (HCC)    Hepatitis B    # Acute on chronic systolic congestive heart failure   - 2/2 non ischemic cardiomyopathy   - LHC in 3/2021 showed moderate non-obstructive atherosclerosis  - previously partially recovered EF now with EF of 25%   - TTE 0n 7/14: EF 25%,  severe global hypokinesis with regional variation, basal and mid inferior wall appeared more severely hypo to akinetic  mild LAE, mod MR, mild AR, mild TR  - Likely secondary to fluid overload state in setting of acute renal failure  - appears     # Acute on chronic hypoxic respiratory failure  - In the setting of volume overload and underlying COPD  - Home O2 2 L at rest and 4 L on exertion  - Currently saturating well on 4 L of oxygen via NC    # Uncontrolled hypertension   - Likely due to bilateral FABIOLA    # Acute renal failure on CKD III  - likely volume overload, decompensated heart failure  - currently on dialysis MWF  - management per nephrology    # Bilateral FABIOLA  - Renal doppler revealed occluded right renal artery and >60% stenosis in the proximal and distal main renal artery  - vascular surgery is following    # COPD on home oxygen    -180/70-100s  She missed one dose of coreg this am    -130  Weight 174 lbs - 162 lbs    Plan:  - Continue coreg 25 mg bid, hydralazine 50 mg tid and isordil 30 mg tid  - Add amlodipine 5 mg qd as BP is uncontrolled     - Volume management as per nephrology  - Monitor hemodynamics  - Pending aldosterone/renin ratio, fractionated catecholamines, plasma renin activity    Subjective:   Patient seen and examined  She appears lethargic  She reports she is very tired  RN reports she refused BiPAP last night  Objective:     Vitals: Blood pressure 149/90, pulse 105, temperature 97 7 °F (36 5 °C), temperature source Axillary, resp  rate 17, height 5' 10" (1 778 m), weight 73 8 kg (162 lb 11 2 oz), SpO2 98 %  , Body mass index is 23 34 kg/m² ,   Orthostatic Blood Pressures    Flowsheet Row Most Recent Value   Blood Pressure 149/90 filed at 07/25/2022 1510   Patient Position - Orthostatic VS Lying filed at 07/25/2022 1510            Intake/Output Summary (Last 24 hours) at 7/25/2022 1542  Last data filed at 7/25/2022 1331  Gross per 24 hour   Intake 1418 ml   Output 0 ml   Net 1418 ml           Physical Exam:    GEN: Juani Morales appears ill-appearing     HEENT: anicteric, mucous membranes moist  NECK: no jvd, carotid bruits   HEART: regular rhythm, normal S1 and S2, 3/6 systolic murmurs, No clicks, gallops or rubs   LUNGS: clear to auscultation bilaterally; no wheezes, rales, or rhonchi   ABDOMEN: normal bowel sounds, soft, no tenderness, no distention  EXTREMITIES: peripheral pulses normal; no clubbing, cyanosis, or edema  NEURO: no focal findings   SKIN: normal without suspicious lesions on exposed skin      Current Facility-Administered Medications:     acetaminophen (TYLENOL) tablet 650 mg, 650 mg, Oral, Q6H PRN, Soheila Haider PA-C    albuterol (PROVENTIL HFA,VENTOLIN HFA) inhaler 2 puff, 2 puff, Inhalation, Q4H PRN, Claris Gitelman, MD, 2 puff at 07/25/22 0600    calcium acetate (PHOSLO) capsule 1,334 mg, 1,334 mg, Oral, TID With Meals, Soheila Haider PA-C, 1,334 mg at 07/25/22 1523    carvedilol (COREG) tablet 25 mg, 25 mg, Oral, BID With Meals, Carly Hamiltonms, DO, 25 mg at 07/25/22 1523    clonazePAM (KlonoPIN) tablet 1 mg, 1 mg, Oral, BID PRN, Seema Huynh PA-C, 1 mg at 07/25/22 1048    diphenhydrAMINE (BENADRYL) tablet 25 mg, 25 mg, Oral, Q6H PRN, Duran Mcneil PA-C, 25 mg at 07/24/22 2329    docusate sodium (COLACE) capsule 100 mg, 100 mg, Oral, BID, Seema Huynh PA-C, 100 mg at 07/24/22 0907    guaiFENesin (MUCINEX) 12 hr tablet 600 mg, 600 mg, Oral, Q12H [address], Pancho Mitchell PA-C, 600 mg at 07/24/22 2104    heparin (porcine) subcutaneous injection 5,000 Units, 5,000 Units, Subcutaneous, Q8H [address], Pancho Mitchell PA-C, 5,000 Units at 07/25/22 1523    hydrALAZINE (APRESOLINE) injection 10 mg, 10 mg, Intravenous, Q6H PRN, Pancho Mitchell PA-C, 10 mg at 07/24/22 0221    hydrALAZINE (APRESOLINE) tablet 50 mg, 50 mg, Oral, Q8H [address], Moy Le MD, 50 mg at 07/25/22 1523    hydrocortisone 1 % cream, , Topical, 4x Daily PRN, Duran Mcneil PA-C    ipratropium (ATROVENT) 0 02 % inhalation solution 0 5 mg, 0 5 mg, Nebulization, TID, Elizabeth Akbar MD, 0 5 mg at 07/25/22 1332    isosorbide dinitrate (ISORDIL) tablet 30 mg, 30 mg, Oral, Q8H [address], Moy Le MD, 30 mg at 07/25/22 1523    levalbuterol (Alana Vamshi) inhalation solution 1 25 mg, 1 25 mg, Nebulization, TID, Elizabeth Akbar MD, 1 25 mg at 07/25/22 1332    ondansetron (ZOFRAN) injection 4 mg, 4 mg, Intravenous, Q4H PRN, Duran Mcneil PA-C, 4 mg at 07/23/22 2021    oxyCODONE (ROXICODONE) IR tablet 2 5 mg, 2 5 mg, Oral, Q4H PRN, Pancho Mitchell PA-C    oxyCODONE (ROXICODONE) IR tablet 5 mg, 5 mg, Oral, Q4H PRN, Pancho Mitchell PA-C, 5 mg at 07/24/22 2108    polyethylene glycol (MIRALAX) packet 17 g, 17 g, Oral, Daily PRN, Seema Huynh PA-C    senna (SENOKOT) tablet 8 6 mg, 1 tablet, Oral, HS, Seema Huynh PA-C, 8 6 mg at 07/23/22 2131    traZODone (DESYREL) tablet 50 mg, 50 mg, Oral, HS, Duran Mcneil PA-C    Labs & Results:    Lab Results   Component Value Date    TROPONINI 0 11 (H) 06/13/2021    TROPONINI 0 10 (H) 06/13/2021    TROPONINI 0 04 (H) 06/13/2021       Lab Results   Component Value Date    CALCIUM 9 6 07/25/2022     04/05/2018    K 4 9 07/25/2022    CO2 23 07/25/2022    CL 92 (L) 07/25/2022    BUN 61 (H) 07/25/2022    CREATININE 6 77 (H) 07/25/2022       Lab Results   Component Value Date    WBC 12 92 (H) 07/24/2022    HGB 10 8 (L) 07/24/2022    HCT 33 3 (L) 07/24/2022    MCV 90 07/24/2022     07/24/2022           Lab Results   Component Value Date    CHOL 216 (H) 04/05/2018     Lab Results   Component Value Date    HDL 43 (L) 05/12/2022    HDL 49 (L) 02/09/2022     Lab Results   Component Value Date    LDLCALC 113 (H) 05/12/2022    LDLCALC 121 (H) 02/09/2022     Lab Results   Component Value Date    TRIG 91 05/12/2022    TRIG 102 02/09/2022       Lab Results   Component Value Date    ALT 18 07/23/2022    AST 15 07/23/2022     Jesus Aquino MD

## 2022-07-25 NOTE — PHYSICAL THERAPY NOTE
Physical Therapy Cancellation Note           07/25/22 1050   PT Last Visit   PT Visit Date 07/25/22   Note Type   Note Type Cancelled Session   Cancel Reasons Patient off floor/test  (Pt chart reviewed  Pt currently off floor at dialysis    PT to continue to follow and see pt as apporpriate and able )     Debbie Goldstein, PT, DPT

## 2022-07-25 NOTE — PROGRESS NOTES
[facility]  Progress Note [place] 1960, 64 y o  female MRN: [id number]  Unit/Bed#: [facility] 501-01 Encounter: [id number]  Primary Care Provider: Jerome Wick DO   Date and time admitted to hospital: 7/22/2022  9:51 PM    * Acute on chronic systolic congestive heart failure ([address] )  Assessment & Plan  Wt Readings from Last 3 Encounters:   07/23/22 73 9 kg (162 lb 14 7 oz)   07/22/22 77 9 kg (171 lb 11 8 oz)   06/28/22 77 4 kg (170 lb 9 6 oz)     Had been unresponsive to diuretics and became anuric  Currently dialysis dependent  Volume control through hemodialysis        Acute on chronic respiratory failure with hypoxemia (HCC)  Assessment & Plan  · Due to above  · Wean O2 as able    Acute kidney injury superimposed on CKD-3  Assessment & Plan  Lab Results   Component Value Date    EGFR 8 07/24/2022    EGFR 6 07/23/2022    EGFR 7 07/22/2022    CREATININE 5 17 (H) 07/24/2022    CREATININE 6 15 (H) 07/23/2022    CREATININE 5 42 (H) 07/22/2022   Baseline creatinine 1 to 1 1  Begun on HD on 07/15 due to acute on chronic systolic heart failure unresponsive to diuretics with worsening hypoxic respiratory failure  Currently on HD on MWF  If continues to require HD PermCath to be placed next week    Essential hypertension  Assessment & Plan  · Uncontrolled hypertension  · Vascular consulted for left renal artery stenosis  · Follow-up labs for secondary hypertension workup  · Continue carvedilol 25 mg twice daily, hydralazine 50 mg q 8 hours, Isordil 30 mg q 8 hours    COPD (chronic obstructive pulmonary disease) (HCC)  Assessment & Plan  · Exacerbation during admission for which she was given 3 doses of Solu-Medrol 40 mg IV which was then discontinued  · Continue Xopenex nebs, Anoro    Tobacco abuse  Assessment & Plan  · Nicotine patch  · Smoking cessation advised    Anxiety  Assessment & Plan  · Home Klonopin was increased to twice daily  · Begun on Seroquel on 2022  · Zyprexa was added as needed    Hepatitis B  Assessment & Plan  · Tested positive when screened for dialysis  · Outpatient GI follow-up    Chronic pain  Assessment & Plan  · Continuous opioid dependent  · Continue home meds MS Contin and Percocet  VTE Pharmacologic Prophylaxis: VTE Score: 5 High Risk (Score >/= 5) - Pharmacological DVT Prophylaxis Ordered: heparin  Sequential Compression Devices Ordered  Patient Centered Rounds: Discussed with RN  Discussions with Specialists or Other Care Team Provider:     Education and Discussions with Family / Patient: Updated  (daughter) via phone  Time Spent for Care: 20 minutes  More than 50% of total time spent on counseling and coordination of care as described above  Current Length of Stay: 2 day(s)  Current Patient Status: Inpatient   Certification Statement: The patient will continue to require additional inpatient hospital stay due to As above    Code Status: Level 1 - Full Code    Subjective:   Shortness of breath better  Objective:     Vitals:   Temp (24hrs), Av 3 °F (36 8 °C), Min:97 8 °F (36 6 °C), Max:99 3 °F (37 4 °C)    Temp:  [97 8 °F (36 6 °C)-99 3 °F (37 4 °C)] 97 8 °F (36 6 °C)  HR:  [79-91] 79  Resp:  [13-18] 14  BP: (148-201)/() 148/89  SpO2:  [86 %-100 %] 96 %  Body mass index is 23 38 kg/m²  Physical Exam:   Physical Exam  Vitals reviewed  HENT:      Head: Normocephalic  Nose: Nose normal       Mouth/Throat:      Mouth: Mucous membranes are moist    Eyes:      Extraocular Movements: Extraocular movements intact  Cardiovascular:      Rate and Rhythm: Normal rate and regular rhythm  Pulmonary:      Effort: Pulmonary effort is normal  No respiratory distress  Breath sounds: Normal breath sounds  No wheezing  Abdominal:      General: Bowel sounds are normal  There is no distension  Palpations: Abdomen is soft  Tenderness: There is no abdominal tenderness     Musculoskeletal: General: No swelling  Cervical back: Neck supple  Skin:     General: Skin is warm and dry  Neurological:      General: No focal deficit present  Mental Status: She is alert and oriented to person, place, and time  Psychiatric:         Mood and Affect: Mood normal          Behavior: Behavior normal           Additional Data:     Labs:  Results from last 7 days   Lab Units 07/24/22  0615 07/23/22  0512 07/22/22  0420   WBC Thousand/uL 12 92*   < > 12 06*   HEMOGLOBIN g/dL 10 8*   < > 10 8*   HEMATOCRIT % 33 3*   < > 33 4*   PLATELETS Thousands/uL 261   < > 294   NEUTROS PCT %  --   --  66   LYMPHS PCT %  --   --  21   MONOS PCT %  --   --  8   EOS PCT %  --   --  2    < > = values in this interval not displayed       Results from last 7 days   Lab Units 07/24/22  0615 07/23/22  0512   SODIUM mmol/L 129* 130*   POTASSIUM mmol/L 4 8 4 2   CHLORIDE mmol/L 95* 90*   CO2 mmol/L 26 27   BUN mg/dL 42* 65*   CREATININE mg/dL 5 17* 6 15*   ANION GAP mmol/L 8 13   CALCIUM mg/dL 9 2 9 0   ALBUMIN g/dL  --  3 4*   TOTAL BILIRUBIN mg/dL  --  0 83   ALK PHOS U/L  --  92   ALT U/L  --  18   AST U/L  --  15   GLUCOSE RANDOM mg/dL 116 101                 Results from last 7 days   Lab Units 07/22/22  2314 07/20/22  0524 07/19/22  0352 07/18/22  0601   LACTIC ACID mmol/L 1 0  --   --   --    PROCALCITONIN ng/ml  --  1 75* 2 58* 2 97*       Lines/Drains:  Invasive Devices  Report    Peripheral Intravenous Line  Duration           Peripheral IV 07/22/22 Proximal;Right;Ventral (anterior) Forearm 2 days          Hemodialysis Catheter  Duration           HD Temporary Double Catheter 10 days                Last 24 Hours Medication List:   Current Facility-Administered Medications   Medication Dose Route Frequency Provider Last Rate    acetaminophen  650 mg Oral Q6H PRN Adina Burkett PA-C      albuterol  2 puff Inhalation Q4H PRN Princess Quinones MD      calcium acetate  1,334 mg Oral TID With Meals Adina Burkett BRAVO      carvedilol  25 mg Oral BID With Meals Geoff Camarillo,       clonazePAM  1 mg Oral BID PRN Seema Huynh PA-C      docusate sodium  100 mg Oral BID Seema Huynh PA-C      guaiFENesin  600 mg Oral Q12H [address] Easton Jarvis PA-C      heparin (porcine)  5,000 Units Subcutaneous [place], [place]      hydrALAZINE  10 mg Intravenous Q6H PRN Easton Jarvis PA-C      hydrALAZINE  50 mg Oral [facility] Tata Tsai MD      ipratropium  0 5 mg Nebulization TID Napoleon Pan MD      isosorbide dinitrate  30 mg Oral [facility] Tata Tsai MD      levalbuterol  1 25 mg Nebulization TID Napoleon Pan MD      ondansetron  4 mg Intravenous Q4H PRN Katelynn Zavlaa PA-C      oxyCODONE  2 5 mg Oral Q4H PRN Easton Jarvis PA-C      oxyCODONE  5 mg Oral Q4H PRN Easton Jarvis PA-C      polyethylene glycol  17 g Oral Daily PRN Seema Huynh PA-C      QUEtiapine  25 mg Oral HS Eatson Jarvis PA-C      senna  1 tablet Oral HS Henrry Alegre PA-C          Today, Patient Was Seen By: Deanna Suggs MD    **Please Note: This note may have been constructed using a voice recognition system  **

## 2022-07-25 NOTE — RESPIRATORY THERAPY NOTE
22 1851   Respiratory Assessment   Resp Comments AB 54/35/49/29/6 9  Gerhard Milner Gerhard Burgos placed patient on 12 L Mid flow  Patient did pull off BIPAP because she was not getting enough "air"  paitient is currently in no distress  Patient has great air movement     Additional Assessments   SpO2 96 %

## 2022-07-25 NOTE — PROGRESS NOTES
NEPHROLOGY PROGRESS NOTE   Konrad Abarca 64 y o  female MRN: [id number]  Unit/Bed#: [facility] 501-01 Encounter: [id number]  Reason for Consult: SHANTA    ASSESSMENT AND PLAN:  Severe SHANTA on CKD stage 3, baseline creatinine 1 to 1 2 with episodes of SHANTA  -due to severe SHANTA with volume overload, decompensated heart failure, hypoxic respiratory failure, she was started on dialysis on 7/15/22  -now remains dialysis dependent on MWF schedule  -HD today  -currently has temporary HD catheter, will need IR consult for [facility] [place] [facility]"" - Critical access hospital cath placement  -will discuss with IR if this can be done when patient gets renal artery angiogram  -no obvious signs of renal recovery, minimal urine output  -UF removal up to 2 5 L on HD today  Consider isolated ultrafiltration again tomorrow    I saw and examined patient during hemodialysis treatment at 12:03 PM on 7/25/2022  The patient was receiving hemodialysis for treatment of SHANTA  I also reviewed vital signs, intake and output, lab results and recent events, and agree with dialysis order  Tolerating HD  BP acceptable    Acute on chronic hypoxic respiratory failure  -in the setting of volume overload  -echo this month shows EF 25%  -currently remains on 4 L O2 via nasal cannula  -continue to challenge UF removal as BP tolerated  -consider isolated ultrafiltration again tomorrow    Hyponatremia, likely hypervolemic, strict fluid restriction 1 2 L per day recommended   -continue to monitor with dialysis    Uncontrolled hypertension  -suspect in the setting of renovascular component with renal artery stenosis  -appreciate vascular surgery follow-up   -tentative plan noted for renal artery angiogram with intervention on Wednesday  -renal artery Doppler showing right atrophic kidney, main right renal artery could not be identified, left renal artery greater than 60% stenosis  -currently on Coreg 25 mg b i d , hydralazine 50 mg t i d , isosorbide 30 mg t i d      Hyperphosphatemia, serum phosphorus 5 7  -renal diet  -currently on calcium acetate with meals    CKD anemia, transfuse p r n  For hemoglobin less than seven    SUBJECTIVE:  Patient seen and examined at bedside  She was not able to sleep well last night  Denies any nausea vomiting  Still having some shortness of breath      OBJECTIVE:  Current Weight: Weight - Scale: 73 8 kg (162 lb 11 2 oz)  Vitals:    07/25/22 1100   BP: (!) 178/118   Pulse: 97   Resp: 18   Temp:    SpO2:        Intake/Output Summary (Last 24 hours) at 7/25/2022 1143  Last data filed at 7/25/2022 0910  Gross per 24 hour   Intake 1100 ml   Output 0 ml   Net 1100 ml     Wt Readings from Last 3 Encounters:   07/25/22 73 8 kg (162 lb 11 2 oz)   07/22/22 77 9 kg (171 lb 11 8 oz)   06/28/22 77 4 kg (170 lb 9 6 oz)     Temp Readings from Last 3 Encounters:   07/25/22 97 8 °F (36 6 °C) (Axillary)   07/22/22 98 5 °F (36 9 °C) (Temporal)   06/22/22 97 9 °F (36 6 °C)     BP Readings from Last 3 Encounters:   07/25/22 (!) 178/118   07/22/22 155/81   06/28/22 (!) 174/110     Pulse Readings from Last 3 Encounters:   07/25/22 97   07/22/22 82   06/28/22 94        Physical Examination:  General:  Lying in bed, mild acute distress   Eyes:  Mild conjunctival pallor present  ENT:  External examination of ears and nose unremarkable  Neck:  No obvious lymphadenopathy appreciated  Respiratory:  Bilateral air entry present, decreased breath sound at bases  CVS:  S1, S2 present  GI:  Soft, nondistended  CNS:  Active alert oriented x3  Skin:  No new rash  Musculoskeletal:  No obvious new gross deformity noted    Medications:    Current Facility-Administered Medications:     acetaminophen (TYLENOL) tablet 650 mg, 650 mg, Oral, Q6H PRN, Balaji Reyes PA-C    albuterol (PROVENTIL HFA,VENTOLIN HFA) inhaler 2 puff, 2 puff, Inhalation, Q4H PRN, Marianne Taylor MD, 2 puff at 07/25/22 0600    calcium acetate (PHOSLO) capsule 1,334 mg, 1,334 mg, Oral, TID With Meals, Balaji Reyes PA-C, 1,334 mg at 07/24/22 1635    carvedilol (COREG) tablet 25 mg, 25 mg, Oral, BID With Meals, Ann Marie Guerrero, , 25 mg at 07/24/22 1635    clonazePAM (KlonoPIN) tablet 1 mg, 1 mg, Oral, BID PRN, Abdullahi Short PA-C, 1 mg at 07/25/22 1048    diphenhydrAMINE (BENADRYL) tablet 25 mg, 25 mg, Oral, Q6H PRN, Eris Rios PA-C, 25 mg at 07/24/22 2329    docusate sodium (COLACE) capsule 100 mg, 100 mg, Oral, BID, Seema Huynh PA-C, 100 mg at 07/24/22 0907    guaiFENesin (MUCINEX) 12 hr tablet 600 mg, 600 mg, Oral, Q12H [facility], [facility] BRAVO Deluca, 600 mg at 07/24/22 2104    heparin (porcine) subcutaneous injection 5,000 Units, 5,000 Units, Subcutaneous, Q8H [facility], [facility], 5,000 Units at 07/25/22 0530    hydrALAZINE (APRESOLINE) injection 10 mg, 10 mg, Intravenous, Q6H PRN, Michelle BRAVO Deluca, 10 mg at 07/24/22 0221    hydrALAZINE (APRESOLINE) tablet 50 mg, 50 mg, Oral, Q8H [facility], [facility] Ngoc Quintana MD, 50 mg at 07/25/22 0530    hydrocortisone 1 % cream, , Topical, 4x Daily PRN, Eris Rios PA-C    ipratropium (ATROVENT) 0 02 % inhalation solution 0 5 mg, 0 5 mg, Nebulization, TID, Davonte Tompkins MD, 0 5 mg at 07/25/22 0800    isosorbide dinitrate (ISORDIL) tablet 30 mg, 30 mg, Oral, Q8H [facility], [facility] Ngoc Quintana MD, 30 mg at 07/25/22 0530    levalbuterol (Thomasenia Murray) inhalation solution 1 25 mg, 1 25 mg, Nebulization, TID, Davonte Tompkins MD, 1 25 mg at 07/25/22 0800    ondansetron (ZOFRAN) injection 4 mg, 4 mg, Intravenous, Q4H PRN, Eris Rios PA-C, 4 mg at 07/23/22 2021    oxyCODONE (ROXICODONE) IR tablet 2 5 mg, 2 5 mg, Oral, Q4H PRN, Michelle Deluca PA-C    oxyCODONE (ROXICODONE) IR tablet 5 mg, 5 mg, Oral, Q4H PRN, Michelle Deluca PA-C, 5 mg at 07/24/22 2108    polyethylene glycol (MIRALAX) packet 17 g, 17 g, Oral, Daily PRN, Seema Huynh PA-C    senna (SENOKOT) tablet 8 6 mg, 1 tablet, Oral, HS, Seema Huynh PA-C, 8 6 mg at 07/23/22 2131    traZODone (DESYREL) tablet 50 mg, 50 mg, Oral, HS, [place], [place]    Laboratory Results:  Results from last 7 days   Lab Units 07/25/22  0431 07/24/22  0615 07/23/22  0512 07/22/22  2314 07/22/22  0420 07/21/22  0609 07/19/22  0353 07/19/22  0352   WBC Thousand/uL  --  12 92* 12 34*  --  12 06* 10 74* 12 78*  --    HEMOGLOBIN g/dL  --  10 8* 11 2*  --  10 8* 10 8* 11 4*  --    HEMATOCRIT %  --  33 3* 33 1*  --  33 4* 33 3* 33 7*  --    PLATELETS Thousands/uL  --  261 285  --  294 262 260  --    SODIUM mmol/L 127* 129* 130* 128* 126* 127*  --  127*   POTASSIUM mmol/L 4 9 4 8 4 2 4 1 5 1 4 8  --  4 4   CHLORIDE mmol/L 92* 95* 90* 90* 90* 92*  --  92*   CO2 mmol/L 23 26 27 30 19* 23  --  22   BUN mg/dL 61* 42* 65* 61* 91* 56*  --  47*   CREATININE mg/dL 6 77* 5 17* 6 15* 5 42* 8 02* 5 81*  --  5 04*   CALCIUM mg/dL 9 6 9 2 9 0 8 8 9 1 8 9  --  9 2   MAGNESIUM mg/dL  --   --  2 3 2 0  --   --   --  2 2   PHOSPHORUS mg/dL  --   --  5 7* 5 0*  --   --   --  6 1*       VAS renal artery complete   Final Result by Melanie Harrison MD (07/24 1949)          Portions of the record may have been created with voice recognition software  Occasional wrong word or "sound a like" substitutions may have occurred due to the inherent limitations of voice recognition software  Read the chart carefully and recognize, using context, where substitutions have occurred

## 2022-07-26 ENCOUNTER — APPOINTMENT (INPATIENT)
Dept: DIALYSIS | Facility: HOSPITAL | Age: 62
DRG: 252 | End: 2022-07-26
Attending: INTERNAL MEDICINE
Payer: MEDICARE

## 2022-07-26 LAB
ANION GAP SERPL CALCULATED.3IONS-SCNC: 13 MMOL/L (ref 4–13)
BUN SERPL-MCNC: 38 MG/DL (ref 5–25)
CALCIUM SERPL-MCNC: 9.3 MG/DL (ref 8.3–10.1)
CHLORIDE SERPL-SCNC: 93 MMOL/L (ref 96–108)
CO2 SERPL-SCNC: 24 MMOL/L (ref 21–32)
CREAT SERPL-MCNC: 5.33 MG/DL (ref 0.6–1.3)
GFR SERPL CREATININE-BSD FRML MDRD: 8 ML/MIN/1.73SQ M
GLUCOSE SERPL-MCNC: 101 MG/DL (ref 65–140)
POTASSIUM SERPL-SCNC: 4.3 MMOL/L (ref 3.5–5.3)
SODIUM SERPL-SCNC: 130 MMOL/L (ref 135–147)

## 2022-07-26 PROCEDURE — 90935 HEMODIALYSIS ONE EVALUATION: CPT | Performed by: INTERNAL MEDICINE

## 2022-07-26 PROCEDURE — 94660 CPAP INITIATION&MGMT: CPT | Performed by: SOCIAL WORKER

## 2022-07-26 PROCEDURE — 99233 SBSQ HOSP IP/OBS HIGH 50: CPT | Performed by: INTERNAL MEDICINE

## 2022-07-26 PROCEDURE — 94640 AIRWAY INHALATION TREATMENT: CPT

## 2022-07-26 PROCEDURE — 99232 SBSQ HOSP IP/OBS MODERATE 35: CPT | Performed by: SURGERY

## 2022-07-26 PROCEDURE — 80048 BASIC METABOLIC PNL TOTAL CA: CPT | Performed by: INTERNAL MEDICINE

## 2022-07-26 PROCEDURE — 94760 N-INVAS EAR/PLS OXIMETRY 1: CPT

## 2022-07-26 RX ORDER — HYDRALAZINE HYDROCHLORIDE 50 MG/1
100 TABLET, FILM COATED ORAL EVERY 8 HOURS SCHEDULED
Status: DISCONTINUED | OUTPATIENT
Start: 2022-07-26 | End: 2022-07-27

## 2022-07-26 RX ORDER — HYDRALAZINE HYDROCHLORIDE 50 MG/1
100 TABLET, FILM COATED ORAL EVERY 8 HOURS SCHEDULED
Status: DISCONTINUED | OUTPATIENT
Start: 2022-07-26 | End: 2022-07-26

## 2022-07-26 RX ADMIN — ISOSORBIDE DINITRATE 30 MG: 30 TABLET ORAL at 05:15

## 2022-07-26 RX ADMIN — DOCUSATE SODIUM 100 MG: 100 CAPSULE, LIQUID FILLED ORAL at 16:55

## 2022-07-26 RX ADMIN — LEVALBUTEROL HYDROCHLORIDE 1.25 MG: 1.25 SOLUTION, CONCENTRATE RESPIRATORY (INHALATION) at 19:50

## 2022-07-26 RX ADMIN — IPRATROPIUM BROMIDE 0.5 MG: 0.5 SOLUTION RESPIRATORY (INHALATION) at 13:26

## 2022-07-26 RX ADMIN — CALCIUM ACETATE 1334 MG: 667 CAPSULE ORAL at 16:55

## 2022-07-26 RX ADMIN — LEVALBUTEROL HYDROCHLORIDE 1.25 MG: 1.25 SOLUTION, CONCENTRATE RESPIRATORY (INHALATION) at 13:26

## 2022-07-26 RX ADMIN — DOCUSATE SODIUM 100 MG: 100 CAPSULE, LIQUID FILLED ORAL at 12:04

## 2022-07-26 RX ADMIN — CARVEDILOL 25 MG: 25 TABLET, FILM COATED ORAL at 16:55

## 2022-07-26 RX ADMIN — SENNOSIDES 8.6 MG: 8.6 TABLET, FILM COATED ORAL at 21:06

## 2022-07-26 RX ADMIN — GUAIFENESIN 600 MG: 600 TABLET, EXTENDED RELEASE ORAL at 21:07

## 2022-07-26 RX ADMIN — IPRATROPIUM BROMIDE 0.5 MG: 0.5 SOLUTION RESPIRATORY (INHALATION) at 19:50

## 2022-07-26 RX ADMIN — LEVALBUTEROL HYDROCHLORIDE 1.25 MG: 1.25 SOLUTION, CONCENTRATE RESPIRATORY (INHALATION) at 07:10

## 2022-07-26 RX ADMIN — HEPARIN SODIUM 5000 UNITS: 5000 INJECTION INTRAVENOUS; SUBCUTANEOUS at 05:14

## 2022-07-26 RX ADMIN — HYDRALAZINE HYDROCHLORIDE 100 MG: 50 TABLET, FILM COATED ORAL at 21:06

## 2022-07-26 RX ADMIN — HYDRALAZINE HYDROCHLORIDE 50 MG: 50 TABLET, FILM COATED ORAL at 05:14

## 2022-07-26 RX ADMIN — ISOSORBIDE DINITRATE 30 MG: 30 TABLET ORAL at 21:09

## 2022-07-26 RX ADMIN — CARVEDILOL 25 MG: 25 TABLET, FILM COATED ORAL at 12:04

## 2022-07-26 RX ADMIN — ISOSORBIDE DINITRATE 30 MG: 30 TABLET ORAL at 14:30

## 2022-07-26 RX ADMIN — HYDRALAZINE HYDROCHLORIDE 10 MG: 20 INJECTION, SOLUTION INTRAMUSCULAR; INTRAVENOUS at 21:14

## 2022-07-26 RX ADMIN — TRAZODONE HYDROCHLORIDE 50 MG: 50 TABLET ORAL at 21:06

## 2022-07-26 RX ADMIN — CLONAZEPAM 1 MG: 1 TABLET ORAL at 05:14

## 2022-07-26 RX ADMIN — IPRATROPIUM BROMIDE 0.5 MG: 0.5 SOLUTION RESPIRATORY (INHALATION) at 07:10

## 2022-07-26 RX ADMIN — HEPARIN SODIUM 5000 UNITS: 5000 INJECTION INTRAVENOUS; SUBCUTANEOUS at 21:07

## 2022-07-26 RX ADMIN — DIPHENHYDRAMINE HCL 25 MG: 25 TABLET ORAL at 22:54

## 2022-07-26 RX ADMIN — CALCIUM ACETATE 1334 MG: 667 CAPSULE ORAL at 12:04

## 2022-07-26 RX ADMIN — GUAIFENESIN 600 MG: 600 TABLET, EXTENDED RELEASE ORAL at 12:04

## 2022-07-26 RX ADMIN — HYDRALAZINE HYDROCHLORIDE 100 MG: 50 TABLET, FILM COATED ORAL at 12:50

## 2022-07-26 NOTE — PROGRESS NOTES
Progress Note - Snow Aquino 64 y o  female MRN: [id number]    Unit/Bed#: [facility] 501-01 Encounter: [id number]      Assessment:  65 y/o F w COPD, CHF, refractory HTN and renal artery stenosis now requiring HD  requiring 10 L midflow  spo2 98%  systolics ranging 775-507  Plan:  NPO  Wean oxygen as tolerated  L Renal stent w IR later today    Subjective:   Feels well  Mild shortness of breath when she lays down  Denied chest pain or shortness of breath  Objective:     Vitals: Blood pressure (!) 183/118, pulse 93, temperature 98 4 °F (36 9 °C), temperature source Oral, resp  rate 18, height 5' 10" (1 778 m), weight 72 4 kg (159 lb 9 8 oz), SpO2 98 %  ,Body mass index is 22 9 kg/m²  Intake/Output Summary (Last 24 hours) at 7/26/2022 0850  Last data filed at 7/26/2022 0805  Gross per 24 hour   Intake 1820 ml   Output 0 ml   Net 1820 ml       Physical Exam  General: NAD  HEENT: NC/AT  MMM  Cv: RRR     Lungs: normal effort  Ab: Soft, NT/ND  Ex: no CCE  Neuro: AAOx3    Scheduled Meds:  Current Facility-Administered Medications   Medication Dose Route Frequency Provider Last Rate    acetaminophen  650 mg Oral Q6H PRN Easton Jarvis PA-C      albuterol  2 puff Inhalation Q4H PRN Napoleon Pan MD      amLODIPine  5 mg Oral Daily Isela Pan MD      calcium acetate  1,334 mg Oral TID With Meals Easton Jarvis PA-C      carvedilol  25 mg Oral BID With Meals Geoff Camarillo DO      clonazePAM  1 mg Oral BID PRN Henrry Alegre PA-C      diphenhydrAMINE  25 mg Oral Q6H PRN Katelynn Zavala PA-C      docusate sodium  100 mg Oral BID Seema Huynh PA-C      guaiFENesin  600 mg Oral Q12H [address] Easton Jarvis PA-C      heparin (porcine)  5,000 Units Subcutaneous [facility] [place]      hydrALAZINE  10 mg Intravenous Q6H PRN Easton Jarvis PA-C      hydrALAZINE  50 mg Oral [facility] Tata Tsai MD      hydrocortisone   Topical 4x Daily PRN Cori Sale, PA-C      ipratropium  0 5 mg Nebulization TID Елена Beavers MD      isosorbide dinitrate  30 mg Oral [facility] Shawnee Avila MD      levalbuterol  1 25 mg Nebulization TID Елена Beavers MD      ondansetron  4 mg Intravenous Q4H PRN Will Dhillon PA-C      oxyCODONE  2 5 mg Oral Q4H PRN Johnie Fothergill, PA-C      oxyCODONE  5 mg Oral Q4H PRN Johnie Fothergill, PA-C      polyethylene glycol  17 g Oral Daily PRN Mago Hess PA-C      senna  1 tablet Oral HS Seema Huynh PA-C      traZODone  50 mg Oral HS Will Dhillon PA-C       Continuous Infusions:   PRN Meds:   acetaminophen    albuterol    clonazePAM    diphenhydrAMINE    hydrALAZINE    hydrocortisone    ondansetron    oxyCODONE    oxyCODONE    polyethylene glycol      Invasive Devices  Report    Peripheral Intravenous Line  Duration           Peripheral IV 07/26/22 Right Wrist <1 day          Hemodialysis Catheter  Duration           HD Temporary Double Catheter 11 days                Lab, Imaging and other studies: I have personally reviewed pertinent reports      VTE Pharmacologic Prophylaxis: Sequential compression device (Venodyne)   VTE Mechanical Prophylaxis: sequential compression device

## 2022-07-26 NOTE — ASSESSMENT & PLAN NOTE
· Had increased anxiety  · Home Klonopin was increased from 1 mg daily prn to [facility]  · Was also begun on Seroquel at [facility] which was discontinued after dose last night due to new rash

## 2022-07-26 NOTE — ASSESSMENT & PLAN NOTE
· Tested positive when screened for dialysis  · W/u suggestive of resolved infection  · Outpatient GI follow-up

## 2022-07-26 NOTE — INCIDENTAL FINDINGS
The following findings require follow up:  Non-Radiographic finding   Finding: Positive Hepatitis B core total antibody   Follow up required:  Follow-up with gastroenterology   Follow up should be done within 4 week(s)    Please notify the following clinician to assist with the follow up:   Dr Juani Eugene

## 2022-07-26 NOTE — ASSESSMENT & PLAN NOTE
Wt Readings from Last 3 Encounters:   07/25/22 73 8 kg (162 lb 11 2 oz)   07/22/22 77 9 kg (171 lb 11 8 oz)   06/28/22 77 4 kg (170 lb 9 6 oz)     · Presented to [facility] on 7/12 with shortness of breath with minimal exertion  · Found to be in acute on chronic systolic heart failure  · Was unresponsive to diuretics and became anuric  · History of nonischemic cardiomyopathy - EF 18% on 3/37/21   Improved to 50% on 7/9/21  · EF 41% on 6/15/22 when admitted to [facility] from 6/15/22 to 6/22/22 with acute on chronic combined CHF and COPD exacerbation  · Repeat echo on 7/14/22 with EF 25%  · Cardiac cath in March 2021 showed moderate nonobstructive atherosclerosis  · Currently dialysis dependent  · Volume control through hemodialysis

## 2022-07-26 NOTE — ASSESSMENT & PLAN NOTE
· On O2 at 2L at rest and 4L with exertion at home  · Worsening hypoxia due to acute on chronic systolic heart failure  · Wean O2 as able

## 2022-07-26 NOTE — ASSESSMENT & PLAN NOTE
· Initial concern for exacerbation during stay at [address] prior to transfer here for which she was given 3 doses of Solu-Medrol 40 mg IV which was then discontinued  · Home regimen: Anoro Ellipta (62 5/25mcg) 1 puff daily  · Continue Xopenex/Atrovent nebs

## 2022-07-26 NOTE — ASSESSMENT & PLAN NOTE
· Initial concern for exacerbation during stay at [address] prior to transfer here for which she was given 3 doses of Solu-Medrol 40 mg IV which was then discontinued  · Home regimen: Anoro Ellipta (62 5/25mcg) 1 puff daily  · Continue Xopenex/Atrovent nebs

## 2022-07-26 NOTE — ASSESSMENT & PLAN NOTE
· Had increased anxiety  · Home Klonopin was increased from 1 mg daily prn to twice daily prn at [facility] prior to transfer  · Was also begun on Seroquel at [facility] which was discontinued after dose last night due to new rash

## 2022-07-26 NOTE — CASE MANAGEMENT
Case Management Assessment    Patient name Salvador Liner  [facility] MRN [id number]  : 1960 Date 2022       Current Admission Date: 2022  Current Admission Diagnosis:Acute on chronic systolic congestive heart failure [facility])   Patient Active Problem List    Diagnosis Date Noted    Hepatitis B 2022    Acute on chronic respiratory failure with hypoxia ([facility] ) 2022    Acute respiratory insufficiency 2022    Acute kidney injury superimposed on CKD-3 2022    COPD (chronic obstructive pulmonary disease) ([facility] ) 06/15/2022    Acute on chronic systolic congestive heart failure ([facility] ) 2021    Tobacco abuse 2021    Leukocytosis 2021    Anxiety 2021    Dilated cardiomyopathy ([facility] ) 2021    Essential hypertension 2021    Chronic pain 2021    Vitamin D deficiency 2013      LOS (days): 4  Geometric Mean LOS (GMLOS) (days): 3 80  Days to GMLOS:0     OBJECTIVE:  PATIENT READMITTED TO HOSPITAL  Risk of Unplanned Readmission Score: 22 11         Current admission status: Inpatient       Preferred Pharmacy:   [id number] - [address] [address]  Phone: [phone] Fax: [phone]    Primary Care Provider: Serge Ramey DO    Primary Insurance: MEDICARE MISC REPLACEMENT  Secondary Insurance: [address]    ASSESSMENT:  [address] [place] [address] Representative - Daughter   Primary Phone: [phone] (Mobile)               Advance Directives  Was patient offered paperwork?:  (declined)  Does patient currently have a Health Care decision maker?: Yes, please see Health Care Proxy section  Does patient have Advance Directives?: No  Was patient offered paperwork?: Yes (declined)  Primary Contact: Sha Durbin    Patient Information  Admitted from[de-identified] Other (comment) (Transfer from [facility])  Mental Status: Alert  During Assessment patient was accompanied by: Not accompanied during assessment  Assessment information provided by[de-identified] Patient  Primary Caregiver: Self  Support Systems: Daughter  [place] of Residence: [address] do you live in?: [address] [facility] entry access options   Select all that apply : Stairs  Number of steps to enter home : 7  Do the steps have railings?: Yes  Type of Current Residence: 2 story home  Upon entering residence, is there a bedroom on the main floor (no further steps)?: Yes  A bedroom is located on the following floor levels of residence (select all that apply):: 2nd Floor  Upon entering residence, is there a bathroom on the main floor (no further steps)?: No  Indicate which floors of current residence have a bathroom (select all the apply):: 2nd Floor  Number of steps to 2nd floor from main floor: One Flight  In the last 12 months, was there a time when you were not able to pay the mortgage or rent on time?: No  In the last 12 months, how many places have you lived?: 1  In the last 12 months, was there a time when you did not have a steady place to sleep or slept in a shelter (including now)?: No  Homeless/housing insecurity resource given?: N/A  Living Arrangements: Lives Alone  Is patient a ?: No    Activities of Daily Living Prior to Admission  Functional Status: Independent  Completes ADLs independently?: Yes  Ambulates independently?: Yes  Does patient use assisted devices?: No  Does patient currently own DME?: Yes  What DME does the patient currently own?: Home Oxygen concentrator, Portable Oxygen concentrator ([employer])  Does patient have a history of Outpatient Therapy (PT/OT)?: No  Does the patient have a history of Short-Term Rehab?: No  Does patient have a history of HHC?: Yes (past [facility], [facility]  Does patient currently have [address]?: No (was referred to [facility] while at [facility])    Patient Information Continued  Does patient have prescription coverage?: Yes  Within the past 12 months, you worried that your food would run out before you got the money to buy more : Never true  Within the past 12 months, the food you bought just didn't last and you didn't have money to get more : Never true  Food insecurity resource given?: N/A  Does patient receive dialysis treatments?: No (anticipate patient will be a new start HD)  Does patient have a history of substance abuse?: No  Does patient have a history of Mental Health Diagnosis?: No    Means of Transportation  Means of Transport to Appts[de-identified] Drives Self  In the past 12 months, has lack of transportation kept you from medical appointments or from getting medications?: No  In the past 12 months, has lack of transportation kept you from meetings, work, or from getting things needed for daily living?: No  Was application for public transport provided?: N/A     AIDIN referral was made to [facility] AT [place]  AIDIN referral made to [facility] for [facility] while patient was at [facility]  CM sent message via [employer] that we have no dc date and called [phone] and spoke to [address] [place] [address] # [phone]  Explained no dc date at this time  They will place HD referral on hold and when CM send new message with anticipated dc date they will look for open schedule at [facility]

## 2022-07-26 NOTE — ASSESSMENT & PLAN NOTE
Wt Readings from Last 3 Encounters:   07/25/22 73 8 kg (162 lb 11 2 oz)   07/22/22 77 9 kg (171 lb 11 8 oz)   06/28/22 77 4 kg (170 lb 9 6 oz)     · Presented to [facility] on 7/12 with shortness of breath with minimal exertion  · Found to be in acute on chronic systolic heart failure  · Was unresponsive to diuretics and became anuric  · History of nonischemic cardiomyopathy - EF 18% on 3/37/21   Improved to 50% on 7/9/21  · [facility]  from 6/15/22 to 6/22/22 with acute on chronic combined CHF and COPD exacerbation  · Repeat echo on 7/14/22 with EF 25%  · Cardiac cath in March 2021 showed moderate nonobstructive atherosclerosis  · Currently dialysis dependent  · Volume control through hemodialysis

## 2022-07-26 NOTE — PLAN OF CARE
Post-Dialysis RN Treatment Note    Blood Pressure:  Pre: mm/Hg  Post: mmHg   EDW: TBD   Weight:  Pre: 71 3 kg   Post: 70 5 kg   Mode of weight measurement: Standing Scale   Volume Removed: 800 ml    Treatment duration: 150 minutes    NS given  No    Treatment shortened?  No   Medications given during Rx None Reported   Estimated Kt/V  Not Applicable   Access type: Temporary HD catheter   Access Issues: Yes, describe: Unable to obtain ordered BFR, despite reversing lines     Report called to primary nurse   Yes, Kizzy    Problem: METABOLIC, FLUID AND ELECTROLYTES - ADULT  Goal: Electrolytes maintained within normal limits  Description: INTERVENTIONS:  - Monitor labs and assess patient for signs and symptoms of electrolyte imbalances  - Administer electrolyte replacement as ordered  - Monitor response to electrolyte replacements, including repeat lab results as appropriate  - Instruct patient on fluid and nutrition as appropriate  Outcome: Progressing  Goal: Fluid balance maintained  Description: INTERVENTIONS:  - Monitor labs   - Monitor I/O and WT  - Instruct patient on fluid and nutrition as appropriate  - Assess for signs & symptoms of volume excess or deficit  Outcome: Progressing

## 2022-07-26 NOTE — PROGRESS NOTES
[facility]  Progress Note [place]lin [place] 1960, 64 y o  female MRN: [id number]  Unit/Bed#: [facility] 501-01 Encounter: [id number]  Primary Care Provider: Suresh Pagan DO   Date and time admitted to hospital: 7/22/2022  9:51 PM    * Acute on chronic systolic congestive heart failure (HCC)  Assessment & Plan  Wt Readings from Last 3 Encounters:   07/25/22 73 8 kg (162 lb 11 2 oz)   07/22/22 77 9 kg (171 lb 11 8 oz)   06/28/22 77 4 kg (170 lb 9 6 oz)     · Presented to [address] [place] [address] on 7/12 with shortness of breath with minimal exertion  · Found to be in acute on chronic systolic heart failure  · Was unresponsive to diuretics and became anuric  · History of nonischemic cardiomyopathy - EF 18% on 3/37/21   Improved to 50% on 7/9/21  · EF 41% on 6/15/22 when admitted to [address] [address] from 6/15/22 to 6/22/22 with acute on chronic combined CHF and COPD exacerbation  · Repeat echo on 7/14/22 with EF 25%  · Cardiac cath in March 2021 showed moderate nonobstructive atherosclerosis  · Currently dialysis dependent  · Volume control through hemodialysis       Acute on chronic respiratory failure with hypoxia (HCC)  Assessment & Plan  · On O2 at 2L at rest and 4L with exertion at home  · Worsening hypoxia due to acute on chronic systolic heart failure  · Wean O2 as able    Acute kidney injury superimposed on CKD-3  Assessment & Plan  Lab Results   Component Value Date    EGFR 6 07/25/2022    EGFR 8 07/24/2022    EGFR 6 07/23/2022    CREATININE 6 77 (H) 07/25/2022    CREATININE 5 17 (H) 07/24/2022    CREATININE 6 15 (H) 07/23/2022   · Baseline creatinine 1 to 1 1  · Begun on HD on 07/15 due to acute on chronic systolic heart failure unresponsive to diuretics with worsening hypoxic respiratory failure  · Currently on HD on MWF  · Plan for PermaCath on 07/26      Essential hypertension  Assessment & Plan  · Uncontrolled hypertension  · Home meds : Carvedilol 3 125 mg BID, lasix 40 mg daily  · Currently on Carvedilol 25 mg twice daily, hydralazine 50 mg q 8 hours, Isordil 30 mg q 8 hours  Amlodipine 5 mg daily added today  · Workup revealed high-grade stenosis of left renal artery - vascular plans arteriogram with renal artery stent on 7/27  · Follow-up labs for secondary hypertension workup  · Cardiology following  · Cardiology/vascular input appreciated        COPD (chronic obstructive pulmonary disease) ([facility] [address] )  Assessment & Plan  · Initial concern for exacerbation during stay at [address] prior to transfer here for which she was given 3 doses of Solu-Medrol 40 mg IV which was then discontinued  · Home regimen: Anoro Ellipta (62 5/25mcg) 1 puff daily  · Continue Xopenex/Atrovent nebs    Tobacco abuse  Assessment & Plan  · Nicotine patch  · Smoking cessation advised    Anxiety  Assessment & Plan  · Had increased anxiety  · Home Klonopin was increased from 1 mg daily prn to twice daily prn at [address] prior to transfer  · Was also begun on Seroquel at [facility] which was discontinued after dose last night due to new rash      Hepatitis B  Assessment & Plan  · Tested positive when screened for dialysis  · W/u suggestive of resolved infection  · Outpatient GI follow-up    Chronic pain  Assessment & Plan  · Continuous opioid dependent  · Continue home meds MS Contin and Percocet  VTE Pharmacologic Prophylaxis: VTE Score: 5 High Risk (Score >/= 5) - Pharmacological DVT Prophylaxis Ordered: heparin  Sequential Compression Devices Ordered  Patient Centered Rounds: Discussed with RN    Education and Discussions with Family / Patient: Updated  (daughter) via phone  Time Spent for Care: 20 minutes  More than 50% of total time spent on counseling and coordination of care as described above      Current Length of Stay: 3 day(s)  Current Patient Status: Inpatient   Certification Statement: The patient will continue to require additional inpatient hospital stay due to As above    Code Status: Level 1 - Full Code    Subjective:   Shortness of breath better  Intermittent increase in anxiety  Noted a generalized itchy, maculopapular rash few days ago  Objective:     Vitals:   Temp (24hrs), Av 8 °F (36 6 °C), Min:97 7 °F (36 5 °C), Max:97 9 °F (36 6 °C)    Temp:  [97 7 °F (36 5 °C)-97 9 °F (36 6 °C)] 97 8 °F (36 6 °C)  HR:  [] 89  Resp:  [16-18] 16  BP: (142-183)/() 157/96  SpO2:  [93 %-99 %] 98 %  Body mass index is 23 34 kg/m²  Physical Exam:   Physical Exam  Vitals reviewed  HENT:      Head: Normocephalic  Nose: Nose normal       Mouth/Throat:      Mouth: Mucous membranes are moist    Eyes:      Extraocular Movements: Extraocular movements intact  Cardiovascular:      Rate and Rhythm: Normal rate and regular rhythm  Pulmonary:      Effort: Pulmonary effort is normal  No respiratory distress  Breath sounds: Normal breath sounds  No wheezing  Abdominal:      General: Bowel sounds are normal  There is no distension  Palpations: Abdomen is soft  Tenderness: There is no abdominal tenderness  Musculoskeletal:         General: No swelling  Cervical back: Neck supple  Skin:     Comments: Generalized maculopapular rash   Neurological:      General: No focal deficit present  Mental Status: She is alert and oriented to person, place, and time  Psychiatric:         Mood and Affect: Mood normal          Behavior: Behavior normal           Additional Data:     Labs:  Results from last 7 days   Lab Units 22  0615 22  0512 22  0420   WBC Thousand/uL 12 92*   < > 12 06*   HEMOGLOBIN g/dL 10 8*   < > 10 8*   HEMATOCRIT % 33 3*   < > 33 4*   PLATELETS Thousands/uL 261   < > 294   NEUTROS PCT %  --   --  66   LYMPHS PCT %  --   --  21   MONOS PCT %  --   --  8   EOS PCT %  --   --  2    < > = values in this interval not displayed       Results from last 7 days   Lab Units 22  0431 22  0615 22  8136 SODIUM mmol/L 127*   < > 130*   POTASSIUM mmol/L 4 9   < > 4 2   CHLORIDE mmol/L 92*   < > 90*   CO2 mmol/L 23   < > 27   BUN mg/dL 61*   < > 65*   CREATININE mg/dL 6 77*   < > 6 15*   ANION GAP mmol/L 12   < > 13   CALCIUM mg/dL 9 6   < > 9 0   ALBUMIN g/dL  --   --  3 4*   TOTAL BILIRUBIN mg/dL  --   --  0 83   ALK PHOS U/L  --   --  92   ALT U/L  --   --  18   AST U/L  --   --  15   GLUCOSE RANDOM mg/dL 111   < > 101    < > = values in this interval not displayed                   Results from last 7 days   Lab Units 07/22/22  2314 07/20/22  0524 07/19/22  0352   LACTIC ACID mmol/L 1 0  --   --    PROCALCITONIN ng/ml  --  1 75* 2 58*       Lines/Drains:  Invasive Devices  Report    Peripheral Intravenous Line  Duration           Peripheral IV 07/22/22 Proximal;Right;Ventral (anterior) Forearm 3 days          Hemodialysis Catheter  Duration           HD Temporary Double Catheter 11 days                  Last 24 Hours Medication List:   Current Facility-Administered Medications   Medication Dose Route Frequency Provider Last Rate    acetaminophen  650 mg Oral Q6H PRN Phan Argueta PA-C      albuterol  2 puff Inhalation Q4H PRN Angel Cerna MD      amLODIPine  5 mg Oral Daily Cosmo Fitzgerald MD      calcium acetate  1,334 mg Oral TID With Meals Phan Argueta PA-C      carvedilol  25 mg Oral BID With Meals Tonja Schmitt DO      clonazePAM  1 mg Oral BID PRN Mason Donovan PA-C      diphenhydrAMINE  25 mg Oral Q6H PRN Sherolyn Duverney, PA-C      docusate sodium  100 mg Oral BID Seema Huynh PA-C      guaiFENesin  600 mg Oral Q12H [address] Phan Argueta PA-C      heparin (porcine)  5,000 Units Subcutaneous [facility]ie [place]      hydrALAZINE  10 mg Intravenous Q6H PRN Phan Argueta PA-C      hydrALAZINE  50 mg Oral [facility] Fadumo Saavedra MD      hydrocortisone   Topical 4x Daily PRN Sherolyn Duverney, PA-C      ipratropium  0 5 mg Nebulization MD Elysia Law isosorbide dinitrate  30 mg Oral [facility] Carin Haywood MD      levalbuterol  1 25 mg Nebulization TID Lennox Kapur, MD      ondansetron  4 mg Intravenous Q4H PRN Mayo Shaver PA-C      oxyCODONE  2 5 mg Oral Q4H PRN Billy Loza PA-C      oxyCODONE  5 mg Oral Q4H PRN Billy Loza PA-C      polyethylene glycol  17 g Oral Daily PRN Charito Dawn PA-C      senna  1 tablet Oral HS Seema Huynh PA-C      traZODone  50 mg Oral HS Mayo Shaver PA-C          Today, Patient Was Seen By: Ashwin Watson MD    **Please Note: This note may have been constructed using a voice recognition system  **

## 2022-07-26 NOTE — PROGRESS NOTES
NEPHROLOGY PROGRESS NOTE   Shasta Sicard 64 y o  female MRN: [id number]  Unit/Bed#: [facility] 501-01 Encounter: [id number]  Reason for Consult: SHANTA    ASSESSMENT AND PLAN:  Severe SHANTA on CKD stage 3, baseline creatinine 1 to 1 2 with episodes of SHANTA  -due to severe SHANTA with volume overload, decompensated heart failure, hypoxic respiratory failure, she was started on dialysis on 7/15/22  -now remains dialysis dependent on MWF schedule  -tolerated HD well yesterday  Post HD weight 71 7 kg  Today pre HD weight 71 3 kg   -isolated ultrafiltration today with 2 5 L UF removal   -next HD again tomorrow, continue to challenge UF removal as BP tolerated  -will eventually need PermCath placement by IR/vascular  -no obvious signs of renal recovery, minimal urine output    I saw and examined patient during hemodialysis treatment at 10:33 AM on 7/26/2022  The patient was receiving ultrafiltration for treatment of SHANTA  I also reviewed vital signs, intake and output, lab results and recent events, and agree with dialysis order  Tolerating HD   BP higher     Acute on chronic hypoxic respiratory failure  -in the setting of volume overload  -echo this month shows EF 25%  -currently remains on 6 L O2 via nasal cannula, required BiPAP overnight   -continue to challenge UF removal as BP tolerated     Hyponatremia, likely hypervolemic, strict fluid restriction 1 2 L per day recommended   -continue to monitor with dialysis     Uncontrolled hypertension  -suspect in the setting of renovascular component with renal artery stenosis  -appreciate vascular surgery follow-up   -tentative plan noted for renal artery angiogram with intervention once respiratory status improves  -renal artery Doppler showing right atrophic kidney, main right renal artery could not be identified, left renal artery greater than 60% stenosis  -currently on Coreg 25 mg b i d , hydralazine 50 mg t i d , isosorbide 30 mg t i d   -consider increasing hydralazine to 100 mg t  i  kenneth Henrqiuez -continue to monitor BP with UF removal challenge on dialysis  Renal artery stenosis  -plan noted for angiogram/renal artery stenting with vascular surgery once respiratory status improves  PermCath placement at the same time as well      Hyperphosphatemia, serum phosphorus 5 7  -renal diet  -currently on calcium acetate with meals     CKD anemia, transfuse p r n  For hemoglobin less than seven  -hemoglobin overall stable at goal, avoid Epogen given uncontrolled hypertension  Discussed above plan in detail with vascular surgery team     SUBJECTIVE:  Patient seen and examined at bedside  Denies chest pain, required BiPAP overnight, shortness of breath seems to be present off and on      OBJECTIVE:  Current Weight: Weight - Scale: 72 4 kg (159 lb 9 8 oz)  Vitals:    07/26/22 0930   BP: 152/99   Pulse: 89   Resp: 18   Temp:    SpO2:        Intake/Output Summary (Last 24 hours) at 7/26/2022 1025  Last data filed at 7/26/2022 0805  Gross per 24 hour   Intake 1420 ml   Output 0 ml   Net 1420 ml     Wt Readings from Last 3 Encounters:   07/26/22 72 4 kg (159 lb 9 8 oz)   07/22/22 77 9 kg (171 lb 11 8 oz)   06/28/22 77 4 kg (170 lb 9 6 oz)     Temp Readings from Last 3 Encounters:   07/26/22 98 4 °F (36 9 °C) (Oral)   07/22/22 98 5 °F (36 9 °C) (Temporal)   06/22/22 97 9 °F (36 6 °C)     BP Readings from Last 3 Encounters:   07/26/22 152/99   07/22/22 155/81   06/28/22 (!) 174/110     Pulse Readings from Last 3 Encounters:   07/26/22 89   07/22/22 82   06/28/22 94        Physical Examination:  General:  Sitting in chair, no acute distress   Eyes:  Mild conjunctival pallor present  ENT:  External examination of ears and nose unremarkable  Neck:  No obvious lymphadenopathy appreciated  Respiratory:  Bilateral entry present  CVS:  S1, S2 present  GI:  Soft, nondistended  CNS:  Active alert oriented x3  Skin:  No new rash  Musculoskeletal:  No obvious new gross deformity noted    Medications:    Current Facility-Administered Medications:     acetaminophen (TYLENOL) tablet 650 mg, 650 mg, Oral, Q6H PRN, Sammy Vale PA-C    albuterol (PROVENTIL HFA,VENTOLIN HFA) inhaler 2 puff, 2 puff, Inhalation, Q4H PRN, Dwight Dutton MD, 2 puff at 07/25/22 0600    amLODIPine (NORVASC) tablet 5 mg, 5 mg, Oral, Daily, Oriana Villalobos MD, 5 mg at 07/25/22 1836    calcium acetate (PHOSLO) capsule 1,334 mg, 1,334 mg, Oral, TID With Meals, DOTTIE Campa-C, 1,334 mg at 07/25/22 1523    carvedilol (COREG) tablet 25 mg, 25 mg, Oral, BID With Meals, Micaela Hendrix DO, 25 mg at 07/25/22 1523    clonazePAM (KlonoPIN) tablet 1 mg, 1 mg, Oral, BID PRN, Ainsley Harrington PA-C, 1 mg at 07/26/22 0514    diphenhydrAMINE (BENADRYL) tablet 25 mg, 25 mg, Oral, Q6H PRN, COLTEN SantiagoC, 25 mg at 07/24/22 2329    docusate sodium (COLACE) capsule 100 mg, 100 mg, Oral, BID, COLTEN MataC, 100 mg at 07/25/22 1836    guaiFENesin (MUCINEX) 12 hr tablet 600 mg, 600 mg, Oral, Q12H [address], DOTTIE Campa-C, 600 mg at 07/25/22 2231    heparin (porcine) subcutaneous injection 5,000 Units, 5,000 Units, Subcutaneous, Q8H [address], Sammy Vale PA-C, 5,000 Units at 07/26/22 0514    hydrALAZINE (APRESOLINE) injection 10 mg, 10 mg, Intravenous, Q6H PRN, DOTTIE Campa-C, 10 mg at 07/24/22 0221    hydrALAZINE (APRESOLINE) tablet 50 mg, 50 mg, Oral, Q8H [address], Moy Mckeon MD, 50 mg at 07/26/22 0514    hydrocortisone 1 % cream, , Topical, 4x Daily PRN, Joe Morales PA-C    ipratropium (ATROVENT) 0 02 % inhalation solution 0 5 mg, 0 5 mg, Nebulization, TID, Dwight Dutton MD, 0 5 mg at 07/26/22 0710    isosorbide dinitrate (ISORDIL) tablet 30 mg, 30 mg, Oral, Q8H [address], Moybassam Mckeon MD, 30 mg at 07/26/22 0515    levalbuterol (Mechele Ma) inhalation solution 1 25 mg, 1 25 mg, Nebulization, TID, Dwight Dutton MD, 1 25 mg at 07/26/22 0710    ondansetron (ZOFRAN) injection 4 mg, 4 mg, Intravenous, Q4H PRN, Isabela Quinteros PA-C, 4 mg at 07/23/22 2021    oxyCODONE (ROXICODONE) IR tablet 2 5 mg, 2 5 mg, Oral, Q4H PRN, Silvio Stroud PA-C    oxyCODONE (ROXICODONE) IR tablet 5 mg, 5 mg, Oral, Q4H PRN, Silvio Stroud PA-C, 5 mg at 07/25/22 2324    polyethylene glycol (MIRALAX) packet 17 g, 17 g, Oral, Daily PRN, Seema Huynh PA-C    senna (SENOKOT) tablet 8 6 mg, 1 tablet, Oral, HS, Seema Huynh PA-C, 8 6 mg at 07/25/22 2231    traZODone (DESYREL) tablet 50 mg, 50 mg, Oral, HS, Isabela Quinteros PA-C, 50 mg at 07/25/22 2231    Laboratory Results:  Results from last 7 days   Lab Units 07/26/22  0509 07/25/22  0431 07/24/22  0615 07/23/22  0512 07/22/22  2314 07/22/22  0420 07/21/22  0609   WBC Thousand/uL  --   --  12 92* 12 34*  --  12 06* 10 74*   HEMOGLOBIN g/dL  --   --  10 8* 11 2*  --  10 8* 10 8*   HEMATOCRIT %  --   --  33 3* 33 1*  --  33 4* 33 3*   PLATELETS Thousands/uL  --   --  261 285  --  294 262   SODIUM mmol/L 130* 127* 129* 130* 128* 126* 127*   POTASSIUM mmol/L 4 3 4 9 4 8 4 2 4 1 5 1 4 8   CHLORIDE mmol/L 93* 92* 95* 90* 90* 90* 92*   CO2 mmol/L 24 23 26 27 30 19* 23   BUN mg/dL 38* 61* 42* 65* 61* 91* 56*   CREATININE mg/dL 5 33* 6 77* 5 17* 6 15* 5 42* 8 02* 5 81*   CALCIUM mg/dL 9 3 9 6 9 2 9 0 8 8 9 1 8 9   MAGNESIUM mg/dL  --   --   --  2 3 2 0  --   --    PHOSPHORUS mg/dL  --   --   --  5 7* 5 0*  --   --        VAS renal artery complete   Final Result by Da Arita MD (07/24 1949)      IR tunneled dialysis catheter placement    (Results Pending)       Portions of the record may have been created with voice recognition software  Occasional wrong word or "sound a like" substitutions may have occurred due to the inherent limitations of voice recognition software  Read the chart carefully and recognize, using context, where substitutions have occurred

## 2022-07-26 NOTE — ASSESSMENT & PLAN NOTE
· Uncontrolled hypertension  · Home meds : Carvedilol 3 125 mg BID, lasix 40 mg daily  · Currently on Carvedilol 25 mg twice daily, hydralazine 50 mg q 8 hours, Isordil 30 mg q 8 hours    Amlodipine 5 mg daily added today  · Workup revealed high-grade stenosis of left renal artery - vascular plans arteriogram with renal artery stent on 7/27  · Follow-up labs for secondary hypertension workup  · Cardiology following  · Cardiology/vascular input appreciated

## 2022-07-26 NOTE — OCCUPATIONAL THERAPY NOTE
Occupational Therapy cx        Patient Name: Ever Thurston  [id number] Date: 7/26/2022 07/26/22 1538   OT Last Visit   OT Visit Date 07/26/22   Note Type   Note Type Cancelled Session   Cancel Reasons Patient off floor/test  (attempted pt this AM, however pt off floor at dialysis  Attempted second time when pt returned from dialysis, however pt reports she is tired and hungry, would like to wait until it is determined if she is going for another procedure today   Will hold )     Jazmín Espinoza, MOT, OTR/L

## 2022-07-26 NOTE — PHYSICAL THERAPY NOTE
PHYSICAL THERAPY NOTE          Patient Name: Konrad JONES Date: 7/26/2022 07/26/22 1046   PT Last Visit   PT Visit Date 07/26/22   Note Type   Note Type Cancelled Session   Cancel Reasons Patient off floor/test  (Patient is currently receiving dialysis treatment   PT to continue to follow and see when available )     Clint Lopes, SPT

## 2022-07-26 NOTE — ASSESSMENT & PLAN NOTE
Lab Results   Component Value Date    EGFR 6 07/25/2022    EGFR 8 07/24/2022    EGFR 6 07/23/2022    CREATININE 6 77 (H) 07/25/2022    CREATININE 5 17 (H) 07/24/2022    CREATININE 6 15 (H) 07/23/2022   · Baseline creatinine 1 to 1 1  · Begun on HD on 07/15 due to acute on chronic systolic heart failure unresponsive to diuretics with worsening hypoxic respiratory failure  · Currently on HD on MWF  · Plan for PermaCath on 07/26

## 2022-07-26 NOTE — PROGRESS NOTES
[facility]  Progress Note Flavio Mosley 1960, 64 y o  female MRN: [id number]  Unit/Bed#: [facility] 501-01 Encounter: [id number]  Primary Care Provider: Lorena Crowley DO   Date and time admitted to hospital: 7/22/2022  9:51 PM    * Acute on chronic systolic congestive heart failure (HCC)  Assessment & Plan  Wt Readings from Last 3 Encounters:   07/25/22 73 8 kg (162 lb 11 2 oz)   07/22/22 77 9 kg (171 lb 11 8 oz)   06/28/22 77 4 kg (170 lb 9 6 oz)     · Presented to [facility] on 7/12 with shortness of breath with minimal exertion  · Found to be in acute on chronic systolic heart failure  · Was unresponsive to diuretics and became anuric  · History of nonischemic cardiomyopathy - EF 18% on 3/37/21   Improved to 50% on 7/9/21  · [facility]  from 6/15/22 to 6/22/22 with acute on chronic combined CHF and COPD exacerbation  · Repeat echo on 7/14/22 with EF 25%  · Cardiac cath in March 2021 showed moderate nonobstructive atherosclerosis  · Currently dialysis dependent  · Volume control through hemodialysis       Hepatitis B  Assessment & Plan  · Tested positive when screened for dialysis  · W/u suggestive of resolved infection  · Outpatient GI follow-up    Acute on chronic respiratory failure with hypoxia (HCC)  Assessment & Plan  · On O2 at 2L at rest and 4L with exertion at home  · Worsening hypoxia due to acute on chronic systolic heart failure  · Wean O2 as able    Acute kidney injury superimposed on CKD-3  Assessment & Plan  Lab Results   Component Value Date    EGFR 6 07/25/2022    EGFR 8 07/24/2022    EGFR 6 07/23/2022    CREATININE 6 77 (H) 07/25/2022    CREATININE 5 17 (H) 07/24/2022    CREATININE 6 15 (H) 07/23/2022   · Baseline creatinine 1 to 1 1  · Begun on HD on 07/15 due to acute on chronic systolic heart failure unresponsive to diuretics with worsening hypoxic respiratory failure  · Currently on HD on MWF  · Plan for PermaCath on 07/26      COPD (chronic obstructive pulmonary disease) ([address] )  Assessment & Plan  · Initial concern for exacerbation during stay at [facility] prior to transfer here for which she was given 3 doses of Solu-Medrol 40 mg IV which was then discontinued  · Home regimen: Anoro Ellipta (62 5/25mcg) 1 puff daily  · Continue Xopenex/Atrovent nebs    Tobacco abuse  Assessment & Plan  · Nicotine patch  · Smoking cessation advised    Anxiety  Assessment & Plan  · Had increased anxiety  · Home Klonopin was increased from 1 mg daily prn to twice daily prn at [facility] prior to transfer  · Was also begun on Seroquel at [facility] which was discontinued after dose last night due to new rash      Chronic pain  Assessment & Plan  · Continuous opioid dependent  · Continue home meds MS Contin and Percocet    Essential hypertension  Assessment & Plan  · Uncontrolled hypertension  · Home meds : Carvedilol 3 125 mg BID, lasix 40 mg daily  · Currently on Carvedilol 25 mg twice daily, hydralazine 50 mg q 8 hours, Isordil 30 mg q 8 hours  Amlodipine 5 mg daily added today  · Workup revealed high-grade stenosis of left renal artery - vascular plans arteriogram with renal artery stent on 7/27  · Follow-up labs for secondary hypertension workup  · Cardiology following  · Cardiology/vascular input appreciated          VTE Pharmacologic Prophylaxis:   Pharmacologic: Heparin  Mechanical VTE Prophylaxis in Place: No    Patient Centered Rounds: I have performed bedside rounds with nursing staff today  Time Spent for Care: 15 minutes  More than 50% of total time spent on counseling and coordination of care as described above      Current Length of Stay: 4 day(s)    Current Patient Status: Inpatient   Certification Statement: The patient will continue to require additional inpatient hospital stay due to Need to monitor symptoms      Code Status: Level 1 - Full Code      Subjective:   No acute distress    Objective:     Vitals:   Temp (24hrs), Av °F (36 7 °C), Min:97 7 °F (36 5 °C), Max:98 4 °F (36 9 °C)    Temp:  [97 7 °F (36 5 °C)-98 4 °F (36 9 °C)] 98 4 °F (36 9 °C)  HR:  [] 93  Resp:  [16-18] 18  BP: (142-183)/() 183/118  SpO2:  [94 %-98 %] 98 %  Body mass index is 22 9 kg/m²  Input and Output Summary (last 24 hours): Intake/Output Summary (Last 24 hours) at 2022 0838  Last data filed at 2022 0805  Gross per 24 hour   Intake 1820 ml   Output 0 ml   Net 1820 ml       Physical Exam:     Physical Exam  Constitutional:       Appearance: Normal appearance  HENT:      Head: Normocephalic and atraumatic  Mouth/Throat:      Mouth: Mucous membranes are dry  Cardiovascular:      Rate and Rhythm: Normal rate and regular rhythm  Heart sounds: No murmur heard  Pulmonary:      Effort: Pulmonary effort is normal       Breath sounds: Normal breath sounds  Abdominal:      General: Abdomen is flat  There is no distension  Palpations: Abdomen is soft  There is no mass  Musculoskeletal:         General: No swelling  Skin:     General: Skin is warm and dry  Neurological:      General: No focal deficit present  Mental Status: She is alert and oriented to person, place, and time  Psychiatric:         Mood and Affect: Mood normal          Additional Data:     Labs:    Results from last 7 days   Lab Units 22  0615 22  0512 22  0420   WBC Thousand/uL 12 92*   < > 12 06*   HEMOGLOBIN g/dL 10 8*   < > 10 8*   HEMATOCRIT % 33 3*   < > 33 4*   PLATELETS Thousands/uL 261   < > 294   NEUTROS PCT %  --   --  66   LYMPHS PCT %  --   --  21   MONOS PCT %  --   --  8   EOS PCT %  --   --  2    < > = values in this interval not displayed       Results from last 7 days   Lab Units 22  0509 22  0615 22  0512   POTASSIUM mmol/L 4 3   < > 4 2   CHLORIDE mmol/L 93*   < > 90*   CO2 mmol/L 24   < > 27   BUN mg/dL 38*   < > 65*   CREATININE mg/dL 5 33*   < > 6 15*   CALCIUM mg/dL 9 3   < > 9 0   ALK PHOS U/L  --   --  92   ALT U/L  --   --  18   AST U/L  --   --  15    < > = values in this interval not displayed  * I Have Reviewed All Lab Data Listed Above  * Additional Pertinent Lab Tests Reviewed:  All Labs Within Last 24 Hours Reviewed      Recent Cultures (last 7 days):           Last 24 Hours Medication List:   Current Facility-Administered Medications   Medication Dose Route Frequency Provider Last Rate    acetaminophen  650 mg Oral Q6H PRN Blue Pittman PA-C      albuterol  2 puff Inhalation Q4H PRN Sinai Brantley MD      amLODIPine  5 mg Oral Daily Gracie Millan MD      calcium acetate  1,334 mg Oral TID With Meals Blue Pittman PA-C      carvedilol  25 mg Oral BID With Meals Dewey Fine DO      clonazePAM  1 mg Oral BID PRN Cecil Matson PA-C      diphenhydrAMINE  25 mg Oral Q6H PRN Emelina Scott PA-C      docusate sodium  100 mg Oral BID Seema Huynh PA-C      guaiFENesin  600 mg Oral Q12H [address] Blue Pittman PA-C      heparin (porcine)  5,000 Units Subcutaneous [place], [place]      hydrALAZINE  10 mg Intravenous Q6H PRN Blue Pittman PA-C      hydrALAZINE  50 mg Oral [facility] Jenn Álvarez MD      hydrocortisone   Topical 4x Daily PRN Emelina Scott PA-C      ipratropium  0 5 mg Nebulization TID Sinai Brantley MD      isosorbide dinitrate  30 mg Oral [facility] Jenn Álvarez MD      levalbuterol  1 25 mg Nebulization TID Sinai Brantley MD      ondansetron  4 mg Intravenous Q4H PRN Emelina Scott PA-C      oxyCODONE  2 5 mg Oral Q4H PRN Blue Pittman PA-C      oxyCODONE  5 mg Oral Q4H PRN Blue Pittman PA-C      polyethylene glycol  17 g Oral Daily PRN Cecil Matson PA-C      senna  1 tablet Oral HS Seema Huynh PA-C      traZODone  50 mg Oral HS Emelina Scott PA-C          Today, Patient Was Seen By: Nuvia Boudreaux DO    ** Please Note: Dictation voice to text software may have been used in the creation of this document   **

## 2022-07-27 ENCOUNTER — APPOINTMENT (INPATIENT)
Dept: DIALYSIS | Facility: HOSPITAL | Age: 62
DRG: 252 | End: 2022-07-27
Payer: MEDICARE

## 2022-07-27 LAB
ANION GAP SERPL CALCULATED.3IONS-SCNC: 9 MMOL/L (ref 4–13)
BUN SERPL-MCNC: 63 MG/DL (ref 5–25)
CALCIUM SERPL-MCNC: 9.5 MG/DL (ref 8.3–10.1)
CHLORIDE SERPL-SCNC: 92 MMOL/L (ref 96–108)
CO2 SERPL-SCNC: 24 MMOL/L (ref 21–32)
CREAT SERPL-MCNC: 7.22 MG/DL (ref 0.6–1.3)
GFR SERPL CREATININE-BSD FRML MDRD: 5 ML/MIN/1.73SQ M
GLUCOSE SERPL-MCNC: 116 MG/DL (ref 65–140)
POTASSIUM SERPL-SCNC: 4.8 MMOL/L (ref 3.5–5.3)
SODIUM SERPL-SCNC: 125 MMOL/L (ref 135–147)

## 2022-07-27 PROCEDURE — 99233 SBSQ HOSP IP/OBS HIGH 50: CPT | Performed by: INTERNAL MEDICINE

## 2022-07-27 PROCEDURE — 94760 N-INVAS EAR/PLS OXIMETRY 1: CPT

## 2022-07-27 PROCEDURE — 99232 SBSQ HOSP IP/OBS MODERATE 35: CPT | Performed by: INTERNAL MEDICINE

## 2022-07-27 PROCEDURE — 80048 BASIC METABOLIC PNL TOTAL CA: CPT | Performed by: INTERNAL MEDICINE

## 2022-07-27 PROCEDURE — 90935 HEMODIALYSIS ONE EVALUATION: CPT | Performed by: INTERNAL MEDICINE

## 2022-07-27 PROCEDURE — 94640 AIRWAY INHALATION TREATMENT: CPT

## 2022-07-27 RX ORDER — PREDNISONE 20 MG/1
20 TABLET ORAL DAILY
Status: DISCONTINUED | OUTPATIENT
Start: 2022-07-28 | End: 2022-07-27

## 2022-07-27 RX ORDER — AMLODIPINE BESYLATE 5 MG/1
5 TABLET ORAL DAILY
Status: DISCONTINUED | OUTPATIENT
Start: 2022-07-27 | End: 2022-07-29

## 2022-07-27 RX ORDER — PREDNISONE 20 MG/1
20 TABLET ORAL DAILY
Status: DISCONTINUED | OUTPATIENT
Start: 2022-07-27 | End: 2022-07-28

## 2022-07-27 RX ORDER — CARVEDILOL 25 MG/1
25 TABLET ORAL 2 TIMES DAILY WITH MEALS
Status: DISCONTINUED | OUTPATIENT
Start: 2022-07-27 | End: 2022-08-03 | Stop reason: HOSPADM

## 2022-07-27 RX ADMIN — LEVALBUTEROL HYDROCHLORIDE 1.25 MG: 1.25 SOLUTION, CONCENTRATE RESPIRATORY (INHALATION) at 13:34

## 2022-07-27 RX ADMIN — GUAIFENESIN 600 MG: 600 TABLET, EXTENDED RELEASE ORAL at 21:19

## 2022-07-27 RX ADMIN — DOCUSATE SODIUM 100 MG: 100 CAPSULE, LIQUID FILLED ORAL at 08:12

## 2022-07-27 RX ADMIN — DOCUSATE SODIUM 100 MG: 100 CAPSULE, LIQUID FILLED ORAL at 18:02

## 2022-07-27 RX ADMIN — HYDRALAZINE HYDROCHLORIDE 100 MG: 50 TABLET, FILM COATED ORAL at 05:00

## 2022-07-27 RX ADMIN — SENNOSIDES 8.6 MG: 8.6 TABLET, FILM COATED ORAL at 21:20

## 2022-07-27 RX ADMIN — OXYCODONE HYDROCHLORIDE 5 MG: 5 TABLET ORAL at 21:19

## 2022-07-27 RX ADMIN — CLONAZEPAM 1 MG: 1 TABLET ORAL at 21:20

## 2022-07-27 RX ADMIN — ISOSORBIDE DINITRATE 30 MG: 30 TABLET ORAL at 05:00

## 2022-07-27 RX ADMIN — CARVEDILOL 25 MG: 25 TABLET, FILM COATED ORAL at 18:02

## 2022-07-27 RX ADMIN — IPRATROPIUM BROMIDE 0.5 MG: 0.5 SOLUTION RESPIRATORY (INHALATION) at 13:33

## 2022-07-27 RX ADMIN — GUAIFENESIN 600 MG: 600 TABLET, EXTENDED RELEASE ORAL at 08:12

## 2022-07-27 RX ADMIN — CALCIUM ACETATE 1334 MG: 667 CAPSULE ORAL at 18:02

## 2022-07-27 RX ADMIN — IPRATROPIUM BROMIDE 0.5 MG: 0.5 SOLUTION RESPIRATORY (INHALATION) at 18:58

## 2022-07-27 RX ADMIN — CALCIUM ACETATE 1334 MG: 667 CAPSULE ORAL at 08:12

## 2022-07-27 RX ADMIN — HYDRALAZINE HYDROCHLORIDE 10 MG: 20 INJECTION, SOLUTION INTRAMUSCULAR; INTRAVENOUS at 05:43

## 2022-07-27 RX ADMIN — AMLODIPINE BESYLATE 5 MG: 5 TABLET ORAL at 13:48

## 2022-07-27 RX ADMIN — LEVALBUTEROL HYDROCHLORIDE 1.25 MG: 1.25 SOLUTION, CONCENTRATE RESPIRATORY (INHALATION) at 18:58

## 2022-07-27 RX ADMIN — ONDANSETRON 4 MG: 2 INJECTION INTRAMUSCULAR; INTRAVENOUS at 13:48

## 2022-07-27 RX ADMIN — CLONAZEPAM 1 MG: 1 TABLET ORAL at 00:30

## 2022-07-27 RX ADMIN — PREDNISONE 20 MG: 20 TABLET ORAL at 18:02

## 2022-07-27 RX ADMIN — ALTEPLASE 2 MG: 2.2 INJECTION, POWDER, LYOPHILIZED, FOR SOLUTION INTRAVENOUS at 09:51

## 2022-07-27 RX ADMIN — IPRATROPIUM BROMIDE 0.5 MG: 0.5 SOLUTION RESPIRATORY (INHALATION) at 07:44

## 2022-07-27 RX ADMIN — ISOSORBIDE DINITRATE 30 MG: 30 TABLET ORAL at 21:21

## 2022-07-27 RX ADMIN — LEVALBUTEROL HYDROCHLORIDE 1.25 MG: 1.25 SOLUTION, CONCENTRATE RESPIRATORY (INHALATION) at 07:40

## 2022-07-27 NOTE — PROGRESS NOTES
[facility]  Progress Note Lashon Kaye 1960, 64 y o  female MRN: [id number]  Unit/Bed#: [facility] 501-01 Encounter: [id number]  Primary Care Provider: Claudette Browning, DO   Date and time admitted to hospital: 7/22/2022  9:51 PM    * Acute on chronic systolic congestive heart failure (HCC)  Assessment & Plan  Wt Readings from Last 3 Encounters:   07/27/22 74 8 kg (165 lb)   07/22/22 77 9 kg (171 lb 11 8 oz)   06/28/22 77 4 kg (170 lb 9 6 oz)     · Presented to [address] [place] [address] on 7/12 with shortness of breath with minimal exertion  · Found to be in acute on chronic systolic heart failure  · Was unresponsive to diuretics and became anuric  · History of nonischemic cardiomyopathy - EF 18% on 3/37/21   Improved to 50% on 7/9/21  · EF 41% on 6/15/22 when admitted to [facility] from 6/15/22 to 6/22/22 with acute on chronic combined CHF and COPD exacerbation  · Repeat echo on 7/14/22 with EF 25%  · Cardiac cath in March 2021 showed moderate nonobstructive atherosclerosis  · Currently dialysis dependent  · Volume control through hemodialysis   ·       Acute kidney injury superimposed on CKD-3  Assessment & Plan  Lab Results   Component Value Date    EGFR 5 07/27/2022    EGFR 8 07/26/2022    EGFR 6 07/25/2022    CREATININE 7 22 (H) 07/27/2022    CREATININE 5 33 (H) 07/26/2022    CREATININE 6 77 (H) 07/25/2022   · Baseline creatinine 1 to 1 1  · Begun on HD on 07/15 due to acute on chronic systolic heart failure unresponsive to diuretics with worsening hypoxic respiratory failure  · Currently on HD on MWF  · Perm cath/renal art stent as per IR      COPD (chronic obstructive pulmonary disease) ([facility] [address] )  Assessment & Plan  · Initial concern for exacerbation during stay at [address] [place] [address] prior to transfer here for which she was given 3 doses of Solu-Medrol 40 mg IV which was then discontinued  · Home regimen: Anoro Ellipta (62 5/25mcg) 1 puff daily  · Continue Xopenex/Atrovent nebs    Tobacco abuse  Assessment & Plan  · Nicotine patch  · Smoking cessation advised    Anxiety  Assessment & Plan  · Had increased anxiety  · Home Klonopin was increased from 1 mg daily prn to twice daily prn at [facility] prior to transfer  · Was also begun on Seroquel at [facility] which was discontinued after dose last night due to new rash      Essential hypertension  Assessment & Plan  · Uncontrolled hypertension  · Home meds : Carvedilol 3 125 mg BID, lasix 40 mg daily  · Currently on Carvedilol 25 mg twice daily, , Isordil 30 mg q 8 hours  Amlodipine 5 mg daily added today  Hold hydralazine for possible rash  · Workup revealed high-grade stenosis of left renal artery -   · Will be to proceed with renal artery stent placement tomorrow with PermCath placement with IR  ·  Follow-up labs for secondary hypertension workup  · Cardiology following  · Cardiology/vascular input appreciated        VTE Pharmacologic Prophylaxis:   Pharmacologic: Heparin  Mechanical VTE Prophylaxis in Place: No    Patient Centered Rounds: I have performed bedside rounds with nursing staff today  Time Spent for Care: 15 minutes  More than 50% of total time spent on counseling and coordination of care as described above  Current Length of Stay: 5 day(s)    Current Patient Status: Inpatient       Code Status: Level 1 - Full Code      Subjective:   nad    Objective:     Vitals:   Temp (24hrs), Av 2 °F (36 8 °C), Min:97 6 °F (36 4 °C), Max:98 9 °F (37 2 °C)    Temp:  [97 6 °F (36 4 °C)-98 9 °F (37 2 °C)] 97 6 °F (36 4 °C)  HR:  [] 104  Resp:  [16-20] 20  BP: (128-188)/() 174/99  SpO2:  [90 %-96 %] 96 %  Body mass index is 23 68 kg/m²  Input and Output Summary (last 24 hours):        Intake/Output Summary (Last 24 hours) at 2022 1452  Last data filed at 2022 1356  Gross per 24 hour   Intake 1216 ml   Output 4154 ml   Net -2938 ml       Physical Exam:     Physical Exam  Constitutional: Appearance: Normal appearance  HENT:      Head: Normocephalic and atraumatic  Cardiovascular:      Rate and Rhythm: Normal rate and regular rhythm  Pulmonary:      Effort: Pulmonary effort is normal       Breath sounds: Normal breath sounds  Abdominal:      General: Abdomen is flat  Palpations: Abdomen is soft  Musculoskeletal:      Cervical back: Normal range of motion and neck supple  Neurological:      General: No focal deficit present  Mental Status: She is alert and oriented to person, place, and time  Psychiatric:         Mood and Affect: Mood normal          Behavior: Behavior normal          Additional Data:     Labs:    Results from last 7 days   Lab Units 07/24/22  0615 07/23/22  0512 07/22/22  0420   WBC Thousand/uL 12 92*   < > 12 06*   HEMOGLOBIN g/dL 10 8*   < > 10 8*   HEMATOCRIT % 33 3*   < > 33 4*   PLATELETS Thousands/uL 261   < > 294   NEUTROS PCT %  --   --  66   LYMPHS PCT %  --   --  21   MONOS PCT %  --   --  8   EOS PCT %  --   --  2    < > = values in this interval not displayed  Results from last 7 days   Lab Units 07/27/22  0432 07/24/22  0615 07/23/22  0512   POTASSIUM mmol/L 4 8   < > 4 2   CHLORIDE mmol/L 92*   < > 90*   CO2 mmol/L 24   < > 27   BUN mg/dL 63*   < > 65*   CREATININE mg/dL 7 22*   < > 6 15*   CALCIUM mg/dL 9 5   < > 9 0   ALK PHOS U/L  --   --  92   ALT U/L  --   --  18   AST U/L  --   --  15    < > = values in this interval not displayed  * I Have Reviewed All Lab Data Listed Above  * Additional Pertinent Lab Tests Reviewed:  All Labs Within Last 24 Hours Reviewed    Recent Cultures (last 7 days):           Last 24 Hours Medication List:   Current Facility-Administered Medications   Medication Dose Route Frequency Provider Last Rate    acetaminophen  650 mg Oral Q6H PRN Amber Calderon PA-C      albuterol  2 puff Inhalation Q4H PRN Mervin López MD      amLODIPine  5 mg Oral Daily Maverick Bond MD      calcium acetate  1,334 mg Oral TID With Meals Billy Loza PA-C      carvedilol  25 mg Oral BID With Meals Wen Florentino MD      clonazePAM  1 mg Oral BID PRN Charito Dawn PA-C      diphenhydrAMINE  25 mg Oral Q6H PRN Mayo Shaver PA-C      docusate sodium  100 mg Oral BID Seema Huynh PA-C      guaiFENesin  600 mg Oral Q12H [address] Billy Loza PA-C      heparin (porcine)  5,000 Units Subcutaneous [facility] Billy Loza [place]      hydrALAZINE  10 mg Intravenous Q6H PRN Billy Loza PA-C      hydrocortisone   Topical 4x Daily PRN Mayo Shaver PA-C      ipratropium  0 5 mg Nebulization TID Lennox Kapur, MD      isosorbide dinitrate  30 mg Oral [facility] Carin Haywood MD      levalbuterol  1 25 mg Nebulization TID Lennox Kapur, MD      ondansetron  4 mg Intravenous Q4H PRN Mayo Shaver PA-C      oxyCODONE  2 5 mg Oral Q4H PRN Billy Loza PA-C      oxyCODONE  5 mg Oral Q4H PRN Billy Loza PA-C      polyethylene glycol  17 g Oral Daily PRN Seema Huynh PA-C      senna  1 tablet Oral HS Seema Huynh PA-C      traZODone  50 mg Oral HS Mayo Shaver PA-C          Today, Patient Was Seen By: Boy Sy DO    ** Please Note: Dictation voice to text software may have been used in the creation of this document   **

## 2022-07-27 NOTE — ASSESSMENT & PLAN NOTE
· Initial concern for exacerbation during stay at [facility] prior to transfer here for which she was given 3 doses of Solu-Medrol 40 mg IV which was then discontinued  · Home regimen: Anoro Ellipta (62 5/25mcg) 1 puff daily  · Continue Xopenex/Atrovent nebs

## 2022-07-27 NOTE — PROGRESS NOTES
Cardiology Progress Note - Shama Burgos 64 y o  female MRN: [id number]    Unit/Bed#: [facility] 501-01 Encounter: [id number]      Assessment & Plan:  Principal Problem:    Acute on chronic systolic congestive heart failure (HCC)  Active Problems:    Essential hypertension    Chronic pain    Anxiety    Tobacco abuse    COPD (chronic obstructive pulmonary disease) (HCC)    Acute kidney injury superimposed on CKD-3    Acute on chronic respiratory failure with hypoxia (HCC)    Hepatitis B    # Acute on chronic systolic congestive heart failure   - 2/2 non ischemic cardiomyopathy   - LHC in 3/2021 showed moderate non-obstructive atherosclerosis  - previously partially recovered EF now with EF of 25%   - TTE 0n 7/14: EF 25%,  severe global hypokinesis with regional variation, basal and mid inferior wall appeared more severely hypo to akinetic  mild LAE, mod MR, mild AR, mild TR  - Likely secondary to fluid overload state in setting of acute renal failure  - appears close to euvolemic       # Acute on chronic hypoxic respiratory failure  - In the setting of volume overload and underlying COPD  - Home O2 2 L at rest and 4 L on exertion  - Currently saturating well on 2 L of oxygen via NC     # Uncontrolled hypertension   - Likely due to bilateral FABIOLA     # Acute renal failure on CKD III  - likely volume overload, decompensated heart failure  - currently on dialysis MWF  - management per nephrology     # Bilateral FABIOLA  - Renal doppler revealed occluded right renal artery and >60% stenosis in the proximal and distal main renal artery  - vascular surgery is following     # COPD on home oxygen     -170s/110-120s  She missed one dose of coreg this am    -130  Weight 174 lbs - 162 lbs - 165 lbs     Plan:  - Continue coreg 25 mg bid, hydralazine 50 mg tid and isordil 30 mg tid and amlodipine 5 mg qd     - Volume management as per nephrology  - Monitor hemodynamics  - Pending aldosterone/renin ratio, fractionated catecholamines, plasma renin activity    Subjective:   Patient was seen during dialysis session  She was sleeping  No significant event overnight  Objective:     Vitals: Blood pressure (!) 160/113, pulse 100, temperature 97 6 °F (36 4 °C), resp  rate 18, height 5' 10" (1 778 m), weight 74 8 kg (165 lb), SpO2 92 %  , Body mass index is 23 68 kg/m² ,   Orthostatic Blood Pressures    Flowsheet Row Most Recent Value   Blood Pressure 160/113 filed at 07/27/2022 1230   Patient Position - Orthostatic VS Lying filed at 07/26/2022 2233            Intake/Output Summary (Last 24 hours) at 7/27/2022 1334  Last data filed at 7/27/2022 0930  Gross per 24 hour   Intake 676 ml   Output 300 ml   Net 376 ml           Current Facility-Administered Medications:     acetaminophen (TYLENOL) tablet 650 mg, 650 mg, Oral, Q6H PRN, Gianni Ibarra PA-C    albuterol (PROVENTIL HFA,VENTOLIN HFA) inhaler 2 puff, 2 puff, Inhalation, Q4H PRN, Mode Kee MD, 2 puff at 07/25/22 0600    amLODIPine (NORVASC) tablet 5 mg, 5 mg, Oral, Daily, Rimma Mg MD    calcium acetate (PHOSLO) capsule 1,334 mg, 1,334 mg, Oral, TID With Meals, Gianni Ibarra PA-C, 1,334 mg at 07/27/22 1962    carvedilol (COREG) tablet 25 mg, 25 mg, Oral, BID With Meals, Gemini Shipman MD    clonazePAM (KlonoPIN) tablet 1 mg, 1 mg, Oral, BID PRN, Lito Jacobs PA-C, 1 mg at 07/27/22 0030    diphenhydrAMINE (BENADRYL) tablet 25 mg, 25 mg, Oral, Q6H PRN, Krysta Pisano PA-C, 25 mg at 07/26/22 2254    docusate sodium (COLACE) capsule 100 mg, 100 mg, Oral, BID, Seema Huynh PA-C, 100 mg at 07/27/22 8923    guaiFENesin (MUCINEX) 12 hr tablet 600 mg, 600 mg, Oral, Q12H [address], DOTTIE Tolentino-ANTONIA, 600 mg at 07/27/22 4118    heparin (porcine) subcutaneous injection 5,000 Units, 5,000 Units, Subcutaneous, Q8H [address], DOTTIE Tolentino-ANTONIA, 5,000 Units at 07/26/22 2107    hydrALAZINE (APRESOLINE) injection 10 mg, 10 mg, Intravenous, Q6H PRN, Gianni Ibarra PA-C, 10 mg at 07/27/22 0543    hydrocortisone 1 % cream, , Topical, 4x Daily PRN, Morris Mckay PA-C    ipratropium (ATROVENT) 0 02 % inhalation solution 0 5 mg, 0 5 mg, Nebulization, TID, Lilli Terry MD, 0 5 mg at 07/27/22 1333    isosorbide dinitrate (ISORDIL) tablet 30 mg, 30 mg, Oral, Q8H [address], Moy Padilla MD, 30 mg at 07/27/22 0500    levalbuterol (Luigi Row) inhalation solution 1 25 mg, 1 25 mg, Nebulization, TID, Lilli Terry MD, 1 25 mg at 07/27/22 1334    ondansetron (ZOFRAN) injection 4 mg, 4 mg, Intravenous, Q4H PRN, Morris Mckay PA-C, 4 mg at 07/23/22 2021    oxyCODONE (ROXICODONE) IR tablet 2 5 mg, 2 5 mg, Oral, Q4H PRN, Luci Knight PA-C    oxyCODONE (ROXICODONE) IR tablet 5 mg, 5 mg, Oral, Q4H PRN, Luci Knight PA-C, 5 mg at 07/25/22 2324    polyethylene glycol (MIRALAX) packet 17 g, 17 g, Oral, Daily PRN, Seema Huynh PA-C    senna (SENOKOT) tablet 8 6 mg, 1 tablet, Oral, HS, Seema Huynh PA-C, 8 6 mg at 07/26/22 2106    traZODone (DESYREL) tablet 50 mg, 50 mg, Oral, HS, COLTEN HollingsworthC, 50 mg at 07/26/22 2106    Labs & Results:    Lab Results   Component Value Date    TROPONINI 0 11 (H) 06/13/2021    TROPONINI 0 10 (H) 06/13/2021    TROPONINI 0 04 (H) 06/13/2021       Lab Results   Component Value Date    CALCIUM 9 5 07/27/2022     04/05/2018    K 4 8 07/27/2022    CO2 24 07/27/2022    CL 92 (L) 07/27/2022    BUN 63 (H) 07/27/2022    CREATININE 7 22 (H) 07/27/2022       Lab Results   Component Value Date    WBC 12 92 (H) 07/24/2022    HGB 10 8 (L) 07/24/2022    HCT 33 3 (L) 07/24/2022    MCV 90 07/24/2022     07/24/2022           Lab Results   Component Value Date    CHOL 216 (H) 04/05/2018     Lab Results   Component Value Date    HDL 43 (L) 05/12/2022    HDL 49 (L) 02/09/2022     Lab Results   Component Value Date    LDLCALC 113 (H) 05/12/2022    LDLCALC 121 (H) 02/09/2022     Lab Results   Component Value Date    TRIG 91 05/12/2022 TRIG 102 02/09/2022       Lab Results   Component Value Date    ALT 18 07/23/2022    AST 15 07/23/2022       TELE: No significant arrhythmias seen on telemetry review

## 2022-07-27 NOTE — PHYSICAL THERAPY NOTE
Physical Therapy Cancellation Note           07/27/22 1342   PT Last Visit   PT Visit Date 07/27/22   Note Type   Note Type Cancelled Session   Cancel Reasons Patient off floor/test  (Pt chart reviewed  Pt again off floor at dialysis    PT to continue to follow and see pt as appropriate and able )     Cherelle Rehman, PT, DPT

## 2022-07-27 NOTE — ASSESSMENT & PLAN NOTE
Wt Readings from Last 3 Encounters:   07/27/22 74 8 kg (165 lb)   07/22/22 77 9 kg (171 lb 11 8 oz)   06/28/22 77 4 kg (170 lb 9 6 oz)     · Presented to [email or web address] on 7/12 with shortness of breath with minimal exertion  · Found to be in acute on chronic systolic heart failure  · Was unresponsive to diuretics and became anuric  · History of nonischemic cardiomyopathy - EF 18% on 3/37/21   Improved to 50% on 7/9/21  · EF 41% on 6/15/22 when admitted to [facility] [place] [facility] from 6/15/22 to 6/22/22 with acute on chronic combined CHF and COPD exacerbation  · Repeat echo on 7/14/22 with EF 25%  · Cardiac cath in March 2021 showed moderate nonobstructive atherosclerosis  · Currently dialysis dependent  · Volume control through hemodialysis   ·

## 2022-07-27 NOTE — QUICK NOTE
Plan for left renal artery stent and PermCath placement tomorrow, 07/28/2022  Patient is medically cleared from a nephrology and medicine standpoint, is on 2-3 L of oxygen    Discussed with Dr Robbie Patton of [facility], Dr Roxi Snyder of nephrology, and Dr Lorelei Zuleta of vascular     - NPO after midnight  - hold morning heparin  - plan for left renal artery stent  - plan for PermCath placement    Evalina Mask, CRNP

## 2022-07-27 NOTE — PLAN OF CARE
Problem: Prexisting or High Potential for Compromised Skin Integrity  Goal: Skin integrity is maintained or improved  Description: INTERVENTIONS:  - Identify patients at risk for skin breakdown  - Assess and monitor skin integrity  - Assess and monitor nutrition and hydration status  - Monitor labs   - Assess for incontinence   - Turn and reposition patient  - Assist with mobility/ambulation  - Relieve pressure over bony prominences  - Avoid friction and shearing  - Provide appropriate hygiene as needed including keeping skin clean and dry  - Evaluate need for skin moisturizer/barrier cream  - Collaborate with interdisciplinary team   - Patient/family teaching  - Consider wound care consult   Outcome: Progressing     Problem: MOBILITY - ADULT  Goal: Maintain or return to baseline ADL function  Description: INTERVENTIONS:  -  Assess patient's ability to carry out ADLs; assess patient's baseline for ADL function and identify physical deficits which impact ability to perform ADLs (bathing, care of mouth/teeth, toileting, grooming, dressing, etc )  - Assess/evaluate cause of self-care deficits   - Assess range of motion  - Assess patient's mobility; develop plan if impaired  - Assess patient's need for assistive devices and provide as appropriate  - Encourage maximum independence but intervene and supervise when necessary  - Involve family in performance of ADLs  - Assess for home care needs following discharge   - Consider OT consult to assist with ADL evaluation and planning for discharge  - Provide patient education as appropriate  Outcome: Progressing  Goal: Maintains/Returns to pre admission functional level  Description: INTERVENTIONS:  - Perform BMAT or MOVE assessment daily    - Set and communicate daily mobility goal to care team and patient/family/caregiver  - Collaborate with rehabilitation services on mobility goals if consulted  - Perform Range of Motion  times a day    - Reposition patient every hours   - Dangle patient  times a day  - Stand patient  times a day  - Ambulate patient  times a day  - Out of bed to chair  times a day   - Out of bed for meals  times a day  - Out of bed for toileting  - Record patient progress and toleration of activity level   Outcome: Progressing     Problem: Potential for Falls  Goal: Patient will remain free of falls  Description: INTERVENTIONS:  - Educate patient/family on patient safety including physical limitations  - Instruct patient to call for assistance with activity   - Consult OT/PT to assist with strengthening/mobility   - Keep Call bell within reach  - Keep bed low and locked with side rails adjusted as appropriate  - Keep care items and personal belongings within reach  - Initiate and maintain comfort rounds  - Make Fall Risk Sign visible to staff  - Offer Toileting every  Hours, in advance of need  - Initiate/Maintain alarm  - Obtain necessary fall risk management equipment  - Apply yellow socks and bracelet for high fall risk patients  - Consider moving patient to room near nurses station  Outcome: Progressing     Problem: Nutrition/Hydration-ADULT  Goal: Nutrient/Hydration intake appropriate for improving, restoring or maintaining nutritional needs  Description: Monitor and assess patient's nutrition/hydration status for malnutrition  Collaborate with interdisciplinary team and initiate plan and interventions as ordered  Monitor patient's weight and dietary intake as ordered or per policy  Utilize nutrition screening tool and intervene as necessary  Determine patient's food preferences and provide high-protein, high-caloric foods as appropriate       INTERVENTIONS:  - Monitor oral intake, urinary output, labs, and treatment plans  - Assess nutrition and hydration status and recommend course of action  - Evaluate amount of meals eaten  - Assist patient with eating if necessary   - Allow adequate time for meals  - Recommend/ encourage appropriate diets, oral nutritional supplements, and vitamin/mineral supplements  - Order, calculate, and assess calorie counts as needed  - Recommend, monitor, and adjust tube feedings and TPN/PPN based on assessed needs  - Assess need for intravenous fluids  - Provide specific nutrition/hydration education as appropriate  - Include patient/family/caregiver in decisions related to nutrition  Outcome: Progressing     Problem: METABOLIC, FLUID AND ELECTROLYTES - ADULT  Goal: Electrolytes maintained within normal limits  Description: INTERVENTIONS:  - Monitor labs and assess patient for signs and symptoms of electrolyte imbalances  - Administer electrolyte replacement as ordered  - Monitor response to electrolyte replacements, including repeat lab results as appropriate  - Instruct patient on fluid and nutrition as appropriate  Outcome: Progressing  Goal: Fluid balance maintained  Description: INTERVENTIONS:  - Monitor labs   - Monitor I/O and WT  - Instruct patient on fluid and nutrition as appropriate  - Assess for signs & symptoms of volume excess or deficit  Outcome: Progressing  Goal: Glucose maintained within target range  Description: INTERVENTIONS:  - Monitor Blood Glucose as ordered  - Assess for signs and symptoms of hyperglycemia and hypoglycemia  - Administer ordered medications to maintain glucose within target range  - Assess nutritional intake and initiate nutrition service referral as needed  Outcome: Progressing

## 2022-07-27 NOTE — ASSESSMENT & PLAN NOTE
· Had increased anxiety  · Home Klonopin was increased from 1 mg daily prn to twice daily prn at [address] prior to transfer  · Was also begun on Seroquel at [place] which was discontinued after dose last night due to new rash

## 2022-07-27 NOTE — PLAN OF CARE
Pre-tx:  UF 3-3 5 L as tolerated per order  Tx initiated and high AP noted  Lines reversed x 1 per protocol without result  Pt reinfused  Nephrologist Ayanna aware and at bedside  New orders received and carried out for CathFlo  Nephrologist Sandra Lomeli discussing HD permcath placement with IR and care team   Will cont to monitor  Post-Dialysis RN Treatment Note    Blood Pressure:  Pre 184/128 mm/Hg  Post 128/84 mmHg   EDW  TBD kg    Weight:  Pre 72 5 kg   Post 69 5 kg   Mode of weight measurement: Standing Scale   Volume Removed  3000 ml    Treatment duration 2 5 hours    NS given  No    Treatment shortened? Yes tx shortened, Nephrologist Ayanna aware  HD Temp cath would no longer run  Pt scheduled for IR Permcath placement tomorrow       Medications given during Rx Not Applicable   Estimated Kt/V  0 91   Access type: Temporary HD catheter   Access Issues:  CathFlo intervention successful x 2 5 hours and then catheter began to lose flow, especially in the arterial lumen   Report called to primary nurse   Yes       Problem: METABOLIC, FLUID AND ELECTROLYTES - ADULT  Goal: Electrolytes maintained within normal limits  Description: INTERVENTIONS:  - Monitor labs and assess patient for signs and symptoms of electrolyte imbalances  - Administer electrolyte replacement as ordered  - Monitor response to electrolyte replacements, including repeat lab results as appropriate  - Instruct patient on fluid and nutrition as appropriate  Outcome: Progressing  Goal: Fluid balance maintained  Description: INTERVENTIONS:  - Monitor labs   - Monitor I/O and WT  - Instruct patient on fluid and nutrition as appropriate  - Assess for signs & symptoms of volume excess or deficit  Outcome: Progressing

## 2022-07-27 NOTE — PROGRESS NOTES
Dr Mitra Khoury and Dr Jenn Leonard made aware patient is refusing some of her medications including isosorbide because of itching  Patient is convinced the itching is caused by one of the medications she was started on here but not sure which one  Patient refusing to take benadryl because it doesn't work and refusing additional itching medication  Will continue to monitor

## 2022-07-27 NOTE — OCCUPATIONAL THERAPY NOTE
Occupational Therapy cx        Patient Name: Neelam VINSON Date: 7/27/2022 07/27/22 1052   OT Last Visit   OT Visit Date 07/27/22   Note Type   Note Type Cancelled Session   Cancel Reasons Patient off floor/test  (attempted pt this AM, off floor at dialysis   Will hold and address as clinical course allows )       Cristian Nicole, MOT, OTR/L

## 2022-07-27 NOTE — ASSESSMENT & PLAN NOTE
Lab Results   Component Value Date    EGFR 5 07/27/2022    EGFR 8 07/26/2022    EGFR 6 07/25/2022    CREATININE 7 22 (H) 07/27/2022    CREATININE 5 33 (H) 07/26/2022    CREATININE 6 77 (H) 07/25/2022   · Baseline creatinine 1 to 1 1  · Begun on HD on 07/15 due to acute on chronic systolic heart failure unresponsive to diuretics with worsening hypoxic respiratory failure  · Currently on HD on MWF  · Perm cath/renal art stent as per IR

## 2022-07-27 NOTE — PROGRESS NOTES
NEPHROLOGY PROGRESS NOTE   Madelin Laura 64 y o  female MRN: [id number]  Unit/Bed#: [facility] 501-01 Encounter: [id number]  Reason for Consult: SHANTA    ASSESSMENT AND PLAN:  Severe SHANTA on CKD stage 3, baseline creatinine 1 to 1 2 with episodes of SHANTA  -due to severe SHANTA with volume overload, decompensated heart failure, hypoxic respiratory failure, she was started on dialysis on 7/15/22  -now remains dialysis dependent on MWF schedule  -status post isolated ultrafiltration yesterday  Post weight 70 5 kg  Post HD weight slowly trending down  Today pre HD weight 72 5 kg  -HD today   -will eventually need PermCath placement by IR/vascular  -no obvious signs of renal recovery, minimal urine output  -recent UA bland    I saw and examined patient during hemodialysis treatment at 9:18 AM on 7/27/2022  The patient was receiving hemodialysis for treatment of SHANTA  I also reviewed vital signs, intake and output, lab results and recent events, and agree with dialysis order  Tolerating HD  BP higher      Access:  Currently has temporary HD catheter, lines reversed, with multiple alarm issues, unable to maintain adequate blood flow  -will do tPA trial if this remains persistent  -will need PermCath placement although planning to get this at the same time when patient gets renal artery angiogram  -if unable to tolerated dialysis with temporary catheter despite tPA trial and cannot get PermCath due to respiratory status, will need new temporary HD catheter  Acute on chronic hypoxic respiratory failure  -in the setting of volume overload  -echo this month shows EF 25%  -O2 requirement seems to be slowly improving, currently remains on 2 L O2 via nasal cannula    -continue to challenge UF removal as BP tolerated     Hyponatremia, likely hypervolemic, strict fluid restriction 1 2 L per day recommended   -continue to monitor with dialysis and UF removal      Uncontrolled hypertension  -suspect in the setting of renovascular component with renal artery stenosis  -appreciate vascular surgery follow-up   -tentative plan noted for renal artery angiogram with intervention once respiratory status improves  -renal artery Doppler showing right atrophic kidney, main right renal artery could not be identified, left renal artery greater than 60% stenosis  -currently on Coreg 25 mg b i d ,isosorbide 30 mg t i d   -noted skin rash in her back, right arm, will discontinue hydralazine for time being   -start amlodipine 5 mg daily  -continue to monitor BP with UF removal challenge on dialysis      Renal artery stenosis  -plan noted for angiogram/renal artery stenting with vascular surgery once respiratory status improves  -overall improving respiratory status, currently remains on 2 L O2 via nasal cannula  -  PermCath placement at the same time as well      Hyperphosphatemia, serum phosphorus 5 7  -renal diet  -currently on calcium acetate with meals     CKD anemia, transfuse p r n  For hemoglobin less than seven  -hemoglobin overall stable at goal, avoid Epogen given uncontrolled hypertension  SUBJECTIVE:  Patient seen and examined at bedside  Patient is complaining of itching which has been present overnight as well    Denies nausea vomiting    OBJECTIVE:  Current Weight: Weight - Scale: 74 8 kg (165 lb)  Vitals:    07/27/22 0750   BP:    Pulse:    Resp:    Temp:    SpO2: 93%       Intake/Output Summary (Last 24 hours) at 7/27/2022 0915  Last data filed at 7/27/2022 0756  Gross per 24 hour   Intake 1076 ml   Output 2500 ml   Net -1424 ml     Wt Readings from Last 3 Encounters:   07/27/22 74 8 kg (165 lb)   07/22/22 77 9 kg (171 lb 11 8 oz)   06/28/22 77 4 kg (170 lb 9 6 oz)     Temp Readings from Last 3 Encounters:   07/27/22 97 8 °F (36 6 °C)   07/22/22 98 5 °F (36 9 °C) (Temporal)   06/22/22 97 9 °F (36 6 °C)     BP Readings from Last 3 Encounters:   07/27/22 (!) 176/100   07/22/22 155/81   06/28/22 (!) 174/110     Pulse Readings from Last 3 Encounters:   07/27/22 101   07/22/22 82   06/28/22 94        Physical Examination:  General:  Sitting in bed, no acute distress   Eyes:  Mild conjunctival pallor present  ENT:  External examination of ears and nose unremarkable  Neck:  No obvious lymphadenopathy appreciated  Respiratory:  Bilateral air entry present  CVS:  S1, S2 present  GI:  Soft, nondistended  CNS:  Active alert oriented x3  Skin:  Macular rash in her back, mild on her right arm  Musculoskeletal:  No obvious new gross deformity noted    Medications:    Current Facility-Administered Medications:     acetaminophen (TYLENOL) tablet 650 mg, 650 mg, Oral, Q6H PRN, Amber Calderon PA-C    albuterol (PROVENTIL HFA,VENTOLIN HFA) inhaler 2 puff, 2 puff, Inhalation, Q4H PRN, Mervin López MD, 2 puff at 07/25/22 0600    calcium acetate (PHOSLO) capsule 1,334 mg, 1,334 mg, Oral, TID With Meals, Amber Calderon PA-C, 1,334 mg at 07/27/22 5912    carvedilol (COREG) tablet 25 mg, 25 mg, Oral, BID With Meals, Sukhi Fletcher DO, 25 mg at 07/26/22 1655    clonazePAM (KlonoPIN) tablet 1 mg, 1 mg, Oral, BID PRN, Anirudh Salazar PA-C, 1 mg at 07/27/22 0030    diphenhydrAMINE (BENADRYL) tablet 25 mg, 25 mg, Oral, Q6H PRN, Brian Boston PA-C, 25 mg at 07/26/22 2254    docusate sodium (COLACE) capsule 100 mg, 100 mg, Oral, BID, Seema Huynh PA-C, 100 mg at 07/27/22 2341    guaiFENesin (MUCINEX) 12 hr tablet 600 mg, 600 mg, Oral, Q12H [address], Amber Calderon PA-C, 600 mg at 07/27/22 1962    heparin (porcine) subcutaneous injection 5,000 Units, 5,000 Units, Subcutaneous, Q8H [address], Amber Calderon PA-C, 5,000 Units at 07/26/22 2107    hydrALAZINE (APRESOLINE) injection 10 mg, 10 mg, Intravenous, Q6H PRN, Amber Calderon PA-C, 10 mg at 07/27/22 0543    hydrALAZINE (APRESOLINE) tablet 100 mg, 100 mg, Oral, Q8H [address], Delfin Urena MD, 100 mg at 07/27/22 0500    hydrocortisone 1 % cream, , Topical, 4x Daily PRN, Brian Boston PA-C    ipratropium (ATROVENT) 0 02 % inhalation solution 0 5 mg, 0 5 mg, Nebulization, TID, Jayce Pelayo MD, 0 5 mg at 07/27/22 0744    isosorbide dinitrate (ISORDIL) tablet 30 mg, 30 mg, Oral, Q8H [address], Moy Decker MD, 30 mg at 07/27/22 0500    levalbuterol (XOPENEX) inhalation solution 1 25 mg, 1 25 mg, Nebulization, TID, Jayce Pelayo MD, 1 25 mg at 07/27/22 0740    ondansetron (ZOFRAN) injection 4 mg, 4 mg, Intravenous, Q4H PRN, Tempie Cords, PA-C, 4 mg at 07/23/22 2021    oxyCODONE (ROXICODONE) IR tablet 2 5 mg, 2 5 mg, Oral, Q4H PRN, Alois Necessary, PA-C    oxyCODONE (ROXICODONE) IR tablet 5 mg, 5 mg, Oral, Q4H PRN, Alois Necessary, PA-C, 5 mg at 07/25/22 2324    polyethylene glycol (MIRALAX) packet 17 g, 17 g, Oral, Daily PRN, Seema Huynh PA-C    senna (SENOKOT) tablet 8 6 mg, 1 tablet, Oral, HS, Seema Huynh PA-C, 8 6 mg at 07/26/22 2106    traZODone (DESYREL) tablet 50 mg, 50 mg, Oral, HS, Tempie Cords, PA-C, 50 mg at 07/26/22 2106    Laboratory Results:  Results from last 7 days   Lab Units 07/27/22  0432 07/26/22  0509 07/25/22  0431 07/24/22  0615 07/23/22  0512 07/22/22  2314 07/22/22  0420 07/21/22  0609   WBC Thousand/uL  --   --   --  12 92* 12 34*  --  12 06* 10 74*   HEMOGLOBIN g/dL  --   --   --  10 8* 11 2*  --  10 8* 10 8*   HEMATOCRIT %  --   --   --  33 3* 33 1*  --  33 4* 33 3*   PLATELETS Thousands/uL  --   --   --  261 285  --  294 262   SODIUM mmol/L 125* 130* 127* 129* 130* 128* 126* 127*   POTASSIUM mmol/L 4 8 4 3 4 9 4 8 4 2 4 1 5 1 4 8   CHLORIDE mmol/L 92* 93* 92* 95* 90* 90* 90* 92*   CO2 mmol/L 24 24 23 26 27 30 19* 23   BUN mg/dL 63* 38* 61* 42* 65* 61* 91* 56*   CREATININE mg/dL 7 22* 5 33* 6 77* 5 17* 6 15* 5 42* 8 02* 5 81*   CALCIUM mg/dL 9 5 9 3 9 6 9 2 9 0 8 8 9 1 8 9   MAGNESIUM mg/dL  --   --   --   --  2 3 2 0  --   --    PHOSPHORUS mg/dL  --   --   --   --  5 7* 5 0*  --   --        VAS renal artery complete   Final Result by Bryant Smith MD (07/24 Heidi9)      IR tunneled dialysis catheter placement    (Results Pending)       Portions of the record may have been created with voice recognition software  Occasional wrong word or "sound a like" substitutions may have occurred due to the inherent limitations of voice recognition software  Read the chart carefully and recognize, using context, where substitutions have occurred

## 2022-07-28 ENCOUNTER — APPOINTMENT (INPATIENT)
Dept: RADIOLOGY | Facility: HOSPITAL | Age: 62
DRG: 252 | End: 2022-07-28
Payer: MEDICARE

## 2022-07-28 LAB
ANION GAP SERPL CALCULATED.3IONS-SCNC: 7 MMOL/L (ref 4–13)
BUN SERPL-MCNC: 49 MG/DL (ref 5–25)
CALCIUM SERPL-MCNC: 9.8 MG/DL (ref 8.3–10.1)
CHLORIDE SERPL-SCNC: 92 MMOL/L (ref 96–108)
CO2 SERPL-SCNC: 26 MMOL/L (ref 21–32)
CREAT SERPL-MCNC: 5.84 MG/DL (ref 0.6–1.3)
DOPAMINE 24H UR-MRATE: 81 PG/ML (ref 0–48)
EPINEPH PLAS-MCNC: 296 PG/ML (ref 0–62)
GFR SERPL CREATININE-BSD FRML MDRD: 7 ML/MIN/1.73SQ M
GLUCOSE SERPL-MCNC: 159 MG/DL (ref 65–140)
NOREPINEPH PLAS-MCNC: 1497 PG/ML (ref 0–874)
POTASSIUM SERPL-SCNC: 5.6 MMOL/L (ref 3.5–5.3)
RENIN PLAS-CCNC: 45.15 NG/ML/HR (ref 0.17–5.38)
SODIUM SERPL-SCNC: 125 MMOL/L (ref 135–147)

## 2022-07-28 PROCEDURE — 94640 AIRWAY INHALATION TREATMENT: CPT

## 2022-07-28 PROCEDURE — 99232 SBSQ HOSP IP/OBS MODERATE 35: CPT | Performed by: INTERNAL MEDICINE

## 2022-07-28 PROCEDURE — 94760 N-INVAS EAR/PLS OXIMETRY 1: CPT

## 2022-07-28 PROCEDURE — NC001 PR NO CHARGE: Performed by: INTERNAL MEDICINE

## 2022-07-28 PROCEDURE — 99233 SBSQ HOSP IP/OBS HIGH 50: CPT | Performed by: INTERNAL MEDICINE

## 2022-07-28 PROCEDURE — 80048 BASIC METABOLIC PNL TOTAL CA: CPT | Performed by: INTERNAL MEDICINE

## 2022-07-28 PROCEDURE — 99232 SBSQ HOSP IP/OBS MODERATE 35: CPT | Performed by: SURGERY

## 2022-07-28 RX ORDER — HYDRALAZINE HYDROCHLORIDE 20 MG/ML
10 INJECTION INTRAMUSCULAR; INTRAVENOUS ONCE
Status: CANCELLED | OUTPATIENT
Start: 2022-07-28

## 2022-07-28 RX ORDER — CLONAZEPAM 1 MG/1
1 TABLET ORAL ONCE
Status: COMPLETED | OUTPATIENT
Start: 2022-07-28 | End: 2022-07-28

## 2022-07-28 RX ORDER — PREDNISONE 20 MG/1
20 TABLET ORAL ONCE
Status: COMPLETED | OUTPATIENT
Start: 2022-07-28 | End: 2022-07-28

## 2022-07-28 RX ORDER — PREDNISONE 20 MG/1
40 TABLET ORAL DAILY
Status: DISCONTINUED | OUTPATIENT
Start: 2022-07-29 | End: 2022-07-29

## 2022-07-28 RX ADMIN — TRAZODONE HYDROCHLORIDE 50 MG: 50 TABLET ORAL at 21:27

## 2022-07-28 RX ADMIN — HEPARIN SODIUM 5000 UNITS: 5000 INJECTION INTRAVENOUS; SUBCUTANEOUS at 21:27

## 2022-07-28 RX ADMIN — LEVALBUTEROL HYDROCHLORIDE 1.25 MG: 1.25 SOLUTION, CONCENTRATE RESPIRATORY (INHALATION) at 06:53

## 2022-07-28 RX ADMIN — DIPHENHYDRAMINE HCL 25 MG: 25 TABLET ORAL at 03:57

## 2022-07-28 RX ADMIN — CALCIUM ACETATE 1334 MG: 667 CAPSULE ORAL at 09:23

## 2022-07-28 RX ADMIN — PREDNISONE 20 MG: 20 TABLET ORAL at 11:17

## 2022-07-28 RX ADMIN — LEVALBUTEROL HYDROCHLORIDE 1.25 MG: 1.25 SOLUTION, CONCENTRATE RESPIRATORY (INHALATION) at 19:18

## 2022-07-28 RX ADMIN — ISOSORBIDE DINITRATE 30 MG: 30 TABLET ORAL at 21:27

## 2022-07-28 RX ADMIN — CLONAZEPAM 1 MG: 1 TABLET ORAL at 11:18

## 2022-07-28 RX ADMIN — CALCIUM ACETATE 1334 MG: 667 CAPSULE ORAL at 18:18

## 2022-07-28 RX ADMIN — IPRATROPIUM BROMIDE 0.5 MG: 0.5 SOLUTION RESPIRATORY (INHALATION) at 06:53

## 2022-07-28 RX ADMIN — DOCUSATE SODIUM 100 MG: 100 CAPSULE, LIQUID FILLED ORAL at 09:24

## 2022-07-28 RX ADMIN — ONDANSETRON 4 MG: 2 INJECTION INTRAMUSCULAR; INTRAVENOUS at 06:13

## 2022-07-28 RX ADMIN — GUAIFENESIN 600 MG: 600 TABLET, EXTENDED RELEASE ORAL at 20:35

## 2022-07-28 RX ADMIN — IPRATROPIUM BROMIDE 0.5 MG: 0.5 SOLUTION RESPIRATORY (INHALATION) at 19:18

## 2022-07-28 RX ADMIN — DOCUSATE SODIUM 100 MG: 100 CAPSULE, LIQUID FILLED ORAL at 18:18

## 2022-07-28 RX ADMIN — CLONAZEPAM 1 MG: 1 TABLET ORAL at 05:27

## 2022-07-28 RX ADMIN — ISOSORBIDE DINITRATE 30 MG: 30 TABLET ORAL at 05:28

## 2022-07-28 RX ADMIN — AMLODIPINE BESYLATE 5 MG: 5 TABLET ORAL at 09:24

## 2022-07-28 RX ADMIN — HYDRALAZINE HYDROCHLORIDE 10 MG: 20 INJECTION, SOLUTION INTRAMUSCULAR; INTRAVENOUS at 19:34

## 2022-07-28 RX ADMIN — CLONAZEPAM 1 MG: 1 TABLET ORAL at 21:29

## 2022-07-28 RX ADMIN — PREDNISONE 20 MG: 20 TABLET ORAL at 09:24

## 2022-07-28 RX ADMIN — CARVEDILOL 25 MG: 25 TABLET, FILM COATED ORAL at 18:18

## 2022-07-28 RX ADMIN — OXYCODONE HYDROCHLORIDE 5 MG: 5 TABLET ORAL at 20:33

## 2022-07-28 RX ADMIN — GUAIFENESIN 600 MG: 600 TABLET, EXTENDED RELEASE ORAL at 09:24

## 2022-07-28 RX ADMIN — CARVEDILOL 25 MG: 25 TABLET, FILM COATED ORAL at 05:28

## 2022-07-28 RX ADMIN — SENNOSIDES 8.6 MG: 8.6 TABLET, FILM COATED ORAL at 21:27

## 2022-07-28 NOTE — WOUND OSTOMY CARE
Progress Note - Wound   Jessy Draper 64 y o  female MRN: [id number]  Unit/Bed#: [facility] 501-01 Encounter: [id number]        Assessment:   Patient seen for wound care follow-up  She is agreeable to assessment  Able to turn in bed independently  Continent of bowel/bladder--largely anuric  Bilateral heels pink, blanchable, intact  Buttocks and sacrum intact--there is a small area of tan hyperpigmentation to the left buttock  No redness  Preventative skin care interventions placed as orders  Wound care team will sign-off at this time  Wound 07/13/22 Pressure Injury Buttocks (Active)   Wound Image   07/28/22 1111   Wound Description Clean;Dry; Intact 07/28/22 1111   Reba-wound Assessment Clean;Dry; Intact 07/28/22 1111   Wound Length (cm) 0 cm 07/28/22 1111   Wound Width (cm) 0 cm 07/28/22 1111   Wound Depth (cm) 0 cm 07/28/22 1111   Wound Surface Area (cm^2) 0 cm^2 07/28/22 1111   Wound Volume (cm^3) 0 cm^3 07/28/22 1111   Calculated Wound Volume (cm^3) 0 cm^3 07/28/22 1111     America STRATTON, RN, [employer]

## 2022-07-28 NOTE — PHYSICAL THERAPY NOTE
Physical Therapy Cancellation Note     07/28/22 1047   PT Last Visit   PT Visit Date 07/28/22   Note Type   Note Type Cancelled Session   Cancel Reasons Patient off floor/test  (permacath placement)     Attempted to see pt fo PT tx session this am- pt off floor for Permacath placement  PT will continue to follow on caseload and progress w/ POC as medically indicated       Sterling Garcia, PT

## 2022-07-28 NOTE — CASE MANAGEMENT
Case Management Discharge Planning Note    Patient name Elvira Hernandez  Location [facility] 501/[facility] 501-01 MRN [id number]  : 1960 Date 2022       Current Admission Date: 2022  Current Admission Diagnosis:Acute on chronic systolic congestive heart failure [facility])   Patient Active Problem List    Diagnosis Date Noted    Hepatitis B 2022    Acute on chronic respiratory failure with hypoxia ([facility] [address] ) 2022    Acute respiratory insufficiency 2022    Acute kidney injury superimposed on CKD-3 2022    COPD (chronic obstructive pulmonary disease) ([facility] [address] ) 06/15/2022    Acute on chronic systolic congestive heart failure ([facility] [address] ) 2021    Tobacco abuse 2021    Leukocytosis 2021    Anxiety 2021    Dilated cardiomyopathy ([facility] [address] ) 2021    Essential hypertension 2021    Chronic pain 2021    Vitamin D deficiency 2013      LOS (days): 6  Geometric Mean LOS (GMLOS) (days): 3 80  Days to GMLOS:-2     OBJECTIVE:  Risk of Unplanned Readmission Score: 21 07      Current admission status: Inpatient   Preferred Pharmacy:   [address] [place] [address] #[id number] [address]  Phone: [phone] Fax: [phone]    Primary Care Provider: Magdy Locke DO    Primary Insurance: MEDICARE [id number] [facility]  Secondary Insurance: [address] [place] [address],[address] DETAILS:    Additional Comments: Informed by RN that patient is unahppy bacilio and requests transfer to another facility  Attempted to meet with patient who is upset and on phone  Informed Dr Evaristo Zuleta and we will speak to patient tomorro

## 2022-07-28 NOTE — OCCUPATIONAL THERAPY NOTE
Occupational Therapy cx        Patient Name: Jose King  [id number] Date: 7/28/2022 07/28/22 1428   OT Last Visit   OT Visit Date 07/28/22   Note Type   Note Type Cancelled Session   Cancel Reasons Patient off floor/test  (attempted tx this date, pt off floor at IR for permcath placement   Will address as clinical course allows )       MARBELLA Shaw, OTR/L

## 2022-07-28 NOTE — PROGRESS NOTES
NEPHROLOGY PROGRESS NOTE   Wesly Blanco 64 y o  female MRN: [id number]  Unit/Bed#: [facility] 501-01 Encounter: [id number]  Reason for Consult: SHANTA    ASSESSMENT AND PLAN:  Severe SHANTA on CKD stage 3, baseline creatinine 1 to 1 2 with episodes of SHANTA  -due to severe SHANTA with volume overload, decompensated heart failure, hypoxic respiratory failure, she was started on dialysis on 7/15/22  -now remains dialysis dependent on MWF schedule  -tolerated dialysis well yesterday, post HD weight continue to reduce, last post HD weight 69 5 kg   -next HD tomorrow  -plan noted for tentative PermCath placement today by IR  -no obvious signs of renal recovery, minimal urine output  -recent UA bland     Access:  Issues with temporary HD catheter to maintain adequate blood flow, lines reversed, status post tPA trial with able to tolerate dialysis yesterday  Plan noted for PermCath placement today         Acute on chronic hypoxic respiratory failure  -in the setting of volume overload  -echo this month shows EF 25%  -O2 requirement seems to be slowly improving, currently remains on 2 L O2 via nasal cannula  -continue to challenge UF removal as BP tolerated     Hyponatremia, likely hypervolemic, strict fluid restriction 1 2 L per day recommended   -continue to monitor with dialysis and UF removal      Uncontrolled hypertension  -suspect in the setting of renovascular component with renal artery stenosis  -appreciate vascular surgery follow-up   -tentative plan noted for renal artery angiogram with intervention today by IR  -renal artery Doppler showing right atrophic kidney, main right renal artery could not be identified, left renal artery greater than 60% stenosis  -currently on Coreg 25 mg b i d ,isosorbide 30 mg t i d , hydralazine was discontinued due to concern for skin rash    Now on amlodipine 5 mg daily   -continue to monitor BP with UF removal challenge on dialysis      Renal artery stenosis  -plan noted for angiogram/renal artery stenting with vascular surgery once respiratory status improves  -overall improving respiratory status, currently remains on 2 L O2 via nasal cannula  -PermCath placement at the same time as well      Hyperphosphatemia, serum phosphorus 5 7  -renal diet  -currently on calcium acetate with meals    Skin rash,? Suspected drug related, hydralazine has been discontinued  Patient is being started on prednisone as per primary team   -seems to be stable/slight better  If does not improve, consider further evaluation       CKD anemia, transfuse p r n  For hemoglobin less than seven  -hemoglobin overall stable at goal, avoid Epogen given uncontrolled hypertension  Discussed above plan in detail with primary team     SUBJECTIVE:  Patient seen and examined at bedside  She has been itching but seems to be relatively stable today  Denies nausea or vomiting      OBJECTIVE:  Current Weight: Weight - Scale: 70 2 kg (154 lb 12 8 oz)  Vitals:    07/28/22 1227   BP: (!) 171/105   Pulse: 93   Resp: 20   Temp: 98 1 °F (36 7 °C)   SpO2: 90%       Intake/Output Summary (Last 24 hours) at 7/28/2022 1232  Last data filed at 7/27/2022 2024  Gross per 24 hour   Intake 1220 ml   Output 3854 ml   Net -2634 ml     Wt Readings from Last 3 Encounters:   07/28/22 70 2 kg (154 lb 12 8 oz)   07/22/22 77 9 kg (171 lb 11 8 oz)   06/28/22 77 4 kg (170 lb 9 6 oz)     Temp Readings from Last 3 Encounters:   07/28/22 98 1 °F (36 7 °C)   07/22/22 98 5 °F (36 9 °C) (Temporal)   06/22/22 97 9 °F (36 6 °C)     BP Readings from Last 3 Encounters:   07/28/22 (!) 171/105   07/22/22 155/81   06/28/22 (!) 174/110     Pulse Readings from Last 3 Encounters:   07/28/22 93   07/22/22 82   06/28/22 94        Physical Examination:  General:  Lying in bed, no acute distress   Eyes:  Mild conjunctival pallor present  ENT:  External examination of ears and nose unremarkable  Neck:  No obvious lymphadenopathy appreciated  Respiratory:  Decreased breath sound at base  CVS:  S1, S2 present  GI:  Soft, nondistended  CNS:  Active alert oriented x3  Skin:  Macular skin rash in back seems to be stable/?  Slightly improving  Musculoskeletal:  No obvious new gross deformity noted    Medications:    Current Facility-Administered Medications:     acetaminophen (TYLENOL) tablet 650 mg, 650 mg, Oral, Q6H PRN, Easton Jarvis PA-C    albuterol (PROVENTIL HFA,VENTOLIN HFA) inhaler 2 puff, 2 puff, Inhalation, Q4H PRN, Napoleon Pan MD, 2 puff at 07/25/22 0600    amLODIPine (NORVASC) tablet 5 mg, 5 mg, Oral, Daily, Delfin Urena MD, 5 mg at 07/28/22 0924    calcium acetate (PHOSLO) capsule 1,334 mg, 1,334 mg, Oral, TID With Meals, Easton Jarvis PA-C, 1,334 mg at 07/28/22 0923    carvedilol (COREG) tablet 25 mg, 25 mg, Oral, BID With Meals, Isela Pan MD, 25 mg at 07/28/22 0528    clonazePAM (KlonoPIN) tablet 1 mg, 1 mg, Oral, BID PRN, Henrry Alegre PA-C, 1 mg at 07/28/22 0527    diphenhydrAMINE (BENADRYL) tablet 25 mg, 25 mg, Oral, Q6H PRN, Katelynn Zavala PA-C, 25 mg at 07/28/22 0357    docusate sodium (COLACE) capsule 100 mg, 100 mg, Oral, BID, Seema Huynh PA-C, 100 mg at 07/28/22 0924    guaiFENesin (MUCINEX) 12 hr tablet 600 mg, 600 mg, Oral, Q12H [facility], Easton Jarvis PA-C, 600 mg at 07/28/22 0691    heparin (porcine) subcutaneous injection 5,000 Units, 5,000 Units, Subcutaneous, Q8H [facility], Easton Jarvis PA-C, 5,000 Units at 07/26/22 2107    hydrALAZINE (APRESOLINE) injection 10 mg, 10 mg, Intravenous, Q6H PRN, Easton Jarvis PA-C, 10 mg at 07/27/22 0543    hydrocortisone 1 % cream, , Topical, 4x Daily PRN, Katelynn Zavala PA-C    ipratropium (ATROVENT) 0 02 % inhalation solution 0 5 mg, 0 5 mg, Nebulization, TID, Napoleon Pan MD, 0 5 mg at 07/28/22 0653    isosorbide dinitrate (ISORDIL) tablet 30 mg, 30 mg, Oral, Q8H [facility], [facility] Allison Pierce MD, 30 mg at 07/28/22 0528    levalbuterol (XOPENEX) inhalation solution 1 25 mg, 1 25 mg, Nebulization, TID, Mayra Mack MD, 1 25 mg at 07/28/22 0653    ondansetron (ZOFRAN) injection 4 mg, 4 mg, Intravenous, Q4H PRN, Lindsey Sheppard PA-C, 4 mg at 07/28/22 3170    oxyCODONE (ROXICODONE) IR tablet 2 5 mg, 2 5 mg, Oral, Q4H PRN, Jaret Salgado PA-C    oxyCODONE (ROXICODONE) IR tablet 5 mg, 5 mg, Oral, Q4H PRN, Jaret Salgado PA-C, 5 mg at 07/27/22 2119    polyethylene glycol (MIRALAX) packet 17 g, 17 g, Oral, Daily PRN, Seema Huynh PA-C    [START ON 7/29/2022] predniSONE tablet 40 mg, 40 mg, Oral, Daily, Hetul Hopkins, DO    senna (SENOKOT) tablet 8 6 mg, 1 tablet, Oral, HS, Seema Huynh PA-C, 8 6 mg at 07/27/22 2120    traZODone (DESYREL) tablet 50 mg, 50 mg, Oral, HS, Lindsey Sheppard PA-C, 50 mg at 07/26/22 2106    Laboratory Results:  Results from last 7 days   Lab Units 07/28/22  0403 07/27/22  0432 07/26/22  0509 07/25/22  0431 07/24/22  0615 07/23/22  0512 07/22/22  2314 07/22/22  0420   WBC Thousand/uL  --   --   --   --  12 92* 12 34*  --  12 06*   HEMOGLOBIN g/dL  --   --   --   --  10 8* 11 2*  --  10 8*   HEMATOCRIT %  --   --   --   --  33 3* 33 1*  --  33 4*   PLATELETS Thousands/uL  --   --   --   --  261 285  --  294   SODIUM mmol/L 125* 125* 130* 127* 129* 130* 128* 126*   POTASSIUM mmol/L 5 6* 4 8 4 3 4 9 4 8 4 2 4 1 5 1   CHLORIDE mmol/L 92* 92* 93* 92* 95* 90* 90* 90*   CO2 mmol/L 26 24 24 23 26 27 30 19*   BUN mg/dL 49* 63* 38* 61* 42* 65* 61* 91*   CREATININE mg/dL 5 84* 7 22* 5 33* 6 77* 5 17* 6 15* 5 42* 8 02*   CALCIUM mg/dL 9 8 9 5 9 3 9 6 9 2 9 0 8 8 9 1   MAGNESIUM mg/dL  --   --   --   --   --  2 3 2 0  --    PHOSPHORUS mg/dL  --   --   --   --   --  5 7* 5 0*  --        VAS renal artery complete   Final Result by Gabriella German MD (07/24 1949)      IR tunneled dialysis catheter placement    (Results Pending)   IR renal angiogram    (Results Pending)       Portions of the record may have been created with voice recognition software   Occasional wrong word or "sound a like" substitutions may have occurred due to the inherent limitations of voice recognition software  Read the chart carefully and recognize, using context, where substitutions have occurred

## 2022-07-28 NOTE — PROGRESS NOTES
[facility]  Progress Note Russ Davis 1960, 64 y o  female MRN: [id number]  Unit/Bed#: [facility] 501-01 Encounter: [id number]  Primary Care Provider: Olivia Zuñiga DO   Date and time admitted to hospital: 7/22/2022  9:51 PM    * Acute on chronic systolic congestive heart failure (HCC)  Assessment & Plan  Wt Readings from Last 3 Encounters:   07/28/22 70 2 kg (154 lb 12 8 oz)   07/22/22 77 9 kg (171 lb 11 8 oz)   06/28/22 77 4 kg (170 lb 9 6 oz)     · Presented to [address] on 7/12 with shortness of breath with minimal exertion  · Found to be in acute on chronic systolic heart failure  · Was unresponsive to diuretics and became anuric  · History of nonischemic cardiomyopathy - EF 18% on 3/37/21  Improved to 50% on 7/9/21  · EF 41% on 6/15/22 when admitted to [facility]  from 6/15/22 to 6/22/22 with acute on chronic combined CHF and COPD exacerbation  · Repeat echo on 7/14/22 with EF 25%  · Cardiac cath in March 2021 showed moderate nonobstructive atherosclerosis  · Currently dialysis dependent  · Volume control through hemodialysis   · IR for PermCath and renal artery stent today          Hepatitis B  Assessment & Plan  · Tested positive when screened for dialysis  · W/u suggestive of resolved infection  · Outpatient GI follow-up    Acute on chronic respiratory failure with hypoxia (HCC)  Assessment & Plan  · On O2 at 2L at rest and 4L with exertion at home  · Worsening hypoxia due to acute on chronic systolic heart failure  · Wean O2 as able    Acute kidney injury superimposed on CKD-3  Assessment & Plan  Lab Results   Component Value Date    EGFR 7 07/28/2022    EGFR 5 07/27/2022    EGFR 8 07/26/2022    CREATININE 5 84 (H) 07/28/2022    CREATININE 7 22 (H) 07/27/2022    CREATININE 5 33 (H) 07/26/2022   · Baseline creatinine 1 to 1 1  · Begun on HD on 07/15 due to acute on chronic systolic heart failure unresponsive to diuretics with worsening hypoxic respiratory failure  · Currently on HD on MWF  · Perm cath/renal art stent as per IR      COPD (chronic obstructive pulmonary disease) ([facility] [address] )  Assessment & Plan  · Initial concern for exacerbation during stay at [address] prior to transfer here for which she was given 3 doses of Solu-Medrol 40 mg IV which was then discontinued  · Home regimen: Anoro Ellipta (62 5/25mcg) 1 puff daily  · Continue Xopenex/Atrovent nebs    Tobacco abuse  Assessment & Plan  · Nicotine patch  · Smoking cessation advised    Anxiety  Assessment & Plan  · Had increased anxiety  · Home Klonopin was increased from 1 mg daily prn to twice daily prn at [address] prior to transfer  · Was also begun on Seroquel at [place] which was discontinued after dose last night due to new rash      Chronic pain  Assessment & Plan  · Continuous opioid dependent  · Continue home meds MS Contin and Percocet    Essential hypertension  Assessment & Plan  · Uncontrolled hypertension  · Home meds : Carvedilol 3 125 mg BID, lasix 40 mg daily  · Currently on Carvedilol 25 mg twice daily, , Isordil 30 mg q 8 hours  Amlodipine 5 mg daily added today  Hold hydralazine for possible rash  · Workup revealed high-grade stenosis of left renal artery -   · Will be to proceed with renal artery stent placement tomorrow with PermCath placement with IR  ·  Follow-up labs for secondary hypertension workup  · Cardiology following  · Cardiology/vascular input appreciated            VTE Pharmacologic Prophylaxis:   Pharmacologic: Heparin  Mechanical VTE Prophylaxis in Place: No    Patient Centered Rounds: I have performed bedside rounds with nursing staff today  Time Spent for Care: 45 minutes  More than 50% of total time spent on counseling and coordination of care as described above      Current Length of Stay: 6 day(s)    Current Patient Status: Inpatient       Code Status: Level 1 - Full Code      Subjective:   nad      Objective:     Vitals:   Temp (24hrs), Av 1 °F (36 7 °C), Min:97 6 °F (36 4 °C), Max:99 1 °F (37 3 °C)    Temp:  [97 6 °F (36 4 °C)-99 1 °F (37 3 °C)] 98 °F (36 7 °C)  HR:  [] 87  Resp:  [16-20] 18  BP: (120-184)/() 148/91  SpO2:  [87 %-96 %] 94 %  Body mass index is 22 21 kg/m²  Input and Output Summary (last 24 hours): Intake/Output Summary (Last 24 hours) at 2022 09  Last data filed at 2022  Gross per 24 hour   Intake 1420 ml   Output 4154 ml   Net -2734 ml       Physical Exam:     Physical Exam  Constitutional:       Appearance: Normal appearance  HENT:      Head: Normocephalic and atraumatic  Cardiovascular:      Rate and Rhythm: Normal rate and regular rhythm  Pulmonary:      Effort: Pulmonary effort is normal       Breath sounds: Normal breath sounds  Skin:     General: Skin is warm and dry  Neurological:      General: No focal deficit present  Mental Status: She is alert and oriented to person, place, and time  Additional Data:     Labs:    Results from last 7 days   Lab Units 22  0615 22  0512 22  0420   WBC Thousand/uL 12 92*   < > 12 06*   HEMOGLOBIN g/dL 10 8*   < > 10 8*   HEMATOCRIT % 33 3*   < > 33 4*   PLATELETS Thousands/uL 261   < > 294   NEUTROS PCT %  --   --  66   LYMPHS PCT %  --   --  21   MONOS PCT %  --   --  8   EOS PCT %  --   --  2    < > = values in this interval not displayed  Results from last 7 days   Lab Units 22  0403 22  0615 22  0512   POTASSIUM mmol/L 5 6*   < > 4 2   CHLORIDE mmol/L 92*   < > 90*   CO2 mmol/L 26   < > 27   BUN mg/dL 49*   < > 65*   CREATININE mg/dL 5 84*   < > 6 15*   CALCIUM mg/dL 9 8   < > 9 0   ALK PHOS U/L  --   --  92   ALT U/L  --   --  18   AST U/L  --   --  15    < > = values in this interval not displayed  * I Have Reviewed All Lab Data Listed Above  * Additional Pertinent Lab Tests Reviewed:  All Labs Within Last 24 Hours Reviewed      Recent Cultures (last 7 days): Last 24 Hours Medication List:   Current Facility-Administered Medications   Medication Dose Route Frequency Provider Last Rate    acetaminophen  650 mg Oral Q6H PRN Barentt Overcast, PA-C      albuterol  2 puff Inhalation Q4H PRN Angela Davey MD      amLODIPine  5 mg Oral Daily Cruz Knapp MD      calcium acetate  1,334 mg Oral TID With Meals Barnett Overcast, PA-C      carvedilol  25 mg Oral BID With Meals Emery Ibrahim MD      clonazePAM  1 mg Oral BID PRN Esvin Haynes, BRAVO      diphenhydrAMINE  25 mg Oral Q6H PRN Harley Cornea, PA-C      docusate sodium  100 mg Oral BID Seema Huynh, BRAVO      guaiFENesin  600 mg Oral Q12H [address] Barnett Overcast, PA-C      heparin (porcine)  5,000 Units Subcutaneous [facility] Barnett Overcast, [place]      hydrALAZINE  10 mg Intravenous Q6H PRN Barnett Overcast, PA-C      hydrocortisone   Topical 4x Daily PRN Harley Cornea, PA-C      ipratropium  0 5 mg Nebulization TID Angela Davey MD      isosorbide dinitrate  30 mg Oral [facility] Giselle Rogesr MD      levalbuterol  1 25 mg Nebulization TID Angela Davey MD      ondansetron  4 mg Intravenous Q4H PRN Harley Cornea, PA-C      oxyCODONE  2 5 mg Oral Q4H PRN Barnett Overcast, PA-C      oxyCODONE  5 mg Oral Q4H PRN Barnett Overcast, PA-C      polyethylene glycol  17 g Oral Daily PRN Seema Huynh, PA-C      predniSONE  20 mg Oral Daily Tequila Hopkins DO      senna  1 tablet Oral HS Seema Huynh, PA-C      traZODone  50 mg Oral HS Harley Cornea, PA-C          Today, Patient Was Seen By: Glo Herring DO    ** Please Note: Dictation voice to text software may have been used in the creation of this document   **

## 2022-07-28 NOTE — ASSESSMENT & PLAN NOTE
Wt Readings from Last 3 Encounters:   07/28/22 70 2 kg (154 lb 12 8 oz)   07/22/22 77 9 kg (171 lb 11 8 oz)   06/28/22 77 4 kg (170 lb 9 6 oz)     · Presented to [facility] on 7/12 with shortness of breath with minimal exertion  · Found to be in acute on chronic systolic heart failure  · Was unresponsive to diuretics and became anuric  · History of nonischemic cardiomyopathy - EF 18% on 3/37/21  Improved to 50% on 7/9/21  · EF 41% on 6/15/22 when admitted to [facility] from 6/15/22 to 6/22/22 with acute on chronic combined CHF and COPD exacerbation  · Repeat echo on 7/14/22 with EF 25%  · Cardiac cath in March 2021 showed moderate nonobstructive atherosclerosis  · Currently dialysis dependent  · Volume control through hemodialysis   · IR for PermCath and renal artery stent today

## 2022-07-28 NOTE — PROGRESS NOTES
Pastoral Care Progress Note    2022  Patient: Ananth Walsh : 1960  Admission Date & Time: 2022 215  MRN: [id number] [facility]: [id number]                     Exploration: Explored emotional needs & resources and Facilitated life review      Relationship Building: Cultivated a relationship of care and support and Listened empathically    Chaplaincy Outcomes Achieved:  Expressed gratitude        Patient provided listening support, shared about problems of health and very concern what is going on with her at this time

## 2022-07-28 NOTE — ASSESSMENT & PLAN NOTE
· Uncontrolled hypertension  · Home meds : Carvedilol 3 125 mg BID, lasix 40 mg daily  · Currently on Carvedilol 25 mg twice daily, , Isordil 30 mg q 8 hours  Amlodipine 5 mg daily added today    Hold hydralazine for possible rash  · Workup revealed high-grade stenosis of left renal artery -   · Will be to proceed with renal artery stent placement tomorrow with PermCath placement with IR  ·  Follow-up labs for secondary hypertension workup  · Cardiology following  · Cardiology/vascular input appreciated

## 2022-07-28 NOTE — PROGRESS NOTES
Cardiology Progress Note - Rashawn Montemayor 64 y o  female MRN: [id number]    Unit/Bed#: [facility] 501-01 Encounter: [id number]      Assessment & Plan:  Principal Problem:    Acute on chronic systolic congestive heart failure (HCC)  Active Problems:    Essential hypertension    Chronic pain    Anxiety    Tobacco abuse    COPD (chronic obstructive pulmonary disease) (HCC)    Acute kidney injury superimposed on CKD-3    Acute on chronic respiratory failure with hypoxia (HCC)    Hepatitis B      # Acute on chronic systolic congestive heart failure   - 2/2 non ischemic cardiomyopathy   - LHC in 3/2021 showed moderate non-obstructive atherosclerosis  - previously partially recovered EF now with EF of 25%   - TTE 0n 7/14: EF 25%,  severe global hypokinesis with regional variation, basal and mid inferior wall appeared more severely hypo to akinetic  mild LAE, mod MR, mild AR, mild TR  - Likely secondary to fluid overload state in setting of acute renal failure    - appears close to euvolemic       # Acute on chronic hypoxic respiratory failure  - In the setting of volume overload and underlying COPD  - Home O2 2 L at rest and 4 L on exertion  - Currently saturating well on 2 L of oxygen via NC     # Uncontrolled hypertension   - Likely due to bilateral FABIOLA     # Acute renal failure on CKD III  - likely volume overload, decompensated heart failure  - currently on dialysis MWF  - management per nephrology     # Bilateral FABIOLA  - Renal doppler revealed occluded right renal artery and >60% stenosis in the proximal and distal main renal artery  - vascular surgery is following     # COPD on home oxygen      BP: 120-170/80-100s  -130  Weight 174 lbs - 162 lbs - 165 lbs - 154 lbs  Is&Os: negative 4 1 L with HD yesterday  Net neg 2 4 L      Plan:  - Continue coreg 25 mg bid, and isordil 30 mg tid and amlodipine 5 mg qd  - Hydralazine has been discontinued due to rash and itchiness     - Volume management as per nephrology  - Monitor hemodynamics     Subjective:   Patient was very tearful and anxious during our encounter  She is very eager to go home  Reports of gen  No significant event overnight         Objective:     Vitals: Blood pressure (!) 171/105, pulse 93, temperature 98 1 °F (36 7 °C), resp  rate 20, height 5' 10" (1 778 m), weight 70 2 kg (154 lb 12 8 oz), SpO2 90 %  , Body mass index is 22 21 kg/m² ,   Orthostatic Blood Pressures    Flowsheet Row Most Recent Value   Blood Pressure 171/105 filed at 07/28/2022 1227   Patient Position - Orthostatic VS Sitting filed at 07/28/2022 0404            Intake/Output Summary (Last 24 hours) at 7/28/2022 1250  Last data filed at 7/27/2022 2024  Gross per 24 hour   Intake 1220 ml   Output 3854 ml   Net -2634 ml           Physical Exam:    GEN: Federico Day appears well, alert and oriented x 3, very anxious and tearful  Chronically ill-appearing   HEENT: anicteric, mucous membranes moist  NECK: no jvd, carotid bruits   Perma cath on right neck  HEART: regular rhythm, normal S1 and S2, no murmurs, clicks, gallops or rubs   LUNGS: clear to auscultation bilaterally; no wheezes, rales, or rhonchi   ABDOMEN: normal bowel sounds, soft, no tenderness, no distention  EXTREMITIES: peripheral pulses normal; no clubbing, cyanosis, or edema  NEURO: no focal findings   SKIN: normal without suspicious lesions on exposed skin      Current Facility-Administered Medications:     acetaminophen (TYLENOL) tablet 650 mg, 650 mg, Oral, Q6H PRN, Deirdre Mccarthy PA-C    albuterol (PROVENTIL HFA,VENTOLIN HFA) inhaler 2 puff, 2 puff, Inhalation, Q4H PRN, Jefferey Holter, MD, 2 puff at 07/25/22 0600    amLODIPine (NORVASC) tablet 5 mg, 5 mg, Oral, Daily, Delfin Urena MD, 5 mg at 07/28/22 0924    calcium acetate (PHOSLO) capsule 1,334 mg, 1,334 mg, Oral, TID With Meals, Deirdre Mccarthy PA-C, 1,334 mg at 07/28/22 5204    carvedilol (COREG) tablet 25 mg, 25 mg, Oral, BID With Meals, Brenda Galicia MD, 25 mg at 07/28/22 0528    clonazePAM (KlonoPIN) tablet 1 mg, 1 mg, Oral, BID PRN, Esvin Demark, PA-C, 1 mg at 07/28/22 0527    diphenhydrAMINE (BENADRYL) tablet 25 mg, 25 mg, Oral, Q6H PRN, Harley Cornea, PA-C, 25 mg at 07/28/22 0357    docusate sodium (COLACE) capsule 100 mg, 100 mg, Oral, BID, Seema Venegasona, PA-C, 100 mg at 07/28/22 7939    guaiFENesin (MUCINEX) 12 hr tablet 600 mg, 600 mg, Oral, Q12H [address], Barnett Overcast, PA-C, 600 mg at 07/28/22 5262    heparin (porcine) subcutaneous injection 5,000 Units, 5,000 Units, Subcutaneous, Q8H [address], Barnett Overcast, PA-C, 5,000 Units at 07/26/22 2107    hydrALAZINE (APRESOLINE) injection 10 mg, 10 mg, Intravenous, Q6H PRN, Barnett Overcast, PA-C, 10 mg at 07/27/22 0543    hydrocortisone 1 % cream, , Topical, 4x Daily PRN, Harley Cornea, PA-C    ipratropium (ATROVENT) 0 02 % inhalation solution 0 5 mg, 0 5 mg, Nebulization, TID, Angela Davey MD, 0 5 mg at 07/28/22 0653    isosorbide dinitrate (ISORDIL) tablet 30 mg, 30 mg, Oral, Q8H [address], Bhavin Jeanie Sacks, MD, 30 mg at 07/28/22 0528    levalbuterol (Cephus Searing) inhalation solution 1 25 mg, 1 25 mg, Nebulization, TID, Angela Davey MD, 1 25 mg at 07/28/22 0653    ondansetron (ZOFRAN) injection 4 mg, 4 mg, Intravenous, Q4H PRN, Harley Cornea, PA-C, 4 mg at 07/28/22 2180    oxyCODONE (ROXICODONE) IR tablet 2 5 mg, 2 5 mg, Oral, Q4H PRN, Barnett Kashast, PA-C    oxyCODONE (ROXICODONE) IR tablet 5 mg, 5 mg, Oral, Q4H PRN, Anibal Cavazos PA-C, 5 mg at 07/27/22 2119    polyethylene glycol (MIRALAX) packet 17 g, 17 g, Oral, Daily PRN, Seema Huynh PA-C    [START ON 7/29/2022] predniSONE tablet 40 mg, 40 mg, Oral, Daily, Tequila Hopkins DO    senna (SENOKOT) tablet 8 6 mg, 1 tablet, Oral, HS, DOTTIE Mata-ANTONIA, 8 6 mg at 07/27/22 2120    traZODone (DESYREL) tablet 50 mg, 50 mg, Oral, HS, Harley Dunaway PA-C, 50 mg at 07/26/22 2106    Labs & Results:    Lab Results   Component Value Date    TROPONINI 0 11 (H) 06/13/2021    TROPONINI 0 10 (H) 06/13/2021    TROPONINI 0 04 (H) 06/13/2021       Lab Results   Component Value Date    CALCIUM 9 8 07/28/2022     04/05/2018    K 5 6 (H) 07/28/2022    CO2 26 07/28/2022    CL 92 (L) 07/28/2022    BUN 49 (H) 07/28/2022    CREATININE 5 84 (H) 07/28/2022       Lab Results   Component Value Date    WBC 12 92 (H) 07/24/2022    HGB 10 8 (L) 07/24/2022    HCT 33 3 (L) 07/24/2022    MCV 90 07/24/2022     07/24/2022           Lab Results   Component Value Date    CHOL 216 (H) 04/05/2018     Lab Results   Component Value Date    HDL 43 (L) 05/12/2022    HDL 49 (L) 02/09/2022     Lab Results   Component Value Date    LDLCALC 113 (H) 05/12/2022    LDLCALC 121 (H) 02/09/2022     Lab Results   Component Value Date    TRIG 91 05/12/2022    TRIG 102 02/09/2022       Lab Results   Component Value Date    ALT 18 07/23/2022    AST 15 07/23/2022       TELE: No significant arrhythmias seen on telemetry review

## 2022-07-28 NOTE — PROGRESS NOTES
Interventional Radiology:    64year old female with fluid overload, HTN, and renal impairment requiring dialysis  Bilateral renal artery ostial stenosis  The right kidney is relatively diminutive in size  Existing right IJ temp HD line  Plan for aortic/left renal angiogram and left renal stent placement  Plan for temp line removal and placement of a tunneled HD line  Procedure, risks, benefits and alternatives were discussed with the patient  Consent obtained at bedside       BP (!) 171/105   Pulse 93   Temp 98 1 °F (36 7 °C)   Resp 20   Ht 5' 10" (1 778 m)   Wt 70 2 kg (154 lb 12 8 oz)   SpO2 90%   BMI 22 21 kg/m²

## 2022-07-28 NOTE — ASSESSMENT & PLAN NOTE
Lab Results   Component Value Date    EGFR 7 07/28/2022    EGFR 5 07/27/2022    EGFR 8 07/26/2022    CREATININE 5 84 (H) 07/28/2022    CREATININE 7 22 (H) 07/27/2022    CREATININE 5 33 (H) 07/26/2022   · Baseline creatinine 1 to 1 1  · Begun on HD on 07/15 due to acute on chronic systolic heart failure unresponsive to diuretics with worsening hypoxic respiratory failure  · Currently on HD on MWF  · Perm cath/renal art stent as per IR

## 2022-07-28 NOTE — PROGRESS NOTES
Progress Note - Vascular Surgery  Mini Chaudhary 64 y o  female MRN: [id number]  Unit/Bed#: [facility] 501-01 Encounter: [id number]        Assessment    Patient is a 64 y o , female with a PMH of COPD, Acute on chronic CHF(EF: 25%), HTN, ARF on CKD-3 undergoing HD presenting to us for B/l renal artery stenosis with right renal atrophy  Updates from previous day:  -Discussed with nephrology that patient is as stable as possible this moment and recommend attempt at [facility] with Renal Artery A-gram/stent    -Currently down to 2L O2 NC from 6L     Plan:  -Patient to undergo Renal artery stenting with permacath placement by IR   -ARF on HD management by nephrology  -HF management by Cardiology   -Rest of care per primary team  -Will continue to follow s/p stenting  -Please wait for attending attestation for final recommendations  ________________________________________________________________  Subjective:  Mini Chaudhary was seen and evaluated at bedside  No acute complaints today  She stated being able to lay down longer after HD previous day  Current O2 was 2L NC        Medications:  Scheduled Meds:  Current Facility-Administered Medications   Medication Dose Route Frequency Provider Last Rate    acetaminophen  650 mg Oral Q6H PRN Blue Pittman PA-C      albuterol  2 puff Inhalation Q4H PRN Sinai Brantley MD      amLODIPine  5 mg Oral Daily Verner Hollering, MD      calcium acetate  1,334 mg Oral TID With Meals Blue Pittman PA-C      carvedilol  25 mg Oral BID With Meals Gracie Millan MD      clonazePAM  1 mg Oral BID PRN Cecil Matson PA-C      diphenhydrAMINE  25 mg Oral Q6H PRN Emelina Scott PA-C      docusate sodium  100 mg Oral BID Seema Huynh PA-C      guaiFENesin  600 mg Oral Q12H [address] Blue Pittman PA-C      heparin (porcine)  5,000 Units Subcutaneous [facility] [place] [place]      hydrALAZINE  10 mg Intravenous Q6H PRN Blue Pittman PA-C      hydrocortisone   Topical 4x Daily PRN Tempie Cords, PA-C      ipratropium  0 5 mg Nebulization TID Jayce Pelayo MD      isosorbide dinitrate  30 mg Oral [facility] Mohamud Alejandro MD      levalbuterol  1 25 mg Nebulization TID Jayce Pelayo MD      ondansetron  4 mg Intravenous Q4H PRN Tempie Cords, PA-C      oxyCODONE  2 5 mg Oral Q4H PRN Alois Necessary, PA-C      oxyCODONE  5 mg Oral Q4H PRN Alois Necessary, PA-C      polyethylene glycol  17 g Oral Daily PRN Seema Huynh, PA-C      predniSONE  20 mg Oral Daily Hetul HopkinsDO      senna  1 tablet Oral HS Seema Ragjulian, PA-C      traZODone  50 mg Oral HS Tempie Cords, BRAVO       Continuous Infusions:   PRN Meds:    acetaminophen    albuterol    clonazePAM    diphenhydrAMINE    hydrALAZINE    hydrocortisone    ondansetron    oxyCODONE    oxyCODONE    polyethylene glycol    Prior to Admission medications    Medication Sig Start Date End Date Taking?  Authorizing Provider   albuterol (2 5 mg/3 mL) 0 083 % nebulizer solution Take 3 mL (2 5 mg total) by nebulization every 6 (six) hours as needed for wheezing or shortness of breath  Patient not taking: Reported on 6/28/2022 6/21/22   ESTHER Monroe   albuterol (ProAir HFA) 90 mcg/act inhaler Inhale 2 puffs every 6 (six) hours as needed for wheezing 6/21/22   ESTHER Monroe   carvedilol (COREG) 3 125 mg tablet Take 1 tablet (3 125 mg total) by mouth 2 (two) times a day with meals 6/22/22   Luisa Jackson DO   clonazePAM (KlonoPIN) 1 mg tablet Take 0 5-1 mg by mouth daily at bedtime as needed 5/13/21   Historical Provider, MD   furosemide (LASIX) 40 mg tablet Take 1 tablet (40 mg total) by mouth daily Hold for morning weight less than 160 6/28/22   Royal Saab MD   gabapentin (NEURONTIN) 300 mg capsule Take 300 mg by mouth as needed     Historical Provider, MD   morphine (MS CONTIN) 30 mg 12 hr tablet Take 30 mg by mouth 2 (two) times a day    Historical Provider, MD oxyCODONE-acetaminophen (PERCOCET) 5-325 mg per tablet Take 1 tablet by mouth every 4 (four) hours as needed for moderate pain    Historical Provider, MD byerseclidinium-vilanterol (Anoro Ellipta) 62 5-25 MCG/INH inhaler Inhale 1 puff daily 6/21/22 7/21/22  ESTHER Edge        Physical Exam:  Vitals:  Vitals:    07/28/22 0407 07/28/22 0527 07/28/22 0655 07/28/22 0836   BP:  162/98  148/91   BP Location:       Pulse:  91  87   Resp:    18   Temp:    98 °F (36 7 °C)   TempSrc:       SpO2:  96% 91% 94%   Weight: 70 2 kg (154 lb 12 8 oz)      Height:           I/Os:  I/O last 3 completed shifts: In: 9752 [P O :1156; I V :500]  Out: 9954 [Other:4154]  No intake/output data recorded  General: AAO x2, NAD  CV: No jugular venous distention   Respiratory: smlung: CTA B/L   Abdominal: abdomen is soft, non-tender and non-distended    Sensory: 0=Normal; no sensory loss   Motor: Normal        Lab Results and Cultures:   CBC w/ Diff:  Results from last 7 days   Lab Units 07/24/22  0615 07/23/22  0512 07/22/22  0420   WBC Thousand/uL 12 92* 12 34* 12 06*   HEMOGLOBIN g/dL 10 8* 11 2* 10 8*   HEMATOCRIT % 33 3* 33 1* 33 4*   PLATELETS Thousands/uL 261 285 294   MCV fL 90 87 91   RBC Million/uL 3 71* 3 80* 3 66*   RDW % 14 1 14 0 13 8   MPV fL 9 6 9 8 9 9   NRBC AUTO /100 WBCs  --   --  0     BMP/CMP:   Results from last 7 days   Lab Units 07/28/22  0403 07/27/22  0432 07/26/22  0509 07/25/22  0431 07/24/22  0615   POTASSIUM mmol/L 5 6* 4 8 4 3 4 9 4 8   CHLORIDE mmol/L 92* 92* 93* 92* 95*   CO2 mmol/L 26 24 24 23 26   BUN mg/dL 49* 63* 38* 61* 42*   CREATININE mg/dL 5 84* 7 22* 5 33* 6 77* 5 17*   CALCIUM mg/dL 9 8 9 5 9 3 9 6 9 2   EGFR ml/min/1 73sq m 7 5 8 6 8     Coags:      HgbA1c:     Lab Results   Component Value Date    HGBA1C 5 5 03/27/2021     Lipid Panel:   Lab Results   Component Value Date    CHOL 216 (H) 04/05/2018     Lab Results   Component Value Date    HDL 43 (L) 05/12/2022    HDL 49 (L) 02/09/2022 HDL 47 06/22/2021     Lab Results   Component Value Date    HDL 43 (L) 05/12/2022    HDL 49 (L) 02/09/2022    HDL 47 06/22/2021     Lab Results   Component Value Date    LDLCALC 113 (H) 05/12/2022    LDLCALC 121 (H) 02/09/2022    LDLCALC 125 (H) 06/22/2021     Lab Results   Component Value Date    TRIG 91 05/12/2022    TRIG 102 02/09/2022    TRIG 144 06/22/2021     Blood Culture:   Lab Results   Component Value Date    BLOODCX No Growth After 5 Days  06/14/2022   ,   Urinalysis:   Lab Results   Component Value Date    COLORU Straw 07/12/2022    CLARITYU Clear 07/12/2022    SPECGRAV <=1 005 (L) 07/12/2022    PHUR 7 0 07/12/2022    PHUR 7 3 04/05/2018    LEUKOCYTESUR Negative 07/12/2022    NITRITE Negative 07/12/2022    GLUCOSEU Negative 07/12/2022    KETONESU Negative 07/12/2022    BILIRUBINUR Negative 07/12/2022    BLOODU Negative 07/12/2022   ,   Urine Culture: No results found for: URINECX  Blood Culture:   Lab Results   Component Value Date    BLOODCX No Growth After 5 Days  06/14/2022     Wound Culure: No results found for: [facility]     Imaging:  CTA chest pe study    Result Date: 6/15/2022  Impression Some images are suboptimal secondary to respiratory motion which decreases sensitivity for evaluation of peripheral pulmonary emboli, subject to this, no pulmonary embolism is seen  Superimposed on moderate pulmonary emphysematous changes, a large ill-defined consolidation in the periphery of the left lower lobe, suspicious for infection in the appropriate clinical setting  Correlation with the patient's symptoms and laboratory values recommended  Small dependent left pleural effusion, possibly reactive  Cardiomegaly, coronary atherosclerosis, prominent mediastinal and hilar lymph nodes, right renal atrophy, and other findings as above  Workstation performed: [id number]     No CTA results available for this patient            Code Status: Level 1 - Full Code  Advance Directive and Living Will:      Power of :    Cynthia Lafleur PA-C  7/28/2022

## 2022-07-28 NOTE — DISCHARGE INSTRUCTIONS
DISCHARGE INSTRUCTIONS  ARTERIOGRAM/ANGIOPLASTY/STENT    ACTIVITY: On the evening following the procedure, you should be mostly resting  Someone should remain with you during the evening and overnight following the procedure  On the day after your procedure, limit your activity to walking  Avoid heavy lifting (no more than 15 lbs) for the first three days  Walking up steps and normal activities may be resumed as you feel ready  You should not drive a car for at least two days following discharge from the hospital  You may ride in a car  If you have any questions regarding a particular activity, please discuss with your doctor or nurse before you are discharged  DIET:  Resume your normal diet  Drink more water than usual for the next 24 hours  PROCEDURE SITE: You may have a procedure site in your groin, arm, or foot  You may have surgical glue at your procedure site  The glue is used to cover the procedure site, assist in closure, and prevent contamination  This adhesive will darken and peel away on its own within one to two weeks  Do not pick at it  You should shower daily  Wash incision daily with soap and water, but do not rub or scrub the incision; rinse thoroughly and pat dry  Do not bathe in a tub or swim for the first 2 week following your procedure or if you have any open wounds  It is normal to have some bruising, swelling or discoloration around the procedure site  IF increasing redness, pain, or a bulge develops, call our office immediately  If present, you may remove the band-aid or steri-strips over your procedure site after two days  If you notice any active bleeding at the site, apply pressure to the site and call our office ([phone]) or [phone]  FOLLOW UP STUDIES:  Your doctor will discuss whether further treatments or follow-up studies are necessary at your first post procedure visit      FOLLOW UP APPOINTMENTS:  Making and keeping follow up appointments and ultrasound tests are important to your recovery  If you have difficulty making it to or keeping your follow up appointments, call the office  If you have increased pain, fever >101 5, increased drainage, redness or a bad smell at your surgery site, new coldness/numbness of your arm or leg, please call us immediately and GO directly to the ER  Appt w/ Dr Alyse Barnes Doctor: 8/30/2022 at 11:30am, Replaced by [facility] office    [address] [address] [phone]  [phone]  [address] [place] [address] , [address], [place], [address]  [address], [address], [place], 210 [facility]  [address] [facility], [facility], [address] [place] [address]  [address] [place] [address], [address], [facility] 5974 [facility]  [address], [address]st [address], Mark Fernandes [address]  [facility], [address] [facility], [address] [place] [address]  1307 [facility], 8614 [facility], 960 [facility]  One [facility], 532 [facility], [facility],[address], [address] [place] [address], [address] [place] [address] [address]       Perma-cath Placement   WHAT [place]:   A perma-cath is a catheter placed through a vein into or near your right atrium  Your right atrium is the right upper chamber of your heart  A perma-cath is used for dialysis in an emergency or until a long-term device is ready to use  After your procedure, you will have some pain and swelling on your chest and neck  You may have some bruises on your chest and neck  You may also have 2 dressings, one on your chest and one on your neck  DISCHARGE INSTRUCTIONS:   Call 911 for any of the following: You feel lightheaded, short of breath, and have chest pain  Your catheter comes out   Contact Interventional Radiology at [phone] [place] PATIENTS: Contact Interventional Radiology at [phone]) Olivia Gupta PATIENTS: Contact Interventional Radiology at [phone]) if:  Blood soaks through your bandage     You have new swelling in your arm, neck, face, or chest on your right side  Your catheter gets wet  Your bruises or pain get worse  You have a fever or chills  Persistent nausea or vomiting  Your incision is red, swollen, or draining pus  You have questions or concerns about your condition or care  Self-care:       Resume your normal diet  Keep your dressings dry  Do not take a shower or swim  You may take a tub bath, but do not get your dressings wet  Water in your wound can cause bacteria to grow and cause an infection  If your dressing gets wet, dry it off and cover it with dry sterile gauze  Call your healthcare provider  Do not use soaps or ointments  Do not change your dressings  Your healthcare provider or dialysis nurse will change your dressings  Your dressings should stay in place until your healthcare provider removes them  The dressing on your chest will stay as long as you have the catheter in place  The dressing prevents infection  Do not remove the red and blue caps from the end of your catheter  The caps prevent air from getting into your catheter  Follow up with your healthcare provider as directed: Write down your questions so you remember to ask them during your visits  ARTERIOGRAM    WHAT YOU SHOULD KNOW:   An angiogram is a procedure to look at arteries in your body  Arteries are the blood vessels that carry blood from your heart to your body  AFTER YOU LEAVE:     Self-care:   Limit activity: Rest for the remainder of the day of your procedure  Have some one with you until the next morning  Keep your arm or leg straight as much as possible  Rest as much as possible, sitting lying or reclining  Walk only to go to the bathroom, to bed or to eat  If the angiogram catheter was put in your leg, use the stairs as little as possible  No driving  Keep your wound clean and dry  You may shower 24 hours after your procedure  The bandage you have on should fall off in 2-3 days   If there is any drainage from the puncture site, you should put on a clean bandage  Watch for bleeding and bruising: It is normal to have a bruise and soreness where the angiogram catheter went in  Diet:   You may resume your regular diet, Sips of flat soda will help with mild nausea  Drink more liquids than usual for the next 24 hours      IMMEDIATELY Contact Interventional Radiology at [phone] [place] PATIENTS: Contact Interventional Radiology at [phone]) Nany Reaves PATIENTS: Contact Interventional Radiology at [phone]) if any of the following occur: If your bruise gets larger or if you notice any active bleeding  APPLY DIRECT PRESSURE TO THE BLEEDING SITE  If you notice increased swelling or have increased pain at the puncture site   If you have any numbness or pain in the extremity of the puncture site   If that extremity seems cold or pale  You have fever greater than 101  Persistent nausea or vomitting    Follow up with your primary healthcare provider  as directed: Write down your questions so you remember to ask them during your visits  ARTERIOGRAM    WHAT YOU SHOULD KNOW:   An angiogram is a procedure to look at arteries in your body  Arteries are the blood vessels that carry blood from your heart to your body  AFTER YOU LEAVE:     Self-care:   Limit activity: Rest for the remainder of the day of your procedure  Have some one with you until the next morning  Keep your arm or leg straight as much as possible  Rest as much as possible, sitting lying or reclining  Walk only to go to the bathroom, to bed or to eat  If the angiogram catheter was put in your leg, use the stairs as little as possible  No driving  Keep your wound clean and dry  You may shower 24 hours after your procedure  The bandage you have on should fall off in 2-3 days  If there is any drainage from the puncture site, you should put on a clean bandage  Watch for bleeding and bruising:  It is normal to have a bruise and soreness where the angiogram catheter went in  Diet:   You may resume your regular diet, Sips of flat soda will help with mild nausea  Drink more liquids than usual for the next 24 hours      IMMEDIATELY Contact Interventional Radiology at [phone] [place] PATIENTS: Contact Interventional Radiology at [phone]) Smiley Matos PATIENTS: Contact Interventional Radiology at [phone]) if any of the following occur: If your bruise gets larger or if you notice any active bleeding  APPLY DIRECT PRESSURE TO THE BLEEDING SITE  If you notice increased swelling or have increased pain at the puncture site   If you have any numbness or pain in the extremity of the puncture site   If that extremity seems cold or pale  You have fever greater than 101  Persistent nausea or vomitting    Follow up with your primary healthcare provider  as directed: Write down your questions so you remember to ask them during your visits

## 2022-07-28 NOTE — PLAN OF CARE
Problem: Prexisting or High Potential for Compromised Skin Integrity  Goal: Skin integrity is maintained or improved  Description: INTERVENTIONS:  - Identify patients at risk for skin breakdown  - Assess and monitor skin integrity  - Assess and monitor nutrition and hydration status  - Monitor labs   - Assess for incontinence   - Turn and reposition patient  - Assist with mobility/ambulation  - Relieve pressure over bony prominences  - Avoid friction and shearing  - Provide appropriate hygiene as needed including keeping skin clean and dry  - Evaluate need for skin moisturizer/barrier cream  - Collaborate with interdisciplinary team   - Patient/family teaching  - Consider wound care consult   Outcome: Progressing     Problem: MOBILITY - ADULT  Goal: Maintain or return to baseline ADL function  Description: INTERVENTIONS:  -  Assess patient's ability to carry out ADLs; assess patient's baseline for ADL function and identify physical deficits which impact ability to perform ADLs (bathing, care of mouth/teeth, toileting, grooming, dressing, etc )  - Assess/evaluate cause of self-care deficits   - Assess range of motion  - Assess patient's mobility; develop plan if impaired  - Assess patient's need for assistive devices and provide as appropriate  - Encourage maximum independence but intervene and supervise when necessary  - Involve family in performance of ADLs  - Assess for home care needs following discharge   - Consider OT consult to assist with ADL evaluation and planning for discharge  - Provide patient education as appropriate  Outcome: Progressing  Goal: Maintains/Returns to pre admission functional level  Description: INTERVENTIONS:  - Perform BMAT or MOVE assessment daily    - Set and communicate daily mobility goal to care team and patient/family/caregiver  - Collaborate with rehabilitation services on mobility goals if consulted  - Perform Range of Motion 2 times a day    - Reposition patient every 2 hours   - Dangle patient 2 times a day  - Stand patient 2 times a day  - Ambulate patient 2 times a day  - Out of bed to chair 2 times a day   - Out of bed for meals 2 times a day  - Out of bed for toileting  - Record patient progress and toleration of activity level   Outcome: Progressing     Problem: Potential for Falls  Goal: Patient will remain free of falls  Description: INTERVENTIONS:  - Educate patient/family on patient safety including physical limitations  - Instruct patient to call for assistance with activity   - Consult OT/PT to assist with strengthening/mobility   - Keep Call bell within reach  - Keep bed low and locked with side rails adjusted as appropriate  - Keep care items and personal belongings within reach  - Initiate and maintain comfort rounds  - Make Fall Risk Sign visible to staff  - Apply yellow socks and bracelet for high fall risk patients  - Consider moving patient to room near nurses station  Outcome: Progressing     Problem: Nutrition/Hydration-ADULT  Goal: Nutrient/Hydration intake appropriate for improving, restoring or maintaining nutritional needs  Description: Monitor and assess patient's nutrition/hydration status for malnutrition  Collaborate with interdisciplinary team and initiate plan and interventions as ordered  Monitor patient's weight and dietary intake as ordered or per policy  Utilize nutrition screening tool and intervene as necessary  Determine patient's food preferences and provide high-protein, high-caloric foods as appropriate       INTERVENTIONS:  - Monitor oral intake, urinary output, labs, and treatment plans  - Assess nutrition and hydration status and recommend course of action  - Evaluate amount of meals eaten  - Assist patient with eating if necessary   - Allow adequate time for meals  - Recommend/ encourage appropriate diets, oral nutritional supplements, and vitamin/mineral supplements  - Order, calculate, and assess calorie counts as needed  - Recommend, monitor, and adjust tube feedings and TPN/PPN based on assessed needs  - Assess need for intravenous fluids  - Provide specific nutrition/hydration education as appropriate  - Include patient/family/caregiver in decisions related to nutrition  Outcome: Progressing     Problem: METABOLIC, FLUID AND ELECTROLYTES - ADULT  Goal: Electrolytes maintained within normal limits  Description: INTERVENTIONS:  - Monitor labs and assess patient for signs and symptoms of electrolyte imbalances  - Administer electrolyte replacement as ordered  - Monitor response to electrolyte replacements, including repeat lab results as appropriate  - Instruct patient on fluid and nutrition as appropriate  Outcome: Progressing  Goal: Fluid balance maintained  Description: INTERVENTIONS:  - Monitor labs   - Monitor I/O and WT  - Instruct patient on fluid and nutrition as appropriate  - Assess for signs & symptoms of volume excess or deficit  Outcome: Progressing  Goal: Glucose maintained within target range  Description: INTERVENTIONS:  - Monitor Blood Glucose as ordered  - Assess for signs and symptoms of hyperglycemia and hypoglycemia  - Administer ordered medications to maintain glucose within target range  - Assess nutritional intake and initiate nutrition service referral as needed  Outcome: Progressing

## 2022-07-28 NOTE — ASSESSMENT & PLAN NOTE
· Initial concern for exacerbation during stay at [facility] prior to transfer here for which she was given 3 doses of Solu-Medrol 40 mg IV which was then discontinued  · Home regimen: Anoro Ellipta (62 5/25mcg) 1 puff daily  · Continue Xopenex/Atrovent nebs

## 2022-07-28 NOTE — ASSESSMENT & PLAN NOTE
· Had increased anxiety  · Home Klonopin was increased from 1 mg daily prn to twice daily prn at [facility] prior to transfer  · Was also begun on Seroquel at [place] which was discontinued after dose last night due to new rash

## 2022-07-29 ENCOUNTER — APPOINTMENT (INPATIENT)
Dept: RADIOLOGY | Facility: HOSPITAL | Age: 62
DRG: 252 | End: 2022-07-29
Payer: MEDICARE

## 2022-07-29 ENCOUNTER — ANESTHESIA (OUTPATIENT)
Dept: RADIOLOGY | Facility: HOSPITAL | Age: 62
End: 2022-07-29

## 2022-07-29 ENCOUNTER — APPOINTMENT (INPATIENT)
Dept: DIALYSIS | Facility: HOSPITAL | Age: 62
DRG: 252 | End: 2022-07-29
Attending: INTERNAL MEDICINE
Payer: MEDICARE

## 2022-07-29 ENCOUNTER — APPOINTMENT (INPATIENT)
Dept: RADIOLOGY | Facility: HOSPITAL | Age: 62
DRG: 252 | End: 2022-07-29
Attending: ANESTHESIOLOGY
Payer: MEDICARE

## 2022-07-29 ENCOUNTER — ANESTHESIA EVENT (OUTPATIENT)
Dept: RADIOLOGY | Facility: HOSPITAL | Age: 62
End: 2022-07-29

## 2022-07-29 LAB
ALDOST SERPL-MCNC: 139.7 NG/DL (ref 0–30)
ALDOST/RENIN PLAS-RTO: 3 {RATIO} (ref 0–30)
ANION GAP SERPL CALCULATED.3IONS-SCNC: 8 MMOL/L (ref 4–13)
BASOPHILS # BLD AUTO: 0.04 THOUSANDS/ΜL (ref 0–0.1)
BASOPHILS NFR BLD AUTO: 0 % (ref 0–1)
BUN SERPL-MCNC: 47 MG/DL (ref 5–25)
CALCIUM SERPL-MCNC: 9.4 MG/DL (ref 8.3–10.1)
CHLORIDE SERPL-SCNC: 92 MMOL/L (ref 96–108)
CO2 SERPL-SCNC: 29 MMOL/L (ref 21–32)
CREAT SERPL-MCNC: 5.07 MG/DL (ref 0.6–1.3)
EOSINOPHIL # BLD AUTO: 0 THOUSAND/ΜL (ref 0–0.61)
EOSINOPHIL NFR BLD AUTO: 0 % (ref 0–6)
ERYTHROCYTE [DISTWIDTH] IN BLOOD BY AUTOMATED COUNT: 13.9 % (ref 11.6–15.1)
GFR SERPL CREATININE-BSD FRML MDRD: 8 ML/MIN/1.73SQ M
GLUCOSE SERPL-MCNC: 117 MG/DL (ref 65–140)
HCT VFR BLD AUTO: 35.6 % (ref 34.8–46.1)
HGB BLD-MCNC: 11.9 G/DL (ref 11.5–15.4)
IMM GRANULOCYTES # BLD AUTO: 0.09 THOUSAND/UL (ref 0–0.2)
IMM GRANULOCYTES NFR BLD AUTO: 1 % (ref 0–2)
LYMPHOCYTES # BLD AUTO: 1.14 THOUSANDS/ΜL (ref 0.6–4.47)
LYMPHOCYTES NFR BLD AUTO: 10 % (ref 14–44)
MCH RBC QN AUTO: 29.2 PG (ref 26.8–34.3)
MCHC RBC AUTO-ENTMCNC: 33.4 G/DL (ref 31.4–37.4)
MCV RBC AUTO: 87 FL (ref 82–98)
MONOCYTES # BLD AUTO: 0.1 THOUSAND/ΜL (ref 0.17–1.22)
MONOCYTES NFR BLD AUTO: 1 % (ref 4–12)
NEUTROPHILS # BLD AUTO: 10.15 THOUSANDS/ΜL (ref 1.85–7.62)
NEUTS SEG NFR BLD AUTO: 88 % (ref 43–75)
NRBC BLD AUTO-RTO: 0 /100 WBCS
PLATELET # BLD AUTO: 255 THOUSANDS/UL (ref 149–390)
PMV BLD AUTO: 9.8 FL (ref 8.9–12.7)
POTASSIUM SERPL-SCNC: 4.3 MMOL/L (ref 3.5–5.3)
RBC # BLD AUTO: 4.08 MILLION/UL (ref 3.81–5.12)
RENIN PLAS-CCNC: 47.08 NG/ML/HR (ref 0.17–5.38)
SODIUM SERPL-SCNC: 129 MMOL/L (ref 135–147)
WBC # BLD AUTO: 11.52 THOUSAND/UL (ref 4.31–10.16)

## 2022-07-29 PROCEDURE — 94760 N-INVAS EAR/PLS OXIMETRY 1: CPT

## 2022-07-29 PROCEDURE — 99232 SBSQ HOSP IP/OBS MODERATE 35: CPT | Performed by: INTERNAL MEDICINE

## 2022-07-29 PROCEDURE — 99232 SBSQ HOSP IP/OBS MODERATE 35: CPT | Performed by: RADIOLOGY

## 2022-07-29 PROCEDURE — 71045 X-RAY EXAM CHEST 1 VIEW: CPT

## 2022-07-29 PROCEDURE — 80048 BASIC METABOLIC PNL TOTAL CA: CPT | Performed by: INTERNAL MEDICINE

## 2022-07-29 PROCEDURE — 76937 US GUIDE VASCULAR ACCESS: CPT | Performed by: RADIOLOGY

## 2022-07-29 PROCEDURE — 36556 INSERT NON-TUNNEL CV CATH: CPT | Performed by: RADIOLOGY

## 2022-07-29 PROCEDURE — 36558 INSERT TUNNELED CV CATH: CPT

## 2022-07-29 PROCEDURE — 02HV33Z INSERTION OF INFUSION DEVICE INTO SUPERIOR VENA CAVA, PERCUTANEOUS APPROACH: ICD-10-PCS | Performed by: RADIOLOGY

## 2022-07-29 PROCEDURE — 76937 US GUIDE VASCULAR ACCESS: CPT

## 2022-07-29 PROCEDURE — 94640 AIRWAY INHALATION TREATMENT: CPT

## 2022-07-29 PROCEDURE — 99233 SBSQ HOSP IP/OBS HIGH 50: CPT | Performed by: INTERNAL MEDICINE

## 2022-07-29 PROCEDURE — 94660 CPAP INITIATION&MGMT: CPT

## 2022-07-29 PROCEDURE — 90935 HEMODIALYSIS ONE EVALUATION: CPT | Performed by: INTERNAL MEDICINE

## 2022-07-29 PROCEDURE — 77001 FLUOROGUIDE FOR VEIN DEVICE: CPT

## 2022-07-29 PROCEDURE — 85025 COMPLETE CBC W/AUTO DIFF WBC: CPT | Performed by: INTERNAL MEDICINE

## 2022-07-29 RX ORDER — ALBUTEROL SULFATE 2.5 MG/3ML
2.5 SOLUTION RESPIRATORY (INHALATION) EVERY 4 HOURS PRN
Status: DISCONTINUED | OUTPATIENT
Start: 2022-07-29 | End: 2022-08-03 | Stop reason: HOSPADM

## 2022-07-29 RX ORDER — AMLODIPINE BESYLATE 10 MG/1
10 TABLET ORAL DAILY
Status: DISCONTINUED | OUTPATIENT
Start: 2022-07-30 | End: 2022-08-02

## 2022-07-29 RX ORDER — CLONAZEPAM 1 MG/1
1 TABLET ORAL ONCE
Status: COMPLETED | OUTPATIENT
Start: 2022-07-29 | End: 2022-07-29

## 2022-07-29 RX ORDER — METHYLPREDNISOLONE SODIUM SUCCINATE 40 MG/ML
20 INJECTION, POWDER, LYOPHILIZED, FOR SOLUTION INTRAMUSCULAR; INTRAVENOUS EVERY 8 HOURS SCHEDULED
Status: DISCONTINUED | OUTPATIENT
Start: 2022-07-29 | End: 2022-08-01

## 2022-07-29 RX ORDER — LEVALBUTEROL 1.25 MG/.5ML
1.25 SOLUTION, CONCENTRATE RESPIRATORY (INHALATION) EVERY 6 HOURS PRN
Status: DISCONTINUED | OUTPATIENT
Start: 2022-07-29 | End: 2022-07-29

## 2022-07-29 RX ORDER — LIDOCAINE WITH 8.4% SOD BICARB 0.9%(10ML)
SYRINGE (ML) INJECTION CODE/TRAUMA/SEDATION MEDICATION
Status: COMPLETED | OUTPATIENT
Start: 2022-07-29 | End: 2022-07-29

## 2022-07-29 RX ORDER — ALBUTEROL SULFATE 2.5 MG/3ML
SOLUTION RESPIRATORY (INHALATION)
Status: COMPLETED
Start: 2022-07-29 | End: 2022-07-29

## 2022-07-29 RX ADMIN — AMLODIPINE BESYLATE 5 MG: 5 TABLET ORAL at 10:19

## 2022-07-29 RX ADMIN — CLONAZEPAM 1 MG: 1 TABLET ORAL at 04:55

## 2022-07-29 RX ADMIN — CALCIUM ACETATE 1334 MG: 667 CAPSULE ORAL at 18:43

## 2022-07-29 RX ADMIN — CARVEDILOL 25 MG: 25 TABLET, FILM COATED ORAL at 10:20

## 2022-07-29 RX ADMIN — ALBUTEROL SULFATE 2 PUFF: 90 AEROSOL, METERED RESPIRATORY (INHALATION) at 04:55

## 2022-07-29 RX ADMIN — LEVALBUTEROL HYDROCHLORIDE 1.25 MG: 1.25 SOLUTION, CONCENTRATE RESPIRATORY (INHALATION) at 07:54

## 2022-07-29 RX ADMIN — METHYLPREDNISOLONE SODIUM SUCCINATE 20 MG: 40 INJECTION, POWDER, FOR SOLUTION INTRAMUSCULAR; INTRAVENOUS at 18:44

## 2022-07-29 RX ADMIN — HEPARIN SODIUM 5000 UNITS: 5000 INJECTION INTRAVENOUS; SUBCUTANEOUS at 07:19

## 2022-07-29 RX ADMIN — ISOSORBIDE DINITRATE 30 MG: 30 TABLET ORAL at 10:20

## 2022-07-29 RX ADMIN — CLONAZEPAM 1 MG: 1 TABLET ORAL at 15:46

## 2022-07-29 RX ADMIN — CLONAZEPAM 1 MG: 1 TABLET ORAL at 22:17

## 2022-07-29 RX ADMIN — IPRATROPIUM BROMIDE 0.5 MG: 0.5 SOLUTION RESPIRATORY (INHALATION) at 07:54

## 2022-07-29 RX ADMIN — IPRATROPIUM BROMIDE 0.5 MG: 0.5 SOLUTION RESPIRATORY (INHALATION) at 19:06

## 2022-07-29 RX ADMIN — ISOSORBIDE DINITRATE 30 MG: 30 TABLET ORAL at 22:17

## 2022-07-29 RX ADMIN — GUAIFENESIN 600 MG: 600 TABLET, EXTENDED RELEASE ORAL at 21:05

## 2022-07-29 RX ADMIN — CARVEDILOL 25 MG: 25 TABLET, FILM COATED ORAL at 18:44

## 2022-07-29 RX ADMIN — PREDNISONE 40 MG: 20 TABLET ORAL at 10:20

## 2022-07-29 RX ADMIN — ALBUTEROL SULFATE 2.5 MG: 2.5 SOLUTION RESPIRATORY (INHALATION) at 05:31

## 2022-07-29 RX ADMIN — Medication 10 ML: at 13:03

## 2022-07-29 RX ADMIN — ISOSORBIDE DINITRATE 30 MG: 30 TABLET ORAL at 18:45

## 2022-07-29 RX ADMIN — HEPARIN SODIUM 5000 UNITS: 5000 INJECTION INTRAVENOUS; SUBCUTANEOUS at 21:05

## 2022-07-29 RX ADMIN — LEVALBUTEROL HYDROCHLORIDE 1.25 MG: 1.25 SOLUTION, CONCENTRATE RESPIRATORY (INHALATION) at 19:06

## 2022-07-29 RX ADMIN — OXYCODONE HYDROCHLORIDE 2.5 MG: 5 TABLET ORAL at 18:58

## 2022-07-29 RX ADMIN — DOCUSATE SODIUM 100 MG: 100 CAPSULE, LIQUID FILLED ORAL at 18:43

## 2022-07-29 NOTE — PROGRESS NOTES
NEPHROLOGY PROGRESS NOTE   Salvador Brooks 64 y o  female MRN: [id number]  Unit/Bed#: [facility] 501-01 Encounter: [id number]  Reason for Consult: SHANTA    ASSESSMENT AND PLAN:  Severe SHANTA on CKD stage 3, baseline creatinine 1 to 1 2 with episodes of SHANTA  -due to severe SHANTA with volume overload, decompensated heart failure, hypoxic respiratory failure, she was started on dialysis on 7/15/22  -now remains dialysis dependent on MWF schedule  -post HD weight continue to reduce, last post HD weight 69 5 kg  No pre HD weight available today as patient is on stretcher  HD today  -UF removal 3 6 L as BP tolerated  -will plan for ultrafiltration again tomorrow  -no obvious signs of renal recovery, minimal urine output  -recent UA bland    I saw and examined patient during hemodialysis treatment at 3:00 PM on 7/29/2022  The patient was receiving hemodialysis for treatment of SHANTA CKD  I also reviewed vital signs, intake and output, lab results and recent events, and agree with dialysis order  Tolerating HD  BP high     Access:  Status post new temporary HD catheter placed on 7/29/22  Will eventually need PermCath once respiratory status improves         Acute on chronic hypoxic respiratory failure  -in the setting of volume overload  -echo this month shows EF 25%  -patient subjectively feeling more dyspnea today  Remains on 4 L O2 via nasal cannula at the time of my encounter  Due to worsening respiratory distress, renal artery angiogram, PermCath procedures are being postponed   -challenge UF removal on dialysis today     Hyponatremia, likely hypervolemic, strict fluid restriction 1 2 L per day recommended   -continue to monitor with dialysis and UF removal      Uncontrolled hypertension  -suspect in the setting of renovascular component with renal artery stenosis  -appreciate vascular surgery follow-up    -unfortunately could not get renal artery angiogram today due to worsened respiratory status   -renal artery Doppler showing right atrophic kidney, main right renal artery could not be identified, left renal artery greater than 60% stenosis  -currently on Coreg 25 mg b i d ,isosorbide 30 mg t i d , agree with increasing amlodipine to 10 mg daily  - hydralazine was discontinued due to concern for skin rash  -continue to monitor BP with UF removal challenge on dialysis      Renal artery stenosis  -plan noted for angiogram/renal artery stenting with vascular surgery once respiratory status improves  Procedure was postponed today   -PermCath placement at the same time as well      Hyperphosphatemia, serum phosphorus 5 7  -renal diet  -currently on calcium acetate with meals     Skin rash,? Suspected drug related, hydralazine has been discontinued  Currently on prednisone   -seems to be slight better  If does not improve, consider further evaluation  Mild hyperkalemia, serum potassium slightly hemolyzed  Potassium 5 6   2K bath on dialysis today      CKD anemia, transfuse p r n  For hemoglobin less than seven  -hemoglobin overall stable at goal, avoid Epogen given uncontrolled hypertension      Discussed above plan in detail with primary team     SUBJECTIVE:  Patient seen and examined at bedside  She is complaining of more shortness of breath  Denies nausea vomiting      OBJECTIVE:  Current Weight: Weight - Scale:  (pt refused at this time she is on BIPAP and "can't breath")  Vitals:    07/29/22 1312   BP:    Pulse: 91   Resp:    Temp:    SpO2: 99%       Intake/Output Summary (Last 24 hours) at 7/29/2022 1445  Last data filed at 7/29/2022 1415  Gross per 24 hour   Intake 200 ml   Output 0 ml   Net 200 ml     Wt Readings from Last 3 Encounters:   07/28/22 70 2 kg (154 lb 12 8 oz)   07/22/22 77 9 kg (171 lb 11 8 oz)   06/28/22 77 4 kg (170 lb 9 6 oz)     Temp Readings from Last 3 Encounters:   07/29/22 99 °F (37 2 °C) (Oral)   07/22/22 98 5 °F (36 9 °C) (Temporal)   06/22/22 97 9 °F (36 6 °C)     BP Readings from Last 3 Encounters:   07/29/22 (!) 175/85   07/22/22 155/81   06/28/22 (!) 174/110     Pulse Readings from Last 3 Encounters:   07/29/22 91   07/22/22 82   06/28/22 94        Physical Examination:  General:  Sitting in chair, no acute distress   Eyes:  Mild conjunctival pallor present  ENT:  External examination of ears and nose unremarkable  Neck:  No obvious lymphadenopathy appreciated  Respiratory:  Bilateral air entry present, inspiratory basilar crackles present  CVS:  S1, S2 present  GI:  Soft, nondistended  CNS:  Active alert oriented x3  Skin:  Macular rash in the back, slowly improving  Musculoskeletal:  No obvious new gross deformity noted    Medications:    Current Facility-Administered Medications:     acetaminophen (TYLENOL) tablet 650 mg, 650 mg, Oral, Q6H PRN, Adina Burkett PA-C    albuterol (PROVENTIL HFA,VENTOLIN HFA) inhaler 2 puff, 2 puff, Inhalation, Q4H PRN, Princess Quinones MD, 2 puff at 07/29/22 0455    [START ON 7/30/2022] amLODIPine (NORVASC) tablet 10 mg, 10 mg, Oral, Daily, Pia Pulido DO    calcium acetate (PHOSLO) capsule 1,334 mg, 1,334 mg, Oral, TID With Meals, Adina Burkett PA-C, 1,334 mg at 07/28/22 1818    carvedilol (COREG) tablet 25 mg, 25 mg, Oral, BID With Meals, Jesus Aquino MD, 25 mg at 07/29/22 1020    clonazePAM (KlonoPIN) tablet 1 mg, 1 mg, Oral, BID PRN, Antwan Bolivar PA-C, 1 mg at 07/29/22 0455    diphenhydrAMINE (BENADRYL) tablet 25 mg, 25 mg, Oral, Q6H PRN, Fermin Dennis PA-C, 25 mg at 07/28/22 0357    docusate sodium (COLACE) capsule 100 mg, 100 mg, Oral, BID, Seema Huynh PA-C, 100 mg at 07/28/22 1818    guaiFENesin (MUCINEX) 12 hr tablet 600 mg, 600 mg, Oral, Q12H [address], Adina Burkett PA-C, 600 mg at 07/28/22 2035    heparin (porcine) subcutaneous injection 5,000 Units, 5,000 Units, Subcutaneous, Q8H [address], Adina Burkett PA-C, 5,000 Units at 07/29/22 0719    hydrALAZINE (APRESOLINE) injection 10 mg, 10 mg, Intravenous, Q6H PRN, Adina Burkett PA-C, 10 mg at 07/28/22 1934    hydrocortisone 1 % cream, , Topical, 4x Daily PRN, Eliecer Ortega PA-C    ipratropium (ATROVENT) 0 02 % inhalation solution 0 5 mg, 0 5 mg, Nebulization, TID, Mary Ann Avila MD, 0 5 mg at 07/29/22 0754    isosorbide dinitrate (ISORDIL) tablet 30 mg, 30 mg, Oral, Q8H [address], Moy Maria Eugenia Ackerman MD, 30 mg at 07/29/22 1020    levalbuterol ([place]) inhalation solution 1 25 mg, 1 25 mg, Nebulization, TID, Mary Ann Avila MD, 1 25 mg at 07/29/22 0754    ondansetron (ZOFRAN) injection 4 mg, 4 mg, Intravenous, Q4H PRN, Eliecer Ortega PA-C, 4 mg at 07/28/22 7159    oxyCODONE (ROXICODONE) IR tablet 2 5 mg, 2 5 mg, Oral, Q4H PRN, Joby Benedict PA-C    oxyCODONE (ROXICODONE) IR tablet 5 mg, 5 mg, Oral, Q4H PRN, Joby Benedict PA-C, 5 mg at 07/28/22 2033    polyethylene glycol (MIRALAX) packet 17 g, 17 g, Oral, Daily PRN, Seema Huynh PA-C    predniSONE tablet 40 mg, 40 mg, Oral, Daily, Tequila Hopkins DO, 40 mg at 07/29/22 1020    senna (SENOKOT) tablet 8 6 mg, 1 tablet, Oral, HS, Seema Huynh PA-C, 8 6 mg at 07/28/22 2127    traZODone (DESYREL) tablet 50 mg, 50 mg, Oral, HS, Eliecer Ortega PA-C, 50 mg at 07/28/22 2127    Laboratory Results:  Results from last 7 days   Lab Units 07/28/22  0403 07/27/22  0432 07/26/22  0509 07/25/22  0431 07/24/22  0615 07/23/22  0512 07/22/22  2314   WBC Thousand/uL  --   --   --   --  12 92* 12 34*  --    HEMOGLOBIN g/dL  --   --   --   --  10 8* 11 2*  --    HEMATOCRIT %  --   --   --   --  33 3* 33 1*  --    PLATELETS Thousands/uL  --   --   --   --  261 285  --    SODIUM mmol/L 125* 125* 130* 127* 129* 130* 128*   POTASSIUM mmol/L 5 6* 4 8 4 3 4 9 4 8 4 2 4 1   CHLORIDE mmol/L 92* 92* 93* 92* 95* 90* 90*   CO2 mmol/L 26 24 24 23 26 27 30   BUN mg/dL 49* 63* 38* 61* 42* 65* 61*   CREATININE mg/dL 5 84* 7 22* 5 33* 6 77* 5 17* 6 15* 5 42*   CALCIUM mg/dL 9 8 9 5 9 3 9 6 9 2 9 0 8 8   MAGNESIUM mg/dL  --   --   --   --   --  2 3 2 0 PHOSPHORUS mg/dL  --   --   --   --   --  5 7* 5 0*       XR chest portable   Final Result by Jarek Camacho MD (07/29 1347)      Pulmonary edema with small bilateral pleural effusions is similar to prior study  Workstation performed: [id number]         VAS renal artery complete   Final Result by Darion Barillas MD (07/24 1949)      IR renal angiogram    (Results Pending)   IR temporary dialysis catheter check/change/reposition    (Results Pending)       Portions of the record may have been created with voice recognition software  Occasional wrong word or "sound a like" substitutions may have occurred due to the inherent limitations of voice recognition software  Read the chart carefully and recognize, using context, where substitutions have occurred

## 2022-07-29 NOTE — RESTORATIVE TECHNICIAN NOTE
Restorative Technician Note      Patient Name: Samina Lu     Note Type: Mobility  Patient Position Upon Consult: Supine  Activity Performed: Ambulated;  Other (Comment) (Joel)  Assistive Device: Roller walker

## 2022-07-29 NOTE — CASE MANAGEMENT
Case Management Discharge Planning Note    Patient name Iwona Mata  [facility] MRN [id number]  : 1960 Date 2022       Current Admission Date: 2022  Current Admission Diagnosis:Acute on chronic systolic congestive heart failure [facility])   Patient Active Problem List    Diagnosis Date Noted    Hepatitis B 2022    Acute on chronic respiratory failure with hypoxia ([facility] ) 2022    Acute respiratory insufficiency 2022    Acute kidney injury superimposed on CKD-3 2022    COPD (chronic obstructive pulmonary disease) ([facility] ) 06/15/2022    Acute on chronic systolic congestive heart failure ([facility] ) 2021    Tobacco abuse 2021    Leukocytosis 2021    Anxiety 2021    Dilated cardiomyopathy ([facility] ) 2021    Essential hypertension 2021    Chronic pain 2021    Vitamin D deficiency 2013      LOS (days): 7  Geometric Mean LOS (GMLOS) (days): 3 80  Days to GMLOS:-3     OBJECTIVE:  Risk of Unplanned Readmission Score: 23 9      Current admission status: Inpatient   Preferred Pharmacy:   [address] [place] [address] #[id number] [address]  Phone: [phone] Fax: [phone]    Primary Care Provider: Ramon Ignacio DO    Primary Insurance: MEDICARE [id number]  Secondary Insurance: [address] [place], [place] [address]Third Floor DETAILS:    Additional Comments: Patient is not medically cleared for dc  Discussed during care coordination rounds  [facility] is following but has not provided outpatient HD scehdule until we have dc date  [facility]

## 2022-07-29 NOTE — PROGRESS NOTES
Patient was brought down to the interventional Radiology holding area for renal artery stenting  She was evaluated by the anesthesiology service  I had a long discussion with the anaesthesia attending physician Dr Taran Christensen  Per their evaluation, patient is not optimized to have sedation or general anaesthesia  I saw and examined the patient myself  She was sitting up on her stretcher leaning forward and appearing to have moderate respiratory distress  She was on 4 L of oxygen via nasal cannula and she was seen using her accessory muscles for respiration  I saw her for the 1st time and it was unclear to me whether this was her baseline, improvement or worsening from her baseline  The IR staff who saw her yesterday reported to me that she does appear significantly worse  and the patient herself verbalized she was feeling much worse compared to yesterday  I contacted her nephrologist (Dr Fazal Flores) and had a long conversation with him  Per Dr Angela Gamble report, her right-sided non tunneled dialysis catheter had not been working well with poor blood flow during dialysis  This non-tunneled dialysis catheter was placed by a non-IR provider and appeared to be through a high access point  Given this, I recommended removing this catheter and placement of a new non tunneled dialysis catheter with the access point at a lower location just above the right clavicle to allow the catheter tip positioned further down in the right atrium for better flow  Regarding renal artery stenting, given that the anesthesia team make the determination that she is not optimized for sedation or anesthesia, this will be postponed pending further medical optimization    Dr Fazal Flores sent out a group text message to the anesthesia service, vascular surgery service, cardiology and primary team   Multi disciplinary discussion needs to take place to come to a consensus regarding the endpoint of medical optimization prior to renal artery stenting  It is unclear whether she will ever be optimized for this medically  Please reach out IR again once she is determined optimized

## 2022-07-29 NOTE — PROGRESS NOTES
Spiritual Care Progress Note    2022  Patient: Asa Santos : 1960  Admission Date & Time: 2022  MRN: [id number] CSN: [id number]       attempted follow-up visit per departmental referral  Pt unavailable at this time  Spiritual care will remain available for emotional and spiritual support as needed       22 1300   Clinical Encounter Type   Visited With Patient not available   Routine Visit Follow-up   Referral From

## 2022-07-29 NOTE — PLAN OF CARE
Post-Dialysis RN Treatment Note    Blood Pressure:  Pre 164/113 mm/Hg  Post 121/82 mmHg   EDW  TBD  kg    Weight:  Pre n/a kg   Post n/a kg   Mode of weight measurement: N/A pt on stretcher no scale   Volume Removed  3000 ml    Treatment duration 240 minutes    NS given  No    Treatment shortened?  No   Medications given during Rx none   Estimated Kt/V  Unable to enter weight   Access type: Temporary HD catheter   Access Issues: No    Report called to primary nurse   Yes  Dianna Zavala Rn        Problem: METABOLIC, FLUID AND ELECTROLYTES - ADULT  Goal: Electrolytes maintained within normal limits  Description: INTERVENTIONS:  - Monitor labs and assess patient for signs and symptoms of electrolyte imbalances  - Administer electrolyte replacement as ordered  - Monitor response to electrolyte replacements, including repeat lab results as appropriate  - Instruct patient on fluid and nutrition as appropriate  Outcome: Progressing  Goal: Fluid balance maintained  Description: INTERVENTIONS:  - Monitor labs   - Monitor I/O and WT  - Instruct patient on fluid and nutrition as appropriate  - Assess for signs & symptoms of volume excess or deficit  Outcome: Progressing

## 2022-07-29 NOTE — ASSESSMENT & PLAN NOTE
· Initial concern for exacerbation during stay at [address] prior to transfer here for which she was given 3 doses of Solu-Medrol 40 mg IV which was then discontinued  · Home regimen: Anoro Ellipta (62 5/25mcg) 1 puff daily  · Continue Xopenex/Atrovent nebs

## 2022-07-29 NOTE — NURSING NOTE
Pt c/o not being able to breath, VSS Sat was 96% on 2L increased to 4L pt request  lung sounds diminished and some scattered wheezes Albuterol was given  MD notified, klonipin given for anxiety, bipap placed and breathing tmt given as well   pt still c/o filling with fluid, finally relaxed and resting around 7am  Asked if she could have her HD early explained it was 4am and that was not possible but did tell her she was scheduled to go down at 8am

## 2022-07-29 NOTE — QUICK NOTE
Patient with acute worsening SOB overnight requiring Bipap therapy  Patient did not receive a CXR at that time  Patient presents today with acute dyspnea, SOB, difficulty breathing, reporting chest pain  Patient is sitting forward and breathing with pursed lips while giving short ansers to questions due to SOB  Gen: Ill appearing female, in acute respiratory distress, sitting forward with head down and pursed lips  Skin: Warm, Dry   HEENT:  PERRLA, EOMI, HD catheter noted in right neck  CV: RRR, +s1/s2, no M/R/G  Pulm: Lungs with wheezing in all lung fields  Abd: Soft, non-tender, non-distended  Neuro: AAOx3;   Psych: anxious      Plan: CXR ordered stat  Discussed concerns with IR Eustaquio Gowers) regarding patient's unsuitability for GA with relaxation and also sedation given my concerns about the patient's unaddressed SOB, hypoxia, and clinical instability  Case will be cancelled for further optimization of patient's clinical status       Chavez Terrell MD

## 2022-07-29 NOTE — PROGRESS NOTES
Heart Failure/ Pulmonary Hypertension Progress Note - Neelam Bun 64 y o  female MRN: [id number]    Unit/Bed#: [facility] Encounter: [id number]      Assessment:    Principal Problem:    Acute on chronic systolic congestive heart failure (HCC)  Active Problems:    Essential hypertension    Chronic pain    Anxiety    Tobacco abuse    COPD (chronic obstructive pulmonary disease) (HCC)    Acute kidney injury superimposed on CKD-3    Acute on chronic respiratory failure with hypoxia (HCC)    Hepatitis B      Objective:    Intake/ Output: not kept  Weight:  154 lbs- standing   Tele:      MAPs very high- 110's      On BiPap      Pt feels itching, possibly from medications         # Acute on Chronic HFrEF, Stage C     Etiology: NICM, LHC 3/2021 showed moderate non obstructive atherosclerosis,   previously partially recovered now back down to 25%     May be due to increased afterload as FABIOLA           Weight:     NT proBNP           Studies- personally reviewed by me           Echocardiogram 7/14/22     LVEF: 25% w segmental WMA     LVIDd: 5 5 cm     RV: normal     MR: moderate     PASP:     RVOT:      Other: mild TR, mild AI           Echo 6/15/22:     LVEF: 40%     LVEDd: 4 7 cm     RV: low normal           Echo 7/9/21:     LVEF: 50%     LVEDd: 4 4 cm       LHC 3/30/21: moderate non obstructive CAD     Non compliant LV with LVEDP 15 increasing to 35 mmHg after atrial systole            Neurohormonal Blockade:     --Beta-Blocker: coreg 25 mg BID    --ACEi, ARB or ARNi:       (or SVR reduction) hydralazine 100 mg Q8, isordil 30 mg Q8    --Aldosterone Receptor Blocker:     --SGLT2-I:      --Diuretic:           Sudden Cardiac Death Risk Reduction:     --ICD:  LifeVest          Electrical Resynchronization:                Advanced Therapies (If appropriate):     --Inotrope:     --LVAD/Transplant Candidacy:           # Acute on chronic hypoxic respiratory failure     - in setting of volume overload and underlying COPD   Oxygen: 2 liters at rest           # HTN-likely due to bilateral FABIOLA - uncontrolled    Coreg 25 mg BID, isosorbide 30 mg TID, hydralazine 100 mg TID  , amlodipine 5 mg daily  # Acute on chronic CKD- HD MWF     # bilateral FABIOLA-  - Renal doppler revealed occluded right renal artery and >60% stenosis in the proximal and distal main renal artery       - vascular surgery is following     # COPD           Plan:     Continue on coreg 25 mg BID, hydralazine 50 mg TID and isordil 30 mg TID   - amlodipine 5 mg daily  - go up to 10 mg daily  Still a lot of room for SVR reduction, anticipate this need will plummet with renal artery stenting     Volume management per HD     Vascular following for aldosterone/renin ration     LiveVest          Review of Systems     [employer] (day, reason): Henley catheter (day, reason):    Vitals: Blood pressure (!) 175/85, pulse 91, temperature 99 °F (37 2 °C), temperature source Oral, resp  rate (!) 30, height 5' 10" (1 778 m), weight 70 2 kg (154 lb 12 8 oz), SpO2 99 %  , Body mass index is 22 21 kg/m² , I/O last 3 completed shifts: In: 480 [P O :480]  Out: -   No intake/output data recorded  Wt Readings from Last 3 Encounters:   07/28/22 70 2 kg (154 lb 12 8 oz)   07/22/22 77 9 kg (171 lb 11 8 oz)   06/28/22 77 4 kg (170 lb 9 6 oz)       Intake/Output Summary (Last 24 hours) at 7/29/2022 1328  Last data filed at 7/29/2022 1120  Gross per 24 hour   Intake 0 ml   Output 0 ml   Net 0 ml     I/O last 3 completed shifts: In: 480 [P O :480]  Out: -     No significant arrhythmias seen on telemetry review         Physical Exam:  Vitals:    07/29/22 1108 07/29/22 1120 07/29/22 1257 07/29/22 1312   BP:       BP Location:       Pulse:   91 91   Resp:   (!) 30    Temp:  99 °F (37 2 °C)     TempSrc:  Oral     SpO2: 96%  95% 99%   Weight:       Height:           GEN: Juani Morales appears well, alert and oriented x 3, pleasant and cooperative   HEENT: pupils equal, round, and reactive to light; extraocular muscles intact  NECK: supple, no carotid bruits   HEART: regular rhythm, normal S1 and S2, no murmurs, clicks, gallops or rubs, JVP is    LUNGS: clear to auscultation bilaterally; no wheezes, rales, or rhonchi   ABDOMEN: normal bowel sounds, soft, no tenderness, no distention  EXTREMITIES: peripheral pulses normal; no clubbing, cyanosis, or edema  NEURO: no focal findings   SKIN: normal without suspicious lesions on exposed skin      Current Facility-Administered Medications:     acetaminophen (TYLENOL) tablet 650 mg, 650 mg, Oral, Q6H PRN, Anibal Hewittast PA-C    albuterol (PROVENTIL HFA,VENTOLIN HFA) inhaler 2 puff, 2 puff, Inhalation, Q4H PRN, Angela Davey MD, 2 puff at 07/29/22 0455    amLODIPine (NORVASC) tablet 5 mg, 5 mg, Oral, Daily, Delfin Urena MD, 5 mg at 07/29/22 1019    calcium acetate (PHOSLO) capsule 1,334 mg, 1,334 mg, Oral, TID With Meals, Anibal Cavazos PA-C, 1,334 mg at 07/28/22 1818    carvedilol (COREG) tablet 25 mg, 25 mg, Oral, BID With Meals, Emery Ibrahim MD, 25 mg at 07/29/22 1020    clonazePAM (KlonoPIN) tablet 1 mg, 1 mg, Oral, BID PRN, Esvin Haynes PA-C, 1 mg at 07/29/22 0455    diphenhydrAMINE (BENADRYL) tablet 25 mg, 25 mg, Oral, Q6H PRN, Harley Dunaway, PA-C, 25 mg at 07/28/22 0357    docusate sodium (COLACE) capsule 100 mg, 100 mg, Oral, BID, Seema Huynh PA-C, 100 mg at 07/28/22 1818    guaiFENesin (MUCINEX) 12 hr tablet 600 mg, 600 mg, Oral, Q12H [address], Barnett Overcast, PA-C, 600 mg at 07/28/22 2035    heparin (porcine) subcutaneous injection 5,000 Units, 5,000 Units, Subcutaneous, Q8H [address], Barnett Overcast, PA-C, 5,000 Units at 07/29/22 0719    hydrALAZINE (APRESOLINE) injection 10 mg, 10 mg, Intravenous, Q6H PRN, Anibal Cavazos PA-C, 10 mg at 07/28/22 1934    hydrocortisone 1 % cream, , Topical, 4x Daily PRN, Harley Dunaway PA-C    ipratropium (ATROVENT) 0 02 % inhalation solution 0 5 mg, 0 5 mg, Nebulization, TID, Angela Davey MD, 0 5 mg at 07/29/22 0754    isosorbide dinitrate (ISORDIL) tablet 30 mg, 30 mg, Oral, Q8H [address], Moy Vance Mckeon MD, 30 mg at 07/29/22 1020    levalbuterol (Mechele Ma) inhalation solution 1 25 mg, 1 25 mg, Nebulization, TID, Dwight Dutton MD, 1 25 mg at 07/29/22 0754    ondansetron (ZOFRAN) injection 4 mg, 4 mg, Intravenous, Q4H PRN, Joe Morales PA-C, 4 mg at 07/28/22 5032    oxyCODONE (ROXICODONE) IR tablet 2 5 mg, 2 5 mg, Oral, Q4H PRN, Sammy Vale PA-C    oxyCODONE (ROXICODONE) IR tablet 5 mg, 5 mg, Oral, Q4H PRN, Sammy Vale PA-C, 5 mg at 07/28/22 2033    polyethylene glycol (MIRALAX) packet 17 g, 17 g, Oral, Daily PRN, Seema Huynh PA-C    predniSONE tablet 40 mg, 40 mg, Oral, Daily, Hetul Hopkins, DO, 40 mg at 07/29/22 1020    senna (SENOKOT) tablet 8 6 mg, 1 tablet, Oral, HS, Seema Huynh PA-C, 8 6 mg at 07/28/22 2127    traZODone (DESYREL) tablet 50 mg, 50 mg, Oral, HS, Joe Morales PA-C, 50 mg at 07/28/22 2127      Labs & Results:        Results from last 7 days   Lab Units 07/24/22  0615 07/23/22  0512   WBC Thousand/uL 12 92* 12 34*   HEMOGLOBIN g/dL 10 8* 11 2*   HEMATOCRIT % 33 3* 33 1*   PLATELETS Thousands/uL 261 285         Results from last 7 days   Lab Units 07/28/22  0403 07/27/22  0432 07/26/22  0509 07/24/22  0615 07/23/22  0512 07/22/22  2314   POTASSIUM mmol/L 5 6* 4 8 4 3   < > 4 2 4 1   CHLORIDE mmol/L 92* 92* 93*   < > 90* 90*   CO2 mmol/L 26 24 24   < > 27 30   BUN mg/dL 49* 63* 38*   < > 65* 61*   CREATININE mg/dL 5 84* 7 22* 5 33*   < > 6 15* 5 42*   CALCIUM mg/dL 9 8 9 5 9 3   < > 9 0 8 8   ALK PHOS U/L  --   --   --   --  92 95   ALT U/L  --   --   --   --  18 17   AST U/L  --   --   --   --  15 11    < > = values in this interval not displayed  Counseling / Coordination of Care  Total floor / unit time spent today 25 minutes  Greater than 50% of total time was spent with the patient and / or family counseling and / or coordination of care    A description of the counseling / coordination of care: 15  Thank you for the opportunity to participate in the care of this patient    [facility] PULMONARY HYPERTENSION  MEDICAL DIRECTOR OF [place]

## 2022-07-29 NOTE — ASSESSMENT & PLAN NOTE
Wt Readings from Last 3 Encounters:   07/28/22 70 2 kg (154 lb 12 8 oz)   07/22/22 77 9 kg (171 lb 11 8 oz)   06/28/22 77 4 kg (170 lb 9 6 oz)     · Presented to [address] on 7/12 with shortness of breath with minimal exertion  · Found to be in acute on chronic systolic heart failure  · Was unresponsive to diuretics and became anuric  · History of nonischemic cardiomyopathy - EF 18% on 3/37/21   Improved to 50% on 7/9/21  · EF 41% on 6/15/22 when admitted to [facility] from 6/15/22 to 6/22/22 with acute on chronic combined CHF and COPD exacerbation  · Repeat echo on 7/14/22 with EF 25%  · Cardiac cath in March 2021 showed moderate nonobstructive atherosclerosis  · Currently dialysis dependent  · Volume control through hemodialysis   · IR for PermCath and renal artery stent as per IR

## 2022-07-29 NOTE — UTILIZATION REVIEW
Continued Stay Review    Date: 07-29-22                       Current Patient Class: inpatient  Current Level of Care: medical    HPI:61 y o  female initially admitted on **07-22-22    Assessment/Plan: HD # 7 patient de sat to 85 % on 2 L NC  Increased to 4 L NC worsening SOB overnight requiring Bipap therapy  Patient is sitting forward and breathing with pursed lips while giving short ansers to questions due to SOB  lung sounds diminished  IR for perma cath today   Vital Signs:   Date/Time Temp Pulse Resp BP MAP (mmHg) SpO2 Calculated FIO2 (%) - Nasal Cannula Nasal Cannula O2 Flow Rate (L/min) O2 Device O2 Interface Device Patient Position - Orthostatic VS   07/29/22 1120 99 °F (37 2 °C) -- -- -- -- -- -- -- -- -- --   07/29/22 1108 -- -- -- -- -- 96 % 36 4 L/min Nasal cannula -- --   07/29/22 1048 -- -- -- 175/85 Abnormal  -- -- -- -- -- -- --   07/29/22 10:46:11 -- 94 -- 191/121 Abnormal  144 85 % Abnormal  -- -- -- -- --   07/29/22 0932 -- -- -- -- -- 96 % -- -- -- Face mask --   07/29/22 0758 -- -- -- -- -- 98 % -- -- -- Face mask --   07/29/22 0754 -- -- -- -- -- 98 % -- -- -- -- --   07/29/22 07:25:13 97 2 °F (36 2 °C) Abnormal  93 -- 164/96 119 98 % -- -- -- -- --   07/29/22 0531 -- -- -- -- -- 100 % -- -- -- -- --   07/29/22 0512 -- -- -- -- -- -- -- -- -- Full face mask          Pertinent Labs/Diagnostic Results:       Results from last 7 days   Lab Units 07/24/22  0615 07/23/22  0512   WBC Thousand/uL 12 92* 12 34*   HEMOGLOBIN g/dL 10 8* 11 2*   HEMATOCRIT % 33 3* 33 1*   PLATELETS Thousands/uL 261 285         Results from last 7 days   Lab Units 07/28/22  0403 07/27/22  0432 07/26/22  0509 07/25/22  0431 07/24/22  0615 07/23/22  0512 07/22/22  2314   SODIUM mmol/L 125* 125* 130* 127* 129* 130* 128*   POTASSIUM mmol/L 5 6* 4 8 4 3 4 9 4 8 4 2 4 1   CHLORIDE mmol/L 92* 92* 93* 92* 95* 90* 90*   CO2 mmol/L 26 24 24 23 26 27 30   ANION GAP mmol/L 7 9 13 12 8 13 8   BUN mg/dL 49* 63* 38* 61* 42* 65* 61* CREATININE mg/dL 5 84* 7 22* 5 33* 6 77* 5 17* 6 15* 5 42*   EGFR ml/min/1 73sq m 7 5 8 6 8 6 7   CALCIUM mg/dL 9 8 9 5 9 3 9 6 9 2 9 0 8 8   MAGNESIUM mg/dL  --   --   --   --   --  2 3 2 0   PHOSPHORUS mg/dL  --   --   --   --   --  5 7* 5 0*     Results from last 7 days   Lab Units 07/23/22  0512 07/22/22  2314   AST U/L 15 11   ALT U/L 18 17   ALK PHOS U/L 92 95   TOTAL PROTEIN g/dL 7 7 7 3   ALBUMIN g/dL 3 4* 3 3*   TOTAL BILIRUBIN mg/dL 0 83 0 62         Results from last 7 days   Lab Units 07/28/22  0403 07/27/22  0432 07/26/22  0509 07/25/22  0431 07/24/22  0615 07/23/22  0512 07/22/22  2314   GLUCOSE RANDOM mg/dL 159* 116 101 111 116 101 102     Results from last 7 days   Lab Units 07/25/22  1751   PH ART  7 545*   PCO2 ART mm Hg 35 0*   PO2 ART mm Hg 49 6*   HCO3 ART mmol/L 29 6*   BASE EXC ART mmol/L 6 9   O2 CONTENT ART mL/dL 13 9*   O2 HGB, ARTERIAL % 88 0*   ABG SOURCE  Radial, Right     Results from last 7 days   Lab Units 07/22/22  2314   PH PANDA  7 403*   PCO2 PANDA mm Hg 44 1   PO2 PANDA mm Hg 32 3*   HCO3 PANDA mmol/L 26 9   BASE EXC PANDA mmol/L 1 8   O2 CONTENT PANDA ml/dL 9 3   O2 HGB, VENOUS % 58 7*       Results from last 7 days   Lab Units 07/22/22  2314   LACTIC ACID mmol/L 1 0       Medications:   Scheduled Medications:  amLODIPine, 5 mg, Oral, Daily  calcium acetate, 1,334 mg, Oral, TID With Meals  carvedilol, 25 mg, Oral, BID With Meals  docusate sodium, 100 mg, Oral, BID  guaiFENesin, 600 mg, Oral, Q12H JULIÁN  heparin (porcine), 5,000 Units, Subcutaneous, Q8H JULIÁN  ipratropium, 0 5 mg, Nebulization, TID  isosorbide dinitrate, 30 mg, Oral, Q8H JULIÁN  levalbuterol, 1 25 mg, Nebulization, TID  predniSONE, 40 mg, Oral, Daily  senna, 1 tablet, Oral, HS  traZODone, 50 mg, Oral, HS      Continuous IV Infusions:     PRN Meds:  acetaminophen, 650 mg, Oral, Q6H PRN  albuterol, 2 puff, Inhalation, Q4H PRN  clonazePAM, 1 mg, Oral, BID PRN  diphenhydrAMINE, 25 mg, Oral, Q6H PRN  hydrALAZINE, 10 mg, Intravenous, Q6H PRN  hydrocortisone, , Topical, 4x Daily PRN  ondansetron, 4 mg, Intravenous, Q4H PRN  oxyCODONE, 2 5 mg, Oral, Q4H PRN  oxyCODONE, 5 mg, Oral, Q4H PRN  polyethylene glycol, 17 g, Oral, Daily PRN        Discharge Plan: TBD    Network Utilization Review Department  ATTENTION: Please call with any questions or concerns to [phone] and carefully listen to the prompts so that you are directed to the right person  All voicemails are confidential   Cherelle Ross all requests for admission clinical reviews, approved or denied determinations and any other requests to dedicated fax number below belonging to the campus where the patient is receiving treatment   List of dedicated fax numbers for the Facilities:  [address] DENIALS (Administrative/Medical Necessity) [phone]   [address] (Maternity/NICU/Pediatrics) [phone] [address]  [address] [facility] [place] [address] [address] [place] [address] [address] Shobha Mckeon [address] [facility] [facility] [phone]

## 2022-07-29 NOTE — ASSESSMENT & PLAN NOTE
· Had increased anxiety  · Home Klonopin was increased from 1 mg daily prn to twice daily prn at [address] prior to transfer  · Was also begun on Seroquel at [place] which was discontinued after dose last night due to new rash

## 2022-07-29 NOTE — PROGRESS NOTES
[address] [facility]  Progress Note Félix Bernardo 1960, 64 y o  female MRN: [id number]  Unit/Bed#: [facility] 501-01 Encounter: [id number]  Primary Care Provider: Mino Joya DO   Date and time admitted to hospital: 7/22/2022  9:51 PM    * Acute on chronic systolic congestive heart failure (HCC)  Assessment & Plan  Wt Readings from Last 3 Encounters:   07/28/22 70 2 kg (154 lb 12 8 oz)   07/22/22 77 9 kg (171 lb 11 8 oz)   06/28/22 77 4 kg (170 lb 9 6 oz)     · Presented to [facility] on 7/12 with shortness of breath with minimal exertion  · Found to be in acute on chronic systolic heart failure  · Was unresponsive to diuretics and became anuric  · History of nonischemic cardiomyopathy - EF 18% on 3/37/21  Improved to 50% on 7/9/21  · EF 41% on 6/15/22 when admitted to [address]  from 6/15/22 to 6/22/22 with acute on chronic combined CHF and COPD exacerbation  · Repeat echo on 7/14/22 with EF 25%  · Cardiac cath in March 2021 showed moderate nonobstructive atherosclerosis  · Currently dialysis dependent  · Volume control through hemodialysis   · IR for PermCath and renal artery stent as per IRfailed attempt due to anxiety/sob  To hd access placed by ir   d/w ir will proceed HD now  · Change to iv solumedrol for wheezing    · cxr stable  ·         Acute on chronic respiratory failure with hypoxia (HCC)  Assessment & Plan  · On O2 at 2L at rest and 4L with exertion at home  · Worsening hypoxia due to acute on chronic systolic heart failure  · Wean O2 as able    Acute kidney injury superimposed on CKD-3  Assessment & Plan  Lab Results   Component Value Date    EGFR 7 07/28/2022    EGFR 5 07/27/2022    EGFR 8 07/26/2022    CREATININE 5 84 (H) 07/28/2022    CREATININE 7 22 (H) 07/27/2022    CREATININE 5 33 (H) 07/26/2022   · Baseline creatinine 1 to 1 1  · Begun on HD on 07/15 due to acute on chronic systolic heart failure unresponsive to diuretics with worsening hypoxic respiratory failure  · Currently on HD on MWF  · Perm cath/renal art stent as per IR  Consult anesthesia      COPD (chronic obstructive pulmonary disease) (HCC)  Assessment & Plan  · Initial concern for exacerbation during stay at [address] prior to transfer here for which she was given 3 doses of Solu-Medrol 40 mg IV which was then discontinued  · Home regimen: Anoro Ellipta (62 5/25mcg) 1 puff daily  · Continue Xopenex/Atrovent nebs    Anxiety  Assessment & Plan  · Had increased anxiety  · Home Klonopin was increased from 1 mg daily prn to twice daily prn at [address] prior to transfer  · Was also begun on Seroquel at [place] which was discontinued after dose last night due to new rash      Chronic pain  Assessment & Plan  · Continuous opioid dependent  · Continue home meds MS Contin and Percocet    Essential hypertension  Assessment & Plan  · Uncontrolled hypertension  · Home meds : Carvedilol 3 125 mg BID, lasix 40 mg daily  · Currently on Carvedilol 25 mg twice daily, , Isordil 30 mg q 8 hours  Amlodipine 5 mg daily added today  Hold hydralazine for possible rash  · Workup revealed high-grade stenosis of left renal artery -   · Will be to proceed with renal artery stent placement tomorrow with PermCath placement with IR  ·  Follow-up labs for secondary hypertension workup  · Cardiology following  · Cardiology/vascular input appreciated          VTE Pharmacologic Prophylaxis:   Pharmacologic: Heparin  Mechanical VTE Prophylaxis in Place: No    Patient Centered Rounds: I have performed bedside rounds with nursing staff today  Time Spent for Care: 15 minutes  More than 50% of total time spent on counseling and coordination of care as described above      Current Length of Stay: 7 day(s)    Current Patient Status: Inpatient       Code Status: Level 1 - Full Code      Subjective:   nad    Objective:     Vitals:   Temp (24hrs), Av 9 °F (36 6 °C), Min:97 2 °F (36 2 °C), Max:99 °F (37 2 °C)    Temp:  [97 2 °F (36 2 °C)-99 °F (37 2 °C)] 99 °F (37 2 °C)  HR:  [] 89  Resp:  [20-30] 20  BP: (160-215)/() 161/114  SpO2:  [85 %-100 %] 99 %  Body mass index is 22 21 kg/m²  Input and Output Summary (last 24 hours): Intake/Output Summary (Last 24 hours) at 7/29/2022 1521  Last data filed at 7/29/2022 1415  Gross per 24 hour   Intake 200 ml   Output 0 ml   Net 200 ml       Physical Exam:     Physical Exam  Constitutional:       Appearance: Normal appearance  HENT:      Head: Normocephalic and atraumatic  Mouth/Throat:      Mouth: Mucous membranes are moist    Cardiovascular:      Rate and Rhythm: Normal rate and regular rhythm  Pulses: Normal pulses  Heart sounds: Normal heart sounds  Pulmonary:      Effort: Pulmonary effort is normal       Breath sounds: Normal breath sounds  Abdominal:      General: Bowel sounds are normal    Skin:     General: Skin is dry  Neurological:      General: No focal deficit present  Mental Status: She is alert  Psychiatric:         Mood and Affect: Mood normal          Additional Data:     Labs:    Results from last 7 days   Lab Units 07/24/22  0615   WBC Thousand/uL 12 92*   HEMOGLOBIN g/dL 10 8*   HEMATOCRIT % 33 3*   PLATELETS Thousands/uL 261     Results from last 7 days   Lab Units 07/28/22  0403 07/24/22  0615 07/23/22  0512   POTASSIUM mmol/L 5 6*   < > 4 2   CHLORIDE mmol/L 92*   < > 90*   CO2 mmol/L 26   < > 27   BUN mg/dL 49*   < > 65*   CREATININE mg/dL 5 84*   < > 6 15*   CALCIUM mg/dL 9 8   < > 9 0   ALK PHOS U/L  --   --  92   ALT U/L  --   --  18   AST U/L  --   --  15    < > = values in this interval not displayed  * I Have Reviewed All Lab Data Listed Above  * Additional Pertinent Lab Tests Reviewed:  All Labs Within Last 24 Hours Reviewed      Recent Cultures (last 7 days):           Last 24 Hours Medication List:   Current Facility-Administered Medications   Medication Dose Route Frequency Provider Last Rate    acetaminophen  650 mg Oral Q6H [facility]      albuterol  2 puff Inhalation Q4H PRN Jarret Porter MD     Pauline Almazan [START ON 7/30/2022] amLODIPine  10 mg Oral Daily Milagros Davenport DO      calcium acetate  1,334 mg Oral TID With Meals [facility]      carvedilol  25 mg Oral BID With Meals Dawn Mittal MD      clonazePAM  1 mg Oral BID PRN Seema Huynh PA-C      clonazePAM  1 mg Oral Once Hetul Hopkins, DO      diphenhydrAMINE  25 mg Oral Q6H PRN Rossana Felton PA-C      docusate sodium  100 mg Oral BID Seema Huynh PA-C      guaiFENesin  600 mg Oral Q12H [address] [facility], BRAVO      heparin (porcine)  5,000 Units Subcutaneous [place], [place]      hydrALAZINE  10 mg Intravenous Q6H PRN [facility]      hydrocortisone   Topical 4x Daily PRN Rossana Felton PA-C      ipratropium  0 5 mg Nebulization TID Jarret Porter MD      isosorbide dinitrate  30 mg Oral [facility] Yue Hawkins MD      levalbuterol  1 25 mg Nebulization TID Jarret Porter MD      levalbuterol  1 25 mg Nebulization Q6H PRN Hetul Hopkins, DO      methylPREDNISolone sodium succinate  20 mg Intravenous Q8H [address] Hetul Hopkins, DO      ondansetron  4 mg Intravenous Q4H PRN Rossana Felton PA-C      oxyCODONE  2 5 mg Oral Q4H PRN [facility]      oxyCODONE  5 mg Oral Q4H PRN [facility]      polyethylene glycol  17 g Oral Daily PRN Seema Huynh PA-C      senna  1 tablet Oral HS Seema Huynh PA-C      traZODone  50 mg Oral HS Rossana Felton PA-C          Today, Patient Was Seen By: Shira Dupree DO    ** Please Note: Dictation voice to text software may have been used in the creation of this document   **

## 2022-07-30 ENCOUNTER — APPOINTMENT (INPATIENT)
Dept: DIALYSIS | Facility: HOSPITAL | Age: 62
DRG: 252 | End: 2022-07-30
Payer: MEDICARE

## 2022-07-30 LAB
ANION GAP SERPL CALCULATED.3IONS-SCNC: 12 MMOL/L (ref 4–13)
BUN SERPL-MCNC: 66 MG/DL (ref 5–25)
CALCIUM SERPL-MCNC: 9.6 MG/DL (ref 8.3–10.1)
CHLORIDE SERPL-SCNC: 88 MMOL/L (ref 96–108)
CO2 SERPL-SCNC: 25 MMOL/L (ref 21–32)
CREAT SERPL-MCNC: 6.52 MG/DL (ref 0.6–1.3)
GFR SERPL CREATININE-BSD FRML MDRD: 6 ML/MIN/1.73SQ M
GLUCOSE SERPL-MCNC: 229 MG/DL (ref 65–140)
POTASSIUM SERPL-SCNC: 5.5 MMOL/L (ref 3.5–5.3)
SODIUM SERPL-SCNC: 125 MMOL/L (ref 135–147)

## 2022-07-30 PROCEDURE — 94660 CPAP INITIATION&MGMT: CPT

## 2022-07-30 PROCEDURE — 94640 AIRWAY INHALATION TREATMENT: CPT

## 2022-07-30 PROCEDURE — 99233 SBSQ HOSP IP/OBS HIGH 50: CPT | Performed by: INTERNAL MEDICINE

## 2022-07-30 PROCEDURE — 99232 SBSQ HOSP IP/OBS MODERATE 35: CPT | Performed by: INTERNAL MEDICINE

## 2022-07-30 PROCEDURE — 80048 BASIC METABOLIC PNL TOTAL CA: CPT | Performed by: INTERNAL MEDICINE

## 2022-07-30 PROCEDURE — 94760 N-INVAS EAR/PLS OXIMETRY 1: CPT

## 2022-07-30 RX ORDER — ISOSORBIDE DINITRATE 20 MG/1
40 TABLET ORAL EVERY 8 HOURS SCHEDULED
Status: DISCONTINUED | OUTPATIENT
Start: 2022-07-30 | End: 2022-08-03 | Stop reason: HOSPADM

## 2022-07-30 RX ADMIN — GUAIFENESIN 600 MG: 600 TABLET, EXTENDED RELEASE ORAL at 21:03

## 2022-07-30 RX ADMIN — HEPARIN SODIUM 5000 UNITS: 5000 INJECTION INTRAVENOUS; SUBCUTANEOUS at 21:03

## 2022-07-30 RX ADMIN — OXYCODONE HYDROCHLORIDE 5 MG: 5 TABLET ORAL at 21:11

## 2022-07-30 RX ADMIN — ISOSORBIDE DINITRATE 40 MG: 20 TABLET ORAL at 16:21

## 2022-07-30 RX ADMIN — IPRATROPIUM BROMIDE 0.5 MG: 0.5 SOLUTION RESPIRATORY (INHALATION) at 19:17

## 2022-07-30 RX ADMIN — ISOSORBIDE DINITRATE 40 MG: 20 TABLET ORAL at 21:05

## 2022-07-30 RX ADMIN — CLONAZEPAM 1 MG: 1 TABLET ORAL at 21:59

## 2022-07-30 RX ADMIN — METHYLPREDNISOLONE SODIUM SUCCINATE 20 MG: 40 INJECTION, POWDER, FOR SOLUTION INTRAMUSCULAR; INTRAVENOUS at 16:19

## 2022-07-30 RX ADMIN — CALCIUM ACETATE 1334 MG: 667 CAPSULE ORAL at 08:09

## 2022-07-30 RX ADMIN — AMLODIPINE BESYLATE 10 MG: 10 TABLET ORAL at 08:09

## 2022-07-30 RX ADMIN — METHYLPREDNISOLONE SODIUM SUCCINATE 20 MG: 40 INJECTION, POWDER, FOR SOLUTION INTRAMUSCULAR; INTRAVENOUS at 05:50

## 2022-07-30 RX ADMIN — HEPARIN SODIUM 5000 UNITS: 5000 INJECTION INTRAVENOUS; SUBCUTANEOUS at 05:49

## 2022-07-30 RX ADMIN — CARVEDILOL 25 MG: 25 TABLET, FILM COATED ORAL at 05:49

## 2022-07-30 RX ADMIN — METHYLPREDNISOLONE SODIUM SUCCINATE 20 MG: 40 INJECTION, POWDER, FOR SOLUTION INTRAMUSCULAR; INTRAVENOUS at 21:04

## 2022-07-30 RX ADMIN — ISOSORBIDE DINITRATE 30 MG: 30 TABLET ORAL at 05:50

## 2022-07-30 RX ADMIN — LEVALBUTEROL HYDROCHLORIDE 1.25 MG: 1.25 SOLUTION, CONCENTRATE RESPIRATORY (INHALATION) at 07:34

## 2022-07-30 RX ADMIN — CARVEDILOL 25 MG: 25 TABLET, FILM COATED ORAL at 17:10

## 2022-07-30 RX ADMIN — GUAIFENESIN 600 MG: 600 TABLET, EXTENDED RELEASE ORAL at 08:09

## 2022-07-30 RX ADMIN — LEVALBUTEROL HYDROCHLORIDE 1.25 MG: 1.25 SOLUTION, CONCENTRATE RESPIRATORY (INHALATION) at 19:17

## 2022-07-30 RX ADMIN — OXYCODONE HYDROCHLORIDE 5 MG: 5 TABLET ORAL at 05:49

## 2022-07-30 RX ADMIN — SENNOSIDES 8.6 MG: 8.6 TABLET, FILM COATED ORAL at 21:03

## 2022-07-30 RX ADMIN — CALCIUM ACETATE 1334 MG: 667 CAPSULE ORAL at 17:10

## 2022-07-30 RX ADMIN — IPRATROPIUM BROMIDE 0.5 MG: 0.5 SOLUTION RESPIRATORY (INHALATION) at 07:34

## 2022-07-30 RX ADMIN — CALCIUM ACETATE 1334 MG: 667 CAPSULE ORAL at 11:10

## 2022-07-30 NOTE — PLAN OF CARE
Started patient on hemodialysis treatment with UF goal of 3L net x 3 hours x 3K bath for serum k+ of 4 3 today 7/29/22  Patient completed the dialysis treatment without complications  Post-Dialysis RN Treatment Note    Blood Pressure:  Pre 151/102 mm/Hg  Post 157/108 mmHg   EDW  TBD kg    Weight:  Pre 70 kg   Post 66 7 kg   Mode of weight measurement: Standing Scale   Volume Removed  3,000 ml NET    Treatment duration 180 minutes    NS given  No    Treatment shortened? No   Medications given during Rx None Reported   Estimated Kt/V  1 38   Access type: Temporary HD catheter   Access Issues: Yes, describe: lines were reversed d/t slugish arterial pull      Report called to primary nurse   Yes, Shefali Collins RN      Problem: METABOLIC, FLUID AND ELECTROLYTES - ADULT  Goal: Electrolytes maintained within normal limits  Description: INTERVENTIONS:  - Monitor labs and assess patient for signs and symptoms of electrolyte imbalances  - Administer electrolyte replacement as ordered  - Monitor response to electrolyte replacements, including repeat lab results as appropriate  - Instruct patient on fluid and nutrition as appropriate  Outcome: Progressing  Goal: Fluid balance maintained  Description: INTERVENTIONS:  - Monitor labs   - Monitor I/O and WT  - Instruct patient on fluid and nutrition as appropriate  - Assess for signs & symptoms of volume excess or deficit  Outcome: Progressing

## 2022-07-30 NOTE — PROGRESS NOTES
NEPHROLOGY PROGRESS NOTE   Salvatore Webber 64 y o  female MRN: [id number]  Unit/Bed#: [facility] 501-01 Encounter: [id number]  Reason for Consult: Clarice    ASSESSMENT AND PLAN:  Severe CLARICE on CKD stage 3, baseline creatinine 1 to 1 2 with episodes of CLARICE  -due to severe CLARICE with volume overload, decompensated heart failure, hypoxic respiratory failure, she was started on dialysis on 7/15/22  -now remains dialysis dependent on MWF schedule  -tolerated dialysis well yesterday  Unfortunately no post HD weight available yesterday as patient was on stretcher   -plan for additional HD session again today for more UF removal   -last post HD weight 69 5 kg    -no obvious signs of renal recovery, minimal urine output  -recent UA bland     Access:  Status post new temporary HD catheter placed on 7/29/22  Will eventually need PermCath once respiratory status improves         Acute on chronic hypoxic respiratory failure  -in the setting of volume overload  -echo this month shows EF 25%  -overall feels much better today  Currently remains on room air at the time of my encounter   -challenge UF removal on dialysis today     Hyponatremia, likely hypervolemic, strict fluid restriction 1 2 L per day recommended   -sodium level improving 129 yesterday   -continue to monitor with dialysis and UF removal      Uncontrolled hypertension  -suspect in the setting of renovascular component with renal artery stenosis  -appreciate vascular surgery follow-up  -will need renal artery angiogram with possible intervention once improves from respiratory status  Had lengthy discussion with IR yesterday   -overall feels better today  Hopefully this can be done early next week on Monday   -renal artery Doppler showing right atrophic kidney, main right renal artery could not be identified, left renal artery greater than 60% stenosis  -currently on Coreg 25 mg b i d ,isosorbide 30 mg t i d , amlodipine to 10 mg daily     - hydralazine was discontinued due to concern for skin rash  -continue to monitor BP with UF removal challenge on dialysis      Renal artery stenosis  -plan noted for angiogram/renal artery stenting with vascular surgery once respiratory status improves  Procedure was postponed yesterday   -PermCath placement at the same time as well      Hyperphosphatemia, serum phosphorus 5 7  -renal diet  -currently on calcium acetate with meals     Skin rash,?  Suspected drug related, hydralazine has been discontinued  Currently on steroid  -slowly improving  -no peripheral eosinophilia     Mild hyperkalemia, resolved      CKD anemia, transfuse p r n  For hemoglobin less than seven  -hemoglobin overall stable at goal, avoid Epogen given uncontrolled hypertension      Discussed above plan in detail with primary team     SUBJECTIVE:  Patient seen and examined at bedside  She overall feels better  Currently remains on room air  Overall frustrated of not been able to get the procedure done yesterday      OBJECTIVE:  Current Weight: Weight - Scale:  (pt refused at this time she is on BIPAP and "can't breath")  Vitals:    07/30/22 0734   BP:    Pulse:    Resp:    Temp:    SpO2: 95%       Intake/Output Summary (Last 24 hours) at 7/30/2022 0913  Last data filed at 7/30/2022 0747  Gross per 24 hour   Intake 1158 ml   Output 3450 ml   Net -2292 ml     Wt Readings from Last 3 Encounters:   07/28/22 70 2 kg (154 lb 12 8 oz)   07/22/22 77 9 kg (171 lb 11 8 oz)   06/28/22 77 4 kg (170 lb 9 6 oz)     Temp Readings from Last 3 Encounters:   07/30/22 (!) 97 3 °F (36 3 °C)   07/22/22 98 5 °F (36 9 °C) (Temporal)   06/22/22 97 9 °F (36 6 °C)     BP Readings from Last 3 Encounters:   07/30/22 167/97   07/22/22 155/81   06/28/22 (!) 174/110     Pulse Readings from Last 3 Encounters:   07/30/22 92   07/22/22 82   06/28/22 94        Physical Examination:  General:  Sitting in chair, no acute distress   Eyes:  Mild conjunctival pallor present  ENT:  External examination of ears and nose unremarkable  Neck:  No obvious lymphadenopathy appreciated  Respiratory:  Decreased breath sound at bases  CVS:  S1, S2 present  GI:  Soft nondistended  CNS:  Active alert oriented x3  Skin:  Skin rash overall improving  Musculoskeletal:  No obvious new gross deformity noted    Medications:    Current Facility-Administered Medications:     acetaminophen (TYLENOL) tablet 650 mg, 650 mg, Oral, Q6H PRN, Alberto Jacobsen PA-C    albuterol (PROVENTIL HFA,VENTOLIN HFA) inhaler 2 puff, 2 puff, Inhalation, Q4H PRN, Alan Carty MD, 2 puff at 07/29/22 0455    albuterol inhalation solution 2 5 mg, 2 5 mg, Nebulization, Q4H PRN, Tequila Hopkins DO    amLODIPine (NORVASC) tablet 10 mg, 10 mg, Oral, Daily, Ilir Colin DO, 10 mg at 07/30/22 0809    calcium acetate (PHOSLO) capsule 1,334 mg, 1,334 mg, Oral, TID With Meals, Alberto Jacobsen PA-C, 1,334 mg at 07/30/22 0809    carvedilol (COREG) tablet 25 mg, 25 mg, Oral, BID With Meals, Tao Kwong MD, 25 mg at 07/30/22 0549    clonazePAM (KlonoPIN) tablet 1 mg, 1 mg, Oral, BID PRN, Camilo Barron PA-C, 1 mg at 07/29/22 2217    diphenhydrAMINE (BENADRYL) tablet 25 mg, 25 mg, Oral, Q6H PRN, Bishop Abarca PA-C, 25 mg at 07/28/22 0357    docusate sodium (COLACE) capsule 100 mg, 100 mg, Oral, BID, Seema Huynh PA-C, 100 mg at 07/29/22 1843    guaiFENesin (MUCINEX) 12 hr tablet 600 mg, 600 mg, Oral, Q12H [facility], Alberto Jacobsen PA-C, 600 mg at 07/30/22 0809    heparin (porcine) subcutaneous injection 5,000 Units, 5,000 Units, Subcutaneous, Q8H [facility], Alberto Jacobsen PA-C, 5,000 Units at 07/30/22 0549    hydrALAZINE (APRESOLINE) injection 10 mg, 10 mg, Intravenous, Q6H PRN, Alberto Jacobsen PA-C, 10 mg at 07/28/22 1934    hydrocortisone 1 % cream, , Topical, 4x Daily PRN, Bishop Abarca PA-C    ipratropium (ATROVENT) 0 02 % inhalation solution 0 5 mg, 0 5 mg, Nebulization, TID, Alan Carty MD, 0 5 mg at 07/30/22 3766    isosorbide dinitrate (ISORDIL) tablet 30 mg, 30 mg, Oral, Q8H [address], Moy Doyne Soulier, MD, 30 mg at 07/30/22 0550    levalbuterol [facility]) inhalation solution 1 25 mg, 1 25 mg, Nebulization, TID, Mervin López MD, 1 25 mg at 07/30/22 0734    methylPREDNISolone sodium succinate (Solu-MEDROL) injection 20 mg, 20 mg, Intravenous, Q8H [address], Tequila Hopkins DO, 20 mg at 07/30/22 0550    ondansetron (ZOFRAN) injection 4 mg, 4 mg, Intravenous, Q4H PRN, Brian Boston PA-C, 4 mg at 07/28/22 0789    oxyCODONE (ROXICODONE) IR tablet 2 5 mg, 2 5 mg, Oral, Q4H PRN, Abmer Calderon PA-C, 2 5 mg at 07/29/22 1858    oxyCODONE (ROXICODONE) IR tablet 5 mg, 5 mg, Oral, Q4H PRN, Amber Calderon PA-C, 5 mg at 07/30/22 0549    polyethylene glycol (MIRALAX) packet 17 g, 17 g, Oral, Daily PRN, Seema Huynh PA-C    senna (SENOKOT) tablet 8 6 mg, 1 tablet, Oral, HS, Seema Huynh PA-C, 8 6 mg at 07/28/22 2127    traZODone (DESYREL) tablet 50 mg, 50 mg, Oral, HS, Brian Boston PA-C, 50 mg at 07/28/22 2127    Laboratory Results:  Results from last 7 days   Lab Units 07/29/22  1509 07/28/22  0403 07/27/22  0432 07/26/22  0509 07/25/22  0431 07/24/22  0615   WBC Thousand/uL 11 52*  --   --   --   --  12 92*   HEMOGLOBIN g/dL 11 9  --   --   --   --  10 8*   HEMATOCRIT % 35 6  --   --   --   --  33 3*   PLATELETS Thousands/uL 255  --   --   --   --  261   SODIUM mmol/L 129* 125* 125* 130* 127* 129*   POTASSIUM mmol/L 4 3 5 6* 4 8 4 3 4 9 4 8   CHLORIDE mmol/L 92* 92* 92* 93* 92* 95*   CO2 mmol/L 29 26 24 24 23 26   BUN mg/dL 47* 49* 63* 38* 61* 42*   CREATININE mg/dL 5 07* 5 84* 7 22* 5 33* 6 77* 5 17*   CALCIUM mg/dL 9 4 9 8 9 5 9 3 9 6 9 2       XR chest portable   Final Result by Jarek Camacho MD (07/29 1347)      Pulmonary edema with small bilateral pleural effusions is similar to prior study                    Workstation performed: [id number]         VAS renal artery complete   Final Result by Darion Barillas MD (07/24 1949)      IR renal angiogram    (Results Pending)   IR temporary dialysis catheter check/change/reposition    (Results Pending)       Portions of the record may have been created with voice recognition software  Occasional wrong word or "sound a like" substitutions may have occurred due to the inherent limitations of voice recognition software  Read the chart carefully and recognize, using context, where substitutions have occurred

## 2022-07-30 NOTE — ASSESSMENT & PLAN NOTE
Lab Results   Component Value Date    EGFR 8 07/29/2022    EGFR 7 07/28/2022    EGFR 5 07/27/2022    CREATININE 5 07 (H) 07/29/2022    CREATININE 5 84 (H) 07/28/2022    CREATININE 7 22 (H) 07/27/2022   · Baseline creatinine 1 to 1 1  · Begun on HD on 07/15 due to acute on chronic systolic heart failure unresponsive to diuretics with worsening hypoxic respiratory failure  · Currently on HD on MWF  · Perm cath/renal art stent as per IR

## 2022-07-30 NOTE — ASSESSMENT & PLAN NOTE
Wt Readings from Last 3 Encounters:   07/28/22 70 2 kg (154 lb 12 8 oz)   07/22/22 77 9 kg (171 lb 11 8 oz)   06/28/22 77 4 kg (170 lb 9 6 oz)     · Presented to [facility] on 7/12 with shortness of breath with minimal exertion  · Found to be in acute on chronic systolic heart failure  · Was unresponsive to diuretics and became anuric  · History of nonischemic cardiomyopathy - EF 18% on 3/37/21  Improved to 50% on 7/9/21  · EF 41% on 6/15/22 when admitted to [facility] - CLOSED from 6/15/22 to 6/22/22 with acute on chronic combined CHF and COPD exacerbation  · Repeat echo on 7/14/22 with EF 25%  · Cardiac cath in March 2021 showed moderate nonobstructive atherosclerosis  · Currently dialysis dependent  · Volume control through hemodialysis   · IR for PermCath and renal artery stent as per IR  · Patient had access adjusted yesterday  Will defer on renal artery stent when respiratory status improved

## 2022-07-30 NOTE — PROGRESS NOTES
[address] [facility]  Progress Note Saad Alcaraz 1960, 64 y o  female MRN: [id number]  Unit/Bed#: [facility] 501-01 Encounter: [id number]  Primary Care Provider: Golden Howell DO   Date and time admitted to hospital: 7/22/2022  9:51 PM    * Acute on chronic systolic congestive heart failure (HCC)  Assessment & Plan  Wt Readings from Last 3 Encounters:   07/28/22 70 2 kg (154 lb 12 8 oz)   07/22/22 77 9 kg (171 lb 11 8 oz)   06/28/22 77 4 kg (170 lb 9 6 oz)     · Presented to [address] on 7/12 with shortness of breath with minimal exertion  · Found to be in acute on chronic systolic heart failure  · Was unresponsive to diuretics and became anuric  · History of nonischemic cardiomyopathy - EF 18% on 3/37/21  Improved to 50% on 7/9/21  · EF 41% on 6/15/[facility]  from 6/15/22 to 6/22/22 with acute on chronic combined CHF and COPD exacerbation  · Repeat echo on 7/14/22 with EF 25%  · Cardiac cath in March 2021 showed moderate nonobstructive atherosclerosis  · Currently dialysis dependent  · Volume control through hemodialysis   · IR for PermCath and renal artery stent as per IR  · Patient had access adjusted yesterday  Will defer on renal artery stent when respiratory status improved          Hepatitis B  Assessment & Plan  · Tested positive when screened for dialysis  · W/u suggestive of resolved infection  · Outpatient GI follow-up    Acute on chronic respiratory failure with hypoxia (HCC)  Assessment & Plan  · On O2 at 2L at rest and 4L with exertion at home  · Worsening hypoxia due to acute on chronic systolic heart failure  · Wean O2 as able    Acute kidney injury superimposed on CKD-3  Assessment & Plan  Lab Results   Component Value Date    EGFR 8 07/29/2022    EGFR 7 07/28/2022    EGFR 5 07/27/2022    CREATININE 5 07 (H) 07/29/2022    CREATININE 5 84 (H) 07/28/2022    CREATININE 7 22 (H) 07/27/2022   · Baseline creatinine 1 to 1 1  · Begun on HD on 07/15 due to acute on chronic systolic heart failure unresponsive to diuretics with worsening hypoxic respiratory failure  · Currently on HD on MWF  · Perm cath/renal art stent as per IR      COPD (chronic obstructive pulmonary disease) ([address] )  Assessment & Plan  · Initial concern for exacerbation during stay at [facility] prior to transfer here for which she was given 3 doses of Solu-Medrol 40 mg IV which was then discontinued  · Home regimen: Anoro Ellipta (62 5/25mcg) 1 puff daily  · Continue Xopenex/Atrovent nebs    Tobacco abuse  Assessment & Plan  · Nicotine patch  · Smoking cessation advised    Anxiety  Assessment & Plan  · Had increased anxiety  · Home Klonopin was increased from 1 mg daily prn to twice daily prn at [facility] prior to transfer  · Was also begun on Seroquel at [place] which was discontinued after dose last night due to new rash      Chronic pain  Assessment & Plan  · Continuous opioid dependent  · Continue home meds MS Contin and Percocet    Essential hypertension  Assessment & Plan  · Uncontrolled hypertension  · Home meds : Carvedilol 3 125 mg BID, lasix 40 mg daily  · Currently on Carvedilol 25 mg twice daily, , Isordil 30 mg q 8 hours  Amlodipine 5 mg daily added today  Hold hydralazine for possible rash  · Workup revealed high-grade stenosis of left renal artery -   · Will be to proceed with renal artery stent placement tomorrow with PermCath placement with IR  ·  Follow-up labs for secondary hypertension workup  · Cardiology following  · Cardiology/vascular input appreciated            VTE Pharmacologic Prophylaxis:   Pharmacologic: Heparin  Mechanical VTE Prophylaxis in Place: No    Patient Centered Rounds: I have performed bedside rounds with nursing staff today  Time Spent for Care: 15 minutes  More than 50% of total time spent on counseling and coordination of care as described above      Current Length of Stay: 8 day(s)    Current Patient Status: Inpatient     Code Status: Level 1 - Full Code      Subjective:   nad    Objective:     Vitals:   Temp (24hrs), Av 7 °F (36 5 °C), Min:97 2 °F (36 2 °C), Max:99 °F (37 2 °C)    Temp:  [97 2 °F (36 2 °C)-99 °F (37 2 °C)] 97 3 °F (36 3 °C)  HR:  [80-98] 92  Resp:  [20-30] 20  BP: (121-191)/() 167/97  SpO2:  [85 %-100 %] 95 %  Body mass index is 22 21 kg/m²  Input and Output Summary (last 24 hours): Intake/Output Summary (Last 24 hours) at 2022 0854  Last data filed at 2022 0747  Gross per 24 hour   Intake 1158 ml   Output 3450 ml   Net -2292 ml       Physical Exam:     Physical Exam  Constitutional:       Appearance: Normal appearance  HENT:      Head: Normocephalic and atraumatic  Nose: Nose normal    Eyes:      Pupils: Pupils are equal, round, and reactive to light  Cardiovascular:      Rate and Rhythm: Normal rate and regular rhythm  Pulmonary:      Effort: Pulmonary effort is normal       Breath sounds: Normal breath sounds  Abdominal:      General: Abdomen is flat  There is no distension  Palpations: Abdomen is soft  There is no mass  Musculoskeletal:      Cervical back: Normal range of motion  Skin:     General: Skin is warm and dry  Neurological:      General: No focal deficit present  Mental Status: She is alert and oriented to person, place, and time  Psychiatric:         Mood and Affect: Mood normal          Additional Data:     Labs:    Results from last 7 days   Lab Units 22  1509   WBC Thousand/uL 11 52*   HEMOGLOBIN g/dL 11 9   HEMATOCRIT % 35 6   PLATELETS Thousands/uL 255   NEUTROS PCT % 88*   LYMPHS PCT % 10*   MONOS PCT % 1*   EOS PCT % 0     Results from last 7 days   Lab Units 22  1509   POTASSIUM mmol/L 4 3   CHLORIDE mmol/L 92*   CO2 mmol/L 29   BUN mg/dL 47*   CREATININE mg/dL 5 07*   CALCIUM mg/dL 9 4           * I Have Reviewed All Lab Data Listed Above  * Additional Pertinent Lab Tests Reviewed:  All Labs Within Last 24 Hours Reviewed        Recent Cultures (last 7 days):           Last 24 Hours Medication List:   Current Facility-Administered Medications   Medication Dose Route Frequency Provider Last Rate    acetaminophen  650 mg Oral Q6H PRN Lefty Meng PA-C      albuterol  2 puff Inhalation Q4H PRN Tyrese Cruz MD      albuterol  2 5 mg Nebulization Q4H PRN Tequila Hopkins DO      amLODIPine  10 mg Oral Daily Nevaeh Gao DO      calcium acetate  1,334 mg Oral TID With Meals Lefty Meng PA-C      carvedilol  25 mg Oral BID With Meals Wesly Galicia MD      clonazePAM  1 mg Oral BID PRN Shauna Reynoso PA-C      diphenhydrAMINE  25 mg Oral Q6H PRN Ellyn Aschoff, PA-C      docusate sodium  100 mg Oral BID Seema Huynh PA-C      guaiFENesin  600 mg Oral Q12H [facility] Lefty Meng PA-C      heparin (porcine)  5,000 Units Subcutaneous [place], [place]      hydrALAZINE  10 mg Intravenous Q6H PRN Lefty Meng PA-C      hydrocortisone   Topical 4x Daily PRN Ellyn Aschoff, PA-C      ipratropium  0 5 mg Nebulization TID Tyrese Cruz MD      isosorbide dinitrate  30 mg Oral [facility] Moy Magdaleno MD      levalbuterol  1 25 mg Nebulization TID Tyrese Cruz MD      methylPREDNISolone sodium succinate  20 mg Intravenous Q8H [facility] Tequila Hopkins DO      ondansetron  4 mg Intravenous Q4H PRN Ellyn Aschoff, PA-C      oxyCODONE  2 5 mg Oral Q4H PRN Lefty Meng PA-C      oxyCODONE  5 mg Oral Q4H PRN Lefty Meng PA-C      polyethylene glycol  17 g Oral Daily PRN Seema Huynh PA-C      senna  1 tablet Oral HS Seema Huynh PA-C      traZODone  50 mg Oral HS Ellyn Aschoff, PA-C          Today, Patient Was Seen By: Farhan Sanz DO    ** Please Note: Dictation voice to text software may have been used in the creation of this document   **

## 2022-07-30 NOTE — PROGRESS NOTES
Heart Failure/ Pulmonary Hypertension Progress Note - Jonah Stevenson 64 y o  female MRN: [id number]    Unit/Bed#: [facility] 501-01 Encounter: [id number]      Assessment:    Principal Problem:    Acute on chronic systolic congestive heart failure (HCC)  Active Problems:    Essential hypertension    Chronic pain    Anxiety    Tobacco abuse    COPD (chronic obstructive pulmonary disease) (HCC)    Acute kidney injury superimposed on CKD-3    Acute on chronic respiratory failure with hypoxia (HCC)    Hepatitis B      Objective:    Intake/ Output: not kept  Weight:  154 lbs- standing   Tele:      MAPs very high- 110's      On BiPap      Pt feels itching, possibly from medications         # Acute on Chronic HFrEF, Stage C     Etiology: NICM, C 3/2021 showed moderate non obstructive atherosclerosis,   previously partially recovered now back down to 25%     May be due to increased afterload as FABIOLA           Weight:     NT proBNP           Studies- personally reviewed by me           Echocardiogram 7/14/22     LVEF: 25% w segmental WMA     LVIDd: 5 5 cm     RV: normal     MR: moderate     PASP:     RVOT:      Other: mild TR, mild AI           Echo 6/15/22:     LVEF: 40%     LVEDd: 4 7 cm     RV: low normal           Echo 7/9/21:     LVEF: 50%     LVEDd: 4 4 cm       LHC 3/30/21: moderate non obstructive CAD     Non compliant LV with LVEDP 15 increasing to 35 mmHg after atrial systole            Neurohormonal Blockade:     --Beta-Blocker: coreg 25 mg BID    --ACEi, ARB or ARNi:       (or SVR reduction) hydralazine 100 mg Q8, isordil 30 mg Q8    --Aldosterone Receptor Blocker:     --SGLT2-I:      --Diuretic:           Sudden Cardiac Death Risk Reduction:     --ICD:  LifeVest          Electrical Resynchronization:     Narrow QRS       Advanced Therapies (If appropriate):     --Inotrope:     --LVAD/Transplant Candidacy:           # Acute on chronic hypoxic respiratory failure     - in setting of volume overload and underlying COPD     Oxygen: 2 liters at rest           # HTN-likely due to bilateral FABIOLA - uncontrolled    Coreg 25 mg BID, isosorbide 30 mg TID, hydralazine 100 mg TID  , amlodipine 5 mg daily  # Acute on chronic CKD- HD MWF     # bilateral FABIOLA-  - Renal doppler revealed occluded right renal artery and >60% stenosis in the proximal and distal main renal artery       - vascular surgery is following     # COPD           Plan:     Continue on coreg 25 mg BID, isordil 30 mg TID (off hydralazine due to itching)  - amlodipine 10 mg daily- go up on isordil  Still a lot of room for SVR reduction, anticipate this need will plummet with renal artery stenting     Volume management per HD     Vascular following for aldosterone/renin ration     LiveVest          Review of Systems   Constitutional: Negative for activity change, appetite change, fatigue and unexpected weight change  HENT: Negative for congestion and nosebleeds  Eyes: Negative  Respiratory: Negative for cough, chest tightness and shortness of breath  Cardiovascular: Negative for chest pain, palpitations and leg swelling  Gastrointestinal: Negative for abdominal distention  Endocrine: Negative  Genitourinary: Negative  Musculoskeletal: Negative  Skin: Negative  Neurological: Negative for dizziness, syncope and weakness  Hematological: Negative  Psychiatric/Behavioral: Negative  [employer] (day, reason): Henley catheter (day, reason):    Vitals: Blood pressure 167/97, pulse 92, temperature (!) 97 3 °F (36 3 °C), resp  rate 20, height 5' 10" (1 778 m), weight 70 2 kg (154 lb 12 8 oz), SpO2 95 %  , Body mass index is 22 21 kg/m² , I/O last 3 completed shifts: In: 922 [P O :222; I V :500;  Other:200]  Out: 3450 [Other:3450]  I/O this shift:  In: 236 [P O :236]  Out: -   Wt Readings from Last 3 Encounters:   07/28/22 70 2 kg (154 lb 12 8 oz)   07/22/22 77 9 kg (171 lb 11 8 oz)   06/28/22 77 4 kg (170 lb 9 6 oz)       Intake/Output Summary (Last 24 hours) at 7/30/2022 0917  Last data filed at 7/30/2022 0747  Gross per 24 hour   Intake 1158 ml   Output 3450 ml   Net -2292 ml     I/O last 3 completed shifts: In: 922 [P O :222; I V :500; Other:200]  Out: 3450 [Other:3450]    No significant arrhythmias seen on telemetry review         Physical Exam:  Vitals:    07/30/22 0230 07/30/22 0548 07/30/22 0655 07/30/22 0734   BP: 143/91 (!) 162/103 167/97    BP Location:       Pulse: 80 94 92    Resp: 20      Temp: 98 °F (36 7 °C)  (!) 97 3 °F (36 3 °C)    TempSrc:       SpO2: 100% 92% 90% 95%   Weight:       Height:           GEN: Juani Morales appears well, alert and oriented x 3, pleasant and cooperative   HEENT: pupils equal, round, and reactive to light; extraocular muscles intact  NECK: supple, no carotid bruits   HEART: regular rhythm, normal S1 and S2, no murmurs, clicks, gallops or rubs, JVP is    LUNGS: clear to auscultation bilaterally; no wheezes, rales, or rhonchi   ABDOMEN: normal bowel sounds, soft, no tenderness, no distention  EXTREMITIES: peripheral pulses normal; no clubbing, cyanosis, or edema  NEURO: no focal findings   SKIN: normal without suspicious lesions on exposed skin      Current Facility-Administered Medications:     acetaminophen (TYLENOL) tablet 650 mg, 650 mg, Oral, Q6H PRN, COLTEN MooreC    albuterol (PROVENTIL HFA,VENTOLIN HFA) inhaler 2 puff, 2 puff, Inhalation, Q4H PRN, Mary Ann Avila MD, 2 puff at 07/29/22 0455    albuterol inhalation solution 2 5 mg, 2 5 mg, Nebulization, Q4H PRN, Tequila VillagomezhtaDO    amLODIPine (NORVASC) tablet 10 mg, 10 mg, Oral, Daily, Glenda Nick, DO, 10 mg at 07/30/22 0809    calcium acetate (PHOSLO) capsule 1,334 mg, 1,334 mg, Oral, TID With Meals, Joby Benedict PA-C, 1,334 mg at 07/30/22 0809    carvedilol (COREG) tablet 25 mg, 25 mg, Oral, BID With Meals, Mary Hernandez MD, 25 mg at 07/30/22 0549    clonazePAM (KlonoPIN) tablet 1 mg, 1 mg, Oral, BID PRN, Seema Huynh PA-C, 1 mg at 07/29/22 2217    diphenhydrAMINE (BENADRYL) tablet 25 mg, 25 mg, Oral, Q6H PRN, Eris Rios PA-C, 25 mg at 07/28/22 0357    docusate sodium (COLACE) capsule 100 mg, 100 mg, Oral, BID, Seema Huynh PA-C, 100 mg at 07/29/22 1843    guaiFENesin (MUCINEX) 12 hr tablet 600 mg, 600 mg, Oral, Q12H [facility], Michelle Deluca PA-C, 600 mg at 07/30/22 0809    heparin (porcine) subcutaneous injection 5,000 Units, 5,000 Units, Subcutaneous, Q8H [facility], [facility] BRAVO Deluca, 5,000 Units at 07/30/22 0549    hydrALAZINE (APRESOLINE) injection 10 mg, 10 mg, Intravenous, Q6H PRN, Michelle Deluca PA-C, 10 mg at 07/28/22 1934    hydrocortisone 1 % cream, , Topical, 4x Daily PRN, Eris Rios PA-C    ipratropium (ATROVENT) 0 02 % inhalation solution 0 5 mg, 0 5 mg, Nebulization, TID, Davonte Tompkins MD, 0 5 mg at 07/30/22 0734    isosorbide dinitrate (ISORDIL) tablet 30 mg, 30 mg, Oral, Q8H [facility], [facility] Ngoc Quintana MD, 30 mg at 07/30/22 0550    levalbuterol (Thomasenia Murray) inhalation solution 1 25 mg, 1 25 mg, Nebulization, TID, Davonte Tompkins MD, 1 25 mg at 07/30/22 0734    methylPREDNISolone sodium succinate (Solu-MEDROL) injection 20 mg, 20 mg, Intravenous, Q8H [facility], Tequila Hopkins DO, 20 mg at 07/30/22 0550    ondansetron (ZOFRAN) injection 4 mg, 4 mg, Intravenous, Q4H PRN, Eris Rios PA-C, 4 mg at 07/28/22 9731    oxyCODONE (ROXICODONE) IR tablet 2 5 mg, 2 5 mg, Oral, Q4H PRN, Michelle Deluca PA-C, 2 5 mg at 07/29/22 1858    oxyCODONE (ROXICODONE) IR tablet 5 mg, 5 mg, Oral, Q4H PRN, Michelle Deluca PA-C, 5 mg at 07/30/22 0549    polyethylene glycol (MIRALAX) packet 17 g, 17 g, Oral, Daily PRN, Seema Huynh PA-C    senna (SENOKOT) tablet 8 6 mg, 1 tablet, Oral, HS, Seema Huynh PA-C, 8 6 mg at 07/28/22 2127    traZODone (DESYREL) tablet 50 mg, 50 mg, Oral, HS, Eris Rios PA-C, 50 mg at 07/28/22 2127      Labs & Results:        Results from last 7 days   Lab Units 07/29/22  9766 07/24/22  0615   WBC Thousand/uL 11 52* 12 92*   HEMOGLOBIN g/dL 11 9 10 8*   HEMATOCRIT % 35 6 33 3*   PLATELETS Thousands/uL 255 261         Results from last 7 days   Lab Units 07/29/22  1509 07/28/22  0403 07/27/22  0432   POTASSIUM mmol/L 4 3 5 6* 4 8   CHLORIDE mmol/L 92* 92* 92*   CO2 mmol/L 29 26 24   BUN mg/dL 47* 49* 63*   CREATININE mg/dL 5 07* 5 84* 7 22*   CALCIUM mg/dL 9 4 9 8 9 5           Counseling / Coordination of Care  Total floor / unit time spent today 25 minutes  Greater than 50% of total time was spent with the patient and / or family counseling and / or coordination of care  A description of the counseling / coordination of care: 15  Thank you for the opportunity to participate in the care of this patient    [address] [facility] PULMONARY HYPERTENSION  MEDICAL DIRECTOR OF [place]

## 2022-07-30 NOTE — PROGRESS NOTES
actively listened as pt, who presented as angry/frustrated/fearful, shared her issues with her treatment  She feels that her treatment/surgery has been delayed and asked to talk to a pt advocate  We discussed her many health issues and how those issues affects treatment and may delay surgery   offered prayer and a blessing for which she was thankful

## 2022-07-31 LAB
ANION GAP SERPL CALCULATED.3IONS-SCNC: 13 MMOL/L (ref 4–13)
BASOPHILS # BLD AUTO: 0.01 THOUSANDS/ΜL (ref 0–0.1)
BASOPHILS NFR BLD AUTO: 0 % (ref 0–1)
BUN SERPL-MCNC: 59 MG/DL (ref 5–25)
CALCIUM SERPL-MCNC: 9.9 MG/DL (ref 8.3–10.1)
CHLORIDE SERPL-SCNC: 90 MMOL/L (ref 96–108)
CO2 SERPL-SCNC: 23 MMOL/L (ref 21–32)
CREAT SERPL-MCNC: 5.16 MG/DL (ref 0.6–1.3)
EOSINOPHIL # BLD AUTO: 0.04 THOUSAND/ΜL (ref 0–0.61)
EOSINOPHIL NFR BLD AUTO: 0 % (ref 0–6)
ERYTHROCYTE [DISTWIDTH] IN BLOOD BY AUTOMATED COUNT: 13.6 % (ref 11.6–15.1)
GFR SERPL CREATININE-BSD FRML MDRD: 8 ML/MIN/1.73SQ M
GLUCOSE SERPL-MCNC: 145 MG/DL (ref 65–140)
GLUCOSE SERPL-MCNC: 208 MG/DL (ref 65–140)
HCT VFR BLD AUTO: 34.7 % (ref 34.8–46.1)
HGB BLD-MCNC: 11.4 G/DL (ref 11.5–15.4)
IMM GRANULOCYTES # BLD AUTO: 0.04 THOUSAND/UL (ref 0–0.2)
IMM GRANULOCYTES NFR BLD AUTO: 0 % (ref 0–2)
LYMPHOCYTES # BLD AUTO: 1.62 THOUSANDS/ΜL (ref 0.6–4.47)
LYMPHOCYTES NFR BLD AUTO: 14 % (ref 14–44)
MCH RBC QN AUTO: 28.7 PG (ref 26.8–34.3)
MCHC RBC AUTO-ENTMCNC: 32.9 G/DL (ref 31.4–37.4)
MCV RBC AUTO: 87 FL (ref 82–98)
MONOCYTES # BLD AUTO: 0.41 THOUSAND/ΜL (ref 0.17–1.22)
MONOCYTES NFR BLD AUTO: 4 % (ref 4–12)
NEUTROPHILS # BLD AUTO: 9.74 THOUSANDS/ΜL (ref 1.85–7.62)
NEUTS SEG NFR BLD AUTO: 82 % (ref 43–75)
NRBC BLD AUTO-RTO: 0 /100 WBCS
PLATELET # BLD AUTO: 264 THOUSANDS/UL (ref 149–390)
PMV BLD AUTO: 10.2 FL (ref 8.9–12.7)
POTASSIUM SERPL-SCNC: 5.9 MMOL/L (ref 3.5–5.3)
RBC # BLD AUTO: 3.97 MILLION/UL (ref 3.81–5.12)
SODIUM SERPL-SCNC: 126 MMOL/L (ref 135–147)
WBC # BLD AUTO: 11.86 THOUSAND/UL (ref 4.31–10.16)

## 2022-07-31 PROCEDURE — 94640 AIRWAY INHALATION TREATMENT: CPT

## 2022-07-31 PROCEDURE — 99232 SBSQ HOSP IP/OBS MODERATE 35: CPT | Performed by: INTERNAL MEDICINE

## 2022-07-31 PROCEDURE — 99233 SBSQ HOSP IP/OBS HIGH 50: CPT | Performed by: INTERNAL MEDICINE

## 2022-07-31 PROCEDURE — 94760 N-INVAS EAR/PLS OXIMETRY 1: CPT

## 2022-07-31 PROCEDURE — 85025 COMPLETE CBC W/AUTO DIFF WBC: CPT | Performed by: INTERNAL MEDICINE

## 2022-07-31 PROCEDURE — 80048 BASIC METABOLIC PNL TOTAL CA: CPT | Performed by: INTERNAL MEDICINE

## 2022-07-31 PROCEDURE — 82948 REAGENT STRIP/BLOOD GLUCOSE: CPT

## 2022-07-31 PROCEDURE — 94660 CPAP INITIATION&MGMT: CPT

## 2022-07-31 RX ORDER — SODIUM CHLORIDE FOR INHALATION 0.9 %
VIAL, NEBULIZER (ML) INHALATION
Status: COMPLETED
Start: 2022-07-31 | End: 2022-07-31

## 2022-07-31 RX ORDER — ALBUTEROL SULFATE 2.5 MG/3ML
10 SOLUTION RESPIRATORY (INHALATION) ONCE
Status: DISCONTINUED | OUTPATIENT
Start: 2022-07-31 | End: 2022-08-03 | Stop reason: HOSPADM

## 2022-07-31 RX ORDER — SODIUM POLYSTYRENE SULFONATE 4.1 MEQ/G
30 POWDER, FOR SUSPENSION ORAL; RECTAL ONCE
Status: COMPLETED | OUTPATIENT
Start: 2022-07-31 | End: 2022-07-31

## 2022-07-31 RX ORDER — DEXTROSE MONOHYDRATE 25 G/50ML
50 INJECTION, SOLUTION INTRAVENOUS ONCE
Status: COMPLETED | OUTPATIENT
Start: 2022-07-31 | End: 2022-07-31

## 2022-07-31 RX ADMIN — METHYLPREDNISOLONE SODIUM SUCCINATE 20 MG: 40 INJECTION, POWDER, FOR SOLUTION INTRAMUSCULAR; INTRAVENOUS at 13:32

## 2022-07-31 RX ADMIN — DEXTROSE MONOHYDRATE 50 ML: 25 INJECTION, SOLUTION INTRAVENOUS at 09:50

## 2022-07-31 RX ADMIN — CALCIUM ACETATE 1334 MG: 667 CAPSULE ORAL at 16:57

## 2022-07-31 RX ADMIN — HEPARIN SODIUM 5000 UNITS: 5000 INJECTION INTRAVENOUS; SUBCUTANEOUS at 05:54

## 2022-07-31 RX ADMIN — HYDRALAZINE HYDROCHLORIDE 10 MG: 20 INJECTION, SOLUTION INTRAMUSCULAR; INTRAVENOUS at 20:15

## 2022-07-31 RX ADMIN — ISOSORBIDE DINITRATE 40 MG: 20 TABLET ORAL at 21:37

## 2022-07-31 RX ADMIN — IPRATROPIUM BROMIDE 0.5 MG: 0.5 SOLUTION RESPIRATORY (INHALATION) at 19:01

## 2022-07-31 RX ADMIN — IPRATROPIUM BROMIDE 0.5 MG: 0.5 SOLUTION RESPIRATORY (INHALATION) at 07:52

## 2022-07-31 RX ADMIN — ALBUTEROL SULFATE 10 MG: 2.5 SOLUTION RESPIRATORY (INHALATION) at 10:27

## 2022-07-31 RX ADMIN — SENNOSIDES 8.6 MG: 8.6 TABLET, FILM COATED ORAL at 21:37

## 2022-07-31 RX ADMIN — ISODIUM CHLORIDE 3 ML: 0.03 SOLUTION RESPIRATORY (INHALATION) at 10:27

## 2022-07-31 RX ADMIN — METHYLPREDNISOLONE SODIUM SUCCINATE 20 MG: 40 INJECTION, POWDER, FOR SOLUTION INTRAMUSCULAR; INTRAVENOUS at 21:38

## 2022-07-31 RX ADMIN — CALCIUM ACETATE 1334 MG: 667 CAPSULE ORAL at 07:16

## 2022-07-31 RX ADMIN — CARVEDILOL 25 MG: 25 TABLET, FILM COATED ORAL at 05:54

## 2022-07-31 RX ADMIN — IPRATROPIUM BROMIDE 0.5 MG: 0.5 SOLUTION RESPIRATORY (INHALATION) at 14:32

## 2022-07-31 RX ADMIN — LEVALBUTEROL HYDROCHLORIDE 1.25 MG: 1.25 SOLUTION, CONCENTRATE RESPIRATORY (INHALATION) at 14:32

## 2022-07-31 RX ADMIN — HEPARIN SODIUM 5000 UNITS: 5000 INJECTION INTRAVENOUS; SUBCUTANEOUS at 21:38

## 2022-07-31 RX ADMIN — LEVALBUTEROL HYDROCHLORIDE 1.25 MG: 1.25 SOLUTION, CONCENTRATE RESPIRATORY (INHALATION) at 19:01

## 2022-07-31 RX ADMIN — GUAIFENESIN 600 MG: 600 TABLET, EXTENDED RELEASE ORAL at 21:37

## 2022-07-31 RX ADMIN — OXYCODONE HYDROCHLORIDE 5 MG: 5 TABLET ORAL at 20:16

## 2022-07-31 RX ADMIN — DOCUSATE SODIUM 100 MG: 100 CAPSULE, LIQUID FILLED ORAL at 17:01

## 2022-07-31 RX ADMIN — CALCIUM ACETATE 1334 MG: 667 CAPSULE ORAL at 11:30

## 2022-07-31 RX ADMIN — METHYLPREDNISOLONE SODIUM SUCCINATE 20 MG: 40 INJECTION, POWDER, FOR SOLUTION INTRAMUSCULAR; INTRAVENOUS at 05:54

## 2022-07-31 RX ADMIN — SODIUM POLYSTYRENE SULFONATE 30 G: 4.1 POWDER, FOR SUSPENSION ORAL; RECTAL at 09:49

## 2022-07-31 RX ADMIN — AMLODIPINE BESYLATE 10 MG: 10 TABLET ORAL at 08:03

## 2022-07-31 RX ADMIN — CLONAZEPAM 1 MG: 1 TABLET ORAL at 21:43

## 2022-07-31 RX ADMIN — ISOSORBIDE DINITRATE 40 MG: 20 TABLET ORAL at 13:32

## 2022-07-31 RX ADMIN — INSULIN HUMAN 10 UNITS: 100 INJECTION, SOLUTION PARENTERAL at 09:50

## 2022-07-31 RX ADMIN — GUAIFENESIN 600 MG: 600 TABLET, EXTENDED RELEASE ORAL at 08:03

## 2022-07-31 RX ADMIN — HEPARIN SODIUM 5000 UNITS: 5000 INJECTION INTRAVENOUS; SUBCUTANEOUS at 13:32

## 2022-07-31 RX ADMIN — CARVEDILOL 25 MG: 25 TABLET, FILM COATED ORAL at 16:57

## 2022-07-31 RX ADMIN — DIPHENHYDRAMINE HCL 25 MG: 25 TABLET ORAL at 22:39

## 2022-07-31 RX ADMIN — ONDANSETRON 4 MG: 2 INJECTION INTRAMUSCULAR; INTRAVENOUS at 07:16

## 2022-07-31 RX ADMIN — ISOSORBIDE DINITRATE 40 MG: 20 TABLET ORAL at 05:54

## 2022-07-31 RX ADMIN — LEVALBUTEROL HYDROCHLORIDE 1.25 MG: 1.25 SOLUTION, CONCENTRATE RESPIRATORY (INHALATION) at 07:52

## 2022-07-31 NOTE — PROGRESS NOTES
Heart Failure/ Pulmonary Hypertension Progress Note - Elvira Hernandez 64 y o  female MRN: [id number]    Unit/Bed#: [facility] 501-01 Encounter: [id number]      Assessment:    Principal Problem:    Acute on chronic systolic congestive heart failure (HCC)  Active Problems:    Essential hypertension    Chronic pain    Anxiety    Tobacco abuse    COPD (chronic obstructive pulmonary disease) (HCC)    Acute kidney injury superimposed on CKD-3    Acute on chronic respiratory failure with hypoxia (HCC)    Hepatitis B      Objective:    Intake/ Output: urine output: 2137/3500: -1363  Weight:  149 lbs- standing   Tele:      MAPs very high- 110's       On BiPap      Wants to leave AMA        # Acute on Chronic HFrEF, Stage C     Etiology: NICM, LHC 3/2021 showed moderate non obstructive atherosclerosis,   previously partially recovered now back down to 25%     May be due to increased afterload as FABIOLA           Weight: 149 lbs  NT proBNP           Studies- personally reviewed by me           Echocardiogram 7/14/22     LVEF: 25% w segmental WMA     LVIDd: 5 5 cm     RV: normal     MR: moderate     PASP:     RVOT:      Other: mild TR, mild AI           Echo 6/15/22:     LVEF: 40%     LVEDd: 4 7 cm     RV: low normal           Echo 7/9/21:     LVEF: 50%     LVEDd: 4 4 cm        LHC 3/30/21: moderate non obstructive CAD     Non compliant LV with LVEDP 15 increasing to 35 mmHg after atrial systole            Neurohormonal Blockade:     --Beta-Blocker: coreg 25 mg BID    --ACEi, ARB or ARNi:       (or SVR reduction) hydralazine 100 mg Q8, isordil 30 mg Q8    --Aldosterone Receptor Blocker:     --SGLT2-I:      --Diuretic:           Sudden Cardiac Death Risk Reduction:     --ICD:  LifeVest          Electrical Resynchronization:     Narrow QRS       Advanced Therapies (If appropriate):     --Inotrope:     --LVAD/Transplant Candidacy:           # Acute on chronic hypoxic respiratory failure     - in setting of volume overload and underlying COPD     Oxygen: 2 liters at rest           # HTN-likely due to bilateral FABIOLA - uncontrolled    Coreg 25 mg BID, isosorbide 30 mg TID, hydralazine 100 mg TID  , amlodipine 5 mg daily  # Acute on chronic CKD- HD MWF     # bilateral FABIOLA-  - Renal doppler revealed occluded right renal artery and >60% stenosis in the proximal and distal main renal artery       - vascular surgery is following     # COPD           Plan:     Continue on coreg 25 mg BID, isordil 30 mg TID (off hydralazine due to itching)  - amlodipine 10 mg daily- go up on isordil  Still a lot of room for SVR reduction, anticipate this need will plummet with renal artery stenting     Volume management per HD     Vascular following for aldosterone/renin ration     LiveVest          Review of Systems   Constitutional: Negative for activity change, appetite change, fatigue and unexpected weight change  HENT: Negative for congestion and nosebleeds  Eyes: Negative  Respiratory: Negative for cough, chest tightness and shortness of breath  Cardiovascular: Negative for chest pain, palpitations and leg swelling  Gastrointestinal: Negative for abdominal distention  Endocrine: Negative  Genitourinary: Negative  Musculoskeletal: Negative  Skin: Negative  Neurological: Negative for dizziness, syncope and weakness  Hematological: Negative  Psychiatric/Behavioral: Negative  [employer] (day, reason): Henley catheter (day, reason):    Vitals: Blood pressure 154/95, pulse 82, temperature 97 5 °F (36 4 °C), resp  rate 20, height 5' 10" (1 778 m), weight 67 7 kg (149 lb 4 8 oz), SpO2 96 %  , Body mass index is 21 42 kg/m² , I/O last 3 completed shifts: In: 2359 [P O :1859;  I V :500]  Out: 3500 [Other:3500]  I/O this shift:  In: 240 [P O :240]  Out: 0   Wt Readings from Last 3 Encounters:   07/31/22 67 7 kg (149 lb 4 8 oz)   07/22/22 77 9 kg (171 lb 11 8 oz)   06/28/22 77 4 kg (170 lb 9 6 oz)       Intake/Output Summary (Last 24 hours) at 7/31/2022 0905  Last data filed at 7/31/2022 0804  Gross per 24 hour   Intake 2141 ml   Output 3500 ml   Net -1359 ml     I/O last 3 completed shifts: In: 2359 [P O :1859; I V :500]  Out: 3500 [Other:3500]    No significant arrhythmias seen on telemetry review         Physical Exam:  Vitals:    07/31/22 0324 07/31/22 0400 07/31/22 0713 07/31/22 0752   BP: 158/96  154/95    BP Location:       Pulse: 84  82    Resp:       Temp:   97 5 °F (36 4 °C)    TempSrc:       SpO2: 95% 94% 95% 96%   Weight:       Height:           GEN: Juani Morales appears well, alert and oriented x 3, pleasant and cooperative   HEENT: pupils equal, round, and reactive to light; extraocular muscles intact  NECK: supple, no carotid bruits   HEART: regular rhythm, normal S1 and S2, no murmurs, clicks, gallops or rubs, JVP is    LUNGS: clear to auscultation bilaterally; no wheezes, rales, or rhonchi   ABDOMEN: normal bowel sounds, soft, no tenderness, no distention  EXTREMITIES: peripheral pulses normal; no clubbing, cyanosis, or edema  NEURO: no focal findings   SKIN: normal without suspicious lesions on exposed skin      Current Facility-Administered Medications:     acetaminophen (TYLENOL) tablet 650 mg, 650 mg, Oral, Q6H PRN, Easton Jarvis PA-C    albuterol (PROVENTIL HFA,VENTOLIN HFA) inhaler 2 puff, 2 puff, Inhalation, Q4H PRN, Napoleon Pan MD, 2 puff at 07/29/22 0455    albuterol inhalation solution 2 5 mg, 2 5 mg, Nebulization, Q4H PRN, Tequila Hopkins DO    amLODIPine (NORVASC) tablet 10 mg, 10 mg, Oral, Daily, Codie Nichols DO, 10 mg at 07/31/22 0803    calcium acetate (PHOSLO) capsule 1,334 mg, 1,334 mg, Oral, TID With Meals, Easton Jarvis PA-C, 1,334 mg at 07/31/22 0716    carvedilol (COREG) tablet 25 mg, 25 mg, Oral, BID With Meals, Isela Pan MD, 25 mg at 07/31/22 0513    clonazePAM (KlonoPIN) tablet 1 mg, 1 mg, Oral, BID PRN, Henrry Alegre PA-C, 1 mg at 07/30/22 0612    diphenhydrAMINE (BENADRYL) tablet 25 mg, 25 mg, Oral, Q6H PRN, Lisbeth Hansen PA-C, 25 mg at 07/28/22 0357    docusate sodium (COLACE) capsule 100 mg, 100 mg, Oral, BID, Seema Huynh PA-C, 100 mg at 07/29/22 1843    guaiFENesin (MUCINEX) 12 hr tablet 600 mg, 600 mg, Oral, Q12H [address], Germán Pan PA-C, 600 mg at 07/31/22 0803    heparin (porcine) subcutaneous injection 5,000 Units, 5,000 Units, Subcutaneous, Q8H [address], Germán Pan PA-C, 5,000 Units at 07/31/22 0554    hydrALAZINE (APRESOLINE) injection 10 mg, 10 mg, Intravenous, Q6H PRN, Germán Pan PA-C, 10 mg at 07/28/22 1934    hydrocortisone 1 % cream, , Topical, 4x Daily PRN, Lisbeth Hansen PA-C    ipratropium (ATROVENT) 0 02 % inhalation solution 0 5 mg, 0 5 mg, Nebulization, TID, Alka Ma MD, 0 5 mg at 07/31/22 0752    isosorbide dinitrate (ISORDIL) tablet 40 mg, 40 mg, Oral, Q8H [address], Rolanda Doshi DO, 40 mg at 07/31/22 0554    levalbuterol ([place]) inhalation solution 1 25 mg, 1 25 mg, Nebulization, TID, Alka Ma MD, 1 25 mg at 07/31/22 8218    methylPREDNISolone sodium succinate (Solu-MEDROL) injection 20 mg, 20 mg, Intravenous, Q8H [address], Tequila Hopkins DO, 20 mg at 07/31/22 0554    ondansetron (ZOFRAN) injection 4 mg, 4 mg, Intravenous, Q4H PRN, Lisbeth Hansen PA-C, 4 mg at 07/31/22 0716    oxyCODONE (ROXICODONE) IR tablet 2 5 mg, 2 5 mg, Oral, Q4H PRN, Germán Pan PA-C, 2 5 mg at 07/29/22 1858    oxyCODONE (ROXICODONE) IR tablet 5 mg, 5 mg, Oral, Q4H PRN, Germán Pan PA-C, 5 mg at 07/30/22 2111    polyethylene glycol (MIRALAX) packet 17 g, 17 g, Oral, Daily PRN, Seema Huynh PA-C    senna (SENOKOT) tablet 8 6 mg, 1 tablet, Oral, HS, Seema Huynh PA-C, 8 6 mg at 07/30/22 2103    traZODone (DESYREL) tablet 50 mg, 50 mg, Oral, HS, Lisbeth Hansen PA-C, 50 mg at 07/28/22 2127      Labs & Results:        Results from last 7 days   Lab Units 07/31/22  0545 07/29/22  1509   WBC Thousand/uL 11 86* 11 52*   HEMOGLOBIN g/dL 11 4* 11 9   HEMATOCRIT % 34 7* 35 6   PLATELETS Thousands/uL 264 255         Results from last 7 days   Lab Units 07/31/22  0545 07/30/22  1229 07/29/22  1509   POTASSIUM mmol/L 5 9* 5 5* 4 3   CHLORIDE mmol/L 90* 88* 92*   CO2 mmol/L 23 25 29   BUN mg/dL 59* 66* 47*   CREATININE mg/dL 5 16* 6 52* 5 07*   CALCIUM mg/dL 9 9 9 6 9 4           Counseling / Coordination of Care  Total floor / unit time spent today 25 minutes  Greater than 50% of total time was spent with the patient and / or family counseling and / or coordination of care  A description of the counseling / coordination of care: 15      Thank you for the opportunity to participate in the care of this patient    [address] [facility] PULMON[facility]  MEDICAL DIRECTOR OF [place]

## 2022-07-31 NOTE — PROGRESS NOTES
NEPHROLOGY PROGRESS NOTE   Shama Burgos 64 y o  female MRN: [id number]  Unit/Bed#: [facility] 501-01 Encounter: [id number]  Reason for Consult: SHANTA    ASSESSMENT AND PLAN:  Severe SHANTA on CKD stage 3, baseline creatinine 1 to 1 2 with episodes of SHANTA  -due to severe SHANTA with volume overload, decompensated heart failure, hypoxic respiratory failure, she was started on dialysis on 7/15/22  -now remains dialysis dependent on MWF schedule, had an additional dialysis yesterday, post HD weight 66 7 kg   -next HD tomorrow    -no obvious signs of renal recovery, minimal urine output  -recent UA bland     Access:  Status post new temporary HD catheter placed on 7/29/22  Will eventually need PermCath, hopefully tomorrow since her volume status overall much improved     Acute on chronic hypoxic respiratory failure  -in the setting of volume overload  -echo this month shows EF 25%  -overall feels much better today  Continue to remain on room air    -challenge UF removal on dialysis     Hyponatremia, likely hypervolemic, strict fluid restriction 1 2 L per day recommended   -sodium level remains lower 126    -continue to monitor with dialysis and UF removal     Hyperkalemia  -potassium 5 9 likely due to dietary noncompliance along with advanced renal failure  (she admits to be eating oranges, bananas)  -strict low-potassium diet  -will give dextrose, IV insulin, albuterol, Kayexalate 30 g  -BMP in a m      Hypertension  -suspect in the setting of renovascular component with renal artery stenosis  -appreciate vascular surgery follow-up  -initial plan for renal artery angiogram with possible intervention was postponed due to her respiratory status  Currently seems to be optimized from volume standpoint, currently remains on room air  Subjectively she feels overall better   -hopefully this can be done tomorrow  Will keep patient NPO from midnight tentatively    -renal artery Doppler showing right atrophic kidney, main right renal artery could not be identified, left renal artery greater than 60% stenosis  -currently on Coreg 25 mg b i d ,isosorbide increased 40 mg t i d , amlodipine to 10 mg daily    - hydralazine was discontinued due to concern for skin rash/itching   -continue to monitor BP with UF removal challenge on dialysis      Renal artery stenosis  -hopefully can get renal artery angiogram/stenting tomorrow since her respiratory status is improved  Primary team to contact IR today to get her on the schedule  Will keep patient NPO after midnight  -PermCath placement at the same time as well      Hyperphosphatemia, serum phosphorus 5 7  -renal diet  -currently on calcium acetate with meals     Skin rash,?  Suspected drug related, hydralazine has been discontinued   Currently on steroid  -slowly improving  -no peripheral eosinophilia     CKD anemia, transfuse p r n  For hemoglobin less than seven  -hemoglobin overall stable at goal, no need for Epogen     Discussed above plan in detail with primary team     SUBJECTIVE:  Patient seen and examined at bedside  She overall feels much better  Remains on room air    Itching is better    OBJECTIVE:  Current Weight: Weight - Scale: 67 7 kg (149 lb 4 8 oz)  Vitals:    07/31/22 0752   BP:    Pulse:    Resp:    Temp:    SpO2: 96%       Intake/Output Summary (Last 24 hours) at 7/31/2022 0906  Last data filed at 7/31/2022 0804  Gross per 24 hour   Intake 2141 ml   Output 3500 ml   Net -1359 ml     Wt Readings from Last 3 Encounters:   07/31/22 67 7 kg (149 lb 4 8 oz)   07/22/22 77 9 kg (171 lb 11 8 oz)   06/28/22 77 4 kg (170 lb 9 6 oz)     Temp Readings from Last 3 Encounters:   07/31/22 97 5 °F (36 4 °C)   07/22/22 98 5 °F (36 9 °C) (Temporal)   06/22/22 97 9 °F (36 6 °C)     BP Readings from Last 3 Encounters:   07/31/22 154/95   07/22/22 155/81   06/28/22 (!) 174/110     Pulse Readings from Last 3 Encounters:   07/31/22 82   07/22/22 82   06/28/22 94        Physical Examination:  General: Lying in bed, no acute distress   Eyes:  Mild conjunctival pallor present  ENT:  External examination of ears and nose unremarkable  Neck:  No obvious lymphadenopathy appreciated  Respiratory:  Bilateral air entry present  CVS:  S1, S2 present  GI:  Soft, nondistended  CNS:  Active alert oriented x3  Skin:  Skin rash improving  Musculoskeletal:  No obvious new gross deformity noted    Medications:    Current Facility-Administered Medications:     acetaminophen (TYLENOL) tablet 650 mg, 650 mg, Oral, Q6H PRN, Germán Pna PA-C    albuterol (PROVENTIL HFA,VENTOLIN HFA) inhaler 2 puff, 2 puff, Inhalation, Q4H PRN, Alka Ma MD, 2 puff at 07/29/22 0455    albuterol inhalation solution 2 5 mg, 2 5 mg, Nebulization, Q4H PRN, Tequila Hopkins DO    amLODIPine (NORVASC) tablet 10 mg, 10 mg, Oral, Daily, Rolanda Doshi DO, 10 mg at 07/31/22 0803    calcium acetate (PHOSLO) capsule 1,334 mg, 1,334 mg, Oral, TID With Meals, Germán Pan PA-C, 1,334 mg at 07/31/22 0716    carvedilol (COREG) tablet 25 mg, 25 mg, Oral, BID With Meals, Chayo Maxwell MD, 25 mg at 07/31/22 0554    clonazePAM (KlonoPIN) tablet 1 mg, 1 mg, Oral, BID PRN, Charmayne Poli, PA-C, 1 mg at 07/30/22 2159    diphenhydrAMINE (BENADRYL) tablet 25 mg, 25 mg, Oral, Q6H PRN, Lisbeth Hansen PA-C, 25 mg at 07/28/22 0357    docusate sodium (COLACE) capsule 100 mg, 100 mg, Oral, BID, Seema Huynh PA-C, 100 mg at 07/29/22 1843    guaiFENesin (MUCINEX) 12 hr tablet 600 mg, 600 mg, Oral, Q12H [facility], Germán Pan PA-C, 600 mg at 07/31/22 0803    heparin (porcine) subcutaneous injection 5,000 Units, 5,000 Units, Subcutaneous, Q8H [facility], Germán Pan PA-C, 5,000 Units at 07/31/22 0554    hydrALAZINE (APRESOLINE) injection 10 mg, 10 mg, Intravenous, Q6H PRN, Germán Pan PA-C, 10 mg at 07/28/22 1934    hydrocortisone 1 % cream, , Topical, 4x Daily PRN, Lisbeth Hansen PA-C    ipratropium (ATROVENT) 0 02 % inhalation solution 0 5 mg, 0 5 mg, Nebulization, TID, Sinai Brantley MD, 0 5 mg at 07/31/22 0752    isosorbide dinitrate (ISORDIL) tablet 40 mg, 40 mg, Oral, Q8H [address], Elby Coreas, DO, 40 mg at 07/31/22 0554    levalbuterol (Shellye Misael) inhalation solution 1 25 mg, 1 25 mg, Nebulization, TID, Sinai Brantley MD, 1 25 mg at 07/31/22 7369    methylPREDNISolone sodium succinate (Solu-MEDROL) injection 20 mg, 20 mg, Intravenous, Q8H [address], Hetul Hopkins, DO, 20 mg at 07/31/22 0554    ondansetron (ZOFRAN) injection 4 mg, 4 mg, Intravenous, Q4H PRN, Emelina Scott PA-C, 4 mg at 07/31/22 1802    oxyCODONE (ROXICODONE) IR tablet 2 5 mg, 2 5 mg, Oral, Q4H PRN, Blue Pittman PA-C, 2 5 mg at 07/29/22 1858    oxyCODONE (ROXICODONE) IR tablet 5 mg, 5 mg, Oral, Q4H PRN, Blue Pittman PA-C, 5 mg at 07/30/22 2111    polyethylene glycol (MIRALAX) packet 17 g, 17 g, Oral, Daily PRN, Seema Huynh PA-C    senna (SENOKOT) tablet 8 6 mg, 1 tablet, Oral, HS, Seema Huynh PA-C, 8 6 mg at 07/30/22 2103    traZODone (DESYREL) tablet 50 mg, 50 mg, Oral, HS, Emelina Scott PA-C, 50 mg at 07/28/22 2127    Laboratory Results:  Results from last 7 days   Lab Units 07/31/22  0545 07/30/22  1229 07/29/22  1509 07/28/22  0403 07/27/22  0432 07/26/22  0509 07/25/22  0431   WBC Thousand/uL 11 86*  --  11 52*  --   --   --   --    HEMOGLOBIN g/dL 11 4*  --  11 9  --   --   --   --    HEMATOCRIT % 34 7*  --  35 6  --   --   --   --    PLATELETS Thousands/uL 264  --  255  --   --   --   --    SODIUM mmol/L 126* 125* 129* 125* 125* 130* 127*   POTASSIUM mmol/L 5 9* 5 5* 4 3 5 6* 4 8 4 3 4 9   CHLORIDE mmol/L 90* 88* 92* 92* 92* 93* 92*   CO2 mmol/L 23 25 29 26 24 24 23   BUN mg/dL 59* 66* 47* 49* 63* 38* 61*   CREATININE mg/dL 5 16* 6 52* 5 07* 5 84* 7 22* 5 33* 6 77*   CALCIUM mg/dL 9 9 9 6 9 4 9 8 9 5 9 3 9 6       XR chest portable   Final Result by Nicolle Ba MD (07/29 3069)      Pulmonary edema with small bilateral pleural effusions is similar to prior study  Workstation performed: [id number]         VAS renal artery complete   Final Result by Andra Steinberg MD (07/24 1949)      IR renal angiogram    (Results Pending)   IR temporary dialysis catheter check/change/reposition    (Results Pending)       Portions of the record may have been created with voice recognition software  Occasional wrong word or "sound a like" substitutions may have occurred due to the inherent limitations of voice recognition software  Read the chart carefully and recognize, using context, where substitutions have occurred

## 2022-07-31 NOTE — ASSESSMENT & PLAN NOTE
· Had increased anxiety  · Home Klonopin was increased from 1 mg daily prn to twice daily prn at [address] prior to transfer  · Was also begun on Seroquel at [place] which was discontinued after  new rash

## 2022-07-31 NOTE — ASSESSMENT & PLAN NOTE
Wt Readings from Last 3 Encounters:   07/31/22 67 7 kg (149 lb 4 8 oz)   07/22/22 77 9 kg (171 lb 11 8 oz)   06/28/22 77 4 kg (170 lb 9 6 oz)     · Presented to [address] on 7/12 with shortness of breath with minimal exertion  · Found to be in acute on chronic systolic heart failure  · Was unresponsive to diuretics and became anuric  · History of nonischemic cardiomyopathy - EF 18% on 3/37/21  Improved to 50% on 7/9/21  · EF 41% on 6/15/22 when admitted to [facility] - CLOSED from 6/15/22 to 6/22/22 with acute on chronic combined CHF and COPD exacerbation  · Repeat echo on 7/14/22 with EF 25%  · Cardiac cath in March 2021 showed moderate nonobstructive atherosclerosis  · Currently dialysis dependent  · Volume control through hemodialysis   · IR for PermCath and renal artery stent as per IR  · Patient had access adjusted yesterday  Will defer on renal artery stent when respiratory status improved

## 2022-07-31 NOTE — PLAN OF CARE
Problem: Prexisting or High Potential for Compromised Skin Integrity  Goal: Skin integrity is maintained or improved  Description: INTERVENTIONS:  - Identify patients at risk for skin breakdown  - Assess and monitor skin integrity  - Assess and monitor nutrition and hydration status  - Monitor labs   - Assess for incontinence   - Turn and reposition patient  - Assist with mobility/ambulation  - Relieve pressure over bony prominences  - Avoid friction and shearing  - Provide appropriate hygiene as needed including keeping skin clean and dry  - Evaluate need for skin moisturizer/barrier cream  - Collaborate with interdisciplinary team   - Patient/family teaching  - Consider wound care consult   Outcome: Progressing     Problem: MOBILITY - ADULT  Goal: Maintain or return to baseline ADL function  Description: INTERVENTIONS:  -  Assess patient's ability to carry out ADLs; assess patient's baseline for ADL function and identify physical deficits which impact ability to perform ADLs (bathing, care of mouth/teeth, toileting, grooming, dressing, etc )  - Assess/evaluate cause of self-care deficits   - Assess range of motion  - Assess patient's mobility; develop plan if impaired  - Assess patient's need for assistive devices and provide as appropriate  - Encourage maximum independence but intervene and supervise when necessary  - Involve family in performance of ADLs  - Assess for home care needs following discharge   - Consider OT consult to assist with ADL evaluation and planning for discharge  - Provide patient education as appropriate  Outcome: Progressing  Goal: Maintains/Returns to pre admission functional level  Description: INTERVENTIONS:  - Perform BMAT or MOVE assessment daily    - Set and communicate daily mobility goal to care team and patient/family/caregiver  - Collaborate with rehabilitation services on mobility goals if consulted  - Perform Range of Motion 3 times a day    - Reposition patient every 3 hours   - Dangle patient 3 times a day  - Stand patient 3 times a day  - Ambulate patient 3 times a day  - Out of bed to chair 3 times a day   - Out of bed for meals 3 times a day  - Out of bed for toileting  - Record patient progress and toleration of activity level   Outcome: Progressing     Problem: Potential for Falls  Goal: Patient will remain free of falls  Description: INTERVENTIONS:  - Educate patient/family on patient safety including physical limitations  - Instruct patient to call for assistance with activity   - Consult OT/PT to assist with strengthening/mobility   - Keep Call bell within reach  - Keep bed low and locked with side rails adjusted as appropriate  - Keep care items and personal belongings within reach  - Initiate and maintain comfort rounds  - Make Fall Risk Sign visible to staff  - Apply yellow socks and bracelet for high fall risk patients  - Consider moving patient to room near nurses station  Outcome: Progressing     Problem: Nutrition/Hydration-ADULT  Goal: Nutrient/Hydration intake appropriate for improving, restoring or maintaining nutritional needs  Description: Monitor and assess patient's nutrition/hydration status for malnutrition  Collaborate with interdisciplinary team and initiate plan and interventions as ordered  Monitor patient's weight and dietary intake as ordered or per policy  Utilize nutrition screening tool and intervene as necessary  Determine patient's food preferences and provide high-protein, high-caloric foods as appropriate       INTERVENTIONS:  - Monitor oral intake, urinary output, labs, and treatment plans  - Assess nutrition and hydration status and recommend course of action  - Evaluate amount of meals eaten  - Assist patient with eating if necessary   - Allow adequate time for meals  - Recommend/ encourage appropriate diets, oral nutritional supplements, and vitamin/mineral supplements  - Order, calculate, and assess calorie counts as needed  - Recommend, monitor, and adjust tube feedings and TPN/PPN based on assessed needs  - Assess need for intravenous fluids  - Provide specific nutrition/hydration education as appropriate  - Include patient/family/caregiver in decisions related to nutrition  Outcome: Progressing     Problem: METABOLIC, FLUID AND ELECTROLYTES - ADULT  Goal: Electrolytes maintained within normal limits  Description: INTERVENTIONS:  - Monitor labs and assess patient for signs and symptoms of electrolyte imbalances  - Administer electrolyte replacement as ordered  - Monitor response to electrolyte replacements, including repeat lab results as appropriate  - Instruct patient on fluid and nutrition as appropriate  Outcome: Progressing  Goal: Fluid balance maintained  Description: INTERVENTIONS:  - Monitor labs   - Monitor I/O and WT  - Instruct patient on fluid and nutrition as appropriate  - Assess for signs & symptoms of volume excess or deficit  Outcome: Progressing  Goal: Glucose maintained within target range  Description: INTERVENTIONS:  - Monitor Blood Glucose as ordered  - Assess for signs and symptoms of hyperglycemia and hypoglycemia  - Administer ordered medications to maintain glucose within target range  - Assess nutritional intake and initiate nutrition service referral as needed  Outcome: Progressing

## 2022-07-31 NOTE — PROGRESS NOTES
[address] [facility]  Progress Note Gabriele Sneed 1960, 64 y o  female MRN: [id number]  Unit/Bed#: [facility] 501-01 Encounter: [id number]  Primary Care Provider: Francisco Arteaga DO   Date and time admitted to hospital: 7/22/2022  9:51 PM    * Acute on chronic systolic congestive heart failure (HCC)  Assessment & Plan  Wt Readings from Last 3 Encounters:   07/31/22 67 7 kg (149 lb 4 8 oz)   07/22/22 77 9 kg (171 lb 11 8 oz)   06/28/22 77 4 kg (170 lb 9 6 oz)     · Presented to [address] on 7/12 with shortness of breath with minimal exertion  · Found to be in acute on chronic systolic heart failure  · Was unresponsive to diuretics and became anuric  · History of nonischemic cardiomyopathy - EF 18% on 3/37/21  Improved to 50% on 7/9/21  · EF 41% on 6/15/22 when admitted to [facility] from 6/15/22 to 6/22/22 with acute on chronic combined CHF and COPD exacerbation  · Repeat echo on 7/14/22 with EF 25%  · Cardiac cath in March 2021 showed moderate nonobstructive atherosclerosis  · Currently dialysis dependent  · Volume control through hemodialysis   · IR for PermCath and renal artery stent as per IR  · Patient had access adjusted yesterday  Will defer on renal artery stent when respiratory status improved          Acute on chronic respiratory failure with hypoxia (HCC)  Assessment & Plan  · On O2 at 2L at rest and 4L with exertion at home  · Worsening hypoxia due to acute on chronic systolic heart failure  · Wean O2 as able    Acute kidney injury superimposed on CKD-3  Assessment & Plan  Lab Results   Component Value Date    EGFR 8 07/31/2022    EGFR 6 07/30/2022    EGFR 8 07/29/2022    CREATININE 5 16 (H) 07/31/2022    CREATININE 6 52 (H) 07/30/2022    CREATININE 5 07 (H) 07/29/2022   · Baseline creatinine 1 to 1 1  · Begun on HD on 07/15 due to acute on chronic systolic heart failure unresponsive to diuretics with worsening hypoxic respiratory failure  · Currently on HD on MWF  · Perm cath/renal art stent as per IR      COPD (chronic obstructive pulmonary disease) ([facility] [address] )  Assessment & Plan  · Initial concern for exacerbation during stay at [address] prior to transfer here for which she was given 3 doses of Solu-Medrol 40 mg IV which was then discontinued  · Home regimen: Anoro Ellipta (62 5/25mcg) 1 puff daily  · Continue Xopenex/Atrovent nebs    Tobacco abuse  Assessment & Plan  · Nicotine patch  · Smoking cessation advised    Anxiety  Assessment & Plan  · Had increased anxiety  · Home Klonopin was increased from 1 mg daily prn to twice daily prn at [address] prior to transfer  · Was also begun on Seroquel at [facility] which was discontinued after  new rash      Chronic pain  Assessment & Plan  · Continuous opioid dependent  · Continue home meds MS Contin and Percocet    Essential hypertension  Assessment & Plan  · Uncontrolled hypertension  · Home meds : Carvedilol 3 125 mg BID, lasix 40 mg daily  · Currently on Carvedilol 25 mg twice daily, , Isordil 30 mg q 8 hours  Amlodipine 5 mg daily added today  Hold hydralazine for possible rash  · Workup revealed high-grade stenosis of left renal artery -   · Will be to proceed with renal artery stent placement tomorrow with PermCath placement with IR  ·  Follow-up labs for secondary hypertension workup  · Cardiology following  · Cardiology/vascular input appreciated            VTE Pharmacologic Prophylaxis:   Pharmacologic: Heparin  Mechanical VTE Prophylaxis in Place: No    Patient Centered Rounds: I have performed bedside rounds with nursing staff today  Time Spent for Care: 15 minutes  More than 50% of total time spent on counseling and coordination of care as described above      Current Length of Stay: 9 day(s)    Current Patient Status: Inpatient       Code Status: Level 1 - Full Code      Subjective:   No acute distress    Objective:     Vitals:   Temp (24hrs), Av 8 °F (36 6 °C), Min:97 4 °F (36 3 °C), Max:98 4 °F (36 9 °C)    Temp:  [97 4 °F (36 3 °C)-98 4 °F (36 9 °C)] 97 5 °F (36 4 °C)  HR:  [82-98] 82  Resp:  [15-20] 20  BP: (119-168)/() 154/95  SpO2:  [93 %-99 %] 96 %  Body mass index is 21 42 kg/m²  Input and Output Summary (last 24 hours): Intake/Output Summary (Last 24 hours) at 7/31/2022 0817  Last data filed at 7/31/2022 0804  Gross per 24 hour   Intake 2141 ml   Output 3500 ml   Net -1359 ml       Physical Exam:     Physical Exam  Constitutional:       Appearance: Normal appearance  HENT:      Head: Normocephalic and atraumatic  Eyes:      Extraocular Movements: Extraocular movements intact  Pupils: Pupils are equal, round, and reactive to light  Cardiovascular:      Rate and Rhythm: Normal rate and regular rhythm  Pulmonary:      Effort: Pulmonary effort is normal       Breath sounds: Normal breath sounds  Abdominal:      General: Abdomen is flat  There is no distension  Palpations: Abdomen is soft  There is no mass  Musculoskeletal:         General: No swelling or tenderness  Skin:     General: Skin is warm and dry  Coloration: Skin is not jaundiced  Neurological:      General: No focal deficit present  Mental Status: She is alert and oriented to person, place, and time  Additional Data:     Labs:    Results from last 7 days   Lab Units 07/31/22  0545   WBC Thousand/uL 11 86*   HEMOGLOBIN g/dL 11 4*   HEMATOCRIT % 34 7*   PLATELETS Thousands/uL 264   NEUTROS PCT % 82*   LYMPHS PCT % 14   MONOS PCT % 4   EOS PCT % 0     Results from last 7 days   Lab Units 07/31/22  0545   POTASSIUM mmol/L 5 9*   CHLORIDE mmol/L 90*   CO2 mmol/L 23   BUN mg/dL 59*   CREATININE mg/dL 5 16*   CALCIUM mg/dL 9 9           * I Have Reviewed All Lab Data Listed Above  * Additional Pertinent Lab Tests Reviewed:  All Labs Within Last 24 Hours Reviewed      Recent Cultures (last 7 days):           Last 24 Hours Medication List:   Current Facility-Administered Medications   Medication Dose Route Frequency Provider Last Rate    acetaminophen  650 mg Oral Q6H PRN Monica Gaytan PA-C      albuterol  2 puff Inhalation Q4H PRN Marvin Coleman MD      albuterol  2 5 mg Nebulization Q4H PRN Tequila Hopkins DO      amLODIPine  10 mg Oral Daily Worthy Cockayne, DO      calcium acetate  1,334 mg Oral TID With Meals Monica Gaytan PA-C      carvedilol  25 mg Oral BID With Meals Ryan Degroot MD      clonazePAM  1 mg Oral BID PRN Martha Bo PA-C      diphenhydrAMINE  25 mg Oral Q6H PRN Laila Marrero PA-C      docusate sodium  100 mg Oral BID Seema Huynh PA-C      guaiFENesin  600 mg Oral Q12H [facility] Monica Gaytan PA-C      heparin (porcine)  5,000 Units Subcutaneous [facility] Monica Gaytan, [facility] [place]      hydrALAZINE  10 mg Intravenous Q6H PRN Monica Gaytan PA-C      hydrocortisone   Topical 4x Daily PRN Laila Marrero PA-C      ipratropium  0 5 mg Nebulization TID Marvin Coleman MD      isosorbide dinitrate  40 mg Oral [facility] Worthy Cockayne,       levalbuterol  1 25 mg Nebulization TID Marvin Coleman MD      methylPREDNISolone sodium succinate  20 mg Intravenous Q8H [facility] Tequila Hopkins DO      ondansetron  4 mg Intravenous Q4H PRN Laila Marrero PA-C      oxyCODONE  2 5 mg Oral Q4H PRN Monica Gaytan PA-C      oxyCODONE  5 mg Oral Q4H PRN Monica Gaytan PA-C      polyethylene glycol  17 g Oral Daily PRN Seema Huynh PA-C      senna  1 tablet Oral HS Seema Huynh PA-C      traZODone  50 mg Oral HS Laila Marrero PA-C          Today, Patient Was Seen By: Ana Sanders DO    ** Please Note: Dictation voice to text software may have been used in the creation of this document   **

## 2022-07-31 NOTE — CASE MANAGEMENT
Case Management Discharge Planning Note    Patient name Anni Laguna  Location [facility] 501-01 MRN [id number]  : 1960 Date 2022       Current Admission Date: 2022  Current Admission Diagnosis:Acute on chronic systolic congestive heart failure [facility])   Patient Active Problem List    Diagnosis Date Noted    Hepatitis B 2022    Acute on chronic respiratory failure with hypoxia ([facility] [address] ) 2022    Acute respiratory insufficiency 2022    Acute kidney injury superimposed on CKD-3 2022    COPD (chronic obstructive pulmonary disease) ([facility] [address] ) 06/15/2022    [facility] 2021    Tobacco abuse 2021    Leukocytosis 2021    Anxiety 2021    Dilated cardiomyopathy ([facility] [address] ) 2021    Essential hypertension 2021    Chronic pain 2021    Vitamin D deficiency 2013      LOS (days): 9  Geometric Mean LOS (GMLOS) (days): 3 80  Days to GMLOS:-4 9     OBJECTIVE:  Risk of Unplanned Readmission Score: 26 8         Current admission status: Inpatient   Preferred Pharmacy:   [id number] [address] [address]  Phone: [phone] Fax: [phone]    Primary Care Provider: Romeo Ni DO    Primary Insurance: MEDICARE MISC REPLACEMENT  Secondary Insurance: DOTTIE [address] [employer]    DISCHARGE DETAILS:        Additional Comments: Discussed patient with Dr Camden Zelaya  Patient had new temp HD catheter placed 22  She needs new perma cath and katie; artery stent which hopefully can be completed in IR on 22  Anticipate dc home later this week if patient has these procedures  AIDI update was sent to [facility] and to [facility]

## 2022-07-31 NOTE — PROGRESS NOTES
Called by floor, patient anxious and wishes to leave AMA  Extensive conversation with patient regarding disposition and planning  I also contacted the patient's daughter and updated her  At this point time the daughter reports that she will not come pick her up if she decides to leave AMA stating that she needs to be here and get her procedures completed  Patient does appear agreeable to staying  She is asking if we can liberalize her diet  Will liberalize her diet tonight and discuss further with Nephrology tomorrow

## 2022-07-31 NOTE — ASSESSMENT & PLAN NOTE
· Initial concern for exacerbation during stay at [facility] prior to transfer here for which she was given 3 doses of Solu-Medrol 40 mg IV which was then discontinued  · Home regimen: Anoro Ellipta (62 5/25mcg) 1 puff daily  · Continue Xopenex/Atrovent nebs

## 2022-07-31 NOTE — PROGRESS NOTES
Pastoral Care Progress Note    2022  Patient: Elvira Hernandez : 1960  Admission Date & Time: 2022 215  MRN: [id number] [facility]: [id number]                     Chaplaincy Interventions Utilized:   Empowerment: Provided anxiety containment    Exploration: Explored hope    Collaboration: Facilitated respect for spiritual/cultural practice during hospitalization    Relationship Building: Listened empathically    Ritual: Provided prayer        Chaplaincy Outcomes Achieved:  Catharsis        Spiritual Coping Strategies Utilized:   Spiritual comfort, Spiritual empowerment, Spiritual growth or transformation, Spiritual gratitude, and Spiritual meaning       22 1400   Clinical Encounter Type   Visited With Patient   Routine Visit Follow-up   Referral From    Referral To    Anabaptist Encounters   Anabaptist Needs Prayer   Patient Spiritual Encounters   Spiritual Assessment 5   Suffering Severity 4   Fear Level 4   Feelings of Hopelessness moderate to low     Provided spiritual and emotional support to the patient, also provided spiritual meaning and empowerment and catharsis in her life  Providing self confidence  We also prayed for her and her daughter

## 2022-07-31 NOTE — ASSESSMENT & PLAN NOTE
Lab Results   Component Value Date    EGFR 8 07/31/2022    EGFR 6 07/30/2022    EGFR 8 07/29/2022    CREATININE 5 16 (H) 07/31/2022    CREATININE 6 52 (H) 07/30/2022    CREATININE 5 07 (H) 07/29/2022   · Baseline creatinine 1 to 1 1  · Begun on HD on 07/15 due to acute on chronic systolic heart failure unresponsive to diuretics with worsening hypoxic respiratory failure  · Currently on HD on MWF  · Perm cath/renal art stent as per IR

## 2022-08-01 ENCOUNTER — ANESTHESIA EVENT (INPATIENT)
Dept: RADIOLOGY | Facility: HOSPITAL | Age: 62
DRG: 252 | End: 2022-08-01
Payer: MEDICARE

## 2022-08-01 ENCOUNTER — APPOINTMENT (INPATIENT)
Dept: DIALYSIS | Facility: HOSPITAL | Age: 62
DRG: 252 | End: 2022-08-01
Attending: INTERNAL MEDICINE
Payer: MEDICARE

## 2022-08-01 ENCOUNTER — APPOINTMENT (INPATIENT)
Dept: RADIOLOGY | Facility: HOSPITAL | Age: 62
DRG: 252 | End: 2022-08-01
Payer: MEDICARE

## 2022-08-01 ENCOUNTER — ANESTHESIA (INPATIENT)
Dept: RADIOLOGY | Facility: HOSPITAL | Age: 62
DRG: 252 | End: 2022-08-01
Payer: MEDICARE

## 2022-08-01 PROBLEM — I70.1 RENAL ARTERY STENOSIS (HCC): Status: ACTIVE | Noted: 2022-08-01

## 2022-08-01 LAB
ANION GAP SERPL CALCULATED.3IONS-SCNC: 17 MMOL/L (ref 4–13)
BUN SERPL-MCNC: 103 MG/DL (ref 5–25)
CALCIUM SERPL-MCNC: 9.2 MG/DL (ref 8.3–10.1)
CHLORIDE SERPL-SCNC: 83 MMOL/L (ref 96–108)
CO2 SERPL-SCNC: 21 MMOL/L (ref 21–32)
CREAT SERPL-MCNC: 6.93 MG/DL (ref 0.6–1.3)
GFR SERPL CREATININE-BSD FRML MDRD: 5 ML/MIN/1.73SQ M
GLUCOSE SERPL-MCNC: 132 MG/DL (ref 65–140)
POTASSIUM SERPL-SCNC: 5.4 MMOL/L (ref 3.5–5.3)
SODIUM SERPL-SCNC: 121 MMOL/L (ref 135–147)

## 2022-08-01 PROCEDURE — C1769 GUIDE WIRE: HCPCS

## 2022-08-01 PROCEDURE — 90935 HEMODIALYSIS ONE EVALUATION: CPT | Performed by: INTERNAL MEDICINE

## 2022-08-01 PROCEDURE — 36251 INS CATH REN ART 1ST UNILAT: CPT

## 2022-08-01 PROCEDURE — 94660 CPAP INITIATION&MGMT: CPT

## 2022-08-01 PROCEDURE — 36558 INSERT TUNNELED CV CATH: CPT | Performed by: RADIOLOGY

## 2022-08-01 PROCEDURE — 77001 FLUOROGUIDE FOR VEIN DEVICE: CPT | Performed by: RADIOLOGY

## 2022-08-01 PROCEDURE — C1887 CATHETER, GUIDING: HCPCS

## 2022-08-01 PROCEDURE — 76937 US GUIDE VASCULAR ACCESS: CPT

## 2022-08-01 PROCEDURE — C1876 STENT, NON-COA/NON-COV W/DEL: HCPCS

## 2022-08-01 PROCEDURE — 047A3DZ DILATION OF LEFT RENAL ARTERY WITH INTRALUMINAL DEVICE, PERCUTANEOUS APPROACH: ICD-10-PCS | Performed by: RADIOLOGY

## 2022-08-01 PROCEDURE — 80048 BASIC METABOLIC PNL TOTAL CA: CPT | Performed by: INTERNAL MEDICINE

## 2022-08-01 PROCEDURE — 94640 AIRWAY INHALATION TREATMENT: CPT

## 2022-08-01 PROCEDURE — 37246 TRLUML BALO ANGIOP 1ST ART: CPT

## 2022-08-01 PROCEDURE — 99231 SBSQ HOSP IP/OBS SF/LOW 25: CPT | Performed by: SURGERY

## 2022-08-01 PROCEDURE — 99233 SBSQ HOSP IP/OBS HIGH 50: CPT | Performed by: INTERNAL MEDICINE

## 2022-08-01 PROCEDURE — C1750 CATH, HEMODIALYSIS,LONG-TERM: HCPCS

## 2022-08-01 PROCEDURE — 94760 N-INVAS EAR/PLS OXIMETRY 1: CPT

## 2022-08-01 PROCEDURE — 75625 CONTRAST EXAM ABDOMINL AORTA: CPT

## 2022-08-01 PROCEDURE — B417YZZ FLUOROSCOPY OF LEFT RENAL ARTERY USING OTHER CONTRAST: ICD-10-PCS | Performed by: RADIOLOGY

## 2022-08-01 PROCEDURE — C1725 CATH, TRANSLUMIN NON-LASER: HCPCS

## 2022-08-01 PROCEDURE — NC001 PR NO CHARGE: Performed by: RADIOLOGY

## 2022-08-01 PROCEDURE — C1894 INTRO/SHEATH, NON-LASER: HCPCS

## 2022-08-01 PROCEDURE — 36558 INSERT TUNNELED CV CATH: CPT

## 2022-08-01 PROCEDURE — C1760 CLOSURE DEV, VASC: HCPCS

## 2022-08-01 PROCEDURE — 0JH63XZ INSERTION OF TUNNELED VASCULAR ACCESS DEVICE INTO CHEST SUBCUTANEOUS TISSUE AND FASCIA, PERCUTANEOUS APPROACH: ICD-10-PCS | Performed by: RADIOLOGY

## 2022-08-01 PROCEDURE — 37236 OPEN/PERQ PLACE STENT 1ST: CPT

## 2022-08-01 PROCEDURE — C1766 INTRO/SHEATH,STRBLE,NON-PEEL: HCPCS

## 2022-08-01 PROCEDURE — 76937 US GUIDE VASCULAR ACCESS: CPT | Performed by: RADIOLOGY

## 2022-08-01 PROCEDURE — 77001 FLUOROGUIDE FOR VEIN DEVICE: CPT

## 2022-08-01 PROCEDURE — 36245 INS CATH ABD/L-EXT ART 1ST: CPT | Performed by: RADIOLOGY

## 2022-08-01 PROCEDURE — 37236 OPEN/PERQ PLACE STENT 1ST: CPT | Performed by: RADIOLOGY

## 2022-08-01 RX ORDER — LABETALOL HYDROCHLORIDE 5 MG/ML
INJECTION, SOLUTION INTRAVENOUS AS NEEDED
Status: DISCONTINUED | OUTPATIENT
Start: 2022-08-01 | End: 2022-08-01

## 2022-08-01 RX ORDER — HEPARIN SODIUM 1000 [USP'U]/ML
INJECTION, SOLUTION INTRAVENOUS; SUBCUTANEOUS CODE/TRAUMA/SEDATION MEDICATION
Status: COMPLETED | OUTPATIENT
Start: 2022-08-01 | End: 2022-08-01

## 2022-08-01 RX ORDER — SODIUM CHLORIDE 9 MG/ML
INJECTION, SOLUTION INTRAVENOUS CONTINUOUS PRN
Status: DISCONTINUED | OUTPATIENT
Start: 2022-08-01 | End: 2022-08-01

## 2022-08-01 RX ORDER — HYDRALAZINE HYDROCHLORIDE 20 MG/ML
INJECTION INTRAMUSCULAR; INTRAVENOUS AS NEEDED
Status: DISCONTINUED | OUTPATIENT
Start: 2022-08-01 | End: 2022-08-01

## 2022-08-01 RX ORDER — HYDROMORPHONE HCL/PF 1 MG/ML
0.5 SYRINGE (ML) INJECTION
Status: CANCELLED | OUTPATIENT
Start: 2022-08-01

## 2022-08-01 RX ORDER — METHYLPREDNISOLONE SODIUM SUCCINATE 40 MG/ML
20 INJECTION, POWDER, LYOPHILIZED, FOR SOLUTION INTRAMUSCULAR; INTRAVENOUS EVERY 12 HOURS SCHEDULED
Status: DISCONTINUED | OUTPATIENT
Start: 2022-08-02 | End: 2022-08-02

## 2022-08-01 RX ORDER — FENTANYL CITRATE 50 UG/ML
INJECTION, SOLUTION INTRAMUSCULAR; INTRAVENOUS AS NEEDED
Status: DISCONTINUED | OUTPATIENT
Start: 2022-08-01 | End: 2022-08-01

## 2022-08-01 RX ORDER — CEFAZOLIN SODIUM 1 G/3ML
INJECTION, POWDER, FOR SOLUTION INTRAMUSCULAR; INTRAVENOUS AS NEEDED
Status: DISCONTINUED | OUTPATIENT
Start: 2022-08-01 | End: 2022-08-01

## 2022-08-01 RX ORDER — PROPOFOL 10 MG/ML
INJECTION, EMULSION INTRAVENOUS CONTINUOUS PRN
Status: DISCONTINUED | OUTPATIENT
Start: 2022-08-01 | End: 2022-08-01

## 2022-08-01 RX ORDER — IODIXANOL 320 MG/ML
50 INJECTION, SOLUTION INTRAVASCULAR
Status: COMPLETED | OUTPATIENT
Start: 2022-08-01 | End: 2022-08-01

## 2022-08-01 RX ADMIN — FENTANYL CITRATE 50 MCG: 50 INJECTION INTRAMUSCULAR; INTRAVENOUS at 17:20

## 2022-08-01 RX ADMIN — HEPARIN SODIUM 5000 UNITS: 1000 INJECTION INTRAVENOUS; SUBCUTANEOUS at 16:10

## 2022-08-01 RX ADMIN — LEVALBUTEROL HYDROCHLORIDE 1.25 MG: 1.25 SOLUTION, CONCENTRATE RESPIRATORY (INHALATION) at 19:00

## 2022-08-01 RX ADMIN — DOCUSATE SODIUM 100 MG: 100 CAPSULE, LIQUID FILLED ORAL at 18:27

## 2022-08-01 RX ADMIN — CLONAZEPAM 1 MG: 1 TABLET ORAL at 21:11

## 2022-08-01 RX ADMIN — HYDRALAZINE HYDROCHLORIDE 10 MG: 20 INJECTION, SOLUTION INTRAMUSCULAR; INTRAVENOUS at 14:53

## 2022-08-01 RX ADMIN — HYDRALAZINE HYDROCHLORIDE 10 MG: 20 INJECTION, SOLUTION INTRAMUSCULAR; INTRAVENOUS at 04:40

## 2022-08-01 RX ADMIN — CLONAZEPAM 1 MG: 1 TABLET ORAL at 09:22

## 2022-08-01 RX ADMIN — IPRATROPIUM BROMIDE 0.5 MG: 0.5 SOLUTION RESPIRATORY (INHALATION) at 07:39

## 2022-08-01 RX ADMIN — IPRATROPIUM BROMIDE 0.5 MG: 0.5 SOLUTION RESPIRATORY (INHALATION) at 19:00

## 2022-08-01 RX ADMIN — HYDROCORTISONE: 1 CREAM TOPICAL at 01:38

## 2022-08-01 RX ADMIN — OXYCODONE HYDROCHLORIDE 5 MG: 5 TABLET ORAL at 21:11

## 2022-08-01 RX ADMIN — METHYLPREDNISOLONE SODIUM SUCCINATE 20 MG: 40 INJECTION, POWDER, FOR SOLUTION INTRAMUSCULAR; INTRAVENOUS at 05:16

## 2022-08-01 RX ADMIN — FENTANYL CITRATE 25 MCG: 50 INJECTION INTRAMUSCULAR; INTRAVENOUS at 17:08

## 2022-08-01 RX ADMIN — CEFAZOLIN 2000 MG: 1 INJECTION, POWDER, FOR SOLUTION INTRAMUSCULAR; INTRAVENOUS at 15:49

## 2022-08-01 RX ADMIN — CALCIUM ACETATE 1334 MG: 667 CAPSULE ORAL at 18:27

## 2022-08-01 RX ADMIN — CARVEDILOL 25 MG: 25 TABLET, FILM COATED ORAL at 04:40

## 2022-08-01 RX ADMIN — LEVALBUTEROL HYDROCHLORIDE 1.25 MG: 1.25 SOLUTION, CONCENTRATE RESPIRATORY (INHALATION) at 07:40

## 2022-08-01 RX ADMIN — IODIXANOL 15 ML: 320 INJECTION, SOLUTION INTRAVASCULAR at 17:43

## 2022-08-01 RX ADMIN — CLONAZEPAM 1 MG: 1 TABLET ORAL at 04:39

## 2022-08-01 RX ADMIN — HYDRALAZINE HYDROCHLORIDE 10 MG: 20 INJECTION, SOLUTION INTRAMUSCULAR; INTRAVENOUS at 16:56

## 2022-08-01 RX ADMIN — SODIUM CHLORIDE: 0.9 INJECTION, SOLUTION INTRAVENOUS at 15:31

## 2022-08-01 RX ADMIN — FENTANYL CITRATE 25 MCG: 50 INJECTION INTRAMUSCULAR; INTRAVENOUS at 15:58

## 2022-08-01 RX ADMIN — METHYLPREDNISOLONE SODIUM SUCCINATE 20 MG: 40 INJECTION, POWDER, FOR SOLUTION INTRAMUSCULAR; INTRAVENOUS at 21:01

## 2022-08-01 RX ADMIN — ISOSORBIDE DINITRATE 40 MG: 20 TABLET ORAL at 21:04

## 2022-08-01 RX ADMIN — HEPARIN SODIUM 5000 UNITS: 5000 INJECTION INTRAVENOUS; SUBCUTANEOUS at 05:16

## 2022-08-01 RX ADMIN — DIPHENHYDRAMINE HCL 25 MG: 25 TABLET ORAL at 04:39

## 2022-08-01 RX ADMIN — FENTANYL CITRATE 25 MCG: 50 INJECTION INTRAMUSCULAR; INTRAVENOUS at 16:50

## 2022-08-01 RX ADMIN — PROPOFOL 15 MCG/KG/MIN: 10 INJECTION, EMULSION INTRAVENOUS at 15:51

## 2022-08-01 RX ADMIN — GUAIFENESIN 600 MG: 600 TABLET, EXTENDED RELEASE ORAL at 21:00

## 2022-08-01 RX ADMIN — HEPARIN SODIUM 5000 UNITS: 5000 INJECTION INTRAVENOUS; SUBCUTANEOUS at 21:01

## 2022-08-01 RX ADMIN — Medication 10 MG: at 17:02

## 2022-08-01 RX ADMIN — Medication 100 MCG: at 16:22

## 2022-08-01 RX ADMIN — CARVEDILOL 25 MG: 25 TABLET, FILM COATED ORAL at 18:27

## 2022-08-01 RX ADMIN — ALBUTEROL SULFATE 2.5 MG: 2.5 SOLUTION RESPIRATORY (INHALATION) at 04:57

## 2022-08-01 RX ADMIN — FENTANYL CITRATE 25 MCG: 50 INJECTION INTRAMUSCULAR; INTRAVENOUS at 16:23

## 2022-08-01 RX ADMIN — FENTANYL CITRATE 50 MCG: 50 INJECTION INTRAMUSCULAR; INTRAVENOUS at 17:36

## 2022-08-01 NOTE — ASSESSMENT & PLAN NOTE
· Had increased anxiety  · Home Klonopin was increased from 1 mg daily prn to twice daily prn at [address] prior to transfer  · Was also begun on Seroquel at [place] which was discontinued after  new rash

## 2022-08-01 NOTE — PLAN OF CARE
Plan 4 hrs of Hemodialysis on a 2 k bath for a serum k of 5 4 on 8/1/22  UF for 4 kg per Dr Santiago Rowan    Post-Dialysis RN Treatment Note    Blood Pressure:  Pre 159/108  mm/Hg  Post 147/99 mmHg   EDW  TBD kg    Weight:  Pre 72 4 kg- bed wt   Post 67 1 kg - standing scale   Mode of weight measurement: Bed Scale, standing scale post   Volume Removed  4016 ml    Treatment duration 240 minutes    NS given  No    Treatment shortened? No   Medications given during Rx Klon[place] 1mg   Estimated Kt/V  2 56   Access type: Temporary HD catheter   Access Issues: Yes, describe: needed to revesed lines when pat sitting up and leaning forward  system clotted  3/4 way through tx      Report called to primary nurse   Yes Rosita Montesinos RN  Problem: METABOLIC, FLUID AND ELECTROLYTES - ADULT  Goal: Electrolytes maintained within normal limits  Description: INTERVENTIONS:  - Monitor labs and assess patient for signs and symptoms of electrolyte imbalances  - Administer electrolyte replacement as ordered  - Monitor response to electrolyte replacements, including repeat lab results as appropriate  - Instruct patient on fluid and nutrition as appropriate  Outcome: Progressing  Goal: Fluid balance maintained  Description: INTERVENTIONS:  - Monitor labs   - Monitor I/O and WT  - Instruct patient on fluid and nutrition as appropriate  - Assess for signs & symptoms of volume excess or deficit  Outcome: Progressing  Goal: Glucose maintained within target range  Description: INTERVENTIONS:  - Monitor Blood Glucose as ordered  - Assess for signs and symptoms of hyperglycemia and hypoglycemia  - Administer ordered medications to maintain glucose within target range  - Assess nutritional intake and initiate nutrition service referral as needed  Outcome: Progressing

## 2022-08-01 NOTE — ASSESSMENT & PLAN NOTE
Patient for renal artery stent placement tentatively today if respiratory status is stable  Will need to coordinate with anesthesia

## 2022-08-01 NOTE — PROGRESS NOTES
visited patient to offer continued support  She was not in the room but arrived back from IR shortly  Patient was happy to share that her stent surgery went well and this was "a miracle" because the prognosis was not clear  She was "doubly blessed" because the medical team was able to give her a new port, too   offered supportive words and patient expressed gratitude

## 2022-08-01 NOTE — ASSESSMENT & PLAN NOTE
Wt Readings from Last 3 Encounters:   08/01/22 70 9 kg (156 lb 4 9 oz)   07/22/22 77 9 kg (171 lb 11 8 oz)   06/28/22 77 4 kg (170 lb 9 6 oz)     · Presented to [address] on 7/12 with shortness of breath with minimal exertion  · Found to be in acute on chronic systolic heart failure  · Was unresponsive to diuretics and became anuric  · History of nonischemic cardiomyopathy - EF 18% on 3/37/21  Improved to 50% on 7/9/21  · EF 41% on 6/15/22 when admitted to [facility] - CLOSED from 6/15/22 to 6/22/22 with acute on chronic combined CHF and COPD exacerbation  · Repeat echo on 7/14/22 with EF 25%  · Cardiac cath in March 2021 showed moderate nonobstructive atherosclerosis  · Currently dialysis dependent  · Volume control through hemodialysis   · IR for PermCath and renal artery stent as per IR  · Patient had access adjusted yesterday  Will defer on renal artery stent when respiratory status improved

## 2022-08-01 NOTE — ASSESSMENT & PLAN NOTE
· Initial concern for exacerbation during stay at [address] prior to transfer here for which she was given 3 doses of Solu-Medrol 40 mg IV which was then discontinued  · Home regimen: Anoro Ellipta (62 5/25mcg) 1 puff daily  · Continue Xopenex/Atrovent nebs 12-May-2021 01:25

## 2022-08-01 NOTE — PROGRESS NOTES
Follow up Consultation    Nephrology   Ever Thurston 64 y o  female MRN: [id number]  Unit/Bed#: [facility] 501-01 Encounter: [id number]      Physician Requesting Consult: Gladys Park DO        ASSESSMENT/PLAN:  57-year-old female with multiple comorbidities including CKD stage 3 and CHF admitted with acute exacerbation COPD  Nephrology following for acute kidney injury  Acute kidney injury on CKD stage 3:  SHANTA multifactorial most likely secondary to volume overload in the presence of decompensated heart failure  After review of records [facility] as well as Care everywhere it appears that the patient has a baseline Creatinine of 1 08 mg/dL on 07/12/2022  patient was admitted with a creatinine of 1-1 2 mg/dL  patient's creatinine today is at 6 93 mg/dL, unfortunately continues to rise in between treatments  No overt signs of renal recovery at this time  Started on dialysis 7/15/22  Last dialysis treatment on 08/01/2022  Currently on MWF dialysis schedule will do additional treatment tomorrow likely to help with UF  Currently has temporary dialysis catheter placed on 07/29/2022 plan for PermCath on 08/01/2022 with IR  Clinically still appears to be hypervolemic   check BMP in a m  Await renal recovery  Optimize hemodynamic status to avoid delay in renal recovery  Place on a renal diet when allowed diet order  Avoid nephrotoxins, adjust meds to appropriate GFR  Strict I/O  Daily weights  Urinary retention protocol if patient does not have a [place]  Most likely has underlying CKD secondary to hypertensive nephrosclerosis plus age-related nephron loss  will need to set up patient for follow up with Nephrology as an outpatient post hospitalization  for nephrology as an outpatient patient follows up with no nephrologist    Blood pressure/hypertension:  current medications:  Coreg 25 mg p o  B i d , isosorbide 40 mg p o  T i d , amlodipine 10 mg p o  Q day  recommendations: Follow-up with IR    Avoid hydralazine due to concern for skin rash/itching  Continue challenge UF with HD  Suspecting renal vascular component due to renal artery stenosis  Renal artery Doppler showing right atrophic kidney  Left renal artery greater than 60% stenosis  Plan for IR stenting on 2022  Optimize hemodynamics  Maintain MAP > 65mmHg  Avoid BP fluctuations  H/H/anemia:  most recent hemoglobin at 11 4 grams/deciliter  maintain hemoglobin greater than 8 grams/deciliter    Acid-base electrolytes:    Electrolytes:    Hyponatremia:   Most recent sodium at 121 mEq  To be corrected with dialysis  Continue fluid restriction 1 2 L per day    Hyperphosphatemia:  On PhosLo 2 tabs p o  T i d  With meals  Check phosphorus level in a m  Hyperkalemia:  Follow low-potassium diet  Most recent potassium 5 4 mEq  To be corrected with dialysis    Acid-base:    Most recent bicarb at 21    Other medical problems:  Proteinuria: Most recent UA with no protein as of 2022  Renal artery stenosis/CHF:  Management per primary team   For IR stenting of left renal artery with IR on 2022  Most recent echo with EF of 25% as of 2022 follow-up with Cardiology      Thanks for the consult  Will continue to follow  Please call with questions/ concerns  Above-mentioned orders and Plan in terms of dialysis was discussed with the team in depth via Tiger text    Cecil Lockhart MD, [facility], 12:33 PM              Objective :   Patient seen and examined while on hemodialysis at 9:55 a m  Tolerating dialysis well aiming for UF close to 4 kilos mild shortness of breath hemodynamically stable for IR procedure later        PHYSICAL EXAM  /99   Pulse 86   Temp 97 9 °F (36 6 °C) (Oral)   Resp 20   Ht 5' 10" (1 778 m)   Wt 70 9 kg (156 lb 4 9 oz)   SpO2 96%   BMI 22 43 kg/m²   Temp (24hrs), Av 8 °F (36 6 °C), Min:97 5 °F (36 4 °C), Max:98 2 °F (36 8 °C)        Intake/Output Summary (Last 24 hours) at 2022 1233  Last data filed at 8/1/2022 1145  Gross per 24 hour   Intake 1701 ml   Output 0 ml   Net 1701 ml       I/O last 24 hours: In: 2162 [P O :1762; I V :200; Other:200]  Out: 0       Current Weight: Weight - Scale: 70 9 kg (156 lb 4 9 oz)  First Weight: Weight - Scale: 79 1 kg (174 lb 6 1 oz)  Physical Exam  Vitals and nursing note reviewed  Constitutional:       General: She is not in acute distress  Appearance: Normal appearance  She is normal weight  She is ill-appearing  She is not toxic-appearing or diaphoretic  HENT:      Head: Normocephalic and atraumatic  Mouth/Throat:      Mouth: Mucous membranes are moist       Pharynx: Oropharynx is clear  No oropharyngeal exudate  Eyes:      General: No scleral icterus  Conjunctiva/sclera: Conjunctivae normal    Neck:      Comments: Right IJ catheter temporary  Cardiovascular:      Rate and Rhythm: Normal rate  Heart sounds: Normal heart sounds  No friction rub  Pulmonary:      Effort: Pulmonary effort is normal  No respiratory distress  Breath sounds: Normal breath sounds  No stridor  No wheezing  Abdominal:      General: There is no distension  Palpations: Abdomen is soft  There is no mass  Tenderness: There is no abdominal tenderness  Musculoskeletal:         General: No swelling  Cervical back: Normal range of motion and neck supple  No rigidity  Skin:     General: Skin is warm  Coloration: Skin is not jaundiced  Neurological:      General: No focal deficit present  Mental Status: She is alert and oriented to person, place, and time  Psychiatric:         Mood and Affect: Mood normal          Behavior: Behavior normal              Review of Systems   Constitutional: Positive for fatigue  Negative for chills  HENT: Negative for congestion  Respiratory: Positive for shortness of breath  Negative for cough and wheezing  Cardiovascular: Negative for leg swelling     Gastrointestinal: Negative for abdominal pain, diarrhea and vomiting  Genitourinary: Positive for decreased urine volume  Musculoskeletal: Negative for back pain  Neurological: Negative for headaches  Psychiatric/Behavioral: Negative for agitation and confusion  All other systems reviewed and are negative        Scheduled Meds:  Current Facility-Administered Medications   Medication Dose Route Frequency Provider Last Rate    acetaminophen  650 mg Oral Q6H PRN Alfredo Garcia PA-C      albuterol  2 puff Inhalation Q4H PRN Keagan Akbar MD      albuterol  10 mg Nebulization Once Bashir Dacosta MD      albuterol  2 5 mg Nebulization Q4H PRN Hetul Hopkins, DO      amLODIPine  10 mg Oral Daily Bob Hard, DO      calcium acetate  1,334 mg Oral TID With Meals Alfredo Garcia PA-C      carvedilol  25 mg Oral BID With Meals Shefali Leyva MD      clonazePAM  1 mg Oral BID PRN Tristan Zafar PA-C      diphenhydrAMINE  25 mg Oral Q6H PRN Bella Anderson PA-C      docusate sodium  100 mg Oral BID Seema Huynh PA-C      guaiFENesin  600 mg Oral Q12H [address] Alfredo Garcia PA-C      heparin (porcine)  5,000 Units Subcutaneous [facility] Camelia Antonio      hydrALAZINE  10 mg Intravenous Q6H PRN Alfredo Garcia PA-C      hydrocortisone   Topical 4x Daily PRN Bella Anderson PA-C      ipratropium  0 5 mg Nebulization TID Keagan Akbar MD      isosorbide dinitrate  40 mg Oral [facility] Bob Hard, DO      levalbuterol  1 25 mg Nebulization TID Keagan Akbar MD      methylPREDNISolone sodium succinate  20 mg Intravenous Q8H [address] Hetbebe Moyaa, DO      ondansetron  4 mg Intravenous Q4H PRN Bella Anderson PA-C      oxyCODONE  2 5 mg Oral Q4H PRN Alfredo Garcia PA-C      oxyCODONE  5 mg Oral Q4H PRN Alfredo Garcia PA-C      polyethylene glycol  17 g Oral Daily PRN Tristan Zafar PA-C      senna  1 tablet Oral HS Seema Huynh PA-C      traZODone  50 mg Oral HS Bella Anderson PA-C         PRN Meds:   acetaminophen    albuterol  [employer] albuterol    clonazePAM    diphenhydrAMINE    hydrALAZINE    hydrocortisone    ondansetron    oxyCODONE    oxyCODONE    polyethylene glycol    Continuous Infusions:       Invasive Devices: Invasive Devices  Report    Peripheral Intravenous Line  Duration           Peripheral IV 08/01/22 Right;Ventral (anterior) Forearm <1 day          Hemodialysis Catheter  Duration           HD Temporary Double Catheter 2 days                  LABORATORY:    Results from last 7 days   Lab Units 08/01/22  0426 07/31/22  0545 07/30/22  1229 07/29/22  1509 07/28/22  0403 07/27/22  0432 07/26/22  0509   WBC Thousand/uL  --  11 86*  --  11 52*  --   --   --    HEMOGLOBIN g/dL  --  11 4*  --  11 9  --   --   --    HEMATOCRIT %  --  34 7*  --  35 6  --   --   --    PLATELETS Thousands/uL  --  264  --  255  --   --   --    POTASSIUM mmol/L 5 4* 5 9* 5 5* 4 3 5 6* 4 8 4 3   CHLORIDE mmol/L 83* 90* 88* 92* 92* 92* 93*   CO2 mmol/L 21 23 25 29 26 24 24   BUN mg/dL 103* 59* 66* 47* 49* 63* 38*   CREATININE mg/dL 6 93* 5 16* 6 52* 5 07* 5 84* 7 22* 5 33*   CALCIUM mg/dL 9 2 9 9 9 6 9 4 9 8 9 5 9 3      rest all reviewed    RADIOLOGY:  XR chest portable   Final Result by Dallin Sarkar MD (07/29 1347)      Pulmonary edema with small bilateral pleural effusions is similar to prior study  Workstation performed: [id number]         IR temporary dialysis catheter check/change/reposition   Final Result by Cam Daniels MD (08/01 1057)      Insertion of right-sided non-tunneled dual-lumen temporary dialysis catheter  Chest radiograph was ordered to confirm catheter tip position  Workstation performed: [id number]         VAS renal artery complete   Final Result by Jarrod Perez MD (07/24 1949)      IR renal angiogram    (Results Pending)     Rest all reviewed    Portions of the record may have been created with voice recognition software   Occasional wrong word or "sound a like" substitutions may have occurred due to the inherent limitations of voice recognition software  Read the chart carefully and recognize, using context, where substitutions have occurred  If you have any questions, please contact the dictating provider

## 2022-08-01 NOTE — PROGRESS NOTES
The history and physical were reviewed, along with progress notes, and the patient was examined  There are no changes since it was written      BP (!) 187/107   Pulse 85   Temp 97 8 °F (36 6 °C)   Resp 17   Ht 5' 10" (1 778 m)   Wt 70 9 kg (156 lb 4 9 oz)   SpO2 94%   BMI 22 43 kg/m²

## 2022-08-01 NOTE — QUICK NOTE
Attempted to see patient multiple times throughout the day  However patient was not present  Unable to provide recommendations for today  Will speak and examine patient tomorrow

## 2022-08-01 NOTE — PROGRESS NOTES
[address] [facility]  Progress Note - [facility] 1960, 64 y o  female MRN: [id number]  Unit/Bed#: [facility] 501-01 Encounter: [id number]  Primary Care Provider: Lorena Crowley DO   Date and time admitted to hospital: 7/22/2022  9:51 PM    Renal artery stenosis [facility])  Assessment & Plan  63 y/o female with COPD, CHF, HTN, severe SHANTA on CKD stage 3 on HD with right renal atrophy and bilateral high grade renal artery stenosis  Plan:  Pt tentatively scheduled in IR today for Left Renal Artery stent and permacath placement, however, overnight pt developed increased WOB and is now on BiPAP, increase weight by 7lbs, HD planned later today  Will require evaluation and ensure clearance prior to proceeding with procedure today    NPO      Subjective:  Pt on BiPAP, c/o SOB    Vitals:  BP (!) 174/102 (BP Location: Right arm)   Pulse 95   Temp 98 2 °F (36 8 °C) (Oral)   Resp (!) 27   Ht 5' 10" (1 778 m)   Wt 70 9 kg (156 lb 4 9 oz)   SpO2 98%   BMI 22 43 kg/m²     I/Os:  I/O last 3 completed shifts: In: 2939 [P O :2439; I V :500]  Out: 3500 [Other:3500]  I/O this shift: In: 960 [P O :960]  Out: -     Lab Results and Cultures:   Lab Results   Component Value Date    WBC 11 86 (H) 07/31/2022    HGB 11 4 (L) 07/31/2022    HCT 34 7 (L) 07/31/2022    MCV 87 07/31/2022     07/31/2022     Lab Results   Component Value Date    CALCIUM 9 2 08/01/2022     04/05/2018    K 5 4 (H) 08/01/2022    CO2 21 08/01/2022    CL 83 (L) 08/01/2022     (H) 08/01/2022    CREATININE 6 93 (H) 08/01/2022     Lab Results   Component Value Date    INR 1 22 (H) 06/14/2022    INR 1 09 06/13/2021    INR 1 32 (H) 03/27/2021    PROTIME 15 3 (H) 06/14/2022    PROTIME 14 0 06/13/2021    PROTIME 16 3 (H) 03/27/2021        Blood Culture:   Lab Results   Component Value Date    BLOODCX No Growth After 5 Days   06/14/2022   ,   Urinalysis:   Lab Results   Component Value Date    COLORU Straw 07/12/2022 CLARITYU Clear 07/12/2022    SPECGRAV <=1 005 (L) 07/12/2022    PHUR 7 0 07/12/2022    PHUR 7 3 04/05/2018    LEUKOCYTESUR Negative 07/12/2022    NITRITE Negative 07/12/2022    GLUCOSEU Negative 07/12/2022    KETONESU Negative 07/12/2022    BILIRUBINUR Negative 07/12/2022    BLOODU Negative 07/12/2022   ,   Urine Culture: No results found for: URINECX,   Wound Culure: No results found for: WOUNDCULT    Medications:  Current Facility-Administered Medications   Medication Dose Route Frequency    acetaminophen (TYLENOL) tablet 650 mg  650 mg Oral Q6H PRN    albuterol (PROVENTIL HFA,VENTOLIN HFA) inhaler 2 puff  2 puff Inhalation Q4H PRN    albuterol inhalation solution 10 mg  10 mg Nebulization Once    albuterol inhalation solution 2 5 mg  2 5 mg Nebulization Q4H PRN    amLODIPine (NORVASC) tablet 10 mg  10 mg Oral Daily    calcium acetate (PHOSLO) capsule 1,334 mg  1,334 mg Oral TID With Meals    carvedilol (COREG) tablet 25 mg  25 mg Oral BID With Meals    clonazePAM (KlonoPIN) tablet 1 mg  1 mg Oral BID PRN    diphenhydrAMINE (BENADRYL) tablet 25 mg  25 mg Oral Q6H PRN    docusate sodium (COLACE) capsule 100 mg  100 mg Oral BID    guaiFENesin (MUCINEX) 12 hr tablet 600 mg  600 mg Oral Q12H [place]N    heparin (porcine) subcutaneous injection 5,000 Units  5,000 Units Subcutaneous Q8H [address]    hydrALAZINE (APRESOLINE) injection 10 mg  10 mg Intravenous Q6H PRN    hydrocortisone 1 % cream   Topical 4x Daily PRN    ipratropium (ATROVENT) 0 02 % inhalation solution 0 5 mg  0 5 mg Nebulization TID    isosorbide dinitrate (ISORDIL) tablet 40 mg  40 mg Oral Q8H [address]    levalbuterol (XOPENEX) inhalation solution 1 25 mg  1 25 mg Nebulization TID    methylPREDNISolone sodium succinate (Solu-MEDROL) injection 20 mg  20 mg Intravenous Q8H [address]    ondansetron (ZOFRAN) injection 4 mg  4 mg Intravenous Q4H PRN    oxyCODONE (ROXICODONE) IR tablet 2 5 mg  2 5 mg Oral Q4H PRN    oxyCODONE (ROXICODONE) IR tablet 5 mg  5 mg Oral Q4H PRN    polyethylene glycol (MIRALAX) packet 17 g  17 g Oral Daily PRN    senna (SENOKOT) tablet 8 6 mg  1 tablet Oral HS    traZODone (DESYREL) tablet 50 mg  50 mg Oral HS       Physical Exam:    General appearance: alert and oriented, in no acute distress  Lungs: diminished breath sounds  Heart: S1, S2 normal  Abdomen: soft, non-tender; bowel sounds normal; no masses,  no organomegaly  Extremities: extremities normal, warm and well-perfused; no cyanosis, clubbing, or edema        José Luis Jacome PA-C  8/1/2022

## 2022-08-01 NOTE — PROGRESS NOTES
Patient with increased WOB this morning and anxiety about upcoming procedure today  Placed on bipap, settings increased by Rigoberto Deleon with resp per patient request  /106, given IV hydralazine and PO coreg scheduled, OK per DOTTIE Brown with SLIM  Patient also dosed with PRN klonopin for anxiety and PO benadryl for itching  Weight was noted to be up 7 pounds per standing scale today from yesterday  Patients diet was liberalized 7/30 by Dr Lobito Canchola and patient is not completely compliant with fluid restriction as of late

## 2022-08-01 NOTE — BRIEF OP NOTE (RAD/CATH)
INTERVENTIONAL RADIOLOGY PROCEDURE NOTE    Date: 8/1/2022    Procedure: IR RENAL ANGIOGRAM  IR TUNNELED DIALYSIS CATHETER PLACEMENT     Preoperative diagnosis:   1  Acute on chronic systolic congestive heart failure ([address] )    2  Acute on chronic kidney failure ([address] )    3  Renal artery stenosis (HCC)         Postoperative diagnosis: Same  Surgeon: Carly Mosley MD     Assistant: None  No qualified resident was available  Blood loss:  5 mL    Specimens:  None    Findings:  High grade 18 mm in length heavily calcified left renal artery ostial stenosis successfully pre dilated to 4 mm, and stented with a 5 mm x 19 mm balloon expandable stent  No gradient was present post intervention  Successful conversion of 15 cm temporary dialysis catheter to 28 cm tunneled dialysis catheter  Complications: None immediate      Anesthesia: MAC sedation 86yMale being evaluated for comfort measures only. Pt palliatively extubated on 1/20/20. Pt on Morphine gtt 6mg/hr, stil got Morphine 4mg IV PRN x 2 in last 4 hours. Pt needing intermittent Ativan 2mg IV for agitation. Family at bedside, know pt will be downgraded. They fell he is comfortable, are aware it could be hours to days. Will lucero pass away in hospital is requiring morphine gtt and ongoing IV symptom management.         Recommendations:  DNR/DNI/CMO  Morphine gtt 10mg/hr  Morphine 4mg IVP Q 15 min PRN  Ativan 2mg IV Q 1/2 HR PRN  Glycopyrrolate 0.2mg Q 6 HRS IV   call x [phone] for any issues w comfort care

## 2022-08-01 NOTE — PLAN OF CARE
Problem: Prexisting or High Potential for Compromised Skin Integrity  Goal: Skin integrity is maintained or improved  Description: INTERVENTIONS:  - Identify patients at risk for skin breakdown  - Assess and monitor skin integrity  - Assess and monitor nutrition and hydration status  - Monitor labs   - Assess for incontinence   - Turn and reposition patient  - Assist with mobility/ambulation  - Relieve pressure over bony prominences  - Avoid friction and shearing  - Provide appropriate hygiene as needed including keeping skin clean and dry  - Evaluate need for skin moisturizer/barrier cream  - Collaborate with interdisciplinary team   - Patient/family teaching  - Consider wound care consult   Outcome: Progressing     Problem: Nutrition/Hydration-ADULT  Goal: Nutrient/Hydration intake appropriate for improving, restoring or maintaining nutritional needs  Description: Monitor and assess patient's nutrition/hydration status for malnutrition  Collaborate with interdisciplinary team and initiate plan and interventions as ordered  Monitor patient's weight and dietary intake as ordered or per policy  Utilize nutrition screening tool and intervene as necessary  Determine patient's food preferences and provide high-protein, high-caloric foods as appropriate       INTERVENTIONS:  - Monitor oral intake, urinary output, labs, and treatment plans  - Assess nutrition and hydration status and recommend course of action  - Evaluate amount of meals eaten  - Assist patient with eating if necessary   - Allow adequate time for meals  - Recommend/ encourage appropriate diets, oral nutritional supplements, and vitamin/mineral supplements  - Order, calculate, and assess calorie counts as needed  - Recommend, monitor, and adjust tube feedings and TPN/PPN based on assessed needs  - Assess need for intravenous fluids  - Provide specific nutrition/hydration education as appropriate  - Include patient/family/caregiver in decisions related to nutrition  Outcome: Progressing     Problem: METABOLIC, FLUID AND ELECTROLYTES - ADULT  Goal: Electrolytes maintained within normal limits  Description: INTERVENTIONS:  - Monitor labs and assess patient for signs and symptoms of electrolyte imbalances  - Administer electrolyte replacement as ordered  - Monitor response to electrolyte replacements, including repeat lab results as appropriate  - Instruct patient on fluid and nutrition as appropriate  Outcome: Progressing  Goal: Fluid balance maintained  Description: INTERVENTIONS:  - Monitor labs   - Monitor I/O and WT  - Instruct patient on fluid and nutrition as appropriate  - Assess for signs & symptoms of volume excess or deficit  Outcome: Progressing  Goal: Glucose maintained within target range  Description: INTERVENTIONS:  - Monitor Blood Glucose as ordered  - Assess for signs and symptoms of hyperglycemia and hypoglycemia  - Administer ordered medications to maintain glucose within target range  - Assess nutritional intake and initiate nutrition service referral as needed  Outcome: Progressing

## 2022-08-01 NOTE — PROGRESS NOTES
[address] [facility]  Progress Note Moy Vega 1960, 64 y o  female MRN: [id number]  Unit/Bed#: [facility] 501-01 Encounter: [id number]  Primary Care Provider: Jodee Nunez DO   Date and time admitted to hospital: 7/22/2022  9:51 PM    * Acute on chronic systolic congestive heart failure (HCC)  Assessment & Plan  Wt Readings from Last 3 Encounters:   08/01/22 70 9 kg (156 lb 4 9 oz)   07/22/22 77 9 kg (171 lb 11 8 oz)   06/28/22 77 4 kg (170 lb 9 6 oz)     · [facility] to [facility] with shortness of breath with minimal exertion  · Found to be in acute on chronic systolic heart failure  · Was unresponsive to diuretics and became anuric  · History of nonischemic cardiomyopathy - EF 18% on 3/37/21  Improved to 50% on 7/9/21  · EF 41% on 6/15/22 when admitted to [address] [facility]  from 6/15/22 to 6/22/22 with acute on chronic combined CHF and COPD exacerbation  · Repeat echo on 7/14/22 with EF 25%  · Cardiac cath in March 2021 showed moderate nonobstructive atherosclerosis  · Currently dialysis dependent  · Volume control through hemodialysis   · Stent today  · CM for HD chair time before dc can be made        Renal artery stenosis [facility])  Assessment & Plan  Patient for renal artery stent placement tentatively today if respiratory status is stable  Will need to coordinate with anesthesia      Acute on chronic respiratory failure with hypoxia (HCC)  Assessment & Plan  · On O2 at 2L at rest and 4L with exertion at home  · Worsening hypoxia due to acute on chronic systolic heart failure  · Wean O2 as able    Acute kidney injury superimposed on CKD-3  Assessment & Plan  Lab Results   Component Value Date    EGFR 5 08/01/2022    EGFR 8 07/31/2022    EGFR 6 07/30/2022    CREATININE 6 93 (H) 08/01/2022    CREATININE 5 16 (H) 07/31/2022    CREATININE 6 52 (H) 07/30/2022   · Baseline creatinine 1 to 1 1  · Begun on HD on 07/15 due to acute on chronic systolic heart failure unresponsive to diuretics with worsening hypoxic respiratory failure  · Currently on HD on MWF  · Perm cath/renal art stent as per IR      COPD (chronic obstructive pulmonary disease) (HCC)  Assessment & Plan  · Initial concern for exacerbation during stay at [address] prior to transfer here for which she was given 3 doses of Solu-Medrol 40 mg IV which was then discontinued  · Home regimen: Anoro Ellipta (62 5/25mcg) 1 puff daily  · Continue Xopenex/Atrovent nebs  · Start solumedrol taper    Tobacco abuse  Assessment & Plan  · Nicotine patch  · Smoking cessation advised    Anxiety  Assessment & Plan  · Had increased anxiety  · Home Klonopin was increased from 1 mg daily prn to twice daily prn at [address] prior to transfer  · Was also begun on Seroquel at [facility] which was discontinued after  new rash      Chronic pain  Assessment & Plan  · Continuous opioid dependent  · Continue home meds MS Contin and Percocet    Essential hypertension  Assessment & Plan  · Uncontrolled hypertension  · Home meds : Carvedilol 3 125 mg BID, lasix 40 mg daily  · Currently on Carvedilol 25 mg twice daily, , Isordil 30 mg q 8 hours  Amlodipine 5 mg daily added today  Hold hydralazine for possible rash  · Workup revealed high-grade stenosis of left renal artery -   · Stent and perm cath w gen aneasthea----follow up on blood pressure  ·  Follow-up labs for secondary hypertension workup  · Cardiology following  · Cardiology/vascular input appreciated            VTE Pharmacologic Prophylaxis:   Pharmacologic: Heparin  Mechanical VTE Prophylaxis in Place: No    Patient Centered Rounds: I have performed bedside rounds with nursing staff today  Time Spent for Care: 15 minutes  More than 50% of total time spent on counseling and coordination of care as described above      Current Length of Stay: 10 day(s)    Current Patient Status: Inpatient       Code Status: Level 1 - Full Code      Subjective:   nad    Objective: Vitals:   Temp (24hrs), Av 8 °F (36 6 °C), Min:97 5 °F (36 4 °C), Max:98 2 °F (36 8 °C)    Temp:  [97 5 °F (36 4 °C)-98 2 °F (36 8 °C)] 97 9 °F (36 6 °C)  HR:  [] 95  Resp:  [18-28] 25  BP: (159-204)/() 159/108  SpO2:  [93 %-99 %] 99 %  Body mass index is 22 43 kg/m²  Input and Output Summary (last 24 hours): Intake/Output Summary (Last 24 hours) at 2022 0916  Last data filed at 2022 0845  Gross per 24 hour   Intake 1722 ml   Output 0 ml   Net 1722 ml       Physical Exam:     Physical Exam  Constitutional:       Appearance: Normal appearance  HENT:      Head: Normocephalic and atraumatic  Cardiovascular:      Rate and Rhythm: Normal rate and regular rhythm  Pulmonary:      Effort: Pulmonary effort is normal       Breath sounds: Normal breath sounds  Abdominal:      General: Abdomen is flat  There is no distension  Palpations: Abdomen is soft  There is no mass  Musculoskeletal:      Cervical back: Normal range of motion and neck supple  Skin:     General: Skin is warm and dry  Neurological:      General: No focal deficit present  Mental Status: She is alert and oriented to person, place, and time  Psychiatric:         Mood and Affect: Mood normal          Behavior: Behavior normal          Additional Data:     Labs:    Results from last 7 days   Lab Units 22  0545   WBC Thousand/uL 11 86*   HEMOGLOBIN g/dL 11 4*   HEMATOCRIT % 34 7*   PLATELETS Thousands/uL 264   NEUTROS PCT % 82*   LYMPHS PCT % 14   MONOS PCT % 4   EOS PCT % 0     Results from last 7 days   Lab Units 22  0426   POTASSIUM mmol/L 5 4*   CHLORIDE mmol/L 83*   CO2 mmol/L 21   BUN mg/dL 103*   CREATININE mg/dL 6 93*   CALCIUM mg/dL 9 2           * I Have Reviewed All Lab Data Listed Above  * Additional Pertinent Lab Tests Reviewed:  All Labs Within Last 24 Hours Reviewed        Recent Cultures (last 7 days):           Last 24 Hours Medication List:   Current Facility-Administered Medications   Medication Dose Route Frequency Provider Last Rate    acetaminophen  650 mg Oral Q6H PRN Pancho Mitchell PA-C      albuterol  2 puff Inhalation Q4H PRN Elizabeth Akbar MD      albuterol  10 mg Nebulization Once Bryant Sandra MD      albuterol  2 5 mg Nebulization Q4H PRN Hetul Hopkins, DO      amLODIPine  10 mg Oral Daily Yareli Hodge DO      calcium acetate  1,334 mg Oral TID With Meals Pancho Mitchell PA-C      carvedilol  25 mg Oral BID With Meals Alyx eLón MD      clonazePAM  1 mg Oral BID PRN Shanell Rodriguez PA-C      diphenhydrAMINE  25 mg Oral Q6H PRN Duran Mcneil PA-C      docusate sodium  100 mg Oral BID Seema Huynh PA-C      guaiFENesin  600 mg Oral Q12H [address] Panchomelanie Mitchell PA-C      heparin (porcine)  5,000 Units Subcutaneous [place], [place]      hydrALAZINE  10 mg Intravenous Q6H PRN Pancho Mitchell PA-C      hydrocortisone   Topical 4x Daily PRN Duran Mcneil PA-C      ipratropium  0 5 mg Nebulization TID Elizabeth Akbar MD      isosorbide dinitrate  40 mg Oral [facility] Yareli Hodge DO      levalbuterol  1 25 mg Nebulization TID Elizabeth Akbar MD      methylPREDNISolone sodium succinate  20 mg Intravenous Q8H [address] Het Hopkins, DO      ondansetron  4 mg Intravenous Q4H PRN Duran Mcneil PA-C      oxyCODONE  2 5 mg Oral Q4H PRN Pancho BRAVO Mitchell      oxyCODONE  5 mg Oral Q4H PRN Panchomelanie Mitchell PA-C      polyethylene glycol  17 g Oral Daily PRN Seema Huynh PA-C      senna  1 tablet Oral HS Seema Huynh PA-C      traZODone  50 mg Oral HS Duran Mcneil PA-C          Today, Patient Was Seen By: Abdoul Mercedes DO    ** Please Note: Dictation voice to text software may have been used in the creation of this document   **

## 2022-08-01 NOTE — PROGRESS NOTES
Spiritual Care Progress Note    2022  Patient: Asa Santos : 1960  Admission Date & Time: 2022  MRN: [id number] [facility]: [id number]       attempted to visit patient per MD referral  Pt unavailable  This  will refer oncoming  to visit patient  Spiritual care will remain available       22 1500   Clinical Encounter Type   Visited With Patient not available   Referral From Physician   Referral To

## 2022-08-01 NOTE — ANESTHESIA POSTPROCEDURE EVALUATION
Post-Op Assessment Note    CV Status:  Stable  Pain Score: 0    Pain management: adequate     Mental Status:  Awake   Hydration Status:  Stable   PONV Controlled:  None   Airway Patency:  Patent      Post Op Vitals Reviewed: Yes      Staff: Anesthesiologist, CRNA         No complications documented      BP  147/90   Temp      Pulse  92   Resp   14   SpO2   97    REPORT TO IR RN, SV, VSS

## 2022-08-01 NOTE — PROGRESS NOTES
Advanced Heart Failure Team Progress Note - Ananth Walsh 64 y o  female MRN: [id number]    Unit/Bed#: [facility] 501-01 Encounter: [id number]          Subjective:   No overnight events  Patient s/p renal artery stenting with IR  Emotional during interview, wants to go home ASAP, became tearful  After she got her renal artery stenting, states that she has begun to urinate in larger volumes, similar to when she was not on dialysis  Hospital Course:   Ananth Walsh is a 64y o  year old female with a history of COPD, chronic tobaccos use, NICM, HTN, CKD, HLD, Bipolar Disorder, severe bilateral renal artery stenosis who presented to [facility] with complaints of SOB and was admitted for acute on chronic CHF exacerbation  Patient was diagnosed with severe nonischemic cardiomyopathy in  [facility] with moderate nonobstructive atherosclerosis), this was followed by improvement in LV function with EF about 50% in   In June 2022, she was admitted to [facility], and was treated for pneumonia, sepsis, heart failure exacerbation as well as COPD  After a week-long hospitalization, she was discharged on oral diuretics  But within 3 weeks she had worsening shortness of breath, and presented back to [facility] (7/12/22)  She did not respond to the IV diuresis, and instead developed acute renal failure with creatinine jump from 1 08 (7/12/22) to 4 5 (7/14/22)  Her EF was noted to have dropped from 40 to 25%, and she was initiated on dialysis  Milrinone was attempted as well, but no diuresis be achieved  Had continued increasing oxygen requirements, and as a result was transferred to [facility] for heart failure evaluation  Vitals: Blood pressure (!) 174/107, pulse 86, temperature 98 °F (36 7 °C), resp  rate 18, height 5' 10" (1 778 m), weight 70 9 kg (156 lb 4 9 oz), SpO2 99 %  , Body mass index is 22 43 kg/m² ,   Orthostatic Blood Pressures    Flowsheet Row Most Recent Value   Blood Pressure 174/107 filed at 08/01/2022 0706   Patient Position - Orthostatic VS Lying filed at 08/01/2022 0427            Intake/Output Summary (Last 24 hours) at 8/1/2022 0912  Last data filed at 8/1/2022 0845  Gross per 24 hour   Intake 1722 ml   Output 0 ml   Net 1722 ml       Review of System:  Review of system was conducted and was negative except for as stated in the subjective course        Physical Exam:  GEN: Snow Aquino appears well, alert and oriented x 3, pleasant and cooperative   HEENT:  Normocephalic, atraumatic, anicteric, moist mucous membranes  NECK: No JVD; no carotid bruits   HEART: reg rhythm, reg rate, normal S1 and S2, no murmur, no clicks, gallops or rubs   LUNGS: slight crackles in b/l LLFs  ABDOMEN:  Normoactive bowel sounds, soft, no distention  EXTREMITIES: peripheral pulses palpable; no edema  NEURO: no gross focal findings; cranial nerves grossly intact   SKIN:  Dry, intact, warm to touch      Current Facility-Administered Medications:     acetaminophen (TYLENOL) tablet 650 mg, 650 mg, Oral, Q6H PRN, Easton Jarvis PA-C    albuterol (PROVENTIL HFA,VENTOLIN HFA) inhaler 2 puff, 2 puff, Inhalation, Q4H PRN, Napoleon Pan MD, 2 puff at 07/29/22 0455    albuterol inhalation solution 10 mg, 10 mg, Nebulization, Once, Tu Wall MD    albuterol inhalation solution 2 5 mg, 2 5 mg, Nebulization, Q4H PRN, Tequila Hopkins DO, 2 5 mg at 08/01/22 0457    amLODIPine (NORVASC) tablet 10 mg, 10 mg, Oral, Daily, Codie Nichols DO, 10 mg at 07/31/22 0803    calcium acetate (PHOSLO) capsule 1,334 mg, 1,334 mg, Oral, TID With Meals, Easton Jarvis PA-C, 1,334 mg at 07/31/22 1657    carvedilol (COREG) tablet 25 mg, 25 mg, Oral, BID With Meals, Isela Pan MD, 25 mg at 08/01/22 0440    clonazePAM (KlonoPIN) tablet 1 mg, 1 mg, Oral, BID PRN, Henrry Alegre PA-C, 1 mg at 08/01/22 0439    diphenhydrAMINE (BENADRYL) tablet 25 mg, 25 mg, Oral, Q6H PRN, Katelynn Zavala PA-C, 25 mg at 08/01/22 0439    docusate sodium (COLACE) capsule 100 mg, 100 mg, Oral, BID, Seema Huynh PA-C, 100 mg at 07/31/22 1701    guaiFENesin (MUCINEX) 12 hr tablet 600 mg, 600 mg, Oral, Q12H [address], Silvio Stroud PA-C, 600 mg at 07/31/22 2137    heparin (porcine) subcutaneous injection 5,000 Units, 5,000 Units, Subcutaneous, Q8H [address], Silvio Stroud PA-C, 5,000 Units at 08/01/22 0516    hydrALAZINE (APRESOLINE) injection 10 mg, 10 mg, Intravenous, Q6H PRN, Silvio Stroud PA-C, 10 mg at 08/01/22 0440    hydrocortisone 1 % cream, , Topical, 4x Daily PRN, Isabela Quinteros PA-C, Given at 08/01/22 0138    ipratropium (ATROVENT) 0 02 % inhalation solution 0 5 mg, 0 5 mg, Nebulization, TID, Kory Gonzalez MD, 0 5 mg at 08/01/22 0739    isosorbide dinitrate (ISORDIL) tablet 40 mg, 40 mg, Oral, Q8H [address], Ray Cortés DO, 40 mg at 07/31/22 2137    levalbuterol (Gala Sleek) inhalation solution 1 25 mg, 1 25 mg, Nebulization, TID, Kory Gonzalez MD, 1 25 mg at 08/01/22 0740    methylPREDNISolone sodium succinate (Solu-MEDROL) injection 20 mg, 20 mg, Intravenous, Q8H [address], Tequila Hopkins DO, 20 mg at 08/01/22 0516    ondansetron (ZOFRAN) injection 4 mg, 4 mg, Intravenous, Q4H PRN, Isabela Quinteros PA-C, 4 mg at 07/31/22 0716    oxyCODONE (ROXICODONE) IR tablet 2 5 mg, 2 5 mg, Oral, Q4H PRN, Silvio Stroud PA-C, 2 5 mg at 07/29/22 1858    oxyCODONE (ROXICODONE) IR tablet 5 mg, 5 mg, Oral, Q4H PRN, Silvio Stroud PA-C, 5 mg at 07/31/22 2016    polyethylene glycol (MIRALAX) packet 17 g, 17 g, Oral, Daily PRN, Seema Huynh PA-C    senna (SENOKOT) tablet 8 6 mg, 1 tablet, Oral, HS, Seema Huynh PA-C, 8 6 mg at 07/31/22 2137    traZODone (DESYREL) tablet 50 mg, 50 mg, Oral, HS, Isabela Quinteros PA-C, 50 mg at 07/28/22 2127    Labs & Results:          Results from last 7 days   Lab Units 08/01/22  0426 07/31/22  0545 07/30/22  1229   POTASSIUM mmol/L 5 4* 5 9* 5 5*   CO2 mmol/L 21 23 25   CHLORIDE mmol/L 83* 90* 88*   BUN mg/dL 103* 59* 66*   CREATININE mg/dL 6 93* 5 16* 6 52*     Results from last 7 days   Lab Units 07/31/22  0545 07/29/22  1509   HEMOGLOBIN g/dL 11 4* 11 9   HEMATOCRIT % 34 7* 35 6   PLATELETS Thousands/uL 264 255               Telemetry:   Personally reviewed by Sim Goldmann, MD:     VTE Prophylaxis: Heparin     ==========================================================================================    Assessment:  Principal Problem:    Acute on chronic systolic congestive heart failure (HCC)  Active Problems:    Essential hypertension    Chronic pain    Anxiety    Tobacco abuse    COPD (chronic obstructive pulmonary disease) (HCC)    Acute kidney injury superimposed on CKD-3    Acute on chronic respiratory failure with hypoxia (HCC)    Hepatitis B    Renal artery stenosis ([facility] [address] )    1  NICM with Acute on Chronic HFrEF (25%)  --> [facility] 3/2021 showed moderate non-obstructive atherosclerosis  --> TTE (7/14/22): 25% with segmental RWMA  LVIDd 5 5cm  Moderate MR  Mild AI and TR  2  ARF on HD (MWF)  --> States she started producing large volume urine on 8/2/22, similar to pre-HD  3  Bilateral Severe Renal Artery Stenosis  --> 8/1/22: High grade 18mm in length heavily calcified Left renal artery ostial stenosis  S/p placement of a 15mm x 19mm stent  4  HTN / HLD  5  Bipolar Disorder  6  COPD 2/2 Tobacco Use    Plan:  - c/w Coreg 25mg PO BID  - c/w Isordil 40mg PO Q8H  - c/w Lipitor 40mg PO QHS  - c/w Amlodipine 10mg PO Daily  - Off Hydralazine 2/2 itching  - Close monitoring of BP now that patient is s/p renal artery stenting  Likely will require adjustment in antihypertensive regimen  - DAPT as per vascular  - LifeVest    Case discussed and reviewed with Dr Courtney Tompkins who agrees with my assessment and plan        Sim Goldmann, MD  Cardiology Fellow PGY-6    ==========================================================================================    Epic/ Allscripts/Care Everywhere records reviewed:     ** Please Note: Fluency DirectDictation voice to text software may have been used in the creation of this document   **

## 2022-08-01 NOTE — PHYSICAL THERAPY NOTE
Physical Therapy Cancellation Note       08/01/22 1116   PT Last Visit   PT Visit Date 08/01/22   Note Type   Note Type Cancelled Session   Cancel Reasons Patient off floor/test     Pt chart reviewed  Pt placed on BiPAP this AM and is now off the floor at dialysis  PT to continue to follow and see pt as appropriate and able      Gavin Spaulding, PT, DPT

## 2022-08-01 NOTE — ASSESSMENT & PLAN NOTE
63 y/o female with COPD, CHF, HTN, severe SHANTA on CKD stage 3 on HD with right renal atrophy and bilateral high grade renal artery stenosis      Plan:  Pt tentatively scheduled in IR today for Left Renal Artery stent and permacath placement, however, overnight pt developed increased WOB and is now on BiPAP, increase weight by 7lbs, HD planned later today  Will require evaluation and ensure clearance prior to proceeding with procedure today    NPO

## 2022-08-01 NOTE — ASSESSMENT & PLAN NOTE
Lab Results   Component Value Date    EGFR 5 08/01/2022    EGFR 8 07/31/2022    EGFR 6 07/30/2022    CREATININE 6 93 (H) 08/01/2022    CREATININE 5 16 (H) 07/31/2022    CREATININE 6 52 (H) 07/30/2022   · Baseline creatinine 1 to 1 1  · Begun on HD on 07/15 due to acute on chronic systolic heart failure unresponsive to diuretics with worsening hypoxic respiratory failure  · Currently on HD on MWF  · Perm cath/renal art stent as per IR

## 2022-08-02 ENCOUNTER — APPOINTMENT (INPATIENT)
Dept: DIALYSIS | Facility: HOSPITAL | Age: 62
DRG: 252 | End: 2022-08-02
Payer: MEDICARE

## 2022-08-02 LAB
ANION GAP SERPL CALCULATED.3IONS-SCNC: 11 MMOL/L (ref 4–13)
BUN SERPL-MCNC: 61 MG/DL (ref 5–25)
CALCIUM SERPL-MCNC: 8.9 MG/DL (ref 8.3–10.1)
CHLORIDE SERPL-SCNC: 92 MMOL/L (ref 96–108)
CO2 SERPL-SCNC: 22 MMOL/L (ref 21–32)
CREAT SERPL-MCNC: 4.63 MG/DL (ref 0.6–1.3)
GFR SERPL CREATININE-BSD FRML MDRD: 9 ML/MIN/1.73SQ M
GLUCOSE SERPL-MCNC: 132 MG/DL (ref 65–140)
PHOSPHATE SERPL-MCNC: 6.6 MG/DL (ref 2.3–4.1)
POTASSIUM SERPL-SCNC: 3.9 MMOL/L (ref 3.5–5.3)
SODIUM SERPL-SCNC: 125 MMOL/L (ref 135–147)

## 2022-08-02 PROCEDURE — 99232 SBSQ HOSP IP/OBS MODERATE 35: CPT | Performed by: NURSE PRACTITIONER

## 2022-08-02 PROCEDURE — 84100 ASSAY OF PHOSPHORUS: CPT | Performed by: INTERNAL MEDICINE

## 2022-08-02 PROCEDURE — 94640 AIRWAY INHALATION TREATMENT: CPT

## 2022-08-02 PROCEDURE — 94760 N-INVAS EAR/PLS OXIMETRY 1: CPT

## 2022-08-02 PROCEDURE — 99232 SBSQ HOSP IP/OBS MODERATE 35: CPT | Performed by: HOSPITALIST

## 2022-08-02 PROCEDURE — 80048 BASIC METABOLIC PNL TOTAL CA: CPT | Performed by: INTERNAL MEDICINE

## 2022-08-02 PROCEDURE — 99232 SBSQ HOSP IP/OBS MODERATE 35: CPT | Performed by: INTERNAL MEDICINE

## 2022-08-02 PROCEDURE — 99231 SBSQ HOSP IP/OBS SF/LOW 25: CPT | Performed by: INTERNAL MEDICINE

## 2022-08-02 PROCEDURE — 99232 SBSQ HOSP IP/OBS MODERATE 35: CPT | Performed by: PHYSICIAN ASSISTANT

## 2022-08-02 RX ORDER — ATORVASTATIN CALCIUM 40 MG/1
40 TABLET, FILM COATED ORAL
Status: DISCONTINUED | OUTPATIENT
Start: 2022-08-02 | End: 2022-08-03 | Stop reason: HOSPADM

## 2022-08-02 RX ORDER — CLOPIDOGREL BISULFATE 75 MG/1
75 TABLET ORAL DAILY
Status: DISCONTINUED | OUTPATIENT
Start: 2022-08-03 | End: 2022-08-03 | Stop reason: HOSPADM

## 2022-08-02 RX ORDER — CLOPIDOGREL BISULFATE 75 MG/1
300 TABLET ORAL ONCE
Status: COMPLETED | OUTPATIENT
Start: 2022-08-02 | End: 2022-08-02

## 2022-08-02 RX ORDER — ASPIRIN 81 MG/1
81 TABLET, CHEWABLE ORAL DAILY
Status: DISCONTINUED | OUTPATIENT
Start: 2022-08-02 | End: 2022-08-03 | Stop reason: HOSPADM

## 2022-08-02 RX ORDER — AMLODIPINE BESYLATE 5 MG/1
5 TABLET ORAL DAILY
Status: DISCONTINUED | OUTPATIENT
Start: 2022-08-03 | End: 2022-08-03

## 2022-08-02 RX ORDER — PREDNISONE 20 MG/1
40 TABLET ORAL DAILY
Status: DISCONTINUED | OUTPATIENT
Start: 2022-08-03 | End: 2022-08-03 | Stop reason: HOSPADM

## 2022-08-02 RX ADMIN — CARVEDILOL 25 MG: 25 TABLET, FILM COATED ORAL at 17:06

## 2022-08-02 RX ADMIN — IPRATROPIUM BROMIDE 0.5 MG: 0.5 SOLUTION RESPIRATORY (INHALATION) at 07:34

## 2022-08-02 RX ADMIN — LEVALBUTEROL HYDROCHLORIDE 1.25 MG: 1.25 SOLUTION, CONCENTRATE RESPIRATORY (INHALATION) at 07:34

## 2022-08-02 RX ADMIN — ISOSORBIDE DINITRATE 40 MG: 20 TABLET ORAL at 13:41

## 2022-08-02 RX ADMIN — METHYLPREDNISOLONE SODIUM SUCCINATE 20 MG: 40 INJECTION, POWDER, FOR SOLUTION INTRAMUSCULAR; INTRAVENOUS at 08:54

## 2022-08-02 RX ADMIN — HEPARIN SODIUM 5000 UNITS: 5000 INJECTION INTRAVENOUS; SUBCUTANEOUS at 13:42

## 2022-08-02 RX ADMIN — CALCIUM ACETATE 1334 MG: 667 CAPSULE ORAL at 13:41

## 2022-08-02 RX ADMIN — ISOSORBIDE DINITRATE 40 MG: 20 TABLET ORAL at 22:25

## 2022-08-02 RX ADMIN — CALCIUM ACETATE 1334 MG: 667 CAPSULE ORAL at 17:07

## 2022-08-02 RX ADMIN — IPRATROPIUM BROMIDE 0.5 MG: 0.5 SOLUTION RESPIRATORY (INHALATION) at 19:17

## 2022-08-02 RX ADMIN — CLONAZEPAM 1 MG: 1 TABLET ORAL at 20:27

## 2022-08-02 RX ADMIN — CARVEDILOL 25 MG: 25 TABLET, FILM COATED ORAL at 06:14

## 2022-08-02 RX ADMIN — ASPIRIN 81 MG CHEWABLE TABLET 81 MG: 81 TABLET CHEWABLE at 08:47

## 2022-08-02 RX ADMIN — ATORVASTATIN CALCIUM 40 MG: 40 TABLET, FILM COATED ORAL at 17:06

## 2022-08-02 RX ADMIN — LEVALBUTEROL HYDROCHLORIDE 1.25 MG: 1.25 SOLUTION, CONCENTRATE RESPIRATORY (INHALATION) at 19:17

## 2022-08-02 RX ADMIN — CALCIUM ACETATE 1334 MG: 667 CAPSULE ORAL at 08:47

## 2022-08-02 RX ADMIN — HEPARIN SODIUM 5000 UNITS: 5000 INJECTION INTRAVENOUS; SUBCUTANEOUS at 06:14

## 2022-08-02 RX ADMIN — OXYCODONE HYDROCHLORIDE 5 MG: 5 TABLET ORAL at 20:27

## 2022-08-02 RX ADMIN — CLOPIDOGREL BISULFATE 300 MG: 75 TABLET ORAL at 08:47

## 2022-08-02 RX ADMIN — ISOSORBIDE DINITRATE 40 MG: 20 TABLET ORAL at 06:14

## 2022-08-02 NOTE — PLAN OF CARE
Pt on HD treatment for 2 5 hours with a UF goal of 2 L as tolerated   Pt on a 4 k 2 5 med bath for a potassium of 3 9 on 08/02/22  Problem: METABOLIC, FLUID AND ELECTROLYTES - ADULT  Goal: Electrolytes maintained within normal limits  Description: INTERVENTIONS:  - Monitor labs and assess patient for signs and symptoms of electrolyte imbalances  - Administer electrolyte replacement as ordered  - Monitor response to electrolyte replacements, including repeat lab results as appropriate  - Instruct patient on fluid and nutrition as appropriate  Outcome: Progressing  Goal: Fluid balance maintained  Description: INTERVENTIONS:  - Monitor labs   - Monitor I/O and WT  - Instruct patient on fluid and nutrition as appropriate  - Assess for signs & symptoms of volume excess or deficit  Outcome: Progressing

## 2022-08-02 NOTE — ASSESSMENT & PLAN NOTE
· Had increased anxiety  · Home Klonopin was increased from 1 mg daily prn to twice daily prn at [facility] prior to transfer  · Was also begun on Seroquel at [place] which was discontinued after  new rash

## 2022-08-02 NOTE — PROGRESS NOTES
Advanced Heart Failure Team Progress Note - Rachele Moreno 64 y o  female MRN: [id number]    Unit/Bed#: [facility] 501-01 Encounter: [id number]          Subjective:   No overnight events  Patient s/p renal artery stenting with IR  Emotional during interview, wants to go home ASAP, became tearful  After she got her renal artery stenting, states that she has begun to urinate in larger volumes, similar to when she was not on dialysis  Hospital Course:   Rachele Moreno is a 64y o  year old female with a history of COPD, chronic tobaccos use, NICM, HTN, CKD, HLD, Bipolar Disorder, severe bilateral renal artery stenosis who presented to [address] [place] [address] with complaints of SOB and was admitted for acute on chronic CHF exacerbation  Patient was diagnosed with severe nonischemic cardiomyopathy in  [facility] with moderate nonobstructive atherosclerosis), this was followed by improvement in LV function with EF about 50% in   In June 2022, she was admitted to [facility], and was treated for pneumonia, sepsis, heart failure exacerbation as well as COPD  After a week-long hospitalization, she was discharged on oral diuretics  But within 3 weeks she had worsening shortness of breath, and presented back to [facility] (7/12/22)  She did not respond to the IV diuresis, and instead developed acute renal failure with creatinine jump from 1 08 (7/12/22) to 4 5 (7/14/22)  Her EF was noted to have dropped from 40 to 25%, and she was initiated on dialysis  Milrinone was attempted as well, but no diuresis be achieved  Had continued increasing oxygen requirements, and as a result was transferred to [facility] for heart failure evaluation  Vitals: Blood pressure 131/94, pulse 80, temperature 97 9 °F (36 6 °C), resp  rate 16, height 5' 10" (1 778 m), weight 68 6 kg (151 lb 3 8 oz), SpO2 98 %  , Body mass index is 21 7 kg/m² ,   Orthostatic Blood Pressures    Flowsheet Row Most Recent Value   Blood Pressure 131/94 filed at 08/02/2022 1245   Patient Position - Orthostatic VS Lying filed at 08/02/2022 1010            Intake/Output Summary (Last 24 hours) at 8/2/2022 1601  Last data filed at 8/2/2022 1245  Gross per 24 hour   Intake 1620 ml   Output 2500 ml   Net -880 ml       Review of System:  Review of system was conducted and was negative except for as stated in the subjective course        Physical Exam:  GEN: Adelfo Flores appears well, alert and oriented x 3, pleasant and cooperative   HEENT:  Normocephalic, atraumatic, anicteric, moist mucous membranes  NECK: No JVD; no carotid bruits   HEART: reg rhythm, reg rate, normal S1 and S2, no murmur, no clicks, gallops or rubs   LUNGS: slight crackles in b/l LLFs  ABDOMEN:  Normoactive bowel sounds, soft, no distention  EXTREMITIES: peripheral pulses palpable; no edema  NEURO: no gross focal findings; cranial nerves grossly intact   SKIN:  Dry, intact, warm to touch      Current Facility-Administered Medications:     acetaminophen (TYLENOL) tablet 650 mg, 650 mg, Oral, Q6H PRN, Sammy Vale PA-C    albuterol (PROVENTIL HFA,VENTOLIN HFA) inhaler 2 puff, 2 puff, Inhalation, Q4H PRN, Dwight Dutton MD, 2 puff at 07/29/22 0455    albuterol inhalation solution 10 mg, 10 mg, Nebulization, Once, Kelsie Gambino MD    albuterol inhalation solution 2 5 mg, 2 5 mg, Nebulization, Q4H PRN, Tequila Hopkins DO, 2 5 mg at 08/01/22 0457    [START ON 8/3/2022] amLODIPine (NORVASC) tablet 5 mg, 5 mg, Oral, Daily, Naya Batista MD    aspirin chewable tablet 81 mg, 81 mg, Oral, Daily, [address] BRAVO Spencer, 81 mg at 08/02/22 0847    atorvastatin (LIPITOR) tablet 40 mg, 40 mg, Oral, Daily With Dinner, [address] BRAVO Spencer    calcium acetate (PHOSLO) capsule 1,334 mg, 1,334 mg, Oral, TID With Meals, Sammy Vale PA-C, 1,334 mg at 08/02/22 1341    carvedilol (COREG) tablet 25 mg, 25 mg, Oral, BID With Meals, Martinez Curtis MD, 25 mg at 08/02/22 8088   clonazePAM (KlonoPIN) tablet 1 mg, 1 mg, Oral, BID PRN, Nico Orona PA-C, 1 mg at 08/01/22 2111    [START ON 8/3/2022] clopidogrel (PLAVIX) tablet 75 mg, 75 mg, Oral, Daily, Neda Crooks PA-C    diphenhydrAMINE (BENADRYL) tablet 25 mg, 25 mg, Oral, Q6H PRN, Rossana Felton PA-C, 25 mg at 08/01/22 0439    docusate sodium (COLACE) capsule 100 mg, 100 mg, Oral, BID, Seema Huynh PA-C, 100 mg at 08/01/22 1827    guaiFENesin (MUCINEX) 12 hr tablet 600 mg, 600 mg, Oral, Q12H [address], Sara Gunn PA-C, 600 mg at 08/01/22 2100    heparin (porcine) subcutaneous injection 5,000 Units, 5,000 Units, Subcutaneous, Q8H [address], Sara Gunn PA-C, 5,000 Units at 08/02/22 1342    hydrALAZINE (APRESOLINE) injection 10 mg, 10 mg, Intravenous, Q6H PRN, Sara Gunn PA-C, 10 mg at 08/01/22 1453    hydrocortisone 1 % cream, , Topical, 4x Daily PRN, Rossana Felton PA-C, Given at 08/01/22 0138    ipratropium (ATROVENT) 0 02 % inhalation solution 0 5 mg, 0 5 mg, Nebulization, TID, Jarret Porter MD, 0 5 mg at 08/02/22 0734    isosorbide dinitrate (ISORDIL) tablet 40 mg, 40 mg, Oral, Q8H [address], Milagros Davenport DO, 40 mg at 08/02/22 1341    levalbuterol (Biloxi Ip) inhalation solution 1 25 mg, 1 25 mg, Nebulization, TID, Jarret Porter MD, 1 25 mg at 08/02/22 0734    ondansetron (ZOFRAN) injection 4 mg, 4 mg, Intravenous, Q4H PRN, Rossana Felton PA-C, 4 mg at 07/31/22 0716    oxyCODONE (ROXICODONE) IR tablet 2 5 mg, 2 5 mg, Oral, Q4H PRN, Sara Gunn PA-C, 2 5 mg at 07/29/22 1858    oxyCODONE (ROXICODONE) IR tablet 5 mg, 5 mg, Oral, Q4H PRN, Sara Gunn PA-C, 5 mg at 08/01/22 2111    polyethylene glycol (MIRALAX) packet 17 g, 17 g, Oral, Daily PRN, Seema Huynh PA-C    [START ON 8/3/2022] predniSONE tablet 40 mg, 40 mg, Oral, Daily, Lyndsay Steele MD    senna (SENOKOT) tablet 8 6 mg, 1 tablet, Oral, HS, Seema Huynh PA-C, 8 6 mg at 07/31/22 2137    traZODone (DESYREL) tablet 50 mg, 50 mg, Oral, HS, Elizabeth Maki PA-C, 50 mg at 07/28/22 2127    Labs & Results:          Results from last 7 days   Lab Units 08/02/22  0617 08/01/22  0426 07/31/22  0545   POTASSIUM mmol/L 3 9 5 4* 5 9*   CO2 mmol/L 22 21 23   CHLORIDE mmol/L 92* 83* 90*   BUN mg/dL 61* 103* 59*   CREATININE mg/dL 4 63* 6 93* 5 16*     Results from last 7 days   Lab Units 07/31/22  0545 07/29/22  1509   HEMOGLOBIN g/dL 11 4* 11 9   HEMATOCRIT % 34 7* 35 6   PLATELETS Thousands/uL 264 255               Telemetry:   Personally reviewed by Shantel Aguilera MD:     VTE Prophylaxis: Heparin     ==========================================================================================    Assessment:  Principal Problem:    Acute on chronic systolic congestive heart failure (HCC)  Active Problems:    Essential hypertension    Chronic pain    Anxiety    Tobacco abuse    COPD (chronic obstructive pulmonary disease) (HCC)    Acute kidney injury superimposed on CKD-3    Acute on chronic respiratory failure with hypoxia (HCC)    Hepatitis B    Renal artery stenosis ([facility] [address] )    1  NICM with Acute on Chronic HFrEF (25%)  --> [facility] 3/2021 showed moderate non-obstructive atherosclerosis  --> TTE (7/14/22): 25% with segmental RWMA  LVIDd 5 5cm  Moderate MR  Mild AI and TR  2  ARF on HD (MWF)  --> States she started producing large volume urine on 8/2/22, similar to pre-HD  3  Bilateral Severe Renal Artery Stenosis  --> 8/1/22: High grade 18mm in length heavily calcified Left renal artery ostial stenosis  S/p placement of a 15mm x 19mm stent  4  HTN / HLD  5  Bipolar Disorder  6  COPD 2/2 Tobacco Use    Plan:  - c/w Coreg 25mg PO BID  - c/w Isordil 40mg PO Q8H  - c/w Lipitor 40mg PO QHS  - c/w Amlodipine 10mg PO Daily  - Off Hydralazine 2/2 itching  - Close monitoring of BP now that patient is s/p renal artery stenting  Likely will require adjustment in antihypertensive regimen    - DAPT as per vascular  - LifeVest    Case discussed and reviewed with   [address] who agrees with my assessment and plan  Kong Diehl MD  Cardiology Fellow PGY-6    ==========================================================================================    Epic/ Allscripts/Care Everywhere records reviewed:     ** Please Note: Fluency DirectDictation voice to text software may have been used in the creation of this document   **

## 2022-08-02 NOTE — PROGRESS NOTES
Follow up Consultation    Nephrology   Angela Mcfarland 64 y o  female MRN: [id number]  Unit/Bed#: [facility] 501-01 Encounter: [id number]      Physician Requesting Consult: Delana Aase, MD        ASSESSMENT/PLAN:  59-year-old female with multiple comorbidities including CKD stage 3 and CHF admitted with acute exacerbation COPD  Nephrology following for acute kidney injury  Acute kidney injury on CKD stage 3:  SHANTA multifactorial most likely secondary to volume overload in the presence of decompensated heart failure  After review of records In [facility] as well as Care everywhere it appears that the patient has a baseline Creatinine of 1 08 mg/dL on 07/12/2022  patient was admitted with a creatinine of 1-1 2 mg/dL  patient's creatinine today is at 4 6 mg/dL, improving with dialysis  No overt signs of renal recovery at this time  Started on dialysis 7/15/22  Last dialysis treatment on 08/01/2022  Currently on MWF dialysis schedule  Will do short dialysis treatment on 08/02/2022 to help with volume  Then back to usual treatment on 08/03/2022  Status post right IJ PermCath placement with IR on 08/01/2022  check BMP in a m  Await renal recovery  Optimize hemodynamic status to avoid delay in renal recovery  Place on a renal diet when allowed diet order  Avoid nephrotoxins, adjust meds to appropriate GFR  Strict I/O  Daily weights  Urinary retention protocol if patient does not have a [place]  Most likely has underlying CKD secondary to hypertensive nephrosclerosis plus age-related nephron loss  will need to set up patient for follow up with Nephrology as an outpatient post hospitalization  for nephrology as an outpatient patient follows up with no nephrologist  Recommend case management consultation for outpatient dialysis placement    Blood pressure/hypertension:  current medications:  Coreg 25 mg p o  B i d , isosorbide 40 mg p o  T i d , amlodipine 10 mg p o   Q day  recommendations:  Avoid hydralazine due to concern for skin rash/itching  Continue challenge UF with HD  Will decrease Norvasc to 5 mg p o  Q day  Suspecting renal vascular component due to renal artery stenosis  Renal artery Doppler showing right atrophic kidney  Left renal artery greater than 60% stenosis  Status post left renal artery stent on 08/01/2022 with IR  Optimize hemodynamics  Maintain MAP > 65mmHg  Avoid BP fluctuations  H/H/anemia:  most recent hemoglobin at 11 4 grams/deciliter  maintain hemoglobin greater than 8 grams/deciliter    Acid-base electrolytes:    Electrolytes:    Hyponatremia:   Most recent sodium at 125 mEq  To be corrected with dialysis, improving  Continue fluid restriction 1 2 L per day    Hyperphosphatemia:  On PhosLo 2 tabs p o  T i d  With meals  Check phosphorus level in a m  Most recent phosphorus 6 6 may need to adjust binders    Hyperkalemia:  Follow low-potassium diet  Most recent potassium 3 9 mEq  Resolved    Acid-base:    Most recent bicarb at 22, acidosis improving    Other medical problems:  Proteinuria: Most recent UA with no protein as of 07/12/2022  Renal artery stenosis/CHF:  Management per primary team   Status post IR stenting of left renal artery  on 08/01/2022  Most recent echo with EF of 25% as of 07/14/2022 follow-up with Cardiology      Thanks for the consult  Will continue to follow  Please call with questions/ concerns  Above-mentioned orders and Plan in terms of dialysis was discussed with the team in depth via Tiger text    Crispin Das MD, [facility], 8/2/2022, 10:42 AM              Objective :   Patient seen and examined in her room no overnight events hemodynamically stable for dialysis later today for extra treatment to help with volume status post PermCath placement yesterday as well as renal artery stenting    Feels well overall blood pressure stable      PHYSICAL EXAM  /95   Pulse 80   Temp 97 8 °F (36 6 °C) (Oral)   Resp 16   Ht 5' 10" (1 778 m)   Wt 68 6 kg (151 lb 3 8 oz) SpO2 98%   BMI 21 70 kg/m²   Temp (24hrs), Av 9 °F (36 6 °C), Min:97 2 °F (36 2 °C), Max:98 3 °F (36 8 °C)        Intake/Output Summary (Last 24 hours) at 2022 1042  Last data filed at 2022 1015  Gross per 24 hour   Intake 1480 ml   Output 4516 ml   Net -3036 ml       I/O last 24 hours: In: 5581 [P O :480; I V :1000; Other:200]  Out: 4516 [Other:4516]      Current Weight: Weight - Scale: 68 6 kg (151 lb 3 8 oz)  First Weight: Weight - Scale: 79 1 kg (174 lb 6 1 oz)  Physical Exam  Vitals and nursing note reviewed  Constitutional:       General: She is not in acute distress  Appearance: Normal appearance  She is normal weight  She is not ill-appearing, toxic-appearing or diaphoretic  HENT:      Head: Normocephalic and atraumatic  Mouth/Throat:      Mouth: Mucous membranes are moist       Pharynx: Oropharynx is clear  No oropharyngeal exudate  Eyes:      General: No scleral icterus  Conjunctiva/sclera: Conjunctivae normal    Neck:      Comments: Right IJ PermCath  Cardiovascular:      Rate and Rhythm: Normal rate  Heart sounds: Normal heart sounds  No friction rub  Pulmonary:      Effort: Pulmonary effort is normal  No respiratory distress  Breath sounds: No stridor  No wheezing  Comments: Course breath sounds  Abdominal:      General: There is no distension  Palpations: Abdomen is soft  There is no mass  Tenderness: There is no abdominal tenderness  Musculoskeletal:         General: No swelling  Cervical back: Normal range of motion and neck supple  No rigidity  Skin:     General: Skin is warm  Coloration: Skin is not jaundiced  Neurological:      General: No focal deficit present  Mental Status: She is alert and oriented to person, place, and time  Psychiatric:         Mood and Affect: Mood normal          Behavior: Behavior normal              Review of Systems   Constitutional: Positive for fatigue  Negative for chills  HENT: Negative for congestion  Respiratory: Negative for cough, shortness of breath and wheezing  Cardiovascular: Negative for leg swelling  Gastrointestinal: Negative for abdominal pain and vomiting  Genitourinary: Positive for decreased urine volume  Musculoskeletal: Negative for back pain  Neurological: Negative for headaches  Psychiatric/Behavioral: Negative for agitation  All other systems reviewed and are negative        Scheduled Meds:  Current Facility-Administered Medications   Medication Dose Route Frequency Provider Last Rate    acetaminophen  650 mg Oral Q6H PRN Alois Necessary, BRAVO      albuterol  2 puff Inhalation Q4H PRN Jayce Pelayo MD      albuterol  10 mg Nebulization Once Leticia Prater MD      albuterol  2 5 mg Nebulization Q4H PRN Tequila Hopkins, DO      amLODIPine  10 mg Oral Daily Kendrick Ibarra DO      aspirin  81 mg Oral Daily Monty Joaquin PA-C      atorvastatin  40 mg Oral Daily With [employer]igroupBRAVO      calcium acetate  1,334 mg Oral TID With Meals Alois Necessary, BRAVO      carvedilol  25 mg Oral BID With Meals Mara Daniel MD      clonazePAM  1 mg Oral BID PRN Abdullahi Mcintosh PA-C      [START ON 8/3/2022] clopidogrel  75 mg Oral Daily Monty Joaquin PA-C      diphenhydrAMINE  25 mg Oral Q6H PRN Tempie Cords, BRAVO      docusate sodium  100 mg Oral BID Seema Huynh PA-C      guaiFENesin  600 mg Oral Q12H [address] Alois Necessary, BRAVO      heparin (porcine)  5,000 Units Subcutaneous [facility] Alois NecessaryMelanie      hydrALAZINE  10 mg Intravenous Q6H PRN Alois Necessary, BRAVO      hydrocortisone   Topical 4x Daily PRN Tempie Cords, BRAVO      ipratropium  0 5 mg Nebulization TID Jayce Pelayo MD      isosorbide dinitrate  40 mg Oral [facility] Kendrick Ibarra,       levalbuterol  1 25 mg Nebulization TID Jayce Pelayo MD      methylPREDNISolone sodium succinate  20 mg Intravenous Q12H [address] Tequila Hopkins, DO      ondansetron  4 mg Intravenous Q4H PRN Dalia Hsu PA-C      oxyCODONE  2 5 mg Oral Q4H PRN Orlin ColBRAVO good      oxyCODONE  5 mg Oral Q4H PRN Orlin Flores PA-C      polyethylene glycol  17 g Oral Daily PRN Jannis Soulier, PA-C      senna  1 tablet Oral HS Seema HuynhBRAVO      traZODone  50 mg Oral HS Dalia Hsu PA-C         PRN Meds:   acetaminophen    albuterol    albuterol    clonazePAM    diphenhydrAMINE    hydrALAZINE    hydrocortisone    ondansetron    oxyCODONE    oxyCODONE    polyethylene glycol    Continuous Infusions:       Invasive Devices: Invasive Devices  Report    Peripheral Intravenous Line  Duration           Peripheral IV 08/01/22 Right;Ventral (anterior) Forearm 1 day          Hemodialysis Catheter  Duration           HD Permanent Double Catheter <1 day                  LABORATORY:    Results from last 7 days   Lab Units 08/02/22  0617 08/01/22  0426 07/31/22  0545 07/30/22  1229 07/29/22  1509 07/28/22  0403 07/27/22  0432   WBC Thousand/uL  --   --  11 86*  --  11 52*  --   --    HEMOGLOBIN g/dL  --   --  11 4*  --  11 9  --   --    HEMATOCRIT %  --   --  34 7*  --  35 6  --   --    PLATELETS Thousands/uL  --   --  264  --  255  --   --    POTASSIUM mmol/L 3 9 5 4* 5 9* 5 5* 4 3 5 6* 4 8   CHLORIDE mmol/L 92* 83* 90* 88* 92* 92* 92*   CO2 mmol/L 22 21 23 25 29 26 24   BUN mg/dL 61* 103* 59* 66* 47* 49* 63*   CREATININE mg/dL 4 63* 6 93* 5 16* 6 52* 5 07* 5 84* 7 22*   CALCIUM mg/dL 8 9 9 2 9 9 9 6 9 4 9 8 9 5   PHOSPHORUS mg/dL 6 6*  --   --   --   --   --   --       rest all reviewed    RADIOLOGY:  XR chest portable   Final Result by Authur Sever, MD (07/29 3247)      Pulmonary edema with small bilateral pleural effusions is similar to prior study                    Workstation performed: [id number]         IR temporary dialysis catheter check/change/reposition   Final Result by Chino James MD (08/01 5907)      Insertion of right-sided non-tunneled dual-lumen temporary dialysis catheter  Chest radiograph was ordered to confirm catheter tip position  Workstation performed: [id number]         VAS renal artery complete   Final Result by Dorinda Zimmer MD (07/24 1949)      IR renal angiogram    (Results Pending)   IR tunneled dialysis catheter placement    (Results Pending)     Rest all reviewed    Portions of the record may have been created with voice recognition software  Occasional wrong word or "sound a like" substitutions may have occurred due to the inherent limitations of voice recognition software  Read the chart carefully and recognize, using context, where substitutions have occurred  If you have any questions, please contact the dictating provider

## 2022-08-02 NOTE — PHYSICAL THERAPY NOTE
Physical Therapy Cancellation Note           08/02/22 6065   PT Last Visit   PT Visit Date 08/02/22   Note Type   Note Type Cancelled Session   Cancel Reasons Patient off floor/test  (Pt chart reviewed  Pt currently off floor at dialysis    PT to continue to follow and see pt as appropriate and able )     Yuri Lamb, PT, DPT

## 2022-08-02 NOTE — ASSESSMENT & PLAN NOTE
· Uncontrolled hypertension  · Home meds : Carvedilol 3 125 mg BID, lasix 40 mg daily  · Currently on Carvedilol 25 mg twice daily, , Isordil 40 mg q 8 hours  Amlodipine - decrease post stent to 5 mg    Hold hydralazine for possible rash  · Workup revealed high-grade stenosis of left renal artery - s/p left renal artery stenting on 8/1 by IR - cont DAPT, statin  · Cardiology following  · Cardiology/vascular input appreciated

## 2022-08-02 NOTE — ASSESSMENT & PLAN NOTE
65 y/o female with COPD, CHF, HTN, severe SHANTA on CKD stage 3 on HD with right renal atrophy and bilateral high grade renal artery stenosis      S/P Renal Agram, Left Renal artery stent, Permacath placement 8/1    Plan:  Recommend DAPT and stain therapy for Renal artery stent  No further plan for vascular intervention  [facility]patient [facility] in [facility]

## 2022-08-02 NOTE — OCCUPATIONAL THERAPY NOTE
Occupational Therapy cx        Patient Name: Angela Mcfarland  [id number] Date: 8/2/2022 08/02/22 6477   Note Type   Note Type Cancelled Session   Cancel Reasons Patient off floor/test  (attempted afternoon session, pt is off floor at dialysis  Will hold and address prn )         Monae Garcia, MARBELLA, OTR/L

## 2022-08-02 NOTE — ASSESSMENT & PLAN NOTE
Lab Results   Component Value Date    EGFR 9 08/02/2022    EGFR 5 08/01/2022    EGFR 8 07/31/2022    CREATININE 4 63 (H) 08/02/2022    CREATININE 6 93 (H) 08/01/2022    CREATININE 5 16 (H) 07/31/2022   · Baseline creatinine 1 to 1 1  · Begun on HD on 07/15 due to acute on chronic systolic heart failure unresponsive to diuretics with worsening hypoxic respiratory failure  · Currently on HD on MWF  · Perm cath/renal art stent done on 8/1 by IR

## 2022-08-02 NOTE — PROGRESS NOTES
[address] [facility]  Progress Note - Salvatore Webber 1960, 64 y o  female MRN: [id number]  Unit/Bed#: [facility] 501-01 Encounter: [id number]  Primary Care Provider: Jodee Nunez DO   Date and time admitted to hospital: 7/22/2022  9:51 PM    Renal artery stenosis [facility])  Assessment & Plan  65 y/o female with COPD, CHF, HTN, severe SHANTA on CKD stage 3 on HD with right renal atrophy and bilateral high grade renal artery stenosis  S/P Renal Agram, Left Renal artery stent, Permacath placement 8/1    Plan:  Recommend DAPT and stain therapy for Renal artery stent  No further plan for vascular intervention  Outpatient follow-up in the Vascular Center        Subjective:  Pt feeling better today, on RA, no SOB    Vitals:  /74   Pulse 84   Temp 98 2 °F (36 8 °C) (Oral)   Resp 18   Ht 5' 10" (1 778 m)   Wt 68 6 kg (151 lb 3 8 oz)   SpO2 94%   BMI 21 70 kg/m²     I/Os:  I/O last 3 completed shifts: In: 2762 [P O :1762; I V :800; Other:200]  Out: 4516 [Other:4516]  I/O this shift: In: 480 [P O :480]  Out: -     Lab Results and Cultures:   Lab Results   Component Value Date    WBC 11 86 (H) 07/31/2022    HGB 11 4 (L) 07/31/2022    HCT 34 7 (L) 07/31/2022    MCV 87 07/31/2022     07/31/2022     Lab Results   Component Value Date    CALCIUM 9 2 08/01/2022     04/05/2018    K 5 4 (H) 08/01/2022    CO2 21 08/01/2022    CL 83 (L) 08/01/2022     (H) 08/01/2022    CREATININE 6 93 (H) 08/01/2022     Lab Results   Component Value Date    INR 1 22 (H) 06/14/2022    INR 1 09 06/13/2021    INR 1 32 (H) 03/27/2021    PROTIME 15 3 (H) 06/14/2022    PROTIME 14 0 06/13/2021    PROTIME 16 3 (H) 03/27/2021        Blood Culture:   Lab Results   Component Value Date    BLOODCX No Growth After 5 Days   06/14/2022   ,   Urinalysis:   Lab Results   Component Value Date    COLORU Straw 07/12/2022    CLARITYU Clear 07/12/2022    SPECGRAV <=1 005 (L) 07/12/2022    PHUR 7 0 07/12/2022 PHUR 7 3 04/05/2018    LEUKOCYTESUR Negative 07/12/2022    NITRITE Negative 07/12/2022    GLUCOSEU Negative 07/12/2022    KETONESU Negative 07/12/2022    BILIRUBINUR Negative 07/12/2022    BLOODU Negative 07/12/2022   ,   Urine Culture: No results found for: URINECX,   Wound Culure: No results found for: WOUNDCULT    Medications:  Current Facility-Administered Medications   Medication Dose Route Frequency    acetaminophen (TYLENOL) tablet 650 mg  650 mg Oral Q6H PRN    albuterol (PROVENTIL HFA,VENTOLIN HFA) inhaler 2 puff  2 puff Inhalation Q4H PRN    albuterol inhalation solution 10 mg  10 mg Nebulization Once    albuterol inhalation solution 2 5 mg  2 5 mg Nebulization Q4H PRN    amLODIPine (NORVASC) tablet 10 mg  10 mg Oral Daily    calcium acetate (PHOSLO) capsule 1,334 mg  1,334 mg Oral TID With Meals    carvedilol (COREG) tablet 25 mg  25 mg Oral BID With Meals    clonazePAM (KlonoPIN) tablet 1 mg  1 mg Oral BID PRN    diphenhydrAMINE (BENADRYL) tablet 25 mg  25 mg Oral Q6H PRN    docusate sodium (COLACE) capsule 100 mg  100 mg Oral BID    guaiFENesin (MUCINEX) 12 hr tablet 600 mg  600 mg Oral Q12H [facility]    heparin (porcine) subcutaneous injection 5,000 Units  5,000 Units Subcutaneous Q8H [facility]    hydrALAZINE (APRESOLINE) injection 10 mg  10 mg Intravenous Q6H PRN    hydrocortisone 1 % cream   Topical 4x Daily PRN    ipratropium (ATROVENT) 0 02 % inhalation solution 0 5 mg  0 5 mg Nebulization TID    isosorbide dinitrate (ISORDIL) tablet 40 mg  40 mg Oral Q8H [facility]    levalbuterol (XOPENEX) inhalation solution 1 25 mg  1 25 mg Nebulization TID    methylPREDNISolone sodium succinate (Solu-MEDROL) injection 20 mg  20 mg Intravenous Q12H [facility]    ondansetron (ZOFRAN) injection 4 mg  4 mg Intravenous Q4H PRN    oxyCODONE (ROXICODONE) IR tablet 2 5 mg  2 5 mg Oral Q4H PRN    oxyCODONE (ROXICODONE) IR tablet 5 mg  5 mg Oral Q4H PRN    polyethylene glycol (MIRALAX) packet 17 g  17 g Oral Daily PRN    senna (SENOKOT) tablet 8 6 mg  1 tablet Oral HS    traZODone (DESYREL) tablet 50 mg  50 mg Oral HS       Physical Exam:    General appearance: alert and oriented, in no acute distress  Lungs: clear to auscultation bilaterally  Heart: regular rate and rhythm, S1, S2 normal, no murmur, click, rub or gallop  Abdomen: soft, non-tender; bowel sounds normal; no masses,  no organomegaly  Extremities: extremities normal, warm and well-perfused; no cyanosis, clubbing, or edema    Wound/Incision:  Right groin, permacath dressing clean, dry, and intact        Shan Pierre PA-C  8/2/2022

## 2022-08-02 NOTE — HEMODIALYSIS
Post-Dialysis RN Treatment Note    Blood Pressure:  Pre 131/81 mm/Hg  Post 131/94 mmHg   EDW  TBD kg    Weight:  Pre 69 8 kg   Post 67 2 kg   Mode of weight measurement: Standing Scale   Volume Removed  2000 ml    Treatment duration 150 minutes    NS given  No    Treatment shortened?  No   Medications given during Rx None Reported   Estimated Kt/V  1 58   Access type: Permacath/TDC   Access Issues: No    Report called to primary nurse   Yes

## 2022-08-02 NOTE — ASSESSMENT & PLAN NOTE
· On O2 at 2L at rest and 4L with exertion at home  · Worsening hypoxia due to acute on chronic systolic heart failure  · Wean O2 as able - now on RA None

## 2022-08-02 NOTE — ASSESSMENT & PLAN NOTE
· Initial concern for exacerbation during stay at [facility] prior to transfer here for which she was given 3 doses of Solu-Medrol 40 mg IV which was then discontinued  · Home regimen: Anoro Ellipta (62 5/25mcg) 1 puff daily  · Continue Xopenex/Atrovent nebs  · Was again on IV solumedrol - change to PO from tomorrow

## 2022-08-02 NOTE — CASE MANAGEMENT
Case Management Discharge Planning Note    Patient name Iwona Mata  Location [facility] MRN [id number]  : 1960 Date 2022       Current Admission Date: 2022  Current Admission Diagnosis:Acute on chronic systolic congestive heart failure [facility])   Patient Active Problem List    Diagnosis Date Noted    Renal artery stenosis ([facility] ) 2022    Hepatitis B 2022    Acute on chronic respiratory failure with hypoxia ([facility] ) 2022    Acute respiratory insufficiency 2022    Acute kidney injury superimposed on CKD-3 2022    COPD (chronic obstructive pulmonary disease) ([facility] ) 06/15/2022    Acute on chronic systolic congestive heart failure ([facility] ) 2021    Tobacco abuse 2021    Leukocytosis 2021    Anxiety 2021    Dilated cardiomyopathy ([facility] ) 2021    Essential hypertension 2021    Chronic pain 2021    Vitamin D deficiency 2013      LOS (days): 11  Geometric Mean LOS (GMLOS) (days): 3 80  Days to GMLOS:-7     OBJECTIVE:  Risk of Unplanned Readmission Score: 30 55         Current admission status: Inpatient   Preferred Pharmacy:   [address] [place] [address]  [address] [address]  Phone: [phone] Fax: [phone]    Primary Care Provider: Ramon Ignacio DO    Primary Insurance: MEDICARE MISC REPLACEMENT  Secondary Insurance: [place] [employer] [facility]    DISCHARGE DETAILS:    Additional Comments: Informed by Dr Hernan Gant, that patient is cleared for dc and want to go home today  DG MILLER  [facility] admissions [phone] ext [phone] and spoke to Philip Gutierrez is reviewing for TTS spot  [facility] B[facility]tjamison had closed previous referral since we had no definate dc date  They now have reopened a referral with new Ref tracking # [id number]  All records were sent to [facility]  CM called Jim Gutierrez and spoke to Nadia Fairbanks   She reports they have no open spot since pt working on home HD has not discharged and no dc date yet  met with patient with MD  Patient angry, agitated, tearful and insists "CM just make it all happen"  Patient wants to leave the hospital and risks of severe illness and need for readmission were explained  Patient is not listening and insists she wants and needs to leave  CM called [address] Admissions and spoke to [facility] who reports [facility]  [facility] will sent clinicals to Franklyn Garcia for review  They may have open spot TTS 1st and 2nd shift  Patient aware that Sergei Lyn is reviewing and says she drive ther if needed  Told pt she can be on wait list for when Rob has an opening  CM called Cecile Rubio and spoke to [facility] who reports they await decision from [facility]  CM called Franklyn Garcia, [phone] and left message to call CM about patient  CM now was informed by [facility] that patient is recommedned for [facility]  This has not been ordered or discussed with CM  This will be barrier to HD as units can only accept pt with [facility] after staff has training

## 2022-08-02 NOTE — NURSING NOTE
Patient has become increasingly agitated and upset throughout the day in regards to her discharge  Patient is adamant that she "needs to go home and take care of things"  Patient states that we "are incompetent and should not have told her she had a dialysis chair and then taken it away" in regards to her discharge planning  Patient states " I have talked to the doctor, and he said I can go home tomorrow after my blood work is done  I will be up and waiting at 6AM for you to come draw my blood work  I will be washed and dressed and you better have everyone signed off  I am leaving  I can have visiting nurses, PT come to my house to work with me  I am leaving tomorrow morning"  When this nurse attempted to educate the patient regarding discharge status, the patient again began crying and reiterating how incompetent we are and states that '"we have no idea what it is like to be going through what she is"

## 2022-08-02 NOTE — PROGRESS NOTES
Progress Note -Interventional Radiology NP  Samina Lu 64 y o  female MRN: [id number]  Unit/Bed#: [facility] 501-01 Encounter: [id number]    Assessment/Plan:    64year old female HTN and SOB, dialysis dependent, who underwent permacath placement and left renal artery stenting for severe renal artery stenosis and atherosclerosis on 8/1/22 by IR  Patient received 5 mm x 19 mm balloon expandable stent and no gradient was present post intervention  Right groin soft, no hematoma  - DAPT and statin for renal artery stent per vascular   - follow up per vascular   - rest of care per primary      Subjective: Samina Lu is a 64 y o  female who presented with SOB, patient has undergone extensive workup through medicine and nephrology for volume overload issues  She is currently at dialysis and expressed happiness over her procedure yesterday and her tunneled line use  She is hopeful to go home as soon as possible  Patient Active Problem List   Diagnosis    Vitamin D deficiency    Dilated cardiomyopathy ([facility] )    Essential hypertension    Chronic pain    Anxiety    Acute on chronic systolic congestive heart failure (HCC)    Tobacco abuse    Leukocytosis    COPD (chronic obstructive pulmonary disease) ([facility] )    Acute kidney injury superimposed on CKD-3    Acute respiratory insufficiency    Acute on chronic respiratory failure with hypoxia (HCC)    Hepatitis B    Renal artery stenosis (HCC)          Objective:    Vitals:  /85   Pulse 80   Temp 97 8 °F (36 6 °C) (Oral)   Resp 16   Ht 5' 10" (1 778 m)   Wt 68 6 kg (151 lb 3 8 oz)   SpO2 98%   BMI 21 70 kg/m²   Body mass index is 21 7 kg/m²    Weight (last 2 days)     Date/Time Weight    08/02/22 0600 68 6 (151 24)    08/01/22 0106 70 9 (156 31)    07/31/22 0308 67 7 (149 3)          I/Os:    Intake/Output Summary (Last 24 hours) at 8/2/2022 1236  Last data filed at 8/2/2022 1015  Gross per 24 hour   Intake 1620 ml   Output 4516 ml   Net -2896 ml       Invasive Devices  Report    Peripheral Intravenous Line  Duration           Peripheral IV 08/01/22 Right;Ventral (anterior) Forearm 1 day          Hemodialysis Catheter  Duration           HD Permanent Double Catheter <1 day                Physical Exam:  Physical Exam  Vitals and nursing note reviewed  Constitutional:       General: She is not in acute distress  Appearance: She is well-developed  HENT:      Head: Normocephalic and atraumatic  Eyes:      Conjunctiva/sclera: Conjunctivae normal    Cardiovascular:      Rate and Rhythm: Normal rate and regular rhythm  Pulses:           Dorsalis pedis pulses are 2+ on the right side and 2+ on the left side  Heart sounds: No murmur heard  Pulmonary:      Effort: Pulmonary effort is normal  No respiratory distress  Breath sounds: Normal breath sounds  Abdominal:      Palpations: Abdomen is soft  Tenderness: There is no abdominal tenderness  Musculoskeletal:      Cervical back: Neck supple  Skin:     General: Skin is warm and dry  Capillary Refill: Capillary refill takes less than 2 seconds  Comments: R groin puncture site soft, no hematoma   Neurological:      Mental Status: She is alert  Psychiatric:         Speech: Speech is tangential          Behavior: Behavior normal  Behavior is cooperative                        Lab Results and Cultures:   CBC with diff:   Lab Results   Component Value Date    WBC 11 86 (H) 07/31/2022    HGB 11 4 (L) 07/31/2022    HCT 34 7 (L) 07/31/2022    MCV 87 07/31/2022     07/31/2022    MCH 28 7 07/31/2022    MCHC 32 9 07/31/2022    RDW 13 6 07/31/2022    MPV 10 2 07/31/2022    NRBC 0 07/31/2022      BMP/CMP:  Lab Results   Component Value Date     04/05/2018    K 3 9 08/02/2022    K 4 6 04/05/2018    CL 92 (L) 08/02/2022     04/05/2018    CO2 22 08/02/2022    CO2 32 (H) 04/05/2018    ANIONGAP 11 6 04/05/2018    BUN 61 (H) 08/02/2022    BUN 14 04/05/2018    CREATININE 4 63 (H) 08/02/2022    CREATININE 0 72 04/05/2018    CALCIUM 8 9 08/02/2022    CALCIUM 10 4 04/05/2018    AST 15 07/23/2022    AST 16 04/05/2018    ALT 18 07/23/2022    ALT 15 04/05/2018    ALKPHOS 92 07/23/2022    ALKPHOS 92 04/05/2018    PROT 8 1 04/05/2018    BILITOT 0 5 04/05/2018    EGFR 9 08/02/2022   ,     Coags:   Lab Results   Component Value Date    PTT 34 06/14/2022    INR 1 22 (H) 06/14/2022   ,          Lipid Panel:   Lab Results   Component Value Date    CHOL 216 (H) 04/05/2018     Lab Results   Component Value Date    HDL 43 (L) 05/12/2022     Lab Results   Component Value Date    HDL 43 (L) 05/12/2022     Lab Results   Component Value Date    LDLCALC 113 (H) 05/12/2022     Lab Results   Component Value Date    TRIG 91 05/12/2022       HgbA1c:   Lab Results   Component Value Date    HGBA1C 5 5 03/27/2021       Blood Culture:   Lab Results   Component Value Date    BLOODCX No Growth After 5 Days  06/14/2022   ,   Urinalysis:   Lab Results   Component Value Date    COLORU Straw 07/12/2022    CLARITYU Clear 07/12/2022    SPECGRAV <=1 005 (L) 07/12/2022    PHUR 7 0 07/12/2022    PHUR 7 3 04/05/2018    LEUKOCYTESUR Negative 07/12/2022    NITRITE Negative 07/12/2022    GLUCOSEU Negative 07/12/2022    KETONESU Negative 07/12/2022    BILIRUBINUR Negative 07/12/2022    BLOODU Negative 07/12/2022   ,   Urine Culture: No results found for: URINECX,   Wound Culure:  No results found for: WOUNDCULT    VTE Pharmacologic Prophylaxis: Heparin      Thank you for allowing me to participate in the care of [employer]  Please don't hesitate to call, text, email, or TigerText with any questions  This text is generated with voice recognition software  There may be translation, syntax,  or grammatical errors  If you have any questions, please contact the dictating provider

## 2022-08-02 NOTE — ASSESSMENT & PLAN NOTE
Wt Readings from Last 3 Encounters:   08/02/22 68 6 kg (151 lb 3 8 oz)   07/22/22 77 9 kg (171 lb 11 8 oz)   06/28/22 77 4 kg (170 lb 9 6 oz)     · Presented to [facility] on 7/12 with shortness of breath with minimal exertion  · Found to be in acute on chronic systolic heart failure  · Was unresponsive to diuretics and became anuric  · History of nonischemic cardiomyopathy - EF 18% on 3/37/21   Improved to 50% on 7/9/21  · [facility] with acute on chronic combined CHF and COPD exacerbation  · Repeat echo on 7/14/22 with EF 25%  · Cardiac cath in March 2021 showed moderate nonobstructive atherosclerosis  · Currently dialysis dependent  · Volume control through hemodialysis

## 2022-08-02 NOTE — PROGRESS NOTES
[facility]  Progress Note Russ Davis 1960, 64 y o  female MRN: [id number]  Unit/Bed#: [facility] 501-01 Encounter: [id number]  Primary Care Provider: Olivia Zuñiga DO   Date and time admitted to hospital: 7/22/2022  9:51 PM    Renal artery stenosis [facility])  Assessment & Plan  S/p left renal A stenting on 8/1 by IR    Acute on chronic respiratory failure with hypoxia (HCC)  Assessment & Plan  · On O2 at 2L at rest and 4L with exertion at home  · Worsening hypoxia due to acute on chronic systolic heart failure  · Wean O2 as able - now on RA    Acute kidney injury superimposed on CKD-3  Assessment & Plan  Lab Results   Component Value Date    EGFR 9 08/02/2022    EGFR 5 08/01/2022    EGFR 8 07/31/2022    CREATININE 4 63 (H) 08/02/2022    CREATININE 6 93 (H) 08/01/2022    CREATININE 5 16 (H) 07/31/2022   · Baseline creatinine 1 to 1 1  · Begun on HD on 07/15 due to acute on chronic systolic heart failure unresponsive to diuretics with worsening hypoxic respiratory failure  · Currently on HD on MWF  · Perm cath/renal art stent done on 8/1 by IR      COPD (chronic obstructive pulmonary disease) ([facility] [address] )  Assessment & Plan  · Initial concern for exacerbation during stay at [facility] prior to transfer here for which she was given 3 doses of Solu-Medrol 40 mg IV which was then discontinued  · Home regimen: Anoro Ellipta (62 5/25mcg) 1 puff daily  · Continue Xopenex/Atrovent nebs  · Was again on IV solumedrol - change to PO from tomorrow    Tobacco abuse  Assessment & Plan  · Nicotine patch  · Smoking cessation advised    Anxiety  Assessment & Plan  · Had increased anxiety  · Home Klonopin was increased from 1 mg daily prn to twice daily prn at [facility] prior to transfer  · Was also begun on Seroquel at [place] which was discontinued after  new rash      Chronic pain  Assessment & Plan  · Continuous opioid dependent  · home meds MS Contin and Percocet  · Currently on prn oxycodone    Essential hypertension  Assessment & Plan  · Uncontrolled hypertension  · Home meds : Carvedilol 3 125 mg BID, lasix 40 mg daily  · Currently on Carvedilol 25 mg twice daily, , Isordil 40 mg q 8 hours  Amlodipine - decrease post stent to 5 mg  Hold hydralazine for possible rash  · Workup revealed high-grade stenosis of left renal artery - s/p left renal artery stenting on 8/1 by IR - cont DAPT, statin  · Cardiology following  · Cardiology/vascular input appreciated        * Acute on chronic systolic congestive heart failure (HCC)  Assessment & Plan  Wt Readings from Last 3 Encounters:   08/02/22 68 6 kg (151 lb 3 8 oz)   07/22/22 77 9 kg (171 lb 11 8 oz)   06/28/22 77 4 kg (170 lb 9 6 oz)     · Presented to [address] on 7/12 with shortness of breath with minimal exertion  · Found to be in acute on chronic systolic heart failure  · Was unresponsive to diuretics and became anuric  · History of nonischemic cardiomyopathy - EF 18% on 3/37/21  Improved to 50% on 7/9/21  · EF 41% on 6/15/22 when admitted to [place] [address] from 6/15/22 to 6/22/22 with acute on chronic combined CHF and COPD exacerbation  · Repeat echo on 7/14/22 with EF 25%  · Cardiac cath in March 2021 showed moderate nonobstructive atherosclerosis  · Currently dialysis dependent  · Volume control through hemodialysis               VTE Pharmacologic Prophylaxis: VTE Score: 5 Moderate Risk (Score 3-4) - Pharmacological DVT Prophylaxis Ordered: heparin  Patient Centered Rounds: I performed bedside rounds with nursing staff today  Discussions with Specialists or Other Care Team Provider:     Education and Discussions with Family / Patient: patient  Time Spent for Care: 30 minutes  More than 50% of total time spent on counseling and coordination of care as described above      Current Length of Stay: 11 day(s)  Current Patient Status: Inpatient   Certification Statement: The patient will continue to require additional inpatient hospital stay due to set up OP HD & lifevest  Discharge Plan: Anticipate discharge in 24-48 hrs to home  Code Status: Level 1 - Full Code    Subjective:   Anxious to leave, states she has to leave today, feels fine and can take herself to HD  Later started shouting & crying upon hearing that there is no HD opening for her    Objective:     Vitals:   Temp (24hrs), Av °F (36 7 °C), Min:97 2 °F (36 2 °C), Max:98 3 °F (36 8 °C)    Temp:  [97 2 °F (36 2 °C)-98 3 °F (36 8 °C)] 97 9 °F (36 6 °C)  HR:  [74-93] 80  Resp:  [16-18] 16  BP: ()/(50-95) 131/94  SpO2:  [89 %-98 %] 98 %  Body mass index is 21 7 kg/m²  Input and Output Summary (last 24 hours): Intake/Output Summary (Last 24 hours) at 2022 1644  Last data filed at 2022 1245  Gross per 24 hour   Intake 1620 ml   Output 2500 ml   Net -880 ml       Physical Exam:   Physical Exam  Vitals reviewed  HENT:      Head: Normocephalic and atraumatic  Mouth/Throat:      Mouth: Mucous membranes are moist    Cardiovascular:      Rate and Rhythm: Normal rate and regular rhythm  Heart sounds: Normal heart sounds  Pulmonary:      Effort: Pulmonary effort is normal  No respiratory distress  Breath sounds: Normal breath sounds  Abdominal:      General: There is no distension  Palpations: Abdomen is soft  Tenderness: There is no abdominal tenderness  Musculoskeletal:      Right lower leg: No edema  Left lower leg: No edema  Neurological:      Mental Status: She is alert and oriented to person, place, and time            Additional Data:     Labs:  Results from last 7 days   Lab Units 22  0545   WBC Thousand/uL 11 86*   HEMOGLOBIN g/dL 11 4*   HEMATOCRIT % 34 7*   PLATELETS Thousands/uL 264   NEUTROS PCT % 82*   LYMPHS PCT % 14   MONOS PCT % 4   EOS PCT % 0     Results from last 7 days   Lab Units 22  0617   SODIUM mmol/L 125*   POTASSIUM mmol/L 3 9   CHLORIDE mmol/L 92*   CO2 mmol/L 22   BUN mg/dL 61*   CREATININE mg/dL 4 63*   ANION GAP mmol/L 11   CALCIUM mg/dL 8 9   GLUCOSE RANDOM mg/dL 132         Results from last 7 days   Lab Units 22  0952   POC GLUCOSE mg/dl 208*               Lines/Drains:  Invasive Devices  Report    Peripheral Intravenous Line  Duration           Peripheral IV 22 Right;Ventral (anterior) Forearm 1 day          Hemodialysis Catheter  Duration           HD Permanent Double Catheter <1 day                  Telemetry:  Telemetry Orders (From admission, onward)             48 Hour Telemetry Monitoring  Continuous x 48 hours           References:    Telemetry Guidelines   Question:  Reason for 48 Hour Telemetry  Answer:  Arrhythmias Requiring Medical Therapy (eg  SVT, Vtach/fib, Bradycardia, Uncontrolled A-fib)                 Telemetry Reviewed: Normal Sinus Rhythm  Indication for Continued Telemetry Use: Lifevest (remains on tele entire hospital stay)             Imaging: No pertinent imaging reviewed      Recent Cultures (last 7 days):         Last 24 Hours Medication List:   Current Facility-Administered Medications   Medication Dose Route Frequency Provider Last Rate    acetaminophen  650 mg Oral Q6H PRN Easton Javris PA-C      albuterol  2 puff Inhalation Q4H PRN Napoleon Pan MD      albuterol  10 mg Nebulization Once Tu Wall MD      albuterol  2 5 mg Nebulization Q4H PRN Tequila Hopkins DO      [START ON 8/3/2022] amLODIPine  5 mg Oral Daily Justa Delarosa MD      aspirin  81 mg Oral Daily Kenya Conway PA-C      atorvastatin  40 mg Oral Daily With BRAVO Shaikh      calcium acetate  1,334 mg Oral TID With Meals Easton Jarvis PA-C      carvedilol  25 mg Oral BID With Meals Isela Pan MD      clonazePAM  1 mg Oral BID PRN Henrry Alegre PA-C      [START ON 8/3/2022] clopidogrel  75 mg Oral Daily Kenya Conway PA-C      diphenhydrAMINE  25 mg Oral Q6H PRN Katelynn Zavala PA-C      docusate sodium  100 mg Oral BID Anahi Tejada PA-C      guaiFENesin  600 mg Oral Q12H [address] [place] Filler, BRAVO      heparin (porcine)  5,000 Units Subcutaneous [facility] [place]      hydrALAZINE  10 mg Intravenous Q6H PRN Jacinta Montano, BRAVO      hydrocortisone   Topical 4x Daily PRN Yessy Elaine PA-C      ipratropium  0 5 mg Nebulization TID Hema Hunt MD      isosorbide dinitrate  40 mg Oral [facility] Jon Trinidad DO      levalbuterol  1 25 mg Nebulization TID Hema Hunt MD      ondansetron  4 mg Intravenous Q4H PRN Yessy Elaine PA-C      oxyCODONE  2 5 mg Oral Q4H PRN Jacinta Filler, BRAVO      oxyCODONE  5 mg Oral Q4H PRN Jacinta Filler, BRAVO      polyethylene glycol  17 g Oral Daily PRN Seema Huynh PA-C      [START ON 8/3/2022] predniSONE  40 mg Oral Daily Lucille Griffiths MD      senna  1 tablet Oral HS Seema Huynh PA-C      traZODone  50 mg Oral HS Yessy Elaine PA-C          Today, Patient Was Seen By: Lucille Griffiths MD    **Please Note: This note may have been constructed using a voice recognition system  **

## 2022-08-03 ENCOUNTER — APPOINTMENT (INPATIENT)
Dept: DIALYSIS | Facility: HOSPITAL | Age: 62
DRG: 252 | End: 2022-08-03
Attending: INTERNAL MEDICINE
Payer: MEDICARE

## 2022-08-03 VITALS
TEMPERATURE: 98.3 F | SYSTOLIC BLOOD PRESSURE: 139 MMHG | WEIGHT: 153.44 LBS | RESPIRATION RATE: 15 BRPM | OXYGEN SATURATION: 97 % | HEART RATE: 85 BPM | DIASTOLIC BLOOD PRESSURE: 90 MMHG | BODY MASS INDEX: 21.97 KG/M2 | HEIGHT: 70 IN

## 2022-08-03 LAB
ANION GAP SERPL CALCULATED.3IONS-SCNC: 10 MMOL/L (ref 4–13)
BUN SERPL-MCNC: 59 MG/DL (ref 5–25)
CALCIUM SERPL-MCNC: 8.7 MG/DL (ref 8.3–10.1)
CHLORIDE SERPL-SCNC: 95 MMOL/L (ref 96–108)
CO2 SERPL-SCNC: 22 MMOL/L (ref 21–32)
CREAT SERPL-MCNC: 4.22 MG/DL (ref 0.6–1.3)
GFR SERPL CREATININE-BSD FRML MDRD: 10 ML/MIN/1.73SQ M
GLUCOSE SERPL-MCNC: 100 MG/DL (ref 65–140)
PHOSPHATE SERPL-MCNC: 2.4 MG/DL (ref 2.3–4.1)
POTASSIUM SERPL-SCNC: 3.7 MMOL/L (ref 3.5–5.3)
SODIUM SERPL-SCNC: 127 MMOL/L (ref 135–147)

## 2022-08-03 PROCEDURE — 90935 HEMODIALYSIS ONE EVALUATION: CPT | Performed by: INTERNAL MEDICINE

## 2022-08-03 PROCEDURE — 84100 ASSAY OF PHOSPHORUS: CPT | Performed by: INTERNAL MEDICINE

## 2022-08-03 PROCEDURE — 94760 N-INVAS EAR/PLS OXIMETRY 1: CPT

## 2022-08-03 PROCEDURE — 99232 SBSQ HOSP IP/OBS MODERATE 35: CPT | Performed by: INTERNAL MEDICINE

## 2022-08-03 PROCEDURE — 99239 HOSP IP/OBS DSCHRG MGMT >30: CPT | Performed by: HOSPITALIST

## 2022-08-03 PROCEDURE — 80048 BASIC METABOLIC PNL TOTAL CA: CPT | Performed by: INTERNAL MEDICINE

## 2022-08-03 RX ORDER — ATORVASTATIN CALCIUM 40 MG/1
40 TABLET, FILM COATED ORAL
Qty: 30 TABLET | Refills: 1 | Status: SHIPPED | OUTPATIENT
Start: 2022-08-03 | End: 2022-10-05 | Stop reason: SDUPTHER

## 2022-08-03 RX ORDER — CLOPIDOGREL BISULFATE 75 MG/1
75 TABLET ORAL DAILY
Qty: 30 TABLET | Refills: 3 | Status: SHIPPED | OUTPATIENT
Start: 2022-08-03

## 2022-08-03 RX ORDER — CALCIUM ACETATE 667 MG/1
1334 CAPSULE ORAL
Qty: 180 CAPSULE | Refills: 0 | Status: SHIPPED | OUTPATIENT
Start: 2022-08-03 | End: 2022-09-01

## 2022-08-03 RX ORDER — AMLODIPINE BESYLATE 5 MG/1
5 TABLET ORAL DAILY
Status: DISCONTINUED | OUTPATIENT
Start: 2022-08-04 | End: 2022-08-03

## 2022-08-03 RX ORDER — CARVEDILOL 25 MG/1
25 TABLET ORAL 2 TIMES DAILY WITH MEALS
Qty: 60 TABLET | Refills: 0 | Status: SHIPPED | OUTPATIENT
Start: 2022-08-03 | End: 2022-08-30 | Stop reason: SDUPTHER

## 2022-08-03 RX ORDER — METHOCARBAMOL 500 MG/1
1000 TABLET, FILM COATED ORAL EVERY 6 HOURS PRN
Status: DISCONTINUED | OUTPATIENT
Start: 2022-08-03 | End: 2022-08-03 | Stop reason: HOSPADM

## 2022-08-03 RX ORDER — ISOSORBIDE DINITRATE 40 MG/1
40 TABLET ORAL EVERY 8 HOURS SCHEDULED
Qty: 90 TABLET | Refills: 0 | Status: SHIPPED | OUTPATIENT
Start: 2022-08-03 | End: 2022-08-05 | Stop reason: SDUPTHER

## 2022-08-03 RX ORDER — ASPIRIN 81 MG/1
81 TABLET, CHEWABLE ORAL DAILY
COMMUNITY
Start: 2022-08-03

## 2022-08-03 RX ORDER — PREDNISONE 20 MG/1
40 TABLET ORAL DAILY
Qty: 8 TABLET | Refills: 0 | Status: SHIPPED | OUTPATIENT
Start: 2022-08-04 | End: 2022-08-08

## 2022-08-03 RX ADMIN — PREDNISONE 40 MG: 20 TABLET ORAL at 12:39

## 2022-08-03 RX ADMIN — ISOSORBIDE DINITRATE 40 MG: 20 TABLET ORAL at 06:08

## 2022-08-03 RX ADMIN — AMLODIPINE BESYLATE 5 MG: 5 TABLET ORAL at 12:39

## 2022-08-03 RX ADMIN — ASPIRIN 81 MG CHEWABLE TABLET 81 MG: 81 TABLET CHEWABLE at 12:39

## 2022-08-03 RX ADMIN — CLOPIDOGREL BISULFATE 75 MG: 75 TABLET ORAL at 12:39

## 2022-08-03 RX ADMIN — CARVEDILOL 25 MG: 25 TABLET, FILM COATED ORAL at 06:08

## 2022-08-03 RX ADMIN — OXYCODONE HYDROCHLORIDE 5 MG: 5 TABLET ORAL at 14:27

## 2022-08-03 RX ADMIN — ISOSORBIDE DINITRATE 40 MG: 20 TABLET ORAL at 14:27

## 2022-08-03 RX ADMIN — CALCIUM ACETATE 1334 MG: 667 CAPSULE ORAL at 12:39

## 2022-08-03 RX ADMIN — METHOCARBAMOL TABLETS 1000 MG: 500 TABLET, COATED ORAL at 01:35

## 2022-08-03 NOTE — PHYSICAL THERAPY NOTE
Physical Therapy Cancellation Note    Patient's Name: Angela Mcfarland       08/03/22 1008   PT Last Visit   PT Visit Date 08/03/22   Note Type   Note Type Cancelled Session   Cancel Reasons Other   Pain Assessment   Pain Assessment Tool   (at dialysis - will continue to follow for PT as able)     Amber Sterling, PT, DPT

## 2022-08-03 NOTE — CASE MANAGEMENT
Case Management Discharge Planning Note    Patient name Amari Longoria  Location [facility] 501/[facility] 917-13 MRN [id number]  : 1960 Date 8/3/2022       Current Admission Date: 2022  Current Admission Diagnosis:Acute on chronic systolic congestive heart failure [facility])   Patient Active Problem List    Diagnosis Date Noted    Renal artery stenosis ([facility] ) 2022    Hepatitis B 2022    Acute on chronic respiratory failure with hypoxia ([facility] ) 2022    Acute respiratory insufficiency 2022    Acute kidney injury superimposed on CKD-3 2022    COPD (chronic obstructive pulmonary disease) ([facility] ) 06/15/2022    Acute on chronic systolic congestive heart failure ([facility] ) 2021    Tobacco abuse 2021    Leukocytosis 2021    Anxiety 2021    Dilated cardiomyopathy ([facility] ) 2021    Essential hypertension 2021    Chronic pain 2021    Vitamin D deficiency 2013      LOS (days): 12  Geometric Mean LOS (GMLOS) (days): 5 20  Days to GMLOS:-6 6     OBJECTIVE:  Risk of Unplanned Readmission Score: 30 48         Current admission status: Inpatient   Preferred Pharmacy:   [address] [place], [place] [id number] [address] [address]  Phone: [phone] Fax: [phone]    Primary Care Provider: Thedore Bamberger, DO    Primary Insurance: MEDICARE MISC REPLACEMENT  Secondary Insurance: [address] [place] [address],[address] DETAILS:                            Additional Comments: Upon verifying with the pt and confirming that she is scheduled for [facility] [employer] tomorrow, pt states she requires transport to and from Dialysis as her car is not inspected and she cannot afford to get it done or pay for the gas that it would require to get to and from [place] as she only has $1000 month income   Pt states she no longer talks to her daughter Crystal Juan because she goes onto My chart and reads all of the pt's medical information and notes and " causes trouble" and " refuses to help me because she reads all of the notes"  Pt crying and anxious while speaking with CM  Attempted to call [facility] and was told that pt would need 3 days notice to schedule transport but that pt does not have an " active ID# " with them and she would have to call herself and clarify  Called [facility] and requested to speak with MSW there but she, Sukumar Tariq, is not in today and will be in the [facility] tomorrow to speak with   Pt states she has no one to call for rides to appts  Her nephew Xochitl Kumar is on his way to pick pt up but cannot provide trasnprot to HD as he has other obligations  Spoke with Jeanette Layne and authorization given for pt to receive LYFT services to HD until more permanent means of transport can be solidified  Pt made aware of LYFT arrnagements to be made by  CM  Call placed to Trena at [facility] to schedule LYFT for  thurs 8/4, and sat 8/6 and tues 8/9  for 0930  tomorrow  8/4 and 10 am  sat 8/6 and tues 8/9, at pt's home going to [facility] at [address]  per Pascual Aguila they can only schedule one week increments for LYFT  Pt made aware to answer her phone when [address] calls and verified her number is [phone]  Pt verbalized agreement and understanding  LYFT rides authorized by  Phyllis Simpson after manager Jeanette Layne discussed pt's current transport problem  Per Hema Iniguez at [facility] will be made aware

## 2022-08-03 NOTE — OCCUPATIONAL THERAPY NOTE
Occupational Therapy CX        Patient Name: Shasta Sicard  TMGDE'K Date: 8/3/2022           08/03/22 3590   OT Last Visit   OT Visit Date 08/03/22   Note Type   Note Type Cancelled Session   Cancel Reasons Patient off floor/test  (Chart reviewed   Pt off the floor at HD- will cont to follow to see as able )     Ant Pickering, MS, OTR/L

## 2022-08-03 NOTE — RESPIRATORY THERAPY NOTE
resp   08/03/22 0720   Respiratory Assessment   Assessment Type Assess only   General Appearance Alert; Awake   Respiratory Pattern Normal   Chest Assessment Chest expansion symmetrical   Bilateral Breath Sounds Diminished;Clear   Cough None   Resp Comments went to give pt morning breathing tx, pt stated they did not want the tx, that she is trying to eat her breakfast and everyone is bothering her, asked pt if she would want her tx when she was done eating and pt cont to decline tx, told pt if she changes her mind and wants her tx to have her nurse call resp and we will come to give her tx

## 2022-08-03 NOTE — PHYSICAL THERAPY NOTE
Physical Therapy Cancellation Note    Patient's Name: Snow Aquino       08/03/22 1333   PT Last Visit   PT Visit Date 08/03/22   Note Type   Note Type Cancelled Session   Cancel Reasons Other  (Attempted to see for PT treatment   Pt declined, stating she was getting washed up to go home )     Elizabeth Leal, PT, DPT

## 2022-08-03 NOTE — PROGRESS NOTES
Heart Failure/ Pulmonary Hypertension Progress Note - Salvador Brooks 64 y o  female MRN: [id number]    Unit/Bed#: [facility] 501-01 Encounter: [id number]      Assessment:    Principal Problem:    Acute on chronic systolic congestive heart failure (HCC)  Active Problems:    Essential hypertension    Chronic pain    Anxiety    Tobacco abuse    COPD (chronic obstructive pulmonary disease) (HCC)    Acute kidney injury superimposed on CKD-3    Acute on chronic respiratory failure with hypoxia (HCC)    Hepatitis B    Renal artery stenosis (HCC)      Subjective:   "Salvador Brooks is a 64y o  year old female with a history of COPD, chronic tobaccos use, NICM, HTN, CKD, HLD, Bipolar Disorder, severe bilateral renal artery stenosis who presented to [address] [place] [address] with complaints of SOB and was admitted for acute on chronic CHF exacerbation       Patient was diagnosed with severe nonischemic cardiomyopathy in  [facility] with moderate nonobstructive atherosclerosis), this was followed by improvement in LV function with EF about 50% in         In June 2022, she was admitted to [facility], and was treated for pneumonia, sepsis, heart failure exacerbation as well as COPD  After a week-long hospitalization, she was discharged on oral diuretics  But within 3 weeks she had worsening shortness of breath, and presented back to [facility] (7/12/22)  She did not respond to the IV diuresis, and instead developed acute renal failure with creatinine jump from 1 08 (7/12/22) to 4 5 (7/14/22)   Her EF was noted to have dropped from 40 to 25%, and she was initiated on dialysis  Milrinone was attempted as well, but no diuresis be achieved  Had continued increasing oxygen requirements, and as a result was transferred to [facility] for heart failure evaluation  "    Patient seen and examined in HD today  No significant events overnight  Quite tearful today and wants to be discharged       Objective: Intake/ Output:1060/U  O 50 cc, HD 2500/ -1 5 L  Weight: 151 lbs  Tele: SR    Acute on Chronic HFrEF, Stage C    Etiology: NICM, [facility] 3/2021 showed moderate non obstructive atherosclerosis, previously partially recovered now back down to 25%, possibly in setting of HTN urgency with FABIOLA  For HD today  Refuses LifeVest      Echocardiogram 7/14/22      LVEF: 25% w segmental WMA      LVIDd: 5 5 cm      RV: normal      MR: moderate      PASP:      RVOT:       Other: mild TR, mild AI             Echo 6/15/22:      LVEF: 40%      LVEDd: 4 7 cm      RV: low normal             Echo 7/9/21:      LVEF: 50%      LVEDd: 4 4 cm          [facility] 3/30/21:moderate non obstructive CAD      Non compliant LV with LVEDP 15 increasing to 35 mmHg after atrial systole              Neurohormonal Blockade:      --Beta-Blocker: coreg 25 mg BID     --ACEi, ARB or ARNi:        (or SVR reduction) hydralazine 100 mg Q8 - off due to itching, isordil 40 mg Q8     --Aldosterone Receptor Blocker:      --SGLT2-I:       --Diuretic:    Volume management via HD         Sudden Cardiac Death Risk Reduction:      --ICD:  LifeVest   offered and discussed, patient refusing           Electrical Resynchronization:      Narrow QRS         Advanced Therapies (If appropriate):      --Inotrope:      --LVAD/Transplant Candidacy:        Acute on chronic hypoxic respiratory failure  - in setting of volume overload and underlying COPD    Oxygen: 2 liters at rest      HTN likely due to bilateral FABIOLA - now controlled on Coreg, ISDN as above  as well as Amlodipine 5mg daily   Acute on chronic CKD  HD MWF      Bilateral FABIOLA  - Renal doppler revealed occluded right renal artery and >60% stenosis in the proximal and distal left main renal artery        - vascular surgery is following;   -s/p left renal artery stenting 8/1/22   COPD               Review of Systems   All other systems reviewed and are negative  [employer] (day, reason):   Henley catheter (day, reason):    Vitals: Blood pressure 92/59, pulse 83, temperature 98 3 °F (36 8 °C), resp  rate 16, height 5' 10" (1 778 m), weight 68 5 kg (151 lb 0 2 oz), SpO2 97 %  , Body mass index is 21 67 kg/m² , I/O last 3 completed shifts: In: 1645 [P O :1040; I V :500]  Out: 2550 [Urine:50; Other:2500]  No intake/output data recorded  Wt Readings from Last 3 Encounters:   08/03/22 68 5 kg (151 lb 0 2 oz)   07/22/22 77 9 kg (171 lb 11 8 oz)   06/28/22 77 4 kg (170 lb 9 6 oz)       Intake/Output Summary (Last 24 hours) at 8/3/2022 0814  Last data filed at 8/3/2022 0006  Gross per 24 hour   Intake 1060 ml   Output 2550 ml   Net -1490 ml     I/O last 3 completed shifts: In: 6033 [P O :1040; I V :500]  Out: 2550 [Urine:50; Other:2500]    No significant arrhythmias seen on telemetry review         Physical Exam:  Vitals:    08/03/22 0608 08/03/22 0705 08/03/22 0705 08/03/22 0720   BP:  92/59 92/59    BP Location:       Pulse: 82  83    Resp:       Temp:  98 3 °F (36 8 °C) 98 3 °F (36 8 °C)    TempSrc:       SpO2:   97% 97%   Weight:       Height:           GEN: Juani Morales appears well, alert and oriented x 3, pleasant and cooperative   HEENT: pupils equal, round, and reactive to light; extraocular muscles intact  NECK: supple, no carotid bruits   HEART: regular rhythm, normal S1 and S2, no murmurs, clicks, gallops or rubs, JVP is  Up  LUNGS: clear to auscultation bilaterally; no wheezes, rales, or rhonchi   ABDOMEN: normal bowel sounds, soft, no tenderness, no distention  EXTREMITIES: peripheral pulses normal; no clubbing, cyanosis, or edema  NEURO: no focal findings   SKIN: normal without suspicious lesions on exposed skin      Current Facility-Administered Medications:     acetaminophen (TYLENOL) tablet 650 mg, 650 mg, Oral, Q6H PRN, Eward Presser, PA-C    albuterol (PROVENTIL HFA,VENTOLIN HFA) inhaler 2 puff, 2 puff, Inhalation, Q4H PRN, Napoleon Pan MD, 2 puff at 07/29/22 6487    albuterol inhalation solution 10 mg, 10 mg, Nebulization, Once, Xiomara Dickinson MD    albuterol inhalation solution 2 5 mg, 2 5 mg, Nebulization, Q4H PRN, Tequila Hopkins DO, 2 5 mg at 08/01/22 0457    amLODIPine (NORVASC) tablet 5 mg, 5 mg, Oral, Daily, Rodney Sommers MD    aspirin chewable tablet 81 mg, 81 mg, Oral, Daily, Neda Singh PA-C, 81 mg at 08/02/22 0847    atorvastatin (LIPITOR) tablet 40 mg, 40 mg, Oral, Daily With Samaria Mcgregor PA-C, 40 mg at 08/02/22 1706    calcium acetate (PHOSLO) capsule 1,334 mg, 1,334 mg, Oral, TID With Meals, Silvio Stroud PA-C, 1,334 mg at 08/02/22 1707    carvedilol (COREG) tablet 25 mg, 25 mg, Oral, BID With Meals, Keo Mallory MD, 25 mg at 08/03/22 0608    clonazePAM (KlonoPIN) tablet 1 mg, 1 mg, Oral, BID PRN, Francesca Marroquin PA-C, 1 mg at 08/02/22 2027    clopidogrel (PLAVIX) tablet 75 mg, 75 mg, Oral, Daily, Neelam Mccauley PA-C    diphenhydrAMINE (BENADRYL) tablet 25 mg, 25 mg, Oral, Q6H PRN, Isabela Quinteros PA-C, 25 mg at 08/01/22 0439    docusate sodium (COLACE) capsule 100 mg, 100 mg, Oral, BID, Seema Huynh PA-C, 100 mg at 08/01/22 1827    guaiFENesin (MUCINEX) 12 hr tablet 600 mg, 600 mg, Oral, Q12H [facility], Silvio Stroud PA-C, 600 mg at 08/01/22 2100    heparin (porcine) subcutaneous injection 5,000 Units, 5,000 Units, Subcutaneous, Q8H [facility], Silvio Stroud PA-C, 5,000 Units at 08/02/22 1342    hydrALAZINE (APRESOLINE) injection 10 mg, 10 mg, Intravenous, Q6H PRN, Silvio Stroud PA-C, 10 mg at 08/01/22 1453    hydrocortisone 1 % cream, , Topical, 4x Daily PRN, Isabela Quinteros PA-C, Given at 08/01/22 0138    ipratropium (ATROVENT) 0 02 % inhalation solution 0 5 mg, 0 5 mg, Nebulization, TID, Kory Gonzalez MD, 0 5 mg at 08/02/22 1917    isosorbide dinitrate (ISORDIL) tablet 40 mg, 40 mg, Oral, Q8H [facility], Ray Cortés DO, 40 mg at 08/03/22 0608    levalbuterol (XOPENEX) inhalation solution 1 25 mg, 1 25 mg, Nebulization, TID, Kory Gonzalez MD, 1 25 mg at 08/02/22 1917    methocarbamol (ROBAXIN) tablet 1,000 mg, 1,000 mg, Oral, Q6H PRN, Ben Del Valle PA-C, 1,000 mg at 08/03/22 0135    ondansetron (ZOFRAN) injection 4 mg, 4 mg, Intravenous, Q4H PRN, Ben Del Valle PA-C, 4 mg at 07/31/22 2581    oxyCODONE (ROXICODONE) IR tablet 2 5 mg, 2 5 mg, Oral, Q4H PRN, Rebeka Davis PA-C, 2 5 mg at 07/29/22 1858    oxyCODONE (ROXICODONE) IR tablet 5 mg, 5 mg, Oral, Q4H PRN, Rebeka Davis PA-C, 5 mg at 08/02/22 2027    polyethylene glycol (MIRALAX) packet 17 g, 17 g, Oral, Daily PRN, Seema Huynh PA-C    predniSONE tablet 40 mg, 40 mg, Oral, Daily, Jacob Dodge MD    senna (SENOKOT) tablet 8 6 mg, 1 tablet, Oral, HS, Seema Huynh PA-C, 8 6 mg at 07/31/22 2137    traZODone (DESYREL) tablet 50 mg, 50 mg, Oral, HS, Ben Del Valle PA-C, 50 mg at 07/28/22 2127      Labs & Results:        Results from last 7 days   Lab Units 07/31/22  0545 07/29/22  1509   WBC Thousand/uL 11 86* 11 52*   HEMOGLOBIN g/dL 11 4* 11 9   HEMATOCRIT % 34 7* 35 6   PLATELETS Thousands/uL 264 255         Results from last 7 days   Lab Units 08/03/22  0609 08/02/22  0617 08/01/22  0426   POTASSIUM mmol/L 3 7 3 9 5 4*   CHLORIDE mmol/L 95* 92* 83*   CO2 mmol/L 22 22 21   BUN mg/dL 59* 61* 103*   CREATININE mg/dL 4 22* 4 63* 6 93*   CALCIUM mg/dL 8 7 8 9 9 2           Counseling / Coordination of Care  Total floor / unit time spent today 20 minutes  Greater than 50% of total time was spent with the patient and / or family counseling and / or coordination of care  A description of the counseling / coordination of care: 20  Thank you for the opportunity to participate in the care of this patient  Suma Griffith

## 2022-08-03 NOTE — CASE MANAGEMENT
Case Management Discharge Planning Note    Patient name Snow Aquino  Location [facility] 501/[facility] 729-68 MRN [id number]  : 1960 Date 8/3/2022       Current Admission Date: 2022  Current Admission Diagnosis:Acute on chronic systolic congestive heart failure [facility])   Patient Active Problem List    Diagnosis Date Noted    Renal artery stenosis ([facility] ) 2022    Hepatitis B 2022    Acute on chronic respiratory failure with hypoxia [facility] ) 2022    Acute respiratory insufficiency 2022    Acute kidney injury superimposed on CKD-3 2022    COPD (chronic obstructive pulmonary disease) ([facility] ) 06/15/2022    Acute on chronic systolic congestive heart failure ([facility] ) 2021    Tobacco abuse 2021    Leukocytosis 2021    Anxiety 2021    Dilated cardiomyopathy ([facility] ) 2021    Essential hypertension 2021    Chronic pain 2021    Vitamin D deficiency 2013      LOS (days): 12  Geometric Mean LOS (GMLOS) (days): 5 20  Days to GMLOS:-6 6     OBJECTIVE:  Risk of Unplanned Readmission Score: 30 48         Current admission status: Inpatient   Preferred Pharmacy:   [address] [place] [address] #[id number] [address] [address]  Phone: [phone] Fax: [phone]    Primary Care Provider: Kristopher Rey DO    Primary Insurance: MEDICARE [address] [facility]  Secondary Insurance: [address]:               Other Referral/Resources/Interventions Provided:  Interventions: Dialysis  Referral Comments: Call placed to Dialysis center [facility] in [address] and spoke with Aubree Gan at [phone] and pt is confirmed chair time at [address] for tomorrow , at 11 am chair but pt needs to arrive at 1040 for the first visit   Pt needs to bring 2 forms of ID and insurance card  Notified Dr Lynn Herring regarding confirmed chair time for tomorrow

## 2022-08-03 NOTE — PLAN OF CARE
Problem: MOBILITY - ADULT  Goal: Maintain or return to baseline ADL function  Description: INTERVENTIONS:  -  Assess patient's ability to carry out ADLs; assess patient's baseline for ADL function and identify physical deficits which impact ability to perform ADLs (bathing, care of mouth/teeth, toileting, grooming, dressing, etc )  - Assess/evaluate cause of self-care deficits   - Assess range of motion  - Assess patient's mobility; develop plan if impaired  - Assess patient's need for assistive devices and provide as appropriate  - Encourage maximum independence but intervene and supervise when necessary  - Involve family in performance of ADLs  - Assess for home care needs following discharge   - Consider OT consult to assist with ADL evaluation and planning for discharge  - Provide patient education as appropriate  Outcome: Progressing     Problem: METABOLIC, FLUID AND ELECTROLYTES - ADULT  Goal: Fluid balance maintained  Description: INTERVENTIONS:  - Monitor labs   - Monitor I/O and WT  - Instruct patient on fluid and nutrition as appropriate  - Assess for signs & symptoms of volume excess or deficit  Outcome: Progressing     Problem: CARDIOVASCULAR - ADULT  Goal: Maintains optimal cardiac output and hemodynamic stability  Description: INTERVENTIONS:  - Monitor I/O, vital signs and rhythm  - Monitor for S/S and trends of decreased cardiac output  - Administer and titrate ordered vasoactive medications to optimize hemodynamic stability  - Assess quality of pulses, skin color and temperature  - Assess for signs of decreased coronary artery perfusion  - Instruct patient to report change in severity of symptoms  Outcome: Progressing     Problem: DISCHARGE PLANNING  Goal: Discharge to home or other facility with appropriate resources  Description: INTERVENTIONS:  - Identify barriers to discharge w/patient and caregiver  - Arrange for needed discharge resources and transportation as appropriate  - Identify discharge learning needs (meds, wound care, etc )  - Arrange for interpretive services to assist at discharge as needed  - Refer to Case Management Department for coordinating discharge planning if the patient needs post-hospital services based on physician/advanced practitioner order or complex needs related to functional status, cognitive ability, or social support system  Outcome: Progressing     Problem: Knowledge Deficit  Goal: Patient/family/caregiver demonstrates understanding of disease process, treatment plan, medications, and discharge instructions  Description: Complete learning assessment and assess knowledge base    Interventions:  - Provide teaching at level of understanding  - Provide teaching via preferred learning methods  Outcome: Progressing

## 2022-08-03 NOTE — CASE MANAGEMENT
Case Management Discharge Planning Note    Patient name Neelam Bun  Location [facility] 501/[facility] 501-01 MRN [id number]  : 1960 Date 8/3/2022       Current Admission Date: 2022  Current Admission Diagnosis:Acute on chronic systolic congestive heart failure [facility])   Patient Active Problem List    Diagnosis Date Noted    Renal artery stenosis ([facility] ) 2022    Hepatitis B 2022    Acute on chronic respiratory failure with hypoxia ([facility] ) 2022    Acute respiratory insufficiency 2022    Acute kidney injury superimposed on CKD-3 2022    COPD (chronic obstructive pulmonary disease) ([facility] ) 06/15/2022    Acute on chronic systolic congestive heart failure ([facility] ) 2021    Tobacco abuse 2021    Leukocytosis 2021    Anxiety 2021    Dilated cardiomyopathy ([facility] ) 2021    Essential hypertension 2021    Chronic pain 2021    Vitamin D deficiency 2013      LOS (days): 12  Geometric Mean LOS (GMLOS) (days): 5 20  Days to GMLOS:-6 4     OBJECTIVE:  Risk of Unplanned Readmission Score: 28 85         Current admission status: Inpatient   Preferred Pharmacy:   [address] [place] [address] #[id number] [address] [address]  Phone: [phone] Fax: [phone]    Primary Care Provider: Claudette Browning, DO    Primary Insurance: MEDICARE MISC REPLACEMENT  Secondary Insurance: [address]:            Other Referral/Resources/Interventions Provided:  Interventions: LifeVest  Referral Comments: Per [facility] provider Dr Jose Rios, pt is refusing Lifevest after discussion about it with Cardiology   pt will be dc without the Lifevest

## 2022-08-03 NOTE — PROGRESS NOTES
Follow up Consultation    Nephrology   Moon Garcia 64 y o  female MRN: [id number]  Unit/Bed#: [facility] 501-01 Encounter: [id number]      Physician Requesting Consult: Mariam Elizalde MD        ASSESSMENT/PLAN:  14-year-old female with multiple comorbidities including CKD stage 3 and CHF admitted with acute exacerbation COPD  Nephrology following for acute kidney injury  Acute kidney injury on CKD stage 3:  SHANTA multifactorial most likely secondary to volume overload in the presence of decompensated heart failure  After review of records [facility] as well as Care everywhere it appears that the patient has a baseline Creatinine of 1 08 mg/dL on 07/12/2022  patient was admitted with a creatinine of 1-1 2 mg/dL  patient's creatinine today is at 4 2 to mg/dL, improving only with dialysis  No overt signs of renal recovery at this time, no significant urine output  Started on dialysis 7/15/22  Last dialysis treatment on 08/03/2022  Next dialysis treatment on 08/05/2022  Currently on MWF dialysis schedule  Status post right IJ PermCath placement with IR on 08/01/2022  check BMP in a m  Await renal recovery  Optimize hemodynamic status to avoid delay in renal recovery  Place on a renal diet when allowed diet order  Avoid nephrotoxins, adjust meds to appropriate GFR  Strict I/O  Daily weights  Urinary retention protocol if patient does not have a [place]  Most likely has underlying CKD secondary to hypertensive nephrosclerosis plus age-related nephron loss  will need to set up patient for follow up with Nephrology as an outpatient post hospitalization  for nephrology as an outpatient patient follows up with no nephrologist  Patient likely has spot available at [facility] T[facility], however is in need of life vest so unsure where her final dialysis destination will be  Patient stable from renal standpoint for discharge once outpatient dialysis arrangements have been made    Please inform us of her final plans  Blood pressure/hypertension:  current medications:  Coreg 25 mg p o  B i d , isosorbide 40 mg p o  T i d , amlodipine 5 mg p o  Q day  recommendations:  Avoid hydralazine due to concern for skin rash/itching  Will decrease UF with HD clinically euvolemic  Will discuss with Cardiology with regards to decreasing antihypertensives  Suspecting renal vascular component due to renal artery stenosis  Renal artery Doppler showing right atrophic kidney  Left renal artery greater than 60% stenosis  Status post left renal artery stent on 08/01/2022 with IR  Optimize hemodynamics  Maintain MAP > 65mmHg  Avoid BP fluctuations  H/H/anemia:  most recent hemoglobin at 11 4 grams/deciliter  maintain hemoglobin greater than 8 grams/deciliter    Acid-base electrolytes:    Electrolytes:    Hyponatremia:   Most recent sodium at 127 mEq  To be corrected with dialysis, improving  Continue fluid restriction 1 2 L per day    Hyperphosphatemia:  On PhosLo 2 tabs p o  T i d  With meals  Check phosphorus level in a m  Most recent phosphorus 2 4, if continues to be low then may need to decrease binders    Hyperkalemia:  Follow low-potassium diet  Most recent potassium 3 7 mEq  Resolved    Acid-base:    Most recent bicarb at 22, acidosis improving, stable    Other medical problems:  Proteinuria: Most recent UA with no protein as of 07/12/2022  Renal artery stenosis/CHF:  Management per primary team   Status post IR stenting of left renal artery  on 08/01/2022  Most recent echo with EF of 25% as of 07/14/2022 follow-up with Cardiology      Thanks for the consult  Will continue to follow  Please call with questions/ concerns  Above-mentioned orders and Plan in terms of dialysis was discussed with the team in depth via Tiger text    Naya Batista MD, FASN, 8/3/2022, 11:10 AM              Objective :   Patient seen and examined while on hemodialysis at 9:40 a m  Very emotional reports he wants to leave today no matter what    We explained the need for dialysis  Blood pressure starting to run low  PHYSICAL EXAM  /82   Pulse 91   Temp 98 8 °F (37 1 °C) (Oral)   Resp 15   Ht 5' 10" (1 778 m)   Wt 69 6 kg (153 lb 7 oz)   SpO2 97%   BMI 22 02 kg/m²   Temp (24hrs), Av 5 °F (36 9 °C), Min:97 9 °F (36 6 °C), Max:99 °F (37 2 °C)        Intake/Output Summary (Last 24 hours) at 8/3/2022 1110  Last data filed at 8/3/2022 0930  Gross per 24 hour   Intake 870 ml   Output 2550 ml   Net -1680 ml       I/O last 24 hours: In: 1046 [P O :710; I V :700]  Out: 2550 [Urine:50; Other:2500]      Current Weight: Weight - Scale: 69 6 kg (153 lb 7 oz)  First Weight: Weight - Scale: 79 1 kg (174 lb 6 1 oz)  Physical Exam  Vitals and nursing note reviewed  Constitutional:       General: She is not in acute distress  Appearance: Normal appearance  She is normal weight  She is not ill-appearing, toxic-appearing or diaphoretic  HENT:      Head: Normocephalic and atraumatic  Mouth/Throat:      Mouth: Mucous membranes are moist       Pharynx: Oropharynx is clear  No oropharyngeal exudate  Eyes:      General: No scleral icterus  Conjunctiva/sclera: Conjunctivae normal    Neck:      Comments: Right IJ PermCath  Cardiovascular:      Rate and Rhythm: Normal rate  Heart sounds: Normal heart sounds  No friction rub  Pulmonary:      Effort: Pulmonary effort is normal  No respiratory distress  Breath sounds: Normal breath sounds  No stridor  No wheezing  Abdominal:      General: There is no distension  Palpations: Abdomen is soft  There is no mass  Tenderness: There is no abdominal tenderness  Musculoskeletal:         General: No swelling  Cervical back: Normal range of motion and neck supple  No rigidity  Skin:     General: Skin is warm  Coloration: Skin is not jaundiced  Neurological:      General: No focal deficit present        Mental Status: She is alert and oriented to person, place, and time    Psychiatric:      Comments: Very emotional             Review of Systems   Constitutional: Positive for fatigue  Negative for chills  HENT: Negative for congestion  Respiratory: Negative for cough, shortness of breath and wheezing  Cardiovascular: Negative for leg swelling  Gastrointestinal: Negative for abdominal pain, constipation, diarrhea and vomiting  Genitourinary: Positive for decreased urine volume  Musculoskeletal: Negative for back pain  Neurological: Negative for headaches  Psychiatric/Behavioral: Positive for agitation and dysphoric mood  The patient is nervous/anxious  All other systems reviewed and are negative        Scheduled Meds:  Current Facility-Administered Medications   Medication Dose Route Frequency Provider Last Rate    acetaminophen  650 mg Oral Q6H PRN Terri Andino PA-C      albuterol  2 puff Inhalation Q4H PRN Ryan Merino MD      albuterol  10 mg Nebulization Once Fakaden Lizama MD      albuterol  2 5 mg Nebulization Q4H PRN Tequila Hopkins DO      amLODIPine  5 mg Oral Daily Denia Fortune MD      aspirin  81 mg Oral Daily José Luis Jacome PA-C      atorvastatin  40 mg Oral Daily With CitiBRAVO martins      calcium acetate  1,334 mg Oral TID With Meals Terri Andino PA-C      carvedilol  25 mg Oral BID With Meals Jelly Garza MD      clonazePAM  1 mg Oral BID PRN Suma Ramsey PA-C      clopidogrel  75 mg Oral Daily José Luis Jacome PA-C      diphenhydrAMINE  25 mg Oral Q6H PRN Russ Briceño PA-C      docusate sodium  100 mg Oral BID Seema Huynh PA-C      guaiFENesin  600 mg Oral Q12H [address] Terri Andino PA-C      heparin (porcine)  5,000 Units Subcutaneous [facility] [place]      hydrALAZINE  10 mg Intravenous Q6H PRN Terri Andino PA-C      hydrocortisone   Topical 4x Daily PRN Russ Briceño PA-C      ipratropium  0 5 mg Nebulization TID Ryan Merino MD      isosorbide dinitrate  40 mg Oral [facility] [address] Ray Cortés DO      levalbuterol  1 25 mg Nebulization TID Kory Gonzalez MD      methocarbamol  1,000 mg Oral Q6H PRN Isabelagenie Quinteros PA-C      ondansetron  4 mg Intravenous Q4H PRN Isabelagenie Quinteros, BRAVO      oxyCODONE  2 5 mg Oral Q4H PRN Silvio Stroud PA-C      oxyCODONE  5 mg Oral Q4H PRN Silvio Stroud PA-C      polyethylene glycol  17 g Oral Daily PRN Seema Huynh PA-C      predniSONE  40 mg Oral Daily Karen Zarco MD      senna  1 tablet Oral HS Seema Huynh PA-C      traZODone  50 mg Oral HS Isabela Quinteros PA-C         PRN Meds:   acetaminophen    albuterol    albuterol    clonazePAM    diphenhydrAMINE    hydrALAZINE    hydrocortisone    methocarbamol    ondansetron    oxyCODONE    oxyCODONE    polyethylene glycol    Continuous Infusions:       Invasive Devices: Invasive Devices  Report    Peripheral Intravenous Line  Duration           Peripheral IV 08/01/22 Right;Ventral (anterior) Forearm 2 days          Hemodialysis Catheter  Duration           HD Permanent Double Catheter 1 day                  LABORATORY:    Results from last 7 days   Lab Units 08/03/22  0609 08/02/22  0617 08/01/22  0426 07/31/22  0545 07/30/22  1229 07/29/22  1509 07/28/22  0403   WBC Thousand/uL  --   --   --  11 86*  --  11 52*  --    HEMOGLOBIN g/dL  --   --   --  11 4*  --  11 9  --    HEMATOCRIT %  --   --   --  34 7*  --  35 6  --    PLATELETS Thousands/uL  --   --   --  264  --  255  --    POTASSIUM mmol/L 3 7 3 9 5 4* 5 9* 5 5* 4 3 5 6*   CHLORIDE mmol/L 95* 92* 83* 90* 88* 92* 92*   CO2 mmol/L 22 22 21 23 25 29 26   BUN mg/dL 59* 61* 103* 59* 66* 47* 49*   CREATININE mg/dL 4 22* 4 63* 6 93* 5 16* 6 52* 5 07* 5 84*   CALCIUM mg/dL 8 7 8 9 9 2 9 9 9 6 9 4 9 8   PHOSPHORUS mg/dL 2 4 6 6*  --   --   --   --   --       rest all reviewed    RADIOLOGY:  IR renal angiogram   Final Result by Per Hughes MD (08/02 1041)   Impression:    1   Diffuse atherosclerosis demonstrated involving the aorta, mesenteric, and iliac vessels as described above with mild ectasia of the distal abdominal aorta and occlusion of the left common iliac artery  2  Preocclusive, 18 mm in length heavily calcified left main renal artery stenosis  3  Successful placement of a 5 mm x 19 mm balloon expandable stent with  no residual pressure gradient across the stented segment post intervention  Workstation performed: [id number]         IR tunneled dialysis catheter placement   Final Result by Crystal Lennox, MD (08/02 1042)   Impression: Successful conversion of a temporary dialysis catheter to a 28 cm Ben split catheter as described above  Workstation performed: [id number]         XR chest portable   Final Result by German Dejesus MD (07/29 1347)      Pulmonary edema with small bilateral pleural effusions is similar to prior study  Workstation performed: [id number]         IR temporary dialysis catheter check/change/reposition   Final Result by Tim Piedra MD (08/01 1057)      Insertion of right-sided non-tunneled dual-lumen temporary dialysis catheter  Chest radiograph was ordered to confirm catheter tip position  Workstation performed: [id number]         VAS renal artery complete   Final Result by Sharon Frazier MD (07/24 1949)        Rest all reviewed    Portions of the record may have been created with voice recognition software  Occasional wrong word or "sound a like" substitutions may have occurred due to the inherent limitations of voice recognition software  Read the chart carefully and recognize, using context, where substitutions have occurred  If you have any questions, please contact the dictating provider

## 2022-08-03 NOTE — PLAN OF CARE
Net UF goal 1 5kg over 3 hours as keith via RIJ TDC  Next HD tx Fri, 8/5  See flowsheets for further assessment details  Post-Dialysis RN Treatment Note    Blood Pressure:  Pre 146/96 mm/Hg  Post 149/98 mmHg   EDW  TBD   Weight:  Pre 69 6 kg   Post 68 1 kg   Mode of weight measurement: Standing Scale   Volume Removed  1500 ml    Treatment duration 180 minutes    NS given  No    Treatment shortened? Yes, 3 hours d/t pt being late, Nephro made aware     Medications given during Rx None Reported   Estimated Kt/V  1 00   Access type: Permacath/TDC   Access Issues: No    Report called to primary nurse   Yes

## 2022-08-04 NOTE — CASE MANAGEMENT
Case Management Discharge Planning Note    Patient name Jessy Draper  Location [facility] 501/[facility] 501-01 MRN [id number]  : 1960 Date 2022       Current Admission Date: 2022  Current Admission Diagnosis:Acute on chronic systolic congestive heart failure [facility])   Patient Active Problem List    Diagnosis Date Noted    Renal artery stenosis ([facility] ) 2022    Hepatitis B 2022    [facility] 2022    Acute respiratory insufficiency 2022    Acute kidney injury superimposed on CKD-3 2022    COPD (chronic obstructive pulmonary disease) [facility] ) 06/15/2022    Acute on chronic systolic congestive heart failure ([facility] ) 2021    Tobacco abuse 2021    Leukocytosis 2021    Anxiety 2021    Dilated cardiomyopathy [facility] 2021    Essential hypertension 2021    Chronic pain 2021    Vitamin D deficiency 2013      LOS (days): 12  Geometric Mean LOS (GMLOS) (days): 5 20  Days to GMLOS:-6 6     OBJECTIVE:  Risk of Unplanned Readmission Score: 30 48         Current admission status: Inpatient   Preferred Pharmacy:   [address] [place] [address] [id number] [place] [id number]  Phone: [phone] Fax: [phone]    Primary Care Provider: Karolina Umana DO    Primary Insurance: MEDICARE [address] [facility]  Secondary Insurance: [address] [place] [address],[address] DETAILS:    TAMMI was informed by RN manager, Agnieszka Gray, that the patient called and reports her pharmacy told her her prescription for Isordil needs preauth  TAMMI spoke to EDIE PATTERSON team ESTHER who will ask her office to work on this

## 2022-08-04 NOTE — UTILIZATION REVIEW
Notification of Discharge   This is a Notification of Discharge from our facility [address]  Please be advised that this patient has been discharge from our facility  Below you will find the admission and discharge date and time including the patients disposition  UTILIZATION REVIEW CONTACT:  Lacie Whittaker  Utilization   Network Utilization Review Department  Phone: [phone] x carefully listen to the prompts  All voicemails are confidential   Email: [email or web address]     PHYSICIAN ADVISORY SERVICES:  FOR [id number] REVIEW - MEDICAL NECESSITY DENIAL  Phone: [phone]  Fax: [phone]  Email: [email or web address]     PRESENTATION DATE: 7/22/2022  9:51 PM  OBERVATION ADMISSION DATE:   INPATIENT ADMISSION DATE: 7/22/22  9:51 PM   DISCHARGE DATE: 8/3/2022  4:50 PM  DISPOSITION: Home with [place] with [address] [place] [address] Road INFORMATION:  Send all requests for admission clinical reviews, approved or denied determinations and any other requests to dedicated fax number below belonging to the campus where the patient is receiving treatment   List of dedicated fax numbers:  [address] DENIALS (Administrative/Medical Necessity) [phone]   [address] (Maternity/NICU/Pediatrics) [phone]   [facility] [phone]   [address] [facility] [phone]   [address] [place] [address] [phone]   [address] [phone]   [facility]  [phone]   [address] [place] [address], [facility]  2401 [facility] 1000 [address] [facility] [phone]

## 2022-08-05 ENCOUNTER — TELEPHONE (OUTPATIENT)
Dept: CARDIOLOGY CLINIC | Facility: CLINIC | Age: 62
End: 2022-08-05

## 2022-08-05 DIAGNOSIS — I42.0 DILATED CARDIOMYOPATHY (HCC): Chronic | ICD-10-CM

## 2022-08-05 RX ORDER — ISOSORBIDE DINITRATE 40 MG/1
40 TABLET ORAL EVERY 8 HOURS SCHEDULED
Qty: 90 TABLET | Refills: 3 | Status: SHIPPED | OUTPATIENT
Start: 2022-08-05 | End: 2022-08-30 | Stop reason: DRUGHIGH

## 2022-08-05 RX ORDER — ISOSORBIDE DINITRATE 40 MG/1
40 TABLET ORAL EVERY 8 HOURS SCHEDULED
Qty: 360 TABLET | Refills: 3 | Status: SHIPPED | OUTPATIENT
Start: 2022-08-05 | End: 2022-08-05 | Stop reason: SDUPTHER

## 2022-08-05 NOTE — NURSING NOTE
Pt called at [address] [place] [address] because she has not received preauthorization for her isordil  Took all pharmacy information and contacted Dr Lula Whitlock with [address]  Could not get through to the pharmacy  Told patient if it is a preauthorization problem, the pharmacy would have to reach out to the doctor's office  I gave her the number for cardiology so she could have [facility] reach out in the morning and reiterated that if she was feeling unwell, she should seek medical help  Pt  continued to [place] around to the same information very repetitively and was swearing at this nurse on the phone regarding her "sub par treatment "     She will reach out to cardiology in the morning for medication clarification

## 2022-08-05 NOTE — TELEPHONE ENCOUNTER
Salud Reis called, she really wants to talk to you about what was going on with her in the hospital  Is there anyway you could talk to the patient [phone]  Thanks

## 2022-08-07 NOTE — ASSESSMENT & PLAN NOTE
· Had increased anxiety  · Home Klonopin was increased from 1 mg daily prn to twice daily prn at [facility] prior to transfer  · Was also begun on Seroquel at [place] which was discontinued after  new rash

## 2022-08-07 NOTE — ASSESSMENT & PLAN NOTE
· Initial concern for exacerbation during stay at [facility] prior to transfer here for which she was given 3 doses of Solu-Medrol 40 mg IV which was then discontinued  · Home regimen: Anoro Ellipta (62 5/25mcg) 1 puff daily  · Continue Xopenex/Atrovent nebs  · Was again on IV solumedrol - changed to oral prednisone 40 mg for 5 days

## 2022-08-07 NOTE — ASSESSMENT & PLAN NOTE
· On O2 at 2L at rest and 4L with exertion at home  · Worsening hypoxia due to acute on chronic systolic heart failure  · Wean O2 as able - now on RA

## 2022-08-07 NOTE — ASSESSMENT & PLAN NOTE
· Continuous opioid dependent  · home meds MS Contin and Percocet  · Currently on prn oxycodone  · No scripts given on discharge

## 2022-08-07 NOTE — ASSESSMENT & PLAN NOTE
· Uncontrolled hypertension  · Home meds : Carvedilol 3 125 mg BID, lasix 40 mg daily  · Currently on Carvedilol 25 mg twice daily, , Isordil 40 mg q 8 hours  Amlodipine - decrease post stent to 5 mg & then stopped    Hold hydralazine for possible rash  · Workup revealed high-grade stenosis of left renal artery - s/p left renal artery stenting on 8/1 by IR - cont DAPT, statin  · Cardiology following  · Cardiology/vascular input appreciated

## 2022-08-07 NOTE — ASSESSMENT & PLAN NOTE
Lab Results   Component Value Date    EGFR 10 08/03/2022    EGFR 9 08/02/2022    EGFR 5 08/01/2022    CREATININE 4 22 (H) 08/03/2022    CREATININE 4 63 (H) 08/02/2022    CREATININE 6 93 (H) 08/01/2022   · Baseline creatinine 1 to 1 1  · Begun on HD on 07/15 due to acute on chronic systolic heart failure unresponsive to diuretics with worsening hypoxic respiratory failure  · Currently on HD on MWF  · Perm cath/renal art stent done on 8/1 by IR  · Finally [place] [facility] [place] will be starting the next day after discharge at 11:00 a m  · CM worked with the patient for arranging transportation for dialysis

## 2022-08-07 NOTE — DISCHARGE SUMMARY
[facility]  Discharge- Ananth Walsh 1960, 64 y o  female MRN: [id number]  Unit/Bed#: [facility] 501-01 Encounter: [id number]  Primary Care Provider: Jeff Small DO   Date and time admitted to hospital: 7/22/2022  9:51 PM    Renal artery stenosis [facility])  Assessment & Plan  S/p left renal A stenting on 8/1 by IR    Acute on chronic respiratory failure with hypoxia ([facility] )  Assessment & Plan  · On O2 at 2L at rest and 4L with exertion at home  · Worsening hypoxia due to acute on chronic systolic heart failure  · Wean O2 as able - now on RA    Acute kidney injury superimposed on CKD-3  Assessment & Plan  Lab Results   Component Value Date    EGFR 10 08/03/2022    EGFR 9 08/02/2022    EGFR 5 08/01/2022    CREATININE 4 22 (H) 08/03/2022    CREATININE 4 63 (H) 08/02/2022    CREATININE 6 93 (H) 08/01/2022   · Baseline creatinine 1 to 1 1  · Begun on HD on 07/15 due to acute on chronic systolic heart failure unresponsive to diuretics with worsening hypoxic respiratory failure  · Currently on HD on MWF  · Perm cath/renal art stent done on 8/1 by IR  · Finally she was set up for dialysis at [place] and will be starting the next day after discharge at 11:00 a m  · CM worked with the patient for arranging transportation for dialysis      COPD (chronic obstructive pulmonary disease) ([facility] )  Assessment & Plan  · Initial concern for exacerbation during stay at [address] prior to transfer here for which she was given 3 doses of Solu-Medrol 40 mg IV which was then discontinued  · Home regimen: Anoro Ellipta (62 5/25mcg) 1 puff daily  · Continue Xopenex/Atrovent nebs  · Was again on IV solumedrol - changed to oral prednisone 40 mg for 5 days    Tobacco abuse  Assessment & Plan  · Nicotine patch  · Smoking cessation advised    Anxiety  Assessment & Plan  · Had increased anxiety  · Home Klonopin was increased from 1 mg daily prn to twice daily prn at [address] prior to transfer  · Was also begun on Seroquel at [place] which was discontinued after  new rash      Chronic pain  Assessment & Plan  · Continuous opioid dependent  · home meds MS Contin and Percocet  · Currently on prn oxycodone  · No scripts given on discharge    Essential hypertension  Assessment & Plan  · Uncontrolled hypertension  · Home meds : Carvedilol 3 125 mg BID, lasix 40 mg daily  · Currently on Carvedilol 25 mg twice daily, , Isordil 40 mg q 8 hours  Amlodipine - decrease post stent to 5 mg & then stopped  Hold hydralazine for possible rash  · Workup revealed high-grade stenosis of left renal artery - s/p left renal artery stenting on 8/1 by IR - cont DAPT, statin  · Cardiology following  · Cardiology/vascular input appreciated        * Acute on chronic systolic congestive heart failure (HCC)  Assessment & Plan  Wt Readings from Last 3 Encounters:   08/03/22 69 6 kg (153 lb 7 oz)   07/22/22 77 9 kg (171 lb 11 8 oz)   06/28/22 77 4 kg (170 lb 9 6 oz)     · Presented to [facility] on 7/12 with shortness of breath with minimal exertion  · Found to be in acute on chronic systolic heart failure  · Was unresponsive to diuretics and became anuric  · History of nonischemic cardiomyopathy - EF 18% on 3/37/21   Improved to 50% on 7/9/21  · EF 41% on 6/15/22 when admitted to [address] [id number] from 6/15/22 to 6/22/22 with acute on chronic combined CHF and COPD exacerbation  · Repeat echo on 7/14/22 with EF 25%  · Cardiac cath in March 2021 showed moderate nonobstructive atherosclerosis  · Currently dialysis dependent  · Volume control through hemodialysis           Medical Problems             Resolved Problems  Date Reviewed: 8/7/2022   None               Discharging Physician / Practitioner: Isabel Grover MD  PCP: Dina Mallory DO  Admission Date:   Admission Orders (From admission, onward)     Ordered        07/22/22 2204  Inpatient Admission  Once                      Discharge Date: 08/03/22    Consultations During Hospital Stay:  · Heart failure Service  · Nephrology  · Vascular surgery    Procedures Performed:   · PermCath placement by IR  · Renal artery stenting on left on 8/1 by IR    Significant Findings / Test Results:   IR renal angiogram   Final Result by Manjeet Denis MD (08/02 1041)   Impression:    1  Diffuse atherosclerosis demonstrated involving the aorta, mesenteric, and iliac vessels as described above with mild ectasia of the distal abdominal aorta and occlusion of the left common iliac artery  2  Preocclusive, 18 mm in length heavily calcified left main renal artery stenosis  3  Successful placement of a 5 mm x 19 mm balloon expandable stent with  no residual pressure gradient across the stented segment post intervention  Workstation performed: [id number]         IR tunneled dialysis catheter placement   Final Result by Manjeet Denis MD (08/02 1042)   Impression: Successful conversion of a temporary dialysis catheter to a 28 cm Ben split catheter as described above  Workstation performed: [id number]         XR chest portable   Final Result by Gayle Fisher MD (07/29 1347)      Pulmonary edema with small bilateral pleural effusions is similar to prior study  Workstation performed: [id number]         IR temporary dialysis catheter check/change/reposition   Final Result by Bill Flores MD (08/01 1057)      Insertion of right-sided non-tunneled dual-lumen temporary dialysis catheter  Chest radiograph was ordered to confirm catheter tip position        Workstation performed: [id number]         VAS renal artery complete   Final Result by Paco Martin MD (07/24 1949)              Reason for Admission:  Acute CHF    Hospital Course:   Asa Santos is a 64 y o  female patient who originally presented to the hospital on 7/22/2022 with PMH significant for CHF, COPD, HTN, anxiety, CKD who presented to [facility] ED on 7/12 with acute hypoxic respiratory failure  She was initially admitted to the floor but had increased WOB requiring BiPAP while in the ED  She was admitted to the ICU for further management  She was treated for a COPD exacerbation with IV steroids, azithromycin and nebulizer therapy  Echocardiogram was performed which revealed an EF of 25%  A PA catheter was placed and she was not in low output heart failure at that time  Antihypertensives were initiated  She also had an SHANTA and developed anuric renal failure  Diuretics were trialed but she ultimately required IHD via a RIJ temporary HD catheter  Patient required BiPAP earlier in her hospitalization but was able to be transitioned to [address]  Later, patient developed increased WOB and was placed on HFNC  She was transferred to [facility] for heart failure evaluation  Please see above list of diagnoses and related plan for additional information  Condition at Discharge: stable    Discharge Day Visit / Exam:   Subjective:  He has been crying for 2 days wanting to get discharged despite explaining her the process of setting up outpatient dialysis  She was about to walk out with her clothes in her hand when we found a place for dialysis and had to walk in the corridor to tell her that and to wait until we can do official discharge for her    Vitals: Blood Pressure: 139/90 (08/03/22 1443)  Pulse: 85 (08/03/22 1155)  Temperature: 98 3 °F (36 8 °C) (08/03/22 1443)  Temp Source: Oral (08/03/22 1155)  Respirations: 15 (08/03/22 1155)  Height: 5' 10" (177 8 cm) (07/22/22 2300)  Weight - Scale: 69 6 kg (153 lb 7 oz) (08/03/22 0904)  SpO2: 97 % (08/03/22 0720)  Exam:   Physical Exam  Vitals reviewed  HENT:      Head: Normocephalic and atraumatic  Mouth/Throat:      Mouth: Mucous membranes are moist    Cardiovascular:      Rate and Rhythm: Normal rate and regular rhythm  Heart sounds: Normal heart sounds     Pulmonary:      Effort: Pulmonary effort is normal  No respiratory distress  Breath sounds: Normal breath sounds  Abdominal:      General: There is no distension  Palpations: Abdomen is soft  Tenderness: There is no abdominal tenderness  Musculoskeletal:      Right lower leg: No edema  Left lower leg: No edema  Neurological:      Mental Status: She is alert and oriented to person, place, and time  Discussion with Family: Patient declined call to   Patient did not want me to call her daughter because she thinks her daughter does not want to do anything with her anymore and she has not been calling her    Discharge instructions/Information to patient and family:   See after visit summary for information provided to patient and family  Provisions for Follow-Up Care:  See after visit summary for information related to follow-up care and any pertinent home health orders  Disposition:   Home with VNA Services (Reminder: Complete face to face encounter)    Planned Readmission: no     Discharge Statement:  I spent 45 minutes discharging the patient  This time was spent on the day of discharge  I had direct contact with the patient on the day of discharge  Greater than 50% of the total time was spent examining patient, answering all patient questions, arranging and discussing plan of care with patient as well as directly providing post-discharge instructions  Additional time then spent on discharge activities  Discharge Medications:  See after visit summary for reconciled discharge medications provided to patient and/or family        **Please Note: This note may have been constructed using a voice recognition system**

## 2022-08-07 NOTE — ASSESSMENT & PLAN NOTE
Wt Readings from Last 3 Encounters:   08/03/22 69 6 kg (153 lb 7 oz)   07/22/22 77 9 kg (171 lb 11 8 oz)   06/28/22 77 4 kg (170 lb 9 6 oz)     · Presented to [facility] on 7/12 with shortness of breath with minimal exertion  · Found to be in acute on chronic systolic heart failure  · Was unresponsive to diuretics and became anuric  · History of nonischemic cardiomyopathy - EF 18% on 3/37/21   Improved to 50% on 7/9/21  · EF 41% on 6/15/22 when [facility] from 6/15/22 to 6/22/22 with acute on chronic combined CHF and COPD exacerbation  · Repeat echo on 7/14/22 with EF 25%  · Cardiac cath in March 2021 showed moderate nonobstructive atherosclerosis  · Currently dialysis dependent  · Volume control through hemodialysis

## 2022-08-18 ENCOUNTER — TELEPHONE (OUTPATIENT)
Dept: VASCULAR SURGERY | Facility: CLINIC | Age: 62
End: 2022-08-18

## 2022-08-18 NOTE — TELEPHONE ENCOUNTER
Called patient to cancel ov with [facility] on 8/30/2022 we are non-par with [facility](Medicare replacement)

## 2022-08-20 DIAGNOSIS — N17.9 AKI (ACUTE KIDNEY INJURY) (HCC): Primary | ICD-10-CM

## 2022-08-22 ENCOUNTER — APPOINTMENT (OUTPATIENT)
Dept: LAB | Facility: CLINIC | Age: 62
End: 2022-08-22
Payer: MEDICARE

## 2022-08-22 DIAGNOSIS — N17.9 AKI (ACUTE KIDNEY INJURY) (HCC): ICD-10-CM

## 2022-08-22 LAB
ANION GAP SERPL CALCULATED.3IONS-SCNC: 8 MMOL/L (ref 4–13)
BUN SERPL-MCNC: 31 MG/DL (ref 5–25)
CALCIUM SERPL-MCNC: 9.5 MG/DL (ref 8.3–10.1)
CHLORIDE SERPL-SCNC: 106 MMOL/L (ref 96–108)
CO2 SERPL-SCNC: 24 MMOL/L (ref 21–32)
CREAT SERPL-MCNC: 1.04 MG/DL (ref 0.6–1.3)
GFR SERPL CREATININE-BSD FRML MDRD: 58 ML/MIN/1.73SQ M
GLUCOSE P FAST SERPL-MCNC: 137 MG/DL (ref 65–99)
POTASSIUM SERPL-SCNC: 4.2 MMOL/L (ref 3.5–5.3)
SODIUM SERPL-SCNC: 138 MMOL/L (ref 135–147)

## 2022-08-22 PROCEDURE — 80048 BASIC METABOLIC PNL TOTAL CA: CPT

## 2022-08-22 PROCEDURE — 36415 COLL VENOUS BLD VENIPUNCTURE: CPT

## 2022-08-23 ENCOUNTER — TELEPHONE (OUTPATIENT)
Dept: CARDIOLOGY CLINIC | Facility: CLINIC | Age: 62
End: 2022-08-23

## 2022-08-23 DIAGNOSIS — N18.31 STAGE 3A CHRONIC KIDNEY DISEASE (HCC): Primary | ICD-10-CM

## 2022-08-23 NOTE — TELEPHONE ENCOUNTER
She wanted to let you know she was taken off dialysis  Her feet and ankles are very swollen since Saturday  She was told not to take Lasix so she is wondering what she can take  Recs?

## 2022-08-29 ENCOUNTER — HOSPITAL ENCOUNTER (OUTPATIENT)
Dept: INTERVENTIONAL RADIOLOGY/VASCULAR | Facility: HOSPITAL | Age: 62
Discharge: HOME | End: 2022-08-29
Attending: INTERNAL MEDICINE | Admitting: RADIOLOGY
Payer: MEDICARE

## 2022-08-29 DIAGNOSIS — N18.31 STAGE 3A CHRONIC KIDNEY DISEASE (CKD) (HCC): ICD-10-CM

## 2022-08-29 PROCEDURE — 36589 REMOVAL TUNNELED CV CATH: CPT

## 2022-08-29 PROCEDURE — 36589 REMOVAL TUNNELED CV CATH: CPT | Performed by: RADIOLOGY

## 2022-08-29 NOTE — PROCEDURES
Interventional Radiology Procedure Note    PATIENT NAME: Amari Longoria  : 1960  MRN: [id number]     Pre-op Diagnosis:   1  Stage 3a chronic kidney disease (CKD) ([facility] )      2  Senescent dialysis catheter    Post-op Diagnosis:   1  Stage 3a chronic kidney disease (CKD) ([facility] )      2     Same    Procedure:  Removal of tunneled dialysis catheter    Surgeon:   Roney Holt MD  Assistants:     No qualified resident was available, Resident is only observing    Estimated Blood Loss: Minimal     Findings:  No dissection or lidocaine need    Specimens: None     Complications:  None     Anesthesia: none    Roney Holt MD     Date: 2022  Time: 12:57 PM

## 2022-08-29 NOTE — DISCHARGE INSTRUCTIONS
[facility]  Interventional Radiology  Dr Robert Balderrama  [phone]

## 2022-08-30 ENCOUNTER — APPOINTMENT (OUTPATIENT)
Dept: LAB | Facility: CLINIC | Age: 62
End: 2022-08-30
Payer: MEDICARE

## 2022-08-30 ENCOUNTER — OFFICE VISIT (OUTPATIENT)
Dept: CARDIOLOGY CLINIC | Facility: CLINIC | Age: 62
End: 2022-08-30
Payer: MEDICARE

## 2022-08-30 VITALS
DIASTOLIC BLOOD PRESSURE: 104 MMHG | HEIGHT: 70 IN | WEIGHT: 168 LBS | BODY MASS INDEX: 24.05 KG/M2 | SYSTOLIC BLOOD PRESSURE: 180 MMHG | HEART RATE: 80 BPM

## 2022-08-30 DIAGNOSIS — N18.31 STAGE 3A CHRONIC KIDNEY DISEASE (HCC): ICD-10-CM

## 2022-08-30 DIAGNOSIS — I70.1 RENAL ARTERY STENOSIS (HCC): ICD-10-CM

## 2022-08-30 DIAGNOSIS — I42.0 DILATED CARDIOMYOPATHY (HCC): Chronic | ICD-10-CM

## 2022-08-30 DIAGNOSIS — I10 ESSENTIAL HYPERTENSION: Primary | Chronic | ICD-10-CM

## 2022-08-30 LAB
ANION GAP SERPL CALCULATED.3IONS-SCNC: 9 MMOL/L (ref 4–13)
BUN SERPL-MCNC: 24 MG/DL (ref 5–25)
CALCIUM SERPL-MCNC: 9.7 MG/DL (ref 8.3–10.1)
CHLORIDE SERPL-SCNC: 97 MMOL/L (ref 96–108)
CO2 SERPL-SCNC: 29 MMOL/L (ref 21–32)
CREAT SERPL-MCNC: 1.07 MG/DL (ref 0.6–1.3)
GFR SERPL CREATININE-BSD FRML MDRD: 56 ML/MIN/1.73SQ M
GLUCOSE SERPL-MCNC: 190 MG/DL (ref 65–140)
POTASSIUM SERPL-SCNC: 3.4 MMOL/L (ref 3.5–5.3)
SODIUM SERPL-SCNC: 135 MMOL/L (ref 135–147)

## 2022-08-30 PROCEDURE — 36415 COLL VENOUS BLD VENIPUNCTURE: CPT

## 2022-08-30 PROCEDURE — 99214 OFFICE O/P EST MOD 30 MIN: CPT | Performed by: INTERNAL MEDICINE

## 2022-08-30 PROCEDURE — 80048 BASIC METABOLIC PNL TOTAL CA: CPT

## 2022-08-30 RX ORDER — LISINOPRIL 10 MG/1
10 TABLET ORAL DAILY
Qty: 90 TABLET | Refills: 5 | Status: SHIPPED | OUTPATIENT
Start: 2022-08-30

## 2022-08-30 RX ORDER — MORPHINE SULFATE 30 MG/1
30 TABLET, FILM COATED, EXTENDED RELEASE ORAL EVERY 12 HOURS
COMMUNITY
Start: 2022-08-22

## 2022-08-30 RX ORDER — PREDNISONE 10 MG/1
TABLET ORAL
COMMUNITY
Start: 2022-08-14 | End: 2022-09-23

## 2022-08-30 RX ORDER — OXYCODONE HYDROCHLORIDE AND ACETAMINOPHEN 5; 325 MG/1; MG/1
1 TABLET ORAL EVERY 6 HOURS PRN
COMMUNITY
Start: 2022-08-22

## 2022-08-30 RX ORDER — CARVEDILOL 25 MG/1
25 TABLET ORAL 2 TIMES DAILY WITH MEALS
Qty: 60 TABLET | Refills: 5 | Status: SHIPPED | OUTPATIENT
Start: 2022-08-30

## 2022-08-30 RX ORDER — FUROSEMIDE 40 MG/1
40 TABLET ORAL DAILY PRN
COMMUNITY
End: 2022-09-09 | Stop reason: SDUPTHER

## 2022-08-30 NOTE — PROGRESS NOTES
Patient ID: Angela Mcfarland is a 64 y o  female  Plan:      Dilated cardiomyopathy ([address] )  Will recheck in 3 months  Essential hypertension  Still high  Now that renal artery is fixed, will start lisinopril with careful monitoring of BMP  Renal artery stenosis (HCC)  S/p left renal artery stenting  Remarkeable improvement  Follow up Plan/Other summary comments:  Return in about 3 months (around 2022)  HPI:  Patient is seen in follow-up today regarding the above as well as her recent long hospital stay  She was quite ill with renal failure and seemingly persistent pulmonary edema  She underwent left renal artery stenting  Remarkably she has improved and her creatinine is down to normal despite transiently being on dialysis  On 2021 she presented with acute heart failure and was at least briefly intubated  An echo the next day revealed ejection fraction less than 20% with moderate mitral regurgitation  Several days later she underwent coronary angiography which revealed moderate but nonobstructive disease  Echo on 2021 revealed near normal LV systolic function  However echo on 2022 again revealed severe LV dysfunction when she presented with heart failure  Most recent or relevant cardiac/vascular testing:    TTE 2021:  EF less than 20%  Coronary angiography 2021:  No high-grade proximal CAD  TTE 2021:  EF 50%  Mild enlargement of the ascending aorta to 3 7 cm  Mild mitral regurgitation    TTE 06/15/2022:  EF 41%  TTE 2022:  EF 25%          Past Surgical History:   Procedure Laterality Date    CARDIAC CATHETERIZATION  2021    Moderate non-obstructive atherosclerosis     SECTION      CHOLECYSTECTOMY      IR RENAL ANGIOGRAM  2022    IR TEMPORARY DIALYSIS CATHETER CHECK/CHANGE/REPOSITION  2022    IR TUNNELED DIALYSIS CATHETER PLACEMENT  2022    ROTATOR CUFF REPAIR W/ DISTAL CLAVICLE EXCISION Right     TONSILLECTOMY       CMP:   Lab Results   Component Value Date     04/05/2018    K 4 2 08/22/2022    K 4 6 04/05/2018     08/22/2022     04/05/2018    CO2 24 08/22/2022    CO2 32 (H) 04/05/2018    BUN 31 (H) 08/22/2022    BUN 14 04/05/2018    CREATININE 1 04 08/22/2022    CREATININE 0 72 04/05/2018    EGFR 58 08/22/2022       Lipid Profile:   Lab Results   Component Value Date    CHOL 216 (H) 04/05/2018    TRIG 91 05/12/2022    TRIG 168 (H) 04/05/2018    HDL 43 (L) 05/12/2022    HDL 43 04/05/2018         Review of Systems   10  point ROS  was otherwise non pertinent or negative except as per HPI or as below  Gait: Normal          Objective:     BP (!) 180/104   Pulse 80   Ht 5' 10" (1 778 m)   Wt 76 2 kg (168 lb)   BMI 24 11 kg/m²     PHYSICAL EXAM:  Right arm blood pressure lower than left  General:  Normal appearance in no distress  Eyes:  Anicteric  Oral mucosa:  Moist   Neck:  No JVD  Carotid upstrokes are brisk without bruits  No masses  Chest:  Clear to auscultation  Bilateral subclavian bruits right worse than left  Cardiac:  No palpable PMI  Normal S1 and S2   Grade 2 systolic murmur at the base radiating to the neck  No gallop or rub  Abdomen:  Soft and nontender  No palpable organomegaly or aortic enlargement  Extremities:  No peripheral edema  Musculoskeletal:  Symmetric  Vascular:  Femoral pulses are brisk without bruits  Popliteal and pedal pulses are absent bilaterally     Neuro:  Grossly symmetric  Psych:  Alert and oriented x3            Current Outpatient Medications:     albuterol (ProAir HFA) 90 mcg/act inhaler, Inhale 2 puffs every 6 (six) hours as needed for wheezing, Disp: 25 5 g, Rfl: 3    aspirin 81 mg chewable tablet, Chew 1 tablet (81 mg total) daily, Disp: , Rfl:     atorvastatin (LIPITOR) 40 mg tablet, Take 1 tablet (40 mg total) by mouth daily with dinner, Disp: 30 tablet, Rfl: 1    calcium acetate (PHOSLO) capsule, Take 2 capsules (1,334 mg total) by mouth 3 (three) times a day with meals, Disp: 180 capsule, Rfl: 0    carvedilol (COREG) 25 mg tablet, Take 1 tablet (25 mg total) by mouth 2 (two) times a day with meals, Disp: 60 tablet, Rfl: 5    Cholecalciferol 50 MCG (2000 UT) CAPS, Take by mouth daily, Disp: , Rfl:     clonazePAM (KlonoPIN) 1 mg tablet, Take 0 5-1 mg by mouth daily at bedtime as needed, Disp: , Rfl:     clopidogrel (PLAVIX) 75 mg tablet, Take 1 tablet (75 mg total) by mouth daily, Disp: 30 tablet, Rfl: 3    furosemide (LASIX) 40 mg tablet, Take 40 mg by mouth daily as needed, Disp: , Rfl:     lisinopril (ZESTRIL) 10 mg tablet, Take 1 tablet (10 mg total) by mouth daily, Disp: 90 tablet, Rfl: 5    MAGNESIUM PO, in the morning, Disp: , Rfl:     morphine (MS CONTIN) 30 mg 12 hr tablet, Take 30 mg by mouth every 12 (twelve) hours, Disp: , Rfl:     oxyCODONE-acetaminophen (PERCOCET) 5-325 mg per tablet, Take 1 tablet by mouth every 6 (six) hours as needed, Disp: , Rfl:     predniSONE 10 mg tablet, TAKE 3 TRABLET BY MOUTH DAILY FOR 5 DAYS AND THEN 2 TABLETS DAILY     (REFER TO PRESCRIPTION NOTES)  , Disp: , Rfl:     umeclidinium-vilanterol (Anoro Ellipta) 62 5-25 MCG/INH inhaler, Inhale 1 puff daily (Patient taking differently: Inhale 1 puff if needed), Disp: 60 blister, Rfl: 3  Allergies   Allergen Reactions    Wellbutrin [Bupropion] Arthralgia     Past Medical History:   Diagnosis Date    Cardi[facility]     Chronic combined systolic and diastolic congestive heart failure     COPD (chronic obstructive pulmonary disease) (HCC)     Fatty liver     Hyperlipidemia     Hypertension     Mitral regurgitation     Sepsis            Social History     Tobacco Use   Smoking Status Current Some Day Smoker    Packs/day: 2 00    Types: Cigarettes   Smokeless Tobacco Never Used

## 2022-09-01 ENCOUNTER — OFFICE VISIT (OUTPATIENT)
Dept: NEPHROLOGY | Facility: CLINIC | Age: 62
End: 2022-09-01
Payer: MEDICARE

## 2022-09-01 VITALS
HEIGHT: 70 IN | SYSTOLIC BLOOD PRESSURE: 140 MMHG | OXYGEN SATURATION: 95 % | BODY MASS INDEX: 23.68 KG/M2 | HEART RATE: 102 BPM | WEIGHT: 165.4 LBS | DIASTOLIC BLOOD PRESSURE: 70 MMHG

## 2022-09-01 DIAGNOSIS — D50.8 IRON DEFICIENCY ANEMIA DUE TO DIETARY CAUSES: ICD-10-CM

## 2022-09-01 DIAGNOSIS — N25.81 SECONDARY HYPERPARATHYROIDISM OF RENAL ORIGIN (HCC): ICD-10-CM

## 2022-09-01 DIAGNOSIS — E55.9 VITAMIN D DEFICIENCY: ICD-10-CM

## 2022-09-01 DIAGNOSIS — J96.21 ACUTE ON CHRONIC RESPIRATORY FAILURE WITH HYPOXIA (HCC): ICD-10-CM

## 2022-09-01 DIAGNOSIS — I42.0 DILATED CARDIOMYOPATHY (HCC): Chronic | ICD-10-CM

## 2022-09-01 DIAGNOSIS — N18.9 ACUTE KIDNEY INJURY SUPERIMPOSED ON CKD (HCC): ICD-10-CM

## 2022-09-01 DIAGNOSIS — Z72.0 TOBACCO ABUSE: ICD-10-CM

## 2022-09-01 DIAGNOSIS — J41.0 SIMPLE CHRONIC BRONCHITIS (HCC): ICD-10-CM

## 2022-09-01 DIAGNOSIS — I70.1 RENAL ARTERY STENOSIS (HCC): ICD-10-CM

## 2022-09-01 DIAGNOSIS — N17.9 ACUTE KIDNEY INJURY SUPERIMPOSED ON CKD (HCC): ICD-10-CM

## 2022-09-01 DIAGNOSIS — I50.23 ACUTE ON CHRONIC SYSTOLIC CONGESTIVE HEART FAILURE (HCC): Primary | ICD-10-CM

## 2022-09-01 PROCEDURE — 99204 OFFICE O/P NEW MOD 45 MIN: CPT | Performed by: INTERNAL MEDICINE

## 2022-09-01 NOTE — PROGRESS NOTES
[address] [facility] [place], [place]    Name: Federico Day  YOB: 1960      Assessment/Plan:       Problem List Items Addressed This Visit        Endocrine    Secondary hyperparathyroidism of renal origin [facility])     She will stop calcium acetate            Respiratory    COPD (chronic obstructive pulmonary disease) ([facility] )    Acute on chronic respiratory failure with hypoxia ([facility] )      Still continues to smoke 1 pack per day which I urged her to quit            Cardiovascular and Mediastinum    Dilated cardiomyopathy ([facility] ) (Chronic)      As above  Last EF was 25% hopefully will improve         Acute on chronic systolic congestive heart failure (HCC) - Primary     Wt Readings from Last 3 Encounters:   09/01/22 75 kg (165 lb 6 4 oz)   08/30/22 76 2 kg (168 lb)   08/03/22 69 6 kg (153 lb 7 oz)       Markedly improved  Her weight has been stable and she does not appear to have exertional dyspnea  She has an appoint with cardiology and is taking daily furosemide             Relevant Orders    Vitamin D 25 hydroxy    Comprehensive metabolic panel    CBC and differential    Magnesium    Iron Saturation %    Urinalysis with microscopic    Microalbumin / creatinine urine ratio    Renal artery stenosis ([facility] )      Had a left renal artery stent with marked improvement in her blood pressure  Right  Renal artery has greater than 60% stenosis and will need to be followed            Genitourinary    Acute kidney injury superimposed on CKD-3     Lab Results   Component Value Date    EGFR 56 08/30/2022    EGFR 58 08/22/2022    EGFR 10 08/03/2022    CREATININE 1 07 08/30/2022    CREATININE 1 04 08/22/2022    CREATININE 4 22 (H) 08/03/2022     She developed acute kidney injury secondary to acute tubular necrosis during her hospitalization  This was due to acute on chronic systolic failure as well as acute on chronic respiratory failure with hypoxia    She required intubation and initiation of dialysis for ultrafiltration  She did quite well  However, she was transferred to [facility] due to the need for heart failure evaluation   During [facility] she was dialyzed and was transferred out  She had 2 weeks of outpatient dialysis and then had renal recovery  Today her GFR is 56 mils per minute  Relevant Orders    Vitamin D 25 hydroxy    Comprehensive metabolic panel    CBC and differential    Magnesium    Iron Saturation %    Urinalysis with microscopic    Protein / creatinine ratio, urine    Microalbumin / creatinine urine ratio       Other    Vitamin D deficiency    Relevant Orders    Vitamin D 25 hydroxy    Tobacco abuse      Other Visit Diagnoses     Iron deficiency anemia due to dietary causes        Relevant Orders    Iron Saturation %            Subjective:      Patient ID: Anisa Rodgers is a 64 y o  female  referred by Dr Fern Arzola    HPI She was evaluated [facility] for severe acute kidney injury superimposed on chronic kidney disease  Her baseline creatinine was 1-1 1 mg/dL  She was started on dialysis on July 15 the setting of decompensated heart failure and hypoxemia respiratory failure refractory to diuretics  She then was transferred for heart failure evaluation  she had uncontrolled hypertension with possible renal artery stenosis  She was to have a renal artery angiogram when respiratory status improved  Renal artery duplex demonstrated right atrophic kidney and the mid right main renal artery could not be identified  Her left renal artery had greater than 60% stenosis  She underwent left renal artery stenting with marked  e improvement in her blood pressure   she has a history of dilated cardiomyopathy with EF of 25%  She did not need a life vest   According to the patient  She [facility] for [facility] and then had 1 day of dialysis at [facility] and then had renal recovery  She had the PermCath removed     She weighs at 160 lbs  She is still smoking one ppd         The following portions of the patient's history were reviewed and updated as appropriate: allergies, current medications, past family history, past medical history, past social history, past surgical history and problem list     Review of Systems   Constitutional: Positive for fatigue  HENT: Negative for hearing loss  Eyes: Negative for visual disturbance  Respiratory: Negative for cough, chest tightness and shortness of breath  Cardiovascular: Negative for chest pain, palpitations and leg swelling  Gastrointestinal: Positive for constipation  Negative for abdominal pain, blood in stool and diarrhea  Genitourinary: Negative for decreased urine volume, difficulty urinating, dysuria, hematuria and urgency  Clear yellow urine   Musculoskeletal: Positive for arthralgias and back pain  Neurological: Positive for light-headedness  Negative for dizziness and weakness  Hematological: Bruises/bleeds easily  Psychiatric/Behavioral: Negative for dysphoric mood  Social History     Socioeconomic History    Marital status: Single     Spouse name: None    Number of children: None    Years of education: None    Highest education level: None   Occupational History    None   Tobacco Use    Smoking status: Current Some Day Smoker     Packs/day: 2 00     Types: Cigarettes    Smokeless tobacco: Never Used   Vaping Use    Vaping Use: Never used   Substance and Sexual Activity    Alcohol use: Never    Drug use: No    Sexual activity: None   Other Topics Concern    None   Social History Narrative    None     Social Determinants of Health     Financial Resource Strain: Not on file   Food Insecurity: No Food Insecurity    Worried About Running Out of Food in the Last Year: Never true    Osbaldo of Food in the Last Year: Never true   Transportation Needs: No Transportation Needs    Lack of Transportation (Medical):  No    Lack of Transportation (Non-Medical):  No   Physical Activity: Not on file   Stress: Not on file   Social Connections: Not on file   Intimate Partner Violence: Not on file   Housing Stability: Low Risk     Unable to Pay for Housing in the Last Year: No    Number of Places Lived in the Last Year: 1    Unstable Housing in the Last Year: No     Past Medical History:   Diagnosis Date    [facility]     Chronic combined systolic and diastolic congestive heart failure     COPD [facility] obstructive [facility]     Fatty liver     Hyperlipidemia     Hypertension     Mitral regurgitation     Sepsis      Past Surgical History:   Procedure Laterality Date    CARDIAC CATHETERIZATION  2021    Moderate non-obstructive atherosclerosis     SECTION      CHOLECYSTECTOMY      IR RENAL ANGIOGRAM  2022    IR TEMPORARY DIALYSIS CATHETER CHECK/CHANGE/REPOSITION  2022    IR TUNNELED DIALYSIS CATHETER PLACEMENT  2022    ROTATOR CUFF REPAIR W/ DISTAL CLAVICLE EXCISION Right     TONSILLECTOMY         Current Outpatient Medications:     albuterol (ProAir HFA) 90 mcg/act inhaler, Inhale 2 puffs every 6 (six) hours as needed for wheezing, Disp: 25 5 g, Rfl: 3    aspirin 81 mg chewable tablet, Chew 1 tablet (81 mg total) daily, Disp: , Rfl:     atorvastatin (LIPITOR) 40 mg tablet, Take 1 tablet (40 mg total) by mouth daily with dinner, Disp: 30 tablet, Rfl: 1    carvedilol (COREG) 25 mg tablet, Take 1 tablet (25 mg total) by mouth 2 (two) times a day with meals, Disp: 60 tablet, Rfl: 5    Cholecalciferol 50 MCG ( UT) CAPS, Take by mouth daily, Disp: , Rfl:     clonazePAM (KlonoPIN) 1 mg tablet, Take 0 5-1 mg by mouth daily at bedtime as needed, Disp: , Rfl:     clopidogrel (PLAVIX) 75 mg tablet, Take 1 tablet (75 mg total) by mouth daily, Disp: 30 tablet, Rfl: 3    furosemide (LASIX) 40 mg tablet, Take 40 mg by mouth daily as needed, Disp: , Rfl:     lisinopril (ZESTRIL) 10 mg tablet, Take 1 tablet (10 mg total) by mouth daily, Disp: 90 tablet, Rfl: 5    MAGNESIUM PO, in the morning, Disp: , Rfl:     morphine (MS CONTIN) 30 mg 12 hr tablet, Take 30 mg by mouth every 12 (twelve) hours, Disp: , Rfl:     oxyCODONE-acetaminophen (PERCOCET) 5-325 mg per tablet, Take 1 tablet by mouth every 6 (six) hours as needed, Disp: , Rfl:     predniSONE 10 mg tablet, TAKE 3 TRABLET BY MOUTH DAILY FOR 5 DAYS AND THEN 2 TABLETS DAILY     (REFER TO PRESCRIPTION NOTES)  , Disp: , Rfl:     umeclidinium-vilanterol (Anoro Ellipta) 62 5-25 MCG/INH inhaler, Inhale 1 puff daily (Patient taking differently: Inhale 1 puff if needed), Disp: 60 blister, Rfl: 3    Lab Results   Component Value Date     04/05/2018    SODIUM 135 08/30/2022    K 3 4 (L) 08/30/2022    CL 97 08/30/2022    CO2 29 08/30/2022    ANIONGAP 11 6 04/05/2018    AGAP 9 08/30/2022    BUN 24 08/30/2022    CREATININE 1 07 08/30/2022    GLUC 190 (H) 08/30/2022    GLUF 137 (H) 08/22/2022    CALCIUM 9 7 08/30/2022    AST 15 07/23/2022    ALT 18 07/23/2022    ALKPHOS 92 07/23/2022    PROT 8 1 04/05/2018    TP 7 7 07/23/2022    BILITOT 0 5 04/05/2018    TBILI 0 83 07/23/2022    EGFR 56 08/30/2022     Lab Results   Component Value Date    WBC 11 86 (H) 07/31/2022    HGB 11 4 (L) 07/31/2022    HCT 34 7 (L) 07/31/2022    MCV 87 07/31/2022     07/31/2022     Lab Results   Component Value Date    CHOLESTEROL 174 05/12/2022    CHOLESTEROL 190 02/09/2022    CHOLESTEROL 201 (H) 06/22/2021     Lab Results   Component Value Date    HDL 43 (L) 05/12/2022    HDL 49 (L) 02/09/2022    HDL 47 06/22/2021     Lab Results   Component Value Date    LDLCALC 113 (H) 05/12/2022    LDLCALC 121 (H) 02/09/2022    LDLCALC 125 (H) 06/22/2021     Lab Results   Component Value Date    TRIG 91 05/12/2022    TRIG 102 02/09/2022    TRIG 144 06/22/2021     No results found for: [place], [place]  Lab Results   Component Value Date    ZTV7FLDSNZGG 0 348 (L) 06/16/2022     Lab Results   Component Value Date    PTH No plasma specimen received  04/05/2018    CALCIUM 9 7 08/30/2022    PHOS 2 4 08/03/2022     No results found for: SPEP, UPEP  No results found for: VASQUEZ [id number]        Objective:      /70   Pulse 102   Ht 5' 10" (1 778 m)   Wt 75 kg (165 lb 6 4 oz)   SpO2 95%   BMI 23 73 kg/m²          Physical Exam  Vitals reviewed  Constitutional:       General: She is not in acute distress  Appearance: She is normal weight  She is not toxic-appearing or diaphoretic  HENT:      Head: Normocephalic and atraumatic  Right Ear: External ear normal       Left Ear: External ear normal    Eyes:      Extraocular Movements: Extraocular movements intact  Conjunctiva/sclera: Conjunctivae normal       Pupils: Pupils are equal, round, and reactive to light  Neck:      Vascular: No carotid bruit  Cardiovascular:      Rate and Rhythm: Normal rate and regular rhythm  Heart sounds: Murmur heard  Pulmonary:      Breath sounds: Rhonchi present  Abdominal:      General: Bowel sounds are normal  There is no distension  Palpations: Abdomen is soft  Tenderness: There is no abdominal tenderness  Musculoskeletal:         General: Normal range of motion  Cervical back: Normal range of motion and neck supple  No rigidity  Right lower leg: No edema  Left lower leg: No edema  Lymphadenopathy:      Cervical: No cervical adenopathy  Skin:     General: Skin is warm and dry  Findings: Bruising present  Neurological:      General: No focal deficit present  Mental Status: She is alert  Mental status is at baseline  Motor: No weakness  Gait: Gait normal    Psychiatric:         Mood and Affect: Mood normal          Behavior: Behavior normal          Thought Content:  Thought content normal          Judgment: Judgment normal

## 2022-09-01 NOTE — PATIENT INSTRUCTIONS
Stop calcium acetate  Watch your morning weight to evaluate for fluid overload  Quit smoking  Have labs in 3 months and return

## 2022-09-01 NOTE — ASSESSMENT & PLAN NOTE
Lab Results   Component Value Date    EGFR 56 08/30/2022    EGFR 58 08/22/2022    EGFR 10 08/03/2022    CREATININE 1 07 08/30/2022    CREATININE 1 04 08/22/2022    CREATININE 4 22 (H) 08/03/2022     She developed acute kidney injury secondary to acute tubular necrosis during her hospitalization  This was due to acute on chronic systolic failure as well as acute on chronic respiratory failure with hypoxia  She required intubation and initiation of dialysis for ultrafiltration  She did quite well  However, she was transferred to [facility] due to the need for heart failure evaluation   During [facility] she was dialyzed and was transferred out  She had 2 weeks of outpatient dialysis and then had renal recovery  Today her GFR is 56 mils per minute

## 2022-09-01 NOTE — ASSESSMENT & PLAN NOTE
Had a left renal artery stent with marked improvement in her blood pressure  Right  Renal artery has greater than 60% stenosis and will need to be followed

## 2022-09-01 NOTE — ASSESSMENT & PLAN NOTE
Wt Readings from Last 3 Encounters:   09/01/22 75 kg (165 lb 6 4 oz)   08/30/22 76 2 kg (168 lb)   08/03/22 69 6 kg (153 lb 7 oz)       Markedly improved  Her weight has been stable and she does not appear to have exertional dyspnea      She has an appoint with cardiology and is taking daily furosemide

## 2022-09-09 DIAGNOSIS — R60.9 EDEMA, UNSPECIFIED TYPE: Primary | ICD-10-CM

## 2022-09-09 RX ORDER — FUROSEMIDE 40 MG/1
40 TABLET ORAL DAILY PRN
Qty: 30 TABLET | Refills: 5 | Status: SHIPPED | OUTPATIENT
Start: 2022-09-09 | End: 2022-09-23

## 2022-09-16 ENCOUNTER — APPOINTMENT (OUTPATIENT)
Dept: RADIOLOGY | Facility: HOSPITAL | Age: 62
End: 2022-09-16
Payer: MEDICARE

## 2022-09-16 ENCOUNTER — HOSPITAL ENCOUNTER (EMERGENCY)
Facility: HOSPITAL | Age: 62
Discharge: HOME | End: 2022-09-16
Attending: EMERGENCY MEDICINE
Payer: MEDICARE

## 2022-09-16 VITALS
SYSTOLIC BLOOD PRESSURE: 138 MMHG | BODY MASS INDEX: 24.3 KG/M2 | OXYGEN SATURATION: 92 % | DIASTOLIC BLOOD PRESSURE: 75 MMHG | HEART RATE: 96 BPM | RESPIRATION RATE: 21 BRPM | TEMPERATURE: 100.5 F | HEIGHT: 70 IN | WEIGHT: 169.75 LBS

## 2022-09-16 DIAGNOSIS — J44.1 COPD WITH ACUTE EXACERBATION (HCC): Primary | ICD-10-CM

## 2022-09-16 DIAGNOSIS — J18.9 PNEUMONIA: ICD-10-CM

## 2022-09-16 LAB
2HR DELTA HS TROPONIN: -3 NG/L
ANION GAP SERPL CALCULATED.3IONS-SCNC: 9 MMOL/L (ref 4–13)
ATRIAL RATE: 93 BPM
BASOPHILS # BLD AUTO: 0.05 THOUSANDS/ΜL (ref 0–0.1)
BASOPHILS NFR BLD AUTO: 1 % (ref 0–1)
BILIRUB UR QL STRIP: NEGATIVE
BNP SERPL-MCNC: 124 PG/ML (ref 0–100)
BUN SERPL-MCNC: 17 MG/DL (ref 5–25)
CALCIUM SERPL-MCNC: 10.1 MG/DL (ref 8.4–10.2)
CARDIAC TROPONIN I PNL SERPL HS: 23 NG/L
CARDIAC TROPONIN I PNL SERPL HS: 26 NG/L
CHLORIDE SERPL-SCNC: 95 MMOL/L (ref 96–108)
CLARITY UR: CLEAR
CO2 SERPL-SCNC: 28 MMOL/L (ref 21–32)
COLOR UR: NORMAL
CREAT SERPL-MCNC: 0.81 MG/DL (ref 0.6–1.3)
EOSINOPHIL # BLD AUTO: 0.03 THOUSAND/ΜL (ref 0–0.61)
EOSINOPHIL NFR BLD AUTO: 0 % (ref 0–6)
ERYTHROCYTE [DISTWIDTH] IN BLOOD BY AUTOMATED COUNT: 14.9 % (ref 11.6–15.1)
FLUAV RNA RESP QL NAA+PROBE: NEGATIVE
FLUBV RNA RESP QL NAA+PROBE: NEGATIVE
GFR SERPL CREATININE-BSD FRML MDRD: 78 ML/MIN/1.73SQ M
GLUCOSE SERPL-MCNC: 118 MG/DL (ref 65–140)
GLUCOSE UR STRIP-MCNC: NEGATIVE MG/DL
HCT VFR BLD AUTO: 45 % (ref 34.8–46.1)
HGB BLD-MCNC: 14.3 G/DL (ref 11.5–15.4)
HGB UR QL STRIP.AUTO: NEGATIVE
IMM GRANULOCYTES # BLD AUTO: 0.04 THOUSAND/UL (ref 0–0.2)
IMM GRANULOCYTES NFR BLD AUTO: 1 % (ref 0–2)
KETONES UR STRIP-MCNC: NEGATIVE MG/DL
LEUKOCYTE ESTERASE UR QL STRIP: NEGATIVE
LYMPHOCYTES # BLD AUTO: 0.88 THOUSANDS/ΜL (ref 0.6–4.47)
LYMPHOCYTES NFR BLD AUTO: 11 % (ref 14–44)
MAGNESIUM SERPL-MCNC: 1.9 MG/DL (ref 1.9–2.7)
MCH RBC QN AUTO: 28.5 PG (ref 26.8–34.3)
MCHC RBC AUTO-ENTMCNC: 31.8 G/DL (ref 31.4–37.4)
MCV RBC AUTO: 90 FL (ref 82–98)
MONOCYTES # BLD AUTO: 0.4 THOUSAND/ΜL (ref 0.17–1.22)
MONOCYTES NFR BLD AUTO: 5 % (ref 4–12)
NEUTROPHILS # BLD AUTO: 6.98 THOUSANDS/ΜL (ref 1.85–7.62)
NEUTS SEG NFR BLD AUTO: 82 % (ref 43–75)
NITRITE UR QL STRIP: NEGATIVE
NRBC BLD AUTO-RTO: 0 /100 WBCS
P AXIS: 41 DEGREES
PH UR STRIP.AUTO: 7 [PH]
PLATELET # BLD AUTO: 307 THOUSANDS/UL (ref 149–390)
PMV BLD AUTO: 9 FL (ref 8.9–12.7)
POTASSIUM SERPL-SCNC: 4.1 MMOL/L (ref 3.5–5.3)
PR INTERVAL: 184 MS
PROT UR STRIP-MCNC: NEGATIVE MG/DL
QRS AXIS: 41 DEGREES
QRSD INTERVAL: 82 MS
QT INTERVAL: 366 MS
QTC INTERVAL: 455 MS
RBC # BLD AUTO: 5.01 MILLION/UL (ref 3.81–5.12)
RSV RNA RESP QL NAA+PROBE: NEGATIVE
SARS-COV-2 RNA RESP QL NAA+PROBE: NEGATIVE
SODIUM SERPL-SCNC: 132 MMOL/L (ref 135–147)
SP GR UR STRIP.AUTO: 1.01 (ref 1–1.03)
T WAVE AXIS: 84 DEGREES
UROBILINOGEN UR QL STRIP.AUTO: 0.2 E.U./DL
VENTRICULAR RATE: 93 BPM
WBC # BLD AUTO: 8.38 THOUSAND/UL (ref 4.31–10.16)

## 2022-09-16 PROCEDURE — 36415 COLL VENOUS BLD VENIPUNCTURE: CPT | Performed by: EMERGENCY MEDICINE

## 2022-09-16 PROCEDURE — 80048 BASIC METABOLIC PNL TOTAL CA: CPT | Performed by: EMERGENCY MEDICINE

## 2022-09-16 PROCEDURE — 99285 EMERGENCY DEPT VISIT HI MDM: CPT | Performed by: EMERGENCY MEDICINE

## 2022-09-16 PROCEDURE — 71045 X-RAY EXAM CHEST 1 VIEW: CPT

## 2022-09-16 PROCEDURE — 83880 ASSAY OF NATRIURETIC PEPTIDE: CPT | Performed by: EMERGENCY MEDICINE

## 2022-09-16 PROCEDURE — 93010 ELECTROCARDIOGRAM REPORT: CPT | Performed by: INTERNAL MEDICINE

## 2022-09-16 PROCEDURE — 96374 THER/PROPH/DIAG INJ IV PUSH: CPT

## 2022-09-16 PROCEDURE — 83735 ASSAY OF MAGNESIUM: CPT | Performed by: EMERGENCY MEDICINE

## 2022-09-16 PROCEDURE — 94640 AIRWAY INHALATION TREATMENT: CPT

## 2022-09-16 PROCEDURE — 84484 ASSAY OF TROPONIN QUANT: CPT | Performed by: EMERGENCY MEDICINE

## 2022-09-16 PROCEDURE — 96375 TX/PRO/DX INJ NEW DRUG ADDON: CPT

## 2022-09-16 PROCEDURE — 93005 ELECTROCARDIOGRAM TRACING: CPT

## 2022-09-16 PROCEDURE — 99285 EMERGENCY DEPT VISIT HI MDM: CPT

## 2022-09-16 PROCEDURE — 0241U HB NFCT DS VIR RESP RNA 4 TRGT: CPT | Performed by: EMERGENCY MEDICINE

## 2022-09-16 PROCEDURE — 85025 COMPLETE CBC W/AUTO DIFF WBC: CPT | Performed by: EMERGENCY MEDICINE

## 2022-09-16 PROCEDURE — 81003 URINALYSIS AUTO W/O SCOPE: CPT | Performed by: EMERGENCY MEDICINE

## 2022-09-16 RX ORDER — AZITHROMYCIN 250 MG/1
500 TABLET, FILM COATED ORAL ONCE
Status: COMPLETED | OUTPATIENT
Start: 2022-09-16 | End: 2022-09-16

## 2022-09-16 RX ORDER — AZITHROMYCIN 250 MG/1
TABLET, FILM COATED ORAL
Qty: 4 TABLET | Refills: 0 | Status: SHIPPED | OUTPATIENT
Start: 2022-09-17 | End: 2022-09-23

## 2022-09-16 RX ORDER — ACETAMINOPHEN 325 MG/1
650 TABLET ORAL ONCE
Status: COMPLETED | OUTPATIENT
Start: 2022-09-16 | End: 2022-09-16

## 2022-09-16 RX ORDER — IPRATROPIUM BROMIDE AND ALBUTEROL SULFATE .5; 3 MG/3ML; MG/3ML
1 SOLUTION RESPIRATORY (INHALATION) ONCE
Status: COMPLETED | OUTPATIENT
Start: 2022-09-16 | End: 2022-09-16

## 2022-09-16 RX ORDER — IPRATROPIUM BROMIDE AND ALBUTEROL SULFATE 2.5; .5 MG/3ML; MG/3ML
3 SOLUTION RESPIRATORY (INHALATION) ONCE
Status: COMPLETED | OUTPATIENT
Start: 2022-09-16 | End: 2022-09-16

## 2022-09-16 RX ORDER — AMOXICILLIN AND CLAVULANATE POTASSIUM 875; 125 MG/1; MG/1
1 TABLET, FILM COATED ORAL ONCE
Status: COMPLETED | OUTPATIENT
Start: 2022-09-16 | End: 2022-09-16

## 2022-09-16 RX ORDER — METHYLPREDNISOLONE SODIUM SUCCINATE 125 MG/2ML
125 INJECTION, POWDER, LYOPHILIZED, FOR SOLUTION INTRAMUSCULAR; INTRAVENOUS ONCE
Status: COMPLETED | OUTPATIENT
Start: 2022-09-16 | End: 2022-09-16

## 2022-09-16 RX ORDER — PREDNISONE 10 MG/1
TABLET ORAL
Qty: 35 TABLET | Refills: 0 | Status: SHIPPED | OUTPATIENT
Start: 2022-09-16 | End: 2022-09-23

## 2022-09-16 RX ORDER — FUROSEMIDE 10 MG/ML
40 INJECTION INTRAMUSCULAR; INTRAVENOUS ONCE
Status: COMPLETED | OUTPATIENT
Start: 2022-09-16 | End: 2022-09-16

## 2022-09-16 RX ORDER — POTASSIUM CHLORIDE 20 MEQ/1
20 TABLET, EXTENDED RELEASE ORAL ONCE
Status: COMPLETED | OUTPATIENT
Start: 2022-09-16 | End: 2022-09-16

## 2022-09-16 RX ORDER — AMOXICILLIN AND CLAVULANATE POTASSIUM 875; 125 MG/1; MG/1
1 TABLET, FILM COATED ORAL EVERY 12 HOURS
Qty: 20 TABLET | Refills: 0 | Status: SHIPPED | OUTPATIENT
Start: 2022-09-16 | End: 2022-09-23

## 2022-09-16 RX ADMIN — AZITHROMYCIN MONOHYDRATE 500 MG: 250 TABLET ORAL at 13:06

## 2022-09-16 RX ADMIN — IPRATROPIUM BROMIDE AND ALBUTEROL SULFATE 3 ML: 2.5; .5 SOLUTION RESPIRATORY (INHALATION) at 11:19

## 2022-09-16 RX ADMIN — IPRATROPIUM BROMIDE AND ALBUTEROL SULFATE 3 ML: 2.5; .5 SOLUTION RESPIRATORY (INHALATION) at 13:50

## 2022-09-16 RX ADMIN — FUROSEMIDE 40 MG: 10 INJECTION, SOLUTION INTRAMUSCULAR; INTRAVENOUS at 11:19

## 2022-09-16 RX ADMIN — POTASSIUM CHLORIDE 20 MEQ: 1500 TABLET, EXTENDED RELEASE ORAL at 11:19

## 2022-09-16 RX ADMIN — ACETAMINOPHEN 650 MG: 325 TABLET ORAL at 13:06

## 2022-09-16 RX ADMIN — AMOXICILLIN AND CLAVULANATE POTASSIUM 1 TABLET: 875; 125 TABLET, FILM COATED ORAL at 13:06

## 2022-09-16 RX ADMIN — METHYLPREDNISOLONE SODIUM SUCCINATE 125 MG: 125 INJECTION, POWDER, FOR SOLUTION INTRAMUSCULAR; INTRAVENOUS at 11:19

## 2022-09-16 NOTE — DISCHARGE INSTRUCTIONS
Return if symptoms worsen or if new symptoms develop  Take your albuterol as prescribed   If symptoms don't improve with albuterol every 4-6 hours as needed for breathing return to the ED  Wear your oxygen as needed  Take the steroids as prescribed

## 2022-09-16 NOTE — ED NOTES
Pt ambulated to the BR O2 sat ranging from  89% to up   Pt denies SOB , states she feels better  Provider notified  Yeimy Salgado  09/16/22 1177

## 2022-09-16 NOTE — ED PROVIDER NOTES
History  Chief Complaint   Patient presents with    Shortness of Breath     Arrives via EMS  c/o SOB  Pt has hx of COPD O2 sat was 86% per EMS douneb given O2 sat went up to 99%  Pt report using O2 4L as needed at home , pt O2 sat 86-89%  RA in ER  60-year-old female with history of COPD, CHF, presenting with shortness of breath worsening over last several days  Patient was given a DuoNeb which improved her symptoms  Patient reports that she is on 4 L nasal cannula as needed, which she normally does not use but did start requiring over the last several days  She describes a cough with productive phlegm, no color  She denies fevers or chills  Patient is on chronic prednisone therapy and was attempting to taper herself off and recently decreased her dose over last several days  Shortness of Breath  Severity:  Mild  Onset quality:  Gradual  Timing:  Constant  Progression:  Worsening  Chronicity:  Recurrent  Relieved by:  Nothing  Worsened by:  Nothing  Associated symptoms: cough    Associated symptoms: no abdominal pain, no chest pain, no fever, no rash, no sore throat, no vomiting and no wheezing        Prior to Admission Medications   Prescriptions Last Dose Informant Patient Reported? Taking?    Cholecalciferol 50 MCG (2000 UT) CAPS   Yes No   Sig: Take by mouth daily   MAGNESIUM PO   Yes No   Sig: in the morning   albuterol (ProAir HFA) 90 mcg/act inhaler   No No   Sig: Inhale 2 puffs every 6 (six) hours as needed for wheezing   aspirin 81 mg chewable tablet   No No   Sig: Chew 1 tablet (81 mg total) daily   atorvastatin (LIPITOR) 40 mg tablet   No No   Sig: Take 1 tablet (40 mg total) by mouth daily with dinner   carvedilol (COREG) 25 mg tablet   No No   Sig: Take 1 tablet (25 mg total) by mouth 2 (two) times a day with meals   clonazePAM (KlonoPIN) 1 mg tablet   Yes No   Sig: Take 0 5-1 mg by mouth daily at bedtime as needed   clopidogrel (PLAVIX) 75 mg tablet   No No   Sig: Take 1 tablet (75 mg total) by mouth daily   furosemide (LASIX) 40 mg tablet   No No   Sig: Take 1 tablet (40 mg total) by mouth daily as needed (As needed)   lisinopril (ZESTRIL) 10 mg tablet   No No   Sig: Take 1 tablet (10 mg total) by mouth daily   morphine (MS CONTIN) 30 mg 12 hr tablet   Yes No   Sig: Take 30 mg by mouth every 12 (twelve) hours   oxyCODONE-acetaminophen (PERCOCET) 5-325 mg per tablet   Yes No   Sig: Take 1 tablet by mouth every 6 (six) hours as needed   predniSONE 10 mg tablet   Yes No   Sig: TAKE 3 TRABLET BY MOUTH DAILY FOR 5 DAYS AND THEN 2 TABLETS DAILY     (REFER TO PRESCRIPTION NOTES)  umeclidinium-vilanterol (Anoro Ellipta) 62 5-25 MCG/INH inhaler   No No   Sig: Inhale 1 puff daily   Patient taking differently: Inhale 1 puff if needed      Facility-Administered Medications: None       Past Medical History:   Diagnosis Date    Cardiomyopathy ([facility] )     Chronic combined systolic and diastolic congestive heart failure     COPD (chronic obstructive pulmonary disease) (HCC)     Fatty liver     Hyperlipidemia     Hypertension     Mitral regurgitation     Sepsis        Past Surgical History:   Procedure Laterality Date    CARDIAC CATHETERIZATION  2021    Moderate non-obstructive atherosclerosis     SECTION      CHOLECYSTECTOMY      IR RENAL ANGIOGRAM  2022    IR TEMPORARY DIALYSIS CATHETER CHECK/CHANGE/REPOSITION  2022    IR TUNNELED DIALYSIS CATHETER PLACEMENT  2022    IR TUNNELED DIALYSIS CATHETER REMOVAL  2022    ROTATOR CUFF REPAIR W/ DISTAL CLAVICLE EXCISION Right     TONSILLECTOMY         History reviewed  No pertinent family history  I have reviewed and agree with the history as documented      E-Cigarette/Vaping    E-Cigarette Use Never User     Comments Pt states she want to quit smoking      E-Cigarette/Vaping Substances    Nicotine Yes     THC No     CBD No     Flavoring No     Other No     Unknown No      Social History     Tobacco Use    Smoking status: Current Some Day Smoker     Packs/day: 2 00     Types: Cigarettes    Smokeless tobacco: Never Used   Vaping Use    Vaping Use: Never used   Substance Use Topics    Alcohol use: Never    Drug use: No       Review of Systems   Constitutional: Negative for chills and fever  HENT: Negative for congestion, nosebleeds, rhinorrhea and sore throat  Eyes: Negative for pain and visual disturbance  Respiratory: Positive for cough and shortness of breath  Negative for wheezing  Cardiovascular: Negative for chest pain and leg swelling  Gastrointestinal: Negative for abdominal distention, abdominal pain, diarrhea, nausea and vomiting  Genitourinary: Negative for dysuria and frequency  Musculoskeletal: Negative for back pain and joint swelling  Skin: Negative for rash and wound  Neurological: Negative for weakness and numbness  Psychiatric/Behavioral: Negative for decreased concentration and suicidal ideas  Physical Exam  Physical Exam  Vitals and nursing note reviewed  Constitutional:       Appearance: She is well-developed  HENT:      Head: Normocephalic and atraumatic  Eyes:      Conjunctiva/sclera: Conjunctivae normal       Pupils: Pupils are equal, round, and reactive to light  Neck:      Trachea: No tracheal deviation  Cardiovascular:      Rate and Rhythm: Normal rate and regular rhythm  Heart sounds: Normal heart sounds  No murmur heard  Pulmonary:      Effort: Pulmonary effort is normal  No respiratory distress  Breath sounds: Examination of the right-lower field reveals rales  Examination of the left-lower field reveals rales  Wheezing and rales present  Abdominal:      General: Bowel sounds are normal  There is no distension  Palpations: Abdomen is soft  Tenderness: There is no abdominal tenderness  Musculoskeletal:         General: No deformity  Cervical back: Normal range of motion and neck supple     Skin:     General: Skin is warm and dry  Capillary Refill: Capillary refill takes less than 2 seconds  Neurological:      Mental Status: She is alert and oriented to person, place, and time  Sensory: No sensory deficit     Psychiatric:         Judgment: Judgment normal          Vital Signs  ED Triage Vitals [09/16/22 0943]   Temperature Pulse Respirations Blood Pressure SpO2   99 2 °F (37 3 °C) 93 21 168/82 (!) 88 %      Temp Source Heart Rate Source Patient Position - Orthostatic VS BP Location FiO2 (%)   Tympanic Monitor Lying Left arm --      Pain Score       No Pain           Vitals:    09/16/22 1230 09/16/22 1300 09/16/22 1328 09/16/22 1345   BP: 152/73 148/76 138/75    Pulse: 92 93 97 96   Patient Position - Orthostatic VS:  Lying Lying Lying         Visual Acuity      ED Medications  Medications   ipratropium-albuterol (FOR EMS ONLY) (DUO-NEB) 0 5-2 5 mg/3 mL inhalation solution 3 mL (0 mL Does not apply Given to EMS 9/16/22 0947)   furosemide (LASIX) injection 40 mg (40 mg Intravenous Given 9/16/22 1119)   potassium chloride (K-DUR,KLOR-CON) CR tablet 20 mEq (20 mEq Oral Given 9/16/22 1119)   ipratropium-albuterol (DUO-NEB) 0 5-2 5 mg/3 mL inhalation solution 3 mL (3 mL Nebulization Given 9/16/22 1119)   methylPREDNISolone sodium succinate (Solu-MEDROL) injection 125 mg (125 mg Intravenous Given 9/16/22 1119)   amoxicillin-clavulanate (AUGMENTIN) 875-125 mg per tablet 1 tablet (1 tablet Oral Given 9/16/22 1306)   azithromycin (ZITHROMAX) tablet 500 mg (500 mg Oral Given 9/16/22 1306)   acetaminophen (TYLENOL) tablet 650 mg (650 mg Oral Given 9/16/22 1306)   ipratropium-albuterol (DUO-NEB) 0 5-2 5 mg/3 mL inhalation solution 3 mL (3 mL Nebulization Given 9/16/22 1350)       Diagnostic Studies  Results Reviewed     Procedure Component Value Units Date/Time    UA w Reflex to Microscopic w Reflex to Culture [[id number]]  (Normal) Collected: 09/16/22 1325    Lab Status: Final result Specimen: Urine, Clean Catch Updated: 09/16/22 8366 Color, UA Straw     Clarity, UA Clear     Specific Gravity, UA 1 010     pH, UA 7 0     Leukocytes, UA Negative     Nitrite, UA Negative     Protein, UA Negative mg/dl      Glucose, UA Negative mg/dl      Ketones, UA Negative mg/dl      Urobilinogen, UA 0 2 E U /dl      Bilirubin, UA Negative     Occult Blood, UA Negative    HS Troponin I 2hr [[id number]]  (Normal) Collected: 09/16/22 1136    Lab Status: Final result Specimen: Blood from Arm, Left Updated: 09/16/22 1205     hs TnI 2hr 23 ng/L      Delta 2hr hsTnI -3 ng/L     HS Troponin I 4hr [[id number]]     Lab Status: No result Specimen: Blood     B-Type Natriuretic Peptide(BNP) AN, CA, EA Campuses Only [[id number]]  (Abnormal) Collected: 09/16/22 0955    Lab Status: Final result Specimen: Blood from Arm, Left Updated: 09/16/22 1059      pg/mL     Basic metabolic panel [[id number]]  (Abnormal) Collected: 09/16/22 0955    Lab Status: Final result Specimen: Blood from Arm, Left Updated: 09/16/22 1046     Sodium 132 mmol/L      Potassium 4 1 mmol/L      Chloride 95 mmol/L      CO2 28 mmol/L      ANION GAP 9 mmol/L      BUN 17 mg/dL      Creatinine 0 81 mg/dL      Glucose 118 mg/dL      Calcium 10 1 mg/dL      eGFR 78 ml/min/1 73sq m     Narrative:      [place] guidelines for Chronic Kidney Disease (CKD):     Stage 1 with normal or high GFR (GFR > 90 mL/min/1 73 square meters)    Stage 2 Mild CKD (GFR = 60-89 mL/min/1 73 square meters)    Stage 3A Moderate CKD (GFR = 45-59 mL/min/1 73 square meters)    Stage 3B Moderate CKD (GFR = 30-44 mL/min/1 73 square meters)    Stage 4 Severe CKD (GFR = 15-29 mL/min/1 73 square meters)    Stage 5 End Stage CKD (GFR <15 mL/min/1 73 square meters)  Note: GFR calculation is accurate only with a steady state creatinine    Magnesium [[id number]]  (Normal) Collected: 09/16/22 0955    Lab Status: Final result Specimen: Blood from Arm, Left Updated: 09/16/22 1046     Magnesium 1 9 mg/dL FLU/RSV/COVID - if FLU/RSV clinically relevant [[id number]]  (Normal) Collected: 09/16/22 0955    Lab Status: Final result Specimen: Nares from Nose Updated: 09/16/22 1039     SARS-CoV-2 Negative     INFLUENZA A PCR Negative     INFLUENZA B PCR Negative     RSV PCR Negative    Narrative:      FOR PEDIATRIC PATIENTS - copy/paste COVID Guidelines URL to browser: [email or web address]  [email or web address]    SARS-CoV-2 assay is a Nucleic Acid Amplification assay intended for the  qualitative detection of nucleic acid from SARS-CoV-2 in nasopharyngeal  swabs  Results are for the presumptive identification of SARS-CoV-2 RNA  Positive results are indicative of infection with SARS-CoV-2, the virus  causing COVID-19, but do not rule out bacterial infection or co-infection  with other viruses  Laboratories within the [place] and its  territories are required to report all positive results to the appropriate  public health authorities  Negative results do not preclude SARS-CoV-2  infection and should not be used as the sole basis for treatment or other  patient management decisions  Negative results must be combined with  clinical observations, patient history, and epidemiological information  This test has not been FDA cleared or approved  This test has been authorized by FDA under an Emergency Use Authorization  (EUA)  This test is only authorized for the duration of time the  declaration that circumstances exist justifying the authorization of the  emergency use of an in vitro diagnostic tests for detection of SARS-CoV-2  virus and/or diagnosis of COVID-19 infection under section 564(b)(1) of  the Act, 21 U  S C  596DES-6(W)(0), unless the authorization is terminated  or revoked sooner  The test has been validated but independent review by FDA  and CLIA is pending  Test performed using [employer]: This RT-PCR assay targets N2,  a region unique to SARS-CoV-2   A conserved region in the E-gene was chosen  for pan-Sarbecovirus detection which includes SARS-CoV-2  HS Troponin 0hr (reflex protocol) [[id number]]  (Normal) Collected: 09/16/22 0955    Lab Status: Final result Specimen: Blood from Arm, Left Updated: 09/16/22 1028     hs TnI 0hr 26 ng/L     CBC and differential [[id number]]  (Abnormal) Collected: 09/16/22 0955    Lab Status: Final result Specimen: Blood from Arm, Left Updated: 09/16/22 1013     WBC 8 38 Thousand/uL      RBC 5 01 Million/uL      Hemoglobin 14 3 g/dL      Hematocrit 45 0 %      MCV 90 fL      MCH 28 5 pg      MCHC 31 8 g/dL      RDW 14 9 %      MPV 9 0 fL      Platelets 766 Thousands/uL      nRBC 0 /100 WBCs      Neutrophils Relative 82 %      Immat GRANS % 1 %      Lymphocytes Relative 11 %      Monocytes Relative 5 %      Eosinophils Relative 0 %      Basophils Relative 1 %      Neutrophils Absolute 6 98 Thousands/µL      Immature Grans Absolute 0 04 Thousand/uL      Lymphocytes Absolute 0 88 Thousands/µL      Monocytes Absolute 0 40 Thousand/µL      Eosinophils Absolute 0 03 Thousand/µL      Basophils Absolute 0 05 Thousands/µL                  XR chest 1 view portable   Final Result by Christina Lambert MD (09/16 1033)   Mild vascular congestion without consolidation or pleural effusion  Workstation performed: [id number]                    Procedures  ECG 12 Lead Documentation Only    Date/Time: 9/16/2022 10:36 AM  Performed by: Mateus Simpson DO  Authorized by: Mateus Simpson DO     Indications / Diagnosis:  SOB  Interpretation:     Interpretation: abnormal    Rate:     ECG rate:  93    ECG rate assessment: normal    Rhythm:     Rhythm: sinus rhythm    Ectopy:     Ectopy: none    ST segments:     ST segments:  Non-specific  Other findings:     Other findings: poor R wave progression               ED Course  ED Course as of 09/16/22 1523   Fri Sep 16, 2022   1259 Patient saturating well on room air   Plan on discharge with treatment for COPD exacerbation, however upon discharge noticed she had a temperature of 100 5 which wasn't present on iniital vitals  Likely pneumonia given her symptoms  Discuss treatment with antibiotics for the patient, admission versus discharge  Patient is adamant that she would like to be discharged as she feels better with the treatment for COPD  She understands my concerns that she is at risk due to her chronic respiratory disease, chronic need for oxygen, and CHF  Despite this her curb 65 score still low, as are the components of PSI measured today  Overall she is well appearing  Ambulatory pulse oxygen was 89% (>88%) on RA however she reports using as much as 4L NC at home as needed  1348 Patient is requesting discharge  Wheezing, will give additional neb prior to discharge   1350 Discussed return precautions at length with patient  She is adamant she wants to trial outpatient treatment  Because her oxygen level is baseline and overall she looks well and comfortable discharging her with very strict return precautions and close PCP follow-up  She understands my concerns, will not hesitate to return to the emergency department if her symptoms worsen  Additional she understands to return to the emergency department if her symptoms do not start to improve in the next 24-48 hours  Or if any new symptoms develop  SBIRT 20yo+    Flowsheet Row Most Recent Value   SBIRT (25 yo +)    In order to provide better care to our patients, we are screening all of our patients for alcohol and drug use  Would it be okay to ask you these screening questions? No Filed at: 09/16/2022 8414   [place]: How many times in the past year have you    Used an illegal drug or used a prescription medication for non-medical reasons?  Never Filed at: 09/16/2022 0949                    MDM  Number of Diagnoses or Management Options  COPD with acute exacerbation [facility]): new and requires workup  Pneumonia: new and requires workup  Diagnosis management comments: 42-year-old female presents with shortness of breath  Scattered with rales on exam, patient reports symptoms improved after DuoNeb in route by EMS  No pedal edema, however some concern for pulmonary edema versus COPD exacerbation  She saturating well on home 4L       Amount and/or Complexity of Data Reviewed  Review and summarize past medical records: yes  Independent visualization of images, tracings, or specimens: yes    Risk of Complications, Morbidity, and/or Mortality  Presenting problems: high  Diagnostic procedures: minimal  Management options: high        Disposition  Final diagnoses:   COPD with acute exacerbation ([facility] )   Pneumonia     Time reflects when diagnosis was documented in both MDM as applicable and the Disposition within this note     Time User Action Codes Description Comment    9/16/2022 12:43 PM Lexx Hernandez Add [J44 1] COPD with acute exacerbation ([facility] )     9/16/2022  1:21 PM Lexx Hernandez Add [J18 9] Pneumonia       ED Disposition     ED Disposition   Discharge    Condition   Stable    Date/Time   Fri Sep 16, 2022 12:43 PM    Comment   Huy Morales discharge to home/self care                 Follow-up Information     Follow up With Specialties Details Why [address], DO Emergency Medicine, Internal Medicine Schedule an appointment as soon as possible for a visit   [place] [phone] [facility] [place]  [phone]            Discharge Medication List as of 9/16/2022  1:50 PM      START taking these medications    Details   amoxicillin-clavulanate (AUGMENTIN) 875-125 mg per tablet Take 1 tablet by mouth every 12 (twelve) hours for 10 days, Starting Fri 9/16/2022, Until Mon 9/26/2022, Normal      azithromycin (ZITHROMAX) 250 mg tablet Take 2 tablets today then 1 tablet daily x 4 days, Normal      !! predniSONE 10 mg tablet Multiple Dosages:Starting Fri 9/16/2022, Until Tue 9/20/2022 at 2359, THEN Starting Wed 9/21/2022, Until Wed 9/21/2022 at 2359, THEN Starting Thu 9/22/2022, Until Thu 9/22/2022 at 2359, THEN Starting Fri 9/23/2022, Until Fri 9/23/2022 at 2359, THEN  Starting Sat 9/24/2022, Until Sat 9/24/2022 at 2359Take 5 tablets (50 mg total) by mouth daily for 5 days, THEN 4 tablets (40 mg total) daily for 1 day, THEN 3 tablets (30 mg total) daily for 1 day, THEN 2 tablets (20 mg total) daily for 1 day, THEN 1 t ablet (10 mg total) daily for 1 day , Normal       !! - Potential duplicate medications found  Please discuss with provider        CONTINUE these medications which have NOT CHANGED    Details   albuterol (ProAir HFA) 90 mcg/act inhaler Inhale 2 puffs every 6 (six) hours as needed for wheezing, Starting Tue 6/21/2022, Normal      aspirin 81 mg chewable tablet Chew 1 tablet (81 mg total) daily, Starting Wed 8/3/2022, OTC      atorvastatin (LIPITOR) 40 mg tablet Take 1 tablet (40 mg total) by mouth daily with dinner, Starting Wed 8/3/2022, Normal      carvedilol (COREG) 25 mg tablet Take 1 tablet (25 mg total) by mouth 2 (two) times a day with meals, Starting Tue 8/30/2022, Normal      Cholecalciferol 50 MCG (2000 UT) CAPS Take by mouth daily, Starting Thu 8/18/2022, Historical Med      clonazePAM (KlonoPIN) 1 mg tablet Take 0 5-1 mg by mouth daily at bedtime as needed, Starting Thu 5/13/2021, Historical Med      clopidogrel (PLAVIX) 75 mg tablet Take 1 tablet (75 mg total) by mouth daily, Starting Wed 8/3/2022, Normal      furosemide (LASIX) 40 mg tablet Take 1 tablet (40 mg total) by mouth daily as needed (As needed), Starting Fri 9/9/2022, Normal      lisinopril (ZESTRIL) 10 mg tablet Take 1 tablet (10 mg total) by mouth daily, Starting Tue 8/30/2022, Normal      MAGNESIUM PO in the morning, Historical Med      morphine (MS CONTIN) 30 mg 12 hr tablet Take 30 mg by mouth every 12 (twelve) hours, Starting Mon 8/22/2022, Historical Med      oxyCODONE-acetaminophen (PERCOCET) 5-325 mg per tablet Take 1 tablet by mouth every 6 (six) hours as needed, Starting Mon 8/22/2022, Historical Med      !! predniSONE 10 mg tablet TAKE 3 TRABLET BY MOUTH DAILY FOR 5 DAYS AND THEN 2 TABLETS DAILY     (REFER TO PRESCRIPTION NOTES)  , Historical Med      umeclidinium-vilanterol (Anoro Ellipta) 62 5-25 MCG/INH inhaler Inhale 1 puff daily, Starting Tue 6/21/2022, Normal       !! - Potential duplicate medications found  Please discuss with provider  No discharge procedures on file      PDMP Review       Value Time User    PDMP Reviewed  Yes 6/13/2021  3:39 AM Parmjit Casiano PA-C          ED Provider  Electronically Signed by           Katharina Ospina DO  09/16/22 0834

## 2022-09-20 ENCOUNTER — HOSPITAL ENCOUNTER (INPATIENT)
Facility: HOSPITAL | Age: 62
LOS: 2 days | Discharge: HOME | DRG: 291 | End: 2022-09-23
Attending: EMERGENCY MEDICINE | Admitting: HOSPITALIST
Payer: MEDICARE

## 2022-09-20 ENCOUNTER — APPOINTMENT (OUTPATIENT)
Dept: RADIOLOGY | Facility: HOSPITAL | Age: 62
DRG: 291 | End: 2022-09-20
Payer: MEDICARE

## 2022-09-20 DIAGNOSIS — I42.0 DILATED CARDIOMYOPATHY (HCC): Chronic | ICD-10-CM

## 2022-09-20 DIAGNOSIS — J44.1 COPD EXACERBATION (HCC): Primary | ICD-10-CM

## 2022-09-20 DIAGNOSIS — J44.1 COPD WITH ACUTE EXACERBATION (HCC): ICD-10-CM

## 2022-09-20 DIAGNOSIS — R09.02 HYPOXIA: ICD-10-CM

## 2022-09-20 DIAGNOSIS — I50.23 ACUTE ON CHRONIC SYSTOLIC CONGESTIVE HEART FAILURE (HCC): ICD-10-CM

## 2022-09-20 DIAGNOSIS — D35.00 PHEOCHROMOCYTOMA: ICD-10-CM

## 2022-09-20 PROBLEM — N18.30 CKD (CHRONIC KIDNEY DISEASE) STAGE 3, GFR 30-59 ML/MIN (HCC): Status: ACTIVE | Noted: 2022-09-20

## 2022-09-20 LAB
2HR DELTA HS TROPONIN: -4 NG/L
ALBUMIN SERPL BCP-MCNC: 4.3 G/DL (ref 3.5–5)
ALP SERPL-CCNC: 59 U/L (ref 34–104)
ALT SERPL W P-5'-P-CCNC: 17 U/L (ref 7–52)
ANION GAP SERPL CALCULATED.3IONS-SCNC: 11 MMOL/L (ref 4–13)
APTT PPP: 24 SECONDS (ref 23–37)
AST SERPL W P-5'-P-CCNC: 18 U/L (ref 13–39)
BACTERIA UR QL AUTO: ABNORMAL /HPF
BASOPHILS # BLD AUTO: 0.03 THOUSANDS/ΜL (ref 0–0.1)
BASOPHILS NFR BLD AUTO: 0 % (ref 0–1)
BILIRUB SERPL-MCNC: 0.5 MG/DL (ref 0.2–1)
BILIRUB UR QL STRIP: NEGATIVE
BNP SERPL-MCNC: 161 PG/ML (ref 0–100)
BUN SERPL-MCNC: 19 MG/DL (ref 5–25)
CALCIUM SERPL-MCNC: 10.2 MG/DL (ref 8.4–10.2)
CARDIAC TROPONIN I PNL SERPL HS: 11 NG/L
CARDIAC TROPONIN I PNL SERPL HS: 15 NG/L
CHLORIDE SERPL-SCNC: 96 MMOL/L (ref 96–108)
CLARITY UR: CLEAR
CO2 SERPL-SCNC: 30 MMOL/L (ref 21–32)
COLOR UR: YELLOW
CREAT SERPL-MCNC: 0.72 MG/DL (ref 0.6–1.3)
D DIMER PPP FEU-MCNC: <0.27 UG/ML FEU
EOSINOPHIL # BLD AUTO: 0.01 THOUSAND/ΜL (ref 0–0.61)
EOSINOPHIL NFR BLD AUTO: 0 % (ref 0–6)
ERYTHROCYTE [DISTWIDTH] IN BLOOD BY AUTOMATED COUNT: 14.3 % (ref 11.6–15.1)
GFR SERPL CREATININE-BSD FRML MDRD: 90 ML/MIN/1.73SQ M
GLUCOSE SERPL-MCNC: 131 MG/DL (ref 65–140)
GLUCOSE UR STRIP-MCNC: NEGATIVE MG/DL
HCT VFR BLD AUTO: 47.2 % (ref 34.8–46.1)
HGB BLD-MCNC: 14.9 G/DL (ref 11.5–15.4)
HGB UR QL STRIP.AUTO: NEGATIVE
IMM GRANULOCYTES # BLD AUTO: 0.08 THOUSAND/UL (ref 0–0.2)
IMM GRANULOCYTES NFR BLD AUTO: 1 % (ref 0–2)
INR PPP: 1.04 (ref 0.84–1.19)
KETONES UR STRIP-MCNC: NEGATIVE MG/DL
LACTATE SERPL-SCNC: 1.8 MMOL/L (ref 0.5–2)
LEUKOCYTE ESTERASE UR QL STRIP: ABNORMAL
LYMPHOCYTES # BLD AUTO: 2.4 THOUSANDS/ΜL (ref 0.6–4.47)
LYMPHOCYTES NFR BLD AUTO: 16 % (ref 14–44)
MCH RBC QN AUTO: 28.8 PG (ref 26.8–34.3)
MCHC RBC AUTO-ENTMCNC: 31.6 G/DL (ref 31.4–37.4)
MCV RBC AUTO: 91 FL (ref 82–98)
MONOCYTES # BLD AUTO: 0.37 THOUSAND/ΜL (ref 0.17–1.22)
MONOCYTES NFR BLD AUTO: 3 % (ref 4–12)
NEUTROPHILS # BLD AUTO: 12.17 THOUSANDS/ΜL (ref 1.85–7.62)
NEUTS SEG NFR BLD AUTO: 80 % (ref 43–75)
NITRITE UR QL STRIP: NEGATIVE
NON-SQ EPI CELLS URNS QL MICRO: ABNORMAL /HPF
NRBC BLD AUTO-RTO: 0 /100 WBCS
PH UR STRIP.AUTO: 6 [PH]
PLATELET # BLD AUTO: 319 THOUSANDS/UL (ref 149–390)
PMV BLD AUTO: 9.2 FL (ref 8.9–12.7)
POTASSIUM SERPL-SCNC: 3.9 MMOL/L (ref 3.5–5.3)
PROCALCITONIN SERPL-MCNC: <0.05 NG/ML
PROT SERPL-MCNC: 7.4 G/DL (ref 6.4–8.4)
PROT UR STRIP-MCNC: NEGATIVE MG/DL
PROTHROMBIN TIME: 13.6 SECONDS (ref 11.6–14.5)
RBC # BLD AUTO: 5.17 MILLION/UL (ref 3.81–5.12)
RBC #/AREA URNS AUTO: ABNORMAL /HPF
SARS-COV-2 RNA RESP QL NAA+PROBE: NEGATIVE
SODIUM SERPL-SCNC: 137 MMOL/L (ref 135–147)
SP GR UR STRIP.AUTO: 1.02 (ref 1–1.03)
UROBILINOGEN UR QL STRIP.AUTO: 0.2 E.U./DL
WBC # BLD AUTO: 15.06 THOUSAND/UL (ref 4.31–10.16)
WBC #/AREA URNS AUTO: ABNORMAL /HPF

## 2022-09-20 PROCEDURE — 84145 PROCALCITONIN (PCT): CPT | Performed by: EMERGENCY MEDICINE

## 2022-09-20 PROCEDURE — 94640 AIRWAY INHALATION TREATMENT: CPT

## 2022-09-20 PROCEDURE — 99219 PR INITIAL OBSERVATION CARE/DAY 50 MINUTES: CPT | Performed by: STUDENT IN AN ORGANIZED HEALTH CARE EDUCATION/TRAINING PROGRAM

## 2022-09-20 PROCEDURE — 36415 COLL VENOUS BLD VENIPUNCTURE: CPT | Performed by: EMERGENCY MEDICINE

## 2022-09-20 PROCEDURE — 99285 EMERGENCY DEPT VISIT HI MDM: CPT

## 2022-09-20 PROCEDURE — 87635 SARS-COV-2 COVID-19 AMP PRB: CPT | Performed by: EMERGENCY MEDICINE

## 2022-09-20 PROCEDURE — 85730 THROMBOPLASTIN TIME PARTIAL: CPT | Performed by: EMERGENCY MEDICINE

## 2022-09-20 PROCEDURE — 85025 COMPLETE CBC W/AUTO DIFF WBC: CPT | Performed by: EMERGENCY MEDICINE

## 2022-09-20 PROCEDURE — 85610 PROTHROMBIN TIME: CPT | Performed by: EMERGENCY MEDICINE

## 2022-09-20 PROCEDURE — 83605 ASSAY OF LACTIC ACID: CPT | Performed by: EMERGENCY MEDICINE

## 2022-09-20 PROCEDURE — 94760 N-INVAS EAR/PLS OXIMETRY 1: CPT

## 2022-09-20 PROCEDURE — 85379 FIBRIN DEGRADATION QUANT: CPT | Performed by: EMERGENCY MEDICINE

## 2022-09-20 PROCEDURE — 81001 URINALYSIS AUTO W/SCOPE: CPT | Performed by: EMERGENCY MEDICINE

## 2022-09-20 PROCEDURE — 80053 COMPREHEN METABOLIC PANEL: CPT | Performed by: EMERGENCY MEDICINE

## 2022-09-20 PROCEDURE — 87040 BLOOD CULTURE FOR BACTERIA: CPT | Performed by: EMERGENCY MEDICINE

## 2022-09-20 PROCEDURE — 93005 ELECTROCARDIOGRAM TRACING: CPT

## 2022-09-20 PROCEDURE — 71045 X-RAY EXAM CHEST 1 VIEW: CPT

## 2022-09-20 PROCEDURE — 84484 ASSAY OF TROPONIN QUANT: CPT | Performed by: EMERGENCY MEDICINE

## 2022-09-20 PROCEDURE — 99291 CRITICAL CARE FIRST HOUR: CPT | Performed by: EMERGENCY MEDICINE

## 2022-09-20 PROCEDURE — 94664 DEMO&/EVAL PT USE INHALER: CPT

## 2022-09-20 PROCEDURE — 83880 ASSAY OF NATRIURETIC PEPTIDE: CPT | Performed by: EMERGENCY MEDICINE

## 2022-09-20 PROCEDURE — 94668 MNPJ CHEST WALL SBSQ: CPT

## 2022-09-20 PROCEDURE — 94644 CONT INHLJ TX 1ST HOUR: CPT

## 2022-09-20 RX ORDER — MELATONIN
2000 DAILY
Status: DISCONTINUED | OUTPATIENT
Start: 2022-09-21 | End: 2022-09-23 | Stop reason: HOSPADM

## 2022-09-20 RX ORDER — SODIUM CHLORIDE FOR INHALATION 0.9 %
3 VIAL, NEBULIZER (ML) INHALATION ONCE
Status: COMPLETED | OUTPATIENT
Start: 2022-09-20 | End: 2022-09-20

## 2022-09-20 RX ORDER — CLONIDINE HYDROCHLORIDE 0.1 MG/1
0.1 TABLET ORAL
Status: DISCONTINUED | OUTPATIENT
Start: 2022-09-20 | End: 2022-09-20

## 2022-09-20 RX ORDER — ENOXAPARIN SODIUM 100 MG/ML
40 INJECTION SUBCUTANEOUS DAILY
Status: DISCONTINUED | OUTPATIENT
Start: 2022-09-20 | End: 2022-09-23 | Stop reason: HOSPADM

## 2022-09-20 RX ORDER — METHYLPREDNISOLONE SODIUM SUCCINATE 40 MG/ML
40 INJECTION, POWDER, LYOPHILIZED, FOR SOLUTION INTRAMUSCULAR; INTRAVENOUS EVERY 8 HOURS
Status: DISCONTINUED | OUTPATIENT
Start: 2022-09-20 | End: 2022-09-21

## 2022-09-20 RX ORDER — FUROSEMIDE 40 MG/1
40 TABLET ORAL DAILY
Status: DISCONTINUED | OUTPATIENT
Start: 2022-09-21 | End: 2022-09-21

## 2022-09-20 RX ORDER — OXYCODONE HYDROCHLORIDE AND ACETAMINOPHEN 5; 325 MG/1; MG/1
1 TABLET ORAL EVERY 6 HOURS PRN
Status: DISCONTINUED | OUTPATIENT
Start: 2022-09-20 | End: 2022-09-23 | Stop reason: HOSPADM

## 2022-09-20 RX ORDER — ASPIRIN 81 MG/1
81 TABLET, CHEWABLE ORAL DAILY
Status: DISCONTINUED | OUTPATIENT
Start: 2022-09-21 | End: 2022-09-23 | Stop reason: HOSPADM

## 2022-09-20 RX ORDER — ALBUTEROL SULFATE 90 UG/1
2 AEROSOL, METERED RESPIRATORY (INHALATION) EVERY 6 HOURS PRN
Status: DISCONTINUED | OUTPATIENT
Start: 2022-09-20 | End: 2022-09-23 | Stop reason: HOSPADM

## 2022-09-20 RX ORDER — MORPHINE SULFATE 30 MG/1
30 TABLET, FILM COATED, EXTENDED RELEASE ORAL EVERY 12 HOURS
Status: DISCONTINUED | OUTPATIENT
Start: 2022-09-20 | End: 2022-09-23 | Stop reason: HOSPADM

## 2022-09-20 RX ORDER — LISINOPRIL 10 MG/1
10 TABLET ORAL DAILY
Status: DISCONTINUED | OUTPATIENT
Start: 2022-09-20 | End: 2022-09-23 | Stop reason: HOSPADM

## 2022-09-20 RX ORDER — IPRATROPIUM BROMIDE AND ALBUTEROL SULFATE .5; 3 MG/3ML; MG/3ML
1 SOLUTION RESPIRATORY (INHALATION) ONCE
Status: COMPLETED | OUTPATIENT
Start: 2022-09-20 | End: 2022-09-20

## 2022-09-20 RX ORDER — CLOPIDOGREL BISULFATE 75 MG/1
75 TABLET ORAL DAILY
Status: DISCONTINUED | OUTPATIENT
Start: 2022-09-21 | End: 2022-09-23 | Stop reason: HOSPADM

## 2022-09-20 RX ORDER — NICOTINE 21 MG/24HR
1 PATCH, TRANSDERMAL 24 HOURS TRANSDERMAL DAILY
Status: DISCONTINUED | OUTPATIENT
Start: 2022-09-20 | End: 2022-09-23 | Stop reason: HOSPADM

## 2022-09-20 RX ORDER — LEVALBUTEROL 1.25 MG/.5ML
1.25 SOLUTION, CONCENTRATE RESPIRATORY (INHALATION)
Status: DISCONTINUED | OUTPATIENT
Start: 2022-09-20 | End: 2022-09-20

## 2022-09-20 RX ORDER — LEVALBUTEROL 1.25 MG/.5ML
1.25 SOLUTION, CONCENTRATE RESPIRATORY (INHALATION)
Status: DISCONTINUED | OUTPATIENT
Start: 2022-09-20 | End: 2022-09-23 | Stop reason: HOSPADM

## 2022-09-20 RX ORDER — ATORVASTATIN CALCIUM 40 MG/1
40 TABLET, FILM COATED ORAL
Status: DISCONTINUED | OUTPATIENT
Start: 2022-09-20 | End: 2022-09-23 | Stop reason: HOSPADM

## 2022-09-20 RX ORDER — SODIUM CHLORIDE FOR INHALATION 0.9 %
3 VIAL, NEBULIZER (ML) INHALATION
Status: DISCONTINUED | OUTPATIENT
Start: 2022-09-20 | End: 2022-09-20

## 2022-09-20 RX ORDER — FUROSEMIDE 10 MG/ML
80 INJECTION INTRAMUSCULAR; INTRAVENOUS ONCE
Status: COMPLETED | OUTPATIENT
Start: 2022-09-20 | End: 2022-09-20

## 2022-09-20 RX ORDER — CARVEDILOL 25 MG/1
25 TABLET ORAL 2 TIMES DAILY WITH MEALS
Status: DISCONTINUED | OUTPATIENT
Start: 2022-09-20 | End: 2022-09-23 | Stop reason: HOSPADM

## 2022-09-20 RX ADMIN — LEVALBUTEROL HYDROCHLORIDE 1.25 MG: 1.25 SOLUTION, CONCENTRATE RESPIRATORY (INHALATION) at 19:46

## 2022-09-20 RX ADMIN — IPRATROPIUM BROMIDE 1 MG: 0.5 SOLUTION RESPIRATORY (INHALATION) at 11:27

## 2022-09-20 RX ADMIN — ENOXAPARIN SODIUM 40 MG: 100 INJECTION SUBCUTANEOUS at 16:45

## 2022-09-20 RX ADMIN — LISINOPRIL 10 MG: 10 TABLET ORAL at 16:44

## 2022-09-20 RX ADMIN — MORPHINE SULFATE 30 MG: 30 TABLET, EXTENDED RELEASE ORAL at 16:44

## 2022-09-20 RX ADMIN — IPRATROPIUM BROMIDE 0.5 MG: 0.5 SOLUTION RESPIRATORY (INHALATION) at 19:46

## 2022-09-20 RX ADMIN — CARVEDILOL 25 MG: 25 TABLET, FILM COATED ORAL at 16:44

## 2022-09-20 RX ADMIN — METHYLPREDNISOLONE SODIUM SUCCINATE 40 MG: 40 INJECTION, POWDER, FOR SOLUTION INTRAMUSCULAR; INTRAVENOUS at 16:43

## 2022-09-20 RX ADMIN — ALBUTEROL SULFATE 10 MG: 2.5 SOLUTION RESPIRATORY (INHALATION) at 11:27

## 2022-09-20 RX ADMIN — NICOTINE 1 PATCH: 21 PATCH, EXTENDED RELEASE TRANSDERMAL at 16:44

## 2022-09-20 RX ADMIN — ISODIUM CHLORIDE 3 ML: 0.03 SOLUTION RESPIRATORY (INHALATION) at 11:28

## 2022-09-20 RX ADMIN — UMECLIDINIUM BROMIDE AND VILANTEROL TRIFENATATE 1 PUFF: 62.5; 25 POWDER RESPIRATORY (INHALATION) at 16:45

## 2022-09-20 RX ADMIN — ATORVASTATIN CALCIUM 40 MG: 40 TABLET, FILM COATED ORAL at 16:44

## 2022-09-20 RX ADMIN — FUROSEMIDE 80 MG: 10 INJECTION, SOLUTION INTRAMUSCULAR; INTRAVENOUS at 16:44

## 2022-09-20 NOTE — ED PROVIDER NOTES
History  Chief Complaint   Patient presents with    Shortness of Breath     Feeling shortness of breath since Friday  Was seen here for same, felt a little better but then progressively worsened at home  Patient is a 65 yo F w/ history of COPD, daily cardiomyopathy, CHF on Lasix, presents for evaluation of cough, shortness of breath  Patient was seen here on 09/15 for similar symptoms  She was treated with albuterol, prednisone and Lasix at that time  Patient was requiring 4 L of oxygen at that time but wanted to go home as she was feeling better    She was started on azithromycin and amoxicillin at that time due to a low-grade fever in the department  Patient says that her shortness of breath has been getting worse  She says that because of wrong tubing at home she was only getting about less than 2 L of oxygen   She says that her oxygen dipped as low as 76% with ambulation  She says that the shortness breath is worse with exertion as well as lying flat  She denies any leg swelling  She denies any chest pain  She denies any documented fevers but says that she feels warm    On exam, the patient satting 96% on 4 L  She has a wet cough he has diffuse rales/rhonchi  Prior to Admission Medications   Prescriptions Last Dose Informant Patient Reported? Taking?    Cholecalciferol 50 MCG (2000 UT) CAPS   Yes No   Sig: Take by mouth daily   MAGNESIUM PO   Yes No   Sig: in the morning   albuterol (ProAir HFA) 90 mcg/act inhaler   No No   Sig: Inhale 2 puffs every 6 (six) hours as needed for wheezing   amoxicillin-clavulanate (AUGMENTIN) 875-125 mg per tablet   No No   Sig: Take 1 tablet by mouth every 12 (twelve) hours for 10 days   aspirin 81 mg chewable tablet   No No   Sig: Chew 1 tablet (81 mg total) daily   atorvastatin (LIPITOR) 40 mg tablet   No No   Sig: Take 1 tablet (40 mg total) by mouth daily with dinner   azithromycin (ZITHROMAX) 250 mg tablet   No No   Sig: Take 2 tablets today then 1 tablet daily x 4 days   carvedilol (COREG) 25 mg tablet   No No   Sig: Take 1 tablet (25 mg total) by mouth 2 (two) times a day with meals   clonazePAM (KlonoPIN) 1 mg tablet   Yes No   Sig: Take 0 5-1 mg by mouth daily at bedtime as needed   clopidogrel (PLAVIX) 75 mg tablet   No No   Sig: Take 1 tablet (75 mg total) by mouth daily   furosemide (LASIX) 40 mg tablet   No No   Sig: Take 1 tablet (40 mg total) by mouth daily as needed (As needed)   lisinopril (ZESTRIL) 10 mg tablet   No No   Sig: Take 1 tablet (10 mg total) by mouth daily   morphine (MS CONTIN) 30 mg 12 hr tablet   Yes No   Sig: Take 30 mg by mouth every 12 (twelve) hours   oxyCODONE-acetaminophen (PERCOCET) 5-325 mg per tablet   Yes No   Sig: Take 1 tablet by mouth every 6 (six) hours as needed   predniSONE 10 mg tablet   Yes No   Sig: TAKE 3 TRABLET BY MOUTH DAILY FOR 5 DAYS AND THEN 2 TABLETS DAILY     (REFER TO PRESCRIPTION NOTES)  predniSONE 10 mg tablet   No No   Sig: Take 5 tablets (50 mg total) by mouth daily for 5 days, THEN 4 tablets (40 mg total) daily for 1 day, THEN 3 tablets (30 mg total) daily for 1 day, THEN 2 tablets (20 mg total) daily for 1 day, THEN 1 tablet (10 mg total) daily for 1 day     umeclidinium-vilanterol (Anoro Ellipta) 62 5-25 MCG/INH inhaler   No No   Sig: Inhale 1 puff daily   Patient taking differently: Inhale 1 puff if needed      Facility-Administered Medications: None       Past Medical History:   Diagnosis Date    [facility]     Chronic combined systolic and diastolic congestive heart failure     [facility]ch[facility]     Fatty liver     Hyperlipidemia     Hypertension     Mitral regurgitation     Sepsis        Past Surgical History:   Procedure Laterality Date    CARDIAC CATHETERIZATION  2021    Moderate non-obstructive atherosclerosis     SECTION      CHOLECYSTECTOMY      IR RENAL ANGIOGRAM  2022    IR TEMPORARY DIALYSIS CATHETER CHECK/CHANGE/REPOSITION  7/29/2022    IR TUNNELED DIALYSIS CATHETER PLACEMENT  8/1/2022    IR TUNNELED DIALYSIS CATHETER REMOVAL  8/29/2022    ROTATOR CUFF REPAIR W/ DISTAL CLAVICLE EXCISION Right     TONSILLECTOMY         History reviewed  No pertinent family history  I have reviewed and agree with the history as documented  E-Cigarette/Vaping    E-Cigarette Use Never User     Comments Pt states she want to quit smoking      E-Cigarette/Vaping Substances    Nicotine Yes     THC No     CBD No     Flavoring No     Other No     Unknown No      Social History     Tobacco Use    Smoking status: Current Some Day Smoker     Packs/day: 2 00     Types: Cigarettes    Smokeless tobacco: Never Used   Vaping Use    Vaping Use: Never used   Substance Use Topics    Alcohol use: Never    Drug use: No       Review of Systems   Constitutional: Negative for fever and unexpected weight change  HENT: Negative for congestion, ear pain, sore throat and trouble swallowing  Eyes: Negative for pain and redness  Respiratory: Positive for cough and shortness of breath  Negative for chest tightness  Cardiovascular: Negative for chest pain and leg swelling  Gastrointestinal: Negative for abdominal distention, abdominal pain, diarrhea and vomiting  Endocrine: Negative for polyuria  Genitourinary: Negative for dysuria, hematuria, pelvic pain and vaginal bleeding  Musculoskeletal: Negative for back pain and myalgias  Skin: Negative for color change and rash  Neurological: Negative for dizziness, syncope, weakness, light-headedness and headaches  Physical Exam  Physical Exam  Vitals and nursing note reviewed  Constitutional:       General: She is not in acute distress  Appearance: She is well-developed  HENT:      Head: Normocephalic and atraumatic        Right Ear: External ear normal       Left Ear: External ear normal       Nose: Nose normal       Mouth/Throat:      Mouth: Mucous membranes are moist       Pharynx: No oropharyngeal exudate  Eyes:      Conjunctiva/sclera: Conjunctivae normal       Pupils: Pupils are equal, round, and reactive to light  Cardiovascular:      Rate and Rhythm: Normal rate and regular rhythm  Heart sounds: Normal heart sounds  No murmur heard  No friction rub  No gallop  Pulmonary:      Effort: Pulmonary effort is normal  No respiratory distress  Breath sounds: Rhonchi and rales present  No wheezing  Abdominal:      General: There is no distension  Palpations: Abdomen is soft  Tenderness: There is no abdominal tenderness  There is no guarding  Musculoskeletal:         General: No swelling, tenderness or deformity  Normal range of motion  Cervical back: Normal range of motion and neck supple  Lymphadenopathy:      Cervical: No cervical adenopathy  Skin:     General: Skin is warm and dry  Neurological:      General: No focal deficit present  Mental Status: She is alert and oriented to person, place, and time  Mental status is at baseline  Cranial Nerves: No cranial nerve deficit  Sensory: No sensory deficit  Motor: No weakness or abnormal muscle tone        Coordination: Coordination normal          Vital Signs  ED Triage Vitals [09/20/22 0959]   Temperature Pulse Respirations Blood Pressure SpO2   (!) 96 4 °F (35 8 °C) 82 20 (!) 172/85 98 %      Temp Source Heart Rate Source Patient Position - Orthostatic VS BP Location FiO2 (%)   Tympanic Monitor Sitting Left arm --      Pain Score       No Pain           Vitals:    09/20/22 1021 09/20/22 1235 09/20/22 1300 09/20/22 1400   BP: (!) 174/84  136/63 122/60   Pulse: 80 81 85 81   Patient Position - Orthostatic VS:             Visual Acuity      ED Medications  Medications   ipratropium-albuterol (FOR EMS ONLY) (DUO-NEB) 0 5-2 5 mg/3 mL inhalation solution 3 mL (0 mL Does not apply Given to EMS 9/20/22 1008)   albuterol inhalation solution 10 mg (10 mg Nebulization Given 9/20/22 1127)     And   ipratropium (ATROVENT) 0 02 % inhalation solution 1 mg (1 mg Nebulization Given 9/20/22 1127)     And   sodium chloride 0 9 % inhalation solution 3 mL (3 mL Nebulization Given 9/20/22 1128)       Diagnostic Studies  Results Reviewed     Procedure Component Value Units Date/Time    HS Troponin I 4hr [[id number]] Collected: 09/20/22 1438    Lab Status: No result Specimen: Blood from Arm, Left     Procalcitonin [[id number]]  (Normal) Collected: 09/20/22 1019    Lab Status: Final result Specimen: Blood from Arm, Right Updated: 09/20/22 1142     Procalcitonin <0 05 ng/ml     D-dimer, quantitative [[id number]]  (Normal) Collected: 09/20/22 1019    Lab Status: Final result Specimen: Blood from Arm, Right Updated: 09/20/22 1131     D-Dimer, Quant <0 27 ug/ml FEU     Narrative: In the evaluation for possible pulmonary embolism, in the appropriate (Well's Score of 4 or less) patient, the age adjusted d-dimer cutoff for this patient can be calculated as:    Age x 0 01 (in ug/mL) for Age-adjusted D-dimer exclusion threshold for a patient over 50 years  COVID only [[id number]]  (Normal) Collected: 09/20/22 1019    Lab Status: Final result Specimen: Nares from Nasopharyngeal Swab Updated: 09/20/22 1128     SARS-CoV-2 Negative    Narrative:      FOR PEDIATRIC PATIENTS - copy/paste COVID Guidelines URL to browser: [email or web address]  [employer]    SARS-CoV-2 assay is a Nucleic Acid Amplification assay intended for the  qualitative detection of nucleic acid from SARS-CoV-2 in nasopharyngeal  swabs  Results are for the presumptive identification of SARS-CoV-2 RNA  Positive results are indicative of infection with SARS-CoV-2, the virus  causing COVID-19, but do not rule out bacterial infection or co-infection  with other viruses   Laboratories within the [place] and its  territories are required to report all positive results to the appropriate  public health authorities  Negative results do not preclude SARS-CoV-2  infection and should not be used as the sole basis for treatment or other  patient management decisions  Negative results must be combined with  clinical observations, patient history, and epidemiological information  This test has not been FDA cleared or approved  This test has been authorized by FDA under an Emergency Use Authorization  (EUA)  This test is only authorized for the duration of time the  declaration that circumstances exist justifying the authorization of the  emergency use of an in vitro diagnostic tests for detection of SARS-CoV-2  virus and/or diagnosis of COVID-19 infection under section 564(b)(1) of  the Act, 21 U  S C  413GYW-7(S)(5), unless the authorization is terminated  or revoked sooner  The test has been validated but independent review by FDA  and CLIA is pending  Test performed using [employer]: This RT-PCR assay targets N2,  a region unique to SARS-CoV-2  A conserved region in the E-gene was chosen  for pan-Sarbecovirus detection which includes SARS-CoV-2  According to CMS-2020-01-R, this platform meets the definition of high-throughput technology  HS Troponin I 2hr [[id number]]     Lab Status: No result Specimen: Blood     HS Troponin 0hr (reflex protocol) [[id number]]  (Normal) Collected: 09/20/22 1019    Lab Status: Final result Specimen: Blood from Arm, Right Updated: 09/20/22 1106     hs TnI 0hr 15 ng/L     B-Type Natriuretic Peptide(BNP) AN, CA, EA Campuses Only [[id number]]  (Abnormal) Collected: 09/20/22 1019    Lab Status: Final result Specimen: Blood from Arm, Right Updated: 09/20/22 1105      pg/mL     Lactic acid [[id number]]  (Normal) Collected: 09/20/22 1019    Lab Status: Final result Specimen: Blood from Arm, Right Updated: 09/20/22 1104     LACTIC ACID 1 8 mmol/L     Narrative:      Result may be elevated if tourniquet was used during collection      Comprehensive metabolic panel [[id number]] Collected: 09/20/22 1019    Lab Status: Final result Specimen: Blood from Arm, Right Updated: 09/20/22 1103     Sodium 137 mmol/L      Potassium 3 9 mmol/L      Chloride 96 mmol/L      CO2 30 mmol/L      ANION GAP 11 mmol/L      BUN 19 mg/dL      Creatinine 0 72 mg/dL      Glucose 131 mg/dL      Calcium 10 2 mg/dL      AST 18 U/L      ALT 17 U/L      Alkaline Phosphatase 59 U/L      Total Protein 7 4 g/dL      Albumin 4 3 g/dL      Total Bilirubin 0 50 mg/dL      eGFR 90 ml/min/1 73sq m     Narrative:      National Kidney Disease Foundation guidelines for Chronic Kidney Disease (CKD):     Stage 1 with normal or high GFR (GFR > 90 mL/min/1 73 square meters)    Stage 2 Mild CKD (GFR = 60-89 mL/min/1 73 square meters)    Stage 3A Moderate CKD (GFR = 45-59 mL/min/1 73 square meters)    Stage 3B Moderate CKD (GFR = 30-44 mL/min/1 73 square meters)    Stage 4 Severe CKD (GFR = 15-29 mL/min/1 73 square meters)    Stage 5 End Stage CKD (GFR <15 mL/min/1 73 square meters)  Note: GFR calculation is accurate only with a steady state creatinine    Protime-INR [[id number]]  (Normal) Collected: 09/20/22 1019    Lab Status: Final result Specimen: Blood from Arm, Right Updated: 09/20/22 1059     Protime 13 6 seconds      INR 1 04    APTT [[id number]]  (Normal) Collected: 09/20/22 1019    Lab Status: Final result Specimen: Blood from Arm, Right Updated: 09/20/22 1059     PTT 24 seconds     CBC and differential [[id number]]  (Abnormal) Collected: 09/20/22 1019    Lab Status: Final result Specimen: Blood from Arm, Right Updated: 09/20/22 1036     WBC 15 06 Thousand/uL      RBC 5 17 Million/uL      Hemoglobin 14 9 g/dL      Hematocrit 47 2 %      MCV 91 fL      MCH 28 8 pg      MCHC 31 6 g/dL      RDW 14 3 %      MPV 9 2 fL      Platelets 491 Thousands/uL      nRBC 0 /100 WBCs      Neutrophils Relative 80 %      Immat GRANS % 1 %      Lymphocytes Relative 16 %      Monocytes Relative 3 %      Eosinophils Relative 0 %      Basophils Relative 0 %      Neutrophils Absolute 12 17 Thousands/µL      Immature Grans Absolute 0 08 Thousand/uL      Lymphocytes Absolute 2 40 Thousands/µL      Monocytes Absolute 0 37 Thousand/µL      Eosinophils Absolute 0 01 Thousand/µL      Basophils Absolute 0 03 Thousands/µL     Blood culture #2 [[id number]] Collected: 09/20/22 1019    Lab Status: In process Specimen: Blood from Arm, Right Updated: 09/20/22 1029    Blood culture #1 [[id number]] Collected: 09/20/22 1010    Lab Status: In process Specimen: Blood from Arm, Left Updated: 09/20/22 1029    Urinalysis with microscopic [[id number]]     Lab Status: No result Specimen: Urine                  XR chest portable   Final Result by Salvador Cano MD (09/20 1050)      Emphysema with probable interstitial edema                    Workstation performed: [id number]                    Procedures  ECG 12 Lead Documentation Only    Date/Time: 9/20/2022 10:22 AM  Performed by: Roseanne Dean DO  Authorized by: Roseanne Dean DO     Indications / Diagnosis:  Sob  ECG reviewed by me, the ED Provider: yes    Patient location:  ED  Previous ECG:     Previous ECG:  Compared to current    Similarity:  No change    Comparison to cardiac monitor: Yes    Interpretation:     Interpretation: normal    Rate:     ECG rate:  83    ECG rate assessment: normal    Rhythm:     Rhythm: sinus rhythm    Ectopy:     Ectopy: none    QRS:     QRS axis:  Normal  Conduction:     Conduction: normal    ST segments:     ST segments:  Normal  T waves:     T waves: normal    CriticalCare Time  Performed by: Roseanne Dean DO  Authorized by: Roseanne Dean DO     Critical care provider statement:     Critical care time (minutes):  60    Critical care time was exclusive of:  Separately billable procedures and treating other patients    Critical care was necessary to treat or prevent imminent or life-threatening deterioration of the following conditions:  Respiratory failure    Critical care was time spent personally by me on the following activities:  Blood draw for specimens, obtaining history from patient or surrogate, development of treatment plan with patient or surrogate, evaluation of patient's response to treatment, examination of patient, review of old charts, re-evaluation of patient's condition, ordering and review of radiographic studies, ordering and review of laboratory studies, ordering and performing treatments and interventions and interpretation of cardiac output measurements    I assumed direction of critical care for this patient from another provider in my specialty: no               ED Course  ED Course as of 09/20/22 1455   Tue Sep 20, 2022   1059 Patients elevated WBC likely 2/2 to chronic prednisone use  No focal consolidation on CXR, no fevers, normal lactic, normal procal   Do not believe symptoms are infectious in nature  1105 Given persistent hypoxia, dsypnea on exertion, non-significant CXR, will add D dimer   1256 Patient desated to 88% on RA                                              MDM  Number of Diagnoses or Management Options  Diagnosis management comments: 79-year-old female history of COPD, CHF, presenting for evaluation of shortness of breath, cough  Symptoms progressing since she was seen here on 09/16  Is requiring 4 L of O2 which is new for her  Admits to subjective fevers  Cough is productive and wet in nature  No chest pain  Vitals within normal limits  Has rales/rhonchi throughout  Will obtain septic workup, cardiac workup and BNP        Disposition  Final diagnoses:   COPD exacerbation ([address] )   Hypoxia     Time reflects when diagnosis was documented in both MDM as applicable and the Disposition within this note     Time User Action Codes Description Comment    9/20/2022  1:11 PM Gabriele Abrams [J44 1] COPD exacerbation ([facility] )     9/20/2022  1:11 PM Gabriele Abrams [R09 02] Hypoxia       ED Disposition     ED Disposition   Admit    Condition   Stable    Date/Time   Tue Sep 20, 2022  1:11 PM    Comment   Case was discussed with YASMINE and the patient's admission status was agreed to be Admission Status: observation status to the service of Dr Hari Mir   Follow-up Information    None         Patient's Medications   Discharge Prescriptions    No medications on file       No discharge procedures on file      PDMP Review       Value Time User    PDMP Reviewed  Yes 6/13/2021  3:39 AM Wes Simmons PA-C          ED Provider  Electronically Signed by           Karissa Mcclelland, DO  09/20/22 [address] [employer], DO  09/20/22 1 [facility] [address], DO  09/20/22 3810

## 2022-09-20 NOTE — RESPIRATORY THERAPY NOTE
RT Protocol Note  Windy Estrada 64 y o  female MRN: [id number]  Unit/Bed#: -01 Encounter: [id number]    Assessment    Principal Problem:    COPD with acute exacerbation ([place] [address] )  Active Problems:    Acute on chronic respiratory failure with hypoxia (HCC)      Home Pulmonary Medications:  Anoro prn  Home Devices/Therapy: (P) Home O2    Past Medical History:   Diagnosis Date    Cardiomyopathy ([place] [address] )     Chronic combined systolic and diastolic congestive heart failure     COPD (chronic obstructive pulmonary disease) ([facility])     Fatty liver     Hyperlipidemia     Hypertension     Mitral regurgitation     Sepsis      Social History     Socioeconomic History    Marital status: Single     Spouse name: None    Number of children: None    Years of education: None    Highest education level: None   Occupational History    None   Tobacco Use    Smoking status: Current Some Day Smoker     Packs/day: 2 00     Types: Cigarettes    Smokeless tobacco: Never Used   Vaping Use    Vaping Use: Never used   Substance and Sexual Activity    Alcohol use: Never    Drug use: No    Sexual activity: None   Other Topics Concern    None   Social History Narrative    None     Social Determinants of Health     Financial Resource Strain: Not on file   Food Insecurity: No Food Insecurity    Worried About Running Out of Food in the Last Year: Never true    Ran Out of Food in the Last Year: Never true   Transportation Needs: No Transportation Needs    Lack of Transportation (Medical): No    Lack of Transportation (Non-Medical):  No   Physical Activity: Not on file   Stress: Not on file   Social Connections: Not on file   Intimate Partner Violence: Not on file   Housing Stability: Low Risk     Unable to Pay for Housing in the Last Year: No    Number of Places Lived in the Last Year: 1    Unstable Housing in the Last Year: No       Subjective         Objective    Physical Exam:   Assessment Type: (P) Assess only  General Appearance: (P) Awake, Alert  Respiratory Pattern: (P) Normal  Chest Assessment: (P) Chest expansion symmetrical  Bilateral Breath Sounds: (P) Rhonchi  Cough: (P) Non-productive, Moist  O2 Device: (P) nc    Vitals:  Blood pressure 122/60, pulse 81, temperature 98 6 °F (37 °C), temperature source Oral, resp  rate 20, height 5' 10" (1 778 m), weight 76 7 kg (169 lb), SpO2 94 %  Imaging and other studies: I have personally reviewed pertinent reports  O2 Device: (P) nc     Plan    Respiratory Plan: (P) Mild Distress pathway  Airway Clearance Plan: (P) Flutter     Resp Comments: (P) Pt admitted for COPD exacerbation  Pt states she has home O2 at home prn and she has anoro but only uses it as needed  Pt with rhonchi and MNPC  Will continue TID udn and add flutter valve for mucus expectoration

## 2022-09-20 NOTE — H&P
[address] 1960, 64 y o  female MRN: [id number]  Unit/Bed#: -01 Encounter: [id number]  Primary Care Provider: Judy Tran DO   Date and time admitted to hospital: 9/20/2022  9:56 AM    * Acute on chronic respiratory failure with hypoxia [facility])  Assessment & Plan  Patient presenting with acute hypoxic respiratory failure  Was recently seen in ED on 09/16 was recommended admission for COPD exacerbation at that time however patient refused  Required 4 L nasal cannula while in ED  Patient received DuoNebs in ED with some improvement  Chest x-ray revealed emphysema with probable interstitial edema  Procalcitonin normal   (was 124 on 9/16)    Acute respiratory failure with hypoxia likely secondary to acute COPD and mild heart failure exacerbation    -IV steroids  -DuoNebs  -Lasix 80 mg IV once resume home Lasix in a m   -completed recent course of azithromycin, will not order at this time  -titrate oxygen as tolerated  -daily weights, monitor I/Os      CKD (chronic kidney disease) stage 3, GFR 30-59 ml/min [facility])  Assessment & Plan  Lab Results   Component Value Date    EGFR 90 09/20/2022    EGFR 78 09/16/2022    EGFR 56 08/30/2022    CREATININE 0 72 09/20/2022    CREATININE 0 81 09/16/2022    CREATININE 1 07 08/30/2022       Creatinine 0 72 on admission, stable  -continue to monitor    COPD with acute exacerbation (HCC)  Assessment & Plan  -plan as above    Tobacco abuse  Assessment & Plan  Nicotine patch    Acute on chronic systolic congestive heart failure (HCC)  Assessment & Plan  Wt Readings from Last 3 Encounters:   09/20/22 76 7 kg (169 lb)   09/16/22 77 kg (169 lb 12 1 oz)   09/01/22 75 kg (165 lb 6 4 oz)     -plan as above         VTE Pharmacologic Prophylaxis: VTE Score: 5 Lovenox  Code Status: Level 1 - Full Code   Discussion with family:  Discussed with patient    Anticipated Length of Stay: Patient will be admitted on an observation basis with an anticipated length of stay of less than 2 midnights secondary to Acute hypoxic respiratory failure requiring nebulizer treatments, IV diuretic and supplemental oxygen  Total Time for Visit, including Counseling / Coordination of Care: 45 minutes Greater than 50% of this total time spent on direct patient counseling and coordination of care  Chief Complaint:  Shortness of breath    History of Present Illness:  Anisa Rodgers is a 64 y o  female with a PMH of COPD, heart failure, cardiomyopathy, hypertension, hyper lipid who presents with shortness of breath  Patient was last seen in ED for mild COPD exacerbation on  was recommended for inpatient admission at that time however patient declined and went home with azithromycin and steroid taper  Reports he well home she states her shortness of breath continued to worsen  Today she felt short of breath and tried supplement oxygen as she at home she desaturated to mid [de-identified]  Review of Systems:  Review of Systems   Constitutional: Positive for fatigue  HENT: Negative  Eyes: Negative  Respiratory: Positive for shortness of breath  Gastrointestinal: Negative  Genitourinary: Negative  Musculoskeletal: Negative  Skin: Negative  Neurological: Negative  Hematological: Negative  Psychiatric/Behavioral: Negative          Past Medical and Surgical History:   Past Medical History:   Diagnosis Date    Cardiomyopathy ([address] )     Chronic combined systolic and diastolic congestive heart failure     COPD (chronic obstructive pulmonary disease) (HCC)     Fatty liver     Hyperlipidemia     Hypertension     Mitral regurgitation     Sepsis        Past Surgical History:   Procedure Laterality Date    CARDIAC CATHETERIZATION  2021    Moderate non-obstructive atherosclerosis     SECTION      CHOLECYSTECTOMY      IR RENAL ANGIOGRAM  2022    IR TEMPORARY DIALYSIS CATHETER CHECK/CHANGE/REPOSITION  2022    IR TUNNELED DIALYSIS CATHETER PLACEMENT  8/1/2022    IR TUNNELED DIALYSIS CATHETER REMOVAL  8/29/2022    ROTATOR CUFF REPAIR W/ DISTAL CLAVICLE EXCISION Right     TONSILLECTOMY         Meds/Allergies:  Prior to Admission medications    Medication Sig Start Date End Date Taking?  Authorizing Provider   albuterol (ProAir HFA) 90 mcg/act inhaler Inhale 2 puffs every 6 (six) hours as needed for wheezing 6/21/22   ESTHER Wagoner   amoxicillin-clavulanate (AUGMENTIN) 875-125 mg per tablet Take 1 tablet by mouth every 12 (twelve) hours for 10 days 9/16/22 9/26/22  Manny Melendez DO   aspirin 81 mg chewable tablet Chew 1 tablet (81 mg total) daily 8/3/22   Wilmar Squires MD   atorvastatin (LIPITOR) 40 mg tablet Take 1 tablet (40 mg total) by mouth daily with dinner 8/3/22   Wilmar Squires MD   azithromycin (ZITHROMAX) 250 mg tablet Take 2 tablets today then 1 tablet daily x 4 days 9/17/22 9/20/22  Manny Melendez DO   carvedilol (COREG) 25 mg tablet Take 1 tablet (25 mg total) by mouth 2 (two) times a day with meals 8/30/22   Hernesto Maurice MD   Cholecalciferol 50 MCG (2000 UT) CAPS Take by mouth daily 8/18/22   Historical Provider, MD   clonazePAM (KlonoPIN) 1 mg tablet Take 0 5-1 mg by mouth daily at bedtime as needed 5/13/21   Historical Provider, MD   clopidogrel (PLAVIX) 75 mg tablet Take 1 tablet (75 mg total) by mouth daily 8/3/22   Wilmar Squires MD   furosemide (LASIX) 40 mg tablet Take 1 tablet (40 mg total) by mouth daily as needed (As needed) 9/9/22   Hernesto Maurice MD   lisinopril (ZESTRIL) 10 mg tablet Take 1 tablet (10 mg total) by mouth daily 8/30/22   Hernesto Maurice MD   MAGNESIUM PO in the morning    Historical Provider, MD   morphine (MS CONTIN) 30 mg 12 hr tablet Take 30 mg by mouth every 12 (twelve) hours 8/22/22   Historical Provider, MD   oxyCODONE-acetaminophen (PERCOCET) 5-325 mg per tablet Take 1 tablet by mouth every 6 (six) hours as needed 8/22/22   Historical Provider, MD   predniSONE 10 mg tablet TAKE 3 TRABLET BY MOUTH DAILY FOR 5 DAYS AND THEN 2 TABLETS DAILY     (REFER TO PRESCRIPTION NOTES)  8/14/22   Historical Provider, MD   predniSONE 10 mg tablet Take 5 tablets (50 mg total) by mouth daily for 5 days, THEN 4 tablets (40 mg total) daily for 1 day, THEN 3 tablets (30 mg total) daily for 1 day, THEN 2 tablets (20 mg total) daily for 1 day, THEN 1 tablet (10 mg total) daily for 1 day  9/16/22 9/25/22  Baltazar Arshad DO   umeclidinium-vilanterol (Anoro Ellipta) 62 5-25 MCG/INH inhaler Inhale 1 puff daily  Patient taking differently: Inhale 1 puff if needed 6/21/22   Gilman Duverney, CRNP     I have reviewed home medications with patient personally  Allergies: Allergies   Allergen Reactions    Wellbutrin [Bupropion] Arthralgia       Social History:  Marital Status: Single   Patient Pre-hospital Living Situation: Home  Patient Pre-hospital Level of Mobility: walks  Patient Pre-hospital Diet Restrictions: none  Substance Use History:   Social History     Substance and Sexual Activity   Alcohol Use Never     Social History     Tobacco Use   Smoking Status Current Some Day Smoker    Packs/day: 2 00    Types: Cigarettes   Smokeless Tobacco Never Used     Social History     Substance and Sexual Activity   Drug Use No       Family History:  History reviewed  No pertinent family history  Physical Exam:     Vitals:   Blood Pressure: 122/60 (09/20/22 1400)  Pulse: 81 (09/20/22 1400)  Temperature: 98 6 °F (37 °C) (09/20/22 1331)  Temp Source: Oral (09/20/22 1331)  Respirations: 20 (09/20/22 1235)  Height: 5' 10" (177 8 cm) (09/20/22 0959)  Weight - Scale: 76 7 kg (169 lb) (09/20/22 0959)  SpO2: 94 % (09/20/22 1400)    Physical Exam  HENT:      Head: Normocephalic  Cardiovascular:      Rate and Rhythm: Normal rate and regular rhythm  Pulses: Normal pulses  Heart sounds: Normal heart sounds  Pulmonary:      Breath sounds: Rhonchi and rales present  Comments: 3 L nasal cannula  Abdominal:      General: Abdomen is flat  Bowel sounds are normal       Palpations: Abdomen is soft  Musculoskeletal:         General: Normal range of motion  Cervical back: Normal range of motion  Right lower leg: No edema  Left lower leg: No edema  Skin:     General: Skin is warm and dry  Neurological:      General: No focal deficit present  Mental Status: She is alert and oriented to person, place, and time  Mental status is at baseline  Psychiatric:         Mood and Affect: Mood normal          Behavior: Behavior normal          Thought Content: Thought content normal          Judgment: Judgment normal           Additional Data:     Lab Results:  Results from last 7 days   Lab Units 09/20/22  1019   WBC Thousand/uL 15 06*   HEMOGLOBIN g/dL 14 9   HEMATOCRIT % 47 2*   PLATELETS Thousands/uL 319   NEUTROS PCT % 80*   LYMPHS PCT % 16   MONOS PCT % 3*   EOS PCT % 0     Results from last 7 days   Lab Units 09/20/22  1019   SODIUM mmol/L 137   POTASSIUM mmol/L 3 9   CHLORIDE mmol/L 96   CO2 mmol/L 30   BUN mg/dL 19   CREATININE mg/dL 0 72   ANION GAP mmol/L 11   CALCIUM mg/dL 10 2   ALBUMIN g/dL 4 3   TOTAL BILIRUBIN mg/dL 0 50   ALK PHOS U/L 59   ALT U/L 17   AST U/L 18   GLUCOSE RANDOM mg/dL 131     Results from last 7 days   Lab Units 09/20/22  1019   INR  1 04             Results from last 7 days   Lab Units 09/20/22  1019   LACTIC ACID mmol/L 1 8   PROCALCITONIN ng/ml <0 05       Imaging: Reviewed radiology reports from this admission including: chest xray  XR chest portable   Final Result by Susanne Herrera MD (09/20 1050)      Emphysema with probable interstitial edema  Workstation performed: [id number]             EKG and Other Studies Reviewed on Admission:   · EKG: No EKG obtained  ** Please Note: This note has been constructed using a voice recognition system   **

## 2022-09-20 NOTE — ASSESSMENT & PLAN NOTE
Lab Results   Component Value Date    EGFR 90 09/20/2022    EGFR 78 09/16/2022    EGFR 56 08/30/2022    CREATININE 0 72 09/20/2022    CREATININE 0 81 09/16/2022    CREATININE 1 07 08/30/2022       Creatinine 0 72 on admission, stable  -continue to monitor

## 2022-09-20 NOTE — CASE MANAGEMENT
Case Management Assessment & Discharge Planning Note    Patient name Iwona Mata  Location /- MRN [id number]  : 1960 Date 2022       Current Admission Date: 2022  Current Admission Diagnosis:Acute on chronic respiratory failure with hypoxia [facility])   Patient Active Problem List    Diagnosis Date Noted    COPD with acute exacerbation [facility] ) 2022    CKD (chronic kidney disease) stage 3, GFR 30-59 ml/min ([facility] ) 2022    Secondary hyperparathyroidism of renal origin ([facility] ) 2022    Renal artery stenosis ([facility] ) 2022    Hepatitis B 2022    Acute on chronic respiratory failure with hypoxia ([facility] ) 2022    Acute respiratory insufficiency 2022    Acute kidney injury superimposed on CKD-3 2022    COPD (chronic obstructive pulmonary disease) ([facility] ) 06/15/2022    Acute on chronic systolic congestive heart failure ([facility] ) 2021    Tobacco abuse 2021    Leukocytosis 2021    Anxiety 2021    Dilated cardiomyopathy ([facility] ) 2021    Essential hypertension 2021    Chronic pain 2021    Vitamin D deficiency 2013      LOS (days): 0  Geometric Mean LOS (GMLOS) (days):   Days to GMLOS:     OBJECTIVE:       Current admission status: Observation  Referral Reason: Area of Aging Referral, VNA    Preferred Pharmacy:   [address] [place] [address] [address] [address]  Phone: [phone] Fax: [phone]    [facility] [address] [place] [address]  Phone: [phone] Fax: [phone]    Primary Care Provider: Ramon Ignacio DO    Primary Insurance: MEDICARE MISC REPLACEMENT  Secondary Insurance: [address]    ASSESSMENT:  [address], [facility] Representative - Daughter   Primary Phone: [phone] (Mobile) Advance Directives  Does patient have a [address]?: No  Was patient offered paperwork?: Yes (Declines)  Does patient currently have a Health Care decision maker?: No  Does patient have Advance Directives?: No  Was patient offered paperwork?: Yes (Declines)  Primary Contact: Stan Chris as decission maker if she can not make own decisions    Readmission Root Cause  30 Day Readmission: No    Patient Information  Admitted from[de-identified] Home  Mental Status: Alert  During Assessment patient was accompanied by: Not accompanied during assessment  Assessment information provided by[de-identified] Patient  Primary Caregiver: Self  Support Systems: Daughter, [facility] of Residence: [address] do you live in?: [facility] entry access options   Select all that apply : Stairs  Number of steps to enter home : 2  Do the steps have railings?: Yes  Type of Current Residence: 2 story home  Upon entering residence, is there a bedroom on the main floor (no further steps)?: No  A bedroom is located on the following floor levels of residence (select all that apply):: 2nd Floor  Upon entering residence, is there a bathroom on the main floor (no further steps)?: No  Indicate which floors of current residence have a bathroom (select all the apply):: 2nd Floor  Number of steps to 2nd floor from main floor: One Flight  In the last 12 months, was there a time when you were not able to pay the mortgage or rent on time?: No  In the last 12 months, how many places have you lived?: 1  In the last 12 months, was there a time when you did not have a steady place to sleep or slept in a shelter (including now)?: No  Homeless/housing insecurity resource given?: N/A  Living Arrangements: Lives Alone  Is patient a ?: No    Activities of Daily Living Prior to Admission  Functional Status: Independent  Completes ADLs independently?: Yes  Ambulates independently?: Yes  Does patient use assisted devices?: No  Does patient currently own DME?: Yes  What DME does the patient currently own?: Stair Chair/Glide, Portable Oxygen concentrator, Home Oxygen concentrator (O2 provider [employer])  Does patient have a history of Outpatient Therapy (PT/OT)?: No  Does the patient have a history of Short-Term Rehab?: No  Does patient have a history of HHC?: Yes (Revoluntary HHC)  Does patient currently have David Altman?: No    Patient Information Continued  Income Source: SSI/SSD  Does patient have prescription coverage?: Yes  Within the past 12 months, you worried that your food would run out before you got the money to buy more : Never true  Within the past 12 months, the food you bought just didn't last and you didn't have money to get more : Never true  Food insecurity resource given?: N/A  Does patient receive dialysis treatments?: No  Does patient have a history of substance abuse?: No  Does patient have a history of Mental Health Diagnosis?: No    Means of Transportation  Means of Transport to Appts[de-identified] Drives Self  In the past 12 months, has lack of transportation kept you from medical appointments or from getting medications?: No  In the past 12 months, has lack of transportation kept you from meetings, work, or from getting things needed for daily living?: No  Was application for public transport provided?: N/A    DISCHARGE DETAILS:    Discharge planning discussed with[de-identified] Patient  Freedom of Choice: Yes     CM contacted family/caregiver?: Yes  Were Treatment Team discharge recommendations reviewed with patient/caregiver?: Yes  Did patient/caregiver verbalize understanding of patient care needs?: Yes  Were patient/caregiver advised of the risks associated with not following Treatment Team discharge recommendations?: Yes    Contacts  Patient Contacts: Dagoberto Hager  Relationship to Patient[de-identified] Family  Contact Method: Phone  Phone Number: [phone]  Reason/Outcome: Continuity of Care, Emergency Contact, Discharge Planning    Requested Home Health Care         Is the [facility] at [facility]?: Yes  [address] requested[de-identified] Zully Ott Agency Name[de-identified] [facility] Provider[de-identified] PCP  Home Health Services Needed[de-identified] COPD Management, Heart Failure Management, Oxygen Via Nasal Cannula  Oxygen LPM Ordered (if applicable based on home health services needed):: 2 LPM  Homebound Criteria Met[de-identified] Uses an Assist Device (i e  cane, walker, etc)  Supporting Clincal Findings[de-identified] Requires Oxygen    Other Referral/Resources/Interventions Provided:  Interventions: [facility]    Treatment Team Recommendation: Home with [address] [employer]         Additional Comments: Met with patient at bedside  Patient reports she has O2 and not sure how to use it  [facility] to [facility] referral made to [facility] via [facility] per preferances  Requesting additional assistance at home referral made to [facility]  Call to [facility] and spoke with [address]  Oneida Hamilton had a referral in August for transporttaion however they never did an assessment  [facility] will need notified at discharge  TT to [facility] provider requesting PT/OT jingals  Patient reports Dtr Joe Donohue should be emergency contact

## 2022-09-20 NOTE — ASSESSMENT & PLAN NOTE
Patient presenting with acute hypoxic respiratory failure  Was recently seen in ED on 09/16 was recommended admission for COPD exacerbation at that time however patient refused  Required 4 L nasal cannula while in ED  Patient received DuoNebs in ED with some improvement  Chest x-ray revealed emphysema with probable interstitial edema  Procalcitonin normal   (was 124 on 9/16)    Acute respiratory failure with hypoxia likely secondary to acute COPD and mild heart failure exacerbation    -IV steroids  -DuoNebs  -Lasix 80 mg IV once resume home Lasix in a m   -completed recent course of azithromycin, will not order at this time  -titrate oxygen as tolerated  -daily weights, monitor I/Os

## 2022-09-20 NOTE — ASSESSMENT & PLAN NOTE
Wt Readings from Last 3 Encounters:   09/20/22 76 7 kg (169 lb)   09/16/22 77 kg (169 lb 12 1 oz)   09/01/22 75 kg (165 lb 6 4 oz)     -plan as above

## 2022-09-21 PROBLEM — G89.4 CHRONIC PAIN SYNDROME: Status: ACTIVE | Noted: 2021-03-27

## 2022-09-21 PROBLEM — J96.01 ACUTE RESPIRATORY FAILURE WITH HYPOXIA (HCC): Status: ACTIVE | Noted: 2022-07-13

## 2022-09-21 LAB
ALBUMIN SERPL BCP-MCNC: 4.3 G/DL (ref 3.5–5)
ALP SERPL-CCNC: 59 U/L (ref 34–104)
ALT SERPL W P-5'-P-CCNC: 15 U/L (ref 7–52)
ANION GAP SERPL CALCULATED.3IONS-SCNC: 10 MMOL/L (ref 4–13)
AST SERPL W P-5'-P-CCNC: 12 U/L (ref 13–39)
ATRIAL RATE: 83 BPM
BILIRUB SERPL-MCNC: 0.48 MG/DL (ref 0.2–1)
BUN SERPL-MCNC: 23 MG/DL (ref 5–25)
CALCIUM SERPL-MCNC: 10.5 MG/DL (ref 8.4–10.2)
CHLORIDE SERPL-SCNC: 94 MMOL/L (ref 96–108)
CO2 SERPL-SCNC: 32 MMOL/L (ref 21–32)
CREAT SERPL-MCNC: 0.83 MG/DL (ref 0.6–1.3)
ERYTHROCYTE [DISTWIDTH] IN BLOOD BY AUTOMATED COUNT: 14.2 % (ref 11.6–15.1)
GFR SERPL CREATININE-BSD FRML MDRD: 76 ML/MIN/1.73SQ M
GLUCOSE SERPL-MCNC: 166 MG/DL (ref 65–140)
HCT VFR BLD AUTO: 47.4 % (ref 34.8–46.1)
HGB BLD-MCNC: 15 G/DL (ref 11.5–15.4)
MAGNESIUM SERPL-MCNC: 2.2 MG/DL (ref 1.9–2.7)
MCH RBC QN AUTO: 28.7 PG (ref 26.8–34.3)
MCHC RBC AUTO-ENTMCNC: 31.6 G/DL (ref 31.4–37.4)
MCV RBC AUTO: 91 FL (ref 82–98)
P AXIS: 38 DEGREES
PLATELET # BLD AUTO: 336 THOUSANDS/UL (ref 149–390)
PMV BLD AUTO: 9.2 FL (ref 8.9–12.7)
POTASSIUM SERPL-SCNC: 4.1 MMOL/L (ref 3.5–5.3)
PR INTERVAL: 164 MS
PROCALCITONIN SERPL-MCNC: <0.05 NG/ML
PROT SERPL-MCNC: 7.3 G/DL (ref 6.4–8.4)
QRS AXIS: 28 DEGREES
QRSD INTERVAL: 94 MS
QT INTERVAL: 370 MS
QTC INTERVAL: 434 MS
RBC # BLD AUTO: 5.22 MILLION/UL (ref 3.81–5.12)
SODIUM SERPL-SCNC: 136 MMOL/L (ref 135–147)
T WAVE AXIS: 84 DEGREES
VENTRICULAR RATE: 83 BPM
WBC # BLD AUTO: 7.54 THOUSAND/UL (ref 4.31–10.16)

## 2022-09-21 PROCEDURE — 94668 MNPJ CHEST WALL SBSQ: CPT

## 2022-09-21 PROCEDURE — 94640 AIRWAY INHALATION TREATMENT: CPT

## 2022-09-21 PROCEDURE — 99223 1ST HOSP IP/OBS HIGH 75: CPT | Performed by: INTERNAL MEDICINE

## 2022-09-21 PROCEDURE — 83735 ASSAY OF MAGNESIUM: CPT | Performed by: STUDENT IN AN ORGANIZED HEALTH CARE EDUCATION/TRAINING PROGRAM

## 2022-09-21 PROCEDURE — 85027 COMPLETE CBC AUTOMATED: CPT | Performed by: STUDENT IN AN ORGANIZED HEALTH CARE EDUCATION/TRAINING PROGRAM

## 2022-09-21 PROCEDURE — 99232 SBSQ HOSP IP/OBS MODERATE 35: CPT | Performed by: HOSPITALIST

## 2022-09-21 PROCEDURE — 94760 N-INVAS EAR/PLS OXIMETRY 1: CPT

## 2022-09-21 PROCEDURE — 80053 COMPREHEN METABOLIC PANEL: CPT | Performed by: STUDENT IN AN ORGANIZED HEALTH CARE EDUCATION/TRAINING PROGRAM

## 2022-09-21 PROCEDURE — 97166 OT EVAL MOD COMPLEX 45 MIN: CPT

## 2022-09-21 PROCEDURE — 99222 1ST HOSP IP/OBS MODERATE 55: CPT | Performed by: INTERNAL MEDICINE

## 2022-09-21 PROCEDURE — 84145 PROCALCITONIN (PCT): CPT | Performed by: STUDENT IN AN ORGANIZED HEALTH CARE EDUCATION/TRAINING PROGRAM

## 2022-09-21 PROCEDURE — 93010 ELECTROCARDIOGRAM REPORT: CPT | Performed by: INTERNAL MEDICINE

## 2022-09-21 PROCEDURE — 97163 PT EVAL HIGH COMPLEX 45 MIN: CPT

## 2022-09-21 RX ORDER — FUROSEMIDE 10 MG/ML
40 INJECTION INTRAMUSCULAR; INTRAVENOUS
Status: DISCONTINUED | OUTPATIENT
Start: 2022-09-21 | End: 2022-09-23

## 2022-09-21 RX ORDER — PREDNISONE 20 MG/1
40 TABLET ORAL DAILY
Status: DISCONTINUED | OUTPATIENT
Start: 2022-09-21 | End: 2022-09-23 | Stop reason: HOSPADM

## 2022-09-21 RX ORDER — GUAIFENESIN 600 MG/1
600 TABLET, EXTENDED RELEASE ORAL EVERY 12 HOURS SCHEDULED
Status: DISCONTINUED | OUTPATIENT
Start: 2022-09-21 | End: 2022-09-23 | Stop reason: HOSPADM

## 2022-09-21 RX ADMIN — LEVALBUTEROL HYDROCHLORIDE 1.25 MG: 1.25 SOLUTION, CONCENTRATE RESPIRATORY (INHALATION) at 07:26

## 2022-09-21 RX ADMIN — LEVALBUTEROL HYDROCHLORIDE 1.25 MG: 1.25 SOLUTION, CONCENTRATE RESPIRATORY (INHALATION) at 19:37

## 2022-09-21 RX ADMIN — GUAIFENESIN 600 MG: 600 TABLET, EXTENDED RELEASE ORAL at 21:42

## 2022-09-21 RX ADMIN — GUAIFENESIN 600 MG: 600 TABLET, EXTENDED RELEASE ORAL at 10:09

## 2022-09-21 RX ADMIN — CARVEDILOL 25 MG: 25 TABLET, FILM COATED ORAL at 15:37

## 2022-09-21 RX ADMIN — FUROSEMIDE 40 MG: 10 INJECTION, SOLUTION INTRAMUSCULAR; INTRAVENOUS at 15:37

## 2022-09-21 RX ADMIN — LISINOPRIL 10 MG: 10 TABLET ORAL at 08:18

## 2022-09-21 RX ADMIN — CHOLECALCIFEROL TAB 25 MCG (1000 UNIT) 2000 UNITS: 25 TAB at 08:18

## 2022-09-21 RX ADMIN — ATORVASTATIN CALCIUM 40 MG: 40 TABLET, FILM COATED ORAL at 15:37

## 2022-09-21 RX ADMIN — LEVALBUTEROL HYDROCHLORIDE 1.25 MG: 1.25 SOLUTION, CONCENTRATE RESPIRATORY (INHALATION) at 13:22

## 2022-09-21 RX ADMIN — IPRATROPIUM BROMIDE 0.5 MG: 0.5 SOLUTION RESPIRATORY (INHALATION) at 13:22

## 2022-09-21 RX ADMIN — MORPHINE SULFATE 30 MG: 30 TABLET, EXTENDED RELEASE ORAL at 04:24

## 2022-09-21 RX ADMIN — IPRATROPIUM BROMIDE 0.5 MG: 0.5 SOLUTION RESPIRATORY (INHALATION) at 07:26

## 2022-09-21 RX ADMIN — METHYLPREDNISOLONE SODIUM SUCCINATE 40 MG: 40 INJECTION, POWDER, FOR SOLUTION INTRAMUSCULAR; INTRAVENOUS at 08:17

## 2022-09-21 RX ADMIN — ENOXAPARIN SODIUM 40 MG: 100 INJECTION SUBCUTANEOUS at 08:17

## 2022-09-21 RX ADMIN — ALBUTEROL SULFATE 2 PUFF: 90 AEROSOL, METERED RESPIRATORY (INHALATION) at 08:19

## 2022-09-21 RX ADMIN — IPRATROPIUM BROMIDE 0.5 MG: 0.5 SOLUTION RESPIRATORY (INHALATION) at 19:37

## 2022-09-21 RX ADMIN — FUROSEMIDE 40 MG: 40 TABLET ORAL at 08:18

## 2022-09-21 RX ADMIN — PREDNISONE 40 MG: 20 TABLET ORAL at 15:37

## 2022-09-21 RX ADMIN — ASPIRIN 81 MG CHEWABLE TABLET 81 MG: 81 TABLET CHEWABLE at 08:17

## 2022-09-21 RX ADMIN — CARVEDILOL 25 MG: 25 TABLET, FILM COATED ORAL at 08:19

## 2022-09-21 RX ADMIN — UMECLIDINIUM BROMIDE AND VILANTEROL TRIFENATATE 1 PUFF: 62.5; 25 POWDER RESPIRATORY (INHALATION) at 10:09

## 2022-09-21 RX ADMIN — NICOTINE 1 PATCH: 21 PATCH, EXTENDED RELEASE TRANSDERMAL at 08:32

## 2022-09-21 RX ADMIN — MORPHINE SULFATE 30 MG: 30 TABLET, EXTENDED RELEASE ORAL at 15:37

## 2022-09-21 RX ADMIN — METHYLPREDNISOLONE SODIUM SUCCINATE 40 MG: 40 INJECTION, POWDER, FOR SOLUTION INTRAMUSCULAR; INTRAVENOUS at 00:02

## 2022-09-21 RX ADMIN — CLOPIDOGREL BISULFATE 75 MG: 75 TABLET ORAL at 08:18

## 2022-09-21 NOTE — ASSESSMENT & PLAN NOTE
Lab Results   Component Value Date    EGFR 76 09/21/2022    EGFR 90 09/20/2022    EGFR 78 09/16/2022    CREATININE 0 83 09/21/2022    CREATININE 0 72 09/20/2022    CREATININE 0 81 09/16/2022       · Will monitor closely in view of the patient now receiving IV diuretics

## 2022-09-21 NOTE — ASSESSMENT & PLAN NOTE
Wt Readings from Last 3 Encounters:   09/21/22 75 3 kg (166 lb 0 1 oz)   09/16/22 77 kg (169 lb 12 1 oz)   09/01/22 75 kg (165 lb 6 4 oz)     Weight is stable, does not examine volume overloaded  BNP not significantly elevated  Recommend resuming diuretics tomorrow in addition to other cardiac meds including lisinopril and carvedilol

## 2022-09-21 NOTE — PLAN OF CARE
Problem: OCCUPATIONAL THERAPY ADULT  Goal: Performs self-care activities at highest level of function for planned discharge setting  See evaluation for individualized goals  Description: Treatment Interventions: ADL retraining, Functional transfer training, Endurance training, Compensatory technique education, Energy conservation, Activityengagement     See flowsheet documentation for full assessment, interventions and recommendations  Note: Limitation: Decreased ADL status, Decreased endurance, Decreased self-care trans, Decreased high-level ADLs  Prognosis: Good  Assessment: Pt is a 64 y o  female seen for OT evaluation s/p admit to [facility] on 9/20/2022 w/ Acute respiratory failure with hypoxia ([facility] )  Comorbidities affecting pt's functional performance at time of assessment include: CHF, COPD, HLD, HTN, Sepsis  Personal factors affecting pt at time of IE include:steps to enter environment, limited home support and difficulty performing ADLS  Prior to admission, pt was Mod I with ADLs  Upon evaluation: the following deficits impact occupational performance: decreased balance and decreased tolerance  Pt to benefit from continued skilled OT tx while in the hospital to address deficits as defined above and maximize level of functional independence w ADL's and functional mobility  Occupational Performance areas to address include: bathing/shower, toilet hygiene, dressing, functional mobility and clothing management  From OT standpoint, recommendation at time of d/c would be Out-patient OT       OT Discharge Recommendation: Home with outpatient rehabilitation     Leyda Alas MS, OTR/L

## 2022-09-21 NOTE — OCCUPATIONAL THERAPY NOTE
Occupational Therapy Evaluation      [facility]    2022    Principal Problem:    Acute respiratory failure with hypoxia (HCC)  Active Problems:    Chronic pain syndrome    Acute on chronic systolic congestive heart failure (HCC)    Tobacco abuse    COPD with acute exacerbation (HCC)    CKD (chronic kidney disease) stage 3, GFR 30-59 ml/min (HCC)      Past Medical History:   Diagnosis Date    Cardiomyopathy ([address] )     Chronic combined systolic and diastolic congestive heart failure     COPD (chronic obstructive pulmonary disease) (HCC)     Fatty liver     Hyperlipidemia     Hypertension     Mitral regurgitation     Sepsis        Past Surgical History:   Procedure Laterality Date    CARDIAC CATHETERIZATION  2021    Moderate non-obstructive atherosclerosis     SECTION      CHOLECYSTECTOMY      IR RENAL ANGIOGRAM  2022    IR TEMPORARY DIALYSIS CATHETER CHECK/CHANGE/REPOSITION  2022    IR TUNNELED DIALYSIS CATHETER PLACEMENT  2022    IR TUNNELED DIALYSIS CATHETER REMOVAL  2022    ROTATOR CUFF REPAIR W/ DISTAL CLAVICLE EXCISION Right     TONSILLECTOMY          22 0800   OT Last Visit   OT Visit Date 22   Note Type   Note type Evaluation   Restrictions/Precautions   Other Precautions Chair Alarm; Bed Alarm;Multiple lines;O2;Fall Risk  (2L of O2)   Pain Assessment   Pain Assessment Tool 0-10   Pain Score No Pain   Home Living   Type of Home House   Home Layout Two level;Performs ADLs on one level; Able to live on main level with bedroom/bathroom;Stairs to enter without rails  (2+3+3 ANATOLY, sleeps on couch downstairs, no bathroom on 1st floor)   Bathroom Shower/Tub Tub/shower unit  (upstairs)   Bathroom Toilet Standard   [address]  (no AD at baseline)   Prior Function   Level of [place] Independent with ADLs and functional mobility   Lives With Alone  (2 cats)   Receives Help From Family  (daughter and nephew live nearby) ADL Assistance Independent   IADLs Independent   Falls in the last 6 months 0   Vocational On disability   Comments +, car doesn't work   ADL   [address]  [address] [place] [address] [address]  5  Supervision/Setup   LB Dressing Assistance 5  Supervision/Setup   Bed Mobility   Supine to Sit 5  Supervision   Additional items Assist x 1;HOB elevated; Bedrails;Verbal cues   Sit to Supine   (DNT pt  was sitting out of bed on recliner upon conclusion )   Transfers   Sit to Stand 5  Supervision   Additional items Assist x 1;Bedrails; Increased time required;Verbal cues   Stand to Sit 5  Supervision   Additional items Assist x 1;Bedrails;Verbal cues   Stand pivot 5  Supervision   Additional items Assist x 1;Verbal cues   Toilet transfer   (CGA)   Additional items Assist x 1; Increased time required;Standard toilet;Verbal cues   Balance   Static Sitting Good   Dynamic Sitting Fair +   Static Standing Fair +   Dynamic Standing Fair   Ambulatory Fair   Activity Tolerance   Activity Tolerance Patient limited by fatigue;Treatment limited secondary to medical complications (Comment)  (SCALES)   Nurse Made Aware HORACE Cronin   RUE Assessment   RUE Assessment WFL   LUE Assessment   LUE Assessment WFL   Vision-Basic Assessment   Current Vision Wears glasses all the time   Cognition   Overall Cognitive Status WFL   Arousal/Participation Alert; Cooperative   Attention Within functional limits   Orientation Level Oriented X4   Memory Within functional limits   Following Commands Follows all commands and directions without difficulty   Assessment   Limitation Decreased ADL status; Decreased endurance;Decreased self-care trans;Decreased high-level ADLs   Prognosis Good   Assessment Pt is a 64 y o  female seen for OT evaluation s/p admit [facility] [address] on 9/20/2022 w/ Acute respiratory failure with hypoxia ([facility] )    Comorbidities affecting pt's functional performance at time of assessment include: CHF, COPD, HLD, HTN, Sepsis  Personal factors affecting pt at time of IE include:steps to enter environment, limited home support and difficulty performing ADLS  Prior to admission, pt was Mod I with ADLs  Upon evaluation: the following deficits impact occupational performance: decreased balance and decreased tolerance  Pt to benefit from continued skilled OT tx while in the hospital to address deficits as defined above and maximize level of functional independence w ADL's and functional mobility  Occupational Performance areas to address include: bathing/shower, toilet hygiene, dressing, functional mobility and clothing management  From OT standpoint, recommendation at time of d/c would be Out-patient OT  Goals   Patient Goals to feel better soon   Plan   Treatment Interventions ADL retraining;Functional transfer training; Endurance training; Compensatory technique education; Energy conservation; Activityengagement   Goal Expiration Date 10/01/22   OT Treatment Day 0   OT Frequency 3-5x/wk   Recommendation   OT Discharge Recommendation Home with outpatient rehabilitation   Additional Comments  Pt seen as a co-eval with PT due to the patient's co-morbidities, clinically unstable presentation, and present impairments which are a regression from the patient's baseline  Additional Comments 2 The patient's raw score on the AM-PAC Daily Activity inpatient short form is 20, standardized score is 42 03, greater than 39 4  Patients at this level are likely to benefit from discharge to home  Please refer to the recommendation of the Occupational Therapist for safe discharge planning     AM-PAC Daily Activity Inpatient   Lower Body Dressing 3   Bathing 3   Toileting 3   Upper Body Dressing 3   Grooming 4   Eating 4   Daily Activity Raw Score 20   Daily Activity Standardized Score (Calc for Raw Score >=11) 42 03   AM-[facility] Applied Cognition Inpatient   Following a Speech/Presentation 4   Understanding Ordinary Conversation 4   Taking Medications 4   Remembering Where Things Are Placed or Put Away 4   Remembering List of 4-5 Errands 4   Taking Care of Complicated Tasks 4   Applied Cognition Raw Score 24   Applied Cognition Standardized Score 62 21     GOALS:    Pt will achieve the following within specified time frame: STG  Pt will achieve the following goals within 5 days    *ADL transfers with (I) for inc'd independence with ADLs/purposeful tasks    *UB ADL with (I) for inc'd independence with self cares    *LB ADL with (I) using AE prn for inc'd independence with self cares    *Toileting with (I) for clothing management and hygiene for return to PLOF with personal care    *Increase static stand balance to G- and dyn stand balance to F+ for inc'd safety with standing purposeful tasks    *Increase stand tolerance x3 m for inc'd tolerance with standing purposeful tasks    *Participate in 10m UE therex to increase overall stamina/activity tolerance for purposeful tasks    *Bed mobility- (I) for inc'd independence to manage own comfort and initiate EOB & OOB purposeful tasks    Pt will achieve the following within specified time frame: LTG  Pt will achieve the following goals within 10 days    *Increase static stand balance to G and dyn stand balance to G- for inc'd safety with standing purposeful tasks    *Increase stand tolerance x5 m for inc'd tolerance with standing purposeful tasks      Leena Quezada MS, OTR/L

## 2022-09-21 NOTE — ASSESSMENT & PLAN NOTE
Wt Readings from Last 3 Encounters:   09/21/22 75 3 kg (166 lb 0 1 oz)   09/16/22 77 kg (169 lb 12 1 oz)   09/01/22 75 kg (165 lb 6 4 oz)     · Recent echocardiograms reviewed, patient has an EF of about 40%  · Chest x-ray reveals interstitial edema in the setting of having emphysema  · Status post 80 mg of IV Lasix yesterday  · Will continue with 40 mg of IV Lasix twice a day  · Consult Cardiology  · In the interim continue other current cardiac based medications which include aspirin, Plavix, carvedilol, lisinopril, and Lipitor

## 2022-09-21 NOTE — ASSESSMENT & PLAN NOTE
· Patient initially presented to the ED with an acute hypoxic respiratory failure  · Patient denies using any supplemental oxygen at home  · Initial oxygen requirement was 4 L of supplemental oxygen via nasal cannula in the ED   · Current O2 requirement is 2 L of supplemental oxygen via nasal cannula with a resultant O2 sat of about 90%  · Mixed etiology  · 1  Acute exacerbation of systolic dysfunction congestive heart failure  · 2   Acute exacerbation of COPD  · Management as outlined below  · Since the patient will require greater than 2 midnights here in the hospital for continued IV diuretics, with IV steroids, a pulmonary evaluation, and a cardiology evaluation, I will be switching her from the observation classification to the inpatient classification

## 2022-09-21 NOTE — CASE MANAGEMENT
Case Management Discharge Planning Note    Patient name Rene Ho  Location /-18 MRN [id number]  : 1960 Date 2022       Current Admission Date: 2022  Current Admission Diagnosis:Acute respiratory failure with hypoxia [facility])   Patient Active Problem List    Diagnosis Date Noted    COPD with acute exacerbation ([facility] ) 2022    CKD (chronic kidney disease) stage 3, GFR 30-59 ml/min ([facility] ) 2022    Secondary hyperpar[facility]roidism of renal origin ([facility] ) 2022    Renal artery stenosis ([facility] ) 2022    Hepatitis B 2022    [facility] with [facility] 2022    Acute respiratory insufficiency 2022    Acute kidney injury superimposed on CKD-3 2022    COPD (chronic obstructive pulmonary disease) ([facility] ) 06/15/2022    Acute on chronic systolic congestive heart failure ([facility] ) 2021    Tobacco abuse 2021    Leukocytosis 2021    Anxiety 2021    Dilated cardiomyopathy ([facility] ) 2021    Essential hypertension 2021    Chronic pain syndrome 2021    Vitamin D deficiency 2013      LOS (days): 0  Geometric Mean LOS (GMLOS) (days): 3 80  Days to GMLOS:3 5     OBJECTIVE:  Risk of Unplanned Readmission Score: 23 49     Current admission status: Inpatient   Preferred Pharmacy:   [address] [place] [address] #[id number] [address]  Phone: [phone] Fax: [phone]    [facility] [address] [place] [address]  Phone: [phone] Fax: [phone]    Primary Care Provider: Bisi Gómez DO    Primary Insurance: MEDICARE MISC REPLACEMENT  Secondary Insurance: [place] [id number] [place] DETAILS:  PT is recommending outpt PT, however pt does not have transport to [facility]  A referral was amde to [facility], however they do not accept insurance    Made 5 more referrals to other [facility] agencies  Pt made aware of same

## 2022-09-21 NOTE — ASSESSMENT & PLAN NOTE
Lab Results   Component Value Date    EGFR 76 09/21/2022    EGFR 90 09/20/2022    EGFR 78 09/16/2022    CREATININE 0 83 09/21/2022    CREATININE 0 72 09/20/2022    CREATININE 0 81 09/16/2022     Monitor renal function on diuretics and ACE-inhibitor

## 2022-09-21 NOTE — ASSESSMENT & PLAN NOTE
· Unspecified exact etiology  · Patient reports having 2 cats at home that shed a lot of hair, patient continues to smoke  · Procalcitonin testing x2 is less than 0 05 - no antibiotics indicated  · Continue Solu-Medrol 40 mg IV q 8 hours  · Continue respiratory protocol  · Continue breathing treatments with Xopenex and Atrovent  · Continue Anoro  · Will consult Pulmonary

## 2022-09-21 NOTE — CONSULTS
Pulmonary Consultation   Manuel Mohan 64 y o  female MRN: [id number]   -01 Encounter: [id number]      Reason for consultation:   COPD acute exacerbation and shortness of breath      Requesting physician:   Capri Loya MD      Impressions:    Acute hypoxemic respiratory failure   COPD with mild acute exacerbation   Congestive heart failure with acute exacerbation   History of renal artery stenosis status post renal artery stent placement done on August 1, 2020   Possible pheochromocytoma with elevated fractionated plasma catecholamines   History of acute kidney failure  She required hemodialysis for several weeks  Recommendations:   Transition the patient to p o  prednisone with a taper   Bronchodilators   Continue diuresis   Titrate oxygen to maintain O2 saturation above 88%  Discussed with Dr Jared Cabrera  History of Present Illness   HPI:  Manuel Mohan is a 64 y o  female who is known to me from her last is mission in June  At the time the patient had acute hypoxemic respiratory failure with unexplained hypertension and acute kidney injury requiring hemodialysis  The patient turned out to have left renal artery stenosis  She had left renal artery angioplasty and stent placed  Also there was suspicion of pheochromocytoma  Because the patient was not making urine 24 hour urine for catecholamines was not possible  She had plasma metanephrines initially which were normal   Subsequently she had fractionated plasma catecholamine test which showed elevated catecholamines  The patient eventually was discharged home  She was home without oxygen and later she did not needed  She said that over the last week she has been feeling not well  She presented to the emergency room about 5 days ago and was evaluated  She was prescribed prednisone and was given breathing treatment and was sent home    She did not feel better and she decided come back to the hospital yesterday and she was admitted  She was treated with steroids, bronchodilators and diuretics  Today she feels a lot better  However she still oxygen dependent  She denies significant cough  No purulent sputum  Denies lower extremity edema  Review of systems:  12 point review of systems was completed and was otherwise negative except as listed in HPI  Historical Information   Past Medical History:   Diagnosis Date    Cardiomyopathy ([facility] )     Chronic combined systolic and diastolic congestive heart failure     COPD (chronic obstructive pulmonary disease) (HCC)     Fatty liver     Hyperlipidemia     Hypertension     Mitral regurgitation     Sepsis      Past Surgical History:   Procedure Laterality Date    CARDIAC CATHETERIZATION  2021    Moderate non-obstructive atherosclerosis     SECTION      CHOLECYSTECTOMY      IR RENAL ANGIOGRAM  2022    IR TEMPORARY DIALYSIS CATHETER CHECK/CHANGE/REPOSITION  2022    IR TUNNELED DIALYSIS CATHETER PLACEMENT  2022    IR TUNNELED DIALYSIS CATHETER REMOVAL  2022    ROTATOR CUFF REPAIR W/ DISTAL CLAVICLE EXCISION Right     TONSILLECTOMY       History reviewed  No pertinent family history  Family History:  No family history of chronic lung disease  Social History:  The patient lives by herself  She was a   Significant smoking history         Meds/Allergies   Current Facility-Administered Medications   Medication Dose Route Frequency    albuterol (PROVENTIL HFA,VENTOLIN HFA) inhaler 2 puff  2 puff Inhalation Q6H PRN    aspirin chewable tablet 81 mg  81 mg Oral Daily    atorvastatin (LIPITOR) tablet 40 mg  40 mg Oral Daily With Dinner    carvedilol (COREG) tablet 25 mg  25 mg Oral BID With Meals    cholecalciferol (VITAMIN D3) tablet 2,000 Units  2,000 Units Oral Daily    clopidogrel (PLAVIX) tablet 75 mg  75 mg Oral Daily    enoxaparin (LOVENOX) subcutaneous injection 40 mg  40 mg Subcutaneous Daily    furosemide (LASIX) injection 40 mg  40 mg Intravenous BID (diuretic)    guaiFENesin (MUCINEX) 12 hr tablet 600 mg  600 mg Oral Q12H [address]    ipratropium (ATROVENT) 0 02 % inhalation solution 0 5 mg  0 5 mg Nebulization TID    levalbuterol (XOPENEX) inhalation solution 1 25 mg  1 25 mg Nebulization TID    lisinopril (ZESTRIL) tablet 10 mg  10 mg Oral Daily    morphine (MS CONTIN) ER tablet 30 mg  30 mg Oral Q12H    nicotine (NICODERM CQ) 21 mg/24 hr TD 24 hr patch 1 patch  1 patch Transdermal Daily    oxyCODONE-acetaminophen (PERCOCET) 5-325 mg per tablet 1 tablet  1 tablet Oral Q6H PRN    predniSONE tablet 40 mg  40 mg Oral Daily    umeclidinium-vilanterol (ANORO ELLIPTA) 62 5-25 MCG/INH inhaler 1 puff  1 puff Inhalation Daily     Medications Prior to Admission   Medication    albuterol (ProAir HFA) 90 mcg/act inhaler    amoxicillin-clavulanate (AUGMENTIN) 875-125 mg per tablet    aspirin 81 mg chewable tablet    atorvastatin (LIPITOR) 40 mg tablet    carvedilol (COREG) 25 mg tablet    Cholecalciferol 50 MCG (2000) CAPS    clonazePAM (KlonoPIN) 1 mg tablet    clopidogrel (PLAVIX) 75 mg tablet    furosemide (LASIX) 40 mg tablet    lisinopril (ZESTRIL) 10 mg tablet    MAGNESIUM PO    morphine (MS CONTIN) 30 mg 12 hr tablet    oxyCODONE-acetaminophen (PERCOCET) 5-325 mg per tablet    predniSONE 10 mg tablet    umeclidinium-vilanterol (Anoro Ellipta) 62 5-25 MCG/INH inhaler    [] azithromycin (ZITHROMAX) 250 mg tablet    predniSONE 10 mg tablet     Allergies   Allergen Reactions    Wellbutrin [Bupropion] Arthralgia       Vitals: Blood pressure 165/67, pulse 85, temperature 98 6 °F (37 °C), temperature source Oral, resp   rate 18, height 5' 10" (1 778 m), weight 75 3 kg (166 lb 0 1 oz), SpO2 92 % ,      Intake/Output Summary (Last 24 hours) at 2022 1533  Last data filed at 2022 1336  Gross per 24 hour   Intake 240 ml   Output 1100 ml   Net -860 ml       Physical exam:        Head/eyes: Normocephalic, without obvious abnormality, atraumatic,         PERRL, extraocular muscles intact, no scleral icterus    Nose:   Nares normal, septum midline, mucosa normal, no drainage    or sinus tenderness   Throat:   Moist mucous membranes, no thrush   Neck:   Supple, trachea midline, no adenopathy; no carotid    bruit or JVD   Lungs:     Decreased breath sounds over the bases  No wheezing  Heart:    Regular rate and rhythm, S1 and S2 normal, no murmur, rub   or gallop   Abdomen:     Soft, non-tender, bowel sounds active all four quadrants,     no masses, no organomegaly   Extremities:   Extremities normal, atraumatic, no cyanosis or edema   Skin:   Warm, dry, turgor normal, no rashes or lesions   Neurologic:   CNII-XII intact, normal strength, non-focal             Labs:   CBC:   Lab Results   Component Value Date    WBC 7 54 09/21/2022    HGB 15 0 09/21/2022    HCT 47 4 (H) 09/21/2022    MCV 91 09/21/2022     09/21/2022    MCH 28 7 09/21/2022    MCHC 31 6 09/21/2022    RDW 14 2 09/21/2022    MPV 9 2 09/21/2022   , CMP:   Lab Results   Component Value Date    SODIUM 136 09/21/2022    K 4 1 09/21/2022    CL 94 (L) 09/21/2022    CO2 32 09/21/2022    BUN 23 09/21/2022    CREATININE 0 83 09/21/2022    CALCIUM 10 5 (H) 09/21/2022    AST 12 (L) 09/21/2022    ALT 15 09/21/2022    ALKPHOS 59 09/21/2022    EGFR 76 09/21/2022       Imaging and other studies: I have personally reviewed pertinent films in PACS chest x-ray is reviewed on the [facility] system  It showed pulmonary congestion          Sharon Diego MD

## 2022-09-21 NOTE — PROGRESS NOTES
[address]  Progress Note Saurabh Bledsoe 1960, 64 y o  female MRN: [id number]  Unit/Bed#: -01 Encounter: [id number]  Primary Care Provider: Karolina Umana DO   Date and time admitted to hospital: 9/20/2022  9:56 AM    * Acute respiratory failure with hypoxia [facility])  Assessment & Plan  · Patient initially presented to the ED with an acute hypoxic respiratory failure  · Patient denies using any supplemental oxygen at home  · Initial oxygen requirement was 4 L of supplemental oxygen via nasal cannula in the ED   · Current O2 requirement is 2 L of supplemental oxygen via nasal cannula with a resultant O2 sat of about 90%  · Mixed etiology  · 1  Acute exacerbation of systolic dysfunction congestive heart failure  · 2   Acute exacerbation of COPD  · Management as outlined below  · Since the patient will require greater than 2 midnights here in the hospital for continued IV diuretics, with IV steroids, a pulmonary evaluation, and a cardiology evaluation, I will be switching her from the observation classification to the inpatient classification    COPD with acute exacerbation ([facility] [address] )  Assessment & Plan  · Unspecified exact etiology  · Patient reports having 2 cats at home that shed a lot of hair, patient continues to smoke  · Procalcitonin testing x2 is less than 0 05 - no antibiotics indicated  · Continue Solu-Medrol 40 mg IV q 8 hours  · Continue respiratory protocol  · Continue breathing treatments with Xopenex and Atrovent  · Continue Anoro  · Will consult Pulmonary    Acute on chronic systolic congestive heart failure (HCC)  Assessment & Plan  Wt Readings from Last 3 Encounters:   09/21/22 75 3 kg (166 lb 0 1 oz)   09/16/22 77 kg (169 lb 12 1 oz)   09/01/22 75 kg (165 lb 6 4 oz)     · Recent echocardiograms reviewed, patient has an EF of about 40%  · Chest x-ray reveals interstitial edema in the setting of having emphysema  · Status post 80 mg of IV Lasix yesterday  · Will continue with 40 mg of IV Lasix twice a day  · Consult Cardiology  · In the interim continue other current cardiac based medications which include aspirin, Plavix, carvedilol, lisinopril, and Lipitor        CKD (chronic kidney disease) stage 3, GFR 30-59 ml/min [facility])  Assessment & Plan  Lab Results   Component Value Date    EGFR 76 2022    EGFR 90 2022    EGFR 78 2022    CREATININE 0 83 2022    CREATININE 0 72 2022    CREATININE 0 81 2022       · Will monitor closely in view of the patient now receiving IV diuretics    Tobacco abuse  Assessment & Plan  · Cessation counseling provided  · Continued Nicotine patch    Chronic pain syndrome  Assessment & Plan  · With continued opioid dependence  · Continue home meds        VTE Prophylaxis:  Enoxaparin (Lovenox)    Patient Centered Rounds: I have performed bedside rounds with nursing staff today  Discussions with Specialists or Other Care Team Provider:  Cardiology, Pulmonary, case management, nursing  Education and Discussions with Family / Patient:  Patient was brought up to par with the plan of care for today, she did not ask and or want me to call anyone    Current Length of Stay: 0 day(s)    Current Patient Status: Inpatient   Certification Statement: The patient, admitted on an observation basis, will now require > 2 midnight hospital stay due to The need for continued IV steroids, and IV diuretics    Discharge Plan:  Hopeful discharge planning in 24-48 hours    Code Status: Level 1 - Full Code    Subjective:   Patient seen and examined, continues to feel short of breath with minimal exertion  Denies any pain  Objective:     Vitals:   Temp (24hrs), Av 1 °F (36 7 °C), Min:96 4 °F (35 8 °C), Max:98 8 °F (37 1 °C)    Temp:  [96 4 °F (35 8 °C)-98 8 °F (37 1 °C)] 98 6 °F (37 °C)  HR:  [76-91] 85  Resp:  [17-24] 18  BP: (116-174)/(60-90) 165/67  SpO2:  [90 %-100 %] 90 %  Body mass index is 23 82 kg/m²       Input and Output Summary (last 24 hours): Intake/Output Summary (Last 24 hours) at 9/21/2022 0903  Last data filed at 9/21/2022 0841  Gross per 24 hour   Intake 240 ml   Output 700 ml   Net -460 ml       Physical Exam:   Physical Exam  Constitutional:       General: She is not in acute distress  Appearance: Normal appearance  She is normal weight  She is not ill-appearing  HENT:      Head: Normocephalic and atraumatic  Nose: Nose normal       Mouth/Throat:      Mouth: Mucous membranes are moist    Eyes:      Extraocular Movements: Extraocular movements intact  Pupils: Pupils are equal, round, and reactive to light  Cardiovascular:      Rate and Rhythm: Normal rate and regular rhythm  Pulses: Normal pulses  Heart sounds: Normal heart sounds  No murmur heard  No friction rub  No gallop  Pulmonary:      Effort: Pulmonary effort is normal  No respiratory distress  Breath sounds: Normal breath sounds  No wheezing, rhonchi or rales  Comments: Decreased breath sounds bilaterally at the bases, faint crackles appreciated  Scattered rhonchi appreciated in all fields bilaterally  Abdominal:      General: There is no distension  Palpations: Abdomen is soft  There is no mass  Tenderness: There is no abdominal tenderness  Hernia: No hernia is present  Musculoskeletal:         General: No swelling or tenderness  Normal range of motion  Cervical back: Normal range of motion and neck supple  No rigidity  Right lower leg: No edema  Left lower leg: No edema  Skin:     General: Skin is warm  Capillary Refill: Capillary refill takes less than 2 seconds  Findings: No erythema or rash  Neurological:      General: No focal deficit present  Mental Status: She is alert and oriented to person, place, and time  Mental status is at baseline  Cranial Nerves: No cranial nerve deficit  Motor: No weakness     Psychiatric:         Mood and Affect: Mood normal          Behavior: Behavior normal          Additional Data:     Labs:    Results from last 7 days   Lab Units 09/21/22  0625 09/20/22  1019   WBC Thousand/uL 7 54 15 06*   HEMOGLOBIN g/dL 15 0 14 9   HEMATOCRIT % 47 4* 47 2*   PLATELETS Thousands/uL 336 319   NEUTROS PCT %  --  80*   LYMPHS PCT %  --  16   MONOS PCT %  --  3*   EOS PCT %  --  0     Results from last 7 days   Lab Units 09/21/22  0438   SODIUM mmol/L 136   POTASSIUM mmol/L 4 1   CHLORIDE mmol/L 94*   CO2 mmol/L 32   BUN mg/dL 23   CREATININE mg/dL 0 83   CALCIUM mg/dL 10 5*   ALK PHOS U/L 59   ALT U/L 15   AST U/L 12*     Results from last 7 days   Lab Units 09/20/22  1019   INR  1 04               * I Have Reviewed All Lab Data Listed Above  * Additional Pertinent Lab Tests Reviewed: [address] 66 Admission  Reviewed    Imaging:  Imaging Reports Reviewed Today Include:  Chest x-ray-see above    Recent Cultures (last 7 days):     Results from last 7 days   Lab Units 09/20/22  1019 09/20/22  1010   BLOOD CULTURE  Received in Microbiology Lab  Culture in Progress  Received in Microbiology Lab  Culture in Progress         Last 24 Hours Medication List:   Current Facility-Administered Medications   Medication Dose Route Frequency Provider Last Rate    albuterol  2 puff Inhalation Q6H PRN Nazia Martinez,       aspirin  81 mg Oral Daily Nazia Martinez, DO      atorvastatin  40 mg Oral Daily With [employer], DO      carvedilol  25 mg Oral BID With Meals Nazia Martinez, DO      cholecalciferol  2,000 Units Oral Daily Nazia Martinez, DO      clopidogrel  75 mg Oral Daily Nazia Martinez, DO      enoxaparin  40 mg Subcutaneous Daily Nazia Martinez, DO      furosemide  40 mg Intravenous BID (diuretic) Marleen Samayoa MD      ipratropium  0 5 mg Nebulization TID Nazia Martinez, DO      levalbuterol  1 25 mg Nebulization TID Nazia Martinez, DO      lisinopril  10 mg Oral Daily Nazia Martinez, DO      methylPREDNISolone sodium succinate  40 mg Intravenous Q8H Keyona Huddle, DO      morphine  30 mg Oral Q12H Keyona Huddle, DO      nicotine  1 patch Transdermal Daily Keyona [facility] [place]      oxyCODONE-acetaminophen  1 tablet Oral Q6H PRN Keyona Huddle, DO      umeclidinium-vilanterol  1 puff Inhalation Daily Keyona Huddle, DO          Today, Patient Was Seen By: Mj Echavarria MD    ** Please Note: Dictation voice to text software may have been used in the creation of this document   **

## 2022-09-21 NOTE — PLAN OF CARE
Problem: PHYSICAL THERAPY ADULT  Goal: Performs mobility at highest level of function for planned discharge setting  See evaluation for individualized goals  Description: Treatment/Interventions: Functional transfer training, LE strengthening/ROM, Elevations, Therapeutic exercise, Endurance training, Patient/family training, Equipment eval/education, Gait training, Compensatory technique education, Spoke to nursing          See flowsheet documentation for full assessment, interventions and recommendations  Note: Prognosis: Good  Problem List: Decreased strength, Decreased endurance, Impaired balance, Decreased mobility, Pain  Assessment: Pt is 64 y o  female seen for PT evaluation s/p admit to [facility] on 9/20/2022 w/ Acute respiratory failure with hypoxia ([facility] [address] )  PT consulted to assess pt's functional mobility and d/c needs  Order placed for PT eval and tx, w/ activity as tolerated order  Comorbidities affecting pt's physical performance at time of assessment include: weakness,acute respiratory failure w/hypoxia, acute on chronic CHF, COPD with acute exacerbation,tobacco abuse,chronic pain syndrome,HTN,dilated cardiomyopathy   PTA, pt was independent w/ all functional mobility w/ o AD usage  Personal factors affecting pt at time of IE include: stairs to enter home, inability to navigate community distances, unable to perform dynamic tasks in community, tobacco use, limited insight into impairments and inability to perform ADLs  Please find objective findings from PT assessment regarding body systems outlined above with impairments and limitations including weakness, impaired balance, decreased endurance, impaired coordination, gait deviations, pain, decreased activity tolerance, decreased functional mobility tolerance, fall risk and SOB upon exertion   From PT/mobility standpoint, recommendation at time of d/c would be home with outpatient rehabilitation pending progress in order to facilitate return to PLOF  PT Discharge Recommendation: Home with outpatient rehabilitation    See flowsheet documentation for full assessment

## 2022-09-21 NOTE — CONSULTS
[address] [facility] 1960, 64 y o  female MRN: [id number]  Unit/Bed#: -01 Encounter: [id number]  Primary Care Provider: Russ Melton DO   Date and time admitted to hospital: 9/20/2022  9:56 AM    Inpatient consult to Cardiology  Consult performed by: ESTHER Cruz  Consult ordered by: Amita Hall MD          CKD (chronic kidney disease) stage 3, GFR 30-59 ml/min [facility])  Assessment & Plan  Lab Results   Component Value Date    EGFR 76 09/21/2022    EGFR 90 09/20/2022    EGFR 78 09/16/2022    CREATININE 0 83 09/21/2022    CREATININE 0 72 09/20/2022    CREATININE 0 81 09/16/2022     Monitor renal function on diuretics and ACE-inhibitor    COPD with acute exacerbation [facility])  Assessment & Plan  Management per medical team and Pulmonary    Tobacco abuse  Assessment & Plan  Cessation encouraged    Acute on chronic systolic congestive heart failure (HCC)  Assessment & Plan  Wt Readings from Last 3 Encounters:   09/21/22 75 3 kg (166 lb 0 1 oz)   09/16/22 77 kg (169 lb 12 1 oz)   09/01/22 75 kg (165 lb 6 4 oz)     Weight is stable, does not examine volume overloaded  BNP not significantly elevated  Recommend resuming diuretics tomorrow in addition to other cardiac meds including lisinopril and carvedilol        * Acute respiratory failure with hypoxia ([address] )  Assessment & Plan  This is likely multifactorial  See additional notes below    Other summary comments:   Acute respiratory failure is likely multifactorial   She has a known nonischemic cardiomyopathy, however she does not examine volume overloaded and BNP is not significantly elevated  Would recommend continuing her home dose diuretics, monitor I/O, daily weights and low-sodium diet  She also has underlying pulmonary disease and pulmonary consultation is currently pending      Outpatient Cardiologist: Dr Tori Chung    HPI: Madelin Laura is a 64y o  year old female who presented with worsening shortness of breath  Ana Mao is a pleasant 25-year-old female who presented with acute heart failure 03/26/2021  Ejection fraction was noted to be 20% with moderate mitral regurgitation at that time  Coronary angiography revealed moderate but nonobstructive disease  By July of that year, her systolic function had recovered to near normal     Since that time, she has had multiple hospitalizations for acute respiratory failure  Most recently, in July, she had a prolonged hospitalization  She was unresponsive to diuretics and became aneuric, ultimately requiring hemodialysis  She had poorly controlled hypertension due to renal artery stenosis and underwent left renal artery stenting 08/01/2022  Since that time, she has had recovery of her renal function and is now off of HD treatments  She was seen in the emergency department 09/16/2022 with worsening shortness of breath  She had increasing O2 requirements  She had improvement in her symptoms following a nebulizer treatment and requested discharge  She returned to the emergency department 09/20/2022 with worsening shortness of breath and increasing O2 requirements  She had been stable off of supplemental oxygen, but is now requiring 2-4 L  She has been started on medications for acute COPD exacerbation and has received IV diuretics  She notes increased urine output, though the I/O documentation does not appear complete  At the time of my evaluation, Ana Mao reports she has a heaviness in her chest and feeling as though she cannot take a deep breath  She has had a loose cough  She feels better when she is leaning forward  She has not been weighing herself at home as she has been on prednisone  She denies consuming increased sodium        EKG:  Sinus rhythm, LAD, nonspecific ST T wave changes      MOST  RECENT CARDIAC IMAGING:   Echo 07/14/2022 EF 25%, severe global hypokinesis with regional variation  Mild AI  Moderate MR  Mild TR    Echo 06/15/2022 EF 41%, mild LVH  Mild AI, TR    Review of Systems: a 10 point review of systems was conducted and is negative except for as mentioned in the HPI or as below          Historical Information   Past Medical History:   Diagnosis Date    Cardiomyopathy ([facility] [address] )     Chronic combined systolic and diastolic congestive heart failure     COPD (chronic obstructive pulmonary disease) (HCC)     Fatty liver     Hyperlipidemia     Hypertension     Mitral regurgitation     Sepsis      Past Surgical History:   Procedure Laterality Date    CARDIAC CATHETERIZATION  2021    Moderate non-obstructive atherosclerosis     SECTION      CHOLECYSTECTOMY      IR RENAL ANGIOGRAM  2022    IR TEMPORARY DIALYSIS CATHETER CHECK/CHANGE/REPOSITION  2022    IR TUNNELED DIALYSIS CATHETER PLACEMENT  2022    IR TUNNELED DIALYSIS CATHETER REMOVAL  2022    ROTATOR CUFF REPAIR W/ DISTAL CLAVICLE EXCISION Right     TONSILLECTOMY       Social History     Substance and Sexual Activity   Alcohol Use Never     Social History     Substance and Sexual Activity   Drug Use No     Social History     Tobacco Use   Smoking Status Current Some Day Smoker    Packs/day: 2 00    Types: Cigarettes   Smokeless Tobacco Never Used       Family History: unknown      Meds/Allergies   all current active meds have been reviewed  Medications Prior to Admission   Medication    albuterol (ProAir HFA) 90 mcg/act inhaler    amoxicillin-clavulanate (AUGMENTIN) 875-125 mg per tablet    aspirin 81 mg chewable tablet    atorvastatin (LIPITOR) 40 mg tablet    carvedilol (COREG) 25 mg tablet    Cholecalciferol 50 MCG (2000) CAPS    clonazePAM (KlonoPIN) 1 mg tablet    clopidogrel (PLAVIX) 75 mg tablet    furosemide (LASIX) 40 mg tablet    lisinopril (ZESTRIL) 10 mg tablet    MAGNESIUM PO    morphine (MS CONTIN) 30 mg 12 hr tablet    oxyCODONE-acetaminophen (PERCOCET) 5-325 mg per tablet    predniSONE 10 mg tablet    umeclidinium-vilanterol (Anoro Ellipta) 62 5-25 MCG/INH inhaler    [] azithromycin (ZITHROMAX) 250 mg tablet    predniSONE 10 mg tablet       Allergies   Allergen Reactions    Wellbutrin [Bupropion] Arthralgia       Objective   Vitals: Blood pressure 165/67, pulse 85, temperature 98 6 °F (37 °C), temperature source Oral, resp  rate 18, height 5' 10" (1 778 m), weight 75 3 kg (166 lb 0 1 oz), SpO2 90 %  , Body mass index is 23 82 kg/m² ,   Orthostatic Blood Pressures    Flowsheet Row Most Recent Value   Blood Pressure 165/67 filed at 2022 0818   Patient Position - Orthostatic VS Lying filed at 2022 7415          Systolic (83VAO), ZXW:568 , Min:116 , QYY:023     Diastolic (26HUW), PFA:49, Min:60, Max:90      Physical Exam:    General:  Normal appearance in no distress  Eyes:  Anicteric  Oral mucosa:  Moist   Neck:  No JVD  Carotid upstrokes are brisk without bruits  No masses  Chest:  Clear to auscultation  Cardiac:  No palpable PMI  Normal S1 and S2  No murmur gallop or rub  Abdomen:  Soft and nontender  No palpable organomegaly or aortic enlargement  Extremities:  No peripheral edema  Musculoskeletal:  Symmetric  Vascular:  Femoral pulses are brisk without bruits  Popliteal  pulses are intact bilaterally  Pedal pulses are intact  Neuro:  Grossly symmetric  Psych:  Alert and oriented x3          Lab Results:     Troponins:    Results from last 7 days   Lab Units 22  1605 22  1019   HS TNI 0HR ng/L  --  15   HS TNI 2HR ng/L 11  --    HSTNI D2 ng/L -4  --      BNP:   Results from last 6 Months   Lab Units 22  1019 22  0955   BNP pg/mL 161* 124*       CBC :   Results from last 7 days   Lab Units 22  0625 22  1019   WBC Thousand/uL 7 54 15 06*   HEMOGLOBIN g/dL 15 0 14 9   HEMATOCRIT % 47 4* 47 2*   MCV fL 91 91   PLATELETS Thousands/uL 336 319     TSH:     CMP:   Results from last 7 days   Lab Units 22  0438 22  1019   POTASSIUM mmol/L 4 1 3 9   CHLORIDE mmol/L 94* 96   CO2 mmol/L 32 30   BUN mg/dL 23 19   CREATININE mg/dL 0 83 0 72   AST U/L 12* 18   ALT U/L 15 17   EGFR ml/min/1 73sq m 76 90     Lipid Profile:     Coags:   Results from last 7 days   Lab Units 09/20/22  1019   INR  1 04

## 2022-09-21 NOTE — PHYSICAL THERAPY NOTE
Physical Therapy Evaluation     Patient's Name: Zeb Best    Admitting Diagnosis  SOB (shortness of breath) [R06 02]  Hypoxia [R09 02]  COPD exacerbation (HCC) [J44 1]    Problem List  Patient Active Problem List   Diagnosis    Vitamin D deficiency    Dilated cardiomyopathy ([facility] )    Essential hypertension    Chronic pain syndrome    Anxiety    Acute on chronic systolic congestive heart failure (HCC)    Tobacco abuse    Leukocytosis    COPD (chronic obstructive pulmonary disease) (HCC)    Acute kidney injury superimposed on CKD-3    Acute respiratory insufficiency    Acute respiratory failure with hypoxia (HCC)    Hepatitis B    Renal artery stenosis (HCC)    Secondary hyperparathyroidism of renal origin ([facility] )    COPD with acute exacerbation (HCC)    CKD (chronic kidney disease) stage 3, GFR 30-59 ml/min ([facility])       Past Medical History  Past Medical History:   Diagnosis Date    Cardiomyopathy ([place] [address] )     Chronic combined systolic and diastolic congestive heart failure     COPD (chronic obstructive pulmonary disease) ([place] [address] )     Fatty liver     Hyperlipidemia     Hypertension     Mitral regurgitation     Sepsis        Past Surgical History  Past Surgical History:   Procedure Laterality Date    CARDIAC CATHETERIZATION  2021    Moderate non-obstructive atherosclerosis     SECTION      CHOLECYSTECTOMY      IR RENAL ANGIOGRAM  2022    IR TEMPORARY DIALYSIS CATHETER CHECK/CHANGE/REPOSITION  2022    IR TUNNELED DIALYSIS CATHETER PLACEMENT  2022    IR TUNNELED DIALYSIS CATHETER REMOVAL  2022    ROTATOR CUFF REPAIR W/ DISTAL CLAVICLE EXCISION Right     TONSILLECTOMY          22 0758   PT Last Visit   PT Visit Date 22   Note Type   Note type Evaluation   Pain Assessment   Pain Assessment Tool 0-10   Pain Score No Pain  (denies, reports recent chest crackling but has resolved)   Restrictions/Precautions   Weight Bearing Precautions Per Order No   Other Precautions Chair Alarm; Bed Alarm; Fall Risk;O2;Multiple lines  (2 L NC, reports she does not utilize supplemental O2 at home)   Home Living   Type of [address] Two level;Performs ADLs on one level; Able to live on main level with bedroom/bathroom;Stairs to enter with rails  (2+3+3 ANATOLY, sleeps on main floor couch)   Bathroom Shower/Tub Tub/shower unit  (on 2nd floor, does sponge bathe on 1st floor)   [address]  (available, did not utilize prior to arrival)   Prior Function   Level of [place] Independent with ADLs and functional mobility   Lives With Alone   Receives Help From Family  (if needed, daughter & nephew live close by)   ADL Assistance Independent   IADLs Independent   Falls in the last 6 months 0  (denies)   Vocational On disability   General   Additional Pertinent History Myrisa OT present for co-assessment due to medical complexity, required skilled interventions of 2 clinicians for care delivery  Family/Caregiver Present No   Cognition   Overall Cognitive Status WFL   Arousal/Participation Alert   Attention Within functional limits   Orientation Level Oriented X4   Memory Within functional limits   Following Commands Follows all commands and directions without difficulty   Comments Pt  agreeable to PT assessment,pleasant     Subjective   Subjective "my legs were so weak when I got out of the hospital last time"   RLE Assessment   RLE Assessment X  (3+/5 gross musculature)   LLE Assessment   LLE Assessment X  (3+/5 gross musculature)   Vision-Basic Assessment   Current Vision Wears glasses all the time   Patient Visual Report Other (Comment)  (pt  denies acute visual changes)   Vestibular   Spontaneous Nystagmus (-) no evidence of nystagmus at rest in room light   Coordination   Movements are Fluid and Coordinated 1   Sharp/Dull   RLE Sharp/Dull Grossly intact   LLE Sharp/Dull Grossly intact   Bed Mobility   Supine to Sit 5  Supervision Additional items Assist x 1;HOB elevated; Bedrails;Verbal cues   Sit to Supine   (DNT pt  was sitting out of bed on recliner upon conclusion )   Additional Comments Verbal cues for bedrail utilization, proper body mechanics  Transfers   Sit to Stand 5  Supervision   Additional items Assist x 1;Bedrails; Increased time required;Verbal cues   Stand to Sit 5  Supervision   Additional items Assist x 1;Bedrails;Verbal cues   Stand pivot 5  Supervision   Additional items Assist x 1;Verbal cues   Toilet transfer   (CGA)   Additional items Assist x 1; Increased time required;Standard toilet;Verbal cues   Additional Comments Verbal cues for proper bedrail utilization,safety awareness with transitional movements  Ambulation/Elevation   Gait pattern Improper Weight shift; Forward Flexion;Decreased foot clearance; Short stride; Inconsistent marzena   Gait Assistance 5  Supervision  (close)   Additional items Assist x 1;Verbal cues   Assistive Device Rolling walker;None  (RW utilization for initial ambulation, then patient mobilized w/o AD latter half)   Distance 30 feet x 2   Stair Management Assistance Not tested   Ambulation/Elevation Additional Comments Verbal cues for base of support adjustment to stabilize balance, safety w/directional changes  Balance   Static Sitting Good   Dynamic Sitting Fair +   Static Standing Fair +   Dynamic Standing Fair   Ambulatory Fair   Endurance Deficit   Endurance Deficit Yes   Endurance Deficit Description SCALES exhibited w/ambulatory progression  Symptomatic resolution w/ conservatory breathing technique education/utilization  (O2 saturation remained in 90s,however HR elevated into 100s  Improvement with increased time )   Activity Tolerance   Activity Tolerance Patient limited by fatigue;Treatment limited secondary to medical complications (Comment)  (SCALES)   Nurse Made Aware yes, Elisa Hanson RN   Assessment   Prognosis Good   Problem List Decreased strength;Decreased endurance; Impaired balance;Decreased mobility;Pain   Assessment Pt is 64 y o  female seen for PT evaluation s/p admit to [facility] on 9/20/2022 w/ Acute respiratory failure with hypoxia ([facility] )  PT consulted to assess pt's functional mobility and d/c needs  Order placed for PT eval and tx, w/ activity as tolerated order  Comorbidities affecting pt's physical performance at time of assessment include: weakness,acute respiratory failure w/hypoxia, acute on chronic CHF, COPD with acute exacerbation,tobacco abuse,chronic pain syndrome,HTN,dilated cardiomyopathy  PTA, pt was independent w/ all functional mobility w/ o AD usage  Personal factors affecting pt at time of IE include: stairs to enter home, inability to navigate community distances, unable to perform dynamic tasks in community, tobacco use, limited insight into impairments and inability to perform ADLs  Please find objective findings from PT assessment regarding body systems outlined above with impairments and limitations including weakness, impaired balance, decreased endurance, impaired coordination, gait deviations, pain, decreased activity tolerance, decreased functional mobility tolerance, fall risk and SOB upon exertion  From PT/mobility standpoint, recommendation at time of d/c would be home with outpatient rehabilitation pending progress in order to facilitate return to PLOF  Goals   Patient Goals to feel better soon   LTG Expiration Date 10/01/22   Long Term Goal #1 1 )Patient will complete bed mobility modified I for decrease need for caregiver assistance, decrease burden of care  2 ) Patient will complete transfers modified I  to decrease risk of falls, facilitate upright standing posture  3 ) BLE strength to greater than/equal to 4/5 gross musculature to increase ability to safely transfer, control descent to chair  4 ) Patient will exhibit increase dynamic standing to Good 4-5 minutes without LOB distant supervision to improve activity tolerance   5 ) Patient will exhibit increase dynamic ambulatory balance to Good 100-125 feet distant supervision to improve ability to mobilize to toilet, chair and decrease risk for additional medical complications  6 ) Patient will exhibit good self monitoring and ability to follow 2 step commands to increase complexity of tasks and resume ADL's without LOB  PT Treatment Day 0   Plan   Treatment/Interventions Functional transfer training;LE strengthening/ROM; Elevations; Therapeutic exercise; Endurance training;Patient/family training;Equipment eval/education;Gait training; Compensatory technique education;Spoke to nursing   PT Frequency 3-5x/wk   Recommendation   PT Discharge Recommendation Home with outpatient rehabilitation   Additional Comments Upon conclusion, pt  was resting out of bed on recliner  All needs within reach and chair alarm engaged  Additional Comments 2 Pt's raw score on the [facility] Basic Mobility inpatient short form is 20, standardized score is 43 99  Patients at this level are likely to benefit from DC to home  However, please refer to therapist recommendation for safe DC planning     [facility] Basic Mobility Inpatient   Turning in Bed Without Bedrails 4   Lying on Back to Sitting on Edge of Flat Bed 4   Moving Bed to Chair 3   Standing Up From Chair 3   Walk in Room 3   Climb 3-5 Stairs 3   Basic Mobility Inpatient Raw Score 20   Basic Mobility Standardized Score 43 99   Highest Level Of Mobility   JH-HLM Goal 6: Walk 10 steps or more   JH-HLM Achieved 7: Walk 25 feet or more     History/Personal Factors/Comorbidities: weakness,acute respiratory failure w/hypoxia, acute on chronic CHF, COPD with acute exacerbation,tobacco abuse,chronic pain syndrome,HTN,dilated cardiomyopathy    # of body structures/limitations: muscle weakness, activity intolerance,decreased endurance, impaired balance, gait deviations    Clinical presentation: unstable as seen in progressive symptoms prior to hospitalization, fall risk, LOYDA,recent hospitalization    Initial Assessment Time: 6548-9804      Hardy Sotelo, PT

## 2022-09-22 PROBLEM — D35.00 PHEOCHROMOCYTOMA: Status: ACTIVE | Noted: 2022-09-22

## 2022-09-22 LAB
ALBUMIN SERPL BCP-MCNC: 4.3 G/DL (ref 3.5–5)
ALP SERPL-CCNC: 55 U/L (ref 34–104)
ALT SERPL W P-5'-P-CCNC: 12 U/L (ref 7–52)
ANION GAP SERPL CALCULATED.3IONS-SCNC: 9 MMOL/L (ref 4–13)
AST SERPL W P-5'-P-CCNC: 11 U/L (ref 13–39)
BASOPHILS # BLD AUTO: 0.02 THOUSANDS/ΜL (ref 0–0.1)
BASOPHILS NFR BLD AUTO: 0 % (ref 0–1)
BILIRUB SERPL-MCNC: 0.58 MG/DL (ref 0.2–1)
BUN SERPL-MCNC: 28 MG/DL (ref 5–25)
CALCIUM SERPL-MCNC: 10.6 MG/DL (ref 8.4–10.2)
CHLORIDE SERPL-SCNC: 94 MMOL/L (ref 96–108)
CO2 SERPL-SCNC: 34 MMOL/L (ref 21–32)
CREAT SERPL-MCNC: 0.83 MG/DL (ref 0.6–1.3)
EOSINOPHIL # BLD AUTO: 0 THOUSAND/ΜL (ref 0–0.61)
EOSINOPHIL NFR BLD AUTO: 0 % (ref 0–6)
ERYTHROCYTE [DISTWIDTH] IN BLOOD BY AUTOMATED COUNT: 14.3 % (ref 11.6–15.1)
GFR SERPL CREATININE-BSD FRML MDRD: 76 ML/MIN/1.73SQ M
GLUCOSE SERPL-MCNC: 120 MG/DL (ref 65–140)
HCT VFR BLD AUTO: 46.3 % (ref 34.8–46.1)
HGB BLD-MCNC: 14.8 G/DL (ref 11.5–15.4)
IMM GRANULOCYTES # BLD AUTO: 0.06 THOUSAND/UL (ref 0–0.2)
IMM GRANULOCYTES NFR BLD AUTO: 1 % (ref 0–2)
LYMPHOCYTES # BLD AUTO: 3.42 THOUSANDS/ΜL (ref 0.6–4.47)
LYMPHOCYTES NFR BLD AUTO: 26 % (ref 14–44)
MCH RBC QN AUTO: 28.5 PG (ref 26.8–34.3)
MCHC RBC AUTO-ENTMCNC: 32 G/DL (ref 31.4–37.4)
MCV RBC AUTO: 89 FL (ref 82–98)
MONOCYTES # BLD AUTO: 0.81 THOUSAND/ΜL (ref 0.17–1.22)
MONOCYTES NFR BLD AUTO: 6 % (ref 4–12)
NEUTROPHILS # BLD AUTO: 8.78 THOUSANDS/ΜL (ref 1.85–7.62)
NEUTS SEG NFR BLD AUTO: 67 % (ref 43–75)
NRBC BLD AUTO-RTO: 0 /100 WBCS
PLATELET # BLD AUTO: 379 THOUSANDS/UL (ref 149–390)
PMV BLD AUTO: 9.5 FL (ref 8.9–12.7)
POTASSIUM SERPL-SCNC: 3.9 MMOL/L (ref 3.5–5.3)
PROT SERPL-MCNC: 7.2 G/DL (ref 6.4–8.4)
RBC # BLD AUTO: 5.2 MILLION/UL (ref 3.81–5.12)
SODIUM SERPL-SCNC: 137 MMOL/L (ref 135–147)
WBC # BLD AUTO: 13.09 THOUSAND/UL (ref 4.31–10.16)

## 2022-09-22 PROCEDURE — 94640 AIRWAY INHALATION TREATMENT: CPT

## 2022-09-22 PROCEDURE — G0426 INPT/ED TELECONSULT50: HCPCS | Performed by: STUDENT IN AN ORGANIZED HEALTH CARE EDUCATION/TRAINING PROGRAM

## 2022-09-22 PROCEDURE — 99232 SBSQ HOSP IP/OBS MODERATE 35: CPT | Performed by: HOSPITALIST

## 2022-09-22 PROCEDURE — 80053 COMPREHEN METABOLIC PANEL: CPT | Performed by: HOSPITALIST

## 2022-09-22 PROCEDURE — 85025 COMPLETE CBC W/AUTO DIFF WBC: CPT | Performed by: HOSPITALIST

## 2022-09-22 PROCEDURE — 94760 N-INVAS EAR/PLS OXIMETRY 1: CPT

## 2022-09-22 PROCEDURE — 99232 SBSQ HOSP IP/OBS MODERATE 35: CPT | Performed by: NURSE PRACTITIONER

## 2022-09-22 PROCEDURE — 97535 SELF CARE MNGMENT TRAINING: CPT

## 2022-09-22 PROCEDURE — 99223 1ST HOSP IP/OBS HIGH 75: CPT | Performed by: PHYSICIAN ASSISTANT

## 2022-09-22 PROCEDURE — 94668 MNPJ CHEST WALL SBSQ: CPT

## 2022-09-22 RX ORDER — POLYETHYLENE GLYCOL 3350 17 G/17G
17 POWDER, FOR SOLUTION ORAL DAILY PRN
Status: DISCONTINUED | OUTPATIENT
Start: 2022-09-22 | End: 2022-09-23 | Stop reason: HOSPADM

## 2022-09-22 RX ORDER — GUAIFENESIN/DEXTROMETHORPHAN 100-10MG/5
10 SYRUP ORAL EVERY 4 HOURS PRN
Status: DISCONTINUED | OUTPATIENT
Start: 2022-09-22 | End: 2022-09-23 | Stop reason: HOSPADM

## 2022-09-22 RX ADMIN — POLYETHYLENE GLYCOL 3350 17 G: 17 POWDER, FOR SOLUTION ORAL at 19:04

## 2022-09-22 RX ADMIN — CARVEDILOL 25 MG: 25 TABLET, FILM COATED ORAL at 08:10

## 2022-09-22 RX ADMIN — LISINOPRIL 10 MG: 10 TABLET ORAL at 09:55

## 2022-09-22 RX ADMIN — NICOTINE 1 PATCH: 21 PATCH, EXTENDED RELEASE TRANSDERMAL at 09:56

## 2022-09-22 RX ADMIN — MORPHINE SULFATE 30 MG: 30 TABLET, EXTENDED RELEASE ORAL at 17:30

## 2022-09-22 RX ADMIN — GUAIFENESIN 600 MG: 600 TABLET, EXTENDED RELEASE ORAL at 09:55

## 2022-09-22 RX ADMIN — IPRATROPIUM BROMIDE 0.5 MG: 0.5 SOLUTION RESPIRATORY (INHALATION) at 19:32

## 2022-09-22 RX ADMIN — CLOPIDOGREL BISULFATE 75 MG: 75 TABLET ORAL at 09:55

## 2022-09-22 RX ADMIN — IPRATROPIUM BROMIDE 0.5 MG: 0.5 SOLUTION RESPIRATORY (INHALATION) at 07:00

## 2022-09-22 RX ADMIN — IPRATROPIUM BROMIDE 0.5 MG: 0.5 SOLUTION RESPIRATORY (INHALATION) at 13:01

## 2022-09-22 RX ADMIN — FUROSEMIDE 40 MG: 10 INJECTION, SOLUTION INTRAMUSCULAR; INTRAVENOUS at 08:56

## 2022-09-22 RX ADMIN — GUAIFENESIN 600 MG: 600 TABLET, EXTENDED RELEASE ORAL at 20:16

## 2022-09-22 RX ADMIN — CHOLECALCIFEROL TAB 25 MCG (1000 UNIT) 2000 UNITS: 25 TAB at 09:55

## 2022-09-22 RX ADMIN — GUAIFENESIN AND DEXTROMETHORPHAN 10 ML: 100; 10 SYRUP ORAL at 19:03

## 2022-09-22 RX ADMIN — CARVEDILOL 25 MG: 25 TABLET, FILM COATED ORAL at 17:30

## 2022-09-22 RX ADMIN — GUAIFENESIN AND DEXTROMETHORPHAN 10 ML: 100; 10 SYRUP ORAL at 12:30

## 2022-09-22 RX ADMIN — UMECLIDINIUM BROMIDE AND VILANTEROL TRIFENATATE 1 PUFF: 62.5; 25 POWDER RESPIRATORY (INHALATION) at 10:01

## 2022-09-22 RX ADMIN — PREDNISONE 40 MG: 20 TABLET ORAL at 09:55

## 2022-09-22 RX ADMIN — ASPIRIN 81 MG CHEWABLE TABLET 81 MG: 81 TABLET CHEWABLE at 09:54

## 2022-09-22 RX ADMIN — ENOXAPARIN SODIUM 40 MG: 100 INJECTION SUBCUTANEOUS at 09:55

## 2022-09-22 RX ADMIN — LEVALBUTEROL HYDROCHLORIDE 1.25 MG: 1.25 SOLUTION, CONCENTRATE RESPIRATORY (INHALATION) at 13:01

## 2022-09-22 RX ADMIN — FUROSEMIDE 40 MG: 10 INJECTION, SOLUTION INTRAMUSCULAR; INTRAVENOUS at 17:30

## 2022-09-22 RX ADMIN — MORPHINE SULFATE 30 MG: 30 TABLET, EXTENDED RELEASE ORAL at 04:05

## 2022-09-22 RX ADMIN — ATORVASTATIN CALCIUM 40 MG: 40 TABLET, FILM COATED ORAL at 17:30

## 2022-09-22 RX ADMIN — LEVALBUTEROL HYDROCHLORIDE 1.25 MG: 1.25 SOLUTION, CONCENTRATE RESPIRATORY (INHALATION) at 07:00

## 2022-09-22 RX ADMIN — LEVALBUTEROL HYDROCHLORIDE 1.25 MG: 1.25 SOLUTION, CONCENTRATE RESPIRATORY (INHALATION) at 19:32

## 2022-09-22 NOTE — CASE MANAGEMENT
Case Management Discharge Planning Note    Patient name Anaheim Cerulean  Location /-13 MRN [id number]  : 1960 Date 2022       Current Admission Date: 2022  Current Admission Diagnosis:Acute respiratory failure with hypoxia [facility])   Patient Active Problem List    Diagnosis Date Noted    Pheochromocytoma 2022    COPD with acute exacerbation ([facility] ) 2022    CKD (chronic kidney disease) stage 3, GFR 30-59 ml/min ([facility] ) 2022    Secondary hyperparathyroidism of renal origin ([facility] ) 2022    Renal artery stenosis ([facility] ) 2022    Hepatitis B 2022    Acute respiratory failure with hypoxia ([facility] ) 2022    Acute respiratory insufficiency 2022    Acute kidney injury superimposed on CKD-3 2022    COPD (chronic obstructive pulmonary disease) ([facility] ) 06/15/2022    Acute on chronic systolic congestive heart failure ([facility] ) 2021    Tobacco abuse 2021    Leukocytosis 2021    Anxiety 2021    Dilated cardiomyopathy ([facility] ) 2021    Essential hypertension 2021    Chronic pain syndrome 2021    Vitamin D deficiency 2013      LOS (days): 1  Geometric Mean LOS (GMLOS) (days): 3 80  Days to GMLOS:2 5     OBJECTIVE:  Risk of Unplanned Readmission Score: 26 75     Current admission status: Inpatient   Preferred Pharmacy:   [address] [place] [address] #[id number] [address] [address]  Phone: [phone] Fax: [phone]     COLIN Saucedo [address] [place] [address]  Phone: [phone] Fax: [phone]    Primary Care Provider: Mikayla Judd DO    Primary Insurance: MEDICARE MISC REPLACEMENT  Secondary Insurance: [address] DETAILS:  Sent additional referrals to [place] [address], [place] [address] agencies do not nsurance  accept the pt insurance    Pt states she is going to call the [employer] to see when the open enrollment period is to get out of her insurance  States she has had problems with same with other physicians also

## 2022-09-22 NOTE — UTILIZATION REVIEW
Inpatient Admission Authorization Request   NOTIFICATION OF INPATIENT ADMISSION/INPATIENT AUTHORIZATION REQUEST   SERVICING FACILITY:   [place] [address]  [address] [facility], [address]  Tax ID: [id number]  NPI: [id number]  Place of Service: Inpatient [facility]  [address]  Place of Service Code: 24     ATTENDING PROVIDER:  Attending Name and NPI#: Marisol Huff, Niru Gibbons Kory [[id number]]  Address: [address] [place] [address], [address]  Phone: [phone]     UTILIZATION REVIEW CONTACT:  Saira Griffith, Utilization   Network Utilization Review Department  Phone: [phone]  Fax [phone]  Email: [email or web address]     PHYSICIAN ADVISORY SERVICES:  FOR AEMT-WO-FTRT REVIEW - MEDICAL NECESSITY DENIAL  Phone: [phone]  Fax: [phone]  Email: [email or web address]     TYPE OF REQUEST:  Inpatient Status     ADMISSION INFORMATION:  ADMISSION DATE/TIME: 9/21/22  8:37 AM  PATIENT DIAGNOSIS CODE/DESCRIPTION:  SOB (shortness of breath) [R06 02]  Hypoxia [R09 02]  COPD exacerbation (HCC) [J44 1]  DISCHARGE DATE/TIME: No discharge date for patient encounter  IMPORTANT INFORMATION:  Please contact the Saira Griffith directly with any questions or concerns regarding this request  Department voicemails are confidential     Send requests for admission clinical reviews, concurrent reviews, approvals, and administrative denials due to lack of clinical to fax [phone]

## 2022-09-22 NOTE — CONSULTS
Consultation - Nephrology   Della Morales 64 y o  female MRN: [id number]  Unit/Bed#: -01 Encounter: [id number]      Assessment and Plan    1  Pheochromocytoma, suspected  Elevated catecholamines  Consider imaging of adrenals  Will coordinate care with endocrinology  If imaging with dye is requested, consider IV fluid precautions for renoprotection, possible holding of lisinopril, although this may be needed for hypertension  2  Renovascular hypertension secondary to renal artery stenosis  Much improved since stenting  She is currently on an ACE-inhibitor  Consider whether this should be continued per primary nephrologist     3  Chronic kidney disease, 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 baseline creatinine 0 8 mg/dL  History of requiring hemodialysis 2 weeks prior to renal recovery  Follows with Dr Reina Bravo  4  Acute respiratory failure with hypoxia / COPD with acute exacerbation  On baseline supplemental oxygen  Management per hospitalist     5  Chronic pain syndrome / tobacco use  Thank you for allowing us to participate in the care of your patient  Please feel free to contact us regarding the care of this patient, or any other questions/concerns that may be applicable      Patient Active Problem List   Diagnosis    Vitamin D deficiency    Dilated cardiomyopathy ([facility] [address] [facility]    Essential hypertension    Chronic pain syndrome    Anxiety    Acute on chronic systolic congestive heart failure (HCC)    Tobacco abuse    Leukocytosis    COPD (chronic obstructive pulmonary disease) ([address] )    Acute kidney injury superimposed on CKD-3    Acute respiratory insufficiency    Acute respiratory failure with hypoxia (HCC)    Hepatitis B    Renal artery stenosis (HCC)    Secondary hyperparathyroidism of renal origin ([address] )    COPD with acute exacerbation (HCC)    CKD (chronic kidney disease) stage 3, GFR 30-59 ml/min (HCC)    Pheochromocytoma       History of Present Illness     Physician Requesting Consult: Maira Noe MD    Reason for Consult / Principal Problem:  Pheochromocytoma    Hx and PE limited by:  None    HPI: Nahomy Murillo is a 64y o  year old female with HTN, FABIOLA s/p stenting, CHF, COPD, cardiomyopathy who presents to [facility] with respiratory failure  Nephrology is consulted for evaluation of possible pheochromocytoma to evaluate elevated catecholamines  From a nephrology standpoint, patient has known renal artery stenosis status post stenting  She previously had acute tubular necrosis there was hemodialysis dependent 2 weeks before renal recovery  She reports that her hypertension is now well controlled on multi-drug regimen status post stenting  She denies any headache or sudden onset of anxiety or chest pain  She now has a creatinine 0 9 mg/dL  She has not had any renal imaging of her adrenals  Endocrinology is consulted  History obtained from chart review and the patient    Review of Systems    Shortness of breath improved  A complete 10 point review of systems was performed and is otherwise negative         Historical Information   Past Medical History:   Diagnosis Date    Cardiomyopathy ([address] )     Chronic combined systolic and diastolic congestive heart failure     COPD (chronic obstructive pulmonary disease) (HCC)     Fatty liver     Hyperlipidemia     Hypertension     Mitral regurgitation     Sepsis      Past Surgical History:   Procedure Laterality Date    CARDIAC CATHETERIZATION  2021    Moderate non-obstructive atherosclerosis     SECTION      CHOLECYSTECTOMY      IR RENAL ANGIOGRAM  2022    IR TEMPORARY DIALYSIS CATHETER CHECK/CHANGE/REPOSITION  2022    IR TUNNELED DIALYSIS CATHETER PLACEMENT  2022    IR TUNNELED DIALYSIS CATHETER REMOVAL  2022    ROTATOR CUFF REPAIR W/ DISTAL CLAVICLE EXCISION Right     TONSILLECTOMY       Social History   Social History     Substance and Sexual Activity   Alcohol Use Never     Social History     Substance and Sexual Activity   Drug Use No     Social History     Tobacco Use   Smoking Status Current Some Day Smoker    Packs/day: 2 00    Types: Cigarettes   Smokeless Tobacco Never Used     History reviewed  No pertinent family history      Meds/Allergies   all current active meds have been reviewed, current meds:   Current Facility-Administered Medications   Medication Dose Route Frequency    albuterol (PROVENTIL HFA,VENTOLIN HFA) inhaler 2 puff  2 puff Inhalation Q6H PRN    aspirin chewable tablet 81 mg  81 mg Oral Daily    atorvastatin (LIPITOR) tablet 40 mg  40 mg Oral Daily With Dinner    carvedilol (COREG) tablet 25 mg  25 mg Oral BID With Meals    cholecalciferol (VITAMIN D3) tablet 2,000 Units  2,000 Units Oral Daily    clopidogrel (PLAVIX) tablet 75 mg  75 mg Oral Daily    dextromethorphan-guaiFENesin (ROBITUSSIN DM) oral syrup 10 mL  10 mL Oral Q4H PRN    enoxaparin (LOVENOX) subcutaneous injection 40 mg  40 mg Subcutaneous Daily    furosemide (LASIX) injection 40 mg  40 mg Intravenous BID (diuretic)    guaiFENesin (MUCINEX) 12 hr tablet 600 mg  600 mg Oral Q12H JULIÁN    ipratropium (ATROVENT) 0 02 % inhalation solution 0 5 mg  0 5 mg Nebulization TID    levalbuterol (XOPENEX) inhalation solution 1 25 mg  1 25 mg Nebulization TID    lisinopril (ZESTRIL) tablet 10 mg  10 mg Oral Daily    morphine (MS CONTIN) ER tablet 30 mg  30 mg Oral Q12H    nicotine (NICODERM CQ) 21 mg/24 hr TD 24 hr patch 1 patch  1 patch Transdermal Daily    oxyCODONE-acetaminophen (PERCOCET) 5-325 mg per tablet 1 tablet  1 tablet Oral Q6H PRN    predniSONE tablet 40 mg  40 mg Oral Daily    umeclidinium-vilanterol (ANORO ELLIPTA) 62 5-25 MCG/INH inhaler 1 puff  1 puff Inhalation Daily    and PTA meds:    Medications Prior to Admission   Medication    albuterol (ProAir HFA) 90 mcg/act inhaler    amoxicillin-clavulanate (AUGMENTIN) 875-125 mg per tablet    aspirin 81 mg chewable tablet    atorvastatin (LIPITOR) 40 mg tablet    carvedilol (COREG) 25 mg tablet    Cholecalciferol 50 MCG (2000) CAPS    clonazePAM (KlonoPIN) 1 mg tablet    clopidogrel (PLAVIX) 75 mg tablet    furosemide (LASIX) 40 mg tablet    lisinopril (ZESTRIL) 10 mg tablet    MAGNESIUM PO    morphine (MS CONTIN) 30 mg 12 hr tablet    oxyCODONE-acetaminophen (PERCOCET) 5-325 mg per tablet    predniSONE 10 mg tablet    umeclidinium-vilanterol (Anoro Ellipta) 62 5-25 MCG/INH inhaler    [] azithromycin (ZITHROMAX) 250 mg tablet    predniSONE 10 mg tablet         Allergies   Allergen Reactions    Wellbutrin [Bupropion] Arthralgia       Objective     Intake/Output Summary (Last 24 hours) at 2022 1511  Last data filed at 2022 0810  Gross per 24 hour   Intake --   Output 850 ml   Net -850 ml       Invasive Devices:        Physical Exam  Vitals and nursing note reviewed  Exam conducted with a chaperone present  Constitutional:       General: She is not in acute distress  Appearance: Normal appearance  She is normal weight  She is not ill-appearing or toxic-appearing  HENT:      Head: Normocephalic and atraumatic  Nose: Nose normal  No congestion or rhinorrhea  Mouth/Throat:      Mouth: Mucous membranes are moist       Pharynx: Oropharynx is clear  Eyes:      General: No scleral icterus  Right eye: No discharge  Left eye: No discharge  Extraocular Movements: Extraocular movements intact  Conjunctiva/sclera: Conjunctivae normal       Pupils: Pupils are equal, round, and reactive to light  Cardiovascular:      Rate and Rhythm: Normal rate and regular rhythm  Pulses: Normal pulses  Heart sounds: Normal heart sounds  No murmur heard  Pulmonary:      Effort: Pulmonary effort is normal  No respiratory distress  Breath sounds: Normal breath sounds  No wheezing  Abdominal:      General: Abdomen is flat   Bowel sounds are normal  There is no distension  Palpations: Abdomen is soft  There is no mass  Tenderness: There is no abdominal tenderness  Musculoskeletal:         General: No swelling or deformity  Normal range of motion  Cervical back: Normal range of motion and neck supple  No rigidity or tenderness  Skin:     General: Skin is warm and dry  Coloration: Skin is not jaundiced or pale  Findings: No bruising  Neurological:      General: No focal deficit present  Mental Status: She is alert and oriented to person, place, and time  Mental status is at baseline  Psychiatric:         Mood and Affect: Mood normal          Behavior: Behavior normal          Thought Content: Thought content normal          Judgment: Judgment normal            I/O last 3 completed shifts: In: 240 [P O :240]  Out: 1050 [Urine:1050]    Vitals:    09/22/22 1431   BP: 139/85   Pulse: 91   Resp: 18   Temp: 98 4 °F (36 9 °C)   SpO2: (!) 85%         Current Weight: Weight - Scale: 72 4 kg (159 lb 9 8 oz) (pt weighed twice on grey standing scale)  First Weight: Weight - Scale: 76 7 kg (169 lb)    Lab Results:  I have personally reviewed pertinent labs      CBC:   Lab Results   Component Value Date    WBC 13 09 (H) 09/22/2022    HGB 14 8 09/22/2022    HCT 46 3 (H) 09/22/2022    MCV 89 09/22/2022     09/22/2022    MCH 28 5 09/22/2022    MCHC 32 0 09/22/2022    RDW 14 3 09/22/2022    MPV 9 5 09/22/2022    NRBC 0 09/22/2022     CMP:   Lab Results   Component Value Date    K 3 9 09/22/2022    CL 94 (L) 09/22/2022    CO2 34 (H) 09/22/2022    BUN 28 (H) 09/22/2022    CREATININE 0 83 09/22/2022    CALCIUM 10 6 (H) 09/22/2022    AST 11 (L) 09/22/2022    ALT 12 09/22/2022    ALKPHOS 55 09/22/2022    EGFR 76 09/22/2022     Phosphorus: No results found for: PHOS  Magnesium: No results found for: MG  Urinalysis: No results found for: Tharon Havers, SPECGRAV, PHUR, LEUKOCYTESUR, NITRITE, PROTEINUA, GLUCOSEU, KETONESU, BILIRUBINUR, BLOODU  Ionized Calcium: No results found for: CAION  Coagulation: No results found for: PT, INR, APTT  Troponin: No results found for: TROPONINI  ABG: No results found for: PHART, VSM5HTU, PO2ART, CQW7TNC, B1HRONDZ, BEART, SOURCE    Results from last 7 days   Lab Units 09/22/22  0431 09/21/22  0438 09/20/22  1019   POTASSIUM mmol/L 3 9 4 1 3 9   CHLORIDE mmol/L 94* 94* 96   CO2 mmol/L 34* 32 30   BUN mg/dL 28* 23 19   CREATININE mg/dL 0 83 0 83 0 72   CALCIUM mg/dL 10 6* 10 5* 10 2   ALK PHOS U/L 55 59 59   ALT U/L 12 15 17   AST U/L 11* 12* 18       Radiology review:  Procedure: XR chest portable    Result Date: 9/20/2022  Narrative: CHEST INDICATION:   sob, cough  COMPARISON:  CXR 9/16/2022 and 7/29/2022, chest CT 6/15/2022  EXAM PERFORMED/VIEWS:  XR CHEST PORTABLE FINDINGS: Cardiomediastinal silhouette appears unremarkable  Moderate emphysema with probable interstitial edema  No effusion or pneumothorax  Mild dextrocurvature of the spine  Impression: Emphysema with probable interstitial edema  Workstation performed: [id number]         Martha Bonner PA-C      Portions of the record may have been created with voice recognition software  Occasional wrong word or "sound a like" substitutions may have occurred due to the inherent limitations of voice recognition software  Read the chart carefully and recognize, using context, where substitutions have occurred

## 2022-09-22 NOTE — PROGRESS NOTES
[address]  Progress Note Lashon Kaye 1960, 64 y o  female MRN: [id number]  Unit/Bed#: -01 Encounter: [id number]  Primary Care Provider: Claudette Browning, DO   Date and time admitted to hospital: 9/20/2022  9:56 AM    * Acute respiratory failure with hypoxia [facility])  Assessment & Plan  · Patient initially presented to the ED with an acute hypoxic respiratory failure  · Patient denies using any supplemental oxygen at home but reports having an oxygen tank at home  · Improved:-  · Initial oxygen requirement was 4 L of supplemental oxygen via nasal cannula in the ED   · Current O2 requirement is 1/2 L of supplemental oxygen via nasal cannula with a resultant O2 sat of about 90%  · Mixed etiology  · 1  Acute exacerbation of systolic dysfunction congestive heart failure  · 2   Acute exacerbation of COPD  · Management as outlined below    COPD with [facility] exacerbation ([facility] )  Assessment & Plan  · Appreciate Pulmonary input  · Patient reports having 2 cats at home that shed a lot of hair, patient continues to smoke  · Procalcitonin testing x2 is less than 0 05 - no antibiotics indicated  · IV Solu-Medrol has been transitioned to p o  prednisone as per Pulmonary recommendations  · Continue prednisone 40 mg daily, Continue respiratory protocol  · Continue Anoro, and breathing treatments with Xopenex and Atrovent  · Continue guaifenesin/Robitussin  · Will check a sputum culture    Acute on chronic systolic congestive heart failure (HCC)  Assessment & Plan  Wt Readings from Last 3 Encounters:   09/22/22 72 4 kg (159 lb 9 8 oz)   09/16/22 77 kg (169 lb 12 1 oz)   09/01/22 75 kg (165 lb 6 4 oz)     · Most recent echocardiogram from July 14, 2022 - revealed an EF of 25%  · Chest x-ray reveals interstitial edema in the setting of having emphysema  · Appreciate cardiology input  · Continue Lasix 40 mg IV b i d   · Patient has diuresed over 1 5 L of fluid thus far  · In the interim continue other current cardiac based medications which include aspirin, Plavix, carvedilol, lisinopril, and Lipitor  · Further management as per Cardiology        CKD (chronic kidney disease) stage 3, GFR 30-59 ml/min [facility])  Assessment & Plan  Lab Results   Component Value Date    EGFR 76 09/22/2022    EGFR 76 09/21/2022    EGFR 90 09/20/2022    CREATININE 0 83 09/22/2022    CREATININE 0 83 09/21/2022    CREATININE 0 72 09/20/2022   · Creatinine has remained stable and at baseline  · Continue close monitoring while on IV diuretics    · Will monitor closely in view of the patient now receiving IV diuretics    Tobacco abuse  Assessment & Plan  · Cessation counseling provided  · Continued Nicotine patch    Pheochromocytoma  Assessment & Plan  · Catecholamine, renin activity, aldosterone/renin ratio testing results reviewed from July 24, 2022  · Will consult Nephrology, and Endocrine for further evaluation    Chronic pain syndrome  Assessment & Plan  · With continued opioid dependence  · Continue home meds        VTE Prophylaxis:  Enoxaparin (Lovenox)    Patient Centered Rounds: I have performed bedside rounds with nursing staff today  Discussions with Specialists or Other Care Team Provider:  Cardiology, nephrology, pulmonary, case management, nursing  Education and Discussions with Family / Patient:  Patient was brought up to par    Current Length of Stay: 1 day(s)    Current Patient Status: Inpatient   Certification Statement: The patient will continue to require additional inpatient hospital stay due to The need for continued IV diuretics    Discharge Plan:  Hopeful discharge planning in 24-48 hours pending clinical course    Code Status: Level 1 - Full Code    Subjective:   Patient seen and examined, reports feeling a little bit better than yesterday, but continues to feel very short of breath with minimal exertion    Also complains of not being able to bring up her sputum completely    Objective:     Vitals:   No data recorded  HR:  [85] 85  Resp:  [18] 18  SpO2:  [89 %-92 %] 89 %  Body mass index is 22 9 kg/m²  Input and Output Summary (last 24 hours): Intake/Output Summary (Last 24 hours) at 9/22/2022 1053  Last data filed at 9/22/2022 0415  Gross per 24 hour   Intake --   Output 1050 ml   Net -1050 ml       Physical Exam:   Physical Exam  Constitutional:       General: She is not in acute distress  Appearance: Normal appearance  She is normal weight  She is not ill-appearing  HENT:      Head: Normocephalic and atraumatic  Nose: Nose normal       Mouth/Throat:      Mouth: Mucous membranes are moist    Eyes:      Extraocular Movements: Extraocular movements intact  Pupils: Pupils are equal, round, and reactive to light  Cardiovascular:      Rate and Rhythm: Normal rate and regular rhythm  Pulses: Normal pulses  Heart sounds: Normal heart sounds  No murmur heard  No friction rub  No gallop  Pulmonary:      Effort: Pulmonary effort is normal  No respiratory distress  Breath sounds: Normal breath sounds  No wheezing, rhonchi or rales  Comments: Tight air entry throughout  Decreased breath sounds bilaterally at the bases  Abdominal:      General: There is no distension  Palpations: Abdomen is soft  There is no mass  Tenderness: There is no abdominal tenderness  Hernia: No hernia is present  Musculoskeletal:         General: No swelling or tenderness  Normal range of motion  Cervical back: Normal range of motion and neck supple  No rigidity  Right lower leg: No edema  Left lower leg: No edema  Skin:     General: Skin is warm  Capillary Refill: Capillary refill takes less than 2 seconds  Findings: No erythema or rash  Neurological:      General: No focal deficit present  Mental Status: She is alert and oriented to person, place, and time  Mental status is at baseline  Cranial Nerves: No cranial nerve deficit        Motor: No weakness  Psychiatric:         Mood and Affect: Mood normal          Behavior: Behavior normal          Additional Data:     Labs:    Results from last 7 days   Lab Units 09/22/22  0431   WBC Thousand/uL 13 09*   HEMOGLOBIN g/dL 14 8   HEMATOCRIT % 46 3*   PLATELETS Thousands/uL 379   NEUTROS PCT % 67   LYMPHS PCT % 26   MONOS PCT % 6   EOS PCT % 0     Results from last 7 days   Lab Units 09/22/22  0431   SODIUM mmol/L 137   POTASSIUM mmol/L 3 9   CHLORIDE mmol/L 94*   CO2 mmol/L 34*   BUN mg/dL 28*   CREATININE mg/dL 0 83   CALCIUM mg/dL 10 6*   ALK PHOS U/L 55   ALT U/L 12   AST U/L 11*     Results from last 7 days   Lab Units 09/20/22  1019   INR  1 04               * I Have Reviewed All Lab Data Listed Above  * Additional Pertinent Lab Tests Reviewed: [address] Admission  Reviewed    Imaging:  Imaging Reports Reviewed Today Include:  None    Recent Cultures (last 7 days):     Results from last 7 days   Lab Units 09/20/22  1019 09/20/22  1010   BLOOD CULTURE  No Growth at 24 hrs  No Growth at 24 hrs         Last 24 Hours Medication List:   Current Facility-Administered Medications   Medication Dose Route Frequency Provider Last Rate    albuterol  2 puff Inhalation Q6H PRN Marshall Bowels, DO      aspirin  81 mg Oral Daily Marshall Bowels, DO      atorvastatin  40 mg Oral Daily With [employer], DO      carvedilol  25 mg Oral BID With Meals Marshall Bowels, DO      cholecalciferol  2,000 Units Oral Daily Marshall Bowels, DO      clopidogrel  75 mg Oral Daily Marshall Bowels, [place]      dextromethorphan-guaiFENesin  10 mL Oral Q4H PRN Dasia Gomez MD      enoxaparin  40 mg Subcutaneous Daily Marshall Bowels, DO      furosemide  40 mg Intravenous BID (diuretic) Dasia Gomez MD      guaiFENesin  600 mg Oral Q12H 2020 Amira Mullins MD      ipratropium  0 5 mg Nebulization TID Marshall Bowels, DO      levalbuterol  1 25 mg Nebulization TID Marshall Bowels, DO  lisinopril  10 mg Oral Daily [facility]      morphine  30 mg Oral Q12H [facility], DO      nicotine  1 patch Transdermal Daily [place], [place]      oxyCODONE-acetaminophen  1 tablet Oral Q6H PRN [facility], DO      predniSONE  40 mg Oral Daily Jazzy Albarran MD      umeclidinium-vilanterol  1 puff Inhalation Daily Pitcher Viviana,           Today, Patient Was Seen By: Jazzy Albarran MD    ** Please Note: Dictation voice to text software may have been used in the creation of this document   **

## 2022-09-22 NOTE — ASSESSMENT & PLAN NOTE
· Patient initially presented to the ED with an acute hypoxic respiratory failure  · Patient denies using any supplemental oxygen at home but reports having an oxygen tank at home  · Improved:-  · Initial oxygen requirement was 4 L of supplemental oxygen via nasal cannula in the ED   · Current O2 requirement is 1/2 L of supplemental oxygen via nasal cannula with a resultant O2 sat of about 90%  · Mixed etiology  · 1  Acute exacerbation of systolic dysfunction congestive heart failure  · 2   Acute exacerbation of COPD  · Management as outlined below

## 2022-09-22 NOTE — ASSESSMENT & PLAN NOTE
· Catecholamine, renin activity, aldosterone/renin ratio testing results reviewed from July 24, 2022  · Will consult Nephrology, and Endocrine for further evaluation

## 2022-09-22 NOTE — ASSESSMENT & PLAN NOTE
· Appreciate Pulmonary input  · Patient reports having 2 cats at home that shed a lot of hair, patient continues to smoke  · Procalcitonin testing x2 is less than 0 05 - no antibiotics indicated  · IV Solu-Medrol has been transitioned to p o  prednisone as per Pulmonary recommendations  · Continue prednisone 40 mg daily, Continue respiratory protocol  · Continue Anoro, and breathing treatments with Xopenex and Atrovent  · Continue guaifenesin/Robitussin  · Will check a sputum culture

## 2022-09-22 NOTE — ASSESSMENT & PLAN NOTE
Lab Results   Component Value Date    EGFR 76 09/22/2022    EGFR 76 09/21/2022    EGFR 90 09/20/2022    CREATININE 0 83 09/22/2022    CREATININE 0 83 09/21/2022    CREATININE 0 72 09/20/2022     Monitor renal function on diuretics and ACE-inhibitor

## 2022-09-22 NOTE — ASSESSMENT & PLAN NOTE
Wt Readings from Last 3 Encounters:   09/22/22 72 4 kg (159 lb 9 8 oz)   09/16/22 77 kg (169 lb 12 1 oz)   09/01/22 75 kg (165 lb 6 4 oz)     · Most recent echocardiogram from July 14, 2022 - revealed an EF of 25%  · Chest x-ray reveals interstitial edema in the setting of having emphysema  · Appreciate cardiology input  · Continue Lasix 40 mg IV b i d   · Patient has diuresed over 1 5 L of fluid thus far  · In the interim continue other current cardiac based medications which include aspirin, Plavix, carvedilol, lisinopril, and Lipitor  · Further management as per Cardiology

## 2022-09-22 NOTE — ASSESSMENT & PLAN NOTE
Wt Readings from Last 3 Encounters:   09/22/22 72 4 kg (159 lb 9 8 oz)   09/16/22 77 kg (169 lb 12 1 oz)   09/01/22 75 kg (165 lb 6 4 oz)     Weight down 7 lbs, -1 5 L  BNP not significantly elevated  IV diuretics have been recommended by pulmonary-continue today  Low Na diet, daily weights, I/O monitoring

## 2022-09-22 NOTE — CONSULTS
Consultation - [facility] 64 y o  female MRN: [id number]    Unit/Bed#: -01 Encounter: [id number]    Telemedicine consent    Patient: Bobo Nova  Provider: No name on file  Provider located at [facility]    The patient was identified by name and date of birth  [facility] was informed that this is a telemedicine visit and that the visit is being conducted through [facility] and patient was informed this is a secure, HIPAA-complaint platform  She agrees to proceed     My office door was closed  No one else was in the room  She acknowledged consent and understanding of privacy and security of the video platform  The patient has agreed to participate and understands they can discontinue the visit at any time  Patient is aware this is a billable service  I spent 35 minutes with the patient during this visit  Assessment/Plan     Assessment: This is a 64y o -year-old female with a history of COPD, CHF, CKD with history of dialysis July 2022, uncontrolled HTN and renovascular stenosis who presented to [facility] 9/20 with SOB, found to be in acute hypoxic respiratory failure 2/2 CHF + COPD exacerbation  Micheline Bowman has history of uncontrolled hypertension, which improved following treatment for renovascular stenosis  However, historical labs during a hospital admission in July requested in evaluation of uncontrolled hypertension revealed elevated serum levels of norepinephrine, dopamine, and epinephrine  Prior testing of plasma free metanephrines showed normal limits of normetanephrine and metanephrine, despite only being measured 8 days apart  I reviewed documentation from her previous hospital encounter in July  For context, patient was dialysis dependent at the time the labs were obtained  Patient without symptoms concerning for hyperadrenergic spells  Her blood pressure has improved with surgical remediation of left renal artery stenosis   Her family history does not suggest an inheritable cause of endocrine tumors  I reviewed recent imaging, including CTA chest PE study dated 6/15/2022  On my interpretation, this study revealed normal appearing adrenal glands without evidence of masses, although I am acknowledging that this is not the preferred imaging study for characterization of the adrenal glands  More remote imaging of CT abd pelvis w and wo contrast from Oct 2014 did not reveal any tumors  A normal CT or MRI of the abdomen and pelvis makes a symptomatic paraganglioma (PPGL) extremely unlikely because 95% of catecholamine producing tumors are found between the diaphragm and pubis  The average size of a symptomatic PPGL is 4 5 cm  They do tend to be slow growing, although I suppose this diagnosis may remain in the differential  PPGL are much less common than pheos, which are already rare  Levels of fractionated catecholamines and metanephrines may be elevated in several clinical scenarios, including illness requiring ICU level care  Renal failure is also a cause for false positive elevations of plasma norepinephrine and dopamine concentrations  However, standard reference ranges can be used for interpreting plasma epinephrine concentrations  Plan:    1  Elevated plasma catecholamines/normal plasma normetanephrines - very low likelihood of pheochromocytoma/PGL  Will request repeat testing for plasma free metanephrines while in house  Can also consider 24 hour urine testing of fractionated metanephrines, although I may arrange for this to be done on an outpatient basis, assuming Tyler Hoffman is not being planned for any surgical interventions while she remains in house  Please assist with arranging outpatient endocrinology follow up on her hospital discharge  I did already discuss this with Tyler Shermankellie and she is agreeable to seeing me  2  Renovascular hypertension - s/p left renal artery stenting with improvement of hypertension       CC: Elevated plasma catecholamines    History of Present Illness HPI: Salvador Brooks is a 64y o  year old female who presented to [facility] with SOB, presently admitted for management of acute exacerbation of CHF and COPD  Pauline Alcocer has a complicated medical history, notable for numerous hospitalizations  Notably in July she presented with acute hypoxic respiratory failure 2/2 CHF/COPD requiring ICU admission for respiratory support with bilevel airway and initiation of intermittent hemodialysis due to anuric SHANTA  She had known b/l renal artery stenosis with complete occlusion on right and >70% occlusion on left  She underwent left renal artery stenting on 8/1  Blood pressures subsequently improved  However, during that admission she notably had elevated levels of serum catecholamines, triggering concern for pheochromocytoma  Patient reports 2 year history of hypertension  She denies any history of paroxysmal symptoms, including anxiety, diaphoresis, abrupt dyspnea, epigastric or chest pain, headaches, nausea and vomiting, palpitations or tremors  She denies any history of endocrine tumors in the family  She denies use of medications such as tricyclic antidepressants, antipsychotic agents, or SNRI/SSRI  She denies use of recreational drugs  Inpatient consult to Endocrinology  Consult performed by: Jairo Smith DO  Consult ordered by: Cheri Bravo MD          Review of Systems   Constitutional: Negative for diaphoresis and unexpected weight change  Respiratory: Positive for shortness of breath  Cardiovascular: Negative for chest pain and palpitations  Gastrointestinal: Negative for abdominal pain, nausea and vomiting  Neurological: Negative for tremors and headaches  Psychiatric/Behavioral: The patient is not nervous/anxious  All other systems reviewed and are negative        Historical Information   Past Medical History:   Diagnosis Date    Cardiomyopathy ([address] )     Chronic combined systolic and diastolic congestive heart failure     COPD (chronic obstructive pulmonary disease) (HCC)     Fatty liver     Hyperlipidemia     Hypertension     Mitral regurgitation     Sepsis      Past Surgical History:   Procedure Laterality Date    CARDIAC CATHETERIZATION  2021    Moderate non-obstructive atherosclerosis     SECTION      CHOLECYSTECTOMY      IR RENAL ANGIOGRAM  2022    IR TEMPORARY DIALYSIS CATHETER CHECK/CHANGE/REPOSITION  2022    IR TUNNELED DIALYSIS CATHETER PLACEMENT  2022    IR TUNNELED DIALYSIS CATHETER REMOVAL  2022    ROTATOR CUFF REPAIR W/ DISTAL CLAVICLE EXCISION Right     TONSILLECTOMY       Social History   Social History     Substance and Sexual Activity   Alcohol Use Never     Social History     Substance and Sexual Activity   Drug Use No     Social History     Tobacco Use   Smoking Status Current Some Day Smoker    Packs/day: 2 00    Types: Cigarettes   Smokeless Tobacco Never Used     Family History: History reviewed  No pertinent family history      Meds/Allergies   Current Facility-Administered Medications   Medication Dose Route Frequency Provider Last Rate Last Admin    albuterol (PROVENTIL HFA,VENTOLIN HFA) inhaler 2 puff  2 puff Inhalation Q6H PRN Blaire De La Torre DO   2 puff at 22 0819    aspirin chewable tablet 81 mg  81 mg Oral Daily Blaire De La Torre DO   81 mg at 22 0817    atorvastatin (LIPITOR) tablet 40 mg  40 mg Oral Daily With [employer], DO   40 mg at 22 1537    carvedilol (COREG) tablet 25 mg  25 mg Oral BID With Meals Blaire De La Torre DO   25 mg at 22 0810    cholecalciferol (VITAMIN D3) tablet 2,000 Units  2,000 Units Oral Daily Blaire De La Torre DO   2,000 Units at 22 0818    clopidogrel (PLAVIX) tablet 75 mg  75 mg Oral Daily Blaire De La Torre DO   75 mg at 22 0818    enoxaparin (LOVENOX) subcutaneous injection 40 mg  40 mg Subcutaneous Daily Blaire De La Torre DO   40 mg at 22 0817    furosemide (LASIX) injection 40 mg 40 mg Intravenous BID (diuretic) Sarah Zhang MD   40 mg at 09/21/22 1537    guaiFENesin (MUCINEX) 12 hr tablet 600 mg  600 mg Oral Q12H 2020 Amira Mullins MD   600 mg at 09/21/22 2142    ipratropium (ATROVENT) 0 02 % inhalation solution 0 5 mg  0 5 mg Nebulization TID Desiree Basil, DO   0 5 mg at 09/22/22 0700    levalbuterol (XOPENEX) inhalation solution 1 25 mg  1 25 mg Nebulization TID Desiree Basil, DO   1 25 mg at 09/22/22 0700    lisinopril (ZESTRIL) tablet 10 mg  10 mg Oral Daily Desiree Basil, DO   10 mg at 09/21/22 0818    morphine (MS CONTIN) ER tablet 30 mg  30 mg Oral Q12H Desiree Basil, DO   30 mg at 09/22/22 0405    nicotine (NICODERM CQ) 21 mg/24 hr TD 24 hr patch 1 patch  1 patch Transdermal Daily Desiree Basil, DO   1 patch at 09/21/22 0832    oxyCODONE-acetaminophen (PERCOCET) 5-325 mg per tablet 1 tablet  1 tablet Oral Q6H PRN Desiree Basil, DO        predniSONE tablet 40 mg  40 mg Oral Daily Sarah Zhang MD   40 mg at 09/21/22 1537    umeclidinium-vilanterol (ANORO ELLIPTA) 62 5-25 MCG/INH inhaler 1 puff  1 puff Inhalation Daily Desiree Alineil, DO   1 puff at 09/21/22 1009     Allergies   Allergen Reactions    Wellbutrin [Bupropion] Arthralgia       Objective   Vitals: Blood pressure 165/67, pulse 85, temperature 98 6 °F (37 °C), temperature source Oral, resp  rate 18, height 5' 10" (1 778 m), weight 72 4 kg (159 lb 9 8 oz), SpO2 (!) 89 %  Intake/Output Summary (Last 24 hours) at 9/22/2022 3214  Last data filed at 9/22/2022 0415  Gross per 24 hour   Intake 240 ml   Output 1050 ml   Net -810 ml     Invasive Devices  Report    Peripheral Intravenous Line  Duration           Peripheral IV 09/20/22 Distal;Right;Ventral (anterior) Forearm 1 day                Physical Exam  Vitals reviewed  Constitutional:       General: She is not in acute distress  Appearance: Normal appearance  Interventions: Nasal cannula in place     HENT:      Head: Normocephalic and atraumatic  Pulmonary:      Effort: Pulmonary effort is normal    Neurological:      Mental Status: She is alert  Comments: Fluent and cogent speech   Psychiatric:         Mood and Affect: Mood normal          Behavior: Behavior normal          The history was obtained from the review of the chart, patient  Lab Results:        Component      Latest Ref Rng & Units 7/16/2022 7/24/2022 7/24/2022            6:15 AM  6:15 AM   Renin      0 167 - 5 380 ng/mL/hr  45 152 (H) 47 077 (H)   Aldosterone      0 0 - 30 0 ng/dL   139 7 (H)   SAMIR/PRA RATIO      0 0 - 30 0   3 0   NOREPINEPHRINE PLASMA      0 - 874 pg/mL  1497 (H)    EPINEPHRINE PLASMA      0 - 62 pg/mL  296 (H)    DOPAMINE PLASMA      0 - 48 pg/mL  81 (H)    NORMETANEPHRINE FREE PLASMA      0 0 - 285 2 pg/mL 51 8     METANEPHRINE FREE PLASMA      0 0 - 88 0 pg/mL 77 4         Imaging Studies: I have personally reviewed pertinent reports  6/15/2022 CTA chest PE study      Left and right adrenal gland      Left adrenal gland      10/21/2014  CT Ab/pelv w IV contrast    Left adrenal      Right adrenal     Portions of the record may have been created with voice recognition software

## 2022-09-22 NOTE — UTILIZATION REVIEW
OBSERVATION 9/20/22 @ 1312 CONVERTED TO INPATIENT 9/21/22 @ 0837 DUE TO ACUTE ON CHRONIC RESPIRATORY FAILURE WITH HYPOXIA REQURIRING CONTINUED LASIX, IV STEROID, NEBULIZERS  Initial Clinical Review    Admission: Date/Time/Statement:   Admission Orders (From admission, onward)     Ordered        09/21/22 0837  Inpatient Admission  Once                      Orders Placed This Encounter   Procedures    Inpatient Admission     Standing Status:   Standing     Number of Occurrences:   1     Order Specific Question:   Level of Care     Answer:   Med Surg [16]     Order Specific Question:   Estimated length of stay     Answer:   More than 2 Midnights     Order Specific Question:   Certification     Answer:   I certify that inpatient services are medically necessary for this patient for a duration of greater than two midnights  See H&P and MD Progress Notes for additional information about the patient's course of treatment  ED Arrival Information     Expected   -    Arrival   9/20/2022 09:56    Acuity   Urgent            Means of arrival   Ambulance    Escorted by   [facility]  ([address] )    Service   Hospitalist    Admission type   Urgent            Arrival complaint   sob           Chief Complaint   Patient presents with    Shortness of Breath     Feeling shortness of breath since Friday  Was seen here for same, felt a little better but then progressively worsened at home  Initial Presentation: 64 y o  female to the ED from home via EMS with complaints of shortness of breath  Admitted under observation then converted to inpatient for acute respiratory failure with hypoxia  Seen on 9/15 in the ED for the same, treated with albuterol, prednisone and lasix, discharged on azithromycin and amox  She wears 2 LNC at home  Pulse ox dropped to 76% at home with ambulation  Arrives tachypneic, pulse ox 96% on 4 L, she has wet cough, diffuse rhonchi, rales  IN the ED, given nebulizer  Started on IV steroids  Lasix given IV  Date: 9/22  Converted to inpatient:  Mixed etiology of respi failure with COPD and CHF  Continue with IV diuresis, IV steroids  Cardiology, pulmonary consult  Continues to smoke  Decreased breath sounds  Cardiology consult:Increase in oxygen demand  Requiring 2-4 LNC  Continue with IV diuresis  Loose cough, feels better when leaning forward  Echo 07/14/2022 EF 25%, severe global hypokinesis with regional variation  Mild AI  Moderate MR  Mild TR  PUlmonary consult: Transition to prednisone taper  Continue bronchodilators, diuresis  Decreased breath sounds      ED Triage Vitals [09/20/22 0959]   Temperature Pulse Respirations Blood Pressure SpO2   (!) 96 4 °F (35 8 °C) 82 20 (!) 172/85 98 %      Temp Source Heart Rate Source Patient Position - Orthostatic VS BP Location FiO2 (%)   Tympanic Monitor Sitting Left arm --      Pain Score       No Pain          Wt Readings from Last 1 Encounters:   09/22/22 72 4 kg (159 lb 9 8 oz)     Additional Vital Signs:   Time Temp Pulse Resp BP MAP (mmHg) SpO2 Calculated FIO2 (%) - Nasal Cannula Nasal Cannula O2 Flow Rate (L/min) O2 Device Patient Position - Orthostatic VS   09/22/22 0700 -- -- -- -- -- 89 % Abnormal  24 1 L/min Nasal cannula --   09/21/22 1937 -- 85 18 -- -- 92 % 36 4 L/min Nasal cannula --   09/21/22 1322 -- -- -- -- -- 92 % 36 4 L/min Nasal cannula --   09/21/22 0818 -- -- -- 165/67 -- -- -- -- -- --   09/21/22 0817 -- -- -- -- -- -- 28 2 L/min Nasal cannula --   09/21/22 0726 -- -- -- -- -- 90 % 28 2 L/min Nasal cannula --   09/20/22 2300 98 6 °F (37 °C) 85 18 116/69 85 93 % 28 2 L/min Nasal cannula Lying   09/20/22 2013 -- -- -- -- -- -- 28 2 L/min Nasal cannula --   09/20/22 1946 -- 91 18 -- -- 94 % 28 2 L/min Nasal cannula --   09/20/22 1609 98 8 °F (37 1 °C) 76 17 140/90 -- 94 % -- -- Nasal cannula Sitting   09/20/22 1400 -- 81 -- 122/60 87 94 % 28 2 L/min Nasal cannula --   09/20/22 1331 98 6 °F (37 °C) -- -- -- -- -- -- -- -- --   09/20/22 1300 -- 85 -- 136/63 91 -- -- -- -- --   09/20/22 1235 -- 81 20 -- -- 100 % -- -- -- --   09/20/22 1128 -- -- -- -- -- 96 % 28 2 L/min Nasal cannula --   09/20/22 1021 -- 80 24 Abnormal  174/84 Abnormal  120 95 % 28 2 L/min Nasal cannula --   09/20/22 0959 96 4 °F (35 8 °C) Abnormal  82 20 172/85 Abnormal  -- 98 % 36 4 L/min Nasal cannula Sitting       Pertinent Labs/Diagnostic Test Results:   9/20 EKG:Normal sinus rhythm  Possible Left atrial enlargement  Nonspecific ST and T wave abnormality  When compared with ECG of 16-SEP-2022 09:43,  No significant change was found  XR chest portable   Final Result by Keiko Nguyen MD (09/20 1050)      Emphysema with probable interstitial edema                    Workstation performed: [id number]           Results from last 7 days   Lab Units 09/20/22  1019 09/16/22  0955   SARS-COV-2  Negative Negative     Results from last 7 days   Lab Units 09/22/22  0431 09/21/22  0625 09/20/22  1019 09/16/22  0955   WBC Thousand/uL 13 09* 7 54 15 06* 8 38   HEMOGLOBIN g/dL 14 8 15 0 14 9 14 3   HEMATOCRIT % 46 3* 47 4* 47 2* 45 0   PLATELETS Thousands/uL 379 336 319 307   NEUTROS ABS Thousands/µL 8 78*  --  12 17* 6 98         Results from last 7 days   Lab Units 09/22/22  0431 09/21/22  0438 09/20/22  1019 09/16/22  0955   SODIUM mmol/L 137 136 137 132*   POTASSIUM mmol/L 3 9 4 1 3 9 4 1   CHLORIDE mmol/L 94* 94* 96 95*   CO2 mmol/L 34* 32 30 28   ANION GAP mmol/L 9 10 11 9   BUN mg/dL 28* 23 19 17   CREATININE mg/dL 0 83 0 83 0 72 0 81   EGFR ml/min/1 73sq m 76 76 90 78   CALCIUM mg/dL 10 6* 10 5* 10 2 10 1   MAGNESIUM mg/dL  --  2 2  --  1 9     Results from last 7 days   Lab Units 09/22/22  0431 09/21/22  0438 09/20/22  1019   AST U/L 11* 12* 18   ALT U/L 12 15 17   ALK PHOS U/L 55 59 59   TOTAL PROTEIN g/dL 7 2 7 3 7 4   ALBUMIN g/dL 4 3 4 3 4 3   TOTAL BILIRUBIN mg/dL 0 58 0 48 0 50         Results from last 7 days   Lab Units 09/22/22  0431 09/21/22  0840 09/20/22  1019 09/16/22  0955   GLUCOSE RANDOM mg/dL 120 166* 131 118         Results from last 7 days   Lab Units 09/20/22  1605 09/20/22  1019 09/16/22  1136 09/16/22  0955   HS TNI 0HR ng/L  --  15  --  26   HS TNI 2HR ng/L 11  --  23  --    HSTNI D2 ng/L -4  --  -3  --      Results from last 7 days   Lab Units 09/20/22  1019   D-DIMER QUANTITATIVE ug/ml FEU <0 27     Results from last 7 days   Lab Units 09/20/22  1019   PROTIME seconds 13 6   INR  1 04   PTT seconds 24         Results from last 7 days   Lab Units 09/21/22  0438 09/20/22  1019   PROCALCITONIN ng/ml <0 05 <0 05     Results from last 7 days   Lab Units 09/20/22  1019   LACTIC ACID mmol/L 1 8             Results from last 7 days   Lab Units 09/20/22  1019 09/16/22  0955   BNP pg/mL 161* 124*       Results from last 7 days   Lab Units 09/20/22  1741 09/16/22  1325   CLARITY UA  Clear Clear   COLOR UA  Yellow Straw   SPEC GRAV UA  1 020 1 010   PH UA  6 0 7 0   GLUCOSE UA mg/dl Negative Negative   KETONES UA mg/dl Negative Negative   BLOOD UA  Negative Negative   PROTEIN UA mg/dl Negative Negative   NITRITE UA  Negative Negative   BILIRUBIN UA  Negative Negative   UROBILINOGEN UA E U /dl 0 2 0 2   LEUKOCYTES UA  1+* Negative   WBC UA /hpf 2-4  --    RBC UA /hpf None Seen  --    BACTERIA UA /hpf None Seen  --    EPITHELIAL CELLS WET PREP /hpf Occasional  --      Results from last 7 days   Lab Units 09/16/22  0955   INFLUENZA A PCR  Negative   INFLUENZA B PCR  Negative   RSV PCR  Negative       Results from last 7 days   Lab Units 09/20/22  1019 09/20/22  1010   BLOOD CULTURE  No Growth at 24 hrs  No Growth at 24 hrs         ED Treatment:   Medication Administration from 09/20/2022 0956 to 09/20/2022 1532       Date/Time Order Dose Route Action Comments     09/20/2022 1127 albuterol inhalation solution 10 mg 10 mg Nebulization Given      09/20/2022 1127 ipratropium (ATROVENT) 0 02 % inhalation solution 1 mg 1 mg Nebulization Given      09/20/2022 1122 sodium chloride 0 9 % inhalation solution 3 mL 3 mL Nebulization Given         Past Medical History:   Diagnosis Date    Cardiomyopathy ([facility] [address]     Chronic combined systolic and diastolic congestive heart failure     COPD (chronic obstructive pulmonary disease) (HCC)     Fatty liver     Hyperlipidemia     Hypertension     Mitral regurgitation     Sepsis        Admitting Diagnosis: SOB (shortness of breath) [R06 02]  Hypoxia [R09 02]  COPD exacerbation (HCC) [J44 1]  Age/Sex: 64 y o  female  Admission Orders:  Scheduled Medications:  aspirin, 81 mg, Oral, Daily  atorvastatin, 40 mg, Oral, Daily With Dinner  carvedilol, 25 mg, Oral, BID With Meals  cholecalciferol, 2,000 Units, Oral, Daily  clopidogrel, 75 mg, Oral, Daily  enoxaparin, 40 mg, Subcutaneous, Daily  furosemide, 40 mg, Intravenous, BID (diuretic)  guaiFENesin, 600 mg, Oral, Q12H JULIÁN  ipratropium, 0 5 mg, Nebulization, TID  levalbuterol, 1 25 mg, Nebulization, TID  lisinopril, 10 mg, Oral, Daily  morphine, 30 mg, Oral, Q12H  nicotine, 1 patch, Transdermal, Daily  predniSONE, 40 mg, Oral, Daily  umeclidinium-vilanterol, 1 puff, Inhalation, Daily      Continuous IV Infusions:     PRN Meds:  albuterol, 2 puff, Inhalation, Q6H PRN  oxyCODONE-acetaminophen, 1 tablet, Oral, Q6H PRN        IP CONSULT TO CARDIOLOGY  IP CONSULT TO PULMONOLOGY  IP CONSULT TO ENDOCRINOLOGY    Network Utilization Review Department  ATTENTION: Please call with any questions or concerns to [phone] and carefully listen to the prompts so that you are directed to the right person  All voicemails are confidential   Sudie Crigler all requests for admission clinical reviews, approved or denied determinations and any other requests to dedicated fax number below belonging to the campus where the patient is receiving treatment   List of dedicated fax numbers for the Facilities:  [address] [place] [address] DENIALS (Administrative/Medical Necessity) [phone]   [address] (Maternity/NICU/Pediatrics) 261 [facility],7Th Floor [facility] 40 125 [facility]  [phone]   Kelly Lam 50 [address] [facility] [place] [address] [id number] [place] [address] [address] [facility] [place] [address] [phone]

## 2022-09-22 NOTE — PLAN OF CARE
Problem: PAIN - ADULT  Goal: Verbalizes/displays adequate comfort level or baseline comfort level  Description: Interventions:  - Encourage patient to monitor pain and request assistance  - Assess pain using appropriate pain scale  - Administer analgesics based on type and severity of pain and evaluate response  - Implement non-pharmacological measures as appropriate and evaluate response  - Consider cultural and social influences on pain and pain management  - Notify physician/advanced practitioner if interventions unsuccessful or patient reports new pain  Outcome: Progressing     Problem: INFECTION - ADULT  Goal: Absence or prevention of progression during hospitalization  Description: INTERVENTIONS:  - Assess and monitor for signs and symptoms of infection  - Monitor lab/diagnostic results  - Monitor all insertion sites, i e  indwelling lines, tubes, and drains  - Monitor endotracheal if appropriate and nasal secretions for changes in amount and color  - [place] appropriate cooling/warming therapies per order  - Administer medications as ordered  - Instruct and encourage patient and family to use good hand hygiene technique  - Identify and instruct in appropriate isolation precautions for identified infection/condition  Outcome: Progressing     Problem: DISCHARGE PLANNING  Goal: Discharge to home or other facility with appropriate resources  Description: INTERVENTIONS:  - Identify barriers to discharge w/patient and caregiver  - Arrange for needed discharge resources and transportation as appropriate  - Identify discharge learning needs (meds, wound care, etc )  - Refer to Case Management Department for coordinating discharge planning if the patient needs post-hospital services based on physician/advanced practitioner order or complex needs related to functional status, cognitive ability, or social support system  Outcome: Progressing     Problem: Knowledge Deficit  Goal: Patient/family/caregiver demonstrates understanding of disease process, treatment plan, medications, and discharge instructions  Description: Complete learning assessment and assess knowledge base    Interventions:  - Provide teaching at level of understanding  - Provide teaching via preferred learning methods  Outcome: Progressing     Problem: RESPIRATORY - ADULT  Goal: Achieves optimal ventilation and oxygenation  Description: INTERVENTIONS:  - Assess for changes in respiratory status  - Assess for changes in mentation and behavior  - Position to facilitate oxygenation and minimize respiratory effort  - Oxygen administered by appropriate delivery if ordered  - Initiate smoking cessation education as indicated  - Encourage broncho-pulmonary hygiene including cough, deep breathe, Incentive Spirometry  - Assess the need for suctioning and aspirate as needed  - Assess and instruct to report SOB or any respiratory difficulty  - Respiratory Therapy support as indicated  Outcome: Progressing

## 2022-09-22 NOTE — ASSESSMENT & PLAN NOTE
Lab Results   Component Value Date    EGFR 76 09/22/2022    EGFR 76 09/21/2022    EGFR 90 09/20/2022    CREATININE 0 83 09/22/2022    CREATININE 0 83 09/21/2022    CREATININE 0 72 09/20/2022   · Creatinine has remained stable and at baseline  · Continue close monitoring while on IV diuretics    · Will monitor closely in view of the patient now receiving IV diuretics

## 2022-09-22 NOTE — PROGRESS NOTES
[facility]  Progress Note Alma Sharp 1960, 64 y o  female MRN: [id number]  Unit/Bed#: -01 Encounter: [id number]  Primary Care Provider: Osei Arita DO   Date and time admitted to hospital: 9/20/2022  9:56 AM    Pheochromocytoma  Assessment & Plan  Evaluation per medical team    CKD (chronic kidney disease) stage 3, GFR 30-59 ml/min [facility])  Assessment & Plan  Lab Results   Component Value Date    EGFR 76 09/22/2022    EGFR 76 09/21/2022    EGFR 90 09/20/2022    CREATININE 0 83 09/22/2022    CREATININE 0 83 09/21/2022    CREATININE 0 72 09/20/2022     Monitor renal function on diuretics and ACE-inhibitor    COPD with acute exacerbation [facility])  Assessment & Plan  Management per medical team and Pulmonary    Tobacco abuse  Assessment & Plan  Cessation encouraged    Acute on chronic systolic congestive heart failure (HCC)  Assessment & Plan  Wt Readings from Last 3 Encounters:   09/22/22 72 4 kg (159 lb 9 8 oz)   09/16/22 77 kg (169 lb 12 1 oz)   09/01/22 75 kg (165 lb 6 4 oz)     Weight down 7 lbs, -1 5 L  BNP not significantly elevated  IV diuretics have been recommended by pulmonary-continue today  Low Na diet, daily weights, I/O monitoring       * Acute respiratory failure with hypoxia (HCC)  Assessment & Plan  This is likely multifactorial  See additional notes below      Outpatient Cardiologist: Dr Lidia Ramirez:   Patient seen and examined  No significant events overnight  Denies any chest pain  She is still short of breath with exertion, but notes it is slightly improved since yesterday  Summary comments:   Rad is feeling a little better, but not at her baseline  She has diuresed well, weight is down, and renal function is stable  Continue IV diuretics today  She has been transitioned to oral steroids per Pulmonary for the pulmonary component of her acute respiratory failure with hypoxia      She was incidentally noted to have a possible pheochromocytoma  Further evaluation is in progress medical team       Continue low-sodium diet, daily weights, I/O monitoring  Telemetry/ECG/Cardiac testing:   Echo 07/14/2022 EF 25%, severe global hypokinesis with regional variation  Mild AI  Moderate MR  Mild TR     Echo 06/15/2022 EF 41%, mild LVH  Mild AI, TR    Cath 3/30/2021  Left main: Normal   LAD: The vessel was normal sized  Angiography showed moderate atherosclerosis  The diagonal branch was also free of significant disease  Circumflex: The vessel was normal sized and gave rise to one OM branch  There was mild plaque  There were no significant lesions  RCA: The vessel was normal sized and dominant, giving rise to the PDA and a posterolateral branch  There was diffuse moderate atherosclerosis  There were no flow-critical lesions  Vitals: Blood pressure 165/67, pulse 85, temperature 98 6 °F (37 °C), temperature source Oral, resp  rate 18, height 5' 10" (1 778 m), weight 72 4 kg (159 lb 9 8 oz), SpO2 (!) 89 % ,   Orthostatic Blood Pressures    Flowsheet Row Most Recent Value   Blood Pressure 165/67 filed at 09/21/2022 0818   Patient Position - Orthostatic VS Lying filed at 09/20/2022 2300      ,   Weight (last 2 days)     Date/Time Weight    09/22/22 0559 72 4 (159 61)     Weight: pt weighed twice on grey standing scale at 09/22/22 0559    09/21/22 0600 75 3 (166 01)    09/20/22 0959 76 7 (169)          Physical Exam:    General:  Normal appearance in no distress  Eyes:  Anicteric  Oral mucosa:  Moist   Neck:  No JVD  Carotid upstrokes are brisk without bruits  No masses  Chest:  Clear but significantly decreased in lower 3rd bilaterally, poor air movement  Cardiac:  No palpable PMI  Normal S1 and S2  No murmur gallop or rub  Abdomen:  Soft and nontender  No palpable organomegaly or aortic enlargement  Extremities:  No peripheral edema  Neuro:  Grossly symmetric  Psych:  Alert and oriented x3        Medications:      Current Facility-Administered Medications:     albuterol (PROVENTIL HFA,VENTOLIN HFA) inhaler 2 puff, 2 puff, Inhalation, Q6H PRN, Imelda Chaudhry DO, 2 puff at 09/21/22 0819    aspirin chewable tablet 81 mg, 81 mg, Oral, Daily, Imelda Richa, DO, 81 mg at 09/22/22 0954    atorvastatin (LIPITOR) tablet 40 mg, 40 mg, Oral, Daily With [employer], DO, 40 mg at 09/21/22 1537    carvedilol (COREG) tablet 25 mg, 25 mg, Oral, BID With Meals, Imelda Chaudhry DO, 25 mg at 09/22/22 0810    cholecalciferol (VITAMIN D3) tablet 2,000 Units, 2,000 Units, Oral, Daily, Imelda Chaudhry DO, 2,000 Units at 09/22/22 0955    clopidogrel (PLAVIX) tablet 75 mg, 75 mg, Oral, Daily, Imelda Chaudhry DO, 75 mg at 09/22/22 0955    dextromethorphan-guaiFENesin (ROBITUSSIN DM) oral syrup 10 mL, 10 mL, Oral, Q4H PRN, Deneen Cain MD    enoxaparin (LOVENOX) subcutaneous injection 40 mg, 40 mg, Subcutaneous, Daily, Imelda Chaudhry DO, 40 mg at 09/22/22 0955    furosemide (LASIX) injection 40 mg, 40 mg, Intravenous, BID (diuretic), Deneen Cain MD, 40 mg at 09/22/22 0856    guaiFENesin (MUCINEX) 12 hr tablet 600 mg, 600 mg, Oral, Q12H [address], Deneen Cain MD, 600 mg at 09/22/22 5819    ipratropium (ATROVENT) 0 02 % inhalation solution 0 5 mg, 0 5 mg, Nebulization, TID, Imelda Chaudhry DO, 0 5 mg at 09/22/22 0700    levalbuterol (XOPENEX) inhalation solution 1 25 mg, 1 25 mg, Nebulization, TID, 1 25 mg at 09/22/22 0700 **AND** [DISCONTINUED] sodium chloride 0 9 % inhalation solution 3 mL, 3 mL, Nebulization, TID, Imelda Overlie, DO    lisinopril (ZESTRIL) tablet 10 mg, 10 mg, Oral, Daily, Imelda Mary Ellenie, DO, 10 mg at 09/22/22 0955    morphine (MS CONTIN) ER tablet 30 mg, 30 mg, Oral, Q12H, Imelda Mary Ellenie, DO, 30 mg at 09/22/22 0405    nicotine (NICODERM CQ) 21 mg/24 hr TD 24 hr patch 1 patch, 1 patch, Transdermal, Daily, Imeldaanne Chaudhry, DO, 1 patch at 09/22/22 0956    oxyCODONE-acetaminophen (PERCOCET) 5-325 mg per tablet 1 tablet, 1 tablet, Oral, Q6H PRN, Joselito Zena, DO    predniSONE tablet 40 mg, 40 mg, Oral, Daily, Maite Martinez MD, 40 mg at 09/22/22 0955    umeclidinium-vilanterol (ANORO ELLIPTA) 62 5-25 MCG/INH inhaler 1 puff, 1 puff, Inhalation, Daily, Joseliot Phanorest, DO, 1 puff at 09/22/22 1001     Labs & Results:    Troponins:    Results from last 7 days   Lab Units 09/20/22  1605 09/20/22  1019   HS TNI 0HR ng/L  --  15   HS TNI 2HR ng/L 11  --    HSTNI D2 ng/L -4  --         BNP:   Results from last 6 Months   Lab Units 09/20/22  1019 09/16/22  0955   BNP pg/mL 161* 124*     CBC with diff:   Results from last 7 days   Lab Units 09/22/22  0431 09/21/22  0625   WBC Thousand/uL 13 09* 7 54   HEMOGLOBIN g/dL 14 8 15 0   HEMATOCRIT % 46 3* 47 4*   MCV fL 89 91   PLATELETS Thousands/uL 379 336     TSH:     CMP:   Results from last 7 days   Lab Units 09/22/22  0431 09/21/22  0438   POTASSIUM mmol/L 3 9 4 1   CHLORIDE mmol/L 94* 94*   CO2 mmol/L 34* 32   BUN mg/dL 28* 23   CREATININE mg/dL 0 83 0 83   AST U/L 11* 12*   ALT U/L 12 15   EGFR ml/min/1 73sq m 76 76     Lipid Profile:     Coags:   Results from last 7 days   Lab Units 09/20/22  1019   INR  1 04

## 2022-09-22 NOTE — OCCUPATIONAL THERAPY NOTE
09/22/22 0941   OT Last Visit   OT Visit Date 09/22/22   Restrictions/Precautions   Weight Bearing Precautions Per Order No   Other Precautions Chair Alarm; Bed Alarm;Multiple lines;O2;Fall Risk  (1 5L O2 via NC)   Pain Assessment   Pain Assessment Tool 0-10   Pain Score No Pain   ADL   Where Assessed Chair   Grooming Assistance 5  Supervision/Setup   Grooming Deficit Setup;Supervision/safety; Increased time to complete; Teeth care;Brushing hair;Wash/dry hands; Wash/dry face   UB Bathing Assistance 5  Supervision/Setup   UB Bathing Deficit Setup;Supervision/safety; Increased time to complete   LB Bathing Assistance 4  Minimal Assistance   LB Bathing Deficit Setup;Supervision/safety; Increased time to complete;Right lower leg including foot; Left lower leg including foot   UB Dressing Assistance 4  Minimal Assistance   UB Dressing Deficit Setup;Supervision/safety; Increased time to complete; Thread RUE; Thread LUE; Fasteners   LB Dressing Deficit Don/doff R sock; Don/doff L sock   LB Dressing Comments Dep doff/don socks   Functional Standing Tolerance   Time 2 mins   Activity stood 2 mins to bathe/dry periareas   Comments No AD during standing   Transfers   Sit to Stand 5  Supervision   Additional items Assist x 1; Increased time required;Armrests; Verbal cues   Stand to Sit 5  Supervision   Additional items Assist x 1; Armrests; Increased time required;Verbal cues   Cognition   Overall Cognitive Status [facility]   Arousal/Participation Alert; Responsive; Cooperative   Attention Within functional limits   Orientation Level Oriented X4   Memory Within functional limits   Following Commands Follows all commands and directions without difficulty   Comments PT agreeable to OT treatment     Activity Tolerance   Activity Tolerance Patient limited by fatigue   Assessment   Assessment Patient participated in Skilled OT session this date with interventions consisting of functional transfer training, ADL re training with the use of correct body mechnaics, Energy Conservation techniques and increase dynamic sit/ stand balance during functional activity    Patient agreeable to OT treatment session, upon arrival patient was found seated OOB to Recliner, alert, responsive  and in no apparent distress  Patient set up for ADL seated OOB to recliner and performs same as detailed in flow sheet  Patient transfers sit to stand with supervision x 1 and stands unsupported ~2 mins to complete periarea bathing/drying with no LOB noted  Patient reports SOB with activity, requiring frequent rest periods  Educated patient in breathing techniques with good carryover; O2 sats 84-86 throughout session with 1 5 L O2 via NC  Patient then deferred additional activity at this time due to fatigue  Session ended with patient seated OOB to recliner, all needs met, and call bell within reach  In comparison to previous session, patient with improvements in self care ADL performance, functional transfers, and endurance  Patient requiring verbal cues for correct technique, cognitive assistance to anticipate next step, frequent rest periods and ocassional safety reminders  Patient performance  demonstrated good carryover of learned techniques and strategies to facilitate safety during functional tasks  Patient continues to be functioning below baseline level, occupational performance remains limited secondary to factors listed above and increased risk for falls and injury  From OT standpoint, recommendation at time of d/c would be Outpatient OT  Patient to benefit from continued Occupational Therapy treatment while in the hospital to address deficits as defined above and maximize level of functional independence with ADLs and functional mobility in order to return to PLOF     Plan   Treatment Interventions ADL retraining;Functional transfer training;Energy conservation   Goal Expiration Date 09/22/22   OT Treatment Day 1   OT Frequency 3-5x/wk   Recommendation   OT Discharge Recommendation Home with outpatient rehabilitation   AM-PAC Daily Activity Inpatient   Lower Body Dressing 3   Bathing 3   Toileting 3   Upper Body Dressing 3   Grooming 4   Eating 4   Daily Activity Raw Score 20   Daily Activity Standardized Score (Calc for Raw Score >=11) 42 03   AM-PAC Applied Cognition Inpatient   Following a Speech/Presentation 4   Understanding Ordinary Conversation 4   Taking Medications 4   Remembering Where Things Are Placed or Put Away 4   Remembering List of 4-5 Errands 4   Taking Care of Complicated Tasks 4   Applied Cognition Raw Score 24   Applied Cognition Standardized Score 62 21   GIFTY Sanders

## 2022-09-22 NOTE — PLAN OF CARE
Problem: OCCUPATIONAL THERAPY ADULT  Goal: Performs self-care activities at highest level of function for planned discharge setting  See evaluation for individualized goals  Description: Treatment Interventions: ADL retraining, Functional transfer training, Endurance training, Compensatory technique education, Energy conservation, Activityengagement     See flowsheet documentation for full assessment, interventions and recommendations  Outcome: Progressing  Note: Limitation: Decreased ADL status, Decreased endurance, Decreased self-care trans, Decreased high-level ADLs  Prognosis: Good  Assessment: Patient participated in Skilled OT session this date with interventions consisting of functional transfer training, ADL re training with the use of correct body mechnaics, Energy Conservation techniques and increase dynamic sit/ stand balance during functional activity    Patient agreeable to OT treatment session, upon arrival patient was found seated OOB to Recliner, alert, responsive  and in no apparent distress  Patient set up for ADL seated OOB to recliner and performs same as detailed in flow sheet  Patient transfers sit to stand with supervision x 1 and stands unsupported ~2 mins to complete periarea bathing/drying with no LOB noted  Patient reports SOB with activity, requiring frequent rest periods  Educated patient in breathing techniques with good carryover; O2 sats 84-86 throughout session with 1 5 L O2 via NC  Patient then deferred additional activity at this time due to fatigue  Session ended with patient seated OOB to recliner, all needs met, and call bell within reach  In comparison to previous session, patient with improvements in self care ADL performance, functional transfers, and endurance  Patient requiring verbal cues for correct technique, cognitive assistance to anticipate next step, frequent rest periods and ocassional safety reminders   Patient performance  demonstrated good carryover of learned techniques and strategies to facilitate safety during functional tasks  Patient continues to be functioning below baseline level, occupational performance remains limited secondary to factors listed above and increased risk for falls and injury  From OT standpoint, recommendation at time of d/c would be Outpatient OT  Patient to benefit from continued Occupational Therapy treatment while in the hospital to address deficits as defined above and maximize level of functional independence with ADLs and functional mobility in order to return to PLOF       OT Discharge Recommendation: Home with outpatient rehabilitation

## 2022-09-22 NOTE — UTILIZATION REVIEW
Inpatient Admission Authorization Request   NOTIFICATION OF INPATIENT ADMISSION/INPATIENT AUTHORIZATION REQUEST   SERVICING FACILITY:   [facility]  [address] [place] [address] AFFILIATED WITH [facility] [address]  Tax ID: [id number]  NPI: [id number]  Place of Service: Inpatient [facility]  [address]  Place of Service Code: 24     ATTENDING PROVIDER:  Attending Name and NPI#: Griselda Pouch, [place] [[id number]]  Address: [address] [place] [address] AFFILIATED WITH [facility], [address]  Phone: [phone]     UTILIZATION REVIEW CONTACT:  Quincy Torre, Utilization Review Supervisor  Network Utilization Review Department  Phone: [phone]  Fax [phone]  Email: [email or web address]     PHYSICIAN ADVISORY SERVICES:  FOR ETEG-HL-AOJP REVIEW - MEDICAL NECESSITY DENIAL  Phone: [phone]  Fax: [phone]  Email: [email or web address]     TYPE OF REQUEST:  Inpatient Status     ADMISSION INFORMATION:  ADMISSION DATE/TIME: 9/21/22  8:37 AM  PATIENT DIAGNOSIS CODE/DESCRIPTION:  SOB (shortness of breath) [R06 02]  Hypoxia [R09 02]  COPD exacerbation (HCC) [J44 1]  DISCHARGE DATE/TIME: No discharge date for patient encounter  IMPORTANT INFORMATION:  Please contact Quincy Torre directly with any questions or concerns regarding this request  Department voicemails are confidential     Send requests for admission clinical reviews, concurrent reviews, approvals, and administrative denials due to lack of clinical to fax [phone]

## 2022-09-23 VITALS
WEIGHT: 159.83 LBS | OXYGEN SATURATION: 91 % | RESPIRATION RATE: 18 BRPM | BODY MASS INDEX: 22.88 KG/M2 | DIASTOLIC BLOOD PRESSURE: 90 MMHG | SYSTOLIC BLOOD PRESSURE: 156 MMHG | HEART RATE: 89 BPM | TEMPERATURE: 98.1 F | HEIGHT: 70 IN

## 2022-09-23 LAB
ANION GAP SERPL CALCULATED.3IONS-SCNC: 5 MMOL/L (ref 4–13)
BASOPHILS # BLD AUTO: 0.02 THOUSANDS/ΜL (ref 0–0.1)
BASOPHILS NFR BLD AUTO: 0 % (ref 0–1)
BUN SERPL-MCNC: 31 MG/DL (ref 5–25)
CALCIUM SERPL-MCNC: 10.1 MG/DL (ref 8.4–10.2)
CHLORIDE SERPL-SCNC: 94 MMOL/L (ref 96–108)
CO2 SERPL-SCNC: 38 MMOL/L (ref 21–32)
CREAT SERPL-MCNC: 0.96 MG/DL (ref 0.6–1.3)
DME PARACHUTE DELIVERY DATE REQUESTED: NORMAL
DME PARACHUTE DELIVERY NOTE: NORMAL
DME PARACHUTE ITEM DESCRIPTION: NORMAL
DME PARACHUTE ORDER STATUS: NORMAL
DME PARACHUTE SUPPLIER NAME: NORMAL
DME PARACHUTE SUPPLIER PHONE: NORMAL
EOSINOPHIL # BLD AUTO: 0.04 THOUSAND/ΜL (ref 0–0.61)
EOSINOPHIL NFR BLD AUTO: 0 % (ref 0–6)
ERYTHROCYTE [DISTWIDTH] IN BLOOD BY AUTOMATED COUNT: 14.3 % (ref 11.6–15.1)
GFR SERPL CREATININE-BSD FRML MDRD: 64 ML/MIN/1.73SQ M
GLUCOSE SERPL-MCNC: 92 MG/DL (ref 65–140)
HCT VFR BLD AUTO: 45 % (ref 34.8–46.1)
HGB BLD-MCNC: 14.4 G/DL (ref 11.5–15.4)
IMM GRANULOCYTES # BLD AUTO: 0.06 THOUSAND/UL (ref 0–0.2)
IMM GRANULOCYTES NFR BLD AUTO: 0 % (ref 0–2)
LYMPHOCYTES # BLD AUTO: 4.31 THOUSANDS/ΜL (ref 0.6–4.47)
LYMPHOCYTES NFR BLD AUTO: 29 % (ref 14–44)
MCH RBC QN AUTO: 28.9 PG (ref 26.8–34.3)
MCHC RBC AUTO-ENTMCNC: 32 G/DL (ref 31.4–37.4)
MCV RBC AUTO: 90 FL (ref 82–98)
MONOCYTES # BLD AUTO: 0.93 THOUSAND/ΜL (ref 0.17–1.22)
MONOCYTES NFR BLD AUTO: 6 % (ref 4–12)
NEUTROPHILS # BLD AUTO: 9.64 THOUSANDS/ΜL (ref 1.85–7.62)
NEUTS SEG NFR BLD AUTO: 65 % (ref 43–75)
NRBC BLD AUTO-RTO: 0 /100 WBCS
PLATELET # BLD AUTO: 346 THOUSANDS/UL (ref 149–390)
PMV BLD AUTO: 9.4 FL (ref 8.9–12.7)
POTASSIUM SERPL-SCNC: 3.6 MMOL/L (ref 3.5–5.3)
RBC # BLD AUTO: 4.98 MILLION/UL (ref 3.81–5.12)
SODIUM SERPL-SCNC: 137 MMOL/L (ref 135–147)
WBC # BLD AUTO: 15 THOUSAND/UL (ref 4.31–10.16)

## 2022-09-23 PROCEDURE — 99239 HOSP IP/OBS DSCHRG MGMT >30: CPT | Performed by: HOSPITALIST

## 2022-09-23 PROCEDURE — 94640 AIRWAY INHALATION TREATMENT: CPT

## 2022-09-23 PROCEDURE — 99232 SBSQ HOSP IP/OBS MODERATE 35: CPT | Performed by: INTERNAL MEDICINE

## 2022-09-23 PROCEDURE — 85025 COMPLETE CBC W/AUTO DIFF WBC: CPT | Performed by: HOSPITALIST

## 2022-09-23 PROCEDURE — 94760 N-INVAS EAR/PLS OXIMETRY 1: CPT

## 2022-09-23 PROCEDURE — 83835 ASSAY OF METANEPHRINES: CPT | Performed by: STUDENT IN AN ORGANIZED HEALTH CARE EDUCATION/TRAINING PROGRAM

## 2022-09-23 PROCEDURE — 94668 MNPJ CHEST WALL SBSQ: CPT

## 2022-09-23 PROCEDURE — 80048 BASIC METABOLIC PNL TOTAL CA: CPT | Performed by: HOSPITALIST

## 2022-09-23 RX ORDER — FUROSEMIDE 40 MG/1
40 TABLET ORAL DAILY
Status: DISCONTINUED | OUTPATIENT
Start: 2022-09-24 | End: 2022-09-23 | Stop reason: HOSPADM

## 2022-09-23 RX ORDER — GUAIFENESIN/DEXTROMETHORPHAN 100-10MG/5
10 SYRUP ORAL EVERY 4 HOURS PRN
Qty: 118 ML | Refills: 0 | Status: SHIPPED | OUTPATIENT
Start: 2022-09-23 | End: 2022-10-03

## 2022-09-23 RX ORDER — FUROSEMIDE 40 MG/1
40 TABLET ORAL DAILY PRN
Qty: 30 TABLET | Refills: 0 | Status: SHIPPED | OUTPATIENT
Start: 2022-09-23 | End: 2022-10-05 | Stop reason: SDUPTHER

## 2022-09-23 RX ORDER — PREDNISONE 20 MG/1
TABLET ORAL
Qty: 7 TABLET | Refills: 0 | Status: SHIPPED | OUTPATIENT
Start: 2022-09-24 | End: 2022-09-29

## 2022-09-23 RX ORDER — FUROSEMIDE 40 MG/1
40 TABLET ORAL DAILY PRN
Status: DISCONTINUED | OUTPATIENT
Start: 2022-09-23 | End: 2022-09-23 | Stop reason: HOSPADM

## 2022-09-23 RX ADMIN — CLOPIDOGREL BISULFATE 75 MG: 75 TABLET ORAL at 09:28

## 2022-09-23 RX ADMIN — CARVEDILOL 25 MG: 25 TABLET, FILM COATED ORAL at 16:34

## 2022-09-23 RX ADMIN — IPRATROPIUM BROMIDE 0.5 MG: 0.5 SOLUTION RESPIRATORY (INHALATION) at 07:13

## 2022-09-23 RX ADMIN — ASPIRIN 81 MG CHEWABLE TABLET 81 MG: 81 TABLET CHEWABLE at 09:28

## 2022-09-23 RX ADMIN — LEVALBUTEROL HYDROCHLORIDE 1.25 MG: 1.25 SOLUTION, CONCENTRATE RESPIRATORY (INHALATION) at 07:13

## 2022-09-23 RX ADMIN — IPRATROPIUM BROMIDE 0.5 MG: 0.5 SOLUTION RESPIRATORY (INHALATION) at 13:46

## 2022-09-23 RX ADMIN — PREDNISONE 40 MG: 20 TABLET ORAL at 09:28

## 2022-09-23 RX ADMIN — CARVEDILOL 25 MG: 25 TABLET, FILM COATED ORAL at 08:13

## 2022-09-23 RX ADMIN — GUAIFENESIN 600 MG: 600 TABLET, EXTENDED RELEASE ORAL at 09:28

## 2022-09-23 RX ADMIN — FUROSEMIDE 40 MG: 10 INJECTION, SOLUTION INTRAMUSCULAR; INTRAVENOUS at 08:13

## 2022-09-23 RX ADMIN — MORPHINE SULFATE 30 MG: 30 TABLET, EXTENDED RELEASE ORAL at 04:36

## 2022-09-23 RX ADMIN — NICOTINE 1 PATCH: 21 PATCH, EXTENDED RELEASE TRANSDERMAL at 09:28

## 2022-09-23 RX ADMIN — ATORVASTATIN CALCIUM 40 MG: 40 TABLET, FILM COATED ORAL at 16:34

## 2022-09-23 RX ADMIN — ENOXAPARIN SODIUM 40 MG: 100 INJECTION SUBCUTANEOUS at 09:28

## 2022-09-23 RX ADMIN — MORPHINE SULFATE 30 MG: 30 TABLET, EXTENDED RELEASE ORAL at 16:34

## 2022-09-23 RX ADMIN — UMECLIDINIUM BROMIDE AND VILANTEROL TRIFENATATE 1 PUFF: 62.5; 25 POWDER RESPIRATORY (INHALATION) at 09:31

## 2022-09-23 RX ADMIN — LISINOPRIL 10 MG: 10 TABLET ORAL at 09:28

## 2022-09-23 RX ADMIN — CHOLECALCIFEROL TAB 25 MCG (1000 UNIT) 2000 UNITS: 25 TAB at 09:28

## 2022-09-23 RX ADMIN — LEVALBUTEROL HYDROCHLORIDE 1.25 MG: 1.25 SOLUTION, CONCENTRATE RESPIRATORY (INHALATION) at 13:46

## 2022-09-23 NOTE — CASE MANAGEMENT
Case Management Discharge Planning Note    Patient name Evelyn Valadez  Location /-12 MRN [id number]  : 1960 Date 2022       Current Admission Date: 2022  Current Admission Diagnosis:Acute respiratory failure with hypoxia [facility])   Patient Active Problem List    Diagnosis Date Noted    Pheochromocytoma 2022    COPD with acute exacerbation ([address] ) 2022    CKD (chronic kidney disease) stage 3, GFR 30-59 ml/min ([address] ) 2022    Secondary hyperparathyroidism of renal origin ([address] ) 2022    Renal artery stenosis [facility] ) 2022    Hepatitis B 2022    Acute respiratory failure with hypoxia ([facility] ) 2022    Acute respiratory insufficiency 2022    Acute kidney injury superimposed on CKD-3 2022    COPD (chronic obstructive pulmonary disease) ([facility] ) 06/15/2022    Acute on chronic systolic congestive heart failure ([address] ) 2021    Tobacco abuse 2021    Leukocytosis 2021    Anxiety 2021    Dilated cardiomyopathy ([address] ) 2021    Essential hypertension 2021    Chronic pain syndrome 2021    Vitamin D deficiency 2013      LOS (days): 2  Geometric Mean LOS (GMLOS) (days): 3 80  Days to GMLOS:1 6     OBJECTIVE:  Risk of Unplanned Readmission Score: 27 59     Current admission status: Inpatient   Preferred Pharmacy:   [address] [place] [address] #[id number] [place] [address]  Phone: [phone] Fax: [phone]     [facility]  [address]  [place] [address]  Phone: [phone] Fax: [phone]    Primary Care Provider: Yoel Locke DO    Primary Insurance: MEDICARE MISC REPLACEMENT  Secondary Insurance: [place] [address] [place] [address] DETAILS:  Pt states she is not sure if her oxygen is working correctly at home    Also states she can't carry the portable oxygen tank to go upstairs  Kaykay Jameson and Mini Vargas from [employer] here and spoke to the pt at the bedside about same  Reviewed the IMM with the pt  who is in agreement with same  Pt aware no [facility] agencies accepted her  Pt called the [employer] to see if she can get her straight Medicare back  Asked CM to call [employer] to provide her Medicare start date  TC to [employer], spoke to [place] and provided the [place] information  Pt does not have transport home  Sent for transport via [address]  Pt will be transported via [facility] at 1700  Made pt destiny Woodard aware                                                                                           IMM Given (Date):: 09/23/22  IMM Given to[de-identified]  (Reviewed the IMM with the pt who is in agreement with same )

## 2022-09-23 NOTE — ASSESSMENT & PLAN NOTE
This is likely multifactorial  BNP is low and even though there is LV dysfunction, I am going to cut back furosemide to prn weight gain

## 2022-09-23 NOTE — ASSESSMENT & PLAN NOTE
Lab Results   Component Value Date    EGFR 64 09/23/2022    EGFR 76 09/22/2022    EGFR 76 09/21/2022    CREATININE 0 96 09/23/2022    CREATININE 0 83 09/22/2022    CREATININE 0 83 09/21/2022   · Creatinine has remained stable and at baseline  · Will need continued periodic outpatient BMP monitoring via her primary care provider

## 2022-09-23 NOTE — PLAN OF CARE
Problem: PAIN - ADULT  Goal: Verbalizes/displays adequate comfort level or baseline comfort level  Description: Interventions:  - Encourage patient to monitor pain and request assistance  - Assess pain using appropriate pain scale  - Administer analgesics based on type and severity of pain and evaluate response  - Implement non-pharmacological measures as appropriate and evaluate response  - Consider cultural and social influences on pain and pain management  - Notify physician/advanced practitioner if interventions unsuccessful or patient reports new pain  Outcome: Progressing     Problem: INFECTION - ADULT  Goal: Absence or prevention of progression during hospitalization  Description: INTERVENTIONS:  - Assess and monitor for signs and symptoms of infection  - Monitor lab/diagnostic results  - Monitor all insertion sites, i e  indwelling lines, tubes, and drains  - Monitor endotracheal if appropriate and nasal secretions for changes in amount and color  - [place] appropriate cooling/warming therapies per order  - Administer medications as ordered  - Instruct and encourage patient and family to use good hand hygiene technique  - Identify and instruct in appropriate isolation precautions for identified infection/condition  Outcome: Progressing     Problem: DISCHARGE PLANNING  Goal: Discharge to home or other facility with appropriate resources  Description: INTERVENTIONS:  - Identify barriers to discharge w/patient and caregiver  - Arrange for needed discharge resources and transportation as appropriate  - Identify discharge learning needs (meds, wound care, etc )  - Refer to Case Management Department for coordinating discharge planning if the patient needs post-hospital services based on physician/advanced practitioner order or complex needs related to functional status, cognitive ability, or social support system  Outcome: Progressing     Problem: Knowledge Deficit  Goal: Patient/family/caregiver demonstrates understanding of disease process, treatment plan, medications, and discharge instructions  Description: Complete learning assessment and assess knowledge base    Interventions:  - Provide teaching at level of understanding  - Provide teaching via preferred learning methods  Outcome: Progressing     Problem: RESPIRATORY - ADULT  Goal: Achieves optimal ventilation and oxygenation  Description: INTERVENTIONS:  - Assess for changes in respiratory status  - Assess for changes in mentation and behavior  - Position to facilitate oxygenation and minimize respiratory effort  - Oxygen administered by appropriate delivery if ordered  - Initiate smoking cessation education as indicated  - Encourage broncho-pulmonary hygiene including cough, deep breathe, Incentive Spirometry  - Assess the need for suctioning and aspirate as needed  - Assess and instruct to report SOB or any respiratory difficulty  - Respiratory Therapy support as indicated  Outcome: Progressing     Problem: Potential for Falls  Goal: Patient will remain free of falls  Description: INTERVENTIONS:  - Educate patient/family on patient safety including physical limitations  - Instruct patient to call for assistance with activity   - Consult OT/PT to assist with strengthening/mobility   - Keep Call bell within reach  - Keep bed low and locked with side rails adjusted as appropriate  - Keep care items and personal belongings within reach  - Initiate and maintain comfort rounds  - Make Fall Risk Sign visible to staff  - Offer Toileting every 2 Hours, in advance of need  - Apply yellow socks and bracelet for high fall risk patients  - Consider moving patient to room near nurses station  Outcome: Progressing

## 2022-09-23 NOTE — PROGRESS NOTES
Progress Note - Nephrology   Windy Estrada 64 y o  female MRN: [id number]  Unit/Bed#: -01 Encounter: [id number]    A/P:  1  Elevated plasma catecholamines, felt to have low likelihood of pheochromocytoma by Endocrinology  Plasma free metanephrines are going to be repeated and can consider urinary metanephrines as an outpatient  Recommended imaging however endocrinology wanted to confirm biochemical testing 1st   Will defer further management to Endocrinology at this point  Stable from renal perspective, continue current medical management per primary and workup of catecholamines per Endo  Will sign off please call with questions    2  Renovascular hypertension secondary to renal artery stenosis  Much improved since stenting  She is currently on ACE-inhibitor  Blood pressure trends reasonable at this point  Continue current management  Diuretic will be on p r n  weight based dosing per Cardiology  Continued DAPT  3  Elevated bicarbonate likely represents metabolic alkalosis but also compensation from chronic respiratory acidosis is consideration  Hold standing diuretics  administer on weight based dosing if > 160 lb  Fu echo with cardiology  If continue to trend up, can add diamox  4  CKD 2/3 baseline creatinine was 0 8  Follows with Dr Gustavo Vee as outpatient  She did require hemodialysis transiently for 2 weeks  5  Acute respiratory failure with hypoxia, COPD  Do not suspect volume is primary   Follow up reason for today's visit:   Elevated plasma catecholamines    Subjective:   Patient seen examined today  Reporting she is getting discharge  She is still having some shortness of breath but overall stable  Denies any chest pain, nausea, vomiting, diarrhea  A complete 10 point review of systems was performed and is otherwise negative  Objective:     Vitals: Blood pressure 156/90, pulse 89, temperature 98 1 °F (36 7 °C), temperature source Oral, resp   rate 18, height 5' 10" (1 778 m), weight 72 5 kg (159 lb 13 3 oz), SpO2 90 %  ,Body mass index is 22 93 kg/m²  Weight (last 2 days)     Date/Time Weight    09/23/22 0600 72 5 (159 83)    09/22/22 0559 72 4 (159 61)     Weight: pt weighed twice on grey standing scale at 09/22/22 0559    09/21/22 0600 75 3 (166 01)            Intake/Output Summary (Last 24 hours) at 9/23/2022 1327  Last data filed at 9/23/2022 1310  Gross per 24 hour   Intake 320 ml   Output 2300 ml   Net -1980 ml     I/O last 3 completed shifts:  In: -   Out: 2500 [Urine:2500]         Physical Exam: /90 (BP Location: Left arm)   Pulse 89   Temp 98 1 °F (36 7 °C) (Oral)   Resp 18   Ht 5' 10" (1 778 m)   Wt 72 5 kg (159 lb 13 3 oz)   SpO2 90%   BMI 22 93 kg/m²     General Appearance:    Alert, cooperative, no distress, appears stated age, glasses   Head:    Normocephalic, without obvious abnormality, atraumatic   Eyes:    Conjunctiva/corneas clear   Ears:    Normal external ears   Nose:   Nares normal, septum midline, mucosa normal, no drainage    or sinus tenderness   Throat:   Lips, mucosa, and tongue normal; teeth and gums normal   Neck:   Supple,        Lungs:     Decrease BS bilaterally, scattered rhonchi    Chest wall:    No tenderness    Heart:    Regular rate and rhythm, S1 and S2 normal, +murmur    Abdomen:     Soft, non-tender, bowel sounds active   Extremities:   Extremities normal, atraumatic, no cyanosis or edema   Skin:   Skin color, texture, turgor normal, no rashes or lesions   Lymph nodes:   Cervical normal   Neurologic:   CNII-XII intact            Lab, Imaging and other studies: I have personally reviewed pertinent labs    CBC:   Lab Results   Component Value Date    WBC 15 00 (H) 09/23/2022    HGB 14 4 09/23/2022    HCT 45 0 09/23/2022    MCV 90 09/23/2022     09/23/2022    MCH 28 9 09/23/2022    MCHC 32 0 09/23/2022    RDW 14 3 09/23/2022    MPV 9 4 09/23/2022    NRBC 0 09/23/2022     CMP:   Lab Results   Component Value Date K 3 6 09/23/2022    CL 94 (L) 09/23/2022    CO2 38 (H) 09/23/2022    BUN 31 (H) 09/23/2022    CREATININE 0 96 09/23/2022    CALCIUM 10 1 09/23/2022    EGFR 64 09/23/2022         Results from last 7 days   Lab Units 09/23/22  0443 09/22/22  0431 09/21/22  0438 09/20/22  1019   POTASSIUM mmol/L 3 6 3 9 4 1 3 9   CHLORIDE mmol/L 94* 94* 94* 96   CO2 mmol/L 38* 34* 32 30   BUN mg/dL 31* 28* 23 19   CREATININE mg/dL 0 96 0 83 0 83 0 72   CALCIUM mg/dL 10 1 10 6* 10 5* 10 2   ALK PHOS U/L  --  55 59 59   ALT U/L  --  12 15 17   AST U/L  --  11* 12* 18         Phosphorus: No results found for: PHOS  Magnesium: No results found for: MG  Urinalysis: No results found for: COLORU, CLARITYU, SPECGRAV, PHUR, LEUKOCYTESUR, NITRITE, PROTEINUA, GLUCOSEU, KETONESU, BILIRUBINUR, BLOODU  Ionized Calcium: No results found for: CAION  Coagulation: No results found for: PT, INR, APTT  Troponin: No results found for: TROPONINI  ABG: No results found for: PHART, HPJ8UCN, PO2ART, XBH4UXW, U3LVPNUG, BEART, SOURCE  Radiology review:     IMAGING  No results found      Current Facility-Administered Medications   Medication Dose Route Frequency    albuterol (PROVENTIL HFA,VENTOLIN HFA) inhaler 2 puff  2 puff Inhalation Q6H PRN    aspirin chewable tablet 81 mg  81 mg Oral Daily    atorvastatin (LIPITOR) tablet 40 mg  40 mg Oral Daily With Dinner    carvedilol (COREG) tablet 25 mg  25 mg Oral BID With Meals    cholecalciferol (VITAMIN D3) tablet 2,000 Units  2,000 Units Oral Daily    clopidogrel (PLAVIX) tablet 75 mg  75 mg Oral Daily    dextromethorphan-guaiFENesin (ROBITUSSIN DM) oral syrup 10 mL  10 mL Oral Q4H PRN    enoxaparin (LOVENOX) subcutaneous injection 40 mg  40 mg Subcutaneous Daily    furosemide (LASIX) tablet 40 mg  40 mg Oral Daily PRN    [START ON 9/24/2022] furosemide (LASIX) tablet 40 mg  40 mg Oral Daily    guaiFENesin (MUCINEX) 12 hr tablet 600 mg  600 mg Oral Q12H JULIÁN    ipratropium (ATROVENT) 0 02 % inhalation solution 0 5 mg  0 5 mg Nebulization TID    levalbuterol (XOPENEX) inhalation solution 1 25 mg  1 25 mg Nebulization TID    lisinopril (ZESTRIL) tablet 10 mg  10 mg Oral Daily    morphine (MS CONTIN) ER tablet 30 mg  30 mg Oral Q12H    nicotine (NICODERM CQ) 21 mg/24 hr TD 24 hr patch 1 patch  1 patch Transdermal Daily    oxyCODONE-acetaminophen (PERCOCET) 5-325 mg per tablet 1 tablet  1 tablet Oral Q6H PRN    polyethylene glycol (MIRALAX) packet 17 g  17 g Oral Daily PRN    predniSONE tablet 40 mg  40 mg Oral Daily    umeclidinium-vilanterol (ANORO ELLIPTA) 62 5-25 MCG/INH inhaler 1 puff  1 puff Inhalation Daily     Medications Discontinued During This Encounter   Medication Reason    sodium chloride 0 9 % inhalation solution 3 mL     levalbuterol (XOPENEX) inhalation solution 1 25 mg     ipratropium (ATROVENT) 0 02 % inhalation solution 0 5 mg     cloNIDine (CATAPRES) tablet 0 1 mg     furosemide (LASIX) tablet 40 mg     methylPREDNISolone sodium succinate (Solu-MEDROL) injection 40 mg     furosemide (LASIX) injection 40 mg        Roldan, DO      This progress note was produced in part using a dictation device which may document imprecise wording from author's original intent

## 2022-09-23 NOTE — DISCHARGE INSTR - OTHER ORDERS
PLEASE CALL [facility] ([facility]) TO DISCUSS SOMEONE COMING TO THE HOME TO CHECK IF YOUR OXYGEN IS WORKING CORRECTLY  ALSO ASK IF YOU WOULD HAVE THE RESPIRATORY THERAPIST COME TO SEE IF YOU CAN QUALIFY FOR THE PORTABLE OXYGEN   CALL # [phone]

## 2022-09-23 NOTE — DISCHARGE SUMMARY
Bernardino U  66   Discharge- Jonah Reveal 1960, 64 y o  female MRN: [id number]  Unit/Bed#: -01 Encounter: [id number]  Primary Care Provider: Radha Lindsey DO   Date and time admitted to hospital: 9/20/2022  9:56 AM    * Acute respiratory failure with hypoxia [facility])  Assessment & Plan  · Patient initially presented to the ED with an acute hypoxic respiratory failure  · Patient denies using any supplemental oxygen at home but reports having an oxygen tank at home  · Improved:-  · Initial oxygen requirement was 4 L of supplemental oxygen via nasal cannula in the ED   · Current O2 requirement is 1/2 L of supplemental oxygen via nasal cannula with a resultant O2 sat of about 90%  · Mixed etiology  · 1  Acute exacerbation of systolic dysfunction congestive heart failure  · 2   Acute exacerbation of COPD  · Patient is medically stable for discharge  · Outpatient follow-up with PCP  · See the remaining outlines below    COPD with acute exacerbation [facility])  Assessment & Plan  · Appreciate Pulmonary input  · Patient reports having 2 cats at home that shed a lot of hair, patient continues to smoke  · Procalcitonin testing x2 is less than 0 05 - no antibiotics indicated  · Status post treatment with IV Solu-Medrol  · DC home on a prednisone taper - 40 mg daily by mouth for 2 days, then 20 mg daily by mouth for 3 days  · DC home on all other pre-admission medications at the preadmission doses  · Outpatient follow-up with PCP, and Pulmonary    Acute on chronic systolic congestive heart failure (HCC)  Assessment & Plan  Wt Readings from Last 3 Encounters:   09/23/22 72 5 kg (159 lb 13 3 oz)   09/16/22 77 kg (169 lb 12 1 oz)   09/01/22 75 kg (165 lb 6 4 oz)     · Most recent echocardiogram from July 14, 2022 - revealed an EF of 25%  · Chest x-ray reveals interstitial edema in the setting of having emphysema  · Appreciate cardiology input  · Patient has diuresed over 3 5 L of fluid on this admission  · DC home on 40 mg of Lasix to be taken on any given day when her weight is greater than 160 lb as per Cardiology recommendation  · In the interim continue other current cardiac based medications which include aspirin, Plavix, carvedilol, lisinopril, and Lipitor post discharge also          CKD (chronic kidney disease) stage 3, GFR 30-59 ml/min [facility])  Assessment & Plan  Lab Results   Component Value Date    EGFR 64 09/23/2022    EGFR 76 09/22/2022    EGFR 76 09/21/2022    CREATININE 0 96 09/23/2022    CREATININE 0 83 09/22/2022    CREATININE 0 83 09/21/2022   · Creatinine has remained stable and at baseline  · Will need continued periodic outpatient BMP monitoring via her primary care provider    Tobacco abuse  Assessment & Plan  · Cessation counseling provided  · Status post treatment with a nicotine patch while in-house    Pheochromocytoma  Assessment & Plan  · Catecholamine, renin activity, aldosterone/renin ratio testing results reviewed from July 24, 2022  · Appreciate Endocrine, and Nephrology evaluation  · Outpatient follow-up with both subspecialties    Renal artery stenosis [facility])  Assessment & Plan  · Status post stenting in the past    Chronic pain syndrome  Assessment & Plan  · With continued opioid dependence  · Continue home meds post discharge        Discharging Physician / Practitioner: Maira Noe MD  PCP: Judy Tran DO  Admission Date:   Admission Orders (From admission, onward)     Ordered        09/21/22 0837  Inpatient Admission  Once            09/20/22 1312  Place in Observation  Once                      Discharge Date: 09/23/22    Medical Problems             Resolved Problems  Date Reviewed: 9/22/2022   None                 Consultations During Hospital Stay:  · Pulmonary  · Cardiology  · Nephrology  · Endocrinology    Procedures Performed:   · None    Significant Findings / Test Results:   · Chest x-ray-emphysema with probable interstitial edema    Incidental Findings:   · None     Test Results Pending at Discharge (will require follow up): · None     Outpatient Tests Requested:  · None    Complications:     None    Reason for Admission:  Shortness of breath, COPD exacerbation, congestive heart failure exacerbation    Hospital Course:     Zachary Vanegas is a 64 y o  female patient who originally presented to the hospital on 9/20/2022 due to shortness of breath  Please refer to the initial history and physical examination completed by Dr Finn Deluca for the initial presenting features and complaints  In brief, the patient is a 79-year-old female, who was admitted to the hospital after she came in with shortness of breath  She was diagnosed with initially with an acute exacerbation of COPD, and was started on IV steroids  She was initially seen in conjunction with Pulmonary  Pulmonary felt that this was more of a mixed picture, and recommended continued steroid therapy, but also felt that a cardiology evaluation should be sought for CHF  Patient was started on IV diuretics  She diuresed over 3-1/2 L of fluid on this admission  Upon further review of her blood work, blood work from July 2022, revealed suspicion for pheochromocytoma  An endocrine, and a nephrology evaluation were obtained  This condition will be further worked up in the outpatient setting  Of note, the patient has oxygen at home, but has not been using it  Compliance counseling was provided  She was deemed medically stable for discharge on 09/23/2022 by all subspecialists  Please refer to the assessment/plan portion of this discharge summary as outlined above for the remainder of the details in regard to her stay  Please see above list of diagnoses and related plan for additional information       Condition at Discharge: good     Discharge Day Visit / Exam:     Subjective:  Patient seen and examined, looks and feels well  Vitals: Blood Pressure: 156/90 (09/23/22 0815)  Pulse: 89 (09/23/22 0815)  Temperature: 98 1 °F (36 7 °C) (09/23/22 0815)  Temp Source: Oral (09/23/22 0815)  Respirations: 18 (09/23/22 0815)  Height: 5' 10" (177 8 cm) (09/20/22 0959)  Weight - Scale: 72 5 kg (159 lb 13 3 oz) (09/23/22 0600)  SpO2: (!) 88 % (09/23/22 0815)  Exam:   Physical Exam  Constitutional:       General: She is not in acute distress  Appearance: Normal appearance  She is normal weight  She is not ill-appearing  HENT:      Head: Normocephalic and atraumatic  Nose: Nose normal       Mouth/Throat:      Mouth: Mucous membranes are moist    Eyes:      Extraocular Movements: Extraocular movements intact  Pupils: Pupils are equal, round, and reactive to light  Cardiovascular:      Rate and Rhythm: Normal rate and regular rhythm  Pulses: Normal pulses  Heart sounds: Normal heart sounds  No murmur heard  No friction rub  No gallop  Pulmonary:      Effort: Pulmonary effort is normal  No respiratory distress  Breath sounds: Normal breath sounds  No wheezing, rhonchi or rales  Abdominal:      General: There is no distension  Palpations: Abdomen is soft  There is no mass  Tenderness: There is no abdominal tenderness  Hernia: No hernia is present  Musculoskeletal:         General: No swelling or tenderness  Normal range of motion  Cervical back: Normal range of motion and neck supple  No rigidity  Right lower leg: No edema  Left lower leg: No edema  Skin:     General: Skin is warm  Capillary Refill: Capillary refill takes less than 2 seconds  Findings: No erythema or rash  Neurological:      General: No focal deficit present  Mental Status: She is alert and oriented to person, place, and time  Mental status is at baseline  Cranial Nerves: No cranial nerve deficit  Motor: No weakness     Psychiatric:         Mood and Affect: Mood normal          Behavior: Behavior normal          Discussion with Family:  No family members were present at the time of my discharge evaluation, nor did the patient ask and or want me to call anyone    Discharge instructions/Information to patient and family:   See after visit summary for information provided to patient and family  Provisions for Follow-Up Care:  See after visit summary for information related to follow-up care and any pertinent home health orders  Disposition:     Home with [facility] (Reminder: Complete face to face encounter)      Planned Readmission:    None     Discharge Statement:  I spent 40 minutes discharging the patient  This time was spent on the day of discharge  I had direct contact with the patient on the day of discharge  Greater than 50% of the total time was spent examining patient, answering all patient questions, arranging and discussing plan of care with patient as well as directly providing post-discharge instructions  Additional time then spent on discharge activities  Discharge Medications:  See after visit summary for reconciled discharge medications provided to patient and family        ** Please Note: This note has been constructed using a voice recognition system **

## 2022-09-23 NOTE — NURSING NOTE
IV site discontinued  Discharge instructions given to patient, verbalized understanding  Patient discharged via ambulance transport home

## 2022-09-23 NOTE — ASSESSMENT & PLAN NOTE
· Catecholamine, renin activity, aldosterone/renin ratio testing results reviewed from July 24, 2022  · Appreciate Endocrine, and Nephrology evaluation  · Outpatient follow-up with both subspecialties

## 2022-09-23 NOTE — ASSESSMENT & PLAN NOTE
Wt Readings from Last 3 Encounters:   09/23/22 72 5 kg (159 lb 13 3 oz)   09/16/22 77 kg (169 lb 12 1 oz)   09/01/22 75 kg (165 lb 6 4 oz)     · Most recent echocardiogram from July 14, 2022 - revealed an EF of 25%  · Chest x-ray reveals interstitial edema in the setting of having emphysema  · Appreciate cardiology input  · Patient has diuresed over 3 5 L of fluid on this admission  · DC home on 40 mg of Lasix to be taken on any given day when her weight is greater than 160 lb as per Cardiology recommendation  · In the interim continue other current cardiac based medications which include aspirin, Plavix, carvedilol, lisinopril, and Lipitor post discharge also

## 2022-09-23 NOTE — ASSESSMENT & PLAN NOTE
· Appreciate Pulmonary input  · Patient reports having 2 cats at home that shed a lot of hair, patient continues to smoke  · Procalcitonin testing x2 is less than 0 05 - no antibiotics indicated  · Status post treatment with IV Solu-Medrol  · DC home on a prednisone taper - 40 mg daily by mouth for 2 days, then 20 mg daily by mouth for 3 days  · DC home on all other pre-admission medications at the preadmission doses  · Outpatient follow-up with PCP, and Pulmonary

## 2022-09-23 NOTE — PROGRESS NOTES
Bernardino U  66   Progress Note Ronda Del Valle 1960, 64 y o  female MRN: [id number]  Unit/Bed#: -01 Encounter: [id number]  Primary Care Provider: Buffy Gonsales DO   Date and time admitted to hospital: 9/20/2022  9:56 AM    Renal artery stenosis [facility])  Assessment & Plan  S/p stent  Continue DAPT  * Acute respiratory failure with hypoxia [facility])  Assessment & Plan  This is likely multifactorial  BNP is low and even though there is LV dysfunction, I am going to cut back furosemide to prn weight gain  Tobacco abuse  Assessment & Plan  Cessation encouraged and discussed at length  Outpatient Cardiologist: Shelbie Hyatt    On 03/26/2021 she presented with acute heart failure and was at least briefly intubated  An echo the next day revealed ejection fraction less than 20% with moderate mitral regurgitation  Several days later she underwent coronary angiography which revealed moderate but nonobstructive disease  Echo on 07/09/2021 revealed near normal LV systolic function  However echo on 07/14/2022 again revealed severe LV dysfunction when she presented with heart failure  Subjective:   Patient seen and examined  No significant events overnight  Breathing improved from admission  Summary comments:  See orders re change to PO furosemide  I have her scheduled for f/u echo and office visit in relative near term  Telemetry/ECG/Cardiac testing:   TTE 03/27/2021:  EF less than 20%  Coronary angiography 03/30/2021:  No high-grade proximal CAD  TTE 07/09/2021:  EF 50%  Mild enlargement of the ascending aorta to 3 7 cm  Mild mitral regurgitation    TTE 06/15/2022:  EF 41%  TTE 07/14/2022:  EF 25%  Vitals: Blood pressure 156/90, pulse 89, temperature 98 1 °F (36 7 °C), temperature source Oral, resp   rate 18, height 5' 10" (1 778 m), weight 72 5 kg (159 lb 13 3 oz), SpO2 (!) 88 % ,   Orthostatic Blood Pressures    Flowsheet Row Most Recent Value   Blood Pressure 156/90 filed at 09/23/2022 0815   Patient Position - Orthostatic VS Sitting filed at 09/23/2022 0815      ,   Weight (last 2 days)     Date/Time Weight    09/23/22 0600 72 5 (159 83)    09/22/22 0559 72 4 (159 61)     Weight: pt weighed twice on grey standing scale at 09/22/22 0559    09/21/22 0600 75 3 (166 01)          Physical Exam:    General:  Normal appearance in no distress  Eyes:  Anicteric  Oral mucosa:  Moist   Neck:  No JVD  Carotid upstrokes are brisk without bruits  No masses  Chest:  Scattered rhonchi and wheezes  Cardiac:  No palpable PMI  Normal S1 and S2   Grade 2 systolic murmur at the base radiating to the neck  No gallop or rub  Abdomen:  Soft and nontender  No palpable organomegaly or aortic enlargement  Extremities:  No peripheral edema  Musculoskeletal:  Symmetric  Pedal pulses are intact  Neuro:  Grossly symmetric  Psych:  Alert and oriented x3        Medications:      Current Facility-Administered Medications:     albuterol (PROVENTIL HFA,VENTOLIN HFA) inhaler 2 puff, 2 puff, Inhalation, Q6H PRN, Padmini Ores, DO, 2 puff at 09/21/22 0819    aspirin chewable tablet 81 mg, 81 mg, Oral, Daily, Padmini Ores, DO, 81 mg at 09/23/22 6883    atorvastatin (LIPITOR) tablet 40 mg, 40 mg, Oral, Daily With Acosta Brewster, DO, 40 mg at 09/22/22 1730    carvedilol (COREG) tablet 25 mg, 25 mg, Oral, BID With Meals, Padmini Ores, DO, 25 mg at 09/23/22 0813    cholecalciferol (VITAMIN D3) tablet 2,000 Units, 2,000 Units, Oral, Daily, Padmini Ores, DO, 2,000 Units at 09/23/22 4786    clopidogrel (PLAVIX) tablet 75 mg, 75 mg, Oral, Daily, Padmini Ores, DO, 75 mg at 09/23/22 0928    dextromethorphan-guaiFENesin (ROBITUSSIN DM) oral syrup 10 mL, 10 mL, Oral, Q4H PRN, Rosa Mathur MD, 10 mL at 09/22/22 1903    enoxaparin (LOVENOX) subcutaneous injection 40 mg, 40 mg, Subcutaneous, Daily, Padmini Villareal DO, 40 mg at 09/23/22 0928    furosemide (LASIX) tablet 40 mg, 40 mg, Oral, Daily PRN, Cristiano Hernandez MD    guaiFENesin [facility]) 12 hr tablet 600 mg, 600 mg, Oral, Q12H [address], Rhys Teran MD, 600 mg at 09/23/22 7686    ipratropium (ATROVENT) 0 02 % inhalation solution 0 5 mg, 0 5 mg, Nebulization, TID, Janina Aschoff, DO, 0 5 mg at 09/23/22 7815    levalbuterol (XOPENEX) inhalation solution 1 25 mg, 1 25 mg, Nebulization, TID, 1 25 mg at 09/23/22 0341 **AND** [DISCONTINUED] sodium chloride 0 9 % inhalation solution 3 mL, 3 mL, Nebulization, TID, Janina Aschoff, DO    lisinopril (ZESTRIL) tablet 10 mg, 10 mg, Oral, Daily, Janina Aschoff, DO, 10 mg at 09/23/22 8885    morphine (MS CONTIN) ER tablet 30 mg, 30 mg, Oral, Q12H, Janina Aschoff, DO, 30 mg at 09/23/22 0436    nicotine (NICODERM CQ) 21 mg/24 hr TD 24 hr patch 1 patch, 1 patch, Transdermal, Daily, Janina Aschoff, DO, 1 patch at 09/23/22 0928    oxyCODONE-acetaminophen (PERCOCET) 5-325 mg per tablet 1 tablet, 1 tablet, Oral, Q6H PRN, Janina Aschoff, DO    polyethylene glycol (MIRALAX) packet 17 g, 17 g, Oral, Daily PRN, Janina Aschoff, DO, 17 g at 09/22/22 1904    predniSONE tablet 40 mg, 40 mg, Oral, Daily, Rhys Teran MD, 40 mg at 09/23/22 0928    umeclidinium-vilanterol (ANORO ELLIPTA) 62 5-25 MCG/INH inhaler 1 puff, 1 puff, Inhalation, Daily, Janina Aschoff, DO, 1 puff at 09/23/22 0931     Labs & Results:    Troponins:    Results from last 7 days   Lab Units 09/20/22  1605 09/20/22  1019   HS TNI 0HR ng/L  --  15   HS TNI 2HR ng/L 11  --    HSTNI D2 ng/L -4  --         BNP:   Results from last 6 Months   Lab Units 09/20/22  1019 09/16/22  0955   BNP pg/mL 161* 124*     CBC with diff:   Results from last 7 days   Lab Units 09/23/22  0443 09/22/22  0431   WBC Thousand/uL 15 00* 13 09*   HEMOGLOBIN g/dL 14 4 14 8   HEMATOCRIT % 45 0 46 3*   MCV fL 90 89   PLATELETS Thousands/uL 346 379     TSH:     CMP:   Results from last 7 days   Lab Units 09/23/22  0443 09/22/22  0431 09/21/22  5923 POTASSIUM mmol/L 3 6 3 9 4 1   CHLORIDE mmol/L 94* 94* 94*   CO2 mmol/L 38* 34* 32   BUN mg/dL 31* 28* 23   CREATININE mg/dL 0 96 0 83 0 83   AST U/L  --  11* 12*   ALT U/L  --  12 15   EGFR ml/min/1 73sq m 64 76 76     Lipid Profile:     Coags:   Results from last 7 days   Lab Units 09/20/22  1019   INR  1 04

## 2022-09-23 NOTE — DISCHARGE INSTR - AVS FIRST PAGE
Dear Windy Estrada,     It was our pleasure to care for you here at [facility]  It is our hope that we were always able to exceed the expected standards for your care during your stay  You were hospitalized due to shortness of breath which was deemed to be secondary to congestive heart failure, and a COPD exacerbation  You were cared for on the medical/surgical floor by Sarah Zhang MD with the [facility] who covers for your primary care physician (PCP), Margaret Olson DO, while you were hospitalized  You were additionally seen by the [facility] Pulmonary associates, Cardiology associates, Nephrology associates, and endocrinology  If you have any questions or concerns related to this hospitalization, you may contact us at [phone]  For follow up as well as any medication refills, we recommend that you follow up with your primary care physician  A registered nurse will reach out to you by phone within a few days after your discharge to answer any additional questions that you may have after going home    However, at this time we provide for you here, the most important instructions / recommendations at discharge:     Notable Medication Adjustments -   New prescription Lasix 40 mg take on any given day if your weight is greater than 160 lb  New prescription prednisone take 40 mg daily by mouth for 2 days, then 20 mg daily by mouth for 3 days, then stop  Okay to continue all other pre-admission medications, at the preadmission doses  Testing Required after Discharge -   To be further determined in the outpatient setting by your primary care provider, and or additional subspecialists  Important follow up information -   Please follow-up with the providers as outlined in this discharge package  Other Instructions -   Please maintain a healthy diet  Please use the supplemental oxygen available to you at home  Please review this entire after visit summary as additional general instructions including medication list, appointments, activity, diet, any pertinent wound care, and other additional recommendations from your care team that may be provided for you        Sincerely,     Bk Murphy MD

## 2022-09-23 NOTE — ASSESSMENT & PLAN NOTE
· Patient initially presented to the ED with an acute hypoxic respiratory failure  · Patient denies using any supplemental oxygen at home but reports having an oxygen tank at home  · Improved:-  · Initial oxygen requirement was 4 L of supplemental oxygen via nasal cannula in the ED   · Current O2 requirement is 1/2 L of supplemental oxygen via nasal cannula with a resultant O2 sat of about 90%  · Mixed etiology  · 1  Acute exacerbation of systolic dysfunction congestive heart failure  · 2   Acute exacerbation of COPD  · Patient is medically stable for discharge  · Outpatient follow-up with PCP  · See the remaining outlines below

## 2022-09-25 LAB
BACTERIA BLD CULT: NORMAL
BACTERIA BLD CULT: NORMAL

## 2022-09-26 NOTE — UTILIZATION REVIEW
Notification of Discharge   This is a Notification of Discharge from our facility [address]  Please be advised that this patient has been discharge from our facility  Below you will find the admission and discharge date and time including the patients disposition  UTILIZATION REVIEW CONTACT:  Arelis Estrada  Utilization   Network Utilization Review Department  Phone: [phone] carefully listen to the prompts  All voicemails are confidential   Email: [email or web address]     PHYSICIAN ADVISORY SERVICES:  FOR CUSS-WS-EFEJ REVIEW - MEDICAL NECESSITY DENIAL  Phone: [phone]  Fax: [phone]  Email: [email or web address]     PRESENTATION DATE: 9/20/2022  9:56 AM  OBERVATION ADMISSION DATE:  INPATIENT ADMISSION DATE: 9/21/22  8:37 AM   DISCHARGE DATE: 9/23/2022  5:00 PM  DISPOSITION: Home/Self Care Home/Self Care      IMPORTANT INFORMATION:  Send all requests for admission clinical reviews, approved or denied determinations and any other requests to dedicated fax number below belonging to the campus where the patient is receiving treatment   List of dedicated fax numbers:  [address] DENIALS (Administrative/Medical Necessity) [phone]   [address] (Maternity/NICU/Pediatrics) [phone]   Gavino Bower [phone]   130 [facility] [phone]   [address] [place] [address] [phone]   2000 [facility] [address] [place] [address],[address] [phone]   [facility]  [phone]   2205 [facility] [address] [facility]  24[address] [facility] 1000 [facility] [phone]

## 2022-09-28 LAB
METANEPH FREE SERPL-MCNC: 17.4 PG/ML (ref 0–88)
NORMETANEPHRINE SERPL-MCNC: 62.7 PG/ML (ref 0–285.2)

## 2022-10-04 LAB
DME PARACHUTE DELIVERY DATE ACTUAL: NORMAL
DME PARACHUTE DELIVERY DATE REQUESTED: NORMAL
DME PARACHUTE DELIVERY NOTE: NORMAL
DME PARACHUTE ITEM DESCRIPTION: NORMAL
DME PARACHUTE ORDER STATUS: NORMAL
DME PARACHUTE SUPPLIER NAME: NORMAL
DME PARACHUTE SUPPLIER PHONE: NORMAL

## 2022-10-05 DIAGNOSIS — I42.0 DILATED CARDIOMYOPATHY (HCC): Chronic | ICD-10-CM

## 2022-10-05 DIAGNOSIS — I50.23 ACUTE ON CHRONIC SYSTOLIC CONGESTIVE HEART FAILURE (HCC): ICD-10-CM

## 2022-10-05 RX ORDER — FUROSEMIDE 40 MG/1
40 TABLET ORAL DAILY PRN
Qty: 30 TABLET | Refills: 5 | Status: SHIPPED | OUTPATIENT
Start: 2022-10-05

## 2022-10-05 RX ORDER — ATORVASTATIN CALCIUM 40 MG/1
40 TABLET, FILM COATED ORAL
Qty: 30 TABLET | Refills: 5 | Status: SHIPPED | OUTPATIENT
Start: 2022-10-05

## 2022-10-05 NOTE — TELEPHONE ENCOUNTER
Demetrius HAWKINS [facility]  Atorvastatin 30 mg     Lasix 40 mg and 20 mg (she said she got both strengths before)     [facility]

## 2022-10-06 NOTE — ANESTHESIA PREPROCEDURE EVALUATION
Procedure:  IR RENAL ANGIOGRAM  IR TUNNELED DIALYSIS CATHETER PLACEMENT    Relevant Problems   CARDIO   (+) Acute on chronic systolic congestive heart failure (HCC)   (+) Essential hypertension      ENDO   (+) Pheochromocytoma   (+) Secondary hyperparathyroidism of renal origin ([facility] [address] )      GI/HEPATIC   (+) Hepatitis B      /RENAL   (+) Acute kidney injury superimposed on CKD-3   (+) CKD (chronic kidney disease) stage 3, GFR 30-59 ml/min (HCC)      NEURO/PSYCH   (+) Anxiety   (+) Chronic pain syndrome      PULMONARY   (+) Acute respiratory failure with hypoxia (HCC)   (+) COPD (chronic obstructive pulmonary disease) (HCC)   (+) COPD with acute exacerbation (HCC)        Physical Exam    Airway    Mallampati score: II  TM Distance: >3 FB  Neck ROM: full     Dental       Cardiovascular      Pulmonary      Other Findings        Anesthesia Plan  ASA Score- 3     Anesthesia Type- IV sedation with anesthesia with ASA Monitors  Additional Monitors:   Airway Plan:           Plan Factors-Exercise tolerance (METS): >4 METS  Chart reviewed  Induction- intravenous  Postoperative Plan-     Informed Consent- Anesthetic plan and risks discussed with patient  I personally reviewed this patient with the CRNA  Discussed and agreed on the Anesthesia Plan with the CAMDEN Medina

## 2022-10-10 ENCOUNTER — OFFICE VISIT (OUTPATIENT)
Dept: NEPHROLOGY | Facility: CLINIC | Age: 62
End: 2022-10-10
Payer: MEDICARE

## 2022-10-10 VITALS
SYSTOLIC BLOOD PRESSURE: 152 MMHG | OXYGEN SATURATION: 97 % | HEART RATE: 88 BPM | BODY MASS INDEX: 24.68 KG/M2 | DIASTOLIC BLOOD PRESSURE: 80 MMHG | WEIGHT: 172 LBS

## 2022-10-10 DIAGNOSIS — I70.1 RENAL ARTERY STENOSIS (HCC): ICD-10-CM

## 2022-10-10 DIAGNOSIS — N25.81 SECONDARY HYPERPARATHYROIDISM OF RENAL ORIGIN (HCC): ICD-10-CM

## 2022-10-10 DIAGNOSIS — N18.30 STAGE 3 CHRONIC KIDNEY DISEASE, UNSPECIFIED WHETHER STAGE 3A OR 3B CKD (HCC): Primary | ICD-10-CM

## 2022-10-10 DIAGNOSIS — E55.9 VITAMIN D DEFICIENCY: ICD-10-CM

## 2022-10-10 DIAGNOSIS — I10 ESSENTIAL HYPERTENSION: Chronic | ICD-10-CM

## 2022-10-10 DIAGNOSIS — Z72.0 TOBACCO ABUSE: ICD-10-CM

## 2022-10-10 PROCEDURE — 99215 OFFICE O/P EST HI 40 MIN: CPT | Performed by: PHYSICIAN ASSISTANT

## 2022-10-10 NOTE — ASSESSMENT & PLAN NOTE
Lab Results   Component Value Date    EGFR 64 09/23/2022    EGFR 76 09/22/2022    EGFR 76 09/21/2022    CREATININE 0 96 09/23/2022    CREATININE 0 83 09/22/2022    CREATININE 0 83 09/21/2022   Personally reviewed graph of renal function trends with patient  Patient is on an ACE-I  Patient was advised to avoid nephrotoxins  Continue management of healthy weight, diabetes and hypertension  Please renally dose medication  Patient has not yet undergone Kidney Smart education  Continue to monitor renal function, anemia and bone-mineral parameters

## 2022-10-10 NOTE — PROGRESS NOTES
Assessment & Plan:    1  Stage 3 chronic kidney disease, unspecified whether stage 3a or 3b CKD [facility])  Assessment & Plan:  Lab Results   Component Value Date    EGFR 64 09/23/2022    EGFR 76 09/22/2022    EGFR 76 09/21/2022    CREATININE 0 96 09/23/2022    CREATININE 0 83 09/22/2022    CREATININE 0 83 09/21/2022   Personally reviewed graph of renal function trends with patient  Patient is on an ACE-I  Patient was advised to avoid nephrotoxins  Continue management of healthy weight, diabetes and hypertension  Please renally dose medication  Patient has not yet undergone Kidney Smart education  Continue to monitor renal function, anemia and bone-mineral parameters  Orders:  -     CBC and differential; Future; Expected date: 04/03/2023  -     Comprehensive metabolic panel; Future; Expected date: 04/03/2023  -     Magnesium; Future; Expected date: 04/03/2023  -     Microalbumin / creatinine urine ratio; Future; Expected date: 04/03/2023  -     Phosphorus; Future; Expected date: 04/03/2023  -     Protein / creatinine ratio, urine; Future; Expected date: 04/03/2023  -     PTH, intact; Future; Expected date: 04/03/2023  -     Urinalysis with microscopic; Future; Expected date: 04/03/2023  -     Vitamin D 25 hydroxy; Future; Expected date: 04/03/2023    2  Secondary hyperparathyroidism of renal origin [facility])  Assessment & Plan: Will check prior to follow up  3  Essential hypertension  Assessment & Plan:  Elevated in office after caffeine pills and cigarettes  Only getting 4-5 hours of sleep  Continue antihypertensives  Recommend low sodium diet, caffeine and cigarette cessation  On prednisone  4  Renal artery stenosis (HCC)  Assessment & Plan:  S/p stenting      5  Vitamin D deficiency  Assessment & Plan:  Previously elevated  Will update level  Orders:  -     Vitamin D 25 hydroxy; Future; Expected date: 04/03/2023    6   Tobacco abuse  Assessment & Plan:  Not ready to quit         The benefits, risks and alternatives to the treatment plan were discussed at this visit  Patient was advised of common adverse effects of any medical therapies prescribed  All questions were answered and discussed with the patient and any accompanying family members or caretakers  Subjective:      Patient ID: Jessy Draper is a 64 y o  female seen in the [facility] office  Patient was hospitalized at [facility] from 09/20/2022 through 09/23/2022 with acute exacerbation of systolic congestive heart failure and COPD  Nephrology was consulted during this hospitalization for evaluation of possible pheochromocytoma  HPI     Today, patient presents for routine follow-up of hospitalization  She reports now off oxygen  She has the "prednisone sweats "     Blood pressure is 152/80 with a heart rate of 88  She took a 200 mg caffeine pill this morning, she had bad dreams last night, she smoked a cigarette this morning  Patient does not monitor blood pressures at home  Denies palpitations  Denies headaches, lightheadedness, dizziness  Patient reports adherence with antihypertensive regimen and denies adverse effects:  Carvedilol 25 mg twice daily, furosemide 40 mg daily, lisinopril 10 mg daily  Patient denies lower extremity swelling  Trying to avoid salty things  Reviewed and discussed the results of labs performed 9/23/2022 with excellent renal function  History is obtained from patient  The following portions of the patient's history were reviewed and updated as appropriate: allergies, current medications, past family history, past medical history, past social history, past surgical history, and problem list     Review of Systems   Constitutional: Negative for activity change, appetite change, chills, fatigue, fever and unexpected weight change  Respiratory: Negative for apnea, cough and shortness of breath  Cardiovascular: Negative for chest pain, palpitations and leg swelling     Gastrointestinal: Negative for abdominal pain, blood in stool, constipation, diarrhea, nausea and vomiting  Genitourinary: Negative for decreased urine volume, difficulty urinating, dysuria, flank pain, frequency, hematuria and urgency  Musculoskeletal: Negative for arthralgias, back pain, joint swelling and myalgias  Skin: Negative for color change and rash  Neurological: Negative for dizziness, seizures, syncope, weakness, light-headedness, numbness and headaches  Hematological: Negative for adenopathy  Does not bruise/bleed easily  Psychiatric/Behavioral: Negative for agitation, behavioral problems, confusion, decreased concentration, dysphoric mood and hallucinations  The patient is not nervous/anxious and is not hyperactive  All other systems reviewed and are negative  Objective:      /80   Pulse 88   Wt 78 kg (172 lb)   SpO2 97%   BMI 24 68 kg/m²          Physical Exam  Vitals and nursing note reviewed  Exam conducted with a chaperone present  Constitutional:       General: She is not in acute distress  Appearance: Normal appearance  She is normal weight  She is not ill-appearing or toxic-appearing  HENT:      Head: Normocephalic and atraumatic  Nose: Nose normal  No congestion or rhinorrhea  Mouth/Throat:      Mouth: Mucous membranes are moist       Dentition: Abnormal dentition  Pharynx: Oropharynx is clear  Eyes:      General: No scleral icterus  Right eye: No discharge  Left eye: No discharge  Extraocular Movements: Extraocular movements intact  Conjunctiva/sclera: Conjunctivae normal       Pupils: Pupils are equal, round, and reactive to light  Cardiovascular:      Rate and Rhythm: Normal rate and regular rhythm  Pulses: Normal pulses  Heart sounds: Normal heart sounds  No murmur heard  Pulmonary:      Effort: Pulmonary effort is normal  No respiratory distress  Breath sounds: Normal breath sounds  No wheezing  Comments: Coarse lung sounds  Abdominal:      General: Abdomen is flat  Bowel sounds are normal  There is no distension  Palpations: Abdomen is soft  There is no mass  Tenderness: There is no abdominal tenderness  Musculoskeletal:         General: No swelling or deformity  Normal range of motion  Cervical back: Normal range of motion and neck supple  No rigidity or tenderness  Skin:     General: Skin is warm and dry  Coloration: Skin is not jaundiced or pale  Findings: No bruising  Neurological:      General: No focal deficit present  Mental Status: She is alert and oriented to person, place, and time  Mental status is at baseline  Psychiatric:         Mood and Affect: Mood normal          Behavior: Behavior normal          Thought Content:  Thought content normal          Judgment: Judgment normal              Lab Results   Component Value Date     04/05/2018    SODIUM 137 09/23/2022    K 3 6 09/23/2022    CL 94 (L) 09/23/2022    CO2 38 (H) 09/23/2022    ANIONGAP 11 6 04/05/2018    AGAP 5 09/23/2022    BUN 31 (H) 09/23/2022    CREATININE 0 96 09/23/2022    GLUC 92 09/23/2022    GLUF 137 (H) 08/22/2022    CALCIUM 10 1 09/23/2022    AST 11 (L) 09/22/2022    ALT 12 09/22/2022    ALKPHOS 55 09/22/2022    PROT 8 1 04/05/2018    TP 7 2 09/22/2022    BILITOT 0 5 04/05/2018    TBILI 0 58 09/22/2022    EGFR 64 09/23/2022      Lab Results   Component Value Date    CREATININE 0 96 09/23/2022    CREATININE 0 83 09/22/2022    CREATININE 0 83 09/21/2022    CREATININE 0 72 09/20/2022    CREATININE 0 81 09/16/2022    CREATININE 1 07 08/30/2022    CREATININE 1 04 08/22/2022    CREATININE 4 22 (H) 08/03/2022    CREATININE 4 63 (H) 08/02/2022    CREATININE 6 93 (H) 08/01/2022    CREATININE 5 16 (H) 07/31/2022    CREATININE 6 52 (H) 07/30/2022    CREATININE 5 07 (H) 07/29/2022    CREATININE 5 84 (H) 07/28/2022    CREATININE 7 22 (H) 07/27/2022      Lab Results   Component Value Date    COLORU Yellow 09/20/2022    CLARITYU Clear 09/20/2022    SPECGRAV 1 020 09/20/2022    PHUR 6 0 09/20/2022    LEUKOCYTESUR 1+ (A) 09/20/2022    NITRITE Negative 09/20/2022    PROTEIN UA Negative 09/20/2022    GLUCOSEU Negative 09/20/2022    KETONESU Negative 09/20/2022    UROBILINOGEN 0 2 09/20/2022    BILIRUBINUR Negative 09/20/2022    BLOODU Negative 09/20/2022    RBCUA None Seen 09/20/2022    WBCUA 2-4 09/20/2022    EPIS Occasional 09/20/2022    BACTERIA None Seen 09/20/2022      No results found for: LABPROT  No results found for: Marlou  Results   Component Value Date    WBC 15 00 (H) 09/23/2022    HGB 14 4 09/23/2022    HCT 45 0 09/23/2022    MCV 90 09/23/2022     09/23/2022      Lab Results   Component Value Date    HGB 14 4 09/23/2022    HGB 14 8 09/22/2022    HGB 15 0 09/21/2022    HGB 14 9 09/20/2022    HGB 14 3 09/16/2022      No results found for: IRON, TIBC, FERRITIN   No results found for: PTHCALCIUM, [id number], PHOSPHORUS   Lab Results   Component Value Date    CHOLESTEROL 174 05/12/2022    HDL 43 (L) 05/12/2022    LDLCALC 113 (H) 05/12/2022    TRIG 91 05/12/2022      No results found for: URICACID   Lab Results   Component Value Date    HGBA1C 5 5 03/27/2021      Lab Results   Component Value Date    FREET4 1 52 (H) 02/09/2022      No results found for: ALEXIS, DSDNAAB, RFIGM   Lab Results   Component Value Date    PROT 8 1 04/05/2018        Portions of the record may have been created with voice recognition software  Occasional wrong word or "sound a like" substitutions may have occurred due to the inherent limitations of voice recognition software  Read the chart carefully and recognize, using context, where substitutions have occurred  If you have any questions, please contact the dictating provider

## 2022-10-10 NOTE — ASSESSMENT & PLAN NOTE
Elevated in office after caffeine pills and cigarettes  Only getting 4-5 hours of sleep  Continue antihypertensives  Recommend low sodium diet, caffeine and cigarette cessation  On prednisone

## 2022-10-31 ENCOUNTER — OFFICE VISIT (OUTPATIENT)
Dept: CARDIOLOGY CLINIC | Facility: CLINIC | Age: 62
End: 2022-10-31

## 2022-10-31 VITALS
BODY MASS INDEX: 24.91 KG/M2 | WEIGHT: 174 LBS | SYSTOLIC BLOOD PRESSURE: 118 MMHG | DIASTOLIC BLOOD PRESSURE: 72 MMHG | HEIGHT: 70 IN | HEART RATE: 72 BPM

## 2022-10-31 DIAGNOSIS — I42.0 DILATED CARDIOMYOPATHY (HCC): Primary | Chronic | ICD-10-CM

## 2022-10-31 DIAGNOSIS — I10 ESSENTIAL HYPERTENSION: Chronic | ICD-10-CM

## 2022-10-31 DIAGNOSIS — Z72.0 TOBACCO ABUSE: ICD-10-CM

## 2022-10-31 DIAGNOSIS — I70.1 RENAL ARTERY STENOSIS (HCC): ICD-10-CM

## 2022-10-31 RX ORDER — PREDNISONE 10 MG/1
TABLET ORAL
COMMUNITY
Start: 2022-10-17

## 2022-10-31 RX ORDER — ERGOCALCIFEROL 1.25 MG/1
CAPSULE ORAL
COMMUNITY
Start: 2022-10-13

## 2022-10-31 NOTE — PROGRESS NOTES
Patient ID: Kal Rowan is a 58 y o  female  Plan:      Dilated cardiomyopathy ([address] )  Repeat echo shortly as clinically she is much improved  Essential hypertension  Much better controlled since renal artery stenting  Tobacco abuse  We spoke again about stopping  Renal artery stenosis (HCC)  S/p left renal artery stenting  Remarkeable improvement  Follow up Plan/Other summary comments: Follow-up echo and visit within the next month  We talked about not taking the diuretic unless her weight is 3 lb higher than tomorrow morning's weight  HPI:  Patient's he in for follow-up regarding the above  Since I last saw her she continues to improve  Dyspnea much improved and there is no chest pain  On 2021 she presented with acute heart failure and was at least briefly intubated  An echo the next day revealed ejection fraction less than 20% with moderate mitral regurgitation  Several days later she underwent coronary angiography which revealed moderate but nonobstructive disease  Echo on 2021 revealed near normal LV systolic function  However echo on 2022 again revealed severe LV dysfunction when she presented with heart failure  Most recent or relevant cardiac/vascular testing:     TTE 2021:  EF less than 20%  Coronary angiography 2021:  No high-grade proximal CAD  TTE 2021:  EF 50%  Mild enlargement of the ascending aorta to 3 7 cm  Mild mitral regurgitation    TTE 06/15/2022:  EF 41%  TTE 2022:  EF 25%          Past Surgical History:   Procedure Laterality Date   • CARDIAC CATHETERIZATION  2021    Moderate non-obstructive atherosclerosis   •  SECTION     • CHOLECYSTECTOMY     • IR RENAL ANGIOGRAM  2022   • IR TEMPORARY DIALYSIS CATHETER CHECK/CHANGE/REPOSITION  2022   • IR TUNNELED DIALYSIS CATHETER PLACEMENT  2022   • IR TUNNELED DIALYSIS CATHETER REMOVAL  2022   • ROTATOR CUFF REPAIR W/ DISTAL CLAVICLE EXCISION Right    • TONSILLECTOMY       CMP:   Lab Results   Component Value Date     04/05/2018    K 3 6 09/23/2022    K 4 6 04/05/2018    CL 94 (L) 09/23/2022     04/05/2018    CO2 38 (H) 09/23/2022    CO2 32 (H) 04/05/2018    BUN 31 (H) 09/23/2022    BUN 14 04/05/2018    CREATININE 0 96 09/23/2022    CREATININE 0 72 04/05/2018    EGFR 64 09/23/2022       Lipid Profile:   Lab Results   Component Value Date    CHOL 216 (H) 04/05/2018    TRIG 91 05/12/2022    TRIG 168 (H) 04/05/2018    HDL 43 (L) 05/12/2022    HDL 43 04/05/2018         Review of Systems   10  point ROS  was otherwise non pertinent or negative except as per HPI or as below  Gait: Normal          Objective:     /72   Pulse 72   Ht 5' 10" (1 778 m)   Wt 78 9 kg (174 lb)   BMI 24 97 kg/m²     PHYSICAL EXAM:    General:  Normal appearance in no distress  Eyes:  Anicteric  Oral mucosa:  Moist   Neck:  No JVD  Carotid upstrokes are brisk without bruits  No masses  Chest:  Clear to auscultation  Cardiac:  No palpable PMI  Normal S1 and S2   Grade 2 systolic murmur at the base radiating to the neck  No gallop or rub  Abdomen:  Soft and nontender  No palpable organomegaly or aortic enlargement  Extremities:  No peripheral edema  Musculoskeletal:  Symmetric  Vascular:  Femoral pulses are brisk without bruits  Popliteal pulses are intact bilaterally  Pedal pulses are intact  Neuro:  Grossly symmetric  Psych:  Alert and oriented x3            Current Outpatient Medications:   •  albuterol (ProAir HFA) 90 mcg/act inhaler, Inhale 2 puffs every 6 (six) hours as needed for wheezing, Disp: 25 5 g, Rfl: 3  •  aspirin 81 mg chewable tablet, Chew 1 tablet (81 mg total) daily, Disp: , Rfl:   •  atorvastatin (LIPITOR) 40 mg tablet, Take 1 tablet (40 mg total) by mouth daily with dinner, Disp: 30 tablet, Rfl: 5  •  carvedilol (COREG) 25 mg tablet, Take 1 tablet (25 mg total) by mouth 2 (two) times a day with meals, Disp: 60 tablet, Rfl: 5  •  Cholecalciferol 50 MCG (2000 UT) CAPS, Take by mouth daily, Disp: , Rfl:   •  clonazePAM (KlonoPIN) 1 mg tablet, Take 0 5-1 mg by mouth daily at bedtime as needed, Disp: , Rfl:   •  clopidogrel (PLAVIX) 75 mg tablet, Take 1 tablet (75 mg total) by mouth daily, Disp: 30 tablet, Rfl: 3  •  ergocalciferol (VITAMIN D2) 50,000 units, take 1 capsule by mouth two times a week, Disp: , Rfl:   •  furosemide (LASIX) 40 mg tablet, Take 1 tablet (40 mg total) by mouth daily as needed (for morning weight more than 160), Disp: 30 tablet, Rfl: 5  •  lisinopril (ZESTRIL) 10 mg tablet, Take 1 tablet (10 mg total) by mouth daily, Disp: 90 tablet, Rfl: 5  •  MAGNESIUM PO, in the morning, Disp: , Rfl:   •  morphine (MS CONTIN) 30 mg 12 hr tablet, Take 30 mg by mouth every 12 (twelve) hours, Disp: , Rfl:   •  oxyCODONE-acetaminophen (PERCOCET) 5-325 mg per tablet, Take 1 tablet by mouth every 6 (six) hours as needed, Disp: , Rfl:   •  predniSONE 10 mg tablet, take 1 tablet by mouth ONE DAY AND 2 TABLETS BY MOUTH ON THE OTHER, ALTERNATE AS INSTRUCTED, Disp: , Rfl:   •  umeclidinium-vilanterol (Anoro Ellipta) 62 5-25 MCG/INH inhaler, Inhale 1 puff daily (Patient taking differently: Inhale 1 puff if needed), Disp: 60 blister, Rfl: 3  Allergies   Allergen Reactions   • Wellbutrin [Bupropion] Arthralgia     Past Medical History:   Diagnosis Date   • Cardiomyopathy ([facility] [address] )    • Chronic combined systolic and diastolic congestive heart failure    • COPD (chronic obstructive pulmonary disease) (HCC)    • Fatty liver    • Hyperlipidemia    • Hypertension    • Mitral regurgitation    • Sepsis            Social History     Tobacco Use   Smoking Status Current Some Day Smoker   • Packs/day: 2 00   • Types: Cigarettes   Smokeless Tobacco Never Used

## 2022-11-01 ENCOUNTER — APPOINTMENT (OUTPATIENT)
Dept: LAB | Facility: MEDICAL CENTER | Age: 62
End: 2022-11-01

## 2022-11-01 DIAGNOSIS — E55.9 VITAMIN D DEFICIENCY: ICD-10-CM

## 2022-11-01 DIAGNOSIS — R63.5 WEIGHT GAIN: ICD-10-CM

## 2022-11-01 DIAGNOSIS — N18.9 CHRONIC KIDNEY DISEASE, UNSPECIFIED CKD STAGE: ICD-10-CM

## 2022-11-01 DIAGNOSIS — J44.9 CHRONIC OBSTRUCTIVE PULMONARY DISEASE, UNSPECIFIED COPD TYPE (HCC): ICD-10-CM

## 2022-11-01 DIAGNOSIS — I50.9 CONGESTIVE HEART FAILURE, UNSPECIFIED HF CHRONICITY, UNSPECIFIED HEART FAILURE TYPE (HCC): ICD-10-CM

## 2022-11-01 DIAGNOSIS — I10 HYPERTENSION, UNSPECIFIED TYPE: ICD-10-CM

## 2022-11-01 DIAGNOSIS — I42.9 CARDIOMYOPATHY, UNSPECIFIED TYPE (HCC): ICD-10-CM

## 2022-11-01 DIAGNOSIS — N18.30 STAGE 3 CHRONIC KIDNEY DISEASE, UNSPECIFIED WHETHER STAGE 3A OR 3B CKD (HCC): ICD-10-CM

## 2022-11-01 LAB — MAGNESIUM SERPL-MCNC: 2.5 MG/DL (ref 1.6–2.6)

## 2022-11-02 LAB
ALBUMIN SERPL BCP-MCNC: 3.7 G/DL (ref 3.5–5)
ALP SERPL-CCNC: 81 U/L (ref 46–116)
ALT SERPL W P-5'-P-CCNC: 21 U/L (ref 12–78)
ANION GAP SERPL CALCULATED.3IONS-SCNC: 4 MMOL/L (ref 4–13)
AST SERPL W P-5'-P-CCNC: 10 U/L (ref 5–45)
BASOPHILS # BLD MANUAL: 0.24 THOUSAND/UL (ref 0–0.1)
BASOPHILS NFR MAR MANUAL: 2 % (ref 0–1)
BILIRUB SERPL-MCNC: 0.27 MG/DL (ref 0.2–1)
BUN SERPL-MCNC: 31 MG/DL (ref 5–25)
BURR CELLS BLD QL SMEAR: PRESENT
CALCIUM SERPL-MCNC: 9.7 MG/DL (ref 8.3–10.1)
CHLORIDE SERPL-SCNC: 105 MMOL/L (ref 96–108)
CO2 SERPL-SCNC: 28 MMOL/L (ref 21–32)
CREAT SERPL-MCNC: 0.95 MG/DL (ref 0.6–1.3)
DACRYOCYTES BLD QL SMEAR: PRESENT
EOSINOPHIL # BLD MANUAL: 0 THOUSAND/UL (ref 0–0.4)
EOSINOPHIL NFR BLD MANUAL: 0 % (ref 0–6)
ERYTHROCYTE [DISTWIDTH] IN BLOOD BY AUTOMATED COUNT: 15.5 % (ref 11.6–15.1)
GFR SERPL CREATININE-BSD FRML MDRD: 64 ML/MIN/1.73SQ M
GLUCOSE SERPL-MCNC: 150 MG/DL (ref 65–140)
HCT VFR BLD AUTO: 43.2 % (ref 34.8–46.1)
HGB BLD-MCNC: 13.8 G/DL (ref 11.5–15.4)
LYMPHOCYTES # BLD AUTO: 1.69 THOUSAND/UL (ref 0.6–4.47)
LYMPHOCYTES # BLD AUTO: 14 % (ref 14–44)
MACROCYTES BLD QL AUTO: PRESENT
MCH RBC QN AUTO: 29.2 PG (ref 26.8–34.3)
MCHC RBC AUTO-ENTMCNC: 31.9 G/DL (ref 31.4–37.4)
MCV RBC AUTO: 92 FL (ref 82–98)
MICROCYTES BLD QL AUTO: PRESENT
MONOCYTES # BLD AUTO: 0.73 THOUSAND/UL (ref 0–1.22)
MONOCYTES NFR BLD: 6 % (ref 4–12)
NEUTROPHILS # BLD MANUAL: 7.86 THOUSAND/UL (ref 1.85–7.62)
NEUTS SEG NFR BLD AUTO: 65 % (ref 43–75)
OVALOCYTES BLD QL SMEAR: PRESENT
PLATELET # BLD AUTO: 406 THOUSANDS/UL (ref 149–390)
PLATELET BLD QL SMEAR: ABNORMAL
PMV BLD AUTO: 9.6 FL (ref 8.9–12.7)
POLYCHROMASIA BLD QL SMEAR: PRESENT
POTASSIUM SERPL-SCNC: 4.6 MMOL/L (ref 3.5–5.3)
PROT SERPL-MCNC: 7.3 G/DL (ref 6.4–8.4)
RBC # BLD AUTO: 4.72 MILLION/UL (ref 3.81–5.12)
RBC MORPH BLD: PRESENT
SODIUM SERPL-SCNC: 137 MMOL/L (ref 135–147)
T4 FREE SERPL-MCNC: 1.24 NG/DL (ref 0.76–1.46)
TSH SERPL DL<=0.05 MIU/L-ACNC: 0.38 UIU/ML (ref 0.45–4.5)
VARIANT LYMPHS # BLD AUTO: 13 %
WBC # BLD AUTO: 12.09 THOUSAND/UL (ref 4.31–10.16)

## 2023-02-24 DIAGNOSIS — I42.0 DILATED CARDIOMYOPATHY (HCC): Chronic | ICD-10-CM

## 2023-02-24 RX ORDER — CARVEDILOL 25 MG/1
TABLET ORAL
Qty: 60 TABLET | Refills: 5 | Status: SHIPPED | OUTPATIENT
Start: 2023-02-24

## 2023-03-01 ENCOUNTER — HOSPITAL ENCOUNTER (OUTPATIENT)
Dept: NON INVASIVE DIAGNOSTICS | Facility: CLINIC | Age: 63
Discharge: HOME | End: 2023-03-01

## 2023-03-01 VITALS
HEART RATE: 87 BPM | BODY MASS INDEX: 24.91 KG/M2 | HEIGHT: 70 IN | SYSTOLIC BLOOD PRESSURE: 118 MMHG | DIASTOLIC BLOOD PRESSURE: 72 MMHG | WEIGHT: 174 LBS

## 2023-03-01 DIAGNOSIS — I42.0 DILATED CARDIOMYOPATHY (HCC): Chronic | ICD-10-CM

## 2023-03-01 LAB
AORTIC ROOT: 3.5 CM
AORTIC VALVE MEAN VELOCITY: 10.7 M/S
APICAL FOUR CHAMBER EJECTION FRACTION: 66 %
ASCENDING AORTA: 4 CM
AV AREA BY CONTINUOUS VTI: 3.1 CM2
AV AREA PEAK VELOCITY: 2.6 CM2
AV LVOT MEAN GRADIENT: 4 MMHG
AV LVOT PEAK GRADIENT: 7 MMHG
AV MEAN GRADIENT: 6 MMHG
AV PEAK GRADIENT: 10 MMHG
AV VALVE AREA: 3.07 CM2
AV VELOCITY RATIO: 0.82
DOP CALC AO PEAK VEL: 1.59 M/S
DOP CALC AO VTI: 29.69 CM
DOP CALC LVOT AREA: 3.14 CM2
DOP CALC LVOT DIAMETER: 2 CM
DOP CALC LVOT PEAK VEL VTI: 29.03 CM
DOP CALC LVOT PEAK VEL: 1.31 M/S
DOP CALC LVOT STROKE INDEX: 46.2 ML/M2
DOP CALC LVOT STROKE VOLUME: 91.15 CM3
FRACTIONAL SHORTENING: 21 % (ref 28–44)
INTERVENTRICULAR SEPTUM IN DIASTOLE (PARASTERNAL SHORT AXIS VIEW): 1.3 CM
INTERVENTRICULAR SEPTUM: 1.3 CM (ref 0.6–1.1)
LAAS-AP2: 22.7 CM2
LAAS-AP4: 15.7 CM2
LEFT ATRIUM SIZE: 3.4 CM
LEFT INTERNAL DIMENSION IN SYSTOLE: 2.7 CM (ref 2.1–4)
LEFT VENTRICULAR INTERNAL DIMENSION IN DIASTOLE: 3.4 CM (ref 3.5–6)
LEFT VENTRICULAR POSTERIOR WALL IN END DIASTOLE: 1.1 CM
LEFT VENTRICULAR STROKE VOLUME: 21 ML
LVSV (TEICH): 21 ML
RIGHT ATRIUM AREA SYSTOLE A4C: 12.1 CM2
RIGHT VENTRICLE ID DIMENSION: 3 CM
SL CV LEFT ATRIUM LENGTH A2C: 5.5 CM
SL CV LV EF: 70
SL CV PED ECHO LEFT VENTRICLE DIASTOLIC VOLUME (MOD BIPLANE) 2D: 48 ML
SL CV PED ECHO LEFT VENTRICLE SYSTOLIC VOLUME (MOD BIPLANE) 2D: 26 ML

## 2023-03-01 RX ADMIN — PERFLUTREN 0.2 ML/MIN: 6.52 INJECTION, SUSPENSION INTRAVENOUS at 13:15

## 2023-03-28 DIAGNOSIS — I42.0 DILATED CARDIOMYOPATHY (HCC): Chronic | ICD-10-CM

## 2023-03-28 RX ORDER — FUROSEMIDE 40 MG/1
TABLET ORAL
Qty: 30 TABLET | Refills: 5 | Status: SHIPPED | OUTPATIENT
Start: 2023-03-28

## 2023-04-01 DIAGNOSIS — I50.23 ACUTE ON CHRONIC SYSTOLIC CONGESTIVE HEART FAILURE (HCC): ICD-10-CM

## 2023-04-03 RX ORDER — ATORVASTATIN CALCIUM 40 MG/1
TABLET, FILM COATED ORAL
Qty: 30 TABLET | Refills: 5 | Status: SHIPPED | OUTPATIENT
Start: 2023-04-03

## 2023-04-04 ENCOUNTER — APPOINTMENT (OUTPATIENT)
Dept: LAB | Facility: MEDICAL CENTER | Age: 63
End: 2023-04-04

## 2023-04-04 DIAGNOSIS — N18.9 ACUTE KIDNEY INJURY SUPERIMPOSED ON CKD (HCC): ICD-10-CM

## 2023-04-04 DIAGNOSIS — I42.9 CARDIOMYOPATHY, UNSPECIFIED TYPE (HCC): ICD-10-CM

## 2023-04-04 DIAGNOSIS — N18.30 STAGE 3 CHRONIC KIDNEY DISEASE, UNSPECIFIED WHETHER STAGE 3A OR 3B CKD (HCC): ICD-10-CM

## 2023-04-04 DIAGNOSIS — E55.9 VITAMIN D DEFICIENCY: ICD-10-CM

## 2023-04-04 DIAGNOSIS — N17.9 ACUTE KIDNEY INJURY SUPERIMPOSED ON CKD (HCC): ICD-10-CM

## 2023-04-04 DIAGNOSIS — I50.9 CONGESTIVE HEART FAILURE, UNSPECIFIED HF CHRONICITY, UNSPECIFIED HEART FAILURE TYPE (HCC): ICD-10-CM

## 2023-04-04 DIAGNOSIS — R63.5 WEIGHT GAIN: ICD-10-CM

## 2023-04-04 DIAGNOSIS — R53.1 WEAKNESS: ICD-10-CM

## 2023-04-04 DIAGNOSIS — D50.8 IRON DEFICIENCY ANEMIA DUE TO DIETARY CAUSES: ICD-10-CM

## 2023-04-04 DIAGNOSIS — I50.23 ACUTE ON CHRONIC SYSTOLIC CONGESTIVE HEART FAILURE (HCC): ICD-10-CM

## 2023-04-04 DIAGNOSIS — J44.9 CHRONIC OBSTRUCTIVE PULMONARY DISEASE, UNSPECIFIED COPD TYPE (HCC): ICD-10-CM

## 2023-04-04 LAB
25(OH)D3 SERPL-MCNC: 33.5 NG/ML (ref 30–100)
ALBUMIN SERPL BCP-MCNC: 3.5 G/DL (ref 3.5–5)
ALP SERPL-CCNC: 93 U/L (ref 46–116)
ALT SERPL W P-5'-P-CCNC: 23 U/L (ref 12–78)
ANION GAP SERPL CALCULATED.3IONS-SCNC: 9 MMOL/L (ref 4–13)
AST SERPL W P-5'-P-CCNC: 19 U/L (ref 5–45)
BACTERIA UR QL AUTO: ABNORMAL /HPF
BASOPHILS # BLD AUTO: 0.06 THOUSANDS/ÂΜL (ref 0–0.1)
BASOPHILS NFR BLD AUTO: 0 % (ref 0–1)
BILIRUB SERPL-MCNC: 0.56 MG/DL (ref 0.2–1)
BILIRUB UR QL STRIP: NEGATIVE
BUN SERPL-MCNC: 42 MG/DL (ref 5–25)
CALCIUM SERPL-MCNC: 9.4 MG/DL (ref 8.3–10.1)
CHLORIDE SERPL-SCNC: 93 MMOL/L (ref 96–108)
CLARITY UR: CLEAR
CO2 SERPL-SCNC: 27 MMOL/L (ref 21–32)
COLOR UR: ABNORMAL
CREAT SERPL-MCNC: 1.72 MG/DL (ref 0.6–1.3)
CREAT UR-MCNC: 54.2 MG/DL
CREAT UR-MCNC: 54.2 MG/DL
EOSINOPHIL # BLD AUTO: 0.04 THOUSAND/ÂΜL (ref 0–0.61)
EOSINOPHIL NFR BLD AUTO: 0 % (ref 0–6)
ERYTHROCYTE [DISTWIDTH] IN BLOOD BY AUTOMATED COUNT: 14.1 % (ref 11.6–15.1)
GFR SERPL CREATININE-BSD FRML MDRD: 31 ML/MIN/1.73SQ M
GLUCOSE P FAST SERPL-MCNC: 155 MG/DL (ref 65–99)
GLUCOSE UR STRIP-MCNC: NEGATIVE MG/DL
HCT VFR BLD AUTO: 48.4 % (ref 34.8–46.1)
HGB BLD-MCNC: 16.2 G/DL (ref 11.5–15.4)
HGB UR QL STRIP.AUTO: NEGATIVE
HYALINE CASTS #/AREA URNS LPF: ABNORMAL /LPF
IMM GRANULOCYTES # BLD AUTO: 0.11 THOUSAND/UL (ref 0–0.2)
IMM GRANULOCYTES NFR BLD AUTO: 1 % (ref 0–2)
KETONES UR STRIP-MCNC: NEGATIVE MG/DL
LEUKOCYTE ESTERASE UR QL STRIP: NEGATIVE
LYMPHOCYTES # BLD AUTO: 1.45 THOUSANDS/ÂΜL (ref 0.6–4.47)
LYMPHOCYTES NFR BLD AUTO: 9 % (ref 14–44)
MAGNESIUM SERPL-MCNC: 2.8 MG/DL (ref 1.6–2.6)
MCH RBC QN AUTO: 31.2 PG (ref 26.8–34.3)
MCHC RBC AUTO-ENTMCNC: 33.5 G/DL (ref 31.4–37.4)
MCV RBC AUTO: 93 FL (ref 82–98)
MICROALBUMIN UR-MCNC: 29.7 MG/L (ref 0–20)
MICROALBUMIN/CREAT 24H UR: 55 MG/G CREATININE (ref 0–30)
MONOCYTES # BLD AUTO: 0.29 THOUSAND/ÂΜL (ref 0.17–1.22)
MONOCYTES NFR BLD AUTO: 2 % (ref 4–12)
NEUTROPHILS # BLD AUTO: 14.43 THOUSANDS/ÂΜL (ref 1.85–7.62)
NEUTS SEG NFR BLD AUTO: 88 % (ref 43–75)
NITRITE UR QL STRIP: NEGATIVE
NON-SQ EPI CELLS URNS QL MICRO: ABNORMAL /HPF
NRBC BLD AUTO-RTO: 0 /100 WBCS
PH UR STRIP.AUTO: 6 [PH]
PHOSPHATE SERPL-MCNC: 5 MG/DL (ref 2.3–4.1)
PLATELET # BLD AUTO: 431 THOUSANDS/UL (ref 149–390)
PMV BLD AUTO: 9.6 FL (ref 8.9–12.7)
POTASSIUM SERPL-SCNC: 3.6 MMOL/L (ref 3.5–5.3)
PROT SERPL-MCNC: 7.2 G/DL (ref 6.4–8.4)
PROT UR STRIP-MCNC: NEGATIVE MG/DL
PROT UR-MCNC: 8 MG/DL
PROT/CREAT UR: 0.15 MG/G{CREAT} (ref 0–0.1)
PTH-INTACT SERPL-MCNC: 83.5 PG/ML (ref 18.4–80.1)
RBC # BLD AUTO: 5.19 MILLION/UL (ref 3.81–5.12)
RBC #/AREA URNS AUTO: ABNORMAL /HPF
SODIUM SERPL-SCNC: 129 MMOL/L (ref 135–147)
SP GR UR STRIP.AUTO: 1.01 (ref 1–1.03)
T4 FREE SERPL-MCNC: 1.69 NG/DL (ref 0.76–1.46)
TSH SERPL DL<=0.05 MIU/L-ACNC: 0.89 UIU/ML (ref 0.45–4.5)
UROBILINOGEN UR STRIP-ACNC: <2 MG/DL
WBC # BLD AUTO: 16.38 THOUSAND/UL (ref 4.31–10.16)
WBC #/AREA URNS AUTO: ABNORMAL /HPF

## 2023-04-06 LAB
IRON SATN MFR SERPL: 23 % (ref 15–50)
IRON SERPL-MCNC: 76 UG/DL (ref 50–170)
TIBC SERPL-MCNC: 328 UG/DL (ref 250–450)

## 2023-04-28 NOTE — PLAN OF CARE
Problem: PAIN - ADULT  Goal: Verbalizes/displays adequate comfort level or baseline comfort level  Description: Interventions:  - Encourage patient to monitor pain and request assistance  - Assess pain using appropriate pain scale  - Administer analgesics based on type and severity of pain and evaluate response  - Implement non-pharmacological measures as appropriate and evaluate response  - Consider cultural and social influences on pain and pain management  - Notify physician/advanced practitioner if interventions unsuccessful or patient reports new pain  Outcome: Progressing     Problem: INFECTION - ADULT  Goal: Absence or prevention of progression during hospitalization  Description: INTERVENTIONS:  - Assess and monitor for signs and symptoms of infection  - Monitor lab/diagnostic results  - Monitor all insertion sites, i e  indwelling lines, tubes, and drains  - Monitor endotracheal if appropriate and nasal secretions for changes in amount and color  - Preston appropriate cooling/warming therapies per order  - Administer medications as ordered  - Instruct and encourage patient and family to use good hand hygiene technique  - Identify and instruct in appropriate isolation precautions for identified infection/condition  Outcome: Progressing     Problem: DISCHARGE PLANNING  Goal: Discharge to home or other facility with appropriate resources  Description: INTERVENTIONS:  - Identify barriers to discharge w/patient and caregiver  - Arrange for needed discharge resources and transportation as appropriate  - Identify discharge learning needs (meds, wound care, etc )  - Refer to Case Management Department for coordinating discharge planning if the patient needs post-hospital services based on physician/advanced practitioner order or complex needs related to functional status, cognitive ability, or social support system  Outcome: Progressing     Problem: Knowledge Deficit  Goal: Patient/family/caregiver demonstrates understanding of disease process, treatment plan, medications, and discharge instructions  Description: Complete learning assessment and assess knowledge base    Interventions:  - Provide teaching at level of understanding  - Provide teaching via preferred learning methods  Outcome: Progressing     Problem: RESPIRATORY - ADULT  Goal: Achieves optimal ventilation and oxygenation  Description: INTERVENTIONS:  - Assess for changes in respiratory status  - Assess for changes in mentation and behavior  - Position to facilitate oxygenation and minimize respiratory effort  - Oxygen administered by appropriate delivery if ordered  - Initiate smoking cessation education as indicated  - Encourage broncho-pulmonary hygiene including cough, deep breathe, Incentive Spirometry  - Assess the need for suctioning and aspirate as needed  - Assess and instruct to report SOB or any respiratory difficulty  - Respiratory Therapy support as indicated  Outcome: Progressing No

## 2023-05-08 ENCOUNTER — OFFICE VISIT (OUTPATIENT)
Dept: CARDIOLOGY CLINIC | Facility: CLINIC | Age: 63
End: 2023-05-08

## 2023-05-08 VITALS
SYSTOLIC BLOOD PRESSURE: 130 MMHG | DIASTOLIC BLOOD PRESSURE: 84 MMHG | WEIGHT: 190 LBS | HEIGHT: 70 IN | BODY MASS INDEX: 27.2 KG/M2 | HEART RATE: 84 BPM

## 2023-05-08 DIAGNOSIS — I42.0 DILATED CARDIOMYOPATHY (HCC): Chronic | ICD-10-CM

## 2023-05-08 DIAGNOSIS — I10 ESSENTIAL HYPERTENSION: Chronic | ICD-10-CM

## 2023-05-08 DIAGNOSIS — I70.1 RENAL ARTERY STENOSIS (HCC): Primary | ICD-10-CM

## 2023-05-08 DIAGNOSIS — Z72.0 TOBACCO ABUSE: Chronic | ICD-10-CM

## 2023-05-08 RX ORDER — CARVEDILOL 12.5 MG/1
12.5 TABLET ORAL 2 TIMES DAILY WITH MEALS
Qty: 180 TABLET | Refills: 5
Start: 2023-05-08

## 2023-05-08 NOTE — PROGRESS NOTES
Patient ID: Tessie Pritchett is a 58 y o  female  Plan:      Renal artery stenosis (HCC)  S/p left renal artery stenting  Remarkeable improvement  Tobacco abuse  Up to 2 ppd  She will try to cut back now that there are things to do outside  Essential hypertension  Much improved post renal artery stent  Dilated cardiomyopathy ([address] )  Resolved  Follow up Plan/Other summary comments:  Return in about 1 year (around 2024)  HPI: Patient seen in f/u regarding the above issues  She has fatigue but no dyspnea  On 2021 she presented with acute heart failure and was at least briefly intubated  An echo the next day revealed ejection fraction less than 20% with moderate mitral regurgitation  Several days later she underwent coronary angiography which revealed moderate but nonobstructive disease  Echo on 2021 revealed near normal LV systolic function  However echo on 2022 again revealed severe LV dysfunction when she presented with heart failure  Improvement since then  Most recent or relevant cardiac/vascular testing:    TTE 2021:  EF less than 20%  Coronary angiography 2021:  No high-grade proximal CAD  TTE 2021:  EF 50%  Mild enlargement of the ascending aorta to 3 7 cm  Mild mitral regurgitation    TTE 06/15/2022:  EF 41%  TTE 2022:  EF 25%  TTE 3/1/2023: LVEF 70 %  Asc aorta 4 0 cm        Past Surgical History:   Procedure Laterality Date   • CARDIAC CATHETERIZATION  2021    Moderate non-obstructive atherosclerosis   •  SECTION     • CHOLECYSTECTOMY     • IR RENAL ANGIOGRAM  2022   • IR TEMPORARY DIALYSIS CATHETER CHECK/CHANGE/REPOSITION  2022   • IR TUNNELED DIALYSIS CATHETER PLACEMENT  2022   • IR TUNNELED DIALYSIS CATHETER REMOVAL  2022   • ROTATOR CUFF REPAIR W/ DISTAL CLAVICLE EXCISION Right    • TONSILLECTOMY         Lipid Profile:   Lab Results   Component Value Date    CHOL 216 (H) 2018 "TRIG 91 05/12/2022    TRIG 168 (H) 04/05/2018    HDL 43 (L) 05/12/2022    HDL 43 04/05/2018         Review of Systems   10  point ROS  was otherwise non pertinent or negative except as per HPI or as below  Gait:  Normal         Objective:     /84   Pulse 84   Ht 5' 10\" (1 778 m)   Wt 86 2 kg (190 lb)   BMI 27 26 kg/m²     PHYSICAL EXAM:    General:  Normal appearance in no distress  Eyes:  Anicteric  Oral mucosa:  Moist   Neck:  No JVD  Carotid upstrokes are brisk without bruits  No masses  Chest:  Clear to auscultation  Cardiac:  No palpable PMI  Normal S1 and S2   Grade 2 systolic murmur at the base radiating to the neck  No gallop or rub  Abdomen:  Soft and nontender  No palpable organomegaly or aortic enlargement  Extremities:  No peripheral edema  Musculoskeletal:  Symmetric  Vascular:  Femoral pulses are brisk without bruits  Popliteal pulses are intact bilaterally  Pedal pulses are intact  Neuro:  Grossly symmetric  Psych:  Alert and oriented x3            Current Outpatient Medications:   •  aspirin 81 mg chewable tablet, Chew 1 tablet (81 mg total) daily, Disp: , Rfl:   •  carvedilol (COREG) 12 5 mg tablet, Take 1 tablet (12 5 mg total) by mouth 2 (two) times a day with meals (Patient taking differently: Take 25 mg by mouth 2 (two) times a day with meals), Disp: 60 tablet, Rfl: 3  •  Cholecalciferol 50 MCG (2000 UT) CAPS, Take by mouth daily, Disp: , Rfl:   •  clonazePAM (KlonoPIN) 1 mg tablet, Take 0 5-1 mg by mouth daily at bedtime as needed At night, Disp: , Rfl:   •  clopidogrel (PLAVIX) 75 mg tablet, Take 1 tablet (75 mg total) by mouth daily, Disp: 30 tablet, Rfl: 3  •  furosemide (LASIX) 40 mg tablet, take 1 tablet by mouth daily if needed (FOR MORNING WEIGHT MORE THAN 160), Disp: 30 tablet, Rfl: 5  •  MAGNESIUM PO, in the morning, Disp: , Rfl:   •  morphine (MS CONTIN) 30 mg 12 hr tablet, Take 30 mg by mouth every 12 (twelve) hours, Disp: , Rfl:   •  " oxyCODONE-acetaminophen (PERCOCET) 5-325 mg per tablet, Take 1 tablet by mouth every 6 (six) hours as needed, Disp: , Rfl:   •  potassium chloride (K-DUR,KLOR-CON) 10 mEq tablet, Take 1 tablet (10 mEq total) by mouth daily If using Lasix, Disp: 30 tablet, Rfl: 3  •  umeclidinium-vilanterol (Anoro Ellipta) 62 5-25 MCG/INH inhaler, Inhale 1 puff daily (Patient taking differently: Inhale 1 puff if needed), Disp: 60 blister, Rfl: 3  •  albuterol (ProAir HFA) 90 mcg/act inhaler, Inhale 2 puffs every 6 (six) hours as needed for wheezing (Patient taking differently: Inhale 2 puffs every 6 (six) hours as needed for wheezing As needed), Disp: 25 5 g, Rfl: 3  •  atorvastatin (LIPITOR) 40 mg tablet, take 1 tablet by mouth daily with dinner (Patient not taking: Reported on 4/14/2023), Disp: 30 tablet, Rfl: 5  •  ergocalciferol (VITAMIN D2) 50,000 units, take 1 capsule by mouth two times a week (Patient not taking: Reported on 5/8/2023), Disp: , Rfl:   •  predniSONE 10 mg tablet, take 1 tablet by mouth ONE DAY AND 2 TABLETS BY MOUTH ON THE OTHER, ALTERNATE AS INSTRUCTED (Patient not taking: Reported on 5/8/2023), Disp: , Rfl:   Allergies   Allergen Reactions   • Wellbutrin [Bupropion] Arthralgia     Past Medical History:   Diagnosis Date   • Cardiomyopathy ([facility] [address] )    • Chronic combined systolic and diastolic congestive heart failure    • COPD (chronic obstructive pulmonary disease) (HCC)    • Fatty liver    • Hyperlipidemia    • Hypertension    • Mitral regurgitation    • Sepsis            Social History     Tobacco Use   Smoking Status Some Days   • Packs/day: 2 00   • Types: Cigarettes   Smokeless Tobacco Never

## 2023-05-10 ENCOUNTER — TELEPHONE (OUTPATIENT)
Dept: NEPHROLOGY | Facility: CLINIC | Age: 63
End: 2023-05-10

## 2023-05-10 NOTE — TELEPHONE ENCOUNTER
Patient called stating she is having urinary retention  Patient stated she is on 40 mg of lasix  Patient stated she sometimes takes two lasix but stills feels like she is retaining fluid  Patient stated the lasix is not working  Patient also stated he is having trouble coming off of the prednisone, she takes 15-20 mg per day  Patient asked what she should do regarding the fluid retention

## 2023-05-11 DIAGNOSIS — E87.70 HYPERVOLEMIA, UNSPECIFIED HYPERVOLEMIA TYPE: Primary | ICD-10-CM

## 2023-05-11 RX ORDER — TORSEMIDE 20 MG/1
40 TABLET ORAL DAILY
Qty: 60 TABLET | Refills: 3 | Status: SHIPPED | OUTPATIENT
Start: 2023-05-11

## 2023-05-11 NOTE — TELEPHONE ENCOUNTER
Spoke with patient, she is aware  Patient asked if the prednisone  if it's ok to still take  Patient stated if she doesn't take it she cant do anything

## 2023-05-12 ENCOUNTER — HOSPITAL ENCOUNTER (OUTPATIENT)
Dept: ULTRASOUND IMAGING | Facility: HOSPITAL | Age: 63
Discharge: HOME | End: 2023-05-12
Attending: INTERNAL MEDICINE

## 2023-05-12 DIAGNOSIS — N17.9 ACUTE KIDNEY INJURY SUPERIMPOSED ON CKD (HCC): ICD-10-CM

## 2023-05-12 DIAGNOSIS — E87.1 HYPONATREMIA: ICD-10-CM

## 2023-05-12 DIAGNOSIS — N18.9 ACUTE KIDNEY INJURY SUPERIMPOSED ON CKD (HCC): ICD-10-CM

## 2023-06-02 DIAGNOSIS — J41.0 SIMPLE CHRONIC BRONCHITIS (HCC): Primary | ICD-10-CM

## 2023-06-02 RX ORDER — PREDNISONE 10 MG/1
TABLET ORAL
Status: CANCELLED | OUTPATIENT
Start: 2023-06-02

## 2023-06-02 RX ORDER — PREDNISONE 5 MG/1
5 TABLET ORAL DAILY
Qty: 40 TABLET | Refills: 0 | Status: SHIPPED | OUTPATIENT
Start: 2023-06-02

## 2023-06-08 DIAGNOSIS — F51.04 PSYCHOPHYSIOLOGICAL INSOMNIA: Primary | ICD-10-CM

## 2023-06-08 RX ORDER — CLONAZEPAM 1 MG/1
1 TABLET ORAL
Qty: 30 TABLET | Refills: 1 | Status: SHIPPED | OUTPATIENT
Start: 2023-06-08 | End: 2023-08-03 | Stop reason: SDUPTHER

## 2023-06-14 ENCOUNTER — TELEPHONE (OUTPATIENT)
Dept: FAMILY MEDICINE CLINIC | Facility: CLINIC | Age: 63
End: 2023-06-14

## 2023-06-14 NOTE — TELEPHONE ENCOUNTER
Rad called for a refill on her prednisone  She said it is 5mg and she takes 2 tablets one day and 3 tablets the next day alternating   She uses [facility] in [place] and she can be reached at [phone]

## 2023-06-15 DIAGNOSIS — J41.0 SIMPLE CHRONIC BRONCHITIS (HCC): ICD-10-CM

## 2023-06-15 RX ORDER — PREDNISONE 5 MG/1
5 TABLET ORAL DAILY
Qty: 40 TABLET | Refills: 0 | Status: SHIPPED | OUTPATIENT
Start: 2023-06-15

## 2023-06-30 DIAGNOSIS — J41.0 SIMPLE CHRONIC BRONCHITIS (HCC): ICD-10-CM

## 2023-07-03 ENCOUNTER — NURSE TRIAGE (OUTPATIENT)
Dept: FAMILY MEDICINE CLINIC | Facility: CLINIC | Age: 63
End: 2023-07-03

## 2023-07-03 DIAGNOSIS — J41.0 SIMPLE CHRONIC BRONCHITIS (HCC): ICD-10-CM

## 2023-07-03 DIAGNOSIS — G89.29 OTHER CHRONIC PAIN: Primary | ICD-10-CM

## 2023-07-03 RX ORDER — OXYCODONE HYDROCHLORIDE AND ACETAMINOPHEN 5; 325 MG/1; MG/1
1 TABLET ORAL EVERY 6 HOURS PRN
Qty: 90 TABLET | Refills: 0 | Status: SHIPPED | OUTPATIENT
Start: 2023-07-03

## 2023-07-03 RX ORDER — PREDNISONE 5 MG/1
TABLET ORAL
Qty: 40 TABLET | Refills: 0 | Status: SHIPPED | OUTPATIENT
Start: 2023-07-03 | End: 2023-07-03 | Stop reason: SDUPTHER

## 2023-07-03 RX ORDER — PREDNISONE 5 MG/1
TABLET ORAL
Qty: 40 TABLET | Refills: 0 | Status: CANCELLED | OUTPATIENT
Start: 2023-07-03

## 2023-07-03 RX ORDER — MORPHINE SULFATE 30 MG/1
30 TABLET, FILM COATED, EXTENDED RELEASE ORAL EVERY 12 HOURS
Qty: 60 TABLET | Refills: 0 | Status: SHIPPED | OUTPATIENT
Start: 2023-07-03

## 2023-07-03 RX ORDER — PREDNISONE 5 MG/1
5 TABLET ORAL DAILY
Qty: 40 TABLET | Refills: 0 | Status: CANCELLED | OUTPATIENT
Start: 2023-07-03

## 2023-07-03 RX ORDER — PREDNISONE 5 MG/1
TABLET ORAL
Qty: 40 TABLET | Refills: 0 | Status: SHIPPED | OUTPATIENT
Start: 2023-07-03

## 2023-07-03 NOTE — TELEPHONE ENCOUNTER
Medication Refill Request     Name prednisone 5mg  Dose/Frequency 5mg BID  Quantity 40  Verified pharmacy   [x]  Verified ordering Provider   [x]  Does patient have enough for the next 3 days? Yes [] No [x]    Med previously renewed incorrectly and never processed through the pharm. Answer Assessment - Initial Assessment Questions  1. DRUG NAME: "What medicine do you need to have refilled?"      *No Answer*  2. REFILLS REMAINING: "How many refills are remaining?" (Note: The label on the medicine or pill bottle will show how many refills are remaining. If there are no refills remaining, then a renewal may be needed.)      *No Answer*  3. EXPIRATION DATE: "What is the expiration date?" (Note: The label states when the prescription will , and thus can no longer be refilled.)      *No Answer*  4. PRESCRIBING HCP: "Who prescribed it?" Reason: If prescribed by specialist, call should be referred to that group. *No Answer*  5. SYMPTOMS: "Do you have any symptoms?"      *No Answer*  6.  PREGNANCY: "Is there any chance that you are pregnant?" "When was your last menstrual period?"      *No Answer*    Protocols used: MEDICATION REFILL AND RENEWAL [facility]

## 2023-07-03 NOTE — TELEPHONE ENCOUNTER
Reason for Disposition  • [1] Caller requesting a prescription renewal (no refills left), no triage required, AND [2] triager able to renew prescription per department policy    Answer Assessment - Initial Assessment Questions  Refill of prednisone    Protocols used: [facility]

## 2023-07-03 NOTE — TELEPHONE ENCOUNTER
Pt calling, she missed her dose of prednisone this morning because she is completely out of it. Pt takes it for chronic bronchitis and is slowly weaning off it. Shireen Alfred on call provider contacted to refill rx. Rx refilled, pt aware.        Regarding: Medication never arrive at the pharmacy  ----- Message from Seamus Colvin sent at 7/3/2023  6:50 PM EDT -----  " I talked to a nurse and she told me that they were going to send me the medicine predniSONE 5 mg tablet to the pharmacy but I still haven't received anything"

## 2023-07-03 NOTE — TELEPHONE ENCOUNTER
Regarding: urgent med refill  ----- Message from Anastasiya Mcfarland sent at 7/3/2023  5:10 PM EDT -----  "I am out of my medication and need to have this filled today"    Medication Refill Request     Name predniSONE  Dose/Frequency 5 mg tablet  Quantity 40  Verified pharmacy   [ x]  Verified ordering Provider   [x ]  Does patient have enough for the next 3 days?  Yes [ ] No [x ]

## 2023-07-14 RX ORDER — CLONAZEPAM 0.5 MG/1
0.5 TABLET ORAL
COMMUNITY
Start: 2023-05-08

## 2023-07-18 ENCOUNTER — OFFICE VISIT (OUTPATIENT)
Dept: FAMILY MEDICINE CLINIC | Facility: CLINIC | Age: 63
End: 2023-07-18
Payer: MEDICARE

## 2023-07-18 VITALS
WEIGHT: 200 LBS | HEIGHT: 69 IN | SYSTOLIC BLOOD PRESSURE: 122 MMHG | DIASTOLIC BLOOD PRESSURE: 84 MMHG | BODY MASS INDEX: 29.62 KG/M2 | TEMPERATURE: 97.6 F

## 2023-07-18 DIAGNOSIS — R73.9 ELEVATED BLOOD SUGAR: ICD-10-CM

## 2023-07-18 DIAGNOSIS — I70.1 RENAL ARTERY STENOSIS (HCC): ICD-10-CM

## 2023-07-18 DIAGNOSIS — J44.1 CHRONIC OBSTRUCTIVE PULMONARY DISEASE WITH ACUTE EXACERBATION (HCC): ICD-10-CM

## 2023-07-18 DIAGNOSIS — I42.0 DILATED CARDIOMYOPATHY (HCC): Chronic | ICD-10-CM

## 2023-07-18 DIAGNOSIS — Z12.11 SCREENING FOR COLON CANCER: ICD-10-CM

## 2023-07-18 DIAGNOSIS — J44.9 CHRONIC OBSTRUCTIVE PULMONARY DISEASE, UNSPECIFIED COPD TYPE (HCC): Primary | ICD-10-CM

## 2023-07-18 DIAGNOSIS — Z72.0 TOBACCO ABUSE: Chronic | ICD-10-CM

## 2023-07-18 DIAGNOSIS — G89.4 CHRONIC PAIN SYNDROME: ICD-10-CM

## 2023-07-18 DIAGNOSIS — I15.0 RENOVASCULAR HYPERTENSION: ICD-10-CM

## 2023-07-18 DIAGNOSIS — Z12.31 ENCOUNTER FOR SCREENING MAMMOGRAM FOR BREAST CANCER: ICD-10-CM

## 2023-07-18 DIAGNOSIS — I10 ESSENTIAL HYPERTENSION: Chronic | ICD-10-CM

## 2023-07-18 PROBLEM — D35.00 PHEOCHROMOCYTOMA: Status: RESOLVED | Noted: 2022-09-22 | Resolved: 2023-07-18

## 2023-07-18 PROCEDURE — 99214 OFFICE O/P EST MOD 30 MIN: CPT | Performed by: INTERNAL MEDICINE

## 2023-07-18 RX ORDER — ALBUTEROL SULFATE 2.5 MG/3ML
2.5 SOLUTION RESPIRATORY (INHALATION) EVERY 6 HOURS PRN
Qty: 150 ML | Refills: 5 | Status: SHIPPED | OUTPATIENT
Start: 2023-07-18 | End: 2023-08-17

## 2023-07-18 RX ORDER — ALBUTEROL SULFATE 90 UG/1
2 AEROSOL, METERED RESPIRATORY (INHALATION) EVERY 4 HOURS PRN
Qty: 25.5 G | Refills: 3 | Status: SHIPPED | OUTPATIENT
Start: 2023-07-18

## 2023-07-18 RX ORDER — PREDNISONE 10 MG/1
10 TABLET ORAL DAILY
Qty: 30 TABLET | Refills: 0 | Status: SHIPPED | OUTPATIENT
Start: 2023-07-18 | End: 2023-08-17

## 2023-07-18 NOTE — ASSESSMENT & PLAN NOTE
History of stenting, and has not been taking any anticoagulation. She stopped her Plavix altogether. She fears that Plavix gave her "medicine head". Strongly encouraged her to start her aspirin up immediately. She agrees to do this.

## 2023-07-18 NOTE — ASSESSMENT & PLAN NOTE
Continues to smokes. Says she is not "addicted". She is just bored. She will smoke a pack to a pack and a half a day at times. Had recent x-rays and is due for her low-dose CAT scan. Has had multiple CT scans though so we will hold off for now and recheck possibly after her next appointment.

## 2023-07-18 NOTE — ASSESSMENT & PLAN NOTE
Remains on prednisone currently taking 10 of 15 even though she was supposed be on 5 and 10. Have given her 30-10 mg pills. On refill she should be decreased to 7.5 mg daily. She is clearly having some systemic effects given her cushingoid facies. We will need to do a bone density at some point.

## 2023-07-18 NOTE — ASSESSMENT & PLAN NOTE
Blood pressure is reasonably controlled, status post incident prevention with renal artery stent as well as resolution of her dilated cardiomyopathy. Concerned about rate as of this I think was brought on by tachyarrhythmia. She is now off her Coreg.

## 2023-07-18 NOTE — ASSESSMENT & PLAN NOTE
Has discontinued most of her medication on her own which was considered cardioprotective. She has stopped her statin, her beta-blocker, and was not even taking an aspirin. Again her compliance is an issue as always.

## 2023-07-18 NOTE — PROGRESS NOTES
Name: Soha Tinajero      : 1960      MRN: [id number]  Encounter Provider: Tonia Lopes DO  Encounter Date: 2023   Encounter department: [address] [facility]    Assessment & Plan     1. Chronic obstructive pulmonary disease, unspecified COPD type ([address] [facility])  Assessment & Plan:  Remains on prednisone currently taking 10 of 15 even though she was supposed be on 5 and 10. Have given her 30-10 mg pills. On refill she should be decreased to 7.5 mg daily. She is clearly having some systemic effects given her cushingoid facies. We will need to do a bone density at some point. Orders:  -     predniSONE 10 mg tablet; Take 1 tablet (10 mg total) by mouth daily  -     albuterol (2.5 mg/3 mL) 0.083 % nebulizer solution; Take 3 mL (2.5 mg total) by nebulization every 6 (six) hours as needed for wheezing or shortness of breath    2. Encounter for screening mammogram for breast cancer  Assessment & Plan:  Refuses mammogram      3. Screening for colon cancer  Assessment & Plan:  Refusing colonoscopy at this time or Cologuard. 4. Dilated cardiomyopathy ([address] )  Assessment & Plan:  Has discontinued most of her medication on her own which was considered cardioprotective. She has stopped her statin, her beta-blocker, and was not even taking an aspirin. Again her compliance is an issue as always. 5. Essential hypertension  Assessment & Plan:  Blood pressure is reasonably controlled, status post incident prevention with renal artery stent as well as resolution of her dilated cardiomyopathy. Concerned about rate as of this I think was brought on by tachyarrhythmia. She is now off her Coreg. 6. Renal artery stenosis (HCC)  Assessment & Plan:  History of stenting, and has not been taking any anticoagulation. She stopped her Plavix altogether. She fears that Plavix gave her "medicine head". Strongly encouraged her to start her aspirin up immediately.   She agrees to do this.    Orders:  -     Comprehensive metabolic panel; Future    7. Tobacco abuse  Assessment & Plan:  Continues to smokes. Says she is not "addicted". She is just bored. She will smoke a pack to a pack and a half a day at times. Had recent x-rays and is due for her low-dose CAT scan. Has had multiple CT scans though so we will hold off for now and recheck possibly after her next appointment. 8. Chronic pain syndrome  Assessment & Plan:  Chronic pain issues dating back years. 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 Prevents her from working at times. Remains on his chronic morphine dose which we have been trying to taper and have done so reasonably well. But she still needs to come down more. She also has Percocet which we will need to discontinue 1 or the other I think. Orders:  -     CBC and differential; Future    9. Chronic obstructive pulmonary disease with acute exacerbation (HCC)  Assessment & Plan:  Remains on prednisone currently taking 10 of 15 even though she was supposed be on 5 and 10. Have given her 30-10 mg pills. On refill she should be decreased to 7.5 mg daily. She is clearly having some systemic effects given her cushingoid facies. We will need to do a bone density at some point. Orders:  -     CBC and differential; Future  -     albuterol (ProAir HFA) 90 mcg/act inhaler; Inhale 2 puffs every 4 (four) hours as needed for wheezing    10. Elevated blood sugar  -     Hemoglobin A1C; Future    11. Renovascular hypertension  -     Comprehensive metabolic panel; Future        Depression Screening and Follow-up Plan: Patient was screened for depression during today's encounter. They screened negative with a PHQ-2 score of 0. Cira Isbell is a 60-year-old female who continues to be self medicate her and manipulate of her medications.   She recently stopped her Coreg and Plavix on her own as it said she did it gave her brain fog. Fortunately her ejection fraction is returned to normal.  She does have a history of SVT with significant respiratory compromise which was the reason for the Coreg, however she says that the side effects are too severe. Again we discussed her prednisone therapy which she is failed to reduce her dose as I recommended. She has been on this quite a long time now and very difficult to realize what the actual dose she is on. Her morphine we have been trying to taper for several years now, she has agreed to try this on the next appointment again. Significant is her recent stent placement or renal artery. Her blood pressure is now reasonably controlled. Her diuretic she stopped altogether but continues on her potassium. She did not have blood work in some time. She has failed to miss several appointments in the past.  She has Trelegy at home which she is using but also has an aura which is on her medication list.  She does continue to smoke. She states she gets worn out quickly, when offered therapy she declines at this time. She is going through some life changes and considering giving up her house but she has cats and she smokes and she does not know where she could move to. Review of Systems   Constitutional: Negative for chills and fever. HENT: Negative for rhinorrhea and sore throat. Eyes: Negative for visual disturbance. Respiratory: Positive for cough and shortness of breath. Cardiovascular: Negative for chest pain and leg swelling. Gastrointestinal: Negative for abdominal pain, diarrhea, nausea and vomiting. Genitourinary: Negative for dysuria. Musculoskeletal: Positive for back pain and myalgias. Skin: Negative for rash. Neurological: Negative for dizziness and headaches. Psychiatric/Behavioral: Negative for confusion. The patient is nervous/anxious. All other systems reviewed and are negative.       Current Outpatient Medications on File Prior to Visit   Medication Sig   • Cholecalciferol 50 MCG (2000 UT) CAPS Take by mouth daily   • clonazePAM (KlonoPIN) 1 mg tablet Take 1 tablet (1 mg total) by mouth daily at bedtime At night   • ergocalciferol (VITAMIN D2) 50,000 units    • MAGNESIUM PO in the morning   • morphine (MS CONTIN) 30 mg 12 hr tablet Take 1 tablet (30 mg total) by mouth every 12 (twelve) hours Max Daily Amount: 60 mg   • oxyCODONE-acetaminophen (PERCOCET) 5-325 mg per tablet Take 1 tablet by mouth every 6 (six) hours as needed for moderate pain Max Daily Amount: 4 tablets   • potassium chloride (K-DUR,KLOR-CON) 10 mEq tablet Take 1 tablet (10 mEq total) by mouth daily If using Lasix   • [DISCONTINUED] albuterol (ProAir HFA) 90 mcg/act inhaler Inhale 2 puffs every 6 (six) hours as needed for wheezing (Patient taking differently: Inhale 2 puffs every 6 (six) hours as needed for wheezing As needed)   • [DISCONTINUED] predniSONE 5 mg tablet Take 2 pills daily   • [DISCONTINUED] torsemide (DEMADEX) 20 mg tablet Take 2 tablets (40 mg total) by mouth daily   • aspirin 81 mg chewable tablet Chew 1 tablet (81 mg total) daily (Patient not taking: Reported on 7/18/2023)   • clonazePAM (KlonoPIN) 0.5 mg tablet Take 0.5 mg by mouth daily at bedtime as needed (Patient not taking: Reported on 7/18/2023)   • umeclidinium-vilanterol (Anoro Ellipta) 62.5-25 MCG/INH inhaler Inhale 1 puff daily (Patient not taking: Reported on 7/18/2023)   • [DISCONTINUED] atorvastatin (LIPITOR) 40 mg tablet take 1 tablet by mouth daily with dinner (Patient not taking: Reported on 7/18/2023)   • [DISCONTINUED] carvedilol (COREG) 12.5 mg tablet Take 1 tablet (12.5 mg total) by mouth 2 (two) times a day with meals (Patient not taking: Reported on 7/18/2023)   • [DISCONTINUED] clopidogrel (PLAVIX) 75 mg tablet Take 1 tablet (75 mg total) by mouth daily (Patient not taking: Reported on 7/18/2023)       Objective     /84 (BP Location: Left arm, Patient Position: Sitting, Cuff Size: Standard)   Temp 97.6 °F (36.4 °C)   Ht 5' 9" (1.753 m)   Wt 90.7 kg (200 lb)   BMI 29.53 kg/m²     Physical Exam  Constitutional:       Appearance: Normal appearance. Comments: Cushingoid face   HENT:      Head: Normocephalic and atraumatic. Nose: No congestion or rhinorrhea. Mouth/Throat:      Mouth: Mucous membranes are moist.   Eyes:      Extraocular Movements: Extraocular movements intact. Pupils: Pupils are equal, round, and reactive to light. Neck:      Vascular: No carotid bruit. Cardiovascular:      Rate and Rhythm: Normal rate and regular rhythm. Heart sounds: No murmur heard. Pulmonary:      Effort: No respiratory distress. Breath sounds: Wheezing present. Chest:      Chest wall: No tenderness. Abdominal:      General: There is no distension. Tenderness: There is no abdominal tenderness. Hernia: No hernia is present. Musculoskeletal:         General: No swelling or tenderness. Right lower leg: No edema. Left lower leg: No edema. Lymphadenopathy:      Cervical: No cervical adenopathy. Skin:     Findings: No rash. Neurological:      General: No focal deficit present. Mental Status: She is alert and oriented to person, place, and time. Sensory: No sensory deficit.    Psychiatric:         Mood and Affect: Mood normal.         Behavior: Behavior normal.       Omid Casiano DO

## 2023-07-18 NOTE — ASSESSMENT & PLAN NOTE
Chronic pain issues dating back years. 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 Prevents her from working at times. Remains on his chronic morphine dose which we have been trying to taper and have done so reasonably well. But she still needs to come down more. She also has Percocet which we will need to discontinue 1 or the other I think.

## 2023-07-26 NOTE — NURSING NOTE
Able to get in touch with [facility]. I needed another fax number to send pt chart notes and path report.    Patient oob to bathroom, O2 sat at 84% on 2L nasal cannula, encouraged to take deep breaths once seated back in room, O2 sat up to 90%   Call bell in reach

## 2023-08-03 DIAGNOSIS — F51.04 PSYCHOPHYSIOLOGICAL INSOMNIA: ICD-10-CM

## 2023-08-03 DIAGNOSIS — G89.29 OTHER CHRONIC PAIN: ICD-10-CM

## 2023-08-03 NOTE — NURSING NOTE
- With persistent epigastric pain, repeat CBC and check iron panel/ferritin.  - Check H pylori  - Pending above, consider GI referral for EGD/c-scope   Patient oob in chair, no complaints of pain  SOB on exertion persists, O2 sat at 88 while eating with 2L NC    Call bell in reach

## 2023-08-04 RX ORDER — OXYCODONE HYDROCHLORIDE AND ACETAMINOPHEN 5; 325 MG/1; MG/1
1 TABLET ORAL EVERY 8 HOURS PRN
Qty: 90 TABLET | Refills: 0 | Status: SHIPPED | OUTPATIENT
Start: 2023-08-04

## 2023-08-04 RX ORDER — CLONAZEPAM 1 MG/1
1 TABLET ORAL
Qty: 30 TABLET | Refills: 1 | Status: SHIPPED | OUTPATIENT
Start: 2023-08-04

## 2023-08-04 RX ORDER — MORPHINE SULFATE 30 MG/1
30 TABLET, FILM COATED, EXTENDED RELEASE ORAL EVERY 12 HOURS
Qty: 60 TABLET | Refills: 0 | Status: SHIPPED | OUTPATIENT
Start: 2023-08-04

## 2023-08-09 ENCOUNTER — APPOINTMENT (EMERGENCY)
Dept: RADIOLOGY | Facility: HOSPITAL | Age: 63
DRG: 378 | End: 2023-08-09
Payer: MEDICARE

## 2023-08-09 ENCOUNTER — APPOINTMENT (INPATIENT)
Dept: NON INVASIVE DIAGNOSTICS | Facility: HOSPITAL | Age: 63
DRG: 378 | End: 2023-08-09
Payer: MEDICARE

## 2023-08-09 ENCOUNTER — APPOINTMENT (EMERGENCY)
Dept: CT IMAGING | Facility: HOSPITAL | Age: 63
DRG: 378 | End: 2023-08-09
Payer: MEDICARE

## 2023-08-09 ENCOUNTER — HOSPITAL ENCOUNTER (INPATIENT)
Facility: HOSPITAL | Age: 63
LOS: 1 days | Discharge: HOME | DRG: 378 | End: 2023-08-10
Attending: EMERGENCY MEDICINE | Admitting: HOSPITALIST
Payer: MEDICARE

## 2023-08-09 DIAGNOSIS — K92.2 GI BLEED: Primary | ICD-10-CM

## 2023-08-09 DIAGNOSIS — K59.00 CONSTIPATION: ICD-10-CM

## 2023-08-09 DIAGNOSIS — I16.0 HYPERTENSIVE URGENCY: ICD-10-CM

## 2023-08-09 DIAGNOSIS — I10 HYPERTENSION: ICD-10-CM

## 2023-08-09 DIAGNOSIS — K62.5 RECTAL BLEEDING: ICD-10-CM

## 2023-08-09 DIAGNOSIS — R77.8 ELEVATED TROPONIN: ICD-10-CM

## 2023-08-09 LAB
2HR DELTA HS TROPONIN: 0 NG/L
4HR DELTA HS TROPONIN: 6 NG/L
ABO GROUP BLD: NORMAL
ABO GROUP BLD: NORMAL
ALBUMIN SERPL BCP-MCNC: 4.3 G/DL (ref 3.5–5)
ALP SERPL-CCNC: 110 U/L (ref 34–104)
ALT SERPL W P-5'-P-CCNC: 11 U/L (ref 7–52)
ANION GAP SERPL CALCULATED.3IONS-SCNC: 10 MMOL/L
APTT PPP: 30 SECONDS (ref 23–37)
AST SERPL W P-5'-P-CCNC: 14 U/L (ref 13–39)
ATRIAL RATE: 105 BPM
ATRIAL RATE: 113 BPM
BASOPHILS # BLD AUTO: 0.08 THOUSANDS/ÂΜL (ref 0–0.1)
BASOPHILS NFR BLD AUTO: 1 % (ref 0–1)
BILIRUB SERPL-MCNC: 0.67 MG/DL (ref 0.2–1)
BLD GP AB SCN SERPL QL: NEGATIVE
BUN SERPL-MCNC: 12 MG/DL (ref 5–25)
CALCIUM SERPL-MCNC: 9.8 MG/DL (ref 8.4–10.2)
CARDIAC TROPONIN I PNL SERPL HS: 79 NG/L
CARDIAC TROPONIN I PNL SERPL HS: 79 NG/L
CARDIAC TROPONIN I PNL SERPL HS: 85 NG/L
CHLORIDE SERPL-SCNC: 98 MMOL/L (ref 96–108)
CO2 SERPL-SCNC: 29 MMOL/L (ref 21–32)
CREAT SERPL-MCNC: 0.83 MG/DL (ref 0.6–1.3)
EOSINOPHIL # BLD AUTO: 0.27 THOUSAND/ÂΜL (ref 0–0.61)
EOSINOPHIL NFR BLD AUTO: 3 % (ref 0–6)
ERYTHROCYTE [DISTWIDTH] IN BLOOD BY AUTOMATED COUNT: 13.8 % (ref 11.6–15.1)
GFR SERPL CREATININE-BSD FRML MDRD: 75 ML/MIN/1.73SQ M
GLUCOSE SERPL-MCNC: 118 MG/DL (ref 65–140)
HCT VFR BLD AUTO: 54 % (ref 34.8–46.1)
HGB BLD-MCNC: 17.6 G/DL (ref 11.5–15.4)
IMM GRANULOCYTES # BLD AUTO: 0.04 THOUSAND/UL (ref 0–0.2)
IMM GRANULOCYTES NFR BLD AUTO: 0 % (ref 0–2)
INR PPP: 1.01 (ref 0.84–1.19)
LIPASE SERPL-CCNC: 20 U/L (ref 11–82)
LYMPHOCYTES # BLD AUTO: 2.15 THOUSANDS/ÂΜL (ref 0.6–4.47)
LYMPHOCYTES NFR BLD AUTO: 22 % (ref 14–44)
MCH RBC QN AUTO: 29.6 PG (ref 26.8–34.3)
MCHC RBC AUTO-ENTMCNC: 32.6 G/DL (ref 31.4–37.4)
MCV RBC AUTO: 91 FL (ref 82–98)
MONOCYTES # BLD AUTO: 0.61 THOUSAND/ÂΜL (ref 0.17–1.22)
MONOCYTES NFR BLD AUTO: 6 % (ref 4–12)
NEUTROPHILS # BLD AUTO: 6.73 THOUSANDS/ÂΜL (ref 1.85–7.62)
NEUTS SEG NFR BLD AUTO: 68 % (ref 43–75)
NRBC BLD AUTO-RTO: 0 /100 WBCS
P AXIS: 43 DEGREES
P AXIS: 45 DEGREES
PLATELET # BLD AUTO: 344 THOUSANDS/UL (ref 149–390)
PMV BLD AUTO: 9.7 FL (ref 8.9–12.7)
POTASSIUM SERPL-SCNC: 3.5 MMOL/L (ref 3.5–5.3)
PR INTERVAL: 156 MS
PR INTERVAL: 162 MS
PROT SERPL-MCNC: 7.7 G/DL (ref 6.4–8.4)
PROTHROMBIN TIME: 13.5 SECONDS (ref 11.6–14.5)
QRS AXIS: 21 DEGREES
QRS AXIS: 32 DEGREES
QRSD INTERVAL: 86 MS
QRSD INTERVAL: 88 MS
QT INTERVAL: 374 MS
QT INTERVAL: 386 MS
QTC INTERVAL: 510 MS
QTC INTERVAL: 513 MS
RBC # BLD AUTO: 5.95 MILLION/UL (ref 3.81–5.12)
RH BLD: POSITIVE
RH BLD: POSITIVE
SODIUM SERPL-SCNC: 137 MMOL/L (ref 135–147)
SPECIMEN EXPIRATION DATE: NORMAL
T WAVE AXIS: 78 DEGREES
T WAVE AXIS: 82 DEGREES
VENTRICULAR RATE: 105 BPM
VENTRICULAR RATE: 113 BPM
WBC # BLD AUTO: 9.88 THOUSAND/UL (ref 4.31–10.16)

## 2023-08-09 PROCEDURE — 36415 COLL VENOUS BLD VENIPUNCTURE: CPT | Performed by: EMERGENCY MEDICINE

## 2023-08-09 PROCEDURE — 85025 COMPLETE CBC W/AUTO DIFF WBC: CPT | Performed by: EMERGENCY MEDICINE

## 2023-08-09 PROCEDURE — 83690 ASSAY OF LIPASE: CPT | Performed by: EMERGENCY MEDICINE

## 2023-08-09 PROCEDURE — 96374 THER/PROPH/DIAG INJ IV PUSH: CPT

## 2023-08-09 PROCEDURE — 99223 1ST HOSP IP/OBS HIGH 75: CPT | Performed by: INTERNAL MEDICINE

## 2023-08-09 PROCEDURE — 86900 BLOOD TYPING SEROLOGIC ABO: CPT | Performed by: EMERGENCY MEDICINE

## 2023-08-09 PROCEDURE — 99285 EMERGENCY DEPT VISIT HI MDM: CPT

## 2023-08-09 PROCEDURE — 96375 TX/PRO/DX INJ NEW DRUG ADDON: CPT

## 2023-08-09 PROCEDURE — 99223 1ST HOSP IP/OBS HIGH 75: CPT

## 2023-08-09 PROCEDURE — 86901 BLOOD TYPING SEROLOGIC RH(D): CPT | Performed by: EMERGENCY MEDICINE

## 2023-08-09 PROCEDURE — 93010 ELECTROCARDIOGRAM REPORT: CPT | Performed by: INTERNAL MEDICINE

## 2023-08-09 PROCEDURE — 74177 CT ABD & PELVIS W/CONTRAST: CPT

## 2023-08-09 PROCEDURE — 84484 ASSAY OF TROPONIN QUANT: CPT | Performed by: EMERGENCY MEDICINE

## 2023-08-09 PROCEDURE — 99291 CRITICAL CARE FIRST HOUR: CPT | Performed by: EMERGENCY MEDICINE

## 2023-08-09 PROCEDURE — 85610 PROTHROMBIN TIME: CPT | Performed by: EMERGENCY MEDICINE

## 2023-08-09 PROCEDURE — 93005 ELECTROCARDIOGRAM TRACING: CPT

## 2023-08-09 PROCEDURE — 80053 COMPREHEN METABOLIC PANEL: CPT | Performed by: EMERGENCY MEDICINE

## 2023-08-09 PROCEDURE — C9113 INJ PANTOPRAZOLE SODIUM, VIA: HCPCS | Performed by: EMERGENCY MEDICINE

## 2023-08-09 PROCEDURE — 86850 RBC ANTIBODY SCREEN: CPT | Performed by: EMERGENCY MEDICINE

## 2023-08-09 PROCEDURE — 71045 X-RAY EXAM CHEST 1 VIEW: CPT

## 2023-08-09 PROCEDURE — 85730 THROMBOPLASTIN TIME PARTIAL: CPT | Performed by: EMERGENCY MEDICINE

## 2023-08-09 PROCEDURE — NC001 PR NO CHARGE: Performed by: EMERGENCY MEDICINE

## 2023-08-09 RX ORDER — MORPHINE SULFATE 30 MG/1
30 TABLET, FILM COATED, EXTENDED RELEASE ORAL EVERY 12 HOURS SCHEDULED
Status: DISCONTINUED | OUTPATIENT
Start: 2023-08-09 | End: 2023-08-10 | Stop reason: HOSPADM

## 2023-08-09 RX ORDER — LISINOPRIL 20 MG/1
20 TABLET ORAL DAILY
Status: DISCONTINUED | OUTPATIENT
Start: 2023-08-09 | End: 2023-08-10 | Stop reason: HOSPADM

## 2023-08-09 RX ORDER — TRAMADOL HYDROCHLORIDE 50 MG/1
50 TABLET ORAL EVERY 6 HOURS PRN
Status: DISCONTINUED | OUTPATIENT
Start: 2023-08-09 | End: 2023-08-10 | Stop reason: HOSPADM

## 2023-08-09 RX ORDER — FUROSEMIDE 40 MG/1
40 TABLET ORAL DAILY
COMMUNITY

## 2023-08-09 RX ORDER — HYDRALAZINE HYDROCHLORIDE 20 MG/ML
10 INJECTION INTRAMUSCULAR; INTRAVENOUS EVERY 6 HOURS PRN
Status: DISCONTINUED | OUTPATIENT
Start: 2023-08-09 | End: 2023-08-10 | Stop reason: HOSPADM

## 2023-08-09 RX ORDER — POTASSIUM CHLORIDE 750 MG/1
10 TABLET, EXTENDED RELEASE ORAL DAILY
Status: DISCONTINUED | OUTPATIENT
Start: 2023-08-09 | End: 2023-08-10 | Stop reason: HOSPADM

## 2023-08-09 RX ORDER — PANTOPRAZOLE SODIUM 40 MG/10ML
40 INJECTION, POWDER, LYOPHILIZED, FOR SOLUTION INTRAVENOUS EVERY 12 HOURS SCHEDULED
Status: DISCONTINUED | OUTPATIENT
Start: 2023-08-09 | End: 2023-08-09

## 2023-08-09 RX ORDER — NICOTINE 21 MG/24HR
1 PATCH, TRANSDERMAL 24 HOURS TRANSDERMAL DAILY
Status: DISCONTINUED | OUTPATIENT
Start: 2023-08-09 | End: 2023-08-10 | Stop reason: HOSPADM

## 2023-08-09 RX ORDER — METOPROLOL TARTRATE 5 MG/5ML
5 INJECTION INTRAVENOUS ONCE
Status: COMPLETED | OUTPATIENT
Start: 2023-08-09 | End: 2023-08-09

## 2023-08-09 RX ORDER — PANTOPRAZOLE SODIUM 40 MG/10ML
40 INJECTION, POWDER, LYOPHILIZED, FOR SOLUTION INTRAVENOUS ONCE
Status: COMPLETED | OUTPATIENT
Start: 2023-08-09 | End: 2023-08-09

## 2023-08-09 RX ORDER — CLONAZEPAM 0.5 MG/1
0.5 TABLET ORAL
Status: DISCONTINUED | OUTPATIENT
Start: 2023-08-09 | End: 2023-08-10 | Stop reason: HOSPADM

## 2023-08-09 RX ORDER — BISACODYL 5 MG/1
5 TABLET, DELAYED RELEASE ORAL DAILY PRN
Status: DISCONTINUED | OUTPATIENT
Start: 2023-08-09 | End: 2023-08-10 | Stop reason: HOSPADM

## 2023-08-09 RX ORDER — POLYETHYLENE GLYCOL 3350 17 G/17G
17 POWDER, FOR SOLUTION ORAL 2 TIMES DAILY
Status: DISCONTINUED | OUTPATIENT
Start: 2023-08-09 | End: 2023-08-09

## 2023-08-09 RX ORDER — ONDANSETRON 2 MG/ML
4 INJECTION INTRAMUSCULAR; INTRAVENOUS EVERY 6 HOURS PRN
Status: DISCONTINUED | OUTPATIENT
Start: 2023-08-09 | End: 2023-08-10 | Stop reason: HOSPADM

## 2023-08-09 RX ORDER — ACETAMINOPHEN 325 MG/1
650 TABLET ORAL EVERY 6 HOURS PRN
Status: DISCONTINUED | OUTPATIENT
Start: 2023-08-09 | End: 2023-08-10 | Stop reason: HOSPADM

## 2023-08-09 RX ORDER — POLYETHYLENE GLYCOL 3350 17 G/17G
17 POWDER, FOR SOLUTION ORAL 2 TIMES DAILY
Status: DISCONTINUED | OUTPATIENT
Start: 2023-08-09 | End: 2023-08-10 | Stop reason: HOSPADM

## 2023-08-09 RX ORDER — PREDNISONE 10 MG/1
10 TABLET ORAL DAILY
Status: DISCONTINUED | OUTPATIENT
Start: 2023-08-09 | End: 2023-08-10 | Stop reason: HOSPADM

## 2023-08-09 RX ORDER — OXYCODONE HYDROCHLORIDE 5 MG/1
5 TABLET ORAL EVERY 4 HOURS PRN
Status: DISCONTINUED | OUTPATIENT
Start: 2023-08-09 | End: 2023-08-10 | Stop reason: HOSPADM

## 2023-08-09 RX ORDER — FUROSEMIDE 10 MG/ML
20 INJECTION INTRAMUSCULAR; INTRAVENOUS ONCE
Status: COMPLETED | OUTPATIENT
Start: 2023-08-09 | End: 2023-08-09

## 2023-08-09 RX ORDER — FUROSEMIDE 10 MG/ML
40 INJECTION INTRAMUSCULAR; INTRAVENOUS ONCE
Status: COMPLETED | OUTPATIENT
Start: 2023-08-09 | End: 2023-08-09

## 2023-08-09 RX ORDER — ALBUTEROL SULFATE 90 UG/1
2 AEROSOL, METERED RESPIRATORY (INHALATION) EVERY 4 HOURS PRN
Status: DISCONTINUED | OUTPATIENT
Start: 2023-08-09 | End: 2023-08-10 | Stop reason: HOSPADM

## 2023-08-09 RX ORDER — FUROSEMIDE 40 MG/1
40 TABLET ORAL DAILY
Status: DISCONTINUED | OUTPATIENT
Start: 2023-08-10 | End: 2023-08-10 | Stop reason: HOSPADM

## 2023-08-09 RX ADMIN — METOROPROLOL TARTRATE 5 MG: 5 INJECTION, SOLUTION INTRAVENOUS at 17:26

## 2023-08-09 RX ADMIN — HYDRALAZINE HYDROCHLORIDE 10 MG: 20 INJECTION INTRAMUSCULAR; INTRAVENOUS at 14:39

## 2023-08-09 RX ADMIN — LISINOPRIL 20 MG: 20 TABLET ORAL at 15:52

## 2023-08-09 RX ADMIN — NITROGLYCERIN 1 INCH: 20 OINTMENT TOPICAL at 13:09

## 2023-08-09 RX ADMIN — CLONAZEPAM 0.5 MG: 0.5 TABLET ORAL at 21:05

## 2023-08-09 RX ADMIN — FUROSEMIDE 20 MG: 10 INJECTION, SOLUTION INTRAMUSCULAR; INTRAVENOUS at 13:10

## 2023-08-09 RX ADMIN — PANTOPRAZOLE SODIUM 40 MG: 40 INJECTION, POWDER, FOR SOLUTION INTRAVENOUS at 12:01

## 2023-08-09 RX ADMIN — UMECLIDINIUM BROMIDE AND VILANTEROL TRIFENATATE 1 PUFF: 62.5; 25 POWDER RESPIRATORY (INHALATION) at 17:30

## 2023-08-09 RX ADMIN — POLYETHYLENE GLYCOL 3350 17 G: 17 POWDER, FOR SOLUTION ORAL at 17:26

## 2023-08-09 RX ADMIN — IOHEXOL 100 ML: 350 INJECTION, SOLUTION INTRAVENOUS at 11:48

## 2023-08-09 RX ADMIN — OXYCODONE HYDROCHLORIDE 5 MG: 5 TABLET ORAL at 16:18

## 2023-08-09 RX ADMIN — PREDNISONE 10 MG: 10 TABLET ORAL at 15:52

## 2023-08-09 RX ADMIN — FUROSEMIDE 40 MG: 10 INJECTION, SOLUTION INTRAMUSCULAR; INTRAVENOUS at 17:26

## 2023-08-09 RX ADMIN — POLYETHYLENE GLYCOL 3350 17 G: 17 POWDER, FOR SOLUTION ORAL at 21:05

## 2023-08-09 RX ADMIN — POTASSIUM CHLORIDE 10 MEQ: 750 TABLET, EXTENDED RELEASE ORAL at 15:52

## 2023-08-09 RX ADMIN — NICOTINE 1 PATCH: 21 PATCH, EXTENDED RELEASE TRANSDERMAL at 15:53

## 2023-08-09 RX ADMIN — MORPHINE SULFATE 30 MG: 30 TABLET, EXTENDED RELEASE ORAL at 21:05

## 2023-08-09 NOTE — ED PROVIDER NOTES
History  Chief Complaint   Patient presents with   • Black or Bloody Stool     Patient arrives with complaints of black tarry stools that have been intermittent for over 1 month. 80-year-old female presents emergency department complaining of shortness of breath right-sided abdominal pain and black tarry stool. Patient states that she has a history of "water on the chest."  And hypertension that would not resolve until they diagnosed her with renal artery stenosis. Patient had a stent placed to the renal artery and was put on dialysis for short time however is now currently not on it anymore. The patient noted that her blood pressure returned to normal, and she then discontinued her carvedilol and her Plavix. The patient notes that she continues to smoke, and says for the past few days she has been feeling short of breath. She feels she needs Lasix. She also feels she has an ulcer in her abdomen because of right upper quadrant abdominal discomfort after eating. Patient also notes her stools are tarry and sticky in nature. Rectal exam done shows evidence of gross blood. Prior to Admission Medications   Prescriptions Last Dose Informant Patient Reported? Taking?    Cholecalciferol 50 MCG (2000 UT) CAPS 8/8/2023 Self Yes Yes   Sig: Take by mouth daily   MAGNESIUM PO 8/8/2023 Self Yes Yes   Sig: in the morning   albuterol (2.5 mg/3 mL) 0.083 % nebulizer solution Past Month  No Yes   Sig: Take 3 mL (2.5 mg total) by nebulization every 6 (six) hours as needed for wheezing or shortness of breath   albuterol (ProAir HFA) 90 mcg/act inhaler 8/8/2023  No Yes   Sig: Inhale 2 puffs every 4 (four) hours as needed for wheezing   aspirin 81 mg chewable tablet Past Week Self No Yes   Sig: Chew 1 tablet (81 mg total) daily   clonazePAM (KlonoPIN) 0.5 mg tablet 8/8/2023  Yes Yes   Sig: Take 0.5 mg by mouth daily at bedtime as needed   clonazePAM (KlonoPIN) 1 mg tablet Not Taking  No No   Sig: Take 1 tablet (1 mg total) by mouth daily at bedtime At night   Patient not taking: Reported on 2023   ergocalciferol (VITAMIN D2) 50,000 units 2023 Self Yes Yes   furosemide (LASIX) 40 mg tablet 2023 Self Yes Yes   Sig: Take 40 mg by mouth daily   morphine (MS CONTIN) 30 mg 12 hr tablet 2023  No Yes   Sig: Take 1 tablet (30 mg total) by mouth every 12 (twelve) hours Max Daily Amount: 60 mg   oxyCODONE-acetaminophen (PERCOCET) 5-325 mg per tablet 2023  No Yes   Sig: Take 1 tablet by mouth every 8 (eight) hours as needed for moderate pain Max Daily Amount: 3 tablets   potassium chloride (K-DUR,KLOR-CON) 10 mEq tablet Past Week  No Yes   Sig: Take 1 tablet (10 mEq total) by mouth daily If using Lasix   predniSONE 10 mg tablet 2023  No Yes   Sig: Take 1 tablet (10 mg total) by mouth daily   umeclidinium-vilanterol (Anoro Ellipta) 62.5-25 MCG/INH inhaler 2023 Self No Yes   Sig: Inhale 1 puff daily      Facility-Administered Medications: None       Past Medical History:   Diagnosis Date   • Cardiomyopathy ([address])    • Chronic combined systolic and diastolic congestive heart failure    • COPD (chronic obstructive pulmonary disease) ([address])    • Fatty liver    • Hyperlipidemia    • Hypertension    • Mitral regurgitation    • Sepsis        Past Surgical History:   Procedure Laterality Date   • CARDIAC CATHETERIZATION  2021    Moderate non-obstructive atherosclerosis   •  SECTION     • CHOLECYSTECTOMY     • IR RENAL ANGIOGRAM  2022   • IR TEMPORARY DIALYSIS CATHETER CHECK/CHANGE/REPOSITION  2022   • IR TUNNELED DIALYSIS CATHETER PLACEMENT  2022   • IR TUNNELED DIALYSIS CATHETER REMOVAL  2022   • ROTATOR CUFF REPAIR W/ DISTAL CLAVICLE EXCISION Right    • TONSILLECTOMY         History reviewed. No pertinent family history. I have reviewed and agree with the history as documented.     E-Cigarette/Vaping   • E-Cigarette Use Never User    • Comments Pt states she want to quit smoking E-Cigarette/Vaping Substances   • Nicotine Yes    • THC No    • CBD No    • Flavoring No    • Other No    • Unknown No      Social History     Tobacco Use   • Smoking status: Every Day     Packs/day: 1.00     Types: Cigarettes   • Smokeless tobacco: Never   Vaping Use   • Vaping Use: Never used   Substance Use Topics   • Alcohol use: Never   • Drug use: No       Review of Systems   Constitutional: Positive for activity change. Negative for chills and fever. HENT: Positive for congestion. Negative for ear pain and sore throat. Eyes: Negative for pain and visual disturbance. Respiratory: Positive for chest tightness and shortness of breath. Negative for cough. Cardiovascular: Negative for chest pain and palpitations. Gastrointestinal: Positive for abdominal pain and blood in stool. Negative for vomiting. Genitourinary: Negative for dysuria and hematuria. Musculoskeletal: Negative for arthralgias and back pain. Skin: Negative for color change and rash. Neurological: Positive for weakness. Negative for seizures and syncope. All other systems reviewed and are negative. Physical Exam  Physical Exam  Vitals and nursing note reviewed. Constitutional:       General: She is not in acute distress. Appearance: Normal appearance. She is well-developed. She is ill-appearing. HENT:      Head: Normocephalic and atraumatic. Right Ear: External ear normal.      Left Ear: External ear normal.      Nose: Nose normal.      Mouth/Throat:      Mouth: Mucous membranes are moist.   Eyes:      Conjunctiva/sclera: Conjunctivae normal.   Cardiovascular:      Rate and Rhythm: Regular rhythm. Tachycardia present. Pulses: Normal pulses. Heart sounds: Normal heart sounds. No murmur heard. Pulmonary:      Effort: Pulmonary effort is normal. No respiratory distress. Breath sounds: Normal breath sounds. Abdominal:      General: Bowel sounds are normal.      Palpations: Abdomen is soft. Tenderness: There is abdominal tenderness. There is no guarding or rebound. Genitourinary:     Rectum: Guaiac result positive. Musculoskeletal:         General: No swelling or deformity. Cervical back: Neck supple. Right lower leg: Edema present. Left lower leg: Edema present. Comments: Trace lower extremity edema bilaterally   Skin:     General: Skin is warm and dry. Capillary Refill: Capillary refill takes less than 2 seconds. Coloration: Skin is not pale. Neurological:      General: No focal deficit present. Mental Status: She is alert and oriented to person, place, and time. Mental status is at baseline.          Vital Signs  ED Triage Vitals   Temperature Pulse Respirations Blood Pressure SpO2   08/09/23 1021 08/09/23 1021 08/09/23 1021 08/09/23 1021 08/09/23 1021   (!) 97.3 °F (36.3 °C) (!) 107 18 (!) 231/116 94 %      Temp Source Heart Rate Source Patient Position - Orthostatic VS BP Location FiO2 (%)   08/09/23 1021 08/09/23 1021 08/09/23 1624 08/09/23 1021 --   Temporal Monitor Sitting Left arm       Pain Score       08/09/23 1021       7           Vitals:    08/09/23 1823 08/09/23 1831 08/09/23 2235 08/10/23 0726   BP: (!) 168/109 160/92 138/80 123/80   Pulse: (!) 108  (!) 109 101   Patient Position - Orthostatic VS:  Lying Lying          Visual Acuity      ED Medications  Medications   pantoprazole (PROTONIX) injection 40 mg (40 mg Intravenous Given 8/9/23 1201)   iohexol (OMNIPAQUE) 350 MG/ML injection (SINGLE-DOSE) 100 mL (100 mL Intravenous Given 8/9/23 1148)   furosemide (LASIX) injection 20 mg (20 mg Intravenous Given 8/9/23 1310)   nitroglycerin (NITRO-BID) 2 % TD ointment 1 inch (1 inch Topical Given 8/9/23 1309)   furosemide (LASIX) injection 40 mg (40 mg Intravenous Given 8/9/23 1726)   metoprolol (LOPRESSOR) injection 5 mg (5 mg Intravenous Given 8/9/23 1726)       Diagnostic Studies  Results Reviewed     Procedure Component Value Units Date/Time Comprehensive metabolic panel [[id number]]  (Abnormal) Collected: 08/10/23 0532    Lab Status: Final result Specimen: Blood from Arm, Left Updated: 08/10/23 0558     Sodium 138 mmol/L      Potassium 3.5 mmol/L      Chloride 100 mmol/L      CO2 29 mmol/L      ANION GAP 9 mmol/L      BUN 15 mg/dL      Creatinine 0.94 mg/dL      Glucose 105 mg/dL      Calcium 9.9 mg/dL      AST 15 U/L      ALT 11 U/L      Alkaline Phosphatase 106 U/L      Total Protein 7.2 g/dL      Albumin 4.1 g/dL      Total Bilirubin 0.69 mg/dL      eGFR 65 ml/min/1.73sq m     Narrative:      [place] guidelines for Chronic Kidney Disease (CKD):   •  Stage 1 with normal or high GFR (GFR > 90 mL/min/1.73 square meters)  •  Stage 2 Mild CKD (GFR = 60-89 mL/min/1.73 square meters)  •  Stage 3A Moderate CKD (GFR = 45-59 mL/min/1.73 square meters)  •  Stage 3B Moderate CKD (GFR = 30-44 mL/min/1.73 square meters)  •  Stage 4 Severe CKD (GFR = 15-29 mL/min/1.73 square meters)  •  Stage 5 End Stage CKD (GFR <15 mL/min/1.73 square meters)  Note: GFR calculation is accurate only with a steady state creatinine    CBC (With Platelets) [[id number]]  (Abnormal) Collected: 08/10/23 0532    Lab Status: Final result Specimen: Blood from Arm, Left Updated: 08/10/23 0540     WBC 8.93 Thousand/uL      RBC 5.69 Million/uL      Hemoglobin 16.6 g/dL      Hematocrit 51.6 %      MCV 91 fL      MCH 29.2 pg      MCHC 32.2 g/dL      RDW 14.0 %      Platelets 169 Thousands/uL      MPV 9.2 fL     HS Troponin I 4hr [[id number]]  (Abnormal) Collected: 08/09/23 1638    Lab Status: Final result Specimen: Blood from Arm, Left Updated: 08/09/23 1707     hs TnI 4hr 85 ng/L      Delta 4hr hsTnI 6 ng/L     HS Troponin I 2hr [[id number]]  (Abnormal) Collected: 08/09/23 1302    Lab Status: Final result Specimen: Blood from Arm, Right Updated: 08/09/23 1335     hs TnI 2hr 79 ng/L      Delta 2hr hsTnI 0 ng/L     Comprehensive metabolic panel [[id number]]  (Abnormal) Collected: 08/09/23 1056    Lab Status: Final result Specimen: Blood from Arm, Right Updated: 08/09/23 1133     Sodium 137 mmol/L      Potassium 3.5 mmol/L      Chloride 98 mmol/L      CO2 29 mmol/L      ANION GAP 10 mmol/L      BUN 12 mg/dL      Creatinine 0.83 mg/dL      Glucose 118 mg/dL      Calcium 9.8 mg/dL      AST 14 U/L      ALT 11 U/L      Alkaline Phosphatase 110 U/L      Total Protein 7.7 g/dL      Albumin 4.3 g/dL      Total Bilirubin 0.67 mg/dL      eGFR 75 ml/min/1.73sq m     Narrative:      [place] guidelines for Chronic Kidney Disease (CKD):   •  Stage 1 with normal or high GFR (GFR > 90 mL/min/1.73 square meters)  •  Stage 2 Mild CKD (GFR = 60-89 mL/min/1.73 square meters)  •  Stage 3A Moderate CKD (GFR = 45-59 mL/min/1.73 square meters)  •  Stage 3B Moderate CKD (GFR = 30-44 mL/min/1.73 square meters)  •  Stage 4 Severe CKD (GFR = 15-29 mL/min/1.73 square meters)  •  Stage 5 End Stage CKD (GFR <15 mL/min/1.73 square meters)  Note: GFR calculation is accurate only with a steady state creatinine    Lipase [[id number]]  (Normal) Collected: 08/09/23 1056    Lab Status: Final result Specimen: Blood from Arm, Right Updated: 08/09/23 1133     Lipase 20 u/L     HS Troponin 0hr (reflex protocol) [[id number]]  (Abnormal) Collected: 08/09/23 1056    Lab Status: Final result Specimen: Blood from Arm, Right Updated: 08/09/23 1130     hs TnI 0hr 79 ng/L     Protime-INR [[id number]]  (Normal) Collected: 08/09/23 1056    Lab Status: Final result Specimen: Blood from Arm, Right Updated: 08/09/23 1119     Protime 13.5 seconds      INR 1.01    APTT [[id number]]  (Normal) Collected: 08/09/23 1056    Lab Status: Final result Specimen: Blood from Arm, Right Updated: 08/09/23 1119     PTT 30 seconds     CBC and differential [[id number]]  (Abnormal) Collected: 08/09/23 1056    Lab Status: Final result Specimen: Blood from Arm, Right Updated: 08/09/23 1107     WBC 9.88 Thousand/uL      RBC 5.95 Million/uL      Hemoglobin 17.6 g/dL      Hematocrit 54.0 %      MCV 91 fL      MCH 29.6 pg      MCHC 32.6 g/dL      RDW 13.8 %      MPV 9.7 fL      Platelets 980 Thousands/uL      nRBC 0 /100 WBCs      Neutrophils Relative 68 %      Immat GRANS % 0 %      Lymphocytes Relative 22 %      Monocytes Relative 6 %      Eosinophils Relative 3 %      Basophils Relative 1 %      Neutrophils Absolute 6.73 Thousands/µL      Immature Grans Absolute 0.04 Thousand/uL      Lymphocytes Absolute 2.15 Thousands/µL      Monocytes Absolute 0.61 Thousand/µL      Eosinophils Absolute 0.27 Thousand/µL      Basophils Absolute 0.08 Thousands/µL                  CT abdomen pelvis with contrast   Final Result by Emily Tam MD (08/09 1243)   No obvious acute findings, within the limitations of routine CT abdomen and pelvis protocol. Bowel normal in course and caliber, with hard stool in the sigmoid colon and rectum. Scattered uncomplicated colonic diverticula. Normal appendix. Chronic findings, as per the body of the report. Workstation performed: [id number]         XR chest 1 view portable   Final Result by Lashell Teran MD (08/09 1423)      No acute cardiopulmonary disease. Workstation performed: [id number]                    Procedures  ECG 12 Lead Documentation Only    Date/Time: 8/9/2023 11:33 AM    Performed by: David Hampton DO  Authorized by: David Hampton DO    ECG reviewed by me, the ED Provider: yes    Patient location:  ED  Comments:      EKG is sinus tach at 113 beats a minute. Axis is normal.  The patient has LVH by voltage criteria. There is concave up J-point elevation and ST segment changes in V1 2 and 3 which are consistent with repolarization changes and LVH. The patient has no definitive acute ST or T wave changes consistent with ischemia or infarction    CriticalCare Time    Date/Time: 8/10/2023 2:55 PM    Performed by:  David Hampton DO  Authorized by: Peyton Garrison., DO    Critical care provider statement:     Critical care time (minutes):  54    Critical care was necessary to treat or prevent imminent or life-threatening deterioration of the following conditions:  Cardiac failure and respiratory failure    Critical care was time spent personally by me on the following activities:  Evaluation of patient's response to treatment, examination of patient, development of treatment plan with patient or surrogate, ordering and review of laboratory studies, ordering and review of radiographic studies, re-evaluation of patient's condition and review of old charts             ED Course  ED Course as of 08/10/23 1455   Wed Aug 09, 2023   1228 hs TnI 0hr(!): 79                               SBIRT 22yo+    Kenneth Hun Most Recent Value   Initial Alcohol Screen: US AUDIT-C     1. How often do you have a drink containing alcohol? 0 Filed at: 08/09/2023 1023   2. How many drinks containing alcohol do you have on a typical day you are drinking? 0 Filed at: 08/09/2023 1023   3a. Male UNDER 65: How often do you have five or more drinks on one occasion? 0 Filed at: 08/09/2023 1023   3b. FEMALE Any Age, or MALE 65+: How often do you have 4 or more drinks on one occassion? 0 Filed at: 08/09/2023 1023   Audit-C Score 0 Filed at: 08/09/2023 1023   SYDNEY: How many times in the past year have you. .. Used an illegal drug or used a prescription medication for non-medical reasons? Never Filed at: 08/09/2023 1023                    Medical Decision Making  Patient is a 51-year-old female who presents emergency room complaining of shortness of breath and pain in her right abdo, and notes that she feels that her symptoms are due to an ulcer, creating shortness of breath. The patient notes that she has had an upper endoscopy in the past but never had a colonoscopy. Patient denies any weight loss.   Patient notes that despite what ever she eats, she is having black sticky and sometimes grossly red stool. Patient denies any chest pain but admits that she feels short of breath, and notes she has recently stopped her Lasix after having an evaluation for renal artery stenosis as well as a stenting. Patient notes that her procedure was supposed to have resolved her hypertension. The patient also notes that she no longer on anticoagulants. Differential diagnosis is acute coronary syndrome, symptomatic anemia, electrolyte abnormality, GI malignancy, diverticulitis or colitis. AV fistula is also possible in the gut. Patient's EKG of the acute MI however troponin is elevated at over 70. The patient's chest x-ray shows mild vascular congestion and interstitial lung disease changes. Abdominal CT however is negative. The patient will need to be admitted due to elevated troponin, shortness of breath with mild pulmonary vascular congestion as well as GI bleeding and therefore inability to treat elevated troponins with anticoagulants or antiplatelets. Patient admitted    Amount and/or Complexity of Data Reviewed  Labs: ordered. Decision-making details documented in ED Course. Radiology: ordered. Risk  Prescription drug management. Decision regarding hospitalization.           Disposition  Final diagnoses:   GI bleed   Hypertension   Elevated troponin   Rectal bleeding   Hypertensive urgency   Constipation     Time reflects when diagnosis was documented in both MDM as applicable and the Disposition within this note     Time User Action Codes Description Comment    8/9/2023  1:23 PM Kayla Bertrand Add [K92.2] GI bleed     8/9/2023  1:23 PM Brutico, Twanna Opitz Add [I10] Hypertension     8/9/2023  1:23 PM Brutico, Twanna Opitz Add [R77.8] Elevated troponin     8/9/2023  2:39 PM Meryl Rosales Add [K62.5] Rectal bleeding     8/10/2023  8:15 AM Meryl Rosales Add [I16.0] Hypertensive urgency     8/10/2023  8:15 AM Meryl Rosales Add [K59.00] Constipation       ED Disposition     ED Disposition   Admit    Condition   Stable    Date/Time   Wed Aug 9, 2023  1:23 PM    Comment   Case was discussed with [facility] and the patient's admission status was agreed to be Admission Status: inpatient status to the service of Dr. Lina Ott .            Follow-up Information     Follow up With Specialties Details Why [facility], DO Family Medicine, Internal Medicine Call in 1 week(s)  [address]  [place] [address]  [address] [facility], DO Gastroenterology Schedule an appointment as soon as possible for a visit Please call to schedule a follow-up appointment for colonoscopy screening [address] [place] [address] [address] [facility]  [phone]            Discharge Medication List as of 8/10/2023  9:22 AM      START taking these medications    Details   bisacodyl (DULCOLAX) 5 mg EC tablet Take 1 tablet (5 mg total) by mouth daily as needed for constipation, Starting Thu 8/10/2023, Normal      docusate sodium (COLACE) 100 mg capsule Take 1 capsule (100 mg total) by mouth 2 (two) times a day, Starting Thu 8/10/2023, Until Sat 9/9/2023, Normal      lisinopril (ZESTRIL) 20 mg tablet Take 1 tablet (20 mg total) by mouth daily, Starting Thu 8/10/2023, Until Sat 9/9/2023, Normal         CONTINUE these medications which have NOT CHANGED    Details   albuterol (2.5 mg/3 mL) 0.083 % nebulizer solution Take 3 mL (2.5 mg total) by nebulization every 6 (six) hours as needed for wheezing or shortness of breath, Starting Tue 7/18/2023, Until Thu 8/17/2023 at 2359, Normal      albuterol (ProAir HFA) 90 mcg/act inhaler Inhale 2 puffs every 4 (four) hours as needed for wheezing, Starting Tue 7/18/2023, Normal      aspirin 81 mg chewable tablet Chew 1 tablet (81 mg total) daily, Starting Wed 8/3/2022, OTC      Cholecalciferol 50 MCG (2000 UT) CAPS Take by mouth daily, Starting Thu 8/18/2022, Historical Med      !! clonazePAM (KlonoPIN) 0.5 mg tablet Take 0.5 mg by mouth daily at bedtime as needed, Starting Mon 5/8/2023, Historical Med      ergocalciferol (VITAMIN D2) 50,000 units Historical Med      furosemide (LASIX) 40 mg tablet Take 40 mg by mouth daily, Historical Med      MAGNESIUM PO in the morning, Historical Med      morphine (MS CONTIN) 30 mg 12 hr tablet Take 1 tablet (30 mg total) by mouth every 12 (twelve) hours Max Daily Amount: 60 mg, Starting Fri 8/4/2023, Normal      oxyCODONE-acetaminophen (PERCOCET) 5-325 mg per tablet Take 1 tablet by mouth every 8 (eight) hours as needed for moderate pain Max Daily Amount: 3 tablets, Starting Fri 8/4/2023, Normal      potassium chloride (K-DUR,KLOR-CON) 10 mEq tablet Take 1 tablet (10 mEq total) by mouth daily If using Lasix, Starting Fri 4/14/2023, Normal      predniSONE 10 mg tablet Take 1 tablet (10 mg total) by mouth daily, Starting Tue 7/18/2023, Until Thu 8/17/2023, Normal      umeclidinium-vilanterol (Anoro Ellipta) 62.5-25 MCG/INH inhaler Inhale 1 puff daily, Starting Tue 6/21/2022, Normal      !! clonazePAM (KlonoPIN) 1 mg tablet Take 1 tablet (1 mg total) by mouth daily at bedtime At night, Starting Fri 8/4/2023, Normal       !! - Potential duplicate medications found. Please discuss with provider.           Outpatient Discharge Orders   Discharge Diet     Activity as tolerated     Call provider for:  severe uncontrolled pain     Call provider for: active or persistent bleeding     Call provider for:  difficulty breathing, headache or visual disturbances     Call provider for:  persistent dizziness or light-headedness     Call provider for:  extreme fatigue       PDMP Review       Value Time User    PDMP Reviewed  Yes 8/4/2023  4:11 PM Ross Schmitt DO          ED Provider  Electronically Signed by           Cristofer Rivera., DO  08/10/23 [facility] [address] DO  08/10/23 9529

## 2023-08-09 NOTE — ASSESSMENT & PLAN NOTE
· Present on admission,   · 1 inch nitropaste to ACW by ED physician  · Patient reports that she was recently taken off of all of her blood pressure medications by her PCP and that her systolic blood pressure at her last appointment was 120  · Also has history of renal artery stenosis, will check renal ultrasound  · Initiate lisinopril 20 mg daily  · Monitor blood pressure every 4 hours

## 2023-08-09 NOTE — CONSULTS
[facility] - Inpatient Consultation    PATIENT INFORMATION      Harley Rosario 58 y.o. female MRN: [id number]  Unit/Bed#: ED 18 Encounter: [id number]  PCP: Jerilyn Layton DO  Date of Admission:  8/9/2023  Date of Consultation: 08/09/23  Requesting Physician: Louis Davidson DO       ASSESSMENT & PLAN     Harley Rosario is a 58 y.o. old female with PMH of significant for hypertension, chronic pain, COPD, history of congestive heart failure, CKD who presented with concern of 1 month on intermittent black stool and R sided abdominal pain. Admitted for hypertensive urgency with GI consult for rectal bleeding and abdominal pain. Rectal Bleeding  Patient with reports of intermittent black stool for the past 1 month, with occasional bright red blood. Stopped aspirin at home, with improvement in symptoms. States significant constipation with no improvement with use of Miralax and Stool softeners. Hgb on presentation 17.6.   · Etiology - unclear at this time, however, suspect 2/2 severe constipation  · No plan for endoscopic evaluation as Hgb currently stable and patient with hypertensive urgency  · Patient okay to continue aspirin 81mg daily from GI perspective  · Clear for regular diet  · Continue PPI BID  · Will plan for outpatient colonoscopy once acute issues improve  · Will reach out to our office to establish outpatient follow up  · With significant opioid use - will need extensive prep for adequate evaluation    Constipation, IBS  Intermittently uses miralax and stool softeners at home without improvement  · Likely IBS-C exacerbated by opioid use as patient improves with bowel movement  · Initiate Miralax BID  · Dulcolax PRN  · Monitor output and advance regimen as needed  · Can consider relistor in future/outpatient setting if needed long-term    Colon Cancer Screening   Plan for outpatient colonoscopy    Chronic opioid use  Patient on Morphine 30mg BID and Percocet 5-325 TID PRN for chronic pain. · Discussed this will exacerbate constipation - patient understands  · Advised the importance of regular bowel regimen to avoid constipation and side effects    GI will follow from the periphery at this time. Please reach out with any additional questions or concerns. REASON FOR CONSULTATION      Rectal bleeding    HISTORY OF PRESENT ILLNESS      Crispin Del Rosario is a 58 y.o. female with PMH significant for hypertension, chronic pain, COPD, history of congestive heart failure, CKD who presented to [address] [place] [address], 101 on 8/9 for concern of black stools for 1 month with associated right-sided abdominal pain. Patient is very tangential and overall poor historian. Does state that pain is worse with eating, but improves with bowel movements. States that black stools waxes/wanes. She reports that she does not regularly move her bowels, and when she does her stool is very hard. She has taken miralax and stool softeners at home, which rarely help. Patient does report that she takes significant opioid regimen at home for her chronic pain including MS contin 30mg BID and Percocet 5-325mg TID PRN. Patient reports that she is supposed to be on antiplatelet therapy with aspirin and plavix, but stopped due to concern for bleeding. Patient ultimately admitted to hospital for hypertensive urgency and GI consulted for occult blood in stool.      No prior EGD/Colonoscopy - patient has refused colon Ca screening in the past.    REVIEW OF SYSTEMS     CONSTITUTIONAL: Denies any fever, chills, rigors, and weight loss  HEENT: No earache or tinnitus, denies hearing loss or visual disturbances  CARDIOVASCULAR: No chest pain or palpitations   RESPIRATORY: Denies any cough, hemoptysis, shortness of breath or dyspnea on exertion  GASTROINTESTINAL: As noted in the History of Present Illness   GENITOURINARY: No problems with urination, denies any hematuria or dysuria  NEUROLOGIC: No dizziness or vertigo, denies headaches   MUSCULOSKELETAL: Denies any muscle or joint pain   SKIN: Denies skin rashes or itching   ENDOCRINE: Denies excessive thirst, denies intolerance to heat or cold  PSYCHOSOCIAL: Denies depression or anxiety, denies any recent memory loss     PAST MEDICAL & SURGICAL HISTORY      Past Medical History:   Diagnosis Date   • Cardiomyopathy [facility])    • Chronic combined systolic and diastolic congestive heart failure    • COPD (chronic obstructive pulmonary disease) (HCC)    • Fatty liver    • Hyperlipidemia    • Hypertension    • Mitral regurgitation    • Sepsis        Past Surgical History:   Procedure Laterality Date   • CARDIAC CATHETERIZATION  2021    Moderate non-obstructive atherosclerosis   •  SECTION     • CHOLECYSTECTOMY     • IR RENAL ANGIOGRAM  2022   • IR TEMPORARY DIALYSIS CATHETER CHECK/CHANGE/REPOSITION  2022   • IR TUNNELED DIALYSIS CATHETER PLACEMENT  2022   • IR TUNNELED DIALYSIS CATHETER REMOVAL  2022   • ROTATOR CUFF REPAIR W/ DISTAL CLAVICLE EXCISION Right    • TONSILLECTOMY         MEDICATIONS & ALLERGIES       Medications:   (Not in a hospital admission)    Current Facility-Administered Medications   Medication Dose Route Frequency   • acetaminophen (TYLENOL) tablet 650 mg  650 mg Oral Q6H PRN   • albuterol (PROVENTIL HFA,VENTOLIN HFA) inhaler 2 puff  2 puff Inhalation Q4H PRN   • clonazePAM (KlonoPIN) tablet 0.5 mg  0.5 mg Oral HS   • [START ON 8/10/2023] furosemide (LASIX) tablet 40 mg  40 mg Oral Daily   • hydrALAZINE (APRESOLINE) injection 10 mg  10 mg Intravenous Q6H PRN   • morphine (MS CONTIN) ER tablet 30 mg  30 mg Oral Q12H [address]   • nicotine (NICODERM CQ) 21 mg/24 hr TD 24 hr patch 1 patch  1 patch Transdermal Daily   • ondansetron (ZOFRAN) injection 4 mg  4 mg Intravenous Q6H PRN   • pantoprazole (PROTONIX) injection 40 mg  40 mg Intravenous Q12H [address]   • potassium chloride (K-DUR,KLOR-CON) CR tablet 10 mEq  10 mEq Oral Daily   • predniSONE tablet 10 mg  10 mg Oral Daily   • sodium chloride 0.9 % bolus 1,000 mL  1,000 mL Intravenous Once   • umeclidinium-vilanterol 62.5-25 mcg/actuation inhaler 1 puff  1 puff Inhalation Daily       Allergies: Allergies   Allergen Reactions   • Wellbutrin [Bupropion] Arthralgia       SOCIAL HISTORY      Social History     Marital Status: Single    Substance Use History:   Social History     Substance and Sexual Activity   Alcohol Use Never     Social History     Tobacco Use   Smoking Status Every Day   • Packs/day: 1.00   • Types: Cigarettes   Smokeless Tobacco Never     Social History     Substance and Sexual Activity   Drug Use No       FAMILY HISTORY      History reviewed. No pertinent family history. PHYSICAL EXAM     Objective   Blood pressure (!) 240/105, pulse 105, temperature (!) 97.3 °F (36.3 °C), temperature source Temporal, resp. rate 18, weight 90.7 kg (200 lb), SpO2 90 %. Body mass index is 29.53 kg/m². No intake or output data in the 24 hours ending 08/09/23 1458    General Appearance:   Alert, cooperative, no distress, tangential   HEENT:   Normocephalic, atraumatic, anicteric     Neck:   Supple, symmetrical, trachea midline   Lungs:   Equal chest rise, respirations unlabored, cough    Heart:   Regular rate and rhythm   Abdomen:   Soft, non-tender, non-distended; normal bowel sounds; no masses, no organomegaly    Rectal:   Deferred    Extremities:   No cyanosis, clubbing or edema    Neuro:    Moves all 4 extremities    Skin:   No jaundice, rashes, or lesions      ADDITIONAL DATA     Lab Results:     Results from last 7 days   Lab Units 08/09/23  1056   WBC Thousand/uL 9.88   HEMOGLOBIN g/dL 17.6*   HEMATOCRIT % 54.0*   PLATELETS Thousands/uL 344   NEUTROS PCT % 68   LYMPHS PCT % 22   MONOS PCT % 6   EOS PCT % 3     Results from last 7 days   Lab Units 08/09/23  1056   POTASSIUM mmol/L 3.5   CHLORIDE mmol/L 98   CO2 mmol/L 29   BUN mg/dL 12   CREATININE mg/dL 0.83   CALCIUM mg/dL 9.8   ALK PHOS U/L 110*   ALT U/L 11   AST U/L 14     Results from last 7 days   Lab Units 08/09/23  1056   INR  1.01       Imaging:    XR chest 1 view portable    Result Date: 8/9/2023  Narrative: CHEST INDICATION:   depressed. COMPARISON: 9/20/2022. 7/29/2022 EXAM PERFORMED/VIEWS:  XR CHEST PORTABLE FINDINGS: Cardiomediastinal silhouette appears unremarkable. The lungs are clear. Chronic interstitial accentuation. No pneumothorax or pleural effusion. Osseous structures appear within normal limits for patient age. Impression: No acute cardiopulmonary disease. Workstation performed: [id number]     CT abdomen pelvis with contrast    Result Date: 8/9/2023  Narrative: CT ABDOMEN AND PELVIS WITH IV CONTRAST INDICATION:   GI Bleed. COMPARISON: 10/21/2014 TECHNIQUE:  CT examination of the abdomen and pelvis was performed. Multiplanar 2D reformatted images were created from the source data. This examination, like all CT scans performed in the [facility], was performed utilizing techniques to minimize radiation dose exposure, including the use of iterative reconstruction and automated exposure control. Radiation dose length product (DLP) for this visit:  832.07 mGy-cm IV Contrast:  100 mL of iohexol (OMNIPAQUE) Enteric Contrast:  Enteric contrast was not administered. FINDINGS: ABDOMEN LOWER CHEST: Discoid density in the lingula, likely subsegmental atelectasis or scarring LIVER/BILIARY TREE: Mild hepatomegaly, 19 cm craniocaudad. No focal lesions. No intrahepatic biliary dilatation. Prominence of the CBD up to 11 mm, unchanged from 2013, and therefore likely due to remote cholecystectomy/age-related changes. GALLBLADDER: Surgically absent SPLEEN:  Unremarkable. PANCREAS:  Unremarkable. ADRENAL GLANDS:  Unremarkable. KIDNEYS/URETERS: Severe atrophy of the right kidney. 2 mm nonobstructive calculus. Otherwise unremarkable left kidney.  No hydroureteronephrosis STOMACH AND BOWEL: No evidence of bowel obstruction or gross inflammatory process. No focal findings to account for reported GI bleed. Scattered colonic diverticula. No diverticulitis. Hard stool in the sigmoid colon and rectum, intermediate density, therefore limiting evaluation. The study is obtained in the late portal venous phase postcontrast. APPENDIX: A normal appendix was visualized. ABDOMINOPELVIC CAVITY:  No ascites. No pneumoperitoneum. No lymphadenopathy. VESSELS: Atherosclerotic changes are present. No evidence of aneurysm. Stent in the proximal left renal artery. Right renal artery stenosis on the basis of circumferential calcified plaque. 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 PELVIS REPRODUCTIVE ORGANS:  Unremarkable for patient's age. URINARY BLADDER:  Unremarkable. ABDOMINAL WALL/INGUINAL REGIONS:  Unremarkable. OSSEOUS STRUCTURES:  No acute fracture or destructive osseous lesion. Impression: No obvious acute findings, within the limitations of routine CT abdomen and pelvis protocol. Bowel normal in course and caliber, with hard stool in the sigmoid colon and rectum. Scattered uncomplicated colonic diverticula. Normal appendix. Chronic findings, as per the body of the report. Workstation performed: [id number]       EKG, Pathology, and Other Studies Reviewed on Admission:   · EKG: Reviewed      Counseling / Coordination of Care Time: 30 total mins spent in consult. Greater than 50% of total time spent on patient counseling and coordination of care. Earla Brittle, DO  Gastroenterology Fellow  [place]  Division of Gastroenterology and Hepatology  Available on CIBDO  . ..............................................................................................................................................  ** Please Note: This note is constructed using a voice recognition dictation system.  **

## 2023-08-09 NOTE — ASSESSMENT & PLAN NOTE
· Without exacerbation  · Currently on room air with nonlabored respirations  · Continue as needed albuterol  · Continue home prednisone

## 2023-08-09 NOTE — ASSESSMENT & PLAN NOTE
· Currently without signs of anxiety  · Continue Klonopin at bedtime patient states she is no longer taking a.m. dose

## 2023-08-09 NOTE — ASSESSMENT & PLAN NOTE
· Patient presented to the ED with complaints of bright red rectal bleeding that started this week.   · Patient also noted that she has been having problems of constipation  · Hemoglobin on arrival 17.6  · Patient reports she is eating and drinking without difficulty prehospital  · Consult GI  · Will start on clear liquids  · IV Protonix  · H/H every 12 hours  · Continue to monitor for bleeding

## 2023-08-09 NOTE — H&P
[address]  H&P  Name: Mark Rodriguez 58 y.o. female I MRN: [id number]  Unit/Bed#: -01 I Date of Admission: 8/9/2023   Date of Service: 8/9/2023 I Hospital Day: 0      Assessment/Plan   * Rectal bleeding  Assessment & Plan  · Patient presented to the ED with complaints of bright red rectal bleeding that started this week.   · Patient also noted that she has been having problems of constipation  · Hemoglobin on arrival 17.6  · Patient reports she is eating and drinking without difficulty prehospital  · Consult GI  · Will start on clear liquids  · IV Protonix  · H/H every 12 hours  · Continue to monitor for bleeding    Hypertensive urgency  Assessment & Plan  · Present on admission,   · 1 inch nitropaste to ACW by ED physician  · Patient reports that she was recently taken off of all of her blood pressure medications by her PCP and that her systolic blood pressure at her last appointment was 120  · Also has history of renal artery stenosis, will check renal ultrasound  · Initiate lisinopril 20 mg daily  · Monitor blood pressure every 4 hours    Renal artery stenosis (HCC)  Assessment & Plan  · Patient reports history of renal artery stenosis, presented with elevated blood pressure  · Patient noted that her blood pressure was normal at her last doctor's visit with a systolic pressure of 742 and that she was taken off all of her blood pressure medications at that time  · Serum creatinine today 0.83  · Reports voiding without difficulty  · Will repeat renal ultrasound    Anxiety  Assessment & Plan  · Currently without signs of anxiety  · Continue Klonopin at bedtime patient states she is no longer taking a.m. dose    COPD (chronic obstructive pulmonary disease) ([address])  Assessment & Plan  · Without exacerbation  · Currently on room air with nonlabored respirations  · Continue as needed albuterol  · Continue home prednisone    Tobacco abuse  Assessment & Plan  · Encouraged smoking cessation  · NicoDerm patch        VTE Prophylaxis: Pharmacologic VTE Prophylaxis contraindicated due to Ported bright red bleeding per rectum prehospital  / sequential compression device   Code Status: Full  POLST: POLST is not applicable to this patient  Discussion with family: Treatment plan discussed with patient understands the plan as it has been explained and is agreeable to the plan as stated. All questions answered to satisfaction. Anticipated Length of Stay:  Patient will be admitted on an Inpatient basis with an anticipated length of stay of greater than 2 midnights. Justification for Hospital Stay: GI consulted, monitor for bleeding, monitor H&H closely, hypertensive urgency on arrival initiated medications need to monitor effectiveness, pending renal ultrasound    Total Time for Visit, including Counseling / Coordination of Care: 45 minutes. Greater than 50% of this total time spent on direct patient counseling and coordination of care. Chief Complaint:   Bright red rectal bleeding, shortness of breath today    History of Present Illness:    Rachel Rogers is a 58 y.o. female who reported that she was coming in today for outpatient blood work and "just did not feel right so I went to the ED instead". When asked to elaborate she said she felt short of breath with ambulation and that she noted she was having some bright red rectal bleeding over the last few days which concerned her so she decided to go to the emergency room. On arrival to the emergency room her systolic pressure was 299, 1 inch Nitropaste was applied by the ED physician. Patient reported to me that she has a history of renal artery stenosis which improved after stenting. She reported that her blood pressure was well-controlled and that her PCP recently discontinued all of her blood pressure medication. She also added that she was not taking her Plavix anymore because she did not like the way it made her head feel.   She states that her PCP was aware of that and told her to start aspirin immediately, although patient states that she is not taking the aspirin either because it upsets her stomach even though it is enteric-coated. In regards to her blood pressure of added as needed hydralazine and will initiate lisinopril. Patient states that she hates being on any blood pressure medications because she does not like the way they make her feel, however, she understands because of her blood pressure she needs some at this time. In regards to her shortness of breath, on my exam she was saturating 96% on room air with clear lungs in all fields and no cough. Reviewing the chest x-ray noted no acute cardiopulmonary disease. Patient was given 20 mg IV Lasix in the ED. I will resume patient's home Lasix regimen starting tomorrow which is 40 mg daily. Patient is noted to have +1 edema of her lower extremities. When asked that she takes her Lasix at home she states that she takes it when her "legs are swollen". She added that she is supposed to take 40 mg at that time, however she states that she sometimes takes 20 mg and sometimes takes 60 mg "what ever I feel I need". Regarding her rectal bleeding, the patient stated that she noted bright red rectal bleeding small amount multiple times this week, and did add that she has had an issue with constipation as well. Her abdomen is nontender on exam.  CT of the abdomen and pelvis noted no acute findings. Will consult GI. Start clear liquids. Continue to monitor for bleeding. Will monitor H/H every 12 hours. [facility]    Review of Systems:    Review of Systems   Constitutional: Negative for activity change, appetite change, chills, diaphoresis, fatigue and fever. HENT: Negative for ear pain and sore throat. Eyes: Negative for pain and visual disturbance. Respiratory: Negative for cough, chest tightness, shortness of breath and wheezing. Cardiovascular: Negative for chest pain and palpitations. Gastrointestinal: Positive for blood in stool and constipation. Negative for abdominal distention, abdominal pain, nausea, rectal pain and vomiting. Endocrine: Negative. Genitourinary: Negative. Negative for dysuria, frequency, hematuria and urgency. Musculoskeletal: Negative for arthralgias and back pain. Skin: Negative for color change and rash. Neurological: Negative for dizziness, seizures, syncope, light-headedness and headaches. Hematological: Negative. Psychiatric/Behavioral: Negative. All other systems reviewed and are negative. Past Medical and Surgical History:     Past Medical History:   Diagnosis Date   • Cardiomyopathy [facility])    • Chronic combined systolic and diastolic congestive heart failure    • COPD (chronic obstructive pulmonary disease) (HCC)    • Fatty liver    • Hyperlipidemia    • Hypertension    • Mitral regurgitation    • Sepsis        Past Surgical History:   Procedure Laterality Date   • CARDIAC CATHETERIZATION  2021    Moderate non-obstructive atherosclerosis   •  SECTION     • CHOLECYSTECTOMY     • IR RENAL ANGIOGRAM  2022   • IR TEMPORARY DIALYSIS CATHETER CHECK/CHANGE/REPOSITION  2022   • IR TUNNELED DIALYSIS CATHETER PLACEMENT  2022   • IR TUNNELED DIALYSIS CATHETER REMOVAL  2022   • ROTATOR CUFF REPAIR W/ DISTAL CLAVICLE EXCISION Right    • TONSILLECTOMY         Meds/Allergies:    Prior to Admission medications    Medication Sig Start Date End Date Taking?  Authorizing Provider   aspirin 81 mg chewable tablet Chew 1 tablet (81 mg total) daily 8/3/22  Yes Quentin Preston MD   furosemide (LASIX) 40 mg tablet Take 40 mg by mouth daily   Yes Historical Provider, MD   albuterol (2.5 mg/3 mL) 0.083 % nebulizer solution Take 3 mL (2.5 mg total) by nebulization every 6 (six) hours as needed for wheezing or shortness of breath 23  Darleen Johnson DO albuterol (ProAir HFA) 90 mcg/act inhaler Inhale 2 puffs every 4 (four) hours as needed for wheezing 7/18/23   Glenys Banks DO   Cholecalciferol 50 MCG (2000 UT) CAPS Take by mouth daily 8/18/22   Historical Provider, MD   clonazePAM (KlonoPIN) 0.5 mg tablet Take 0.5 mg by mouth daily at bedtime as needed  Patient not taking: Reported on 7/18/2023 5/8/23   Historical Provider, MD   clonazePAM (KlonoPIN) 1 mg tablet Take 1 tablet (1 mg total) by mouth daily at bedtime At night 8/4/23   Glenys Banks DO   ergocalciferol (VITAMIN D2) 50,000 units  10/13/22   Historical Provider, MD   MAGNESIUM PO in the morning    Historical Provider, MD   morphine (MS CONTIN) 30 mg 12 hr tablet Take 1 tablet (30 mg total) by mouth every 12 (twelve) hours Max Daily Amount: 60 mg 8/4/23   Glenys Banks DO   oxyCODONE-acetaminophen (PERCOCET) 5-325 mg per tablet Take 1 tablet by mouth every 8 (eight) hours as needed for moderate pain Max Daily Amount: 3 tablets 8/4/23   Glenys Banks DO   potassium chloride (K-DUR,KLOR-CON) 10 mEq tablet Take 1 tablet (10 mEq total) by mouth daily If using Lasix 4/14/23   Wilver Cardona MD   predniSONE 10 mg tablet Take 1 tablet (10 mg total) by mouth daily 7/18/23 8/17/23  Glenys Banks DO   umeclidinium-vilanterol (Anoro Ellipta) 62.5-25 MCG/INH inhaler Inhale 1 puff daily  Patient not taking: Reported on 7/18/2023 6/21/22   ESTHER Lee     I have reviewed home medications with patient personally. Allergies:    Allergies   Allergen Reactions   • Wellbutrin [Bupropion] Arthralgia       Social History:     Marital Status: Single   Occupation: Disability  Patient Pre-hospital Living Situation: Independent home alone  Patient Pre-hospital Level of Mobility: No assistive devices ambulates independently  Patient Pre-hospital Diet Restrictions: None  Substance Use History:   Social History     Substance and Sexual Activity   Alcohol Use Never     Social History     Tobacco Use   Smoking Status Every Day   • Packs/day: 1.00   • Types: Cigarettes   Smokeless Tobacco Never     Social History     Substance and Sexual Activity   Drug Use No       Family History:    History reviewed. No pertinent family history. Physical Exam:     Vitals:   Blood Pressure: (!) 191/124 (08/09/23 1552)  Pulse: (!) 120 (08/09/23 1543)  Temperature: (!) 97.3 °F (36.3 °C) (08/09/23 1021)  Temp Source: Temporal (08/09/23 1021)  Respirations: 20 (08/09/23 1543)  Weight - Scale: 89.1 kg (196 lb 8.6 oz) (08/09/23 1540)  SpO2: 94 % (08/09/23 1543)    Physical Exam  Vitals and nursing note reviewed. Constitutional:       General: She is not in acute distress. Appearance: She is obese. She is not ill-appearing. HENT:      Head: Normocephalic and atraumatic. Nose: Nose normal.      Mouth/Throat:      Mouth: Mucous membranes are dry. Cardiovascular:      Rate and Rhythm: Normal rate and regular rhythm. Pulses: Normal pulses. Heart sounds: Normal heart sounds. Pulmonary:      Effort: Pulmonary effort is normal. No respiratory distress. Breath sounds: Normal breath sounds. No wheezing or rales. Abdominal:      General: Bowel sounds are normal. There is no distension. Palpations: Abdomen is soft. Tenderness: There is no abdominal tenderness. There is no guarding. Musculoskeletal:      Right lower leg: Edema present. Left lower leg: Edema present. Comments: + 1 edema bilateral lower extremities   Skin:     General: Skin is warm and dry. Capillary Refill: Capillary refill takes less than 2 seconds. Neurological:      General: No focal deficit present. Mental Status: She is alert and oriented to person, place, and time. Mental status is at baseline. Psychiatric:         Mood and Affect: Mood normal.         Behavior: Behavior normal.         Thought Content:  Thought content normal.         Judgment: Judgment normal.             Additional Data:     Lab Results: I have personally reviewed pertinent reports. Results from last 7 days   Lab Units 08/09/23  1056   WBC Thousand/uL 9.88   HEMOGLOBIN g/dL 17.6*   HEMATOCRIT % 54.0*   PLATELETS Thousands/uL 344   NEUTROS PCT % 68   LYMPHS PCT % 22   MONOS PCT % 6   EOS PCT % 3     Results from last 7 days   Lab Units 08/09/23  1056   SODIUM mmol/L 137   POTASSIUM mmol/L 3.5   CHLORIDE mmol/L 98   CO2 mmol/L 29   BUN mg/dL 12   CREATININE mg/dL 0.83   ANION GAP mmol/L 10   CALCIUM mg/dL 9.8   ALBUMIN g/dL 4.3   TOTAL BILIRUBIN mg/dL 0.67   ALK PHOS U/L 110*   ALT U/L 11   AST U/L 14   GLUCOSE RANDOM mg/dL 118     Results from last 7 days   Lab Units 08/09/23  1056   INR  1.01                   Imaging: I have personally reviewed pertinent reports. CT abdomen pelvis with contrast   Final Result by Toniann Rubinstein, MD (08/09 1243)   No obvious acute findings, within the limitations of routine CT abdomen and pelvis protocol. Bowel normal in course and caliber, with hard stool in the sigmoid colon and rectum. Scattered uncomplicated colonic diverticula. Normal appendix. Chronic findings, as per the body of the report. Workstation performed: [id number]         XR chest 1 view portable   Final Result by Lico Hadley MD (08/09 1423)      No acute cardiopulmonary disease.                   Workstation performed: [id number]         VAS renal artery complete    (Results Pending)       EKG, Pathology, and Other Studies Reviewed on Admission:   · EKG: sinus tachycardia 105    Allscripts / Epic Records Reviewed: Yes     ** Please Note: This note has been constructed using a voice recognition syste

## 2023-08-09 NOTE — PLAN OF CARE
Problem: INFECTION - ADULT  Goal: Absence or prevention of progression during hospitalization  Description: INTERVENTIONS:  - Assess and monitor for signs and symptoms of infection  - Monitor lab/diagnostic results  - Monitor all insertion sites, i.e. indwelling lines, tubes, and drains  - Monitor endotracheal if appropriate and nasal secretions for changes in amount and color  - [place] appropriate cooling/warming therapies per order  - Administer medications as ordered  - Instruct and encourage patient and family to use good hand hygiene technique  - Identify and instruct in appropriate isolation precautions for identified infection/condition  Outcome: Progressing

## 2023-08-09 NOTE — PLAN OF CARE
Problem: Potential for Falls  Goal: Patient will remain free of falls  Description: INTERVENTIONS:  - Educate patient/family on patient safety including physical limitations  - Instruct patient to call for assistance with activity   - Consult OT/PT to assist with strengthening/mobility   - Keep Call bell within reach  - Keep bed low and locked with side rails adjusted as appropriate  - Keep care items and personal belongings within reach  - Initiate and maintain comfort rounds  - Make Fall Risk Sign visible to staff  - Offer Toileting every 2 Hours, in advance of need  - Initiate/Maintain bed/chair alarm  - Obtain necessary fall risk management equipment: alarms, non-skid socks  - Apply yellow socks and bracelet for high fall risk patients  - Consider moving patient to room near nurses station  Outcome: Progressing     Problem: PAIN - ADULT  Goal: Verbalizes/displays adequate comfort level or baseline comfort level  Description: Interventions:  - Encourage patient to monitor pain and request assistance  - Assess pain using appropriate pain scale  - Administer analgesics based on type and severity of pain and evaluate response  - Implement non-pharmacological measures as appropriate and evaluate response  - Consider cultural and social influences on pain and pain management  - Notify physician/advanced practitioner if interventions unsuccessful or patient reports new pain  Outcome: Progressing     Problem: INFECTION - ADULT  Goal: Absence or prevention of progression during hospitalization  Description: INTERVENTIONS:  - Assess and monitor for signs and symptoms of infection  - Monitor lab/diagnostic results  - Monitor all insertion sites, i.e. indwelling lines, tubes, and drains  - Monitor endotracheal if appropriate and nasal secretions for changes in amount and color  - [place] appropriate cooling/warming therapies per order  - Administer medications as ordered  - Instruct and encourage patient and family to use good hand hygiene technique  - Identify and instruct in appropriate isolation precautions for identified infection/condition  Outcome: Progressing     Problem: SAFETY ADULT  Goal: Patient will remain free of falls  Description: INTERVENTIONS:  - Educate patient/family on patient safety including physical limitations  - Instruct patient to call for assistance with activity   - Consult OT/PT to assist with strengthening/mobility   - Keep Call bell within reach  - Keep bed low and locked with side rails adjusted as appropriate  - Keep care items and personal belongings within reach  - Initiate and maintain comfort rounds  - Make Fall Risk Sign visible to staff  - Offer Toileting every 2 Hours, in advance of need  - Initiate/Maintain bed/chair alarm  - Obtain necessary fall risk management equipment: alarms, non-skid socks  - Apply yellow socks and bracelet for high fall risk patients  - Consider moving patient to room near nurses station  Outcome: Progressing

## 2023-08-09 NOTE — ASSESSMENT & PLAN NOTE
· Patient reports history of renal artery stenosis, presented with elevated blood pressure  · Patient noted that her blood pressure was normal at her last doctor's visit with a systolic pressure of 664 and that she was taken off all of her blood pressure medications at that time  · Serum creatinine today 0.83  · Reports voiding without difficulty  · Will repeat renal ultrasound

## 2023-08-10 ENCOUNTER — TELEPHONE (OUTPATIENT)
Dept: GASTROENTEROLOGY | Facility: CLINIC | Age: 63
End: 2023-08-10

## 2023-08-10 ENCOUNTER — TRANSITIONAL CARE MANAGEMENT (OUTPATIENT)
Dept: FAMILY MEDICINE CLINIC | Facility: CLINIC | Age: 63
End: 2023-08-10

## 2023-08-10 VITALS
WEIGHT: 196.54 LBS | DIASTOLIC BLOOD PRESSURE: 80 MMHG | BODY MASS INDEX: 29.11 KG/M2 | HEART RATE: 101 BPM | HEIGHT: 69 IN | RESPIRATION RATE: 22 BRPM | OXYGEN SATURATION: 90 % | TEMPERATURE: 98.1 F | SYSTOLIC BLOOD PRESSURE: 123 MMHG

## 2023-08-10 LAB
ALBUMIN SERPL BCP-MCNC: 4.1 G/DL (ref 3.5–5)
ALP SERPL-CCNC: 106 U/L (ref 34–104)
ALT SERPL W P-5'-P-CCNC: 11 U/L (ref 7–52)
ANION GAP SERPL CALCULATED.3IONS-SCNC: 9 MMOL/L
AST SERPL W P-5'-P-CCNC: 15 U/L (ref 13–39)
BILIRUB SERPL-MCNC: 0.69 MG/DL (ref 0.2–1)
BUN SERPL-MCNC: 15 MG/DL (ref 5–25)
CALCIUM SERPL-MCNC: 9.9 MG/DL (ref 8.4–10.2)
CHLORIDE SERPL-SCNC: 100 MMOL/L (ref 96–108)
CO2 SERPL-SCNC: 29 MMOL/L (ref 21–32)
CREAT SERPL-MCNC: 0.94 MG/DL (ref 0.6–1.3)
ERYTHROCYTE [DISTWIDTH] IN BLOOD BY AUTOMATED COUNT: 14 % (ref 11.6–15.1)
GFR SERPL CREATININE-BSD FRML MDRD: 65 ML/MIN/1.73SQ M
GLUCOSE SERPL-MCNC: 105 MG/DL (ref 65–140)
HCT VFR BLD AUTO: 51.6 % (ref 34.8–46.1)
HGB BLD-MCNC: 16.6 G/DL (ref 11.5–15.4)
MCH RBC QN AUTO: 29.2 PG (ref 26.8–34.3)
MCHC RBC AUTO-ENTMCNC: 32.2 G/DL (ref 31.4–37.4)
MCV RBC AUTO: 91 FL (ref 82–98)
PLATELET # BLD AUTO: 345 THOUSANDS/UL (ref 149–390)
PMV BLD AUTO: 9.2 FL (ref 8.9–12.7)
POTASSIUM SERPL-SCNC: 3.5 MMOL/L (ref 3.5–5.3)
PROT SERPL-MCNC: 7.2 G/DL (ref 6.4–8.4)
RBC # BLD AUTO: 5.69 MILLION/UL (ref 3.81–5.12)
SODIUM SERPL-SCNC: 138 MMOL/L (ref 135–147)
WBC # BLD AUTO: 8.93 THOUSAND/UL (ref 4.31–10.16)

## 2023-08-10 PROCEDURE — 80053 COMPREHEN METABOLIC PANEL: CPT

## 2023-08-10 PROCEDURE — 85027 COMPLETE CBC AUTOMATED: CPT

## 2023-08-10 PROCEDURE — 99239 HOSP IP/OBS DSCHRG MGMT >30: CPT

## 2023-08-10 RX ORDER — BISACODYL 5 MG/1
5 TABLET, DELAYED RELEASE ORAL DAILY PRN
Qty: 30 TABLET | Refills: 0 | Status: SHIPPED | OUTPATIENT
Start: 2023-08-10

## 2023-08-10 RX ORDER — LISINOPRIL 20 MG/1
20 TABLET ORAL DAILY
Qty: 30 TABLET | Refills: 0 | Status: SHIPPED | OUTPATIENT
Start: 2023-08-10 | End: 2023-08-17 | Stop reason: SDUPTHER

## 2023-08-10 RX ORDER — DOCUSATE SODIUM 100 MG/1
100 CAPSULE, LIQUID FILLED ORAL 2 TIMES DAILY
Qty: 60 CAPSULE | Refills: 0 | Status: SHIPPED | OUTPATIENT
Start: 2023-08-10 | End: 2023-08-17 | Stop reason: SDUPTHER

## 2023-08-10 RX ADMIN — POLYETHYLENE GLYCOL 3350 17 G: 17 POWDER, FOR SOLUTION ORAL at 09:11

## 2023-08-10 RX ADMIN — FUROSEMIDE 40 MG: 40 TABLET ORAL at 09:09

## 2023-08-10 RX ADMIN — PREDNISONE 10 MG: 10 TABLET ORAL at 09:09

## 2023-08-10 RX ADMIN — LISINOPRIL 20 MG: 20 TABLET ORAL at 09:09

## 2023-08-10 RX ADMIN — NICOTINE 1 PATCH: 21 PATCH, EXTENDED RELEASE TRANSDERMAL at 09:11

## 2023-08-10 RX ADMIN — MORPHINE SULFATE 30 MG: 30 TABLET, EXTENDED RELEASE ORAL at 09:09

## 2023-08-10 NOTE — ASSESSMENT & PLAN NOTE
· Patient reports history of renal artery stenosis, presented with elevated blood pressure  · Patient noted that her blood pressure was normal at her last doctor's visit with a systolic pressure of 462 and that she was taken off all of her blood pressure medications at that time  · Serum creatinine today on arrival 0.83, 0.94 today  · Renal ultrasound  pending. Patient states that she insists on going home this morning and is not willing to wait for her ultrasound. She is voiding without difficulty, pain-free, blood pressure normalized. Will send cuong to PCP regarding renal ultrasound. Patient advised to follow-up with her PCP within a week of discharge.

## 2023-08-10 NOTE — ASSESSMENT & PLAN NOTE
· Patient presented to the ED with complaints of bright red rectal bleeding that started this week.   · Patient also noted that she has been having problems of constipation  · Hemoglobin on arrival 17.6, today 16.6  · Denies any further rectal bleeding  · GI followed, appreciate recommendations  · Hemoglobin remained stable overnight, no further rectal bleeding, tolerating soft diet  · Will discharge home today with outpatient follow-up with GI for colonoscopy screening

## 2023-08-10 NOTE — ASSESSMENT & PLAN NOTE
· Without exacerbation  · Currently on room air with nonlabored respirations  · Patient was initiated on prednisone taper prehospital by PCP, continued while inpatient, will complete course on discharge as ordered by patient's PCP  · Encouraged smoking cessation

## 2023-08-10 NOTE — TELEPHONE ENCOUNTER
----- Message from Rhonda Arenas DO sent at 8/10/2023  9:19 AM EDT -----  Regarding: Hospital F/u, Colon Ca Screen  Good morning,    Please call patient to schedule follow up apt for constipation, rectal bleeding, and to discuss colonoscopy for colon cancer screening. Currently on significant amount of opioids, and will likely need multiple day prep.     Thank you,  Rafa

## 2023-08-10 NOTE — NURSING NOTE
Patient discharged to home. Patient has all instructions and follow up. Patient encouraged to monitor BP and continue medications as prescribed. Patient has all belonging and IV site WNL.

## 2023-08-10 NOTE — CASE MANAGEMENT
Case Management Assessment & Discharge Planning Note    Patient name Eben Davis  Location /-01 MRN [id number]  : 1960 Date 8/10/2023       Current Admission Date: 2023  Current Admission Diagnosis:Rectal bleeding   Patient Active Problem List    Diagnosis Date Noted   • Hypertensive urgency 2023   • Rectal bleeding 2023   • Screening for colon cancer 2023   • Encounter for screening mammogram for breast cancer 2023   • COPD with acute exacerbation ([address]) 2022   • CKD (chronic kidney disease) stage 3, GFR 30-59 ml/min ([address]) 2022   • Secondary hyperparathyroidism of renal origin ([address]) 2022   • Renal artery stenosis ([address]) 2022   • Hepatitis B 2022   • Acute respiratory failure with hypoxia ([address]) 2022   • Acute respiratory insufficiency 2022   • Acute kidney injury superimposed on CKD-3 2022   • COPD (chronic obstructive pulmonary disease) ([address]) 06/15/2022   • Acute on chronic systolic congestive heart failure ([address]) 2021   • Tobacco abuse 2021   • Hyponatremia 2021   • Leukocytosis 2021   • Anxiety 2021   • Dilated cardiomyopathy ([address]) 2021   • Essential hypertension 2021   • Chronic pain syndrome 2021   • Vitamin D deficiency 2013      LOS (days): 1  Geometric Mean LOS (GMLOS) (days): 2.10  Days to GMLOS:1.3     OBJECTIVE:    Risk of Unplanned Readmission Score: 13.37         Current admission status: Inpatient       Preferred Pharmacy:   [address] [place] [address] #[id number] Drake Wallis, [address] [place] [id number]  Phone: [phone] Fax: [phone]    [place] [address]  Phone: [phone] Fax: [phone]    Primary Care Provider: Fred Lipoma, DO    Primary Insurance: MEDICARE  Secondary Insurance:  [facility]    ASSESSMENT:  Active Health Care Proxies     [facility] Representative - Daughter   Primary Phone: [phone] (Mobile)               Advance Directives  Does patient have a [address] [place] [address]?: No  Was patient offered paperwork?: Yes (Patient declined paperwork.)  Does patient currently have a Health Care decision maker?: Yes, please see Health Care Proxy section  Does patient have Advance Directives?: No  Was patient offered paperwork?: Yes (Patient declined paperwork.)  Primary Contact: Patient's Daughter    Readmission Root Cause  30 Day Readmission: No    Patient Information  Admitted from[de-identified] Home  Mental Status: Alert  During Assessment patient was accompanied by: Not accompanied during assessment  Assessment information provided by[de-identified] Patient  Primary Caregiver: Self  Support Systems: Self, Daughter  [place] of Residence: [facility] do you live in?: [address] [place] [address],[facility] One entry access options.  Select all that apply.: Stairs  Do the steps have railings?: Yes  Type of Current Residence: 2 story home  Upon entering residence, is there a bedroom on the main floor (no further steps)?: Yes  Upon entering residence, is there a bathroom on the main floor (no further steps)?: Yes  In the last 12 months, was there a time when you were not able to pay the mortgage or rent on time?: No  In the last 12 months, how many places have you lived?: 1  In the last 12 months, was there a time when you did not have a steady place to sleep or slept in a shelter (including now)?: No  Homeless/housing insecurity resource given?: N/A  Living Arrangements: Lives Alone  Is patient a ?: No    Activities of Daily Living Prior to Admission  Functional Status: Independent  Completes ADLs independently?: Yes  Ambulates independently?: Yes  Does patient use assisted devices?: No  Does patient currently own DME?: No  Does patient have a history of Outpatient Therapy (PT/OT)?: No  Does the patient have a history of Short-Term Rehab?: No  Does patient have a history of HHC?: No  Does patient currently have [address]?: No    Patient Information Continued  Income Source: SSI/SSD  Does patient have prescription coverage?: Yes (Patient confirmed that she uses [employer] in [facility], and she denied any barriers to obtaining or affording prescriptions.)  Within the past 12 months, you worried that your food would run out before you got the money to buy more.: Never true  Within the past 12 months, the food you bought just didn't last and you didn't have money to get more.: Never true  Food insecurity resource given?: N/A  Does patient receive dialysis treatments?: No  Does patient have a history of substance abuse?: Currently using  Current substance use preference: Other (Patient reported that she smokes one pack of cigarettes per day.)  History of Withdrawal Symptoms: Denies past symptoms  Is patient currently in treatment for substance abuse?: No. Patient declined treatment information. Does patient have a history of Mental Health Diagnosis?: No    PHQ 2/9 Screening   Reviewed PHQ 2/9 Depression Screening Score?: No    Means of Transportation  Means of Transport to Appts[de-identified] Drives Self  In the past 12 months, has lack of transportation kept you from medical appointments or from getting medications?: No  In the past 12 months, has lack of transportation kept you from meetings, work, or from getting things needed for daily living?: No  Was application for public transport provided?: N/A    DISCHARGE DETAILS:    Discharge planning discussed with[de-identified] Patient  Freedom of Choice: Yes  Comments - Freedom of Choice: CM discussed freedom of choice as it pertains to discharge planning. Patient denied any needs at this time and wishes to return home. She will drive herself home once discharged.   CM contacted family/caregiver?: No- see comments (Patient declined.)  Were Treatment Team discharge recommendations reviewed with patient/caregiver?: Yes  Did patient/caregiver verbalize understanding of patient care needs?: Yes  Were patient/caregiver advised of the risks associated with not following Treatment Team discharge recommendations?: Yes    Requested [address]          Is the patient interested in [facility] at discharge?: No    DME Referral Provided  Referral made for DME?: No    Other Referral/Resources/Interventions Provided:  Interventions: None Indicated    Would you like to participate in our [address] service program?  : No - Declined    Treatment Team Recommendation: Home  Discharge Destination Plan[de-identified] Home  Transport at Discharge : Self

## 2023-08-10 NOTE — DISCHARGE SUMMARY
[address]  Discharge- Alex Horton 1960, 58 y.o. female MRN: [id number]  Unit/Bed#: -Virgie Encounter: [id number]  Primary Care Provider: Porter Rollins DO   Date and time admitted to hospital: 8/9/2023 10:20 AM    * Rectal bleeding  Assessment & Plan  · Patient presented to the ED with complaints of bright red rectal bleeding that started this week. · Patient also noted that she has been having problems of constipation  · Hemoglobin on arrival 17.6, today 16.6  · Denies any further rectal bleeding  · GI followed, appreciate recommendations  · Hemoglobin remained stable overnight, no further rectal bleeding, tolerating soft diet  · Will discharge home today with outpatient follow-up with GI for colonoscopy screening    Hypertensive urgency  Assessment & Plan  · Present on admission,   · Blood pressure normalized today  · On arrival 1 inch nitropaste to ACW by ED physician  · On arrival patient reports that she was recently taken off of all of her blood pressure medications by her PCP and that her systolic blood pressure at her last appointment was 120  · Also has history of renal artery stenosis, will check renal ultrasound  · Renal ultrasound  pending. Patient is not willing to wait for results insistent on being discharged. · Initiated lisinopril 20 mg daily, will continue on discharge  · Encourage patient to continue to monitor blood pressure on discharge for PCP. She states she has a machine at home to do such. Renal artery stenosis (HCC)  Assessment & Plan  · Patient reports history of renal artery stenosis, presented with elevated blood pressure  · Patient noted that her blood pressure was normal at her last doctor's visit with a systolic pressure of 867 and that she was taken off all of her blood pressure medications at that time  · Serum creatinine today on arrival 0.83, 0.94 today  · Renal ultrasound  pending.   Patient states that she insists on going home this morning and is not willing to wait for her ultrasound. She is voiding without difficulty, pain-free, blood pressure normalized. Will send cuong to PCP regarding renal ultrasound. Patient advised to follow-up with her PCP within a week of discharge. Anxiety  Assessment & Plan  · She is mildly anxious today, states she is feeling much better and wants to go home  · Resume home Klonopin on discharge    COPD (chronic obstructive pulmonary disease) ([address])  Assessment & Plan  · Without exacerbation  · Currently on room air with nonlabored respirations  · Patient was initiated on prednisone taper prehospital by PCP, continued while inpatient, will complete course on discharge as ordered by patient's PCP  · Encouraged smoking cessation     Tobacco abuse  Assessment & Plan  · Encouraged smoking cessation, patient refuses to quit smoking        Discharging Physician / Practitioner: ESTHER Moreland  PCP: Abdullahi Fox DO  Admission Date:   Admission Orders (From admission, onward)     Ordered        08/09/23 1324  [address]  Once                      Discharge Date: 08/10/23    Medical Problems     Resolved Problems  Date Reviewed: 8/10/2023   None         Consultations During Hospital Stay:  · GI    Procedures Performed:   · None    Significant Findings / Test Results:   · 8/9 chest x-ray: No acute cardiopulmonary disease  · 8/9 CT abdomen pelvis: No obvious acute findings; hard stool in the sigmoid colon and rectum and scattered uncomplicated colonic diverticula. Normal appendix. · 8/9 hemoglobin 17.6  · 8/10 hemoglobin 16.6    Incidental Findings:   · None    Test Results Pending at Discharge (will require follow up): · Renal ultrasound not completed. Patient refusing to stay until it is done.      Outpatient Tests Requested:  · To be determined by your PCP    Complications: None    Reason for Admission: Rectal bleeding    Hospital Course:     Vickie Garcia is a 58 y.o. female patient who originally presented to the hospital on 8/9/2023 due to complaints of bright red rectal bleeding and shortness of breath x 1 day. Patient reported that she was coming for outpatient blood work and "just did not feel right so she went to the ED instead". She stated that she felt short of breath with ambulation as she was walking in and was having some bright red rectal bleeding over the last few days and concerned about that so she went to the ED. On arrival to the ED her systolic pressure was 852.  1 inch of Nitropaste was applied by the ED physician. She reported she had history of renal artery stenosis which improved since stenting and that her blood pressure has been well-controlled and her PCP recently discontinued all of her blood pressure medications. She also reported that she had stopped taking her Plavix because she "did not like the way it made her head feel" and that her PCP was aware and told her to take aspirin which she has also not been taking because it upsets her stomach even though it is enteric-coated. Patient received 1 dose of IV hydralazine, followed by IV metoprolol x 1 dose and initiate lisinopril. This morning patient's blood pressures normalized. Regarding patient's shortness of breath complaint,patient did not require oxygen on arrival, lungs were clear. Did have +1 edema of lower extremities. She is ordered 40 mg of Lasix daily. Questioned how she takes this at home patient states that she takes it "when my legs are swollen" and additionally added that "sometimes I take 40, sometimes I take 20, sometimes I take 60 mg depending on the swelling in my legs". She was given 20 mg of IV Lasix in the ED, an additional 40 mg IV on the floor. Chest x-ray on arrival no acute cardiopulmonary disease. GI was consulted as patient's complaint was red bleeding from rectum off-and-on for 1 week with some constipation. Hemoglobin on arrival was 17.6, today 16.6.   CT abdomen pelvis on arrival noted no acute findings, hard stool in the sigmoid colon and rectum and scattered uncomplicated colonic diverticula. Patient denies any further rectal bleeding. GI was consulted, recommended bowel regimen which we will continue on discharge. Will make outpatient referral for colonoscopy screening, patient has never had a colonoscopy. Patient is refusing to wait for her renal ultrasound today. She states she is voiding without difficulty and "my stent is not blocked". She says if she has trouble she will see her primary care physician. Patient will be discharged home today. Follow-up with her primary care physician within a week. Please see above list of diagnoses and related plan for additional information. Condition at Discharge: good     Discharge Day Visit / Exam:     Subjective: "I am getting out of here today. I am better."  Denied any further rectal bleeding, denies shortness of breath today. Denies any pain or discomfort. Says voiding without difficulty. Vitals: Blood Pressure: 123/80 (08/10/23 0726)  Pulse: 101 (08/10/23 0726)  Temperature: 98.1 °F (36.7 °C) (08/10/23 0726)  Temp Source: Oral (08/09/23 2235)  Respirations: 22 (08/10/23 0726)  Height: 5' 9" (175.3 cm) (08/09/23 1624)  Weight - Scale: 89.1 kg (196 lb 8.6 oz) (08/09/23 1540)  SpO2: 90 % (08/10/23 0726)  Exam:   Physical Exam  Vitals and nursing note reviewed. Constitutional:       General: She is not in acute distress. Appearance: She is well-developed. She is obese. She is not ill-appearing. HENT:      Head: Normocephalic and atraumatic. Mouth/Throat:      Mouth: Mucous membranes are moist.   Cardiovascular:      Rate and Rhythm: Normal rate and regular rhythm. Pulses: Normal pulses. Heart sounds: Normal heart sounds. No murmur heard. Pulmonary:      Effort: Pulmonary effort is normal. No respiratory distress. Breath sounds: Normal breath sounds. No wheezing or rales. Abdominal:      General: Bowel sounds are normal. There is no distension. Palpations: Abdomen is soft. Tenderness: There is no abdominal tenderness. There is no guarding. Musculoskeletal:         General: No swelling. Normal range of motion. Skin:     General: Skin is warm and dry. Capillary Refill: Capillary refill takes less than 2 seconds. Neurological:      General: No focal deficit present. Mental Status: She is alert and oriented to person, place, and time. Mental status is at baseline. Psychiatric:         Mood and Affect: Mood normal.         Behavior: Behavior normal.         Thought Content: Thought content normal.         Judgment: Judgment normal.         Discussion with Family: Course of hospitalization clinic testing and treatments discussed with patient who understands all that has been explained. She understands that she was for a renal ultrasound today because her blood pressure was elevated on arrival and I wanted to check because she had a stent placed for renal artery stenosis to make sure that was not blocked. Patient is refusing to stay she says that she can have this done in the outpatient setting if her primary care physician feels it is necessary. Her blood pressure is normal today. I did encourage her to monitor her blood pressure for PCP. Will discharge her on lisinopril which was initiated yesterday. Advised her to follow-up with her PCP within a week. She is also aware she is to follow-up with GI for outpatient colonoscopy screening. Discharge instructions/Information to patient and family:   See after visit summary for information provided to patient and family. Provisions for Follow-Up Care:  See after visit summary for information related to follow-up care and any pertinent home health orders.       Disposition:     Home    For Discharges to [facility] SNF:   · Not Applicable to this Patient - Not Applicable to this Patient    Planned Readmission: No     Discharge Statement:  I spent 45 minutes discharging the patient. This time was spent on the day of discharge. I had direct contact with the patient on the day of discharge. Greater than 50% of the total time was spent examining patient, answering all patient questions, arranging and discussing plan of care with patient as well as directly providing post-discharge instructions. Additional time then spent on discharge activities. Discharge Medications:  See after visit summary for reconciled discharge medications provided to patient and family.       ** Please Note: This note has been constructed using a voice recognition system **

## 2023-08-10 NOTE — ASSESSMENT & PLAN NOTE
· Present on admission,   · Blood pressure normalized today  · On arrival 1 inch nitropaste to ACW by ED physician  · On arrival patient reports that she was recently taken off of all of her blood pressure medications by her PCP and that her systolic blood pressure at her last appointment was 120  · Also has history of renal artery stenosis, will check renal ultrasound  · Renal ultrasound  pending. Patient is not willing to wait for results insistent on being discharged. · Initiated lisinopril 20 mg daily, will continue on discharge  · Encourage patient to continue to monitor blood pressure on discharge for PCP. She states she has a machine at home to do such.

## 2023-08-10 NOTE — PLAN OF CARE
Problem: Potential for Falls  Goal: Patient will remain free of falls  Description: INTERVENTIONS:  - Educate patient/family on patient safety including physical limitations  - Instruct patient to call for assistance with activity   - Consult OT/PT to assist with strengthening/mobility   - Keep Call bell within reach  - Keep bed low and locked with side rails adjusted as appropriate  - Keep care items and personal belongings within reach  - Initiate and maintain comfort rounds  - Make Fall Risk Sign visible to staff  - Offer Toileting every 2 Hours, in advance of need  - Initiate/Maintain bed/chair alarm  - Obtain necessary fall risk management equipment: alarms, non-skid socks  - Apply yellow socks and bracelet for high fall risk patients  - Consider moving patient to room near nurses station  Outcome: Adequate for Discharge     Problem: PAIN - ADULT  Goal: Verbalizes/displays adequate comfort level or baseline comfort level  Description: Interventions:  - Encourage patient to monitor pain and request assistance  - Assess pain using appropriate pain scale  - Administer analgesics based on type and severity of pain and evaluate response  - Implement non-pharmacological measures as appropriate and evaluate response  - Consider cultural and social influences on pain and pain management  - Notify physician/advanced practitioner if interventions unsuccessful or patient reports new pain  Outcome: Adequate for Discharge     Problem: INFECTION - ADULT  Goal: Absence or prevention of progression during hospitalization  Description: INTERVENTIONS:  - Assess and monitor for signs and symptoms of infection  - Monitor lab/diagnostic results  - Monitor all insertion sites, i.e. indwelling lines, tubes, and drains  - Monitor endotracheal if appropriate and nasal secretions for changes in amount and color  - [place] appropriate cooling/warming therapies per order  - Administer medications as ordered  - Instruct and encourage patient and family to use good hand hygiene technique  - Identify and instruct in appropriate isolation precautions for identified infection/condition  Outcome: Adequate for Discharge  Goal: Absence of fever/infection during neutropenic period  Description: INTERVENTIONS:  - Monitor WBC    Outcome: Adequate for Discharge     Problem: SAFETY ADULT  Goal: Patient will remain free of falls  Description: INTERVENTIONS:  - Educate patient/family on patient safety including physical limitations  - Instruct patient to call for assistance with activity   - Consult OT/PT to assist with strengthening/mobility   - Keep Call bell within reach  - Keep bed low and locked with side rails adjusted as appropriate  - Keep care items and personal belongings within reach  - Initiate and maintain comfort rounds  - Make Fall Risk Sign visible to staff  - Offer Toileting every 2 Hours, in advance of need  - Initiate/Maintain bed/chair alarm  - Obtain necessary fall risk management equipment: alarms, non-skid socks  - Apply yellow socks and bracelet for high fall risk patients  - Consider moving patient to room near nurses station  Outcome: Adequate for Discharge  Goal: Maintain or return to baseline ADL function  Description: INTERVENTIONS:  -  Assess patient's ability to carry out ADLs; assess patient's baseline for ADL function and identify physical deficits which impact ability to perform ADLs (bathing, care of mouth/teeth, toileting, grooming, dressing, etc.)  - Assess/evaluate cause of self-care deficits   - Assess range of motion  - Assess patient's mobility; develop plan if impaired  - Assess patient's need for assistive devices and provide as appropriate  - Encourage maximum independence but intervene and supervise when necessary  - Involve family in performance of ADLs  - Assess for home care needs following discharge   - Consider OT consult to assist with ADL evaluation and planning for discharge  - Provide patient education as appropriate  Outcome: Adequate for Discharge  Goal: Maintains/Returns to pre admission functional level  Description: INTERVENTIONS:  - Perform BMAT or MOVE assessment daily.   - Set and communicate daily mobility goal to care team and patient/family/caregiver. - Collaborate with rehabilitation services on mobility goals if consulted  - Perform Range of Motion 3 times a day. - Reposition patient every 2 hours. - Dangle patient 3 times a day  - Stand patient 3 times a day  - Ambulate patient 3 times a day  - Out of bed to chair 3 times a day   - Out of bed for meals 3 times a day  - Out of bed for toileting  - Record patient progress and toleration of activity level   Outcome: Adequate for Discharge     Problem: DISCHARGE PLANNING  Goal: Discharge to home or other facility with appropriate resources  Description: INTERVENTIONS:  - Identify barriers to discharge w/patient and caregiver  - Arrange for needed discharge resources and transportation as appropriate  - Identify discharge learning needs (meds, wound care, etc.)  - Arrange for interpretive services to assist at discharge as needed  - Refer to Case Management Department for coordinating discharge planning if the patient needs post-hospital services based on physician/advanced practitioner order or complex needs related to functional status, cognitive ability, or social support system  Outcome: Adequate for Discharge     Problem: Knowledge Deficit  Goal: Patient/family/caregiver demonstrates understanding of disease process, treatment plan, medications, and discharge instructions  Description: Complete learning assessment and assess knowledge base.   Interventions:  - Provide teaching at level of understanding  - Provide teaching via preferred learning methods  Outcome: Adequate for Discharge

## 2023-08-10 NOTE — ASSESSMENT & PLAN NOTE
· She is mildly anxious today, states she is feeling much better and wants to go home  · Resume home Klonopin on discharge

## 2023-08-10 NOTE — DISCHARGE INSTR - AVS FIRST PAGE
Please follow-up with your primary care physician within a week of discharge  I have made no changes to your prehospital medications. I did add lisinopril daily for your blood pressure and stool softener twice a day (Colace). Both of these scripts have been sent to your pharmacy.   Please continue to monitor your blood pressure at home for your primary care physician

## 2023-08-11 NOTE — TELEPHONE ENCOUNTER
Patients daughter is returning a call from the office to schedule her mothers NP visit. Please call daughter back.

## 2023-08-17 ENCOUNTER — OFFICE VISIT (OUTPATIENT)
Dept: FAMILY MEDICINE CLINIC | Facility: CLINIC | Age: 63
End: 2023-08-17
Payer: MEDICARE

## 2023-08-17 VITALS
TEMPERATURE: 98.6 F | BODY MASS INDEX: 29.33 KG/M2 | WEIGHT: 198 LBS | DIASTOLIC BLOOD PRESSURE: 74 MMHG | HEIGHT: 69 IN | SYSTOLIC BLOOD PRESSURE: 124 MMHG

## 2023-08-17 DIAGNOSIS — Z79.52 CURRENT CHRONIC USE OF SYSTEMIC STEROIDS: ICD-10-CM

## 2023-08-17 DIAGNOSIS — G89.4 CHRONIC PAIN SYNDROME: ICD-10-CM

## 2023-08-17 DIAGNOSIS — J44.9 CHRONIC OBSTRUCTIVE PULMONARY DISEASE, UNSPECIFIED COPD TYPE (HCC): ICD-10-CM

## 2023-08-17 DIAGNOSIS — K59.00 CONSTIPATION: ICD-10-CM

## 2023-08-17 DIAGNOSIS — Z12.11 SCREENING FOR COLON CANCER: ICD-10-CM

## 2023-08-17 DIAGNOSIS — F11.20 CONTINUOUS OPIOID DEPENDENCE (HCC): ICD-10-CM

## 2023-08-17 DIAGNOSIS — Z12.31 ENCOUNTER FOR SCREENING MAMMOGRAM FOR BREAST CANCER: ICD-10-CM

## 2023-08-17 DIAGNOSIS — Z78.0 ASYMPTOMATIC POSTMENOPAUSAL STATE: ICD-10-CM

## 2023-08-17 DIAGNOSIS — I10 ESSENTIAL HYPERTENSION: Chronic | ICD-10-CM

## 2023-08-17 DIAGNOSIS — J41.8 MIXED SIMPLE AND MUCOPURULENT CHRONIC BRONCHITIS (HCC): ICD-10-CM

## 2023-08-17 DIAGNOSIS — F41.9 ANXIETY: ICD-10-CM

## 2023-08-17 DIAGNOSIS — K62.5 RECTAL BLEEDING: Primary | ICD-10-CM

## 2023-08-17 DIAGNOSIS — I70.1 RENAL ARTERY STENOSIS (HCC): ICD-10-CM

## 2023-08-17 DIAGNOSIS — I16.0 HYPERTENSIVE URGENCY: ICD-10-CM

## 2023-08-17 PROBLEM — I50.23 ACUTE ON CHRONIC SYSTOLIC CONGESTIVE HEART FAILURE (HCC): Status: RESOLVED | Noted: 2021-06-13 | Resolved: 2023-08-17

## 2023-08-17 PROCEDURE — 99495 TRANSJ CARE MGMT MOD F2F 14D: CPT | Performed by: INTERNAL MEDICINE

## 2023-08-17 RX ORDER — DOCUSATE SODIUM 100 MG/1
100 CAPSULE, LIQUID FILLED ORAL 2 TIMES DAILY
Qty: 60 CAPSULE | Refills: 3 | Status: SHIPPED | OUTPATIENT
Start: 2023-08-17 | End: 2023-09-16

## 2023-08-17 RX ORDER — ALBUTEROL SULFATE 2.5 MG/3ML
2.5 SOLUTION RESPIRATORY (INHALATION) EVERY 6 HOURS PRN
Qty: 150 ML | Refills: 5 | Status: SHIPPED | OUTPATIENT
Start: 2023-08-17 | End: 2023-09-16

## 2023-08-17 RX ORDER — LISINOPRIL 20 MG/1
20 TABLET ORAL DAILY
Qty: 30 TABLET | Refills: 0 | Status: SHIPPED | OUTPATIENT
Start: 2023-08-17 | End: 2023-09-16

## 2023-08-17 RX ORDER — PREDNISONE 10 MG/1
10 TABLET ORAL DAILY
Qty: 30 TABLET | Refills: 0 | Status: SHIPPED | OUTPATIENT
Start: 2023-08-17 | End: 2023-09-16

## 2023-08-17 NOTE — ASSESSMENT & PLAN NOTE
Back on her usual regimen, and on lisinopril 20 mg daily with Lasix. Feeling well. Notably renal function has normalized.

## 2023-08-17 NOTE — PROGRESS NOTES
Assessment & Plan     1. Rectal bleeding  Assessment & Plan:  Reviewed recent hospitalization and consultations. Has follow-up with GI as an outpatient. Compliance with follow-up she had to be seen. 2. Current chronic use of systemic steroids  Assessment & Plan:  Agreeable to bone density at this time. 3. Chronic obstructive pulmonary disease, unspecified COPD type ([address])  Assessment & Plan:  Known underlying COPD. Multiple exacerbations. Currently on 10 mg prednisone daily. Takes nebs around-the-clock as well. Currently would consider Trelegy on her but she has been somewhat reluctant to this. Orders:  -     albuterol (2.5 mg/3 mL) 0.083 % nebulizer solution; Take 3 mL (2.5 mg total) by nebulization every 6 (six) hours as needed for wheezing or shortness of breath  -     predniSONE 10 mg tablet; Take 1 tablet (10 mg total) by mouth daily    4. Hypertensive urgency  Assessment & Plan:  Resolved,    Orders:  -     lisinopril (ZESTRIL) 20 mg tablet; Take 1 tablet (20 mg total) by mouth daily    5. Renal artery stenosis [facility])  Assessment & Plan:  Status post angioplasty, doing well since. 6. Encounter for screening mammogram for breast cancer  Assessment & Plan:  Patient declines [place] mammogram      7. Asymptomatic postmenopausal state    8. Mixed simple and mucopurulent chronic bronchitis (HCC)  Assessment & Plan:  Known underlying COPD. Multiple exacerbations. Currently on 10 mg prednisone daily. Takes nebs around-the-clock as well. Currently would consider Trelegy on her but she has been somewhat reluctant to this. 9. Chronic pain syndrome  Assessment & Plan:  Again, will discuss tapering from 30 mg twice a day to 15 mg 3 times a day of her MS Contin, this will take her from 60 to 45 morphine milliequivalents. 10. Continuous opioid dependence ([address])    11. Essential hypertension  Assessment & Plan:  Back on her usual regimen, and on lisinopril 20 mg daily with Lasix.   Feeling well.  Notably renal function has normalized. 12. Constipation  Assessment & Plan:  Colace was added to her current regimen. Seems to be effective at this point. Orders:  -     docusate sodium (COLACE) 100 mg capsule; Take 1 capsule (100 mg total) by mouth 2 (two) times a day    13. Screening for colon cancer  Assessment & Plan:  She is scheduled for follow-up with gastroenterology, will see if she keeps this appointment given her compliance issues in the past.  Also see if she will even agree to getting a colon screening. 14. Anxiety  Assessment & Plan:  Chronic issue, stable. Often manic      BMI Counseling: Body mass index is 29.24 kg/m². The BMI is above normal. Nutrition recommendations include consuming healthier snacks and moderation in carbohydrate intake. Rationale for BMI follow-up plan is due to patient being overweight or obese. Subjective     Transitional Care Management Review:   Hilton Bell is a 58 y.o. female here for TCM follow up. During the TCM phone call patient stated:  TCM Call     Date and time call was made  8/10/2023 11:05 AM    Hospital care reviewed  Records reviewed    Patient was hospitialized at  [address] [facility]    Date of Admission  08/09/23    Date of discharge  08/10/23    Diagnosis  Rectal bleeding    Disposition  Home    Were the patients medications reviewed and updated  Yes      TCM Call     Scheduled for follow up? Yes    Did you obtain your prescribed medications  Yes    Do you need help managing your prescriptions or medications  No    Is transportation to your appointment needed  No    I have advised the patient to call PCP with any new or worsening symptoms  Jacintala Roldan [facility]        Patient here on hospital follow-up. Was admitted because she had some rectal bleeding and accelerated hypertension. The GI bleeding was minimal, no significant drop in H&H.   Systolic hypertension was managed with first nitroglycerin, which gave her severe headache. They then did not renew any of her medicines her blood pressure continued to climb. Once her medications were reordered, her blood pressure came down, she had no further rectal bleeding was discharged home. She is home again, smoking her usual pack to a pack and a half a day. Denies fever chills or night sweats. Notes no nausea or vomiting. Her breathing continues to be compromised. She is now on 10 mg of prednisone, and continues on the MS Contin at 30 mg twice a day and discussed the need for decreasing this total dose again today. She was seen by gastroenterology in the hospital, has outpatient follow-up for possible evaluations. She will agree to the upper GI, she is not sure about the lower colonoscopy evaluation at this time. Review of Systems   Constitutional: Negative for chills and fever. HENT: Negative for rhinorrhea and sore throat. Eyes: Negative for visual disturbance. Respiratory: Positive for cough and shortness of breath. Cardiovascular: Negative for chest pain and leg swelling. Gastrointestinal: Positive for anal bleeding and constipation. Negative for abdominal pain, diarrhea, nausea and vomiting. Genitourinary: Negative for dysuria. Musculoskeletal: Positive for arthralgias, back pain and myalgias. Skin: Negative for rash. Neurological: Negative for dizziness and headaches. Psychiatric/Behavioral: Negative for confusion. The patient is nervous/anxious. All other systems reviewed and are negative. Objective     /74 (BP Location: Left arm, Patient Position: Sitting, Cuff Size: Large)   Temp 98.6 °F (37 °C)   Ht 5' 9" (1.753 m)   Wt 89.8 kg (198 lb)   BMI 29.24 kg/m²      Physical Exam  Vitals and nursing note reviewed. Constitutional:       Appearance: Normal appearance. She is well-developed. Comments: Chronically ill appearing   HENT:      Head: Normocephalic and atraumatic.       Nose: Nose normal.      Mouth/Throat:      Mouth: Mucous membranes are moist.      Pharynx: No oropharyngeal exudate. Eyes:      General: No scleral icterus. Conjunctiva/sclera: Conjunctivae normal.      Pupils: Pupils are equal, round, and reactive to light. Neck:      Vascular: No JVD. Trachea: No tracheal deviation. Cardiovascular:      Rate and Rhythm: Normal rate and regular rhythm. Heart sounds: Normal heart sounds. No murmur heard. Pulmonary:      Effort: Pulmonary effort is normal. No respiratory distress. Breath sounds: Wheezing present. No rales. Comments: Few expiratory wheezes  Abdominal:      General: Bowel sounds are normal.      Palpations: Abdomen is soft. Tenderness: There is no abdominal tenderness. There is no guarding. Musculoskeletal:         General: No tenderness. Normal range of motion. Cervical back: Normal range of motion and neck supple. Skin:     General: Skin is warm and dry. Neurological:      Mental Status: She is alert and oriented to person, place, and time. Cranial Nerves: No cranial nerve deficit. Sensory: No sensory deficit. Motor: No abnormal muscle tone.       Comments: 5/5 motor, nl sens   Psychiatric:         Behavior: Behavior normal.       Medications have been reviewed by provider in current encounter    Jessie Carrizales DO

## 2023-08-17 NOTE — ASSESSMENT & PLAN NOTE
Reviewed recent hospitalization and consultations. Has follow-up with GI as an outpatient. Compliance with follow-up she had to be seen.

## 2023-08-17 NOTE — ASSESSMENT & PLAN NOTE
Known underlying COPD. Multiple exacerbations. Currently on 10 mg prednisone daily. Takes nebs around-the-clock as well. Currently would consider Trelegy on her but she has been somewhat reluctant to this.

## 2023-08-17 NOTE — ASSESSMENT & PLAN NOTE
Again, will discuss tapering from 30 mg twice a day to 15 mg 3 times a day of her MS Contin, this will take her from 60 to 45 morphine milliequivalents.

## 2023-08-17 NOTE — ASSESSMENT & PLAN NOTE
Status post angioplasty, doing well since. Detail Level: Zone Details (Free Text): legs Photo Preface (Leave Blank If You Do Not Want): Photographs were obtained today

## 2023-08-17 NOTE — ASSESSMENT & PLAN NOTE
She is scheduled for follow-up with gastroenterology, will see if she keeps this appointment given her compliance issues in the past.  Also see if she will even agree to getting a colon screening.

## 2023-08-31 ENCOUNTER — OFFICE VISIT (OUTPATIENT)
Dept: NEPHROLOGY | Facility: CLINIC | Age: 63
End: 2023-08-31
Payer: MEDICARE

## 2023-08-31 VITALS
SYSTOLIC BLOOD PRESSURE: 162 MMHG | HEART RATE: 112 BPM | HEIGHT: 69 IN | DIASTOLIC BLOOD PRESSURE: 100 MMHG | OXYGEN SATURATION: 91 % | BODY MASS INDEX: 29.18 KG/M2 | WEIGHT: 197 LBS

## 2023-08-31 DIAGNOSIS — K59.00 CONSTIPATION, UNSPECIFIED CONSTIPATION TYPE: ICD-10-CM

## 2023-08-31 DIAGNOSIS — E87.1 HYPONATREMIA: ICD-10-CM

## 2023-08-31 DIAGNOSIS — E55.9 VITAMIN D DEFICIENCY: ICD-10-CM

## 2023-08-31 DIAGNOSIS — I16.0 HYPERTENSIVE URGENCY: Primary | ICD-10-CM

## 2023-08-31 PROCEDURE — 99214 OFFICE O/P EST MOD 30 MIN: CPT | Performed by: INTERNAL MEDICINE

## 2023-08-31 NOTE — PATIENT INSTRUCTIONS
Restart lisinopril 20mg as soon as you get home  Do not take caffeine pills at all   a jug at the lab for 24-hour urine and a hat.  today that you are going to be home for 24 hours. The first morning urine goes in the toilet and then every bit of urine that you make that morning, that afternoon, that evening and the first morning urine the following morning goes in the jug.   Bring the jug to the lab and they will draw blood to correlate what is in the jug with what is in your blood  Check your blood pressure in the morning and if you wake up in the evening

## 2023-08-31 NOTE — ASSESSMENT & PLAN NOTE
Lab Results   Component Value Date    EGFR 65 08/10/2023    EGFR 75 08/09/2023    EGFR 31 04/04/2023    CREATININE 0.94 08/10/2023    CREATININE 0.83 08/09/2023    CREATININE 1.72 (H) 04/04/2023   resolved  Kidney function is normal

## 2023-08-31 NOTE — PROGRESS NOTES
[facility] [place], [place]    Name: Nathan Veliz  YOB: 1960      Assessment/Plan:         Problem List Items Addressed This Visit        Cardiovascular and Mediastinum    Hypertensive urgency - Primary     She was admitted for hypertensive urgency and discharged on lisinopril 20mg daily. She stopped the medication on her own and took a caffeine pill = 150mg  Her blood pressure is > 180  She needs to restart lisinopril 20mg  Today and daily  She needs to check her BP daily and in the middle of the night if she awakens  We will repeat a renal artery Doppler and catecholamine levels. Will see see her back in 2 weeks         Relevant Orders    Basic metabolic panel    Magnesium    Vitamin D 25 hydroxy    Aldosterone    VAS renal artery complete    Catecholamines, Fractionated, and VMA, 24-Hour Urine    Aldosterone/Renin Ratio    Cortisol       Other    Vitamin D deficiency    Relevant Orders    Vitamin D 25 hydroxy    Hyponatremia     resolved         Constipation     Will take daily Dulcolax              Subjective:      Patient ID: Nathan Veliz is a 58 y.o. female. Referred by Dr Sonya Logan    HPI  Has a history of left renal artery stenosis s/p stenting 8/22. She had an acute episode of SHANTA requiring short term dialysis for ultrafiltration with recovery  She presents with hypertension today but took a 150 mg caffeine tablet today. She had an elevated plasma catecholamine level which was not repeated as she did not follow up with endocrinology    Her sleep pattern is poor with an awakening at 3 am after 4 hours of sleep and is up 1-2 hours and then goes back to sleep. She then sleeps another 3-4 hours. She does not snore. She takes opioids due to a prior shoulder injury and nerve damage. She falls asleep after meals frequently.    The following portions of the patient's history were reviewed and updated as appropriate: allergies, current medications, past family history, past medical history, past social history, past surgical history and problem list.    Review of Systems   Constitutional: Positive for fatigue. HENT: Negative for hearing loss. Eyes: Negative for visual disturbance. Respiratory: Positive for cough and shortness of breath. SCALES   Cardiovascular: Positive for leg swelling. Negative for chest pain and palpitations. Ankles puffy   Gastrointestinal: Negative for abdominal pain, blood in stool, constipation and diarrhea. Genitourinary: Positive for decreased urine volume. Negative for difficulty urinating, dysuria and hematuria. She says that when she is constipated she voids less   Musculoskeletal: Positive for back pain. Social History     Socioeconomic History   • Marital status: Single     Spouse name: None   • Number of children: None   • Years of education: None   • Highest education level: None   Occupational History   • None   Tobacco Use   • Smoking status: Every Day     Packs/day: 1.00     Types: Cigarettes   • Smokeless tobacco: Never   Vaping Use   • Vaping Use: Never used   Substance and Sexual Activity   • Alcohol use: Never   • Drug use: No   • Sexual activity: None   Other Topics Concern   • None   Social History Narrative   • None     Social Determinants of Health     Financial Resource Strain: Not on file   Food Insecurity: No Food Insecurity (8/10/2023)    Hunger Vital Sign    • Worried About Running Out of Food in the Last Year: Never true    • Ran Out of Food in the Last Year: Never true   Transportation Needs: No Transportation Needs (8/10/2023)    PRAPARE - Transportation    • Lack of Transportation (Medical): No    • Lack of Transportation (Non-Medical):  No   Physical Activity: Not on file   Stress: Not on file   Social Connections: Not on file   Intimate Partner Violence: Not on file   Housing Stability: Low Risk  (8/10/2023)    Housing Stability Vital Sign    • Unable to Pay for Housing in the Last Year: No    • Number of Places Lived in the Last Year: 1    • Unstable Housing in the Last Year: No     Past Medical History:   Diagnosis Date   • Cardiomyopathy ([address])    • Chronic combined systolic and diastolic congestive heart failure    • COPD (chronic obstructive pulmonary disease) (HCC)    • Fatty liver    • Hyperlipidemia    • Hypertension    • Mitral regurgitation    • Sepsis      Past Surgical History:   Procedure Laterality Date   • CARDIAC CATHETERIZATION  2021    Moderate non-obstructive atherosclerosis   •  SECTION     • CHOLECYSTECTOMY     • IR RENAL ANGIOGRAM  2022   • IR TEMPORARY DIALYSIS CATHETER CHECK/CHANGE/REPOSITION  2022   • IR TUNNELED DIALYSIS CATHETER PLACEMENT  2022   • IR TUNNELED DIALYSIS CATHETER REMOVAL  2022   • ROTATOR CUFF REPAIR W/ DISTAL CLAVICLE EXCISION Right    • TONSILLECTOMY         Current Outpatient Medications:   •  albuterol (ProAir HFA) 90 mcg/act inhaler, Inhale 2 puffs every 4 (four) hours as needed for wheezing, Disp: 25.5 g, Rfl: 3  •  aspirin 81 mg chewable tablet, Chew 1 tablet (81 mg total) daily, Disp: , Rfl:   •  bisacodyl (DULCOLAX) 5 mg EC tablet, Take 1 tablet (5 mg total) by mouth daily as needed for constipation (Patient taking differently: Take 5 mg by mouth daily as needed for constipation prn), Disp: 30 tablet, Rfl: 0  •  Cholecalciferol 50 MCG (2000 UT) CAPS, Take by mouth daily, Disp: , Rfl:   •  clonazePAM (KlonoPIN) 0.5 mg tablet, Take 0.5 mg by mouth daily at bedtime as needed, Disp: , Rfl:   •  clonazePAM (KlonoPIN) 1 mg tablet, Take 1 tablet (1 mg total) by mouth daily at bedtime At night, Disp: 30 tablet, Rfl: 1  •  docusate sodium (COLACE) 100 mg capsule, Take 1 capsule (100 mg total) by mouth 2 (two) times a day, Disp: 60 capsule, Rfl: 3  •  ergocalciferol (VITAMIN D2) 50,000 units, , Disp: , Rfl:   •  furosemide (LASIX) 40 mg tablet, Take 40 mg by mouth daily, Disp: , Rfl:   •  MAGNESIUM PO, in the morning, Disp: , Rfl:   •  morphine (MS CONTIN) 30 mg 12 hr tablet, Take 1 tablet (30 mg total) by mouth every 12 (twelve) hours Max Daily Amount: 60 mg, Disp: 60 tablet, Rfl: 0  •  oxyCODONE-acetaminophen (PERCOCET) 5-325 mg per tablet, Take 1 tablet by mouth every 8 (eight) hours as needed for moderate pain Max Daily Amount: 3 tablets, Disp: 90 tablet, Rfl: 0  •  potassium chloride (K-DUR,KLOR-CON) 10 mEq tablet, Take 1 tablet (10 mEq total) by mouth daily If using Lasix, Disp: 30 tablet, Rfl: 3  •  umeclidinium-vilanterol (Anoro Ellipta) 62.5-25 MCG/INH inhaler, Inhale 1 puff daily, Disp: 60 blister, Rfl: 3  •  albuterol (2.5 mg/3 mL) 0.083 % nebulizer solution, Take 3 mL (2.5 mg total) by nebulization every 6 (six) hours as needed for wheezing or shortness of breath (Patient not taking: Reported on 8/31/2023), Disp: 150 mL, Rfl: 5  •  lisinopril (ZESTRIL) 20 mg tablet, Take 1 tablet (20 mg total) by mouth daily (Patient not taking: Reported on 8/31/2023), Disp: 30 tablet, Rfl: 0  •  predniSONE 10 mg tablet, Take 1 tablet (10 mg total) by mouth daily (Patient not taking: Reported on 8/31/2023), Disp: 30 tablet, Rfl: 0    Lab Results   Component Value Date     04/05/2018    SODIUM 138 08/10/2023    K 3.5 08/10/2023     08/10/2023    CO2 29 08/10/2023    ANIONGAP 11.6 04/05/2018    AGAP 9 08/10/2023    BUN 15 08/10/2023    CREATININE 0.94 08/10/2023    GLUC 105 08/10/2023    GLUF 155 (H) 04/04/2023    CALCIUM 9.9 08/10/2023    AST 15 08/10/2023    ALT 11 08/10/2023    ALKPHOS 106 (H) 08/10/2023    PROT 8.1 04/05/2018    TP 7.2 08/10/2023    BILITOT 0.5 04/05/2018    TBILI 0.69 08/10/2023    EGFR 65 08/10/2023     Lab Results   Component Value Date    WBC 8.93 08/10/2023    HGB 16.6 (H) 08/10/2023    HCT 51.6 (H) 08/10/2023    MCV 91 08/10/2023     08/10/2023     Lab Results   Component Value Date    CHOLESTEROL 174 05/12/2022    CHOLESTEROL 190 02/09/2022    CHOLESTEROL 201 (H) 06/22/2021     Lab Results   Component Value Date    HDL 43 (L) 05/12/2022    HDL 49 (L) 02/09/2022    HDL 47 06/22/2021     Lab Results   Component Value Date    LDLCALC 113 (H) 05/12/2022    LDLCALC 121 (H) 02/09/2022    LDLCALC 125 (H) 06/22/2021     Lab Results   Component Value Date    TRIG 91 05/12/2022    TRIG 102 02/09/2022    TRIG 144 06/22/2021     No results found for: "CHOLHDL"  Lab Results   Component Value Date    BWP6EMLOJRQO 0.887 04/04/2023     Lab Results   Component Value Date    PTH 83.5 (H) 04/04/2023    CALCIUM 9.9 08/10/2023    PHOS 5.0 (H) 04/04/2023     No results found for: "SPEP", "UPEP"  No results found for: "Ardeth Malou", "[id number]"        Objective:      /100 (BP Location: Left arm, Patient Position: Sitting, Cuff Size: Standard)   Pulse (!) 112   Ht 5' 9" (1.753 m)   Wt 89.4 kg (197 lb)   SpO2 91%   BMI 29.09 kg/m²          Physical Exam  Vitals reviewed. Constitutional:       Appearance: She is obese. HENT:      Head: Normocephalic and atraumatic. Right Ear: External ear normal.      Left Ear: External ear normal.   Eyes:      Extraocular Movements: Extraocular movements intact. Neck:      Vascular: No carotid bruit. Cardiovascular:      Rate and Rhythm: Tachycardia present. Pulmonary:      Effort: Pulmonary effort is normal.      Breath sounds: Normal breath sounds. Abdominal:      General: Bowel sounds are normal.      Tenderness: There is no abdominal tenderness. Musculoskeletal:      Cervical back: Normal range of motion. Right lower leg: No edema. Left lower leg: No edema. Lymphadenopathy:      Cervical: No cervical adenopathy. Skin:     General: Skin is warm and dry. Neurological:      General: No focal deficit present. Mental Status: She is alert. Psychiatric:         Mood and Affect: Mood normal.         Behavior: Behavior normal.         Thought Content:  Thought content normal.         Judgment: Judgment normal.

## 2023-08-31 NOTE — ASSESSMENT & PLAN NOTE
She was admitted for hypertensive urgency and discharged on lisinopril 20mg daily. She stopped the medication on her own and took a caffeine pill = 150mg  Her blood pressure is > 180  She needs to restart lisinopril 20mg  Today and daily  She needs to check her BP daily and in the middle of the night if she awakens  We will repeat a renal artery Doppler and catecholamine levels.   Will see see her back in 2 weeks

## 2023-09-07 ENCOUNTER — TELEPHONE (OUTPATIENT)
Dept: CARDIOLOGY CLINIC | Facility: CLINIC | Age: 63
End: 2023-09-07

## 2023-09-07 DIAGNOSIS — G89.29 OTHER CHRONIC PAIN: ICD-10-CM

## 2023-09-07 RX ORDER — MORPHINE SULFATE 30 MG/1
30 TABLET, FILM COATED, EXTENDED RELEASE ORAL EVERY 12 HOURS
Qty: 60 TABLET | Refills: 0 | Status: SHIPPED | OUTPATIENT
Start: 2023-09-07

## 2023-09-07 RX ORDER — OXYCODONE HYDROCHLORIDE AND ACETAMINOPHEN 5; 325 MG/1; MG/1
1 TABLET ORAL EVERY 8 HOURS PRN
Qty: 90 TABLET | Refills: 0 | Status: SHIPPED | OUTPATIENT
Start: 2023-09-07

## 2023-09-07 NOTE — TELEPHONE ENCOUNTER
Tylene Galeazzi asked for you to review her ER report. They told her that her EKG was abnormal. She wants to know if she should make an appt with you, if you want any testing? Please advise. She did go in with abdominal pain. Her BP was high. Given nitro which gave her a severe migraine.

## 2023-09-13 DIAGNOSIS — I10 ESSENTIAL HYPERTENSION: Primary | Chronic | ICD-10-CM

## 2023-09-13 NOTE — PROGRESS NOTES
Progress Note - Pulmonary   Michel Ribeiro 64 y o  female MRN: [id number]  Unit/Bed#: E5 -01 Encounter: [id number]    Assessment/Plan:    1  Acute hypoxic and hypercapnic respiratory failure likely multifaceted as listed below       -  currently 2 L-91%, patient does not wear home O2       -  continue saturations greater than 88%       -  pulmonary toileting:  Deep breathing cough, OOB as tolerated, IS Q 1 hr       -  will order overnight pulse ox and ABG in a m     2  Acute on chronic grade 1 systolic/diastolic CHF w/ moderate PHTN likely WHO group II & III       -  6/15/2022- EF 41%    proBNP-- 66768-- 76210   PA pressure 46 mmHg       -  cardiology following        -  patient still has noted rales upon examination along with worsening effusion upon imaging       -  will repeat diuresing 40 mg today    3  LLL PNA       -  Day # 6/7- ceftriaxone       -  procalcitonin-  17 80-- 7 62       -  will repeat procalcitonin given worsening imaging    4  COPD of unknown severity with resolving exacerbation       -  Inpatient: Day # 2/3-prednisone 40 mg, decreased by 10 mg q 3 days, DuoNeb q 6       -  home regimen: Will need minimum LABA/LAMA and albuterol MDI/nebulizer upon discharge       -  and will need close pulmonary follow-up and formal PFT testing    5  Tobacco abuse       -  patient reports 2PPD       -  encourage in educated on tobacco cessation       -  patient appeared to be in pre contemplation stage        Chief Complaint:    "I feel pretty good"    Subjective:    [place] Crow was comfortably sitting in her bed  She reports she is still somewhat short of breath upon walking around  No significant overnight events reported  Patient currently denying any fevers, chills hemoptysis, headaches night sweats, pleuritic chest pain, or palpitations  Objective:    Vitals: Blood pressure 121/83, pulse 89, temperature (!) 97 4 °F (36 3 °C), resp   rate 18, height 5' 7" (1 702 m), weight 76 8 kg (169 lb 5 oz), SpO2 91 % 2L,Body mass index is 26 52 kg/m²  Intake/Output Summary (Last 24 hours) at 6/20/2022 1143  Last data filed at 6/19/2022 2249  Gross per 24 hour   Intake --   Output 900 ml   Net -900 ml       Invasive Devices  Report    Peripheral Intravenous Line  Duration           Peripheral IV 06/17/22 Left Antecubital 3 days                Physical Exam:   Physical Exam  Constitutional:       General: She is not in acute distress  Appearance: Normal appearance  She is normal weight  She is not ill-appearing  HENT:      Head: Normocephalic and atraumatic  Nose: Nose normal  No congestion or rhinorrhea  Mouth/Throat:      Mouth: Mucous membranes are dry  Pharynx: No oropharyngeal exudate or posterior oropharyngeal erythema  Cardiovascular:      Rate and Rhythm: Normal rate and regular rhythm  Pulses: Normal pulses  Heart sounds: Normal heart sounds  No murmur heard  No friction rub  No gallop  Pulmonary:      Effort: Pulmonary effort is normal  No tachypnea, bradypnea, accessory muscle usage or respiratory distress  Breath sounds: Decreased air movement present  No stridor or transmitted upper airway sounds  Examination of the right-lower field reveals rales  Examination of the left-lower field reveals rales  Decreased breath sounds and rales present  No wheezing or rhonchi  Comments: Some lower lobe rales  Chest:      Chest wall: No tenderness  Abdominal:      General: Abdomen is flat  Bowel sounds are normal  There is no distension  Palpations: Abdomen is soft  There is no mass  Musculoskeletal:         General: No swelling or tenderness  Normal range of motion  Cervical back: Normal range of motion  No rigidity or tenderness  Skin:     General: Skin is warm and dry  Coloration: Skin is not jaundiced or pale  Neurological:      General: No focal deficit present  Mental Status: She is alert and oriented to person, place, and time   Mental status is at baseline  Psychiatric:         Mood and Affect: Mood normal          Behavior: Behavior normal          Labs:    I have personally reviewed pertinent lab results CBC:   Lab Results   Component Value Date    WBC 10 44 (H) 06/20/2022    HGB 12 1 06/20/2022    HCT 35 8 06/20/2022    MCV 89 06/20/2022     06/20/2022    MCH 30 1 06/20/2022    MCHC 33 8 06/20/2022    RDW 12 9 06/20/2022    MPV 10 1 06/20/2022    NRBC 0 06/20/2022   , CMP:   Lab Results   Component Value Date    SODIUM 131 (L) 06/20/2022    K 3 7 06/20/2022    CL 96 (L) 06/20/2022    CO2 27 06/20/2022    BUN 44 (H) 06/20/2022    CREATININE 1 75 (H) 06/20/2022    CALCIUM 9 0 06/20/2022    EGFR 30 06/20/2022       Imaging and other studies: I have personally reviewed pertinent films in PACS     CXR 6/18/2022- worsening left lower lung effusion/opacity Olumiant Pregnancy And Lactation Text: Based on animal studies, Olumiant may cause embryo-fetal harm when administered to pregnant women.  The medication should not be used in pregnancy.  Breastfeeding is not recommended during treatment.

## 2023-09-14 RX ORDER — FUROSEMIDE 40 MG/1
40 TABLET ORAL DAILY
Qty: 30 TABLET | Refills: 5 | Status: SHIPPED | OUTPATIENT
Start: 2023-09-14

## 2023-09-16 PROBLEM — Z12.11 SCREENING FOR COLON CANCER: Status: RESOLVED | Noted: 2023-07-18 | Resolved: 2023-09-16

## 2023-09-23 ENCOUNTER — DOCUMENTATION (OUTPATIENT)
Dept: GASTROENTEROLOGY | Facility: CLINIC | Age: 63
End: 2023-09-23

## 2023-09-23 NOTE — PROGRESS NOTES
Chart reviewed and following information summarized and outlined below for office visit 9/25/2023 8/9 through 8/10/2023 hospitalized for rectal bleeding  Hemoglobin 17.6 at admission decreased to 16.6  GI consultation  Patient also had hypertensive urgency with systolic blood pressure 500  History of renal artery stenosis  COPD  Initiate on prednisone taper by PCP  Tobacco use    8/9/2023 inpatient GI consultation  Patient had worsening abdominal pain and dark stools and constipation.   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

## 2023-09-26 ENCOUNTER — HOSPITAL ENCOUNTER (OUTPATIENT)
Dept: NON INVASIVE DIAGNOSTICS | Facility: HOSPITAL | Age: 63
Discharge: HOME | End: 2023-09-26
Attending: INTERNAL MEDICINE
Payer: MEDICARE

## 2023-09-26 DIAGNOSIS — I16.0 HYPERTENSIVE URGENCY: ICD-10-CM

## 2023-09-26 PROCEDURE — 93975 VASCULAR STUDY: CPT | Performed by: SURGERY

## 2023-09-26 PROCEDURE — 93975 VASCULAR STUDY: CPT

## 2023-09-27 DIAGNOSIS — I70.1 RENAL ARTERY STENOSIS (HCC): Primary | ICD-10-CM

## 2023-09-28 DIAGNOSIS — F51.04 PSYCHOPHYSIOLOGICAL INSOMNIA: ICD-10-CM

## 2023-09-28 DIAGNOSIS — G89.29 OTHER CHRONIC PAIN: ICD-10-CM

## 2023-09-29 RX ORDER — MORPHINE SULFATE 30 MG/1
30 TABLET, FILM COATED, EXTENDED RELEASE ORAL EVERY 12 HOURS
Qty: 60 TABLET | Refills: 0 | Status: SHIPPED | OUTPATIENT
Start: 2023-10-06

## 2023-09-29 RX ORDER — CLONAZEPAM 1 MG/1
1 TABLET ORAL
Qty: 30 TABLET | Refills: 1 | Status: SHIPPED | OUTPATIENT
Start: 2023-09-29

## 2023-09-29 RX ORDER — OXYCODONE HYDROCHLORIDE AND ACETAMINOPHEN 5; 325 MG/1; MG/1
1 TABLET ORAL EVERY 8 HOURS PRN
Qty: 90 TABLET | Refills: 0 | Status: SHIPPED | OUTPATIENT
Start: 2023-10-06

## 2023-10-02 ENCOUNTER — TELEPHONE (OUTPATIENT)
Dept: VASCULAR SURGERY | Facility: CLINIC | Age: 63
End: 2023-10-02

## 2023-10-03 ENCOUNTER — APPOINTMENT (OUTPATIENT)
Dept: LAB | Facility: CLINIC | Age: 63
End: 2023-10-03
Payer: MEDICARE

## 2023-10-03 DIAGNOSIS — I70.1 RENAL ARTERY STENOSIS (HCC): ICD-10-CM

## 2023-10-03 DIAGNOSIS — I15.0 RENOVASCULAR HYPERTENSION: ICD-10-CM

## 2023-10-03 DIAGNOSIS — N18.9 ACUTE KIDNEY INJURY SUPERIMPOSED ON CKD: ICD-10-CM

## 2023-10-03 DIAGNOSIS — E87.1 HYPONATREMIA: ICD-10-CM

## 2023-10-03 DIAGNOSIS — R73.9 ELEVATED BLOOD SUGAR: ICD-10-CM

## 2023-10-03 DIAGNOSIS — I16.0 HYPERTENSIVE URGENCY: ICD-10-CM

## 2023-10-03 DIAGNOSIS — E55.9 VITAMIN D DEFICIENCY: ICD-10-CM

## 2023-10-03 DIAGNOSIS — J44.1 CHRONIC OBSTRUCTIVE PULMONARY DISEASE WITH ACUTE EXACERBATION (HCC): ICD-10-CM

## 2023-10-03 DIAGNOSIS — G89.4 CHRONIC PAIN SYNDROME: ICD-10-CM

## 2023-10-03 DIAGNOSIS — N17.9 ACUTE KIDNEY INJURY SUPERIMPOSED ON CKD: ICD-10-CM

## 2023-10-03 LAB
25(OH)D3 SERPL-MCNC: 33.4 NG/ML (ref 30–100)
ALBUMIN SERPL BCP-MCNC: 4.4 G/DL (ref 3.5–5)
ALP SERPL-CCNC: 112 U/L (ref 34–104)
ALT SERPL W P-5'-P-CCNC: 11 U/L (ref 7–52)
ANION GAP SERPL CALCULATED.3IONS-SCNC: 6 MMOL/L
AST SERPL W P-5'-P-CCNC: 14 U/L (ref 13–39)
BASOPHILS # BLD AUTO: 0.1 THOUSANDS/ÂΜL (ref 0–0.1)
BASOPHILS NFR BLD AUTO: 1 % (ref 0–1)
BILIRUB SERPL-MCNC: 0.55 MG/DL (ref 0.2–1)
BUN SERPL-MCNC: 22 MG/DL (ref 5–25)
CALCIUM SERPL-MCNC: 10.6 MG/DL (ref 8.4–10.2)
CHLORIDE SERPL-SCNC: 103 MMOL/L (ref 96–108)
CO2 SERPL-SCNC: 28 MMOL/L (ref 21–32)
CORTIS SERPL-MCNC: 20.7 UG/DL
CREAT SERPL-MCNC: 1.02 MG/DL (ref 0.6–1.3)
EOSINOPHIL # BLD AUTO: 0.37 THOUSAND/ÂΜL (ref 0–0.61)
EOSINOPHIL NFR BLD AUTO: 3 % (ref 0–6)
ERYTHROCYTE [DISTWIDTH] IN BLOOD BY AUTOMATED COUNT: 12.8 % (ref 11.6–15.1)
EST. AVERAGE GLUCOSE BLD GHB EST-MCNC: 134 MG/DL
GFR SERPL CREATININE-BSD FRML MDRD: 59 ML/MIN/1.73SQ M
GLUCOSE P FAST SERPL-MCNC: 113 MG/DL (ref 65–99)
HBA1C MFR BLD: 6.3 %
HCT VFR BLD AUTO: 50 % (ref 34.8–46.1)
HGB BLD-MCNC: 16 G/DL (ref 11.5–15.4)
IMM GRANULOCYTES # BLD AUTO: 0.03 THOUSAND/UL (ref 0–0.2)
IMM GRANULOCYTES NFR BLD AUTO: 0 % (ref 0–2)
LYMPHOCYTES # BLD AUTO: 2.37 THOUSANDS/ÂΜL (ref 0.6–4.47)
LYMPHOCYTES NFR BLD AUTO: 21 % (ref 14–44)
MAGNESIUM SERPL-MCNC: 1.9 MG/DL (ref 1.9–2.7)
MCH RBC QN AUTO: 28.2 PG (ref 26.8–34.3)
MCHC RBC AUTO-ENTMCNC: 32 G/DL (ref 31.4–37.4)
MCV RBC AUTO: 88 FL (ref 82–98)
MONOCYTES # BLD AUTO: 0.7 THOUSAND/ÂΜL (ref 0.17–1.22)
MONOCYTES NFR BLD AUTO: 6 % (ref 4–12)
NEUTROPHILS # BLD AUTO: 7.48 THOUSANDS/ÂΜL (ref 1.85–7.62)
NEUTS SEG NFR BLD AUTO: 69 % (ref 43–75)
NRBC BLD AUTO-RTO: 0 /100 WBCS
PLATELET # BLD AUTO: 373 THOUSANDS/UL (ref 149–390)
PMV BLD AUTO: 9.6 FL (ref 8.9–12.7)
POTASSIUM SERPL-SCNC: 4.8 MMOL/L (ref 3.5–5.3)
PROT SERPL-MCNC: 7.8 G/DL (ref 6.4–8.4)
RBC # BLD AUTO: 5.67 MILLION/UL (ref 3.81–5.12)
SODIUM SERPL-SCNC: 137 MMOL/L (ref 135–147)
WBC # BLD AUTO: 11.05 THOUSAND/UL (ref 4.31–10.16)

## 2023-10-03 PROCEDURE — 82088 ASSAY OF ALDOSTERONE: CPT

## 2023-10-03 PROCEDURE — 83735 ASSAY OF MAGNESIUM: CPT

## 2023-10-03 PROCEDURE — 83036 HEMOGLOBIN GLYCOSYLATED A1C: CPT

## 2023-10-03 PROCEDURE — 82533 TOTAL CORTISOL: CPT

## 2023-10-03 PROCEDURE — 36415 COLL VENOUS BLD VENIPUNCTURE: CPT

## 2023-10-03 PROCEDURE — 80053 COMPREHEN METABOLIC PANEL: CPT

## 2023-10-03 PROCEDURE — 84244 ASSAY OF RENIN: CPT

## 2023-10-03 PROCEDURE — 85025 COMPLETE CBC W/AUTO DIFF WBC: CPT

## 2023-10-03 PROCEDURE — 82306 VITAMIN D 25 HYDROXY: CPT

## 2023-10-04 ENCOUNTER — APPOINTMENT (OUTPATIENT)
Dept: LAB | Facility: CLINIC | Age: 63
End: 2023-10-04
Payer: MEDICARE

## 2023-10-04 DIAGNOSIS — E83.52 HYPERCALCEMIA: Primary | ICD-10-CM

## 2023-10-04 DIAGNOSIS — E83.52 HYPERCALCEMIA: ICD-10-CM

## 2023-10-04 LAB
CA-I BLD-SCNC: 1.25 MMOL/L (ref 1.12–1.32)
CALCIUM SERPL-MCNC: 9.9 MG/DL (ref 8.4–10.2)
SODIUM 24H UR-SCNC: 29 MOL/L

## 2023-10-04 PROCEDURE — 84165 PROTEIN E-PHORESIS SERUM: CPT

## 2023-10-04 PROCEDURE — 82330 ASSAY OF CALCIUM: CPT

## 2023-10-04 PROCEDURE — 82310 ASSAY OF CALCIUM: CPT

## 2023-10-04 PROCEDURE — 84166 PROTEIN E-PHORESIS/URINE/CSF: CPT | Performed by: INTERNAL MEDICINE

## 2023-10-04 PROCEDURE — 36415 COLL VENOUS BLD VENIPUNCTURE: CPT

## 2023-10-04 PROCEDURE — 86334 IMMUNOFIX E-PHORESIS SERUM: CPT

## 2023-10-04 PROCEDURE — 84300 ASSAY OF URINE SODIUM: CPT

## 2023-10-05 LAB
ALBUMIN SERPL ELPH-MCNC: 4.34 G/DL (ref 3.2–5.1)
ALBUMIN SERPL ELPH-MCNC: 55.7 % (ref 48–70)
ALBUMIN UR ELPH-MCNC: 100 %
ALPHA1 GLOB MFR UR ELPH: 0 %
ALPHA1 GLOB SERPL ELPH-MCNC: 0.33 G/DL (ref 0.15–0.47)
ALPHA1 GLOB SERPL ELPH-MCNC: 4.2 % (ref 1.8–7)
ALPHA2 GLOB MFR UR ELPH: 0 %
ALPHA2 GLOB SERPL ELPH-MCNC: 0.88 G/DL (ref 0.42–1.04)
ALPHA2 GLOB SERPL ELPH-MCNC: 11.3 % (ref 5.9–14.9)
B-GLOBULIN MFR UR ELPH: 0 %
BETA GLOB ABNORMAL SERPL ELPH-MCNC: 0.48 G/DL (ref 0.31–0.57)
BETA1 GLOB SERPL ELPH-MCNC: 6.1 % (ref 4.7–7.7)
BETA2 GLOB SERPL ELPH-MCNC: 7.7 % (ref 3.1–7.9)
BETA2+GAMMA GLOB SERPL ELPH-MCNC: 0.6 G/DL (ref 0.2–0.58)
GAMMA GLOB ABNORMAL SERPL ELPH-MCNC: 1.17 G/DL (ref 0.4–1.66)
GAMMA GLOB MFR UR ELPH: 0 %
GAMMA GLOB SERPL ELPH-MCNC: 15 % (ref 6.9–22.3)
IGG/ALB SER: 1.26 {RATIO} (ref 1.1–1.8)
PROT PATTERN SERPL ELPH-IMP: ABNORMAL
PROT PATTERN UR ELPH-IMP: NORMAL
PROT SERPL-MCNC: 7.8 G/DL (ref 6.4–8.2)
PROT UR-MCNC: 9.5 MG/DL

## 2023-10-05 PROCEDURE — 84166 PROTEIN E-PHORESIS/URINE/CSF: CPT | Performed by: PATHOLOGY

## 2023-10-05 PROCEDURE — 86334 IMMUNOFIX E-PHORESIS SERUM: CPT | Performed by: PATHOLOGY

## 2023-10-05 PROCEDURE — 84165 PROTEIN E-PHORESIS SERUM: CPT | Performed by: PATHOLOGY

## 2023-10-06 LAB — INTERPRETATION UR IFE-IMP: NORMAL

## 2023-10-08 LAB
ALDOST SERPL-MCNC: 3.3 NG/DL (ref 0–30)
ALDOST/RENIN PLAS-RTO: 0.2 {RATIO} (ref 0–30)
RENIN PLAS-CCNC: 17.09 NG/ML/HR (ref 0.17–5.38)

## 2023-10-09 ENCOUNTER — RA CDI HCC (OUTPATIENT)
Dept: OTHER | Facility: HOSPITAL | Age: 63
End: 2023-10-09

## 2023-10-09 LAB — ALDOST SERPL-MCNC: 4 NG/DL (ref 0–30)

## 2023-10-16 ENCOUNTER — OFFICE VISIT (OUTPATIENT)
Dept: FAMILY MEDICINE CLINIC | Facility: CLINIC | Age: 63
End: 2023-10-16
Payer: MEDICARE

## 2023-10-16 VITALS
HEART RATE: 80 BPM | WEIGHT: 196.8 LBS | OXYGEN SATURATION: 94 % | DIASTOLIC BLOOD PRESSURE: 92 MMHG | HEIGHT: 69 IN | BODY MASS INDEX: 29.15 KG/M2 | TEMPERATURE: 96.5 F | SYSTOLIC BLOOD PRESSURE: 150 MMHG

## 2023-10-16 DIAGNOSIS — Z78.0 ASYMPTOMATIC POSTMENOPAUSAL STATE: ICD-10-CM

## 2023-10-16 DIAGNOSIS — I10 ESSENTIAL HYPERTENSION: Chronic | ICD-10-CM

## 2023-10-16 DIAGNOSIS — Z13.820 SCREENING FOR OSTEOPOROSIS: ICD-10-CM

## 2023-10-16 DIAGNOSIS — R73.9 ELEVATED BLOOD SUGAR: ICD-10-CM

## 2023-10-16 DIAGNOSIS — K59.00 CONSTIPATION, UNSPECIFIED CONSTIPATION TYPE: ICD-10-CM

## 2023-10-16 DIAGNOSIS — J43.8 OTHER EMPHYSEMA (HCC): ICD-10-CM

## 2023-10-16 DIAGNOSIS — Z78.0 ASYMPTOMATIC MENOPAUSAL STATE: ICD-10-CM

## 2023-10-16 DIAGNOSIS — K62.5 RECTAL BLEEDING: ICD-10-CM

## 2023-10-16 DIAGNOSIS — Z23 ENCOUNTER FOR IMMUNIZATION: ICD-10-CM

## 2023-10-16 DIAGNOSIS — I70.1 RENAL ARTERY STENOSIS (HCC): Primary | ICD-10-CM

## 2023-10-16 DIAGNOSIS — Z72.0 TOBACCO ABUSE: Chronic | ICD-10-CM

## 2023-10-16 DIAGNOSIS — Z12.31 ENCOUNTER FOR SCREENING MAMMOGRAM FOR MALIGNANT NEOPLASM OF BREAST: ICD-10-CM

## 2023-10-16 DIAGNOSIS — Z12.11 SCREENING FOR COLON CANCER: ICD-10-CM

## 2023-10-16 DIAGNOSIS — E87.6 HYPOKALEMIA: ICD-10-CM

## 2023-10-16 PROBLEM — E87.1 HYPONATREMIA: Status: RESOLVED | Noted: 2021-06-13 | Resolved: 2023-10-16

## 2023-10-16 PROCEDURE — 90686 IIV4 VACC NO PRSV 0.5 ML IM: CPT

## 2023-10-16 PROCEDURE — G0008 ADMIN INFLUENZA VIRUS VAC: HCPCS

## 2023-10-16 PROCEDURE — 99214 OFFICE O/P EST MOD 30 MIN: CPT | Performed by: INTERNAL MEDICINE

## 2023-10-16 RX ORDER — POTASSIUM CHLORIDE 750 MG/1
10 TABLET, EXTENDED RELEASE ORAL DAILY
Qty: 30 TABLET | Refills: 3 | OUTPATIENT
Start: 2023-10-16

## 2023-10-16 RX ORDER — LISINOPRIL 5 MG/1
5 TABLET ORAL DAILY
Qty: 30 TABLET | Refills: 5 | Status: SHIPPED | OUTPATIENT
Start: 2023-10-16

## 2023-10-16 RX ORDER — CARVEDILOL 12.5 MG/1
12.5 TABLET ORAL 2 TIMES DAILY WITH MEALS
COMMUNITY

## 2023-10-16 RX ORDER — FUROSEMIDE 40 MG/1
20 TABLET ORAL ONCE AS NEEDED
Qty: 30 TABLET | Refills: 3 | Status: SHIPPED | OUTPATIENT
Start: 2023-10-16

## 2023-10-16 NOTE — ASSESSMENT & PLAN NOTE
Continues to smoke. Had been doing well on daily prednisone, this has been discontinued. Currently she seems to be doing okay. Believes she has nebulizers at home as well.

## 2023-10-16 NOTE — ASSESSMENT & PLAN NOTE
Failure to meet up with the gastroenterologist.  Pari Villaloboses her another appointment, but she declines this. She does not really want to know.

## 2023-10-16 NOTE — PROGRESS NOTES
Name: Diana Terrazas      : 1960      MRN: [id number]  Encounter Provider: Suzanne Camejo DO  Encounter Date: 10/16/2023   Encounter department: [address] [facility]    Assessment & Plan     1. Renal artery stenosis [facility])  Assessment & Plan:  Status post stent of the left renal artery, and recent studies suggest a 70% blockage in the right renal artery as well. She has follow-up with vascular surgery. 2. Essential hypertension  Assessment & Plan:  As for exam with blood pressure initially quite high, down to 150/92. We will add 5 mg lisinopril, she formally had been on 20 and she felt terrible on it. We will recheck in 6 to 7 weeks at which time we will check blood pressure again on her. Consider more blood work at that time. Notably she has renal artery stenosis, and she is going for formal evaluation of that as well. Orders:  -     furosemide (LASIX) 40 mg tablet; Take 0.5 tablets (20 mg total) by mouth once as needed (edema) for up to 1 dose  -     lisinopril (ZESTRIL) 5 mg tablet; Take 1 tablet (5 mg total) by mouth daily    3. Rectal bleeding  Assessment & Plan:  Failure to meet up with the gastroenterologist.  Offered her another appointment, but she declines this. She does not really want to know. 4. Other emphysema ([address])  Assessment & Plan:  Continues to smoke. Had been doing well on daily prednisone, this has been discontinued. Currently she seems to be doing okay. Believes she has nebulizers at home as well. 5. Elevated blood sugar  Assessment & Plan:  Slightly above normal hemoglobin A1c, likely related to recent prednisone therapy. We will continue to monitor. 6. Screening for colon cancer  -     Ambulatory Referral to Gastroenterology; Future    7. Asymptomatic postmenopausal state    8. Constipation, unspecified constipation type  Assessment & Plan:  Controlled with over-the-counter medications.       9. Tobacco abuse  Assessment & Plan:  Offered low-dose CAT scan screening, she declines this at this time. She has had recent CAT scans with her recent illnesses. 10. Encounter for immunization  -     Pneumococcal Conjugate Vaccine 20-valent (PCV20)  -     influenza vaccine, quadrivalent, 0.5 mL, preservative-free, for adult and pediatric patients 6 mos+ (AFLURIA, FLUARIX, FLULAVAL, FLUZONE)    11. Screening for osteoporosis  -     DXA bone density spine hip and pelvis; Future; Expected date: 11/16/2023    12. Encounter for screening mammogram for malignant neoplasm of breast  Comments:  Refusing mammography. 13. Asymptomatic menopausal state  -     DXA bone density spine hip and pelvis; Future; Expected date: 11/16/2023        Tobacco Cessation Counseling: Tobacco cessation counseling was provided. The patient is sincerely urged to quit consumption of tobacco. She is not ready to quit tobacco. Medication options and side effects of medication discussed. Patient refused medication. Dahlia Bryant continues to have issues with compliance. She is finally able to wean herself off of prednisone. She says she is feeling more normal.  She also happened to stop some of her blood pressure pills her blood pressure is running quite high, but she says she feels better on no medications. She continues to smoke despite my urging stop. She says is mostly out of boredom. Offered medication but she declines. Continues with intermittent constipation. She has failed to follow-up with GI despite a history of rectal bleeding abdominal pain. She is not interested in knowing. She refuses a mammogram today as well. Willing to go for bone density which I think would be important given her recent long course of prednisone. She continues with her usual chronic pain issues from her cervical spine/thoracic spine, fibromyalgia and a past history of a long thoracic nerve injury.       Review of Systems   Constitutional:  Negative for chills and fever. HENT:  Negative for rhinorrhea and sore throat. Eyes:  Negative for visual disturbance. Respiratory:  Positive for cough. Negative for shortness of breath. Cardiovascular:  Positive for leg swelling. Negative for chest pain. Gastrointestinal:  Negative for abdominal pain, diarrhea, nausea and vomiting. Genitourinary:  Negative for dysuria. Musculoskeletal:  Positive for arthralgias and back pain. Negative for myalgias. Skin:  Negative for rash. Neurological:  Negative for dizziness and headaches. Psychiatric/Behavioral:  Negative for confusion. All other systems reviewed and are negative. Current Outpatient Medications on File Prior to Visit   Medication Sig    albuterol (ProAir HFA) 90 mcg/act inhaler Inhale 2 puffs every 4 (four) hours as needed for wheezing    aspirin 81 mg chewable tablet Chew 1 tablet (81 mg total) daily    bisacodyl (DULCOLAX) 5 mg EC tablet Take 1 tablet (5 mg total) by mouth daily as needed for constipation (Patient taking differently: Take 5 mg by mouth daily as needed for constipation prn)    carvedilol (COREG) 12.5 mg tablet Take 12.5 mg by mouth 2 (two) times a day with meals    Cholecalciferol 50 MCG (2000 UT) CAPS Take by mouth daily    clonazePAM (KlonoPIN) 1 mg tablet Take 1 tablet (1 mg total) by mouth daily at bedtime At night    ergocalciferol (VITAMIN D2) 50,000 units     MAGNESIUM PO in the morning    morphine (MS CONTIN) 30 mg 12 hr tablet Take 1 tablet (30 mg total) by mouth every 12 (twelve) hours Max Daily Amount: 60 mg Do not start before October 6, 2023. oxyCODONE-acetaminophen (PERCOCET) 5-325 mg per tablet Take 1 tablet by mouth every 8 (eight) hours as needed for moderate pain Max Daily Amount: 3 tablets Do not start before October 6, 2023.     potassium chloride (K-DUR,KLOR-CON) 10 mEq tablet Take 1 tablet (10 mEq total) by mouth daily If using Lasix    umeclidinium-vilanterol (Anoro Ellipta) 62.5-25 MCG/INH inhaler Inhale 1 puff daily    [DISCONTINUED] clonazePAM (KlonoPIN) 0.5 mg tablet Take 0.5 mg by mouth daily at bedtime as needed    [DISCONTINUED] furosemide (LASIX) 40 mg tablet Take 1 tablet (40 mg total) by mouth daily    docusate sodium (COLACE) 100 mg capsule Take 1 capsule (100 mg total) by mouth 2 (two) times a day    [DISCONTINUED] lisinopril (ZESTRIL) 20 mg tablet Take 1 tablet (20 mg total) by mouth daily (Patient not taking: Reported on 8/31/2023)       Objective     /92 (BP Location: Left arm, Patient Position: Sitting, Cuff Size: Adult)   Pulse 80   Temp (!) 96.5 °F (35.8 °C) (Tympanic)   Ht 5' 9" (1.753 m)   Wt 89.3 kg (196 lb 12.8 oz)   SpO2 94%   BMI 29.06 kg/m²     Physical Exam  Constitutional:       Appearance: Normal appearance. HENT:      Head: Normocephalic and atraumatic. Nose: No congestion or rhinorrhea. Eyes:      Extraocular Movements: Extraocular movements intact. Pupils: Pupils are equal, round, and reactive to light. Neck:      Vascular: No carotid bruit. Cardiovascular:      Rate and Rhythm: Normal rate and regular rhythm. Heart sounds: No murmur heard. Pulmonary:      Effort: No respiratory distress. Breath sounds: Wheezing present. Chest:      Chest wall: No tenderness. Abdominal:      General: There is no distension. Tenderness: There is no abdominal tenderness. Hernia: No hernia is present. Musculoskeletal:         General: No swelling or tenderness. Cervical back: Normal range of motion and neck supple. Right lower leg: Edema present. Left lower leg: Edema present. Comments: trace   Lymphadenopathy:      Cervical: No cervical adenopathy. Skin:     Findings: No rash. Neurological:      General: No focal deficit present. Mental Status: She is alert and oriented to person, place, and time. Sensory: No sensory deficit.    Psychiatric:         Mood and Affect: Mood normal.         Behavior: Behavior normal. Jairo Escobar, DO

## 2023-10-16 NOTE — ASSESSMENT & PLAN NOTE
Slightly above normal hemoglobin A1c, likely related to recent prednisone therapy. We will continue to monitor.

## 2023-10-16 NOTE — ASSESSMENT & PLAN NOTE
As for exam with blood pressure initially quite high, down to 150/92. We will add 5 mg lisinopril, she formally had been on 20 and she felt terrible on it. We will recheck in 6 to 7 weeks at which time we will check blood pressure again on her. Consider more blood work at that time. Notably she has renal artery stenosis, and she is going for formal evaluation of that as well.

## 2023-10-16 NOTE — ASSESSMENT & PLAN NOTE
Offered low-dose CAT scan screening, she declines this at this time. She has had recent CAT scans with her recent illnesses.

## 2023-10-16 NOTE — ASSESSMENT & PLAN NOTE
Status post stent of the left renal artery, and recent studies suggest a 70% blockage in the right renal artery as well. She has follow-up with vascular surgery.

## 2023-10-30 NOTE — PROGRESS NOTES
Pt extubated without incident this AM after 2 hour wean on PS 5/6  Pt became acutely agitated, anxious, and stating "I can't breathe"  Ripped off her oxygen  Vitals noted for 's, pulse ox 90's  I assessed the pt and spoke with her  Give Lorazepam 1 mg x 1  Start Precedex  Start BIPAP for support  May require reintubation  Additional cc time by me personally is 15 minutes to manage acute respiratory failure  Detail Level: Detailed Quality 431: Preventive Care And Screening: Unhealthy Alcohol Use - Screening: Patient not identified as an unhealthy alcohol user when screened for unhealthy alcohol use using a systematic screening method Quality 110: Preventive Care And Screening: Influenza Immunization: Influenza immunization was not ordered or administered, reason not given Quality 111:Pneumonia Vaccination Status For Older Adults: Patient did not receive any pneumococcal conjugate or polysaccharide vaccine on or after their 60th birthday and before the end of the measurement period Quality 226: Preventive Care And Screening: Tobacco Use: Screening And Cessation Intervention: Patient screened for tobacco use and is an ex/non-smoker

## 2023-11-06 DIAGNOSIS — G89.29 OTHER CHRONIC PAIN: ICD-10-CM

## 2023-11-06 DIAGNOSIS — F51.04 PSYCHOPHYSIOLOGICAL INSOMNIA: ICD-10-CM

## 2023-11-06 RX ORDER — MORPHINE SULFATE 30 MG/1
30 TABLET, FILM COATED, EXTENDED RELEASE ORAL EVERY 12 HOURS
Qty: 60 TABLET | Refills: 0 | Status: SHIPPED | OUTPATIENT
Start: 2023-11-06

## 2023-11-06 RX ORDER — CLONAZEPAM 1 MG/1
1 TABLET ORAL
Qty: 30 TABLET | Refills: 1 | Status: SHIPPED | OUTPATIENT
Start: 2023-11-06

## 2023-11-06 RX ORDER — OXYCODONE HYDROCHLORIDE AND ACETAMINOPHEN 5; 325 MG/1; MG/1
1 TABLET ORAL EVERY 8 HOURS PRN
Qty: 90 TABLET | Refills: 0 | Status: SHIPPED | OUTPATIENT
Start: 2023-11-06

## 2023-11-07 ENCOUNTER — CONSULT (OUTPATIENT)
Dept: VASCULAR SURGERY | Facility: CLINIC | Age: 63
End: 2023-11-07
Payer: MEDICARE

## 2023-11-07 ENCOUNTER — TELEPHONE (OUTPATIENT)
Dept: GASTROENTEROLOGY | Facility: CLINIC | Age: 63
End: 2023-11-07

## 2023-11-07 VITALS
HEIGHT: 69 IN | TEMPERATURE: 97.6 F | DIASTOLIC BLOOD PRESSURE: 92 MMHG | WEIGHT: 196.6 LBS | OXYGEN SATURATION: 97 % | BODY MASS INDEX: 29.12 KG/M2 | HEART RATE: 91 BPM | SYSTOLIC BLOOD PRESSURE: 158 MMHG

## 2023-11-07 DIAGNOSIS — N18.30 STAGE 3 CHRONIC KIDNEY DISEASE, UNSPECIFIED WHETHER STAGE 3A OR 3B CKD (HCC): ICD-10-CM

## 2023-11-07 DIAGNOSIS — Z72.0 TOBACCO ABUSE: Chronic | ICD-10-CM

## 2023-11-07 DIAGNOSIS — I70.1 RENAL ARTERY STENOSIS (HCC): Primary | ICD-10-CM

## 2023-11-07 PROCEDURE — 99213 OFFICE O/P EST LOW 20 MIN: CPT | Performed by: SURGERY

## 2023-11-07 RX ORDER — ATORVASTATIN CALCIUM 40 MG/1
40 TABLET, FILM COATED ORAL DAILY
Qty: 90 TABLET | Refills: 0 | Status: SHIPPED | OUTPATIENT
Start: 2023-11-07

## 2023-11-07 NOTE — TELEPHONE ENCOUNTER
This is a reminder; patient is due for Renal US  . Please call patient and schedule the following by the dates provided. Patient's appointment(s) are due on or after August 2024 .     Dopplers  [] Abdominal Aorta Iliac (AOIL)  [] Carotid (CV)   [] Celiac and/or Mesenteric  [] Endovascular Aortic Repair (EVAR)   [] Hemodialysis Access (HD)   [] Lower Limb Arterial (MAMTA)  [] Lower Limb Venous (LEV)  [] Lower Limb Venous Duplex with Reflux (LEVDR)  [x] Renal Artery due on or after August 2024  [] Upper Limb Arterial (UEA)    [] Upper Limb Venous (UEV)              [] RHETT and Waveform analysis     Advanced Imaging   [] CTA head/neck    [] CTA abdomen    [] CTA abdomen & pelvis    [] CT abdomen with/ without contrast  [] CT abdomen with contrast  [] CT abdomen without contrast    [] CT abdomen & pelvis with/ without contrast  [] CT abdomen & pelvis with contrast  [] CT abdomen & pelvis without contrast    Office Visit   [] New patient, patient last seen over 3 years ago  [] Risk factor modification (RFM)   [x] Follow up due on or after August 2024  [] Lost to follow up (LTFU)

## 2023-11-07 NOTE — PROGRESS NOTES
Assessment/Plan:    Pt is a 62 yo F w/ HBV, hyperparathryoidism, COPD, cardiomyopathy, HTN, CKD, tobacco abuse, anxiety, chronic pain, renal artery stenosis s/p L renal artery stent    Renal artery stenosis (HCC)  Stage 3 chronic kidney disease, unspecified whether stage 3a or 3b CKD ([address])  -     Ambulatory Referral to Vascular Surgery  -     atorvastatin (LIPITOR) 40 mg tablet; Take 1 tablet (40 mg total) by mouth daily  -     VAS renal artery complete; Future  -was admitted in '22 w/ flash pulmonary edema, acute renal failure  -s/p L renal artery stent 5x19 BES ([place])  -reviewed CT w/ contrast which shows known occluded R renal artery and L renal stent; difficult to evaluate but appears patent; R renal artery atretic  -reviewed renal ultrasound which shows patent L renal artery stent; R renal artery known occluded; R kidney atretic  -currently 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 and L renal artery patent stent  -blood pressure reasonable controlled (150/90s) on carvedilol, klonopin, lisinopril  -Cr: 1.02  GFR: 59  -last TTE March '23 w/ moderate hypertrophy, EF: 70%, mild valvular disease  -no indication for intervention at this time; will continue yearly surveillance w/ DU  -f/u 1 year    Tobacco abuse  -     Ambulatory Referral to Smoking Cessation Program; Future    -current 2PPD  -discussed relationship between arterial disease and smoking and likelihood of instent restenosis or occlusion if smoking continues  -agreeable to referral to smoking cessation    Medications  -continue ASA  -will start statin for atherosclerosis      Subjective:      Patient ID: Moises Gomez is a 61 y.o. female. New patient referred by Dr. Fuad Rowland for renal artery stenosis had CT of the abdomen/pelvis done 8/9/23 and a renal artery complete done 9/26/23. PT has no symptoms to report. Pt is taking ASA 81 mg. Pt is a current smoker 1-2 PPD.      HPI:    Patient referred for renal artery stenosis. Patient had hospitalization in  with flash pulmonary edema and was on dialysis and underwent L renal artery stenting. She required dialysis for about 1.5 mos. Currently, her blood pressure is semi-controlled. She is taking carvedilol, klonopin, lisinopril    Her kidney function is currently normal    She denies SOB. Denies claudication. Smoking 2PPD. Takes aspirin. No statin (has in the past)        The following portions of the patient's history were reviewed and updated as appropriate: allergies, current medications, past family history, past medical history, past social history, past surgical history, and problem list.    Review of Systems   Constitutional: Negative. HENT: Negative. Eyes: Negative. Respiratory:  Positive for shortness of breath. Cardiovascular: Negative. Gastrointestinal: Negative. Endocrine: Negative. Genitourinary: Negative. Musculoskeletal: Negative. Skin: Negative. Allergic/Immunologic: Negative. Neurological: Negative. Hematological: Negative. Psychiatric/Behavioral: Negative. Objective:      /92 (BP Location: Right arm, Patient Position: Sitting, Cuff Size: Standard)   Pulse 91   Temp 97.6 °F (36.4 °C) (Temporal)   Ht 5' 9" (1.753 m)   Wt 89.2 kg (196 lb 9.6 oz)   SpO2 97%   BMI 29.03 kg/m²          Physical Exam  Cardiovascular:      Rate and Rhythm: Normal rate and regular rhythm. Pulses:           Radial pulses are 0 on the right side and 2+ on the left side. Dorsalis pedis pulses are 2+ on the right side and 1+ on the left side. Posterior tibial pulses are 1+ on the right side and 0 on the left side. Heart sounds: Murmur heard. Pulmonary:      Effort: No respiratory distress. Breath sounds: No wheezing or rales. Musculoskeletal:      Right lower le+ Edema present. Left lower le+ Edema present.    Skin:     Comments: Shallow scratches R shin (from cat) I have reviewed and made appropriate changes to the review of systems input by the medical assistant. Vitals:    23 1431   BP: 158/92   BP Location: Right arm   Patient Position: Sitting   Cuff Size: Standard   Pulse: 91   Temp: 97.6 °F (36.4 °C)   TempSrc: Temporal   SpO2: 97%   Weight: 89.2 kg (196 lb 9.6 oz)   Height: 5' 9" (1.753 m)       Patient Active Problem List   Diagnosis   • Vitamin D deficiency   • Dilated cardiomyopathy ([address])   • Essential hypertension   • Chronic pain syndrome   • Anxiety   • Tobacco abuse   • Leukocytosis   • COPD (chronic obstructive pulmonary disease) (HCC)   • Acute kidney injury superimposed on CKD-3   • Acute respiratory insufficiency   • Acute respiratory failure with hypoxia (HCC)   • Hepatitis B   • Renal artery stenosis (HCC)   • Secondary hyperparathyroidism of renal origin ([address])   • COPD with acute exacerbation (HCC)   • CKD (chronic kidney disease) stage 3, GFR 30-59 ml/min (HCC)   • Screening for colon cancer   • Encounter for screening mammogram for breast cancer   • Hypertensive urgency   • Rectal bleeding   • Continuous opioid dependence ([address])   • Current chronic use of systemic steroids   • Asymptomatic menopausal state   • Constipation   • Elevated blood sugar       Past Surgical History:   Procedure Laterality Date   • CARDIAC CATHETERIZATION  2021    Moderate non-obstructive atherosclerosis   •  SECTION     • CHOLECYSTECTOMY     • IR RENAL ANGIOGRAM  2022   • IR TEMPORARY DIALYSIS CATHETER CHECK/CHANGE/REPOSITION  2022   • IR TUNNELED DIALYSIS CATHETER PLACEMENT  2022   • IR TUNNELED DIALYSIS CATHETER REMOVAL  2022   • ROTATOR CUFF REPAIR W/ DISTAL CLAVICLE EXCISION Right    • TONSILLECTOMY         History reviewed. No pertinent family history.     Social History     Socioeconomic History   • Marital status: Single     Spouse name: Not on file   • Number of children: Not on file   • Years of education: Not on file • Highest education level: Not on file   Occupational History   • Not on file   Tobacco Use   • Smoking status: Every Day     Packs/day: 1.00     Types: Cigarettes     Passive exposure: Current   • Smokeless tobacco: Never   Vaping Use   • Vaping Use: Never used   Substance and Sexual Activity   • Alcohol use: Never   • Drug use: No   • Sexual activity: Not on file   Other Topics Concern   • Not on file   Social History Narrative   • Not on file     Social Determinants of Health     Financial Resource Strain: Not on file   Food Insecurity: No Food Insecurity (8/10/2023)    Hunger Vital Sign    • Worried About Running Out of Food in the Last Year: Never true    • Ran Out of Food in the Last Year: Never true   Transportation Needs: No Transportation Needs (8/10/2023)    PRAPARE - Transportation    • Lack of Transportation (Medical): No    • Lack of Transportation (Non-Medical):  No   Physical Activity: Not on file   Stress: Not on file   Social Connections: Not on file   Intimate Partner Violence: Not on file   Housing Stability: Low Risk  (8/10/2023)    Housing Stability Vital Sign    • Unable to Pay for Housing in the Last Year: No    • Number of Places Lived in the Last Year: 1    • Unstable Housing in the Last Year: No       Allergies   Allergen Reactions   • Wellbutrin [Bupropion] Arthralgia         Current Outpatient Medications:   •  aspirin 81 mg chewable tablet, Chew 1 tablet (81 mg total) daily, Disp: , Rfl:   •  atorvastatin (LIPITOR) 40 mg tablet, Take 1 tablet (40 mg total) by mouth daily, Disp: 90 tablet, Rfl: 0  •  bisacodyl (DULCOLAX) 5 mg EC tablet, Take 1 tablet (5 mg total) by mouth daily as needed for constipation (Patient taking differently: Take 5 mg by mouth daily as needed for constipation prn), Disp: 30 tablet, Rfl: 0  •  carvedilol (COREG) 12.5 mg tablet, Take 12.5 mg by mouth 2 (two) times a day with meals, Disp: , Rfl:   •  Cholecalciferol 50 MCG (2000 UT) CAPS, Take by mouth daily, Disp: , Rfl:   •  clonazePAM (KlonoPIN) 1 mg tablet, Take 1 tablet (1 mg total) by mouth daily at bedtime At night, Disp: 30 tablet, Rfl: 1  •  furosemide (LASIX) 40 mg tablet, Take 0.5 tablets (20 mg total) by mouth once as needed (edema) for up to 1 dose, Disp: 30 tablet, Rfl: 3  •  lisinopril (ZESTRIL) 5 mg tablet, Take 1 tablet (5 mg total) by mouth daily, Disp: 30 tablet, Rfl: 5  •  MAGNESIUM PO, in the morning, Disp: , Rfl:   •  morphine (MS CONTIN) 30 mg 12 hr tablet, Take 1 tablet (30 mg total) by mouth every 12 (twelve) hours Max Daily Amount: 60 mg, Disp: 60 tablet, Rfl: 0  •  oxyCODONE-acetaminophen (PERCOCET) 5-325 mg per tablet, Take 1 tablet by mouth every 8 (eight) hours as needed for moderate pain Max Daily Amount: 3 tablets, Disp: 90 tablet, Rfl: 0  •  potassium chloride (K-DUR,KLOR-CON) 10 mEq tablet, Take 1 tablet (10 mEq total) by mouth daily If using Lasix, Disp: 30 tablet, Rfl: 3  •  albuterol (ProAir HFA) 90 mcg/act inhaler, Inhale 2 puffs every 4 (four) hours as needed for wheezing (Patient not taking: Reported on 11/7/2023), Disp: 25.5 g, Rfl: 3  •  docusate sodium (COLACE) 100 mg capsule, Take 1 capsule (100 mg total) by mouth 2 (two) times a day, Disp: 60 capsule, Rfl: 3  •  ergocalciferol (VITAMIN D2) 50,000 units, , Disp: , Rfl:   •  umeclidinium-vilanterol (Anoro Ellipta) 62.5-25 MCG/INH inhaler, Inhale 1 puff daily (Patient not taking: Reported on 11/7/2023), Disp: 60 blister, Rfl: 3

## 2023-11-09 ENCOUNTER — PATIENT OUTREACH (OUTPATIENT)
Dept: OTHER | Facility: CLINIC | Age: 63
End: 2023-11-09

## 2023-11-21 DIAGNOSIS — I10 ESSENTIAL HYPERTENSION: Primary | ICD-10-CM

## 2023-11-21 DIAGNOSIS — K59.00 CONSTIPATION: ICD-10-CM

## 2023-11-21 RX ORDER — BISACODYL 5 MG/1
5 TABLET, DELAYED RELEASE ORAL DAILY PRN
Qty: 30 TABLET | Refills: 0 | Status: SHIPPED | OUTPATIENT
Start: 2023-11-21

## 2023-11-21 RX ORDER — CARVEDILOL 12.5 MG/1
12.5 TABLET ORAL 2 TIMES DAILY WITH MEALS
Qty: 30 TABLET | Refills: 5 | Status: SHIPPED | OUTPATIENT
Start: 2023-11-21

## 2023-12-05 DIAGNOSIS — G89.29 OTHER CHRONIC PAIN: ICD-10-CM

## 2023-12-05 RX ORDER — MORPHINE SULFATE 30 MG/1
30 TABLET, FILM COATED, EXTENDED RELEASE ORAL EVERY 12 HOURS
Qty: 60 TABLET | Refills: 0 | Status: SHIPPED | OUTPATIENT
Start: 2023-12-05

## 2023-12-07 DIAGNOSIS — G89.29 OTHER CHRONIC PAIN: ICD-10-CM

## 2023-12-07 RX ORDER — OXYCODONE HYDROCHLORIDE AND ACETAMINOPHEN 5; 325 MG/1; MG/1
1 TABLET ORAL EVERY 8 HOURS PRN
Qty: 90 TABLET | Refills: 0 | Status: SHIPPED | OUTPATIENT
Start: 2023-12-07

## 2023-12-15 PROBLEM — Z12.11 SCREENING FOR COLON CANCER: Status: RESOLVED | Noted: 2023-07-18 | Resolved: 2023-12-15

## 2023-12-28 ENCOUNTER — TELEPHONE (OUTPATIENT)
Dept: FAMILY MEDICINE CLINIC | Facility: CLINIC | Age: 63
End: 2023-12-28

## 2023-12-28 DIAGNOSIS — I42.0 DILATED CARDIOMYOPATHY (HCC): Primary | Chronic | ICD-10-CM

## 2023-12-28 RX ORDER — CARVEDILOL 25 MG/1
25 TABLET ORAL 2 TIMES DAILY WITH MEALS
Qty: 60 TABLET | Refills: 5 | Status: SHIPPED | OUTPATIENT
Start: 2023-12-28

## 2023-12-28 NOTE — TELEPHONE ENCOUNTER
Rx sent for carvodial 25 mg bid... for 1 month supply.. she should not be changing meds on her own.... [facility]

## 2023-12-28 NOTE — TELEPHONE ENCOUNTER
Phc from patient is is asking for a refill on her carvedilol she had been taking 12.5 bid but patient increased it herself do to having floaters in left eye and also stopped her lipitor saw on internet that this could cause floaters also. So she is asking for the 25mg be called in to [facility] the dose was lower before due to her potassium she did make an ov for 1/3

## 2024-01-01 ENCOUNTER — APPOINTMENT (INPATIENT)
Dept: CT IMAGING | Facility: HOSPITAL | Age: 64
DRG: 208 | End: 2024-01-01
Payer: MEDICARE

## 2024-01-01 ENCOUNTER — APPOINTMENT (INPATIENT)
Dept: RADIOLOGY | Facility: HOSPITAL | Age: 64
DRG: 208 | End: 2024-01-01
Payer: MEDICARE

## 2024-01-01 ENCOUNTER — APPOINTMENT (EMERGENCY)
Dept: RADIOLOGY | Facility: HOSPITAL | Age: 64
DRG: 208 | End: 2024-01-01
Payer: MEDICARE

## 2024-01-01 ENCOUNTER — HOSPITAL ENCOUNTER (INPATIENT)
Facility: HOSPITAL | Age: 64
LOS: 8 days | DRG: 208 | End: 2024-03-28
Attending: EMERGENCY MEDICINE | Admitting: STUDENT IN AN ORGANIZED HEALTH CARE EDUCATION/TRAINING PROGRAM
Payer: MEDICARE

## 2024-01-01 ENCOUNTER — APPOINTMENT (INPATIENT)
Dept: NON INVASIVE DIAGNOSTICS | Facility: HOSPITAL | Age: 64
DRG: 208 | End: 2024-01-01
Payer: MEDICARE

## 2024-01-01 VITALS
BODY MASS INDEX: 24.41 KG/M2 | WEIGHT: 174.38 LBS | HEIGHT: 71 IN | TEMPERATURE: 100 F | DIASTOLIC BLOOD PRESSURE: 91 MMHG | RESPIRATION RATE: 23 BRPM | HEART RATE: 54 BPM | SYSTOLIC BLOOD PRESSURE: 191 MMHG | OXYGEN SATURATION: 35 %

## 2024-01-01 DIAGNOSIS — J44.1 COPD EXACERBATION (HCC): ICD-10-CM

## 2024-01-01 DIAGNOSIS — K92.2 GIB (GASTROINTESTINAL BLEEDING): ICD-10-CM

## 2024-01-01 DIAGNOSIS — J18.9 PNEUMONIA: Primary | ICD-10-CM

## 2024-01-01 DIAGNOSIS — J96.00 ACUTE RESPIRATORY FAILURE (HCC): ICD-10-CM

## 2024-01-01 DIAGNOSIS — R79.89 ELEVATED TROPONIN: ICD-10-CM

## 2024-01-01 DIAGNOSIS — J96.91 HYPOXIC RESPIRATORY FAILURE (HCC): ICD-10-CM

## 2024-01-01 LAB
2HR DELTA HS TROPONIN: 0 NG/L
2HR DELTA HS TROPONIN: 195 NG/L
2HR DELTA HS TROPONIN: 728 NG/L
4HR DELTA HS TROPONIN: -2 NG/L
4HR DELTA HS TROPONIN: 119 NG/L
4HR DELTA HS TROPONIN: 723 NG/L
ALBUMIN SERPL BCP-MCNC: 3.5 G/DL (ref 3.5–5)
ALBUMIN SERPL BCP-MCNC: 3.7 G/DL (ref 3.5–5)
ALBUMIN SERPL BCP-MCNC: 3.7 G/DL (ref 3.5–5)
ALBUMIN SERPL BCP-MCNC: 3.8 G/DL (ref 3.5–5)
ALBUMIN SERPL BCP-MCNC: 4.4 G/DL (ref 3.5–5)
ALBUMIN SERPL BCP-MCNC: 4.4 G/DL (ref 3.5–5)
ALP SERPL-CCNC: 48 U/L (ref 34–104)
ALP SERPL-CCNC: 52 U/L (ref 34–104)
ALP SERPL-CCNC: 57 U/L (ref 34–104)
ALP SERPL-CCNC: 64 U/L (ref 34–104)
ALP SERPL-CCNC: 92 U/L (ref 34–104)
ALP SERPL-CCNC: 93 U/L (ref 34–104)
ALT SERPL W P-5'-P-CCNC: 11 U/L (ref 7–52)
ALT SERPL W P-5'-P-CCNC: 11 U/L (ref 7–52)
ALT SERPL W P-5'-P-CCNC: 7 U/L (ref 7–52)
ALT SERPL W P-5'-P-CCNC: 8 U/L (ref 7–52)
ALT SERPL W P-5'-P-CCNC: 9 U/L (ref 7–52)
ALT SERPL W P-5'-P-CCNC: 9 U/L (ref 7–52)
ANION GAP SERPL CALCULATED.3IONS-SCNC: 11 MMOL/L (ref 4–13)
ANION GAP SERPL CALCULATED.3IONS-SCNC: 12 MMOL/L (ref 4–13)
ANION GAP SERPL CALCULATED.3IONS-SCNC: 6 MMOL/L (ref 4–13)
ANION GAP SERPL CALCULATED.3IONS-SCNC: 7 MMOL/L (ref 4–13)
ANION GAP SERPL CALCULATED.3IONS-SCNC: 8 MMOL/L (ref 4–13)
ANION GAP SERPL CALCULATED.3IONS-SCNC: 9 MMOL/L (ref 4–13)
ANION GAP SERPL CALCULATED.3IONS-SCNC: 9 MMOL/L (ref 4–13)
AORTIC ROOT: 3.5 CM
AORTIC VALVE MEAN VELOCITY: 12 M/S
APICAL FOUR CHAMBER EJECTION FRACTION: 42 %
APTT PPP: 34 SECONDS (ref 23–37)
ARTERIAL PATENCY WRIST A: YES
ASCENDING AORTA: 3.7 CM
AST SERPL W P-5'-P-CCNC: 12 U/L (ref 13–39)
AST SERPL W P-5'-P-CCNC: 13 U/L (ref 13–39)
AST SERPL W P-5'-P-CCNC: 13 U/L (ref 13–39)
AST SERPL W P-5'-P-CCNC: 14 U/L (ref 13–39)
AST SERPL W P-5'-P-CCNC: 15 U/L (ref 13–39)
AST SERPL W P-5'-P-CCNC: 21 U/L (ref 13–39)
ATRIAL RATE: 107 BPM
ATRIAL RATE: 133 BPM
ATRIAL RATE: 75 BPM
ATRIAL RATE: 81 BPM
ATRIAL RATE: 89 BPM
AV LVOT MEAN GRADIENT: 3 MMHG
AV LVOT PEAK GRADIENT: 5 MMHG
AV MEAN GRADIENT: 7 MMHG
AV PEAK GRADIENT: 11 MMHG
AV VELOCITY RATIO: 0.67
B PARAP IS1001 DNA NPH QL NAA+NON-PROBE: NOT DETECTED
B PERT.PT PRMT NPH QL NAA+NON-PROBE: NOT DETECTED
BACTERIA BLD CULT: NORMAL
BACTERIA SPT RESP CULT: NO GROWTH
BACTERIA UR QL AUTO: ABNORMAL /HPF
BASE EX.OXY STD BLDV CALC-SCNC: 71.8 % (ref 60–80)
BASE EX.OXY STD BLDV CALC-SCNC: 72.9 % (ref 60–80)
BASE EX.OXY STD BLDV CALC-SCNC: 77.3 % (ref 60–80)
BASE EX.OXY STD BLDV CALC-SCNC: 85.3 % (ref 60–80)
BASE EXCESS BLDA CALC-SCNC: -0.2 MMOL/L
BASE EXCESS BLDA CALC-SCNC: -3.4 MMOL/L
BASE EXCESS BLDA CALC-SCNC: -4 MMOL/L (ref -2–3)
BASE EXCESS BLDA CALC-SCNC: -5.4 MMOL/L
BASE EXCESS BLDA CALC-SCNC: 0.8 MMOL/L
BASE EXCESS BLDA CALC-SCNC: 1 MMOL/L
BASE EXCESS BLDA CALC-SCNC: 1.8 MMOL/L
BASE EXCESS BLDA CALC-SCNC: 2 MMOL/L
BASE EXCESS BLDA CALC-SCNC: 3 MMOL/L
BASE EXCESS BLDV CALC-SCNC: -2.4 MMOL/L
BASE EXCESS BLDV CALC-SCNC: -3.5 MMOL/L
BASE EXCESS BLDV CALC-SCNC: -4 MMOL/L
BASE EXCESS BLDV CALC-SCNC: -6.5 MMOL/L
BASOPHILS # BLD AUTO: 0.01 THOUSANDS/ÂΜL (ref 0–0.1)
BASOPHILS # BLD AUTO: 0.02 THOUSANDS/ÂΜL (ref 0–0.1)
BASOPHILS # BLD AUTO: 0.03 THOUSANDS/ÂΜL (ref 0–0.1)
BASOPHILS # BLD AUTO: 0.04 THOUSANDS/ÂΜL (ref 0–0.1)
BASOPHILS # BLD MANUAL: 0 THOUSAND/UL (ref 0–0.1)
BASOPHILS NFR BLD AUTO: 0 % (ref 0–1)
BASOPHILS NFR BLD AUTO: 1 % (ref 0–1)
BASOPHILS NFR MAR MANUAL: 0 % (ref 0–1)
BILIRUB SERPL-MCNC: 0.26 MG/DL (ref 0.2–1)
BILIRUB SERPL-MCNC: 0.27 MG/DL (ref 0.2–1)
BILIRUB SERPL-MCNC: 0.31 MG/DL (ref 0.2–1)
BILIRUB SERPL-MCNC: 0.31 MG/DL (ref 0.2–1)
BILIRUB SERPL-MCNC: 0.42 MG/DL (ref 0.2–1)
BILIRUB SERPL-MCNC: 0.7 MG/DL (ref 0.2–1)
BILIRUB UR QL STRIP: NEGATIVE
BNP SERPL-MCNC: 111 PG/ML (ref 0–100)
BODY TEMPERATURE: 103.6 DEGREES FEHRENHEIT
BODY TEMPERATURE: 97.2 DEGREES FEHRENHEIT
BODY TEMPERATURE: 97.7 DEGREES FEHRENHEIT
BODY TEMPERATURE: 97.9 DEGREES FEHRENHEIT
BSA FOR ECHO PROCEDURE: 2.02 M2
BUN SERPL-MCNC: 11 MG/DL (ref 5–25)
BUN SERPL-MCNC: 12 MG/DL (ref 5–25)
BUN SERPL-MCNC: 20 MG/DL (ref 5–25)
BUN SERPL-MCNC: 25 MG/DL (ref 5–25)
BUN SERPL-MCNC: 26 MG/DL (ref 5–25)
BUN SERPL-MCNC: 29 MG/DL (ref 5–25)
BUN SERPL-MCNC: 30 MG/DL (ref 5–25)
BUN SERPL-MCNC: 31 MG/DL (ref 5–25)
BUN SERPL-MCNC: 34 MG/DL (ref 5–25)
BUN SERPL-MCNC: 9 MG/DL (ref 5–25)
C PNEUM DNA NPH QL NAA+NON-PROBE: NOT DETECTED
CA-I BLD-SCNC: 1.1 MMOL/L (ref 1.12–1.32)
CA-I BLD-SCNC: 1.11 MMOL/L (ref 1.12–1.32)
CA-I BLD-SCNC: 1.17 MMOL/L (ref 1.12–1.32)
CA-I BLD-SCNC: 1.17 MMOL/L (ref 1.12–1.32)
CA-I BLD-SCNC: 1.18 MMOL/L (ref 1.12–1.32)
CA-I BLD-SCNC: 1.19 MMOL/L (ref 1.12–1.32)
CA-I BLD-SCNC: 1.2 MMOL/L (ref 1.12–1.32)
CALCIUM SERPL-MCNC: 8.5 MG/DL (ref 8.4–10.2)
CALCIUM SERPL-MCNC: 8.7 MG/DL (ref 8.4–10.2)
CALCIUM SERPL-MCNC: 8.8 MG/DL (ref 8.4–10.2)
CALCIUM SERPL-MCNC: 8.9 MG/DL (ref 8.4–10.2)
CALCIUM SERPL-MCNC: 9 MG/DL (ref 8.4–10.2)
CALCIUM SERPL-MCNC: 9.4 MG/DL (ref 8.4–10.2)
CARDIAC TROPONIN I PNL SERPL HS: 115 NG/L (ref 8–18)
CARDIAC TROPONIN I PNL SERPL HS: 1290 NG/L
CARDIAC TROPONIN I PNL SERPL HS: 2013 NG/L
CARDIAC TROPONIN I PNL SERPL HS: 2018 NG/L
CARDIAC TROPONIN I PNL SERPL HS: 7 NG/L
CARDIAC TROPONIN I PNL SERPL HS: 721 NG/L
CARDIAC TROPONIN I PNL SERPL HS: 840 NG/L
CARDIAC TROPONIN I PNL SERPL HS: 9 NG/L
CARDIAC TROPONIN I PNL SERPL HS: 9 NG/L
CARDIAC TROPONIN I PNL SERPL HS: 916 NG/L
CHLORIDE SERPL-SCNC: 102 MMOL/L (ref 96–108)
CHLORIDE SERPL-SCNC: 104 MMOL/L (ref 96–108)
CHLORIDE SERPL-SCNC: 105 MMOL/L (ref 96–108)
CHLORIDE SERPL-SCNC: 106 MMOL/L (ref 96–108)
CHLORIDE SERPL-SCNC: 107 MMOL/L (ref 96–108)
CHLORIDE SERPL-SCNC: 107 MMOL/L (ref 96–108)
CHLORIDE SERPL-SCNC: 109 MMOL/L (ref 96–108)
CHLORIDE SERPL-SCNC: 109 MMOL/L (ref 96–108)
CHLORIDE SERPL-SCNC: 97 MMOL/L (ref 96–108)
CHLORIDE SERPL-SCNC: 99 MMOL/L (ref 96–108)
CLARITY UR: CLEAR
CO2 SERPL-SCNC: 20 MMOL/L (ref 21–32)
CO2 SERPL-SCNC: 20 MMOL/L (ref 21–32)
CO2 SERPL-SCNC: 25 MMOL/L (ref 21–32)
CO2 SERPL-SCNC: 27 MMOL/L (ref 21–32)
CO2 SERPL-SCNC: 27 MMOL/L (ref 21–32)
CO2 SERPL-SCNC: 28 MMOL/L (ref 21–32)
CO2 SERPL-SCNC: 29 MMOL/L (ref 21–32)
CO2 SERPL-SCNC: 29 MMOL/L (ref 21–32)
COLOR UR: YELLOW
CREAT SERPL-MCNC: 0.62 MG/DL (ref 0.6–1.3)
CREAT SERPL-MCNC: 0.69 MG/DL (ref 0.6–1.3)
CREAT SERPL-MCNC: 0.7 MG/DL (ref 0.6–1.3)
CREAT SERPL-MCNC: 0.75 MG/DL (ref 0.6–1.3)
CREAT SERPL-MCNC: 0.81 MG/DL (ref 0.6–1.3)
CREAT SERPL-MCNC: 0.92 MG/DL (ref 0.6–1.3)
CREAT SERPL-MCNC: 1.09 MG/DL (ref 0.6–1.3)
CREAT SERPL-MCNC: 1.14 MG/DL (ref 0.6–1.3)
CREAT SERPL-MCNC: 1.14 MG/DL (ref 0.6–1.3)
CREAT SERPL-MCNC: 1.19 MG/DL (ref 0.6–1.3)
DOP CALC AO PEAK VEL: 1.67 M/S
DOP CALC AO VTI: 21.17 CM
DOP CALC LVOT PEAK VEL VTI: 13.51 CM
DOP CALC LVOT PEAK VEL: 1.12 M/S
EOSINOPHIL # BLD AUTO: 0.01 THOUSAND/ÂΜL (ref 0–0.61)
EOSINOPHIL # BLD AUTO: 0.01 THOUSAND/ÂΜL (ref 0–0.61)
EOSINOPHIL # BLD AUTO: 0.03 THOUSAND/ÂΜL (ref 0–0.61)
EOSINOPHIL # BLD AUTO: 0.03 THOUSAND/ÂΜL (ref 0–0.61)
EOSINOPHIL # BLD MANUAL: 0 THOUSAND/UL (ref 0–0.4)
EOSINOPHIL NFR BLD AUTO: 0 % (ref 0–6)
EOSINOPHIL NFR BLD AUTO: 1 % (ref 0–6)
EOSINOPHIL NFR BLD MANUAL: 0 % (ref 0–6)
ERYTHROCYTE [DISTWIDTH] IN BLOOD BY AUTOMATED COUNT: 13 % (ref 11.6–15.1)
ERYTHROCYTE [DISTWIDTH] IN BLOOD BY AUTOMATED COUNT: 13.1 % (ref 11.6–15.1)
ERYTHROCYTE [DISTWIDTH] IN BLOOD BY AUTOMATED COUNT: 13.1 % (ref 11.6–15.1)
ERYTHROCYTE [DISTWIDTH] IN BLOOD BY AUTOMATED COUNT: 13.2 % (ref 11.6–15.1)
ERYTHROCYTE [DISTWIDTH] IN BLOOD BY AUTOMATED COUNT: 13.4 % (ref 11.6–15.1)
EST. AVERAGE GLUCOSE BLD GHB EST-MCNC: 126 MG/DL
FIO2 GAS DIL.REBREATH: 60 L
FLUAV RNA NPH QL NAA+NON-PROBE: NOT DETECTED
FLUAV RNA RESP QL NAA+PROBE: NEGATIVE
FLUBV RNA NPH QL NAA+NON-PROBE: NOT DETECTED
FLUBV RNA RESP QL NAA+PROBE: NEGATIVE
FRACTIONAL SHORTENING: 5 (ref 28–44)
GFR SERPL CREATININE-BSD FRML MDRD: 48 ML/MIN/1.73SQ M
GFR SERPL CREATININE-BSD FRML MDRD: 51 ML/MIN/1.73SQ M
GFR SERPL CREATININE-BSD FRML MDRD: 51 ML/MIN/1.73SQ M
GFR SERPL CREATININE-BSD FRML MDRD: 54 ML/MIN/1.73SQ M
GFR SERPL CREATININE-BSD FRML MDRD: 66 ML/MIN/1.73SQ M
GFR SERPL CREATININE-BSD FRML MDRD: 77 ML/MIN/1.73SQ M
GFR SERPL CREATININE-BSD FRML MDRD: 85 ML/MIN/1.73SQ M
GFR SERPL CREATININE-BSD FRML MDRD: 92 ML/MIN/1.73SQ M
GFR SERPL CREATININE-BSD FRML MDRD: 92 ML/MIN/1.73SQ M
GFR SERPL CREATININE-BSD FRML MDRD: 96 ML/MIN/1.73SQ M
GLUCOSE SERPL-MCNC: 104 MG/DL (ref 65–140)
GLUCOSE SERPL-MCNC: 119 MG/DL (ref 65–140)
GLUCOSE SERPL-MCNC: 120 MG/DL (ref 65–140)
GLUCOSE SERPL-MCNC: 123 MG/DL (ref 65–140)
GLUCOSE SERPL-MCNC: 124 MG/DL (ref 65–140)
GLUCOSE SERPL-MCNC: 125 MG/DL (ref 65–140)
GLUCOSE SERPL-MCNC: 125 MG/DL (ref 65–140)
GLUCOSE SERPL-MCNC: 126 MG/DL (ref 65–140)
GLUCOSE SERPL-MCNC: 127 MG/DL (ref 65–140)
GLUCOSE SERPL-MCNC: 127 MG/DL (ref 65–140)
GLUCOSE SERPL-MCNC: 128 MG/DL (ref 65–140)
GLUCOSE SERPL-MCNC: 129 MG/DL (ref 65–140)
GLUCOSE SERPL-MCNC: 131 MG/DL (ref 65–140)
GLUCOSE SERPL-MCNC: 132 MG/DL (ref 65–140)
GLUCOSE SERPL-MCNC: 133 MG/DL (ref 65–140)
GLUCOSE SERPL-MCNC: 135 MG/DL (ref 65–140)
GLUCOSE SERPL-MCNC: 143 MG/DL (ref 65–140)
GLUCOSE SERPL-MCNC: 147 MG/DL (ref 65–140)
GLUCOSE SERPL-MCNC: 149 MG/DL (ref 65–140)
GLUCOSE SERPL-MCNC: 150 MG/DL (ref 65–140)
GLUCOSE SERPL-MCNC: 151 MG/DL (ref 65–140)
GLUCOSE SERPL-MCNC: 151 MG/DL (ref 65–140)
GLUCOSE SERPL-MCNC: 153 MG/DL (ref 65–140)
GLUCOSE SERPL-MCNC: 156 MG/DL (ref 65–140)
GLUCOSE SERPL-MCNC: 159 MG/DL (ref 65–140)
GLUCOSE SERPL-MCNC: 162 MG/DL (ref 65–140)
GLUCOSE SERPL-MCNC: 173 MG/DL (ref 65–140)
GLUCOSE SERPL-MCNC: 223 MG/DL (ref 65–140)
GLUCOSE SERPL-MCNC: 228 MG/DL (ref 65–140)
GLUCOSE SERPL-MCNC: 253 MG/DL (ref 65–140)
GLUCOSE SERPL-MCNC: 271 MG/DL (ref 65–140)
GLUCOSE SERPL-MCNC: 360 MG/DL (ref 65–140)
GLUCOSE SERPL-MCNC: 371 MG/DL (ref 65–140)
GLUCOSE UR STRIP-MCNC: NEGATIVE MG/DL
GRAM STN SPEC: NORMAL
HADV DNA NPH QL NAA+NON-PROBE: NOT DETECTED
HBA1C MFR BLD: 6 %
HCO3 BLDA-SCNC: 23.7 MMOL/L (ref 22–28)
HCO3 BLDA-SCNC: 24.2 MMOL/L (ref 22–28)
HCO3 BLDA-SCNC: 25.1 MMOL/L (ref 22–28)
HCO3 BLDA-SCNC: 26 MMOL/L (ref 22–28)
HCO3 BLDA-SCNC: 26.7 MMOL/L (ref 22–28)
HCO3 BLDA-SCNC: 26.9 MMOL/L (ref 22–28)
HCO3 BLDA-SCNC: 27.1 MMOL/L (ref 22–28)
HCO3 BLDA-SCNC: 28 MMOL/L (ref 22–28)
HCO3 BLDA-SCNC: 29 MMOL/L (ref 22–28)
HCO3 BLDV-SCNC: 22.7 MMOL/L (ref 24–30)
HCO3 BLDV-SCNC: 24.8 MMOL/L (ref 24–30)
HCO3 BLDV-SCNC: 25.2 MMOL/L (ref 24–30)
HCO3 BLDV-SCNC: 25.6 MMOL/L (ref 24–30)
HCOV 229E RNA NPH QL NAA+NON-PROBE: NOT DETECTED
HCOV HKU1 RNA NPH QL NAA+NON-PROBE: NOT DETECTED
HCOV NL63 RNA NPH QL NAA+NON-PROBE: NOT DETECTED
HCOV OC43 RNA NPH QL NAA+NON-PROBE: NOT DETECTED
HCT VFR BLD AUTO: 42.1 % (ref 34.8–46.1)
HCT VFR BLD AUTO: 42.7 % (ref 34.8–46.1)
HCT VFR BLD AUTO: 43 % (ref 34.8–46.1)
HCT VFR BLD AUTO: 44.4 % (ref 34.8–46.1)
HCT VFR BLD AUTO: 44.8 % (ref 34.8–46.1)
HCT VFR BLD AUTO: 45.7 % (ref 34.8–46.1)
HCT VFR BLD AUTO: 48 % (ref 34.8–46.1)
HCT VFR BLD AUTO: 50.2 % (ref 34.8–46.1)
HCT VFR BLD AUTO: 51 % (ref 34.8–46.1)
HCT VFR BLD CALC: 49 % (ref 34.8–46.1)
HGB BLD-MCNC: 13.4 G/DL (ref 11.5–15.4)
HGB BLD-MCNC: 13.5 G/DL (ref 11.5–15.4)
HGB BLD-MCNC: 13.9 G/DL (ref 11.5–15.4)
HGB BLD-MCNC: 14 G/DL (ref 11.5–15.4)
HGB BLD-MCNC: 14.5 G/DL (ref 11.5–15.4)
HGB BLD-MCNC: 14.5 G/DL (ref 11.5–15.4)
HGB BLD-MCNC: 15.9 G/DL (ref 11.5–15.4)
HGB BLD-MCNC: 16.1 G/DL (ref 11.5–15.4)
HGB BLD-MCNC: 16.4 G/DL (ref 11.5–15.4)
HGB BLDA-MCNC: 16.7 G/DL (ref 11.5–15.4)
HGB UR QL STRIP.AUTO: ABNORMAL
HMPV RNA NPH QL NAA+NON-PROBE: DETECTED
HOROWITZ INDEX BLDA+IHG-RTO: 50 MM[HG]
HOROWITZ INDEX BLDA+IHG-RTO: 60 MM[HG]
HOROWITZ INDEX BLDA+IHG-RTO: 80 MM[HG]
HPIV1 RNA NPH QL NAA+NON-PROBE: NOT DETECTED
HPIV2 RNA NPH QL NAA+NON-PROBE: NOT DETECTED
HPIV3 RNA NPH QL NAA+NON-PROBE: NOT DETECTED
HPIV4 RNA NPH QL NAA+NON-PROBE: NOT DETECTED
HYALINE CASTS #/AREA URNS LPF: ABNORMAL /LPF
I-TIME: 1
IMM GRANULOCYTES # BLD AUTO: 0.01 THOUSAND/UL (ref 0–0.2)
IMM GRANULOCYTES # BLD AUTO: 0.02 THOUSAND/UL (ref 0–0.2)
IMM GRANULOCYTES # BLD AUTO: 0.05 THOUSAND/UL (ref 0–0.2)
IMM GRANULOCYTES # BLD AUTO: 0.08 THOUSAND/UL (ref 0–0.2)
IMM GRANULOCYTES NFR BLD AUTO: 0 % (ref 0–2)
IMM GRANULOCYTES NFR BLD AUTO: 1 % (ref 0–2)
INR PPP: 1.16 (ref 0.84–1.19)
INTERVENTRICULAR SEPTUM IN DIASTOLE (PARASTERNAL SHORT AXIS VIEW): 1.2 CM
INTERVENTRICULAR SEPTUM: 1.2 CM (ref 0.6–1.1)
KETONES UR STRIP-MCNC: NEGATIVE MG/DL
L PNEUMO1 AG UR QL IA.RAPID: NEGATIVE
LAAS-AP2: 20.1 CM2
LAAS-AP4: 17.4 CM2
LACTATE SERPL-SCNC: 0.7 MMOL/L (ref 0.5–2)
LACTATE SERPL-SCNC: 0.9 MMOL/L (ref 0.5–2)
LACTATE SERPL-SCNC: 1.5 MMOL/L (ref 0.5–2)
LEFT ATRIUM SIZE: 3 CM
LEFT ATRIUM VOLUME (MOD BIPLANE): 53 ML
LEFT ATRIUM VOLUME INDEX (MOD BIPLANE): 26.5 ML/M2
LEFT INTERNAL DIMENSION IN SYSTOLE: 3.7 CM (ref 2.1–4)
LEFT VENTRICULAR INTERNAL DIMENSION IN DIASTOLE: 3.9 CM (ref 3.5–6)
LEFT VENTRICULAR POSTERIOR WALL IN END DIASTOLE: 1.2 CM
LEFT VENTRICULAR STROKE VOLUME: 9 ML
LEUKOCYTE ESTERASE UR QL STRIP: NEGATIVE
LVSV (TEICH): 9 ML
LYMPHOCYTES # BLD AUTO: 1.19 THOUSANDS/ÂΜL (ref 0.6–4.47)
LYMPHOCYTES # BLD AUTO: 1.23 THOUSANDS/ÂΜL (ref 0.6–4.47)
LYMPHOCYTES # BLD AUTO: 1.32 THOUSANDS/ÂΜL (ref 0.6–4.47)
LYMPHOCYTES # BLD AUTO: 1.75 THOUSANDS/ÂΜL (ref 0.6–4.47)
LYMPHOCYTES # BLD AUTO: 2.15 THOUSAND/UL (ref 0.6–4.47)
LYMPHOCYTES # BLD AUTO: 34 % (ref 14–44)
LYMPHOCYTES NFR BLD AUTO: 12 % (ref 14–44)
LYMPHOCYTES NFR BLD AUTO: 13 % (ref 14–44)
LYMPHOCYTES NFR BLD AUTO: 20 % (ref 14–44)
LYMPHOCYTES NFR BLD AUTO: 27 % (ref 14–44)
M PNEUMO DNA NPH QL NAA+NON-PROBE: NOT DETECTED
MAGNESIUM SERPL-MCNC: 2 MG/DL (ref 1.9–2.7)
MAGNESIUM SERPL-MCNC: 2 MG/DL (ref 1.9–2.7)
MAGNESIUM SERPL-MCNC: 2.1 MG/DL (ref 1.9–2.7)
MAGNESIUM SERPL-MCNC: 2.2 MG/DL (ref 1.9–2.7)
MAGNESIUM SERPL-MCNC: 2.3 MG/DL (ref 1.9–2.7)
MAGNESIUM SERPL-MCNC: 2.4 MG/DL (ref 1.9–2.7)
MAGNESIUM SERPL-MCNC: 2.9 MG/DL (ref 1.9–2.7)
MCH RBC QN AUTO: 28.8 PG (ref 26.8–34.3)
MCH RBC QN AUTO: 29 PG (ref 26.8–34.3)
MCH RBC QN AUTO: 29.2 PG (ref 26.8–34.3)
MCH RBC QN AUTO: 29.3 PG (ref 26.8–34.3)
MCH RBC QN AUTO: 29.5 PG (ref 26.8–34.3)
MCH RBC QN AUTO: 29.5 PG (ref 26.8–34.3)
MCH RBC QN AUTO: 29.6 PG (ref 26.8–34.3)
MCH RBC QN AUTO: 29.8 PG (ref 26.8–34.3)
MCH RBC QN AUTO: 29.9 PG (ref 26.8–34.3)
MCHC RBC AUTO-ENTMCNC: 30.6 G/DL (ref 31.4–37.4)
MCHC RBC AUTO-ENTMCNC: 31.2 G/DL (ref 31.4–37.4)
MCHC RBC AUTO-ENTMCNC: 31.3 G/DL (ref 31.4–37.4)
MCHC RBC AUTO-ENTMCNC: 31.6 G/DL (ref 31.4–37.4)
MCHC RBC AUTO-ENTMCNC: 31.7 G/DL (ref 31.4–37.4)
MCHC RBC AUTO-ENTMCNC: 32.1 G/DL (ref 31.4–37.4)
MCHC RBC AUTO-ENTMCNC: 32.1 G/DL (ref 31.4–37.4)
MCHC RBC AUTO-ENTMCNC: 32.2 G/DL (ref 31.4–37.4)
MCHC RBC AUTO-ENTMCNC: 33.5 G/DL (ref 31.4–37.4)
MCV RBC AUTO: 89 FL (ref 82–98)
MCV RBC AUTO: 91 FL (ref 82–98)
MCV RBC AUTO: 92 FL (ref 82–98)
MCV RBC AUTO: 93 FL (ref 82–98)
MCV RBC AUTO: 93 FL (ref 82–98)
MCV RBC AUTO: 94 FL (ref 82–98)
MCV RBC AUTO: 95 FL (ref 82–98)
MONOCYTES # BLD AUTO: 0.16 THOUSAND/ÂΜL (ref 0.17–1.22)
MONOCYTES # BLD AUTO: 0.36 THOUSAND/UL (ref 0–1.22)
MONOCYTES # BLD AUTO: 0.36 THOUSAND/ÂΜL (ref 0.17–1.22)
MONOCYTES # BLD AUTO: 0.51 THOUSAND/ÂΜL (ref 0.17–1.22)
MONOCYTES # BLD AUTO: 0.52 THOUSAND/ÂΜL (ref 0.17–1.22)
MONOCYTES NFR BLD AUTO: 4 % (ref 4–12)
MONOCYTES NFR BLD AUTO: 4 % (ref 4–12)
MONOCYTES NFR BLD AUTO: 5 % (ref 4–12)
MONOCYTES NFR BLD AUTO: 5 % (ref 4–12)
MONOCYTES NFR BLD: 6 % (ref 4–12)
MUCOUS THREADS UR QL AUTO: ABNORMAL
NEUTROPHILS # BLD AUTO: 12.17 THOUSANDS/ÂΜL (ref 1.85–7.62)
NEUTROPHILS # BLD AUTO: 2.98 THOUSANDS/ÂΜL (ref 1.85–7.62)
NEUTROPHILS # BLD AUTO: 4.97 THOUSANDS/ÂΜL (ref 1.85–7.62)
NEUTROPHILS # BLD AUTO: 7.84 THOUSANDS/ÂΜL (ref 1.85–7.62)
NEUTROPHILS # BLD MANUAL: 3.46 THOUSAND/UL (ref 1.85–7.62)
NEUTS BAND NFR BLD MANUAL: 1 % (ref 0–8)
NEUTS SEG NFR BLD AUTO: 57 % (ref 43–75)
NEUTS SEG NFR BLD AUTO: 67 % (ref 43–75)
NEUTS SEG NFR BLD AUTO: 74 % (ref 43–75)
NEUTS SEG NFR BLD AUTO: 81 % (ref 43–75)
NEUTS SEG NFR BLD AUTO: 83 % (ref 43–75)
NITRITE UR QL STRIP: NEGATIVE
NON-SQ EPI CELLS URNS QL MICRO: ABNORMAL /HPF
NRBC BLD AUTO-RTO: 0 /100 WBCS
O2 CT BLDA-SCNC: 18.4 ML/DL (ref 16–23)
O2 CT BLDA-SCNC: 18.8 ML/DL (ref 16–23)
O2 CT BLDA-SCNC: 18.9 ML/DL (ref 16–23)
O2 CT BLDA-SCNC: 19.5 ML/DL (ref 16–23)
O2 CT BLDA-SCNC: 19.7 ML/DL (ref 16–23)
O2 CT BLDA-SCNC: 19.8 ML/DL (ref 16–23)
O2 CT BLDA-SCNC: 20.1 ML/DL (ref 16–23)
O2 CT BLDA-SCNC: 22.8 ML/DL (ref 16–23)
O2 CT BLDV-SCNC: 15 ML/DL
O2 CT BLDV-SCNC: 15.1 ML/DL
O2 CT BLDV-SCNC: 16.7 ML/DL
O2 CT BLDV-SCNC: 19.8 ML/DL
OXYHGB MFR BLDA: 93.5 % (ref 94–97)
OXYHGB MFR BLDA: 94.4 % (ref 94–97)
OXYHGB MFR BLDA: 94.6 % (ref 94–97)
OXYHGB MFR BLDA: 94.7 % (ref 94–97)
OXYHGB MFR BLDA: 95.2 % (ref 94–97)
OXYHGB MFR BLDA: 95.6 % (ref 94–97)
OXYHGB MFR BLDA: 95.7 % (ref 94–97)
OXYHGB MFR BLDA: 95.7 % (ref 94–97)
P AXIS: 29 DEGREES
P AXIS: 45 DEGREES
P AXIS: 54 DEGREES
P AXIS: 58 DEGREES
P AXIS: 58 DEGREES
PCO2 BLD: 27 MMOL/L (ref 21–32)
PCO2 BLD: 62 MM HG (ref 36–44)
PCO2 BLDA: 43.5 MM HG (ref 36–44)
PCO2 BLDA: 45.2 MM HG (ref 36–44)
PCO2 BLDA: 46.7 MM HG (ref 36–44)
PCO2 BLDA: 49.4 MM HG (ref 36–44)
PCO2 BLDA: 49.5 MM HG (ref 36–44)
PCO2 BLDA: 53.2 MM HG (ref 36–44)
PCO2 BLDA: 53.9 MM HG (ref 36–44)
PCO2 BLDA: 60.2 MM HG (ref 36–44)
PCO2 BLDV: 57.8 MM HG (ref 42–50)
PCO2 BLDV: 58.2 MM HG (ref 42–50)
PCO2 BLDV: 59.7 MM HG (ref 42–50)
PCO2 BLDV: 64.1 MM HG (ref 42–50)
PEEP RESPIRATORY: 6 CM[H2O]
PEEP RESPIRATORY: 8 CM[H2O]
PH BLD: 7.21 [PH] (ref 7.35–7.45)
PH BLDA: 7.21 [PH] (ref 7.35–7.45)
PH BLDA: 7.27 [PH] (ref 7.35–7.45)
PH BLDA: 7.32 [PH] (ref 7.35–7.45)
PH BLDA: 7.37 [PH] (ref 7.35–7.45)
PH BLDA: 7.38 [PH] (ref 7.35–7.45)
PH BLDA: 7.39 [PH] (ref 7.35–7.45)
PH BLDA: 7.39 [PH] (ref 7.35–7.45)
PH BLDA: 7.4 [PH] (ref 7.35–7.45)
PH BLDV: 7.2 [PH] (ref 7.3–7.4)
PH BLDV: 7.21 [PH] (ref 7.3–7.4)
PH BLDV: 7.25 [PH] (ref 7.3–7.4)
PH BLDV: 7.26 [PH] (ref 7.3–7.4)
PH UR STRIP.AUTO: 6 [PH]
PHOSPHATE SERPL-MCNC: 2.9 MG/DL (ref 2.3–4.1)
PHOSPHATE SERPL-MCNC: 3 MG/DL (ref 2.3–4.1)
PHOSPHATE SERPL-MCNC: 3.1 MG/DL (ref 2.3–4.1)
PHOSPHATE SERPL-MCNC: 3.4 MG/DL (ref 2.3–4.1)
PHOSPHATE SERPL-MCNC: 3.7 MG/DL (ref 2.3–4.1)
PHOSPHATE SERPL-MCNC: 4.1 MG/DL (ref 2.3–4.1)
PHOSPHATE SERPL-MCNC: 4.6 MG/DL (ref 2.3–4.1)
PHOSPHATE SERPL-MCNC: 4.7 MG/DL (ref 2.3–4.1)
PHOSPHATE SERPL-MCNC: 6.6 MG/DL (ref 2.3–4.1)
PLATELET # BLD AUTO: 194 THOUSANDS/UL (ref 149–390)
PLATELET # BLD AUTO: 205 THOUSANDS/UL (ref 149–390)
PLATELET # BLD AUTO: 207 THOUSANDS/UL (ref 149–390)
PLATELET # BLD AUTO: 213 THOUSANDS/UL (ref 149–390)
PLATELET # BLD AUTO: 214 THOUSANDS/UL (ref 149–390)
PLATELET # BLD AUTO: 220 THOUSANDS/UL (ref 149–390)
PLATELET # BLD AUTO: 224 THOUSANDS/UL (ref 149–390)
PLATELET # BLD AUTO: 240 THOUSANDS/UL (ref 149–390)
PLATELET # BLD AUTO: 315 THOUSANDS/UL (ref 149–390)
PLATELET BLD QL SMEAR: ADEQUATE
PMV BLD AUTO: 10.1 FL (ref 8.9–12.7)
PMV BLD AUTO: 10.3 FL (ref 8.9–12.7)
PMV BLD AUTO: 9.2 FL (ref 8.9–12.7)
PMV BLD AUTO: 9.2 FL (ref 8.9–12.7)
PMV BLD AUTO: 9.6 FL (ref 8.9–12.7)
PMV BLD AUTO: 9.8 FL (ref 8.9–12.7)
PMV BLD AUTO: 9.9 FL (ref 8.9–12.7)
PO2 BLD: 89 MM HG (ref 75–129)
PO2 BLDA: 102.6 MM HG (ref 75–129)
PO2 BLDA: 72.5 MM HG (ref 75–129)
PO2 BLDA: 81.6 MM HG (ref 75–129)
PO2 BLDA: 81.9 MM HG (ref 75–129)
PO2 BLDA: 82.2 MM HG (ref 75–129)
PO2 BLDA: 87.9 MM HG (ref 75–129)
PO2 BLDA: 88.1 MM HG (ref 75–129)
PO2 BLDA: 90.7 MM HG (ref 75–129)
PO2 BLDV: 41.7 MM HG (ref 35–45)
PO2 BLDV: 42.3 MM HG (ref 35–45)
PO2 BLDV: 48 MM HG (ref 35–45)
PO2 BLDV: 61.4 MM HG (ref 35–45)
POTASSIUM BLD-SCNC: 5.2 MMOL/L (ref 3.5–5.3)
POTASSIUM SERPL-SCNC: 4 MMOL/L (ref 3.5–5.3)
POTASSIUM SERPL-SCNC: 4.1 MMOL/L (ref 3.5–5.3)
POTASSIUM SERPL-SCNC: 4.2 MMOL/L (ref 3.5–5.3)
POTASSIUM SERPL-SCNC: 4.2 MMOL/L (ref 3.5–5.3)
POTASSIUM SERPL-SCNC: 4.5 MMOL/L (ref 3.5–5.3)
POTASSIUM SERPL-SCNC: 4.6 MMOL/L (ref 3.5–5.3)
POTASSIUM SERPL-SCNC: 4.6 MMOL/L (ref 3.5–5.3)
POTASSIUM SERPL-SCNC: 5 MMOL/L (ref 3.5–5.3)
PR INTERVAL: 168 MS
PR INTERVAL: 168 MS
PR INTERVAL: 170 MS
PR INTERVAL: 176 MS
PR INTERVAL: 186 MS
PROCALCITONIN SERPL-MCNC: 0.48 NG/ML
PROCALCITONIN SERPL-MCNC: 1.04 NG/ML
PROCALCITONIN SERPL-MCNC: 2.37 NG/ML
PROCALCITONIN SERPL-MCNC: 4.55 NG/ML
PROCALCITONIN SERPL-MCNC: <0.05 NG/ML
PROT SERPL-MCNC: 6.5 G/DL (ref 6.4–8.4)
PROT SERPL-MCNC: 6.5 G/DL (ref 6.4–8.4)
PROT SERPL-MCNC: 6.6 G/DL (ref 6.4–8.4)
PROT SERPL-MCNC: 6.7 G/DL (ref 6.4–8.4)
PROT SERPL-MCNC: 7.9 G/DL (ref 6.4–8.4)
PROT SERPL-MCNC: 7.9 G/DL (ref 6.4–8.4)
PROT UR STRIP-MCNC: ABNORMAL MG/DL
PROTHROMBIN TIME: 15 SECONDS (ref 11.6–14.5)
QRS AXIS: -18 DEGREES
QRS AXIS: 21 DEGREES
QRS AXIS: 23 DEGREES
QRS AXIS: 34 DEGREES
QRS AXIS: 39 DEGREES
QRSD INTERVAL: 86 MS
QRSD INTERVAL: 88 MS
QRSD INTERVAL: 90 MS
QRSD INTERVAL: 92 MS
QRSD INTERVAL: 94 MS
QT INTERVAL: 276 MS
QT INTERVAL: 364 MS
QT INTERVAL: 384 MS
QT INTERVAL: 418 MS
QT INTERVAL: 438 MS
QTC INTERVAL: 408 MS
QTC INTERVAL: 466 MS
QTC INTERVAL: 467 MS
QTC INTERVAL: 485 MS
QTC INTERVAL: 508 MS
RBC # BLD AUTO: 4.56 MILLION/UL (ref 3.81–5.12)
RBC # BLD AUTO: 4.57 MILLION/UL (ref 3.81–5.12)
RBC # BLD AUTO: 4.57 MILLION/UL (ref 3.81–5.12)
RBC # BLD AUTO: 4.79 MILLION/UL (ref 3.81–5.12)
RBC # BLD AUTO: 4.83 MILLION/UL (ref 3.81–5.12)
RBC # BLD AUTO: 4.83 MILLION/UL (ref 3.81–5.12)
RBC # BLD AUTO: 5.39 MILLION/UL (ref 3.81–5.12)
RBC # BLD AUTO: 5.49 MILLION/UL (ref 3.81–5.12)
RBC # BLD AUTO: 5.56 MILLION/UL (ref 3.81–5.12)
RBC #/AREA URNS AUTO: ABNORMAL /HPF
RBC MORPH BLD: NORMAL
RIGHT VENTRICLE ID DIMENSION: 4 CM
RSV RNA NPH QL NAA+NON-PROBE: NOT DETECTED
RSV RNA RESP QL NAA+PROBE: NEGATIVE
RV+EV RNA NPH QL NAA+NON-PROBE: NOT DETECTED
S PNEUM AG UR QL: NEGATIVE
SAO2 % BLD FROM PO2: 94 % (ref 60–85)
SARS-COV-2 RNA NPH QL NAA+NON-PROBE: NOT DETECTED
SARS-COV-2 RNA RESP QL NAA+PROBE: NEGATIVE
SL CV LEFT ATRIUM LENGTH A2C: 5.5 CM
SL CV LV EF: 30
SL CV PED ECHO LEFT VENTRICLE DIASTOLIC VOLUME (MOD BIPLANE) 2D: 67 ML
SL CV PED ECHO LEFT VENTRICLE SYSTOLIC VOLUME (MOD BIPLANE) 2D: 58 ML
SODIUM BLD-SCNC: 134 MMOL/L (ref 136–145)
SODIUM SERPL-SCNC: 130 MMOL/L (ref 135–147)
SODIUM SERPL-SCNC: 131 MMOL/L (ref 135–147)
SODIUM SERPL-SCNC: 133 MMOL/L (ref 135–147)
SODIUM SERPL-SCNC: 136 MMOL/L (ref 135–147)
SODIUM SERPL-SCNC: 141 MMOL/L (ref 135–147)
SODIUM SERPL-SCNC: 141 MMOL/L (ref 135–147)
SODIUM SERPL-SCNC: 142 MMOL/L (ref 135–147)
SODIUM SERPL-SCNC: 142 MMOL/L (ref 135–147)
SODIUM SERPL-SCNC: 145 MMOL/L (ref 135–147)
SODIUM SERPL-SCNC: 145 MMOL/L (ref 135–147)
SP GR UR STRIP.AUTO: >=1.03
SPECIMEN SOURCE: ABNORMAL
SPECIMEN SOURCE: NORMAL
T WAVE AXIS: 50 DEGREES
T WAVE AXIS: 74 DEGREES
T WAVE AXIS: 79 DEGREES
T WAVE AXIS: 80 DEGREES
T WAVE AXIS: 81 DEGREES
T4 FREE SERPL-MCNC: 1 NG/DL (ref 0.61–1.12)
TR MAX PG: 19 MMHG
TR PEAK VELOCITY: 2.2 M/S
TRICUSPID ANNULAR PLANE SYSTOLIC EXCURSION: 1.5 CM
TRICUSPID VALVE PEAK REGURGITATION VELOCITY: 2.21 M/S
TSH SERPL DL<=0.05 MIU/L-ACNC: 0.42 UIU/ML (ref 0.45–4.5)
UROBILINOGEN UR QL STRIP.AUTO: 0.2 E.U./DL
VARIANT LYMPHS # BLD AUTO: 2 %
VENT AC: 16
VENT AC: 20
VENT AC: 20
VENT AC: 24
VENT- AC: AC
VENTRICULAR RATE: 107 BPM
VENTRICULAR RATE: 132 BPM
VENTRICULAR RATE: 75 BPM
VENTRICULAR RATE: 81 BPM
VENTRICULAR RATE: 89 BPM
VT SETTING VENT: 450 ML
VT SETTING VENT: 450 ML
VT SETTING VENT: 470 ML
WBC # BLD AUTO: 10.12 THOUSAND/UL (ref 4.31–10.16)
WBC # BLD AUTO: 11.89 THOUSAND/UL (ref 4.31–10.16)
WBC # BLD AUTO: 14.58 THOUSAND/UL (ref 4.31–10.16)
WBC # BLD AUTO: 4.39 THOUSAND/UL (ref 4.31–10.16)
WBC # BLD AUTO: 5.24 THOUSAND/UL (ref 4.31–10.16)
WBC # BLD AUTO: 5.97 THOUSAND/UL (ref 4.31–10.16)
WBC # BLD AUTO: 6.71 THOUSAND/UL (ref 4.31–10.16)
WBC # BLD AUTO: 7.22 THOUSAND/UL (ref 4.31–10.16)
WBC # BLD AUTO: 9.66 THOUSAND/UL (ref 4.31–10.16)
WBC #/AREA URNS AUTO: ABNORMAL /HPF

## 2024-01-01 PROCEDURE — 82948 REAGENT STRIP/BLOOD GLUCOSE: CPT

## 2024-01-01 PROCEDURE — 31500 INSERT EMERGENCY AIRWAY: CPT | Performed by: NURSE PRACTITIONER

## 2024-01-01 PROCEDURE — C9113 INJ PANTOPRAZOLE SODIUM, VIA: HCPCS | Performed by: NURSE PRACTITIONER

## 2024-01-01 PROCEDURE — 96367 TX/PROPH/DG ADDL SEQ IV INF: CPT

## 2024-01-01 PROCEDURE — 84484 ASSAY OF TROPONIN QUANT: CPT | Performed by: PHYSICIAN ASSISTANT

## 2024-01-01 PROCEDURE — 94640 AIRWAY INHALATION TREATMENT: CPT

## 2024-01-01 PROCEDURE — 82330 ASSAY OF CALCIUM: CPT | Performed by: NURSE PRACTITIONER

## 2024-01-01 PROCEDURE — 84145 PROCALCITONIN (PCT): CPT | Performed by: NURSE PRACTITIONER

## 2024-01-01 PROCEDURE — 80053 COMPREHEN METABOLIC PANEL: CPT | Performed by: NURSE PRACTITIONER

## 2024-01-01 PROCEDURE — 94002 VENT MGMT INPAT INIT DAY: CPT

## 2024-01-01 PROCEDURE — 71275 CT ANGIOGRAPHY CHEST: CPT

## 2024-01-01 PROCEDURE — 82330 ASSAY OF CALCIUM: CPT

## 2024-01-01 PROCEDURE — 80048 BASIC METABOLIC PNL TOTAL CA: CPT | Performed by: INTERNAL MEDICINE

## 2024-01-01 PROCEDURE — 84295 ASSAY OF SERUM SODIUM: CPT

## 2024-01-01 PROCEDURE — 83735 ASSAY OF MAGNESIUM: CPT | Performed by: NURSE PRACTITIONER

## 2024-01-01 PROCEDURE — 5A1945Z RESPIRATORY VENTILATION, 24-96 CONSECUTIVE HOURS: ICD-10-PCS | Performed by: INTERNAL MEDICINE

## 2024-01-01 PROCEDURE — 85027 COMPLETE CBC AUTOMATED: CPT | Performed by: NURSE PRACTITIONER

## 2024-01-01 PROCEDURE — 99291 CRITICAL CARE FIRST HOUR: CPT | Performed by: STUDENT IN AN ORGANIZED HEALTH CARE EDUCATION/TRAINING PROGRAM

## 2024-01-01 PROCEDURE — 84443 ASSAY THYROID STIM HORMONE: CPT | Performed by: NURSE PRACTITIONER

## 2024-01-01 PROCEDURE — 82947 ASSAY GLUCOSE BLOOD QUANT: CPT

## 2024-01-01 PROCEDURE — 99291 CRITICAL CARE FIRST HOUR: CPT | Performed by: INTERNAL MEDICINE

## 2024-01-01 PROCEDURE — 93005 ELECTROCARDIOGRAM TRACING: CPT

## 2024-01-01 PROCEDURE — 82805 BLOOD GASES W/O2 SATURATION: CPT | Performed by: NURSE PRACTITIONER

## 2024-01-01 PROCEDURE — 94664 DEMO&/EVAL PT USE INHALER: CPT

## 2024-01-01 PROCEDURE — 93975 VASCULAR STUDY: CPT | Performed by: SURGERY

## 2024-01-01 PROCEDURE — 83735 ASSAY OF MAGNESIUM: CPT | Performed by: INTERNAL MEDICINE

## 2024-01-01 PROCEDURE — 99223 1ST HOSP IP/OBS HIGH 75: CPT | Performed by: INTERNAL MEDICINE

## 2024-01-01 PROCEDURE — 84100 ASSAY OF PHOSPHORUS: CPT | Performed by: NURSE PRACTITIONER

## 2024-01-01 PROCEDURE — 94760 N-INVAS EAR/PLS OXIMETRY 1: CPT

## 2024-01-01 PROCEDURE — 71045 X-RAY EXAM CHEST 1 VIEW: CPT

## 2024-01-01 PROCEDURE — 93010 ELECTROCARDIOGRAM REPORT: CPT | Performed by: INTERNAL MEDICINE

## 2024-01-01 PROCEDURE — 03HY32Z INSERTION OF MONITORING DEVICE INTO UPPER ARTERY, PERCUTANEOUS APPROACH: ICD-10-PCS | Performed by: INTERNAL MEDICINE

## 2024-01-01 PROCEDURE — 84484 ASSAY OF TROPONIN QUANT: CPT | Performed by: NURSE PRACTITIONER

## 2024-01-01 PROCEDURE — 85025 COMPLETE CBC W/AUTO DIFF WBC: CPT | Performed by: PHYSICIAN ASSISTANT

## 2024-01-01 PROCEDURE — 85018 HEMOGLOBIN: CPT | Performed by: NURSE PRACTITIONER

## 2024-01-01 PROCEDURE — 96365 THER/PROPH/DIAG IV INF INIT: CPT

## 2024-01-01 PROCEDURE — 0BH17EZ INSERTION OF ENDOTRACHEAL AIRWAY INTO TRACHEA, VIA NATURAL OR ARTIFICIAL OPENING: ICD-10-PCS | Performed by: INTERNAL MEDICINE

## 2024-01-01 PROCEDURE — 36556 INSERT NON-TUNNEL CV CATH: CPT | Performed by: NURSE PRACTITIONER

## 2024-01-01 PROCEDURE — 93306 TTE W/DOPPLER COMPLETE: CPT | Performed by: INTERNAL MEDICINE

## 2024-01-01 PROCEDURE — 87040 BLOOD CULTURE FOR BACTERIA: CPT | Performed by: NURSE PRACTITIONER

## 2024-01-01 PROCEDURE — 81003 URINALYSIS AUTO W/O SCOPE: CPT | Performed by: NURSE PRACTITIONER

## 2024-01-01 PROCEDURE — 94003 VENT MGMT INPAT SUBQ DAY: CPT

## 2024-01-01 PROCEDURE — 81001 URINALYSIS AUTO W/SCOPE: CPT | Performed by: NURSE PRACTITIONER

## 2024-01-01 PROCEDURE — 4A133J1 MONITORING OF ARTERIAL PULSE, PERIPHERAL, PERCUTANEOUS APPROACH: ICD-10-PCS | Performed by: INTERNAL MEDICINE

## 2024-01-01 PROCEDURE — 82803 BLOOD GASES ANY COMBINATION: CPT

## 2024-01-01 PROCEDURE — 84484 ASSAY OF TROPONIN QUANT: CPT

## 2024-01-01 PROCEDURE — 84145 PROCALCITONIN (PCT): CPT | Performed by: INTERNAL MEDICINE

## 2024-01-01 PROCEDURE — 83605 ASSAY OF LACTIC ACID: CPT | Performed by: NURSE PRACTITIONER

## 2024-01-01 PROCEDURE — 84439 ASSAY OF FREE THYROXINE: CPT | Performed by: NURSE PRACTITIONER

## 2024-01-01 PROCEDURE — 85025 COMPLETE CBC W/AUTO DIFF WBC: CPT | Performed by: NURSE PRACTITIONER

## 2024-01-01 PROCEDURE — 99233 SBSQ HOSP IP/OBS HIGH 50: CPT | Performed by: NURSE PRACTITIONER

## 2024-01-01 PROCEDURE — 85007 BL SMEAR W/DIFF WBC COUNT: CPT | Performed by: NURSE PRACTITIONER

## 2024-01-01 PROCEDURE — 84100 ASSAY OF PHOSPHORUS: CPT | Performed by: INTERNAL MEDICINE

## 2024-01-01 PROCEDURE — 84132 ASSAY OF SERUM POTASSIUM: CPT

## 2024-01-01 PROCEDURE — 82805 BLOOD GASES W/O2 SATURATION: CPT

## 2024-01-01 PROCEDURE — 99285 EMERGENCY DEPT VISIT HI MDM: CPT | Performed by: INTERNAL MEDICINE

## 2024-01-01 PROCEDURE — 85610 PROTHROMBIN TIME: CPT | Performed by: NURSE PRACTITIONER

## 2024-01-01 PROCEDURE — 4A133B1 MONITORING OF ARTERIAL PRESSURE, PERIPHERAL, PERCUTANEOUS APPROACH: ICD-10-PCS | Performed by: INTERNAL MEDICINE

## 2024-01-01 PROCEDURE — 31500 INSERT EMERGENCY AIRWAY: CPT

## 2024-01-01 PROCEDURE — 0202U NFCT DS 22 TRGT SARS-COV-2: CPT | Performed by: INTERNAL MEDICINE

## 2024-01-01 PROCEDURE — 99233 SBSQ HOSP IP/OBS HIGH 50: CPT | Performed by: STUDENT IN AN ORGANIZED HEALTH CARE EDUCATION/TRAINING PROGRAM

## 2024-01-01 PROCEDURE — 87070 CULTURE OTHR SPECIMN AEROBIC: CPT | Performed by: NURSE PRACTITIONER

## 2024-01-01 PROCEDURE — 85730 THROMBOPLASTIN TIME PARTIAL: CPT | Performed by: NURSE PRACTITIONER

## 2024-01-01 PROCEDURE — 36415 COLL VENOUS BLD VENIPUNCTURE: CPT | Performed by: PHYSICIAN ASSISTANT

## 2024-01-01 PROCEDURE — 87205 SMEAR GRAM STAIN: CPT | Performed by: NURSE PRACTITIONER

## 2024-01-01 PROCEDURE — 85025 COMPLETE CBC W/AUTO DIFF WBC: CPT | Performed by: INTERNAL MEDICINE

## 2024-01-01 PROCEDURE — 83036 HEMOGLOBIN GLYCOSYLATED A1C: CPT | Performed by: NURSE PRACTITIONER

## 2024-01-01 PROCEDURE — 80053 COMPREHEN METABOLIC PANEL: CPT | Performed by: PHYSICIAN ASSISTANT

## 2024-01-01 PROCEDURE — 99223 1ST HOSP IP/OBS HIGH 75: CPT | Performed by: STUDENT IN AN ORGANIZED HEALTH CARE EDUCATION/TRAINING PROGRAM

## 2024-01-01 PROCEDURE — 80048 BASIC METABOLIC PNL TOTAL CA: CPT | Performed by: NURSE PRACTITIONER

## 2024-01-01 PROCEDURE — C8929 TTE W OR WO FOL WCON,DOPPLER: HCPCS

## 2024-01-01 PROCEDURE — 85014 HEMATOCRIT: CPT

## 2024-01-01 PROCEDURE — 83605 ASSAY OF LACTIC ACID: CPT | Performed by: INTERNAL MEDICINE

## 2024-01-01 PROCEDURE — 36620 INSERTION CATHETER ARTERY: CPT

## 2024-01-01 PROCEDURE — NC001 PR NO CHARGE: Performed by: STUDENT IN AN ORGANIZED HEALTH CARE EDUCATION/TRAINING PROGRAM

## 2024-01-01 PROCEDURE — 99238 HOSP IP/OBS DSCHRG MGMT 30/<: CPT | Performed by: STUDENT IN AN ORGANIZED HEALTH CARE EDUCATION/TRAINING PROGRAM

## 2024-01-01 PROCEDURE — 74177 CT ABD & PELVIS W/CONTRAST: CPT

## 2024-01-01 PROCEDURE — 99285 EMERGENCY DEPT VISIT HI MDM: CPT

## 2024-01-01 PROCEDURE — 87040 BLOOD CULTURE FOR BACTERIA: CPT | Performed by: INTERNAL MEDICINE

## 2024-01-01 PROCEDURE — 84484 ASSAY OF TROPONIN QUANT: CPT | Performed by: INTERNAL MEDICINE

## 2024-01-01 PROCEDURE — 87449 NOS EACH ORGANISM AG IA: CPT | Performed by: INTERNAL MEDICINE

## 2024-01-01 PROCEDURE — 0241U HB NFCT DS VIR RESP RNA 4 TRGT: CPT | Performed by: PHYSICIAN ASSISTANT

## 2024-01-01 PROCEDURE — 02HV33Z INSERTION OF INFUSION DEVICE INTO SUPERIOR VENA CAVA, PERCUTANEOUS APPROACH: ICD-10-PCS | Performed by: INTERNAL MEDICINE

## 2024-01-01 PROCEDURE — 83880 ASSAY OF NATRIURETIC PEPTIDE: CPT | Performed by: PHYSICIAN ASSISTANT

## 2024-01-01 PROCEDURE — 31500 INSERT EMERGENCY AIRWAY: CPT | Performed by: INTERNAL MEDICINE

## 2024-01-01 PROCEDURE — 93975 VASCULAR STUDY: CPT

## 2024-01-01 RX ORDER — HYDRALAZINE HYDROCHLORIDE 20 MG/ML
10 INJECTION INTRAMUSCULAR; INTRAVENOUS EVERY 6 HOURS PRN
Status: DISCONTINUED | OUTPATIENT
Start: 2024-01-01 | End: 2024-01-01

## 2024-01-01 RX ORDER — ALBUTEROL SULFATE 90 UG/1
2 AEROSOL, METERED RESPIRATORY (INHALATION) EVERY 4 HOURS PRN
Status: CANCELLED | OUTPATIENT
Start: 2024-01-01

## 2024-01-01 RX ORDER — METHYLPREDNISOLONE SOD SUCC 125 MG
1 VIAL (EA) INJECTION ONCE
Status: COMPLETED | OUTPATIENT
Start: 2024-01-01 | End: 2024-01-01

## 2024-01-01 RX ORDER — ASPIRIN 81 MG/1
81 TABLET, CHEWABLE ORAL DAILY
Status: DISCONTINUED | OUTPATIENT
Start: 2024-01-01 | End: 2024-01-01

## 2024-01-01 RX ORDER — CLOPIDOGREL BISULFATE 75 MG/1
75 TABLET ORAL DAILY
Status: DISCONTINUED | OUTPATIENT
Start: 2024-01-01 | End: 2024-01-01

## 2024-01-01 RX ORDER — HYDROMORPHONE HCL/PF 1 MG/ML
1 SYRINGE (ML) INJECTION
Status: DISCONTINUED | OUTPATIENT
Start: 2024-01-01 | End: 2024-01-01 | Stop reason: HOSPADM

## 2024-01-01 RX ORDER — GABAPENTIN 100 MG/1
100 CAPSULE ORAL
Status: DISCONTINUED | OUTPATIENT
Start: 2024-01-01 | End: 2024-01-01

## 2024-01-01 RX ORDER — FENTANYL CITRATE-0.9 % NACL/PF 10 MCG/ML
100 PLASTIC BAG, INJECTION (ML) INTRAVENOUS CONTINUOUS
Status: DISCONTINUED | OUTPATIENT
Start: 2024-01-01 | End: 2024-01-01

## 2024-01-01 RX ORDER — CARVEDILOL 12.5 MG/1
12.5 TABLET ORAL 2 TIMES DAILY WITH MEALS
Status: DISCONTINUED | OUTPATIENT
Start: 2024-01-01 | End: 2024-01-01

## 2024-01-01 RX ORDER — METHYLPREDNISOLONE SODIUM SUCCINATE 40 MG/ML
40 INJECTION, POWDER, LYOPHILIZED, FOR SOLUTION INTRAMUSCULAR; INTRAVENOUS DAILY
Status: DISCONTINUED | OUTPATIENT
Start: 2024-01-01 | End: 2024-01-01

## 2024-01-01 RX ORDER — SODIUM CHLORIDE 9 MG/ML
125 INJECTION, SOLUTION INTRAVENOUS CONTINUOUS
Status: DISCONTINUED | OUTPATIENT
Start: 2024-01-01 | End: 2024-01-01

## 2024-01-01 RX ORDER — HEPARIN SODIUM 5000 [USP'U]/ML
5000 INJECTION, SOLUTION INTRAVENOUS; SUBCUTANEOUS EVERY 8 HOURS SCHEDULED
Status: DISCONTINUED | OUTPATIENT
Start: 2024-01-01 | End: 2024-01-01

## 2024-01-01 RX ORDER — ALBUMIN, HUMAN INJ 5% 5 %
SOLUTION INTRAVENOUS
Status: COMPLETED
Start: 2024-01-01 | End: 2024-01-01

## 2024-01-01 RX ORDER — PROPOFOL 10 MG/ML
5-50 INJECTION, EMULSION INTRAVENOUS
Status: DISCONTINUED | OUTPATIENT
Start: 2024-01-01 | End: 2024-01-01

## 2024-01-01 RX ORDER — MAGNESIUM SULFATE HEPTAHYDRATE 40 MG/ML
2 INJECTION, SOLUTION INTRAVENOUS ONCE
Status: COMPLETED | OUTPATIENT
Start: 2024-01-01 | End: 2024-01-01

## 2024-01-01 RX ORDER — CARVEDILOL 3.12 MG/1
6.25 TABLET ORAL 2 TIMES DAILY WITH MEALS
Status: DISCONTINUED | OUTPATIENT
Start: 2024-01-01 | End: 2024-01-01

## 2024-01-01 RX ORDER — SENNOSIDES 8.8 MG/5ML
17.6 LIQUID ORAL 2 TIMES DAILY
Status: DISCONTINUED | OUTPATIENT
Start: 2024-01-01 | End: 2024-01-01

## 2024-01-01 RX ORDER — HALOPERIDOL 5 MG/ML
0.5 INJECTION INTRAMUSCULAR EVERY 2 HOUR PRN
Status: DISCONTINUED | OUTPATIENT
Start: 2024-01-01 | End: 2024-01-01 | Stop reason: HOSPADM

## 2024-01-01 RX ORDER — FENTANYL CITRATE 50 UG/ML
50 INJECTION, SOLUTION INTRAMUSCULAR; INTRAVENOUS EVERY 2 HOUR PRN
Status: DISCONTINUED | OUTPATIENT
Start: 2024-01-01 | End: 2024-01-01

## 2024-01-01 RX ORDER — CLONAZEPAM 1 MG/1
1 TABLET ORAL
Status: DISCONTINUED | OUTPATIENT
Start: 2024-01-01 | End: 2024-01-01

## 2024-01-01 RX ORDER — METHYLPREDNISOLONE SODIUM SUCCINATE 40 MG/ML
40 INJECTION, POWDER, LYOPHILIZED, FOR SOLUTION INTRAMUSCULAR; INTRAVENOUS EVERY 12 HOURS SCHEDULED
Status: DISCONTINUED | OUTPATIENT
Start: 2024-01-01 | End: 2024-01-01

## 2024-01-01 RX ORDER — GUAIFENESIN 600 MG/1
600 TABLET, EXTENDED RELEASE ORAL 2 TIMES DAILY
Status: DISCONTINUED | OUTPATIENT
Start: 2024-01-01 | End: 2024-01-01

## 2024-01-01 RX ORDER — ONDANSETRON 2 MG/ML
4 INJECTION INTRAMUSCULAR; INTRAVENOUS EVERY 6 HOURS PRN
Status: DISCONTINUED | OUTPATIENT
Start: 2024-01-01 | End: 2024-01-01 | Stop reason: HOSPADM

## 2024-01-01 RX ORDER — NICOTINE 21 MG/24HR
1 PATCH, TRANSDERMAL 24 HOURS TRANSDERMAL DAILY
Status: DISCONTINUED | OUTPATIENT
Start: 2024-01-01 | End: 2024-01-01

## 2024-01-01 RX ORDER — POLYETHYLENE GLYCOL 3350 17 G/17G
17 POWDER, FOR SOLUTION ORAL DAILY
Status: DISCONTINUED | OUTPATIENT
Start: 2024-01-01 | End: 2024-01-01

## 2024-01-01 RX ORDER — DEXMEDETOMIDINE HYDROCHLORIDE 4 UG/ML
.1-.7 INJECTION, SOLUTION INTRAVENOUS
Status: DISCONTINUED | OUTPATIENT
Start: 2024-01-01 | End: 2024-01-01

## 2024-01-01 RX ORDER — CEFTRIAXONE 1 G/50ML
1000 INJECTION, SOLUTION INTRAVENOUS ONCE
Status: COMPLETED | OUTPATIENT
Start: 2024-01-01 | End: 2024-01-01

## 2024-01-01 RX ORDER — LORAZEPAM 2 MG/ML
1 INJECTION INTRAMUSCULAR
Status: COMPLETED | OUTPATIENT
Start: 2024-01-01 | End: 2024-01-01

## 2024-01-01 RX ORDER — LACTULOSE 10 G/15ML
20 SOLUTION ORAL DAILY
Status: DISCONTINUED | OUTPATIENT
Start: 2024-01-01 | End: 2024-01-01

## 2024-01-01 RX ORDER — ATORVASTATIN CALCIUM 40 MG/1
40 TABLET, FILM COATED ORAL
Status: DISCONTINUED | OUTPATIENT
Start: 2024-01-01 | End: 2024-01-01

## 2024-01-01 RX ORDER — GLYCOPYRROLATE 0.2 MG/ML
0.1 INJECTION INTRAMUSCULAR; INTRAVENOUS EVERY 4 HOURS PRN
Status: DISCONTINUED | OUTPATIENT
Start: 2024-01-01 | End: 2024-01-01 | Stop reason: HOSPADM

## 2024-01-01 RX ORDER — ALBUTEROL SULFATE 2.5 MG/3ML
2 SOLUTION RESPIRATORY (INHALATION) ONCE
Status: COMPLETED | OUTPATIENT
Start: 2024-01-01 | End: 2024-01-01

## 2024-01-01 RX ORDER — FENTANYL CITRATE 50 UG/ML
100 INJECTION, SOLUTION INTRAMUSCULAR; INTRAVENOUS ONCE
Status: DISCONTINUED | OUTPATIENT
Start: 2024-01-01 | End: 2024-01-01

## 2024-01-01 RX ORDER — PANTOPRAZOLE SODIUM 40 MG/10ML
40 INJECTION, POWDER, LYOPHILIZED, FOR SOLUTION INTRAVENOUS EVERY 12 HOURS SCHEDULED
Status: DISCONTINUED | OUTPATIENT
Start: 2024-01-01 | End: 2024-01-01

## 2024-01-01 RX ORDER — LORAZEPAM 2 MG/ML
2 INJECTION INTRAMUSCULAR
Status: DISCONTINUED | OUTPATIENT
Start: 2024-01-01 | End: 2024-01-01 | Stop reason: HOSPADM

## 2024-01-01 RX ORDER — LORAZEPAM 2 MG/ML
1 INJECTION INTRAMUSCULAR ONCE
Status: COMPLETED | OUTPATIENT
Start: 2024-01-01 | End: 2024-01-01

## 2024-01-01 RX ORDER — FUROSEMIDE 10 MG/ML
40 INJECTION INTRAMUSCULAR; INTRAVENOUS ONCE
Status: COMPLETED | OUTPATIENT
Start: 2024-01-01 | End: 2024-01-01

## 2024-01-01 RX ORDER — ALBUTEROL SULFATE 90 UG/1
2 AEROSOL, METERED RESPIRATORY (INHALATION) EVERY 4 HOURS PRN
Status: DISCONTINUED | OUTPATIENT
Start: 2024-01-01 | End: 2024-01-01

## 2024-01-01 RX ORDER — FENTANYL CITRATE 50 UG/ML
50 INJECTION, SOLUTION INTRAMUSCULAR; INTRAVENOUS
Status: DISCONTINUED | OUTPATIENT
Start: 2024-01-01 | End: 2024-01-01

## 2024-01-01 RX ORDER — HYDRALAZINE HYDROCHLORIDE 20 MG/ML
10 INJECTION INTRAMUSCULAR; INTRAVENOUS ONCE
Status: COMPLETED | OUTPATIENT
Start: 2024-01-01 | End: 2024-01-01

## 2024-01-01 RX ORDER — LISINOPRIL 10 MG/1
10 TABLET ORAL DAILY
Status: DISCONTINUED | OUTPATIENT
Start: 2024-01-01 | End: 2024-01-01

## 2024-01-01 RX ORDER — FENTANYL CITRATE-0.9 % NACL/PF 10 MCG/ML
50 PLASTIC BAG, INJECTION (ML) INTRAVENOUS CONTINUOUS
Status: DISCONTINUED | OUTPATIENT
Start: 2024-01-01 | End: 2024-01-01

## 2024-01-01 RX ORDER — CEFTRIAXONE 1 G/50ML
1000 INJECTION, SOLUTION INTRAVENOUS EVERY 24 HOURS
Status: DISCONTINUED | OUTPATIENT
Start: 2024-01-01 | End: 2024-01-01

## 2024-01-01 RX ORDER — METHYLPREDNISOLONE SODIUM SUCCINATE 40 MG/ML
40 INJECTION, POWDER, LYOPHILIZED, FOR SOLUTION INTRAMUSCULAR; INTRAVENOUS EVERY 8 HOURS SCHEDULED
Status: DISCONTINUED | OUTPATIENT
Start: 2024-01-01 | End: 2024-01-01

## 2024-01-01 RX ORDER — CHLORHEXIDINE GLUCONATE ORAL RINSE 1.2 MG/ML
15 SOLUTION DENTAL EVERY 12 HOURS SCHEDULED
Status: DISCONTINUED | OUTPATIENT
Start: 2024-01-01 | End: 2024-01-01

## 2024-01-01 RX ORDER — OXYCODONE HYDROCHLORIDE AND ACETAMINOPHEN 5; 325 MG/1; MG/1
1 TABLET ORAL EVERY 8 HOURS PRN
Status: DISCONTINUED | OUTPATIENT
Start: 2024-01-01 | End: 2024-01-01

## 2024-01-01 RX ORDER — CARVEDILOL 25 MG/1
25 TABLET ORAL 2 TIMES DAILY WITH MEALS
Status: DISCONTINUED | OUTPATIENT
Start: 2024-01-01 | End: 2024-01-01

## 2024-01-01 RX ORDER — BUDESONIDE 0.5 MG/2ML
0.5 INHALANT ORAL
Status: DISCONTINUED | OUTPATIENT
Start: 2024-01-01 | End: 2024-01-01

## 2024-01-01 RX ORDER — ACETAMINOPHEN 10 MG/ML
1000 INJECTION, SOLUTION INTRAVENOUS ONCE
Status: COMPLETED | OUTPATIENT
Start: 2024-01-01 | End: 2024-01-01

## 2024-01-01 RX ORDER — OLANZAPINE 10 MG/2ML
5 INJECTION, POWDER, FOR SOLUTION INTRAMUSCULAR ONCE
Status: DISCONTINUED | OUTPATIENT
Start: 2024-01-01 | End: 2024-01-01

## 2024-01-01 RX ORDER — ALBUTEROL SULFATE 2.5 MG/3ML
5 SOLUTION RESPIRATORY (INHALATION) ONCE
Status: COMPLETED | OUTPATIENT
Start: 2024-01-01 | End: 2024-01-01

## 2024-01-01 RX ORDER — LEVALBUTEROL INHALATION SOLUTION 1.25 MG/3ML
1.25 SOLUTION RESPIRATORY (INHALATION)
Status: DISCONTINUED | OUTPATIENT
Start: 2024-01-01 | End: 2024-01-01

## 2024-01-01 RX ORDER — LISINOPRIL 5 MG/1
5 TABLET ORAL ONCE
Status: COMPLETED | OUTPATIENT
Start: 2024-01-01 | End: 2024-01-01

## 2024-01-01 RX ORDER — MORPHINE SULFATE 15 MG/1
15 TABLET ORAL 3 TIMES DAILY
Status: DISCONTINUED | OUTPATIENT
Start: 2024-01-01 | End: 2024-01-01

## 2024-01-01 RX ORDER — IPRATROPIUM BROMIDE AND ALBUTEROL SULFATE .5; 3 MG/3ML; MG/3ML
1 SOLUTION RESPIRATORY (INHALATION) ONCE
Status: COMPLETED | OUTPATIENT
Start: 2024-01-01 | End: 2024-01-01

## 2024-01-01 RX ORDER — MORPHINE SULFATE 30 MG/1
30 TABLET, FILM COATED, EXTENDED RELEASE ORAL EVERY 12 HOURS SCHEDULED
Status: DISCONTINUED | OUTPATIENT
Start: 2024-01-01 | End: 2024-01-01

## 2024-01-01 RX ORDER — LISINOPRIL 5 MG/1
5 TABLET ORAL DAILY
Status: DISCONTINUED | OUTPATIENT
Start: 2024-01-01 | End: 2024-01-01

## 2024-01-01 RX ORDER — ACETAMINOPHEN 325 MG/1
650 TABLET ORAL EVERY 6 HOURS PRN
Status: DISCONTINUED | OUTPATIENT
Start: 2024-01-01 | End: 2024-01-01

## 2024-01-01 RX ORDER — INSULIN LISPRO 100 [IU]/ML
1-6 INJECTION, SOLUTION INTRAVENOUS; SUBCUTANEOUS EVERY 6 HOURS SCHEDULED
Status: DISCONTINUED | OUTPATIENT
Start: 2024-01-01 | End: 2024-01-01

## 2024-01-01 RX ORDER — BISACODYL 5 MG/1
5 TABLET, DELAYED RELEASE ORAL DAILY PRN
Status: DISCONTINUED | OUTPATIENT
Start: 2024-01-01 | End: 2024-01-01

## 2024-01-01 RX ADMIN — PROPOFOL 50 MCG/KG/MIN: 10 INJECTION, EMULSION INTRAVENOUS at 08:45

## 2024-01-01 RX ADMIN — Medication 100 MCG/HR: at 04:48

## 2024-01-01 RX ADMIN — IPRATROPIUM BROMIDE 0.5 MG: 0.5 SOLUTION RESPIRATORY (INHALATION) at 20:26

## 2024-01-01 RX ADMIN — CEFTRIAXONE 1000 MG: 1 INJECTION, SOLUTION INTRAVENOUS at 12:11

## 2024-01-01 RX ADMIN — LORAZEPAM 2 MG: 2 INJECTION INTRAMUSCULAR; INTRAVENOUS at 21:23

## 2024-01-01 RX ADMIN — IPRATROPIUM BROMIDE 0.5 MG: 0.5 SOLUTION RESPIRATORY (INHALATION) at 07:58

## 2024-01-01 RX ADMIN — HYDRALAZINE HYDROCHLORIDE 10 MG: 20 INJECTION INTRAMUSCULAR; INTRAVENOUS at 17:06

## 2024-01-01 RX ADMIN — SENNOSIDES 17.6 MG: 8.8 LIQUID ORAL at 17:48

## 2024-01-01 RX ADMIN — Medication 100 MCG/HR: at 08:59

## 2024-01-01 RX ADMIN — LEVALBUTEROL HYDROCHLORIDE 1.25 MG: 1.25 SOLUTION RESPIRATORY (INHALATION) at 13:18

## 2024-01-01 RX ADMIN — METHYLPREDNISOLONE SODIUM SUCCINATE 40 MG: 40 INJECTION, POWDER, FOR SOLUTION INTRAMUSCULAR; INTRAVENOUS at 14:48

## 2024-01-01 RX ADMIN — INSULIN LISPRO 1 UNITS: 100 INJECTION, SOLUTION INTRAVENOUS; SUBCUTANEOUS at 00:04

## 2024-01-01 RX ADMIN — MAGNESIUM SULFATE HEPTAHYDRATE 2 G: 40 INJECTION, SOLUTION INTRAVENOUS at 16:31

## 2024-01-01 RX ADMIN — LEVALBUTEROL HYDROCHLORIDE 1.25 MG: 1.25 SOLUTION RESPIRATORY (INHALATION) at 07:58

## 2024-01-01 RX ADMIN — METHYLPREDNISOLONE SODIUM SUCCINATE 40 MG: 40 INJECTION, POWDER, FOR SOLUTION INTRAMUSCULAR; INTRAVENOUS at 21:01

## 2024-01-01 RX ADMIN — LEVALBUTEROL HYDROCHLORIDE 1.25 MG: 1.25 SOLUTION RESPIRATORY (INHALATION) at 13:11

## 2024-01-01 RX ADMIN — CHLORHEXIDINE GLUCONATE 15 ML: 1.2 RINSE ORAL at 10:21

## 2024-01-01 RX ADMIN — LORAZEPAM 1 MG: 2 INJECTION INTRAMUSCULAR; INTRAVENOUS at 05:36

## 2024-01-01 RX ADMIN — LACTULOSE 20 G: 20 SOLUTION ORAL at 08:32

## 2024-01-01 RX ADMIN — IPRATROPIUM BROMIDE 0.5 MG: 0.5 SOLUTION RESPIRATORY (INHALATION) at 07:21

## 2024-01-01 RX ADMIN — AZITHROMYCIN MONOHYDRATE 500 MG: 500 INJECTION, POWDER, LYOPHILIZED, FOR SOLUTION INTRAVENOUS at 13:27

## 2024-01-01 RX ADMIN — HEPARIN SODIUM 5000 UNITS: 5000 INJECTION INTRAVENOUS; SUBCUTANEOUS at 14:30

## 2024-01-01 RX ADMIN — Medication 100 MCG/HR: at 04:58

## 2024-01-01 RX ADMIN — HEPARIN SODIUM 5000 UNITS: 5000 INJECTION, SOLUTION INTRAVENOUS; SUBCUTANEOUS at 21:47

## 2024-01-01 RX ADMIN — METHYLPREDNISOLONE SODIUM SUCCINATE 40 MG: 40 INJECTION, POWDER, FOR SOLUTION INTRAMUSCULAR; INTRAVENOUS at 05:13

## 2024-01-01 RX ADMIN — CEFTRIAXONE 1000 MG: 1 INJECTION, SOLUTION INTRAVENOUS at 13:08

## 2024-01-01 RX ADMIN — NOREPINEPHRINE BITARTRATE 4000 MCG: 1 SOLUTION INTRAVENOUS at 08:17

## 2024-01-01 RX ADMIN — CHLORHEXIDINE GLUCONATE 15 ML: 1.2 RINSE ORAL at 21:29

## 2024-01-01 RX ADMIN — SODIUM CHLORIDE 125 ML/HR: 0.9 INJECTION, SOLUTION INTRAVENOUS at 00:41

## 2024-01-01 RX ADMIN — Medication 100 MCG/HR: at 14:48

## 2024-01-01 RX ADMIN — CARVEDILOL 25 MG: 25 TABLET, FILM COATED ORAL at 16:21

## 2024-01-01 RX ADMIN — ATORVASTATIN CALCIUM 40 MG: 40 TABLET, FILM COATED ORAL at 17:03

## 2024-01-01 RX ADMIN — LEVALBUTEROL HYDROCHLORIDE 1.25 MG: 1.25 SOLUTION RESPIRATORY (INHALATION) at 07:21

## 2024-01-01 RX ADMIN — Medication 100 MCG/HR: at 21:09

## 2024-01-01 RX ADMIN — FENTANYL CITRATE 50 MCG: 50 INJECTION INTRAMUSCULAR; INTRAVENOUS at 18:02

## 2024-01-01 RX ADMIN — AZITHROMYCIN MONOHYDRATE 500 MG: 500 INJECTION, POWDER, LYOPHILIZED, FOR SOLUTION INTRAVENOUS at 13:06

## 2024-01-01 RX ADMIN — METHYLPREDNISOLONE SODIUM SUCCINATE 40 MG: 40 INJECTION, POWDER, FOR SOLUTION INTRAMUSCULAR; INTRAVENOUS at 08:09

## 2024-01-01 RX ADMIN — IPRATROPIUM BROMIDE 0.5 MG: 0.5 SOLUTION RESPIRATORY (INHALATION) at 20:47

## 2024-01-01 RX ADMIN — PROPOFOL 40 MCG/KG/MIN: 10 INJECTION, EMULSION INTRAVENOUS at 03:12

## 2024-01-01 RX ADMIN — PERFLUTREN 0.8 ML/MIN: 6.52 INJECTION, SUSPENSION INTRAVENOUS at 10:22

## 2024-01-01 RX ADMIN — PROPOFOL 40 MCG/KG/MIN: 10 INJECTION, EMULSION INTRAVENOUS at 08:33

## 2024-01-01 RX ADMIN — IPRATROPIUM BROMIDE 0.5 MG: 0.5 SOLUTION RESPIRATORY (INHALATION) at 13:21

## 2024-01-01 RX ADMIN — HEPARIN SODIUM 5000 UNITS: 5000 INJECTION INTRAVENOUS; SUBCUTANEOUS at 21:03

## 2024-01-01 RX ADMIN — BUDESONIDE INHALATION 0.5 MG: 0.5 SUSPENSION RESPIRATORY (INHALATION) at 09:22

## 2024-01-01 RX ADMIN — HEPARIN SODIUM 5000 UNITS: 5000 INJECTION INTRAVENOUS; SUBCUTANEOUS at 13:07

## 2024-01-01 RX ADMIN — CARVEDILOL 25 MG: 25 TABLET, FILM COATED ORAL at 09:09

## 2024-01-01 RX ADMIN — PROPOFOL 30 MCG/KG/MIN: 10 INJECTION, EMULSION INTRAVENOUS at 08:30

## 2024-01-01 RX ADMIN — PANTOPRAZOLE SODIUM 40 MG: 40 INJECTION, POWDER, FOR SOLUTION INTRAVENOUS at 10:21

## 2024-01-01 RX ADMIN — METHYLPREDNISOLONE SODIUM SUCCINATE 40 MG: 40 INJECTION, POWDER, FOR SOLUTION INTRAMUSCULAR; INTRAVENOUS at 09:30

## 2024-01-01 RX ADMIN — METHYLPREDNISOLONE SODIUM SUCCINATE 40 MG: 40 INJECTION, POWDER, FOR SOLUTION INTRAMUSCULAR; INTRAVENOUS at 21:29

## 2024-01-01 RX ADMIN — IPRATROPIUM BROMIDE 0.5 MG: 0.5 SOLUTION RESPIRATORY (INHALATION) at 19:57

## 2024-01-01 RX ADMIN — LEVALBUTEROL HYDROCHLORIDE 1.25 MG: 1.25 SOLUTION RESPIRATORY (INHALATION) at 20:47

## 2024-01-01 RX ADMIN — CEFTRIAXONE 1000 MG: 1 INJECTION, SOLUTION INTRAVENOUS at 12:52

## 2024-01-01 RX ADMIN — PANTOPRAZOLE SODIUM 40 MG: 40 INJECTION, POWDER, FOR SOLUTION INTRAVENOUS at 21:01

## 2024-01-01 RX ADMIN — LEVALBUTEROL HYDROCHLORIDE 1.25 MG: 1.25 SOLUTION RESPIRATORY (INHALATION) at 13:56

## 2024-01-01 RX ADMIN — HYDROMORPHONE HYDROCHLORIDE 1 MG/HR: 10 INJECTION, SOLUTION INTRAMUSCULAR; INTRAVENOUS; SUBCUTANEOUS at 22:31

## 2024-01-01 RX ADMIN — PROPOFOL 40 MCG/KG/MIN: 10 INJECTION, EMULSION INTRAVENOUS at 17:43

## 2024-01-01 RX ADMIN — MORPHINE SULFATE 2 MG: 2 INJECTION, SOLUTION INTRAMUSCULAR; INTRAVENOUS at 02:34

## 2024-01-01 RX ADMIN — AZITHROMYCIN MONOHYDRATE 500 MG: 500 INJECTION, POWDER, LYOPHILIZED, FOR SOLUTION INTRAVENOUS at 14:00

## 2024-01-01 RX ADMIN — IPRATROPIUM BROMIDE 0.5 MG: 0.5 SOLUTION RESPIRATORY (INHALATION) at 13:11

## 2024-01-01 RX ADMIN — METHYLPREDNISOLONE SODIUM SUCCINATE 40 MG: 40 INJECTION, POWDER, FOR SOLUTION INTRAMUSCULAR; INTRAVENOUS at 05:33

## 2024-01-01 RX ADMIN — PROPOFOL 40 MCG/KG/MIN: 10 INJECTION, EMULSION INTRAVENOUS at 18:49

## 2024-01-01 RX ADMIN — LORAZEPAM 1 MG: 2 INJECTION INTRAMUSCULAR; INTRAVENOUS at 22:16

## 2024-01-01 RX ADMIN — MORPHINE SULFATE 2 MG: 2 INJECTION, SOLUTION INTRAMUSCULAR; INTRAVENOUS at 04:15

## 2024-01-01 RX ADMIN — LORAZEPAM 1 MG: 2 INJECTION INTRAMUSCULAR; INTRAVENOUS at 02:34

## 2024-01-01 RX ADMIN — CHLORHEXIDINE GLUCONATE 15 ML: 1.2 RINSE ORAL at 09:10

## 2024-01-01 RX ADMIN — POLYETHYLENE GLYCOL 3350 17 G: 17 POWDER, FOR SOLUTION ORAL at 10:54

## 2024-01-01 RX ADMIN — PROPOFOL 20 MCG/KG/MIN: 10 INJECTION, EMULSION INTRAVENOUS at 12:53

## 2024-01-01 RX ADMIN — LISINOPRIL 5 MG: 5 TABLET ORAL at 09:57

## 2024-01-01 RX ADMIN — ATORVASTATIN CALCIUM 40 MG: 40 TABLET, FILM COATED ORAL at 16:21

## 2024-01-01 RX ADMIN — METHYLPREDNISOLONE SODIUM SUCCINATE 40 MG: 40 INJECTION, POWDER, FOR SOLUTION INTRAMUSCULAR; INTRAVENOUS at 20:19

## 2024-01-01 RX ADMIN — ALBUTEROL SULFATE 5 MG: 2.5 SOLUTION RESPIRATORY (INHALATION) at 12:54

## 2024-01-01 RX ADMIN — METHYLPREDNISOLONE SODIUM SUCCINATE 40 MG: 40 INJECTION, POWDER, FOR SOLUTION INTRAMUSCULAR; INTRAVENOUS at 21:48

## 2024-01-01 RX ADMIN — CLONAZEPAM 1 MG: 1 TABLET ORAL at 21:32

## 2024-01-01 RX ADMIN — CEFTRIAXONE 1000 MG: 1 INJECTION, SOLUTION INTRAVENOUS at 12:33

## 2024-01-01 RX ADMIN — GLYCOPYRROLATE 0.1 MG: 0.2 INJECTION, SOLUTION INTRAMUSCULAR; INTRAVENOUS at 15:41

## 2024-01-01 RX ADMIN — PROPOFOL 50 MCG/KG/MIN: 10 INJECTION, EMULSION INTRAVENOUS at 11:00

## 2024-01-01 RX ADMIN — PROPOFOL 50 MCG/KG/MIN: 10 INJECTION, EMULSION INTRAVENOUS at 18:29

## 2024-01-01 RX ADMIN — LORAZEPAM 2 MG: 2 INJECTION INTRAMUSCULAR; INTRAVENOUS at 20:18

## 2024-01-01 RX ADMIN — LORAZEPAM 1 MG: 2 INJECTION INTRAMUSCULAR; INTRAVENOUS at 21:32

## 2024-01-01 RX ADMIN — IPRATROPIUM BROMIDE 0.5 MG: 0.5 SOLUTION RESPIRATORY (INHALATION) at 19:48

## 2024-01-01 RX ADMIN — METHYLPREDNISOLONE SODIUM SUCCINATE 40 MG: 40 INJECTION, POWDER, FOR SOLUTION INTRAMUSCULAR; INTRAVENOUS at 14:29

## 2024-01-01 RX ADMIN — LEVALBUTEROL HYDROCHLORIDE 1.25 MG: 1.25 SOLUTION RESPIRATORY (INHALATION) at 19:48

## 2024-01-01 RX ADMIN — HYDRALAZINE HYDROCHLORIDE 10 MG: 20 INJECTION INTRAMUSCULAR; INTRAVENOUS at 12:29

## 2024-01-01 RX ADMIN — FENTANYL CITRATE 50 MCG: 50 INJECTION INTRAMUSCULAR; INTRAVENOUS at 05:25

## 2024-01-01 RX ADMIN — LORAZEPAM 2 MG: 2 INJECTION INTRAMUSCULAR; INTRAVENOUS at 14:45

## 2024-01-01 RX ADMIN — MORPHINE SULFATE 15 MG: 15 TABLET ORAL at 15:56

## 2024-01-01 RX ADMIN — HYDROMORPHONE HYDROCHLORIDE 1 MG: 1 INJECTION, SOLUTION INTRAMUSCULAR; INTRAVENOUS; SUBCUTANEOUS at 00:01

## 2024-01-01 RX ADMIN — PANTOPRAZOLE SODIUM 40 MG: 40 INJECTION, POWDER, FOR SOLUTION INTRAVENOUS at 08:09

## 2024-01-01 RX ADMIN — CLONAZEPAM 1 MG: 1 TABLET ORAL at 21:45

## 2024-01-01 RX ADMIN — MORPHINE SULFATE 15 MG: 15 TABLET ORAL at 21:45

## 2024-01-01 RX ADMIN — HEPARIN SODIUM 5000 UNITS: 5000 INJECTION INTRAVENOUS; SUBCUTANEOUS at 14:48

## 2024-01-01 RX ADMIN — CARVEDILOL 25 MG: 25 TABLET, FILM COATED ORAL at 17:03

## 2024-01-01 RX ADMIN — HEPARIN SODIUM 5000 UNITS: 5000 INJECTION, SOLUTION INTRAVENOUS; SUBCUTANEOUS at 14:30

## 2024-01-01 RX ADMIN — ALBUMIN (HUMAN) 12.5 G: 12.5 INJECTION, SOLUTION INTRAVENOUS at 08:18

## 2024-01-01 RX ADMIN — ASPIRIN 81 MG 81 MG: 81 TABLET ORAL at 11:01

## 2024-01-01 RX ADMIN — LEVALBUTEROL HYDROCHLORIDE 1.25 MG: 1.25 SOLUTION RESPIRATORY (INHALATION) at 21:05

## 2024-01-01 RX ADMIN — CARVEDILOL 25 MG: 25 TABLET, FILM COATED ORAL at 08:09

## 2024-01-01 RX ADMIN — HEPARIN SODIUM 5000 UNITS: 5000 INJECTION INTRAVENOUS; SUBCUTANEOUS at 05:17

## 2024-01-01 RX ADMIN — METHYLPREDNISOLONE SODIUM SUCCINATE 40 MG: 40 INJECTION, POWDER, FOR SOLUTION INTRAMUSCULAR; INTRAVENOUS at 21:31

## 2024-01-01 RX ADMIN — SODIUM CHLORIDE 125 ML/HR: 0.9 INJECTION, SOLUTION INTRAVENOUS at 15:41

## 2024-01-01 RX ADMIN — LEVALBUTEROL HYDROCHLORIDE 1.25 MG: 1.25 SOLUTION RESPIRATORY (INHALATION) at 07:14

## 2024-01-01 RX ADMIN — INSULIN LISPRO 2 UNITS: 100 INJECTION, SOLUTION INTRAVENOUS; SUBCUTANEOUS at 05:57

## 2024-01-01 RX ADMIN — AZITHROMYCIN MONOHYDRATE 500 MG: 500 INJECTION, POWDER, LYOPHILIZED, FOR SOLUTION INTRAVENOUS at 13:53

## 2024-01-01 RX ADMIN — GABAPENTIN 100 MG: 100 CAPSULE ORAL at 21:01

## 2024-01-01 RX ADMIN — HEPARIN SODIUM 5000 UNITS: 5000 INJECTION INTRAVENOUS; SUBCUTANEOUS at 05:56

## 2024-01-01 RX ADMIN — HEPARIN SODIUM 5000 UNITS: 5000 INJECTION INTRAVENOUS; SUBCUTANEOUS at 21:29

## 2024-01-01 RX ADMIN — IPRATROPIUM BROMIDE 0.5 MG: 0.5 SOLUTION RESPIRATORY (INHALATION) at 07:28

## 2024-01-01 RX ADMIN — Medication 100 MCG/HR: at 00:21

## 2024-01-01 RX ADMIN — PROPOFOL 40 MCG/KG/MIN: 10 INJECTION, EMULSION INTRAVENOUS at 22:03

## 2024-01-01 RX ADMIN — SENNOSIDES 17.6 MG: 8.8 LIQUID ORAL at 09:20

## 2024-01-01 RX ADMIN — CARVEDILOL 25 MG: 25 TABLET, FILM COATED ORAL at 17:48

## 2024-01-01 RX ADMIN — PANTOPRAZOLE SODIUM 40 MG: 40 INJECTION, POWDER, FOR SOLUTION INTRAVENOUS at 09:29

## 2024-01-01 RX ADMIN — ALBUTEROL SULFATE 2 PUFF: 90 AEROSOL, METERED RESPIRATORY (INHALATION) at 02:33

## 2024-01-01 RX ADMIN — CHLORHEXIDINE GLUCONATE 15 ML: 1.2 RINSE ORAL at 09:48

## 2024-01-01 RX ADMIN — Medication 100 MCG/HR: at 09:20

## 2024-01-01 RX ADMIN — Medication 100 MCG/HR: at 10:59

## 2024-01-01 RX ADMIN — CLONAZEPAM 1 MG: 1 TABLET ORAL at 21:04

## 2024-01-01 RX ADMIN — IPRATROPIUM BROMIDE 0.5 MG: 0.5 SOLUTION RESPIRATORY (INHALATION) at 07:24

## 2024-01-01 RX ADMIN — PROPOFOL 40 MCG/KG/MIN: 10 INJECTION, EMULSION INTRAVENOUS at 03:25

## 2024-01-01 RX ADMIN — PROPOFOL 50 MCG/KG/MIN: 10 INJECTION, EMULSION INTRAVENOUS at 01:18

## 2024-01-01 RX ADMIN — IPRATROPIUM BROMIDE 0.5 MG: 0.5 SOLUTION RESPIRATORY (INHALATION) at 13:00

## 2024-01-01 RX ADMIN — FUROSEMIDE 40 MG: 10 INJECTION, SOLUTION INTRAMUSCULAR; INTRAVENOUS at 05:59

## 2024-01-01 RX ADMIN — PROPOFOL 40 MCG/KG/MIN: 10 INJECTION, EMULSION INTRAVENOUS at 12:51

## 2024-01-01 RX ADMIN — IPRATROPIUM BROMIDE 0.5 MG: 0.5 SOLUTION RESPIRATORY (INHALATION) at 07:20

## 2024-01-01 RX ADMIN — LEVALBUTEROL HYDROCHLORIDE 1.25 MG: 1.25 SOLUTION RESPIRATORY (INHALATION) at 20:26

## 2024-01-01 RX ADMIN — IPRATROPIUM BROMIDE 0.5 MG: 0.5 SOLUTION RESPIRATORY (INHALATION) at 13:18

## 2024-01-01 RX ADMIN — IPRATROPIUM BROMIDE 0.5 MG: 0.5 SOLUTION RESPIRATORY (INHALATION) at 07:14

## 2024-01-01 RX ADMIN — PROPOFOL 40 MCG/KG/MIN: 10 INJECTION, EMULSION INTRAVENOUS at 21:16

## 2024-01-01 RX ADMIN — PROPOFOL 50 MCG/KG/MIN: 10 INJECTION, EMULSION INTRAVENOUS at 18:38

## 2024-01-01 RX ADMIN — PROPOFOL 50 MCG/KG/MIN: 10 INJECTION, EMULSION INTRAVENOUS at 21:01

## 2024-01-01 RX ADMIN — HEPARIN SODIUM 5000 UNITS: 5000 INJECTION INTRAVENOUS; SUBCUTANEOUS at 05:01

## 2024-01-01 RX ADMIN — HEPARIN SODIUM 5000 UNITS: 5000 INJECTION INTRAVENOUS; SUBCUTANEOUS at 21:32

## 2024-01-01 RX ADMIN — SENNOSIDES 17.6 MG: 8.8 LIQUID ORAL at 09:29

## 2024-01-01 RX ADMIN — MORPHINE SULFATE 2 MG: 2 INJECTION, SOLUTION INTRAMUSCULAR; INTRAVENOUS at 21:32

## 2024-01-01 RX ADMIN — CEFTRIAXONE 1000 MG: 1 INJECTION, SOLUTION INTRAVENOUS at 13:07

## 2024-01-01 RX ADMIN — LEVALBUTEROL HYDROCHLORIDE 1.25 MG: 1.25 SOLUTION RESPIRATORY (INHALATION) at 20:38

## 2024-01-01 RX ADMIN — LORAZEPAM 2 MG: 2 INJECTION INTRAMUSCULAR; INTRAVENOUS at 00:01

## 2024-01-01 RX ADMIN — PROPOFOL 40 MCG/KG/MIN: 10 INJECTION, EMULSION INTRAVENOUS at 21:40

## 2024-01-01 RX ADMIN — SENNOSIDES 17.6 MG: 8.8 LIQUID ORAL at 18:32

## 2024-01-01 RX ADMIN — PANTOPRAZOLE SODIUM 40 MG: 40 INJECTION, POWDER, FOR SOLUTION INTRAVENOUS at 09:20

## 2024-01-01 RX ADMIN — METHYLPREDNISOLONE SODIUM SUCCINATE 40 MG: 40 INJECTION, POWDER, FOR SOLUTION INTRAMUSCULAR; INTRAVENOUS at 21:03

## 2024-01-01 RX ADMIN — DEXMEDETOMIDINE HYDROCHLORIDE 0.1 MCG/KG/HR: 400 INJECTION INTRAVENOUS at 15:01

## 2024-01-01 RX ADMIN — HYDROMORPHONE HYDROCHLORIDE 2 MG/HR: 10 INJECTION, SOLUTION INTRAMUSCULAR; INTRAVENOUS; SUBCUTANEOUS at 13:01

## 2024-01-01 RX ADMIN — IPRATROPIUM BROMIDE 0.5 MG: 0.5 SOLUTION RESPIRATORY (INHALATION) at 21:05

## 2024-01-01 RX ADMIN — POLYETHYLENE GLYCOL 3350 17 G: 17 POWDER, FOR SOLUTION ORAL at 08:09

## 2024-01-01 RX ADMIN — LISINOPRIL 5 MG: 5 TABLET ORAL at 08:09

## 2024-01-01 RX ADMIN — CHLORHEXIDINE GLUCONATE 15 ML: 1.2 RINSE ORAL at 08:09

## 2024-01-01 RX ADMIN — HYDROMORPHONE HYDROCHLORIDE 1 MG: 1 INJECTION, SOLUTION INTRAMUSCULAR; INTRAVENOUS; SUBCUTANEOUS at 21:23

## 2024-01-01 RX ADMIN — CHLORHEXIDINE GLUCONATE 15 ML: 1.2 RINSE ORAL at 20:15

## 2024-01-01 RX ADMIN — LISINOPRIL 5 MG: 5 TABLET ORAL at 09:29

## 2024-01-01 RX ADMIN — LEVALBUTEROL HYDROCHLORIDE 1.25 MG: 1.25 SOLUTION RESPIRATORY (INHALATION) at 13:00

## 2024-01-01 RX ADMIN — ACETAMINOPHEN 1000 MG: 10 INJECTION INTRAVENOUS at 09:47

## 2024-01-01 RX ADMIN — HYDRALAZINE HYDROCHLORIDE 10 MG: 20 INJECTION INTRAMUSCULAR; INTRAVENOUS at 14:20

## 2024-01-01 RX ADMIN — ATORVASTATIN CALCIUM 40 MG: 40 TABLET, FILM COATED ORAL at 17:49

## 2024-01-01 RX ADMIN — NICOTINE 1 PATCH: 21 PATCH, EXTENDED RELEASE TRANSDERMAL at 09:32

## 2024-01-01 RX ADMIN — FUROSEMIDE 40 MG: 10 INJECTION, SOLUTION INTRAMUSCULAR; INTRAVENOUS at 06:44

## 2024-01-01 RX ADMIN — ALBUMIN (HUMAN) 12.5 G: 12.5 INJECTION, SOLUTION INTRAVENOUS at 08:16

## 2024-01-01 RX ADMIN — PANTOPRAZOLE SODIUM 40 MG: 40 INJECTION, POWDER, FOR SOLUTION INTRAVENOUS at 21:29

## 2024-01-01 RX ADMIN — AZITHROMYCIN MONOHYDRATE 500 MG: 500 INJECTION, POWDER, LYOPHILIZED, FOR SOLUTION INTRAVENOUS at 13:58

## 2024-01-01 RX ADMIN — HYDROMORPHONE HYDROCHLORIDE 1 MG: 1 INJECTION, SOLUTION INTRAMUSCULAR; INTRAVENOUS; SUBCUTANEOUS at 21:58

## 2024-01-01 RX ADMIN — POLYETHYLENE GLYCOL 3350 17 G: 17 POWDER, FOR SOLUTION ORAL at 09:29

## 2024-01-01 RX ADMIN — PROPOFOL 50 MCG/KG/MIN: 10 INJECTION, EMULSION INTRAVENOUS at 13:04

## 2024-01-01 RX ADMIN — CARVEDILOL 6.25 MG: 3.12 TABLET, FILM COATED ORAL at 18:30

## 2024-01-01 RX ADMIN — GABAPENTIN 100 MG: 100 CAPSULE ORAL at 21:03

## 2024-01-01 RX ADMIN — PANTOPRAZOLE SODIUM 40 MG: 40 INJECTION, POWDER, FOR SOLUTION INTRAVENOUS at 08:56

## 2024-01-01 RX ADMIN — HEPARIN SODIUM 5000 UNITS: 5000 INJECTION INTRAVENOUS; SUBCUTANEOUS at 05:33

## 2024-01-01 RX ADMIN — BUDESONIDE INHALATION 0.5 MG: 0.5 SUSPENSION RESPIRATORY (INHALATION) at 07:58

## 2024-01-01 RX ADMIN — Medication 100 MCG/HR: at 18:10

## 2024-01-01 RX ADMIN — GABAPENTIN 100 MG: 100 CAPSULE ORAL at 21:31

## 2024-01-01 RX ADMIN — ASPIRIN 81 MG 81 MG: 81 TABLET ORAL at 09:29

## 2024-01-01 RX ADMIN — PANTOPRAZOLE SODIUM 40 MG: 40 INJECTION, POWDER, FOR SOLUTION INTRAVENOUS at 21:04

## 2024-01-01 RX ADMIN — HEPARIN SODIUM 5000 UNITS: 5000 INJECTION INTRAVENOUS; SUBCUTANEOUS at 13:00

## 2024-01-01 RX ADMIN — BUDESONIDE INHALATION 0.5 MG: 0.5 SUSPENSION RESPIRATORY (INHALATION) at 20:44

## 2024-01-01 RX ADMIN — INSULIN LISPRO 1 UNITS: 100 INJECTION, SOLUTION INTRAVENOUS; SUBCUTANEOUS at 05:29

## 2024-01-01 RX ADMIN — HYDRALAZINE HYDROCHLORIDE 10 MG: 20 INJECTION INTRAMUSCULAR; INTRAVENOUS at 19:20

## 2024-01-01 RX ADMIN — CHLORHEXIDINE GLUCONATE 15 ML: 1.2 RINSE ORAL at 21:32

## 2024-01-01 RX ADMIN — CHLORHEXIDINE GLUCONATE 15 ML: 1.2 RINSE ORAL at 08:56

## 2024-01-01 RX ADMIN — CHLORHEXIDINE GLUCONATE 15 ML: 1.2 RINSE ORAL at 21:04

## 2024-01-01 RX ADMIN — INSULIN LISPRO 1 UNITS: 100 INJECTION, SOLUTION INTRAVENOUS; SUBCUTANEOUS at 12:11

## 2024-01-01 RX ADMIN — ASPIRIN 81 MG: 81 TABLET, CHEWABLE ORAL at 14:29

## 2024-01-01 RX ADMIN — SENNOSIDES 17.6 MG: 8.8 LIQUID ORAL at 08:09

## 2024-01-01 RX ADMIN — CLONAZEPAM 1 MG: 1 TABLET ORAL at 21:03

## 2024-01-01 RX ADMIN — IPRATROPIUM BROMIDE 0.5 MG: 0.5 SOLUTION RESPIRATORY (INHALATION) at 13:56

## 2024-01-01 RX ADMIN — SENNOSIDES 17.6 MG: 8.8 LIQUID ORAL at 17:03

## 2024-01-01 RX ADMIN — HEPARIN SODIUM 5000 UNITS: 5000 INJECTION INTRAVENOUS; SUBCUTANEOUS at 13:47

## 2024-01-01 RX ADMIN — LISINOPRIL 5 MG: 5 TABLET ORAL at 10:54

## 2024-01-01 RX ADMIN — PROPOFOL 40 MCG/KG/MIN: 10 INJECTION, EMULSION INTRAVENOUS at 06:13

## 2024-01-01 RX ADMIN — PROPOFOL 50 MCG/KG/MIN: 10 INJECTION, EMULSION INTRAVENOUS at 05:25

## 2024-01-01 RX ADMIN — ATORVASTATIN CALCIUM 40 MG: 40 TABLET, FILM COATED ORAL at 18:04

## 2024-01-01 RX ADMIN — LEVALBUTEROL HYDROCHLORIDE 1.25 MG: 1.25 SOLUTION RESPIRATORY (INHALATION) at 07:24

## 2024-01-01 RX ADMIN — CLONAZEPAM 1 MG: 1 TABLET ORAL at 21:01

## 2024-01-01 RX ADMIN — HEPARIN SODIUM 5000 UNITS: 5000 INJECTION INTRAVENOUS; SUBCUTANEOUS at 21:01

## 2024-01-01 RX ADMIN — LORAZEPAM 2 MG: 2 INJECTION INTRAMUSCULAR; INTRAVENOUS at 21:58

## 2024-01-01 RX ADMIN — PANTOPRAZOLE SODIUM 40 MG: 40 INJECTION, POWDER, FOR SOLUTION INTRAVENOUS at 20:19

## 2024-01-01 RX ADMIN — Medication 100 MCG/HR: at 18:53

## 2024-01-01 RX ADMIN — IPRATROPIUM BROMIDE 0.5 MG: 0.5 SOLUTION RESPIRATORY (INHALATION) at 20:38

## 2024-01-01 RX ADMIN — METHYLPREDNISOLONE SODIUM SUCCINATE 40 MG: 40 INJECTION, POWDER, FOR SOLUTION INTRAMUSCULAR; INTRAVENOUS at 13:46

## 2024-01-01 RX ADMIN — HEPARIN SODIUM 5000 UNITS: 5000 INJECTION INTRAVENOUS; SUBCUTANEOUS at 05:13

## 2024-01-01 RX ADMIN — HEPARIN SODIUM 5000 UNITS: 5000 INJECTION INTRAVENOUS; SUBCUTANEOUS at 13:55

## 2024-01-01 RX ADMIN — LEVALBUTEROL HYDROCHLORIDE 1.25 MG: 1.25 SOLUTION RESPIRATORY (INHALATION) at 07:20

## 2024-01-01 RX ADMIN — NICOTINE 1 PATCH: 21 PATCH, EXTENDED RELEASE TRANSDERMAL at 10:22

## 2024-01-01 RX ADMIN — SENNOSIDES 17.6 MG: 8.8 LIQUID ORAL at 11:01

## 2024-01-01 RX ADMIN — HYDROMORPHONE HYDROCHLORIDE 1 MG: 1 INJECTION, SOLUTION INTRAMUSCULAR; INTRAVENOUS; SUBCUTANEOUS at 20:19

## 2024-01-01 RX ADMIN — CEFTRIAXONE 1000 MG: 1 INJECTION, SOLUTION INTRAVENOUS at 12:54

## 2024-01-01 RX ADMIN — MORPHINE SULFATE 30 MG: 30 TABLET, EXTENDED RELEASE ORAL at 21:32

## 2024-01-01 RX ADMIN — PROPOFOL 35 MCG/KG/MIN: 10 INJECTION, EMULSION INTRAVENOUS at 11:42

## 2024-01-01 RX ADMIN — PANTOPRAZOLE SODIUM 40 MG: 40 INJECTION, POWDER, FOR SOLUTION INTRAVENOUS at 10:58

## 2024-01-01 RX ADMIN — IOHEXOL 100 ML: 350 INJECTION, SOLUTION INTRAVENOUS at 14:21

## 2024-01-01 RX ADMIN — NICOTINE 1 PATCH: 21 PATCH, EXTENDED RELEASE TRANSDERMAL at 08:15

## 2024-01-01 RX ADMIN — GLYCOPYRROLATE 0.1 MG: 0.2 INJECTION, SOLUTION INTRAMUSCULAR; INTRAVENOUS at 22:12

## 2024-01-01 RX ADMIN — PROPOFOL 40 MCG/KG/MIN: 10 INJECTION, EMULSION INTRAVENOUS at 03:23

## 2024-01-01 RX ADMIN — FENTANYL CITRATE 50 MCG: 50 INJECTION INTRAMUSCULAR; INTRAVENOUS at 20:04

## 2024-01-01 RX ADMIN — LEVALBUTEROL HYDROCHLORIDE 1.25 MG: 1.25 SOLUTION RESPIRATORY (INHALATION) at 19:57

## 2024-01-01 RX ADMIN — LEVALBUTEROL HYDROCHLORIDE 1.25 MG: 1.25 SOLUTION RESPIRATORY (INHALATION) at 13:21

## 2024-01-01 RX ADMIN — LEVALBUTEROL HYDROCHLORIDE 1.25 MG: 1.25 SOLUTION RESPIRATORY (INHALATION) at 07:28

## 2024-01-01 RX ADMIN — INSULIN LISPRO 1 UNITS: 100 INJECTION, SOLUTION INTRAVENOUS; SUBCUTANEOUS at 23:43

## 2024-01-01 RX ADMIN — PROPOFOL 40 MCG/KG/MIN: 10 INJECTION, EMULSION INTRAVENOUS at 06:46

## 2024-01-01 RX ADMIN — Medication 100 MCG/HR: at 23:12

## 2024-01-01 RX ADMIN — GLYCOPYRROLATE 0.1 MG: 0.2 INJECTION, SOLUTION INTRAMUSCULAR; INTRAVENOUS at 20:18

## 2024-01-01 RX ADMIN — NICOTINE 1 PATCH: 21 PATCH, EXTENDED RELEASE TRANSDERMAL at 09:20

## 2024-01-01 RX ADMIN — FENTANYL CITRATE 50 MCG: 50 INJECTION INTRAMUSCULAR; INTRAVENOUS at 10:58

## 2024-01-01 RX ADMIN — CARVEDILOL 25 MG: 25 TABLET, FILM COATED ORAL at 06:30

## 2024-01-01 RX ADMIN — PROPOFOL 40 MCG/KG/MIN: 10 INJECTION, EMULSION INTRAVENOUS at 02:07

## 2024-01-01 RX ADMIN — PROPOFOL 40 MCG/KG/MIN: 10 INJECTION, EMULSION INTRAVENOUS at 06:28

## 2024-01-01 RX ADMIN — HEPARIN SODIUM 5000 UNITS: 5000 INJECTION, SOLUTION INTRAVENOUS; SUBCUTANEOUS at 05:17

## 2024-01-01 RX ADMIN — GABAPENTIN 100 MG: 100 CAPSULE ORAL at 21:45

## 2024-01-01 RX ADMIN — Medication 100 MCG/HR: at 10:04

## 2024-01-01 RX ADMIN — BUDESONIDE INHALATION 0.5 MG: 0.5 SUSPENSION RESPIRATORY (INHALATION) at 21:10

## 2024-01-01 RX ADMIN — PROPOFOL 40 MCG/KG/MIN: 10 INJECTION, EMULSION INTRAVENOUS at 23:43

## 2024-01-01 RX ADMIN — PROPOFOL 40 MCG/KG/MIN: 10 INJECTION, EMULSION INTRAVENOUS at 17:57

## 2024-01-01 RX ADMIN — PANTOPRAZOLE SODIUM 40 MG: 40 INJECTION, POWDER, FOR SOLUTION INTRAVENOUS at 21:32

## 2024-01-01 RX ADMIN — CHLORHEXIDINE GLUCONATE 15 ML: 1.2 RINSE ORAL at 09:20

## 2024-01-01 RX ADMIN — GUAIFENESIN 600 MG: 600 TABLET ORAL at 17:31

## 2024-01-01 RX ADMIN — FENTANYL CITRATE 50 MCG: 50 INJECTION INTRAMUSCULAR; INTRAVENOUS at 05:45

## 2024-01-01 RX ADMIN — MORPHINE SULFATE 30 MG: 30 TABLET, EXTENDED RELEASE ORAL at 10:54

## 2024-01-01 RX ADMIN — INSULIN LISPRO 1 UNITS: 100 INJECTION, SOLUTION INTRAVENOUS; SUBCUTANEOUS at 18:53

## 2024-01-01 RX ADMIN — CHLORHEXIDINE GLUCONATE 15 ML: 1.2 RINSE ORAL at 20:19

## 2024-01-01 RX ADMIN — NICOTINE 1 PATCH: 21 PATCH, EXTENDED RELEASE TRANSDERMAL at 08:56

## 2024-01-01 RX ADMIN — ASPIRIN 81 MG 81 MG: 81 TABLET ORAL at 08:09

## 2024-01-01 RX ADMIN — Medication 100 MCG/HR: at 14:44

## 2024-01-01 RX ADMIN — METHYLPREDNISOLONE SODIUM SUCCINATE 40 MG: 40 INJECTION, POWDER, FOR SOLUTION INTRAMUSCULAR; INTRAVENOUS at 05:01

## 2024-01-01 RX ADMIN — METHYLPREDNISOLONE SODIUM SUCCINATE 40 MG: 40 INJECTION, POWDER, FOR SOLUTION INTRAMUSCULAR; INTRAVENOUS at 13:07

## 2024-01-01 RX ADMIN — Medication 100 MCG/HR: at 22:27

## 2024-01-01 RX ADMIN — NICOTINE 1 PATCH: 21 PATCH, EXTENDED RELEASE TRANSDERMAL at 09:49

## 2024-01-01 RX ADMIN — CARVEDILOL 6.25 MG: 3.12 TABLET, FILM COATED ORAL at 11:01

## 2024-01-01 RX ADMIN — ASPIRIN 81 MG 81 MG: 81 TABLET ORAL at 09:20

## 2024-01-01 RX ADMIN — HYDRALAZINE HYDROCHLORIDE 10 MG: 20 INJECTION INTRAMUSCULAR; INTRAVENOUS at 17:39

## 2024-01-05 ENCOUNTER — APPOINTMENT (OUTPATIENT)
Age: 64
End: 2024-01-05
Payer: MEDICARE

## 2024-01-05 ENCOUNTER — OFFICE VISIT (OUTPATIENT)
Dept: FAMILY MEDICINE CLINIC | Facility: CLINIC | Age: 64
End: 2024-01-05
Payer: MEDICARE

## 2024-01-05 VITALS
SYSTOLIC BLOOD PRESSURE: 128 MMHG | DIASTOLIC BLOOD PRESSURE: 88 MMHG | OXYGEN SATURATION: 98 % | HEART RATE: 96 BPM | WEIGHT: 187.4 LBS | TEMPERATURE: 97.3 F | BODY MASS INDEX: 27.76 KG/M2 | HEIGHT: 69 IN

## 2024-01-05 DIAGNOSIS — I10 ESSENTIAL HYPERTENSION: Chronic | ICD-10-CM

## 2024-01-05 DIAGNOSIS — G89.4 CHRONIC PAIN SYNDROME: ICD-10-CM

## 2024-01-05 DIAGNOSIS — E87.6 HYPOKALEMIA: ICD-10-CM

## 2024-01-05 DIAGNOSIS — N25.81 SECONDARY HYPERPARATHYROIDISM OF RENAL ORIGIN (HCC): ICD-10-CM

## 2024-01-05 DIAGNOSIS — Z12.31 ENCOUNTER FOR SCREENING MAMMOGRAM FOR BREAST CANCER: ICD-10-CM

## 2024-01-05 DIAGNOSIS — R73.9 ELEVATED BLOOD SUGAR: ICD-10-CM

## 2024-01-05 DIAGNOSIS — H34.231 RETINAL ARTERY OCCLUSION, BRANCH, RIGHT: Primary | ICD-10-CM

## 2024-01-05 DIAGNOSIS — J43.8 OTHER EMPHYSEMA (HCC): ICD-10-CM

## 2024-01-05 DIAGNOSIS — R53.82 CHRONIC FATIGUE: ICD-10-CM

## 2024-01-05 DIAGNOSIS — Z11.4 SCREENING FOR HIV (HUMAN IMMUNODEFICIENCY VIRUS): ICD-10-CM

## 2024-01-05 DIAGNOSIS — Z72.0 TOBACCO ABUSE: Chronic | ICD-10-CM

## 2024-01-05 DIAGNOSIS — E78.2 MIXED HYPERLIPIDEMIA: ICD-10-CM

## 2024-01-05 DIAGNOSIS — I70.1 RENAL ARTERY STENOSIS (HCC): ICD-10-CM

## 2024-01-05 DIAGNOSIS — I50.32 CHRONIC DIASTOLIC HEART FAILURE (HCC): ICD-10-CM

## 2024-01-05 DIAGNOSIS — F51.04 PSYCHOPHYSIOLOGICAL INSOMNIA: ICD-10-CM

## 2024-01-05 DIAGNOSIS — G89.29 OTHER CHRONIC PAIN: ICD-10-CM

## 2024-01-05 DIAGNOSIS — Z23 ENCOUNTER FOR IMMUNIZATION: ICD-10-CM

## 2024-01-05 PROBLEM — N17.9 ACUTE KIDNEY INJURY SUPERIMPOSED ON CKD: Status: RESOLVED | Noted: 2022-06-16 | Resolved: 2024-01-05

## 2024-01-05 PROBLEM — D72.829 LEUKOCYTOSIS: Status: RESOLVED | Noted: 2021-06-13 | Resolved: 2024-01-05

## 2024-01-05 PROBLEM — J44.1 COPD WITH ACUTE EXACERBATION (HCC): Status: RESOLVED | Noted: 2022-09-20 | Resolved: 2024-01-05

## 2024-01-05 PROBLEM — J96.01 ACUTE RESPIRATORY FAILURE WITH HYPOXIA (HCC): Status: RESOLVED | Noted: 2022-07-13 | Resolved: 2024-01-05

## 2024-01-05 PROBLEM — R06.89 ACUTE RESPIRATORY INSUFFICIENCY: Status: RESOLVED | Noted: 2022-07-12 | Resolved: 2024-01-05

## 2024-01-05 PROBLEM — K62.5 RECTAL BLEEDING: Status: RESOLVED | Noted: 2023-08-09 | Resolved: 2024-01-05

## 2024-01-05 PROBLEM — I16.0 HYPERTENSIVE URGENCY: Status: RESOLVED | Noted: 2023-08-09 | Resolved: 2024-01-05

## 2024-01-05 PROBLEM — N18.9 ACUTE KIDNEY INJURY SUPERIMPOSED ON CKD: Status: RESOLVED | Noted: 2022-06-16 | Resolved: 2024-01-05

## 2024-01-05 LAB
ALBUMIN SERPL BCP-MCNC: 4.8 G/DL (ref 3.5–5)
ALP SERPL-CCNC: 104 U/L (ref 34–104)
ALT SERPL W P-5'-P-CCNC: 10 U/L (ref 7–52)
ANION GAP SERPL CALCULATED.3IONS-SCNC: 8 MMOL/L
AST SERPL W P-5'-P-CCNC: 17 U/L (ref 13–39)
BASOPHILS # BLD AUTO: 0.12 THOUSANDS/ÂΜL (ref 0–0.1)
BASOPHILS NFR BLD AUTO: 1 % (ref 0–1)
BILIRUB SERPL-MCNC: 0.6 MG/DL (ref 0.2–1)
BUN SERPL-MCNC: 18 MG/DL (ref 5–25)
CALCIUM SERPL-MCNC: 10.8 MG/DL (ref 8.4–10.2)
CHLORIDE SERPL-SCNC: 99 MMOL/L (ref 96–108)
CHOLEST SERPL-MCNC: 143 MG/DL
CO2 SERPL-SCNC: 28 MMOL/L (ref 21–32)
CREAT SERPL-MCNC: 0.99 MG/DL (ref 0.6–1.3)
CRP SERPL QL: 7.8 MG/L
EOSINOPHIL # BLD AUTO: 0.37 THOUSAND/ÂΜL (ref 0–0.61)
EOSINOPHIL NFR BLD AUTO: 4 % (ref 0–6)
ERYTHROCYTE [DISTWIDTH] IN BLOOD BY AUTOMATED COUNT: 12.5 % (ref 11.6–15.1)
GFR SERPL CREATININE-BSD FRML MDRD: 60 ML/MIN/1.73SQ M
GLUCOSE SERPL-MCNC: 111 MG/DL (ref 65–140)
HCT VFR BLD AUTO: 53.4 % (ref 34.8–46.1)
HDLC SERPL-MCNC: 43 MG/DL
HGB BLD-MCNC: 17.4 G/DL (ref 11.5–15.4)
IMM GRANULOCYTES # BLD AUTO: 0.03 THOUSAND/UL (ref 0–0.2)
IMM GRANULOCYTES NFR BLD AUTO: 0 % (ref 0–2)
LDLC SERPL CALC-MCNC: 69 MG/DL (ref 0–100)
LYMPHOCYTES # BLD AUTO: 3 THOUSANDS/ÂΜL (ref 0.6–4.47)
LYMPHOCYTES NFR BLD AUTO: 33 % (ref 14–44)
MAGNESIUM SERPL-MCNC: 1.9 MG/DL (ref 1.9–2.7)
MCH RBC QN AUTO: 29.7 PG (ref 26.8–34.3)
MCHC RBC AUTO-ENTMCNC: 32.6 G/DL (ref 31.4–37.4)
MCV RBC AUTO: 91 FL (ref 82–98)
MONOCYTES # BLD AUTO: 0.63 THOUSAND/ÂΜL (ref 0.17–1.22)
MONOCYTES NFR BLD AUTO: 7 % (ref 4–12)
NEUTROPHILS # BLD AUTO: 4.88 THOUSANDS/ÂΜL (ref 1.85–7.62)
NEUTS SEG NFR BLD AUTO: 55 % (ref 43–75)
NRBC BLD AUTO-RTO: 0 /100 WBCS
PLATELET # BLD AUTO: 351 THOUSANDS/UL (ref 149–390)
PMV BLD AUTO: 9.8 FL (ref 8.9–12.7)
POTASSIUM SERPL-SCNC: 4.6 MMOL/L (ref 3.5–5.3)
PROT SERPL-MCNC: 8.4 G/DL (ref 6.4–8.4)
RBC # BLD AUTO: 5.86 MILLION/UL (ref 3.81–5.12)
SODIUM SERPL-SCNC: 135 MMOL/L (ref 135–147)
TRIGL SERPL-MCNC: 153 MG/DL
WBC # BLD AUTO: 9.03 THOUSAND/UL (ref 4.31–10.16)

## 2024-01-05 PROCEDURE — 80061 LIPID PANEL: CPT

## 2024-01-05 PROCEDURE — 86140 C-REACTIVE PROTEIN: CPT | Performed by: INTERNAL MEDICINE

## 2024-01-05 PROCEDURE — 87389 HIV-1 AG W/HIV-1&-2 AB AG IA: CPT

## 2024-01-05 PROCEDURE — 36415 COLL VENOUS BLD VENIPUNCTURE: CPT

## 2024-01-05 PROCEDURE — 85025 COMPLETE CBC W/AUTO DIFF WBC: CPT

## 2024-01-05 PROCEDURE — 83036 HEMOGLOBIN GLYCOSYLATED A1C: CPT

## 2024-01-05 PROCEDURE — 80053 COMPREHEN METABOLIC PANEL: CPT

## 2024-01-05 PROCEDURE — 99214 OFFICE O/P EST MOD 30 MIN: CPT | Performed by: INTERNAL MEDICINE

## 2024-01-05 PROCEDURE — 83735 ASSAY OF MAGNESIUM: CPT

## 2024-01-05 RX ORDER — CLONAZEPAM 1 MG/1
1 TABLET ORAL
Qty: 30 TABLET | Refills: 1 | Status: SHIPPED | OUTPATIENT
Start: 2024-01-05

## 2024-01-05 RX ORDER — POTASSIUM CHLORIDE 750 MG/1
10 TABLET, EXTENDED RELEASE ORAL DAILY
Qty: 30 TABLET | Refills: 3 | Status: SHIPPED | OUTPATIENT
Start: 2024-01-05

## 2024-01-05 RX ORDER — MORPHINE SULFATE 30 MG/1
30 TABLET, FILM COATED, EXTENDED RELEASE ORAL EVERY 12 HOURS
Qty: 60 TABLET | Refills: 0 | Status: SHIPPED | OUTPATIENT
Start: 2024-01-05

## 2024-01-05 RX ORDER — CLOPIDOGREL BISULFATE 75 MG/1
75 TABLET ORAL DAILY
Qty: 90 TABLET | Refills: 3 | Status: SHIPPED | OUTPATIENT
Start: 2024-01-05

## 2024-01-05 NOTE — ASSESSMENT & PLAN NOTE
Pawan medication induced or associated with her fibromyalgia.  Again we will try to taper her medications.  Consider use of Cymbalta, although she has been resistant to this in the past

## 2024-01-05 NOTE — PROGRESS NOTES
Name: Juani Morales      : 1960      MRN: [id number]  Encounter Provider: Hugh Castro DO  Encounter Date: 2024   Encounter department: [facility]    Assessment & Plan     1. Retinal artery occlusion, branch, right  Assessment & Plan:  She has a follow-up with ophthalmology specialist.  They are coming from [facility].  In the meantime we will check carotid Dopplers.  Is no evidence that she has had A-fib of recent.  She has had SVT in the past.  If Dopplers are negative might move forward with amatory heart monitor    Orders:  -     VAS carotid complete study; Future; Expected date: 2024  -     clopidogrel (PLAVIX) 75 mg tablet; Take 1 tablet (75 mg total) by mouth daily  -     C-reactive protein    2. Renal artery stenosis (HCC)  Assessment & Plan:  Stent placement with improvement in blood pressures.    Orders:  -     CBC and differential; Future  -     Comprehensive metabolic panel; Future    3. Tobacco abuse  Assessment & Plan:  Continue counseling smoking cessation      4. Essential hypertension  Assessment & Plan:  Underlying essential hypertension is well-controlled.  Renal artery stents have improved the effectiveness of her current medications    Orders:  -     CBC and differential; Future  -     Comprehensive metabolic panel; Future  -     Magnesium; Future    5. Encounter for immunization    6. Screening for HIV (human immunodeficiency virus)  -     HIV 1/2 AG/AB w Reflex [facility] for 2 yr old and above; Future    7. Elevated blood sugar  Comments:  Usually associated with steroid use, currently off steroids  Orders:  -     Hemoglobin A1C; Future    8. Mixed hyperlipidemia  -     Lipid Panel with Direct LDL reflex; Future    9. Chronic diastolic heart failure (HCC)  Comments:  Improved ejection fraction and heart rate control    10. Secondary hyperparathyroidism of renal origin (HCC)  Comments:  Has been following with nephrology, but needs to make  appointment  Assessment & Plan:  Calcium levels improved with recent improvement in her renal function.  Follows with nephrology      11. Encounter for screening mammogram for breast cancer  Assessment & Plan:  Refuses mammograms      12. Chronic pain syndrome  Assessment & Plan:  Remains on chronic opioid dependence.  We have tapered her quite a bit from where she was years ago, but needs to go down further and she is aware of this.  In the spring when the weather changes, will attempt to get her down to 15 mg either 3 times a day or twice a day.      13. Chronic fatigue  Assessment & Plan:  Pawan medication induced or associated with her fibromyalgia.  Again we will try to taper her medications.  Consider use of Cymbalta, although she has been resistant to this in the past      14. Other emphysema (HCC)  Assessment & Plan:  Both emphysematous and chronic bronchitis, currently on no nebs.  Usually does not use an inhaler unless she gets sick.  Continue to smoke a pack a day.          Depression Screening and Follow-up Plan: Patient was screened for depression during today's encounter. They screened negative with a PHQ-2 score of 0.        Subjective      Patient had sudden onset of right center vision blurriness.  Was changing her meds around to see if this would help.  Finally went to the eye doctor, diagnosed with a retinal artery occlusion she thinks.  She has another appointment with [facility] eye in the Coming future.  She denies any chest pain to speak of, and shortness of breath no more than usual.  She describes excessive fatigue which she has for many years.  Continues with chronic back pain and fibromyalgia-like symptoms.  Compliance issues are always the case with her with regards to follow-up, as well as medications.  She has been self manipulating her medications at home.  She restarted her Plavix thinking would help her blood pressure, but I think this is appropriate given the retinal artery occlusion on  aspirin.  Continues to smoke a pack a day.  We continue to talk about her opiate use and need for reduction.      Review of Systems   Constitutional:  Negative for chills and fever.   HENT:  Negative for rhinorrhea and sore throat.    Eyes:  Positive for visual disturbance.   Respiratory:  Positive for shortness of breath and wheezing. Negative for cough.    Cardiovascular:  Negative for chest pain and leg swelling.   Gastrointestinal:  Negative for abdominal pain, diarrhea, nausea and vomiting.   Genitourinary:  Negative for dysuria.   Musculoskeletal:  Positive for arthralgias and back pain. Negative for myalgias.   Skin:  Negative for rash.   Neurological:  Negative for dizziness and headaches.   Psychiatric/Behavioral:  Negative for confusion.    All other systems reviewed and are negative.      Current Outpatient Medications on File Prior to Visit   Medication Sig    albuterol (ProAir HFA) 90 mcg/act inhaler Inhale 2 puffs every 4 (four) hours as needed for wheezing    aspirin 81 mg chewable tablet Chew 1 tablet (81 mg total) daily    atorvastatin (LIPITOR) 40 mg tablet Take 1 tablet (40 mg total) by mouth daily    bisacodyl (DULCOLAX) 5 mg EC tablet Take 1 tablet (5 mg total) by mouth daily as needed for constipation    carvedilol (COREG) 25 mg tablet Take 1 tablet (25 mg total) by mouth 2 (two) times a day with meals    Cholecalciferol 50 MCG (2000 UT) CAPS Take by mouth daily    clonazePAM (KlonoPIN) 1 mg tablet Take 1 tablet (1 mg total) by mouth daily at bedtime At night    furosemide (LASIX) 40 mg tablet Take 0.5 tablets (20 mg total) by mouth once as needed (edema) for up to 1 dose    lisinopril (ZESTRIL) 5 mg tablet Take 1 tablet (5 mg total) by mouth daily    MAGNESIUM PO in the morning    morphine (MS CONTIN) 30 mg 12 hr tablet Take 1 tablet (30 mg total) by mouth every 12 (twelve) hours Max Daily Amount: 60 mg    oxyCODONE-acetaminophen (PERCOCET) 5-325 mg per tablet Take 1 tablet by mouth every 8  "(eight) hours as needed for moderate pain Max Daily Amount: 3 tablets    potassium chloride (K-DUR,KLOR-CON) 10 mEq tablet Take 1 tablet (10 mEq total) by mouth daily If using Lasix    docusate sodium (COLACE) 100 mg capsule Take 1 capsule (100 mg total) by mouth 2 (two) times a day    ergocalciferol (VITAMIN D2) 50,000 units  (Patient not taking: Reported on 11/7/2023)    umeclidinium-vilanterol (Anoro Ellipta) 62.5-25 MCG/INH inhaler Inhale 1 puff daily (Patient not taking: Reported on 11/7/2023)       Objective     /88 (BP Location: Left arm, Patient Position: Sitting, Cuff Size: Large)   Pulse 96   Temp (!) 97.3 °F (36.3 °C) (Tympanic)   Ht 5' 9\" (1.753 m)   Wt 85 kg (187 lb 6.4 oz)   SpO2 98%   BMI 27.67 kg/m²     Physical Exam  Constitutional:       Comments: Appears chronically ill   HENT:      Head: Normocephalic and atraumatic.      Nose: No congestion or rhinorrhea.      Mouth/Throat:      Comments: Missing teeth.  Eyes:      Extraocular Movements: Extraocular movements intact.      Pupils: Pupils are equal, round, and reactive to light.   Neck:      Vascular: No carotid bruit.   Cardiovascular:      Rate and Rhythm: Normal rate and regular rhythm.      Heart sounds: No murmur heard.  Pulmonary:      Effort: No respiratory distress.      Breath sounds: Wheezing present.      Comments: Increased AP diameter and diffuse mild ache and expiratory wheeze  Chest:      Chest wall: No tenderness.   Abdominal:      General: There is no distension.      Tenderness: There is no abdominal tenderness.      Hernia: No hernia is present.   Musculoskeletal:         General: No swelling or tenderness.      Right lower leg: No edema.      Left lower leg: No edema.      Comments: Increased kyphosis   Lymphadenopathy:      Cervical: No cervical adenopathy.   Skin:     Findings: No rash.   Neurological:      General: No focal deficit present.      Mental Status: She is alert and oriented to person, place, and time. "      Sensory: No sensory deficit.   Psychiatric:         Mood and Affect: Mood normal.         Behavior: Behavior normal.       Hugh Castro, DO

## 2024-01-05 NOTE — ASSESSMENT & PLAN NOTE
She has a follow-up with ophthalmology specialist.  They are coming from [facility] eye.  In the meantime we will check carotid Dopplers.  Is no evidence that she has had A-fib of recent.  She has had SVT in the past.  If Dopplers are negative might move forward with amatory heart monitor

## 2024-01-05 NOTE — ASSESSMENT & PLAN NOTE
Both emphysematous and chronic bronchitis, currently on no nebs.  Usually does not use an inhaler unless she gets sick.  Continue to smoke a pack a day.

## 2024-01-05 NOTE — ASSESSMENT & PLAN NOTE
Underlying essential hypertension is well-controlled.  Renal artery stents have improved the effectiveness of her current medications

## 2024-01-05 NOTE — ASSESSMENT & PLAN NOTE
Remains on chronic opioid dependence.  We have tapered her quite a bit from where she was years ago, but needs to go down further and she is aware of this.  In the spring when the weather changes, will attempt to get her down to 15 mg either 3 times a day or twice a day.

## 2024-01-06 LAB
EST. AVERAGE GLUCOSE BLD GHB EST-MCNC: 120 MG/DL
HBA1C MFR BLD: 5.8 %
HIV 1+2 AB+HIV1 P24 AG SERPL QL IA: NORMAL
HIV 2 AB SERPL QL IA: NORMAL
HIV1 AB SERPL QL IA: NORMAL
HIV1 P24 AG SERPL QL IA: NORMAL

## 2024-01-09 ENCOUNTER — TELEPHONE (OUTPATIENT)
Dept: FAMILY MEDICINE CLINIC | Facility: CLINIC | Age: 64
End: 2024-01-09

## 2024-01-09 DIAGNOSIS — G89.29 OTHER CHRONIC PAIN: ICD-10-CM

## 2024-01-09 NOTE — TELEPHONE ENCOUNTER
Patient called stating she went to get her scripts at [facility] and the morphine was not sent in again. Please resend

## 2024-01-09 NOTE — TELEPHONE ENCOUNTER
Per PDMP, she picked up the prescription for morphine on January 5, if she disagrees with this check with pharmacy.

## 2024-01-10 DIAGNOSIS — G89.29 OTHER CHRONIC PAIN: ICD-10-CM

## 2024-01-10 NOTE — TELEPHONE ENCOUNTER
Spoke with patient she is going to check with pharmacy see if they have it will call back if she needs it

## 2024-01-11 RX ORDER — OXYCODONE HYDROCHLORIDE AND ACETAMINOPHEN 5; 325 MG/1; MG/1
1 TABLET ORAL EVERY 8 HOURS PRN
Qty: 90 TABLET | Refills: 0 | Status: SHIPPED | OUTPATIENT
Start: 2024-01-11

## 2024-02-06 DIAGNOSIS — G89.29 OTHER CHRONIC PAIN: ICD-10-CM

## 2024-02-06 DIAGNOSIS — F51.04 PSYCHOPHYSIOLOGICAL INSOMNIA: ICD-10-CM

## 2024-02-06 DIAGNOSIS — I10 ESSENTIAL HYPERTENSION: Chronic | ICD-10-CM

## 2024-02-06 RX ORDER — MORPHINE SULFATE 30 MG/1
30 TABLET, FILM COATED, EXTENDED RELEASE ORAL EVERY 12 HOURS
Qty: 60 TABLET | Refills: 0 | Status: SHIPPED | OUTPATIENT
Start: 2024-02-06

## 2024-02-06 RX ORDER — OXYCODONE HYDROCHLORIDE AND ACETAMINOPHEN 5; 325 MG/1; MG/1
1 TABLET ORAL EVERY 8 HOURS PRN
Qty: 90 TABLET | Refills: 0 | Status: SHIPPED | OUTPATIENT
Start: 2024-02-10

## 2024-02-06 RX ORDER — CLONAZEPAM 1 MG/1
1 TABLET ORAL
Qty: 30 TABLET | Refills: 1 | Status: SHIPPED | OUTPATIENT
Start: 2024-02-06

## 2024-02-06 RX ORDER — FUROSEMIDE 40 MG/1
TABLET ORAL
Qty: 30 TABLET | Refills: 3 | Status: SHIPPED | OUTPATIENT
Start: 2024-02-06

## 2024-02-08 ENCOUNTER — TELEPHONE (OUTPATIENT)
Dept: FAMILY MEDICINE CLINIC | Facility: CLINIC | Age: 64
End: 2024-02-08

## 2024-02-08 DIAGNOSIS — I42.0 DILATED CARDIOMYOPATHY (HCC): Chronic | ICD-10-CM

## 2024-02-08 RX ORDER — CARVEDILOL 25 MG/1
25 TABLET ORAL 2 TIMES DAILY WITH MEALS
Qty: 180 TABLET | Refills: 5 | Status: SHIPPED | OUTPATIENT
Start: 2024-02-08

## 2024-03-05 ENCOUNTER — TELEPHONE (OUTPATIENT)
Dept: FAMILY MEDICINE CLINIC | Facility: CLINIC | Age: 64
End: 2024-03-05

## 2024-03-05 NOTE — TELEPHONE ENCOUNTER
Please call pt to come into sign Opiod and narcotic agreement before meds to be filled, she was told before to do this.Please let me know when she comes in

## 2024-03-06 DIAGNOSIS — F51.04 PSYCHOPHYSIOLOGICAL INSOMNIA: ICD-10-CM

## 2024-03-06 DIAGNOSIS — G89.29 OTHER CHRONIC PAIN: ICD-10-CM

## 2024-03-06 RX ORDER — CLONAZEPAM 1 MG/1
1 TABLET ORAL
Qty: 30 TABLET | Refills: 1 | Status: CANCELLED | OUTPATIENT
Start: 2024-03-06

## 2024-03-07 DIAGNOSIS — G89.29 OTHER CHRONIC PAIN: ICD-10-CM

## 2024-03-07 DIAGNOSIS — F51.04 PSYCHOPHYSIOLOGICAL INSOMNIA: ICD-10-CM

## 2024-03-07 RX ORDER — OXYCODONE HYDROCHLORIDE AND ACETAMINOPHEN 5; 325 MG/1; MG/1
1 TABLET ORAL EVERY 8 HOURS PRN
Qty: 90 TABLET | Refills: 0 | Status: SHIPPED | OUTPATIENT
Start: 2024-03-13

## 2024-03-07 RX ORDER — MORPHINE SULFATE 30 MG/1
30 TABLET, FILM COATED, EXTENDED RELEASE ORAL EVERY 12 HOURS
Qty: 60 TABLET | Refills: 0 | Status: SHIPPED | OUTPATIENT
Start: 2024-03-07

## 2024-03-07 RX ORDER — CLONAZEPAM 1 MG/1
1 TABLET ORAL
Qty: 30 TABLET | Refills: 1 | Status: SHIPPED | OUTPATIENT
Start: 2024-03-13 | End: 2024-03-07 | Stop reason: SDUPTHER

## 2024-03-07 RX ORDER — CLONAZEPAM 1 MG/1
1 TABLET ORAL
Qty: 30 TABLET | Refills: 1 | Status: SHIPPED | OUTPATIENT
Start: 2024-03-13 | End: 2024-03-08 | Stop reason: SDUPTHER

## 2024-03-07 NOTE — TELEPHONE ENCOUNTER
Juani signed her opioid agreements and would like her clonapin and her oxycodone sent to the pharmacy

## 2024-03-08 ENCOUNTER — TELEPHONE (OUTPATIENT)
Dept: FAMILY MEDICINE CLINIC | Facility: CLINIC | Age: 64
End: 2024-03-08

## 2024-03-08 DIAGNOSIS — F51.04 PSYCHOPHYSIOLOGICAL INSOMNIA: ICD-10-CM

## 2024-03-08 RX ORDER — CLONAZEPAM 1 MG/1
1 TABLET ORAL
Qty: 30 TABLET | Refills: 1 | Status: SHIPPED | OUTPATIENT
Start: 2024-03-13 | End: 2024-03-08

## 2024-03-08 RX ORDER — CLONAZEPAM 1 MG/1
1 TABLET ORAL
Qty: 30 TABLET | Refills: 1 | Status: SHIPPED | OUTPATIENT
Start: 2024-03-08

## 2024-03-08 NOTE — TELEPHONE ENCOUNTER
Spoke with Juani at length.  Renewed her Klonopin for today.  She has appointment coming up.  We will again go over her medications.  She is clearly in the throes of depression and festus.  Has been not sleeping for several days.  Continue to attempt to decrease her dependence on opioid and benzodiazepines.

## 2024-03-08 NOTE — TELEPHONE ENCOUNTER
Message sent to pt for more information   Pt called back and left message on rx refill line:    This is Juani Morales. I just talked to someone up there about my prescriptions. I need to  my clonazepam and I can't get it because you put dates on it. Arbitrary arbitrary dates. The last time I picked it up was 27 and 2024. Now why can't I get it till the 13th if I need it? And the other question I have is why do you have a date on the oxycodone for? I don't know. Why do you have a date a day late on the Oxycodone when you don't need the date on that prescription? So the Oxycodone and the Klonopin I'm trying to  and they normally don't have dates and the pharmacy won't fill them anyway until the date they're supposed to be filled. They can fill the Oxycodone, I suppose a couple days early, but they had to take more of that previously because they didn't have the Ms Contin. And your office called me and told me to do that because they couldn't refill right away because they needed an authorization which was denied. And then I needed an. Then I needed him to write something and he was busy and then he wasn't in and he couldn't do it. And they called me and told me to take extra of that. I'm not taking extra that since I got the MsOxana Quinteros, but prior to that it was so I don't understand. My mentor all screwed up. I couldn't sleep all night. I have to take Benadryl to sleep at night. But if you want me to continue to do that until the 13th so I can  my Klonopin, I suppose I can have. I guess I could have to do that, so I don't know. I have to go get more Benadryl now. So if you don't want to fix my prescriptions for me, then just let me know. Know I'll do whatever the heck dance you want me to do, and you can treat me anyway. You want me want to treat me. But it never had issues like this. Now all of a sudden they have all these issues and they acting like I'm some kind of drug addict that I'm calling up here for stuff that I don't need, that  I don't use. When I always called late for my prescriptions, I've been doing a lot more physical things because I'm feeling better and I can't get to sleep at night. So if you're going to keep taking the Benadryl till the 13th, when the last time I picked up the Klonopin it was the 7th, well that's fine with me. Then just call me and tell me to do that. Because of course I won't take anything unless you tell me to take it. But if I don't have it, I have to take something else because I can't sleep all night. I was up half and half the damn night last night because I couldn't sleep. So you do what you want. I don't know what you want from me.

## 2024-03-08 NOTE — TELEPHONE ENCOUNTER
This is Juani Morales calling. Since I can't get my refill on my clonazepam, I'm going to go to the pharmacy and buy over the counter sleeping medication. So do you have a preference which one I should get? Is there one that's better than others with the medications I'm on, or should I just ask the pharmacist? I'd appreciate if you call me back and let me know what I should buy over the counter because I need something to help me sleep.

## 2024-03-08 NOTE — TELEPHONE ENCOUNTER
Pt called and states she has been taking more of the Klonopin and her Percocet since she's not taking the MS Contin. She states that someone from the office told her to take more percocet since she can't get the ms contin and has been but nothing is documented     She is taking Percocet 5/325 5 at times rather then 3 since she is out of the other med's     Klonopin 1 mg once at bedtime but has been taking more recently and has been taking and extra 1/2 tab at times when she is more stressed     Explained to pt she signed the narcotic agreement and that states she will get her med's when she is due and she should not take more then prescribed and I would speak to Dr Castro about it . Pt got upset and then hung up on me

## 2024-03-13 ENCOUNTER — OFFICE VISIT (OUTPATIENT)
Dept: FAMILY MEDICINE CLINIC | Facility: CLINIC | Age: 64
End: 2024-03-13
Payer: MEDICARE

## 2024-03-13 VITALS
SYSTOLIC BLOOD PRESSURE: 132 MMHG | BODY MASS INDEX: 26.48 KG/M2 | OXYGEN SATURATION: 94 % | HEART RATE: 80 BPM | DIASTOLIC BLOOD PRESSURE: 78 MMHG | HEIGHT: 69 IN | TEMPERATURE: 97.7 F | WEIGHT: 178.8 LBS

## 2024-03-13 DIAGNOSIS — F11.20 CONTINUOUS OPIOID DEPENDENCE (HCC): ICD-10-CM

## 2024-03-13 DIAGNOSIS — J43.8 OTHER EMPHYSEMA (HCC): ICD-10-CM

## 2024-03-13 DIAGNOSIS — Z78.0 ASYMPTOMATIC MENOPAUSE: ICD-10-CM

## 2024-03-13 DIAGNOSIS — H34.231 RETINAL ARTERY OCCLUSION, BRANCH, RIGHT: ICD-10-CM

## 2024-03-13 DIAGNOSIS — E55.9 VITAMIN D DEFICIENCY: ICD-10-CM

## 2024-03-13 DIAGNOSIS — G89.4 CHRONIC PAIN SYNDROME: ICD-10-CM

## 2024-03-13 DIAGNOSIS — F41.9 ANXIETY: ICD-10-CM

## 2024-03-13 DIAGNOSIS — I70.1 RENAL ARTERY STENOSIS (HCC): ICD-10-CM

## 2024-03-13 DIAGNOSIS — Z72.0 TOBACCO ABUSE: Chronic | ICD-10-CM

## 2024-03-13 DIAGNOSIS — I10 ESSENTIAL HYPERTENSION: Primary | Chronic | ICD-10-CM

## 2024-03-13 DIAGNOSIS — R73.9 ELEVATED BLOOD SUGAR: ICD-10-CM

## 2024-03-13 DIAGNOSIS — I42.0 DILATED CARDIOMYOPATHY (HCC): Chronic | ICD-10-CM

## 2024-03-13 DIAGNOSIS — F17.210 SMOKING GREATER THAN 30 PACK YEARS: ICD-10-CM

## 2024-03-13 PROBLEM — N18.30 CKD (CHRONIC KIDNEY DISEASE) STAGE 3, GFR 30-59 ML/MIN (HCC): Status: RESOLVED | Noted: 2022-09-20 | Resolved: 2024-03-13

## 2024-03-13 PROBLEM — Z79.52 CURRENT CHRONIC USE OF SYSTEMIC STEROIDS: Status: RESOLVED | Noted: 2023-08-17 | Resolved: 2024-03-13

## 2024-03-13 PROCEDURE — G2211 COMPLEX E/M VISIT ADD ON: HCPCS | Performed by: INTERNAL MEDICINE

## 2024-03-13 PROCEDURE — 99214 OFFICE O/P EST MOD 30 MIN: CPT | Performed by: INTERNAL MEDICINE

## 2024-03-13 RX ORDER — GABAPENTIN 100 MG/1
100 CAPSULE ORAL
Qty: 90 CAPSULE | Refills: 3 | Status: ON HOLD | OUTPATIENT
Start: 2024-03-13

## 2024-03-13 NOTE — ASSESSMENT & PLAN NOTE
Recent mei with anxiety as her house was supposedly going up for sure sale.  She was able to keep this, and is able to least delayed so she does not have to worry about being homeless at this time.  This situation led to a breakdown her compliance with most of her medications.  She is back on track now she states.

## 2024-03-13 NOTE — PROGRESS NOTES
Name: Juani Morales      : 1960      MRN: [id number]  Encounter Provider: Hugh Castro DO  Encounter Date: 3/13/2024   Encounter department: [facility] PRIMARY CARE    Assessment & Plan     1. Essential hypertension  Assessment & Plan:  Encouraged to resume her lisinopril which she had stopped.  She will continue [place] Irma at 25 mg twice daily.  Notably she occasionally takes an extra when she when she feels like her blood pressure is up without checking it.  Today also, she did not take her Lasix because she was coming to the office to be seen.          2. Renal artery stenosis (HCC)  Assessment & Plan:  Status post stenting with improvement of blood pressure, although today's blood pressure for me was elevated at 160/90, she is reluctant to take her lisinopril.  She feels fine on the [place] Irma.  Again compliance always an issue with regards to blood pressure maintenance with her.  She is also due for carotid Dopplers.  Both of these orders have been reprinted for her.      3. Retinal artery occlusion, branch, right  Assessment & Plan:  Recent injection from [facility] is led to improvement in her vision.  She follows with them regularly at this point.      4. Other emphysema (HCC)  Assessment & Plan:  While she is prescribed Trelegy she does not take this.  Not taking albuterol but only as needed.  Continues to smoke despite my encouragement and her near death experiences.      5. Tobacco abuse  Assessment & Plan:  Low-dose CAT scan ordered today.      6. Continuous opioid dependence (HCC)  Assessment & Plan:  Long discussion again about tapering her morphine from 30 mg twice a day to 15 mg 3 times a day.  She seems in agreement with this at this point.  She has a whole month at home.  When she calls she will ask for the 15's.  Would also like to taper off her oxycodone.  This been a long struggle often leading to tears      7. 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  Also has fibromyalgia.  Clearly on too much opiate medication at this point.  We have this discussion frequently, will need to just bite the bullet and start cutting back.    Orders:  -     gabapentin (NEURONTIN) 100 mg capsule; Take 1 capsule (100 mg total) by mouth daily at bedtime    8. Vitamin D deficiency    9. Smoking greater than 30 pack years  -     CT lung screening program; Future; Expected date: 03/13/2024    10. Asymptomatic menopause  -     Ambulatory Referral to Obstetrics / Gynecology; Future; Expected date: 06/13/2024  -     DXA bone density spine hip and pelvis; Future; Expected date: 03/13/2024    11. Dilated cardiomyopathy (HCC)  Comments:  Recovery of normal EF.  Some aortic and mitral regurgitation still noted on last echo  Assessment & Plan:  Resolution of diminished ejection fraction.  Heart rate seems stable.      12. Elevated blood sugar  Comments:  Continue periodic monitoring of the A1c    13. Anxiety  Assessment & Plan:  Recent mei with anxiety as her house was supposedly going up for sure sale.  She was able to keep this, and is able to least delayed so she does not have to worry about being homeless at this time.  This situation led to a breakdown her compliance with most of her medications.  She is back on track now she states.             Subjective      Patient is here for routine appointment.  Compliance issues remain at problem.  Discussed  her opioid use.  Will be moving to cut her MME requirements.  She continues to smoke significant amounts, and had some difficulty with anxiety recently because her house was to be a fracture sale.  That is all worked out so she is back to being compliant with most of her medications.  She has not been taking lisinopril, stating that she did not think she needed it anymore.  Today's blood pressures are elevated for me.      Review of Systems   Constitutional:  Negative for chills and  fever.   HENT:  Negative for rhinorrhea and sore throat.    Eyes:  Negative for visual disturbance.   Respiratory:  Positive for cough. Negative for shortness of breath.         Chronic cough   Cardiovascular:  Negative for chest pain and leg swelling.   Gastrointestinal:  Negative for abdominal pain, diarrhea, nausea and vomiting.   Genitourinary:  Negative for dysuria.   Musculoskeletal:  Positive for arthralgias, back pain and myalgias.   Skin:  Negative for rash.   Neurological:  Negative for dizziness and headaches.   Psychiatric/Behavioral:  Negative for confusion.    All other systems reviewed and are negative.      Current Outpatient Medications on File Prior to Visit   Medication Sig    albuterol (ProAir HFA) 90 mcg/act inhaler Inhale 2 puffs every 4 (four) hours as needed for wheezing    aspirin 81 mg chewable tablet Chew 1 tablet (81 mg total) daily    atorvastatin (LIPITOR) 40 mg tablet Take 1 tablet (40 mg total) by mouth daily    bisacodyl (DULCOLAX) 5 mg EC tablet Take 1 tablet (5 mg total) by mouth daily as needed for constipation    carvedilol (COREG) 25 mg tablet take 1 tablet by mouth twice a day with meals    Cholecalciferol 50 MCG (2000 UT) CAPS Take by mouth daily    clonazePAM (KlonoPIN) 1 mg tablet Take 1 tablet (1 mg total) by mouth daily at bedtime    clopidogrel (PLAVIX) 75 mg tablet Take 1 tablet (75 mg total) by mouth daily    furosemide (LASIX) 40 mg tablet Take 1/2 to 1 pill as needed for edema.    lisinopril (ZESTRIL) 5 mg tablet Take 1 tablet (5 mg total) by mouth daily    MAGNESIUM PO in the morning    morphine (MS CONTIN) 30 mg 12 hr tablet Take 1 tablet (30 mg total) by mouth every 12 (twelve) hours Max Daily Amount: 60 mg    oxyCODONE-acetaminophen (PERCOCET) 5-325 mg per tablet Take 1 tablet by mouth every 8 (eight) hours as needed for moderate pain Max Daily Amount: 3 tablets Do not start before March 13, 2024.    potassium chloride (K-DUR,KLOR-CON) 10 mEq tablet Take 1 tablet  "(10 mEq total) by mouth daily If using Lasix    docusate sodium (COLACE) 100 mg capsule Take 1 capsule (100 mg total) by mouth 2 (two) times a day    [DISCONTINUED] ergocalciferol (VITAMIN D2) 50,000 units  (Patient not taking: Reported on 11/7/2023)    [DISCONTINUED] umeclidinium-vilanterol (Anoro Ellipta) 62.5-25 MCG/INH inhaler Inhale 1 puff daily (Patient not taking: Reported on 11/7/2023)       Objective     /78 (BP Location: Left arm, Patient Position: Sitting, Cuff Size: Adult)   Pulse 80   Temp 97.7 °F (36.5 °C) (Tympanic)   Ht 5' 9\" (1.753 m)   Wt 81.1 kg (178 lb 12.8 oz)   SpO2 94%   BMI 26.40 kg/m²     Physical Exam  Constitutional:       Appearance: Normal appearance.   HENT:      Head: Normocephalic and atraumatic.      Nose: No congestion or rhinorrhea.   Eyes:      Extraocular Movements: Extraocular movements intact.      Pupils: Pupils are equal, round, and reactive to light.   Neck:      Vascular: No carotid bruit.   Cardiovascular:      Rate and Rhythm: Normal rate and regular rhythm.      Pulses: Normal pulses.      Heart sounds: Murmur heard.      Comments: 1/6 systolic murmur blowing.  Pulmonary:      Effort: No respiratory distress.      Breath sounds: No wheezing.   Chest:      Chest wall: No tenderness.   Abdominal:      General: There is no distension.      Tenderness: There is no abdominal tenderness.      Hernia: No hernia is present.   Musculoskeletal:         General: Tenderness present. No swelling.      Right lower leg: No edema.      Left lower leg: No edema.      Comments: Multiple thoracic trigger point   Lymphadenopathy:      Cervical: No cervical adenopathy.   Skin:     Findings: No rash.   Neurological:      General: No focal deficit present.      Mental Status: She is alert and oriented to person, place, and time.      Sensory: No sensory deficit.   Psychiatric:         Mood and Affect: Mood normal.         Behavior: Behavior normal.       Hugh Castro, DO    "

## 2024-03-13 NOTE — ASSESSMENT & PLAN NOTE
Recent injection from [facility] is led to improvement in her vision.  She follows with them regularly at this point.

## 2024-03-13 NOTE — ASSESSMENT & PLAN NOTE
Status post stenting with improvement of blood pressure, although today's blood pressure for me was elevated at 160/90, she is reluctant to take her lisinopril.  She feels fine on the [place] Irma.  Again compliance always an issue with regards to blood pressure maintenance with her.  She is also due for carotid Dopplers.  Both of these orders have been reprinted for her.

## 2024-03-13 NOTE — ASSESSMENT & PLAN NOTE
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  Also has fibromyalgia.  Clearly on too much opiate medication at this point.  We have this discussion frequently, will need to just bite the bullet and start cutting back.

## 2024-03-13 NOTE — ASSESSMENT & PLAN NOTE
While she is prescribed Trelegy she does not take this.  Not taking albuterol but only as needed.  Continues to smoke despite my encouragement and her near death experiences.

## 2024-03-13 NOTE — ASSESSMENT & PLAN NOTE
Encouraged to resume her lisinopril which she had stopped.  She will continue [place] Irma at 25 mg twice daily.  Notably she occasionally takes an extra when she when she feels like her blood pressure is up without checking it.  Today also, she did not take her Lasix because she was coming to the office to be seen.

## 2024-03-13 NOTE — ASSESSMENT & PLAN NOTE
Long discussion again about tapering her morphine from 30 mg twice a day to 15 mg 3 times a day.  She seems in agreement with this at this point.  She has a whole month at home.  When she calls she will ask for the 15's.  Would also like to taper off her oxycodone.  This been a long struggle often leading to tears

## 2024-03-18 RX ORDER — OXYCODONE HYDROCHLORIDE AND ACETAMINOPHEN 5; 325 MG/1; MG/1
1 TABLET ORAL EVERY 8 HOURS PRN
Qty: 90 TABLET | Refills: 0 | OUTPATIENT
Start: 2024-03-18

## 2024-03-20 PROBLEM — J18.9 PNEUMONIA: Status: ACTIVE | Noted: 2024-01-01

## 2024-03-20 NOTE — ASSESSMENT & PLAN NOTE
Patient presents with complaints of productive cough  States family members have been sick at home with influenza  COVID/influenza/RSV panel unremarkable  Chest x-ray with possible right lower lobe consolidation    Admit to pneumonia pathway  Ceftriaxone and azithromycin  De-escalate antibiotics as appropriate  Strep pneumoniae and Legionella urinary antigens

## 2024-03-20 NOTE — PLAN OF CARE
Problem: PAIN - ADULT  Goal: Verbalizes/displays adequate comfort level or baseline comfort level  Description: Interventions:  - Encourage patient to monitor pain and request assistance  - Assess pain using appropriate pain scale  - Administer analgesics based on type and severity of pain and evaluate response  - Implement non-pharmacological measures as appropriate and evaluate response  - Consider cultural and social influences on pain and pain management  - Notify physician/advanced practitioner if interventions unsuccessful or patient reports new pain  Outcome: Progressing     Problem: INFECTION - ADULT  Goal: Absence or prevention of progression during hospitalization  Description: INTERVENTIONS:  - Assess and monitor for signs and symptoms of infection  - Monitor lab/diagnostic results  - Monitor all insertion sites, i.e. indwelling lines, tubes, and drains  - Monitor endotracheal if appropriate and nasal secretions for changes in amount and color  - [place] appropriate cooling/warming therapies per order  - Administer medications as ordered  - Instruct and encourage patient and family to use good hand hygiene technique  - Identify and instruct in appropriate isolation precautions for identified infection/condition  Outcome: Progressing  Goal: Absence of fever/infection during neutropenic period  Description: INTERVENTIONS:  - Monitor WBC    Outcome: Progressing     Problem: SAFETY ADULT  Goal: Patient will remain free of falls  Description: INTERVENTIONS:  - Educate patient/family on patient safety including physical limitations  - Instruct patient to call for assistance with activity   - Consult OT/PT to assist with strengthening/mobility   - Keep Call bell within reach  - Keep bed low and locked with side rails adjusted as appropriate  - Keep care items and personal belongings within reach  - Initiate and maintain comfort rounds  - Make Fall Risk Sign visible to staff  - Offer Toileting every 2 Hours,  in advance of need  - Initiate/Maintain bed alarm  - Obtain necessary fall risk management equipment: nonslip socks  - Apply yellow socks and bracelet for high fall risk patients  - Consider moving patient to room near nurses station  Outcome: Progressing  Goal: Maintain or return to baseline ADL function  Description: INTERVENTIONS:  -  Assess patient's ability to carry out ADLs; assess patient's baseline for ADL function and identify physical deficits which impact ability to perform ADLs (bathing, care of mouth/teeth, toileting, grooming, dressing, etc.)  - Assess/evaluate cause of self-care deficits   - Assess range of motion  - Assess patient's mobility; develop plan if impaired  - Assess patient's need for assistive devices and provide as appropriate  - Encourage maximum independence but intervene and supervise when necessary  - Involve family in performance of ADLs  - Assess for home care needs following discharge   - Consider OT consult to assist with ADL evaluation and planning for discharge  - Provide patient education as appropriate  Outcome: Progressing  Goal: Maintains/Returns to pre admission functional level  Description: INTERVENTIONS:  - Perform AM-PAC 6 Click Basic Mobility/ Daily Activity assessment daily.  - Set and communicate daily mobility goal to care team and patient/family/caregiver.   - Collaborate with rehabilitation services on mobility goals if consulted  - Perform Range of Motion 3 times a day.  - Reposition patient every 2 hours.  - Dangle patient 3 times a day  - Stand patient 3 times a day  - Ambulate patient 3 times a day  - Out of bed to chair 3 times a day   - Out of bed for meals 3 times a day  - Out of bed for toileting  - Record patient progress and toleration of activity level   Outcome: Progressing     Problem: DISCHARGE PLANNING  Goal: Discharge to home or other facility with appropriate resources  Description: INTERVENTIONS:  - Identify barriers to discharge w/patient and  caregiver  - Arrange for needed discharge resources and transportation as appropriate  - Identify discharge learning needs (meds, wound care, etc.)  - Arrange for interpretive services to assist at discharge as needed  - Refer to Case Management Department for coordinating discharge planning if the patient needs post-hospital services based on physician/advanced practitioner order or complex needs related to functional status, cognitive ability, or social support system  Outcome: Progressing     Problem: Knowledge Deficit  Goal: Patient/family/caregiver demonstrates understanding of disease process, treatment plan, medications, and discharge instructions  Description: Complete learning assessment and assess knowledge base.  Interventions:  - Provide teaching at level of understanding  - Provide teaching via preferred learning methods  Outcome: Progressing

## 2024-03-20 NOTE — H&P
Critical access hospital  H&P  Name: Juani Morales 63 y.o. female I MRN: [id number]  Unit/Bed#: ED 16 I Date of Admission: 3/20/2024   Date of Service: 3/20/2024 I Hospital Day: 0      Assessment/Plan   * Pneumonia  Assessment & Plan  Patient presents with complaints of productive cough  States family members have been sick at home with influenza  COVID/influenza/RSV panel unremarkable  Chest x-ray with possible right lower lobe consolidation    Admit to pneumonia pathway  Ceftriaxone and azithromycin  De-escalate antibiotics as appropriate  Strep pneumoniae and Legionella urinary antigens    COPD (chronic obstructive pulmonary disease) ([facility])  Assessment & Plan  Possible exacerbation secondary to viral versus bacterial pneumonia  Presents with shortness of breath and acute hypoxic respiratory failure  States she has been utilizing rescue inhalers without relief    Initiate Solu-Medrol 40 mg IV every 8 hours scheduled  Atrovent and Xopenex  Respiratory protocol  IV azithromycin while inpatient    Acute respiratory failure with hypoxia (HCC)  Assessment & Plan  Patient not on home O2 found to be requiring 5 L of nasal cannula supplemental O2 in the ED  Present on admission likely secondary to pneumonia versus COPD exacerbation    Wean O2 as tolerated  Respiratory protocol  Goal SpO2 greater than 88%  Management of pneumonia/COPD as above    Tobacco abuse  Assessment & Plan  Patient continues to smoke daily    Nicotine patch while inpatient  Counseled patient on the importance of tobacco abuse cessation    Essential hypertension  Assessment & Plan  Blood pressures somewhat soft during ED course    Will hold home beta-blocker and ACE inhibitor in the setting of pneumonia  Monitor blood pressures  Reinstitute home antihypertensives as able         VTE Prophylaxis: Heparin  Code Status: Level 1 full code    Anticipated Length of Stay:  Patient will be admitted on an Inpatient basis with an anticipated length  of stay of greater than 2 midnights.   Justification for Hospital Stay: Pneumonia    Total Time for Visit, including Counseling / Coordination of Care: I have spent a total time of 45 minutes on 03/20/24 in caring for this patient including Diagnostic results, Prognosis, Risks and benefits of tx options, Instructions for management, Patient and family education, Importance of tx compliance, Risk factor reductions, Impressions, Counseling / Coordination of care, Documenting in the medical record, Reviewing / ordering tests, medicine, procedures  , Obtaining or reviewing history  , and Communicating with other healthcare professionals .    Chief Complaint: Cough/shortness of breath    History of Present Illness:    Juani oMrales is a 63 y.o. female with a past medical history significant for COPD, congestive heart failure, chronic pain disorder, hyperlipidemia who presents with complaints of cough and shortness of breath.  The patient was recently seen at her PCPs office and she has been feeling well however over the course of the past few days she is been progressively deteriorating and has been complaining of cough and shortness of breath.  The patient was found to be hypoxic via EMS to 82% requiring 4 L of nasal cannula.  The patient currently smokes daily.  In the ED, she was found to be requiring 5 L of nasal cannula supplemental O2.  Laboratory analysis grossly unremarkable.  Chest x-ray with possible right lower lobe consolidation.  The patient was admitted to the general medicine service for further evaluation and treatment of pneumonia and possible COPD exacerbation.    Review of Systems:    Review of Systems   Constitutional:  Negative for chills and fever.   HENT:  Negative for ear pain and sore throat.    Eyes:  Negative for pain and visual disturbance.   Respiratory:  Positive for cough and shortness of breath.    Cardiovascular:  Negative for chest pain and palpitations.   Gastrointestinal:  Negative  for abdominal pain and vomiting.   Genitourinary:  Negative for dysuria and hematuria.   Musculoskeletal:  Negative for arthralgias and back pain.   Skin:  Negative for color change and rash.   Neurological:  Negative for seizures and syncope.   All other systems reviewed and are negative.      Past Medical and Surgical History:     Past Medical History:   Diagnosis Date    Acute respiratory failure with hypoxia (HCC)     Cardiomyopathy (HCC)     Chronic combined systolic and diastolic congestive heart failure     COPD (chronic obstructive pulmonary disease) (HCC)     Fatty liver     Hyperlipidemia     Hypertension     Hypertensive urgency     Mitral regurgitation     Rectal bleeding     Sepsis        Past Surgical History:   Procedure Laterality Date    CARDIAC CATHETERIZATION  2021    Moderate non-obstructive atherosclerosis     SECTION      CHOLECYSTECTOMY      IR RENAL ANGIOGRAM  2022    IR TEMPORARY DIALYSIS CATHETER CHECK/CHANGE/REPOSITION  2022    IR TUNNELED DIALYSIS CATHETER PLACEMENT  2022    IR TUNNELED DIALYSIS CATHETER REMOVAL  2022    ROTATOR CUFF REPAIR W/ DISTAL CLAVICLE EXCISION Right     TONSILLECTOMY         Meds/Allergies:    Prior to Admission medications    Medication Sig Start Date End Date Taking? Authorizing Provider   albuterol (ProAir HFA) 90 mcg/act inhaler Inhale 2 puffs every 4 (four) hours as needed for wheezing 23   Hugh Castro DO   aspirin 81 mg chewable tablet Chew 1 tablet (81 mg total) daily 8/3/22   Lyndsay Steele MD   atorvastatin (LIPITOR) 40 mg tablet Take 1 tablet (40 mg total) by mouth daily 23   Kalyn Seth MD   bisacodyl (DULCOLAX) 5 mg EC tablet Take 1 tablet (5 mg total) by mouth daily as needed for constipation 23   Hugh Castro DO   carvedilol (COREG) 25 mg tablet take 1 tablet by mouth twice a day with meals 24   Hugh Castro DO   Cholecalciferol 50 MCG ( UT) CAPS Take by mouth daily  8/18/22   Historical Provider, MD   clonazePAM (KlonoPIN) 1 mg tablet Take 1 tablet (1 mg total) by mouth daily at bedtime 3/8/24   ESTHER Lujan   clopidogrel (PLAVIX) 75 mg tablet Take 1 tablet (75 mg total) by mouth daily 1/5/24   Hugh Castro DO   docusate sodium (COLACE) 100 mg capsule Take 1 capsule (100 mg total) by mouth 2 (two) times a day 8/17/23 9/16/23  Hugh Castro DO   furosemide (LASIX) 40 mg tablet Take 1/2 to 1 pill as needed for edema. 2/6/24   Hugh Castro DO   gabapentin (NEURONTIN) 100 mg capsule Take 1 capsule (100 mg total) by mouth daily at bedtime 3/13/24   Hugh Castro DO   lisinopril (ZESTRIL) 5 mg tablet Take 1 tablet (5 mg total) by mouth daily 10/16/23   Hugh Castro DO   MAGNESIUM PO in the morning    Historical Provider, MD   morphine (MS CONTIN) 30 mg 12 hr tablet Take 1 tablet (30 mg total) by mouth every 12 (twelve) hours Max Daily Amount: 60 mg 3/7/24   Hugh Castro DO   oxyCODONE-acetaminophen (PERCOCET) 5-325 mg per tablet Take 1 tablet by mouth every 8 (eight) hours as needed for moderate pain Max Daily Amount: 3 tablets Do not start before March 13, 2024. 3/13/24   Hugh Castro DO   potassium chloride (K-DUR,KLOR-CON) 10 mEq tablet Take 1 tablet (10 mEq total) by mouth daily If using Lasix 1/5/24   Hugh Castro DO       Allergies:   Allergies   Allergen Reactions    Wellbutrin [Bupropion] Arthralgia       Social History:     Marital Status: Single   Substance Use History:   Social History     Substance and Sexual Activity   Alcohol Use Never     Social History     Tobacco Use   Smoking Status Every Day    Current packs/day: 1.00    Average packs/day: 1 pack/day for 47.2 years (47.2 ttl pk-yrs)    Types: Cigarettes    Start date: 1977    Passive exposure: Current   Smokeless Tobacco Never     Social History     Substance and Sexual Activity   Drug Use No       Family History:    Pertinent family history reviewed    Physical Exam:  "    Vitals:   Blood Pressure: 112/67 (03/20/24 1330)  Pulse: 80 (03/20/24 1330)  Temperature: 99.9 °F (37.7 °C) (03/20/24 1152)  Temp Source: Tympanic (03/20/24 1152)  Respirations: 22 (03/20/24 1152)  Height: 5' 11\" (180.3 cm) (03/20/24 1152)  Weight - Scale: 78 kg (172 lb) (03/20/24 1152)  SpO2: 91 % (03/20/24 1330)    Physical Exam  Vitals and nursing note reviewed.   Constitutional:       General: She is not in acute distress.     Appearance: She is well-developed.   HENT:      Head: Normocephalic and atraumatic.   Eyes:      Conjunctiva/sclera: Conjunctivae normal.   Cardiovascular:      Rate and Rhythm: Normal rate and regular rhythm.      Heart sounds: No murmur heard.  Pulmonary:      Effort: Pulmonary effort is normal. No respiratory distress.      Breath sounds: Normal breath sounds.   Abdominal:      Palpations: Abdomen is soft.      Tenderness: There is no abdominal tenderness.   Musculoskeletal:         General: No swelling.      Cervical back: Neck supple.   Skin:     General: Skin is warm and dry.      Capillary Refill: Capillary refill takes less than 2 seconds.   Neurological:      Mental Status: She is alert.   Psychiatric:         Mood and Affect: Mood normal.          Additional Data:     Lab Results: I have reviewed pertinent results     Results from last 7 days   Lab Units 03/20/24  1206   WBC Thousand/uL 6.71   HEMOGLOBIN g/dL 16.1*   HEMATOCRIT % 48.0*   PLATELETS Thousands/uL 207   NEUTROS PCT % 74   LYMPHS PCT % 20   MONOS PCT % 5   EOS PCT % 0     Results from last 7 days   Lab Units 03/20/24  1206   SODIUM mmol/L 130*   POTASSIUM mmol/L 4.0   CHLORIDE mmol/L 97   CO2 mmol/L 25   BUN mg/dL 9   CREATININE mg/dL 0.81   ANION GAP mmol/L 8   CALCIUM mg/dL 9.4   ALBUMIN g/dL 4.4   TOTAL BILIRUBIN mg/dL 0.70   ALK PHOS U/L 93   ALT U/L 8   AST U/L 15   GLUCOSE RANDOM mg/dL 125                 Results from last 7 days   Lab Units 03/20/24  1235   LACTIC ACID mmol/L 0.7       Imaging: I have " reviewed pertinent imaging     XR chest 1 view portable    (Results Pending)       EKG, Pathology, and Other Studies Reviewed on Admission:   EKG: NSR    Allscripts / Epic Records Reviewed    ** Please Note: This note has been constructed using a voice recognition system. **

## 2024-03-20 NOTE — ED PROVIDER NOTES
History  Chief Complaint   Patient presents with    Shortness of Breath    Flu Symptoms     60-year-old female with underlying COPD history of congestive heart failure, bipolar illness as well as long thoracic nerve syndrome and chronic pain.  Presents today with shortness of breath.  Started about 2 days ago with progression.  Seen in PCP office, was feeling well at that time.  Deteriorated since then.  Family members all had flu B.  She has cough but nonproductive.  She felt like her chest was tight.  Ambulance arrived, found her belly breathing on the front porch.  Here today on the nebulizer pulse ox was 92%, after she finished the nebulizer pulse ox dropped to 82% required 4 L to get her up to 88%.  She does not appear particularly short of breath at rest.  She does not know if she had fever, she was 99 9 here.  She continues to smoke.  Fairly unreliable historian.        Prior to Admission Medications   Prescriptions Last Dose Informant Patient Reported? Taking?   Cholecalciferol 50 MCG (2000 UT) CAPS 3/20/2024 Self Yes Yes   Sig: Take by mouth daily   MAGNESIUM PO 3/19/2024 Self Yes Yes   Sig: in the morning   albuterol (ProAir HFA) 90 mcg/act inhaler 3/20/2024 Self No Yes   Sig: Inhale 2 puffs every 4 (four) hours as needed for wheezing   aspirin 81 mg chewable tablet 3/20/2024 Self No Yes   Sig: Chew 1 tablet (81 mg total) daily   atorvastatin (LIPITOR) 40 mg tablet 3/20/2024  No Yes   Sig: Take 1 tablet (40 mg total) by mouth daily   bisacodyl (DULCOLAX) 5 mg EC tablet 3/20/2024  No Yes   Sig: Take 1 tablet (5 mg total) by mouth daily as needed for constipation   carvedilol (COREG) 25 mg tablet 3/20/2024  No Yes   Sig: take 1 tablet by mouth twice a day with meals   clonazePAM (KlonoPIN) 1 mg tablet 3/19/2024  No Yes   Sig: Take 1 tablet (1 mg total) by mouth daily at bedtime   docusate sodium (COLACE) 100 mg capsule  Self No No   Sig: Take 1 capsule (100 mg total) by mouth 2 (two) times a day   furosemide  (LASIX) 40 mg tablet More than a month  No No   Sig: Take 1/2 to 1 pill as needed for edema.   gabapentin (NEURONTIN) 100 mg capsule 3/19/2024  No Yes   Sig: Take 1 capsule (100 mg total) by mouth daily at bedtime   lisinopril (ZESTRIL) 5 mg tablet Not Taking Self No No   Sig: Take 1 tablet (5 mg total) by mouth daily   Patient not taking: Reported on 3/20/2024   morphine (MS CONTIN) 30 mg 12 hr tablet 3/20/2024  No Yes   Sig: Take 1 tablet (30 mg total) by mouth every 12 (twelve) hours Max Daily Amount: 60 mg   oxyCODONE-acetaminophen (PERCOCET) 5-325 mg per tablet 3/19/2024  No Yes   Sig: Take 1 tablet by mouth every 8 (eight) hours as needed for moderate pain Max Daily Amount: 3 tablets Do not start before 2024.   potassium chloride (K-DUR,KLOR-CON) 10 mEq tablet More than a month  No No   Sig: Take 1 tablet (10 mEq total) by mouth daily If using Lasix      Facility-Administered Medications: None       Past Medical History:   Diagnosis Date    Acute respiratory failure with hypoxia (HCC)     Cardiomyopathy (HCC)     Chronic combined systolic and diastolic congestive heart failure     COPD (chronic obstructive pulmonary disease) (HCC)     Fatty liver     Hyperlipidemia     Hypertension     Hypertensive urgency     Mitral regurgitation     Rectal bleeding     Sepsis        Past Surgical History:   Procedure Laterality Date    CARDIAC CATHETERIZATION  2021    Moderate non-obstructive atherosclerosis     SECTION      CHOLECYSTECTOMY      IR RENAL ANGIOGRAM  2022    IR TEMPORARY DIALYSIS CATHETER CHECK/CHANGE/REPOSITION  2022    IR TUNNELED DIALYSIS CATHETER PLACEMENT  2022    IR TUNNELED DIALYSIS CATHETER REMOVAL  2022    ROTATOR CUFF REPAIR W/ DISTAL CLAVICLE EXCISION Right     TONSILLECTOMY         Family History   Problem Relation Age of Onset    Rheum arthritis Mother     Stroke Father      I have reviewed and agree with the history as documented.    E-Cigarette/Vaping     E-Cigarette Use Never User     Comments Pt states she want to quit smoking      E-Cigarette/Vaping Substances    Nicotine Yes     THC No     CBD No     Flavoring No     Other No     Unknown No      Social History     Tobacco Use    Smoking status: Every Day     Current packs/day: 1.00     Average packs/day: 1 pack/day for 47.2 years (47.2 ttl pk-yrs)     Types: Cigarettes     Start date: 1977     Passive exposure: Current    Smokeless tobacco: Never   Vaping Use    Vaping status: Never Used   Substance Use Topics    Alcohol use: Never    Drug use: No       Review of Systems   Constitutional:  Positive for chills. Negative for fever.   HENT:  Negative for rhinorrhea and sore throat.    Eyes:  Negative for visual disturbance.   Respiratory:  Positive for cough and shortness of breath.    Cardiovascular:  Negative for chest pain and leg swelling.   Gastrointestinal:  Negative for abdominal pain, diarrhea, nausea and vomiting.   Genitourinary:  Negative for dysuria.   Musculoskeletal:  Positive for arthralgias. Negative for back pain and myalgias.   Skin:  Negative for rash.   Neurological:  Positive for weakness. Negative for dizziness and headaches.   Psychiatric/Behavioral:  Negative for confusion.    All other systems reviewed and are negative.      Physical Exam  Physical Exam  Vitals and nursing note reviewed.   Constitutional:       General: She is not in acute distress.     Appearance: She is well-developed. She is ill-appearing.   HENT:      Head: Normocephalic and atraumatic.   Eyes:      Conjunctiva/sclera: Conjunctivae normal.      Pupils: Pupils are equal, round, and reactive to light.   Cardiovascular:      Rate and Rhythm: Normal rate and regular rhythm.      Heart sounds: No murmur heard.  Pulmonary:      Effort: Pulmonary effort is normal. Tachypnea present. No respiratory distress.      Breath sounds: Decreased breath sounds and wheezing present.   Chest:      Chest wall: No mass or deformity.    Abdominal:      Palpations: Abdomen is soft.      Tenderness: There is no abdominal tenderness.   Musculoskeletal:         General: No swelling. Normal range of motion.      Cervical back: Neck supple.   Skin:     General: Skin is warm and dry.      Capillary Refill: Capillary refill takes less than 2 seconds.      Coloration: Skin is pale.   Neurological:      Mental Status: She is alert.   Psychiatric:         Mood and Affect: Mood normal.      Comments: Disheveled         Vital Signs  ED Triage Vitals   Temperature Pulse Respirations Blood Pressure SpO2   03/20/24 1152 03/20/24 1152 03/20/24 1152 03/20/24 1152 03/20/24 1152   99.9 °F (37.7 °C) 86 22 138/85 94 %      Temp Source Heart Rate Source Patient Position - Orthostatic VS BP Location FiO2 (%)   03/20/24 1152 03/20/24 1152 03/20/24 1152 03/20/24 1152 --   Tympanic Monitor Sitting Left arm       Pain Score       03/20/24 1556       8           Vitals:    03/20/24 1300 03/20/24 1330 03/20/24 1430 03/20/24 1500   BP: 122/71 112/67 118/59 126/81   Pulse: 86 80 81 79   Patient Position - Orthostatic VS:    Lying         Visual Acuity      ED Medications  Medications   albuterol (PROVENTIL HFA,VENTOLIN HFA) inhaler 2 puff (has no administration in time range)   aspirin chewable tablet 81 mg (81 mg Oral Given 3/20/24 1429)   atorvastatin (LIPITOR) tablet 40 mg (40 mg Oral Not Given 3/20/24 1548)   bisacodyl (DULCOLAX) EC tablet 5 mg (has no administration in time range)   clonazePAM (KlonoPIN) tablet 1 mg (has no administration in time range)   clopidogrel (PLAVIX) tablet 75 mg (75 mg Oral Not Given 3/20/24 1432)   gabapentin (NEURONTIN) capsule 100 mg (has no administration in time range)   oxyCODONE-acetaminophen (PERCOCET) 5-325 mg per tablet 1 tablet (has no administration in time range)   sodium chloride 0.9 % infusion (125 mL/hr Intravenous New Bag 3/20/24 1541)   acetaminophen (TYLENOL) tablet 650 mg (has no administration in time range)   ondansetron  (ZOFRAN) injection 4 mg (has no administration in time range)   nicotine (NICODERM CQ) 21 mg/24 hr TD 24 hr patch 1 patch (1 patch Transdermal Not Given 3/20/24 1432)   guaiFENesin (MUCINEX) 12 hr tablet 600 mg (600 mg Oral Given 3/20/24 1731)   heparin (porcine) subcutaneous injection 5,000 Units (5,000 Units Subcutaneous Given 3/20/24 1430)   cefTRIAXone (ROCEPHIN) IVPB (premix in dextrose) 1,000 mg 50 mL (has no administration in time range)   azithromycin (ZITHROMAX) 500 mg in sodium chloride 0.9 % 250 mL IVPB (has no administration in time range)   methylPREDNISolone sodium succinate (Solu-MEDROL) injection 40 mg (40 mg Intravenous Given 3/20/24 1429)   levalbuterol (XOPENEX) inhalation solution 1.25 mg ( Nebulization Canceled Entry 3/20/24 1447)   morphine (MSIR) IR tablet 15 mg (15 mg Oral Given 3/20/24 1556)   ipratropium (ATROVENT) 0.02 % inhalation solution 0.5 mg (has no administration in time range)   albuterol (FOR EMS ONLY) (2.5 mg/3 mL) 0.083 % inhalation solution 5 mg (0 mg Does not apply Given to EMS 3/20/24 1230)   ipratropium-albuterol (FOR EMS ONLY) (DUO-NEB) 0.5-2.5 mg/3 mL inhalation solution 3 mL (0 mL Does not apply Given to EMS 3/20/24 1230)   methylPREDNISolone sodium succinate (FOR EMS ONLY) (Solu-MEDROL) 125 MG injection 125 mg (0 mg Does not apply Given to EMS 3/20/24 1230)   albuterol inhalation solution 5 mg (5 mg Nebulization Given 3/20/24 1254)   cefTRIAXone (ROCEPHIN) IVPB (premix in dextrose) 1,000 mg 50 mL (0 mg Intravenous Stopped 3/20/24 1327)   azithromycin (ZITHROMAX) 500 mg in sodium chloride 0.9 % 250 mL IVPB (500 mg Intravenous New Bag 3/20/24 1327)       Diagnostic Studies  Results Reviewed       Procedure Component Value Units Date/Time    HS Troponin I 4hr [[id number]]  (Normal) Collected: 03/20/24 1657    Lab Status: Final result Specimen: Blood from Hand, Right Updated: 03/20/24 1725     hs TnI 4hr 7 ng/L      Delta 4hr hsTnI -2 ng/L     Respiratory Panel 2.1(RP2)with  COVID19 [[id number]] Collected: 03/20/24 1516    Lab Status: In process Specimen: Nasopharyngeal Swab Updated: 03/20/24 1519    Procalcitonin [[id number]]  (Normal) Collected: 03/20/24 1434    Lab Status: Final result Specimen: Blood from Arm, Left Updated: 03/20/24 1507     Procalcitonin <0.05 ng/ml     HS Troponin I 2hr [[id number]]  (Normal) Collected: 03/20/24 1434    Lab Status: Final result Specimen: Blood from Arm, Left Updated: 03/20/24 1505     hs TnI 2hr 9 ng/L      Delta 2hr hsTnI 0 ng/L     Strep Pneumoniae, Urine [[id number]]     Lab Status: No result Specimen: Urine     Legionella antigen, urine [[id number]]     Lab Status: No result Specimen: Urine     Lactic acid, plasma (w/reflex if result > 2.0) [[id number]]  (Normal) Collected: 03/20/24 1235    Lab Status: Final result Specimen: Blood from Arm, Left Updated: 03/20/24 1309     LACTIC ACID 0.7 mmol/L     Narrative:      Result may be elevated if tourniquet was used during collection.    B-Type Natriuretic Peptide(BNP) [[id number]]  (Abnormal) Collected: 03/20/24 1206    Lab Status: Final result Specimen: Blood from Arm, Left Updated: 03/20/24 1257      pg/mL     FLU/RSV/COVID - if FLU/RSV clinically relevant [[id number]]  (Normal) Collected: 03/20/24 1206    Lab Status: Final result Specimen: Nares from Nose Updated: 03/20/24 1250     SARS-CoV-2 Negative     INFLUENZA A PCR Negative     INFLUENZA B PCR Negative     RSV PCR Negative    Narrative:      FOR PEDIATRIC PATIENTS - copy/paste COVID Guidelines URL to browser: [email or web address]    SARS-CoV-2 assay is a Nucleic Acid Amplification assay intended for the  qualitative detection of nucleic acid from SARS-CoV-2 in nasopharyngeal  swabs. Results are for the presumptive identification of SARS-CoV-2 RNA.    Positive results are indicative of infection with SARS-CoV-2, the virus  causing COVID-19, but do not rule out bacterial infection or  co-infection  with other viruses. Laboratories within the [place] and its  territories are required to report all positive results to the appropriate  public health authorities. Negative results do not preclude SARS-CoV-2  infection and should not be used as the sole basis for treatment or other  patient management decisions. Negative results must be combined with  clinical observations, patient history, and epidemiological information.  This test has not been FDA cleared or approved.    This test has been authorized by FDA under an Emergency Use Authorization  (EUA). This test is only authorized for the duration of time the  declaration that circumstances exist justifying the authorization of the  emergency use of an in vitro diagnostic tests for detection of SARS-CoV-2  virus and/or diagnosis of COVID-19 infection under section 564(b)(1) of  the Act, 21 U.S.C. 360bbb-3(b)(1), unless the authorization is terminated  or revoked sooner. The test has been validated but independent review by FDA  and CLIA is pending.    Test performed using [employer] Gene[employer]pert: This RT-PCR assay targets N2,  a region unique to SARS-CoV-2. A conserved region in the E-gene was chosen  for pan-Sarbecovirus detection which includes SARS-CoV-2.    According to CMS-2020-01-R, this platform meets the definition of high-throughput technology.    Blood culture #2 [[id number]] Collected: 03/20/24 1235    Lab Status: In process Specimen: Blood from Arm, Left Updated: 03/20/24 1244    Blood culture #1 [[id number]] Collected: 03/20/24 1235    Lab Status: In process Specimen: Blood from Arm, Left Updated: 03/20/24 1244    HS Troponin 0hr (reflex protocol) [[id number]]  (Normal) Collected: 03/20/24 1206    Lab Status: Final result Specimen: Blood from Arm, Left Updated: 03/20/24 1234     hs TnI 0hr 9 ng/L     Comprehensive metabolic panel [[id number]]  (Abnormal) Collected: 03/20/24 1206    Lab Status: Final result Specimen: Blood from Arm, Left  Updated: 03/20/24 1227     Sodium 130 mmol/L      Potassium 4.0 mmol/L      Chloride 97 mmol/L      CO2 25 mmol/L      ANION GAP 8 mmol/L      BUN 9 mg/dL      Creatinine 0.81 mg/dL      Glucose 125 mg/dL      Calcium 9.4 mg/dL      AST 15 U/L      ALT 8 U/L      Alkaline Phosphatase 93 U/L      Total Protein 7.9 g/dL      Albumin 4.4 g/dL      Total Bilirubin 0.70 mg/dL      eGFR 77 ml/min/1.73sq m     Narrative:      National Kidney Disease Foundation guidelines for Chronic Kidney Disease (CKD):     Stage 1 with normal or high GFR (GFR > 90 mL/min/1.73 square meters)    Stage 2 Mild CKD (GFR = 60-89 mL/min/1.73 square meters)    Stage 3A Moderate CKD (GFR = 45-59 mL/min/1.73 square meters)    Stage 3B Moderate CKD (GFR = 30-44 mL/min/1.73 square meters)    Stage 4 Severe CKD (GFR = 15-29 mL/min/1.73 square meters)    Stage 5 End Stage CKD (GFR <15 mL/min/1.73 square meters)  Note: GFR calculation is accurate only with a steady state creatinine    CBC and differential [[id number]]  (Abnormal) Collected: 03/20/24 1206    Lab Status: Final result Specimen: Blood from Arm, Left Updated: 03/20/24 1213     WBC 6.71 Thousand/uL      RBC 5.39 Million/uL      Hemoglobin 16.1 g/dL      Hematocrit 48.0 %      MCV 89 fL      MCH 29.9 pg      MCHC 33.5 g/dL      RDW 13.1 %      MPV 9.2 fL      Platelets 207 Thousands/uL      nRBC 0 /100 WBCs      Neutrophils Relative 74 %      Immature Grans % 0 %      Lymphocytes Relative 20 %      Monocytes Relative 5 %      Eosinophils Relative 0 %      Basophils Relative 1 %      Neutrophils Absolute 4.97 Thousands/µL      Absolute Immature Grans 0.01 Thousand/uL      Absolute Lymphocytes 1.32 Thousands/µL      Absolute Monocytes 0.36 Thousand/µL      Eosinophils Absolute 0.01 Thousand/µL      Basophils Absolute 0.04 Thousands/µL                    XR chest 1 view portable   Final Result by Alejandro Louis MD (03/20 1652)      No acute cardiopulmonary disease.   Emphysema. Stable  subsegmental lingular atelectasis.            Workstation performed: [id number]                    Procedures  ECG 12 Lead Documentation Only    Date/Time: 3/20/2024 12:18 PM    Performed by: Hugh Castro DO  Authorized by: Hugh Castro DO    Indications / Diagnosis:  Shortness of breath  ECG reviewed by me, the ED Provider: yes    Patient location:  ED  Previous ECG:     Previous ECG:  Compared to current    Comparison ECG info:  Resolution of tachycardia    Similarity:  Changes noted    Comparison to cardiac monitor: Yes    Interpretation:     Interpretation: non-specific    Rate:     ECG rate:  89    ECG rate assessment: tachycardic    Rhythm:     Rhythm: sinus rhythm    Ectopy:     Ectopy: none    QRS:     QRS axis:  Left    QRS intervals:  Normal  Conduction:     Conduction: normal    ST segments:     ST segments:  Normal  T waves:     T waves: non-specific             ED Course  ED Course as of 03/20/24 1746   Wed Mar 20, 2024   1351 Patient received a 1 hour breathing treatment, is sitting more comfortably.  Still requiring 4 L of oxygen and remaining pulse ox greater than 90%.  Minimal cough is noted.  MI profile unremarkable lactate 0.7   1427 Patient was breathing much easier after the 1 hour treatment.  Admitted to [facility] given her new oxygen requirements.             HEART Risk Score      Flowsheet Row Most Recent Value   Heart Score Risk Calculator    History 1 Filed at: 03/20/2024 1254   ECG 1 Filed at: 03/20/2024 1254   Age 1 Filed at: 03/20/2024 1254   Risk Factors 1 Filed at: 03/20/2024 1254   Troponin 0 Filed at: 03/20/2024 1254   HEART Score 4 Filed at: 03/20/2024 1254                          SBIRT 20yo+      Flowsheet Row Most Recent Value   Initial Alcohol Screen: US AUDIT-C     1. How often do you have a drink containing alcohol? 0 Filed at: 03/20/2024 1156   2. How many drinks containing alcohol do you have on a typical day you are drinking?  0 Filed at: 03/20/2024 1151   3b. FEMALE  Any Age, or MALE 65+: How often do you have 4 or more drinks on one occassion? 0 Filed at: 03/20/2024 4629   Audit-C Score 0 Filed at: 03/20/2024 1153   SYDNEY: How many times in the past year have you...    Used an illegal drug or used a prescription medication for non-medical reasons? Never Filed at: 03/20/2024 6830                      Medical Decision Making  60-year-old female with underlying COPD history of congestive heart failure, bipolar illness as well as long thoracic nerve syndrome and chronic pain.  Presents today with shortness of breath.  Started about 2 days ago with progression.  Seen in PCP office, was feeling well at that time.  Deteriorated since then.  Family members all had flu B.  She has cough but nonproductive.  She felt like her chest was tight.  Ambulance arrived, found her belly breathing on the front porch.  Here today on the nebulizer pulse ox was 92%, after she finished the nebulizer pulse ox dropped to 82% required 4 L to get her up to 88%.  She does not appear particularly short of breath at rest.  She does not know if she had fever, she was 99 9 here.  She continues to smoke.  Fairly unreliable historian.    Differential diagnosis include congestive heart failure she has had this before, this was typically when she was tachycardic and she is not tachycardic now.  Also pneumonia, COPD exacerbation or hospitalist.  Should be noted in her past she was critically ill for some time, almost needing dialysis at 1 time.    Amount and/or Complexity of Data Reviewed  External Data Reviewed: labs, radiology, ECG and notes.  Labs: ordered.     Details: Lactate of 0.7.  There is no elevated white count.  Hemoglobin is stable.  COVID flu and RSV swabs were negative.  Radiology: ordered.     Details: Radiology suggestive of a right lower lobe infiltrate.  ECG/medicine tests: ordered and independent interpretation performed.     Details: EKG ordered and independently interpreted by me revealing  normal sinus rhythm at 89 bpm.  Left atrial enlargement and left axis deviation.  Nonspecific ST-T wave abnormalities.    Risk  Prescription drug management.  Decision regarding hospitalization.  Risk Details: Spoke with patient.  Given her hypoxia, his reasonable to admit her, give her usual COPD exacerbation steroids and nebulizers, as well as antibiotics for the apparent pneumonia on x-ray.  She has been respiratory failure more than 1 occasion, requiring even intubation and long-term hospitalization.  In view of high risk for readmission if we try to get her home on oxygen today.  She lives by herself, compliance issues are noted.             Disposition  Final diagnoses:   Pneumonia   Hypoxic respiratory failure (HCC)   COPD exacerbation (HCC)     Time reflects when diagnosis was documented in both MDM as applicable and the Disposition within this note       Time User Action Codes Description Comment    3/20/2024  1:53 PM Hugh Castro [J18.9] Pneumonia     3/20/2024  1:53 PM Hugh Castro [J96.91] Hypoxic respiratory failure (HCC)     3/20/2024  1:53 PM Hugh Castro [J44.1] COPD exacerbation (HCC)           ED Disposition       ED Disposition   Admit    Condition   Stable    Date/Time   Wed Mar 20, 2024 1353    Comment   Case was discussed with Dr Putnam and the patient's admission status was agreed to be Admission Status: inpatient status to the service of Dr. Putnam .               Follow-up Information    None         Current Discharge Medication List        CONTINUE these medications which have NOT CHANGED    Details   albuterol (ProAir HFA) 90 mcg/act inhaler Inhale 2 puffs every 4 (four) hours as needed for wheezing  Qty: 25.5 g, Refills: 3    Comments: Substitution to a formulary equivalent within the same pharmaceutical class is authorized.  Associated Diagnoses: Chronic obstructive pulmonary disease with acute exacerbation (HCC)      aspirin 81 mg chewable tablet Chew 1 tablet  (81 mg total) daily    Associated Diagnoses: Acute on chronic systolic congestive heart failure (HCC)      atorvastatin (LIPITOR) 40 mg tablet Take 1 tablet (40 mg total) by mouth daily  Qty: 90 tablet, Refills: 0    Associated Diagnoses: Renal artery stenosis (HCC)      bisacodyl (DULCOLAX) 5 mg EC tablet Take 1 tablet (5 mg total) by mouth daily as needed for constipation  Qty: 30 tablet, Refills: 0    Associated Diagnoses: Constipation      carvedilol (COREG) 25 mg tablet take 1 tablet by mouth twice a day with meals  Qty: 180 tablet, Refills: 5    Associated Diagnoses: Dilated cardiomyopathy (HCC)      Cholecalciferol 50 MCG (2000 UT) CAPS Take by mouth daily      clonazePAM (KlonoPIN) 1 mg tablet Take 1 tablet (1 mg total) by mouth daily at bedtime  Qty: 30 tablet, Refills: 1    Associated Diagnoses: Psychophysiological insomnia      gabapentin (NEURONTIN) 100 mg capsule Take 1 capsule (100 mg total) by mouth daily at bedtime  Qty: 90 capsule, Refills: 3    Associated Diagnoses: Chronic pain syndrome      MAGNESIUM PO in the morning      morphine (MS CONTIN) 30 mg 12 hr tablet Take 1 tablet (30 mg total) by mouth every 12 (twelve) hours Max Daily Amount: 60 mg  Qty: 60 tablet, Refills: 0    Associated Diagnoses: Other chronic pain      oxyCODONE-acetaminophen (PERCOCET) 5-325 mg per tablet Take 1 tablet by mouth every 8 (eight) hours as needed for moderate pain Max Daily Amount: 3 tablets Do not start before March 13, 2024.  Qty: 90 tablet, Refills: 0    Associated Diagnoses: Other chronic pain      docusate sodium (COLACE) 100 mg capsule Take 1 capsule (100 mg total) by mouth 2 (two) times a day  Qty: 60 capsule, Refills: 3    Associated Diagnoses: Constipation      furosemide (LASIX) 40 mg tablet Take 1/2 to 1 pill as needed for edema.  Qty: 30 tablet, Refills: 3    Associated Diagnoses: Essential hypertension      lisinopril (ZESTRIL) 5 mg tablet Take 1 tablet (5 mg total) by mouth daily  Qty: 30 tablet,  Refills: 5    Associated Diagnoses: Essential hypertension      potassium chloride (K-DUR,KLOR-CON) 10 mEq tablet Take 1 tablet (10 mEq total) by mouth daily If using Lasix  Qty: 30 tablet, Refills: 3    Associated Diagnoses: Hypokalemia           STOP taking these medications       clopidogrel (PLAVIX) 75 mg tablet Comments:   Reason for Stopping:               No discharge procedures on file.    PDMP Review         Value Time User    PDMP Reviewed  Yes 3/11/2024 12:25 PM Hugh Castro DO            ED Provider  Electronically Signed by             Hugh Castro DO  03/20/24 1741

## 2024-03-20 NOTE — ASSESSMENT & PLAN NOTE
Patient not on home O2 found to be requiring 5 L of nasal cannula supplemental O2 in the ED  Present on admission likely secondary to pneumonia versus COPD exacerbation    Wean O2 as tolerated  Respiratory protocol  Goal SpO2 greater than 88%  Management of pneumonia/COPD as above

## 2024-03-20 NOTE — RESPIRATORY THERAPY NOTE
RT Protocol Note  Juani Morales 63 y.o. female MRN: [id number]  Unit/Bed#: -01 Encounter: [id number]    Assessment    Principal Problem:    Pneumonia  Active Problems:    Essential hypertension    Tobacco abuse    COPD (chronic obstructive pulmonary disease) (HCC)    Acute respiratory failure with hypoxia (HCC)      Home Pulmonary Medications:  Albuterol MDI   Trelegy     Home Devices/Therapy: Other (Comment) (None)    Past Medical History:   Diagnosis Date    Acute respiratory failure with hypoxia (HCC)     Cardiomyopathy (HCC)     Chronic combined systolic and diastolic congestive heart failure     COPD (chronic obstructive pulmonary disease) (HCC)     Fatty liver     Hyperlipidemia     Hypertension     Hypertensive urgency     Mitral regurgitation     Rectal bleeding     Sepsis      Social History     Socioeconomic History    Marital status: Single     Spouse name: None    Number of children: None    Years of education: None    Highest education level: None   Occupational History    None   Tobacco Use    Smoking status: Every Day     Current packs/day: 1.00     Average packs/day: 1 pack/day for 47.2 years (47.2 ttl pk-yrs)     Types: Cigarettes     Start date: 1977     Passive exposure: Current    Smokeless tobacco: Never   Vaping Use    Vaping status: Never Used   Substance and Sexual Activity    Alcohol use: Never    Drug use: No    Sexual activity: None   Other Topics Concern    None   Social History Narrative    None     Social Determinants of Health     Financial Resource Strain: Not on file   Food Insecurity: No Food Insecurity (8/10/2023)    Hunger Vital Sign     Worried About Running Out of Food in the Last Year: Never true     Ran Out of Food in the Last Year: Never true   Transportation Needs: No Transportation Needs (8/10/2023)    PRAPARE - Transportation     Lack of Transportation (Medical): No     Lack of Transportation (Non-Medical): No   Physical Activity: Not on file   Stress: Not on file  "  Social Connections: Not on file   Intimate Partner Violence: Not on file   Housing Stability: Low Risk  (8/10/2023)    Housing Stability Vital Sign     Unable to Pay for Housing in the Last Year: No     Number of Places Lived in the Last Year: 1     Unstable Housing in the Last Year: No       Subjective         Objective    Physical Exam:   Assessment Type: Assess only  General Appearance: Alert, Awake  Respiratory Pattern: Dyspnea with exertion  Chest Assessment: Chest expansion symmetrical  Bilateral Breath Sounds: Diminished  Cough: None    Vitals:  Blood pressure 126/81, pulse 79, temperature 99.3 °F (37.4 °C), temperature source Oral, resp. rate 18, height 5' 11\" (1.803 m), weight 80.5 kg (177 lb 7.5 oz), SpO2 (!) 88%.          Imaging and other studies: I have personally reviewed pertinent reports.            Plan    Respiratory Plan: Mild Distress pathway        Resp Comments: Will change patient treatments to 1.25 mg Xopenex , 0.5 mg atrovent TID and continue the albuterol MDi Q 4 PRN.       "

## 2024-03-20 NOTE — ASSESSMENT & PLAN NOTE
Possible exacerbation secondary to viral versus bacterial pneumonia  Presents with shortness of breath and acute hypoxic respiratory failure  States she has been utilizing rescue inhalers without relief    Initiate Solu-Medrol 40 mg IV every 8 hours scheduled  Atrovent and Xopenex  Respiratory protocol  IV azithromycin while inpatient

## 2024-03-20 NOTE — ASSESSMENT & PLAN NOTE
Blood pressures somewhat soft during ED course    Will hold home beta-blocker and ACE inhibitor in the setting of pneumonia  Monitor blood pressures  Reinstitute home antihypertensives as able

## 2024-03-20 NOTE — ASSESSMENT & PLAN NOTE
Patient continues to smoke daily    Nicotine patch while inpatient  Counseled patient on the importance of tobacco abuse cessation

## 2024-03-21 PROBLEM — N18.9 CHRONIC KIDNEY DISEASE (CKD): Status: ACTIVE | Noted: 2022-09-20

## 2024-03-21 PROBLEM — R73.03 PREDIABETES: Status: ACTIVE | Noted: 2024-01-01

## 2024-03-21 PROBLEM — K92.2 GIB (GASTROINTESTINAL BLEEDING): Status: ACTIVE | Noted: 2024-01-01

## 2024-03-21 PROBLEM — R57.9 SHOCK (HCC): Status: ACTIVE | Noted: 2024-03-21

## 2024-03-21 PROBLEM — D75.1 POLYCYTHEMIA: Chronic | Status: ACTIVE | Noted: 2024-01-01

## 2024-03-21 PROBLEM — J12.3 HUMAN METAPNEUMOVIRUS PNEUMONIA: Status: ACTIVE | Noted: 2024-03-21

## 2024-03-21 NOTE — NURSING NOTE
Pt complaining of increased SOB at 5 am, gave scheduled prednisone without relief, informed Respiratory and Hospitalist. Pt had stat ECG, CXR, Ativan and Lasix, BP high O@ stats in 90-92 range. Pt stil SOB, diaphoretic. Pt transferred to ICU.

## 2024-03-21 NOTE — ASSESSMENT & PLAN NOTE
POA  Likely exacerbated by metapneumovirus pneumonia  Does not appear to follow w/Pulmonology - no PFTs available for review  Continues to smoke 1-2 PPD  Continue IV Solu Medrol 40 mg Q8H   Only uses PRN Albuterol inhaler at home - continue scheduled Xopenex/Atrovent nebs  Aggressive pulmonary hygiene

## 2024-03-21 NOTE — RESPIRATORY THERAPY NOTE
RT Ventilator Management Note  Juani Morales 63 y.o. female MRN: [id number]  Unit/Bed#: ICU 11-01 Encounter: [id number]      Daily Screen         3/21/2024  0807             Patient safety screen outcome:: Failed (P)     Not Ready for Weaning due to:: Underline problem not resolved (P)               Physical Exam:   Assessment Type: (P) During-treatment  General Appearance: (P) Sedated  Respiratory Pattern: (P) Assisted  Chest Assessment: (P) Chest expansion symmetrical  Bilateral Breath Sounds: (P) Diminished, Expiratory wheezes  Cough: Strong, Non-productive      Resp Comments: (P) Pt intubated at bedside for cont. poor respiratory status. Pt preoxygenated on 100% via bipap, pt keith well with no drop in sat. ETT secured at 25cm at the gums, CXR pending. Good BS bilat, none in abd, good color change on CO2 device. .

## 2024-03-21 NOTE — PROCEDURES
Intubation    Date/Time: 3/21/2024 8:13 AM    Performed by: ESTHER Rebolledo  Authorized by: ESTHER Rebolledo    Patient location:  Bedside  Consent:     Consent obtained:  Emergent situation and verbal    Consent given by:  Patient    Risks discussed:  Aspiration, brain injury, dental trauma, laryngeal injury, death and bleeding  Universal protocol:     Procedure explained and questions answered to patient or proxy's satisfaction: yes      Site/side marked: yes      Immediately prior to procedure, a time out was called: yes      Patient identity confirmed:  Verbally with patient, hospital-assigned identification number and arm band  Pre-procedure details:     Patient status:  Awake (lethargic, but oriented)    Pretreatment medications:  Etomidate    Paralytics:  Vecuronium  Indications:     Indications for intubation: respiratory distress, respiratory failure and hypercapnia    Procedure details:     Preoxygenation:  BiPAP    Intubation method:  Oral    Oral intubation technique:  Direct and glidescope (Maldonado)    Laryngoscope blade:  Mac 3    Tube size (mm):  7.5    Tube type:  Cuffed and hi-lo    Number of attempts:  1    Tube visualized through cords: yes    Placement assessment:     ETT to teeth:  25    Tube secured with:  ETT giron    Breath sounds:  Equal and absent over the epigastrium    Placement verification: chest rise, condensation, colorimetric ETCO2 device, CXR verification, direct visualization, equal breath sounds and tube exhalation      CXR findings:  ETT in proper place  Post-procedure details:     Patient tolerance of procedure:  Tolerated well, no immediate complications

## 2024-03-21 NOTE — RESPIRATORY THERAPY NOTE
03/21/24 0916   Respiratory Assessment   Resp Comments 20 gauge arterial line placed in left radial artery. Sterile technique used, no injectable meds given as pt sedated from recent intubation. Line placed in one attempt, good waveforms and blood return. Line sutured in placed and dressed appropriatly.

## 2024-03-21 NOTE — CONSULTS
[facility] - Inpatient Consultation    PATIENT INFORMATION      Juani Morales 63 y.o. female MRN: [id number]  Unit/Bed#: ICU 11-01 Encounter: [id number]  PCP: Hugh Castro DO  Date of Admission:  3/20/2024  Date of Consultation: 03/21/24  Requesting Physician: Belle Souza*       ASSESSMENT & PLAN     Juani Morales is a 63 y.o. female with PMH significant for hypertension, anxiety, CHF, COPD, prediabetes, tobacco use, chronic pain syndrome who presented to [facility] on 3/20 for concern of cough and shortness of breath and admitted with human metapneumovirus pneumonia. Hospitalization complicated by further decompensation from respiratory standpoint with coffee-ground output noted in OG tube for which GI has been consulted.    Coffee-ground emesis  Patient noted to have coffee ground output in OGT when placed after intubation. Hgb dropped from 16 to 14, and GI consulted for evaluation. Currently with 150cc output in suction canister. No melena/hematochezia noted. Appears to have decline in all cell counts.   Currently patient critically ill with stable hemoglobin given decline in other cell lines.   Will plan to manage medically with IV PPI BID  Should patient require cardiac cath/intervention/AC/AP will consider more urgent evaluation prior to initation  Monitor both OGT output and stool output for ongoing bleeding  Monitor Q8 hour hemoglobin - transfuse for <8 given cardiac history  Continue supportive care per primary team  GI team will continue to follow  Please contact on-call provider with changes in clinical status    Acute Hypoxic Respiratory Failure  Shock 2/2 Sepsis vs. Cardiogenic  Elevated troponin  Polycythemia  Patient critically ill and being managed by CC team for above.       REASON FOR CONSULTATION      Coffee Ground Emesis      HISTORY OF PRESENT ILLNESS      Juani Morales is a 63 y.o. female with PMH significant for  hypertension, anxiety, CHF, COPD, prediabetes, tobacco use, chronic pain syndrome who presented to [facility] on 3/20 for concern of cough and shortness of breath.  In the ED she was noted to be hypoxic to 82% requiring 4 L of nasal cannula.  Patient was admitted for pneumonia and RP 2 panel was performed significant for human metapneumovirus.  Respiratory status decompensated on a.m. 3/21 and patient was urgently transferred to ICU and intubated.  After placement of OG tube and connection to low intermittent suction patient was noted to have coffee-ground emesis in canister and GI was consulted for further evaluation.  Hemoglobin on presentation 16 with drop to 14.5, however, repeat CBC notable for decrease in WBC and platelets.      REVIEW OF SYSTEMS     Patient intubated and sedated - unable to provide subjective input    PAST MEDICAL & SURGICAL HISTORY      Past Medical History:   Diagnosis Date    Acute respiratory failure with hypoxia (HCC)     Cardiomyopathy (HCC)     Chronic combined systolic and diastolic congestive heart failure     COPD (chronic obstructive pulmonary disease) (HCC)     Fatty liver     Hyperlipidemia     Hypertension     Hypertensive urgency     Mitral regurgitation     Rectal bleeding     Sepsis        Past Surgical History:   Procedure Laterality Date    CARDIAC CATHETERIZATION  2021    Moderate non-obstructive atherosclerosis     SECTION      CHOLECYSTECTOMY      IR RENAL ANGIOGRAM  2022    IR TEMPORARY DIALYSIS CATHETER CHECK/CHANGE/REPOSITION  2022    IR TUNNELED DIALYSIS CATHETER PLACEMENT  2022    IR TUNNELED DIALYSIS CATHETER REMOVAL  2022    ROTATOR CUFF REPAIR W/ DISTAL CLAVICLE EXCISION Right     TONSILLECTOMY         MEDICATIONS & ALLERGIES       Medications:   Medications Prior to Admission   Medication    albuterol (ProAir HFA) 90 mcg/act inhaler    aspirin 81 mg chewable tablet    atorvastatin (LIPITOR) 40 mg tablet    bisacodyl  (DULCOLAX) 5 mg EC tablet    carvedilol (COREG) 25 mg tablet    Cholecalciferol 50 MCG (2000 UT) CAPS    clonazePAM (KlonoPIN) 1 mg tablet    gabapentin (NEURONTIN) 100 mg capsule    MAGNESIUM PO    morphine (MS CONTIN) 30 mg 12 hr tablet    oxyCODONE-acetaminophen (PERCOCET) 5-325 mg per tablet    docusate sodium (COLACE) 100 mg capsule    furosemide (LASIX) 40 mg tablet    lisinopril (ZESTRIL) 5 mg tablet    potassium chloride (K-DUR,KLOR-CON) 10 mEq tablet     Current Facility-Administered Medications   Medication Dose Route Frequency    azithromycin (ZITHROMAX) 500 mg in sodium chloride 0.9 % 250 mL IVPB  500 mg Intravenous Q24H    cefTRIAXone (ROCEPHIN) IVPB (premix in dextrose) 1,000 mg 50 mL  1,000 mg Intravenous Q24H    chlorhexidine (PERIDEX) 0.12 % oral rinse 15 mL  15 mL Mouth/Throat Q12H JULIÁN    fentaNYL 1000 mcg in sodium chloride 0.9% 100mL infusion  100 mcg/hr Intravenous Continuous    fentaNYL injection 50 mcg  50 mcg Intravenous Q1H PRN    heparin (porcine) subcutaneous injection 5,000 Units  5,000 Units Subcutaneous Q8H JULIÁN    insulin lispro (HumALOG/ADMELOG) 100 units/mL subcutaneous injection 1-6 Units  1-6 Units Subcutaneous Q6H JULIÁN    ipratropium (ATROVENT) 0.02 % inhalation solution 0.5 mg  0.5 mg Nebulization TID    levalbuterol (XOPENEX) inhalation solution 1.25 mg  1.25 mg Nebulization TID    methylPREDNISolone sodium succinate (Solu-MEDROL) injection 40 mg  40 mg Intravenous Q8H JULIÁN    nicotine (NICODERM CQ) 21 mg/24 hr TD 24 hr patch 1 patch  1 patch Transdermal Daily    NOREPINEPHRINE 4 MG  ML NSS (CMPD ORDER) infusion  1-30 mcg/min Intravenous Titrated    ondansetron (ZOFRAN) injection 4 mg  4 mg Intravenous Q6H PRN    pantoprazole (PROTONIX) injection 40 mg  40 mg Intravenous Q12H JULIÁN    propofol (DIPRIVAN) 1000 mg in 100 mL infusion (premix)  5-50 mcg/kg/min Intravenous Titrated       Allergies:   Allergies   Allergen Reactions    Wellbutrin [Bupropion] Arthralgia       SOCIAL  "HISTORY      Social History     Marital Status: Single    Substance Use History:   Social History     Substance and Sexual Activity   Alcohol Use Never     Social History     Tobacco Use   Smoking Status Every Day    Current packs/day: 1.00    Average packs/day: 1 pack/day for 47.2 years (47.2 ttl pk-yrs)    Types: Cigarettes    Start date: 1977    Passive exposure: Current   Smokeless Tobacco Never     Social History     Substance and Sexual Activity   Drug Use No       FAMILY HISTORY      Family History   Problem Relation Age of Onset    Rheum arthritis Mother     Stroke Father        PHYSICAL EXAM     Objective   Blood pressure (!) 74/45, pulse 86, temperature 98.1 °F (36.7 °C), resp. rate (!) 24, height 5' 11\" (1.803 m), weight 82.1 kg (181 lb), SpO2 96%. Body mass index is 25.24 kg/m².    Intake/Output Summary (Last 24 hours) at 3/21/2024 1505  Last data filed at 3/21/2024 1200  Gross per 24 hour   Intake 936.92 ml   Output 3475 ml   Net -2538.08 ml       General Appearance:   Intubated, sedated, ill-appearing   HEENT:   Normocephalic, atraumatic, anicteric, intubated   Neck:   Supple, symmetrical, trachea midline   Lungs:   Equal chest rise, mechanically ventilated   Heart:   Regular rate and rhythm   Abdomen:   Soft, non-distended; normal bowel sounds; OGT with 100cc coffee ground output   Rectal:   Deferred    Extremities:   No cyanosis, clubbing or edema    Neuro:   sedated   Skin:   No jaundice, rashes, or lesions. +bruising     ADDITIONAL DATA     Lab Results:     Results from last 7 days   Lab Units 03/21/24  1123 03/21/24  0738 03/21/24  0735   WBC Thousand/uL 11.89*  --  14.58*   HEMOGLOBIN g/dL 14.5  14.5  --  16.4*   I STAT HEMOGLOBIN   --    < >  --    HEMATOCRIT % 44.8  --  51.0*   HEMATOCRIT, ISTAT   --    < >  --    PLATELETS Thousands/uL 220  --  315   NEUTROS PCT %  --   --  83*   LYMPHS PCT %  --   --  12*   MONOS PCT %  --   --  4   EOS PCT %  --   --  0    < > = values in this interval not " displayed.     Results from last 7 days   Lab Units 03/21/24  0738 03/21/24  0735   POTASSIUM mmol/L  --  5.0   CHLORIDE mmol/L  --  99   CO2 mmol/L  --  20*   CO2, I-STAT mmol/L 27  --    BUN mg/dL  --  12   CREATININE mg/dL  --  0.92   CALCIUM mg/dL  --  8.8   ALK PHOS U/L  --  92   ALT U/L  --  7   AST U/L  --  14   GLUCOSE, ISTAT mg/dl 371*  --      Results from last 7 days   Lab Units 03/21/24  0735   INR  1.16       Imaging:    Echo complete w/ contrast if indicated    Result Date: 3/21/2024  Narrative:   Left Ventricle: Left ventricular cavity size is normal. Wall thickness is normal. The left ventricular ejection fraction is 30%. Systolic function is moderate to severely reduced in a global manner but the septum is less kinetic than the remaining segments. Wall motion is normal. Diastolic function is normal.   Right Ventricle: Right ventricular cavity size is mildly dilated. Systolic function is mildly reduced. PA pressure normal.     XR chest portable ICU    Result Date: 3/21/2024  Narrative: XR CHEST PORTABLE ICU INDICATION: intubation and CVC placement. COMPARISON: Earlier the same day FINDINGS: The tip of the endotracheal tube is 5.1 cm above the rebecca. The tip of the central venous catheter which enters from the right neck region projects over the region of the superior vena cava above the heart. An endogastric tube extends out of the field-of-view into the abdomen. The lungs appear stable from the recent prior study. The right apex is incompletely included, slightly limiting the exam. No large volume pneumothorax appreciated. There may be small left effusion. Unremarkable cardiomediastinal silhouette.  Atherosclerotic vascular calcifications noted. Bones are unremarkable for age. Normal upper abdomen.     Impression: Satisfactory line and tube positioning. Stable appearance of pulmonary infiltrates. Questionable minimal left effusion. Slightly limited study. Workstation performed: [id number]     XR  chest portable    Result Date: 3/21/2024  Narrative: XR CHEST PORTABLE INDICATION: hypoxia. , Elevated white blood count. Slightly elevated BNP COMPARISON: Radiograph from March 20, 2024 FINDINGS: Diffuse bronchial wall thickening and mild diffuse interstitial prominence. Left basilar subsegmental atelectasis. No pneumothorax or pleural effusion. Normal cardiomediastinal silhouette. Bones are unremarkable for age. Normal upper abdomen.     Impression: Findings suggestive of diffuse bronchitis and multifocal bronchiolitis/bronchopneumonia. Possible superimposed background very mild interstitial edema. The study was marked in EPIC for immediate notification. Workstation performed: [id number]     XR chest 1 view portable    Result Date: 3/20/2024  Narrative: XR CHEST PORTABLE INDICATION: Flulike symptoms. COMPARISON: Radiograph August 9, 2023 FINDINGS: Biapical pleural-parenchymal scarring. Subsegmental atelectasis in the lingula, unchanged. No consolidation or pulmonary edema. Background emphysema. No pneumothorax or pleural effusion. Normal cardiomediastinal silhouette. Bones are unremarkable for age. Normal upper abdomen.     Impression: No acute cardiopulmonary disease. Emphysema. Stable subsegmental lingular atelectasis. Workstation performed: [id number]       EKG, Pathology, and Other Studies Reviewed on Admission:   EKG: Reviewed      Counseling / Coordination of Care Time: 30 total mins spent in consult. Greater than 50% of total time spent on patient counseling and coordination of care.    Irina Gonzalez DO  Gastroenterology Fellow  [facility]  Division of Gastroenterology and Hepatology  Available on CallMDt  ...............................................................................................................................................  ** Please Note: This note is constructed using a voice recognition dictation system. **

## 2024-03-21 NOTE — PLAN OF CARE
Problem: INFECTION - ADULT  Goal: Absence or prevention of progression during hospitalization  Description: INTERVENTIONS:  - Assess and monitor for signs and symptoms of infection  - Monitor lab/diagnostic results  - Monitor all insertion sites, i.e. indwelling lines, tubes, and drains  - Monitor endotracheal if appropriate and nasal secretions for changes in amount and color  - [place] appropriate cooling/warming therapies per order  - Administer medications as ordered  - Instruct and encourage patient and family to use good hand hygiene technique  - Identify and instruct in appropriate isolation precautions for identified infection/condition  Outcome: Progressing  Goal: Absence of fever/infection during neutropenic period  Description: INTERVENTIONS:  - Monitor WBC    Outcome: Progressing

## 2024-03-21 NOTE — ASSESSMENT & PLAN NOTE
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

## 2024-03-21 NOTE — ASSESSMENT & PLAN NOTE
Troponins negative on admission - now elevated to 1290  Suspect this is 2/2 demand ischemia in setting of shock state/respiratory failure rather than ACS  TTE w/EF 30% as noted above - likely stress-induced  Denied chest pain prior to intubation  ECG initially w/ST depression in V4/V5 while in respiratory distress - no ST depressions on repeat ECG   Discussed case w/Dr. Chow (Cardiology) who agrees this is likely 2/2 demand rather than ACS  Continue to trend troponins and serial ECGs  Continue cardiac monitoring

## 2024-03-21 NOTE — SEPSIS NOTE
Sepsis Note   Juani Morales 63 y.o. female MRN: [id number]  Unit/Bed#: ICU 11-01 Encounter: [id number]       Initial Sepsis Screening       Row Name 03/21/24 1115 03/21/24 0725             Is the patient's history suggestive of a new or worsening infection? Yes (Proceed)  -BA Yes (Proceed)  -BA       Suspected source of infection pneumonia  -BA pneumonia  -BA       Indicate SIRS criteria Hyperthemia > 38.3C (100.9F) OR Hypothermia <36C (96.8F);Tachypnea > 20 resp per min;Leukocytosis (WBC > 35317 IJL) OR Leukopenia (WBC <4000 IJL) OR Bandemia (WBC >10% bands);Tachycardia > 90 bpm  -BA Hyperthemia > 38.3C (100.9F) OR Hypothermia <36C (96.8F);Tachycardia > 90 bpm;Tachypnea > 20 resp per min;Leukocytosis (WBC > 62244 IJL) OR Leukopenia (WBC <4000 IJL) OR Bandemia (WBC >10% bands)  -BA       Are two or more of the above signs & symptoms of infection both present and new to the patient? Yes (Proceed)  -BA Yes (Proceed)  -BA       Assess for evidence of organ dysfunction: Are any of the below criteria present within 6 hours of suspected infection and SIRS criteria that are NOT considered to be chronic conditions? SBP < 90;MAP < 65;SBP decrease > 40 from baseline;New need for invasive/non-invasive ventilation  -BA New need for invasive/non-invasive ventilation  -BA       Date of presentation of severe sepsis -- 03/21/24  -BA       Time of presentation of severe sepsis -- 0725  -BA       Date of presentation of septic shock 03/21/24  -BA --       Time of presentation of septic shock 1115  -BA --       Fluid Resuscitation: A lesser volume than 30 ml/kg IV fluid will be given  Albumin 5% 25g given instead of crystalloid.  -BA --       The 30 mL/kg fluid bolus was not given to the patient despite having hypotension, a lactate of >= 4 mmol/L, or documentation of septic shock secondary to: Colloids were administered;Heart Failure;Concern for fluid/volume overload  -BA --       Instead of the 30 ml/kg fluid bolus, the  "following volume of crystalloid fluid will be ordered: Other (document in comments)  Colloids given instead of crystalloid.  -BA --       Of the following fluid type: Other (document in comments)  Colloid  -BA --       Is the patient is persistently hypotensive in the hour after fluid bolus administration? If yes, patient meets criteria for vasopressor use. YES  -BA --       Sepsis Note: Click \"NEXT\" below (NOT \"close\") to generate sepsis note based on above information. YES (proceed by clicking \"NEXT\")  -BA YES (proceed by clicking \"NEXT\")  -BA                 User Key  (r) = Recorded By, (t) = Taken By, (c) = Cosigned By      Initials Name Provider Type    ESTHER Dixon Nurse Practitioner                        Body mass index is 25.24 kg/m².  Wt Readings from Last 1 Encounters:   03/21/24 82.1 kg (181 lb)     IBW (Ideal Body Weight): 70.8 kg    Ideal body weight: 70.8 kg (156 lb 1.4 oz)  Adjusted ideal body weight: 75.3 kg (166 lb 0.8 oz)    Now with hypotension. Will not give 30 cc/kg given volume overload, HF. Received 5% Albumin 25g x1. Levophed gtt started to maintain MAP > 65. Will continue w/IV antibiotics.   "

## 2024-03-21 NOTE — CONSULTS
[facility]  ICU Acceptance Note  Name: Juani Morales 63 y.o. female I MRN: [id number]  Unit/Bed#: ICU 11-01 I Date of Admission: 3/20/2024   Date of Service: 3/21/2024 I Hospital Day: 1    Consults: Upgraded to ICU for acute respiratory failure/respiratory distress    Assessment/Plan   * Acute respiratory failure with hypoxia (HCC)  Assessment & Plan  Initially admitted on 03/20 to [facility] for cough/SOB x2 weeks - required 5L NC on admission   Had escalating O2 requirements overnight - upgraded to ICU this AM for respiratory distress/failure   Failed BIPAP - intubated shortly after arrival to ICU this AM  Suspect this is multifactorial in setting of Metapneumovirus pneumonia vs COPD exacerbation vs flash pulmonary edema/acute on chronic HF  CXR this AM w/diffuse bronchitis and multifocal bronchiolitis/bronchopneumonia and possible superimposed very mild interstitial edema  RP2 panel (+) for Metapneumovirus  Urine strep/legionella negative  Sputum culture pending  Will obtain STAT CTA chest PE study now  Continue IV Azithromycin/Ceftriaxone for potential CAP, however if procalcitonin negative again, will stop  Continue IV Solu Medrol 40 mg Q8H for COPD exacerbation  Continue scheduled Xopenex/Atrovent nebs  Is s/p IV Lasix 80 mg x1 w/good responsive - continue PRN  Current vent settings: ACVC 20/450/80/8 - today is MV D#1  Follow ABG and adjust vent settings as necessary  Currently sedated w/Propofol/Fentanyl gtt's for goal RASS -2 to -3  Aggressive pulmonary hygiene  VAP precautions  Daily SAT/SBT  Wean FiO2 for SpO2 > 88%    Shock (HCC)  Assessment & Plan  Suspect mixed etiology: septic in setting of metapneumovirus pneumonia vs cardiogenic w/known dilated cardiomyopathy and acute on chronic HF  CXR this AM as noted above  TTE this AM revealed EF 30% w/moderate to severely reduced systolic function in a global manner, though septum is less kinetic than remaining segments; dilated RV w/mildly  reduced systolic function; mild AR, trace TR  SvO2 this AM was 85.3, however was cold/clammy/mottled on exam - now has improved  CVP 9  RP2 panel (+) for metapneumovirus  Blood cultures from admission pending - repeats sent given acute decompensation  UA pending  Urine strep/legionella negative  Sputum culture pending  Lactic acid 1.5  Procalcitonin <0.05 x2  Initially hypertensive, then became hypotensive post intubation/sedation - did not receive 30 cc/kg IVF bolus given volume overload/HF  Continue Levophed gtt for MAP > 65  Continue IV Azithromycin/Ceftriaxone for possible CAP, however as noted above, will stop if procalcitonin negative tomorrow  Is s/p IV Lasix 80 mg x1 for acute on chronic HF - will continue w/further dosing as needed  Would consider Milrinone gtt for inotropic support if SvO2 drops and/or if exam worsens  Monitor fever and WBC curve  Continue to trend SvO2 and lactic acid  Monitor hemodynamics closely    Human metapneumovirus pneumonia  Assessment & Plan  Please see plan as noted above    COPD with acute exacerbation (HCC)  Assessment & Plan  POA  Likely exacerbated by metapneumovirus pneumonia  Does not appear to follow w/Pulmonology - no PFTs available for review  Continues to smoke 1-2 PPD  Continue IV Solu Medrol 40 mg Q8H   Only uses PRN Albuterol inhaler at home - continue scheduled Xopenex/Atrovent nebs  Aggressive pulmonary hygiene    Acute on chronic combined systolic (congestive) and diastolic (congestive) heart failure (HCC)  Assessment & Plan  Wt Readings from Last 3 Encounters:   03/21/24 82.1 kg (181 lb)   03/13/24 81.1 kg (178 lb 12.8 oz)   01/05/24 85 kg (187 lb 6.4 oz)     Has history of nonischemic dilated cardiomyopathy - first noted in 2021, then again in 2022  TTE from 03/2023 revealed EF 70% w/mild to moderate concentric hypertrophy and mildly increased wall thickness; G1DD  Repeat today reveals EF 30% as noted above   Is s/p IV Lasix 80 mg x1 - continue w/PRN diuresis  for goal -1 to -2L over next 24H  SvO2 currently 85.3  May require inotropic agent if cardiogenic shock worsens  Holding home Coreg and Lisinopril given shock state  Holding home ASA given acute GIB  Strict I/O - maintain Henley cath for accurate I/O  Daily weights    Elevated troponin  Assessment & Plan  Troponins negative on admission - now elevated to 1290  Suspect this is 2/2 demand ischemia in setting of shock state/respiratory failure rather than ACS  TTE w/EF 30% as noted above - likely stress-induced  Denied chest pain prior to intubation  ECG initially w/ST depression in V4/V5 while in respiratory distress - no ST depressions on repeat ECG   Discussed case w/Dr. Chow (Cardiology) who agrees this is likely 2/2 demand rather than ACS  Continue to trend troponins and serial ECGs  Continue cardiac monitoring    GIB (gastrointestinal bleeding)  Assessment & Plan  Coffee-ground output noted in OGT   Holding home ASA for now  IV PPI BID started  GI consulted - appreciate recommendations  Monitor Hgb and output    Prediabetes  Assessment & Plan  Check HA1C  Will start SSI w/Q6H fingersticks  Goal  - 180    Polycythemia  Assessment & Plan  Likely in setting of chronic hypoxemia  Will need follow-up as outpatient    Chronic kidney disease (CKD)  Assessment & Plan  Lab Results   Component Value Date    EGFR 66 03/21/2024    EGFR 92 03/21/2024    EGFR 77 03/20/2024    CREATININE 0.92 03/21/2024    CREATININE 0.69 03/21/2024    CREATININE 0.81 03/20/2024     Baseline creatinine appears to be around 0.6 - 1.0  Has chronically occluded R renal artery and L renal artery is s/p stenting in 2022 - required IHD for 1/5 months in 2022  Avoid nephrotoxic agents and hypotension  Trend renal indices  Strict I/O  Daily weights    Tobacco abuse  Assessment & Plan  Smokes 1-2 PPD   NRT  Encourage cessation when appropriate    Anxiety  Assessment & Plan  Holding home Klonopin for now while NPO in setting of GIB    Chronic  pain syndrome  Assessment & Plan  Holding home MS Contin 30 mg BID and PRN Percocet 5-325 mg Q8H - appears to be filling this regularly per PDMP  Holding home Neurontin while NPO  Continue Fentanyl gtt while intubated    Essential hypertension  Assessment & Plan  Holding home BB and ACE-I given shock state           History of Present Illness     HPI: Juani Morales is a 63 y.o. female w/PMHx of HTN, HLD, history of nonischemic dilated cardiomyopathy, chronic diastolic HF, CKD3A, COPD, prediabetes, chronic pain syndrome w/opioid dependence, anxiety, tobacco abuse who initially presented on 03/20 for cough and SOB x2 weeks. Patient was admitted under SLIM for pneumonia and COPD exacerbation. She was started on IV antibiotics for CAP and IV steroids for COPD exacerbation. Overnight, she had worsening O2 requirements and CXR was concerning for pulmonary edema. She was given IV Lasix 80 mg x1. Patient was upgraded to ICU shortly after for worsening respiratory failure and respiratory distress. On exam, she was cold, clammy, and mottled. She remained oriented, however was very lethargic. She was hypertensive and in respiratory distress w/hypoxia on 9L MFNC. Unfortunately, she failed BIPAP and was subsequently intubated for respiratory failure. Bedside TTE revealed depressed EF when compared to prior TTE. Henley cath was placed and 2.2L UO was noted. Given concern for cardiogenic shock, R IJ TLC was placed emergently to obtain SvO2 and for further hemodynamic monitoring. Patient's daughter was updated at bedside.     History obtained from chart review.  Review of Systems   Unable to perform ROS: Severe respiratory distress     Disposition: Critical care   Historical Information   Past Medical History:  No date: Acute respiratory failure with hypoxia (HCC)  No date: Cardiomyopathy (HCC)  No date: Chronic combined systolic and diastolic congestive heart   failure  No date: COPD (chronic obstructive pulmonary disease)  (HCC)  No date: Fatty liver  No date: Hyperlipidemia  No date: Hypertension  No date: Hypertensive urgency  No date: Mitral regurgitation  No date: Rectal bleeding  No date: Sepsis Past Surgical History:  2021: CARDIAC CATHETERIZATION      Comment:  Moderate non-obstructive atherosclerosis  No date:  SECTION  No date: CHOLECYSTECTOMY  2022: IR RENAL ANGIOGRAM  2022: IR TEMPORARY DIALYSIS CATHETER CHECK/CHANGE/REPOSITION  2022: IR TUNNELED DIALYSIS CATHETER PLACEMENT  2022: IR TUNNELED DIALYSIS CATHETER REMOVAL  No date: ROTATOR CUFF REPAIR W/ DISTAL CLAVICLE EXCISION; Right  No date: TONSILLECTOMY   Current Outpatient Medications   Medication Instructions    albuterol (ProAir HFA) 90 mcg/act inhaler 2 puffs, Inhalation, Every 4 hours PRN    aspirin 81 mg, Oral, Daily    atorvastatin (LIPITOR) 40 mg, Oral, Daily    bisacodyl (DULCOLAX) 5 mg, Oral, Daily PRN    carvedilol (COREG) 25 mg, Oral, 2 times daily with meals    Cholecalciferol 50 MCG (2000) CAPS Oral, Daily    clonazePAM (KLONOPIN) 1 mg, Oral, Daily at bedtime    docusate sodium (COLACE) 100 mg, Oral, 2 times daily    furosemide (LASIX) 40 mg tablet Take 1/2 to 1 pill as needed for edema.    gabapentin (NEURONTIN) 100 mg, Oral, Daily at bedtime    lisinopril (ZESTRIL) 5 mg, Oral, Daily    MAGNESIUM PO Daily    morphine (MS CONTIN) 30 mg, Oral, Every 12 hours    oxyCODONE-acetaminophen (PERCOCET) 5-325 mg per tablet 1 tablet, Oral, Every 8 hours PRN    potassium chloride (K-DUR,KLOR-CON) 10 mEq tablet 10 mEq, Oral, Daily, If using Lasix    Allergies   Allergen Reactions    Wellbutrin [Bupropion] Arthralgia      Social History     Tobacco Use    Smoking status: Every Day     Current packs/day: 1.00     Average packs/day: 1 pack/day for 47.2 years (47.2 ttl pk-yrs)     Types: Cigarettes     Start date:      Passive exposure: Current    Smokeless tobacco: Never   Vaping Use    Vaping status: Never Used   Substance Use Topics     Alcohol use: Never    Drug use: No    Family History   Problem Relation Age of Onset    Rheum arthritis Mother     Stroke Father         Objective                            Vitals I/O      Most Recent Min/Max in 24hrs   Temp (!) 103.5 °F (39.7 °C) Temp  Min: 95.9 °F (35.5 °C)  Max: 103.5 °F (39.7 °C)   Pulse 94 Pulse  Min: 79  Max: 132   Resp 20 Resp  Min: 18  Max: 48   BP (!) 74/45 BP  Min: 74/45  Max: 238/154   O2 Sat 98 % SpO2  Min: 88 %  Max: 99 %      Intake/Output Summary (Last 24 hours) at 3/21/2024 1424  Last data filed at 3/21/2024 0900  Gross per 24 hour   Intake 555 ml   Output --   Net 555 ml       Diet NPO     Physical Exam   Physical Exam  Vitals and nursing note reviewed.   Eyes:      Pupils: Pupils are equal, round, and reactive to light.   Skin:     Capillary Refill: Capillary refill takes more than 3 seconds.      Comments: Initially cold, clammy, and mottled - now improved, but remains diaphoretic   HENT:      Nose: Nasogastric tube present.      Comments: OGT w/coffee-ground output  Neck:      Vascular: JVD and central line present.   Cardiovascular:      Rate and Rhythm: Regular rhythm. Tachycardia present.      Pulses:           Radial pulses are 1+ on the right side and 1+ on the left side.        Dorsalis pedis pulses are detected w/ Doppler on the right side and detected w/ Doppler on the left side.   Musculoskeletal:      Right lower le+ Pitting Edema present.      Left lower le+ Pitting Edema present.   Abdominal: General: Bowel sounds are normal. There is no distension.      Palpations: Abdomen is soft.   Constitutional:       Appearance: She is ill-appearing and diaphoretic.      Interventions: She is sedated, intubated and restrained.      Comments: Initially in acute distress as described above - now appears comfortable intubated/sedated   Pulmonary:      Effort: Tachypnea present. She is intubated.      Breath sounds: Examination of the right-lower field reveals rales.  Examination of the left-lower field reveals rales. Decreased breath sounds and rales present.      Comments: Initially in respiratory distress - improved post intubation  Psychiatric:      Comments: Deferred   Neurological:      Comments: Initially lethargic, but oriented prior to intubation - now sedated   Genitourinary/Anorectal:  Henley present.          Diagnostic Studies      EKG: sinus tachycardia  Imaging:  I have personally reviewed pertinent reports.   and I have personally reviewed pertinent films in PACS     Medications:  Scheduled PRN   azithromycin, 500 mg, Q24H  cefTRIAXone, 1,000 mg, Q24H  chlorhexidine, 15 mL, Q12H JULIÁN  heparin (porcine), 5,000 Units, Q8H JULIÁN  insulin lispro, 1-6 Units, Q6H JULIÁN  ipratropium, 0.5 mg, TID  levalbuterol, 1.25 mg, TID  methylPREDNISolone sodium succinate, 40 mg, Q8H JULIÁN  nicotine, 1 patch, Daily  pantoprazole, 40 mg, Q12H JULIÁN      fentaNYL, 50 mcg, Q1H PRN  ondansetron, 4 mg, Q6H PRN       Continuous    fentaNYL, 100 mcg/hr, Last Rate: 100 mcg/hr (03/21/24 1059)  norepinephrine, 1-30 mcg/min, Last Rate: 2 mcg/min (03/21/24 1210)  propofol, 5-50 mcg/kg/min, Last Rate: 20 mcg/kg/min (03/21/24 1253)         Labs:    CBC    Recent Labs     03/21/24 0454 03/21/24  0735 03/21/24  0738 03/21/24  1123   WBC 4.39 14.58*  --   --    HGB 15.9* 16.4* 16.7* 14.5   HCT 50.2* 51.0* 49*  --     315  --   --      BMP    Recent Labs     03/21/24 0454 03/21/24  0735 03/21/24  0738   SODIUM 133* 131*  --    K 4.6 5.0  --     99  --    CO2 20* 20* 27   AGAP 9 12  --    BUN 11 12  --    CREATININE 0.69 0.92  --    CALCIUM 8.8 8.8  --        Coags    Recent Labs     03/21/24  0735   INR 1.16   PTT 34        Additional Electrolytes  Recent Labs     03/21/24 0454 03/21/24  0735 03/21/24  0738   MG 2.2 2.0  --    PHOS 4.1 6.6*  --    CAIONIZED  --  1.11* 1.20          Blood Gas    Recent Labs     03/21/24  0921   PHART 7.213*   XZD7CGH 60.2*   PO2ART 102.6   QXO2JMY 23.7   BEART  -5.4   SOURCE Line, Arterial     Recent Labs     03/21/24  0921 03/21/24  1132   PHVEN  --  7.213*   LCM2CEG  --  64.1*   PO2VEN  --  48.0*   CKI6OEN  --  25.2   BEVEN  --  -4.0   T7RSLMV  --  77.3   SOURCE Line, Arterial  --     LFTs  Recent Labs     03/20/24  1206 03/21/24  0735   ALT 8 7   AST 15 14   ALKPHOS 93 92   ALB 4.4 4.4   TBILI 0.70 0.42       Infectious  Recent Labs     03/21/24  0454 03/21/24  0735   PROCALCITONI <0.05 <0.05     Glucose  Recent Labs     03/20/24  1206 03/21/24  0454 03/21/24  0735   GLUC 125 151* 360*             Critical Care Time Statement: Upon my evaluation, this patient had a high probability of imminent or life-threatening deterioration due to acute respiratory failure, shock, COPD exacerbation, acute on chronic HF, metapneumovirus pneumonia, which required my direct attention, intervention, and personal management.  I spent a total of 42 minutes directly providing critical care services, including interpretation of complex medical databases, evaluating for the presence of life-threatening injuries or illnesses, management of organ system failure(s) , complex medical decision making (to support/prevent further life-threatening deterioration)., interpretation of hemodynamic data, titration of vasoactive medications, titration of continuous IV medications (drips), and ventilator management. This time is exclusive of procedures, teaching, family meetings, and any prior time recorded by providers other than myself.      ESTHER San

## 2024-03-21 NOTE — QUICK NOTE
07:15am Patient admitted into ICU 11 emergently from Med surg 2. Placed on Bipap, hooked up to 12 Lead, pulse oximetry, and Blood pressure cuff. Temperature 95.9 Degrees F, bear hugger being placed. Blood glucose was 271

## 2024-03-21 NOTE — PROCEDURES
Central Line Insertion    Date/Time: 3/21/2024 8:32 AM    Performed by: ESTHER Rebolledo  Authorized by: ESTHER Rebolledo    Patient location:  Bedside and ICU  Consent:     Consent obtained:  Emergent situation  Universal protocol:     Site/side marked: yes      Immediately prior to procedure, a time out was called: yes      Patient identity confirmed:  Hospital-assigned identification number, arm band and anonymous protocol, patient vented/unresponsive  Pre-procedure details:     Hand hygiene: Hand hygiene performed prior to insertion      Sterile barrier technique: All elements of maximal sterile technique followed      Skin preparation:  ChloraPrep    Skin preparation agent: Skin preparation agent completely dried prior to procedure    Indications:     Central line indications: medications requiring central line and hemodynamic monitoring    Anesthesia (see MAR for exact dosages):     Anesthesia method:  Local infiltration    Local anesthetic:  Lidocaine 1% w/o epi  Procedure details:     Location:  Right internal jugular    Vessel type: vein      Laterality:  Right    Approach: percutaneous technique used      Patient position:  Flat    Catheter type:  Triple lumen 16cm    Catheter size:  7 Fr    Landmarks identified: yes      Ultrasound guidance: yes      Ultrasound image availability:  Images available in PACS    Sterile ultrasound techniques: Sterile gel and sterile probe covers were used      Manometry confirmation: yes      Number of attempts:  1    Successful placement: yes      Catheter tip vessel location: superior vena cava    Post-procedure details:     Post-procedure:  Dressing applied and line sutured    Assessment:  Blood return through all ports, no pneumothorax on x-ray, placement verified by x-ray and free fluid flow    Post-procedure complications: none      Patient tolerance of procedure:  Tolerated well, no immediate complications

## 2024-03-21 NOTE — ASSESSMENT & PLAN NOTE
Coffee-ground output noted in OGT   Holding home ASA for now  IV PPI BID started  GI consulted - appreciate recommendations  Monitor Hgb and output

## 2024-03-21 NOTE — ASSESSMENT & PLAN NOTE
Suspect mixed etiology: septic in setting of metapneumovirus pneumonia vs cardiogenic w/known dilated cardiomyopathy and acute on chronic HF  CXR this AM as noted above  TTE this AM revealed EF 30% w/moderate to severely reduced systolic function in a global manner, though septum is less kinetic than remaining segments; dilated RV w/mildly reduced systolic function; mild AR, trace TR  SvO2 this AM was 85.3, however was cold/clammy/mottled on exam - now has improved  CVP 9  RP2 panel (+) for metapneumovirus  Blood cultures from admission pending - repeats sent given acute decompensation  UA pending  Urine strep/legionella negative  Sputum culture pending  Lactic acid 1.5  Procalcitonin <0.05 x2  Initially hypertensive, then became hypotensive post intubation/sedation - did not receive 30 cc/kg IVF bolus given volume overload/HF  Continue Levophed gtt for MAP > 65  Continue IV Azithromycin/Ceftriaxone for possible CAP, however as noted above, will stop if procalcitonin negative tomorrow  Is s/p IV Lasix 80 mg x1 for acute on chronic HF - will continue w/further dosing as needed  Would consider Milrinone gtt for inotropic support if SvO2 drops and/or if exam worsens  Monitor fever and WBC curve  Continue to trend SvO2 and lactic acid  Monitor hemodynamics closely

## 2024-03-21 NOTE — PROGRESS NOTES
Deterioration Index Critical Care Recommendations  Room #: 212  Deterioration index score: 75.6%    Critical Care recommends Please see ICU Acceptance Note.    Spoke with Dr. Putnam from primary team    Brief summary:   Critical care was brought to the patient's bedside via deterioration index alert. The alert was concerning for respiratory distress, HTN, tachycardia.     Please contact critical care via [facility] with any questions or concerns.

## 2024-03-21 NOTE — SEPSIS NOTE
Sepsis Note   Juani Morales 63 y.o. female MRN: [id number]  Unit/Bed#: ICU 11-01 Encounter: [id number]       Initial Sepsis Screening       Row Name 03/21/24 1115 03/21/24 0725             Is the patient's history suggestive of a new or worsening infection? Yes (Proceed)  -BA Yes (Proceed)  -BA       Suspected source of infection pneumonia  -BA pneumonia  -BA       Indicate SIRS criteria Hyperthemia > 38.3C (100.9F) OR Hypothermia <36C (96.8F);Tachypnea > 20 resp per min;Leukocytosis (WBC > 07772 IJL) OR Leukopenia (WBC <4000 IJL) OR Bandemia (WBC >10% bands);Tachycardia > 90 bpm  -BA Hyperthemia > 38.3C (100.9F) OR Hypothermia <36C (96.8F);Tachycardia > 90 bpm;Tachypnea > 20 resp per min;Leukocytosis (WBC > 66701 IJL) OR Leukopenia (WBC <4000 IJL) OR Bandemia (WBC >10% bands)  -BA       Are two or more of the above signs & symptoms of infection both present and new to the patient? Yes (Proceed)  -BA Yes (Proceed)  -BA       Assess for evidence of organ dysfunction: Are any of the below criteria present within 6 hours of suspected infection and SIRS criteria that are NOT considered to be chronic conditions? SBP < 90;MAP < 65;SBP decrease > 40 from baseline;New need for invasive/non-invasive ventilation  -BA New need for invasive/non-invasive ventilation  -BA       Date of presentation of severe sepsis -- 03/21/24  -BA       Time of presentation of severe sepsis -- 0725  -BA       Date of presentation of septic shock 03/21/24  -BA --       Time of presentation of septic shock 1115  -BA --       Fluid Resuscitation: A lesser volume than 30 ml/kg IV fluid will be given  Albumin 5% 25g given instead of crystalloid.  -BA --       The 30 mL/kg fluid bolus was not given to the patient despite having hypotension, a lactate of >= 4 mmol/L, or documentation of septic shock secondary to: Colloids were administered;Heart Failure;Concern for fluid/volume overload  -BA --       Instead of the 30 ml/kg fluid bolus, the  "following volume of crystalloid fluid will be ordered: Other (document in comments)  Colloids given instead of crystalloid.  -BA --       Of the following fluid type: Other (document in comments)  Colloid  -BA --       Is the patient is persistently hypotensive in the hour after fluid bolus administration? If yes, patient meets criteria for vasopressor use. YES  -BA --       Sepsis Note: Click \"NEXT\" below (NOT \"close\") to generate sepsis note based on above information. YES (proceed by clicking \"NEXT\")  -BA YES (proceed by clicking \"NEXT\")  -BA                 User Key  (r) = Recorded By, (t) = Taken By, (c) = Cosigned By      Initials Name Provider Type    ESTHER Dixon Nurse Practitioner                    Default Flowsheet Data (last 720 hours)       Sepsis Reassess       Row Name 03/21/24 1432                   Repeat Volume Status and Tissue Perfusion Assessment Performed    Date of Reassessment: 03/21/24  -        Time of Reassessment: 1432  -        Sepsis Reassessment Note: Click \"NEXT\" below (NOT \"close\") to generate sepsis reassessment note. --        Repeat Volume Status and Tissue Perfusion Assessment Performed --                  User Key  (r) = Recorded By, (t) = Taken By, (c) = Cosigned By      Initials Name Provider Type    ESTHER Dixon Nurse Practitioner                    Body mass index is 25.24 kg/m².  Wt Readings from Last 1 Encounters:   03/21/24 82.1 kg (181 lb)     IBW (Ideal Body Weight): 70.8 kg    Ideal body weight: 70.8 kg (156 lb 1.4 oz)  Adjusted ideal body weight: 75.3 kg (166 lb 0.8 oz)    "

## 2024-03-21 NOTE — ASSESSMENT & PLAN NOTE
Initially admitted on 03/20 to [facility] for cough/SOB x2 weeks - required 5L NC on admission   Had escalating O2 requirements overnight - upgraded to ICU this AM for respiratory distress/failure   Failed BIPAP - intubated shortly after arrival to ICU this AM  Suspect this is multifactorial in setting of Metapneumovirus pneumonia vs COPD exacerbation vs flash pulmonary edema/acute on chronic HF  CXR this AM w/diffuse bronchitis and multifocal bronchiolitis/bronchopneumonia and possible superimposed very mild interstitial edema  RP2 panel (+) for Metapneumovirus  Urine strep/legionella negative  Sputum culture pending  Will obtain STAT CTA chest PE study now  Continue IV Azithromycin/Ceftriaxone for potential CAP, however if procalcitonin negative again, will stop  Continue IV Solu Medrol 40 mg Q8H for COPD exacerbation  Continue scheduled Xopenex/Atrovent nebs  Is s/p IV Lasix 80 mg x1 w/good responsive - continue PRN  Current vent settings: ACVC 20/450/80/8 - today is MV D#1  Follow ABG and adjust vent settings as necessary  Currently sedated w/Propofol/Fentanyl gtt's for goal RASS -2 to -3  Aggressive pulmonary hygiene  VAP precautions  Daily SAT/SBT  Wean FiO2 for SpO2 > 88%

## 2024-03-21 NOTE — ASSESSMENT & PLAN NOTE
Wt Readings from Last 3 Encounters:   03/21/24 82.1 kg (181 lb)   03/13/24 81.1 kg (178 lb 12.8 oz)   01/05/24 85 kg (187 lb 6.4 oz)     Has history of nonischemic dilated cardiomyopathy - first noted in 2021, then again in 2022  TTE from 03/2023 revealed EF 70% w/mild to moderate concentric hypertrophy and mildly increased wall thickness; G1DD  Repeat today reveals EF 30% as noted above   Is s/p IV Lasix 80 mg x1 - continue w/PRN diuresis for goal -1 to -2L over next 24H  SvO2 currently 85.3  May require inotropic agent if cardiogenic shock worsens  Holding home Coreg and Lisinopril given shock state  Holding home ASA given acute GIB  Strict I/O - maintain Henley cath for accurate I/O  Daily weights

## 2024-03-21 NOTE — ASSESSMENT & PLAN NOTE
Holding home MS Contin 30 mg BID and PRN Percocet 5-325 mg Q8H - appears to be filling this regularly per PDMP  Holding home Neurontin while NPO  Continue Fentanyl gtt while intubated

## 2024-03-21 NOTE — NURSING NOTE
Patient transferred from [facility]  to ICU room 11 for Acute Respiratory Failure.  Patient with increased WOB, diaphoretic, Sinus Tachycardia on cardiac telemetry; hypothermic, cold clammy Patient placed on Bi-Pap; Respiratory status worsening; Intubated at 07:58. IV sedation administered per NP. Patient restrained with Bilateral soft wrist restraints for safety and injury prevention.  Patient's daughter made aware of same.

## 2024-03-21 NOTE — PROGRESS NOTES
Pastoral Care Progress Note    3/21/2024  Patient: Juani Morales : 1960  Admission Date & Time: 3/20/2024 1149  MRN: [id number] [facility]: [id number]      CH initiated support visit to family in setting of critical care. Pt occupied with providers, daughter of pt arrived urgently bedside for medical update.  After daughter received update, CH and daughter engaged in family waiting area.  Daughter shared that she is the pt's only support. There are no other siblings. Daughter shared pt's medical hx, and about several critical incidents that pt has gone through. Daughter shared that she attempts to support Juani in healthy habits and smoking cessation, but this has met resistance. Daughter of pt shared she worries at times that Juani may decompensate and it would not be discovered.  Daughter and CH talked through daughter's plan for today: she will return to work until 1400. She expects to visit shortly after that time. Daughter is supported by her  and she has 2 young children at home.  CH provided empathetic listening and support. CH encouraged self care to daughter so that she may have strength and focus for the pt's admission.             Chaplaincy Interventions Utilized:   Empowerment: Encouraged focus on present and Encouraged self-care    Exploration: Explored relational needs & resources    Collaboration: Consulted with interdisciplinary team    Relationship Building: Listened empathically    Chaplaincy Outcomes Achieved:  Expressed intermediate hope    Spiritual Coping Strategies Utilized:   Connectedness       24 0900   Clinical Encounter Type   Visited With Family   Routine Visit Introduction

## 2024-03-21 NOTE — SEPSIS NOTE
"  Sepsis Note   Juani Morales 63 y.o. female MRN: [id number]  Unit/Bed#: ICU 11-01 Encounter: [id number]       Initial Sepsis Screening       Row Name 03/21/24 0725                Is the patient's history suggestive of a new or worsening infection? Yes (Proceed)  -BA        Suspected source of infection pneumonia  -BA        Indicate SIRS criteria Hyperthemia > 38.3C (100.9F) OR Hypothermia <36C (96.8F);Tachycardia > 90 bpm;Tachypnea > 20 resp per min;Leukocytosis (WBC > 25781 IJL) OR Leukopenia (WBC <4000 IJL) OR Bandemia (WBC >10% bands)  -BA        Are two or more of the above signs & symptoms of infection both present and new to the patient? Yes (Proceed)  -BA        Assess for evidence of organ dysfunction: Are any of the below criteria present within 6 hours of suspected infection and SIRS criteria that are NOT considered to be chronic conditions? New need for invasive/non-invasive ventilation  -BA        Date of presentation of severe sepsis 03/21/24  -BA        Time of presentation of severe sepsis 0725  -BA        Sepsis Note: Click \"NEXT\" below (NOT \"close\") to generate sepsis note based on above information. YES (proceed by clicking \"NEXT\")  -BA                  User Key  (r) = Recorded By, (t) = Taken By, (c) = Cosigned By      Initials Name Provider Type    BA ESTHER Rebolledo Nurse Practitioner                        Body mass index is 25.24 kg/m².  Wt Readings from Last 1 Encounters:   03/21/24 82.1 kg (181 lb)     IBW (Ideal Body Weight): 70.8 kg    Ideal body weight: 70.8 kg (156 lb 1.4 oz)  Adjusted ideal body weight: 75.3 kg (166 lb 0.8 oz)    Now meeting severe sepsis criteria. Will resend blood cultures and obtain further septic work-up given acute decompensation. Send lactic acid. Already receiving IV Azithromycin and Ceftriaxone for CAP - will continue. Holding on IVF given HF, overloaded state.     "

## 2024-03-21 NOTE — CASE MANAGEMENT
Case Management Discharge Planning Note    Patient name Juani Morales  Location ICU 11/ICU  MRN [id number]  : 1960 Date 3/21/2024       Current Admission Date: 3/20/2024  Current Admission Diagnosis:Pneumonia   Patient Active Problem List    Diagnosis Date Noted    Pneumonia 2024    Retinal artery occlusion, branch, right 2024    Chronic fatigue 2024    Elevated blood sugar 10/16/2023    Continuous opioid dependence (HCC) 2023    Asymptomatic menopausal state 2023    Constipation 2023    Encounter for screening mammogram for breast cancer 2023    Secondary hyperparathyroidism of renal origin (HCC) 2022    Renal artery stenosis (HCC) 2022    Hepatitis B 2022    Acute respiratory failure with hypoxia (HCC) 2022    COPD (chronic obstructive pulmonary disease) (HCC) 06/15/2022    Tobacco abuse 2021    Anxiety 2021    Dilated cardiomyopathy (HCC) 2021    Essential hypertension 2021    Chronic pain syndrome 2021    Vitamin D deficiency 2013      LOS (days): 1  Geometric Mean LOS (GMLOS) (days):   Days to GMLOS:     OBJECTIVE:  Risk of Unplanned Readmission Score: 20.29      Current admission status: Inpatient   Preferred Pharmacy:   [id number] [address] [address]  Phone: [phone] Fax: [phone]    Primary Care Provider: Hugh Castro DO    Primary Insurance: MEDICARE  Secondary Insurance: [facility]    DISCHARGE DETAILS:  PT transferred to ICU this am.  Reviewed the chart for CM purposes.  Pt was urgently intubated, central line, now under critical care services.  Will evaluate when extubated.                                                                                                                          no

## 2024-03-22 NOTE — RESPIRATORY THERAPY NOTE
03/22/24 0425   Respiratory Assessment   General Appearance Sedated   Respiratory Pattern Assisted   Chest Assessment Chest expansion symmetrical   Bilateral Breath Sounds Diminished   Suction ET Tube   Resp Comments pt remains on current vent settings without incident. pt suctioned as needed. ETT secure   O2 Device vent   Vent Information   Vent ID [id number]   Vent type Segura G5   Segura Vent Mode (S)CMV   $ Vent Daily Charge-Subsequent Yes   $ Pulse Oximetry Spot Check Charge Completed   (S)CMV Settings   Resp Rate (BPM) 24 BPM   VT (mL) 470 mL   FIO2 (%) 50 %   PEEP (cmH2O) 8 cmH2O   I:E Ratio 1:2.1   Insp Time (%) 0.8 %   Flow Trigger (LPM) 5   Humidification Heater   Heater Temperature (Set) 95 °F (35 °C)   (S)CMV Actuals   Resp Rate (BPM) 24 BPM   VT (mL) 471   MV 11.3   MAP (cmH2O) 15 cmH2O   Peak Pressure (cmH2O) 29 cmH2O   I:E Ratio (Obs) 1:2.1   Insp Resistance 24   Heater Temperature (Obs) 95 °F (35 °C)   Static Compliance (mL/cmH20) 63.1 mL/cmH2O   Plateau Pressure (cm H2O) 21.6 cm H2O   (S)CMV Alarms   High Peak Pressure (cmH2O) 40   Low Pressure (cmH2O) 5 cm H2O   High Resp Rate (BPM) 40 BPM   Low Resp Rate (BPM) 8 BPM   High MV (L/min) 20 L/min   Low MV (L/min) 4 L/min   High VT (mL) 1000 mL   Low VT (mL) 350 mL   Apnea Time (s) 20 S   Maintenance   Alarm (pink) cable attached No   Resuscitation bag with peep valve at bedside Yes   Water bag changed No   Circuit changed No   Daily Screen   Patient safety screen outcome: Failed   Not Ready for Weaning due to: Underline problem not resolved   IHI Ventilator Associated Pneumonia Bundle   Head of Bed Elevated HOB 30   ETT  Hi-Lo;Cuffed 7.5 mm   Placement Date/Time: 03/21/24 (c) 1718   Type: Hi-Lo;Cuffed  Tube Size: 7.5 mm  Location: Oral  Insertion attempts: 1  Placement Verification: Chest x-ray;Symmetrical chest wall movement  Secured at (cm): 25   Secured at (cm) 25   Measured from Gums   Secured Location (S)  Left   Repositioned Center to Left    Secured by Commercial tube giron   Site Condition Dry   Cuff Pressure (color) Green   HI-LO Suction  Continuous low suction   HI-LO Secretions Scant     RT Ventilator Management Note  Juani Morales 63 y.o. female MRN: [id number]  Unit/Bed#: ICU 11-01 Encounter: [id number]      Daily Screen         3/21/2024  0807 3/22/2024  0425          Patient safety screen outcome:: Failed Failed      Not Ready for Weaning due to:: Underline problem not resolved Underline problem not resolved                Physical Exam:   General Appearance: Sedated  Respiratory Pattern: Assisted  Chest Assessment: Chest expansion symmetrical  Bilateral Breath Sounds: Diminished  Suction: ET Tube  O2 Device: vent      Resp Comments: pt remains on current vent settings without incident. pt suctioned as needed. ETT secure

## 2024-03-22 NOTE — ASSESSMENT & PLAN NOTE
Likely due to respiratory infection/COPD--there may have been a component of mild volume overload as she diuresed almost 3 L, however she does not appear volume overloaded at this time  Remains intubated, management per critical care

## 2024-03-22 NOTE — ASSESSMENT & PLAN NOTE
Wt Readings from Last 3 Encounters:   03/22/24 79.1 kg (174 lb 6.1 oz)   03/13/24 81.1 kg (178 lb 12.8 oz)   01/05/24 85 kg (187 lb 6.4 oz)     Has history of nonischemic dilated cardiomyopathy - first noted in 2021, then again in 2022  TTE from 03/2023 revealed EF 70% w/mild to moderate concentric hypertrophy and mildly increased wall thickness; G1DD  Repeat today reveals EF 30% as noted above     Plan:  Is s/p IV Lasix 80 mg x1 - continue w/PRN diuresis for goal -1 to -2L over next 24H  SvO2 currently 72.9  Holding home Coreg and Lisinopril given shock state  Holding home ASA given acute GIB  Strict I/O - maintain Henley cath for accurate I/O  Daily weights

## 2024-03-22 NOTE — NUTRITION
03/22/24 1402   Biochemical Data,Medical Tests, and Procedures   Biochemical Data/Medical Tests/Procedures Lab values reviewed;Meds reviewed   Labs (Comment) 3/22/24 glucose 147, Mg 2.9, P 4.6   Meds (Comment) heparin, insulin, protonix, fentanyl, norepinephrine, propofol   Nutrition-Focused Physical Exam   Nutrition-Focused Physical Exam Findings RN skin assessment reviewed;Edema;No skin issues documented  (Trace generalized edema, trace bilateral lower extremity edema.)   Medical-Related Concerns hypertension, CHF, CKD, prediabetes, vitamin D deficiency, COPD   Feeding Route   PO NPO   Propofol mL/hr 24 mL/hr   Propofol Kcals 629 kcal   Current PO Intake   Estimated calorie intake compared to estimated need Nutrient needs are not met at this time.   PES Statement   Oral or Nutritional Support Intake (2) Inadequate oral intake NI-2.1   Related to Other (Comment)  (acute critical illness)   As evidenced by: Intake < estimated needs   Recommendations/Interventions   Malnutrition/BMI Present No   Summary N.p.o.; ventilator in use.  Presents with shortness of breath and flu symptoms.  Positive for HMPV.  Intubated 3/21 in setting of respiratory distress.  Decrease in hemoglobin, GI consulted and recommending PPI twice daily unless GI bleed symptoms become significant.  Past medical history significant for hypertension, CHF, CKD, prediabetes, vitamin D deficiency, COPD.  Weight history reviewed.  Noted significant 22# weight loss in 5 months (11.2% body weight).  Trace generalized edema, trace bilateral lower extremity edema.  No pressure areas.  N.p.o. at present.  Intubated and sedated at this time.  Propofol running at 24.2 mL/h, provides 639 kcals daily. OGT in place. When clinically indicated to initiate EN recommend starting Jevity 1.2 at 15 mL/hr titrating up by 10 mL q4hr to goal of 35 mL/hr continuous. Prosource Protein Liquid TID. Provides 1187 kcal, 92 g protein, 677 mL free water. Flushes per MD. Kcal and  protein needs will be met via EN at goal, Prosource TID, and propofol at current rate. RD to follow and make updated recommendations as hospital course progresses.   Interventions/Recommendations Tube feeding recs provided;Monitor I & O's   Recommendations to Provider When clinically indicated to initiate EN recommend starting Jevity 1.2 at 15 mL/hr titrating up by 10 mL q4hr to goal of 35 mL/hr continuous. Prosource Protein Liquid TID. Provides 1187 kcal, 92 g protein, 677 mL free water.   Education Assessment   Education Patient/caregiver not appropriate for education at this time   Patient Nutrition Goals   Goal Initiate EN;Nutrition via appropriate route

## 2024-03-22 NOTE — ASSESSMENT & PLAN NOTE
Suspect mixed etiology: septic in setting of metapneumovirus pneumonia vs cardiogenic w/known dilated cardiomyopathy and acute on chronic HF  CXR this AM as noted above  TTE this AM revealed EF 30% w/moderate to severely reduced systolic function in a global manner, though septum is less kinetic than remaining segments; dilated RV w/mildly reduced systolic function; mild AR, trace TR  CVP 9  RP2 panel (+) for metapneumovirus  Blood cultures from admission pending - repeats sent given acute decompensation  UA negative  Urine strep/legionella negative  Sputum culture pending  Lactic acid 1.5  Procalcitonin <0.05 x2  Initially hypertensive, then became hypotensive post intubation/sedation - did not receive 30 cc/kg IVF bolus given volume overload/HF  Continue IV Azithromycin/Ceftriaxone for possible CAP, however as noted above, procal remains negative, consider stopping today  Is s/p IV Lasix 80 mg x1 for acute on chronic HF - will continue w/further dosing as needed  Monitor fever and WBC curve  Monitor hemodynamics closely

## 2024-03-22 NOTE — PROGRESS NOTES
[facility]  Progress Note  Name: Juani Morales I  MRN: [id number]  Unit/Bed#: ICU 11-01 I Date of Admission: 3/20/2024   Date of Service: 3/22/2024 I Hospital Day: 2    Assessment/Plan   * Acute respiratory failure with hypoxia (HCC)  Assessment & Plan  Initially admitted on 03/20 to [facility] for cough/SOB x2 weeks - required 5L NC on admission   Had escalating O2 requirements overnight - upgraded to ICU this AM for respiratory distress/failure   Failed BIPAP - intubated shortly after arrival to ICU 3/21  Suspect this is multifactorial in setting of Metapneumovirus pneumonia vs COPD exacerbation vs flash pulmonary edema/acute on chronic HF  CXR this AM w/diffuse bronchitis and multifocal bronchiolitis/bronchopneumonia and possible superimposed very mild interstitial edema  RP2 panel (+) for Metapneumovirus  Urine strep/legionella negative  Sputum culture pending  CTA PE study subpleural consolidation in the inferior left lower lobe, possibly atelectatic but pneumonia cannot be excluded. Minimal compressive atelectasis in the dependent right lower lobe.   Azithro/Cftx D2, procal elevated this AM, continue to trend  Continue IV Solu Medrol 40 mg Q8H for COPD exacerbation  Continue scheduled Xopenex/Atrovent nebs  Is s/p IV Lasix 80 mg x1 w/good responsive - continue PRN for goal negative 1-2L  Current vent settings: ACVC 20/450/50/8 - today is MV D#2  Follow ABG and adjust vent settings as necessary  Currently sedated w/Propofol/Fentanyl gtt's for goal RASS -2 to -3  Aggressive pulmonary hygiene  VAP precautions  Daily SAT/SBT  Wean FiO2 for SpO2 > 88%    Shock (HCC)  Assessment & Plan  Suspect mixed etiology: septic in setting of metapneumovirus pneumonia vs cardiogenic w/known dilated cardiomyopathy and acute on chronic HF  CXR this AM as noted above  TTE this AM revealed EF 30% w/moderate to severely reduced systolic function in a global manner, though septum is less kinetic than remaining  segments; dilated RV w/mildly reduced systolic function; mild AR, trace TR  CVP 9  RP2 panel (+) for metapneumovirus  Blood cultures from admission pending - repeats sent given acute decompensation  UA negative  Urine strep/legionella negative  Sputum culture pending  Lactic acid 1.5  Procalcitonin <0.05 x2  Initially hypertensive, then became hypotensive post intubation/sedation - did not receive 30 cc/kg IVF bolus given volume overload/HF  Continue IV Azithromycin/Ceftriaxone for possible CAP, now D2, bump in procal noted this AM   Is s/p IV Lasix 80 mg x1 for acute on chronic HF - will continue w/further dosing as needed  Monitor fever and WBC curve  Monitor hemodynamics closely    Human metapneumovirus pneumonia  Assessment & Plan  Please see plan as noted above    COPD with acute exacerbation (HCC)  Assessment & Plan  POA  Likely exacerbated by metapneumovirus pneumonia  Does not appear to follow w/Pulmonology - no PFTs available for review  Continues to smoke 1-2 PPD  Continue IV Solu Medrol 40 mg Q8H   Only uses PRN Albuterol inhaler at home - continue scheduled Xopenex/Atrovent nebs  Aggressive pulmonary hygiene    Acute on chronic combined systolic (congestive) and diastolic (congestive) heart failure (HCC)  Assessment & Plan  Wt Readings from Last 3 Encounters:   03/22/24 79.1 kg (174 lb 6.1 oz)   03/13/24 81.1 kg (178 lb 12.8 oz)   01/05/24 85 kg (187 lb 6.4 oz)     Has history of nonischemic dilated cardiomyopathy - first noted in 2021, then again in 2022  TTE from 03/2023 revealed EF 70% w/mild to moderate concentric hypertrophy and mildly increased wall thickness; G1DD  Repeat today reveals EF 30% as noted above   Is s/p IV Lasix 80 mg x1 - continue w/PRN diuresis for goal -1 to -2L over next 24H  SvO2 currently 85.3  Holding home Coreg and Lisinopril given shock state  Holding home ASA given acute GIB  Strict I/O - maintain Henley cath for accurate I/O  Daily weights    Elevated troponin  Assessment &  Plan  Troponins negative on admission - now elevated to 1290, peaked at 2018  Suspect this is 2/2 demand ischemia in setting of shock state/respiratory failure rather than ACS  TTE w/EF 30% as noted above - likely stress-induced  Denied chest pain prior to intubation  ECG initially w/ST depression in V4/V5 while in respiratory distress - no ST depressions on repeat ECG   Discussed case w/Dr. Chow (Cardiology) who agrees this is likely 2/2 demand rather than ACS  Continue to trend troponins and serial ECGs  Continue cardiac monitoring    GIB (gastrointestinal bleeding)  Assessment & Plan  Coffee-ground output noted in OGT   Holding home ASA for now  IV PPI BID started  GI consulted - appreciate recommendations  Monitor Hgb and output    Prediabetes  Assessment & Plan  Check HA1C  Will start SSI w/Q6H fingersticks  Goal  - 180    Polycythemia  Assessment & Plan  Likely in setting of chronic hypoxemia  Will need follow-up as outpatient    Chronic kidney disease (CKD)  Assessment & Plan  Lab Results   Component Value Date    EGFR 51 03/22/2024    EGFR 51 03/21/2024    EGFR 66 03/21/2024    CREATININE 1.14 03/22/2024    CREATININE 1.14 03/21/2024    CREATININE 0.92 03/21/2024     Baseline creatinine appears to be around 0.6 - 1.0  Has chronically occluded R renal artery and L renal artery is s/p stenting in 2022 - required IHD for 1/5 months in 2022  Avoid nephrotoxic agents and hypotension  Trend renal indices  Strict I/O  Daily weights    Tobacco abuse  Assessment & Plan  Smokes 1-2 PPD   NRT  Encourage cessation when appropriate    Anxiety  Assessment & Plan  Holding home Klonopin for now while NPO in setting of GIB    Chronic pain syndrome  Assessment & Plan  Holding home MS Contin 30 mg BID and PRN Percocet 5-325 mg Q8H - appears to be filling this regularly per PDMP  Holding home Neurontin while NPO  Continue Fentanyl gtt while intubated    Essential hypertension  Assessment & Plan  Holding home BB and  ACE-I given shock state             Disposition: Critical care    ICU Core Measures     Vented Patient  VAP Bundle  VAP bundle ordered     A: Assess, Prevent, and Manage Pain Has pain been assessed? Yes  Need for changes to pain regimen? No   B: Both Spontaneous Awakening Trials (SATs) and Spontaneous Breathing Trials (SBTs) Plan to perform spontaneous awakening trial today? Yes   Plan to perform spontaneous breathing trial today? Yes   Obvious barriers to extubation? No   C: Choice of Sedation RASS Goal: -1 Drowsy  Need for changes to sedation or analgesia regimen? No   D: Delirium CAM-ICU: Unable to perform secondary to Acute cognitive dysfunction   E: Early Mobility  Plan for early mobility? Yes   F: Family Engagement Plan for family engagement today? Yes       Antibiotic Review: Awaiting culture results.     Review of Invasive Devices:    Kale Plan: Continue for accurate I/O monitoring for 48 hours  Central access plan: Medications requiring central line  Susi Plan: Keep arterial line for hemodynamic monitoring    Prophylaxis:  VTE VTE covered by:  heparin (porcine), Subcutaneous, 5,000 Units at 03/22/24 0513       Stress Ulcer  covered bypantoprazole (PROTONIX) injection 40 mg [[id number]]         Significant 24hr Events     24hr events: No acute events overnight.     Subjective   Review of Systems   Unable to perform ROS: Intubated      Objective                            Vitals I/O      Most Recent Min/Max in 24hrs   Temp (!) 95.9 °F (35.5 °C) Temp  Min: 95.9 °F (35.5 °C)  Max: 103.5 °F (39.7 °C)   Pulse 79 Pulse  Min: 70  Max: 132   Resp (!) 24 Resp  Min: 20  Max: 48   BP (!) 74/45 BP  Min: 74/45  Max: 238/154   O2 Sat 95 % SpO2  Min: 89 %  Max: 99 %      Intake/Output Summary (Last 24 hours) at 3/22/2024 0609  Last data filed at 3/22/2024 0600  Gross per 24 hour   Intake 1306.63 ml   Output 4700 ml   Net -3393.37 ml       Diet NPO    Invasive Monitoring   Arterial Line  [place] /65  Arterial Line BP   Min: 81/55  Max: 204/108   MAP 84 mmHg  Arterial Line MAP (mmHg)  Min: 63 mmHg  Max: 136 mmHg           Physical Exam   Physical Exam  Vitals and nursing note reviewed.   Eyes:      Pupils: Pupils are equal, round, and reactive to light.   Skin:     General: Skin is warm and dry.   HENT:      Head: Normocephalic.      Mouth/Throat:      Mouth: Mucous membranes are dry.   Neck:      Vascular: Central line present.   Cardiovascular:      Rate and Rhythm: Normal rate and regular rhythm.      Pulses: Normal pulses.      Heart sounds: Normal heart sounds.   Musculoskeletal:         General: Normal range of motion.   Abdominal:      Palpations: Abdomen is soft.   Constitutional:       Appearance: She is ill-appearing.      Interventions: She is intubated.   Pulmonary:      Effort: She is intubated.   Neurological:      Comments: GCS 3T   Genitourinary/Anorectal:  Henley present.          Diagnostic Studies      EKG: NSR  Imaging:  I have personally reviewed pertinent reports.   and I have personally reviewed pertinent films in PACS     Medications:  Scheduled PRN   azithromycin, 500 mg, Q24H  cefTRIAXone, 1,000 mg, Q24H  chlorhexidine, 15 mL, Q12H JULIÁN  heparin (porcine), 5,000 Units, Q8H JULIÁN  insulin lispro, 1-6 Units, Q6H JULIÁN  ipratropium, 0.5 mg, TID  levalbuterol, 1.25 mg, TID  methylPREDNISolone sodium succinate, 40 mg, Q8H JULIÁN  nicotine, 1 patch, Daily  pantoprazole, 40 mg, Q12H JULIÁN      fentaNYL, 50 mcg, Q1H PRN  ondansetron, 4 mg, Q6H PRN       Continuous    fentaNYL, 100 mcg/hr, Last Rate: 100 mcg/hr (03/22/24 2554)  norepinephrine, 1-30 mcg/min, Last Rate: Stopped (03/21/24 1716)  propofol, 5-50 mcg/kg/min, Last Rate: 40 mcg/kg/min (03/22/24 0325)         Labs:    CBC    Recent Labs     03/21/24  1123 03/22/24  0526   WBC 11.89* 9.66   HGB 14.5  14.5 13.9   HCT 44.8 44.4    194     BMP    Recent Labs     03/21/24  1555 03/22/24  0526   SODIUM 136 141   K 4.0 4.0    105   CO2 25 25   AGAP 9 11   BUN  20 25   CREATININE 1.14 1.14   CALCIUM 8.5 9.0       Coags    Recent Labs     03/21/24  0735   INR 1.16   PTT 34        Additional Electrolytes  Recent Labs     03/21/24  1555 03/22/24  0526   MG 2.0 2.9*   PHOS 4.7* 4.6*   CAIONIZED 1.10* 1.18          Blood Gas    Recent Labs     03/22/24  0526   PHART 7.321*   [id number] 53.2*   PO2ART 81.6   ZIY4FRK 26.9   BEART -0.2   SOURCE Line, Arterial     Recent Labs     03/21/24 2039 03/22/24  0526   PHVEN 7.265*  --    UPO4EFS 57.8*  --    PO2VEN 41.7  --    OIC5SXZ 25.6  --    BEVEN -2.4  --    G8MIVXI 72.9  --    SOURCE  --  Line, Arterial    LFTs  Recent Labs     03/21/24  0735 03/22/24  0526   ALT 7 11   AST 14 21   ALKPHOS 92 64   ALB 4.4 3.8   TBILI 0.42 0.27       Infectious  Recent Labs     03/21/24  0735 03/22/24  0526   PROCALCITONI <0.05 4.55*     Glucose  Recent Labs     03/21/24  0454 03/21/24  0735 03/21/24  1555 03/22/24  0526   GLUC 151* 360* 151* 135               ESTHER Gregorio

## 2024-03-22 NOTE — PLAN OF CARE
Problem: PAIN - ADULT  Goal: Verbalizes/displays adequate comfort level or baseline comfort level  Description: Interventions:  - Encourage patient to monitor pain and request assistance  - Assess pain using appropriate pain scale  - Administer analgesics based on type and severity of pain and evaluate response  - Implement non-pharmacological measures as appropriate and evaluate response  - Consider cultural and social influences on pain and pain management  - Notify physician/advanced practitioner if interventions unsuccessful or patient reports new pain  Outcome: Progressing     Problem: INFECTION - ADULT  Goal: Absence or prevention of progression during hospitalization  Description: INTERVENTIONS:  - Assess and monitor for signs and symptoms of infection  - Monitor lab/diagnostic results  - Monitor all insertion sites, i.e. indwelling lines, tubes, and drains  - Monitor endotracheal if appropriate and nasal secretions for changes in amount and color  - [place] appropriate cooling/warming therapies per order  - Administer medications as ordered  - Instruct and encourage patient and family to use good hand hygiene technique  - Identify and instruct in appropriate isolation precautions for identified infection/condition  Outcome: Progressing     Problem: SAFETY ADULT  Goal: Patient will remain free of falls  Description: INTERVENTIONS:  - Educate patient/family on patient safety including physical limitations  - Instruct patient to call for assistance with activity   - Consult OT/PT to assist with strengthening/mobility   - Keep Call bell within reach  - Keep bed low and locked with side rails adjusted as appropriate  - Keep care items and personal belongings within reach  - Initiate and maintain comfort rounds  - Make Fall Risk Sign visible to staff  - Offer Toileting every 2 Hours, in advance of need  - Initiate/Maintain bed alarm  - Obtain necessary fall risk management equipment  - Apply yellow socks and  bracelet for high fall risk patients  - Consider moving patient to room near nurses station  Outcome: Progressing     Problem: Prexisting or High Potential for Compromised Skin Integrity  Goal: Skin integrity is maintained or improved  Description: INTERVENTIONS:  - Identify patients at risk for skin breakdown  - Assess and monitor skin integrity  - Assess and monitor nutrition and hydration status  - Monitor labs   - Assess for incontinence   - Turn and reposition patient  - Assist with mobility/ambulation  - Relieve pressure over bony prominences  - Avoid friction and shearing  - Provide appropriate hygiene as needed including keeping skin clean and dry  - Evaluate need for skin moisturizer/barrier cream  - Collaborate with interdisciplinary team   - Patient/family teaching  - Consider wound care consult   Outcome: Progressing     Problem: SAFETY,RESTRAINT: NV/NON-SELF DESTRUCTIVE BEHAVIOR  Goal: Remains free of harm/injury (restraint for non violent/non self-detsructive behavior)  Description: INTERVENTIONS:  - Instruct patient/family regarding restraint use   - Assess and monitor physiologic and psychological status   - Provide interventions and comfort measures to meet assessed patient needs   - Identify and implement measures to help patient regain control  - Assess readiness for release of restraint   Outcome: Progressing     Problem: Nutrition/Hydration-ADULT  Goal: Nutrient/Hydration intake appropriate for improving, restoring or maintaining nutritional needs  Description: Monitor and assess patient's nutrition/hydration status for malnutrition. Collaborate with interdisciplinary team and initiate plan and interventions as ordered.  Monitor patient's weight and dietary intake as ordered or per policy. Utilize nutrition screening tool and intervene as necessary. Determine patient's food preferences and provide high-protein, high-caloric foods as appropriate.     INTERVENTIONS:  - Monitor oral intake, urinary  output, labs, and treatment plans  - Assess nutrition and hydration status and recommend course of action  - Evaluate amount of meals eaten  - Assist patient with eating if necessary   - Allow adequate time for meals  - Recommend/ encourage appropriate diets, oral nutritional supplements, and vitamin/mineral supplements  - Order, calculate, and assess calorie counts as needed  - Recommend, monitor, and adjust tube feedings and TPN/PPN based on assessed needs  - Assess need for intravenous fluids  - Provide specific nutrition/hydration education as appropriate  - Include patient/family/caregiver in decisions related to nutrition  Outcome: Progressing     Problem: RESPIRATORY - ADULT  Goal: Achieves optimal ventilation and oxygenation  Description: INTERVENTIONS:  - Assess for changes in respiratory status  - Assess for changes in mentation and behavior  - Position to facilitate oxygenation and minimize respiratory effort  - Oxygen administered by appropriate delivery if ordered  - Initiate smoking cessation education as indicated  - Encourage broncho-pulmonary hygiene including cough, deep breathe, Incentive Spirometry  - Assess the need for suctioning and aspirate as needed  - Assess and instruct to report SOB or any respiratory difficulty  - Respiratory Therapy support as indicated  Outcome: Progressing     Problem: GENITOURINARY - ADULT  Goal: Urinary catheter remains patent  Description: INTERVENTIONS:  - Assess patency of urinary catheter  - If patient has a chronic leung, consider changing catheter if non-functioning  - Follow guidelines for intermittent irrigation of non-functioning urinary catheter  Outcome: Progressing

## 2024-03-22 NOTE — ASSESSMENT & PLAN NOTE
Initially admitted on 03/20 to [facility] for cough/SOB x2 weeks - required 5L NC on admission   Had escalating O2 requirements overnight - upgraded to ICU this AM for respiratory distress/failure   Failed BIPAP - intubated shortly after arrival to ICU 3/21  Suspect this is multifactorial in setting of Metapneumovirus pneumonia vs COPD exacerbation vs flash pulmonary edema/acute on chronic HF  (3/21) CXR w/diffuse bronchitis and multifocal bronchiolitis/bronchopneumonia and possible superimposed very mild interstitial edema    Plan:  RP2 panel (+) for Metapneumovirus  Urine strep/legionella negative  Sputum culture negative  (3/21) CTA PE study - subpleural consolidation in the inferior left lower lobe, possibly atelectatic but pneumonia cannot be excluded. Minimal compressive atelectasis in the dependent right lower lobe.   Azithro/Cftx D4, procal elevated; will continue to trend  Continue IV Solu Medrol 40 mg Q8H for COPD exacerbation  Continue scheduled Xopenex/Atrovent nebs  Is s/p IV Lasix 80 mg x1 w/good responsive - continue PRN for goal negative 1-2L  Current vent settings: ACVC 20/470/50/8 - today is MV D#3  Follow ABG and adjust vent settings as necessary  Currently sedated w/Propofol/Fentanyl gtt's for goal RASS -2 to -3  Aggressive pulmonary hygiene  VAP precautions  Daily SAT/SBT  Wean FiO2 for SpO2 > 88%

## 2024-03-22 NOTE — ASSESSMENT & PLAN NOTE
POA  Likely exacerbated by metapneumovirus pneumonia  Does not appear to follow w/Pulmonology - no PFTs available for review  Continues to smoke 1-2 PPD    Plan:  Continue IV Solu Medrol 40 mg Q8H   Only uses PRN Albuterol inhaler at home - continue scheduled Xopenex/Atrovent nebs  Aggressive pulmonary hygiene

## 2024-03-22 NOTE — RESPIRATORY THERAPY NOTE
03/22/24 1948   Respiratory Assessment   Assessment Type Assess only   General Appearance Sedated   Respiratory Pattern Assisted   Chest Assessment Chest expansion symmetrical   Bilateral Breath Sounds Clear   Suction ET Tube   Resp Comments pt remains on vent day # 2 curent settings CMV 24 470 50% +8. Ett 7.5 25@ gums moved from Rt to Center, tx given, BS clear, sxnd' x1 scant thin pale yellow, pt synch with vent no changes will cont to monitor   O2 Device vent   Vent Information   Vent ID [id number]   Vent type Segura G5   Segura Vent Mode (S)CMV   $ Pulse Oximetry Spot Check Charge Completed   (S)CMV Settings   Resp Rate (BPM) 24 BPM   VT (mL) 470 mL   FIO2 (%) 50 %   PEEP (cmH2O) 8 cmH2O   I:E Ratio 1:2.1   Insp Time (%) 0.8 %   Flow Trigger (LPM) 5   Humidification Heater   Heater Temperature (Set) 95 °F (35 °C)   (S)CMV Actuals   Resp Rate (BPM) 24 BPM   VT (mL) 470   MV 11.6   MAP (cmH2O) 13 cmH2O   Peak Pressure (cmH2O) 30 cmH2O   I:E Ratio (Obs) 1:2.1   Insp Resistance 21   Heater Temperature (Obs) 95 °F (35 °C)   Static Compliance (mL/cmH20) 47 mL/cmH2O   Plateau Pressure (cm H2O) 21 cm H2O   (S)CMV Alarms   High Peak Pressure (cmH2O) 40   Low Pressure (cmH2O) 5 cm H2O   High Resp Rate (BPM) 40 BPM   Low Resp Rate (BPM) 8 BPM   High MV (L/min) 20 L/min   Low MV (L/min) 4 L/min   High VT (mL) 1000 mL   Low VT (mL) 350 mL   Apnea Time (s) 20 S   Maintenance   Alarm (pink) cable attached No   Resuscitation bag with peep valve at bedside Yes   Water bag changed No   Circuit changed No   IHI Ventilator Associated Pneumonia Bundle   Head of Bed Elevated HOB 30   ETT  Hi-Lo;Cuffed 7.5 mm   Placement Date/Time: 03/21/24 (c) 0813   Type: Hi-Lo;Cuffed  Tube Size: 7.5 mm  Location: Oral  Insertion attempts: 1  Placement Verification: Chest x-ray;Symmetrical chest wall movement  Secured at (cm): 25   Secured at (cm) 25   Measured from Gums   Secured Location (S)  Center   Repositioned (S)  Right to Center   Secured  by Commercial tube giron  (skin and strap intact with two finger pass to back of head)   Site Condition Dry   Cuff Pressure (color) Green   HI-LO Suction  Continuous low suction   HI-LO Secretions Scant;Thin;Clear   HI-LO Intervention Patent     RT Ventilator Management Note  Juani Morales 63 y.o. female MRN: [id number]  Unit/Bed#: ICU 11-01 Encounter: [id number]      Daily Screen         3/22/2024  0425 3/22/2024  0724          Patient safety screen outcome:: Failed Failed      Not Ready for Weaning due to:: Underline problem not resolved Underline problem not resolved                Physical Exam:   Assessment Type: Assess only  General Appearance: Sedated  Respiratory Pattern: Assisted  Chest Assessment: Chest expansion symmetrical  Bilateral Breath Sounds: Clear  Suction: ET Tube  O2 Device: vent      Resp Comments: pt remains on vent day # 2 curent settings CMV 24 470 50% +8. Ett 7.5 25@ gums moved from Rt to Center, tx given, BS clear, sxnd' x1 scant thin pale yellow, pt synch with vent no changes will cont to monitor

## 2024-03-22 NOTE — CONSULTS
[facility]  Consult  Name: Juani Morales 63 y.o. female I MRN: [id number]  Unit/Bed#: ICU 11-01 I Date of Admission: 3/20/2024   Date of Service: 3/22/2024 I Hospital Day: 2    Inpatient consult to Cardiology  Consult performed by: ESTHER Stephens  Consult ordered by: ESTHER Rebolledo          Assessment/Plan   Human metapneumovirus pneumonia  Assessment & Plan  Detected on admission  Likely contributing to acute respiratory failure    Acute on chronic combined systolic (congestive) and diastolic (congestive) heart failure (HCC)  Assessment & Plan  Wt Readings from Last 3 Encounters:   03/22/24 79.1 kg (174 lb 6.1 oz)   03/13/24 81.1 kg (178 lb 12.8 oz)   01/05/24 85 kg (187 lb 6.4 oz)     BNP not elevated on presentation though she did diurese well with lasix  She does not examine volume overloaded at this time  Continue to monitor           Elevated troponin  Assessment & Plan  Troponin elevation in the setting of acute illness and respiratory failure  Suspect this is a type 2 NSTEMI due to demand    * Acute respiratory failure with hypoxia (HCC)  Assessment & Plan  Likely due to respiratory infection/COPD--there may have been a component of mild volume overload as she diuresed almost 3 L, however she does not appear volume overloaded at this time  Remains intubated, management per critical care            Other summary comments:   Juani presented with shortness of breath.  She is positive for human metapneumovirus and also being treated for COPD exacerbation.      In this setting, trop levels were noted to be rising.  There is no report of chest pain prior to intubation.  Juani has an significant cardiac history, but no obstructive disease by cath in 2021.      Suspect trop elevation represents a type 2 NSTEMI related to demand.    Will reassess once patient extubated and recovered from acute issues.    Outpatient Cardiologist: Dr Chow    HPI: Juani Morales is a 63  y.o. year old female who presented with progressive shortness of breath.  She was initially admitted to the medical floor, but this morning had increasing O2 requirements and was transferred to ICU where she was intubated shortly after arrival.    She is positive for metapneumovirus pneumonia and she is being treated for a COPD exacerbation.  She is on antibiotics and COPD treatment per critical care.    On admission, troponin levels were normal, but are now elevated.    It is for this reason that cardiology was consulted.    Cardiac history significant for HFrEF with initial presentation in 3/2021.  Moderate but nonobstructive CAD noted at that time by cath.  Several months later, her EF returned to normal, but then decreased again.  In 2022, she was hospitalized for acute respirator failure and was not responsive to diuretics.  She ultimately required hemodialysis, but renal function fortunately recovered.          EKG: ST, LAE      MOST  RECENT CARDIAC IMAGING:   Echo 3/21/2024  EF 30%  Mild AI    3/1/2023  EF 70%  Mild AI, MR    Echo 2022  EF 25%  Mild AI, mod MR, mild TR    Review of Systems: history obtained from chart.  ROS not assessed as pt is chemically sedated and orally intubated      Historical Information   Past Medical History:   Diagnosis Date    Acute respiratory failure with hypoxia (HCC)     Cardiomyopathy (HCC)     Chronic combined systolic and diastolic congestive heart failure     COPD (chronic obstructive pulmonary disease) (HCC)     Fatty liver     Hyperlipidemia     Hypertension     Hypertensive urgency     Mitral regurgitation     Rectal bleeding     Sepsis      Past Surgical History:   Procedure Laterality Date    CARDIAC CATHETERIZATION  2021    Moderate non-obstructive atherosclerosis     SECTION      CHOLECYSTECTOMY      IR RENAL ANGIOGRAM  2022    IR TEMPORARY DIALYSIS CATHETER CHECK/CHANGE/REPOSITION  2022    IR TUNNELED DIALYSIS CATHETER PLACEMENT   "2022    IR TUNNELED DIALYSIS CATHETER REMOVAL  2022    ROTATOR CUFF REPAIR W/ DISTAL CLAVICLE EXCISION Right     TONSILLECTOMY       Social History     Substance and Sexual Activity   Alcohol Use Never     Social History     Substance and Sexual Activity   Drug Use No     Social History     Tobacco Use   Smoking Status Every Day    Current packs/day: 1.00    Average packs/day: 1 pack/day for 47.2 years (47.2 ttl pk-yrs)    Types: Cigarettes    Start date:     Passive exposure: Current   Smokeless Tobacco Never       Family History: no significant cardiac history      Meds/Allergies   all current active meds have been reviewed  Medications Prior to Admission   Medication    albuterol (ProAir HFA) 90 mcg/act inhaler    aspirin 81 mg chewable tablet    atorvastatin (LIPITOR) 40 mg tablet    bisacodyl (DULCOLAX) 5 mg EC tablet    carvedilol (COREG) 25 mg tablet    Cholecalciferol 50 MCG (2000) CAPS    clonazePAM (KlonoPIN) 1 mg tablet    gabapentin (NEURONTIN) 100 mg capsule    MAGNESIUM PO    morphine (MS CONTIN) 30 mg 12 hr tablet    oxyCODONE-acetaminophen (PERCOCET) 5-325 mg per tablet    docusate sodium (COLACE) 100 mg capsule    furosemide (LASIX) 40 mg tablet    lisinopril (ZESTRIL) 5 mg tablet    potassium chloride (K-DUR,KLOR-CON) 10 mEq tablet       Allergies   Allergen Reactions    Wellbutrin [Bupropion] Arthralgia       Objective   Vitals: Blood pressure (!) 74/45, pulse 80, temperature (!) 95.9 °F (35.5 °C), resp. rate (!) 24, height 5' 11\" (1.803 m), weight 79.1 kg (174 lb 6.1 oz), SpO2 95%., Body mass index is 24.32 kg/m².,   Orthostatic Blood Pressures      Flowsheet Row Most Recent Value   Blood Pressure 74/45 filed at 2024 1130   Patient Position - Orthostatic VS Lying filed at 2024 1500            Systolic (24hrs), Av , Min:74 , Max:74     Diastolic (24hrs), Av, Min:45, Max:45      Physical Exam:    General:  Normal appearance in no distress.  Eyes:  closed  Oral " mucosa:  orally intubated  Neck:  No JVD. Carotid upstrokes are brisk without bruits.  No masses.  Chest:  Coarse throughout with scattered wheezed  Cardiac:  No palpable PMI.  Normal S1 and S2.  No murmur gallop or rub.  Abdomen:  Soft and nontender. No palpable organomegaly or aortic enlargement.  Extremities:  No peripheral edema.  Musculoskeletal:  not assessed due to sedation   Vascular:  Pedal pulses are intact.  Neuro:  chemically sedated  Psych:  chemically sedated      Lab Results:   Labs reviewed and prominent abnormalities reviewed above and/or below.    Troponins:    Results from last 7 days   Lab Units 03/21/24  1555 03/21/24  1346 03/21/24  1121   HS TNI 0HR ng/L  --   --  1,290*   HS TNI 2HR ng/L  --  2,018*  --    HSTNI D2 ng/L  --  728*  --    HS TNI 4HR ng/L 2,013*  --   --    HSTNI D4 ng/L 723*  --   --      BNP:   Results from last 6 Months   Lab Units 03/20/24  1206   BNP pg/mL 111*

## 2024-03-22 NOTE — ASSESSMENT & PLAN NOTE
Suspect mixed etiology: septic in setting of metapneumovirus pneumonia vs cardiogenic w/known dilated cardiomyopathy and acute on chronic HF  CXR this AM as noted above  TTE this AM revealed EF 30% w/moderate to severely reduced systolic function in a global manner, though septum is less kinetic than remaining segments; dilated RV w/mildly reduced systolic function; mild AR, trace TR  CVP 9    Plan:  RP2 panel (+) for metapneumovirus  Blood cultures from admission pending - repeats sent given acute decompensation  UA negative  Urine strep/legionella negative  Sputum culture negative  Lactic acid 1.5  Procalcitonin <0.05 x2; now elevated to 4.55  Initially hypertensive, then became hypotensive post intubation/sedation - did not receive 30 cc/kg IVF bolus given volume overload/HF  Continue IV Azithromycin/Ceftriaxone for possible CAP, now D4  Is s/p IV Lasix 80 mg x1 for acute on chronic HF - will continue w/further dosing as needed  Monitor fever and WBC curve  Monitor hemodynamics closely

## 2024-03-22 NOTE — PLAN OF CARE
Problem: PAIN - ADULT  Goal: Verbalizes/displays adequate comfort level or baseline comfort level  Description: Interventions:  - Encourage patient to monitor pain and request assistance  - Assess pain using appropriate pain scale  - Administer analgesics based on type and severity of pain and evaluate response  - Implement non-pharmacological measures as appropriate and evaluate response  - Consider cultural and social influences on pain and pain management  - Notify physician/advanced practitioner if interventions unsuccessful or patient reports new pain  Outcome: Progressing     Problem: INFECTION - ADULT  Goal: Absence or prevention of progression during hospitalization  Description: INTERVENTIONS:  - Assess and monitor for signs and symptoms of infection  - Monitor lab/diagnostic results  - Monitor all insertion sites, i.e. indwelling lines, tubes, and drains  - Monitor endotracheal if appropriate and nasal secretions for changes in amount and color  - [place] appropriate cooling/warming therapies per order  - Administer medications as ordered  - Instruct and encourage patient and family to use good hand hygiene technique  - Identify and instruct in appropriate isolation precautions for identified infection/condition  Outcome: Progressing  Goal: Absence of fever/infection during neutropenic period  Description: INTERVENTIONS:  - Monitor WBC    Outcome: Progressing     Problem: SAFETY ADULT  Goal: Patient will remain free of falls  Description: INTERVENTIONS:  - Educate patient/family on patient safety including physical limitations  - Instruct patient to call for assistance with activity   - Consult OT/PT to assist with strengthening/mobility   - Keep Call bell within reach  - Keep bed low and locked with side rails adjusted as appropriate  - Keep care items and personal belongings within reach  - Initiate and maintain comfort rounds  - Make Fall Risk Sign visible to staff  - Offer Toileting every 2 Hours,  in advance of need  - Initiate/Maintain BED alarm  - Obtain necessary fall risk management equipment  - Apply yellow socks and bracelet for high fall risk patients  - Consider moving patient to room near nurses station  Outcome: Progressing  Goal: Maintain or return to baseline ADL function  Description: INTERVENTIONS:  -  Assess patient's ability to carry out ADLs; assess patient's baseline for ADL function and identify physical deficits which impact ability to perform ADLs (bathing, care of mouth/teeth, toileting, grooming, dressing, etc.)  - Assess/evaluate cause of self-care deficits   - Assess range of motion  - Assess patient's mobility; develop plan if impaired  - Assess patient's need for assistive devices and provide as appropriate  - Encourage maximum independence but intervene and supervise when necessary  - Involve family in performance of ADLs  - Assess for home care needs following discharge   - Consider OT consult to assist with ADL evaluation and planning for discharge  - Provide patient education as appropriate  Outcome: Progressing  Goal: Maintains/Returns to pre admission functional level  Description: INTERVENTIONS:  - Perform AM-PAC 6 Click Basic Mobility/ Daily Activity assessment daily.  - Set and communicate daily mobility goal to care team and patient/family/caregiver.   - Collaborate with rehabilitation services on mobility goals if consulted  - Perform Range of Motion 3 times a day.  - Reposition patient every 2 hours.  - Dangle patient 3 times a day  - Stand patient 3 times a day  - Ambulate patient 3 times a day  - Out of bed to chair 3 times a day   - Out of bed for meals 3 times a day  - Out of bed for toileting  - Record patient progress and toleration of activity level   Outcome: Progressing     Problem: DISCHARGE PLANNING  Goal: Discharge to home or other facility with appropriate resources  Description: INTERVENTIONS:  - Identify barriers to discharge w/patient and caregiver  -  Arrange for needed discharge resources and transportation as appropriate  - Identify discharge learning needs (meds, wound care, etc.)  - Arrange for interpretive services to assist at discharge as needed  - Refer to Case Management Department for coordinating discharge planning if the patient needs post-hospital services based on physician/advanced practitioner order or complex needs related to functional status, cognitive ability, or social support system  Outcome: Progressing     Problem: Knowledge Deficit  Goal: Patient/family/caregiver demonstrates understanding of disease process, treatment plan, medications, and discharge instructions  Description: Complete learning assessment and assess knowledge base.  Interventions:  - Provide teaching at level of understanding  - Provide teaching via preferred learning methods  Outcome: Progressing     Problem: Prexisting or High Potential for Compromised Skin Integrity  Goal: Skin integrity is maintained or improved  Description: INTERVENTIONS:  - Identify patients at risk for skin breakdown  - Assess and monitor skin integrity  - Assess and monitor nutrition and hydration status  - Monitor labs   - Assess for incontinence   - Turn and reposition patient  - Assist with mobility/ambulation  - Relieve pressure over bony prominences  - Avoid friction and shearing  - Provide appropriate hygiene as needed including keeping skin clean and dry  - Evaluate need for skin moisturizer/barrier cream  - Collaborate with interdisciplinary team   - Patient/family teaching  - Consider wound care consult   Outcome: Progressing     Problem: SAFETY,RESTRAINT: NV/NON-SELF DESTRUCTIVE BEHAVIOR  Goal: Remains free of harm/injury (restraint for non violent/non self-detsructive behavior)  Description: INTERVENTIONS:  - Instruct patient/family regarding restraint use   - Assess and monitor physiologic and psychological status   - Provide interventions and comfort measures to meet assessed  patient needs   - Identify and implement measures to help patient regain control  - Assess readiness for release of restraint   Outcome: Progressing  Goal: Returns to optimal restraint-free functioning  Description: INTERVENTIONS:  - Assess the patient's behavior and symptoms that indicate continued need for restraint  - Identify and implement measures to help patient regain control  - Assess readiness for release of restraint   Outcome: Progressing     Problem: Nutrition/Hydration-ADULT  Goal: Nutrient/Hydration intake appropriate for improving, restoring or maintaining nutritional needs  Description: Monitor and assess patient's nutrition/hydration status for malnutrition. Collaborate with interdisciplinary team and initiate plan and interventions as ordered.  Monitor patient's weight and dietary intake as ordered or per policy. Utilize nutrition screening tool and intervene as necessary. Determine patient's food preferences and provide high-protein, high-caloric foods as appropriate.     INTERVENTIONS:  - Monitor oral intake, urinary output, labs, and treatment plans  - Assess nutrition and hydration status and recommend course of action  - Evaluate amount of meals eaten  - Assist patient with eating if necessary   - Allow adequate time for meals  - Recommend/ encourage appropriate diets, oral nutritional supplements, and vitamin/mineral supplements  - Order, calculate, and assess calorie counts as needed  - Recommend, monitor, and adjust tube feedings and TPN/PPN based on assessed needs  - Assess need for intravenous fluids  - Provide specific nutrition/hydration education as appropriate  - Include patient/family/caregiver in decisions related to nutrition  Outcome: Progressing     Problem: NEUROSENSORY - ADULT  Goal: Achieves stable or improved neurological status  Description: INTERVENTIONS  - Monitor and report changes in neurological status  - Monitor vital signs such as temperature, blood pressure, glucose,  and any other labs ordered   - Initiate measures to prevent increased intracranial pressure  - Monitor for seizure activity and implement precautions if appropriate      Outcome: Progressing  Goal: Remains free of injury related to seizures activity  Description: INTERVENTIONS  - Maintain airway, patient safety  and administer oxygen as ordered  - Monitor patient for seizure activity, document and report duration and description of seizure to physician/advanced practitioner  - If seizure occurs,  ensure patient safety during seizure  - Reorient patient post seizure  - Seizure pads on all 4 side rails  - Instruct patient/family to notify RN of any seizure activity including if an aura is experienced  - Instruct patient/family to call for assistance with activity based on nursing assessment  - Administer anti-seizure medications if ordered    Outcome: Progressing  Goal: Achieves maximal functionality and self care  Description: INTERVENTIONS  - Monitor swallowing and airway patency with patient fatigue and changes in neurological status  - Encourage and assist patient to increase activity and self care.   - Encourage visually impaired, hearing impaired and aphasic patients to use assistive/communication devices  Outcome: Progressing     Problem: CARDIOVASCULAR - ADULT  Goal: Maintains optimal cardiac output and hemodynamic stability  Description: INTERVENTIONS:  - Monitor I/O, vital signs and rhythm  - Monitor for S/S and trends of decreased cardiac output  - Administer and titrate ordered vasoactive medications to optimize hemodynamic stability  - Assess quality of pulses, skin color and temperature  - Assess for signs of decreased coronary artery perfusion  - Instruct patient to report change in severity of symptoms  Outcome: Progressing  Goal: Absence of cardiac dysrhythmias or at baseline rhythm  Description: INTERVENTIONS:  - Continuous cardiac monitoring, vital signs, obtain 12 lead EKG if ordered  - Administer  antiarrhythmic and heart rate control medications as ordered  - Monitor electrolytes and administer replacement therapy as ordered  Outcome: Progressing     Problem: RESPIRATORY - ADULT  Goal: Achieves optimal ventilation and oxygenation  Description: INTERVENTIONS:  - Assess for changes in respiratory status  - Assess for changes in mentation and behavior  - Position to facilitate oxygenation and minimize respiratory effort  - Oxygen administered by appropriate delivery if ordered  - Initiate smoking cessation education as indicated  - Encourage broncho-pulmonary hygiene including cough, deep breathe, Incentive Spirometry  - Assess the need for suctioning and aspirate as needed  - Assess and instruct to report SOB or any respiratory difficulty  - Respiratory Therapy support as indicated  Outcome: Progressing     Problem: GASTROINTESTINAL - ADULT  Goal: Minimal or absence of nausea and/or vomiting  Description: INTERVENTIONS:  - Administer IV fluids if ordered to ensure adequate hydration  - Maintain NPO status until nausea and vomiting are resolved  - Nasogastric tube if ordered  - Administer ordered antiemetic medications as needed  - Provide nonpharmacologic comfort measures as appropriate  - Advance diet as tolerated, if ordered  - Consider nutrition services referral to assist patient with adequate nutrition and appropriate food choices  Outcome: Progressing  Goal: Maintains or returns to baseline bowel function  Description: INTERVENTIONS:  - Assess bowel function  - Encourage oral fluids to ensure adequate hydration  - Administer IV fluids if ordered to ensure adequate hydration  - Administer ordered medications as needed  - Encourage mobilization and activity  - Consider nutritional services referral to assist patient with adequate nutrition and appropriate food choices  Outcome: Progressing  Goal: Maintains adequate nutritional intake  Description: INTERVENTIONS:  - Monitor percentage of each meal  consumed  - Identify factors contributing to decreased intake, treat as appropriate  - Assist with meals as needed  - Monitor I&O, weight, and lab values if indicated  - Obtain nutrition services referral as needed  Outcome: Progressing  Goal: Establish and maintain optimal ostomy function  Description: INTERVENTIONS:  - Assess bowel function  - Encourage oral fluids to ensure adequate hydration  - Administer IV fluids if ordered to ensure adequate hydration   - Administer ordered medications as needed  - Encourage mobilization and activity  - Nutrition services referral to assist patient with appropriate food choices  - Assess stoma site  - Consider wound care consult   Outcome: Progressing  Goal: Oral mucous membranes remain intact  Description: INTERVENTIONS  - Assess oral mucosa and hygiene practices  - Implement preventative oral hygiene regimen  - Implement oral medicated treatments as ordered  - Initiate Nutrition services referral as needed  Outcome: Progressing     Problem: GENITOURINARY - ADULT  Goal: Maintains or returns to baseline urinary function  Description: INTERVENTIONS:  - Assess urinary function  - Encourage oral fluids to ensure adequate hydration if ordered  - Administer IV fluids as ordered to ensure adequate hydration  - Administer ordered medications as needed  - Offer frequent toileting  - Follow urinary retention protocol if ordered  Outcome: Progressing  Goal: Absence of urinary retention  Description: INTERVENTIONS:  - Assess patient's ability to void and empty bladder  - Monitor I/O  - Bladder scan as needed  - Discuss with physician/AP medications to alleviate retention as needed  - Discuss catheterization for long term situations as appropriate  Outcome: Progressing  Goal: Urinary catheter remains patent  Description: INTERVENTIONS:  - Assess patency of urinary catheter  - If patient has a chronic leung, consider changing catheter if non-functioning  - Follow guidelines for  intermittent irrigation of non-functioning urinary catheter  Outcome: Progressing     Problem: METABOLIC, FLUID AND ELECTROLYTES - ADULT  Goal: Electrolytes maintained within normal limits  Description: INTERVENTIONS:  - Monitor labs and assess patient for signs and symptoms of electrolyte imbalances  - Administer electrolyte replacement as ordered  - Monitor response to electrolyte replacements, including repeat lab results as appropriate  - Instruct patient on fluid and nutrition as appropriate  Outcome: Progressing  Goal: Fluid balance maintained  Description: INTERVENTIONS:  - Monitor labs   - Monitor I/O and WT  - Instruct patient on fluid and nutrition as appropriate  - Assess for signs & symptoms of volume excess or deficit  Outcome: Progressing  Goal: Glucose maintained within target range  Description: INTERVENTIONS:  - Monitor Blood Glucose as ordered  - Assess for signs and symptoms of hyperglycemia and hypoglycemia  - Administer ordered medications to maintain glucose within target range  - Assess nutritional intake and initiate nutrition service referral as needed  Outcome: Progressing     Problem: SKIN/TISSUE INTEGRITY - ADULT  Goal: Skin Integrity remains intact(Skin Breakdown Prevention)  Description: Assess:  -Perform Valentín assessment   -Clean and moisturize skin   -Inspect skin when repositioning, toileting, and assisting with ADLS  -Assess under medical devices  -Assess extremities for adequate circulation and sensation     Bed Management:  -Have minimal linens on bed & keep smooth, unwrinkled  -Change linens as needed when moist or perspiring  -Avoid sitting or lying in one position for more than 2 hours while in bed  -Keep HOB at 30 degrees     Toileting:  -Offer bedside commode  -Assess for incontinence   -Use incontinent care products after each incontinent episode     Activity:  -Mobilize patient 3 times a day  -Encourage activity and walks on unit  -Encourage or provide ROM exercises    -Turn and reposition patient every 2 Hours  -Use appropriate equipment to lift or move patient in bed  -Instruct/ Assist with weight shifting when out of bed in chair  -Consider limitation of chair time 2 hour intervals    Skin Care:  -Avoid use of baby powder, tape, friction and shearing, hot water or constrictive clothing  -Relieve pressure over bony prominences   -Do not massage red bony areas    Next Steps:  -Teach patient strategies to minimize risks    -Consider consults to  interdisciplinary teams  Outcome: Progressing  Goal: Incision(s), wounds(s) or drain site(s) healing without S/S of infection  Description: INTERVENTIONS  - Assess and document dressing, incision, wound bed, drain sites and surrounding tissue  - Provide patient and family education  - Perform skin care/dressing changes  Outcome: Progressing  Goal: Pressure injury heals and does not worsen  Description: Interventions:  - Implement low air loss mattress or specialty surface (Criteria met)  - Apply silicone foam dressing  - Instruct/assist with weight shifting every 60 minutes when in chair   - Limit chair time to 2 hour intervals  - Use special pressure reducing interventions when in chair   - Apply fecal or urinary incontinence containment device   - Perform passive or active ROM   - Turn and reposition patient & offload bony prominences every 2 hours   - Utilize friction reducing device or surface for transfers   - Consider consults to  interdisciplinary teams  - Use incontinent care products after each incontinent episode  - Consider nutrition services referral as needed  Outcome: Progressing     Problem: HEMATOLOGIC - ADULT  Goal: Maintains hematologic stability  Description: INTERVENTIONS  - Assess for signs and symptoms of bleeding or hemorrhage  - Monitor labs  - Administer supportive blood products/factors as ordered and appropriate  Outcome: Progressing     Problem: MUSCULOSKELETAL - ADULT  Goal: Maintain or return mobility to  safest level of function  Description: INTERVENTIONS:  - Assess patient's ability to carry out ADLs; assess patient's baseline for ADL function and identify physical deficits which impact ability to perform ADLs (bathing, care of mouth/teeth, toileting, grooming, dressing, etc.)  - Assess/evaluate cause of self-care deficits   - Assess range of motion  - Assess patient's mobility  - Assess patient's need for assistive devices and provide as appropriate  - Encourage maximum independence but intervene and supervise when necessary  - Involve family in performance of ADLs  - Assess for home care needs following discharge   - Consider OT consult to assist with ADL evaluation and planning for discharge  - Provide patient education as appropriate  Outcome: Progressing  Goal: Maintain proper alignment of affected body part  Description: INTERVENTIONS:  - Support, maintain and protect limb and body alignment  - Provide patient/ family with appropriate education  Outcome: Progressing

## 2024-03-22 NOTE — RESPIRATORY THERAPY NOTE
RT Ventilator Management Note  Juani Morales 63 y.o. female MRN: [id number]  Unit/Bed#: ICU 11-01 Encounter: [id number]      Daily Screen         3/22/2024  0425 3/22/2024  0724          Patient safety screen outcome:: Failed Failed      Not Ready for Weaning due to:: Underline problem not resolved Underline problem not resolved                Physical Exam:   Assessment Type: Assess only  General Appearance: Sedated  Respiratory Pattern: Assisted  Chest Assessment: Chest expansion symmetrical  Bilateral Breath Sounds: Clear  Suction: ET Tube  O2 Device: vent      Resp Comments: (P) No weaning performed today. Pt remainecd sedated and comfortable on vent. Vitals have been stable. Little to no secretions suctioned thruought the day. Plan is to cont. on current settings overnight.

## 2024-03-22 NOTE — PROGRESS NOTES
[facility] - Inpatient Progress Note    PATIENT INFORMATION      Juani Morales 63 y.o. female MRN: [id number]  Unit/Bed#: ICU 11-01 Encounter: [id number]    ASSESSMENT & PLAN   Juani Morales is a 63 y.o. female with PMH significant for hypertension, anxiety, CHF, COPD, prediabetes, tobacco use, chronic pain syndrome who presented to [facility] on 3/20 for concern of cough and shortness of breath and admitted with human metapneumovirus pneumonia. Hospitalization complicated by further decompensation from respiratory standpoint with coffee-ground output noted in OG tube for which GI has been consulted.     Coffee-ground emesis  Patient noted to have coffee ground output in OGT when placed after intubation. Hgb dropped from 16 to 14, and GI consulted for evaluation. Currently with 150cc output in suction canister. No melena/hematochezia noted. Appears to have decline in all cell counts. Hgb stable at 13.9 from 14.5. OGT output now bilious in character with no bowel movements per bedside RN  Currently patient critically ill with stable hemoglobin given decline in other cell lines.   Continue medical management with IV PPI BID  Should patient require cardiac cath/intervention/AC/AP will consider more urgent evaluation prior to initation  Monitor both OGT output and stool output for ongoing bleeding  Monitor daily hemoglobin - transfuse for <8 given cardiac history  Continue supportive care per primary team  GI team will continue to follow  Please contact on-call provider with changes in clinical status     Acute Hypoxic Respiratory Failure  Shock 2/2 Sepsis vs. Cardiogenic  Elevated troponin  Polycythemia  Patient critically ill and being managed by CC team for above.       SUBJECTIVE     Patient seen and evaluated at bedside. Remains intubated and sedated    MEDICATIONS & ALLERGIES       Medications:   Medications Prior to Admission   Medication    albuterol (ProAir  HFA) 90 mcg/act inhaler    aspirin 81 mg chewable tablet    atorvastatin (LIPITOR) 40 mg tablet    bisacodyl (DULCOLAX) 5 mg EC tablet    carvedilol (COREG) 25 mg tablet    Cholecalciferol 50 MCG (2000 UT) CAPS    clonazePAM (KlonoPIN) 1 mg tablet    gabapentin (NEURONTIN) 100 mg capsule    MAGNESIUM PO    morphine (MS CONTIN) 30 mg 12 hr tablet    oxyCODONE-acetaminophen (PERCOCET) 5-325 mg per tablet    docusate sodium (COLACE) 100 mg capsule    furosemide (LASIX) 40 mg tablet    lisinopril (ZESTRIL) 5 mg tablet    potassium chloride (K-DUR,KLOR-CON) 10 mEq tablet     Current Facility-Administered Medications   Medication Dose Route Frequency    azithromycin (ZITHROMAX) 500 mg in sodium chloride 0.9 % 250 mL IVPB  500 mg Intravenous Q24H    cefTRIAXone (ROCEPHIN) IVPB (premix in dextrose) 1,000 mg 50 mL  1,000 mg Intravenous Q24H    chlorhexidine (PERIDEX) 0.12 % oral rinse 15 mL  15 mL Mouth/Throat Q12H JULIÁN    fentaNYL 1000 mcg in sodium chloride 0.9% 100mL infusion  100 mcg/hr Intravenous Continuous    fentaNYL injection 50 mcg  50 mcg Intravenous Q1H PRN    heparin (porcine) subcutaneous injection 5,000 Units  5,000 Units Subcutaneous Q8H JULIÁN    insulin lispro (HumALOG/ADMELOG) 100 units/mL subcutaneous injection 1-6 Units  1-6 Units Subcutaneous Q6H JULIÁN    ipratropium (ATROVENT) 0.02 % inhalation solution 0.5 mg  0.5 mg Nebulization TID    levalbuterol (XOPENEX) inhalation solution 1.25 mg  1.25 mg Nebulization TID    methylPREDNISolone sodium succinate (Solu-MEDROL) injection 40 mg  40 mg Intravenous Q8H JULIÁN    nicotine (NICODERM CQ) 21 mg/24 hr TD 24 hr patch 1 patch  1 patch Transdermal Daily    NOREPINEPHRINE 4 MG  ML NSS (CMPD ORDER) infusion  1-30 mcg/min Intravenous Titrated    ondansetron (ZOFRAN) injection 4 mg  4 mg Intravenous Q6H PRN    pantoprazole (PROTONIX) injection 40 mg  40 mg Intravenous Q12H JULIÁN    propofol (DIPRIVAN) 1000 mg in 100 mL infusion (premix)  5-50 mcg/kg/min Intravenous  "Titrated       Allergies:   Allergies   Allergen Reactions    Wellbutrin [Bupropion] Arthralgia       PHYSICAL EXAM     Objective   Blood pressure (!) 74/45, pulse 79, temperature 98.8 °F (37.1 °C), temperature source Esophageal, resp. rate (!) 24, height 5' 11\" (1.803 m), weight 79.1 kg (174 lb 6.1 oz), SpO2 95%. Body mass index is 24.32 kg/m².    Intake/Output Summary (Last 24 hours) at 3/22/2024 0823  Last data filed at 3/22/2024 0600  Gross per 24 hour   Intake 1056.63 ml   Output 1450 ml   Net -393.37 ml       General Appearance:   Intubated and sedated   HEENT:   Normocephalic, atraumatic   Neck:   Supple, symmetrical   Lungs:   Equal chest rise, mechanical ventilation, crackles throughout   Heart:   Regular rate and rhythm   Abdomen:   Soft, non-tender, non-distended; normal bowel sounds; bilious output in OGT   Rectal:   Deferred    Extremities:   No cyanosis, clubbing   Neuro:   Intubated and sedated   Skin:   No jaundice, rashes, or lesions      ADDITIONAL DATA     Lab Results:     Results from last 7 days   Lab Units 03/22/24  0526   WBC Thousand/uL 9.66   HEMOGLOBIN g/dL 13.9   HEMATOCRIT % 44.4   PLATELETS Thousands/uL 194   NEUTROS PCT % 81*   LYMPHS PCT % 13*   MONOS PCT % 5   EOS PCT % 0     Results from last 7 days   Lab Units 03/22/24  0526 03/21/24  1555 03/21/24  0738   POTASSIUM mmol/L 4.0   < >  --    CHLORIDE mmol/L 105   < >  --    CO2 mmol/L 25   < >  --    CO2, I-STAT mmol/L  --   --  27   BUN mg/dL 25   < >  --    CREATININE mg/dL 1.14   < >  --    CALCIUM mg/dL 9.0   < >  --    ALK PHOS U/L 64  --   --    ALT U/L 11  --   --    AST U/L 21  --   --    GLUCOSE, ISTAT mg/dl  --   --  371*    < > = values in this interval not displayed.     Results from last 7 days   Lab Units 03/21/24  0735   INR  1.16       Imaging:    CTA chest (pe study) abdomen pelvis contrast    Result Date: 3/21/2024  Narrative: CT PULMONARY ANGIOGRAM OF THE CHEST AND CT ABDOMEN AND PELVIS WITH INTRAVENOUS CONTRAST " INDICATION: enlarged RV; respiratory failure, GIB, shock. COMPARISON: 8/9/2023 TECHNIQUE: CT examination of the chest, abdomen and pelvis was performed. Thin section CT angiographic technique was used in the chest in order to evaluate for pulmonary embolus and coronal 3D MIP postprocessing was performed on the acquisition scanner. Multiplanar 2D reformatted images were created from the source data. This examination, like all CT scans performed in the [facility], was performed utilizing techniques to minimize radiation dose exposure, including the use of iterative reconstruction and automated exposure control. Radiation dose length product (DLP) for this visit: 1308.22 mGy-cm IV Contrast: 100 mL of iohexol (OMNIPAQUE) Enteric Contrast: Not administered. FINDINGS: CHEST PULMONARY ARTERIAL TREE: No pulmonary embolus is seen. LUNGS: Emphysema, up to severe biapically. Biapical pleuroparenchymal scarring. Patchy infrahilar opacities in the lingula. Subpleural consolidation in the inferior left lower lobe, possibly atelectatic but pneumonia cannot be excluded. Minimal compressive atelectasis in the dependent right lower lobe. PLEURA: Unremarkable. HEART/AORTA: Heart is unremarkable for patient's age. No thoracic aortic aneurysm. MEDIASTINUM AND REGGIE: Mildly enlarged right hilar lymph node, 13 mm transverse. Subcarinal lymph node, 11 mm transverse. CHEST WALL AND LOWER NECK: Unremarkable. ABDOMEN LIVER/BILIARY TREE: Prominence of common bile duct and central intrahepatic biliary tree, most likely sequela of prior cholecystectomy. Liver is otherwise unremarkable. Mild periportal edema. GALLBLADDER: Surgically absent SPLEEN: Unremarkable. PANCREAS: Unremarkable. ADRENAL GLANDS: Unremarkable. KIDNEYS/URETERS: 5 mm nonobstructive left renal calculus. Severe atrophy of the right kidney. STOMACH AND BOWEL: No evidence of bowel obstruction or gross inflammatory process. Endogastric tube terminating in the mid  stomach. APPENDIX: Normal appendix. ABDOMINOPELVIC CAVITY: No ascites. No pneumoperitoneum. No lymphadenopathy. VESSELS: Unremarkable for patient's age. PELVIS REPRODUCTIVE ORGANS: Unremarkable for patient's age. URINARY BLADDER: Unremarkable. ABDOMINAL WALL/INGUINAL REGIONS: Unremarkable. BONES: No acute fracture or suspicious osseous lesion.     Impression: No pulmonary embolism. Subpleural consolidation in the inferior left lower lobe, possibly atelectatic but pneumonia cannot be excluded. Minimal compressive atelectasis in the dependent right lower lobe. Emphysema. Biapical scarring. Minimally enlarged subcarinal and right hilar lymph nodes, possibly reactive. Workstation performed: [id number]     US bedside procedure    Result Date: 3/21/2024  Narrative: [id number]    Echo complete w/ contrast if indicated    Result Date: 3/21/2024  Narrative:   Left Ventricle: Left ventricular cavity size is normal. Wall thickness is normal. The left ventricular ejection fraction is 30%. Systolic function is moderate to severely reduced in a global manner but the septum is less kinetic than the remaining segments. Wall motion is normal. Diastolic function is normal.   Right Ventricle: Right ventricular cavity size is mildly dilated. Systolic function is mildly reduced. PA pressure normal.     XR chest portable ICU    Result Date: 3/21/2024  Narrative: XR CHEST PORTABLE ICU INDICATION: intubation and CVC placement. COMPARISON: Earlier the same day FINDINGS: The tip of the endotracheal tube is 5.1 cm above the rebecca. The tip of the central venous catheter which enters from the right neck region projects over the region of the superior vena cava above the heart. An endogastric tube extends out of the field-of-view into the abdomen. The lungs appear stable from the recent prior study. The right apex is incompletely included, slightly limiting the exam. No large volume pneumothorax appreciated. There  may be small left effusion. Unremarkable cardiomediastinal silhouette.  Atherosclerotic vascular calcifications noted. Bones are unremarkable for age. Normal upper abdomen.     Impression: Satisfactory line and tube positioning. Stable appearance of pulmonary infiltrates. Questionable minimal left effusion. Slightly limited study. Workstation performed: [id number]     XR chest portable    Result Date: 3/21/2024  Narrative: XR CHEST PORTABLE INDICATION: hypoxia. , Elevated white blood count. Slightly elevated BNP COMPARISON: Radiograph from March 20, 2024 FINDINGS: Diffuse bronchial wall thickening and mild diffuse interstitial prominence. Left basilar subsegmental atelectasis. No pneumothorax or pleural effusion. Normal cardiomediastinal silhouette. Bones are unremarkable for age. Normal upper abdomen.     Impression: Findings suggestive of diffuse bronchitis and multifocal bronchiolitis/bronchopneumonia. Possible superimposed background very mild interstitial edema. The study was marked in EPIC for immediate notification. Workstation performed: [id number]     XR chest 1 view portable    Result Date: 3/20/2024  Narrative: XR CHEST PORTABLE INDICATION: Flulike symptoms. COMPARISON: Radiograph August 9, 2023 FINDINGS: Biapical pleural-parenchymal scarring. Subsegmental atelectasis in the lingula, unchanged. No consolidation or pulmonary edema. Background emphysema. No pneumothorax or pleural effusion. Normal cardiomediastinal silhouette. Bones are unremarkable for age. Normal upper abdomen.     Impression: No acute cardiopulmonary disease. Emphysema. Stable subsegmental lingular atelectasis. Workstation performed: [id number]       EKG, Pathology, and Other Studies Reviewed on Admission:   EKG: Reviewed      Counseling / Coordination of Care Time: 30 total mins spent n consult. Greater than 50% of total time spent on patient counseling and coordination of care.    Irina Gonzalez DO  Gastroenterology Fellow  [facility]  [facility]  Division of Gastroenterology and Hepatology  Available on [employer]  ...............................................................................................................................................  ** Please Note: This note is constructed using a voice recognition dictation system. **

## 2024-03-22 NOTE — ASSESSMENT & PLAN NOTE
Initially admitted on 03/20 to [facility] for cough/SOB x2 weeks - required 5L NC on admission   Had escalating O2 requirements overnight - upgraded to ICU this AM for respiratory distress/failure   Failed BIPAP - intubated shortly after arrival to ICU 3/21  Suspect this is multifactorial in setting of Metapneumovirus pneumonia vs COPD exacerbation vs flash pulmonary edema/acute on chronic HF  CXR this AM w/diffuse bronchitis and multifocal bronchiolitis/bronchopneumonia and possible superimposed very mild interstitial edema  RP2 panel (+) for Metapneumovirus  Urine strep/legionella negative  Sputum culture pending  CTA PE study subpleural consolidation in the inferior left lower lobe, possibly atelectatic but pneumonia cannot be excluded. Minimal compressive atelectasis in the dependent right lower lobe.   Azithro/Cftx D2, procal remains negative  Continue IV Solu Medrol 40 mg Q8H for COPD exacerbation  Continue scheduled Xopenex/Atrovent nebs  Is s/p IV Lasix 80 mg x1 w/good responsive - continue PRN for goal negative 1-2L  Current vent settings: ACVC 20/450/50/8 - today is MV D#2  Follow ABG and adjust vent settings as necessary  Currently sedated w/Propofol/Fentanyl gtt's for goal RASS -2 to -3  Aggressive pulmonary hygiene  VAP precautions  Daily SAT/SBT  Wean FiO2 for SpO2 > 88%

## 2024-03-22 NOTE — ASSESSMENT & PLAN NOTE
Wt Readings from Last 3 Encounters:   03/22/24 79.1 kg (174 lb 6.1 oz)   03/13/24 81.1 kg (178 lb 12.8 oz)   01/05/24 85 kg (187 lb 6.4 oz)     BNP not elevated on presentation though she did diurese well with lasix  She does not examine volume overloaded at this time  Continue to monitor          cigarettes

## 2024-03-22 NOTE — ASSESSMENT & PLAN NOTE
Troponin elevation in the setting of acute illness and respiratory failure  Suspect this is a type 2 NSTEMI due to demand

## 2024-03-22 NOTE — ASSESSMENT & PLAN NOTE
Coffee-ground output noted in OGT     Plan:  Holding home ASA for now  IV PPI BID started  GI consulted - appreciate recommendations  Monitor Hgb and output

## 2024-03-22 NOTE — ASSESSMENT & PLAN NOTE
Lab Results   Component Value Date    EGFR 51 03/21/2024    EGFR 66 03/21/2024    EGFR 92 03/21/2024    CREATININE 1.14 03/21/2024    CREATININE 0.92 03/21/2024    CREATININE 0.69 03/21/2024     Baseline creatinine appears to be around 0.6 - 1.0  Has chronically occluded R renal artery and L renal artery is s/p stenting in 2022 - required IHD for 1/5 months in 2022  Avoid nephrotoxic agents and hypotension  Trend renal indices  Strict I/O  Daily weights

## 2024-03-22 NOTE — ASSESSMENT & PLAN NOTE
Troponins negative on admission - now elevated to 1290, peaked at 2018  Suspect this is 2/2 demand ischemia in setting of shock state/respiratory failure rather than ACS  TTE w/EF 30% as noted above - likely stress-induced  Denied chest pain prior to intubation  ECG initially w/ST depression in V4/V5 while in respiratory distress - no ST depressions on repeat ECG   Discussed case w/Dr. Chow (Cardiology) who agrees this is likely 2/2 demand rather than ACS  Continue to trend troponins and serial ECGs  Continue cardiac monitoring

## 2024-03-22 NOTE — ASSESSMENT & PLAN NOTE
Troponins negative on admission - now elevated to 1290, peaked at 2018  Suspect this is 2/2 demand ischemia in setting of shock state/respiratory failure rather than ACS  TTE w/EF 30% as noted above - likely stress-induced  Denied chest pain prior to intubation  ECG initially w/ST depression in V4/V5 while in respiratory distress - no ST depressions on repeat ECG   Discussed case w/Dr. Chow (Cardiology) who agrees this is likely 2/2 demand rather than ACS    Plan:  Continue to trend troponins and serial ECGs  Continuous cardiac monitoring

## 2024-03-22 NOTE — ASSESSMENT & PLAN NOTE
Wt Readings from Last 3 Encounters:   03/21/24 82.1 kg (181 lb)   03/13/24 81.1 kg (178 lb 12.8 oz)   01/05/24 85 kg (187 lb 6.4 oz)     Has history of nonischemic dilated cardiomyopathy - first noted in 2021, then again in 2022  TTE from 03/2023 revealed EF 70% w/mild to moderate concentric hypertrophy and mildly increased wall thickness; G1DD  Repeat today reveals EF 30% as noted above   Is s/p IV Lasix 80 mg x1 - continue w/PRN diuresis for goal -1 to -2L over next 24H  SvO2 currently 85.3  Holding home Coreg and Lisinopril given shock state  Holding home ASA given acute GIB  Strict I/O - maintain Henley cath for accurate I/O  Daily weights

## 2024-03-22 NOTE — ASSESSMENT & PLAN NOTE
Lab Results   Component Value Date    EGFR 51 03/22/2024    EGFR 51 03/21/2024    EGFR 66 03/21/2024    CREATININE 1.14 03/22/2024    CREATININE 1.14 03/21/2024    CREATININE 0.92 03/21/2024     Baseline creatinine appears to be around 0.6 - 1.0  Has chronically occluded R renal artery and L renal artery is s/p stenting in 2022 - required IHD for 1/5 months in 2022    Plan:  Avoid nephrotoxic agents and hypotension  Trend renal indices  Strict I/O  Daily weights

## 2024-03-23 NOTE — ASSESSMENT & PLAN NOTE
Lab Results   Component Value Date    EGFR 54 03/23/2024    EGFR 48 03/22/2024    EGFR 51 03/22/2024    CREATININE 1.09 03/23/2024    CREATININE 1.19 03/22/2024    CREATININE 1.14 03/22/2024     Baseline creatinine appears to be around 0.6 - 1.0  Has chronically occluded R renal artery and L renal artery is s/p stenting in 2022 - required IHD for 1/5 months in 2022    Plan:  Avoid nephrotoxic agents and hypotension  Trend renal indices  Strict I/O  Daily weights

## 2024-03-23 NOTE — RESPIRATORY THERAPY NOTE
RT Ventilator Management Note  Juani Morales 63 y.o. female MRN: [id number]  Unit/Bed#: ICU 11-01 Encounter: [id number]      Daily Screen         3/23/2024  1404 3/23/2024  1430          Patient safety screen outcome:: Passed --      Spont breathing trial % for 30 min: Yes --      Spont breathing trial outcome:: -- Passed      Name of Medical Team Notified:: -- cc      Preparing to extubate/ Notify Nurse: -- Yes      Extubation order obtained: -- Yes      Consider Cuff Test: -- Yes      Patient extubated: -- Yes                Physical Exam:   Suction: ET Tube  O2 Device: (P) vent      Resp Comments: (P) Pt reintubated at the bedside by AP due to increasing WOB. Pt intubated with a 7.0 ET Tube, 24 @ teeth. +ETCO2 and Bilat BS noted. ET tube secured. Awaiting CXR for placement

## 2024-03-23 NOTE — RESPIRATORY THERAPY NOTE
03/23/24 0112   Respiratory Assessment   Assessment Type Assess only   General Appearance Sedated   Respiratory Pattern Assisted   Chest Assessment Chest expansion symmetrical   Bilateral Breath Sounds Coarse;Diminished   Cough None   Resp Comments no changes pt keith vent well synch with vent day 3 CMV 24  470 50% +8 will cont to monitor   O2 Device vent   Vent Information   Vent ID [id number]   Vent type Segura G5   Segura Vent Mode (S)CMV   $ Vent Daily Charge-Subsequent Yes   $ Pulse Oximetry Spot Check Charge Completed   (S)CMV Settings   Resp Rate (BPM) 24 BPM   VT (mL) 470 mL   FIO2 (%) 50 %   PEEP (cmH2O) 8 cmH2O   I:E Ratio 1;2.1   Insp Time (%) 0.8 %   Flow Trigger (LPM) 5   Humidification Heater   Heater Temperature (Set) 95 °F (35 °C)   (S)CMV Actuals   Resp Rate (BPM) 24 BPM   VT (mL) 477   MV 11.5   MAP (cmH2O) 14 cmH2O   Peak Pressure (cmH2O) 27 cmH2O   I:E Ratio (Obs) 1:2.4   Insp Resistance 19   Heater Temperature (Obs) 93.2 °F (34 °C)   Static Compliance (mL/cmH20) 47.4 mL/cmH2O   Plateau Pressure (cm H2O) 21 cm H2O   (S)CMV Alarms   High Peak Pressure (cmH2O) 40   Low Pressure (cmH2O) 5 cm H2O   High Resp Rate (BPM) 40 BPM   Low Resp Rate (BPM) 8 BPM   High MV (L/min) 20 L/min   Low MV (L/min) 4 L/min   High VT (mL) 1000 mL   Low VT (mL) 350 mL   Apnea Time (s) 20 S   Maintenance   Alarm (pink) cable attached No   Resuscitation bag with peep valve at bedside Yes   Water bag changed No   Circuit changed No   IHI Ventilator Associated Pneumonia Bundle   Head of Bed Elevated HOB 30   ETT  Hi-Lo;Cuffed 7.5 mm   Placement Date/Time: 03/21/24 (c) 7476   Type: Hi-Lo;Cuffed  Tube Size: 7.5 mm  Location: Oral  Insertion attempts: 1  Placement Verification: Chest x-ray;Symmetrical chest wall movement  Secured at (cm): 25   Secured at (cm) 25   Measured from Gums   Secured Location Center   Secured by Commercial tube giron  (unchanged)   Site Condition Dry   Cuff Pressure (color) Green   HI-LO Suction   Continuous low suction   HI-LO Secretions Scant;Thin;Clear   HI-LO Intervention Patent

## 2024-03-23 NOTE — ASSESSMENT & PLAN NOTE
Initially admitted on 03/20 to [facility] for cough/SOB x2 weeks - required 5L NC on admission   Had escalating O2 requirements overnight - upgraded to ICU this AM for respiratory distress/failure   Failed BIPAP - intubated shortly after arrival to ICU 3/21  Suspect this is multifactorial in setting of Metapneumovirus pneumonia vs COPD exacerbation vs flash pulmonary edema/acute on chronic HF  (3/21) CXR w/diffuse bronchitis and multifocal bronchiolitis/bronchopneumonia and possible superimposed very mild interstitial edema  (3/23) Pt was extubated to BiPAP with Precedex gtt. Tolerated well and was transitioned to HFNC which she did not tolerate. Pt was given Zyprexa and placed back on the BiPAP. On exam pt with no air movement on auscultation. Pt was re-intubated with bougie device and a 7.0 ETT.    Plan:  RP2 panel (+) for Metapneumovirus  Urine strep/legionella negative  Sputum culture negative  (3/21) CTA PE study - subpleural consolidation in the inferior left lower lobe, possibly atelectatic but pneumonia cannot be excluded. Minimal compressive atelectasis in the dependent right lower lobe.   Azithro/Cftx D4, procal elevated; will continue to trend  Continue IV Solu Medrol 40 mg Q8H for COPD exacerbation  Continue scheduled Xopenex/Atrovent/Pulmicort  Is s/p IV Lasix 80 mg x1 w/good responsive - continue PRN for goal negative 1-2L  Current vent settings: ACVC 20/470/50/8 - today is MV D#3  Follow ABG and adjust vent settings as necessary  Currently sedated w/Propofol/Fentanyl gtt's for goal RASS -2 to -3  Aggressive pulmonary hygiene  VAP precautions  Daily SAT/SBT  Wean FiO2 for SpO2 > 88%

## 2024-03-23 NOTE — PROGRESS NOTES
Counts include 234 beds at the [facility]  Progress Note  Name: Juani Morales I  MRN: [id number]  Unit/Bed#: ICU 11-01 I Date of Admission: 3/20/2024   Date of Service: 3/23/2024 I Hospital Day: 3    Assessment/Plan   * Acute respiratory failure with hypoxia (HCC)  Assessment & Plan  Initially admitted on 03/20 to [facility] for cough/SOB x2 weeks - required 5L NC on admission   Had escalating O2 requirements overnight - upgraded to ICU this AM for respiratory distress/failure   Failed BIPAP - intubated shortly after arrival to ICU 3/21  Suspect this is multifactorial in setting of Metapneumovirus pneumonia vs COPD exacerbation vs flash pulmonary edema/acute on chronic HF  (3/21) CXR w/diffuse bronchitis and multifocal bronchiolitis/bronchopneumonia and possible superimposed very mild interstitial edema    Plan:  RP2 panel (+) for Metapneumovirus  Urine strep/legionella negative  Sputum culture negative  (3/21) CTA PE study - subpleural consolidation in the inferior left lower lobe, possibly atelectatic but pneumonia cannot be excluded. Minimal compressive atelectasis in the dependent right lower lobe.   Azithro/Cftx D4, procal elevated; will continue to trend  Continue IV Solu Medrol 40 mg Q8H for COPD exacerbation  Continue scheduled Xopenex/Atrovent nebs  Is s/p IV Lasix 80 mg x1 w/good responsive - continue PRN for goal negative 1-2L  Current vent settings: ACVC 20/470/50/8 - today is MV D#3  Follow ABG and adjust vent settings as necessary  Currently sedated w/Propofol/Fentanyl gtt's for goal RASS -2 to -3  Aggressive pulmonary hygiene  VAP precautions  Daily SAT/SBT  Wean FiO2 for SpO2 > 88%    Shock (HCC)  Assessment & Plan  Suspect mixed etiology: septic in setting of metapneumovirus pneumonia vs cardiogenic w/known dilated cardiomyopathy and acute on chronic HF  CXR this AM as noted above  TTE this AM revealed EF 30% w/moderate to severely reduced systolic function in a global manner, though septum is less kinetic  than remaining segments; dilated RV w/mildly reduced systolic function; mild AR, trace TR  CVP 9    Plan:  RP2 panel (+) for metapneumovirus  Blood cultures from admission pending - repeats sent given acute decompensation  UA negative  Urine strep/legionella negative  Sputum culture negative  Lactic acid 1.5  Procalcitonin <0.05 x2; now elevated to 4.55  Initially hypertensive, then became hypotensive post intubation/sedation - did not receive 30 cc/kg IVF bolus given volume overload/HF  Continue IV Azithromycin/Ceftriaxone for possible CAP, now D4  Is s/p IV Lasix 80 mg x1 for acute on chronic HF - will continue w/further dosing as needed  Monitor fever and WBC curve  Monitor hemodynamics closely    Human metapneumovirus pneumonia  Assessment & Plan  Please see plan as noted above    COPD with acute exacerbation (HCC)  Assessment & Plan  POA  Likely exacerbated by metapneumovirus pneumonia  Does not appear to follow w/Pulmonology - no PFTs available for review  Continues to smoke 1-2 PPD    Plan:  Continue IV Solu Medrol 40 mg Q8H   Only uses PRN Albuterol inhaler at home - continue scheduled Xopenex/Atrovent nebs  Aggressive pulmonary hygiene    Acute on chronic combined systolic (congestive) and diastolic (congestive) heart failure (HCC)  Assessment & Plan  Wt Readings from Last 3 Encounters:   03/22/24 79.1 kg (174 lb 6.1 oz)   03/13/24 81.1 kg (178 lb 12.8 oz)   01/05/24 85 kg (187 lb 6.4 oz)     Has history of nonischemic dilated cardiomyopathy - first noted in 2021, then again in 2022  TTE from 03/2023 revealed EF 70% w/mild to moderate concentric hypertrophy and mildly increased wall thickness; G1DD  Repeat today reveals EF 30% as noted above     Plan:  Is s/p IV Lasix 80 mg x1 - continue w/PRN diuresis for goal -1 to -2L over next 24H  SvO2 currently 72.9  Holding home Coreg and Lisinopril given shock state  Holding home ASA given acute GIB  Strict I/O - maintain Henley cath for accurate I/O  Daily  weights    Elevated troponin  Assessment & Plan  Troponins negative on admission - now elevated to 1290, peaked at 2018  Suspect this is 2/2 demand ischemia in setting of shock state/respiratory failure rather than ACS  TTE w/EF 30% as noted above - likely stress-induced  Denied chest pain prior to intubation  ECG initially w/ST depression in V4/V5 while in respiratory distress - no ST depressions on repeat ECG   Discussed case w/Dr. Chow (Cardiology) who agrees this is likely 2/2 demand rather than ACS    Plan:  Continue to trend troponins and serial ECGs  Continuous cardiac monitoring    GIB (gastrointestinal bleeding)  Assessment & Plan  Coffee-ground output noted in OGT     Plan:  Holding home ASA for now  IV PPI BID started  GI consulted - appreciate recommendations  Monitor Hgb and output    Prediabetes  Assessment & Plan  Plan:  Hgb A1C 6.0  SSI w/Q6H fingersticks  Goal  - 180    Polycythemia  Assessment & Plan  Likely in setting of chronic hypoxemia    Plan:  Will need follow-up as outpatient    Chronic kidney disease (CKD)  Assessment & Plan  Lab Results   Component Value Date    EGFR 51 03/22/2024    EGFR 51 03/21/2024    EGFR 66 03/21/2024    CREATININE 1.14 03/22/2024    CREATININE 1.14 03/21/2024    CREATININE 0.92 03/21/2024     Baseline creatinine appears to be around 0.6 - 1.0  Has chronically occluded R renal artery and L renal artery is s/p stenting in 2022 - required IHD for 1/5 months in 2022    Plan:  Avoid nephrotoxic agents and hypotension  Trend renal indices  Strict I/O  Daily weights    Tobacco abuse  Assessment & Plan  Smokes 1-2 PPD     Plan:  NRT  Encourage cessation when appropriate    Anxiety  Assessment & Plan  Plan:  Holding home Klonopin for now while NPO in setting of GIB    Chronic pain syndrome  Assessment & Plan  Plan:  Holding home MS Contin 30 mg BID and PRN Percocet 5-325 mg Q8H - appears to be filling this regularly per PDMP  Holding home Neurontin while  NPO  Continue Fentanyl gtt while intubated    Essential hypertension  Assessment & Plan  Plan:  Holding home BB and ACE-I given shock state             Disposition: Critical care    ICU Core Measures     Vented Patient  VAP Bundle  VAP bundle ordered     A: Assess, Prevent, and Manage Pain Has pain been assessed? Yes  Need for changes to pain regimen? No   B: Both Spontaneous Awakening Trials (SATs) and Spontaneous Breathing Trials (SBTs) Plan to perform spontaneous awakening trial today? Yes   Plan to perform spontaneous breathing trial today? Yes   Obvious barriers to extubation? No   C: Choice of Sedation RASS Goal: -2 Light Sedation or 0 Alert and Calm  Need for changes to sedation or analgesia regimen? No   D: Delirium CAM-ICU: Unable to perform secondary to Acute cognitive dysfunction   E: Early Mobility  Plan for early mobility? Yes   F: Family Engagement Plan for family engagement today? Yes       Antibiotic Review: Patient on appropriate coverage based on culture data.  and Awaiting culture results.     Review of Invasive Devices:    [place] Plan: Continue for accurate I/O monitoring for 48 hours  Central access plan: Medications requiring central line  [place] Plan: Keep arterial line for hemodynamic monitoring    Prophylaxis:  VTE VTE covered by:  heparin (porcine), Subcutaneous, 5,000 Units at 03/22/24 2103       Stress Ulcer  covered bypantoprazole (PROTONIX) injection 40 mg [[id number]]         Significant 24hr Events     24hr events: Pt remains intubated and sedated. No acute events overnight.      Subjective   Review of Systems   Unable to perform ROS: Intubated      Objective                            Vitals I/O      Most Recent Min/Max in 24hrs   Temp 99.3 °F (37.4 °C) Temp  Min: 95.9 °F (35.5 °C)  Max: 99.3 °F (37.4 °C)   Pulse 79 Pulse  Min: 73  Max: 83   Resp (!) 34 Resp  Min: 24  Max: 34   BP (!) 74/45 No data recorded   O2 Sat 96 % SpO2  Min: 93 %  Max: 97 %      Intake/Output Summary (Last 24  hours) at 3/23/2024 0013  Last data filed at 3/23/2024 0000  Gross per 24 hour   Intake 958.34 ml   Output 1044 ml   Net -85.66 ml       Diet NPO    Invasive Monitoring   Arterial Line  Susi /57  Arterial Line BP  Min: 95/52  Max: 130/65   MAP 77 mmHg  Arterial Line MAP (mmHg)  Min: 65 mmHg  Max: 88 mmHg           Physical Exam   Physical Exam  Vitals and nursing note reviewed.   Eyes:      Pupils: Pupils are equal, round, and reactive to light.   Skin:     General: Skin is warm and dry.      Capillary Refill: Capillary refill takes less than 2 seconds.   HENT:      Head: Normocephalic.      Mouth/Throat:      Mouth: Mucous membranes are dry.      Comments: OGT  Neck:      Vascular: Central line present.   Cardiovascular:      Rate and Rhythm: Normal rate and regular rhythm.      Pulses: Normal pulses.      Heart sounds: Normal heart sounds.   Musculoskeletal:         General: Normal range of motion.   Abdominal: General: Bowel sounds are normal.      Palpations: Abdomen is soft.   Constitutional:       Interventions: She is sedated, intubated and restrained.   Pulmonary:      Effort: She is intubated.      Breath sounds: Decreased breath sounds present.   Neurological:      GCS: GCS eye subscore is 3. GCS verbal subscore is 1. GCS motor subscore is 6.      Motor: Strength full and intact in all extremities.        Corneal reflex present, cough reflex and gag reflex intact.   Genitourinary/Anorectal:  Henley present.          Diagnostic Studies      EKG: NSR  Imaging:  I have personally reviewed pertinent reports.   and I have personally reviewed pertinent films in PACS     Medications:  Scheduled PRN   azithromycin, 500 mg, Q24H  cefTRIAXone, 1,000 mg, Q24H  chlorhexidine, 15 mL, Q12H JULIÁN  clonazePAM, 1 mg, HS  gabapentin, 100 mg, HS  heparin (porcine), 5,000 Units, Q8H JULIÁN  insulin lispro, 1-6 Units, Q6H JULIÁN  ipratropium, 0.5 mg, TID  levalbuterol, 1.25 mg, TID  methylPREDNISolone sodium succinate, 40 mg,  Q8H JULIÁN  nicotine, 1 patch, Daily  pantoprazole, 40 mg, Q12H JULIÁN      fentaNYL, 50 mcg, Q1H PRN  ondansetron, 4 mg, Q6H PRN       Continuous    fentaNYL, 100 mcg/hr, Last Rate: 100 mcg/hr (03/22/24 2227)  propofol, 5-50 mcg/kg/min, Last Rate: 40 mcg/kg/min (03/22/24 2116)         Labs:    CBC    Recent Labs     03/21/24  1123 03/22/24  0526 03/22/24  1617   WBC 11.89* 9.66  --    HGB 14.5  14.5 13.9 13.5   HCT 44.8 44.4  --     194  --      BMP    Recent Labs     03/22/24  0526 03/22/24  1617   SODIUM 141 141   K 4.0 4.1    106   CO2 25 27   AGAP 11 8   BUN 25 31*   CREATININE 1.14 1.19   CALCIUM 9.0 8.7       Coags    Recent Labs     03/21/24  0735   INR 1.16   PTT 34        Additional Electrolytes  Recent Labs     03/22/24  0526 03/22/24  1617   MG 2.9* 2.9*   PHOS 4.6* 3.7   CAIONIZED 1.18 1.17          Blood Gas    Recent Labs     03/22/24  0526   PHART 7.321*   QGL2NWY 53.2*   PO2ART 81.6   YSL2CSU 26.9   BEART -0.2   SOURCE Line, Arterial     Recent Labs     03/21/24 2039 03/22/24  0526   PHVEN 7.265*  --    YUN2NJG 57.8*  --    PO2VEN 41.7  --    WDV7OAN 25.6  --    BEVEN -2.4  --    J4YSJQM 72.9  --    SOURCE  --  Line, Arterial    LFTs  Recent Labs     03/21/24  0735 03/22/24  0526   ALT 7 11   AST 14 21   ALKPHOS 92 64   ALB 4.4 3.8   TBILI 0.42 0.27       Infectious  Recent Labs     03/21/24  0735 03/22/24  0526   PROCALCITONI <0.05 4.55*     Glucose  Recent Labs     03/21/24  0735 03/21/24  1555 03/22/24  0526 03/22/24  1617   GLUC 360* 151* 135 131               ESTHER Younger

## 2024-03-23 NOTE — RESPIRATORY THERAPY NOTE
03/23/24 0451   Respiratory Assessment   Assessment Type Assess only   General Appearance Sedated   Respiratory Pattern Assisted   Chest Assessment Chest expansion symmetrical   Bilateral Breath Sounds Coarse;Diminished   R Breath Sounds Coarse;Rhonchi   L Breath Sounds Coarse   Cough None   Resp Comments uneventful shift no changes keith vent well synch   O2 Device vent   Vent Information   Vent ID [id number]   Vent type Segura G5   Segura Vent Mode (S)CMV   $ Pulse Oximetry Spot Check Charge Completed   (S)CMV Settings   Resp Rate (BPM) 24 BPM   VT (mL) 470 mL   FIO2 (%) 50 %   PEEP (cmH2O) 8 cmH2O   I:E Ratio 1:2.1   Insp Time (%) 0.8 %   Flow Trigger (LPM) 5   Humidification Heater   Heater Temperature (Set) 95 °F (35 °C)   (S)CMV Actuals   Resp Rate (BPM) 24 BPM   VT (mL) 478   MV 11.5   MAP (cmH2O) 14 cmH2O   Peak Pressure (cmH2O) 28 cmH2O   I:E Ratio (Obs) 1:2.1   Insp Resistance 20   Heater Temperature (Obs) 95 °F (35 °C)   Static Compliance (mL/cmH20) 49 mL/cmH2O   Plateau Pressure (cm H2O) 20 cm H2O   (S)CMV Alarms   High Peak Pressure (cmH2O) 40   Low Pressure (cmH2O) 5 cm H2O   High Resp Rate (BPM) 40 BPM   Low Resp Rate (BPM) 8 BPM   High MV (L/min) 20 L/min   Low MV (L/min) 4 L/min   High VT (mL) 1000 mL   Low VT (mL) 350 mL   Apnea Time (s) 20 S   Maintenance   Alarm (pink) cable attached No   Resuscitation bag with peep valve at bedside Yes   Water bag changed No   Circuit changed No   IHI Ventilator Associated Pneumonia Bundle   Head of Bed Elevated HOB 30   ETT  Hi-Lo;Cuffed 7.5 mm   Placement Date/Time: 03/21/24 (c) 9330   Type: Hi-Lo;Cuffed  Tube Size: 7.5 mm  Location: Oral  Insertion attempts: 1  Placement Verification: Chest x-ray;Symmetrical chest wall movement  Secured at (cm): 25   Secured at (cm) 25   Measured from Gums   Secured Location Center   Secured by Commercial tube giron  (unchanged)   Site Condition Dry   Cuff Pressure (color) Green   HI-LO Suction  Continuous low suction    HI-LO Secretions Scant;Thin;Clear   HI-LO Intervention Patent

## 2024-03-23 NOTE — PROCEDURES
Intubation    Date/Time: 3/23/2024 6:43 PM    Performed by: ESTHER Rasheed  Authorized by: ESTHER Rasheed    Patient location:  Bedside  Consent:     Consent obtained:  Emergent situation and verbal    Consent given by:  Healthcare agent    Risks discussed:  Aspiration, brain injury, dental trauma, laryngeal injury, death, hypoxia, pneumothorax and bleeding  Universal protocol:     Procedure explained and questions answered to patient or proxy's satisfaction: yes      Relevant documents present and verified: yes      Test results available and properly labeled: yes      Radiology Images displayed and confirmed.  If images not available, report reviewed: yes      Required blood products, implants, devices, and special equipment available: yes      Site/side marked: yes      Immediately prior to procedure, a time out was called: yes      Patient identity confirmed:  Anonymous protocol, patient vented/unresponsive  Pre-procedure details:     Patient status:  Unresponsive    Pretreatment medications:  Etomidate    Paralytics:  Succinylcholine  Indications:     Indications for intubation: respiratory distress and respiratory failure    Procedure details:     Preoxygenation:  Bag valve mask    CPR in progress: no      Intubation method:  Oral    Oral intubation technique: Erica.    Laryngoscope blade:  Mac 4    Tube size (mm):  7.0    Tube type:  Hi-lo    Number of attempts:  2    Ventilation between attempts: no      Cricoid pressure: no      Tube visualized through cords: yes    Placement assessment:     Tube secured with:  ETT giron    Breath sounds:  Equal and absent over the epigastrium    Placement verification: chest rise, condensation, CXR verification, direct visualization, equal breath sounds, ETCO2 detector and tube exhalation      CXR findings:  ETT in proper place  Comments:      Intubated with 7.0 and bougie on second attempt. SpO2 % throughout entire procedure.

## 2024-03-23 NOTE — ASSESSMENT & PLAN NOTE
Suspect mixed etiology: septic in setting of metapneumovirus pneumonia vs cardiogenic w/known dilated cardiomyopathy and acute on chronic HF  CXR this AM as noted above  TTE this AM revealed EF 30% w/moderate to severely reduced systolic function in a global manner, though septum is less kinetic than remaining segments; dilated RV w/mildly reduced systolic function; mild AR, trace TR  CVP 9    Plan:  RP2 panel (+) for metapneumovirus  Blood cultures from admission pending - repeats sent given acute decompensation  UA negative  Urine strep/legionella negative  Sputum culture negative  Lactic acid 1.5  Procalcitonin <0.05 x2; now elevated to 4.55  Initially hypertensive, then became hypotensive post intubation/sedation - did not receive 30 cc/kg IVF bolus given volume overload/HF  Continue IV Azithromycin/Ceftriaxone for possible CAP, now D5  Is s/p IV Lasix 80 mg x1 for acute on chronic HF - will continue w/further dosing as needed  Monitor fever and WBC curve  Monitor hemodynamics closely

## 2024-03-23 NOTE — RESPIRATORY THERAPY NOTE
RT Ventilator Management Note  Juani Morales 63 y.o. female MRN: [id number]  Unit/Bed#: ICU 11-01 Encounter: [id number]      Daily Screen         3/23/2024  1404 3/23/2024  1430          Patient safety screen outcome:: Passed --      Spont breathing trial % for 30 min: Yes --      Spont breathing trial outcome:: -- Passed (P)       Name of Medical Team Notified:: -- cc (P)       Preparing to extubate/ Notify Nurse: -- Yes (P)       Extubation order obtained: -- Yes (P)       Consider Cuff Test: -- Yes (P)       Patient extubated: -- Yes (P)                 Physical Exam:   Assessment Type: Assess only  General Appearance: Sedated  Respiratory Pattern: Assisted  Chest Assessment: Chest expansion symmetrical  Bilateral Breath Sounds: Coarse, Diminished  R Breath Sounds: Coarse, Rhonchi  L Breath Sounds: Coarse  Cough: None  Suction: ET Tube  O2 Device: (P) bipap      Resp Comments: (P) Pt. extubated to bipap through G5 vent. No stridor. No resp. distress noted. Pt talking without difficulty

## 2024-03-23 NOTE — ASSESSMENT & PLAN NOTE
Plan:  Holding home MS Contin 30 mg BID and PRN Percocet 5-325 mg Q8H - appears to be filling this regularly per PDMP  Holding home Neurontin while NPO  Continue Fentanyl gtt while intubated

## 2024-03-23 NOTE — PLAN OF CARE
Problem: SAFETY ADULT  Goal: Patient will remain free of falls  Description: INTERVENTIONS:  - Educate patient/family on patient safety including physical limitations  - Instruct patient to call for assistance with activity   - Consult OT/PT to assist with strengthening/mobility   - Keep Call bell within reach  - Keep bed low and locked with side rails adjusted as appropriate  - Keep care items and personal belongings within reach  - Initiate and maintain comfort rounds  - Make Fall Risk Sign visible to staff  - Offer Toileting every 2 Hours, in advance of need  - Initiate/Maintain BED alarm  - Obtain necessary fall risk management equipment  - Apply yellow socks and bracelet for high fall risk patients  - Consider moving patient to room near nurses station  Outcome: Progressing     Problem: CARDIOVASCULAR - ADULT  Goal: Maintains optimal cardiac output and hemodynamic stability  Description: INTERVENTIONS:  - Monitor I/O, vital signs and rhythm  - Monitor for S/S and trends of decreased cardiac output  - Administer and titrate ordered vasoactive medications to optimize hemodynamic stability  - Assess quality of pulses, skin color and temperature  - Assess for signs of decreased coronary artery perfusion  - Instruct patient to report change in severity of symptoms  Outcome: Progressing     Problem: SAFETY,RESTRAINT: NV/NON-SELF DESTRUCTIVE BEHAVIOR  Goal: Remains free of harm/injury (restraint for non violent/non self-detsructive behavior)  Description: INTERVENTIONS:  - Instruct patient/family regarding restraint use   - Assess and monitor physiologic and psychological status   - Provide interventions and comfort measures to meet assessed patient needs   - Identify and implement measures to help patient regain control  - Assess readiness for release of restraint   Outcome: Not Progressing     Problem: SAFETY,RESTRAINT: NV/NON-SELF DESTRUCTIVE BEHAVIOR  Goal: Returns to optimal restraint-free  functioning  Description: INTERVENTIONS:  - Assess the patient's behavior and symptoms that indicate continued need for restraint  - Identify and implement measures to help patient regain control  - Assess readiness for release of restraint   Outcome: Not Progressing

## 2024-03-23 NOTE — PLAN OF CARE
Problem: PAIN - ADULT  Goal: Verbalizes/displays adequate comfort level or baseline comfort level  Description: Interventions:  - Encourage patient to monitor pain and request assistance  - Assess pain using appropriate pain scale  - Administer analgesics based on type and severity of pain and evaluate response  - Implement non-pharmacological measures as appropriate and evaluate response  - Consider cultural and social influences on pain and pain management  - Notify physician/advanced practitioner if interventions unsuccessful or patient reports new pain  Outcome: Progressing     Problem: INFECTION - ADULT  Goal: Absence or prevention of progression during hospitalization  Description: INTERVENTIONS:  - Assess and monitor for signs and symptoms of infection  - Monitor lab/diagnostic results  - Monitor all insertion sites, i.e. indwelling lines, tubes, and drains  - Monitor endotracheal if appropriate and nasal secretions for changes in amount and color  - [place] appropriate cooling/warming therapies per order  - Administer medications as ordered  - Instruct and encourage patient and family to use good hand hygiene technique  - Identify and instruct in appropriate isolation precautions for identified infection/condition  Outcome: Progressing  Goal: Absence of fever/infection during neutropenic period  Description: INTERVENTIONS:  - Monitor WBC    Outcome: Progressing     Problem: SAFETY ADULT  Goal: Patient will remain free of falls  Description: INTERVENTIONS:  - Educate patient/family on patient safety including physical limitations  - Instruct patient to call for assistance with activity   - Consult OT/PT to assist with strengthening/mobility   - Keep Call bell within reach  - Keep bed low and locked with side rails adjusted as appropriate  - Keep care items and personal belongings within reach  - Initiate and maintain comfort rounds  - Make Fall Risk Sign visible to staff  - Offer Toileting every 2 Hours,  in advance of need  - Initiate/Maintain BED alarm  - Obtain necessary fall risk management equipment  - Apply yellow socks and bracelet for high fall risk patients  - Consider moving patient to room near nurses station  Outcome: Progressing  Goal: Maintain or return to baseline ADL function  Description: INTERVENTIONS:  -  Assess patient's ability to carry out ADLs; assess patient's baseline for ADL function and identify physical deficits which impact ability to perform ADLs (bathing, care of mouth/teeth, toileting, grooming, dressing, etc.)  - Assess/evaluate cause of self-care deficits   - Assess range of motion  - Assess patient's mobility; develop plan if impaired  - Assess patient's need for assistive devices and provide as appropriate  - Encourage maximum independence but intervene and supervise when necessary  - Involve family in performance of ADLs  - Assess for home care needs following discharge   - Consider OT consult to assist with ADL evaluation and planning for discharge  - Provide patient education as appropriate  Outcome: Progressing  Goal: Maintains/Returns to pre admission functional level  Description: INTERVENTIONS:  - Perform AM-PAC 6 Click Basic Mobility/ Daily Activity assessment daily.  - Set and communicate daily mobility goal to care team and patient/family/caregiver.   - Collaborate with rehabilitation services on mobility goals if consulted  - Perform Range of Motion 3 times a day.  - Reposition patient every 2 hours.  - Dangle patient 3 times a day  - Stand patient 3 times a day  - Ambulate patient 3 times a day  - Out of bed to chair 3 times a day   - Out of bed for meals 3 times a day  - Out of bed for toileting  - Record patient progress and toleration of activity level   Outcome: Progressing     Problem: DISCHARGE PLANNING  Goal: Discharge to home or other facility with appropriate resources  Description: INTERVENTIONS:  - Identify barriers to discharge w/patient and caregiver  -  Arrange for needed discharge resources and transportation as appropriate  - Identify discharge learning needs (meds, wound care, etc.)  - Arrange for interpretive services to assist at discharge as needed  - Refer to Case Management Department for coordinating discharge planning if the patient needs post-hospital services based on physician/advanced practitioner order or complex needs related to functional status, cognitive ability, or social support system  Outcome: Progressing     Problem: Knowledge Deficit  Goal: Patient/family/caregiver demonstrates understanding of disease process, treatment plan, medications, and discharge instructions  Description: Complete learning assessment and assess knowledge base.  Interventions:  - Provide teaching at level of understanding  - Provide teaching via preferred learning methods  Outcome: Progressing     Problem: Prexisting or High Potential for Compromised Skin Integrity  Goal: Skin integrity is maintained or improved  Description: INTERVENTIONS:  - Identify patients at risk for skin breakdown  - Assess and monitor skin integrity  - Assess and monitor nutrition and hydration status  - Monitor labs   - Assess for incontinence   - Turn and reposition patient  - Assist with mobility/ambulation  - Relieve pressure over bony prominences  - Avoid friction and shearing  - Provide appropriate hygiene as needed including keeping skin clean and dry  - Evaluate need for skin moisturizer/barrier cream  - Collaborate with interdisciplinary team   - Patient/family teaching  - Consider wound care consult   Outcome: Progressing     Problem: SAFETY,RESTRAINT: NV/NON-SELF DESTRUCTIVE BEHAVIOR  Goal: Remains free of harm/injury (restraint for non violent/non self-detsructive behavior)  Description: INTERVENTIONS:  - Instruct patient/family regarding restraint use   - Assess and monitor physiologic and psychological status   - Provide interventions and comfort measures to meet assessed  patient needs   - Identify and implement measures to help patient regain control  - Assess readiness for release of restraint   Outcome: Progressing  Goal: Returns to optimal restraint-free functioning  Description: INTERVENTIONS:  - Assess the patient's behavior and symptoms that indicate continued need for restraint  - Identify and implement measures to help patient regain control  - Assess readiness for release of restraint   Outcome: Progressing     Problem: Nutrition/Hydration-ADULT  Goal: Nutrient/Hydration intake appropriate for improving, restoring or maintaining nutritional needs  Description: Monitor and assess patient's nutrition/hydration status for malnutrition. Collaborate with interdisciplinary team and initiate plan and interventions as ordered.  Monitor patient's weight and dietary intake as ordered or per policy. Utilize nutrition screening tool and intervene as necessary. Determine patient's food preferences and provide high-protein, high-caloric foods as appropriate.     INTERVENTIONS:  - Monitor oral intake, urinary output, labs, and treatment plans  - Assess nutrition and hydration status and recommend course of action  - Evaluate amount of meals eaten  - Assist patient with eating if necessary   - Allow adequate time for meals  - Recommend/ encourage appropriate diets, oral nutritional supplements, and vitamin/mineral supplements  - Order, calculate, and assess calorie counts as needed  - Recommend, monitor, and adjust tube feedings and TPN/PPN based on assessed needs  - Assess need for intravenous fluids  - Provide specific nutrition/hydration education as appropriate  - Include patient/family/caregiver in decisions related to nutrition  Outcome: Progressing     Problem: NEUROSENSORY - ADULT  Goal: Achieves stable or improved neurological status  Description: INTERVENTIONS  - Monitor and report changes in neurological status  - Monitor vital signs such as temperature, blood pressure, glucose,  and any other labs ordered   - Initiate measures to prevent increased intracranial pressure  - Monitor for seizure activity and implement precautions if appropriate      Outcome: Progressing  Goal: Remains free of injury related to seizures activity  Description: INTERVENTIONS  - Maintain airway, patient safety  and administer oxygen as ordered  - Monitor patient for seizure activity, document and report duration and description of seizure to physician/advanced practitioner  - If seizure occurs,  ensure patient safety during seizure  - Reorient patient post seizure  - Seizure pads on all 4 side rails  - Instruct patient/family to notify RN of any seizure activity including if an aura is experienced  - Instruct patient/family to call for assistance with activity based on nursing assessment  - Administer anti-seizure medications if ordered    Outcome: Progressing  Goal: Achieves maximal functionality and self care  Description: INTERVENTIONS  - Monitor swallowing and airway patency with patient fatigue and changes in neurological status  - Encourage and assist patient to increase activity and self care.   - Encourage visually impaired, hearing impaired and aphasic patients to use assistive/communication devices  Outcome: Progressing     Problem: CARDIOVASCULAR - ADULT  Goal: Maintains optimal cardiac output and hemodynamic stability  Description: INTERVENTIONS:  - Monitor I/O, vital signs and rhythm  - Monitor for S/S and trends of decreased cardiac output  - Administer and titrate ordered vasoactive medications to optimize hemodynamic stability  - Assess quality of pulses, skin color and temperature  - Assess for signs of decreased coronary artery perfusion  - Instruct patient to report change in severity of symptoms  Outcome: Progressing  Goal: Absence of cardiac dysrhythmias or at baseline rhythm  Description: INTERVENTIONS:  - Continuous cardiac monitoring, vital signs, obtain 12 lead EKG if ordered  - Administer  antiarrhythmic and heart rate control medications as ordered  - Monitor electrolytes and administer replacement therapy as ordered  Outcome: Progressing     Problem: RESPIRATORY - ADULT  Goal: Achieves optimal ventilation and oxygenation  Description: INTERVENTIONS:  - Assess for changes in respiratory status  - Assess for changes in mentation and behavior  - Position to facilitate oxygenation and minimize respiratory effort  - Oxygen administered by appropriate delivery if ordered  - Initiate smoking cessation education as indicated  - Encourage broncho-pulmonary hygiene including cough, deep breathe, Incentive Spirometry  - Assess the need for suctioning and aspirate as needed  - Assess and instruct to report SOB or any respiratory difficulty  - Respiratory Therapy support as indicated  Outcome: Progressing     Problem: GASTROINTESTINAL - ADULT  Goal: Minimal or absence of nausea and/or vomiting  Description: INTERVENTIONS:  - Administer IV fluids if ordered to ensure adequate hydration  - Maintain NPO status until nausea and vomiting are resolved  - Nasogastric tube if ordered  - Administer ordered antiemetic medications as needed  - Provide nonpharmacologic comfort measures as appropriate  - Advance diet as tolerated, if ordered  - Consider nutrition services referral to assist patient with adequate nutrition and appropriate food choices  Outcome: Progressing  Goal: Maintains or returns to baseline bowel function  Description: INTERVENTIONS:  - Assess bowel function  - Encourage oral fluids to ensure adequate hydration  - Administer IV fluids if ordered to ensure adequate hydration  - Administer ordered medications as needed  - Encourage mobilization and activity  - Consider nutritional services referral to assist patient with adequate nutrition and appropriate food choices  Outcome: Progressing  Goal: Maintains adequate nutritional intake  Description: INTERVENTIONS:  - Monitor percentage of each meal  consumed  - Identify factors contributing to decreased intake, treat as appropriate  - Assist with meals as needed  - Monitor I&O, weight, and lab values if indicated  - Obtain nutrition services referral as needed  Outcome: Progressing  Goal: Establish and maintain optimal ostomy function  Description: INTERVENTIONS:  - Assess bowel function  - Encourage oral fluids to ensure adequate hydration  - Administer IV fluids if ordered to ensure adequate hydration   - Administer ordered medications as needed  - Encourage mobilization and activity  - Nutrition services referral to assist patient with appropriate food choices  - Assess stoma site  - Consider wound care consult   Outcome: Progressing  Goal: Oral mucous membranes remain intact  Description: INTERVENTIONS  - Assess oral mucosa and hygiene practices  - Implement preventative oral hygiene regimen  - Implement oral medicated treatments as ordered  - Initiate Nutrition services referral as needed  Outcome: Progressing     Problem: GENITOURINARY - ADULT  Goal: Maintains or returns to baseline urinary function  Description: INTERVENTIONS:  - Assess urinary function  - Encourage oral fluids to ensure adequate hydration if ordered  - Administer IV fluids as ordered to ensure adequate hydration  - Administer ordered medications as needed  - Offer frequent toileting  - Follow urinary retention protocol if ordered  Outcome: Progressing  Goal: Absence of urinary retention  Description: INTERVENTIONS:  - Assess patient's ability to void and empty bladder  - Monitor I/O  - Bladder scan as needed  - Discuss with physician/AP medications to alleviate retention as needed  - Discuss catheterization for long term situations as appropriate  Outcome: Progressing  Goal: Urinary catheter remains patent  Description: INTERVENTIONS:  - Assess patency of urinary catheter  - If patient has a chronic leung, consider changing catheter if non-functioning  - Follow guidelines for  intermittent irrigation of non-functioning urinary catheter  Outcome: Progressing     Problem: METABOLIC, FLUID AND ELECTROLYTES - ADULT  Goal: Electrolytes maintained within normal limits  Description: INTERVENTIONS:  - Monitor labs and assess patient for signs and symptoms of electrolyte imbalances  - Administer electrolyte replacement as ordered  - Monitor response to electrolyte replacements, including repeat lab results as appropriate  - Instruct patient on fluid and nutrition as appropriate  Outcome: Progressing  Goal: Fluid balance maintained  Description: INTERVENTIONS:  - Monitor labs   - Monitor I/O and WT  - Instruct patient on fluid and nutrition as appropriate  - Assess for signs & symptoms of volume excess or deficit  Outcome: Progressing  Goal: Glucose maintained within target range  Description: INTERVENTIONS:  - Monitor Blood Glucose as ordered  - Assess for signs and symptoms of hyperglycemia and hypoglycemia  - Administer ordered medications to maintain glucose within target range  - Assess nutritional intake and initiate nutrition service referral as needed  Outcome: Progressing     Problem: SKIN/TISSUE INTEGRITY - ADULT  Goal: Skin Integrity remains intact(Skin Breakdown Prevention)  Description: Assess:  -Perform Valent[place] assessment   -Clean and moisturize skin   -Inspect skin when repositioning, toileting, and assisting with ADLS  -Assess under medical devices  -Assess extremities for adequate circulation and sensation     Bed Management:  -Have minimal linens on bed & keep smooth, unwrinkled  -Change linens as needed when moist or perspiring  -Avoid sitting or lying in one position for more than 2 hours while in bed  -Keep HOB at 30 degrees     Toileting:  -Offer bedside commode  -Assess for incontinence   -Use incontinent care products after each incontinent episode     Activity:  -Mobilize patient 3 times a day  -Encourage activity and walks on unit  -Encourage or provide ROM exercises    -Turn and reposition patient every 2 Hours  -Use appropriate equipment to lift or move patient in bed  -Instruct/ Assist with weight shifting when out of bed in chair  -Consider limitation of chair time 2 hour intervals    Skin Care:  -Avoid use of baby powder, tape, friction and shearing, hot water or constrictive clothing  -Relieve pressure over bony prominences   -Do not massage red bony areas    Next Steps:  -Teach patient strategies to minimize risks    -Consider consults to  interdisciplinary teams  Outcome: Progressing  Goal: Incision(s), wounds(s) or drain site(s) healing without S/S of infection  Description: INTERVENTIONS  - Assess and document dressing, incision, wound bed, drain sites and surrounding tissue  - Provide patient and family education  - Perform skin care/dressing changes  Outcome: Progressing  Goal: Pressure injury heals and does not worsen  Description: Interventions:  - Implement low air loss mattress or specialty surface (Criteria met)  - Apply silicone foam dressing  - Instruct/assist with weight shifting every 60 minutes when in chair   - Limit chair time to 2 hour intervals  - Use special pressure reducing interventions when in chair   - Apply fecal or urinary incontinence containment device   - Perform passive or active ROM   - Turn and reposition patient & offload bony prominences every 2 hours   - Utilize friction reducing device or surface for transfers   - Consider consults to  interdisciplinary teams  - Use incontinent care products after each incontinent episode  - Consider nutrition services referral as needed  Outcome: Progressing     Problem: HEMATOLOGIC - ADULT  Goal: Maintains hematologic stability  Description: INTERVENTIONS  - Assess for signs and symptoms of bleeding or hemorrhage  - Monitor labs  - Administer supportive blood products/factors as ordered and appropriate  Outcome: Progressing     Problem: MUSCULOSKELETAL - ADULT  Goal: Maintain or return mobility to  safest level of function  Description: INTERVENTIONS:  - Assess patient's ability to carry out ADLs; assess patient's baseline for ADL function and identify physical deficits which impact ability to perform ADLs (bathing, care of mouth/teeth, toileting, grooming, dressing, etc.)  - Assess/evaluate cause of self-care deficits   - Assess range of motion  - Assess patient's mobility  - Assess patient's need for assistive devices and provide as appropriate  - Encourage maximum independence but intervene and supervise when necessary  - Involve family in performance of ADLs  - Assess for home care needs following discharge   - Consider OT consult to assist with ADL evaluation and planning for discharge  - Provide patient education as appropriate  Outcome: Progressing  Goal: Maintain proper alignment of affected body part  Description: INTERVENTIONS:  - Support, maintain and protect limb and body alignment  - Provide patient/ family with appropriate education  Outcome: Progressing

## 2024-03-23 NOTE — ASSESSMENT & PLAN NOTE
Wt Readings from Last 3 Encounters:   03/23/24 78.5 kg (173 lb 1 oz)   03/13/24 81.1 kg (178 lb 12.8 oz)   01/05/24 85 kg (187 lb 6.4 oz)     Has history of nonischemic dilated cardiomyopathy - first noted in 2021, then again in 2022  TTE from 03/2023 revealed EF 70% w/mild to moderate concentric hypertrophy and mildly increased wall thickness; G1DD  Repeat today reveals EF 30% as noted above     Plan:  Is s/p IV Lasix 80 mg x1 - continue w/PRN diuresis for goal -1 to -2L over next 24H  SvO2 currently 72.9  Continue home Coreg  Holding home Lisinopril given shock state  Holding home ASA given acute GIB  Strict I/O - maintain [place] cath for accurate I/O  Daily weights

## 2024-03-23 NOTE — NURSING NOTE
Pt condition changed  Required reintubation  Received 100mg of succinylcholine and 20mg etomidate.  OGT placed by nursing

## 2024-03-23 NOTE — PROGRESS NOTES
[id number]: Pt had been intubated shortly before shift change. Pt sedated with fentanyl & prop; precedex turned off. Esophageal probe inserted; temp 100.2. BP per a-line 180-200. Notified ESTHER Mayo. Troponin & ABG collected & sent to lab. EKG performed. Pt given 10 mg iv hydralazine once. Central line dressing changed; old one had dried blood and had been reinforced multiple times.     2000: Pt given PRN Fentanyl for agitation/biting ETT.     0525: Pt given PRN fentanyl for agitation/biting ETT.     0630: Coreg dose adjusted by ESTHER Merritt. Dose given. /81.

## 2024-03-23 NOTE — RESPIRATORY THERAPY NOTE
RT Ventilator Management Note  Juani Morales 63 y.o. female MRN: [id number]  Unit/Bed#: ICU 11-01 Encounter: [id number]      Daily Screen         3/22/2024  0724 3/23/2024  0721          Patient safety screen outcome:: Failed Failed (P)       Not Ready for Weaning due to:: Underline problem not resolved Underline problem not resolved (P)                 Physical Exam:   Assessment Type: Assess only  General Appearance: Sedated  Respiratory Pattern: Assisted  Chest Assessment: Chest expansion symmetrical  Bilateral Breath Sounds: Coarse, Diminished  R Breath Sounds: Coarse, Rhonchi  L Breath Sounds: Coarse  Cough: None  Suction: (P) ET Tube  O2 Device: vent      Resp Comments: (P) Pt. resting comfortably on current vent settings. Will await rounds for any weaning.

## 2024-03-23 NOTE — ASSESSMENT & PLAN NOTE
POA  Likely exacerbated by metapneumovirus pneumonia  Does not appear to follow w/Pulmonology - no PFTs available for review  Continues to smoke 1-2 PPD    Plan:  Continue IV Solu Medrol 40 mg Q8H   Only uses PRN Albuterol inhaler at home - continue scheduled Xopenex/Atrovent/Pulmicort  Aggressive pulmonary hygiene

## 2024-03-24 NOTE — ASSESSMENT & PLAN NOTE
Initially admitted on 03/20 to [facility] for cough/SOB x2 weeks - required 5L NC on admission   Had escalating O2 requirements overnight - upgraded to ICU this AM for respiratory distress/failure   Failed BIPAP - intubated shortly after arrival to ICU 3/21  Suspect this is multifactorial in setting of Metapneumovirus pneumonia vs COPD exacerbation vs flash pulmonary edema/acute on chronic HF  (3/21) CXR w/diffuse bronchitis and multifocal bronchiolitis/bronchopneumonia and possible superimposed very mild interstitial edema  (3/23) Pt was extubated to BiPAP with Precedex gtt. Tolerated well and was transitioned to HFNC which she did not tolerate. Pt was given Zyprexa and placed back on the BiPAP. On exam pt with no air movement on auscultation. Pt was re-intubated with bougie device and a 7.0 ETT.    Plan:  RP2 panel (+) for Metapneumovirus  Urine strep/legionella negative  Sputum culture negative  (3/21) CTA PE study - subpleural consolidation in the inferior left lower lobe, possibly atelectatic but pneumonia cannot be excluded. Minimal compressive atelectasis in the dependent right lower lobe.   Azithro/Cftx D4, procal elevated; will continue to trend  Continue IV Solu Medrol 40 mg Q12H for COPD exacerbation  Continue scheduled Xopenex/Atrovent/Pulmicort  Is s/p IV Lasix 80 mg x1 w/good responsive - continue PRN for goal negative 1-2L  Current vent settings: ACVC 20/470/50/8 - today is MV D#4  Follow ABG and adjust vent settings as necessary  Currently sedated w/Propofol/Fentanyl gtt's for goal RASS -2 to -3  Aggressive pulmonary hygiene  VAP precautions  Daily SAT/SBT  Wean FiO2 for SpO2 > 88%

## 2024-03-24 NOTE — PROGRESS NOTES
[facility]  Progress Note  Name: Juani Morales I  MRN: [id number]  Unit/Bed#: ICU 11-01 I Date of Admission: 3/20/2024   Date of Service: 3/24/2024 I Hospital Day: 4    Assessment/Plan   * Acute respiratory failure with hypoxia (HCC)  Assessment & Plan  Initially admitted on 03/20 to [facility] for cough/SOB x2 weeks - required 5L NC on admission   Had escalating O2 requirements overnight - upgraded to ICU this AM for respiratory distress/failure   Failed BIPAP - intubated shortly after arrival to ICU 3/21  Suspect this is multifactorial in setting of Metapneumovirus pneumonia vs COPD exacerbation vs flash pulmonary edema/acute on chronic HF  (3/21) CXR w/diffuse bronchitis and multifocal bronchiolitis/bronchopneumonia and possible superimposed very mild interstitial edema  (3/23) Pt was extubated to BiPAP with Precedex gtt. Tolerated well and was transitioned to HFNC which she did not tolerate. Pt was given Zyprexa and placed back on the BiPAP. On exam pt with no air movement on auscultation. Pt was re-intubated with bougie device and a 7.0 ETT.    Plan:  RP2 panel (+) for Metapneumovirus  Urine strep/legionella negative  Sputum culture negative  (3/21) CTA PE study - subpleural consolidation in the inferior left lower lobe, possibly atelectatic but pneumonia cannot be excluded. Minimal compressive atelectasis in the dependent right lower lobe.   Azithro/Cftx D4, procal elevated; will continue to trend  Continue IV Solu Medrol 40 mg Q8H for COPD exacerbation  Continue scheduled Xopenex/Atrovent/Pulmicort  Is s/p IV Lasix 80 mg x1 w/good responsive - continue PRN for goal negative 1-2L  Current vent settings: ACVC 20/470/50/8 - today is MV D#3  Follow ABG and adjust vent settings as necessary  Currently sedated w/Propofol/Fentanyl gtt's for goal RASS -2 to -3  Aggressive pulmonary hygiene  VAP precautions  Daily SAT/SBT  Wean FiO2 for SpO2 > 88%    Shock (HCC)  Assessment & Plan  Suspect mixed  etiology: septic in setting of metapneumovirus pneumonia vs cardiogenic w/known dilated cardiomyopathy and acute on chronic HF  CXR this AM as noted above  TTE this AM revealed EF 30% w/moderate to severely reduced systolic function in a global manner, though septum is less kinetic than remaining segments; dilated RV w/mildly reduced systolic function; mild AR, trace TR  CVP 9    Plan:  RP2 panel (+) for metapneumovirus  Blood cultures from admission pending - repeats sent given acute decompensation  UA negative  Urine strep/legionella negative  Sputum culture negative  Lactic acid 1.5  Procalcitonin <0.05 x2; now elevated to 4.55  Initially hypertensive, then became hypotensive post intubation/sedation - did not receive 30 cc/kg IVF bolus given volume overload/HF  Continue IV Azithromycin/Ceftriaxone for possible CAP, now D5  Is s/p IV Lasix 80 mg x1 for acute on chronic HF - will continue w/further dosing as needed  Monitor fever and WBC curve  Monitor hemodynamics closely    Human metapneumovirus pneumonia  Assessment & Plan  Please see plan as noted above    COPD with acute exacerbation (HCC)  Assessment & Plan  POA  Likely exacerbated by metapneumovirus pneumonia  Does not appear to follow w/Pulmonology - no PFTs available for review  Continues to smoke 1-2 PPD    Plan:  Continue IV Solu Medrol 40 mg Q8H   Only uses PRN Albuterol inhaler at home - continue scheduled Xopenex/Atrovent/Pulmicort  Aggressive pulmonary hygiene    Acute on chronic combined systolic (congestive) and diastolic (congestive) heart failure (HCC)  Assessment & Plan  Wt Readings from Last 3 Encounters:   03/23/24 78.5 kg (173 lb 1 oz)   03/13/24 81.1 kg (178 lb 12.8 oz)   01/05/24 85 kg (187 lb 6.4 oz)     Has history of nonischemic dilated cardiomyopathy - first noted in 2021, then again in 2022  TTE from 03/2023 revealed EF 70% w/mild to moderate concentric hypertrophy and mildly increased wall thickness; G1DD  Repeat today reveals EF 30%  as noted above     Plan:  Is s/p IV Lasix 80 mg x1 - continue w/PRN diuresis for goal -1 to -2L over next 24H  SvO2 currently 72.9  Continue home Coreg  Holding home Lisinopril given shock state  Holding home ASA given acute GIB  Strict I/O - maintain Henley cath for accurate I/O  Daily weights    Elevated troponin  Assessment & Plan  Troponins negative on admission - now elevated to 1290, peaked at 2018  Suspect this is 2/2 demand ischemia in setting of shock state/respiratory failure rather than ACS  TTE w/EF 30% as noted above - likely stress-induced  Denied chest pain prior to intubation  ECG initially w/ST depression in V4/V5 while in respiratory distress - no ST depressions on repeat ECG   Discussed case w/Dr. Chow (Cardiology) who agrees this is likely 2/2 demand rather than ACS    Plan:  Continue to trend troponins and serial ECGs  Continuous cardiac monitoring    GIB (gastrointestinal bleeding)  Assessment & Plan  Coffee-ground output noted in OGT     Plan:  Holding home ASA for now  IV PPI BID started  GI consulted - appreciate recommendations  Monitor Hgb and output    Prediabetes  Assessment & Plan  Plan:  Hgb A1C 6.0  SSI w/Q6H fingersticks  Goal  - 180    Polycythemia  Assessment & Plan  Likely in setting of chronic hypoxemia    Plan:  Will need follow-up as outpatient    Chronic kidney disease (CKD)  Assessment & Plan  Lab Results   Component Value Date    EGFR 54 03/23/2024    EGFR 48 03/22/2024    EGFR 51 03/22/2024    CREATININE 1.09 03/23/2024    CREATININE 1.19 03/22/2024    CREATININE 1.14 03/22/2024     Baseline creatinine appears to be around 0.6 - 1.0  Has chronically occluded R renal artery and L renal artery is s/p stenting in 2022 - required IHD for 1/5 months in 2022    Plan:  Avoid nephrotoxic agents and hypotension  Trend renal indices  Strict I/O  Daily weights    Tobacco abuse  Assessment & Plan  Smokes 1-2 PPD     Plan:  NRT  Encourage cessation when  appropriate    Anxiety  Assessment & Plan  Plan:  Holding home Klonopin for now while NPO in setting of GIB    Chronic pain syndrome  Assessment & Plan  Plan:  Holding home MS Contin 30 mg BID and PRN Percocet 5-325 mg Q8H - appears to be filling this regularly per PDMP  Holding home Neurontin while NPO  Continue Fentanyl gtt while intubated    Essential hypertension  Assessment & Plan  Plan:  Holding home BB and ACE-I given shock state             Disposition: Critical care    ICU Core Measures     Vented Patient  VAP Bundle  VAP bundle ordered     A: Assess, Prevent, and Manage Pain Has pain been assessed? Yes  Need for changes to pain regimen? No   B: Both Spontaneous Awakening Trials (SATs) and Spontaneous Breathing Trials (SBTs) Plan to perform spontaneous awakening trial today? Yes   Plan to perform spontaneous breathing trial today? Yes   Obvious barriers to extubation? Yes   C: Choice of Sedation RASS Goal: -2 Light Sedation or 0 Alert and Calm  Need for changes to sedation or analgesia regimen? No   D: Delirium CAM-ICU: Unable to perform secondary to Acute cognitive dysfunction   E: Early Mobility  Plan for early mobility? Yes   F: Family Engagement Plan for family engagement today? Yes       Antibiotic Review: Patient on appropriate coverage based on culture data.  and Awaiting culture results.     Review of Invasive Devices:    Kale Plan: Continue for accurate I/O monitoring for 48 hours  Central access plan: Medications requiring central line  Susi Plan: Keep arterial line for hemodynamic monitoring    Prophylaxis:  VTE VTE covered by:  heparin (porcine), Subcutaneous, 5,000 Units at 03/23/24 2101       Stress Ulcer  covered bypantoprazole (PROTONIX) injection 40 mg [[id number]]         Significant 24hr Events     24hr events: Pt was extubated to BiPAP yesterday and placed on a Precedex gtt. Pt toelrated and was transitioned to HFNC. Pt did not tolerate the HFNC and was given a dose of Zyprxa and placed  back on the BiPAP. Unfortunately pt ended up requiring re-intubation yesterday evening. Pt was placed back on Propofol/Fentanyl gtts for sedation. Received a one time dose of Hydralazine 10 mg IV for sbp >190 with noted improvement.      Subjective   Review of Systems   Unable to perform ROS: Intubated      Objective                            Vitals I/O      Most Recent Min/Max in 24hrs   Temp 99 °F (37.2 °C) Temp  Min: 98.4 °F (36.9 °C)  Max: 100.4 °F (38 °C)   Pulse 64 Pulse  Min: 59  Max: 87   Resp 18 Resp  Min: 16  Max: 37   /68 BP  Min: 132/68  Max: 203/84   O2 Sat 95 % SpO2  Min: 93 %  Max: 98 %      Intake/Output Summary (Last 24 hours) at 3/24/2024 0021  Last data filed at 3/24/2024 0000  Gross per 24 hour   Intake 611.61 ml   Output 1520 ml   Net -908.39 ml       Diet NPO    Invasive Monitoring   Arterial Line  Susi /67  Arterial Line BP  Min: 110/55  Max: 227/93   MAP 95 mmHg  Arterial Line MAP (mmHg)  Min: 72 mmHg  Max: 145 mmHg           Physical Exam   Physical Exam  Vitals and nursing note reviewed.   Eyes:      Pupils: Pupils are equal, round, and reactive to light.   Skin:     General: Skin is warm and dry.      Capillary Refill: Capillary refill takes 2 to 3 seconds.   HENT:      Head: Normocephalic.      Mouth/Throat:      Mouth: Mucous membranes are dry.      Comments: OGT in place  Neck:      Vascular: Central line present.   Cardiovascular:      Rate and Rhythm: Normal rate and regular rhythm.      Pulses: Normal pulses.      Heart sounds: Normal heart sounds.   Musculoskeletal:         General: Swelling present.      Right lower leg: Trace Edema present.      Left lower leg: Trace Edema present.   Abdominal: General: Bowel sounds are normal.      Palpations: Abdomen is soft.   Constitutional:       Appearance: She is obese.      Interventions: She is sedated, intubated and restrained.   Pulmonary:      Effort: She is intubated.      Breath sounds: Decreased breath sounds present.    Neurological:      GCS: GCS eye subscore is 4. GCS verbal subscore is 1. GCS motor subscore is 4.      Motor: Strength full and intact in all extremities.        Corneal reflex present, cough reflex and gag reflex intact.   Genitourinary/Anorectal:  Henley present.          Diagnostic Studies      EKG: NSR  Imaging:  I have personally reviewed pertinent reports.   and I have personally reviewed pertinent films in PACS     Medications:  Scheduled PRN   aspirin, 81 mg, Daily  atorvastatin, 40 mg, Daily With Dinner  azithromycin, 500 mg, Q24H  budesonide, 0.5 mg, Q12H  carvedilol, 6.25 mg, BID With Meals  cefTRIAXone, 1,000 mg, Q24H  chlorhexidine, 15 mL, Q12H JULIÁN  clonazePAM, 1 mg, HS  gabapentin, 100 mg, HS  heparin (porcine), 5,000 Units, Q8H JULIÁN  insulin lispro, 1-6 Units, Q6H JULIÁN  ipratropium, 0.5 mg, TID  levalbuterol, 1.25 mg, TID  methylPREDNISolone sodium succinate, 40 mg, Q8H JULIÁN  nicotine, 1 patch, Daily  OLANZapine, 5 mg, Once  pantoprazole, 40 mg, Q12H JULIÁN  senna, 17.6 mg, BID      fentaNYL, 50 mcg, Q2H PRN  ondansetron, 4 mg, Q6H PRN       Continuous    fentaNYL, 100 mcg/hr, Last Rate: 100 mcg/hr (03/23/24 2312)  propofol, 5-50 mcg/kg/min, Last Rate: 50 mcg/kg/min (03/23/24 2101)         Labs:    CBC    Recent Labs     03/22/24  0526 03/22/24  1617 03/23/24  0539   WBC 9.66  --  5.24   HGB 13.9 13.5 13.5   HCT 44.4  --  42.1     --  205     BMP    Recent Labs     03/22/24  1617 03/23/24  0539   SODIUM 141 142   K 4.1 4.2    107   CO2 27 27   AGAP 8 8   BUN 31* 34*   CREATININE 1.19 1.09   CALCIUM 8.7 8.8       Coags    No recent results     Additional Electrolytes  Recent Labs     03/22/24  1617 03/23/24  0539   MG 2.9* 2.9*   PHOS 3.7 2.9   CAIONIZED 1.17 1.17          Blood Gas    Recent Labs     03/23/24 1918   PHART 7.386   [id number] 49.4*   PO2ART 88.1   [id number] 29.0*   BEART 3.0   SOURCE Line, Arterial     Recent Labs     03/23/24 1918   SOURCE Line, Arterial    LFTs  Recent Labs      03/22/24  0526 03/23/24  0539   ALT 11 11   AST 21 13   ALKPHOS 64 57   ALB 3.8 3.7   TBILI 0.27 0.26       Infectious  Recent Labs     03/22/24  0526 03/23/24  0539   PROCALCITONI 4.55* 2.37*     Glucose  Recent Labs     03/22/24  0526 03/22/24  1617 03/23/24  0539   GLUC 135 131 127               JAY YoungerNP

## 2024-03-24 NOTE — RESPIRATORY THERAPY NOTE
03/23/24 2038   Respiratory Assessment   Assessment Type Pre-treatment   General Appearance Sedated   Respiratory Pattern Assisted   Chest Assessment Chest expansion symmetrical   Bilateral Breath Sounds Diminished;Clear   Suction ET Tube   Resp Comments pt was reintubated today, 7.0 24@ gum, moved from Rt to center skina nd strap intact with two finger pas to back of back of head , tx given , BS dim clear, sxn'mall blood tinged sectretions, pt on current settings CMV 16 470 +6 50% , pulmicort added to tx no changes to settings will cont to monitor   O2 Device vent   Vent Information   Vent ID [id number]   Vent type Segura G5   Segura Vent Mode (S)CMV   $ Pulse Oximetry Spot Check Charge Completed   SpO2 98 %   (S)CMV Settings   Resp Rate (BPM) 16 BPM   VT (mL) 470 mL   FIO2 (%) (S)  50 %   PEEP (cmH2O) 6 cmH2O   I:E Ratio 1:2.7   Insp Time (%) 1 %   Flow Trigger (LPM) 5   Humidification Heater   Heater Temperature (Set) 95 °F (35 °C)   (S)CMV Actuals   Resp Rate (BPM) 18 BPM   VT (mL) 537   MV 8.6   MAP (cmH2O) 9.1 cmH2O   Peak Pressure (cmH2O) 19 cmH2O   I:E Ratio (Obs) 1:2.8   Insp Resistance 6   Heater Temperature (Obs) 95 °F (35 °C)   Static Compliance (mL/cmH20) 78 mL/cmH2O   Plateau Pressure (cm H2O) 20 cm H2O   (S)CMV Alarms   High Peak Pressure (cmH2O) 40   Low Pressure (cmH2O) 5 cm H2O   High Resp Rate (BPM) 40 BPM   Low Resp Rate (BPM) 8 BPM   High MV (L/min) 20 L/min   Low MV (L/min) 4 L/min   High VT (mL) 1000 mL   Low VT (mL) 350 mL   Apnea Time (s) 20 S   Maintenance   Alarm (pink) cable attached No   Resuscitation bag with peep valve at bedside Yes   Water bag changed No   Circuit changed No   IHI Ventilator Associated Pneumonia Bundle   Head of Bed Elevated HOB 30   ETT  Hi-Lo 7 mm   Placement Date/Time: 03/23/24 (c) 5448   Type: Hi-Lo  Tube Size: 7 mm  Location: Oral  Insertion attempts: 2  Placement Verification: Chest x-ray;End tidal CO2;Symmetrical chest wall movement   Secured at (cm) 24    Measured from Gums   Secured Location (S)  Center   Repositioned (S)  Right to Center   Secured by Commercial tube giron  (SKIN AND STRAP INTACT WITH TWO FONGER PASS TO BACK OF HEAD)   Site Condition Dry   Cuff Pressure (color) Green   HI-LO Suction  Continuous low suction   HI-LO Secretions Scant;Thin;Clear   HI-LO Intervention Patent

## 2024-03-24 NOTE — ASSESSMENT & PLAN NOTE
POA  Likely exacerbated by metapneumovirus pneumonia  Does not appear to follow w/Pulmonology - no PFTs available for review  Continues to smoke 1-2 PPD    Plan:  Continue IV Solu Medrol 40 mg Q12H   Only uses PRN Albuterol inhaler at home - continue scheduled Xopenex/Atrovent/Pulmicort  Aggressive pulmonary hygiene

## 2024-03-24 NOTE — PLAN OF CARE
Problem: SAFETY,RESTRAINT: NV/NON-SELF DESTRUCTIVE BEHAVIOR  Goal: Remains free of harm/injury (restraint for non violent/non self-detsructive behavior)  Description: INTERVENTIONS:  - Instruct patient/family regarding restraint use   - Assess and monitor physiologic and psychological status   - Provide interventions and comfort measures to meet assessed patient needs   - Identify and implement measures to help patient regain control  - Assess readiness for release of restraint   Outcome: Not Progressing     Problem: SAFETY,RESTRAINT: NV/NON-SELF DESTRUCTIVE BEHAVIOR  Goal: Returns to optimal restraint-free functioning  Description: INTERVENTIONS:  - Assess the patient's behavior and symptoms that indicate continued need for restraint  - Identify and implement measures to help patient regain control  - Assess readiness for release of restraint   Outcome: Not Progressing     Problem: RESPIRATORY - ADULT  Goal: Achieves optimal ventilation and oxygenation  Description: INTERVENTIONS:  - Assess for changes in respiratory status  - Assess for changes in mentation and behavior  - Position to facilitate oxygenation and minimize respiratory effort  - Oxygen administered by appropriate delivery if ordered  - Initiate smoking cessation education as indicated  - Encourage broncho-pulmonary hygiene including cough, deep breathe, Incentive Spirometry  - Assess the need for suctioning and aspirate as needed  - Assess and instruct to report SOB or any respiratory difficulty  - Respiratory Therapy support as indicated  Outcome: Not Progressing

## 2024-03-24 NOTE — RESPIRATORY THERAPY NOTE
RT Ventilator Management Note  Juani Morales 63 y.o. female MRN: [id number]  Unit/Bed#: ICU 11-01 Encounter: [id number]      Daily Screen         3/23/2024  1430 3/24/2024  0759          Patient safety screen outcome:: -- Failed (P)       Not Ready for Weaning due to:: -- Underline problem not resolved (P)       Spont breathing trial outcome:: Passed --      Name of Medical Team Notified:: cc --      Preparing to extubate/ Notify Nurse: Yes --      Extubation order obtained: Yes --      Consider Cuff Test: Yes --      Patient extubated: Yes --                Physical Exam:   Assessment Type: Assess only  General Appearance: Sedated  Respiratory Pattern: Assisted  Chest Assessment: Chest expansion symmetrical  Bilateral Breath Sounds: Diminished  R Breath Sounds: Clear  Cough: None  Suction: (P) ET Tube  O2 Device: vent  Subjective Data: (P) xop/atro given      Resp Comments: (P) Pt stable on current vent settings. No changes at this time. Minimal secretions noted.

## 2024-03-24 NOTE — ASSESSMENT & PLAN NOTE
Lab Results   Component Value Date    EGFR 85 03/24/2024    EGFR 54 03/23/2024    EGFR 48 03/22/2024    CREATININE 0.75 03/24/2024    CREATININE 1.09 03/23/2024    CREATININE 1.19 03/22/2024     Baseline creatinine appears to be around 0.6 - 1.0  Has chronically occluded R renal artery and L renal artery is s/p stenting in 2022 - required IHD for 1/5 months in 2022    Plan:  Avoid nephrotoxic agents and hypotension  Trend renal indices  Strict I/O  Daily weights

## 2024-03-24 NOTE — ASSESSMENT & PLAN NOTE
Coffee-ground output noted in OGT     Plan:  Holding home ASA for now  IV PPI BID   GI consulted - appreciate recommendations  Monitor Hgb and output

## 2024-03-24 NOTE — RESPIRATORY THERAPY NOTE
03/24/24 0100   Respiratory Assessment   Assessment Type Assess only   Respiratory Pattern Assisted   Chest Assessment Chest expansion symmetrical   Bilateral Breath Sounds Diminished   Cough None   Resp Comments no changes made will cont to monitor   O2 Device vent   Vent Information   Vent ID [id number]   Vent type Segura G5   Segura Vent Mode (S)CMV   $ Vent Daily Charge-Subsequent Yes   $ Pulse Oximetry Spot Check Charge Completed   (S)CMV Settings   Resp Rate (BPM) 16 BPM   VT (mL) 470 mL   FIO2 (%) 50 %   PEEP (cmH2O) 6 cmH2O   I:E Ratio 1:2.7   Insp Time (%) 1 %   Flow Trigger (LPM) 5   Humidification Heater   Heater Temperature (Set) 95 °F (35 °C)   (S)CMV Actuals   Resp Rate (BPM) 17 BPM   VT (mL) 580   MV 8.8   MAP (cmH2O) 11 cmH2O   Peak Pressure (cmH2O) 24 cmH2O   I:E Ratio (Obs) 1:2.8   Insp Resistance 18   Heater Temperature (Obs) 95 °F (35 °C)   Static Compliance (mL/cmH20) 62 mL/cmH2O   Plateau Pressure (cm H2O) 19 cm H2O   (S)CMV Alarms   High Peak Pressure (cmH2O) 40   Low Pressure (cmH2O) 5 cm H2O   High Resp Rate (BPM) 40 BPM   Low Resp Rate (BPM) 8 BPM   High MV (L/min) 20 L/min   Low MV (L/min) 4 L/min   High VT (mL) 1000 mL   Low VT (mL) 350 mL   Apnea Time (s) 20 S   Maintenance   Alarm (pink) cable attached No   Resuscitation bag with peep valve at bedside Yes   Water bag changed No   Circuit changed No   IHI Ventilator Associated Pneumonia Bundle   Head of Bed Elevated HOB 30   ETT  Hi-Lo 7 mm   Placement Date/Time: 03/23/24 (c) 1800   Type: Hi-Lo  Tube Size: 7 mm  Location: Oral  Insertion attempts: 2  Placement Verification: Chest x-ray;End tidal CO2;Symmetrical chest wall movement  Secured at (cm): 24  Comments: lip  Placed By: Advanced Pr...   Secured at (cm) 24   Measured from Gums   Secured Location Center   Secured by Commercial tube giron  (unchanged)   Site Condition Dry   Cuff Pressure (color) Green   HI-LO Suction  Continuous low suction   HI-LO Secretions Scant;Thin;Clear    HI-LO Intervention Patent

## 2024-03-24 NOTE — RESPIRATORY THERAPY NOTE
03/24/24 0345   Respiratory Assessment   Assessment Type Assess only   General Appearance Sedated   Respiratory Pattern Assisted   Chest Assessment Chest expansion symmetrical   Bilateral Breath Sounds Diminished   R Breath Sounds Clear   Cough None   Suction ET Tube   Resp Comments pt on vetn day 2 Ett 7.0 @ 24 @ lip CMV 16 470 50% +6 uneventful shift was reintubated after failing bipap and HFNC when extubated on 3/23 will cont to monitor   O2 Device vent   Vent Information   Vent ID [id number]   Vent type Segura G5   Segura Vent Mode (S)CMV   $ Pulse Oximetry Spot Check Charge Completed   (S)CMV Settings   Resp Rate (BPM) 16 BPM   VT (mL) 470 mL   FIO2 (%) 50 %   PEEP (cmH2O) 6 cmH2O   I:E Ratio 1:2.7   Insp Time (%) 1 %   Flow Trigger (LPM) 5   Humidification Heater   Heater Temperature (Set) 95 °F (35 °C)   (S)CMV Actuals   Resp Rate (BPM) 16 BPM   VT (mL) 499   MV 8.1   MAP (cmH2O) 9.5 cmH2O   Peak Pressure (cmH2O) 23 cmH2O   I:E Ratio (Obs) 1:2.8   Insp Resistance 17   Heater Temperature (Obs) 95 °F (35 °C)   Static Compliance (mL/cmH20) 78 mL/cmH2O   Plateau Pressure (cm H2O) 19 cm H2O   (S)CMV Alarms   High Peak Pressure (cmH2O) 40   Low Pressure (cmH2O) 5 cm H2O   High Resp Rate (BPM) 40 BPM   Low Resp Rate (BPM) 8 BPM   High MV (L/min) 20 L/min   Low MV (L/min) 4 L/min   High VT (mL) 1000 mL   Low VT (mL) 350 mL   Apnea Time (s) 20 S   Maintenance   Alarm (pink) cable attached No   Resuscitation bag with peep valve at bedside Yes   Water bag changed No   Circuit changed No   IHI Ventilator Associated Pneumonia Bundle   Head of Bed Elevated HOB 30   ETT  Hi-Lo 7 mm   Placement Date/Time: 03/23/24 (c) 1800   Type: Hi-Lo  Tube Size: 7 mm  Location: Oral  Insertion attempts: 2  Placement Verification: Chest x-ray;End tidal CO2;Symmetrical chest wall movement  Secured at (cm): 24  Comments: lip  Placed By: Advanced Pr...   Secured at (cm) 24   Measured from Gums   Secured Location Center   Secured by  Commercial tube giron  (unchanged)   Site Condition Dry   Cuff Pressure (color) Green   HI-LO Suction  Continuous low suction   HI-LO Secretions Scant;Blood tinged;Thin   HI-LO Intervention Patent     RT Ventilator Management Note  Juani Morales 63 y.o. female MRN: [id number]  Unit/Bed#: ICU 11-01 Encounter: [id number]      Daily Screen         3/23/2024  1404 3/23/2024  1430          Patient safety screen outcome:: Passed --      Spont breathing trial % for 30 min: Yes --      Spont breathing trial outcome:: -- Passed      Name of Medical Team Notified:: -- cc      Preparing to extubate/ Notify Nurse: -- Yes      Extubation order obtained: -- Yes      Consider Cuff Test: -- Yes      Patient extubated: -- Yes                Physical Exam:   Assessment Type: Assess only  General Appearance: Sedated  Respiratory Pattern: Assisted  Chest Assessment: Chest expansion symmetrical  Bilateral Breath Sounds: Diminished  R Breath Sounds: Clear  Cough: None  Suction: ET Tube  O2 Device: vent      Resp Comments: pt on vetn day 2 Ett 7.0 @ 24 @ lip CMV 16 470 50% +6 uneventful shift was reintubated after failing bipap and HFNC when extubated on 3/23 will cont to monitor

## 2024-03-24 NOTE — ASSESSMENT & PLAN NOTE
Wt Readings from Last 3 Encounters:   03/24/24 79 kg (174 lb 2.6 oz)   03/13/24 81.1 kg (178 lb 12.8 oz)   01/05/24 85 kg (187 lb 6.4 oz)     Has history of nonischemic dilated cardiomyopathy - first noted in 2021, then again in 2022  TTE from 03/2023 revealed EF 70% w/mild to moderate concentric hypertrophy and mildly increased wall thickness; G1DD  Repeat today reveals EF 30% as noted above     Plan:  Is s/p IV Lasix 80 mg x1 - continue w/PRN diuresis for goal -1 to -2L over next 24H  SvO2 currently 72.9  Continue home Coreg and Lisinopril  Holding home ASA given acute GIB  Strict I/O - maintain Henley cath for accurate I/O  Daily weights

## 2024-03-24 NOTE — PLAN OF CARE
Problem: PAIN - ADULT  Goal: Verbalizes/displays adequate comfort level or baseline comfort level  Description: Interventions:  - Encourage patient to monitor pain and request assistance  - Assess pain using appropriate pain scale  - Administer analgesics based on type and severity of pain and evaluate response  - Implement non-pharmacological measures as appropriate and evaluate response  - Consider cultural and social influences on pain and pain management  - Notify physician/advanced practitioner if interventions unsuccessful or patient reports new pain  Outcome: Progressing     Problem: INFECTION - ADULT  Goal: Absence or prevention of progression during hospitalization  Description: INTERVENTIONS:  - Assess and monitor for signs and symptoms of infection  - Monitor lab/diagnostic results  - Monitor all insertion sites, i.e. indwelling lines, tubes, and drains  - Monitor endotracheal if appropriate and nasal secretions for changes in amount and color  - Aubrey appropriate cooling/warming therapies per order  - Administer medications as ordered  - Instruct and encourage patient and family to use good hand hygiene technique  - Identify and instruct in appropriate isolation precautions for identified infection/condition  Outcome: Progressing  Goal: Absence of fever/infection during neutropenic period  Description: INTERVENTIONS:  - Monitor WBC    Outcome: Progressing     Problem: SAFETY ADULT  Goal: Patient will remain free of falls  Description: INTERVENTIONS:  - Educate patient/family on patient safety including physical limitations  - Instruct patient to call for assistance with activity   - Consult OT/PT to assist with strengthening/mobility   - Keep Call bell within reach  - Keep bed low and locked with side rails adjusted as appropriate  - Keep care items and personal belongings within reach  - Initiate and maintain comfort rounds  - Make Fall Risk Sign visible to staff  - Offer Toileting every 2 Hours,  in advance of need  - Initiate/Maintain BED alarm  - Obtain necessary fall risk management equipment  - Apply yellow socks and bracelet for high fall risk patients  - Consider moving patient to room near nurses station  Outcome: Progressing  Goal: Maintain or return to baseline ADL function  Description: INTERVENTIONS:  -  Assess patient's ability to carry out ADLs; assess patient's baseline for ADL function and identify physical deficits which impact ability to perform ADLs (bathing, care of mouth/teeth, toileting, grooming, dressing, etc.)  - Assess/evaluate cause of self-care deficits   - Assess range of motion  - Assess patient's mobility; develop plan if impaired  - Assess patient's need for assistive devices and provide as appropriate  - Encourage maximum independence but intervene and supervise when necessary  - Involve family in performance of ADLs  - Assess for home care needs following discharge   - Consider OT consult to assist with ADL evaluation and planning for discharge  - Provide patient education as appropriate  Outcome: Progressing  Goal: Maintains/Returns to pre admission functional level  Description: INTERVENTIONS:  - Perform AM-PAC 6 Click Basic Mobility/ Daily Activity assessment daily.  - Set and communicate daily mobility goal to care team and patient/family/caregiver.   - Collaborate with rehabilitation services on mobility goals if consulted  - Perform Range of Motion 3 times a day.  - Reposition patient every 2 hours.  - Dangle patient 3 times a day  - Stand patient 3 times a day  - Ambulate patient 3 times a day  - Out of bed to chair 3 times a day   - Out of bed for meals 3 times a day  - Out of bed for toileting  - Record patient progress and toleration of activity level   Outcome: Progressing     Problem: DISCHARGE PLANNING  Goal: Discharge to home or other facility with appropriate resources  Description: INTERVENTIONS:  - Identify barriers to discharge w/patient and caregiver  -  Arrange for needed discharge resources and transportation as appropriate  - Identify discharge learning needs (meds, wound care, etc.)  - Arrange for interpretive services to assist at discharge as needed  - Refer to Case Management Department for coordinating discharge planning if the patient needs post-hospital services based on physician/advanced practitioner order or complex needs related to functional status, cognitive ability, or social support system  Outcome: Progressing     Problem: Knowledge Deficit  Goal: Patient/family/caregiver demonstrates understanding of disease process, treatment plan, medications, and discharge instructions  Description: Complete learning assessment and assess knowledge base.  Interventions:  - Provide teaching at level of understanding  - Provide teaching via preferred learning methods  Outcome: Progressing     Problem: Prexisting or High Potential for Compromised Skin Integrity  Goal: Skin integrity is maintained or improved  Description: INTERVENTIONS:  - Identify patients at risk for skin breakdown  - Assess and monitor skin integrity  - Assess and monitor nutrition and hydration status  - Monitor labs   - Assess for incontinence   - Turn and reposition patient  - Assist with mobility/ambulation  - Relieve pressure over bony prominences  - Avoid friction and shearing  - Provide appropriate hygiene as needed including keeping skin clean and dry  - Evaluate need for skin moisturizer/barrier cream  - Collaborate with interdisciplinary team   - Patient/family teaching  - Consider wound care consult   Outcome: Progressing     Problem: SAFETY,RESTRAINT: NV/NON-SELF DESTRUCTIVE BEHAVIOR  Goal: Remains free of harm/injury (restraint for non violent/non self-detsructive behavior)  Description: INTERVENTIONS:  - Instruct patient/family regarding restraint use   - Assess and monitor physiologic and psychological status   - Provide interventions and comfort measures to meet assessed  patient needs   - Identify and implement measures to help patient regain control  - Assess readiness for release of restraint   Outcome: Progressing  Goal: Returns to optimal restraint-free functioning  Description: INTERVENTIONS:  - Assess the patient's behavior and symptoms that indicate continued need for restraint  - Identify and implement measures to help patient regain control  - Assess readiness for release of restraint   Outcome: Progressing     Problem: Nutrition/Hydration-ADULT  Goal: Nutrient/Hydration intake appropriate for improving, restoring or maintaining nutritional needs  Description: Monitor and assess patient's nutrition/hydration status for malnutrition. Collaborate with interdisciplinary team and initiate plan and interventions as ordered.  Monitor patient's weight and dietary intake as ordered or per policy. Utilize nutrition screening tool and intervene as necessary. Determine patient's food preferences and provide high-protein, high-caloric foods as appropriate.     INTERVENTIONS:  - Monitor oral intake, urinary output, labs, and treatment plans  - Assess nutrition and hydration status and recommend course of action  - Evaluate amount of meals eaten  - Assist patient with eating if necessary   - Allow adequate time for meals  - Recommend/ encourage appropriate diets, oral nutritional supplements, and vitamin/mineral supplements  - Order, calculate, and assess calorie counts as needed  - Recommend, monitor, and adjust tube feedings and TPN/PPN based on assessed needs  - Assess need for intravenous fluids  - Provide specific nutrition/hydration education as appropriate  - Include patient/family/caregiver in decisions related to nutrition  Outcome: Progressing     Problem: NEUROSENSORY - ADULT  Goal: Achieves stable or improved neurological status  Description: INTERVENTIONS  - Monitor and report changes in neurological status  - Monitor vital signs such as temperature, blood pressure, glucose,  and any other labs ordered   - Initiate measures to prevent increased intracranial pressure  - Monitor for seizure activity and implement precautions if appropriate      Outcome: Progressing  Goal: Remains free of injury related to seizures activity  Description: INTERVENTIONS  - Maintain airway, patient safety  and administer oxygen as ordered  - Monitor patient for seizure activity, document and report duration and description of seizure to physician/advanced practitioner  - If seizure occurs,  ensure patient safety during seizure  - Reorient patient post seizure  - Seizure pads on all 4 side rails  - Instruct patient/family to notify RN of any seizure activity including if an aura is experienced  - Instruct patient/family to call for assistance with activity based on nursing assessment  - Administer anti-seizure medications if ordered    Outcome: Progressing  Goal: Achieves maximal functionality and self care  Description: INTERVENTIONS  - Monitor swallowing and airway patency with patient fatigue and changes in neurological status  - Encourage and assist patient to increase activity and self care.   - Encourage visually impaired, hearing impaired and aphasic patients to use assistive/communication devices  Outcome: Progressing     Problem: CARDIOVASCULAR - ADULT  Goal: Maintains optimal cardiac output and hemodynamic stability  Description: INTERVENTIONS:  - Monitor I/O, vital signs and rhythm  - Monitor for S/S and trends of decreased cardiac output  - Administer and titrate ordered vasoactive medications to optimize hemodynamic stability  - Assess quality of pulses, skin color and temperature  - Assess for signs of decreased coronary artery perfusion  - Instruct patient to report change in severity of symptoms  Outcome: Progressing  Goal: Absence of cardiac dysrhythmias or at baseline rhythm  Description: INTERVENTIONS:  - Continuous cardiac monitoring, vital signs, obtain 12 lead EKG if ordered  - Administer  antiarrhythmic and heart rate control medications as ordered  - Monitor electrolytes and administer replacement therapy as ordered  Outcome: Progressing     Problem: RESPIRATORY - ADULT  Goal: Achieves optimal ventilation and oxygenation  Description: INTERVENTIONS:  - Assess for changes in respiratory status  - Assess for changes in mentation and behavior  - Position to facilitate oxygenation and minimize respiratory effort  - Oxygen administered by appropriate delivery if ordered  - Initiate smoking cessation education as indicated  - Encourage broncho-pulmonary hygiene including cough, deep breathe, Incentive Spirometry  - Assess the need for suctioning and aspirate as needed  - Assess and instruct to report SOB or any respiratory difficulty  - Respiratory Therapy support as indicated  Outcome: Progressing     Problem: GASTROINTESTINAL - ADULT  Goal: Minimal or absence of nausea and/or vomiting  Description: INTERVENTIONS:  - Administer IV fluids if ordered to ensure adequate hydration  - Maintain NPO status until nausea and vomiting are resolved  - Nasogastric tube if ordered  - Administer ordered antiemetic medications as needed  - Provide nonpharmacologic comfort measures as appropriate  - Advance diet as tolerated, if ordered  - Consider nutrition services referral to assist patient with adequate nutrition and appropriate food choices  Outcome: Progressing  Goal: Maintains or returns to baseline bowel function  Description: INTERVENTIONS:  - Assess bowel function  - Encourage oral fluids to ensure adequate hydration  - Administer IV fluids if ordered to ensure adequate hydration  - Administer ordered medications as needed  - Encourage mobilization and activity  - Consider nutritional services referral to assist patient with adequate nutrition and appropriate food choices  Outcome: Progressing  Goal: Maintains adequate nutritional intake  Description: INTERVENTIONS:  - Monitor percentage of each meal  consumed  - Identify factors contributing to decreased intake, treat as appropriate  - Assist with meals as needed  - Monitor I&O, weight, and lab values if indicated  - Obtain nutrition services referral as needed  Outcome: Progressing  Goal: Establish and maintain optimal ostomy function  Description: INTERVENTIONS:  - Assess bowel function  - Encourage oral fluids to ensure adequate hydration  - Administer IV fluids if ordered to ensure adequate hydration   - Administer ordered medications as needed  - Encourage mobilization and activity  - Nutrition services referral to assist patient with appropriate food choices  - Assess stoma site  - Consider wound care consult   Outcome: Progressing  Goal: Oral mucous membranes remain intact  Description: INTERVENTIONS  - Assess oral mucosa and hygiene practices  - Implement preventative oral hygiene regimen  - Implement oral medicated treatments as ordered  - Initiate Nutrition services referral as needed  Outcome: Progressing     Problem: GENITOURINARY - ADULT  Goal: Maintains or returns to baseline urinary function  Description: INTERVENTIONS:  - Assess urinary function  - Encourage oral fluids to ensure adequate hydration if ordered  - Administer IV fluids as ordered to ensure adequate hydration  - Administer ordered medications as needed  - Offer frequent toileting  - Follow urinary retention protocol if ordered  Outcome: Progressing  Goal: Absence of urinary retention  Description: INTERVENTIONS:  - Assess patient's ability to void and empty bladder  - Monitor I/O  - Bladder scan as needed  - Discuss with physician/AP medications to alleviate retention as needed  - Discuss catheterization for long term situations as appropriate  Outcome: Progressing  Goal: Urinary catheter remains patent  Description: INTERVENTIONS:  - Assess patency of urinary catheter  - If patient has a chronic leung, consider changing catheter if non-functioning  - Follow guidelines for  intermittent irrigation of non-functioning urinary catheter  Outcome: Progressing     Problem: METABOLIC, FLUID AND ELECTROLYTES - ADULT  Goal: Electrolytes maintained within normal limits  Description: INTERVENTIONS:  - Monitor labs and assess patient for signs and symptoms of electrolyte imbalances  - Administer electrolyte replacement as ordered  - Monitor response to electrolyte replacements, including repeat lab results as appropriate  - Instruct patient on fluid and nutrition as appropriate  Outcome: Progressing  Goal: Fluid balance maintained  Description: INTERVENTIONS:  - Monitor labs   - Monitor I/O and WT  - Instruct patient on fluid and nutrition as appropriate  - Assess for signs & symptoms of volume excess or deficit  Outcome: Progressing  Goal: Glucose maintained within target range  Description: INTERVENTIONS:  - Monitor Blood Glucose as ordered  - Assess for signs and symptoms of hyperglycemia and hypoglycemia  - Administer ordered medications to maintain glucose within target range  - Assess nutritional intake and initiate nutrition service referral as needed  Outcome: Progressing     Problem: SKIN/TISSUE INTEGRITY - ADULT  Goal: Skin Integrity remains intact(Skin Breakdown Prevention)  Description: Assess:  -Perform Valentín assessment   -Clean and moisturize skin   -Inspect skin when repositioning, toileting, and assisting with ADLS  -Assess under medical devices  -Assess extremities for adequate circulation and sensation     Bed Management:  -Have minimal linens on bed & keep smooth, unwrinkled  -Change linens as needed when moist or perspiring  -Avoid sitting or lying in one position for more than 2 hours while in bed  -Keep HOB at 30 degrees     Toileting:  -Offer bedside commode  -Assess for incontinence   -Use incontinent care products after each incontinent episode     Activity:  -Mobilize patient 3 times a day  -Encourage activity and walks on unit  -Encourage or provide ROM exercises    -Turn and reposition patient every 2 Hours  -Use appropriate equipment to lift or move patient in bed  -Instruct/ Assist with weight shifting when out of bed in chair  -Consider limitation of chair time 2 hour intervals    Skin Care:  -Avoid use of baby powder, tape, friction and shearing, hot water or constrictive clothing  -Relieve pressure over bony prominences   -Do not massage red bony areas    Next Steps:  -Teach patient strategies to minimize risks    -Consider consults to  interdisciplinary teams  Outcome: Progressing  Goal: Incision(s), wounds(s) or drain site(s) healing without S/S of infection  Description: INTERVENTIONS  - Assess and document dressing, incision, wound bed, drain sites and surrounding tissue  - Provide patient and family education  - Perform skin care/dressing changes  Outcome: Progressing  Goal: Pressure injury heals and does not worsen  Description: Interventions:  - Implement low air loss mattress or specialty surface (Criteria met)  - Apply silicone foam dressing  - Instruct/assist with weight shifting every 60 minutes when in chair   - Limit chair time to 2 hour intervals  - Use special pressure reducing interventions when in chair   - Apply fecal or urinary incontinence containment device   - Perform passive or active ROM   - Turn and reposition patient & offload bony prominences every 2 hours   - Utilize friction reducing device or surface for transfers   - Consider consults to  interdisciplinary teams  - Use incontinent care products after each incontinent episode  - Consider nutrition services referral as needed  Outcome: Progressing     Problem: HEMATOLOGIC - ADULT  Goal: Maintains hematologic stability  Description: INTERVENTIONS  - Assess for signs and symptoms of bleeding or hemorrhage  - Monitor labs  - Administer supportive blood products/factors as ordered and appropriate  Outcome: Progressing     Problem: MUSCULOSKELETAL - ADULT  Goal: Maintain or return mobility to  safest level of function  Description: INTERVENTIONS:  - Assess patient's ability to carry out ADLs; assess patient's baseline for ADL function and identify physical deficits which impact ability to perform ADLs (bathing, care of mouth/teeth, toileting, grooming, dressing, etc.)  - Assess/evaluate cause of self-care deficits   - Assess range of motion  - Assess patient's mobility  - Assess patient's need for assistive devices and provide as appropriate  - Encourage maximum independence but intervene and supervise when necessary  - Involve family in performance of ADLs  - Assess for home care needs following discharge   - Consider OT consult to assist with ADL evaluation and planning for discharge  - Provide patient education as appropriate  Outcome: Progressing  Goal: Maintain proper alignment of affected body part  Description: INTERVENTIONS:  - Support, maintain and protect limb and body alignment  - Provide patient/ family with appropriate education  Outcome: Progressing

## 2024-03-24 NOTE — ASSESSMENT & PLAN NOTE
Suspect mixed etiology: septic in setting of metapneumovirus pneumonia vs cardiogenic w/known dilated cardiomyopathy and acute on chronic HF  CXR this AM as noted above  TTE this AM revealed EF 30% w/moderate to severely reduced systolic function in a global manner, though septum is less kinetic than remaining segments; dilated RV w/mildly reduced systolic function; mild AR, trace TR  CVP 9    Plan:  RP2 panel (+) for metapneumovirus  Blood cultures from admission pending - repeats sent given acute decompensation  UA negative  Urine strep/legionella negative  Sputum culture negative  Lactic acid 1.5  Procalcitonin <0.05 x2; now elevated to 4.55  Initially hypertensive, then became hypotensive post intubation/sedation - did not receive 30 cc/kg IVF bolus given volume overload/HF  Continue IV Azithromycin/Ceftriaxone for possible CAP, now D6  Monitor fever and WBC curve  Monitor hemodynamics closely

## 2024-03-25 NOTE — RESPIRATORY THERAPY NOTE
RT Ventilator Management Note  Juani Morales 63 y.o. female MRN: [id number]  Unit/Bed#: ICU 11-01 Encounter: [id number]      Daily Screen         3/25/2024  0747 3/25/2024  1449          Patient safety screen outcome:: Failed Passed (P)       Not Ready for Weaning due to:: Underline problem not resolved --      Spont breathing trial outcome:: -- Passed (P)                 Physical Exam:   Assessment Type: (P) Assess only  General Appearance: (P) Awake  Respiratory Pattern: (P) Assisted  Chest Assessment: (P) Chest expansion symmetrical  Bilateral Breath Sounds: (P) Clear  Cough: None  Suction: ET Tube  O2 Device: vent      Resp Comments: (P) Pt placed on SVT per critiacal care. Pt awake and follows commands. Pt a bit agitated, but otherwise keith spont settings well.

## 2024-03-25 NOTE — PROGRESS NOTES
[facility]  Progress Note  Name: Juani Morales I  MRN: [id number]  Unit/Bed#: ICU 11-01 I Date of Admission: 3/20/2024   Date of Service: 3/25/2024 I Hospital Day: 5    Assessment/Plan   * Acute respiratory failure with hypoxia (HCC)  Assessment & Plan  Initially admitted on 03/20 to [facility] for cough/SOB x2 weeks - required 5L NC on admission   Had escalating O2 requirements overnight - upgraded to ICU this AM for respiratory distress/failure   Failed BIPAP - intubated shortly after arrival to ICU 3/21  Suspect this is multifactorial in setting of Metapneumovirus pneumonia vs COPD exacerbation vs flash pulmonary edema/acute on chronic HF  (3/21) CXR w/diffuse bronchitis and multifocal bronchiolitis/bronchopneumonia and possible superimposed very mild interstitial edema  (3/23) Pt was extubated to BiPAP with Precedex gtt. Tolerated well and was transitioned to HFNC which she did not tolerate. Pt was given Zyprexa and placed back on the BiPAP. On exam pt with no air movement on auscultation. Pt was re-intubated with bougie device and a 7.0 ETT.    Plan:  RP2 panel (+) for Metapneumovirus  Urine strep/legionella negative  Sputum culture negative  (3/21) CTA PE study - subpleural consolidation in the inferior left lower lobe, possibly atelectatic but pneumonia cannot be excluded. Minimal compressive atelectasis in the dependent right lower lobe.   Azithro/Cftx D4, procal elevated; will continue to trend  Continue IV Solu Medrol 40 mg Q12H for COPD exacerbation  Continue scheduled Xopenex/Atrovent/Pulmicort  Is s/p IV Lasix 80 mg x1 w/good responsive - continue PRN for goal negative 1-2L  Current vent settings: ACVC 20/470/50/8 - today is MV D#4  Follow ABG and adjust vent settings as necessary  Currently sedated w/Propofol/Fentanyl gtt's for goal RASS -2 to -3  Aggressive pulmonary hygiene  VAP precautions  Daily SAT/SBT  Wean FiO2 for SpO2 > 88%    Shock (HCC)  Assessment & Plan  Suspect mixed  etiology: septic in setting of metapneumovirus pneumonia vs cardiogenic w/known dilated cardiomyopathy and acute on chronic HF  CXR this AM as noted above  TTE this AM revealed EF 30% w/moderate to severely reduced systolic function in a global manner, though septum is less kinetic than remaining segments; dilated RV w/mildly reduced systolic function; mild AR, trace TR  CVP 9    Plan:  RP2 panel (+) for metapneumovirus  Blood cultures from admission pending - repeats sent given acute decompensation  UA negative  Urine strep/legionella negative  Sputum culture negative  Lactic acid 1.5  Procalcitonin <0.05 x2; now elevated to 4.55  Initially hypertensive, then became hypotensive post intubation/sedation - did not receive 30 cc/kg IVF bolus given volume overload/HF  Continue IV Azithromycin/Ceftriaxone for possible CAP, now D6  Monitor fever and WBC curve  Monitor hemodynamics closely    Human metapneumovirus pneumonia  Assessment & Plan  Please see plan as noted above    COPD with acute exacerbation (HCC)  Assessment & Plan  POA  Likely exacerbated by metapneumovirus pneumonia  Does not appear to follow w/Pulmonology - no PFTs available for review  Continues to smoke 1-2 PPD    Plan:  Continue IV Solu Medrol 40 mg Q12H   Only uses PRN Albuterol inhaler at home - continue scheduled Xopenex/Atrovent/Pulmicort  Aggressive pulmonary hygiene    Acute on chronic combined systolic (congestive) and diastolic (congestive) heart failure (HCC)  Assessment & Plan  Wt Readings from Last 3 Encounters:   03/24/24 79 kg (174 lb 2.6 oz)   03/13/24 81.1 kg (178 lb 12.8 oz)   01/05/24 85 kg (187 lb 6.4 oz)     Has history of nonischemic dilated cardiomyopathy - first noted in 2021, then again in 2022  TTE from 03/2023 revealed EF 70% w/mild to moderate concentric hypertrophy and mildly increased wall thickness; G1DD  Repeat today reveals EF 30% as noted above     Plan:  Is s/p IV Lasix 80 mg x1 - continue w/PRN diuresis for goal -1 to  -2L over next 24H  SvO2 currently 72.9  Continue home Coreg and Lisinopril  Holding home ASA given acute GIB  Strict I/O - maintain Henley cath for accurate I/O  Daily weights    Elevated troponin  Assessment & Plan  Troponins negative on admission - now elevated to 1290, peaked at 2018  Suspect this is 2/2 demand ischemia in setting of shock state/respiratory failure rather than ACS  TTE w/EF 30% as noted above - likely stress-induced  Denied chest pain prior to intubation  ECG initially w/ST depression in V4/V5 while in respiratory distress - no ST depressions on repeat ECG   Discussed case w/Dr. Chow (Cardiology) who agrees this is likely 2/2 demand rather than ACS    Plan:  Continue to trend troponins and serial ECGs  Continuous cardiac monitoring    GIB (gastrointestinal bleeding)  Assessment & Plan  Coffee-ground output noted in OGT     Plan:  Holding home ASA for now  IV PPI BID   GI consulted - appreciate recommendations  Monitor Hgb and output    Prediabetes  Assessment & Plan  Plan:  Hgb A1C 6.0  SSI w/Q6H fingersticks  Goal  - 180    Polycythemia  Assessment & Plan  Likely in setting of chronic hypoxemia    Plan:  Will need follow-up as outpatient    Chronic kidney disease (CKD)  Assessment & Plan  Lab Results   Component Value Date    EGFR 85 03/24/2024    EGFR 54 03/23/2024    EGFR 48 03/22/2024    CREATININE 0.75 03/24/2024    CREATININE 1.09 03/23/2024    CREATININE 1.19 03/22/2024     Baseline creatinine appears to be around 0.6 - 1.0  Has chronically occluded R renal artery and L renal artery is s/p stenting in 2022 - required IHD for 1/5 months in 2022    Plan:  Avoid nephrotoxic agents and hypotension  Trend renal indices  Strict I/O  Daily weights    Tobacco abuse  Assessment & Plan  Smokes 1-2 PPD     Plan:  NRT  Encourage cessation when appropriate    Anxiety  Assessment & Plan  Plan:  Holding home Klonopin for now while NPO in setting of GIB    Chronic pain syndrome  Assessment &  Plan  Plan:  Holding home MS Contin 30 mg BID and PRN Percocet 5-325 mg Q8H - appears to be filling this regularly per PDMP  Holding home Neurontin while NPO  Continue Fentanyl gtt while intubated    Essential hypertension  Assessment & Plan  Plan:  Home Coreg and Lisinopril restarted             Disposition: Critical care    ICU Core Measures     Vented Patient  VAP Bundle  VAP bundle ordered     A: Assess, Prevent, and Manage Pain Has pain been assessed? Yes  Need for changes to pain regimen? No   B: Both Spontaneous Awakening Trials (SATs) and Spontaneous Breathing Trials (SBTs) Plan to perform spontaneous awakening trial today? Yes   Plan to perform spontaneous breathing trial today? Yes   Obvious barriers to extubation? No   C: Choice of Sedation RASS Goal: -2 Light Sedation or 0 Alert and Calm  Need for changes to sedation or analgesia regimen? No   D: Delirium CAM-ICU: Unable to perform secondary to Acute cognitive dysfunction   E: Early Mobility  Plan for early mobility? Yes   F: Family Engagement Plan for family engagement today? Yes       Antibiotic Review: Patient on appropriate coverage based on culture data.     Review of Invasive Devices:    [place] Plan: Continue for accurate I/O monitoring for 48 hours  Central access plan: Medications requiring central line  [place] Plan: Keep arterial line for hemodynamic monitoring    Prophylaxis:  VTE VTE covered by:  heparin (porcine), Subcutaneous, 5,000 Units at 03/24/24 2103       Stress Ulcer  covered bypantoprazole (PROTONIX) injection 40 mg [[id number]]         Significant 24hr Events     24hr events: Home Coreg and Lisinopril started yesterday. TF restarted. No acute events overnight.     Subjective   Review of Systems   Unable to perform ROS: Intubated      Objective                            Vitals I/O      Most Recent Min/Max in 24hrs   Temp 99.9 °F (37.7 °C) Temp  Min: 99.3 °F (37.4 °C)  Max: 100.4 °F (38 °C)   Pulse 66 Pulse  Min: 64  Max: 77   Resp 16  Resp  Min: 15  Max: 23   BP (!) 180/79 BP  Min: 150/65  Max: 192/81   O2 Sat 95 % SpO2  Min: 94 %  Max: 97 %      Intake/Output Summary (Last 24 hours) at 3/25/2024 0017  Last data filed at 3/25/2024 0000  Gross per 24 hour   Intake 1025.9 ml   Output 1575 ml   Net -549.1 ml       Diet Enteral/Parenteral; Tube Feeding No Oral Diet; Jevity 1.2 Andres; Continuous; 40; 100; Water; Every 6 hours    Invasive Monitoring   Arterial Line  Commodore /69  Arterial Line BP  Min: 146/68  Max: 184/78   MAP 94 mmHg  Arterial Line MAP (mmHg)  Min: 88 mmHg  Max: 115 mmHg           Physical Exam   Physical Exam  Vitals and nursing note reviewed.   Eyes:      Pupils: Pupils are equal, round, and reactive to light.   Skin:     General: Skin is warm and dry.      Capillary Refill: Capillary refill takes 2 to 3 seconds.   HENT:      Head: Normocephalic.      Mouth/Throat:      Mouth: Mucous membranes are dry.      Comments: OGT in place  Neck:      Vascular: Central line present.   Cardiovascular:      Rate and Rhythm: Normal rate and regular rhythm.      Pulses: Normal pulses.      Heart sounds: Normal heart sounds.   Musculoskeletal:         General: Swelling present.      Right lower leg: Trace Edema present.      Left lower leg: Trace Edema present.   Abdominal: General: Bowel sounds are normal.      Palpations: Abdomen is soft.   Constitutional:       Interventions: She is sedated, intubated and restrained.   Pulmonary:      Effort: She is intubated.      Breath sounds: Decreased breath sounds present.   Neurological:      GCS: GCS eye subscore is 4. GCS verbal subscore is 1. GCS motor subscore is 4.        Corneal reflex present, cough reflex and gag reflex intact.   Genitourinary/Anorectal:  Henley present.          Diagnostic Studies      EKG: NSR  Imaging:  I have personally reviewed pertinent reports.   and I have personally reviewed pertinent films in PACS     Medications:  Scheduled PRN   aspirin, 81 mg, Daily  atorvastatin,  40 mg, Daily With Dinner  azithromycin, 500 mg, Q24H  budesonide, 0.5 mg, Q12H  carvedilol, 25 mg, BID With Meals  cefTRIAXone, 1,000 mg, Q24H  chlorhexidine, 15 mL, Q12H JULIÁN  clonazePAM, 1 mg, HS  gabapentin, 100 mg, HS  heparin (porcine), 5,000 Units, Q8H JUILÁN  insulin lispro, 1-6 Units, Q6H JULIÁN  ipratropium, 0.5 mg, TID  levalbuterol, 1.25 mg, TID  lisinopril, 5 mg, Daily  methylPREDNISolone sodium succinate, 40 mg, Q12H JULIÁN  nicotine, 1 patch, Daily  OLANZapine, 5 mg, Once  pantoprazole, 40 mg, Q12H JULIÁN  polyethylene glycol, 17 g, Daily  senna, 17.6 mg, BID      fentaNYL, 50 mcg, Q2H PRN  ondansetron, 4 mg, Q6H PRN       Continuous    fentaNYL, 100 mcg/hr, Last Rate: 100 mcg/hr (03/24/24 2109)  propofol, 5-50 mcg/kg/min, Last Rate: 40 mcg/kg/min (03/24/24 2140)         Labs:    CBC    Recent Labs     03/23/24  0539 03/24/24  0505   WBC 5.24 5.97   HGB 13.5 13.5   HCT 42.1 42.7    214   BANDSPCT  --  1     BMP    Recent Labs     03/23/24  0539 03/24/24  0505   SODIUM 142 145   K 4.2 4.2    109*   CO2 27 29   AGAP 8 7   BUN 34* 30*   CREATININE 1.09 0.75   CALCIUM 8.8 8.8       Coags    No recent results     Additional Electrolytes  Recent Labs     03/23/24  0539 03/24/24  0505   MG 2.9* 2.4   PHOS 2.9 3.0   CAIONIZED 1.17  --           Blood Gas    Recent Labs     03/24/24  1112   PHART 7.375   CGS0MRF 46.7*   PO2ART 81.9   ZCN8RCM 26.7   BEART 1.0   SOURCE Line, Arterial     Recent Labs     03/24/24  1112   SOURCE Line, Arterial    LFTs  Recent Labs     03/23/24  0539 03/24/24  0505   ALT 11 9   AST 13 13   ALKPHOS 57 52   ALB 3.7 3.7   TBILI 0.26 0.31       Infectious  Recent Labs     03/23/24  0539 03/24/24  0505   PROCALCITONI 2.37* 1.04*     Glucose  Recent Labs     03/23/24  0539 03/24/24  0505   GLUC 127 123               Karolina Mayo, ESTHER

## 2024-03-25 NOTE — PROGRESS NOTES
[facility] Progress Note    3/25/2024  Patient: Juani Morales : 1960  Admission Date & Time: 3/20/2024 1149  MRN: [id number] CSN: [id number]      CH attempted follow up support visit to family of pt. No family currently bedside.  CH remains available.     24 1100   Clinical Encounter Type   Visited With Patient not available

## 2024-03-25 NOTE — PLAN OF CARE
Problem: CARDIOVASCULAR - ADULT  Goal: Maintains optimal cardiac output and hemodynamic stability  Description: INTERVENTIONS:  - Monitor I/O, vital signs and rhythm  - Monitor for S/S and trends of decreased cardiac output  - Administer and titrate ordered vasoactive medications to optimize hemodynamic stability  - Assess quality of pulses, skin color and temperature  - Assess for signs of decreased coronary artery perfusion  - Instruct patient to report change in severity of symptoms  Outcome: Progressing     Problem: RESPIRATORY - ADULT  Goal: Achieves optimal ventilation and oxygenation  Description: INTERVENTIONS:  - Assess for changes in respiratory status  - Assess for changes in mentation and behavior  - Position to facilitate oxygenation and minimize respiratory effort  - Oxygen administered by appropriate delivery if ordered  - Initiate smoking cessation education as indicated  - Encourage broncho-pulmonary hygiene including cough, deep breathe, Incentive Spirometry  - Assess the need for suctioning and aspirate as needed  - Assess and instruct to report SOB or any respiratory difficulty  - Respiratory Therapy support as indicated  Outcome: Progressing     Problem: SAFETY,RESTRAINT: NV/NON-SELF DESTRUCTIVE BEHAVIOR  Goal: Remains free of harm/injury (restraint for non violent/non self-detsructive behavior)  Description: INTERVENTIONS:  - Instruct patient/family regarding restraint use   - Assess and monitor physiologic and psychological status   - Provide interventions and comfort measures to meet assessed patient needs   - Identify and implement measures to help patient regain control  - Assess readiness for release of restraint   Outcome: Not Progressing     Problem: SAFETY,RESTRAINT: NV/NON-SELF DESTRUCTIVE BEHAVIOR  Goal: Returns to optimal restraint-free functioning  Description: INTERVENTIONS:  - Assess the patient's behavior and symptoms that indicate continued need for restraint  - Identify and  implement measures to help patient regain control  - Assess readiness for release of restraint   Outcome: Not Progressing

## 2024-03-25 NOTE — RESPIRATORY THERAPY NOTE
03/24/24 2105   Respiratory Assessment   Assessment Type Pre-treatment   General Appearance Sedated   Respiratory Pattern Assisted   Chest Assessment Chest expansion symmetrical   Bilateral Breath Sounds Diminished;Clear   Suction ET Tube   Resp Comments tx given, pt remains pt remains on vent CMV 16 470 +6 50% Ett 7.0 24@ gum moved from Rt to L skin and strap intact with two finger pass to bnack of head, sxn'd x 1 small pale yellow, BS clear, no changes made will cont to monitor   O2 Device vent   Vent Information   Vent ID [id number]   Vent type Segura G5   Segura Vent Mode (S)CMV   $ Pulse Oximetry Spot Check Charge Completed   SpO2 95 %   (S)CMV Settings   Resp Rate (BPM) 16 BPM   VT (mL) 470 mL   FIO2 (%) 50 %   PEEP (cmH2O) 6 cmH2O   I:E Ratio 1:2.7   Insp Time (%) 1 %   Flow Trigger (LPM) 5   Humidification Heater   Heater Temperature (Set) 95 °F (35 °C)   (S)CMV Actuals   Resp Rate (BPM) 16 BPM   VT (mL) 497   MV 8   MAP (cmH2O) 114 cmH2O   Peak Pressure (cmH2O) 23 cmH2O   I:E Ratio (Obs) 1:2.8   Insp Resistance 22   Heater Temperature (Obs) 95 °F (35 °C)   Static Compliance (mL/cmH20) 59 mL/cmH2O   Plateau Pressure (cm H2O) 20 cm H2O   (S)CMV Alarms   High Peak Pressure (cmH2O) 40   Low Pressure (cmH2O) 5 cm H2O   High Resp Rate (BPM) 40 BPM   Low Resp Rate (BPM) 8 BPM   High MV (L/min) 20 L/min   Low MV (L/min) 4 L/min   High VT (mL) 1000 mL   Low VT (mL) 350 mL   Apnea Time (s) 20 S   Maintenance   Alarm (pink) cable attached No   Resuscitation bag with peep valve at bedside Yes   Water bag changed No   Circuit changed No   IHI Ventilator Associated Pneumonia Bundle   Head of Bed Elevated HOB 30   ETT  Hi-Lo 7 mm   Placement Date/Time: 03/23/24 (c) 1800   Type: Hi-Lo  Tube Size: 7 mm  Location: Oral  Insertion attempts: 2  Placement Verification: Chest x-ray;End tidal CO2;Symmetrical chest wall movement  Secured at (cm): 24  Comments: lip  Placed By: Advanced Pr...   Secured at (cm) 24   Measured from  Gums   Secured Location (S)  Left   Repositioned (S)  Right to Left   Secured by Commercial tube giron  (skin and strap intact with two finger pass to back of head)   Site Condition Dry   Cuff Pressure (color) Green   HI-LO Suction  Continuous low suction   HI-LO Secretions Scant;Thin;Clear   HI-LO Intervention Patent     RT Ventilator Management Note  Juani Morales 63 y.o. female MRN: [id number]  Unit/Bed#: ICU 11-01 Encounter: [id number]      Daily Screen         3/23/2024  1430 3/24/2024  0759          Patient safety screen outcome:: -- Failed      Not Ready for Weaning due to:: -- Underline problem not resolved      Spont breathing trial outcome:: Passed --      Name of Medical Team Notified:: cc --      Preparing to extubate/ Notify Nurse: Yes --      Extubation order obtained: Yes --      Consider Cuff Test: Yes --      Patient extubated: Yes --                Physical Exam:   Assessment Type: Pre-treatment  General Appearance: Sedated  Respiratory Pattern: Assisted  Chest Assessment: Chest expansion symmetrical  Bilateral Breath Sounds: Diminished, Clear  Suction: ET Tube  O2 Device: vent      Resp Comments: pulmicort given

## 2024-03-25 NOTE — RESPIRATORY THERAPY NOTE
03/25/24 0030   Respiratory Assessment   Assessment Type Assess only   General Appearance Sedated   Respiratory Pattern Assisted   Chest Assessment Chest expansion symmetrical   Bilateral Breath Sounds Diminished;Clear   Cough None   Resp Comments no changes will cont to monitor   O2 Device vent   Vent Information   Vent ID [id number]   Vent type Segura G5   Segura Vent Mode (S)CMV   $ Vent Daily Charge-Subsequent Yes   $ Pulse Oximetry Spot Check Charge Completed   (S)CMV Settings   Resp Rate (BPM) 16 BPM   VT (mL) 470 mL   FIO2 (%) 50 %   PEEP (cmH2O) 6 cmH2O   I:E Ratio 1:2.7   Insp Time (%) 1 %   Flow Trigger (LPM) 5   Humidification Heater   Heater Temperature (Set) 95 °F (35 °C)   (S)CMV Actuals   Resp Rate (BPM) 17 BPM   VT (mL) 481   MV 8.9   MAP (cmH2O) 11 cmH2O   Peak Pressure (cmH2O) 23 cmH2O   I:E Ratio (Obs) 1:2.8   Insp Resistance 17   Heater Temperature (Obs) 95 °F (35 °C)   Static Compliance (mL/cmH20) 60 mL/cmH2O   Plateau Pressure (cm H2O) 20 cm H2O   (S)CMV Alarms   High Peak Pressure (cmH2O) 40   Low Pressure (cmH2O) 5 cm H2O   High Resp Rate (BPM) 40 BPM   Low Resp Rate (BPM) 8 BPM   High MV (L/min) 20 L/min   Low MV (L/min) 4 L/min   High VT (mL) 1000 mL   Low VT (mL) 350 mL   Apnea Time (s) 20 S   Maintenance   Alarm (pink) cable attached No   Resuscitation bag with peep valve at bedside Yes   Water bag changed No   Circuit changed No   IHI Ventilator Associated Pneumonia Bundle   Head of Bed Elevated HOB 30   ETT  Hi-Lo 7 mm   Placement Date/Time: 03/23/24 (c) 1800   Type: Hi-Lo  Tube Size: 7 mm  Location: Oral  Insertion attempts: 2  Placement Verification: Chest x-ray;End tidal CO2;Symmetrical chest wall movement  Secured at (cm): 24  Comments: lip  Placed By: Advanced Pr...   Secured at (cm) 24   Measured from Gums   Secured Location Left   Secured by Commercial tube giron  (unchanged)   Site Condition Dry   Cuff Pressure (color) Green   HI-LO Suction  Continuous low suction   HI-LO  Secretions Scant;Thin;Clear   HI-LO Intervention Patent

## 2024-03-25 NOTE — PLAN OF CARE
Problem: SAFETY,RESTRAINT: NV/NON-SELF DESTRUCTIVE BEHAVIOR  Goal: Remains free of harm/injury (restraint for non violent/non self-detsructive behavior)  Description: INTERVENTIONS:  - Instruct patient/family regarding restraint use   - Assess and monitor physiologic and psychological status   - Provide interventions and comfort measures to meet assessed patient needs   - Identify and implement measures to help patient regain control  - Assess readiness for release of restraint   Outcome: Progressing     Problem: Nutrition/Hydration-ADULT  Goal: Nutrient/Hydration intake appropriate for improving, restoring or maintaining nutritional needs  Description: Monitor and assess patient's nutrition/hydration status for malnutrition. Collaborate with interdisciplinary team and initiate plan and interventions as ordered.  Monitor patient's weight and dietary intake as ordered or per policy. Utilize nutrition screening tool and intervene as necessary. Determine patient's food preferences and provide high-protein, high-caloric foods as appropriate.     INTERVENTIONS:  - Monitor oral intake, urinary output, labs, and treatment plans  - Assess nutrition and hydration status and recommend course of action  - Evaluate amount of meals eaten  - Assist patient with eating if necessary   - Allow adequate time for meals  - Recommend/ encourage appropriate diets, oral nutritional supplements, and vitamin/mineral supplements  - Order, calculate, and assess calorie counts as needed  - Recommend, monitor, and adjust tube feedings and TPN/PPN based on assessed needs  - Assess need for intravenous fluids  - Provide specific nutrition/hydration education as appropriate  - Include patient/family/caregiver in decisions related to nutrition  Outcome: Progressing

## 2024-03-25 NOTE — RESPIRATORY THERAPY NOTE
03/25/24 0440   Respiratory Assessment   Assessment Type Assess only   General Appearance Sedated   Respiratory Pattern Assisted   Chest Assessment Chest expansion symmetrical   Bilateral Breath Sounds Diminished;Clear   Cough None   Resp Comments uneventful shift, no changes in settings pt keith vent well does over breathe but not dysynchronous, vent day 3,  16 50% +6, will contto monitor   O2 Device vent   Vent Information   Vent ID [id number]   Vent type Segura G5   Segura Vent Mode (S)CMV   $ Pulse Oximetry Spot Check Charge Completed   (S)CMV Settings   Resp Rate (BPM) 16 BPM   VT (mL) 475 mL   FIO2 (%) 50 %   PEEP (cmH2O) 6 cmH2O   I:E Ratio 1:2.7   Insp Time (%) 1 %   Flow Trigger (LPM) 5   Humidification Heater   Heater Temperature (Set) 95 °F (35 °C)   (S)CMV Actuals   Resp Rate (BPM) 19 BPM   VT (mL) 488   MV 9   MAP (cmH2O) 8.8 cmH2O   Peak Pressure (cmH2O) 21 cmH2O   I:E Ratio (Obs) 1:2   Insp Resistance 12   Heater Temperature (Obs) 95 °F (35 °C)   Static Compliance (mL/cmH20) 75 mL/cmH2O   Plateau Pressure (cm H2O) 18 cm H2O   (S)CMV Alarms   High Peak Pressure (cmH2O) 40   Low Pressure (cmH2O) 5 cm H2O   High Resp Rate (BPM) 40 BPM   Low Resp Rate (BPM) 8 BPM   High MV (L/min) 20 L/min   Low MV (L/min) 4 L/min   High VT (mL) 1000 mL   Low VT (mL) 350 mL   Apnea Time (s) 20 S   Maintenance   Alarm (pink) cable attached No   Resuscitation bag with peep valve at bedside Yes   Water bag changed No   Circuit changed No   IHI Ventilator Associated Pneumonia Bundle   Head of Bed Elevated HOB 30   ETT  Hi-Lo 7 mm   Placement Date/Time: 03/23/24 (c) 1800   Type: Hi-Lo  Tube Size: 7 mm  Location: Oral  Insertion attempts: 2  Placement Verification: Chest x-ray;End tidal CO2;Symmetrical chest wall movement  Secured at (cm): 24  Comments: lip  Placed By: Advanced Pr...   Secured at (cm) 24   Measured from Gums   Secured Location Left   Secured by Commercial tube giron   Site Condition Dry   Cuff Pressure  (color) Green   HI-LO Suction  Continuous low suction   HI-LO Secretions Scant;Thin;Clear   HI-LO Intervention Patent

## 2024-03-25 NOTE — QUICK NOTE
Daughter, Bárbara, updated via telephone. All questions answered.    We discussed trach/PEG if patient is unable to be extubated or fails extubation. Daughter will like to think about this prior to making final decision.

## 2024-03-26 NOTE — PLAN OF CARE
Problem: SAFETY,RESTRAINT: NV/NON-SELF DESTRUCTIVE BEHAVIOR  Goal: Remains free of harm/injury (restraint for non violent/non self-detsructive behavior)  Description: INTERVENTIONS:  - Instruct patient/family regarding restraint use   - Assess and monitor physiologic and psychological status   - Provide interventions and comfort measures to meet assessed patient needs   - Identify and implement measures to help patient regain control  - Assess readiness for release of restraint   3/26/2024 1735 by Makayla Regalado RN  Outcome: Completed  3/26/2024 0719 by Makayla Regalado RN  Outcome: Progressing  Goal: Returns to optimal restraint-free functioning  Description: INTERVENTIONS:  - Assess the patient's behavior and symptoms that indicate continued need for restraint  - Identify and implement measures to help patient regain control  - Assess readiness for release of restraint   3/26/2024 1735 by Makayla Regalado RN  Outcome: Completed  3/26/2024 0719 by Makayla Regalado RN  Outcome: Progressing

## 2024-03-26 NOTE — PLAN OF CARE
Problem: PAIN - ADULT  Goal: Verbalizes/displays adequate comfort level or baseline comfort level  Description: Interventions:  - Encourage patient to monitor pain and request assistance  - Assess pain using appropriate pain scale  - Administer analgesics based on type and severity of pain and evaluate response  - Implement non-pharmacological measures as appropriate and evaluate response  - Consider cultural and social influences on pain and pain management  - Notify physician/advanced practitioner if interventions unsuccessful or patient reports new pain  Outcome: Progressing     Problem: INFECTION - ADULT  Goal: Absence or prevention of progression during hospitalization  Description: INTERVENTIONS:  - Assess and monitor for signs and symptoms of infection  - Monitor lab/diagnostic results  - Monitor all insertion sites, i.e. indwelling lines, tubes, and drains  - Monitor endotracheal if appropriate and nasal secretions for changes in amount and color  - Chavies appropriate cooling/warming therapies per order  - Administer medications as ordered  - Instruct and encourage patient and family to use good hand hygiene technique  - Identify and instruct in appropriate isolation precautions for identified infection/condition  Outcome: Progressing  Goal: Absence of fever/infection during neutropenic period  Description: INTERVENTIONS:  - Monitor WBC    Outcome: Progressing     Problem: SAFETY ADULT  Goal: Patient will remain free of falls  Description: INTERVENTIONS:  - Educate patient/family on patient safety including physical limitations  - Instruct patient to call for assistance with activity   - Consult OT/PT to assist with strengthening/mobility   - Keep Call bell within reach  - Keep bed low and locked with side rails adjusted as appropriate  - Keep care items and personal belongings within reach  - Initiate and maintain comfort rounds  - Make Fall Risk Sign visible to staff  - Offer Toileting every 2 Hours,  in advance of need  - Initiate/Maintain BED alarm  - Obtain necessary fall risk management equipment  - Apply yellow socks and bracelet for high fall risk patients  - Consider moving patient to room near nurses station  Outcome: Progressing  Goal: Maintain or return to baseline ADL function  Description: INTERVENTIONS:  -  Assess patient's ability to carry out ADLs; assess patient's baseline for ADL function and identify physical deficits which impact ability to perform ADLs (bathing, care of mouth/teeth, toileting, grooming, dressing, etc.)  - Assess/evaluate cause of self-care deficits   - Assess range of motion  - Assess patient's mobility; develop plan if impaired  - Assess patient's need for assistive devices and provide as appropriate  - Encourage maximum independence but intervene and supervise when necessary  - Involve family in performance of ADLs  - Assess for home care needs following discharge   - Consider OT consult to assist with ADL evaluation and planning for discharge  - Provide patient education as appropriate  Outcome: Progressing  Goal: Maintains/Returns to pre admission functional level  Description: INTERVENTIONS:  - Perform AM-PAC 6 Click Basic Mobility/ Daily Activity assessment daily.  - Set and communicate daily mobility goal to care team and patient/family/caregiver.   - Collaborate with rehabilitation services on mobility goals if consulted  - Perform Range of Motion 3 times a day.  - Reposition patient every 2 hours.  - Dangle patient 3 times a day  - Stand patient 3 times a day  - Ambulate patient 3 times a day  - Out of bed to chair 3 times a day   - Out of bed for meals 3 times a day  - Out of bed for toileting  - Record patient progress and toleration of activity level   Outcome: Progressing     Problem: DISCHARGE PLANNING  Goal: Discharge to home or other facility with appropriate resources  Description: INTERVENTIONS:  - Identify barriers to discharge w/patient and caregiver  -  Arrange for needed discharge resources and transportation as appropriate  - Identify discharge learning needs (meds, wound care, etc.)  - Arrange for interpretive services to assist at discharge as needed  - Refer to Case Management Department for coordinating discharge planning if the patient needs post-hospital services based on physician/advanced practitioner order or complex needs related to functional status, cognitive ability, or social support system  Outcome: Progressing     Problem: Knowledge Deficit  Goal: Patient/family/caregiver demonstrates understanding of disease process, treatment plan, medications, and discharge instructions  Description: Complete learning assessment and assess knowledge base.  Interventions:  - Provide teaching at level of understanding  - Provide teaching via preferred learning methods  Outcome: Progressing     Problem: Prexisting or High Potential for Compromised Skin Integrity  Goal: Skin integrity is maintained or improved  Description: INTERVENTIONS:  - Identify patients at risk for skin breakdown  - Assess and monitor skin integrity  - Assess and monitor nutrition and hydration status  - Monitor labs   - Assess for incontinence   - Turn and reposition patient  - Assist with mobility/ambulation  - Relieve pressure over bony prominences  - Avoid friction and shearing  - Provide appropriate hygiene as needed including keeping skin clean and dry  - Evaluate need for skin moisturizer/barrier cream  - Collaborate with interdisciplinary team   - Patient/family teaching  - Consider wound care consult   Outcome: Progressing     Problem: SAFETY,RESTRAINT: NV/NON-SELF DESTRUCTIVE BEHAVIOR  Goal: Remains free of harm/injury (restraint for non violent/non self-detsructive behavior)  Description: INTERVENTIONS:  - Instruct patient/family regarding restraint use   - Assess and monitor physiologic and psychological status   - Provide interventions and comfort measures to meet assessed  patient needs   - Identify and implement measures to help patient regain control  - Assess readiness for release of restraint   Outcome: Progressing  Goal: Returns to optimal restraint-free functioning  Description: INTERVENTIONS:  - Assess the patient's behavior and symptoms that indicate continued need for restraint  - Identify and implement measures to help patient regain control  - Assess readiness for release of restraint   Outcome: Progressing     Problem: Nutrition/Hydration-ADULT  Goal: Nutrient/Hydration intake appropriate for improving, restoring or maintaining nutritional needs  Description: Monitor and assess patient's nutrition/hydration status for malnutrition. Collaborate with interdisciplinary team and initiate plan and interventions as ordered.  Monitor patient's weight and dietary intake as ordered or per policy. Utilize nutrition screening tool and intervene as necessary. Determine patient's food preferences and provide high-protein, high-caloric foods as appropriate.     INTERVENTIONS:  - Monitor oral intake, urinary output, labs, and treatment plans  - Assess nutrition and hydration status and recommend course of action  - Evaluate amount of meals eaten  - Assist patient with eating if necessary   - Allow adequate time for meals  - Recommend/ encourage appropriate diets, oral nutritional supplements, and vitamin/mineral supplements  - Order, calculate, and assess calorie counts as needed  - Recommend, monitor, and adjust tube feedings and TPN/PPN based on assessed needs  - Assess need for intravenous fluids  - Provide specific nutrition/hydration education as appropriate  - Include patient/family/caregiver in decisions related to nutrition  Outcome: Progressing     Problem: NEUROSENSORY - ADULT  Goal: Achieves stable or improved neurological status  Description: INTERVENTIONS  - Monitor and report changes in neurological status  - Monitor vital signs such as temperature, blood pressure, glucose,  and any other labs ordered   - Initiate measures to prevent increased intracranial pressure  - Monitor for seizure activity and implement precautions if appropriate      Outcome: Progressing  Goal: Remains free of injury related to seizures activity  Description: INTERVENTIONS  - Maintain airway, patient safety  and administer oxygen as ordered  - Monitor patient for seizure activity, document and report duration and description of seizure to physician/advanced practitioner  - If seizure occurs,  ensure patient safety during seizure  - Reorient patient post seizure  - Seizure pads on all 4 side rails  - Instruct patient/family to notify RN of any seizure activity including if an aura is experienced  - Instruct patient/family to call for assistance with activity based on nursing assessment  - Administer anti-seizure medications if ordered    Outcome: Progressing  Goal: Achieves maximal functionality and self care  Description: INTERVENTIONS  - Monitor swallowing and airway patency with patient fatigue and changes in neurological status  - Encourage and assist patient to increase activity and self care.   - Encourage visually impaired, hearing impaired and aphasic patients to use assistive/communication devices  Outcome: Progressing     Problem: CARDIOVASCULAR - ADULT  Goal: Maintains optimal cardiac output and hemodynamic stability  Description: INTERVENTIONS:  - Monitor I/O, vital signs and rhythm  - Monitor for S/S and trends of decreased cardiac output  - Administer and titrate ordered vasoactive medications to optimize hemodynamic stability  - Assess quality of pulses, skin color and temperature  - Assess for signs of decreased coronary artery perfusion  - Instruct patient to report change in severity of symptoms  Outcome: Progressing  Goal: Absence of cardiac dysrhythmias or at baseline rhythm  Description: INTERVENTIONS:  - Continuous cardiac monitoring, vital signs, obtain 12 lead EKG if ordered  - Administer  antiarrhythmic and heart rate control medications as ordered  - Monitor electrolytes and administer replacement therapy as ordered  Outcome: Progressing     Problem: RESPIRATORY - ADULT  Goal: Achieves optimal ventilation and oxygenation  Description: INTERVENTIONS:  - Assess for changes in respiratory status  - Assess for changes in mentation and behavior  - Position to facilitate oxygenation and minimize respiratory effort  - Oxygen administered by appropriate delivery if ordered  - Initiate smoking cessation education as indicated  - Encourage broncho-pulmonary hygiene including cough, deep breathe, Incentive Spirometry  - Assess the need for suctioning and aspirate as needed  - Assess and instruct to report SOB or any respiratory difficulty  - Respiratory Therapy support as indicated  Outcome: Progressing     Problem: GASTROINTESTINAL - ADULT  Goal: Minimal or absence of nausea and/or vomiting  Description: INTERVENTIONS:  - Administer IV fluids if ordered to ensure adequate hydration  - Maintain NPO status until nausea and vomiting are resolved  - Nasogastric tube if ordered  - Administer ordered antiemetic medications as needed  - Provide nonpharmacologic comfort measures as appropriate  - Advance diet as tolerated, if ordered  - Consider nutrition services referral to assist patient with adequate nutrition and appropriate food choices  Outcome: Progressing  Goal: Maintains or returns to baseline bowel function  Description: INTERVENTIONS:  - Assess bowel function  - Encourage oral fluids to ensure adequate hydration  - Administer IV fluids if ordered to ensure adequate hydration  - Administer ordered medications as needed  - Encourage mobilization and activity  - Consider nutritional services referral to assist patient with adequate nutrition and appropriate food choices  Outcome: Progressing  Goal: Maintains adequate nutritional intake  Description: INTERVENTIONS:  - Monitor percentage of each meal  consumed  - Identify factors contributing to decreased intake, treat as appropriate  - Assist with meals as needed  - Monitor I&O, weight, and lab values if indicated  - Obtain nutrition services referral as needed  Outcome: Progressing  Goal: Establish and maintain optimal ostomy function  Description: INTERVENTIONS:  - Assess bowel function  - Encourage oral fluids to ensure adequate hydration  - Administer IV fluids if ordered to ensure adequate hydration   - Administer ordered medications as needed  - Encourage mobilization and activity  - Nutrition services referral to assist patient with appropriate food choices  - Assess stoma site  - Consider wound care consult   Outcome: Progressing  Goal: Oral mucous membranes remain intact  Description: INTERVENTIONS  - Assess oral mucosa and hygiene practices  - Implement preventative oral hygiene regimen  - Implement oral medicated treatments as ordered  - Initiate Nutrition services referral as needed  Outcome: Progressing     Problem: GENITOURINARY - ADULT  Goal: Maintains or returns to baseline urinary function  Description: INTERVENTIONS:  - Assess urinary function  - Encourage oral fluids to ensure adequate hydration if ordered  - Administer IV fluids as ordered to ensure adequate hydration  - Administer ordered medications as needed  - Offer frequent toileting  - Follow urinary retention protocol if ordered  Outcome: Progressing  Goal: Absence of urinary retention  Description: INTERVENTIONS:  - Assess patient's ability to void and empty bladder  - Monitor I/O  - Bladder scan as needed  - Discuss with physician/AP medications to alleviate retention as needed  - Discuss catheterization for long term situations as appropriate  Outcome: Progressing  Goal: Urinary catheter remains patent  Description: INTERVENTIONS:  - Assess patency of urinary catheter  - If patient has a chronic leung, consider changing catheter if non-functioning  - Follow guidelines for  intermittent irrigation of non-functioning urinary catheter  Outcome: Progressing     Problem: METABOLIC, FLUID AND ELECTROLYTES - ADULT  Goal: Electrolytes maintained within normal limits  Description: INTERVENTIONS:  - Monitor labs and assess patient for signs and symptoms of electrolyte imbalances  - Administer electrolyte replacement as ordered  - Monitor response to electrolyte replacements, including repeat lab results as appropriate  - Instruct patient on fluid and nutrition as appropriate  Outcome: Progressing  Goal: Fluid balance maintained  Description: INTERVENTIONS:  - Monitor labs   - Monitor I/O and WT  - Instruct patient on fluid and nutrition as appropriate  - Assess for signs & symptoms of volume excess or deficit  Outcome: Progressing  Goal: Glucose maintained within target range  Description: INTERVENTIONS:  - Monitor Blood Glucose as ordered  - Assess for signs and symptoms of hyperglycemia and hypoglycemia  - Administer ordered medications to maintain glucose within target range  - Assess nutritional intake and initiate nutrition service referral as needed  Outcome: Progressing     Problem: SKIN/TISSUE INTEGRITY - ADULT  Goal: Skin Integrity remains intact(Skin Breakdown Prevention)  Description: Assess:  -Perform Valentín assessment   -Clean and moisturize skin   -Inspect skin when repositioning, toileting, and assisting with ADLS  -Assess under medical devices  -Assess extremities for adequate circulation and sensation     Bed Management:  -Have minimal linens on bed & keep smooth, unwrinkled  -Change linens as needed when moist or perspiring  -Avoid sitting or lying in one position for more than 2 hours while in bed  -Keep HOB at 30 degrees     Toileting:  -Offer bedside commode  -Assess for incontinence   -Use incontinent care products after each incontinent episode     Activity:  -Mobilize patient 3 times a day  -Encourage activity and walks on unit  -Encourage or provide ROM exercises    -Turn and reposition patient every 2 Hours  -Use appropriate equipment to lift or move patient in bed  -Instruct/ Assist with weight shifting when out of bed in chair  -Consider limitation of chair time 2 hour intervals    Skin Care:  -Avoid use of baby powder, tape, friction and shearing, hot water or constrictive clothing  -Relieve pressure over bony prominences   -Do not massage red bony areas    Next Steps:  -Teach patient strategies to minimize risks    -Consider consults to  interdisciplinary teams  Outcome: Progressing  Goal: Incision(s), wounds(s) or drain site(s) healing without S/S of infection  Description: INTERVENTIONS  - Assess and document dressing, incision, wound bed, drain sites and surrounding tissue  - Provide patient and family education  - Perform skin care/dressing changes  Outcome: Progressing  Goal: Pressure injury heals and does not worsen  Description: Interventions:  - Implement low air loss mattress or specialty surface (Criteria met)  - Apply silicone foam dressing  - Instruct/assist with weight shifting every 60 minutes when in chair   - Limit chair time to 2 hour intervals  - Use special pressure reducing interventions when in chair   - Apply fecal or urinary incontinence containment device   - Perform passive or active ROM   - Turn and reposition patient & offload bony prominences every 2 hours   - Utilize friction reducing device or surface for transfers   - Consider consults to  interdisciplinary teams  - Use incontinent care products after each incontinent episode  - Consider nutrition services referral as needed  Outcome: Progressing     Problem: HEMATOLOGIC - ADULT  Goal: Maintains hematologic stability  Description: INTERVENTIONS  - Assess for signs and symptoms of bleeding or hemorrhage  - Monitor labs  - Administer supportive blood products/factors as ordered and appropriate  Outcome: Progressing     Problem: MUSCULOSKELETAL - ADULT  Goal: Maintain or return mobility to  safest level of function  Description: INTERVENTIONS:  - Assess patient's ability to carry out ADLs; assess patient's baseline for ADL function and identify physical deficits which impact ability to perform ADLs (bathing, care of mouth/teeth, toileting, grooming, dressing, etc.)  - Assess/evaluate cause of self-care deficits   - Assess range of motion  - Assess patient's mobility  - Assess patient's need for assistive devices and provide as appropriate  - Encourage maximum independence but intervene and supervise when necessary  - Involve family in performance of ADLs  - Assess for home care needs following discharge   - Consider OT consult to assist with ADL evaluation and planning for discharge  - Provide patient education as appropriate  Outcome: Progressing  Goal: Maintain proper alignment of affected body part  Description: INTERVENTIONS:  - Support, maintain and protect limb and body alignment  - Provide patient/ family with appropriate education  Outcome: Progressing

## 2024-03-26 NOTE — RESPIRATORY THERAPY NOTE
03/26/24 0450   Respiratory Assessment   Respiratory Pattern Assisted   Chest Assessment Chest expansion symmetrical   Bilateral Breath Sounds Diminished;Coarse   Suction Oral;ET Tube   Resp Comments pt received weaning on cpap 6 psv6 50%. pttaken off wean for HS. pt placed current cmv setting withour incident. pt suctioned as needed. ETT secure   O2 Device vent   Vent Information   Vent ID [id number]   Vent type Segura G5   Segura Vent Mode (S)CMV   $ Vent Daily Charge-Subsequent Yes   $ Pulse Oximetry Spot Check Charge Completed   SpO2 95 %   (S)CMV Settings   Resp Rate (BPM) 16 BPM   VT (mL) 470 mL   FIO2 (%) 50 %   PEEP (cmH2O) 6 cmH2O   I:E Ratio 1:2.7   Insp Time (%) 1 %   Flow Trigger (LPM) 5   Humidification Heater   Heater Temperature (Set) 95 °F (35 °C)   (S)CMV Actuals   Resp Rate (BPM) 17 BPM   VT (mL) 518   MV 8.8   MAP (cmH2O) 12 cmH2O   Peak Pressure (cmH2O) 21 cmH2O   I:E Ratio (Obs) 1:2.8   Insp Resistance 17   Heater Temperature (Obs) 95 °F (35 °C)   Static Compliance (mL/cmH20) 19.8 mL/cmH2O   Plateau Pressure (cm H2O) 15.8 cm H2O   (S)CMV Alarms   High Peak Pressure (cmH2O) 40   Low Pressure (cmH2O) 5 cm H2O   High Resp Rate (BPM) 40 BPM   Low Resp Rate (BPM) 8 BPM   High MV (L/min) 20 L/min   Low MV (L/min) 4 L/min   High VT (mL) 1000 mL   Low VT (mL) 350 mL   Apnea Time (s) 20 S   Maintenance   Alarm (pink) cable attached No   Resuscitation bag with peep valve at bedside Yes   Water bag changed No   Circuit changed No   Daily Screen   Patient safety screen outcome: Passed   IHI Ventilator Associated Pneumonia Bundle   Head of Bed Elevated HOB 30   ETT  Hi-Lo 7 mm   Placement Date/Time: 03/23/24 (c) 1800   Type: Hi-Lo  Tube Size: 7 mm  Location: Oral  Insertion attempts: 2  Placement Verification: Chest x-ray;End tidal CO2;Symmetrical chest wall movement  Secured at (cm): 24  Comments: lip  Placed By: [employer]...   Secured at (cm) 24   Measured from Gums   Secured Location (S)  Right    Repositioned Center to Right   Secured by Commercial tube giron   Site Condition Dry   Cuff Pressure (color) Green   HI-LO Suction  Continuous low suction   HI-LO Secretions Scant     RT Ventilator Management Note  Juani Morales 63 y.o. female MRN: [id number]  Unit/Bed#: ICU 11-01 Encounter: [id number]      Daily Screen         3/25/2024  1449 3/26/2024  0450          Patient safety screen outcome:: Passed Passed      Spont breathing trial outcome:: Passed --                Physical Exam:   Respiratory Pattern: Assisted  Chest Assessment: Chest expansion symmetrical  Bilateral Breath Sounds: Diminished, Coarse  Suction: Oral, ET Tube  O2 Device: vent      Resp Comments: pt received weaning on cpap 6 psv6 50%. pttaken off wean for HS. pt placed current cmv setting withour incident. pt suctioned as needed. ETT secure

## 2024-03-26 NOTE — RESPIRATORY THERAPY NOTE
RT Ventilator Management Note  Juani Morales 63 y.o. female MRN: [id number]  Unit/Bed#: ICU 11-01 Encounter: [id number]      Daily Screen         3/26/2024  0450 3/26/2024  0700          Patient safety screen outcome:: Passed Passed (P)       Spont breathing trial % for 30 min: -- Yes (P)       RSBI: -- 54 (P)                 Physical Exam:   Assessment Type: (P) During-treatment  General Appearance: (P) Sleeping  Respiratory Pattern: (P) Assisted  Chest Assessment: (P) Chest expansion symmetrical  Bilateral Breath Sounds: (P) Clear  Suction: (P) ET Tube  O2 Device: vent      Resp Comments: (P) Pt placed on SVT with possible extubation later today. Pt keith well, vitals are stable.

## 2024-03-26 NOTE — ASSESSMENT & PLAN NOTE
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

## 2024-03-26 NOTE — ASSESSMENT & PLAN NOTE
Now resolved  Suspect mixed etiology: septic in setting of metapneumovirus pneumonia vs cardiogenic w/known dilated cardiomyopathy and acute on chronic HF  CXR this AM as noted above  TTE this AM revealed EF 30% w/moderate to severely reduced systolic function in a global manner, though septum is less kinetic than remaining segments; dilated RV w/mildly reduced systolic function; mild AR, trace TR  CVP 9    Plan:  RP2 panel (+) for metapneumovirus  Blood cultures from admission pending - repeats sent given acute decompensation  UA negative  Urine strep/legionella negative  Sputum culture negative  Lactic acid 1.5  Procalcitonin <0.05 x2; now elevated to 4.55  Initially hypertensive, then became hypotensive post intubation/sedation - did not receive 30 cc/kg IVF bolus given volume overload/HF  Continue IV Azithromycin/Ceftriaxone for possible CAP, now D6  Monitor fever and WBC curve  Monitor hemodynamics closely

## 2024-03-26 NOTE — PROGRESS NOTES
[facility]  Progress Note  Name: Juani Morales I  MRN: [id number]  Unit/Bed#: ICU 11-01 I Date of Admission: 3/20/2024   Date of Service: 3/26/2024 I Hospital Day: 6    Assessment/Plan   * Acute respiratory failure with hypoxia (HCC)  Assessment & Plan  Initially admitted on 03/20 to [facility] for cough/SOB x2 weeks - required 5L NC on admission   Had escalating O2 requirements overnight - upgraded to ICU this AM for respiratory distress/failure   Failed BIPAP - intubated shortly after arrival to ICU 3/21  Suspect this is multifactorial in setting of Metapneumovirus pneumonia vs COPD exacerbation vs flash pulmonary edema/acute on chronic HF  (3/21) CXR w/diffuse bronchitis and multifocal bronchiolitis/bronchopneumonia and possible superimposed very mild interstitial edema  (3/23) Pt was extubated to BiPAP with Precedex gtt. Tolerated well and was transitioned to HFNC which she did not tolerate. Pt was given Zyprexa and placed back on the BiPAP. On exam pt with no air movement on auscultation. Pt was re-intubated with bougie device and a 7.0 ETT.    Plan:  RP2 panel (+) for Metapneumovirus  Urine strep/legionella negative  Sputum culture negative  (3/21) CTA PE study - subpleural consolidation in the inferior left lower lobe, possibly atelectatic but pneumonia cannot be excluded. Minimal compressive atelectasis in the dependent right lower lobe.   Azithro/Cftx completed  Continue IV Solu Medrol 40 mg Q12H for COPD exacerbation  Continue scheduled Xopenex/Atrovent/Pulmicort  Is s/p IV Lasix 80 mg x1 w/good responsive - continue PRN for goal negative 1-2L  Current vent settings: ACVC 20/470/50/8 - today is MV D#4 after reintubation  Follow ABG and adjust vent settings as necessary  Currently sedated w/Propofol/Fentanyl gtt's for goal RASS -2 to -3  Aggressive pulmonary hygiene  VAP precautions  Daily SAT/SBT  Wean FiO2 for SpO2 > 88%    Shock (HCC)  Assessment & Plan  Now resolved  Suspect mixed  etiology: septic in setting of metapneumovirus pneumonia vs cardiogenic w/known dilated cardiomyopathy and acute on chronic HF  CXR this AM as noted above  TTE this AM revealed EF 30% w/moderate to severely reduced systolic function in a global manner, though septum is less kinetic than remaining segments; dilated RV w/mildly reduced systolic function; mild AR, trace TR  CVP 9    Plan:  RP2 panel (+) for metapneumovirus  Blood cultures from admission pending - repeats sent given acute decompensation  UA negative  Urine strep/legionella negative  Sputum culture negative  Lactic acid 1.5  Procalcitonin <0.05 x2; now elevated to 4.55  Initially hypertensive, then became hypotensive post intubation/sedation - did not receive 30 cc/kg IVF bolus given volume overload/HF  Continue IV Azithromycin/Ceftriaxone for possible CAP, now D6  Monitor fever and WBC curve  Monitor hemodynamics closely    Human metapneumovirus pneumonia  Assessment & Plan  Please see plan as noted above    COPD with acute exacerbation (HCC)  Assessment & Plan  POA  Likely exacerbated by metapneumovirus pneumonia  Does not appear to follow w/Pulmonology - no PFTs available for review  Continues to smoke 1-2 PPD    Plan:  Continue IV Solu Medrol 40 mg Q12H   Only uses PRN Albuterol inhaler at home - continue scheduled Xopenex/Atrovent/Pulmicort  Aggressive pulmonary hygiene    Acute on chronic combined systolic (congestive) and diastolic (congestive) heart failure (HCC)  Assessment & Plan  Wt Readings from Last 3 Encounters:   03/26/24 79.1 kg (174 lb 6.1 oz)   03/13/24 81.1 kg (178 lb 12.8 oz)   01/05/24 85 kg (187 lb 6.4 oz)     Has history of nonischemic dilated cardiomyopathy - first noted in 2021, then again in 2022  TTE from 03/2023 revealed EF 70% w/mild to moderate concentric hypertrophy and mildly increased wall thickness; G1DD  Repeat today reveals EF 30% as noted above     Plan:  Is s/p IV Lasix 80 mg x1 - continue w/PRN diuresis   Continue  home Coreg   Holding home ASA given acute GIB  Strict I/O - maintain Henley cath for accurate I/O  Daily weights    Elevated troponin  Assessment & Plan  Troponins negative on admission - now elevated to 1290, peaked at 2018  Suspect this is 2/2 demand ischemia in setting of shock state/respiratory failure rather than ACS  TTE w/EF 30% as noted above - likely stress-induced  Denied chest pain prior to intubation  ECG initially w/ST depression in V4/V5 while in respiratory distress - no ST depressions on repeat ECG   Discussed case w/Dr. Chow (Cardiology) who agrees this is likely 2/2 demand rather than ACS    Plan:  Continue to trend troponins and serial ECGs  Continuous cardiac monitoring    GIB (gastrointestinal bleeding)  Assessment & Plan  Coffee-ground output noted in OGT     Plan:  Holding home ASA for now  IV PPI BID   GI consulted - appreciate recommendations  Monitor Hgb and output    Prediabetes  Assessment & Plan  Plan:  Hgb A1C 6.0  SSI w/Q6H fingersticks  Goal  - 180    Polycythemia  Assessment & Plan  Likely in setting of chronic hypoxemia    Plan:  Will need follow-up as outpatient    Chronic kidney disease (CKD)  Assessment & Plan  Lab Results   Component Value Date    EGFR 92 03/25/2024    EGFR 85 03/24/2024    EGFR 54 03/23/2024    CREATININE 0.70 03/25/2024    CREATININE 0.75 03/24/2024    CREATININE 1.09 03/23/2024     Baseline creatinine appears to be around 0.6 - 1.0  Has chronically occluded R renal artery and L renal artery is s/p stenting in 2022 - required IHD for 1/5 months in 2022    Plan:  Avoid nephrotoxic agents and hypotension  Trend renal indices  Strict I/O  Daily weights    Tobacco abuse  Assessment & Plan  Smokes 1-2 PPD     Plan:  NRT  Encourage cessation when appropriate    Anxiety  Assessment & Plan  Plan:  Holding home Klonopin for now while NPO in setting of GIB    Chronic pain syndrome  Assessment & Plan  Plan:  Holding home MS Contin 30 mg BID and PRN Percocet 5-325  mg Q8H - appears to be filling this regularly per PDMP  Holding home Neurontin while NPO  Continue Fentanyl gtt while intubated    Essential hypertension  Assessment & Plan  Plan:  Home Coreg              Disposition: Critical care    ICU Core Measures     Vented Patient  VAP Bundle  VAP bundle ordered     A: Assess, Prevent, and Manage Pain Has pain been assessed? Yes  Need for changes to pain regimen? No   B: Both Spontaneous Awakening Trials (SATs) and Spontaneous Breathing Trials (SBTs) Plan to perform spontaneous awakening trial today? Yes   Plan to perform spontaneous breathing trial today? Yes   Obvious barriers to extubation? No   C: Choice of Sedation RASS Goal: 0 Alert and Calm  Need for changes to sedation or analgesia regimen? No   D: Delirium CAM-ICU: Negative   E: Early Mobility  Plan for early mobility? Yes   F: Family Engagement Plan for family engagement today? Yes       Antibiotic Review: Patient on appropriate coverage based on culture data.     Review of Invasive Devices:    Kale Plan: Continue for accurate I/O monitoring for 48 hours  Central access plan: Medications requiring central line  Susi Plan: Keep arterial line for hemodynamic monitoring    Prophylaxis:  VTE VTE covered by:  heparin (porcine), Subcutaneous, 5,000 Units at 03/26/24 0556       Stress Ulcer  covered bypantoprazole (PROTONIX) injection 40 mg [[id number]]         Significant 24hr Events     24hr events: No acute events     Subjective   Review of Systems   Unable to perform ROS: Intubated      Objective                            Vitals I/O      Most Recent Min/Max in 24hrs   Temp 98.2 °F (36.8 °C) Temp  Min: 95.7 °F (35.4 °C)  Max: 100 °F (37.8 °C)   Pulse 81 Pulse  Min: 72  Max: 91   Resp 20 Resp  Min: 11  Max: 26   BP (!) 180/79 No data recorded   O2 Sat 96 % SpO2  Min: 94 %  Max: 96 %      Intake/Output Summary (Last 24 hours) at 3/26/2024 0600  Last data filed at 3/26/2024 0400  Gross per 24 hour   Intake 1680.14 ml    Output 1550 ml   Net 130.14 ml       Diet Enteral/Parenteral; Tube Feeding No Oral Diet; Jevity 1.2 Andres; Continuous; 40; 100; Water; Every 6 hours    Invasive Monitoring   Arterial Line  Susi /68  Arterial Line BP  Min: 123/61  Max: 190/80   MAP 92 mmHg  Arterial Line MAP (mmHg)  Min: 78 mmHg  Max: 118 mmHg           Physical Exam   Physical Exam  Vitals and nursing note reviewed.   Eyes:      Pupils: Pupils are equal, round, and reactive to light.   Skin:     General: Skin is warm and dry.   HENT:      Head: Normocephalic.      Mouth/Throat:      Mouth: Mucous membranes are dry.   Neck:      Vascular: Central line present.   Cardiovascular:      Rate and Rhythm: Normal rate and regular rhythm.      Pulses: Normal pulses.      Heart sounds: Normal heart sounds.   Musculoskeletal:         General: Normal range of motion.   Abdominal:      Palpations: Abdomen is soft.   Constitutional:       Appearance: She is ill-appearing.      Interventions: She is intubated.   Pulmonary:      Effort: She is intubated.   Neurological:      GCS: GCS eye subscore is 3. GCS verbal subscore is 1. GCS motor subscore is 6.      Motor: Strength full and intact in all extremities.   Genitourinary/Anorectal:  Henley present.          Diagnostic Studies      EKG: NSR  Imaging:  I have personally reviewed pertinent reports.   and I have personally reviewed pertinent films in PACS     Medications:  Scheduled PRN   aspirin, 81 mg, Daily  atorvastatin, 40 mg, Daily With Dinner  azithromycin, 500 mg, Q24H  carvedilol, 25 mg, BID With Meals  cefTRIAXone, 1,000 mg, Q24H  chlorhexidine, 15 mL, Q12H JULIÁN  clonazePAM, 1 mg, HS  gabapentin, 100 mg, HS  heparin (porcine), 5,000 Units, Q8H JULIÁN  insulin lispro, 1-6 Units, Q6H JULIÁN  ipratropium, 0.5 mg, TID  levalbuterol, 1.25 mg, TID  lisinopril, 5 mg, Daily  methylPREDNISolone sodium succinate, 40 mg, Q12H JULIÁN  nicotine, 1 patch, Daily  OLANZapine, 5 mg, Once  pantoprazole, 40 mg, Q12H  JULIÁN  polyethylene glycol, 17 g, Daily  senna, 17.6 mg, BID      fentaNYL, 50 mcg, Q2H PRN  ondansetron, 4 mg, Q6H PRN       Continuous    fentaNYL, 100 mcg/hr, Last Rate: 100 mcg/hr (03/26/24 0021)  propofol, 5-50 mcg/kg/min, Last Rate: 40 mcg/kg/min (03/26/24 0323)         Labs:    CBC    Recent Labs     03/25/24  0525   WBC 7.22   HGB 13.4   HCT 43.0        BMP    Recent Labs     03/25/24  0525   SODIUM 145   K 4.5   *   CO2 28   AGAP 8   BUN 29*   CREATININE 0.70   CALCIUM 8.8       Coags    No recent results     Additional Electrolytes  Recent Labs     03/25/24  0525   MG 2.3   PHOS 3.4   CAIONIZED 1.19          Blood Gas    Recent Labs     03/24/24  1112   PHART 7.375   [id number] 46.7*   PO2ART 81.9   [id number] 26.7   BEART 1.0   SOURCE Line, Arterial     Recent Labs     03/24/24  1112   SOURCE Line, Arterial    LFTs  Recent Labs     03/25/24  0525   ALT 9   AST 12*   ALKPHOS 48   ALB 3.5   TBILI 0.31       Infectious  Recent Labs     03/25/24  0525   PROCALCITONI 0.48*     Glucose  Recent Labs     03/25/24  0525   GLUC 124               ESTHER Gregorio

## 2024-03-26 NOTE — PLAN OF CARE
Problem: PAIN - ADULT  Goal: Verbalizes/displays adequate comfort level or baseline comfort level  Description: Interventions:  - Encourage patient to monitor pain and request assistance  - Assess pain using appropriate pain scale  - Administer analgesics based on type and severity of pain and evaluate response  - Implement non-pharmacological measures as appropriate and evaluate response  - Consider cultural and social influences on pain and pain management  - Notify physician/advanced practitioner if interventions unsuccessful or patient reports new pain  Outcome: Progressing     Problem: INFECTION - ADULT  Goal: Absence or prevention of progression during hospitalization  Description: INTERVENTIONS:  - Assess and monitor for signs and symptoms of infection  - Monitor lab/diagnostic results  - Monitor all insertion sites, i.e. indwelling lines, tubes, and drains  - Monitor endotracheal if appropriate and nasal secretions for changes in amount and color  - Rawlings appropriate cooling/warming therapies per order  - Administer medications as ordered  - Instruct and encourage patient and family to use good hand hygiene technique  - Identify and instruct in appropriate isolation precautions for identified infection/condition  Outcome: Progressing  Goal: Absence of fever/infection during neutropenic period  Description: INTERVENTIONS:  - Monitor WBC    Outcome: Progressing     Problem: SAFETY ADULT  Goal: Patient will remain free of falls  Description: INTERVENTIONS:  - Educate patient/family on patient safety including physical limitations  - Instruct patient to call for assistance with activity   - Consult OT/PT to assist with strengthening/mobility   - Keep Call bell within reach  - Keep bed low and locked with side rails adjusted as appropriate  - Keep care items and personal belongings within reach  - Initiate and maintain comfort rounds  - Make Fall Risk Sign visible to staff  - Offer Toileting every 2 Hours,  in advance of need  - Initiate/Maintain BED alarm  - Obtain necessary fall risk management equipment  - Apply yellow socks and bracelet for high fall risk patients  - Consider moving patient to room near nurses station  Outcome: Progressing  Goal: Maintain or return to baseline ADL function  Description: INTERVENTIONS:  -  Assess patient's ability to carry out ADLs; assess patient's baseline for ADL function and identify physical deficits which impact ability to perform ADLs (bathing, care of mouth/teeth, toileting, grooming, dressing, etc.)  - Assess/evaluate cause of self-care deficits   - Assess range of motion  - Assess patient's mobility; develop plan if impaired  - Assess patient's need for assistive devices and provide as appropriate  - Encourage maximum independence but intervene and supervise when necessary  - Involve family in performance of ADLs  - Assess for home care needs following discharge   - Consider OT consult to assist with ADL evaluation and planning for discharge  - Provide patient education as appropriate  Outcome: Progressing  Goal: Maintains/Returns to pre admission functional level  Description: INTERVENTIONS:  - Perform AM-PAC 6 Click Basic Mobility/ Daily Activity assessment daily.  - Set and communicate daily mobility goal to care team and patient/family/caregiver.   - Collaborate with rehabilitation services on mobility goals if consulted  - Perform Range of Motion 3 times a day.  - Reposition patient every 2 hours.  - Dangle patient 3 times a day  - Stand patient 3 times a day  - Ambulate patient 3 times a day  - Out of bed to chair 3 times a day   - Out of bed for meals 3 times a day  - Out of bed for toileting  - Record patient progress and toleration of activity level   Outcome: Progressing     Problem: DISCHARGE PLANNING  Goal: Discharge to home or other facility with appropriate resources  Description: INTERVENTIONS:  - Identify barriers to discharge w/patient and caregiver  -  Arrange for needed discharge resources and transportation as appropriate  - Identify discharge learning needs (meds, wound care, etc.)  - Arrange for interpretive services to assist at discharge as needed  - Refer to Case Management Department for coordinating discharge planning if the patient needs post-hospital services based on physician/advanced practitioner order or complex needs related to functional status, cognitive ability, or social support system  Outcome: Progressing     Problem: Knowledge Deficit  Goal: Patient/family/caregiver demonstrates understanding of disease process, treatment plan, medications, and discharge instructions  Description: Complete learning assessment and assess knowledge base.  Interventions:  - Provide teaching at level of understanding  - Provide teaching via preferred learning methods  Outcome: Progressing     Problem: Prexisting or High Potential for Compromised Skin Integrity  Goal: Skin integrity is maintained or improved  Description: INTERVENTIONS:  - Identify patients at risk for skin breakdown  - Assess and monitor skin integrity  - Assess and monitor nutrition and hydration status  - Monitor labs   - Assess for incontinence   - Turn and reposition patient  - Assist with mobility/ambulation  - Relieve pressure over bony prominences  - Avoid friction and shearing  - Provide appropriate hygiene as needed including keeping skin clean and dry  - Evaluate need for skin moisturizer/barrier cream  - Collaborate with interdisciplinary team   - Patient/family teaching  - Consider wound care consult   Outcome: Progressing     Problem: SAFETY,RESTRAINT: NV/NON-SELF DESTRUCTIVE BEHAVIOR  Goal: Remains free of harm/injury (restraint for non violent/non self-detsructive behavior)  Description: INTERVENTIONS:  - Instruct patient/family regarding restraint use   - Assess and monitor physiologic and psychological status   - Provide interventions and comfort measures to meet assessed  patient needs   - Identify and implement measures to help patient regain control  - Assess readiness for release of restraint   Outcome: Progressing  Goal: Returns to optimal restraint-free functioning  Description: INTERVENTIONS:  - Assess the patient's behavior and symptoms that indicate continued need for restraint  - Identify and implement measures to help patient regain control  - Assess readiness for release of restraint   Outcome: Progressing     Problem: Nutrition/Hydration-ADULT  Goal: Nutrient/Hydration intake appropriate for improving, restoring or maintaining nutritional needs  Description: Monitor and assess patient's nutrition/hydration status for malnutrition. Collaborate with interdisciplinary team and initiate plan and interventions as ordered.  Monitor patient's weight and dietary intake as ordered or per policy. Utilize nutrition screening tool and intervene as necessary. Determine patient's food preferences and provide high-protein, high-caloric foods as appropriate.     INTERVENTIONS:  - Monitor oral intake, urinary output, labs, and treatment plans  - Assess nutrition and hydration status and recommend course of action  - Evaluate amount of meals eaten  - Assist patient with eating if necessary   - Allow adequate time for meals  - Recommend/ encourage appropriate diets, oral nutritional supplements, and vitamin/mineral supplements  - Order, calculate, and assess calorie counts as needed  - Recommend, monitor, and adjust tube feedings and TPN/PPN based on assessed needs  - Assess need for intravenous fluids  - Provide specific nutrition/hydration education as appropriate  - Include patient/family/caregiver in decisions related to nutrition  Outcome: Progressing     Problem: NEUROSENSORY - ADULT  Goal: Achieves stable or improved neurological status  Description: INTERVENTIONS  - Monitor and report changes in neurological status  - Monitor vital signs such as temperature, blood pressure, glucose,  and any other labs ordered   - Initiate measures to prevent increased intracranial pressure  - Monitor for seizure activity and implement precautions if appropriate      Outcome: Progressing  Goal: Remains free of injury related to seizures activity  Description: INTERVENTIONS  - Maintain airway, patient safety  and administer oxygen as ordered  - Monitor patient for seizure activity, document and report duration and description of seizure to physician/advanced practitioner  - If seizure occurs,  ensure patient safety during seizure  - Reorient patient post seizure  - Seizure pads on all 4 side rails  - Instruct patient/family to notify RN of any seizure activity including if an aura is experienced  - Instruct patient/family to call for assistance with activity based on nursing assessment  - Administer anti-seizure medications if ordered    Outcome: Progressing  Goal: Achieves maximal functionality and self care  Description: INTERVENTIONS  - Monitor swallowing and airway patency with patient fatigue and changes in neurological status  - Encourage and assist patient to increase activity and self care.   - Encourage visually impaired, hearing impaired and aphasic patients to use assistive/communication devices  Outcome: Progressing     Problem: CARDIOVASCULAR - ADULT  Goal: Maintains optimal cardiac output and hemodynamic stability  Description: INTERVENTIONS:  - Monitor I/O, vital signs and rhythm  - Monitor for S/S and trends of decreased cardiac output  - Administer and titrate ordered vasoactive medications to optimize hemodynamic stability  - Assess quality of pulses, skin color and temperature  - Assess for signs of decreased coronary artery perfusion  - Instruct patient to report change in severity of symptoms  Outcome: Progressing  Goal: Absence of cardiac dysrhythmias or at baseline rhythm  Description: INTERVENTIONS:  - Continuous cardiac monitoring, vital signs, obtain 12 lead EKG if ordered  - Administer  antiarrhythmic and heart rate control medications as ordered  - Monitor electrolytes and administer replacement therapy as ordered  Outcome: Progressing     Problem: RESPIRATORY - ADULT  Goal: Achieves optimal ventilation and oxygenation  Description: INTERVENTIONS:  - Assess for changes in respiratory status  - Assess for changes in mentation and behavior  - Position to facilitate oxygenation and minimize respiratory effort  - Oxygen administered by appropriate delivery if ordered  - Initiate smoking cessation education as indicated  - Encourage broncho-pulmonary hygiene including cough, deep breathe, Incentive Spirometry  - Assess the need for suctioning and aspirate as needed  - Assess and instruct to report SOB or any respiratory difficulty  - Respiratory Therapy support as indicated  Outcome: Progressing     Problem: GASTROINTESTINAL - ADULT  Goal: Minimal or absence of nausea and/or vomiting  Description: INTERVENTIONS:  - Administer IV fluids if ordered to ensure adequate hydration  - Maintain NPO status until nausea and vomiting are resolved  - Nasogastric tube if ordered  - Administer ordered antiemetic medications as needed  - Provide nonpharmacologic comfort measures as appropriate  - Advance diet as tolerated, if ordered  - Consider nutrition services referral to assist patient with adequate nutrition and appropriate food choices  Outcome: Progressing  Goal: Maintains or returns to baseline bowel function  Description: INTERVENTIONS:  - Assess bowel function  - Encourage oral fluids to ensure adequate hydration  - Administer IV fluids if ordered to ensure adequate hydration  - Administer ordered medications as needed  - Encourage mobilization and activity  - Consider nutritional services referral to assist patient with adequate nutrition and appropriate food choices  Outcome: Progressing  Goal: Maintains adequate nutritional intake  Description: INTERVENTIONS:  - Monitor percentage of each meal  consumed  - Identify factors contributing to decreased intake, treat as appropriate  - Assist with meals as needed  - Monitor I&O, weight, and lab values if indicated  - Obtain nutrition services referral as needed  Outcome: Progressing  Goal: Establish and maintain optimal ostomy function  Description: INTERVENTIONS:  - Assess bowel function  - Encourage oral fluids to ensure adequate hydration  - Administer IV fluids if ordered to ensure adequate hydration   - Administer ordered medications as needed  - Encourage mobilization and activity  - Nutrition services referral to assist patient with appropriate food choices  - Assess stoma site  - Consider wound care consult   Outcome: Progressing  Goal: Oral mucous membranes remain intact  Description: INTERVENTIONS  - Assess oral mucosa and hygiene practices  - Implement preventative oral hygiene regimen  - Implement oral medicated treatments as ordered  - Initiate Nutrition services referral as needed  Outcome: Progressing     Problem: GENITOURINARY - ADULT  Goal: Maintains or returns to baseline urinary function  Description: INTERVENTIONS:  - Assess urinary function  - Encourage oral fluids to ensure adequate hydration if ordered  - Administer IV fluids as ordered to ensure adequate hydration  - Administer ordered medications as needed  - Offer frequent toileting  - Follow urinary retention protocol if ordered  Outcome: Progressing  Goal: Absence of urinary retention  Description: INTERVENTIONS:  - Assess patient's ability to void and empty bladder  - Monitor I/O  - Bladder scan as needed  - Discuss with physician/AP medications to alleviate retention as needed  - Discuss catheterization for long term situations as appropriate  Outcome: Progressing  Goal: Urinary catheter remains patent  Description: INTERVENTIONS:  - Assess patency of urinary catheter  - If patient has a chronic leung, consider changing catheter if non-functioning  - Follow guidelines for  intermittent irrigation of non-functioning urinary catheter  Outcome: Progressing     Problem: METABOLIC, FLUID AND ELECTROLYTES - ADULT  Goal: Electrolytes maintained within normal limits  Description: INTERVENTIONS:  - Monitor labs and assess patient for signs and symptoms of electrolyte imbalances  - Administer electrolyte replacement as ordered  - Monitor response to electrolyte replacements, including repeat lab results as appropriate  - Instruct patient on fluid and nutrition as appropriate  Outcome: Progressing  Goal: Fluid balance maintained  Description: INTERVENTIONS:  - Monitor labs   - Monitor I/O and WT  - Instruct patient on fluid and nutrition as appropriate  - Assess for signs & symptoms of volume excess or deficit  Outcome: Progressing  Goal: Glucose maintained within target range  Description: INTERVENTIONS:  - Monitor Blood Glucose as ordered  - Assess for signs and symptoms of hyperglycemia and hypoglycemia  - Administer ordered medications to maintain glucose within target range  - Assess nutritional intake and initiate nutrition service referral as needed  Outcome: Progressing     Problem: SKIN/TISSUE INTEGRITY - ADULT  Goal: Skin Integrity remains intact(Skin Breakdown Prevention)  Description: Assess:  -Perform Valentín assessment   -Clean and moisturize skin   -Inspect skin when repositioning, toileting, and assisting with ADLS  -Assess under medical devices  -Assess extremities for adequate circulation and sensation     Bed Management:  -Have minimal linens on bed & keep smooth, unwrinkled  -Change linens as needed when moist or perspiring  -Avoid sitting or lying in one position for more than 2 hours while in bed  -Keep HOB at 30 degrees     Toileting:  -Offer bedside commode  -Assess for incontinence   -Use incontinent care products after each incontinent episode     Activity:  -Mobilize patient 3 times a day  -Encourage activity and walks on unit  -Encourage or provide ROM exercises    -Turn and reposition patient every 2 Hours  -Use appropriate equipment to lift or move patient in bed  -Instruct/ Assist with weight shifting when out of bed in chair  -Consider limitation of chair time 2 hour intervals    Skin Care:  -Avoid use of baby powder, tape, friction and shearing, hot water or constrictive clothing  -Relieve pressure over bony prominences   -Do not massage red bony areas    Next Steps:  -Teach patient strategies to minimize risks    -Consider consults to  interdisciplinary teams  Outcome: Progressing  Goal: Incision(s), wounds(s) or drain site(s) healing without S/S of infection  Description: INTERVENTIONS  - Assess and document dressing, incision, wound bed, drain sites and surrounding tissue  - Provide patient and family education  - Perform skin care/dressing changes  Outcome: Progressing  Goal: Pressure injury heals and does not worsen  Description: Interventions:  - Implement low air loss mattress or specialty surface (Criteria met)  - Apply silicone foam dressing  - Instruct/assist with weight shifting every 60 minutes when in chair   - Limit chair time to 2 hour intervals  - Use special pressure reducing interventions when in chair   - Apply fecal or urinary incontinence containment device   - Perform passive or active ROM   - Turn and reposition patient & offload bony prominences every 2 hours   - Utilize friction reducing device or surface for transfers   - Consider consults to  interdisciplinary teams  - Use incontinent care products after each incontinent episode  - Consider nutrition services referral as needed  Outcome: Progressing     Problem: HEMATOLOGIC - ADULT  Goal: Maintains hematologic stability  Description: INTERVENTIONS  - Assess for signs and symptoms of bleeding or hemorrhage  - Monitor labs  - Administer supportive blood products/factors as ordered and appropriate  Outcome: Progressing     Problem: MUSCULOSKELETAL - ADULT  Goal: Maintain or return mobility to  safest level of function  Description: INTERVENTIONS:  - Assess patient's ability to carry out ADLs; assess patient's baseline for ADL function and identify physical deficits which impact ability to perform ADLs (bathing, care of mouth/teeth, toileting, grooming, dressing, etc.)  - Assess/evaluate cause of self-care deficits   - Assess range of motion  - Assess patient's mobility  - Assess patient's need for assistive devices and provide as appropriate  - Encourage maximum independence but intervene and supervise when necessary  - Involve family in performance of ADLs  - Assess for home care needs following discharge   - Consider OT consult to assist with ADL evaluation and planning for discharge  - Provide patient education as appropriate  Outcome: Progressing  Goal: Maintain proper alignment of affected body part  Description: INTERVENTIONS:  - Support, maintain and protect limb and body alignment  - Provide patient/ family with appropriate education  Outcome: Progressing

## 2024-03-26 NOTE — ASSESSMENT & PLAN NOTE
Initially admitted on 03/20 to [facility] for cough/SOB x2 weeks - required 5L NC on admission   Had escalating O2 requirements overnight - upgraded to ICU this AM for respiratory distress/failure   Failed BIPAP - intubated shortly after arrival to ICU 3/21  Suspect this is multifactorial in setting of Metapneumovirus pneumonia vs COPD exacerbation vs flash pulmonary edema/acute on chronic HF  (3/21) CXR w/diffuse bronchitis and multifocal bronchiolitis/bronchopneumonia and possible superimposed very mild interstitial edema  (3/23) Pt was extubated to BiPAP with Precedex gtt. Tolerated well and was transitioned to HFNC which she did not tolerate. Pt was given Zyprexa and placed back on the BiPAP. On exam pt with no air movement on auscultation. Pt was re-intubated with bougie device and a 7.0 ETT.    Plan:  RP2 panel (+) for Metapneumovirus  Urine strep/legionella negative  Sputum culture negative  (3/21) CTA PE study - subpleural consolidation in the inferior left lower lobe, possibly atelectatic but pneumonia cannot be excluded. Minimal compressive atelectasis in the dependent right lower lobe.   Azithro/Cftx completed  Continue IV Solu Medrol 40 mg Q12H for COPD exacerbation  Continue scheduled Xopenex/Atrovent/Pulmicort  Is s/p IV Lasix 80 mg x1 w/good responsive - continue PRN for goal negative 1-2L  Current vent settings: ACVC 20/470/50/8 - today is MV D#4 after reintubation  Follow ABG and adjust vent settings as necessary  Currently sedated w/Propofol/Fentanyl gtt's for goal RASS -2 to -3  Aggressive pulmonary hygiene  VAP precautions  Daily SAT/SBT  Wean FiO2 for SpO2 > 88%

## 2024-03-26 NOTE — ASSESSMENT & PLAN NOTE
Wt Readings from Last 3 Encounters:   03/26/24 79.1 kg (174 lb 6.1 oz)   03/13/24 81.1 kg (178 lb 12.8 oz)   01/05/24 85 kg (187 lb 6.4 oz)     Has history of nonischemic dilated cardiomyopathy - first noted in 2021, then again in 2022  TTE from 03/2023 revealed EF 70% w/mild to moderate concentric hypertrophy and mildly increased wall thickness; G1DD  Repeat today reveals EF 30% as noted above     Plan:  Is s/p IV Lasix 80 mg x1 - continue w/PRN diuresis   Continue home Coreg   Holding home ASA given acute GIB  Strict I/O - maintain Henley cath for accurate I/O  Daily weights

## 2024-03-26 NOTE — ACP (ADVANCE CARE PLANNING)
Critical Care Advanced Care Planning Note  Juani Morales 63 y.o. female MRN: [id number]  Unit/Bed#: ICU 11-01 Encounter: [id number]    Juani Morales is a 63 y.o. female requiring critical care evaluation and advanced care planning. The patient has chronic comorbidities, including but not limited to COPD, ICM, HTN, HLD, Anxiety, Chronic pain syndrome opiod dependent, CKD, which is now further complicated by the following acute conditions: Acute respiratory failure 2/2 human metapnuemovirus pneumonia requiring intubation x 2 .  Due to the severity of the patient's acute condition and/or the extent of chronic conditions, additional conversations pertaining to advanced care planning were required. Today's discussion, which was held in a face-to-face meeting, included Monica Terry (patient's daughter), and it was established that all stake holders understood the rationale for the advanced care planning. The patient was able to participate in the discussion.    Summary of Discussion:  Patient was extubated this am to BiPAP and then weaned to high flow. Discussion of code status discussed with patient and daughter Monica. Both women were made aware that patient was at high risk for reintubation since she failed extubation 3 days ago and would likely end up needing a tracheostomy. Patient expressed her desire that she did not want to be re intubated/ have a tracheostomy or CPR. Code status changed to Level 3 code DNR/DNI.      Total time spent, (15) minutes (1745 to 1800).      CODE STATUS: DNR/DNI - Level 3  POA:    POLST:      SIGNATURE: ESTHER Whatley  DATE: March 26, 2024  TIME: 6:11 PM

## 2024-03-26 NOTE — RESPIRATORY THERAPY NOTE
03/26/24 1114   Respiratory Assessment   Resp Comments Pt extubated to bipap. Strong voice, no stridor heard, good airation bilat. sats stable. Pt keith bipap well. Skin barrier applied. Will cont. to monitor.   Non-Invasive Information   O2 Interface Device Full face mask   Non-Invasive Ventilation Mode BiPAP   $ Continous NIV Initial   SpO2 93 %   $ Pulse Oximetry Spot Check Charge Completed   Non-Invasive Settings   IPAP (cm) 12 cm   EPAP (cm) 6 cm   Rate (Set) 12   FiO2 (%) 50   Pressure Support (cm H2O) 6   Trigger Sensitivity Flow (lpm) 5   Inspiratory Time (Set) 1   Expiratory Sensitivity (%) 25   Temperature (Set) 31   Non-Invasive Readings   Skin Intervention Skin barrier applied   Total Rate 17   MAP (Obs) 7.2   MV (Mech) 16.3   Peak Pressure (Obs) 14   Spontaneous Vt (mL) 825   I/E Ratio (Obs) 1:3.6   Heater Temperature (Obs) 33   Leak (lpm) 28   Non-Invasive Alarms   Insp Pressure High (cm H20) 40   Insp Pressure Low (cm H20) 5   MV High (L/min) 20   MV Low (L/min) 4   Vt High (mL) 1000   Vt Low (mL) 200   High Resp Rate (BPM) 40 BPM   Low Resp Rate (BPM) 8 BPM   Apnea Interval (sec) 20   Apnea Rate 12

## 2024-03-27 NOTE — ASSESSMENT & PLAN NOTE
Troponins negative on admission - now elevated to 1290, peaked at 2018  Suspect this is 2/2 demand ischemia in setting of shock state/respiratory failure rather than ACS  TTE w/EF 30% as noted above - likely stress-induced  Denied chest pain prior to intubation  ECG initially w/ST depression in V4/V5 while in respiratory distress - no ST depressions on repeat ECG   Discussed case w/Dr. Chow (Cardiology) who agrees this is likely 2/2 demand rather than ACS    Plan:  Comfort measures

## 2024-03-27 NOTE — ASSESSMENT & PLAN NOTE
Wt Readings from Last 3 Encounters:   03/26/24 79.1 kg (174 lb 6.1 oz)   03/13/24 81.1 kg (178 lb 12.8 oz)   01/05/24 85 kg (187 lb 6.4 oz)     Has history of nonischemic dilated cardiomyopathy - first noted in 2021, then again in 2022  TTE from 03/2023 revealed EF 70% w/mild to moderate concentric hypertrophy and mildly increased wall thickness; G1DD  Repeat today reveals EF 30% as noted above     Plan:  Transitioned to comfort measures 3/26

## 2024-03-27 NOTE — NURSING NOTE
alfie Naranjo and Father Enoc at the bedside. Patient on the phone face timing family members to say good bye. Talking about family memories. All questions answered. Melissa Browning explained comfort measures. She is ready to be comfortable

## 2024-03-27 NOTE — ASSESSMENT & PLAN NOTE
Now resolved  Suspect mixed etiology: septic in setting of metapneumovirus pneumonia vs cardiogenic w/known dilated cardiomyopathy and acute on chronic HF  CXR this AM as noted above  TTE this AM revealed EF 30% w/moderate to severely reduced systolic function in a global manner, though septum is less kinetic than remaining segments; dilated RV w/mildly reduced systolic function; mild AR, trace TR  CVP 9    Plan:  RP2 panel (+) for metapneumovirus  Blood cultures from admission pending - repeats sent given acute decompensation  UA negative  Urine strep/legionella negative  Sputum culture negative  Lactic acid 1.5  Procalcitonin <0.05 x2; now elevated to 4.55  Transitioned to comfort measures

## 2024-03-27 NOTE — PROGRESS NOTES
Pastoral Care Progress Note    3/27/2024  Patient: Juani Morales : 1960  Admission Date & Time: 3/20/2024 1149  MRN: [id number] [facility]: [id number]    CH received urgent consult request from family via RN in setting of pt decision to transition to comfort care, responding at . Pt received CH reclined in bed, daughter, son in law and nephew present bedside.  Pt shared her intention to stop interventional cares and to be made comfortable. Pt shared that she did not want to continue to attempt interventions because her experience of breathing was very uncomfortable, laborious and she also no longer wished to endure the intubation process, which she perceived might remain necessary.   Family expressed their support of Juani. Family had facilitated multiple conversations today with family members for Juani to say goodbye. Family expressed their sadness that Juani will soon die, but also shared they are at peace with her decision. Family shared their relief that the pt could voice her wishes at this time.  Family asked questions of CH regarding dying process, discussed theology of the afterlife, shared their grief and exhaustion. CH provided support and normalized experience.  Pt declined specific sacramental needs at this time, however when  Father Enoc was bedside, pt received a blessing with nicola.  CH encouraged family to focus on the moment, care for themselves and to know that Juani feels supported and loved just by their presence in the room.  CH will follow.               Chaplaincy Interventions Utilized:   Empowerment: Encouraged self-care, Facilitated group experience, Normalized experience of patient/family, and Provided grief counseling    Exploration: Explored emotional needs & resources    Collaboration: Consulted with interdisciplinary team    Relationship Building: Listened empathically      Chaplaincy Outcomes Achieved:  Expressed gratitude and Expressed peace    Spiritual Coping  Strategies Utilized:   Spiritual comfort       03/26/24 2100   Clinical Encounter Type   Visited With Patient and family together   Routine Visit Follow-up   Referral From Nurse;Family

## 2024-03-27 NOTE — ASSESSMENT & PLAN NOTE
Suspect mixed etiology: septic in setting of metapneumovirus pneumonia vs cardiogenic w/known dilated cardiomyopathy and acute on chronic HF  TTE this AM revealed EF 30% w/moderate to severely reduced systolic function in a global manner, though septum is less kinetic than remaining segments; dilated RV w/mildly reduced systolic function; mild AR, trace TR  RP2 panel (+) for metapneumovirus  Blood cultures w/NGTD  UA negative  Urine strep/legionella negative  Sputum culture negative  Lactic acid 1.5  Procalcitonin 4.55 --> 0.48  Is s/p IV diuresis w/appropriate response  Completed course of IV Azithromycin/Ceftriaxone for CAP  Required low dose vasopressors for brief period of time  Transitioned to comfort care per patient's wishes on 03/26

## 2024-03-27 NOTE — ASSESSMENT & PLAN NOTE
Troponins peaked at 2018  Suspect this is 2/2 demand ischemia in setting of shock state/respiratory failure rather than ACS  TTE w/EF 30% as noted above - likely stress-induced  Denied chest pain prior to intubation  ECG initially w/ST depression in V4/V5 while in respiratory distress - no ST depressions on repeat ECG   Discussed case w/Dr. Chow (Cardiology) who agreed this was likely 2/2 demand rather than ACS  Transitioned to comfort care per patient's wishes on 03/26

## 2024-03-27 NOTE — ASSESSMENT & PLAN NOTE
Lab Results   Component Value Date    EGFR 96 03/26/2024    EGFR 92 03/25/2024    EGFR 85 03/24/2024    CREATININE 0.62 03/26/2024    CREATININE 0.70 03/25/2024    CREATININE 0.75 03/24/2024     Baseline creatinine appears to be around 0.6 - 1.0  Has chronically occluded R renal artery and L renal artery is s/p stenting in 2022 - required IHD for 1/5 months in 2022    Plan:  Comfort measures

## 2024-03-27 NOTE — NURSING NOTE
Medications give PO. Patient had difficulty swallowing with applesauce. Morphine and Ativan given IV. Susi removed. Patient placed on 2L NC.Bed bath given. Positioned for comfort. Medical equipment removed from the room.Family back to the bedside.

## 2024-03-27 NOTE — NURSING NOTE
Notified per family the patients wishes are to donate her body. GOL called. Family notified they need to set this up with [employer] or [employer] [facility], after her death.

## 2024-03-27 NOTE — ASSESSMENT & PLAN NOTE
Suspect this is multifactorial in setting of Metapneumovirus pneumonia vs COPD exacerbation vs flash pulmonary edema/acute on chronic HF  Events:  Initially admitted on 03/20 to [facility] for cough/SOB x2 weeks - required 5L NC on admission   Upgraded to ICU on 03/21 for escalating O2 requirements/worsening respiratory failure - failed BIPAP and was intubated shortly after arrival to ICU on 03/21  Extubated to BIPAP on 03/23, however required reintubation later than evening  Extubated to BIPAP on 03/26  Imaging w/subpleural consolidation in inferior left lower lobe, possibly atelectatic but pneumonia cannot be excluded; Minimal compressive atelectasis in dependent right lower lobe  RP2 panel (+) for Metapneumovirus  Urine strep/legionella negative  Sputum culture negative  Completed course of IV Azithromycin/Ceftriaxone for CAP  Previously on IV Solu Medrol and scheduled nebs for COPD exacerbation  Transitioned to comfort care per patient's wishes on 03/26

## 2024-03-27 NOTE — PROGRESS NOTES
Pastoral Care Progress Note    3/27/2024  Patient: Juani Morales : 1960  Admission Date & Time: 3/20/2024 1149  MRN: [id number] CSN: [id number]    CH provided follow up support to family. Pt received CH reclined in bed, aware but not speaking very much. Daughter and ANGELINE bedside.  CH inquired after wellbeing of family and pt. Pt responded that she was content. Family shared they had slept restlessly, but were very grateful to be able to remain bedside and daughter will remain until pt passes. Family shared their perception that pt is comfortable and expressed their gratefulness for the care provided by the staff.  CH will check on family before end of day to ascertain any further needs.               Chaplaincy Interventions Utilized:   Empowerment: Encouraged focus on present and Provided anticipatory guidance    Chaplaincy Outcomes Achieved:  Expressed peace and Identified priorities        Spiritual Coping Strategies Utilized:   Connectedness       24 1000   Clinical Encounter Type   Visited With Patient and family together   Routine Visit Follow-up

## 2024-03-27 NOTE — NURSING NOTE
Assumed care of patient on comfort care.  Patient remains lethargic; responds to pain with repositioning.  Dilaudid gtt continuous at 2 mg / hr. For comfort.  Family remains at bedside for support.

## 2024-03-27 NOTE — PROGRESS NOTES
[facility]  Progress Note  Name: Juani Morales I  MRN: [id number]  Unit/Bed#: ICU 11-01 I Date of Admission: 3/20/2024   Date of Service: 3/27/2024 I Hospital Day: 7    Assessment/Plan   * Acute respiratory failure with hypoxia (HCC)  Assessment & Plan  Initially admitted on 03/20 to [facility] for cough/SOB x2 weeks - required 5L NC on admission   Had escalating O2 requirements overnight - upgraded to ICU this AM for respiratory distress/failure   Failed BIPAP - intubated shortly after arrival to ICU 3/21  Suspect this is multifactorial in setting of Metapneumovirus pneumonia vs COPD exacerbation vs flash pulmonary edema/acute on chronic HF  (3/21) CXR w/diffuse bronchitis and multifocal bronchiolitis/bronchopneumonia and possible superimposed very mild interstitial edema  (3/23) Pt was extubated to BiPAP with Precedex gtt. Tolerated well and was transitioned to HFNC which she did not tolerate. Pt was given Zyprexa and placed back on the BiPAP. On exam pt with no air movement on auscultation. Pt was re-intubated with bougie device and a 7.0 ETT.    Plan:  RP2 panel (+) for Metapneumovirus  Urine strep/legionella negative  Sputum culture negative  (3/21) CTA PE study - subpleural consolidation in the inferior left lower lobe, possibly atelectatic but pneumonia cannot be excluded. Minimal compressive atelectasis in the dependent right lower lobe.   Azithro/Cftx completed  Extubated to Bipap 3/26, transitioned to HFNC  Patient refusing Bipap, DNR/DNI and ultimately transitioned to comfort measures    Shock (HCC)  Assessment & Plan  Now resolved  Suspect mixed etiology: septic in setting of metapneumovirus pneumonia vs cardiogenic w/known dilated cardiomyopathy and acute on chronic HF  CXR this AM as noted above  TTE this AM revealed EF 30% w/moderate to severely reduced systolic function in a global manner, though septum is less kinetic than remaining segments; dilated RV w/mildly reduced systolic  function; mild AR, trace TR  CVP 9    Plan:  RP2 panel (+) for metapneumovirus  Blood cultures from admission pending - repeats sent given acute decompensation  UA negative  Urine strep/legionella negative  Sputum culture negative  Lactic acid 1.5  Procalcitonin <0.05 x2; now elevated to 4.55  Transitioned to comfort measures    Human metapneumovirus pneumonia  Assessment & Plan  Please see plan as noted above    COPD with acute exacerbation (HCC)  Assessment & Plan  POA  Likely exacerbated by metapneumovirus pneumonia  Does not appear to follow w/Pulmonology - no PFTs available for review  Continues to smoke 1-2 PPD    Plan:  Comfort measures     Acute on chronic combined systolic (congestive) and diastolic (congestive) heart failure (HCC)  Assessment & Plan  Wt Readings from Last 3 Encounters:   03/26/24 79.1 kg (174 lb 6.1 oz)   03/13/24 81.1 kg (178 lb 12.8 oz)   01/05/24 85 kg (187 lb 6.4 oz)     Has history of nonischemic dilated cardiomyopathy - first noted in 2021, then again in 2022  TTE from 03/2023 revealed EF 70% w/mild to moderate concentric hypertrophy and mildly increased wall thickness; G1DD  Repeat today reveals EF 30% as noted above     Plan:  Transitioned to comfort measures 3/26    Elevated troponin  Assessment & Plan  Troponins negative on admission - now elevated to 1290, peaked at 2018  Suspect this is 2/2 demand ischemia in setting of shock state/respiratory failure rather than ACS  TTE w/EF 30% as noted above - likely stress-induced  Denied chest pain prior to intubation  ECG initially w/ST depression in V4/V5 while in respiratory distress - no ST depressions on repeat ECG   Discussed case w/Dr. Chow (Cardiology) who agrees this is likely 2/2 demand rather than ACS    Plan:  Comfort measures    GIB (gastrointestinal bleeding)  Assessment & Plan  Coffee-ground output noted in OGT     Plan:  Comfort measures    Prediabetes  Assessment & Plan  Plan:  Comfort  measures    Polycythemia  Assessment & Plan  Likely in setting of chronic hypoxemia    Plan:  Comfort measures    Chronic kidney disease (CKD)  Assessment & Plan  Lab Results   Component Value Date    EGFR 96 03/26/2024    EGFR 92 03/25/2024    EGFR 85 03/24/2024    CREATININE 0.62 03/26/2024    CREATININE 0.70 03/25/2024    CREATININE 0.75 03/24/2024     Baseline creatinine appears to be around 0.6 - 1.0  Has chronically occluded R renal artery and L renal artery is s/p stenting in 2022 - required IHD for 1/5 months in 2022    Plan:  Comfort measures    Tobacco abuse  Assessment & Plan  Smokes 1-2 PPD     Plan:  Comfort measures    Anxiety  Assessment & Plan  Plan:  Comfort measures  PRN Ativan    Chronic pain syndrome  Assessment & Plan  Plan:  Now transitioned to comfort measures  Dilaudid infusion    Essential hypertension  Assessment & Plan  Plan:  Comfort measures             Disposition: Med Surg    ICU Core Measures     A: Assess, Prevent, and Manage Pain Has pain been assessed? Yes  Need for changes to pain regimen? No   B: Both SAT/SAT  N/A   C: Choice of Sedation RASS Goal: -1 Drowsy  Need for changes to sedation or analgesia regimen? No   D: Delirium CAM-ICU: Unable to perform secondary to Acute cognitive dysfunction   E: Early Mobility  Plan for early mobility? NA   F: Family Engagement Plan for family engagement today? Yes       Review of Invasive Devices:    [place] Plan: end of life care  Central access plan: No peripheral access able to be obtained.  Plan end of life care  [place] Plan: Discontinue arterial line    Prophylaxis:  VTE Contraindicated secondary to: comfort measures   Stress Ulcer  not ordered         Significant 24hr Events     24hr events: Extubated to Bipap, transitioned to HFNC. Multiple GOC discussions, initially made DNR/DNI and ultimately transitioned to comfort measures.     Subjective   Review of Systems   Unable to perform ROS: Other      Objective                             Vitals I/O      Most Recent Min/Max in 24hrs   Temp 99 °F (37.2 °C) Temp  Min: 99 °F (37.2 °C)  Max: 100.2 °F (37.9 °C)   Pulse 91 Pulse  Min: 79  Max: 91   Resp (!) 25 Resp  Min: 16  Max: 35   /87 BP  Min: 155/74  Max: 198/93   O2 Sat 92 % SpO2  Min: 92 %  Max: 97 %      Intake/Output Summary (Last 24 hours) at 3/27/2024 0545  Last data filed at 3/26/2024 1801  Gross per 24 hour   Intake 1559.98 ml   Output 1360 ml   Net 199.98 ml       Diet Regular; Pleasure Feed    Invasive Monitoring           Physical Exam   Physical Exam  Vitals and nursing note reviewed.   Eyes:      Pupils: Pupils are equal, round, and reactive to light.   Skin:     General: Skin is warm and dry.   HENT:      Head: Normocephalic.      Mouth/Throat:      Mouth: Mucous membranes are dry.   Neck:      Vascular: Central line present.   Cardiovascular:      Rate and Rhythm: Normal rate and regular rhythm.      Pulses: Normal pulses.      Heart sounds: Normal heart sounds.   Musculoskeletal:         General: Normal range of motion.   Abdominal:      Palpations: Abdomen is soft.   Constitutional:       Appearance: She is ill-appearing.   Pulmonary:      Effort: Pulmonary effort is normal.   Neurological:      General: No focal deficit present.   Genitourinary/Anorectal:  [place] present.          Diagnostic Studies      EKG: n/a  Imaging:  I have personally reviewed pertinent reports.   and I have personally reviewed pertinent films in PACS     Medications:  Scheduled PRN      glycopyrrolate, 0.1 mg, Q4H PRN  haloperidol lactate, 0.5 mg, Q2H PRN  morphine injection, 2 mg, Q1H PRN  ondansetron, 4 mg, Q6H PRN       Continuous    HYDROmorphone, 1 mg/hr, Last Rate: 1 mg/hr (03/26/24 2231)         Labs:    CBC    Recent Labs     03/26/24  0549   WBC 10.12   HGB 14.0   HCT 45.7        BMP    Recent Labs     03/26/24  0549   SODIUM 142   K 4.6      CO2 29   AGAP 6   BUN 26*   CREATININE 0.62   CALCIUM 8.9       Coags    No recent  results     Additional Electrolytes  Recent Labs     03/26/24  0549   MG 2.1   PHOS 3.1          Blood Gas    No recent results  No recent results LFTs  No recent results    Infectious  No recent results  Glucose  Recent Labs     03/26/24  0549   GLUC 228*               ESTHER Gregorio

## 2024-03-27 NOTE — NURSING NOTE
Family at the bedside. Patient awake on HFNC. No respiratory distress at the present. Patient and family requesting a . Patient would like to transition to comfort care only. Reached out to Father Enoc and Marcella Nunez for guidance. Provided comfort to the family and Juani .

## 2024-03-27 NOTE — ASSESSMENT & PLAN NOTE
Coffee-ground output noted in OGT   Previously on IV PPI BID  GI consulted during admission  Transitioned to comfort care per patient's wishes on 03/26

## 2024-03-27 NOTE — ACP (ADVANCE CARE PLANNING)
Critical Care Advanced Care Planning Note  Juani Morales 63 y.o. female MRN: [id number]  Unit/Bed#: ICU 11-01 Encounter: [id number]    Juani Morales is a 63 y.o. female requiring critical care evaluation and advanced care planning. The patient has chronic comorbidities, including but not limited to nonischemic cardiomyopathy, COPD, HTN, which is now further complicated by the following acute conditions: acute hypoxic respiratory failure.  Due to the severity of the patient's acute condition and/or the extent of chronic conditions, additional conversations pertaining to advanced care planning were required. Today's discussion, which was held in a face-to-face meeting, included  Juani, her daughter, son-in law and nephew , and it was established that all stake holders understood the rationale for the advanced care planning. The patient was able to participate in the discussion.    Summary of Discussion:  Ongoing goals of care discussions throughout the day. Patient has expressed that she would not like any escalation of care and is refusing to go back on Bipap. Earlier it was discussed and the patient made clear she would not want tracheostomy, she was made Level 3 at that time. This evening patient and daughter requesting  at bedside. Discussed comfort care measures and patient requests that we move to comfort focused care. The process was explained in detail all questions were answered.  and Father present at the bedside with family.       Total time spent, (20) minutes (2110 to 2130).      CODE STATUS: COMFORT CARE - Level 4  POA:    POLST:      SIGNATURE: ESTHER Gregorio  DATE: March 26, 2024  TIME: 9:47 PM

## 2024-03-27 NOTE — ASSESSMENT & PLAN NOTE
Initially admitted on 03/20 to [facility] for cough/SOB x2 weeks - required 5L NC on admission   Had escalating O2 requirements overnight - upgraded to ICU this AM for respiratory distress/failure   Failed BIPAP - intubated shortly after arrival to ICU 3/21  Suspect this is multifactorial in setting of Metapneumovirus pneumonia vs COPD exacerbation vs flash pulmonary edema/acute on chronic HF  (3/21) CXR w/diffuse bronchitis and multifocal bronchiolitis/bronchopneumonia and possible superimposed very mild interstitial edema  (3/23) Pt was extubated to BiPAP with Precedex gtt. Tolerated well and was transitioned to HFNC which she did not tolerate. Pt was given Zyprexa and placed back on the BiPAP. On exam pt with no air movement on auscultation. Pt was re-intubated with bougie device and a 7.0 ETT.    Plan:  RP2 panel (+) for Metapneumovirus  Urine strep/legionella negative  Sputum culture negative  (3/21) CTA PE study - subpleural consolidation in the inferior left lower lobe, possibly atelectatic but pneumonia cannot be excluded. Minimal compressive atelectasis in the dependent right lower lobe.   Azithro/Cftx completed  Extubated to Bipap 3/26, transitioned to HFNC  Patient refusing Bipap, DNR/DNI and ultimately transitioned to comfort measures

## 2024-03-27 NOTE — ASSESSMENT & PLAN NOTE
Previously taking Neurontin, MS Contin 30 mg BID and Percocet 5-325 mg Q8H - PDMP reviewed   Now on Dilaudid gtt in setting of comfort measures only

## 2024-03-27 NOTE — ASSESSMENT & PLAN NOTE
POA  Likely exacerbated by metapneumovirus pneumonia  Does not appear to follow w/Pulmonology - no PFTs available for review  Continues to smoke 1-2 PPD    Plan:  Comfort measures

## 2024-03-27 NOTE — NURSING NOTE
Patient with frequent moist cough, unable to us oral suction. Robinal given.Ativan given. Melissa RAMIREZ Notified.Dilaudid gtt started. Family relaxed at the bedside

## 2024-03-27 NOTE — ASSESSMENT & PLAN NOTE
Lab Results   Component Value Date    EGFR 96 03/26/2024    EGFR 92 03/25/2024    EGFR 85 03/24/2024    CREATININE 0.62 03/26/2024    CREATININE 0.70 03/25/2024    CREATININE 0.75 03/24/2024     Baseline creatinine appears to be around 0.6 - 1.0  Has chronically occluded R renal artery and L renal artery is s/p stenting in 2022 - required IHD for 1/5 months in 2022

## 2024-03-27 NOTE — ASSESSMENT & PLAN NOTE
Wt Readings from Last 3 Encounters:   03/26/24 79.1 kg (174 lb 6.1 oz)   03/13/24 81.1 kg (178 lb 12.8 oz)   01/05/24 85 kg (187 lb 6.4 oz)     Has history of nonischemic dilated cardiomyopathy - first noted in 2021, then again in 2022  TTE from 03/2023 revealed EF 70% w/mild to moderate concentric hypertrophy and mildly increased wall thickness; G1DD  Repeat today reveals EF 30% as noted above   Is s/p IV diuresis as noted above  Previously on Coreg, PRN Lasix, statin, ASA as outpatient  Transitioned to comfort care per patient's wishes on 03/26

## 2024-03-27 NOTE — CASE MANAGEMENT
Case Management Discharge Planning Note    Patient name Juani Morales  Location ICU 11/ICU  MRN [id number]  : 1960 Date 3/27/2024       Current Admission Date: 3/20/2024  Current Admission Diagnosis:Acute respiratory failure with hypoxia (HCC)   Patient Active Problem List    Diagnosis Date Noted    Human metapneumovirus pneumonia 2024    Shock (HCC) 2024    GIB (gastrointestinal bleeding) 2024    Polycythemia 2024    Prediabetes 2024    Pneumonia 2024    Retinal artery occlusion, branch, right 2024    Chronic fatigue 2024    Elevated blood sugar 10/16/2023    Continuous opioid dependence (HCC) 2023    Asymptomatic menopausal state 2023    Constipation 2023    Encounter for screening mammogram for breast cancer 2023    COPD with acute exacerbation (HCC) 2022    Chronic kidney disease (CKD) 2022    Secondary hyperparathyroidism of renal origin (HCC) 2022    Renal artery stenosis (HCC) 2022    Hepatitis B 2022    Acute respiratory failure with hypoxia (HCC) 2022    COPD (chronic obstructive pulmonary disease) (HCC) 06/15/2022    Acute on chronic combined systolic (congestive) and diastolic (congestive) heart failure (HCC) 2021    Tobacco abuse 2021    Anxiety 2021    Dilated cardiomyopathy (HCC) 2021    Essential hypertension 2021    Chronic pain syndrome 2021    Elevated troponin 2021    Vitamin D deficiency 2013      LOS (days): 7  Geometric Mean LOS (GMLOS) (days): 5.2  Days to GMLOS:-1.8     OBJECTIVE:  Risk of Unplanned Readmission Score: 23.37         Current admission status: Inpatient   Preferred Pharmacy:   [id number] [place] - [address] [facility]  [address] [place] [address]  [place] [address]  Phone: [phone] Fax: [phone]    Primary Care Provider: Hugh Castro DO    Primary Insurance: MEDICARE  Secondary  Insurance: [employer] [facility]    DISCHARGE DETAILS:  Pt transitioned to comfort measures.

## 2024-03-27 NOTE — ASSESSMENT & PLAN NOTE
POA  Likely exacerbated by metapneumovirus pneumonia  Does not appear to follow w/Pulmonology - no PFTs available for review  Continues to smoke 1-2 PPD  Previously on IV Solu Medrol and scheduled nebs  Transitioned to comfort care per patient's wishes on 03/26

## 2024-03-28 NOTE — NURSING NOTE
Patient moaning and restless in the bed.PRN medication given. Patient positioned for comfort, fresh linen provided. Daughter Monica at the bedside. Will continue to provide comfort to patient and family

## 2024-03-28 NOTE — DEATH NOTE
INPATIENT DEATH NOTE  Juani Morales 63 y.o. female MRN: [id number]  Unit/Bed#: ICU  Encounter: [id number]    Date, Time and Cause of Death    Date of Death: 3/28/24  Time of Death:  4:20 AM  Preliminary Cause of Death: Acute respiratory failure with hypoxia (HCC)  Entered by: ESTHER Pina[DR1.1]       Attribution       DR1.1 ESTHER Younger 24 04:33             Patient's Information  Pronounced by: ESTHER Pina  Did the patient's death occur in the ED?: No  Did the patient's death occur in the OR?: No  Did the patient's death occur less than 10 days post-op?: No  Did the patient's death occur within 24 hours of admission?: No  Was code status DNR at the time of death?: Yes    PHYSICAL EXAM:  Unresponsive to noxious stimuli, Spontaneous respirations absent, Breath sounds absent, Carotid pulse absent, Heart sounds absent, Pupillary light reflex absent, and Corneal blink reflex absent    Medical Examiner notification criteria:  NONE APPLICABLE   Medical Examiner's office notified?:  RN to notify  Medical Examiner accepted case?:  RN to notify  Name of Medical Examiner: RN to notify    Family Notification  Was the family notified?: Yes  Date Notified: 24  Time Notified: 421  Notified by: ESTHER Pina  Name of Family Notified of Death: Monica Terry   Relationship to Patient: Daughter  Family Notification Route: At bedside  Was the family told to contact a  home?: Yes  Name of  Home:: [facility]  Home    Autopsy Options:  Post-mortem examination declined by next of kin    Primary Service Attending Physician notified?:  yes - Attending:  Zack Bettencourt, DO    Physician/Resident responsible for completing Discharge Summary:  ESTHER Pina

## 2024-03-28 NOTE — NURSING NOTE
Patient  at this time. Daughter and son in law at the bedside.Comfort provided. [employer] called. Family instructed to call [facility]  home. [employer] will call at [phone] when the office is open

## 2024-03-28 NOTE — DISCHARGE SUMMARY
[facility]  Discharge- Juani Morales 1960, 63 y.o. female MRN: [id number]  Unit/Bed#: ICU 11-01 Encounter: [id number]  Primary Care Provider: Hugh Castro DO   Date and time admitted to hospital: 3/20/2024 11:49 AM    * Acute respiratory failure with hypoxia (HCC)  Assessment & Plan  Suspect this is multifactorial in setting of Metapneumovirus pneumonia vs COPD exacerbation vs flash pulmonary edema/acute on chronic HF  Events:  Initially admitted on 03/20 to [facility] for cough/SOB x2 weeks - required 5L NC on admission   Upgraded to ICU on 03/21 for escalating O2 requirements/worsening respiratory failure - failed BIPAP and was intubated shortly after arrival to ICU on 03/21  Extubated to BIPAP on 03/23, however required reintubation later than evening  Extubated to BIPAP on 03/26  Imaging w/subpleural consolidation in inferior left lower lobe, possibly atelectatic but pneumonia cannot be excluded; Minimal compressive atelectasis in dependent right lower lobe  RP2 panel (+) for Metapneumovirus  Urine strep/legionella negative  Sputum culture negative  Completed course of IV Azithromycin/Ceftriaxone for CAP  Previously on IV Solu Medrol and scheduled nebs for COPD exacerbation  Transitioned to comfort care per patient's wishes on 03/26    Shock (HCC)  Assessment & Plan  Suspect mixed etiology: septic in setting of metapneumovirus pneumonia vs cardiogenic w/known dilated cardiomyopathy and acute on chronic HF  TTE this AM revealed EF 30% w/moderate to severely reduced systolic function in a global manner, though septum is less kinetic than remaining segments; dilated RV w/mildly reduced systolic function; mild AR, trace TR  RP2 panel (+) for metapneumovirus  Blood cultures w/NGTD  UA negative  Urine strep/legionella negative  Sputum culture negative  Lactic acid 1.5  Procalcitonin 4.55 --> 0.48  Is s/p IV diuresis w/appropriate response  Completed course of IV Azithromycin/Ceftriaxone  for CAP  Required low dose vasopressors for brief period of time  Transitioned to comfort care per patient's wishes on 03/26    Human metapneumovirus pneumonia  Assessment & Plan  Please see plan as noted above    COPD with acute exacerbation (HCC)  Assessment & Plan  POA  Likely exacerbated by metapneumovirus pneumonia  Does not appear to follow w/Pulmonology - no PFTs available for review  Continues to smoke 1-2 PPD  Previously on IV Solu Medrol and scheduled nebs  Transitioned to comfort care per patient's wishes on 03/26    Acute on chronic combined systolic (congestive) and diastolic (congestive) heart failure (HCC)  Assessment & Plan  Wt Readings from Last 3 Encounters:   03/26/24 79.1 kg (174 lb 6.1 oz)   03/13/24 81.1 kg (178 lb 12.8 oz)   01/05/24 85 kg (187 lb 6.4 oz)     Has history of nonischemic dilated cardiomyopathy - first noted in 2021, then again in 2022  TTE from 03/2023 revealed EF 70% w/mild to moderate concentric hypertrophy and mildly increased wall thickness; G1DD  Repeat today reveals EF 30% as noted above   Is s/p IV diuresis as noted above  Previously on Coreg, PRN Lasix, statin, ASA as outpatient  Transitioned to comfort care per patient's wishes on 03/26    Elevated troponin  Assessment & Plan  Troponins peaked at 2018  Suspect this is 2/2 demand ischemia in setting of shock state/respiratory failure rather than ACS  TTE w/EF 30% as noted above - likely stress-induced  Denied chest pain prior to intubation  ECG initially w/ST depression in V4/V5 while in respiratory distress - no ST depressions on repeat ECG   Discussed case w/Dr. Chow (Cardiology) who agreed this was likely 2/2 demand rather than ACS  Transitioned to comfort care per patient's wishes on 03/26    GIB (gastrointestinal bleeding)  Assessment & Plan  Coffee-ground output noted in OGT   Previously on IV PPI BID  GI consulted during admission  Transitioned to comfort care per patient's wishes on  03/26    Prediabetes  Assessment & Plan  HA1C 6.0  Not on antihyperglycemic medications as outpatient     Polycythemia  Assessment & Plan  Likely in setting of chronic hypoxemia    Chronic kidney disease (CKD)  Assessment & Plan  Lab Results   Component Value Date    EGFR 96 03/26/2024    EGFR 92 03/25/2024    EGFR 85 03/24/2024    CREATININE 0.62 03/26/2024    CREATININE 0.70 03/25/2024    CREATININE 0.75 03/24/2024     Baseline creatinine appears to be around 0.6 - 1.0  Has chronically occluded R renal artery and L renal artery is s/p stenting in 2022 - required IHD for 1/5 months in 2022    Tobacco abuse  Assessment & Plan  Smokes 1-2 PPD     Anxiety  Assessment & Plan  Takes Klonopin as outpatient - PDMP reviewed   Continue PRN Ativan in setting of comfort measures    Chronic pain syndrome  Assessment & Plan  Previously taking Neurontin, MS Contin 30 mg BID and Percocet 5-325 mg Q8H - PDMP reviewed   Now on Dilaudid gtt in setting of comfort measures only      Essential hypertension  Assessment & Plan  Previously on Lisinopril and Coreg as outpatient   Pt now on comfort care measures only              Medical Problems       Resolved Problems  Date Reviewed: 3/28/2024   None         Admission Date:   Admission Orders (From admission, onward)       Ordered        03/20/24 1354  INPATIENT ADMISSION  Once                            Admitting Diagnosis: Pneumonia [J18.9]  SOB (shortness of breath) [R06.02]  COPD exacerbation (HCC) [J44.1]  Flu-like symptoms [R68.89]  Hypoxic respiratory failure (HCC) [J96.91]    HPI: Per HPI written by ESTHER San: Juani Morales is a 63 y.o. female w/PMHx of HTN, HLD, history of nonischemic dilated cardiomyopathy, chronic diastolic HF, CKD3A, COPD, prediabetes, chronic pain syndrome w/opioid dependence, anxiety, tobacco abuse who initially presented on 03/20 for cough and SOB x2 weeks. Patient was admitted under [facility] for pneumonia and COPD exacerbation. She was started  on IV antibiotics for CAP and IV steroids for COPD exacerbation. Overnight, she had worsening O2 requirements and CXR was concerning for pulmonary edema. She was given IV Lasix 80 mg x1. Patient was upgraded to ICU shortly after for worsening respiratory failure and respiratory distress. On exam, she was cold, clammy, and mottled. She remained oriented, however was very lethargic. She was hypertensive and in respiratory distress w/hypoxia on 9L MFNC. Unfortunately, she failed BIPAP and was subsequently intubated for respiratory failure. Bedside TTE revealed depressed EF when compared to prior TTE. Henley cath was placed and 2.2L UO was noted. Given concern for cardiogenic shock, R IJ TLC was placed emergently to obtain SvO2 and for further hemodynamic monitoring        Procedures Performed:   Orders Placed This Encounter   Procedures    Central Line    ED ECG Documentation Only    Intubation    Intubation       Summary of Hospital Course: Pt was admitted 3/21 for acute hypoxic respiratory failure and ultimately required intubation. She was extuabted on 3/23 but required reintubation for increased wob. Pt tolerated daily SBT x2 days and was extubated again on 3/26. An ACP was held with the pt and her family. The pt made the decision to transition to comfort care on 3/26.    Significant Findings, Care, Treatment and Services Provided: N/A    Complications: N/A             Discharge instructions/Information to patient and family:   See after visit summary for information provided to patient and family.      Provisions for Follow-Up Care:  See after visit summary for information related to follow-up care and any pertinent home health orders.      PCP: Hugh Castro DO    Disposition:     Planned Readmission: No    Discharge Statement   I spent 24 minutes discharging the patient. This time was spent on the day of discharge. I had direct contact with the patient on the day of discharge. Additional documentation  is required if more than 30 minutes were spent on discharge.     Discharge Medications:  See after visit summary for reconciled discharge medications provided to patient and family.

## 2025-02-17 NOTE — ASSESSMENT & PLAN NOTE
CT chest, abdomen, pelvis showing incidental finding  1.) at site of posterior right kidney mid portion, there is an indeterminate 1.0 cm hypodense lesion.  Suggest initial attempted visualization with nonemergent renal ultrasound to evaluate whether this represents a cyst  2.) a 1.2 x 0.9 cm nodule in the prevascular region could represent a prominent lymph node cannot exclude a small thymoma.  Recommend 3-month follow-up unenhanced chest CT  Findings were discussed with family recommend outpatient follow-up with PCP.  Family and patient expressed understanding.   · Uncontrolled hypertension  · Home meds : Carvedilol 3 125 mg BID, lasix 40 mg daily  · Currently on Carvedilol 25 mg twice daily, , Isordil 30 mg q 8 hours  Amlodipine 5 mg daily added today    Hold hydralazine for possible rash  · Workup revealed high-grade stenosis of left renal artery -   · Will be to proceed with renal artery stent placement tomorrow with PermCath placement with IR  ·  Follow-up labs for secondary hypertension workup  · Cardiology following  · Cardiology/vascular input appreciated

## 2025-05-20 NOTE — ASSESSMENT & PLAN NOTE
· Current everyday smoker 2 PPD  · Per chart review, she is ordered O2 @ home but does not wear it  · Nicotine patch ordered  · Encourage Cessation No